# Patient Record
Sex: FEMALE | Race: WHITE | Employment: UNEMPLOYED | URBAN - METROPOLITAN AREA
[De-identification: names, ages, dates, MRNs, and addresses within clinical notes are randomized per-mention and may not be internally consistent; named-entity substitution may affect disease eponyms.]

---

## 2017-01-10 ENCOUNTER — HOSPITAL ENCOUNTER (OUTPATIENT)
Dept: PREADMISSION TESTING | Age: 57
Discharge: HOME | End: 2017-01-10
Attending: NURSE PRACTITIONER
Payer: COMMERCIAL

## 2017-01-10 ENCOUNTER — HOSPITAL ENCOUNTER (OUTPATIENT)
Dept: ULTRASOUND IMAGING | Age: 57
Discharge: HOME | End: 2017-01-10
Attending: SURGERY
Payer: COMMERCIAL

## 2017-01-10 ENCOUNTER — HOSPITAL ENCOUNTER (OUTPATIENT)
Dept: GENERAL RADIOLOGY | Age: 57
Discharge: HOME | End: 2017-01-10
Attending: NURSE PRACTITIONER
Payer: COMMERCIAL

## 2017-01-10 ENCOUNTER — OFFICE VISIT (OUTPATIENT)
Dept: SURGERY | Age: 57
End: 2017-01-10

## 2017-01-10 ENCOUNTER — HOSPITAL ENCOUNTER (OUTPATIENT)
Dept: ULTRASOUND IMAGING | Age: 57
Discharge: HOME | End: 2017-01-10
Attending: NURSE PRACTITIONER
Payer: COMMERCIAL

## 2017-01-10 VITALS
SYSTOLIC BLOOD PRESSURE: 141 MMHG | HEART RATE: 80 BPM | HEIGHT: 65 IN | WEIGHT: 221.13 LBS | BODY MASS INDEX: 36.84 KG/M2 | RESPIRATION RATE: 20 BRPM | TEMPERATURE: 97.7 F | DIASTOLIC BLOOD PRESSURE: 84 MMHG

## 2017-01-10 VITALS
BODY MASS INDEX: 36.85 KG/M2 | TEMPERATURE: 97.7 F | HEART RATE: 80 BPM | HEIGHT: 65 IN | WEIGHT: 221.2 LBS | SYSTOLIC BLOOD PRESSURE: 141 MMHG | RESPIRATION RATE: 20 BRPM | DIASTOLIC BLOOD PRESSURE: 84 MMHG

## 2017-01-10 DIAGNOSIS — K21.9 GASTROESOPHAGEAL REFLUX DISEASE WITHOUT ESOPHAGITIS: ICD-10-CM

## 2017-01-10 DIAGNOSIS — G47.33 OBSTRUCTIVE SLEEP APNEA: ICD-10-CM

## 2017-01-10 DIAGNOSIS — E11.9 CONTROLLED TYPE 2 DIABETES MELLITUS WITHOUT COMPLICATION, WITHOUT LONG-TERM CURRENT USE OF INSULIN (HCC): ICD-10-CM

## 2017-01-10 DIAGNOSIS — E66.01 MORBID OBESITY, UNSPECIFIED OBESITY TYPE (HCC): Primary | ICD-10-CM

## 2017-01-10 DIAGNOSIS — K75.81 NASH (NONALCOHOLIC STEATOHEPATITIS): ICD-10-CM

## 2017-01-10 DIAGNOSIS — E66.01 MORBID OBESITY, UNSPECIFIED OBESITY TYPE (HCC): ICD-10-CM

## 2017-01-10 DIAGNOSIS — Z01.818 PREOP GENERAL PHYSICAL EXAM: ICD-10-CM

## 2017-01-10 LAB
25(OH)D3 SERPL-MCNC: 33.6 NG/ML (ref 30–100)
ALBUMIN SERPL BCP-MCNC: 3.9 G/DL (ref 3.5–5)
ALBUMIN/GLOB SERPL: 1.3 {RATIO} (ref 1.1–2.2)
ALP SERPL-CCNC: 85 U/L (ref 45–117)
ALT SERPL-CCNC: 44 U/L (ref 12–78)
ANION GAP BLD CALC-SCNC: 6 MMOL/L (ref 5–15)
AST SERPL W P-5'-P-CCNC: 22 U/L (ref 15–37)
ATRIAL RATE: 74 BPM
BASOPHILS # BLD AUTO: 0.1 K/UL (ref 0–0.1)
BASOPHILS # BLD: 1 % (ref 0–1)
BILIRUB SERPL-MCNC: 0.4 MG/DL (ref 0.2–1)
BUN SERPL-MCNC: 13 MG/DL (ref 6–20)
BUN/CREAT SERPL: 18 (ref 12–20)
CALCIUM SERPL-MCNC: 9.7 MG/DL (ref 8.5–10.1)
CALCULATED P AXIS, ECG09: 25 DEGREES
CALCULATED R AXIS, ECG10: -9 DEGREES
CALCULATED T AXIS, ECG11: 26 DEGREES
CHLORIDE SERPL-SCNC: 99 MMOL/L (ref 97–108)
CO2 SERPL-SCNC: 33 MMOL/L (ref 21–32)
CREAT SERPL-MCNC: 0.71 MG/DL (ref 0.55–1.02)
CRP SERPL-MCNC: 2.67 MG/DL (ref 0–0.6)
DIAGNOSIS, 93000: NORMAL
DIFFERENTIAL METHOD BLD: ABNORMAL
EOSINOPHIL # BLD: 1.4 K/UL (ref 0–0.4)
EOSINOPHIL NFR BLD: 12 % (ref 0–7)
ERYTHROCYTE [DISTWIDTH] IN BLOOD BY AUTOMATED COUNT: 15 % (ref 11.5–14.5)
GLOBULIN SER CALC-MCNC: 3 G/DL (ref 2–4)
GLUCOSE SERPL-MCNC: 121 MG/DL (ref 65–100)
HCT VFR BLD AUTO: 43.4 % (ref 35–47)
HGB BLD-MCNC: 14.2 G/DL (ref 11.5–16)
LYMPHOCYTES # BLD AUTO: 22 % (ref 12–49)
LYMPHOCYTES # BLD: 2.5 K/UL (ref 0.8–3.5)
MCH RBC QN AUTO: 30.7 PG (ref 26–34)
MCHC RBC AUTO-ENTMCNC: 32.7 G/DL (ref 30–36.5)
MCV RBC AUTO: 93.9 FL (ref 80–99)
MONOCYTES # BLD: 0.7 K/UL (ref 0–1)
MONOCYTES NFR BLD AUTO: 6 % (ref 5–13)
NEUTS SEG # BLD: 6.6 K/UL (ref 1.8–8)
NEUTS SEG NFR BLD AUTO: 59 % (ref 32–75)
P-R INTERVAL, ECG05: 166 MS
PLATELET # BLD AUTO: 290 K/UL (ref 150–400)
POTASSIUM SERPL-SCNC: 3.7 MMOL/L (ref 3.5–5.1)
PROT SERPL-MCNC: 6.9 G/DL (ref 6.4–8.2)
Q-T INTERVAL, ECG07: 398 MS
QRS DURATION, ECG06: 72 MS
QTC CALCULATION (BEZET), ECG08: 441 MS
RBC # BLD AUTO: 4.62 M/UL (ref 3.8–5.2)
RBC MORPH BLD: ABNORMAL
SODIUM SERPL-SCNC: 138 MMOL/L (ref 136–145)
T4 FREE SERPL-MCNC: 1.1 NG/DL (ref 0.8–1.5)
TSH SERPL DL<=0.05 MIU/L-ACNC: 2.07 UIU/ML (ref 0.36–3.74)
VENTRICULAR RATE, ECG03: 74 BPM
WBC # BLD AUTO: 11.3 K/UL (ref 3.6–11)
WBC MORPH BLD: ABNORMAL

## 2017-01-10 PROCEDURE — 71020 XR CHEST PA LAT: CPT

## 2017-01-10 PROCEDURE — 86140 C-REACTIVE PROTEIN: CPT | Performed by: SURGERY

## 2017-01-10 PROCEDURE — 80053 COMPREHEN METABOLIC PANEL: CPT | Performed by: SURGERY

## 2017-01-10 PROCEDURE — 84439 ASSAY OF FREE THYROXINE: CPT | Performed by: SURGERY

## 2017-01-10 PROCEDURE — 84443 ASSAY THYROID STIM HORMONE: CPT | Performed by: SURGERY

## 2017-01-10 PROCEDURE — 93005 ELECTROCARDIOGRAM TRACING: CPT

## 2017-01-10 PROCEDURE — 85025 COMPLETE CBC W/AUTO DIFF WBC: CPT | Performed by: SURGERY

## 2017-01-10 PROCEDURE — 36415 COLL VENOUS BLD VENIPUNCTURE: CPT | Performed by: SURGERY

## 2017-01-10 PROCEDURE — 76700 US EXAM ABDOM COMPLETE: CPT

## 2017-01-10 PROCEDURE — 82306 VITAMIN D 25 HYDROXY: CPT | Performed by: SURGERY

## 2017-01-10 RX ORDER — ONDANSETRON 4 MG/1
4 TABLET, ORALLY DISINTEGRATING ORAL
Qty: 30 TAB | Refills: 0 | Status: SHIPPED | OUTPATIENT
Start: 2017-01-10 | End: 2017-02-08 | Stop reason: SDUPTHER

## 2017-01-10 RX ORDER — ENOXAPARIN SODIUM 100 MG/ML
40 INJECTION SUBCUTANEOUS DAILY
Qty: 14 SYRINGE | Refills: 0 | Status: SHIPPED | OUTPATIENT
Start: 2017-01-10 | End: 2017-01-29

## 2017-01-10 NOTE — PROGRESS NOTES
Bariatric Surgery History and Physical  Amanda Glass is a 64 y.o. female scheduled for laparoscopic malabsorptive gastric bypass with Dr. Javier Almazan on 2017 at [facility]. Patient comes in office today for history and physical, and to receive pre-operative liver shrinking diet.  Patient height is 5'5'', weight is 221 pounds, and BMI is 36.81  Bariatric comorbidities present: hypertension, non-insulin dependent diabetes, GERD and sleep apnea  Patient Active Problem List    Diagnosis Date Noted    History of pulmonary embolus (PE) 2016    Anxiety 2016    Recurrent major depressive disorder, in remission ([address] 2016    CAD (coronary artery disease) 2015    DM (diabetes mellitus) ([address] 2015    FH: diabetes mellitus     Metabolic syndrome 4116    Essential hypertension 2015    Chronic pain 2015    GARCIA (nonalcoholic steatohepatitis) 2011    Morbid obesity ([address] 2010    Obstructive sleep apnea 2010    Asthma 2010    Arthritis 2010    GERD (gastroesophageal reflux disease) 2010    Edema 2010     Past Medical History   Diagnosis Date    Arthritis     Asthma     Autoimmune disease ([address]      Malcahi Potter    CAD (coronary artery disease)      s/p stents 2015    Cardiac murmur     Depression     Diabetes ([address]     DVT (deep venous thrombosis) ([address]     GERD (gastroesophageal reflux disease)     Hypertension     Musculoskeletal disorder     EDMOND (obstructive sleep apnea)      wears cpap    S/P TONJA (total abdominal hysterectomy)     Thromboembolus ([address]      PE      Past Surgical History   Procedure Laterality Date    Hx total abdominal hysterectomy       age 22    Pr cardiac surg procedure unlist  2015     STENT PLACED    Hx appendectomy  1963    Hx hysterectomy  1985    Hx  section  [address] 40      Social History   Substance Use Topics    Smoking status: Never Smoker    Smokeless tobacco: Never Used    Alcohol use No      Comment: rarely      Family History   Problem Relation Age of Onset    Cancer Mother 67     colon    Diabetes Mother     Arthritis-osteo Mother     Stroke Mother     Migraines Mother     Diabetes Father     Heart Disease Father     Heart Attack Father     Hypertension Father     High Cholesterol Father     COPD Father     Heart Failure Father     Cancer Other     Diabetes Other     Heart Disease Other     Hypertension Other     Cancer Brother      lymphoma    Cancer Brother      PROSTATE AND LYMPHOMA    Cancer Maternal Grandmother      stomach    Asthma Brother     Asthma Brother     Stroke Brother     Cancer Maternal Aunt      lung     Cancer Maternal Uncle      lung    Cancer Maternal Uncle      melanoma    Anesth Problems Neg Hx       Prior to Admission medications    Medication Sig Start Date End Date Taking? Authorizing Provider   ondansetron (ZOFRAN ODT) 4 mg disintegrating tablet Take 1 Tab by mouth every six (6) hours as needed for Nausea. 1/10/17  Yes Uri Rhodes NP   guanFACINE (TENEX) 1 mg tablet TAKE 1 TABLET BY MOUTH TWICE A DAY 12/28/16  Yes Yovanny Julian MD   ALPRAZolam Molly Ridgel) 0.5 mg tablet TAKE 1 TABLET BY MOUTH 3 TIMES A DAY AS NEEDED FOR ANXIETY. MAX DAILY AMT 1.5MG 12/27/16  Yes Yovanny Julian MD   losartan (COZAAR) 50 mg tablet TAKE 1 TABLET BY MOUTH EVERY DAY **REPLACES AMLODIPINE** 12/27/16  Yes Yovanny Julian MD   albuterol (PROAIR HFA) 90 mcg/actuation inhaler Take 1 Puff by inhalation every four (4) hours as needed for Wheezing or Shortness of Breath. 12/21/16  Yes Maritza Diaz MD   omeprazole (PRILOSEC) 20 mg capsule TAKE ONE CAPSULE BY MOUTH EVERY DAY 12/20/16  Yes Yovanny Julian MD   budesonide (PULMICORT) 180 mcg/actuation aepb inhaler Take 1 Puff by inhalation two (2) times a day.  Indications: MAINTENANCE THERAPY FOR ASTHMA 11/4/16  Yes Madhavi Baez MD metoprolol tartrate (LOPRESSOR) 100 mg IR tablet TAKE 1 TABLET BY MOUTH TWICE A DAY 10/21/16  Yes Telly Maloney MD   cyclobenzaprine (FLEXERIL) 10 mg tablet Take  by mouth three (3) times daily as needed for Muscle Spasm(s). Yes Historical Provider   albuterol (ACCUNEB) 0.63 mg/3 mL nebulizer solution 3 mL by Nebulization route every four (4) hours as needed for Wheezing or Shortness of Breath. 10/21/16  Yes Ariella Rodriguez MD   rosuvastatin (CRESTOR) 40 mg tablet TAKE 1 TABLET BY MOUTH NIGHTLY 10/12/16  Yes Aquiles Cordon MD   chlorthalidone (HYGROTEN) 25 mg tablet TAKE 1 TABLET BY MOUTH DAILY 10/6/16  Yes Telly Maloney MD   diclofenac EC (VOLTAREN) 75 mg EC tablet Take 75 mg by mouth two (2) times a day. Yes Historical Provider   gabapentin (NEURONTIN) 300 mg capsule Take 2 Caps by mouth three (3) times daily. 8/17/16  Yes Mylene Banda MD   metFORMIN (GLUCOPHAGE) 500 mg tablet TAKE 1 TABLET BY MOUTH DAILY 8/17/16  Yes Mylene Banda MD   FLUoxetine (PROZAC) 20 mg capsule TAKE ONE CAPSULE BY MOUTH EVERY DAY 8/2/16  Yes Aquiles Cordon MD   Blood-Glucose Meter monitoring kit Use to check glucose once a day 7/15/16  Yes Aquiles Cordon MD   alcohol swabs (ALCOHOL PADS) padm Use when checking blood glucose 7/15/16  Yes Aquiles Cordon MD   Lancets misc Use once a day. Please give one compatible with patient's meter 7/15/16  Yes Aquiles Cordon MD   glucose blood VI test strips (BLOOD GLUCOSE TEST) strip Use once a day 7/15/16  Yes Aquiles Cordon MD   fluticasone (FLONASE) 50 mcg/actuation nasal spray 2 puffs each nostril daily 4/11/16  Yes Mylene Banda MD   valACYclovir (VALTREX) 500 mg tablet Take 1 Tab by mouth two (2) times a day. Patient taking differently: Take 500 mg by mouth two (2) times daily as needed. 4/11/16  Yes Mylene Banda MD   Aspirin, Buffered 81 mg tab Take  by mouth daily.    Yes Historical Provider   diclofenac (VOLTAREN) 1 % topical gel Apply 4 g to affected area four (4) times daily. Yes Historical Provider   cetirizine (ZYRTEC) 10 mg tablet Take  by mouth daily. Yes Historical Provider   nitroglycerin (NITROSTAT) 0.4 mg SL tablet 1 Tab by SubLINGual route every five (5) minutes as needed for Chest Pain. 12/11/15   Paris Lipscomb MD   dicyclomine (BENTYL) 10 mg capsule TAKE 1 (ONE) CAPSULE ORALLY AS NEEDED EVERY 6 HOURS 9/13/14   Sravan Gonsalez MD     Allergies   Allergen Reactions    Accupril [Quinapril] Cough    Lipitor [Atorvastatin] Other (comments)     aches    Norvasc [Amlodipine] Swelling     On legs at 10 mg dose           HPI    Review of Systems   Constitutional: Negative for chills, diaphoresis, fever and weight loss. HENT: Positive for congestion. Negative for hearing loss and nosebleeds. Eyes: Positive for blurred vision. Negative for double vision, photophobia and pain. Eyeglasses for reading   Respiratory: Positive for sputum production and shortness of breath. Negative for cough, wheezing and stridor. Asthma and sleep apnea with CPAP usae   Cardiovascular: Negative for chest pain, palpitations and leg swelling. Gastrointestinal: Positive for constipation and diarrhea. Negative for abdominal pain, blood in stool, heartburn, nausea and vomiting. History of irritable bowel. Genitourinary: Negative for dysuria, frequency, hematuria and urgency. No kidney stone history   Musculoskeletal: Positive for back pain and joint pain. Negative for falls and neck pain. Skin: Negative for itching and rash. Intermittent skin intertrigo to skin folds to lower abdomen   Neurological: Negative for dizziness, tingling, seizures, loss of consciousness, weakness and headaches. Endo/Heme/Allergies:        Diabetes diagnosed in 2016. Blood glucose levels 90's at home. No thyroid disease, no anemia, and no gout history   Psychiatric/Behavioral: Positive for depression.  Negative for hallucinations, memory loss, substance abuse and suicidal ideas. The patient is nervous/anxious. The patient does not have insomnia. Physical Exam   Constitutional: She is oriented to person, place, and time. She appears well-developed and well-nourished. Non-toxic appearance. No distress. HENT:   Head: Normocephalic and atraumatic. Head is without abrasion and without contusion. Hair is normal.   Right Ear: External ear normal.   Left Ear: External ear normal.   Nose: Nose normal. No septal deviation or nasal septal hematoma. No epistaxis. Right sinus exhibits no maxillary sinus tenderness and no frontal sinus tenderness. Left sinus exhibits no maxillary sinus tenderness and no frontal sinus tenderness. Mouth/Throat: Uvula is midline, oropharynx is clear and moist and mucous membranes are normal. Mucous membranes are not pale and not dry. She does not have dentures. Abnormal dentition. Eyes: Conjunctivae, EOM and lids are normal. Pupils are equal, round, and reactive to light. No scleral icterus. Right eye exhibits no nystagmus. Left eye exhibits no nystagmus. Neck: Trachea normal and normal range of motion. No JVD present. Carotid bruit is not present. No tracheal deviation present. No thyroid mass present. Cardiovascular: Normal rate, regular rhythm, S1 normal, S2 normal, normal heart sounds, intact distal pulses and normal pulses. Exam reveals no gallop. No murmur heard. Pulmonary/Chest: Effort normal and breath sounds normal. No respiratory distress. She has no decreased breath sounds. She has no wheezes. She has no rhonchi. She has no rales. She exhibits no laceration, no crepitus and no retraction. Abdominal: Soft. Normal appearance and bowel sounds are normal. She exhibits no shifting dullness, no distension, no fluid wave and no ascites. There is hepatomegaly. There is no tenderness. No hernia. Hernia confirmed negative in the ventral area. Abdomen, obese, rounded.  No hernia/masses palpated   Musculoskeletal: Normal range of motion. Lymphadenopathy:        Head (right side): No submental, no submandibular, no tonsillar, no preauricular and no posterior auricular adenopathy present. Head (left side): No submental, no submandibular, no tonsillar, no preauricular and no posterior auricular adenopathy present. She has no cervical adenopathy. She has no axillary adenopathy. Neurological: She is alert and oriented to person, place, and time. She has normal strength. No cranial nerve deficit or sensory deficit. Skin: Skin is warm and dry. No rash noted. She is not diaphoretic. No cyanosis. Nails show no clubbing. Psychiatric: She has a normal mood and affect. Her speech is normal and behavior is normal. Judgment and thought content normal. Cognition and memory are normal.   Blood pressure 141/84, pulse 80, temperature 97.7 °F (36.5 °C), resp. rate 20, height 5' 5\" (1.651 m), weight 221 lb 3.2 oz (100.3 kg), last menstrual period 01/01/1985. ASSESSMENT and PLAN      Morbid obesity with body mass index of 36.81. Co-morbids listed above    Patient is  scheduled for laparoscopic malabsorptive gastric bypass with Dr. Shy Saavedra on January 25, 2017 at [address] [place] [address].Counseled patient in regard to post diet restrictions, follow- up office visits, follow- up care, and follow up medications. Prescriptions of Zofran and Lovenox give Patient as received educational booklet and vitamin list. Discussed with patient and started patient on pre-operative liver shrinking diet. Patient received copy of bariatric educational booklet. Patient informed to discontinue use of Metformin, Aspirin, and Voltaren one week prior to surgery. Answered all questions and patient wishes to proceed. Signed By: Liana Strickland NP     January 10, 2017       35 minutes was spent with patient.

## 2017-01-10 NOTE — PATIENT INSTRUCTIONS
Start liver shrinking diet 1/11/ 2017    Stop taking Aspirin, Metformin, and Voltaren one week prior to surgery    Make 2, 4, and 6 week post operative visits         Laparoscopic Abraham-en-Y Gastric Bypass: Before Your Surgery  What is a laparoscopic Abraham-en-Y gastric bypass? A Abraham-en-Y (say \"mayela-en-why\") gastric bypass is surgery to help you lose weight. It does this in two ways. First, it makes the stomach smaller. Second, it changes the connection between the stomach and the intestines. These changes help you eat less and feel full sooner. You will be asleep during the surgery. The doctor will make several small cuts in your belly. These cuts are called incisions. Then the doctor puts special tools and a camera through the incisions. Next, the doctor separates the upper part of your stomach from the rest of your stomach to make a small pouch. This new pouch will hold the food you eat. The doctor will connect the new stomach pouch to the middle part of your small intestine. Then he or she will close the incisions with stitches. The incisions leave scars that fade with time. After the surgery, the food you eat will go from the small pouch to the middle part of your intestine. Food will no longer go through the lower part of your stomach or the first part of your intestines. You will stay in the hospital 1 or more days after the surgery. Most people can go back to work or their usual routine in about 2 to 4 weeks. Follow-up care is a key part of your treatment and safety. Be sure to make and go to all appointments, and call your doctor if you are having problems. It's also a good idea to know your test results and keep a list of the medicines you take. What happens before surgery? Surgery can be stressful. This information will help you understand what you can expect. And it will help you safely prepare for surgery.   Preparing for surgery  · Understand exactly what surgery is planned, along with the risks, benefits, and other options. · Tell your doctors ALL the medicines, vitamins, supplements, and herbal remedies you take. Some of these can increase the risk of bleeding or interact with anesthesia. · If you take blood thinners, such as warfarin (Coumadin), clopidogrel (Plavix), or aspirin, be sure to talk to your doctor. He or she will tell you if you should stop taking these medicines before your surgery. Make sure that you understand exactly what your doctor wants you to do. · Your doctor will tell you which medicines to take or stop before your surgery. You may need to stop taking certain medicines a week or more before surgery. So talk to your doctor as soon as you can. · If you have an advance directive, let your doctor know. It may include a living will and a durable power of  for health care. Bring a copy to the hospital. If you don't have one, you may want to prepare one. It lets your doctor and loved ones know your health care wishes. Doctors advise that everyone prepare these papers before any type of surgery or procedure. · You may need to follow a clear liquid diet for several days before surgery. Your doctor will tell you how to do this. What happens on the day of surgery? · Follow the instructions exactly about when to stop eating and drinking. If you don't, your surgery may be canceled. If your doctor told you to take your medicines on the day of surgery, take them with only a sip of water. · Take a bath or shower before you come in for your surgery. Do not apply lotions, perfumes, deodorants, or nail polish. · Do not shave the surgical site yourself. · Take off all jewelry and piercings. And take out contact lenses, if you wear them. At the hospital or surgery center  · Bring a picture ID. · The area for surgery is often marked to make sure there are no errors. · You will be kept comfortable and safe by your anesthesia provider. You will be asleep during the surgery.   · The surgery will take about 2 to 3 hours. Going home  · Be sure you have someone to drive you home. Anesthesia and pain medicine make it unsafe for you to drive. · You will be given more specific instructions about recovering from your surgery. They will cover things like diet, wound care, follow-up care, driving, and getting back to your normal routine. When should you call your doctor? · You have questions or concerns. · You don't understand how to prepare for your surgery. · You become ill before the surgery (such as fever, flu, or a cold). · You need to reschedule or have changed your mind about having the surgery. Where can you learn more? Go to http://maliha-sanchez.info/. Enter Q715 in the search box to learn more about \"Laparoscopic Abraham-en-Y Gastric Bypass: Before Your Surgery. \"  Current as of: February 16, 2016  Content Version: 11.1  © 5171-1873 [employer]. Care instructions adapted under license by [email or web address] (which disclaims liability or warranty for this information). If you have questions about a medical condition or this instruction, always ask your healthcare professional. [address] any warranty or liability for your use of this information.

## 2017-01-10 NOTE — MR AVS SNAPSHOT
Visit Information Date & Time Provider Department Dept. Phone Encounter #  
 1/10/2017  1:30 PM Alfredo Cerda NP [facility] 22 [facility] [phone] [id number] Your Appointments 1/17/2017  1:00 PM  
ESTABLISHED PATIENT with Racheal Sandoval MD  
[address] [facility]) Appt Note: sign consents for lap RY 1/25/17 ($0cp--$0pb) [address] [facility] 100[address] [facility]  
[phone]  
  
   
  [address]  
  
    
 6/23/2017  1:40 PM  
ESTABLISHED PATIENT with Queenie Paetl MD  
[facility] ([facility]) Appt Note: 6 mo fup; 6 mo [address] [place] [address] [facility]  
[address] [place] [address] [address] Upcoming Health Maintenance Date Due HEMOGLOBIN A1C Q6M 2/23/2017 BREAST CANCER SCRN MAMMOGRAM 7/14/2017 FOOT EXAM Q1 8/23/2017 MICROALBUMIN Q1 8/23/2017 LIPID PANEL Q1 8/23/2017 EYE EXAM RETINAL OR DILATED Q1 8/24/2017 PAP AKA CERVICAL CYTOLOGY 8/23/2019 COLONOSCOPY 6/23/2020 DTaP/Tdap/Td series (2 - Td) 8/14/2022 Allergies as of 1/10/2017  Review Complete On: 1/10/2017 By: Alfredo Cerda NP Severity Noted Reaction Type Reactions Accupril [Quinapril]  07/07/2010    Cough Lipitor [Atorvastatin]  07/07/2010    Other (comments)  
 aches Norvasc [Amlodipine]  07/22/2015    Swelling On legs at 10 mg dose Current Immunizations  Reviewed on 8/23/2016 Name Date Influenza Vaccine 9/1/2015 Pneumococcal Polysaccharide (PPSV-23) 8/4/2015 TD Vaccine 11/7/2005 TDAP Vaccine 8/14/2012 Not reviewed this visit You Were Diagnosed With   
  
 Codes Comments Morbid obesity, unspecified [facility] type ([facility]    -  Primary ICD-10-CM: E66.01 
ICD-9-CM: 278.01  Preop general physical exam     ICD-10-CM: U45.742 
 ICD-9-CM: V72.83 Gastroesophageal reflux disease without esophagitis     ICD-10-CM: K21.9 ICD-9-CM: 530.81 Obstructive sleep apnea     ICD-10-CM: G47.33 
ICD-9-CM: 327.23 Controlled type 2 diabetes mellitus without complication, without long-term current use of insulin ([facility]     ICD-10-CM: E11.9 ICD-9-CM: 250.00 BMI 36.0-36.9,adult     ICD-10-CM: L48.99 
ICD-9-CM: V85.36 Vitals BP Pulse Temp Resp Height(growth percentile) Weight(growth percentile) 141/84 80 97.7 °F (36.5 °C) 20 5' 5\" (1.651 m) 221 lb 3.2 oz (100.3 kg) LMP SpO2 BMI OB Status Smoking Status 01/01/1985 (!) 20% 36.81 kg/m2 Hysterectomy Never Smoker BMI and BSA Data Body Mass Index Body Surface Area  
 36.81 kg/m 2 2.14 m 2 Preferred Pharmacy Pharmacy Name Phone [id number] [address] AT [facility] [phone] Your Updated Medication List  
  
   
This list is accurate as of: 1/10/17  2:07 PM.  Always use your most recent med list.  
  
  
  
  
 * albuterol 0.63 mg/3 mL nebulizer solution Commonly known as:  ACCUNEB  
3 mL by Nebulization route every four (4) hours as needed for Wheezing or Shortness of Breath. * albuterol 90 mcg/actuation inhaler Commonly known as:  PROAIR HFA Take 1 Puff by inhalation every four (4) hours as needed for Wheezing or Shortness of Breath. alcohol swabs Padm Commonly known as:  ALCOHOL PADS Use when checking blood glucose ALPRAZolam 0.5 mg tablet Commonly known as:  XANAX  
TAKE 1 TABLET BY MOUTH 3 TIMES A DAY AS NEEDED FOR ANXIETY. MAX DAILY AMT 1.5MG aspirin, buffered 81 mg Tab Take  by mouth daily. Blood-Glucose Meter monitoring kit Use to check glucose once a day  
  
 budesonide 180 mcg/actuation Aepb inhaler Commonly known as:  PULMICORT Take 1 Puff by inhalation two (2) times a day. Indications: MAINTENANCE THERAPY FOR ASTHMA chlorthalidone 25 mg tablet Commonly known as:  HYGROTEN  
TAKE 1 TABLET BY MOUTH DAILY  
  
 cyclobenzaprine 10 mg tablet Commonly known as:  FLEXERIL Take  by mouth three (3) times daily as needed for Muscle Spasm(s). dicyclomine 10 mg capsule Commonly known as:  BENTYL TAKE 1 (ONE) CAPSULE ORALLY AS NEEDED EVERY 6 HOURS FLUoxetine 20 mg capsule Commonly known as:  PROzac TAKE ONE CAPSULE BY MOUTH EVERY DAY  
  
 fluticasone 50 mcg/actuation nasal spray Commonly known as:  Xavi Sacks 2 puffs each nostril daily  
  
 gabapentin 300 mg capsule Commonly known as:  NEURONTIN Take 2 Caps by mouth three (3) times daily. glucose blood VI test strips strip Commonly known as:  blood glucose test  
Use once a day  
  
 guanFACINE 1 mg tablet Commonly known as:  TENEX  
TAKE 1 TABLET BY MOUTH TWICE A DAY Lancets Misc Use once a day. Please give one compatible with patient's meter  
  
 losartan 50 mg tablet Commonly known as:  COZAAR  
TAKE 1 TABLET BY MOUTH EVERY DAY **REPLACES AMLODIPINE**  
  
 metFORMIN 500 mg tablet Commonly known as:  GLUCOPHAGE  
TAKE 1 TABLET BY MOUTH DAILY  
  
 metoprolol tartrate 100 mg IR tablet Commonly known as:  LOPRESSOR  
TAKE 1 TABLET BY MOUTH TWICE A DAY  
  
 nitroglycerin 0.4 mg SL tablet Commonly known as:  NITROSTAT  
1 Tab by SubLINGual route every five (5) minutes as needed for Chest Pain. omeprazole 20 mg capsule Commonly known as:  PRILOSEC  
TAKE ONE CAPSULE BY MOUTH EVERY DAY  
  
 ondansetron 4 mg disintegrating tablet Commonly known as:  ZOFRAN ODT Take 1 Tab by mouth every six (6) hours as needed for Nausea. rosuvastatin 40 mg tablet Commonly known as:  CRESTOR  
TAKE 1 TABLET BY MOUTH NIGHTLY  
  
 valACYclovir 500 mg tablet Commonly known as:  VALTREX Take 1 Tab by mouth two (2) times a day. * VOLTAREN 1 % Gel Generic drug:  diclofenac Apply 4 g to affected area four (4) times daily. * diclofenac EC 75 mg EC tablet Commonly known as:  VOLTAREN Take 75 mg by mouth two (2) times a day. ZyrTEC 10 mg tablet Generic drug:  cetirizine Take  by mouth daily. * Notice: This list has 4 medication(s) that are the same as other medications prescribed for you. Read the directions carefully, and ask your doctor or other care provider to review them with you. Prescriptions Sent to Pharmacy Refills  
 ondansetron (ZOFRAN ODT) 4 mg disintegrating tablet 0 Sig: Take 1 Tab by mouth every six (6) hours as needed for Nausea. Class: Normal  
 Pharmacy: [address] [place] [address] Ph #: [phone] Route: Oral  
  
Patient Instructions Start liver shrinking diet 1/11/ 2017 Stop taking Aspirin, Metformin, and Voltaren one week prior to surgery Make 2, 4, and 6 week post operative visits Laparoscopic Abraham-en-Y Gastric Bypass: Before Your Surgery What is a laparoscopic Abraham-en-Y gastric bypass? A Abraham-en-Y (say \"mayela-en-why\") gastric bypass is surgery to help you lose weight. It does this in two ways. First, it makes the stomach smaller. Second, it changes the connection between the stomach and the intestines. These changes help you eat less and feel full sooner. You will be asleep during the surgery. The doctor will make several small cuts in your belly. These cuts are called incisions. Then the doctor puts special tools and a camera through the incisions. Next, the doctor separates the upper part of your stomach from the rest of your stomach to make a small pouch. This new pouch will hold the food you eat. The doctor will connect the new stomach pouch to the middle part of your small intestine. Then he or she will close the incisions with stitches. The incisions leave scars that fade with time. After the surgery, the food you eat will go from the small pouch to the middle part of your intestine. Food will no longer go through the lower part of your stomach or the first part of your intestines. You will stay in the hospital 1 or more days after the surgery. Most people can go back to work or their usual routine in about 2 to 4 weeks. Follow-up care is a key part of your treatment and safety. Be sure to make and go to all appointments, and call your doctor if you are having problems. It's also a good idea to know your test results and keep a list of the medicines you take. What happens before surgery? Surgery can be stressful. This information will help you understand what you can expect. And it will help you safely prepare for surgery. Preparing for surgery · Understand exactly what surgery is planned, along with the risks, benefits, and other options. · Tell your doctors ALL the medicines, vitamins, supplements, and herbal remedies you take. Some of these can increase the risk of bleeding or interact with anesthesia. · If you take blood thinners, such as warfarin (Coumadin), clopidogrel (Plavix), or aspirin, be sure to talk to your doctor. He or she will tell you if you should stop taking these medicines before your surgery. Make sure that you understand exactly what your doctor wants you to do. · Your doctor will tell you which medicines to take or stop before your surgery. You may need to stop taking certain medicines a week or more before surgery. So talk to your doctor as soon as you can. · If you have an advance directive, let your doctor know. It may include a living will and a durable power of  for health care. Bring a copy to the hospital. If you don't have one, you may want to prepare one. It lets your doctor and loved ones know your health care wishes. Doctors advise that everyone prepare these papers before any type of surgery or procedure. · You may need to follow a clear liquid diet for several days before surgery. Your doctor will tell you how to do this. What happens on the day of surgery? · Follow the instructions exactly about when to stop eating and drinking. If you don't, your surgery may be canceled. If your doctor told you to take your medicines on the day of surgery, take them with only a sip of water. · Take a bath or shower before you come in for your surgery. Do not apply lotions, perfumes, deodorants, or nail polish. · Do not shave the surgical site yourself. · Take off all jewelry and piercings. And take out contact lenses, if you wear them. At the hospital or surgery center · Bring a picture ID. · The area for surgery is often marked to make sure there are no errors. · You will be kept comfortable and safe by your anesthesia provider. You will be asleep during the surgery. · The surgery will take about 2 to 3 hours. Going home · Be sure you have someone to drive you home. Anesthesia and pain medicine make it unsafe for you to drive. · You will be given more specific instructions about recovering from your surgery. They will cover things like diet, wound care, follow-up care, driving, and getting back to your normal routine. When should you call your doctor? · You have questions or concerns. · You don't understand how to prepare for your surgery. · You become ill before the surgery (such as fever, flu, or a cold). · You need to reschedule or have changed your mind about having the surgery. Where can you learn more? Go to http://maliha-sanchez.info/. Enter E058 in the search box to learn more about \"Laparoscopic Abraham-en-Y Gastric Bypass: Before Your Surgery. \" Current as of: February 16, 2016 Content Version: 11.1 © 8208-9768 [employer].  Care instructions adapted under license by [employer] (which disclaims liability or warranty for this information). If you have questions about a medical condition or this instruction, always ask your healthcare professional. [address] any warranty or liability for your use of this information. Introducing [facility] SERVICES! Dear Juan Pablo Franki: 
Thank you for requesting a "[employer]" account. Our records indicate that you already have an active "[employer]" account. You can access your account anytime at [email or web address] Did you know that you can access your hospital and ER discharge instructions at any time in "[employer]"? You can also review all of your test results from your hospital stay or ER visit. Additional Information If you have questions, please visit the Frequently Asked Questions section of the "[employer]" website at [email or web address] Remember, "[employer]" is NOT to be used for urgent needs. For medical emergencies, dial 911. Now available from your iPhone and Android! Please provide this summary of care documentation to your next provider. Your primary care clinician is listed as Mel Izquierdo. If you have any questions after today's visit, please call [phone].

## 2017-01-10 NOTE — PROGRESS NOTES
1. Have you been to the ER, urgent care clinic since your last visit? Hospitalized since your last visit?  no    2. Have you seen or consulted any other health care providers outside of the [address] [place] [address] since your last visit? Include any pap smears or colon screening.    no

## 2017-01-10 NOTE — PERIOP NOTES
Patient given surgical site infection FAQ handout and CHG wipes. Preop instructions reviewed and patient verbalizes understanding of instructions. Patient has been given the opportunity to ask additional questions.

## 2017-01-16 RX ORDER — TIZANIDINE 4 MG/1
TABLET ORAL
Qty: 30 TAB | Refills: 3 | Status: SHIPPED | OUTPATIENT
Start: 2017-01-16 | End: 2017-03-30

## 2017-01-17 ENCOUNTER — OFFICE VISIT (OUTPATIENT)
Dept: SURGERY | Age: 57
End: 2017-01-17

## 2017-01-17 VITALS
OXYGEN SATURATION: 95 % | RESPIRATION RATE: 15 BRPM | BODY MASS INDEX: 36.4 KG/M2 | WEIGHT: 218.5 LBS | HEART RATE: 65 BPM | SYSTOLIC BLOOD PRESSURE: 98 MMHG | TEMPERATURE: 97.5 F | HEIGHT: 65 IN | DIASTOLIC BLOOD PRESSURE: 63 MMHG

## 2017-01-17 DIAGNOSIS — E66.9 OBESITY (BMI 30-39.9): Primary | ICD-10-CM

## 2017-01-17 DIAGNOSIS — I15.9 SECONDARY HYPERTENSION: ICD-10-CM

## 2017-01-17 DIAGNOSIS — K21.9 GASTROESOPHAGEAL REFLUX DISEASE WITHOUT ESOPHAGITIS: ICD-10-CM

## 2017-01-17 DIAGNOSIS — G47.33 OSA (OBSTRUCTIVE SLEEP APNEA): ICD-10-CM

## 2017-01-17 DIAGNOSIS — E11.8 CONTROLLED TYPE 2 DIABETES MELLITUS WITH COMPLICATION, WITHOUT LONG-TERM CURRENT USE OF INSULIN (HCC): ICD-10-CM

## 2017-01-17 NOTE — PROGRESS NOTES
1. Have you been to the ER, urgent care clinic since your last visit? Hospitalized since your last visit?no    2. Have you seen or consulted any other health care providers outside of the [address] [place] [address] since your last visit? Include any pap smears or colon screening.  no

## 2017-01-19 NOTE — PROGRESS NOTES
Toshia Edge is an 64 y.o.   female who comes in for a meet the doctor appointment. she is planning on the laparoscopic gastric bypass surgery . she has completed her pre-operative work-up and is ready form surgery. We discussed our office consent form in detail and we both signed it. We discussed complications including but not limited to bleeding, infection, injury to abdominal organs, leak, PE/DVT, cardiopulmonary events and poor weight loss. she understands and agrees to surgery. More than 30 minutes was spent with the patient and over half that time was spent on counseling on the risks of the gastric bypass surgery.

## 2017-01-24 ENCOUNTER — ANESTHESIA EVENT (OUTPATIENT)
Dept: SURGERY | Age: 57
DRG: 620 | End: 2017-01-24
Payer: COMMERCIAL

## 2017-01-25 ENCOUNTER — ANESTHESIA (OUTPATIENT)
Dept: SURGERY | Age: 57
DRG: 620 | End: 2017-01-25
Payer: COMMERCIAL

## 2017-01-25 PROCEDURE — 74011250636 HC RX REV CODE- 250/636

## 2017-01-25 PROCEDURE — 77030008684 HC TU ET CUF COVD -B: Performed by: ANESTHESIOLOGY

## 2017-01-25 PROCEDURE — P9045 ALBUMIN (HUMAN), 5%, 250 ML: HCPCS

## 2017-01-25 PROCEDURE — 77030026438 HC STYL ET INTUB CARD -A: Performed by: ANESTHESIOLOGY

## 2017-01-25 PROCEDURE — 77030008771 HC TU NG SALEM SUMP -A: Performed by: ANESTHESIOLOGY

## 2017-01-25 PROCEDURE — 74011000250 HC RX REV CODE- 250

## 2017-01-25 PROCEDURE — 77030013079 HC BLNKT BAIR HGGR 3M -A: Performed by: ANESTHESIOLOGY

## 2017-01-25 PROCEDURE — 77030011992 HC AIRWY NASOPHGL TELE -A: Performed by: ANESTHESIOLOGY

## 2017-01-25 RX ORDER — KETOROLAC TROMETHAMINE 30 MG/ML
INJECTION, SOLUTION INTRAMUSCULAR; INTRAVENOUS AS NEEDED
Status: DISCONTINUED | OUTPATIENT
Start: 2017-01-25 | End: 2017-01-25 | Stop reason: HOSPADM

## 2017-01-25 RX ORDER — PHENYLEPHRINE HCL IN 0.9% NACL 0.4MG/10ML
SYRINGE (ML) INTRAVENOUS AS NEEDED
Status: DISCONTINUED | OUTPATIENT
Start: 2017-01-25 | End: 2017-01-25 | Stop reason: HOSPADM

## 2017-01-25 RX ORDER — MIDAZOLAM HYDROCHLORIDE 1 MG/ML
INJECTION, SOLUTION INTRAMUSCULAR; INTRAVENOUS AS NEEDED
Status: DISCONTINUED | OUTPATIENT
Start: 2017-01-25 | End: 2017-01-25 | Stop reason: HOSPADM

## 2017-01-25 RX ORDER — SODIUM CHLORIDE, SODIUM LACTATE, POTASSIUM CHLORIDE, CALCIUM CHLORIDE 600; 310; 30; 20 MG/100ML; MG/100ML; MG/100ML; MG/100ML
INJECTION, SOLUTION INTRAVENOUS
Status: DISCONTINUED | OUTPATIENT
Start: 2017-01-25 | End: 2017-01-25 | Stop reason: HOSPADM

## 2017-01-25 RX ORDER — SUCCINYLCHOLINE CHLORIDE 20 MG/ML
INJECTION INTRAMUSCULAR; INTRAVENOUS AS NEEDED
Status: DISCONTINUED | OUTPATIENT
Start: 2017-01-25 | End: 2017-01-25 | Stop reason: HOSPADM

## 2017-01-25 RX ORDER — PROPOFOL 10 MG/ML
INJECTION, EMULSION INTRAVENOUS AS NEEDED
Status: DISCONTINUED | OUTPATIENT
Start: 2017-01-25 | End: 2017-01-25 | Stop reason: HOSPADM

## 2017-01-25 RX ORDER — ONDANSETRON 2 MG/ML
INJECTION INTRAMUSCULAR; INTRAVENOUS AS NEEDED
Status: DISCONTINUED | OUTPATIENT
Start: 2017-01-25 | End: 2017-01-25 | Stop reason: HOSPADM

## 2017-01-25 RX ORDER — ROCURONIUM BROMIDE 10 MG/ML
INJECTION, SOLUTION INTRAVENOUS AS NEEDED
Status: DISCONTINUED | OUTPATIENT
Start: 2017-01-25 | End: 2017-01-25 | Stop reason: HOSPADM

## 2017-01-25 RX ORDER — NEOSTIGMINE METHYLSULFATE 1 MG/ML
INJECTION INTRAVENOUS AS NEEDED
Status: DISCONTINUED | OUTPATIENT
Start: 2017-01-25 | End: 2017-01-25 | Stop reason: HOSPADM

## 2017-01-25 RX ORDER — CEFAZOLIN SODIUM IN 0.9 % NACL 2 G/100 ML
PLASTIC BAG, INJECTION (ML) INTRAVENOUS AS NEEDED
Status: DISCONTINUED | OUTPATIENT
Start: 2017-01-25 | End: 2017-01-25 | Stop reason: HOSPADM

## 2017-01-25 RX ORDER — DEXAMETHASONE SODIUM PHOSPHATE 4 MG/ML
INJECTION, SOLUTION INTRA-ARTICULAR; INTRALESIONAL; INTRAMUSCULAR; INTRAVENOUS; SOFT TISSUE AS NEEDED
Status: DISCONTINUED | OUTPATIENT
Start: 2017-01-25 | End: 2017-01-25 | Stop reason: HOSPADM

## 2017-01-25 RX ORDER — LIDOCAINE HYDROCHLORIDE 20 MG/ML
INJECTION, SOLUTION EPIDURAL; INFILTRATION; INTRACAUDAL; PERINEURAL AS NEEDED
Status: DISCONTINUED | OUTPATIENT
Start: 2017-01-25 | End: 2017-01-25 | Stop reason: HOSPADM

## 2017-01-25 RX ORDER — ALBUMIN HUMAN 50 G/1000ML
SOLUTION INTRAVENOUS AS NEEDED
Status: DISCONTINUED | OUTPATIENT
Start: 2017-01-25 | End: 2017-01-25 | Stop reason: HOSPADM

## 2017-01-25 RX ORDER — ALBUMIN HUMAN 50 G/1000ML
SOLUTION INTRAVENOUS AS NEEDED
Status: DISCONTINUED | OUTPATIENT
Start: 2017-01-25 | End: 2017-01-25

## 2017-01-25 RX ORDER — CHLORTHALIDONE 25 MG/1
TABLET ORAL
Qty: 30 TAB | Refills: 3 | Status: SHIPPED | OUTPATIENT
Start: 2017-01-25 | End: 2017-02-20

## 2017-01-25 RX ORDER — GLYCOPYRROLATE 0.2 MG/ML
INJECTION INTRAMUSCULAR; INTRAVENOUS AS NEEDED
Status: DISCONTINUED | OUTPATIENT
Start: 2017-01-25 | End: 2017-01-25 | Stop reason: HOSPADM

## 2017-01-25 RX ORDER — FENTANYL CITRATE 50 UG/ML
INJECTION, SOLUTION INTRAMUSCULAR; INTRAVENOUS AS NEEDED
Status: DISCONTINUED | OUTPATIENT
Start: 2017-01-25 | End: 2017-01-25 | Stop reason: HOSPADM

## 2017-01-25 RX ADMIN — ALBUMIN HUMAN 250 ML: 50 SOLUTION INTRAVENOUS at 09:26

## 2017-01-25 RX ADMIN — MIDAZOLAM HYDROCHLORIDE 2 MG: 1 INJECTION, SOLUTION INTRAMUSCULAR; INTRAVENOUS at 07:26

## 2017-01-25 RX ADMIN — ROCURONIUM BROMIDE 5 MG: 10 INJECTION, SOLUTION INTRAVENOUS at 07:35

## 2017-01-25 RX ADMIN — FENTANYL CITRATE 100 MCG: 50 INJECTION, SOLUTION INTRAMUSCULAR; INTRAVENOUS at 07:35

## 2017-01-25 RX ADMIN — Medication 80 MCG: at 07:41

## 2017-01-25 RX ADMIN — ONDANSETRON 4 MG: 2 INJECTION INTRAMUSCULAR; INTRAVENOUS at 11:18

## 2017-01-25 RX ADMIN — ROCURONIUM BROMIDE 10 MG: 10 INJECTION, SOLUTION INTRAVENOUS at 09:56

## 2017-01-25 RX ADMIN — ONDANSETRON 4 MG: 2 INJECTION INTRAMUSCULAR; INTRAVENOUS at 07:26

## 2017-01-25 RX ADMIN — GLYCOPYRROLATE 0.3 MG: 0.2 INJECTION INTRAMUSCULAR; INTRAVENOUS at 11:30

## 2017-01-25 RX ADMIN — PROPOFOL 180 MG: 10 INJECTION, EMULSION INTRAVENOUS at 07:35

## 2017-01-25 RX ADMIN — FENTANYL CITRATE 50 MCG: 50 INJECTION, SOLUTION INTRAMUSCULAR; INTRAVENOUS at 09:10

## 2017-01-25 RX ADMIN — SODIUM CHLORIDE, SODIUM LACTATE, POTASSIUM CHLORIDE, CALCIUM CHLORIDE: 600; 310; 30; 20 INJECTION, SOLUTION INTRAVENOUS at 07:26

## 2017-01-25 RX ADMIN — NEOSTIGMINE METHYLSULFATE 1.5 MG: 1 INJECTION INTRAVENOUS at 11:30

## 2017-01-25 RX ADMIN — GLYCOPYRROLATE 0.2 MG: 0.2 INJECTION INTRAMUSCULAR; INTRAVENOUS at 07:26

## 2017-01-25 RX ADMIN — FENTANYL CITRATE 50 MCG: 50 INJECTION, SOLUTION INTRAMUSCULAR; INTRAVENOUS at 11:03

## 2017-01-25 RX ADMIN — ALBUMIN HUMAN 250 ML: 50 SOLUTION INTRAVENOUS at 07:50

## 2017-01-25 RX ADMIN — LIDOCAINE HYDROCHLORIDE 100 MG: 20 INJECTION, SOLUTION EPIDURAL; INFILTRATION; INTRACAUDAL; PERINEURAL at 07:35

## 2017-01-25 RX ADMIN — ALBUMIN HUMAN 250 ML: 50 SOLUTION INTRAVENOUS at 08:08

## 2017-01-25 RX ADMIN — Medication 2 G: at 07:35

## 2017-01-25 RX ADMIN — KETOROLAC TROMETHAMINE 30 MG: 30 INJECTION, SOLUTION INTRAMUSCULAR; INTRAVENOUS at 11:16

## 2017-01-25 RX ADMIN — ROCURONIUM BROMIDE 10 MG: 10 INJECTION, SOLUTION INTRAVENOUS at 09:14

## 2017-01-25 RX ADMIN — FENTANYL CITRATE 50 MCG: 50 INJECTION, SOLUTION INTRAMUSCULAR; INTRAVENOUS at 10:19

## 2017-01-25 RX ADMIN — SODIUM CHLORIDE, SODIUM LACTATE, POTASSIUM CHLORIDE, CALCIUM CHLORIDE: 600; 310; 30; 20 INJECTION, SOLUTION INTRAVENOUS at 10:27

## 2017-01-25 RX ADMIN — ROCURONIUM BROMIDE 20 MG: 10 INJECTION, SOLUTION INTRAVENOUS at 08:00

## 2017-01-25 RX ADMIN — SODIUM CHLORIDE, SODIUM LACTATE, POTASSIUM CHLORIDE, CALCIUM CHLORIDE: 600; 310; 30; 20 INJECTION, SOLUTION INTRAVENOUS at 08:50

## 2017-01-25 RX ADMIN — ROCURONIUM BROMIDE 15 MG: 10 INJECTION, SOLUTION INTRAVENOUS at 07:49

## 2017-01-25 RX ADMIN — ROCURONIUM BROMIDE 10 MG: 10 INJECTION, SOLUTION INTRAVENOUS at 09:31

## 2017-01-25 RX ADMIN — GLYCOPYRROLATE 0.2 MG: 0.2 INJECTION INTRAMUSCULAR; INTRAVENOUS at 07:49

## 2017-01-25 RX ADMIN — DEXAMETHASONE SODIUM PHOSPHATE 4 MG: 4 INJECTION, SOLUTION INTRA-ARTICULAR; INTRALESIONAL; INTRAMUSCULAR; INTRAVENOUS; SOFT TISSUE at 07:49

## 2017-01-25 RX ADMIN — Medication 80 MCG: at 07:38

## 2017-01-25 RX ADMIN — SUCCINYLCHOLINE CHLORIDE 120 MG: 20 INJECTION INTRAMUSCULAR; INTRAVENOUS at 07:36

## 2017-01-25 NOTE — ANESTHESIA POSTPROCEDURE EVALUATION
Post-Anesthesia Evaluation and Assessment    Patient: Greta Green MRN: [id number]  SSN: [id number]    YOB: 1960  Age: 64 y.o. Sex: female       Cardiovascular Function/Vital Signs  Visit Vitals    BP 93/55    Pulse 76    Temp 36.4 °C (97.5 °F)    Resp 15    Ht 5' 5\" (1.651 m)    Wt 100.3 kg (221 lb 1.9 oz)    SpO2 94%    BMI 36.8 kg/m2       Patient is status post general anesthesia for Procedure(s):  LAPAROSCOPIC GASTRIC BYPASS WITH ENDOSCOPY   ESOPHAGOGASTRODUODENOSCOPY (EGD). Nausea/Vomiting: None    Postoperative hydration reviewed and adequate. Pain:  Pain Scale 1: Numeric (0 - 10) (01/25/17 1250)  Pain Intensity 1: 4 (01/25/17 1250)   Managed    Neurological Status:   Neuro (WDL): Exceptions to WDL (01/25/17 1230)  Neuro  Neurologic State: Drowsy (01/25/17 1230)  LUE Motor Response: Purposeful (01/25/17 1230)  LLE Motor Response: Purposeful (01/25/17 1230)  RUE Motor Response: Purposeful (01/25/17 1230)  RLE Motor Response: Purposeful (01/25/17 1230)   At baseline    Mental Status and Level of Consciousness: Arousable    Pulmonary Status:   O2 Device: Nasal cannula (01/25/17 1235)   Adequate oxygenation and airway patent    Complications related to anesthesia: None    Post-anesthesia assessment completed.  No concerns    Signed By: Claudette Pill, MD     January 25, 2017

## 2017-01-25 NOTE — ANESTHESIA PREPROCEDURE EVALUATION
Anesthetic History   No history of anesthetic complications            Review of Systems / Medical History  Patient summary reviewed, nursing notes reviewed and pertinent labs reviewed    Pulmonary        Sleep apnea    Asthma        Neuro/Psych   Within defined limits           Cardiovascular    Hypertension          CAD         GI/Hepatic/Renal     GERD      Liver disease     Endo/Other    Diabetes    Morbid obesity and arthritis     Other Findings              Physical Exam    Airway  Mallampati: II  TM Distance: > 6 cm  Neck ROM: normal range of motion   Mouth opening: Normal     Cardiovascular  Regular rate and rhythm,  S1 and S2 normal,  no murmur, click, rub, or gallop             Dental  No notable dental hx       Pulmonary  Breath sounds clear to auscultation               Abdominal  GI exam deferred       Other Findings            Anesthetic Plan    ASA: 3  Anesthesia type: general          Induction: Intravenous  Anesthetic plan and risks discussed with: Patient

## 2017-01-26 ENCOUNTER — APPOINTMENT (OUTPATIENT)
Dept: GENERAL RADIOLOGY | Age: 57
DRG: 620 | End: 2017-01-26
Attending: SURGERY
Payer: COMMERCIAL

## 2017-01-26 PROBLEM — E87.6 HYPOKALEMIA: Status: ACTIVE | Noted: 2017-01-26

## 2017-01-26 PROCEDURE — 74241 XR UPPER GI SERIES W KUB: CPT

## 2017-01-27 ENCOUNTER — APPOINTMENT (OUTPATIENT)
Dept: CT IMAGING | Age: 57
DRG: 620 | End: 2017-01-27
Attending: SURGERY
Payer: COMMERCIAL

## 2017-01-27 ENCOUNTER — APPOINTMENT (OUTPATIENT)
Dept: GENERAL RADIOLOGY | Age: 57
DRG: 620 | End: 2017-01-27
Attending: PHYSICIAN ASSISTANT
Payer: COMMERCIAL

## 2017-01-27 PROCEDURE — 74022 RADEX COMPL AQT ABD SERIES: CPT

## 2017-01-27 PROCEDURE — 74177 CT ABD & PELVIS W/CONTRAST: CPT

## 2017-01-30 ENCOUNTER — TELEPHONE (OUTPATIENT)
Dept: SURGERY | Age: 57
End: 2017-01-30

## 2017-01-30 NOTE — TELEPHONE ENCOUNTER
Diet:Question of any nausea and/or vomiting. Protein intake (goal is 60 grams of protein daily)   Poor____Fair____Good__x__Great____    Comment:______________________________________________________________      ______________________________________________________________________    Hydration:Less than 32 ounces of water daily is fair to poor (Goal is 64 ounces per day)  Poor____ Fair____ Good_x___Great____    Comment:______________________________________________________________    ______________________________________________________________________      Ambulation:( walking at least 3 x week, for 15- 20 minutes)     Poor______ Fair_x_____ Good______     Great______ Comment:__________________________________________________    ______________________________________________________________________      Urine Color: Question of any odor and color(should be roger, pale, and clear) Dark______ Amber__x____ Pale______      Clear______ Comment:___________________________________________________                           ________________________________________________________________    Bowel movements: Question of any constipation- haven't had any bowel movements for more than 3 days. This could be related to protein intake and/or narcotic pain medication usage. Comment:                                                                                                                              Pain: Left sided abdominal pain is normal (should be less than 3)  Question if pain medication is helpful.  10___ 9___ 8___ 7_x__ 6___ 5___ 4___ 3___     2___1___0___Comment:___pain medication is helping, but she just came home yesterday from the hospital so still adjusting ______________________________________________    ______________________________________________________________________      Incision: (No redness, pain, swelling or fever) Healing Well______     Healed______Redness_x________ Pain_________ Swelling_________ Fever__________(greater than 101 needs evaluation)    Comment:____________________________________________________________    ______________________________________________________________________  Use of incentive spirometer: Yes__x__       No           Next Appointment:_2/8/17_____________                 Support Group: Yes__x____No______    Additional Comments:____________________________________________________________    ____________________________________________________________________      If more than one parameter is not met or considered poor, nurse needs to discuss with provider recommend for patient to be seen in the office as soon as possible or refer to the provider for follow-up. Reinforce to patient to use bariatric educational booklet as guide. It is appropriate to refer patient to the nutritionist to discuss more in detail of diet and nutrition.

## 2017-02-06 RX ORDER — FLUOXETINE HYDROCHLORIDE 20 MG/1
CAPSULE ORAL
Qty: 90 CAP | Refills: 1 | Status: SHIPPED | OUTPATIENT
Start: 2017-02-06 | End: 2017-07-12 | Stop reason: SDUPTHER

## 2017-02-06 RX ORDER — METOPROLOL TARTRATE 100 MG/1
TABLET ORAL
Qty: 60 TAB | Refills: 3 | Status: SHIPPED | OUTPATIENT
Start: 2017-02-06 | End: 2017-02-20

## 2017-02-08 ENCOUNTER — OFFICE VISIT (OUTPATIENT)
Dept: SURGERY | Age: 57
End: 2017-02-08

## 2017-02-08 VITALS
HEART RATE: 82 BPM | HEIGHT: 65 IN | WEIGHT: 200 LBS | TEMPERATURE: 97.5 F | DIASTOLIC BLOOD PRESSURE: 63 MMHG | OXYGEN SATURATION: 97 % | RESPIRATION RATE: 14 BRPM | BODY MASS INDEX: 33.32 KG/M2 | SYSTOLIC BLOOD PRESSURE: 111 MMHG

## 2017-02-08 DIAGNOSIS — Z09 SURGERY FOLLOW-UP: ICD-10-CM

## 2017-02-08 DIAGNOSIS — E66.09 NON MORBID OBESITY DUE TO EXCESS CALORIES: Primary | ICD-10-CM

## 2017-02-08 RX ORDER — HYDROMORPHONE HYDROCHLORIDE 2 MG/1
2 TABLET ORAL
Qty: 20 TAB | Refills: 0 | Status: SHIPPED | OUTPATIENT
Start: 2017-02-08 | End: 2017-02-23 | Stop reason: ALTCHOICE

## 2017-02-08 RX ORDER — ONDANSETRON 4 MG/1
4 TABLET, ORALLY DISINTEGRATING ORAL
Qty: 30 TAB | Refills: 0 | Status: SHIPPED | OUTPATIENT
Start: 2017-02-08 | End: 2017-06-23 | Stop reason: SDUPTHER

## 2017-02-08 NOTE — MR AVS SNAPSHOT
Visit Information Date & Time Provider Department Dept. Phone Encounter #  
 2/8/2017  1:20 PM Lucia Baires NP [address] [facility] [phone] [id number] Your Appointments 6/23/2017  1:40 PM  
ESTABLISHED PATIENT with Aubrey Mortimer, MD  
[facility] ([address]) Appt Note: 6 mo fup; 6 mo [address] [place] [address] [facility]  
54 [facility] Randy Kapadia [facility] [address] [address] Upcoming Health Maintenance Date Due HEMOGLOBIN A1C Q6M 2/23/2017 BREAST CANCER SCRN MAMMOGRAM 7/14/2017 FOOT EXAM Q1 8/23/2017 MICROALBUMIN Q1 8/23/2017 LIPID PANEL Q1 8/23/2017 EYE EXAM RETINAL OR DILATED Q1 8/24/2017 PAP AKA CERVICAL CYTOLOGY 8/23/2019 COLONOSCOPY 6/23/2020 DTaP/Tdap/Td series (2 - Td) 8/14/2022 Allergies as of 2/8/2017  Review Complete On: 2/8/2017 By: Lucia Baires NP Severity Noted Reaction Type Reactions Accupril [Quinapril]  07/07/2010    Cough Lipitor [Atorvastatin]  07/07/2010    Other (comments)  
 aches Norvasc [Amlodipine]  07/22/2015    Swelling On legs at 10 mg dose Current Immunizations  Reviewed on 1/26/2017 Name Date Influenza Vaccine 9/1/2015 Pneumococcal Polysaccharide (PPSV-23) 8/4/2015 TD Vaccine 11/7/2005 TDAP Vaccine 8/14/2012 Not reviewed this visit Vitals BP Pulse Temp Resp Height(growth percentile) Weight(growth percentile) 111/63 (BP 1 Location: Left arm, BP Patient Position: Sitting) 82 97.5 °F (36.4 °C) (Oral) 14 5' 5\" (1.651 m) 200 lb (90.7 kg) LMP SpO2 BMI OB Status Smoking Status 01/01/1985 97% 33.28 kg/m2 Hysterectomy Never Smoker Vitals History BMI and BSA Data Body Mass Index Body Surface Area  
 33.28 kg/m 2 2.04 m 2 Preferred Pharmacy Pharmacy Name Phone [id number] [address]  [facility] [phone] Your Updated Medication List  
  
   
This list is accurate as of: 2/8/17  1:49 PM.  Always use your most recent med list.  
  
  
  
  
 * albuterol 0.63 mg/3 mL nebulizer solution Commonly known as:  ACCUNEB  
3 mL by Nebulization route every four (4) hours as needed for Wheezing or Shortness of Breath. * albuterol 90 mcg/actuation inhaler Commonly known as:  PROAIR HFA Take 1 Puff by inhalation every four (4) hours as needed for Wheezing or Shortness of Breath. alcohol swabs Padm Commonly known as:  ALCOHOL PADS Use when checking blood glucose ALPRAZolam 0.5 mg tablet Commonly known as:  XANAX  
TAKE 1 TABLET BY MOUTH 3 TIMES A DAY AS NEEDED FOR ANXIETY. MAX DAILY AMT 1.5MG aspirin, buffered 81 mg Tab Take  by mouth daily. Blood-Glucose Meter monitoring kit Use to check glucose once a day  
  
 budesonide 180 mcg/actuation Aepb inhaler Commonly known as:  PULMICORT Take 1 Puff by inhalation two (2) times a day. Indications: MAINTENANCE THERAPY FOR ASTHMA  
  
 chlorthalidone 25 mg tablet Commonly known as:  HYGROTEN  
TAKE 1 TABLET BY MOUTH DAILY  
  
 dicyclomine 10 mg capsule Commonly known as:  BENTYL TAKE 1 (ONE) CAPSULE ORALLY AS NEEDED EVERY 6 HOURS FLUoxetine 20 mg capsule Commonly known as:  PROzac TAKE ONE CAPSULE BY MOUTH EVERY DAY  
  
 fluticasone 50 mcg/actuation nasal spray Commonly known as:  Chasidy Serene 2 puffs each nostril daily  
  
 gabapentin 300 mg capsule Commonly known as:  NEURONTIN Take 2 Caps by mouth three (3) times daily. glucose blood VI test strips strip Commonly known as:  blood glucose test  
Use once a day  
  
 guanFACINE 1 mg tablet Commonly known as:  TENEX  
TAKE 1 TABLET BY MOUTH TWICE A DAY HYDROmorphone 2 mg tablet Commonly known as:  DILAUDID Take 1 Tab by mouth every four (4) hours as needed for Pain. Max Daily Amount: 12 mg. Lancets Misc Use once a day. Please give one compatible with patient's meter  
  
 losartan 50 mg tablet Commonly known as:  COZAAR  
TAKE 1 TABLET BY MOUTH EVERY DAY **REPLACES AMLODIPINE**  
  
 metoprolol tartrate 100 mg IR tablet Commonly known as:  LOPRESSOR  
TAKE 1 TABLET BY MOUTH TWICE A DAY  
  
 nitroglycerin 0.4 mg SL tablet Commonly known as:  NITROSTAT  
1 Tab by SubLINGual route every five (5) minutes as needed for Chest Pain. omeprazole 20 mg capsule Commonly known as:  PRILOSEC  
TAKE ONE CAPSULE BY MOUTH EVERY DAY  
  
 ondansetron 4 mg disintegrating tablet Commonly known as:  ZOFRAN ODT Take 1 Tab by mouth every six (6) hours as needed for Nausea. rosuvastatin 40 mg tablet Commonly known as:  CRESTOR  
TAKE 1 TABLET BY MOUTH NIGHTLY  
  
 tiZANidine 4 mg tablet Commonly known as:  Tanja Dev TAKE 1 TABLET BY MOUTH AT BEDTIME  
  
 valACYclovir 500 mg tablet Commonly known as:  VALTREX Take 1 Tab by mouth two (2) times a day. ZyrTEC 10 mg tablet Generic drug:  cetirizine Take  by mouth daily. * Notice: This list has 2 medication(s) that are the same as other medications prescribed for you. Read the directions carefully, and ask your doctor or other care provider to review them with you. Prescriptions Printed Refills HYDROmorphone (DILAUDID) 2 mg tablet 0 Sig: Take 1 Tab by mouth every four (4) hours as needed for Pain. Max Daily Amount: 12 mg. Class: Print Route: Oral  
  
Prescriptions Sent to Pharmacy Refills  
 ondansetron (ZOFRAN ODT) 4 mg disintegrating tablet 0 Sig: Take 1 Tab by mouth every six (6) hours as needed for Nausea. Class: Normal  
 Pharmacy: [address] [place] [address] Ph #: [phone] Route: Oral  
  
Patient Instructions Constipation: Care Instructions Your Care Instructions Constipation means that you have a hard time passing stools (bowel movements). People pass stools from 3 times a day to once every 3 days. What is normal for you may be different. Constipation may occur with pain in the rectum and cramping. The pain may get worse when you try to pass stools. Sometimes there are small amounts of bright red blood on toilet paper or the surface of stools. This is because of enlarged veins near the rectum (hemorrhoids). A few changes in your diet and lifestyle may help you avoid ongoing constipation. Your doctor may also prescribe medicine to help loosen your stool. Some medicines can cause constipation. These include pain medicines and antidepressants. Tell your doctor about all the medicines you take. Your doctor may want to make a medicine change to ease your symptoms. Follow-up care is a key part of your treatment and safety. Be sure to make and go to all appointments, and call your doctor if you are having problems. It's also a good idea to know your test results and keep a list of the medicines you take. How can you care for yourself at home? · Drink plenty of fluids, enough so that your urine is light yellow or clear like water. If you have kidney, heart, or liver disease and have to limit fluids, talk with your doctor before you increase the amount of fluids you drink. · Include high-fiber foods in your diet each day. These include fruits, vegetables, beans, and whole grains. · Get at least 30 minutes of exercise on most days of the week. Walking is a good choice. You also may want to do other activities, such as running, swimming, cycling, or playing tennis or team sports. · Take a fiber supplement, such as Citrucel or Metamucil, every day. Read and follow all instructions on the label. · Schedule time each day for a bowel movement. A daily routine may help. Take your time having your bowel movement. · Support your feet with a small step stool when you sit on the toilet. This helps flex your hips and places your pelvis in a squatting position. · Your doctor may recommend an over-the-counter laxative to relieve your constipation. Examples are Milk of Magnesia and MiraLax. Read and follow all instructions on the label. Do not use laxatives on a long-term basis. When should you call for help? Call your doctor now or seek immediate medical care if: 
· You have new or worse belly pain. · You have new or worse nausea or vomiting. · You have blood in your stools. Watch closely for changes in your health, and be sure to contact your doctor if: 
· Your constipation is getting worse. · You do not get better as expected. Where can you learn more? Go to http://maliha-sanchez.info/. Enter [phone] in the search box to learn more about \"Constipation: Care Instructions. \" Current as of: May 27, 2016 Content Version: 11.1 © 1309-4977 [employer]. Care instructions adapted under license by [facility] (which disclaims liability or warranty for this information). If you have questions about a medical condition or this instruction, always ask your healthcare professional. [place] [address] any warranty or liability for your use of this information. What you need to know: 1. Advance your diet to soft foods. Follow the handout that you were given today in the office. 2.  Take the recommended vitamins daily 3. No lifting greater than 20 lbs. 4.  You can do light jogging and walking. 5  Follow up in 2 weeks. 6.  You may go into a pool. 7.  If you are not able to tolerate liquids or soft foods. Please call our office. [phone] 
8. If you have vomiting and persistent epigastric pain or chest pain. You should call our office, the doctor on-call or go to the emergency room. What to do if you are constipated: You may  take Milk of Magnesia. Take 2 Tablespoons followed by 16 oz of water then 2 hours later take another 2 tablespoons. If  milk of magnesia does not work then take [place] or Miralax over the counter. Keep in mind that the Benefiber or Miralax may take a day or two to work. If all of the above do not work try a Fleets enema and follow the directions on the box. Soft and Mushy: Phase 1 Below is a list of basic items to purchase for the first phase of the  
soft mushy diet. Your surgeon or nurse practitioner will inform you when it is okay  
to advance to the next phase. Soft and Mushy Foods: Prepare food to the appropriate texture. ? Everything on clear and full liquid diet ? Applesauce (no sugar added) ? Hot & cold cereals (Cream of Wheat, Plain Cheerios®, Special K with protein®, plain oatmeal, grits) ? Frozen or canned vegetables (carrots, acorn squash, butternut squash, string beans, spinach, broccoli, cauliflower  florets only!) ? Canned fruit (in natural juice or with Splenda®) ? Fat-free, cholesterol-free egg substitute (P) ? Low-fat or fat-free cottage cheese (P) ? Low-fat or fat-free yogurt (P) ? Low-fat or fat-free [place] yogurt (P) ? Fat-free milk or 1% milk (P) ? Lactaid fat-free or 1% low fat milk (P) ? Low-fat well-cooked/soft beans (the consistency of refried beans) (P) ? No sugar added, low fat pudding (no pistachio or other flavor containing nuts) ? low-fat cream soups ? Low-fat chicken noodle or chicken rice soup (P) ? Sugar-free fudgesicles ? Sugar-free cocoa ? Fat free whipped or mashed potatoes ? Herbs and spices ? Lite butter, margarine, canola oil, olive oil, reduced-fat or fat-free mayonnaise, reduced-fat or fat-free salad dressing, reduced-fat or fat-free cream cheese, reduced-fat or fat-free sour cream.  
 
 
 P designates food sources of protein.  Include a protein at each meal.  
 
 If a food does not contain protein, you may want to consider adding protein powder to the food to give it extra protein. For example, mix protein powder in with the following: oatmeal, mashed potatoes, sugar-free pudding, sugar-free gelatin (see recipes in book), no-sugar-added applesauce. Soft Mushy Diet: Phase 1 Time Meal or Snack Soft/Mushy Food Amount (ounces) Protein 
(g) Supplement 6:30 am Sip on Fluids Sip on non-carbonated, calorie-free, no sugar added liquids. 8 oz 
 0 g Take Multivitamin containing 18 mg ferrous sulfate (iron) 7:00 am   Stop drinking fluids 30 minutes before breakfast  
7:30 am Breakfast ½ cup sugar-free oatmeal with 1 scoop of protein powder. Add cinnamon, nutmeg, artificial sweeteners as desired for flavor. 4 oz  
 20-25 g   
9:00 Snack (optional) High Protein Gelatin (see recipe in book) 4oz 10 g   
11:30 am Stop drinking liquids 30 minutes before lunch 12:00 pm Lunch Sip low-fat cream of potato soup or low-fat cream of chicken soup mixed with 1 scoop of protein powder 8 oz soup 25 g Take 400 mg calcium citrate 2:00 Snack (optional) ½ cup high protein pudding (see recipe in book) or ½ cup low-fat cottage cheese or yogurt. Can also add protein powder as needed. 4 oz 14 g 
 
or 
 
5 g   
 
3:00  5:30 pm  
Sip on Fluids Sip on non-carbonated, calorie-free, no sugar added liquids. 24 - 32 oz  
0 g Take 400 mg calcium citrate. 6:00 pm Dinner ¼ cup low-fat, well cooked beans (ex. black beans, low-fat refried beans) ¼ cup no-sugar-added applesauce 4 oz 3.5 g Take 400 mg of calcium citrate. 7:00 - 10:00 pm Sip on Fluids Sip on non-carbonated, no sugar added liquids as needed  16-24 oz 0 g Take Multivitamin with 18 mg ferrous sulfate Total:  80 oz clear fluids 63-77 
grams 2 Multivitamins with 18 mg ferrous sulfate, 6403-5176 mg calcium citrate Introducing [facility]! Dear Elyse Sanderson: Thank you for requesting a Sighter account. Our records indicate that you already have an active Sighter account. You can access your account anytime at [email or web address] Did you know that you can access your hospital and ER discharge instructions at any time in Sighter? You can also review all of your test results from your hospital stay or ER visit. Additional Information If you have questions, please visit the Frequently Asked Questions section of the Sighter website at [email or web address] Remember, Sighter is NOT to be used for urgent needs. For medical emergencies, dial 911. Now available from your iPhone and Android! Please provide this summary of care documentation to your next provider. Your primary care clinician is listed as Tika Bro. If you have any questions after today's visit, please call [phone].

## 2017-02-08 NOTE — PATIENT INSTRUCTIONS
Constipation: Care Instructions  Your Care Instructions  Constipation means that you have a hard time passing stools (bowel movements). People pass stools from 3 times a day to once every 3 days. What is normal for you may be different. Constipation may occur with pain in the rectum and cramping. The pain may get worse when you try to pass stools. Sometimes there are small amounts of bright red blood on toilet paper or the surface of stools. This is because of enlarged veins near the rectum (hemorrhoids). A few changes in your diet and lifestyle may help you avoid ongoing constipation. Your doctor may also prescribe medicine to help loosen your stool. Some medicines can cause constipation. These include pain medicines and antidepressants. Tell your doctor about all the medicines you take. Your doctor may want to make a medicine change to ease your symptoms. Follow-up care is a key part of your treatment and safety. Be sure to make and go to all appointments, and call your doctor if you are having problems. It's also a good idea to know your test results and keep a list of the medicines you take. How can you care for yourself at home? · Drink plenty of fluids, enough so that your urine is light yellow or clear like water. If you have kidney, heart, or liver disease and have to limit fluids, talk with your doctor before you increase the amount of fluids you drink. · Include high-fiber foods in your diet each day. These include fruits, vegetables, beans, and whole grains. · Get at least 30 minutes of exercise on most days of the week. Walking is a good choice. You also may want to do other activities, such as running, swimming, cycling, or playing tennis or team sports. · Take a fiber supplement, such as Citrucel or Metamucil, every day. Read and follow all instructions on the label. · Schedule time each day for a bowel movement. A daily routine may help.  Take your time having your bowel movement. · Support your feet with a small step stool when you sit on the toilet. This helps flex your hips and places your pelvis in a squatting position. · Your doctor may recommend an over-the-counter laxative to relieve your constipation. Examples are Milk of Magnesia and MiraLax. Read and follow all instructions on the label. Do not use laxatives on a long-term basis. When should you call for help? Call your doctor now or seek immediate medical care if:  · You have new or worse belly pain. · You have new or worse nausea or vomiting. · You have blood in your stools. Watch closely for changes in your health, and be sure to contact your doctor if:  · Your constipation is getting worse. · You do not get better as expected. Where can you learn more? Go to http://maliha-sanchez.info/. Enter 21 [phone] in the search box to learn more about \"Constipation: Care Instructions. \"  Current as of: May 27, 2016  Content Version: 11.1  © 1791-0167 [employer]. Care instructions adapted under license by [employer] (which disclaims liability or warranty for this information). If you have questions about a medical condition or this instruction, always ask your healthcare professional. [address] any warranty or liability for your use of this information. What you need to know:  1. Advance your diet to soft foods. Follow the handout that you were given today in the office. 2.  Take the recommended vitamins daily  3. No lifting greater than 20 lbs. 4.  You can do light jogging and walking. 5  Follow up in 2 weeks. 6.  You may go into a pool. 7.  If you are not able to tolerate liquids or soft foods. Please call our office. [phone]  8. If you have vomiting and persistent epigastric pain or chest pain. You should call our office, the doctor on-call or go to the emergency room. What to do if you are constipated:   You may  take Milk of Magnesia. Take 2 Tablespoons followed by 16 oz of water then 2 hours later take another 2 tablespoons. If  milk of magnesia does not work then take [place] or Miralax over the counter. Keep in mind that the Benefiber or Miralax may take a day or two to work. If all of the above do not work try a Fleets enema and follow the directions on the box. Soft and Mushy: Phase 1    Below is a list of basic items to purchase for the first phase of the   soft mushy diet. Your surgeon or nurse practitioner will inform you when it is okay   to advance to the next phase. Soft and Mushy Foods: Prepare food to the appropriate texture. ? Everything on clear and full liquid diet  ? Applesauce (no sugar added)  ? Hot & cold cereals (Cream of Wheat, Plain Cheerios®, Special K with protein®, plain oatmeal, grits)  ? Frozen or canned vegetables (carrots, acorn squash, butternut squash, string beans, spinach, broccoli, cauliflower - florets only!)   ? Canned fruit (in natural juice or with Splenda®)  ? Fat-free, cholesterol-free egg substitute (P)  ? Low-fat or fat-free cottage cheese (P)  ? Low-fat or fat-free yogurt (P)  ? Low-fat or fat-free [place] yogurt (P)  ? Fat-free milk or 1% milk (P)  ? Lactaid fat-free or 1% low fat milk (P)  ? Low-fat well-cooked/soft beans (the consistency of refried beans) (P)  ? No sugar added, low fat pudding (no pistachio or other flavor containing nuts)  ? low-fat cream soups  ? Low-fat chicken noodle or chicken rice soup (P)  ? Sugar-free fudgesicles  ? Sugar-free cocoa  ? Fat free whipped or mashed potatoes   ? Herbs and spices  ? Lite butter, margarine, canola oil, olive oil, reduced-fat or fat-free mayonnaise, reduced-fat or fat-free salad dressing, reduced-fat or fat-free cream cheese, reduced-fat or fat-free sour cream.        P designates food sources of protein.  Include a protein at each meal.     If a food does not contain protein, you may want to consider adding protein powder to the food to give it extra protein. For example, mix protein powder in with the following: oatmeal, mashed potatoes, sugar-free pudding, sugar-free gelatin (see recipes in book), no-sugar-added applesauce. Soft Mushy Diet: Phase 1  Time Meal or Snack Soft/Mushy Food Amount (ounces) Protein  (g) Supplement   6:30 am Sip on Fluids Sip on non-carbonated, calorie-free, no sugar added liquids. 8 oz   0 g Take Multivitamin containing 18 mg ferrous sulfate (iron)    7:00 am   Stop drinking fluids 30 minutes before breakfast   7:30 am Breakfast ½ cup sugar-free oatmeal with 1 scoop of protein powder. Add cinnamon, nutmeg, artificial sweeteners as desired for flavor. 4 oz    20-25 g    9:00 Snack  (optional) High Protein Gelatin (see recipe in book) 4oz 10 g    11:30 am Stop drinking liquids 30 minutes before lunch   12:00 pm Lunch Sip low-fat cream of potato soup or low-fat cream of chicken soup mixed with 1 scoop of protein powder 8 oz soup 25 g Take 400 mg calcium citrate   2:00 Snack  (optional) ½ cup high protein pudding (see recipe in book)   or   ½ cup low-fat cottage cheese or yogurt. Can also add protein powder as needed. 4 oz 14 g    or    5 g      3:00 - 5:30 pm   Sip on Fluids   Sip on non-carbonated, calorie-free, no sugar added liquids. 24 - 32 oz   0 g   Take 400 mg calcium citrate. 6:00 pm Dinner ¼ cup low-fat, well cooked beans (ex. black beans, low-fat refried beans)  ¼ cup no-sugar-added applesauce 4 oz 3.5 g Take 400 mg of calcium citrate.    7:00 - 10:00 pm Sip on Fluids Sip on non-carbonated, no sugar added liquids as needed  16-24 oz 0 g Take Multivitamin with 18 mg ferrous sulfate   Total:  80 oz clear fluids 63-77  grams 2 Multivitamins with 18 mg ferrous sulfate, 3805-8450 mg calcium citrate

## 2017-02-08 NOTE — PROGRESS NOTES
1. Have you been to the ER, urgent care clinic since your last visit? Hospitalized since your last visit?no    2. Have you seen or consulted any other health care providers outside of the [address] [place] [address] since your last visit? Include any pap smears or colon screening.  no

## 2017-02-08 NOTE — PROGRESS NOTES
2 weeks status post gastric bypass. Pt reports doing well on liquids . Pt's post op pain is incisional and when she coughs. Her  was home last week with the flu and now she has a cough and runny nose. Pt reports no vomiting. She complains of nausea with the shakes. Sheis drinking approximately 60 oz of water daily  Denies fever or chills. She is taking bariatric vitamins without issue. Total weight loss since surgery 18lbs  Weight loss since last visit 18lbs  Visit Vitals    /63 (BP 1 Location: Left arm, BP Patient Position: Sitting)    Pulse 82    Temp 97.5 °F (36.4 °C) (Oral)    Resp 14    Ht 5' 5\" (1.651 m)    Wt 200 lb (90.7 kg)    LMP 01/01/1985    SpO2 97%    BMI 33.28 kg/m2          Patient has an advanced directive: NO    Ms. Jose Elmore has a reminder for a \"due or due soon\" health maintenance. I have asked that she contact her primary care provider for follow-up on this health maintenance. Physical Examination: General appearance - alert, well appearing, and in no distress,  Chest - clear to auscultation bilaterally  Heart - normal rate, regular rhythm, normal S1, S2, no murmurs, rubs, clicks or gallops  Abdomen - soft, nontender, nondistended  scars from previous incisions healing without erythema or induration    A/P    Doing well 2 wks stats post laparoscopic Gastric Bypass  Diet advanced to soft foods  Encouraged water intake  Continue PPI  Follow up with PCP today or urgent care due to flu like symptoms. No lifting greater than 20 lbs. Follow up in 2 weeks. Prescription given for Dilaudid. Refilled Zofran. Pt verbalized understanding and questions were answered to the best of my knowledge and ability.     Jarek Gupta, CRISTAL

## 2017-02-17 ENCOUNTER — OFFICE VISIT (OUTPATIENT)
Dept: FAMILY MEDICINE CLINIC | Age: 57
End: 2017-02-17

## 2017-02-17 VITALS
BODY MASS INDEX: 32.49 KG/M2 | OXYGEN SATURATION: 100 % | WEIGHT: 195 LBS | RESPIRATION RATE: 16 BRPM | HEIGHT: 65 IN | DIASTOLIC BLOOD PRESSURE: 81 MMHG | SYSTOLIC BLOOD PRESSURE: 127 MMHG | HEART RATE: 89 BPM

## 2017-02-17 DIAGNOSIS — G89.29 OTHER CHRONIC PAIN: ICD-10-CM

## 2017-02-17 DIAGNOSIS — M79.7 FIBROMYALGIA: Primary | ICD-10-CM

## 2017-02-17 RX ORDER — GABAPENTIN 600 MG/1
600 TABLET ORAL 3 TIMES DAILY
Qty: 270 TAB | Refills: 0 | Status: SHIPPED | OUTPATIENT
Start: 2017-02-17 | End: 2017-05-14 | Stop reason: SDUPTHER

## 2017-02-17 NOTE — PROGRESS NOTES
Chief Complaint   Patient presents with    Medication Evaluation     Patient comes today for medication evaluation. She stated she is no longer taking Metformin since surgery. Due to swallowing issues, she would like to switch Gabapentin from capsule to tablet. Also her insurance is no longer covering Pulmicort.

## 2017-02-17 NOTE — PROGRESS NOTES
Rebecka Duverney is a 64 y.o. female with history of HTN, CAD sp stent, GERD, GARCIA, DM, EDMOND, Asthma, Anxiety, Depression who presents to discuss medications changes after gastric bypass surgery. History provided by: patient    HPI    Patient had gastric bypass 1/25/17. At that time the surgeon stopped her metformin and diclofenac. She states that since her surgery she is not able to take gabapentin capsules and is in need of the tablets. She takes it for chronic pain due to fibromyalgia. When she takes gabapentin it controls the pain well. She has been off it for 2 weeks now and states the pain is bothersome. It is all over her body, legs, arms, back. It occurs daily. Has fu with surgeon 3/8/17    Patient Active Problem List   Diagnosis Code    [facility] E66.01    Obstructive sleep apnea G47.33    Asthma J45.909    Arthritis M19.90    GERD (gastroesophageal reflux disease) K21.9    Edema R60.9    GARCIA (nonalcoholic steatohepatitis) K75.81    Chronic pain G89.29    Essential hypertension W48    Metabolic syndrome E66.59    FH: diabetes mellitus Z83.3    DM (diabetes mellitus) ([facility] E11.9    CAD (coronary artery disease) I25.10    Anxiety F41.9    History of pulmonary embolus (PE) Z86.711    Hypokalemia E87.6          Current Outpatient Prescriptions:     gabapentin (NEURONTIN) 600 mg tablet, Take 1 Tab by mouth three (3) times daily. , Disp: 270 Tab, Rfl: 0    ondansetron (ZOFRAN ODT) 4 mg disintegrating tablet, Take 1 Tab by mouth every six (6) hours as needed for Nausea., Disp: 30 Tab, Rfl: 0    HYDROmorphone (DILAUDID) 2 mg tablet, Take 1 Tab by mouth every four (4) hours as needed for Pain.  Max Daily Amount: 12 mg., Disp: 20 Tab, Rfl: 0    FLUoxetine (PROZAC) 20 mg capsule, TAKE ONE CAPSULE BY MOUTH EVERY DAY, Disp: 90 Cap, Rfl: 1    metoprolol tartrate (LOPRESSOR) 100 mg IR tablet, TAKE 1 TABLET BY MOUTH TWICE A DAY, Disp: 60 Tab, Rfl: 3    chlorthalidone (HYGROTEN) 25 mg tablet, TAKE 1 TABLET BY MOUTH DAILY, Disp: 30 Tab, Rfl: 3    tiZANidine (ZANAFLEX) 4 mg tablet, TAKE 1 TABLET BY MOUTH AT BEDTIME, Disp: 30 Tab, Rfl: 3    guanFACINE (TENEX) 1 mg tablet, TAKE 1 TABLET BY MOUTH TWICE A DAY, Disp: 180 Tab, Rfl: 3    ALPRAZolam (XANAX) 0.5 mg tablet, TAKE 1 TABLET BY MOUTH 3 TIMES A DAY AS NEEDED FOR ANXIETY. MAX DAILY AMT 1.5MG, Disp: 180 Tab, Rfl: 0    losartan (COZAAR) 50 mg tablet, TAKE 1 TABLET BY MOUTH EVERY DAY **REPLACES AMLODIPINE**, Disp: 30 Tab, Rfl: 2    albuterol (PROAIR HFA) 90 mcg/actuation inhaler, Take 1 Puff by inhalation every four (4) hours as needed for Wheezing or Shortness of Breath., Disp: 1 Inhaler, Rfl: 5    omeprazole (PRILOSEC) 20 mg capsule, TAKE ONE CAPSULE BY MOUTH EVERY DAY, Disp: 90 Cap, Rfl: 1    budesonide (PULMICORT) 180 mcg/actuation aepb inhaler, Take 1 Puff by inhalation two (2) times a day. Indications: MAINTENANCE THERAPY FOR ASTHMA, Disp: 1 Inhaler, Rfl: 3    albuterol (ACCUNEB) 0.63 mg/3 mL nebulizer solution, 3 mL by Nebulization route every four (4) hours as needed for Wheezing or Shortness of Breath., Disp: 30 Vial, Rfl: 5    rosuvastatin (CRESTOR) 40 mg tablet, TAKE 1 TABLET BY MOUTH NIGHTLY, Disp: 90 Tab, Rfl: 3    Blood-Glucose Meter monitoring kit, Use to check glucose once a day, Disp: 1 Kit, Rfl: 0    alcohol swabs (ALCOHOL PADS) padm, Use when checking blood glucose, Disp: 100 Pad, Rfl: 5    Lancets misc, Use once a day. Please give one compatible with patient's meter, Disp: 1 Package, Rfl: 5    glucose blood VI test strips (BLOOD GLUCOSE TEST) strip, Use once a day, Disp: 100 Strip, Rfl: 5    valACYclovir (VALTREX) 500 mg tablet, Take 1 Tab by mouth two (2) times a day. (Patient taking differently: Take 500 mg by mouth two (2) times daily as needed.), Disp: 60 Tab, Rfl: 3    Aspirin, Buffered 81 mg tab, Take  by mouth daily. , Disp: , Rfl:     dicyclomine (BENTYL) 10 mg capsule, TAKE 1 (ONE) CAPSULE ORALLY AS NEEDED EVERY 6 HOURS, Disp: 120 capsule, Rfl: 0    cetirizine (ZYRTEC) 10 mg tablet, Take  by mouth daily. , Disp: , Rfl:     nitroglycerin (NITROSTAT) 0.4 mg SL tablet, 1 Tab by SubLINGual route every five (5) minutes as needed for Chest Pain., Disp: 1 Bottle, Rfl: 4     Allergies   Allergen Reactions    Accupril [Quinapril] Cough    Lipitor [Atorvastatin] Other (comments)     aches    Norvasc [Amlodipine] Swelling     On legs at 10 mg dose        Past Medical History   Diagnosis Date    Arthritis     Asthma     Autoimmune disease ([facility]      Evan Nava    CAD (coronary artery disease)      s/p stents 11/2015    Cardiac murmur     Depression     Diabetes ([facility]     DVT (deep venous thrombosis) ([facility] 1981    GERD (gastroesophageal reflux disease)     Hypertension     Morbid obesity ([facility]     Musculoskeletal disorder     EDMOND (obstructive sleep apnea)      wears cpap    S/P TONJA (total abdominal hysterectomy)     Thromboembolus ([facility] 1996     PE       ROS  Had one episode of SOB  No chest pain    Physical Exam   Constitutional: She is well-developed, well-nourished, and in no distress. /81  Pulse 89  Resp 16  Ht 5' 5\" (1.651 m)  Wt 195 lb (88.5 kg)  LMP 01/01/1985  SpO2 100%  BMI 32.45 kg/m2    Cardiovascular: Normal rate, regular rhythm, normal heart sounds and intact distal pulses. Exam reveals no gallop and no friction rub. No murmur heard. Pulmonary/Chest: Effort normal and breath sounds normal. No respiratory distress. She has no wheezes. She has no rales. Vitals reviewed. positive 18/18 fibromyalgia tender points     Assessment/Plan:   Carol Garcia is a 64 y.o. female with history of HTN, CAD sp stent, GERD, GARCIA, DM, EDMOND, Asthma, Anxiety, Depression who presents to discuss medications changes after gastric bypass surgery. ICD-10-CM ICD-9-CM    1. Fibromyalgia M79.7 729.1 gabapentin (NEURONTIN) 600 mg tablet   2.  Other chronic pain G89.29 338.29 gabapentin (NEURONTIN) 600 mg tablet     Will go ahead and change gabapentin to tablets. Follow-up Disposition:  Return if symptoms worsen or fail to improve. I have discussed the diagnosis with the patient and the intended plan as seen in the above orders. The patient has received an after-visit summary and questions were answered concerning future plans. I have discussed medication side effects and warnings with the patient as well.     Patient discussed with Dr Clemmie Halsted, MD  Family Medicine Resident (PGY-3)  2/17/2017

## 2017-02-17 NOTE — PATIENT INSTRUCTIONS
Fibromyalgia: Care Instructions  Your Care Instructions  Fibromyalgia is a painful condition that is not completely understood by medical experts. The cause of fibromyalgia is not known. It can make you feel tired and ache all over. It causes tender spots at specific points of the body that hurt only when you press on them. You may have trouble sleeping, as well as other symptoms. These problems can upset your work and home life. Symptoms tend to come and go, although they may never go away completely. Fibromyalgia does not harm your muscles, joints, or organs. Follow-up care is a key part of your treatment and safety. Be sure to make and go to all appointments, and call your doctor if you are having problems. It's also a good idea to know your test results and keep a list of the medicines you take. How can you care for yourself at home? · Exercise often. Walk, swim, or bike to help with pain and sleep problems and to make you feel better. · Try to get a good night's sleep. Go to bed and get up at the same time each day, whether you feel rested or not. Make sure you have a good mattress and pillow. · Reduce stress. Avoid things that cause you stress, if you can. If not, work at making them less stressful. Learn to use biofeedback, guided imagery, meditation, or other methods to relax. · Make healthy changes. Eat a balanced diet, quit smoking, and limit alcohol and caffeine. · Use a heating pad set on low or take warm baths or showers for pain. Using cold packs for up to 20 minutes at a time can also relieve pain. Put a thin cloth between the cold pack and your skin. A gentle massage might help too. · Be safe with medicines. Take your medicines exactly as prescribed. Call your doctor if you think you are having a problem with your medicine. Your doctor may talk to you about taking antidepressant medicines. These medicines may improve sleep, relieve pain, and in some cases treat depression.   · Learn about fibromyalgia. This makes coping easier. Then, take an active role in your treatment. · Think about joining a support group with others who have fibromyalgia to learn more and get support. When should you call for help? Watch closely for changes in your health, and be sure to contact your doctor if:  · You feel sad, helpless, or hopeless; lose interest in things you used to enjoy; or have other symptoms of depression. · Your fibromyalgia symptoms get worse. Where can you learn more? Go to http://maliha-sanchez.info/. Enter V003 in the search box to learn more about \"Fibromyalgia: Care Instructions. \"  Current as of: April 18, 2016  Content Version: 11.1  © 4408-4621 [employer], [employer]. Care instructions adapted under license by [employer] (which disclaims liability or warranty for this information). If you have questions about a medical condition or this instruction, always ask your healthcare professional. [address] any warranty or liability for your use of this information.

## 2017-02-17 NOTE — MR AVS SNAPSHOT
Visit Information Date & Time Provider Department Dept. Phone Encounter #  
 2/17/2017  4:00 PM Mary Barry, Maria Fernanda [place] [phone] [id number] Follow-up Instructions Return if symptoms worsen or fail to improve. Your Appointments 3/8/2017  1:40 PM  
[place] [id number] [address] [facility] ([address] [place] [address]) Appt Note: PO 4 wk F/U  
 [address] [place] [id number]  
[phone]  
  
   
 [address] [facility]  
  
    
 3/22/2017  1:40 PM  
POST OP with Tita Livingston NP [address] [facility] ([address] [place] [address]) Appt Note: PO 6 wk F/U  
 [address] 8 [facility] 1007 [facility]  
[phone]  
  
    
 6/23/2017  1:40 PM  
ESTABLISHED PATIENT with Kin Gonzalez MD  
[facility] ([facility]) Appt Note: 6 mo fup; 6 mo [address] [place] [address] [address] [facility]  
[address] [place] [address] [address] Upcoming Health Maintenance Date Due HEMOGLOBIN A1C Q6M 2/23/2017 BREAST CANCER SCRN MAMMOGRAM 7/14/2017 FOOT EXAM Q1 8/23/2017 MICROALBUMIN Q1 8/23/2017 LIPID PANEL Q1 8/23/2017 EYE EXAM RETINAL OR DILATED Q1 8/24/2017 PAP AKA CERVICAL CYTOLOGY 8/23/2019 COLONOSCOPY 6/23/2020 DTaP/Tdap/Td series (2 - Td) 8/14/2022 Allergies as of 2/17/2017  Review Complete On: 2/17/2017 By: Mary Barry MD  
  
 Severity Noted Reaction Type Reactions Accupril [Quinapril]  07/07/2010    Cough Lipitor [Atorvastatin]  07/07/2010    Other (comments)  
 aches Norvasc [Amlodipine]  07/22/2015    Swelling On legs at 10 mg dose Current Immunizations  Reviewed on 1/26/2017 Name Date Influenza Vaccine 9/1/2015 Pneumococcal Polysaccharide (PPSV-23) 8/4/2015 TD Vaccine 11/7/2005 TDAP Vaccine 8/14/2012 Not reviewed this visit You Were Diagnosed With   
  
 Codes Comments Fibromyalgia    -  Primary ICD-10-CM: M79.7 ICD-9-CM: 729.1 Other chronic pain     ICD-10-CM: G89.29 ICD-9-CM: 338.29 Vitals BP Pulse Resp Height(growth percentile) Weight(growth percentile) [facility]  
 127/81 89 16 5' 5\" (1.651 m) 195 lb (88.5 kg) 01/01/1985 SpO2 BMI OB Status Smoking Status 100% 32.45 kg/m2 Hysterectomy Never Smoker BMI and BSA Data Body Mass Index Body Surface Area  
 32.45 kg/m 2 2.01 m 2 Preferred Pharmacy Pharmacy Name Phone [id number] [place], [address] AT [facility] [phone] Your Updated Medication List  
  
   
This list is accurate as of: 2/17/17  4:34 PM.  Always use your most recent med list.  
  
  
  
  
 * albuterol 0.63 mg/3 mL nebulizer solution Commonly known as:  ACCUNEB  
3 mL by Nebulization route every four (4) hours as needed for Wheezing or Shortness of Breath. * albuterol 90 mcg/actuation inhaler Commonly known as:  PROAIR HFA Take 1 Puff by inhalation every four (4) hours as needed for Wheezing or Shortness of Breath. alcohol swabs Padm Commonly known as:  ALCOHOL PADS Use when checking blood glucose ALPRAZolam 0.5 mg tablet Commonly known as:  XANAX  
TAKE 1 TABLET BY MOUTH 3 TIMES A DAY AS NEEDED FOR ANXIETY. MAX DAILY AMT 1.5MG aspirin, buffered 81 mg Tab Take  by mouth daily. Blood-Glucose Meter monitoring kit Use to check glucose once a day  
  
 budesonide 180 mcg/actuation Aepb inhaler Commonly known as:  PULMICORT Take 1 Puff by inhalation two (2) times a day. Indications: MAINTENANCE THERAPY FOR ASTHMA  
  
 chlorthalidone 25 mg tablet Commonly known as:  HYGROTEN  
TAKE 1 TABLET BY MOUTH DAILY  
  
 dicyclomine 10 mg capsule Commonly known as:  BENTYL TAKE 1 (ONE) CAPSULE ORALLY AS NEEDED EVERY 6 HOURS FLUoxetine 20 mg capsule Commonly known as:  PROzac TAKE ONE CAPSULE BY MOUTH EVERY DAY  
  
 gabapentin 600 mg tablet Commonly known as:  NEURONTIN Take 1 Tab by mouth three (3) times daily. glucose blood VI test strips strip Commonly known as:  blood glucose test  
Use once a day  
  
 guanFACINE 1 mg tablet Commonly known as:  TENEX  
TAKE 1 TABLET BY MOUTH TWICE A DAY HYDROmorphone 2 mg tablet Commonly known as:  DILAUDID Take 1 Tab by mouth every four (4) hours as needed for Pain. Max Daily Amount: 12 mg. Lancets Misc Use once a day. Please give one compatible with patient's meter  
  
 losartan 50 mg tablet Commonly known as:  COZAAR  
TAKE 1 TABLET BY MOUTH EVERY DAY **REPLACES AMLODIPINE**  
  
 metoprolol tartrate 100 mg IR tablet Commonly known as:  LOPRESSOR  
TAKE 1 TABLET BY MOUTH TWICE A DAY  
  
 nitroglycerin 0.4 mg SL tablet Commonly known as:  NITROSTAT  
1 Tab by SubLINGual route every five (5) minutes as needed for Chest Pain. omeprazole 20 mg capsule Commonly known as:  PRILOSEC  
TAKE ONE CAPSULE BY MOUTH EVERY DAY  
  
 ondansetron 4 mg disintegrating tablet Commonly known as:  ZOFRAN ODT Take 1 Tab by mouth every six (6) hours as needed for Nausea. rosuvastatin 40 mg tablet Commonly known as:  CRESTOR  
TAKE 1 TABLET BY MOUTH NIGHTLY  
  
 tiZANidine 4 mg tablet Commonly known as:  Kamila Adwoa TAKE 1 TABLET BY MOUTH AT BEDTIME  
  
 valACYclovir 500 mg tablet Commonly known as:  VALTREX Take 1 Tab by mouth two (2) times a day. ZyrTEC 10 mg tablet Generic drug:  cetirizine Take  by mouth daily. * Notice: This list has 2 medication(s) that are the same as other medications prescribed for you. Read the directions carefully, and ask your doctor or other care provider to review them with you. Prescriptions Sent to Pharmacy Refills  
 gabapentin (NEURONTIN) 600 mg tablet 0 Sig: Take 1 Tab by mouth three (3) times daily. Class: Normal  
 Pharmacy: [address] #: [phone] Route: Oral  
  
Follow-up Instructions Return if symptoms worsen or fail to improve. Patient Instructions Fibromyalgia: Care Instructions Your Care Instructions Fibromyalgia is a painful condition that is not completely understood by medical experts. The cause of fibromyalgia is not known. It can make you feel tired and ache all over. It causes tender spots at specific points of the body that hurt only when you press on them. You may have trouble sleeping, as well as other symptoms. These problems can upset your work and home life. Symptoms tend to come and go, although they may never go away completely. Fibromyalgia does not harm your muscles, joints, or organs. Follow-up care is a key part of your treatment and safety. Be sure to make and go to all appointments, and call your doctor if you are having problems. It's also a good idea to know your test results and keep a list of the medicines you take. How can you care for yourself at home? · Exercise often. Walk, swim, or bike to help with pain and sleep problems and to make you feel better. · Try to get a good night's sleep. Go to bed and get up at the same time each day, whether you feel rested or not. Make sure you have a good mattress and pillow. · Reduce stress. Avoid things that cause you stress, if you can. If not, work at making them less stressful. Learn to use biofeedback, guided imagery, meditation, or other methods to relax. · Make healthy changes. Eat a balanced diet, quit smoking, and limit alcohol and caffeine. · Use a heating pad set on low or take warm baths or showers for pain. Using cold packs for up to 20 minutes at a time can also relieve pain. Put a thin cloth between the cold pack and your skin. A gentle massage might help too. · Be safe with medicines. Take your medicines exactly as prescribed. Call your doctor if you think you are having a problem with your medicine. Your doctor may talk to you about taking antidepressant medicines. These medicines may improve sleep, relieve pain, and in some cases treat depression. · Learn about fibromyalgia. This makes coping easier. Then, take an active role in your treatment. · Think about joining a support group with others who have fibromyalgia to learn more and get support. When should you call for help? Watch closely for changes in your health, and be sure to contact your doctor if: 
· You feel sad, helpless, or hopeless; lose interest in things you used to enjoy; or have other symptoms of depression. · Your fibromyalgia symptoms get worse. Where can you learn more? Go to http://maliha-sanchez.info/. Enter V003 in the search box to learn more about \"Fibromyalgia: Care Instructions. \" Current as of: April 18, 2016 Content Version: 11.1 © 7015-8696 [employer]. Care instructions adapted under license by [employer] (which disclaims liability or warranty for this information). If you have questions about a medical condition or this instruction, always ask your healthcare professional. [address] any warranty or liability for your use of this information. Introducing [facility]! Dear Mireille Bingham: 
Thank you for requesting a [facility] account. Our records indicate that you already have an active [facility] [employer] [facility] account. You can access your account anytime at [email or web address] Did you know that you can access your hospital and ER discharge instructions at any time in [facility] [employer] [facility]? You can also review all of your test results from your hospital stay or ER visit. Additional Information If you have questions, please visit the Frequently Asked Questions section of the [employer] website at [email or web address] Remember, [facility] is NOT to be used for urgent needs. For medical emergencies, dial 911. Now available from your iPhone and Android! Please provide this summary of care documentation to your next provider. Your primary care clinician is listed as Wade Briseno. If you have any questions after today's visit, please call [phone].

## 2017-02-20 ENCOUNTER — TELEPHONE (OUTPATIENT)
Dept: FAMILY MEDICINE CLINIC | Age: 57
End: 2017-02-20

## 2017-02-20 ENCOUNTER — OFFICE VISIT (OUTPATIENT)
Dept: FAMILY MEDICINE CLINIC | Age: 57
End: 2017-02-20

## 2017-02-20 ENCOUNTER — TELEPHONE (OUTPATIENT)
Dept: SURGERY | Age: 57
End: 2017-02-20

## 2017-02-20 VITALS
HEIGHT: 65 IN | SYSTOLIC BLOOD PRESSURE: 94 MMHG | RESPIRATION RATE: 20 BRPM | BODY MASS INDEX: 32.49 KG/M2 | HEART RATE: 64 BPM | OXYGEN SATURATION: 96 % | TEMPERATURE: 97.6 F | WEIGHT: 195 LBS | DIASTOLIC BLOOD PRESSURE: 63 MMHG

## 2017-02-20 DIAGNOSIS — E11.9 DIABETES MELLITUS TYPE 2, DIET-CONTROLLED (HCC): ICD-10-CM

## 2017-02-20 DIAGNOSIS — R42 LIGHTHEADEDNESS: Primary | ICD-10-CM

## 2017-02-20 DIAGNOSIS — R30.0 DYSURIA: ICD-10-CM

## 2017-02-20 DIAGNOSIS — I25.10 CORONARY ARTERY DISEASE INVOLVING NATIVE CORONARY ARTERY OF NATIVE HEART WITHOUT ANGINA PECTORIS: ICD-10-CM

## 2017-02-20 DIAGNOSIS — I95.9 HYPOTENSION, UNSPECIFIED HYPOTENSION TYPE: ICD-10-CM

## 2017-02-20 LAB
BILIRUB UR QL STRIP: ABNORMAL
GLUCOSE POC: 111 MG/DL
GLUCOSE UR-MCNC: NEGATIVE MG/DL
KETONES P FAST UR STRIP-MCNC: NEGATIVE MG/DL
PH UR STRIP: 8.5 [PH] (ref 4.6–8)
PROT UR QL STRIP: ABNORMAL MG/DL
SP GR UR STRIP: 1.01 (ref 1–1.03)
UA UROBILINOGEN AMB POC: ABNORMAL (ref 0.2–1)
URINALYSIS CLARITY POC: CLEAR
URINALYSIS COLOR POC: YELLOW
URINE BLOOD POC: NEGATIVE
URINE LEUKOCYTES POC: NEGATIVE
URINE NITRITES POC: NEGATIVE

## 2017-02-20 RX ORDER — METOPROLOL TARTRATE 25 MG/1
12.5 TABLET, FILM COATED ORAL 2 TIMES DAILY
Qty: 60 TAB | Refills: 1 | Status: SHIPPED | OUTPATIENT
Start: 2017-02-20 | End: 2017-03-02 | Stop reason: SDUPTHER

## 2017-02-20 NOTE — PROGRESS NOTES
Barbara Calles is a 64 y.o. female    Issues discussed today include:    1)  Low BP:  First noticed feeling \"lightheaded, weak\" 2 days ago. Checked BP yesterday and was 74/59. This am BP was 84/64 and at noon was 107/63. Has not taken her BP meds today. Was having constipation, but had small BM this am. Was small and hard. No opiate pain meds in 2 days. Notes some swelling in both hands, none in legs + bloating and abdomen is \"uncomfortable. \"  Had gastric bypass on Jan 25, 2017 (3.5 weeks ago), ended up staying in the hospital for 2 extra days d/t ileus  Has lost 35 lbs since her surgery. + nausea with almost all foods x 2-3 days. No eating any sugary foods. No emesis. She called and spoke to a nurse in her surgeon's office today at 8:30a - they recommended increasing protein and drinking more water  Pt says \"I know I need to drink more water. \"  + dysuria x 2 days, no frequency,+ urgency and only a dribble comes out  Has DM, has glucometer, but not checking sugar since sxs began    Data reviewed or ordered today:       Other problems include:  Patient Active Problem List   Diagnosis Code    Morbid obesity ([facility] [address] E66.01    Obstructive sleep apnea G47.33    Asthma J45.909    Arthritis M19.90    GERD (gastroesophageal reflux disease) K21.9    Edema R60.9    GARCIA (nonalcoholic steatohepatitis) K75.81    Chronic pain G89.29    Essential hypertension A73    Metabolic syndrome H02.16    FH: diabetes mellitus Z83.3    DM (diabetes mellitus) ([facility] [address] E11.9    CAD (coronary artery disease) I25.10    Anxiety F41.9    History of pulmonary embolus (PE) Z86.711    Hypokalemia E87.6       Medications:  Current Outpatient Prescriptions   Medication Sig Dispense Refill    metoprolol tartrate (LOPRESSOR) 25 mg tablet Take 0.5 Tabs by mouth two (2) times a day. 60 Tab 1    gabapentin (NEURONTIN) 600 mg tablet Take 1 Tab by mouth three (3) times daily.  270 Tab 0    ondansetron (ZOFRAN ODT) 4 mg disintegrating tablet Take 1 Tab by mouth every six (6) hours as needed for Nausea. 30 Tab 0    HYDROmorphone (DILAUDID) 2 mg tablet Take 1 Tab by mouth every four (4) hours as needed for Pain. Max Daily Amount: 12 mg. 20 Tab 0    FLUoxetine (PROZAC) 20 mg capsule TAKE ONE CAPSULE BY MOUTH EVERY DAY 90 Cap 1    tiZANidine (ZANAFLEX) 4 mg tablet TAKE 1 TABLET BY MOUTH AT BEDTIME 30 Tab 3    guanFACINE (TENEX) 1 mg tablet TAKE 1 TABLET BY MOUTH TWICE A  Tab 3    ALPRAZolam (XANAX) 0.5 mg tablet TAKE 1 TABLET BY MOUTH 3 TIMES A DAY AS NEEDED FOR ANXIETY. MAX DAILY AMT 1.5MG 180 Tab 0    albuterol (PROAIR HFA) 90 mcg/actuation inhaler Take 1 Puff by inhalation every four (4) hours as needed for Wheezing or Shortness of Breath. 1 Inhaler 5    omeprazole (PRILOSEC) 20 mg capsule TAKE ONE CAPSULE BY MOUTH EVERY DAY 90 Cap 1    budesonide (PULMICORT) 180 mcg/actuation aepb inhaler Take 1 Puff by inhalation two (2) times a day. Indications: MAINTENANCE THERAPY FOR ASTHMA 1 Inhaler 3    albuterol (ACCUNEB) 0.63 mg/3 mL nebulizer solution 3 mL by Nebulization route every four (4) hours as needed for Wheezing or Shortness of Breath. 30 Vial 5    rosuvastatin (CRESTOR) 40 mg tablet TAKE 1 TABLET BY MOUTH NIGHTLY 90 Tab 3    Blood-Glucose Meter monitoring kit Use to check glucose once a day 1 Kit 0    alcohol swabs (ALCOHOL PADS) padm Use when checking blood glucose 100 Pad 5    Lancets misc Use once a day. Please give one compatible with patient's meter 1 Package 5    glucose blood VI test strips (BLOOD GLUCOSE TEST) strip Use once a day 100 Strip 5    valACYclovir (VALTREX) 500 mg tablet Take 1 Tab by mouth two (2) times a day. (Patient taking differently: Take 500 mg by mouth two (2) times daily as needed.) 60 Tab 3    nitroglycerin (NITROSTAT) 0.4 mg SL tablet 1 Tab by SubLINGual route every five (5) minutes as needed for Chest Pain. 1 Bottle 4    Aspirin, Buffered 81 mg tab Take  by mouth daily.       dicyclomine (BENTYL) 10 mg capsule TAKE 1 (ONE) CAPSULE ORALLY AS NEEDED EVERY 6 HOURS 120 capsule 0    cetirizine (ZYRTEC) 10 mg tablet Take  by mouth daily. Allergies: Allergies   Allergen Reactions    Accupril [Quinapril] Cough    Lipitor [Atorvastatin] Other (comments)     aches    Norvasc [Amlodipine] Swelling     On legs at 10 mg dose       LMP:  Patient's last menstrual period was 01/01/1985. Social History     Social History    Marital status:      Spouse name: N/A    Number of children: N/A    Years of education: N/A     Occupational History    Not on file.      Social History Main Topics    Smoking status: Never Smoker    Smokeless tobacco: Never Used    Alcohol use No      Comment: rarely    Drug use: No    Sexual activity: Yes     Partners: Male     Birth control/ protection: None     Other Topics Concern    Not on file     Social History Narrative       Family History   Problem Relation Age of Onset    Cancer Mother 67     colon    Diabetes Mother     Arthritis-osteo Mother     Stroke Mother     Migraines Mother     Diabetes Father     Heart Disease Father     Heart Attack Father     Hypertension Father     High Cholesterol Father     COPD Father     Heart Failure Father     Cancer Other     Diabetes Other     Heart Disease Other     Hypertension Other     Cancer Brother      lymphoma    Cancer Brother      PROSTATE AND LYMPHOMA    Cancer Maternal Grandmother      stomach    Asthma Brother     Asthma Brother     Stroke Brother     Cancer Maternal Aunt      lung     Cancer Maternal Uncle      lung    Cancer Maternal Uncle      melanoma    Anesth Problems Neg Hx        ROS:   Chest Pain:  No  SOB:  No      Physical Exam  Visit Vitals    BP 94/63 (BP 1 Location: Left arm, BP Patient Position: Sitting)    Pulse 64    Temp 97.6 °F (36.4 °C) (Oral)    Resp 20    Ht 5' 5\" (1.651 m)    Wt 195 lb (88.5 kg)    LMP 01/01/1985    SpO2 96%    BMI 32.45 kg/m2 BP Readings from Last 3 Encounters:   02/20/17 94/63   02/17/17 127/81   02/08/17 111/63     Constitutional: Appears well,  No acute distress, Vitals noted  Psychiatric:  Affect normal, Alert and Oriented to person/place/time  Eyes:  Conjunctiva clear, no drainage  ENT:  External ears and nose normal, Teeth and gums appear healthy, Mucous membranes moist  Neck:  General inspection normal. Supple. Lungs:  Clear to auscultation, good respiratory effort, no wheezes, rales or rhonchi  Heart:  Normal HR, Normal S1 and S2,  Regular rhythm. No murmurs, rubs or gallops. No carotid bruits. Abdomen: Soft, protuberant, surgical scars healing well c/d/i, nondistended, mild diffuse ttp, no focal tenderness, no guarding or rebound  Extremities:  Without edema, good peripheral pulses  Skin:  Warm to palpation, without rashes    POC Glucose: 111      Assessment/Plan:      ICD-10-CM ICD-9-CM    1. Lightheadedness Y74 055.2 METABOLIC PANEL, BASIC      CBC WITH AUTOMATED DIFF   2. Hypotension, unspecified hypotension type I95.9 458.9    3. Diabetes mellitus type 2, diet-controlled (HCC) E11.9 250.00 AMB POC GLUCOSE, QUANTITATIVE, BLOOD      HEMOGLOBIN A1C WITH EAG   4. Dysuria R30.0 788.1 AMB POC URINALYSIS DIP STICK AUTO W/O MICRO   5.  Coronary artery disease involving native coronary artery of native heart without angina pectoris I25.10 414.01 metoprolol tartrate (LOPRESSOR) 25 mg tablet       Lightheadedness, likely 2/2 dehydration and weight loss with over medication of blood pressure  Currently on three antihypertensives, likely still in system even though last dose was yesterday  Stop chlorthalidone and cozaar  Continue metoprolol 12.5mg bid given h/o CAD with PCI in 2015  Dysuria - UA without evidence of infection, likely 2/2 concentrated urine in setting of dehydration  Push clear fluids  Pt to call her surgeons later this afternoon if still nauseated, lightheaded - may benefit from IV hydration (as surgeon's had indicated was not uncommon and could arrange IVF in ED without admission)  POC gluc fine 111, pt to check glucose at home to ensure not too high or low, not on medication for DM  Will check basic labs to ensure no sign of infection, electrolyte disturbance - I reviewed inpatient labs from last admission, was hypokalemic  Close follow up    Follow-up Disposition:  Return in about 3 days (around 2/23/2017). AVS was printed, given to patient and briefly discussed prior to patient's departure from the office today. Patient discussed with attending, Dr. Gisselle Garcia.  Estuardo Dias MD  [id number] [facility]  [phone]  [address]

## 2017-02-20 NOTE — MR AVS SNAPSHOT
Visit Information Date & Time Provider Department Dept. Phone Encounter #  
 2/20/2017  2:00 PM Amalia Baxter, Maria Fernanda Jewell [phone] [id number] Follow-up Instructions Return in about 3 days (around 2/23/2017). Your Appointments 3/8/2017  1:40 PM  
POST OP with Sukhdev Chandra NP [id number] [facility] ([facility]) Appt Note: PO 4 wk F/U  
 [address] [facility] [address]  
[phone]  
  
   
 [address] 1007 [facility]  
  
    
 3/22/2017  1:40 PM  
POST OP with Sukhdev Chandra NP [id number] [facility] ([facility]) Appt Note: PO 6 wk F/U  
 [address] [facility] 1007 [facility]  
[phone]  
  
    
 6/23/2017  1:40 PM  
ESTABLISHED PATIENT with Edith Yin MD  
[facility] ([facility]) Appt Note: 6 mo fup; 6 mo [address] [place] [address] [facility]  
[address] [place] [address] [facility] Upcoming Health Maintenance Date Due HEMOGLOBIN A1C Q6M 2/23/2017 BREAST CANCER SCRN MAMMOGRAM 7/14/2017 FOOT EXAM Q1 8/23/2017 MICROALBUMIN Q1 8/23/2017 LIPID PANEL Q1 8/23/2017 EYE EXAM RETINAL OR DILATED Q1 8/24/2017 PAP AKA CERVICAL CYTOLOGY 8/23/2019 COLONOSCOPY 6/23/2020 DTaP/Tdap/Td series (2 - Td) 8/14/2022 Allergies as of 2/20/2017  Review Complete On: 2/20/2017 By: Amalia Baxter MD  
  
 Severity Noted Reaction Type Reactions Accupril [Quinapril]  07/07/2010    Cough Lipitor [Atorvastatin]  07/07/2010    Other (comments)  
 aches Norvasc [Amlodipine]  07/22/2015    Swelling On legs at 10 mg dose Current Immunizations  Reviewed on 1/26/2017 Name Date Influenza Vaccine 9/1/2015 Pneumococcal Polysaccharide (PPSV-23) 8/4/2015 TD Vaccine 11/7/2005 TDAP Vaccine 8/14/2012 Not reviewed this visit You Were Diagnosed With   
  
 Codes Comments Dysuria    -  Primary ICD-10-CM: R30.0 ICD-9-CM: 702. 1 DM (diabetes mellitus screen)     ICD-10-CM: Z13.1 ICD-9-CM: V77.1 Vitals BP Pulse Temp Resp Height(growth percentile) Weight(growth percentile) 94/63 (BP 1 Location: Left arm, BP Patient Position: Sitting) 64 97.6 °F (36.4 °C) (Oral) 20 5' 5\" (1.651 m) 195 lb (88.5 kg) LMP SpO2 BMI OB Status Smoking Status 01/01/1985 96% 32.45 kg/m2 Hysterectomy Never Smoker Vitals History BMI and BSA Data Body Mass Index Body Surface Area  
 32.45 kg/m 2 2.01 m 2 Preferred Pharmacy Pharmacy Name Phone [id number] [place], [address] AT [employer] [phone] Your Updated Medication List  
  
   
This list is accurate as of: 2/20/17  2:52 PM.  Always use your most recent med list.  
  
  
  
  
 * albuterol 0.63 mg/3 mL nebulizer solution Commonly known as:  ACCUNEB  
3 mL by Nebulization route every four (4) hours as needed for Wheezing or Shortness of Breath. * albuterol 90 mcg/actuation inhaler Commonly known as:  PROAIR HFA Take 1 Puff by inhalation every four (4) hours as needed for Wheezing or Shortness of Breath. alcohol swabs Padm Commonly known as:  ALCOHOL PADS Use when checking blood glucose ALPRAZolam 0.5 mg tablet Commonly known as:  XANAX  
TAKE 1 TABLET BY MOUTH 3 TIMES A DAY AS NEEDED FOR ANXIETY. MAX DAILY AMT 1.5MG aspirin, buffered 81 mg Tab Take  by mouth daily. Blood-Glucose Meter monitoring kit Use to check glucose once a day  
  
 budesonide 180 mcg/actuation Aepb inhaler Commonly known as:  PULMICORT Take 1 Puff by inhalation two (2) times a day. Indications: MAINTENANCE THERAPY FOR ASTHMA  
  
 dicyclomine 10 mg capsule Commonly known as:  BENTYL TAKE 1 (ONE) CAPSULE ORALLY AS NEEDED EVERY 6 HOURS  
  
 FLUoxetine 20 mg capsule Commonly known as:  PROzac TAKE ONE CAPSULE BY MOUTH EVERY DAY  
  
 gabapentin 600 mg tablet Commonly known as:  NEURONTIN Take 1 Tab by mouth three (3) times daily. glucose blood VI test strips strip Commonly known as:  blood glucose test  
Use once a day  
  
 guanFACINE 1 mg tablet Commonly known as:  TENEX  
TAKE 1 TABLET BY MOUTH TWICE A DAY HYDROmorphone 2 mg tablet Commonly known as:  DILAUDID Take 1 Tab by mouth every four (4) hours as needed for Pain. Max Daily Amount: 12 mg. Lancets Misc Use once a day. Please give one compatible with patient's meter  
  
 metoprolol tartrate 25 mg tablet Commonly known as:  LOPRESSOR Take 0.5 Tabs by mouth two (2) times a day. nitroglycerin 0.4 mg SL tablet Commonly known as:  NITROSTAT  
1 Tab by SubLINGual route every five (5) minutes as needed for Chest Pain. omeprazole 20 mg capsule Commonly known as:  PRILOSEC  
TAKE ONE CAPSULE BY MOUTH EVERY DAY  
  
 ondansetron 4 mg disintegrating tablet Commonly known as:  ZOFRAN ODT Take 1 Tab by mouth every six (6) hours as needed for Nausea. rosuvastatin 40 mg tablet Commonly known as:  CRESTOR  
TAKE 1 TABLET BY MOUTH NIGHTLY  
  
 tiZANidine 4 mg tablet Commonly known as:  Michelle Reagin TAKE 1 TABLET BY MOUTH AT BEDTIME  
  
 valACYclovir 500 mg tablet Commonly known as:  VALTREX Take 1 Tab by mouth two (2) times a day. ZyrTEC 10 mg tablet Generic drug:  cetirizine Take  by mouth daily. * Notice: This list has 2 medication(s) that are the same as other medications prescribed for you. Read the directions carefully, and ask your doctor or other care provider to review them with you. Prescriptions Sent to Pharmacy Refills  
 metoprolol tartrate (LOPRESSOR) 25 mg tablet 1 Sig: Take 0.5 Tabs by mouth two (2) times a day.   
 Class: Normal  
 [facility] [address] [facility] #: [phone] Route: Oral  
  
We Performed the Following AMB POC GLUCOSE, QUANTITATIVE, BLOOD [93414 CPT(R)] AMB POC URINALYSIS DIP STICK AUTO W/O MICRO [85651 CPT(R)] Follow-up Instructions Return in about 3 days (around 2/23/2017). Patient Instructions Low Blood Pressure: Care Instructions Your Care Instructions Blood pressure is a measurement of the force of the blood against the walls of the blood vessels during and after each beat of the heart. Low blood pressure (hypotension) means that your blood pressure is much lower than normal. Some people, especially young, slim women, may have slightly low blood pressure without symptoms. However, in many people, low blood pressure can cause symptoms such as dizziness or lightheadedness. When your blood pressure is too low, your heart, brain, and other organs do not get enough blood. Low blood pressure can be caused by many things, including heart problems and some medicines. Uncontrolled diabetes can cause your blood pressure to drop, and so can a severe allergic reaction or infection. Another cause is dehydration, which is when your body loses too much fluid. Treatment for low blood pressure depends on the cause. Follow-up care is a key part of your treatment and safety. Be sure to make and go to all appointments, and call your doctor if you are having problems. It's also a good idea to know your test results and keep a list of the medicines you take. How can you care for yourself at home? · Drink plenty of fluids, enough so that your urine is light yellow or clear like water. If you have kidney, heart, or liver disease and have to limit fluids, talk with your doctor before you increase the amount of fluids you drink. · Be safe with medicines.  Call your doctor if you think you are having a problem with your medicine. You will get more details on the specific medicines your doctor prescribes. · Stand up or get out of bed very slowly to allow your body to adjust. 
· Get plenty of rest. 
· Do not smoke. Smoking increases your risk of heart attack. If you need help quitting, talk to your doctor about stop-smoking programs and medicines. These can increase your chances of quitting for good. · Limit alcohol to 2 drinks a day for men and 1 drink a day for women. Alcohol may interfere with your medicine. In addition, alcohol can make your low blood pressure worse by causing your body to lose water. When should you call for help? Call 911 anytime you think you may need emergency care. For example, call if: 
· You have symptoms of a heart attack. These may include: ¨ Chest pain or pressure, or a strange feeling in the chest. 
¨ Sweating. ¨ Shortness of breath. ¨ Nausea or vomiting. ¨ Pain, pressure, or a strange feeling in the back, neck, jaw, or upper belly or in one or both shoulders or arms. ¨ Lightheadedness or sudden weakness. ¨ A fast or irregular heartbeat. After you call 911, the  may tell you to chew 1 adult-strength or 2 to 4 low-dose aspirin. Wait for an ambulance. Do not try to drive yourself. · You have symptoms of a stroke. These may include: 
¨ Sudden numbness, tingling, weakness, or loss of movement in your face, arm, or leg, especially on only one side of your body. ¨ Sudden vision changes. ¨ Sudden trouble speaking. ¨ Sudden confusion or trouble understanding simple statements. ¨ Sudden problems with walking or balance. ¨ A sudden, severe headache that is different from past headaches. · You passed out (lost consciousness). Call your doctor now or seek immediate medical care if: 
· You are dizzy or lightheaded, or you feel like you may faint. · You have signs of needing more fluids. You have sunken eyes and a dry mouth, and you pass only a little dark urine. · You cannot keep down fluids. Watch closely for changes in your health, and be sure to contact your doctor if: 
· You do not get better as expected. Where can you learn more? Go to http://maliha-sanchez.info/. Enter C304 in the search box to learn more about \"Low Blood Pressure: Care Instructions. \" Current as of: April 21, 2016 Content Version: 11.1 © 5668-9103 [employer]. Care instructions adapted under license by [employer] (which disclaims liability or warranty for this information). If you have questions about a medical condition or this instruction, always ask your healthcare professional. [address] any warranty or liability for your use of this information. Low Blood Pressure: Care Instructions Your Care Instructions Blood pressure is a measurement of the force of the blood against the walls of the blood vessels during and after each beat of the heart. Low blood pressure (hypotension) means that your blood pressure is much lower than normal. Some people, especially young, slim women, may have slightly low blood pressure without symptoms. However, in many people, low blood pressure can cause symptoms such as dizziness or lightheadedness. When your blood pressure is too low, your heart, brain, and other organs do not get enough blood. Low blood pressure can be caused by many things, including heart problems and some medicines. Uncontrolled diabetes can cause your blood pressure to drop, and so can a severe allergic reaction or infection. Another cause is dehydration, which is when your body loses too much fluid. Treatment for low blood pressure depends on the cause. Follow-up care is a key part of your treatment and safety. Be sure to make and go to all appointments, and call your doctor if you are having problems. It's also a good idea to know your test results and keep a list of the medicines you take. How can you care for yourself at home? · Drink plenty of fluids, enough so that your urine is light yellow or clear like water. If you have kidney, heart, or liver disease and have to limit fluids, talk with your doctor before you increase the amount of fluids you drink. · Be safe with medicines. Call your doctor if you think you are having a problem with your medicine. You will get more details on the specific medicines your doctor prescribes. · Stand up or get out of bed very slowly to allow your body to adjust. 
· Get plenty of rest. 
· Do not smoke. Smoking increases your risk of heart attack. If you need help quitting, talk to your doctor about stop-smoking programs and medicines. These can increase your chances of quitting for good. · Limit alcohol to 2 drinks a day for men and 1 drink a day for women. Alcohol may interfere with your medicine. In addition, alcohol can make your low blood pressure worse by causing your body to lose water. When should you call for help? Call 911 anytime you think you may need emergency care. For example, call if: 
· You have symptoms of a heart attack. These may include: ¨ Chest pain or pressure, or a strange feeling in the chest. 
¨ Sweating. ¨ Shortness of breath. ¨ Nausea or vomiting. ¨ Pain, pressure, or a strange feeling in the back, neck, jaw, or upper belly or in one or both shoulders or arms. ¨ Lightheadedness or sudden weakness. ¨ A fast or irregular heartbeat. After you call 911, the  may tell you to chew 1 adult-strength or 2 to 4 low-dose aspirin. Wait for an ambulance. Do not try to drive yourself. · You have symptoms of a stroke. These may include: 
¨ Sudden numbness, tingling, weakness, or loss of movement in your face, arm, or leg, especially on only one side of your body. ¨ Sudden vision changes. ¨ Sudden trouble speaking. ¨ Sudden confusion or trouble understanding simple statements. ¨ Sudden problems with walking or balance. ¨ A sudden, severe headache that is different from past headaches. · You passed out (lost consciousness). Call your doctor now or seek immediate medical care if: 
· You are dizzy or lightheaded, or you feel like you may faint. · You have signs of needing more fluids. You have sunken eyes and a dry mouth, and you pass only a little dark urine. · You cannot keep down fluids. Watch closely for changes in your health, and be sure to contact your doctor if: 
· You do not get better as expected. Where can you learn more? Go to http://maliha-sanchez.info/. Enter C304 in the search box to learn more about \"Low Blood Pressure: Care Instructions. \" Current as of: April 21, 2016 Content Version: 11.1 © 2742-6941 [employer]. Care instructions adapted under license by Agennix (which disclaims liability or warranty for this information). If you have questions about a medical condition or this instruction, always ask your healthcare professional. [place] [address] any warranty or liability for your use of this information. Dehydration: Care Instructions Your Care Instructions Dehydration happens when your body loses too much fluid. This might happen when you do not drink enough water or you lose large amounts of fluids from your body because of diarrhea, vomiting, or sweating. Severe dehydration can be life-threatening. Water and minerals called electrolytes help put your body fluids back in balance. Learn the early signs of fluid loss, and drink more fluids to prevent dehydration. Follow-up care is a key part of your treatment and safety. Be sure to make and go to all appointments, and call your doctor if you are having problems. It's also a good idea to know your test results and keep a list of the medicines you take. How can you care for yourself at home?  
· To prevent dehydration, drink plenty of fluids, enough so that your urine is light yellow or clear like water. Choose water and other caffeine-free clear liquids until you feel better. If you have kidney, heart, or liver disease and have to limit fluids, talk with your doctor before you increase the amount of fluids you drink. · If you do not feel like eating or drinking, try taking small sips of water, sports drinks, or other rehydration drinks. · Get plenty of rest. 
To prevent dehydration · Add more fluids to your diet and daily routine, unless your doctor has told you not to. · During hot weather, drink more fluids. Drink even more fluids if you exercise a lot. Stay away from drinks with alcohol or caffeine. · Watch for the symptoms of dehydration. These include: ¨ A dry, sticky mouth. ¨ Dark yellow urine, and not much of it. ¨ Dry and sunken eyes. ¨ Feeling very tired. · Learn what problems can lead to dehydration. These include: ¨ Diarrhea, fever, and vomiting. ¨ Any illness with a fever, such as pneumonia or the flu. ¨ Activities that cause heavy sweating, such as endurance races and heavy outdoor work in hot or humid weather. ¨ Alcohol or drug abuse or withdrawal. 
¨ Certain medicines, such as cold and allergy pills (antihistamines), diet pills (diuretics), and laxatives. ¨ Certain diseases, such as diabetes, cancer, and heart or kidney disease. When should you call for help? Call 911 anytime you think you may need emergency care. For example, call if: 
· You passed out (lost consciousness). Call your doctor now or seek immediate medical care if: 
· You are confused and cannot think clearly. · You are dizzy or lightheaded, or you feel like you may faint. · You have signs of needing more fluids. You have sunken eyes and a dry mouth, and you pass only a little dark urine. · You cannot keep fluids down. Watch closely for changes in your health, and be sure to contact your doctor if: 
· You are not making tears. · Your skin is very dry and sags slowly back into place after you pinch it. · Your mouth and eyes are very dry. Where can you learn more? Go to http://maliha-sanchez.info/. Enter P492 in the search box to learn more about \"Dehydration: Care Instructions. \" Current as of: May 27, 2016 Content Version: 11.1 © 5677-0310 [employer]. Care instructions adapted under license by [employer] (which disclaims liability or warranty for this information). If you have questions about a medical condition or this instruction, always ask your healthcare professional. [place] [address] any warranty or liability for your use of this information. Painful Urination (Dysuria): Care Instructions Your Care Instructions Burning pain with urination (dysuria) is a common symptom of a urinary tract infection or other urinary problems. The bladder may become inflamed. This can cause pain when the bladder fills and empties. You may also feel pain if the tube that carries urine from the bladder to the outside of the body (urethra) gets irritated or infected. Sexually transmitted infections (STIs) also may cause pain when you urinate. Sometimes the pain can be caused by things other than an infection. The urethra can be irritated by soaps, perfumes, or foreign objects in the urethra. Kidney stones can cause pain when they pass through the urethra. The cause may be hard to find. You may need tests. Treatment for painful urination depends on the cause. Follow-up care is a key part of your treatment and safety. Be sure to make and go to all appointments, and call your doctor if you are having problems. It's also a good idea to know your test results and keep a list of the medicines you take. How can you care for yourself at home?  
· Drink extra water and juices such as cranberry and blueberry juices for the next day or two. This will help make the urine less concentrated. And it may help wash out any bacteria that may be causing an infection. (If you have kidney, heart, or liver disease and have to limit fluids, talk with your doctor before you increase the amount of fluids you drink.) · Avoid drinks that are carbonated or have caffeine. They can irritate the bladder. · Urinate often. Try to empty your bladder each time. For women: · Urinate right after you have sex. · After going to the bathroom, wipe from front to back. · Avoid douches, bubble baths, and feminine hygiene sprays. And avoid other feminine hygiene products that have deodorants. When should you call for help? Call your doctor now or seek immediate medical care if: 
· You have new symptoms, such as fever, nausea, or vomiting. · You have new or worse symptoms of a urinary problem. For example: ¨ You have blood or pus in your urine. ¨ You have chills or body aches. ¨ It hurts worse to urinate. ¨ You have groin or belly pain. ¨ You have pain in your back just below your rib cage (the flank area). Watch closely for changes in your health, and be sure to contact your doctor if you have any problems. Where can you learn more? Go to http://maliha-sanchez.info/. Enter R067 in the search box to learn more about \"Painful Urination (Dysuria): Care Instructions. \" Current as of: August 12, 2016 Content Version: 11.1 © 7752-0679 [employer]. Care instructions adapted under license by THYME (which disclaims liability or warranty for this information). If you have questions about a medical condition or this instruction, always ask your healthcare professional. [address] any warranty or liability for your use of this information. Introducing [facility]! Dear Darius Ly: 
Thank you for requesting a [employer] account.   Our records indicate that you already have an active [employer] account. You can access your account anytime at [email or web address] Did you know that you can access your hospital and ER discharge instructions at any time in [employer]? You can also review all of your test results from your hospital stay or ER visit. Additional Information If you have questions, please visit the Frequently Asked Questions section of the [employer] website at [email or web address] Remember, [employer] is NOT to be used for urgent needs. For medical emergencies, dial 911. Now available from your iPhone and Android! Please provide this summary of care documentation to your next provider. Your primary care clinician is listed as Christie Aguiar. If you have any questions after today's visit, please call [phone].

## 2017-02-20 NOTE — PROGRESS NOTES
Chief Complaint   Patient presents with    Blood Pressure Check     1. Have you been to the ER, urgent care clinic since your last visit? Hospitalized since your last visit? No    2. Have you seen or consulted any other health care providers outside of the [address] [place] [address] since your last visit? Include any pap smears or colon screening.  No

## 2017-02-20 NOTE — PATIENT INSTRUCTIONS
Low Blood Pressure: Care Instructions  Your Care Instructions  Blood pressure is a measurement of the force of the blood against the walls of the blood vessels during and after each beat of the heart. Low blood pressure (hypotension) means that your blood pressure is much lower than normal. Some people, especially young, slim women, may have slightly low blood pressure without symptoms. However, in many people, low blood pressure can cause symptoms such as dizziness or lightheadedness. When your blood pressure is too low, your heart, brain, and other organs do not get enough blood. Low blood pressure can be caused by many things, including heart problems and some medicines. Uncontrolled diabetes can cause your blood pressure to drop, and so can a severe allergic reaction or infection. Another cause is dehydration, which is when your body loses too much fluid. Treatment for low blood pressure depends on the cause. Follow-up care is a key part of your treatment and safety. Be sure to make and go to all appointments, and call your doctor if you are having problems. It's also a good idea to know your test results and keep a list of the medicines you take. How can you care for yourself at home? · Drink plenty of fluids, enough so that your urine is light yellow or clear like water. If you have kidney, heart, or liver disease and have to limit fluids, talk with your doctor before you increase the amount of fluids you drink. · Be safe with medicines. Call your doctor if you think you are having a problem with your medicine. You will get more details on the specific medicines your doctor prescribes. · Stand up or get out of bed very slowly to allow your body to adjust.  · Get plenty of rest.  · Do not smoke. Smoking increases your risk of heart attack. If you need help quitting, talk to your doctor about stop-smoking programs and medicines. These can increase your chances of quitting for good.   · Limit alcohol to 2 drinks a day for men and 1 drink a day for women. Alcohol may interfere with your medicine. In addition, alcohol can make your low blood pressure worse by causing your body to lose water. When should you call for help? Call 911 anytime you think you may need emergency care. For example, call if:  · You have symptoms of a heart attack. These may include:  ¨ Chest pain or pressure, or a strange feeling in the chest.  ¨ Sweating. ¨ Shortness of breath. ¨ Nausea or vomiting. ¨ Pain, pressure, or a strange feeling in the back, neck, jaw, or upper belly or in one or both shoulders or arms. ¨ Lightheadedness or sudden weakness. ¨ A fast or irregular heartbeat. After you call 911, the  may tell you to chew 1 adult-strength or 2 to 4 low-dose aspirin. Wait for an ambulance. Do not try to drive yourself. · You have symptoms of a stroke. These may include:  ¨ Sudden numbness, tingling, weakness, or loss of movement in your face, arm, or leg, especially on only one side of your body. ¨ Sudden vision changes. ¨ Sudden trouble speaking. ¨ Sudden confusion or trouble understanding simple statements. ¨ Sudden problems with walking or balance. ¨ A sudden, severe headache that is different from past headaches. · You passed out (lost consciousness). Call your doctor now or seek immediate medical care if:  · You are dizzy or lightheaded, or you feel like you may faint. · You have signs of needing more fluids. You have sunken eyes and a dry mouth, and you pass only a little dark urine. · You cannot keep down fluids. Watch closely for changes in your health, and be sure to contact your doctor if:  · You do not get better as expected. Where can you learn more? Go to http://maliha-sanchez.info/. Enter C304 in the search box to learn more about \"Low Blood Pressure: Care Instructions. \"  Current as of: April 21, 2016  Content Version: 11.1  © 3110-9185 [employer].  Care instructions adapted under license by [employer] (which disclaims liability or warranty for this information). If you have questions about a medical condition or this instruction, always ask your healthcare professional. [address] any warranty or liability for your use of this information. Low Blood Pressure: Care Instructions  Your Care Instructions  Blood pressure is a measurement of the force of the blood against the walls of the blood vessels during and after each beat of the heart. Low blood pressure (hypotension) means that your blood pressure is much lower than normal. Some people, especially young, slim women, may have slightly low blood pressure without symptoms. However, in many people, low blood pressure can cause symptoms such as dizziness or lightheadedness. When your blood pressure is too low, your heart, brain, and other organs do not get enough blood. Low blood pressure can be caused by many things, including heart problems and some medicines. Uncontrolled diabetes can cause your blood pressure to drop, and so can a severe allergic reaction or infection. Another cause is dehydration, which is when your body loses too much fluid. Treatment for low blood pressure depends on the cause. Follow-up care is a key part of your treatment and safety. Be sure to make and go to all appointments, and call your doctor if you are having problems. It's also a good idea to know your test results and keep a list of the medicines you take. How can you care for yourself at home? · Drink plenty of fluids, enough so that your urine is light yellow or clear like water. If you have kidney, heart, or liver disease and have to limit fluids, talk with your doctor before you increase the amount of fluids you drink. · Be safe with medicines. Call your doctor if you think you are having a problem with your medicine.  You will get more details on the specific medicines your doctor prescribes. · Stand up or get out of bed very slowly to allow your body to adjust.  · Get plenty of rest.  · Do not smoke. Smoking increases your risk of heart attack. If you need help quitting, talk to your doctor about stop-smoking programs and medicines. These can increase your chances of quitting for good. · Limit alcohol to 2 drinks a day for men and 1 drink a day for women. Alcohol may interfere with your medicine. In addition, alcohol can make your low blood pressure worse by causing your body to lose water. When should you call for help? Call 911 anytime you think you may need emergency care. For example, call if:  · You have symptoms of a heart attack. These may include:  ¨ Chest pain or pressure, or a strange feeling in the chest.  ¨ Sweating. ¨ Shortness of breath. ¨ Nausea or vomiting. ¨ Pain, pressure, or a strange feeling in the back, neck, jaw, or upper belly or in one or both shoulders or arms. ¨ Lightheadedness or sudden weakness. ¨ A fast or irregular heartbeat. After you call 911, the  may tell you to chew 1 adult-strength or 2 to 4 low-dose aspirin. Wait for an ambulance. Do not try to drive yourself. · You have symptoms of a stroke. These may include:  ¨ Sudden numbness, tingling, weakness, or loss of movement in your face, arm, or leg, especially on only one side of your body. ¨ Sudden vision changes. ¨ Sudden trouble speaking. ¨ Sudden confusion or trouble understanding simple statements. ¨ Sudden problems with walking or balance. ¨ A sudden, severe headache that is different from past headaches. · You passed out (lost consciousness). Call your doctor now or seek immediate medical care if:  · You are dizzy or lightheaded, or you feel like you may faint. · You have signs of needing more fluids. You have sunken eyes and a dry mouth, and you pass only a little dark urine. · You cannot keep down fluids.   Watch closely for changes in your health, and be sure to contact your doctor if:  · You do not get better as expected. Where can you learn more? Go to http://maliha-sanchez.info/. Enter C304 in the search box to learn more about \"Low Blood Pressure: Care Instructions. \"  Current as of: April 21, 2016  Content Version: 11.1  © 3926-7245 [employer]. Care instructions adapted under license by [employer] (which disclaims liability or warranty for this information). If you have questions about a medical condition or this instruction, always ask your healthcare professional. [place] [address] any warranty or liability for your use of this information. Dehydration: Care Instructions  Your Care Instructions  Dehydration happens when your body loses too much fluid. This might happen when you do not drink enough water or you lose large amounts of fluids from your body because of diarrhea, vomiting, or sweating. Severe dehydration can be life-threatening. Water and minerals called electrolytes help put your body fluids back in balance. Learn the early signs of fluid loss, and drink more fluids to prevent dehydration. Follow-up care is a key part of your treatment and safety. Be sure to make and go to all appointments, and call your doctor if you are having problems. It's also a good idea to know your test results and keep a list of the medicines you take. How can you care for yourself at home? · To prevent dehydration, drink plenty of fluids, enough so that your urine is light yellow or clear like water. Choose water and other caffeine-free clear liquids until you feel better. If you have kidney, heart, or liver disease and have to limit fluids, talk with your doctor before you increase the amount of fluids you drink. · If you do not feel like eating or drinking, try taking small sips of water, sports drinks, or other rehydration drinks.   · Get plenty of rest.  To prevent dehydration  · Add more fluids to your diet and daily routine, unless your doctor has told you not to. · During hot weather, drink more fluids. Drink even more fluids if you exercise a lot. Stay away from drinks with alcohol or caffeine. · Watch for the symptoms of dehydration. These include:  ¨ A dry, sticky mouth. ¨ Dark yellow urine, and not much of it. ¨ Dry and sunken eyes. ¨ Feeling very tired. · Learn what problems can lead to dehydration. These include:  ¨ Diarrhea, fever, and vomiting. ¨ Any illness with a fever, such as pneumonia or the flu. ¨ Activities that cause heavy sweating, such as endurance races and heavy outdoor work in hot or humid weather. ¨ Alcohol or drug abuse or withdrawal.  ¨ Certain medicines, such as cold and allergy pills (antihistamines), diet pills (diuretics), and laxatives. ¨ Certain diseases, such as diabetes, cancer, and heart or kidney disease. When should you call for help? Call 911 anytime you think you may need emergency care. For example, call if:  · You passed out (lost consciousness). Call your doctor now or seek immediate medical care if:  · You are confused and cannot think clearly. · You are dizzy or lightheaded, or you feel like you may faint. · You have signs of needing more fluids. You have sunken eyes and a dry mouth, and you pass only a little dark urine. · You cannot keep fluids down. Watch closely for changes in your health, and be sure to contact your doctor if:  · You are not making tears. · Your skin is very dry and sags slowly back into place after you pinch it. · Your mouth and eyes are very dry. Where can you learn more? Go to http://maliha-sanchez.info/. Enter S038 in the search box to learn more about \"Dehydration: Care Instructions. \"  Current as of: May 27, 2016  Content Version: 11.1  © 2339-2980 [employer]. Care instructions adapted under license by "[employer] (which disclaims liability or warranty for this information).  If you have questions about a medical condition or this instruction, always ask your healthcare professional. [address] any warranty or liability for your use of this information. Painful Urination (Dysuria): Care Instructions  Your Care Instructions  Burning pain with urination (dysuria) is a common symptom of a urinary tract infection or other urinary problems. The bladder may become inflamed. This can cause pain when the bladder fills and empties. You may also feel pain if the tube that carries urine from the bladder to the outside of the body (urethra) gets irritated or infected. Sexually transmitted infections (STIs) also may cause pain when you urinate. Sometimes the pain can be caused by things other than an infection. The urethra can be irritated by soaps, perfumes, or foreign objects in the urethra. Kidney stones can cause pain when they pass through the urethra. The cause may be hard to find. You may need tests. Treatment for painful urination depends on the cause. Follow-up care is a key part of your treatment and safety. Be sure to make and go to all appointments, and call your doctor if you are having problems. It's also a good idea to know your test results and keep a list of the medicines you take. How can you care for yourself at home? · Drink extra water and juices such as cranberry and blueberry juices for the next day or two. This will help make the urine less concentrated. And it may help wash out any bacteria that may be causing an infection. (If you have kidney, heart, or liver disease and have to limit fluids, talk with your doctor before you increase the amount of fluids you drink.)  · Avoid drinks that are carbonated or have caffeine. They can irritate the bladder. · Urinate often. Try to empty your bladder each time. For women:  · Urinate right after you have sex. · After going to the bathroom, wipe from front to back.   · Avoid douches, bubble baths, and feminine hygiene sprays. And avoid other feminine hygiene products that have deodorants. When should you call for help? Call your doctor now or seek immediate medical care if:  · You have new symptoms, such as fever, nausea, or vomiting. · You have new or worse symptoms of a urinary problem. For example:  ¨ You have blood or pus in your urine. ¨ You have chills or body aches. ¨ It hurts worse to urinate. ¨ You have groin or belly pain. ¨ You have pain in your back just below your rib cage (the flank area). Watch closely for changes in your health, and be sure to contact your doctor if you have any problems. Where can you learn more? Go to http://maliha-sanchez.info/. Enter Q019 in the search box to learn more about \"Painful Urination (Dysuria): Care Instructions. \"  Current as of: August 12, 2016  Content Version: 11.1  © 0067-1657 [employer]. Care instructions adapted under license by [employer] (which disclaims liability or warranty for this information). If you have questions about a medical condition or this instruction, always ask your healthcare professional. [address] any warranty or liability for your use of this information.

## 2017-02-20 NOTE — TELEPHONE ENCOUNTER
Diet:Question of any nausea and/or vomiting. Protein intake (goal is 60 grams of protein daily)   Poor x____Fair____Good____Great____    Comment:____________she said she has been feeling very nausea so she has not been able to eat a lot __________________________________________________      ______________________________________________________________________    Hydration:Less than 32 ounces of water daily is fair to poor (Goal is 64 ounces per day)  Poor____ Fair____ Good_x___Great____    Comment:______________________________________________________________    ______________________________________________________________________      Ambulation:( walking at least 3 x week, for 15- 20 minutes)     Poor______ Fair______ Good______     Great___x___ Comment:__________________________________________________    ______________________________________________________________________      Urine Color: Question of any odor and color(should be roger, pale, and clear) Dark______ Amber_x_____ Pale______      Clear______ Comment:___________________________________________________                           ________________________________________________________________    Bowel movements: Question of any constipation- haven't had any bowel movements for more than 3 days. This could be related to protein intake and/or narcotic pain medication usage. Comment:     has not had a bowel movement in 7 days, not getting enough protein intake. Blood pressure has been low. she is going to try miralax, milk of magnesia and also she already had a suppository. Pain: Left sided abdominal pain is normal (should be less than 3)  Question if pain medication is helpful.  10___ 9___ 8___ 7___ 6___ 5__x_ 4___ 3___     2___1___0___Comment:_____left side abdominal pain she feels is from her being constipated (bloated)____________________________________________    ______________________________________________________________________      Incision: (No redness, pain, swelling or fever) Healing Well______     Healed___x___Redness_________ Pain_________     Swelling_________ Fever__________(greater than 101 needs evaluation)    Comment:____________________________________________________________    ______________________________________________________________________  Use of incentive spirometer: Yes____       No    x       Next Appointment:___3/8/17___________                 Support Group: Yes__x____No______    Additional Comments:____________________________________________________________    ____________________________________________________________________      If more than one parameter is not met or considered poor, nurse needs to discuss with provider recommend for patient to be seen in the office as soon as possible or refer to the provider for follow-up. Reinforce to patient to use bariatric educational booklet as guide. It is appropriate to refer patient to the nutritionist to discuss more in detail of diet and nutrition.

## 2017-02-20 NOTE — TELEPHONE ENCOUNTER
Patient called to say had recent weight loss surgery of   January 25. Said she had spoken with surgeon this morning and was advised to call this office regarding the blood pressure readings. Said blood pressure was low the whole weekend. 84/64, now  Last night, 74/59    Feeling lightheaded. Appointment is ok per nurse advice and appointment was made for today; patient accepted.

## 2017-02-21 LAB
BASOPHILS # BLD AUTO: 0.1 X10E3/UL (ref 0–0.2)
BASOPHILS NFR BLD AUTO: 1 %
BUN SERPL-MCNC: 11 MG/DL (ref 6–24)
BUN/CREAT SERPL: 14 (ref 9–23)
CALCIUM SERPL-MCNC: 9.6 MG/DL (ref 8.7–10.2)
CHLORIDE SERPL-SCNC: 93 MMOL/L (ref 96–106)
CO2 SERPL-SCNC: 32 MMOL/L (ref 18–29)
CREAT SERPL-MCNC: 0.77 MG/DL (ref 0.57–1)
EOSINOPHIL # BLD AUTO: 0.8 X10E3/UL (ref 0–0.4)
EOSINOPHIL NFR BLD AUTO: 9 %
ERYTHROCYTE [DISTWIDTH] IN BLOOD BY AUTOMATED COUNT: 15 % (ref 12.3–15.4)
EST. AVERAGE GLUCOSE BLD GHB EST-MCNC: 128 MG/DL
GLUCOSE SERPL-MCNC: 103 MG/DL (ref 65–99)
HBA1C MFR BLD: 6.1 % (ref 4.8–5.6)
HCT VFR BLD AUTO: 37.2 % (ref 34–46.6)
HGB BLD-MCNC: 12.1 G/DL (ref 11.1–15.9)
IMM GRANULOCYTES # BLD: 0 X10E3/UL (ref 0–0.1)
IMM GRANULOCYTES NFR BLD: 0 %
LYMPHOCYTES # BLD AUTO: 2.2 X10E3/UL (ref 0.7–3.1)
LYMPHOCYTES NFR BLD AUTO: 25 %
MCH RBC QN AUTO: 29.2 PG (ref 26.6–33)
MCHC RBC AUTO-ENTMCNC: 32.5 G/DL (ref 31.5–35.7)
MCV RBC AUTO: 90 FL (ref 79–97)
MONOCYTES # BLD AUTO: 1 X10E3/UL (ref 0.1–0.9)
MONOCYTES NFR BLD AUTO: 11 %
NEUTROPHILS # BLD AUTO: 4.9 X10E3/UL (ref 1.4–7)
NEUTROPHILS NFR BLD AUTO: 54 %
PLATELET # BLD AUTO: 316 X10E3/UL (ref 150–379)
POTASSIUM SERPL-SCNC: 3.8 MMOL/L (ref 3.5–5.2)
RBC # BLD AUTO: 4.14 X10E6/UL (ref 3.77–5.28)
SODIUM SERPL-SCNC: 139 MMOL/L (ref 134–144)
WBC # BLD AUTO: 9 X10E3/UL (ref 3.4–10.8)

## 2017-02-21 NOTE — PROGRESS NOTES
BMP - milfly low chloride and elevated bicarb, no evidence of JASIEL or other electrolyte derangements  CBC - wnl  A1c 6.1%, improved from previous 6.9% - in the pre-diabetic range  Letter sent to inform pt of results

## 2017-02-23 ENCOUNTER — OFFICE VISIT (OUTPATIENT)
Dept: FAMILY MEDICINE CLINIC | Age: 57
End: 2017-02-23

## 2017-02-23 VITALS
OXYGEN SATURATION: 97 % | WEIGHT: 193 LBS | DIASTOLIC BLOOD PRESSURE: 74 MMHG | TEMPERATURE: 97.3 F | HEART RATE: 72 BPM | SYSTOLIC BLOOD PRESSURE: 119 MMHG | HEIGHT: 65 IN | RESPIRATION RATE: 16 BRPM | BODY MASS INDEX: 32.15 KG/M2

## 2017-02-23 DIAGNOSIS — I10 ESSENTIAL HYPERTENSION: Primary | ICD-10-CM

## 2017-02-23 DIAGNOSIS — R63.4 WEIGHT LOSS: ICD-10-CM

## 2017-02-23 DIAGNOSIS — J45.40 MODERATE PERSISTENT ASTHMA WITHOUT COMPLICATION: ICD-10-CM

## 2017-02-23 RX ORDER — ALBUTEROL SULFATE 90 UG/1
1 AEROSOL, METERED RESPIRATORY (INHALATION)
Qty: 1 INHALER | Refills: 5 | Status: SHIPPED | OUTPATIENT
Start: 2017-02-23 | End: 2019-10-20 | Stop reason: SDUPTHER

## 2017-02-23 NOTE — PROGRESS NOTES
Hue Engel is a 64 y.o. female    Issues discussed today include:    1)  Low blood pressure follow up: Was here 3 days ago for low BP after bariatric surgery and 35 lb wt loss. Has lost 2 more lbs since then. Has next appt with surgeons on 3/8/17. Says she fells much better for the last 2 days. Has been drinking lots of water. Didn't end up needing to go for IVF. Leisa Iba out for a walk yesterday for first time since hospital discharge. She brings her BP/HR log - BP ranging in 110-130/60-70s, HR in the 70s. She denies dizziness, nausea, abdominal pain, CP, dyspnea. Asked pt about tenex, she recalls being put on that for HTN. Denies h/o ADHD. Data reviewed or ordered today:       Other problems include:  Patient Active Problem List   Diagnosis Code    Morbid obesity ([facility] E66.01    Obstructive sleep apnea G47.33    Asthma J45.909    Arthritis M19.90    GERD (gastroesophageal reflux disease) K21.9    Edema R60.9    GARCIA (nonalcoholic steatohepatitis) K75.81    Chronic pain G89.29    Essential hypertension J90    Metabolic syndrome U27.72    FH: diabetes mellitus Z83.3    DM (diabetes mellitus) ([facility] E11.9    CAD (coronary artery disease) I25.10    Anxiety F41.9    History of pulmonary embolus (PE) Z86.711    Hypokalemia E87.6       Medications:  Current Outpatient Prescriptions   Medication Sig Dispense Refill    albuterol (PROAIR HFA) 90 mcg/actuation inhaler Take 1 Puff by inhalation every four (4) hours as needed for Wheezing or Shortness of Breath. 1 Inhaler 5    metoprolol tartrate (LOPRESSOR) 25 mg tablet Take 0.5 Tabs by mouth two (2) times a day. 60 Tab 1    gabapentin (NEURONTIN) 600 mg tablet Take 1 Tab by mouth three (3) times daily. 270 Tab 0    ondansetron (ZOFRAN ODT) 4 mg disintegrating tablet Take 1 Tab by mouth every six (6) hours as needed for Nausea.  30 Tab 0    FLUoxetine (PROZAC) 20 mg capsule TAKE ONE CAPSULE BY MOUTH EVERY DAY 90 Cap 1    tiZANidine (ZANAFLEX) 4 mg tablet TAKE 1 TABLET BY MOUTH AT BEDTIME 30 Tab 3    ALPRAZolam (XANAX) 0.5 mg tablet TAKE 1 TABLET BY MOUTH 3 TIMES A DAY AS NEEDED FOR ANXIETY. MAX DAILY AMT 1.5MG 180 Tab 0    budesonide (PULMICORT) 180 mcg/actuation aepb inhaler Take 1 Puff by inhalation two (2) times a day. Indications: MAINTENANCE THERAPY FOR ASTHMA 1 Inhaler 3    rosuvastatin (CRESTOR) 40 mg tablet TAKE 1 TABLET BY MOUTH NIGHTLY 90 Tab 3    Blood-Glucose Meter monitoring kit Use to check glucose once a day 1 Kit 0    alcohol swabs (ALCOHOL PADS) padm Use when checking blood glucose 100 Pad 5    Lancets misc Use once a day. Please give one compatible with patient's meter 1 Package 5    glucose blood VI test strips (BLOOD GLUCOSE TEST) strip Use once a day 100 Strip 5    valACYclovir (VALTREX) 500 mg tablet Take 1 Tab by mouth two (2) times a day. (Patient taking differently: Take 500 mg by mouth two (2) times daily as needed.) 60 Tab 3    dicyclomine (BENTYL) 10 mg capsule TAKE 1 (ONE) CAPSULE ORALLY AS NEEDED EVERY 6 HOURS 120 capsule 0    cetirizine (ZYRTEC) 10 mg tablet Take  by mouth daily.  omeprazole (PRILOSEC) 20 mg capsule TAKE ONE CAPSULE BY MOUTH EVERY DAY 90 Cap 1    albuterol (ACCUNEB) 0.63 mg/3 mL nebulizer solution 3 mL by Nebulization route every four (4) hours as needed for Wheezing or Shortness of Breath. 30 Vial 5    nitroglycerin (NITROSTAT) 0.4 mg SL tablet 1 Tab by SubLINGual route every five (5) minutes as needed for Chest Pain. 1 Bottle 4    Aspirin, Buffered 81 mg tab Take  by mouth daily. Allergies: Allergies   Allergen Reactions    Accupril [Quinapril] Cough    Lipitor [Atorvastatin] Other (comments)     aches    Norvasc [Amlodipine] Swelling     On legs at 10 mg dose       LMP:  Patient's last menstrual period was 01/01/1985.     Social History     Social History    Marital status:      Spouse name: N/A    Number of children: N/A    Years of education: N/A     Occupational History    Not on file. Social History Main Topics    Smoking status: Never Smoker    Smokeless tobacco: Never Used    Alcohol use No      Comment: rarely    Drug use: No    Sexual activity: Yes     Partners: Male     Birth control/ protection: None     Other Topics Concern    Not on file     Social History Narrative       Family History   Problem Relation Age of Onset    Cancer Mother 67     colon    Diabetes Mother     Arthritis-osteo Mother     Stroke Mother     Migraines Mother     Diabetes Father     Heart Disease Father     Heart Attack Father     Hypertension Father     High Cholesterol Father     COPD Father     Heart Failure Father     Cancer Other     Diabetes Other     Heart Disease Other     Hypertension Other     Cancer Brother      lymphoma    Cancer Brother      PROSTATE AND LYMPHOMA    Cancer Maternal Grandmother      stomach    Asthma Brother     Asthma Brother     Stroke Brother     Cancer Maternal Aunt      lung     Cancer Maternal Uncle      lung    Cancer Maternal Uncle      melanoma    Anesth Problems Neg Hx        ROS:   Chest Pain:  No  SOB:  No      Physical Exam  Visit Vitals    /74 (BP 1 Location: Left arm, BP Patient Position: Sitting)    Pulse 72    Temp 97.3 °F (36.3 °C) (Oral)    Resp 16    Ht 5' 5\" (1.651 m)    Wt 193 lb (87.5 kg)    LMP 01/01/1985    SpO2 97%    BMI 32.12 kg/m2     BP Readings from Last 3 Encounters:   02/23/17 119/74   02/20/17 94/63   02/17/17 127/81     Constitutional: Appears well,  No acute distress, Vitals noted  Psychiatric:  Affect normal, Alert and Oriented to person/place/time  Eyes:  Conjunctiva clear, no drainage  ENT:  External ears and nose normal, Teeth and gums appear healthy, Mucous membranes moist  Neck:  General inspection normal. Supple. Lungs:  Clear to auscultation, good respiratory effort, no wheezes, rales or rhonchi  Heart:  Normal HR, Normal S1 and S2,  Regular rhythm.   No murmurs, rubs or gallops. Abdomen: Soft, nondistended, nontender  Extremities:  Without edema, good peripheral pulses  Skin:  Warm to palpation, without rashes      Assessment/Plan:      ICD-10-CM ICD-9-CM    1. Essential hypertension I10 401.9    2. Weight loss R63.4 783.21    3. Moderate persistent asthma without complication I65.73 441.23 albuterol (PROAIR HFA) 90 mcg/actuation inhaler       BP and symptoms much improved from earlier this week. - D/c tenex today  - Continue metoprolol 12.5mg BID  - Keep daily BP/HR log and bring to follow up  - Hopefully we can increase her beta blocker at follow up given h/o CAD    H/o Asthma - not in exacerbation. Requesting refill - done. Follow-up Disposition:  Return in about 1 week (around 3/2/2017)  for BP follow up,  sooner if symptoms worsen or fail to improve. AVS was printed, given to patient and briefly discussed prior to patient's departure from the office today.     Rudy Marrufo MD  [address]  Medicine Residency  [address]  Azael Padgett

## 2017-02-23 NOTE — MR AVS SNAPSHOT
Visit Information Date & Time Provider Department Dept. Phone Encounter #  
 2/23/2017  2:00 PM Rodell Sandifer, [address] [facility] [phone] [id number] Follow-up Instructions Return in about 1 week (around 3/2/2017), or if symptoms worsen or fail to improve, for BP follow up. Your Appointments 3/8/2017  1:40 PM  
POST OP with Baldev Rose, CRIST[facility] [address] Surgery ([facility]) Appt Note: PO 4 wk F/U  
 [address] [facility] 8 [facility] [address] [facility] [address]  
[phone]  
  
   
 [address] 100[address] [facility]  
  
    
 3/22/2017  1:40 PM  
POST OP with Baldev Rose NP [address] Surgery ([facility]) Appt Note: PO 6 wk F/U  
 380 [facility] 8 [facility] 1007 [facility]  
[phone]  
  
    
 6/23/2017  1:40 PM  
ESTABLISHED PATIENT with Chrissie Yanes MD  
[facility] ([facility]) Appt Note: 6 mo fup; 6 mo [address] [place] [address] [facility]  
[address] [place] [address] [address] Upcoming Health Maintenance Date Due  
 BREAST CANCER SCRN MAMMOGRAM 7/14/2017 HEMOGLOBIN A1C Q6M 8/20/2017 FOOT EXAM Q1 8/23/2017 MICROALBUMIN Q1 8/23/2017 LIPID PANEL Q1 8/23/2017 EYE EXAM RETINAL OR DILATED Q1 8/24/2017 PAP AKA CERVICAL CYTOLOGY 8/23/2019 COLONOSCOPY 6/23/2020 DTaP/Tdap/Td series (2 - Td) 8/14/2022 Allergies as of 2/23/2017  Review Complete On: 2/23/2017 By: Jennifer Herrera LPN Severity Noted Reaction Type Reactions Accupril [Quinapril]  07/07/2010    Cough Lipitor [Atorvastatin]  07/07/2010    Other (comments)  
 aches Norvasc [Amlodipine]  07/22/2015    Swelling On legs at 10 mg dose Current Immunizations  Reviewed on 1/26/2017 Name Date Influenza Vaccine 9/1/2015 Pneumococcal Polysaccharide (PPSV-23) 8/4/2015 TD Vaccine 11/7/2005 TDAP Vaccine 8/14/2012 Not reviewed this visit You Were Diagnosed With   
  
 Codes Comments Essential hypertension    -  Primary ICD-10-CM: I10 
ICD-9-CM: 401.9 Weight loss     ICD-10-CM: R63.4 ICD-9-CM: 783.21 Moderate persistent asthma without complication     FXF-61-WC: J45.40 ICD-9-CM: 493.90 Vitals BP  
  
  
  
  
  
 119/74 (BP 1 Location: Left arm, BP Patient Position: Sitting) BMI and BSA Data Body Mass Index Body Surface Area  
 32.12 kg/m 2 2 m 2 Preferred Pharmacy Pharmacy Name Phone [id number] [place], [place] [address] AT [facility] [phone] Your Updated Medication List  
  
   
This list is accurate as of: 2/23/17  2:30 PM.  Always use your most recent med list.  
  
  
  
  
 * albuterol 0.63 mg/3 mL nebulizer solution Commonly known as:  ACCUNEB  
3 mL by Nebulization route every four (4) hours as needed for Wheezing or Shortness of Breath. * albuterol 90 mcg/actuation inhaler Commonly known as:  PROAIR HFA Take 1 Puff by inhalation every four (4) hours as needed for Wheezing or Shortness of Breath. alcohol swabs Padm Commonly known as:  ALCOHOL PADS Use when checking blood glucose ALPRAZolam 0.5 mg tablet Commonly known as:  XANAX  
TAKE 1 TABLET BY MOUTH 3 TIMES A DAY AS NEEDED FOR ANXIETY. MAX DAILY AMT 1.5MG aspirin, buffered 81 mg Tab Take  by mouth daily. Blood-Glucose Meter monitoring kit Use to check glucose once a day  
  
 budesonide 180 mcg/actuation Aepb inhaler Commonly known as:  PULMICORT Take 1 Puff by inhalation two (2) times a day. Indications: MAINTENANCE THERAPY FOR ASTHMA  
  
 dicyclomine 10 mg capsule Commonly known as:  BENTYL TAKE 1 (ONE) CAPSULE ORALLY AS NEEDED EVERY 6 HOURS FLUoxetine 20 mg capsule Commonly known as:  PROzac  
 TAKE ONE CAPSULE BY MOUTH EVERY DAY  
  
 gabapentin 600 mg tablet Commonly known as:  NEURONTIN Take 1 Tab by mouth three (3) times daily. glucose blood VI test strips strip Commonly known as:  blood glucose test  
Use once a day Lancets Misc Use once a day. Please give one compatible with patient's meter  
  
 metoprolol tartrate 25 mg tablet Commonly known as:  LOPRESSOR Take 0.5 Tabs by mouth two (2) times a day. nitroglycerin 0.4 mg SL tablet Commonly known as:  NITROSTAT  
1 Tab by SubLINGual route every five (5) minutes as needed for Chest Pain. omeprazole 20 mg capsule Commonly known as:  PRILOSEC  
TAKE ONE CAPSULE BY MOUTH EVERY DAY  
  
 ondansetron 4 mg disintegrating tablet Commonly known as:  ZOFRAN ODT Take 1 Tab by mouth every six (6) hours as needed for Nausea. rosuvastatin 40 mg tablet Commonly known as:  CRESTOR  
TAKE 1 TABLET BY MOUTH NIGHTLY  
  
 tiZANidine 4 mg tablet Commonly known as:  Mariano Nguyễn TAKE 1 TABLET BY MOUTH AT BEDTIME  
  
 valACYclovir 500 mg tablet Commonly known as:  VALTREX Take 1 Tab by mouth two (2) times a day. ZyrTEC 10 mg tablet Generic drug:  cetirizine Take  by mouth daily. * Notice: This list has 2 medication(s) that are the same as other medications prescribed for you. Read the directions carefully, and ask your doctor or other care provider to review them with you. Prescriptions Sent to Pharmacy Refills  
 albuterol (PROAIR HFA) 90 mcg/actuation inhaler 5 Sig: Take 1 Puff by inhalation every four (4) hours as needed for Wheezing or Shortness of Breath. Class: Normal  
 Pharmacy: [address] [place] [address] #: [phone] Route: Inhalation Follow-up Instructions Return in about 1 week (around 3/2/2017), or if symptoms worsen or fail to improve, for BP follow up. Introducing \[facility] [place] [facility]\"" & HEALTH SERVICES! Dear Melina Espinosa: 
Thank you for requesting a [employer] account. Our records indicate that you already have an active [employer] account. You can access your account anytime at [email or web address] Did you know that you can access your hospital and ER discharge instructions at any time in [employer]? You can also review all of your test results from your hospital stay or ER visit. Additional Information If you have questions, please visit the Frequently Asked Questions section of the [employer] website at [email or web address] Remember, [employer] is NOT to be used for urgent needs. For medical emergencies, dial 911. Now available from your iPhone and Android! Please provide this summary of care documentation to your next provider. Your primary care clinician is listed as Yasmany Colón. If you have any questions after today's visit, please call [phone].

## 2017-02-23 NOTE — PROGRESS NOTES
Had bariatric surgery 3 weeks ago  Has lost 37 pounds since surgery  Adjusted her antihypertensives    On tenex also -- brought her BP log    To return with blood pressure log    I reviewed with the resident the medical history and the resident's findings on the physical examination. I discussed with the resident the patient's diagnosis and concur with the plan.

## 2017-03-02 ENCOUNTER — OFFICE VISIT (OUTPATIENT)
Dept: FAMILY MEDICINE CLINIC | Age: 57
End: 2017-03-02

## 2017-03-02 VITALS
HEIGHT: 65 IN | OXYGEN SATURATION: 96 % | HEART RATE: 77 BPM | WEIGHT: 193 LBS | BODY MASS INDEX: 32.15 KG/M2 | RESPIRATION RATE: 16 BRPM | SYSTOLIC BLOOD PRESSURE: 121 MMHG | TEMPERATURE: 97.5 F | DIASTOLIC BLOOD PRESSURE: 77 MMHG

## 2017-03-02 DIAGNOSIS — I10 ESSENTIAL HYPERTENSION: Primary | ICD-10-CM

## 2017-03-02 DIAGNOSIS — I25.10 CORONARY ARTERY DISEASE INVOLVING NATIVE CORONARY ARTERY OF NATIVE HEART WITHOUT ANGINA PECTORIS: ICD-10-CM

## 2017-03-02 DIAGNOSIS — Z98.84 S/P GASTRIC BYPASS: ICD-10-CM

## 2017-03-02 DIAGNOSIS — R10.11 RUQ ABDOMINAL PAIN: ICD-10-CM

## 2017-03-02 RX ORDER — METOPROLOL TARTRATE 25 MG/1
25 TABLET, FILM COATED ORAL 2 TIMES DAILY
Qty: 60 TAB | Refills: 0
Start: 2017-03-02 | End: 2017-03-20 | Stop reason: SDUPTHER

## 2017-03-02 NOTE — PATIENT INSTRUCTIONS
Abdominal Pain: Care Instructions  Your Care Instructions    Abdominal pain has many possible causes. Some aren't serious and get better on their own in a few days. Others need more testing and treatment. If your pain continues or gets worse, you need to be rechecked and may need more tests to find out what is wrong. You may need surgery to correct the problem. Don't ignore new symptoms, such as fever, nausea and vomiting, urination problems, pain that gets worse, and dizziness. These may be signs of a more serious problem. Your doctor may have recommended a follow-up visit in the next 8 to 12 hours. If you are not getting better, you may need more tests or treatment. The doctor has checked you carefully, but problems can develop later. If you notice any problems or new symptoms, get medical treatment right away. Follow-up care is a key part of your treatment and safety. Be sure to make and go to all appointments, and call your doctor if you are having problems. It's also a good idea to know your test results and keep a list of the medicines you take. How can you care for yourself at home? · Rest until you feel better. · To prevent dehydration, drink plenty of fluids, enough so that your urine is light yellow or clear like water. Choose water and other caffeine-free clear liquids until you feel better. If you have kidney, heart, or liver disease and have to limit fluids, talk with your doctor before you increase the amount of fluids you drink. · If your stomach is upset, eat mild foods, such as rice, dry toast or crackers, bananas, and applesauce. Try eating several small meals instead of two or three large ones. · Wait until 48 hours after all symptoms have gone away before you have spicy foods, alcohol, and drinks that contain caffeine. · Do not eat foods that are high in fat. · Avoid anti-inflammatory medicines such as aspirin, ibuprofen (Advil, Motrin), and naproxen (Aleve).  These can cause stomach upset. Talk to your doctor if you take daily aspirin for another health problem. When should you call for help? Call 911 anytime you think you may need emergency care. For example, call if:  · You passed out (lost consciousness). · You pass maroon or very bloody stools. · You vomit blood or what looks like coffee grounds. · You have new, severe belly pain. Call your doctor now or seek immediate medical care if:  · Your pain gets worse, especially if it becomes focused in one area of your belly. · You have a new or higher fever. · Your stools are black and look like tar, or they have streaks of blood. · You have unexpected vaginal bleeding. · You have symptoms of a urinary tract infection. These may include:  ¨ Pain when you urinate. ¨ Urinating more often than usual.  ¨ Blood in your urine. · You are dizzy or lightheaded, or you feel like you may faint. Watch closely for changes in your health, and be sure to contact your doctor if:  · You are not getting better after 1 day (24 hours). Where can you learn more? Go to http://maliha-sanchez.info/. Enter E968 in the search box to learn more about \"Abdominal Pain: Care Instructions. \"  Current as of: May 27, 2016  Content Version: 11.1  © 7324-1355 [employer]. Care instructions adapted under license by [employer] (which disclaims liability or warranty for this information). If you have questions about a medical condition or this instruction, always ask your healthcare professional. [place] [address] any warranty or liability for your use of this information. Low Blood Pressure: Care Instructions  Your Care Instructions  Blood pressure is a measurement of the force of the blood against the walls of the blood vessels during and after each beat of the heart.  Low blood pressure (hypotension) means that your blood pressure is much lower than normal. Some people, especially young, slim women, may have slightly low blood pressure without symptoms. However, in many people, low blood pressure can cause symptoms such as dizziness or lightheadedness. When your blood pressure is too low, your heart, brain, and other organs do not get enough blood. Low blood pressure can be caused by many things, including heart problems and some medicines. Uncontrolled diabetes can cause your blood pressure to drop, and so can a severe allergic reaction or infection. Another cause is dehydration, which is when your body loses too much fluid. Treatment for low blood pressure depends on the cause. Follow-up care is a key part of your treatment and safety. Be sure to make and go to all appointments, and call your doctor if you are having problems. It's also a good idea to know your test results and keep a list of the medicines you take. How can you care for yourself at home? · Drink plenty of fluids, enough so that your urine is light yellow or clear like water. If you have kidney, heart, or liver disease and have to limit fluids, talk with your doctor before you increase the amount of fluids you drink. · Be safe with medicines. Call your doctor if you think you are having a problem with your medicine. You will get more details on the specific medicines your doctor prescribes. · Stand up or get out of bed very slowly to allow your body to adjust.  · Get plenty of rest.  · Do not smoke. Smoking increases your risk of heart attack. If you need help quitting, talk to your doctor about stop-smoking programs and medicines. These can increase your chances of quitting for good. · Limit alcohol to 2 drinks a day for men and 1 drink a day for women. Alcohol may interfere with your medicine. In addition, alcohol can make your low blood pressure worse by causing your body to lose water. When should you call for help? Call 911 anytime you think you may need emergency care.  For example, call if:  · You have symptoms of a heart attack. These may include:  ¨ Chest pain or pressure, or a strange feeling in the chest.  ¨ Sweating. ¨ Shortness of breath. ¨ Nausea or vomiting. ¨ Pain, pressure, or a strange feeling in the back, neck, jaw, or upper belly or in one or both shoulders or arms. ¨ Lightheadedness or sudden weakness. ¨ A fast or irregular heartbeat. After you call 911, the  may tell you to chew 1 adult-strength or 2 to 4 low-dose aspirin. Wait for an ambulance. Do not try to drive yourself. · You have symptoms of a stroke. These may include:  ¨ Sudden numbness, tingling, weakness, or loss of movement in your face, arm, or leg, especially on only one side of your body. ¨ Sudden vision changes. ¨ Sudden trouble speaking. ¨ Sudden confusion or trouble understanding simple statements. ¨ Sudden problems with walking or balance. ¨ A sudden, severe headache that is different from past headaches. · You passed out (lost consciousness). Call your doctor now or seek immediate medical care if:  · You are dizzy or lightheaded, or you feel like you may faint. · You have signs of needing more fluids. You have sunken eyes and a dry mouth, and you pass only a little dark urine. · You cannot keep down fluids. Watch closely for changes in your health, and be sure to contact your doctor if:  · You do not get better as expected. Where can you learn more? Go to http://maliha-sanchez.info/. Enter C304 in the search box to learn more about \"Low Blood Pressure: Care Instructions. \"  Current as of: April 21, 2016  Content Version: 11.1  © 3122-5842 [employer]. Care instructions adapted under license by [employer] (which disclaims liability or warranty for this information). If you have questions about a medical condition or this instruction, always ask your healthcare professional. [address] any warranty or liability for your use of this information.

## 2017-03-02 NOTE — PROGRESS NOTES
Here for BP check   Lost 37 pounds after gastric bypass surgery    Adjusting antihypertensives    Sees cardiology in June    I reviewed with the resident the medical history and the resident's findings on the physical examination. I discussed with the resident the patient's diagnosis and concur with the plan.

## 2017-03-02 NOTE — MR AVS SNAPSHOT
Visit Information Date & Time Provider Department Dept. Phone Encounter #  
 3/2/2017  3:55 PM Maria Fernanda Meza [phone] [id number] Your Appointments 3/8/2017  1:40 PM  
POST OP with Jaki Dominguez NP [id number] Surgery ([facility]) Appt Note: PO 4 wk F/U  
 [address] [facility] [address] [facility] [address]  
[phone]  
  
   
 [address] [facility] [address]  
  
    
 3/22/2017  1:40 PM  
POST OP with Jaki Dominguez NP [id number] Surgery ([facility]) Appt Note: PO 6 wk F/U  
 [address] 8 [facility] [address]  
[phone]  
  
    
 6/23/2017  1:40 PM  
ESTABLISHED PATIENT with Telly Maloney MD  
[facility] ([facility]) Appt Note: 6 mo fup; 6 mo [address] [facility] 600 [address] [facility]  
54 [facility] [place] [facility] [address] [address] Upcoming Health Maintenance Date Due  
 BREAST CANCER SCRN MAMMOGRAM 7/14/2017 HEMOGLOBIN A1C Q6M 8/20/2017 FOOT EXAM Q1 8/23/2017 MICROALBUMIN Q1 8/23/2017 LIPID PANEL Q1 8/23/2017 EYE EXAM RETINAL OR DILATED Q1 8/24/2017 PAP AKA CERVICAL CYTOLOGY 8/23/2019 COLONOSCOPY 6/23/2020 DTaP/Tdap/Td series (2 - Td) 8/14/2022 Allergies as of 3/2/2017  Review Complete On: 3/2/2017 By: Noel Anna LPN Severity Noted Reaction Type Reactions Accupril [Quinapril]  07/07/2010    Cough Lipitor [Atorvastatin]  07/07/2010    Other (comments)  
 aches Norvasc [Amlodipine]  07/22/2015    Swelling On legs at 10 mg dose Current Immunizations  Reviewed on 1/26/2017 Name Date Influenza Vaccine 9/1/2015 Pneumococcal Polysaccharide (PPSV-23) 8/4/2015 TD Vaccine 11/7/2005 TDAP Vaccine 8/14/2012 Not reviewed this visit You Were Diagnosed With   
  
 Codes Comments Essential hypertension    -  Primary ICD-10-CM: I10 
ICD-9-CM: 401.9 RUQ abdominal pain     ICD-10-CM: R10.11 ICD-9-CM: 789.01 Coronary artery disease involving native coronary artery of native heart without angina pectoris     ICD-10-CM: I25.10 ICD-9-CM: 414.01 S/P gastric bypass     ICD-10-CM: H70.79 ICD-9-CM: V45.86 Vitals BP  
  
  
  
  
  
 121/77 Vitals History BMI and BSA Data Body Mass Index Body Surface Area  
 32.12 kg/m 2 2 m 2 Preferred Pharmacy Pharmacy Name Phone [id number] [facility] [phone] Your Updated Medication List  
  
   
This list is accurate as of: 3/2/17  4:29 PM.  Always use your most recent med list.  
  
  
  
  
 * albuterol 0.63 mg/3 mL nebulizer solution Commonly known as:  ACCUNEB  
3 mL by Nebulization route every four (4) hours as needed for Wheezing or Shortness of Breath. * albuterol 90 mcg/actuation inhaler Commonly known as:  PROAIR HFA Take 1 Puff by inhalation every four (4) hours as needed for Wheezing or Shortness of Breath. alcohol swabs Padm Commonly known as:  ALCOHOL PADS Use when checking blood glucose ALPRAZolam 0.5 mg tablet Commonly known as:  XANAX  
TAKE 1 TABLET BY MOUTH 3 TIMES A DAY AS NEEDED FOR ANXIETY. MAX DAILY AMT 1.5MG aspirin, buffered 81 mg Tab Take  by mouth daily. Blood-Glucose Meter monitoring kit Use to check glucose once a day  
  
 budesonide 180 mcg/actuation Aepb inhaler Commonly known as:  PULMICORT Take 1 Puff by inhalation two (2) times a day. Indications: MAINTENANCE THERAPY FOR ASTHMA  
  
 dicyclomine 10 mg capsule Commonly known as:  BENTYL TAKE 1 (ONE) CAPSULE ORALLY AS NEEDED EVERY 6 HOURS FLUoxetine 20 mg capsule Commonly known as:  PROzac TAKE ONE CAPSULE BY MOUTH EVERY DAY  
  
 gabapentin 600 mg tablet Commonly known as:  NEURONTIN Take 1 Tab by mouth three (3) times daily. glucose blood VI test strips strip Commonly known as:  blood glucose test  
Use once a day Lancets Misc Use once a day. Please give one compatible with patient's meter  
  
 metoprolol tartrate 25 mg tablet Commonly known as:  LOPRESSOR Take 1 Tab by mouth two (2) times a day. nitroglycerin 0.4 mg SL tablet Commonly known as:  NITROSTAT  
1 Tab by SubLINGual route every five (5) minutes as needed for Chest Pain. omeprazole 20 mg capsule Commonly known as:  PRILOSEC  
TAKE ONE CAPSULE BY MOUTH EVERY DAY  
  
 ondansetron 4 mg disintegrating tablet Commonly known as:  ZOFRAN ODT Take 1 Tab by mouth every six (6) hours as needed for Nausea. rosuvastatin 40 mg tablet Commonly known as:  CRESTOR  
TAKE 1 TABLET BY MOUTH NIGHTLY  
  
 tiZANidine 4 mg tablet Commonly known as:  Corazon Manual TAKE 1 TABLET BY MOUTH AT BEDTIME  
  
 valACYclovir 500 mg tablet Commonly known as:  VALTREX Take 1 Tab by mouth two (2) times a day. ZyrTEC 10 mg tablet Generic drug:  cetirizine Take  by mouth daily. * Notice: This list has 2 medication(s) that are the same as other medications prescribed for you. Read the directions carefully, and ask your doctor or other care provider to review them with you. To-Do List   
 03/02/2017 Imaging:  NM HEPATOBILIARY DUCT SCAN Patient Instructions Abdominal Pain: Care Instructions Your Care Instructions Abdominal pain has many possible causes. Some aren't serious and get better on their own in a few days. Others need more testing and treatment. If your pain continues or gets worse, you need to be rechecked and may need more tests to find out what is wrong. You may need surgery to correct the problem.  
Don't ignore new symptoms, such as fever, nausea and vomiting, urination problems, pain that gets worse, and dizziness. These may be signs of a more serious problem. Your doctor may have recommended a follow-up visit in the next 8 to 12 hours. If you are not getting better, you may need more tests or treatment. The doctor has checked you carefully, but problems can develop later. If you notice any problems or new symptoms, get medical treatment right away. Follow-up care is a key part of your treatment and safety. Be sure to make and go to all appointments, and call your doctor if you are having problems. It's also a good idea to know your test results and keep a list of the medicines you take. How can you care for yourself at home? · Rest until you feel better. · To prevent dehydration, drink plenty of fluids, enough so that your urine is light yellow or clear like water. Choose water and other caffeine-free clear liquids until you feel better. If you have kidney, heart, or liver disease and have to limit fluids, talk with your doctor before you increase the amount of fluids you drink. · If your stomach is upset, eat mild foods, such as rice, dry toast or crackers, bananas, and applesauce. Try eating several small meals instead of two or three large ones. · Wait until 48 hours after all symptoms have gone away before you have spicy foods, alcohol, and drinks that contain caffeine. · Do not eat foods that are high in fat. · Avoid anti-inflammatory medicines such as aspirin, ibuprofen (Advil, Motrin), and naproxen (Aleve). These can cause stomach upset. Talk to your doctor if you take daily aspirin for another health problem. When should you call for help? Call 911 anytime you think you may need emergency care. For example, call if: 
· You passed out (lost consciousness). · You pass maroon or very bloody stools. · You vomit blood or what looks like coffee grounds. · You have new, severe belly pain. Call your doctor now or seek immediate medical care if: · Your pain gets worse, especially if it becomes focused in one area of your belly. · You have a new or higher fever. · Your stools are black and look like tar, or they have streaks of blood. · You have unexpected vaginal bleeding. · You have symptoms of a urinary tract infection. These may include: 
¨ Pain when you urinate. ¨ Urinating more often than usual. 
¨ Blood in your urine. · You are dizzy or lightheaded, or you feel like you may faint. Watch closely for changes in your health, and be sure to contact your doctor if: 
· You are not getting better after 1 day (24 hours). Where can you learn more? Go to http://maliha-sanchez.info/. Enter D518 in the search box to learn more about \"Abdominal Pain: Care Instructions. \" Current as of: May 27, 2016 Content Version: 11.1 © 3405-6917 MySupportAssistant. Care instructions adapted under license by [employer] (which disclaims liability or warranty for this information). If you have questions about a medical condition or this instruction, always ask your healthcare professional. [place] [address] any warranty or liability for your use of this information. Low Blood Pressure: Care Instructions Your Care Instructions Blood pressure is a measurement of the force of the blood against the walls of the blood vessels during and after each beat of the heart. Low blood pressure (hypotension) means that your blood pressure is much lower than normal. Some people, especially young, slim women, may have slightly low blood pressure without symptoms. However, in many people, low blood pressure can cause symptoms such as dizziness or lightheadedness. When your blood pressure is too low, your heart, brain, and other organs do not get enough blood. Low blood pressure can be caused by many things, including heart problems and some medicines.  Uncontrolled diabetes can cause your blood pressure to drop, and so can a severe allergic reaction or infection. Another cause is dehydration, which is when your body loses too much fluid. Treatment for low blood pressure depends on the cause. Follow-up care is a key part of your treatment and safety. Be sure to make and go to all appointments, and call your doctor if you are having problems. It's also a good idea to know your test results and keep a list of the medicines you take. How can you care for yourself at home? · Drink plenty of fluids, enough so that your urine is light yellow or clear like water. If you have kidney, heart, or liver disease and have to limit fluids, talk with your doctor before you increase the amount of fluids you drink. · Be safe with medicines. Call your doctor if you think you are having a problem with your medicine. You will get more details on the specific medicines your doctor prescribes. · Stand up or get out of bed very slowly to allow your body to adjust. 
· Get plenty of rest. 
· Do not smoke. Smoking increases your risk of heart attack. If you need help quitting, talk to your doctor about stop-smoking programs and medicines. These can increase your chances of quitting for good. · Limit alcohol to 2 drinks a day for men and 1 drink a day for women. Alcohol may interfere with your medicine. In addition, alcohol can make your low blood pressure worse by causing your body to lose water. When should you call for help? Call 911 anytime you think you may need emergency care. For example, call if: 
· You have symptoms of a heart attack. These may include: ¨ Chest pain or pressure, or a strange feeling in the chest. 
¨ Sweating. ¨ Shortness of breath. ¨ Nausea or vomiting. ¨ Pain, pressure, or a strange feeling in the back, neck, jaw, or upper belly or in one or both shoulders or arms. ¨ Lightheadedness or sudden weakness. ¨ A fast or irregular heartbeat. After you call 911, the  may tell you to chew 1 adult-strength or 2 to 4 low-dose aspirin. Wait for an ambulance. Do not try to drive yourself. · You have symptoms of a stroke. These may include: 
¨ Sudden numbness, tingling, weakness, or loss of movement in your face, arm, or leg, especially on only one side of your body. ¨ Sudden vision changes. ¨ Sudden trouble speaking. ¨ Sudden confusion or trouble understanding simple statements. ¨ Sudden problems with walking or balance. ¨ A sudden, severe headache that is different from past headaches. · You passed out (lost consciousness). Call your doctor now or seek immediate medical care if: 
· You are dizzy or lightheaded, or you feel like you may faint. · You have signs of needing more fluids. You have sunken eyes and a dry mouth, and you pass only a little dark urine. · You cannot keep down fluids. Watch closely for changes in your health, and be sure to contact your doctor if: 
· You do not get better as expected. Where can you learn more? Go to http://maliha-sanchez.info/. Enter C304 in the search box to learn more about \"Low Blood Pressure: Care Instructions. \" Current as of: April 21, 2016 Content Version: 11.1 © 0463-9884 [employer] Neuromodulation, Incorporated. Care instructions adapted under license by [employer] (which disclaims liability or warranty for this information). If you have questions about a medical condition or this instruction, always ask your healthcare professional. [place] [address] any warranty or liability for your use of this information. Introducing [facility]! Dear Reeves Dakin: 
Thank you for requesting a PromoteSocial account. Our records indicate that you already have an active PromoteSocial account. You can access your account anytime at [email or web address] Did you know that you can access your hospital and ER discharge instructions at any time in Investor[employer] Circle? You can also review all of your test results from your hospital stay or ER visit. Additional Information If you have questions, please visit the Frequently Asked Questions section of the [employer] website at [email or web address] Remember, [employer]or[employer] Circle is NOT to be used for urgent needs. For medical emergencies, dial 911. Now available from your iPhone and Android! Please provide this summary of care documentation to your next provider. Your primary care clinician is listed as Joy Sanchez. If you have any questions after today's visit, please call [phone].

## 2017-03-02 NOTE — PROGRESS NOTES
Chief Complaint   Patient presents with    Hypertension     followup    Breast pain     on and off, sharp pain most recent yesterday

## 2017-03-02 NOTE — PROGRESS NOTES
Twila Escalante is a 64 y.o. female    Issues discussed today include:    1)  Blood pressure follow up:  Is checking BP twice daily for the last week. Ranging 110-120s/70-80s. Taking metoprolol 25mg bid currently. H/o CAD, has f/u with Dr. Hal Valencia (cardiology) in June 2017. Denies HA, dizziness, palpitations. 2)  RUQ pain:  Initially began before surgery, would be off and on all day without association with meals. Didn't notice it as much after the surgery. Now 5 weeks post-op from gastric bypass surgery. Just this week started having sxs again. Still having some constipation, but has had relief with suppository. Only took 1 tab of her hydromorphone for pain in the last week. Slight nausea and infrequent, took zofran twice in the last week. Data reviewed or ordered today:       Other problems include:  Patient Active Problem List   Diagnosis Code    Morbid obesity ([facility] E66.01    Obstructive sleep apnea G47.33    Asthma J45.909    Arthritis M19.90    GERD (gastroesophageal reflux disease) K21.9    Edema R60.9    GARCIA (nonalcoholic steatohepatitis) K75.81    Chronic pain G89.29    Essential hypertension G47    Metabolic syndrome B29.30    FH: diabetes mellitus Z83.3    DM (diabetes mellitus) ([facility] E11.9    CAD (coronary artery disease) I25.10    Anxiety F41.9    History of pulmonary embolus (PE) Z86.711    Hypokalemia E87.6       Medications:  Current Outpatient Prescriptions   Medication Sig Dispense Refill    metoprolol tartrate (LOPRESSOR) 25 mg tablet Take 1 Tab by mouth two (2) times a day. 60 Tab 0    albuterol (PROAIR HFA) 90 mcg/actuation inhaler Take 1 Puff by inhalation every four (4) hours as needed for Wheezing or Shortness of Breath. 1 Inhaler 5    gabapentin (NEURONTIN) 600 mg tablet Take 1 Tab by mouth three (3) times daily. 270 Tab 0    ondansetron (ZOFRAN ODT) 4 mg disintegrating tablet Take 1 Tab by mouth every six (6) hours as needed for Nausea.  30 Tab 0    FLUoxetine (PROZAC) 20 mg capsule TAKE ONE CAPSULE BY MOUTH EVERY DAY 90 Cap 1    tiZANidine (ZANAFLEX) 4 mg tablet TAKE 1 TABLET BY MOUTH AT BEDTIME 30 Tab 3    ALPRAZolam (XANAX) 0.5 mg tablet TAKE 1 TABLET BY MOUTH 3 TIMES A DAY AS NEEDED FOR ANXIETY. MAX DAILY AMT 1.5MG 180 Tab 0    omeprazole (PRILOSEC) 20 mg capsule TAKE ONE CAPSULE BY MOUTH EVERY DAY 90 Cap 1    budesonide (PULMICORT) 180 mcg/actuation aepb inhaler Take 1 Puff by inhalation two (2) times a day. Indications: MAINTENANCE THERAPY FOR ASTHMA 1 Inhaler 3    albuterol (ACCUNEB) 0.63 mg/3 mL nebulizer solution 3 mL by Nebulization route every four (4) hours as needed for Wheezing or Shortness of Breath. 30 Vial 5    rosuvastatin (CRESTOR) 40 mg tablet TAKE 1 TABLET BY MOUTH NIGHTLY 90 Tab 3    Blood-Glucose Meter monitoring kit Use to check glucose once a day 1 Kit 0    alcohol swabs (ALCOHOL PADS) padm Use when checking blood glucose 100 Pad 5    Lancets misc Use once a day. Please give one compatible with patient's meter 1 Package 5    glucose blood VI test strips (BLOOD GLUCOSE TEST) strip Use once a day 100 Strip 5    valACYclovir (VALTREX) 500 mg tablet Take 1 Tab by mouth two (2) times a day. (Patient taking differently: Take 500 mg by mouth two (2) times daily as needed.) 60 Tab 3    Aspirin, Buffered 81 mg tab Take  by mouth daily.  dicyclomine (BENTYL) 10 mg capsule TAKE 1 (ONE) CAPSULE ORALLY AS NEEDED EVERY 6 HOURS 120 capsule 0    cetirizine (ZYRTEC) 10 mg tablet Take  by mouth daily.  nitroglycerin (NITROSTAT) 0.4 mg SL tablet 1 Tab by SubLINGual route every five (5) minutes as needed for Chest Pain. 1 Bottle 4       Allergies: Allergies   Allergen Reactions    Accupril [Quinapril] Cough    Lipitor [Atorvastatin] Other (comments)     aches    Norvasc [Amlodipine] Swelling     On legs at 10 mg dose       LMP:  Patient's last menstrual period was 01/01/1985.     Social History     Social History    Marital status:  Spouse name: N/A    Number of children: N/A    Years of education: N/A     Occupational History    Not on file. Social History Main Topics    Smoking status: Never Smoker    Smokeless tobacco: Never Used    Alcohol use No      Comment: rarely    Drug use: No    Sexual activity: Yes     Partners: Male     Birth control/ protection: None     Other Topics Concern    Not on file     Social History Narrative       Family History   Problem Relation Age of Onset    Cancer Mother 67     colon    Diabetes Mother     Arthritis-osteo Mother     Stroke Mother     Migraines Mother     Diabetes Father     Heart Disease Father     Heart Attack Father     Hypertension Father     High Cholesterol Father     COPD Father     Heart Failure Father     Cancer Other     Diabetes Other     Heart Disease Other     Hypertension Other     Cancer Brother      lymphoma    Cancer Brother      PROSTATE AND LYMPHOMA    Cancer Maternal Grandmother      stomach    Asthma Brother     Asthma Brother     Stroke Brother     Cancer Maternal Aunt      lung     Cancer Maternal Uncle      lung    Cancer Maternal Uncle      melanoma    Anesth Problems Neg Hx        ROS:   Chest Pain:  No  SOB:  No      Physical Exam  Visit Vitals    /77    Pulse 77    Temp 97.5 °F (36.4 °C) (Oral)    Resp 16    Ht 5' 5\" (1.651 m)    Wt 193 lb (87.5 kg)    LMP 01/01/1985    SpO2 96%    BMI 32.12 kg/m2     BP Readings from Last 3 Encounters:   03/02/17 121/77   02/23/17 119/74   02/20/17 94/63     Constitutional: Appears well,  No acute distress, Vitals noted  Psychiatric:  Affect normal, Alert and Oriented to person/place/time  Eyes:  Conjunctiva clear, no drainage  ENT:  External ears and nose normal, Teeth and gums appear healthy, Mucous membranes moist  Neck:  General inspection normal. Supple. Lungs:  Clear to auscultation, good respiratory effort, no wheezes, rales or rhonchi.   Heart:  Normal HR, Normal S1 and S2,  Regular rhythm. No murmurs, rubs or gallops. No carotid bruits. Abdomen: Soft, hypoactive BS, nondistended, + RUQ tenderness, no other ttp, no HSM, no guarding or rebound, well healing laparoscopic scars  Extremities:  Without edema, good peripheral pulses  Skin:  Warm to palpation, without rashes        Assessment/Plan:      ICD-10-CM ICD-9-CM    1. Essential hypertension I10 401.9    2. RUQ abdominal pain R10.11 789.01    3. Coronary artery disease involving native coronary artery of native heart without angina pectoris I25.10 414.01 metoprolol tartrate (LOPRESSOR) 25 mg tablet      NM HEPATOBILIARY DUCT SCAN   4. S/P gastric bypass Z98.84 V45.86      HTN - had low BP after gastric bypass and 37lb wt loss. Needs BB for h/o CAD. BP stable on metoprolol 25mg bid. Continue current dose. RUQ pain in the setting of acute weight loss, ? Gallbladder dysfunction. Sxs present when prior abd us done before surgery, was neg for gall stones. Will check hida scan         Follow-up Disposition:  Return in about 2 months (around 5/2/2017), or if symptoms worsen or fail to improve. AVS was printed, given to patient and briefly discussed prior to patient's departure from the office today.     Zuleyma Gonzalez MD  [address] [facility]  [address] [place] Jen Wilkins [facility]

## 2017-03-08 ENCOUNTER — OFFICE VISIT (OUTPATIENT)
Dept: SURGERY | Age: 57
End: 2017-03-08

## 2017-03-08 VITALS
OXYGEN SATURATION: 98 % | HEIGHT: 65 IN | RESPIRATION RATE: 15 BRPM | BODY MASS INDEX: 32.15 KG/M2 | HEART RATE: 65 BPM | WEIGHT: 193 LBS | SYSTOLIC BLOOD PRESSURE: 101 MMHG | DIASTOLIC BLOOD PRESSURE: 61 MMHG | TEMPERATURE: 97.6 F

## 2017-03-08 DIAGNOSIS — Z09 SURGERY FOLLOW-UP: ICD-10-CM

## 2017-03-08 DIAGNOSIS — E66.09 NON MORBID OBESITY DUE TO EXCESS CALORIES: Primary | ICD-10-CM

## 2017-03-08 NOTE — PROGRESS NOTES
6 weeks status post gastric bypass. Pt reports doing well on liquids, soft foods and soft meats. .    Pt's post op pain is none. Pt reports no nausea and no vomiting  Sheis drinking approximately 40+ oz of water daily  She was taken off of most of her blood pressure medication. She is taking bariatric vitamins without issue. Total weight loss since surgery 25lbs  Weight loss since last visit 7lbs  Visit Vitals    /61 (BP 1 Location: Left arm, BP Patient Position: Sitting)    Pulse 65    Temp 97.6 °F (36.4 °C) (Oral)    Resp 15    Ht 5' 5\" (1.651 m)    Wt 193 lb (87.5 kg)    LMP 01/01/1985    SpO2 98%    BMI 32.12 kg/m2          Patient has an advanced directive: NOt on file. Ms. Caroline Barroso has a reminder for a \"due or due soon\" health maintenance. I have asked that she contact her primary care provider for follow-up on this health maintenance. Physical Examination: General appearance - alert, well appearing, and in no distress,  Chest - clear to auscultation bilaterally  Heart - normal rate, regular rhythm, normal S1, S2, no murmurs, rubs, clicks or gallops  Abdomen - soft, nontender, nondistended  scars from previous incisions healing without erythema or induration    A/P    Doing well 6 wks stats post laparoscopic Gastric Bypass  Diet advanced to solid foods. May start colace for constipation  Encouraged water intake  Continue PPI  Goal of protein intake 50-60 grams. Follow up in 6 weeks. Exercise daily    Pt verbalized understanding and questions were answered to the best of my knowledge and ability.          Warren Jauregui NP

## 2017-03-08 NOTE — MR AVS SNAPSHOT
Visit Information Date & Time Provider Department Dept. Phone Encounter #  
 3/8/2017  1:40 PM Caitlyn Lee, [address] [facility] [phone] [id number] Your Appointments 4/5/2017  1:00 PM  
POST OP 10 MIN [place] [address] [facility] ([address]) Appt Note: 1 month f/u po/cancel 3/22 appt/$0PB/$0CP/GSSF/3.8.17/DGB  
 [address] [facility] 8 [facility] 1007 [facility]  
[phone]  
  
   
 [address] [place] [address] Road  
  
    
 6/23/2017  1:40 PM  
ESTABLISHED PATIENT with Elijah Meehan MD  
[facility] ([address]) Appt Note: 6 mo fup; 6 mo [address] [place] [address] [address] [facility]  
[address] [place] [address] [address] Upcoming Health Maintenance Date Due  
 BREAST CANCER SCRN MAMMOGRAM 7/14/2017 HEMOGLOBIN A1C Q6M 8/20/2017 FOOT EXAM Q1 8/23/2017 MICROALBUMIN Q1 8/23/2017 LIPID PANEL Q1 8/23/2017 EYE EXAM RETINAL OR DILATED Q1 8/24/2017 PAP AKA CERVICAL CYTOLOGY 8/23/2019 COLONOSCOPY 6/23/2020 DTaP/Tdap/Td series (2 - Td) 8/14/2022 Allergies as of 3/8/2017  Review Complete On: 3/8/2017 By: Caitlyn Lee NP Severity Noted Reaction Type Reactions Accupril [Quinapril]  07/07/2010    Cough Lipitor [Atorvastatin]  07/07/2010    Other (comments)  
 aches Norvasc [Amlodipine]  07/22/2015    Swelling On legs at 10 mg dose Current Immunizations  Reviewed on 1/26/2017 Name Date Influenza Vaccine 9/1/2015 Pneumococcal Polysaccharide (PPSV-23) 8/4/2015 TD Vaccine 11/7/2005 TDAP Vaccine 8/14/2012 Not reviewed this visit Vitals BP Pulse Temp Resp Height(growth percentile) Weight(growth percentile) 101/61 (BP 1 Location: Left arm, BP Patient Position: Sitting) 65 97.6 °F (36.4 °C) (Oral) 15 5' 5\" (1.651 m) 193 lb (87.5 kg) LMP SpO2 BMI OB Status Smoking Status 01/01/1985 98% 32.12 kg/m2 Hysterectomy Never Smoker Vitals History BMI and BSA Data Body Mass Index Body Surface Area  
 32.12 kg/m 2 2 m 2 Preferred Pharmacy Pharmacy Name Phone [id number] [place], [address] AT [facility] [phone] Your Updated Medication List  
  
   
This list is accurate as of: 3/8/17  2:26 PM.  Always use your most recent med list.  
  
  
  
  
 * albuterol 0.63 mg/3 mL nebulizer solution Commonly known as:  ACCUNEB  
3 mL by Nebulization route every four (4) hours as needed for Wheezing or Shortness of Breath. * albuterol 90 mcg/actuation inhaler Commonly known as:  PROAIR HFA Take 1 Puff by inhalation every four (4) hours as needed for Wheezing or Shortness of Breath. alcohol swabs Padm Commonly known as:  ALCOHOL PADS Use when checking blood glucose ALPRAZolam 0.5 mg tablet Commonly known as:  XANAX  
TAKE 1 TABLET BY MOUTH 3 TIMES A DAY AS NEEDED FOR ANXIETY. MAX DAILY AMT 1.5MG aspirin, buffered 81 mg Tab Take  by mouth daily. Blood-Glucose Meter monitoring kit Use to check glucose once a day  
  
 budesonide 180 mcg/actuation Aepb inhaler Commonly known as:  PULMICORT Take 1 Puff by inhalation two (2) times a day. Indications: MAINTENANCE THERAPY FOR ASTHMA  
  
 dicyclomine 10 mg capsule Commonly known as:  BENTYL TAKE 1 (ONE) CAPSULE ORALLY AS NEEDED EVERY 6 HOURS FLUoxetine 20 mg capsule Commonly known as:  PROzac TAKE ONE CAPSULE BY MOUTH EVERY DAY  
  
 gabapentin 600 mg tablet Commonly known as:  NEURONTIN Take 1 Tab by mouth three (3) times daily. glucose blood VI test strips strip Commonly known as:  blood glucose test  
Use once a day Lancets Misc Use once a day. Please give one compatible with patient's meter  
  
 metoprolol tartrate 25 mg tablet Commonly known as:  LOPRESSOR Take 1 Tab by mouth two (2) times a day. nitroglycerin 0.4 mg SL tablet Commonly known as:  NITROSTAT  
1 Tab by SubLINGual route every five (5) minutes as needed for Chest Pain. omeprazole 20 mg capsule Commonly known as:  PRILOSEC  
TAKE ONE CAPSULE BY MOUTH EVERY DAY  
  
 ondansetron 4 mg disintegrating tablet Commonly known as:  ZOFRAN ODT Take 1 Tab by mouth every six (6) hours as needed for Nausea. rosuvastatin 40 mg tablet Commonly known as:  CRESTOR  
TAKE 1 TABLET BY MOUTH NIGHTLY  
  
 tiZANidine 4 mg tablet Commonly known as:  Len Mule TAKE 1 TABLET BY MOUTH AT BEDTIME  
  
 valACYclovir 500 mg tablet Commonly known as:  VALTREX Take 1 Tab by mouth two (2) times a day. ZyrTEC 10 mg tablet Generic drug:  cetirizine Take  by mouth daily. * Notice: This list has 2 medication(s) that are the same as other medications prescribed for you. Read the directions carefully, and ask your doctor or other care provider to review them with you. To-Do List   
 03/13/2017 4:00 PM  
(Arrive by 3:30 PM) Appointment with [facility] RM 1 at [facility] ([phone]) Please bring any recent X-rays with you to the procedure. You must bring a LIST or BAG of all current medication you are taking with you to your appointment. NOTHING TO EAT OR DRINK 4 HOURS PRIOR TO ARRIVAL AND PLEASE BRING SOMEONE TO DRIVE THE PATIENT HOME AFTERWARDS. PATIENT SHOULD EAT A FATTY MEAL THE NIGHT BEFORE  THE PATIENT SHOULD NOT TAKE PAIN MEDICATION 4-6 HOURS PRIOR TO ARRIVAL TIME FOR TEST. Registration Patient Instructions Learning About Physical Activity What is physical activity? Physical activity is any kind of activity that gets your body moving. The types of physical activity that can help you get fit and stay healthy include: · Aerobic or \"cardio\" activities that make your heart beat faster and make you breathe harder, such as brisk walking, riding a bike, or running. Aerobic activities strengthen your heart and lungs and build up your endurance. · Strength training activities that make your muscles work against, or \"resist,\" something, such as lifting weights or doing push-ups. These activities help tone and strengthen your muscles. · Stretches that allow you to move your joints and muscles through their full range of motion. Stretching helps you be more flexible and avoid injury. What are the benefits of physical activity? Being active is one of the best things you can do to get fit and stay healthy. It helps you to: · Feel stronger and have more energy to do all the things you like to do. · Focus better at school or work and perform better in sports. · Feel, think, and sleep better. · Reach and stay at a healthy weight. · Lose fat and build lean muscle. · Lower your risk for serious health problems. · Keep your bones, muscles, and joints strong. Being fit lets you do more physical activity. And it lets you work out harder without as much effort. How can you make physical activity part of your life? Get at least 30 minutes of exercise on most days of the week. Walking is a good choice. You also may want to do other activities, such as running, swimming, cycling, or playing tennis or team sports. Pick activities that you likeones that make your heart beat faster, your muscles stronger, and your muscles and joints more flexible. If you find more than one thing you like doing, do them all. You don't have to do the same thing every day. Get your heart pumping every day. Any activity that makes your heart beat faster and keeps it at that rate for a while counts. Here are some great ways to get your heart beating faster: · Go for a brisk walk, run, or bike ride. · Go for a hike or swim. · Go in-line skating. · Play a game of touch football, basketball, or soccer. · Ride a bike. · Play tennis or racquetball. · Climb stairs. Even some household chores can be aerobicjust do them at a faster pace. Vacuuming, raking or mowing the lawn, sweeping the garage, and washing and waxing the car all can help get your heart rate up. Strengthen your muscles during the week. You don't have to lift heavy weights or grow big, bulky muscles to get stronger. Doing a few simple activities that make your muscles work against, or \"resist,\" something can help you get stronger. For example, you can: · Do push-ups or sit-ups, which use your own body weight as resistance. · Lift weights or dumbbells or use stretch bands at home or in a gym or community center. Stretch your muscles often. Stretching will help you as you become more active. It can help you stay flexible, loosen tight muscles, and avoid injury. It can also help improve your balance and posture and can be a great way to relax. Be sure to stretch the muscles you'll be using when you work out. It's best to warm your muscles slightly before you stretch them. Walk or do some other light aerobic activity for a few minutes, and then start stretching. When you stretch your muscles: · Do it slowly. Stretching is not about going fast or making sudden movements. · Don't push or bounce during a stretch. · Hold each stretch for at least 15 to 30 seconds, if you can. You should feel a stretch in the muscle, but not pain. · Breathe out as you do the stretch. Then breathe in as you hold the stretch. Don't hold your breath. If you're worried about how more activity might affect your health, have a checkup before you start. Follow any special advice your doctor gives you for getting a smart start. Where can you learn more? Go to http://maliha-sanchez.info/. Enter I457 in the search box to learn more about \"Learning About Physical Activity. \" Current as of: May 27, 2016 Content Version: 11.1 © 2913-4752 Healthwise, Incorporated. Care instructions adapted under license by [employer] (which disclaims liability or warranty for this information). If you have questions about a medical condition or this instruction, always ask your healthcare professional. [address] any warranty or liability for your use of this information. Introducing [facility] & HEALTH SERVICES! Dear Ismael Jauregui: 
Thank you for requesting a [employer] account. Our records indicate that you already have an active [employer] account. You can access your account anytime at [email or web address] Words Did you know that you can access your hospital and ER discharge instructions at any time in [employer]? You can also review all of your test results from your hospital stay or ER visit. Additional Information If you have questions, please visit the Frequently Asked Questions section of the [employer] website at [email or web address] Remember, [employer] is NOT to be used for urgent needs. For medical emergencies, dial 911. Now available from your iPhone and Android! Please provide this summary of care documentation to your next provider. Your primary care clinician is listed as Elizabeth Limon. If you have any questions after today's visit, please call [phone].

## 2017-03-08 NOTE — PATIENT INSTRUCTIONS

## 2017-03-13 ENCOUNTER — HOSPITAL ENCOUNTER (OUTPATIENT)
Dept: NUCLEAR MEDICINE | Age: 57
Discharge: HOME | End: 2017-03-13
Attending: FAMILY MEDICINE
Payer: COMMERCIAL

## 2017-03-13 DIAGNOSIS — I25.10 CORONARY ARTERY DISEASE INVOLVING NATIVE CORONARY ARTERY OF NATIVE HEART WITHOUT ANGINA PECTORIS: ICD-10-CM

## 2017-03-13 PROCEDURE — 78226 HEPATOBILIARY SYSTEM IMAGING: CPT

## 2017-03-14 ENCOUNTER — TELEPHONE (OUTPATIENT)
Dept: FAMILY MEDICINE CLINIC | Age: 57
End: 2017-03-14

## 2017-03-20 ENCOUNTER — TELEPHONE (OUTPATIENT)
Dept: FAMILY MEDICINE CLINIC | Age: 57
End: 2017-03-20

## 2017-03-20 DIAGNOSIS — I25.10 CORONARY ARTERY DISEASE INVOLVING NATIVE CORONARY ARTERY OF NATIVE HEART WITHOUT ANGINA PECTORIS: ICD-10-CM

## 2017-03-20 RX ORDER — METOPROLOL TARTRATE 25 MG/1
25 TABLET, FILM COATED ORAL 2 TIMES DAILY
Qty: 60 TAB | Refills: 2 | Status: SHIPPED | OUTPATIENT
Start: 2017-03-20 | End: 2017-07-19 | Stop reason: SDUPTHER

## 2017-03-20 NOTE — TELEPHONE ENCOUNTER
Patient states [facility] will not let her refill medication and states it's to soon to refill per her insurance company. Patient states the pharmacy is still showing her taking 1/2 tablet daily instead of 2 tablets daily.       metoprolol tartrate (LOPRESSOR) 25 mg tablet [[id number]]   Order Details   Dose: 25 mg Route: Oral Frequency: 2 TIMES DAILY   Dispense Quantity:  60 Tab Refills:  0 Fills Remaining:  --           Sig: Take 1 Tab by mouth two (2) times a day.          Written Date:  03/02/17 Expiration Date:  --     Start Date:  03/02/17 End Date:  --            Ordering Provider:  -- SHANIA #:  -- NPI:  --    Authorizing Provider:  Juan Pringle MD SHANIA #:  [id number] NPI:  [id number]    Diagnosis Association: Coronary artery disease involving native coronary artery of native heart without angina pectoris (I25.10)      Original Order:  metoprolol tartrate (LOPRESSOR) 25 mg tablet [[id number]]      Pharmacy:  [facility]/pharmacy [address] [place] [address] #:  [id number]

## 2017-03-30 ENCOUNTER — OFFICE VISIT (OUTPATIENT)
Dept: FAMILY MEDICINE CLINIC | Age: 57
End: 2017-03-30

## 2017-03-30 VITALS
DIASTOLIC BLOOD PRESSURE: 88 MMHG | TEMPERATURE: 98.2 F | HEART RATE: 72 BPM | OXYGEN SATURATION: 95 % | RESPIRATION RATE: 18 BRPM | HEIGHT: 65 IN | SYSTOLIC BLOOD PRESSURE: 128 MMHG | BODY MASS INDEX: 30.66 KG/M2 | WEIGHT: 184 LBS

## 2017-03-30 DIAGNOSIS — J45.41 MODERATE PERSISTENT ASTHMA WITH ACUTE EXACERBATION: Primary | ICD-10-CM

## 2017-03-30 RX ORDER — PREDNISONE 20 MG/1
40 TABLET ORAL
Qty: 10 TAB | Refills: 0 | Status: SHIPPED | OUTPATIENT
Start: 2017-03-30 | End: 2017-04-04

## 2017-03-30 RX ORDER — SUCRALFATE 1 G/1
1 TABLET ORAL 2 TIMES DAILY
Qty: 28 TAB | Refills: 0 | Status: SHIPPED | OUTPATIENT
Start: 2017-03-30 | End: 2017-04-13

## 2017-03-30 RX ORDER — CYCLOBENZAPRINE HCL 10 MG
TABLET ORAL
COMMUNITY
End: 2017-09-03 | Stop reason: ALTCHOICE

## 2017-03-30 RX ORDER — IPRATROPIUM BROMIDE AND ALBUTEROL SULFATE 2.5; .5 MG/3ML; MG/3ML
3 SOLUTION RESPIRATORY (INHALATION)
Qty: 1 NEBULE | Refills: 0
Start: 2017-03-30 | End: 2017-03-30

## 2017-03-30 NOTE — MR AVS SNAPSHOT
Visit Information Date & Time Provider Department Dept. Phone Encounter #  
 3/30/2017  4:00 PM Ant Holly, [address] [facility] [phone] [id number] Follow-up Instructions Return in about 1 week (around 4/6/2017) for asthma fu. Your Appointments 4/5/2017  1:00 PM  
POST OP 10 MIN with Chelsea Rosales NP [facility] ([address]) Appt Note: 1 month f/u po/cancel 3/22 appt/$0PB/$0CP/GSSF/3.8.17/DGB  
 [address] [address] 8 [facility] [address]  
[phone]  
  
   
 [address] [place] [address] Road  
  
    
 6/23/2017  1:40 PM  
ESTABLISHED PATIENT with Jefferson Mitchell MD  
[facility] ([facility]) Appt Note: 6 mo fup; 6 mo [address] [facility] [address]  
[facility] Randy Motcarlos [facility] [id number] [place] [address] Upcoming Health Maintenance Date Due  
 BREAST CANCER SCRN MAMMOGRAM 7/14/2017 HEMOGLOBIN A1C Q6M 8/20/2017 FOOT EXAM Q1 8/23/2017 MICROALBUMIN Q1 8/23/2017 LIPID PANEL Q1 8/23/2017 EYE EXAM RETINAL OR DILATED Q1 8/24/2017 PAP AKA CERVICAL CYTOLOGY 8/23/2019 COLONOSCOPY 6/23/2020 DTaP/Tdap/Td series (2 - Td) 8/14/2022 Allergies as of 3/30/2017  Review Complete On: 3/30/2017 By: Ant Holly MD  
  
 Severity Noted Reaction Type Reactions Accupril [Quinapril]  07/07/2010    Cough Lipitor [Atorvastatin]  07/07/2010    Other (comments)  
 aches Norvasc [Amlodipine]  07/22/2015    Swelling On legs at 10 mg dose Current Immunizations  Reviewed on 1/26/2017 Name Date Influenza Vaccine 9/1/2015 Pneumococcal Polysaccharide (PPSV-23) 8/4/2015 TD Vaccine 11/7/2005 TDAP Vaccine 8/14/2012 Not reviewed this visit You Were Diagnosed With   
  
 Codes Comments  Moderate persistent asthma with acute exacerbation    -  Primary ICD-10-CM: J45.41 
ICD-9-CM: 493.92 Vitals BP Pulse Temp Resp Height(growth percentile) Weight(growth percentile) 128/88 (BP 1 Location: Left arm, BP Patient Position: Sitting) 72 98.2 °F (36.8 °C) (Oral) 18 5' 5\" (1.651 m) 184 lb (83.5 kg) LMP SpO2 BMI OB Status Smoking Status 01/01/1985 95% 30.62 kg/m2 Hysterectomy Never Smoker Vitals History BMI and BSA Data Body Mass Index Body Surface Area  
 30.62 kg/m 2 1.96 m 2 Preferred Pharmacy Pharmacy Name Phone [id number] [place], [address] AT [facility] [place] [phone] Your Updated Medication List  
  
   
This list is accurate as of: 3/30/17  5:19 PM.  Always use your most recent med list.  
  
  
  
  
 * albuterol 0.63 mg/3 mL nebulizer solution Commonly known as:  ACCUNEB  
3 mL by Nebulization route every four (4) hours as needed for Wheezing or Shortness of Breath. * albuterol 90 mcg/actuation inhaler Commonly known as:  PROAIR HFA Take 1 Puff by inhalation every four (4) hours as needed for Wheezing or Shortness of Breath. albuterol-ipratropium 2.5 mg-0.5 mg/3 ml Nebu Commonly known as:  DUO-NEB  
3 mL by Nebulization route now for 1 dose. alcohol swabs Padm Commonly known as:  ALCOHOL PADS Use when checking blood glucose ALPRAZolam 0.5 mg tablet Commonly known as:  XANAX  
TAKE 1 TABLET BY MOUTH 3 TIMES A DAY AS NEEDED FOR ANXIETY. MAX DAILY AMT 1.5MG aspirin, buffered 81 mg Tab Take  by mouth daily. Blood-Glucose Meter monitoring kit Use to check glucose once a day  
  
 budesonide 180 mcg/actuation Aepb inhaler Commonly known as:  PULMICORT Take 1 Puff by inhalation two (2) times a day. Indications: MAINTENANCE THERAPY FOR ASTHMA  
  
 cyclobenzaprine 10 mg tablet Commonly known as:  FLEXERIL Take  by mouth three (3) times daily as needed for Muscle Spasm(s). dicyclomine 10 mg capsule Commonly known as:  BENTYL TAKE 1 (ONE) CAPSULE ORALLY AS NEEDED EVERY 6 HOURS FLUoxetine 20 mg capsule Commonly known as:  PROzac TAKE ONE CAPSULE BY MOUTH EVERY DAY  
  
 gabapentin 600 mg tablet Commonly known as:  NEURONTIN Take 1 Tab by mouth three (3) times daily. glucose blood VI test strips strip Commonly known as:  blood glucose test  
Use once a day Lancets Misc Use once a day. Please give one compatible with patient's meter  
  
 metoprolol tartrate 25 mg tablet Commonly known as:  LOPRESSOR Take 1 Tab by mouth two (2) times a day. nitroglycerin 0.4 mg SL tablet Commonly known as:  NITROSTAT  
1 Tab by SubLINGual route every five (5) minutes as needed for Chest Pain. omeprazole 20 mg capsule Commonly known as:  PRILOSEC  
TAKE ONE CAPSULE BY MOUTH EVERY DAY  
  
 ondansetron 4 mg disintegrating tablet Commonly known as:  ZOFRAN ODT Take 1 Tab by mouth every six (6) hours as needed for Nausea. rosuvastatin 40 mg tablet Commonly known as:  CRESTOR  
TAKE 1 TABLET BY MOUTH NIGHTLY  
  
 valACYclovir 500 mg tablet Commonly known as:  VALTREX Take 1 Tab by mouth two (2) times a day. ZyrTEC 10 mg tablet Generic drug:  cetirizine Take  by mouth daily. * Notice: This list has 2 medication(s) that are the same as other medications prescribed for you. Read the directions carefully, and ask your doctor or other care provider to review them with you. We Performed the Following ALBUTEROL, INHAL. SOL., FDA-APPROVED FINAL, NON-COMPOUND UNIT DOSE, 1 MG [ [facility] [place]] INHAL RX, AIRWAY OBST/DX SPUTUM INDUCT [id number] CPT(R)] Follow-up Instructions Return in about 1 week (around 4/6/2017) for asthma fu. Patient Instructions Use albuterol every 4 hours Asthma Attack: Care Instructions Your Care Instructions During an asthma attack, the airways swell and narrow.  This makes it hard to breathe. Severe asthma attacks can be life-threatening, but you can help prevent them by keeping your asthma under control and treating symptoms before they get bad. Symptoms include being short of breath, having chest tightness, coughing, and wheezing. Noting and treating these symptoms can also help you avoid future trips to the emergency room. The doctor has checked you carefully, but problems can develop later. If you notice any problems or new symptoms, get medical treatment right away. Follow-up care is a key part of your treatment and safety. Be sure to make and go to all appointments, and call your doctor if you are having problems. It's also a good idea to know your test results and keep a list of the medicines you take. How can you care for yourself at home? · Follow your asthma action plan to prevent and treat attacks. If you don't have an asthma action plan, work with your doctor to create one. · Take your asthma medicines exactly as prescribed. Talk to your doctor right away if you have any questions about how to take them. ¨ Use your quick-relief medicine when you have symptoms of an attack. Quick-relief medicine is usually an albuterol inhaler. Some people need to use quick-relief medicine before they exercise. ¨ Take your controller medicine every day, not just when you have symptoms. Controller medicine is usually an inhaled corticosteroid. The goal is to prevent problems before they occur. Don't use your controller medicine to treat an attack that has already started. It doesn't work fast enough to help. ¨ If your doctor prescribed corticosteroid pills to use during an attack, take them exactly as prescribed. It may take hours for the pills to work, but they may make the episode shorter and help you breathe better. ¨ Keep your quick-relief medicine with you at all times. · Talk to your doctor before using other medicines.  Some medicines, such as aspirin, can cause asthma attacks in some people. · If you have a peak flow meter, use it to check how well you are breathing. This can help you predict when an asthma attack is going to occur. Then you can take medicine to prevent the asthma attack or make it less severe. · Do not smoke or allow others to smoke around you. Avoid smoky places. Smoking makes asthma worse. If you need help quitting, talk to your doctor about stop-smoking programs and medicines. These can increase your chances of quitting for good. · Learn what triggers an asthma attack for you, and avoid the triggers when you can. Common triggers include colds, smoke, air pollution, dust, pollen, mold, pets, cockroaches, stress, and cold air. · Avoid colds and the flu. Get a pneumococcal vaccine shot. If you have had one before, ask your doctor if you need a second dose. Get a flu vaccine every fall. If you must be around people with colds or the flu, wash your hands often. When should you call for help? Call 911 anytime you think you may need emergency care. For example, call if: 
· You have severe trouble breathing. Call your doctor now or seek immediate medical care if: 
· Your symptoms do not get better after you have followed your asthma action plan. · You have new or worse trouble breathing. · Your coughing and wheezing get worse. · You cough up dark brown or bloody mucus (sputum). · You have a new or higher fever. Watch closely for changes in your health, and be sure to contact your doctor if: 
· You need to use quick-relief medicine on more than 2 days a week (unless it is just for exercise). · You cough more deeply or more often, especially if you notice more mucus or a change in the color of your mucus. · You are not getting better as expected. Where can you learn more? Go to http://maliha-sanchez.info/. Enter U668 in the search box to learn more about \"Asthma Attack: Care Instructions. \" 
 Current as of: May 23, 2016 Content Version: 11.2 © 0622-6042 [employer], [employer]. Care instructions adapted under license by [employer] (which disclaims liability or warranty for this information). If you have questions about a medical condition or this instruction, always ask your healthcare professional. [address] any warranty or liability for your use of this information. Introducing [facility] & HEALTH SERVICES! Dear Dariana Schmid: 
Thank you for requesting a [employer] account. Our records indicate that you already have an active [employer] account. You can access your account anytime at [email or web address]. [email or web address] Cash Did you know that you can access your hospital and ER discharge instructions at any time in [employer]? You can also review all of your test results from your hospital stay or ER visit. Additional Information If you have questions, please visit the Frequently Asked Questions section of the [employer] website at [email or web address] Remember, [employer] is NOT to be used for urgent needs. For medical emergencies, dial 911. Now available from your iPhone and Android! Please provide this summary of care documentation to your next provider. Your primary care clinician is listed as Anabel Mckeon. If you have any questions after today's visit, please call [phone].

## 2017-03-30 NOTE — PATIENT INSTRUCTIONS
Use albuterol every 4 hours      Asthma Attack: Care Instructions  Your Care Instructions    During an asthma attack, the airways swell and narrow. This makes it hard to breathe. Severe asthma attacks can be life-threatening, but you can help prevent them by keeping your asthma under control and treating symptoms before they get bad. Symptoms include being short of breath, having chest tightness, coughing, and wheezing. Noting and treating these symptoms can also help you avoid future trips to the emergency room. The doctor has checked you carefully, but problems can develop later. If you notice any problems or new symptoms, get medical treatment right away. Follow-up care is a key part of your treatment and safety. Be sure to make and go to all appointments, and call your doctor if you are having problems. It's also a good idea to know your test results and keep a list of the medicines you take. How can you care for yourself at home? · Follow your asthma action plan to prevent and treat attacks. If you don't have an asthma action plan, work with your doctor to create one. · Take your asthma medicines exactly as prescribed. Talk to your doctor right away if you have any questions about how to take them. ¨ Use your quick-relief medicine when you have symptoms of an attack. Quick-relief medicine is usually an albuterol inhaler. Some people need to use quick-relief medicine before they exercise. ¨ Take your controller medicine every day, not just when you have symptoms. Controller medicine is usually an inhaled corticosteroid. The goal is to prevent problems before they occur. Don't use your controller medicine to treat an attack that has already started. It doesn't work fast enough to help. ¨ If your doctor prescribed corticosteroid pills to use during an attack, take them exactly as prescribed. It may take hours for the pills to work, but they may make the episode shorter and help you breathe better.   ¨ Keep your quick-relief medicine with you at all times. · Talk to your doctor before using other medicines. Some medicines, such as aspirin, can cause asthma attacks in some people. · If you have a peak flow meter, use it to check how well you are breathing. This can help you predict when an asthma attack is going to occur. Then you can take medicine to prevent the asthma attack or make it less severe. · Do not smoke or allow others to smoke around you. Avoid smoky places. Smoking makes asthma worse. If you need help quitting, talk to your doctor about stop-smoking programs and medicines. These can increase your chances of quitting for good. · Learn what triggers an asthma attack for you, and avoid the triggers when you can. Common triggers include colds, smoke, air pollution, dust, pollen, mold, pets, cockroaches, stress, and cold air. · Avoid colds and the flu. Get a pneumococcal vaccine shot. If you have had one before, ask your doctor if you need a second dose. Get a flu vaccine every fall. If you must be around people with colds or the flu, wash your hands often. When should you call for help? Call 911 anytime you think you may need emergency care. For example, call if:  · You have severe trouble breathing. Call your doctor now or seek immediate medical care if:  · Your symptoms do not get better after you have followed your asthma action plan. · You have new or worse trouble breathing. · Your coughing and wheezing get worse. · You cough up dark brown or bloody mucus (sputum). · You have a new or higher fever. Watch closely for changes in your health, and be sure to contact your doctor if:  · You need to use quick-relief medicine on more than 2 days a week (unless it is just for exercise). · You cough more deeply or more often, especially if you notice more mucus or a change in the color of your mucus. · You are not getting better as expected. Where can you learn more?   Go to http://maliha-sanchez.info/. Enter X942 in the search box to learn more about \"Asthma Attack: Care Instructions. \"  Current as of: May 23, 2016  Content Version: 11.2  © 3525-5042 [employer], [employer]. Care instructions adapted under license by [employer] (which disclaims liability or warranty for this information). If you have questions about a medical condition or this instruction, always ask your healthcare professional. [place] [address] any warranty or liability for your use of this information.

## 2017-03-30 NOTE — PROGRESS NOTES
Chief Complaint   Patient presents with    Other     chest congestion, cough, head hurts, yellow and green mucus while coughing. headache since sunday and its getting worse.

## 2017-03-30 NOTE — PROGRESS NOTES
Hx asthma, HTN, CAD s/p stent, anxiety/depression    Diffuse wheezing    After duoneb treatment, less wheezing    Recent gastric bypass surgery    Will check with surgeon to verify if ok to give her oral steroids    I reviewed with the resident the medical history and the resident's findings on the physical examination. I discussed with the resident the patient's diagnosis and concur with the plan.

## 2017-03-30 NOTE — PROGRESS NOTES
Tia Mcghee is a 64 y.o. female with history of HTN, CAD sp stent, GERD, GARCIA, DM, EDMOND, Asthma, Anxiety, Depression who presents cough. History provided by: patient    HPI  Onset Sunday. Cough with green phlegm. Positive headache, rhinorrhea, congestion, malaise, fever at 101F, sore throat, SOB, wheezing    No chills    Using inhaler 2x/day and nebulizer every 4 hours    No sick contacts  Never smoked  No recent travel  Positive hx of asthma  No recent antibiotics     Had gastric bypass surgery 1/25/17    Patient Active Problem List   Diagnosis Code    Morbid obesity ([facility] E66.01    Obstructive sleep apnea G47.33    Asthma J45.909    Arthritis M19.90    GERD (gastroesophageal reflux disease) K21.9    Edema R60.9    GARCIA (nonalcoholic steatohepatitis) K75.81    Chronic pain G89.29    Essential hypertension L09    Metabolic syndrome S05.81    FH: diabetes mellitus Z83.3    DM (diabetes mellitus) ([facility] E11.9    CAD (coronary artery disease) I25.10    Anxiety F41.9    History of pulmonary embolus (PE) Z86.711    Hypokalemia E87.6          Current Outpatient Prescriptions:     cyclobenzaprine (FLEXERIL) 10 mg tablet, Take  by mouth three (3) times daily as needed for Muscle Spasm(s). , Disp: , Rfl:     sucralfate (CARAFATE) 1 gram tablet, Take 1 Tab by mouth two (2) times a day for 14 days. , Disp: 28 Tab, Rfl: 0    predniSONE (DELTASONE) 20 mg tablet, Take 2 Tabs by mouth daily (with breakfast) for 5 days. , Disp: 10 Tab, Rfl: 0    metoprolol tartrate (LOPRESSOR) 25 mg tablet, Take 1 Tab by mouth two (2) times a day., Disp: 60 Tab, Rfl: 2    albuterol (PROAIR HFA) 90 mcg/actuation inhaler, Take 1 Puff by inhalation every four (4) hours as needed for Wheezing or Shortness of Breath., Disp: 1 Inhaler, Rfl: 5    gabapentin (NEURONTIN) 600 mg tablet, Take 1 Tab by mouth three (3) times daily. , Disp: 270 Tab, Rfl: 0    FLUoxetine (PROZAC) 20 mg capsule, TAKE ONE CAPSULE BY MOUTH EVERY DAY, Disp: 90 Cap, Rfl: 1    ALPRAZolam (XANAX) 0.5 mg tablet, TAKE 1 TABLET BY MOUTH 3 TIMES A DAY AS NEEDED FOR ANXIETY. MAX DAILY AMT 1.5MG, Disp: 180 Tab, Rfl: 0    omeprazole (PRILOSEC) 20 mg capsule, TAKE ONE CAPSULE BY MOUTH EVERY DAY, Disp: 90 Cap, Rfl: 1    budesonide (PULMICORT) 180 mcg/actuation aepb inhaler, Take 1 Puff by inhalation two (2) times a day. Indications: MAINTENANCE THERAPY FOR ASTHMA, Disp: 1 Inhaler, Rfl: 3    albuterol (ACCUNEB) 0.63 mg/3 mL nebulizer solution, 3 mL by Nebulization route every four (4) hours as needed for Wheezing or Shortness of Breath., Disp: 30 Vial, Rfl: 5    rosuvastatin (CRESTOR) 40 mg tablet, TAKE 1 TABLET BY MOUTH NIGHTLY, Disp: 90 Tab, Rfl: 3    Blood-Glucose Meter monitoring kit, Use to check glucose once a day, Disp: 1 Kit, Rfl: 0    alcohol swabs (ALCOHOL PADS) padm, Use when checking blood glucose, Disp: 100 Pad, Rfl: 5    Lancets misc, Use once a day. Please give one compatible with patient's meter, Disp: 1 Package, Rfl: 5    glucose blood VI test strips (BLOOD GLUCOSE TEST) strip, Use once a day, Disp: 100 Strip, Rfl: 5    Aspirin, Buffered 81 mg tab, Take  by mouth daily. , Disp: , Rfl:     dicyclomine (BENTYL) 10 mg capsule, TAKE 1 (ONE) CAPSULE ORALLY AS NEEDED EVERY 6 HOURS, Disp: 120 capsule, Rfl: 0    cetirizine (ZYRTEC) 10 mg tablet, Take  by mouth daily. , Disp: , Rfl:     ondansetron (ZOFRAN ODT) 4 mg disintegrating tablet, Take 1 Tab by mouth every six (6) hours as needed for Nausea., Disp: 30 Tab, Rfl: 0    valACYclovir (VALTREX) 500 mg tablet, Take 1 Tab by mouth two (2) times a day.  (Patient taking differently: Take 500 mg by mouth two (2) times daily as needed.), Disp: 60 Tab, Rfl: 3    nitroglycerin (NITROSTAT) 0.4 mg SL tablet, 1 Tab by SubLINGual route every five (5) minutes as needed for Chest Pain., Disp: 1 Bottle, Rfl: 4     Allergies   Allergen Reactions    Accupril [Quinapril] Cough    Lipitor [Atorvastatin] Other (comments) aches    Norvasc [Amlodipine] Swelling     On legs at 10 mg dose        Past Medical History:   Diagnosis Date    Arthritis     Asthma     Autoimmune disease ([facility]     Abby Magaña    CAD (coronary artery disease)     s/p stents 11/2015    Cardiac murmur     Depression     Diabetes ([facility]     [facility]    GERD (gastroesophageal reflux disease)     Hypertension     Morbid obesity ([facility]     Musculoskeletal disorder     EDMOND (obstructive sleep apnea)     wears cpap    S/P TONJA (total abdominal hysterectomy)     Thromboembolus ([facility] 1996    PE       ROS  As stated in HPI    Physical Exam   Constitutional: She is well-developed, well-nourished, and in no distress. /88 (BP 1 Location: Left arm, BP Patient Position: Sitting)  Pulse 72  Temp 98.2 °F (36.8 °C) (Oral)   Resp 18  Ht 5' 5\" (1.651 m)  Wt 184 lb (83.5 kg)  LMP 01/01/1985  SpO2 95%  BMI 30.62 kg/m2    HENT:   Head: Normocephalic and atraumatic. Right Ear: External ear normal.   Left Ear: External ear normal.   Nose: Nose normal.   Mouth/Throat: Oropharynx is clear and moist. No oropharyngeal exudate. Bilateral TM nl  No sinus tenderness   Eyes: Conjunctivae are normal. Pupils are equal, round, and reactive to light. Right eye exhibits no discharge. Left eye exhibits no discharge. No scleral icterus. Neck: Neck supple. Cardiovascular: Normal rate, regular rhythm, normal heart sounds and intact distal pulses. Exam reveals no gallop and no friction rub. No murmur heard. Pulmonary/Chest: No respiratory distress. She has wheezes (diffuse inspiratory and expiratory). She has no rales. Poor air movement  Diffuse rhonchi   Lymphadenopathy:     She has no cervical adenopathy. Vitals reviewed. Assessment/Plan:   Michell Rojas is a 64 y.o. female with history of HTN, CAD sp stent, GERD, GARCIA, DM, EDMOND, Asthma, Anxiety, Depression who presents cough. ICD-10-CM ICD-9-CM    1.  Moderate persistent asthma with acute exacerbation J45.41 493.92 ALBUTEROL, INHAL. SOL., FDA-APPROVED FINAL, NON-COMPOUND UNIT DOSE, 1 MG      INHAL RX, AIRWAY OBST/DX SPUTUM INDUCT      albuterol-ipratropium (DUO-NEB) 2.5 mg-0.5 mg/3 ml nebu      sucralfate (CARAFATE) 1 gram tablet      predniSONE (DELTASONE) 20 mg tablet      1. Moderate persistent asthma with acute exacerbation  Patient w/ acute exacerbation. duoneb treatment given. Improvement in air movement. Scattered wheezing. Patient with recent gastric bypass surgery. Unable to reach patient's surgeon, Dr Ras English, but was able to reach on call surgeon Dr Adolfo Schwartz. Discussed starting PO prednisone for acute asthma exacerbation, he states ok to start, use carafate in addition and fu with Dr Ras English in am. Called patient and informed her of the update. Dicussed if at any moment she has worsening symptoms, abdominal pain, dark tarry stools, blood in stools then to go to the ER. Continue albuterol q4hr. - ALBUTEROL, INHAL. UBF.()  - INHAL RX, AIRWAY OBST/DX SPUTUM INDUCT ([id number])  - albuterol-ipratropium (DUO-NEB) 2.5 mg-0.5 mg/3 ml nebu; 3 mL by Nebulization route now for 1 dose. Dispense: 1 Nebule; Refill: 0  - sucralfate (CARAFATE) 1 gram tablet; Take 1 Tab by mouth two (2) times a day for 14 days. Dispense: 28 Tab; Refill: 0  - predniSONE (DELTASONE) 20 mg tablet; Take 2 Tabs by mouth daily (with breakfast) for 5 days. Dispense: 10 Tab; Refill: 0    Follow-up Disposition:  Return in about 1 week (around 4/6/2017) for asthma fu. I have discussed the diagnosis with the patient and the intended plan as seen in the above orders. The patient has received an after-visit summary and questions were answered concerning future plans. I have discussed medication side effects and warnings with the patient as well.     Patient discussed with Dr Melissa Osborn MD  Family Medicine Resident (PGY-3)  3/31/2017

## 2017-04-04 ENCOUNTER — OFFICE VISIT (OUTPATIENT)
Dept: SURGERY | Age: 57
End: 2017-04-04

## 2017-04-04 VITALS
DIASTOLIC BLOOD PRESSURE: 78 MMHG | OXYGEN SATURATION: 96 % | HEART RATE: 69 BPM | HEIGHT: 65 IN | SYSTOLIC BLOOD PRESSURE: 147 MMHG | WEIGHT: 183.5 LBS | RESPIRATION RATE: 20 BRPM | BODY MASS INDEX: 30.57 KG/M2 | TEMPERATURE: 98.2 F

## 2017-04-04 DIAGNOSIS — K81.1 CHRONIC CHOLECYSTITIS: Primary | ICD-10-CM

## 2017-04-04 DIAGNOSIS — Z09 POSTOPERATIVE EXAMINATION: ICD-10-CM

## 2017-04-04 NOTE — PROGRESS NOTES
1. Have you been to the ER, urgent care clinic since your last visit? Hospitalized since your last visit? no      2. Have you seen or consulted any other health care providers outside of the [address] [place] [address] since your last visit? Include any pap smears or colon screening. no    Patient states she vomits after eating certain foods about 3 times a week. Mariaelena Lemons  Body composition    female  64 y.o. Vitals:    04/04/17 1526   BP: 147/78   Pulse: 69   Resp: 20   Temp: 98.2 °F (36.8 °C)   TempSrc: Oral   SpO2: 96%   Weight: 183 lb 8 oz (83.2 kg)   Height: 5' 5\" (1.651 m)     Body mass index is 30.54 kg/(m^2).

## 2017-04-04 NOTE — PROGRESS NOTES
Subjective:      Ida Melara is a 64 y.o. female presents for postop care 69 days following lap GBP. She has been having episodes of RUQ pain associated with nausea. She has also had two severe asthma attacks recently and has been placed on steroids. She thinks the steroids are making her nausea better. She is aating a regular diet without difficulty. Bowel movements are constipated. The patient is voiding without difficulty. She is down 40 pounds    Objective:     Visit Vitals    /78 (BP 1 Location: Left arm, BP Patient Position: Sitting)    Pulse 69    Temp 98.2 °F (36.8 °C) (Oral)    Resp 20    Ht 5' 5\" (1.651 m)    Wt 183 lb 8 oz (83.2 kg)    LMP 1985    SpO2 96%    BMI 30.54 kg/m2       General:  alert, cooperative, no distress, appears stated age   Lungs CTA   Heart  RRR   Abdomen: soft, bowel sounds active, tender in RUQ   Incision:   healing well, no drainage, no erythema, no hernia, no seroma, no swelling, no dehiscence, incision well approximated     HIDA -  Significantly delayed gallbladder emptying     Assessment:     Biliary dyskenisia    Plan:       Plan/Recommendations/Medical Decision Makin. I recommend laparoscopic cholecystectomy with possible cholangiogram  Treatment alternatives were discussed. 2. Discussed aspects of surgical intervention, methods, risks (including by not limited to infection, bleeding, retained gallstones in the abdominal cavity and/or the main bile ducts, and injury to adjacent structures including the biliary system, common bile duct, and intestines.)  The patient understands the risks, any and all questions were answered to the patient's satisfaction. 3. Patient does wish to proceed with surgery.

## 2017-04-21 ENCOUNTER — HOSPITAL ENCOUNTER (OUTPATIENT)
Dept: PREADMISSION TESTING | Age: 57
Discharge: HOME | End: 2017-04-21
Payer: COMMERCIAL

## 2017-04-21 ENCOUNTER — HOSPITAL ENCOUNTER (OUTPATIENT)
Dept: GENERAL RADIOLOGY | Age: 57
Discharge: HOME | End: 2017-04-21
Attending: SURGERY
Payer: COMMERCIAL

## 2017-04-21 ENCOUNTER — TELEPHONE (OUTPATIENT)
Dept: CARDIOLOGY CLINIC | Age: 57
End: 2017-04-21

## 2017-04-21 VITALS
RESPIRATION RATE: 20 BRPM | SYSTOLIC BLOOD PRESSURE: 133 MMHG | BODY MASS INDEX: 29.82 KG/M2 | HEIGHT: 65 IN | OXYGEN SATURATION: 97 % | HEART RATE: 67 BPM | WEIGHT: 179 LBS | DIASTOLIC BLOOD PRESSURE: 79 MMHG | TEMPERATURE: 98.1 F

## 2017-04-21 LAB
ALBUMIN SERPL BCP-MCNC: 3.6 G/DL (ref 3.5–5)
ALBUMIN/GLOB SERPL: 1.2 {RATIO} (ref 1.1–2.2)
ALP SERPL-CCNC: 88 U/L (ref 45–117)
ALT SERPL-CCNC: 18 U/L (ref 12–78)
ANION GAP BLD CALC-SCNC: 10 MMOL/L (ref 5–15)
AST SERPL W P-5'-P-CCNC: 17 U/L (ref 15–37)
ATRIAL RATE: 64 BPM
BASOPHILS # BLD AUTO: 0.1 K/UL (ref 0–0.1)
BASOPHILS # BLD: 1 % (ref 0–1)
BILIRUB SERPL-MCNC: 0.5 MG/DL (ref 0.2–1)
BUN SERPL-MCNC: 7 MG/DL (ref 6–20)
BUN/CREAT SERPL: 12 (ref 12–20)
CALCIUM SERPL-MCNC: 9.3 MG/DL (ref 8.5–10.1)
CALCULATED P AXIS, ECG09: 12 DEGREES
CALCULATED R AXIS, ECG10: 3 DEGREES
CALCULATED T AXIS, ECG11: 19 DEGREES
CHLORIDE SERPL-SCNC: 106 MMOL/L (ref 97–108)
CO2 SERPL-SCNC: 28 MMOL/L (ref 21–32)
CREAT SERPL-MCNC: 0.58 MG/DL (ref 0.55–1.02)
DIAGNOSIS, 93000: NORMAL
EOSINOPHIL # BLD: 0.7 K/UL (ref 0–0.4)
EOSINOPHIL NFR BLD: 8 % (ref 0–7)
ERYTHROCYTE [DISTWIDTH] IN BLOOD BY AUTOMATED COUNT: 14.2 % (ref 11.5–14.5)
GLOBULIN SER CALC-MCNC: 3 G/DL (ref 2–4)
GLUCOSE SERPL-MCNC: 81 MG/DL (ref 65–100)
HCT VFR BLD AUTO: 41.4 % (ref 35–47)
HGB BLD-MCNC: 13.5 G/DL (ref 11.5–16)
LYMPHOCYTES # BLD AUTO: 27 % (ref 12–49)
LYMPHOCYTES # BLD: 2.4 K/UL (ref 0.8–3.5)
MCH RBC QN AUTO: 29.3 PG (ref 26–34)
MCHC RBC AUTO-ENTMCNC: 32.6 G/DL (ref 30–36.5)
MCV RBC AUTO: 90 FL (ref 80–99)
MONOCYTES # BLD: 0.6 K/UL (ref 0–1)
MONOCYTES NFR BLD AUTO: 7 % (ref 5–13)
NEUTS SEG # BLD: 5.1 K/UL (ref 1.8–8)
NEUTS SEG NFR BLD AUTO: 57 % (ref 32–75)
P-R INTERVAL, ECG05: 134 MS
PLATELET # BLD AUTO: 305 K/UL (ref 150–400)
POTASSIUM SERPL-SCNC: 4 MMOL/L (ref 3.5–5.1)
PROT SERPL-MCNC: 6.6 G/DL (ref 6.4–8.2)
Q-T INTERVAL, ECG07: 414 MS
QRS DURATION, ECG06: 68 MS
QTC CALCULATION (BEZET), ECG08: 427 MS
RBC # BLD AUTO: 4.6 M/UL (ref 3.8–5.2)
SODIUM SERPL-SCNC: 144 MMOL/L (ref 136–145)
VENTRICULAR RATE, ECG03: 64 BPM
WBC # BLD AUTO: 8.8 K/UL (ref 3.6–11)

## 2017-04-21 PROCEDURE — 71020 XR CHEST PA LAT: CPT

## 2017-04-21 PROCEDURE — 80053 COMPREHEN METABOLIC PANEL: CPT | Performed by: SURGERY

## 2017-04-21 PROCEDURE — 93005 ELECTROCARDIOGRAM TRACING: CPT

## 2017-04-21 PROCEDURE — 85025 COMPLETE CBC W/AUTO DIFF WBC: CPT | Performed by: SURGERY

## 2017-04-21 PROCEDURE — 36415 COLL VENOUS BLD VENIPUNCTURE: CPT | Performed by: SURGERY

## 2017-04-21 RX ORDER — TIZANIDINE HYDROCHLORIDE 4 MG/1
4 CAPSULE, GELATIN COATED ORAL
COMMUNITY
End: 2017-05-11 | Stop reason: SDUPTHER

## 2017-04-21 NOTE — PERIOP NOTES
[place] [facility]  PREOPERATIVE INSTRUCTIONS    Surgery Date:   4/28/17  Surgery arrival time given by surgeon: NO   If no,[facility] [address] staff will call you between 4 PM- 8 PM the day before surgery with your arrival time. If your surgery is on a Monday, we will call you the preceding Friday. Please call [phone] after 8 PM if you did not receive your arrival time. 1. Please report at the designated time to the [address] [facility]. Bring your insurance card, photo identification, and any copayment ( if applicable). 2. You must have a responsible adult to drive you home. You need to have a responsible adult to stay with you the first 24 hours after surgery if you are going home the same day of your surgery and you should not drive a car for 24 hours following your surgery. 3. Nothing to eat or drink after midnight the night before surgery. This includes no water, gum, mints, coffee, juice, etc.  Please note special instructions, if applicable, below for medications. 4. MEDICATIONS TO TAKE THE MORNING OF SURGERY WITH A SIP OF WATER: Use nebulizer or Proair if needed,Xanax,Gabapentin,Pulmicort,Metoprolol,Omeprazole,  5. No alcoholic beverages 24 hours before or after your surgery. 6. If you are being admitted to the hospital,please leave personal belongings/luggage in your car until you have an assigned hospital room number. 7. Stop Aspirin and/or any non-steroidal anti-inflammatory drugs (i.e. Ibuprofen, Naproxen, Advil, Aleve) as directed by your surgeon. You may take Tylenol. Stop herbal supplements 1 week prior to  surgery. 8. If you are currently taking Plavix, Coumadin,or any other blood-thinning/anticoagulant medication contact your surgeon for instructions. 9. Please wear comfortable clothes. Wear your glasses instead of contacts. We ask that all money, jewelry and valuables be left at home. Wear no make up, particularly mascara, the day of surgery.    10.  All body piercings, rings,and jewelry need to be removed and left at home. Please wear your hair loose or down. Please no pony-tails, buns, or any metal hair accessories. If you shower the morning of surgery, please do not apply any lotions, powders, or deodorants afterwards. Do not shave any body area within 24 hours of your surgery. 11. Please follow all instructions to avoid any potential surgical cancellation. 12.  Should your physical condition change, (i.e. fever, cold, flu, etc.) please notify your surgeon as soon as possible. 13. It is important to be on time. If a situation occurs where you may be delayed, please call: / [phone] on the day of surgery. 14. The [facility] staff can be reached at 21 [phone]. .  15. Special instructions: free  parking,ask Tennille Melissa today about the buffered aspirin and let him know your surgery date 4/28/17    The patient was contacted  in person. She  verbalize  understanding of all instructions does not  need reinforcement.

## 2017-04-21 NOTE — TELEPHONE ENCOUNTER
Return call placed to pt (IDx2). Informed to stop taking aspirin 5-7 days prior to surgery per VO Dr. Kwabena Acevedo.

## 2017-04-28 ENCOUNTER — HOSPITAL ENCOUNTER (OUTPATIENT)
Age: 57
Setting detail: OUTPATIENT SURGERY
Discharge: HOME | End: 2017-04-28
Attending: SURGERY | Admitting: SURGERY
Payer: COMMERCIAL

## 2017-04-28 ENCOUNTER — ANESTHESIA EVENT (OUTPATIENT)
Dept: SURGERY | Age: 57
End: 2017-04-28
Payer: COMMERCIAL

## 2017-04-28 ENCOUNTER — ANESTHESIA (OUTPATIENT)
Dept: SURGERY | Age: 57
End: 2017-04-28
Payer: COMMERCIAL

## 2017-04-28 ENCOUNTER — APPOINTMENT (OUTPATIENT)
Dept: GENERAL RADIOLOGY | Age: 57
End: 2017-04-28
Attending: SURGERY
Payer: COMMERCIAL

## 2017-04-28 VITALS
DIASTOLIC BLOOD PRESSURE: 75 MMHG | SYSTOLIC BLOOD PRESSURE: 161 MMHG | HEIGHT: 65 IN | TEMPERATURE: 97.8 F | RESPIRATION RATE: 18 BRPM | BODY MASS INDEX: 29.79 KG/M2 | HEART RATE: 91 BPM | OXYGEN SATURATION: 94 % | WEIGHT: 178.79 LBS

## 2017-04-28 LAB
GLUCOSE BLD STRIP.AUTO-MCNC: 101 MG/DL (ref 65–100)
GLUCOSE BLD STRIP.AUTO-MCNC: 109 MG/DL (ref 65–100)
SERVICE CMNT-IMP: ABNORMAL
SERVICE CMNT-IMP: ABNORMAL

## 2017-04-28 PROCEDURE — 77030002895 HC DEV VASC CLOSR COVD -B: Performed by: SURGERY

## 2017-04-28 PROCEDURE — 77030035048 HC TRCR ENDOSC OPTCL COVD -B: Performed by: SURGERY

## 2017-04-28 PROCEDURE — 74300 X-RAY BILE DUCTS/PANCREAS: CPT

## 2017-04-28 PROCEDURE — 77030035051: Performed by: SURGERY

## 2017-04-28 PROCEDURE — 77030031139 HC SUT VCRL2 J&J -A: Performed by: SURGERY

## 2017-04-28 PROCEDURE — 77030035045 HC TRCR ENDOSC VRSPRT BLDLSS COVD -B: Performed by: SURGERY

## 2017-04-28 PROCEDURE — 77030019908 HC STETH ESOPH SIMS -A: Performed by: NURSE ANESTHETIST, CERTIFIED REGISTERED

## 2017-04-28 PROCEDURE — 77030002933 HC SUT MCRYL J&J -A: Performed by: SURGERY

## 2017-04-28 PROCEDURE — 76030000001 HC AMB SURG OR TIME 1 TO 1.5: Performed by: SURGERY

## 2017-04-28 PROCEDURE — 77030008684 HC TU ET CUF COVD -B: Performed by: NURSE ANESTHETIST, CERTIFIED REGISTERED

## 2017-04-28 PROCEDURE — 82962 GLUCOSE BLOOD TEST: CPT

## 2017-04-28 PROCEDURE — 76060000062 HC AMB SURG ANES 1 TO 1.5 HR: Performed by: SURGERY

## 2017-04-28 PROCEDURE — 77030013079 HC BLNKT BAIR HGGR 3M -A: Performed by: NURSE ANESTHETIST, CERTIFIED REGISTERED

## 2017-04-28 PROCEDURE — 74011000250 HC RX REV CODE- 250

## 2017-04-28 PROCEDURE — 76210000035 HC AMBSU PH I REC 1 TO 1.5 HR: Performed by: SURGERY

## 2017-04-28 PROCEDURE — 77030037032 HC INSRT SCIS CLICKLLINE DISP STOR -B: Performed by: SURGERY

## 2017-04-28 PROCEDURE — 74011250636 HC RX REV CODE- 250/636: Performed by: ANESTHESIOLOGY

## 2017-04-28 PROCEDURE — 77030020263 HC SOL INJ SOD CL0.9% LFCR 1000ML: Performed by: SURGERY

## 2017-04-28 PROCEDURE — 74011636320 HC RX REV CODE- 636/320: Performed by: SURGERY

## 2017-04-28 PROCEDURE — 74011250636 HC RX REV CODE- 250/636: Performed by: SURGERY

## 2017-04-28 PROCEDURE — 77030009852 HC PCH RTVR ENDOSC COVD -B: Performed by: SURGERY

## 2017-04-28 PROCEDURE — 77030010507 HC ADH SKN DERMBND J&J -B: Performed by: SURGERY

## 2017-04-28 PROCEDURE — 76210000046 HC AMBSU PH II REC FIRST 0.5 HR: Performed by: SURGERY

## 2017-04-28 PROCEDURE — 77030020782 HC GWN BAIR PAWS FLX 3M -B

## 2017-04-28 PROCEDURE — 77030009403 HC ELECTRD ENDO MEGA -B: Performed by: SURGERY

## 2017-04-28 PROCEDURE — 74011250636 HC RX REV CODE- 250/636

## 2017-04-28 PROCEDURE — 77030032490 HC SLV COMPR SCD KNE COVD -B: Performed by: SURGERY

## 2017-04-28 PROCEDURE — 77030020747 HC TU INSUF ENDOSC TELE -A: Performed by: SURGERY

## 2017-04-28 PROCEDURE — 77030026438 HC STYL ET INTUB CARD -A: Performed by: NURSE ANESTHETIST, CERTIFIED REGISTERED

## 2017-04-28 PROCEDURE — 77030018836 HC SOL IRR NACL ICUM -A: Performed by: SURGERY

## 2017-04-28 PROCEDURE — 88304 TISSUE EXAM BY PATHOLOGIST: CPT | Performed by: SURGERY

## 2017-04-28 PROCEDURE — 77030012029 HC APPL CLP LIG COVD -C: Performed by: SURGERY

## 2017-04-28 PROCEDURE — 74011000250 HC RX REV CODE- 250: Performed by: ANESTHESIOLOGY

## 2017-04-28 PROCEDURE — 77030013140 HC MSK NEB VYRM -A

## 2017-04-28 PROCEDURE — 77030011640 HC PAD GRND REM COVD -A: Performed by: SURGERY

## 2017-04-28 PROCEDURE — 74011000250 HC RX REV CODE- 250: Performed by: SURGERY

## 2017-04-28 PROCEDURE — 77030008756 HC TU IRR SUC STRY -B: Performed by: SURGERY

## 2017-04-28 RX ORDER — SODIUM CHLORIDE, SODIUM LACTATE, POTASSIUM CHLORIDE, CALCIUM CHLORIDE 600; 310; 30; 20 MG/100ML; MG/100ML; MG/100ML; MG/100ML
100 INJECTION, SOLUTION INTRAVENOUS CONTINUOUS
Status: DISCONTINUED | OUTPATIENT
Start: 2017-04-28 | End: 2017-04-28 | Stop reason: HOSPADM

## 2017-04-28 RX ORDER — GLYCOPYRROLATE 0.2 MG/ML
INJECTION INTRAMUSCULAR; INTRAVENOUS AS NEEDED
Status: DISCONTINUED | OUTPATIENT
Start: 2017-04-28 | End: 2017-04-28 | Stop reason: HOSPADM

## 2017-04-28 RX ORDER — MIDAZOLAM HYDROCHLORIDE 1 MG/ML
INJECTION, SOLUTION INTRAMUSCULAR; INTRAVENOUS AS NEEDED
Status: DISCONTINUED | OUTPATIENT
Start: 2017-04-28 | End: 2017-04-28 | Stop reason: HOSPADM

## 2017-04-28 RX ORDER — CEFAZOLIN SODIUM IN 0.9 % NACL 2 G/50 ML
2 INTRAVENOUS SOLUTION, PIGGYBACK (ML) INTRAVENOUS
Status: COMPLETED | OUTPATIENT
Start: 2017-04-28 | End: 2017-04-28

## 2017-04-28 RX ORDER — PROPOFOL 10 MG/ML
INJECTION, EMULSION INTRAVENOUS AS NEEDED
Status: DISCONTINUED | OUTPATIENT
Start: 2017-04-28 | End: 2017-04-28 | Stop reason: HOSPADM

## 2017-04-28 RX ORDER — ALBUTEROL SULFATE 0.83 MG/ML
2.5 SOLUTION RESPIRATORY (INHALATION) AS NEEDED
Status: DISCONTINUED | OUTPATIENT
Start: 2017-04-28 | End: 2017-04-28 | Stop reason: HOSPADM

## 2017-04-28 RX ORDER — HYDROMORPHONE HYDROCHLORIDE 1 MG/ML
1 INJECTION, SOLUTION INTRAMUSCULAR; INTRAVENOUS; SUBCUTANEOUS
Status: DISCONTINUED | OUTPATIENT
Start: 2017-04-28 | End: 2017-04-28 | Stop reason: HOSPADM

## 2017-04-28 RX ORDER — ONDANSETRON 2 MG/ML
INJECTION INTRAMUSCULAR; INTRAVENOUS AS NEEDED
Status: DISCONTINUED | OUTPATIENT
Start: 2017-04-28 | End: 2017-04-28 | Stop reason: HOSPADM

## 2017-04-28 RX ORDER — SUCCINYLCHOLINE CHLORIDE 20 MG/ML
INJECTION INTRAMUSCULAR; INTRAVENOUS AS NEEDED
Status: DISCONTINUED | OUTPATIENT
Start: 2017-04-28 | End: 2017-04-28 | Stop reason: HOSPADM

## 2017-04-28 RX ORDER — OXYCODONE AND ACETAMINOPHEN 5; 325 MG/1; MG/1
2 TABLET ORAL
Qty: 45 TAB | Refills: 0 | Status: SHIPPED | OUTPATIENT
Start: 2017-04-28 | End: 2017-06-23 | Stop reason: ALTCHOICE

## 2017-04-28 RX ORDER — ONDANSETRON 4 MG/1
4 TABLET, ORALLY DISINTEGRATING ORAL
Qty: 30 TAB | Refills: 0 | Status: SHIPPED | OUTPATIENT
Start: 2017-04-28 | End: 2017-07-12 | Stop reason: ALTCHOICE

## 2017-04-28 RX ORDER — ROCURONIUM BROMIDE 10 MG/ML
INJECTION, SOLUTION INTRAVENOUS AS NEEDED
Status: DISCONTINUED | OUTPATIENT
Start: 2017-04-28 | End: 2017-04-28 | Stop reason: HOSPADM

## 2017-04-28 RX ORDER — DIPHENHYDRAMINE HYDROCHLORIDE 50 MG/ML
12.5 INJECTION, SOLUTION INTRAMUSCULAR; INTRAVENOUS AS NEEDED
Status: DISCONTINUED | OUTPATIENT
Start: 2017-04-28 | End: 2017-04-28 | Stop reason: HOSPADM

## 2017-04-28 RX ORDER — DEXAMETHASONE SODIUM PHOSPHATE 4 MG/ML
INJECTION, SOLUTION INTRA-ARTICULAR; INTRALESIONAL; INTRAMUSCULAR; INTRAVENOUS; SOFT TISSUE AS NEEDED
Status: DISCONTINUED | OUTPATIENT
Start: 2017-04-28 | End: 2017-04-28 | Stop reason: HOSPADM

## 2017-04-28 RX ORDER — SODIUM CHLORIDE, SODIUM LACTATE, POTASSIUM CHLORIDE, CALCIUM CHLORIDE 600; 310; 30; 20 MG/100ML; MG/100ML; MG/100ML; MG/100ML
150 INJECTION, SOLUTION INTRAVENOUS CONTINUOUS
Status: DISCONTINUED | OUTPATIENT
Start: 2017-04-28 | End: 2017-04-28 | Stop reason: HOSPADM

## 2017-04-28 RX ORDER — MIDAZOLAM HYDROCHLORIDE 1 MG/ML
1 INJECTION, SOLUTION INTRAMUSCULAR; INTRAVENOUS AS NEEDED
Status: DISCONTINUED | OUTPATIENT
Start: 2017-04-28 | End: 2017-04-28 | Stop reason: HOSPADM

## 2017-04-28 RX ORDER — FENTANYL CITRATE 50 UG/ML
INJECTION, SOLUTION INTRAMUSCULAR; INTRAVENOUS AS NEEDED
Status: DISCONTINUED | OUTPATIENT
Start: 2017-04-28 | End: 2017-04-28 | Stop reason: HOSPADM

## 2017-04-28 RX ORDER — LIDOCAINE HYDROCHLORIDE 20 MG/ML
INJECTION, SOLUTION EPIDURAL; INFILTRATION; INTRACAUDAL; PERINEURAL AS NEEDED
Status: DISCONTINUED | OUTPATIENT
Start: 2017-04-28 | End: 2017-04-28 | Stop reason: HOSPADM

## 2017-04-28 RX ORDER — ALBUTEROL SULFATE 0.83 MG/ML
2.5 SOLUTION RESPIRATORY (INHALATION) ONCE
Status: COMPLETED | OUTPATIENT
Start: 2017-04-28 | End: 2017-04-28

## 2017-04-28 RX ORDER — LIDOCAINE HYDROCHLORIDE 10 MG/ML
0.1 INJECTION, SOLUTION EPIDURAL; INFILTRATION; INTRACAUDAL; PERINEURAL AS NEEDED
Status: DISCONTINUED | OUTPATIENT
Start: 2017-04-28 | End: 2017-04-28 | Stop reason: HOSPADM

## 2017-04-28 RX ORDER — ONDANSETRON 2 MG/ML
4 INJECTION INTRAMUSCULAR; INTRAVENOUS AS NEEDED
Status: DISCONTINUED | OUTPATIENT
Start: 2017-04-28 | End: 2017-04-28 | Stop reason: HOSPADM

## 2017-04-28 RX ORDER — NEOSTIGMINE METHYLSULFATE 1 MG/ML
INJECTION INTRAVENOUS AS NEEDED
Status: DISCONTINUED | OUTPATIENT
Start: 2017-04-28 | End: 2017-04-28 | Stop reason: HOSPADM

## 2017-04-28 RX ORDER — BUPIVACAINE HYDROCHLORIDE AND EPINEPHRINE 5; 5 MG/ML; UG/ML
INJECTION, SOLUTION EPIDURAL; INTRACAUDAL; PERINEURAL AS NEEDED
Status: DISCONTINUED | OUTPATIENT
Start: 2017-04-28 | End: 2017-04-28 | Stop reason: HOSPADM

## 2017-04-28 RX ORDER — FENTANYL CITRATE 50 UG/ML
50 INJECTION, SOLUTION INTRAMUSCULAR; INTRAVENOUS AS NEEDED
Status: DISCONTINUED | OUTPATIENT
Start: 2017-04-28 | End: 2017-04-28 | Stop reason: HOSPADM

## 2017-04-28 RX ADMIN — PROPOFOL 180 MG: 10 INJECTION, EMULSION INTRAVENOUS at 12:06

## 2017-04-28 RX ADMIN — HYDROMORPHONE HYDROCHLORIDE 0.5 MG: 1 INJECTION, SOLUTION INTRAMUSCULAR; INTRAVENOUS; SUBCUTANEOUS at 13:44

## 2017-04-28 RX ADMIN — FENTANYL CITRATE 50 MCG: 50 INJECTION, SOLUTION INTRAMUSCULAR; INTRAVENOUS at 12:32

## 2017-04-28 RX ADMIN — LIDOCAINE HYDROCHLORIDE 80 MG: 20 INJECTION, SOLUTION EPIDURAL; INFILTRATION; INTRACAUDAL; PERINEURAL at 12:06

## 2017-04-28 RX ADMIN — SUCCINYLCHOLINE CHLORIDE 100 MG: 20 INJECTION INTRAMUSCULAR; INTRAVENOUS at 12:06

## 2017-04-28 RX ADMIN — FENTANYL CITRATE 50 MCG: 50 INJECTION, SOLUTION INTRAMUSCULAR; INTRAVENOUS at 12:44

## 2017-04-28 RX ADMIN — SODIUM CHLORIDE, SODIUM LACTATE, POTASSIUM CHLORIDE, AND CALCIUM CHLORIDE: 600; 310; 30; 20 INJECTION, SOLUTION INTRAVENOUS at 13:11

## 2017-04-28 RX ADMIN — MIDAZOLAM HYDROCHLORIDE 2 MG: 1 INJECTION, SOLUTION INTRAMUSCULAR; INTRAVENOUS at 11:58

## 2017-04-28 RX ADMIN — NEOSTIGMINE METHYLSULFATE 2 MG: 1 INJECTION INTRAVENOUS at 12:59

## 2017-04-28 RX ADMIN — HYDROMORPHONE HYDROCHLORIDE 1 MG: 1 INJECTION, SOLUTION INTRAMUSCULAR; INTRAVENOUS; SUBCUTANEOUS at 14:51

## 2017-04-28 RX ADMIN — FENTANYL CITRATE 50 MCG: 50 INJECTION, SOLUTION INTRAMUSCULAR; INTRAVENOUS at 12:53

## 2017-04-28 RX ADMIN — FENTANYL CITRATE 50 MCG: 50 INJECTION, SOLUTION INTRAMUSCULAR; INTRAVENOUS at 12:24

## 2017-04-28 RX ADMIN — ONDANSETRON 4 MG: 2 INJECTION INTRAMUSCULAR; INTRAVENOUS at 12:12

## 2017-04-28 RX ADMIN — SODIUM CHLORIDE, SODIUM LACTATE, POTASSIUM CHLORIDE, AND CALCIUM CHLORIDE 150 ML/HR: 600; 310; 30; 20 INJECTION, SOLUTION INTRAVENOUS at 10:48

## 2017-04-28 RX ADMIN — FENTANYL CITRATE 25 MCG: 50 INJECTION, SOLUTION INTRAMUSCULAR; INTRAVENOUS at 12:15

## 2017-04-28 RX ADMIN — ALBUTEROL SULFATE 2.5 MG: 2.5 SOLUTION RESPIRATORY (INHALATION) at 11:26

## 2017-04-28 RX ADMIN — CEFAZOLIN 2 G: 1 INJECTION, POWDER, FOR SOLUTION INTRAMUSCULAR; INTRAVENOUS; PARENTERAL at 12:13

## 2017-04-28 RX ADMIN — DEXAMETHASONE SODIUM PHOSPHATE 4 MG: 4 INJECTION, SOLUTION INTRA-ARTICULAR; INTRALESIONAL; INTRAMUSCULAR; INTRAVENOUS; SOFT TISSUE at 12:12

## 2017-04-28 RX ADMIN — HYDROMORPHONE HYDROCHLORIDE 0.5 MG: 1 INJECTION, SOLUTION INTRAMUSCULAR; INTRAVENOUS; SUBCUTANEOUS at 13:53

## 2017-04-28 RX ADMIN — ROCURONIUM BROMIDE 5 MG: 10 INJECTION, SOLUTION INTRAVENOUS at 12:41

## 2017-04-28 RX ADMIN — FENTANYL CITRATE 100 MCG: 50 INJECTION, SOLUTION INTRAMUSCULAR; INTRAVENOUS at 12:06

## 2017-04-28 RX ADMIN — FENTANYL CITRATE 25 MCG: 50 INJECTION, SOLUTION INTRAMUSCULAR; INTRAVENOUS at 11:58

## 2017-04-28 RX ADMIN — FENTANYL CITRATE 50 MCG: 50 INJECTION, SOLUTION INTRAMUSCULAR; INTRAVENOUS at 12:26

## 2017-04-28 RX ADMIN — MEPERIDINE HYDROCHLORIDE 12.5 MG: 25 INJECTION, SOLUTION INTRAMUSCULAR; INTRAVENOUS; SUBCUTANEOUS at 14:15

## 2017-04-28 RX ADMIN — ROCURONIUM BROMIDE 25 MG: 10 INJECTION, SOLUTION INTRAVENOUS at 12:15

## 2017-04-28 RX ADMIN — GLYCOPYRROLATE 0.4 MG: 0.2 INJECTION INTRAMUSCULAR; INTRAVENOUS at 12:59

## 2017-04-28 NOTE — INTERVAL H&P NOTE
H&P Update:  Antonia Lee was seen and examined. History and physical has been reviewed. The patient has been examined.  There have been no significant clinical changes since the completion of the originally dated History and Physical.    Signed By: Zahida Arguello MD     April 28, 2017 11:50 AM

## 2017-04-28 NOTE — ANESTHESIA POSTPROCEDURE EVALUATION
Post-Anesthesia Evaluation and Assessment    Patient: Oralia Flores MRN: [id number]  SSN: [id number]    YOB: 1960  Age: 64 y.o. Sex: female       Cardiovascular Function/Vital Signs  Visit Vitals    /77    Pulse 82    Temp 36.7 °C (98 °F)    Resp 22    Ht 5' 5\" (1.651 m)    Wt 81.1 kg (178 lb 12.7 oz)    SpO2 99%    BMI 29.75 kg/m2       Patient is status post general anesthesia for Procedure(s):  LAPAROSCOPIC CHOLECYSTECTOMY WITH GRAMS AND LYSIS OF ADHESIONS. Nausea/Vomiting: None    Postoperative hydration reviewed and adequate. Pain:  Pain Scale 1: Numeric (0 - 10) (04/28/17 1451)  Pain Intensity 1: 5 (04/28/17 1451)   Managed    Neurological Status:   Neuro (WDL): Within Defined Limits (04/28/17 1348)  Neuro  LUE Motor Response: Purposeful (04/28/17 1348)  LLE Motor Response: Purposeful (04/28/17 1348)  RUE Motor Response: Purposeful (04/28/17 1348)  RLE Motor Response: Purposeful (04/28/17 1348)   At baseline    Mental Status and Level of Consciousness: Arousable    Pulmonary Status:   O2 Device: Nasal cannula (3 LPM) (04/28/17 1348)   Adequate oxygenation and airway patent    Complications related to anesthesia: None    Post-anesthesia assessment completed.  No concerns    Signed By: Kimberley De Jesus MD     April 28, 2017

## 2017-04-28 NOTE — H&P (VIEW-ONLY)
Subjective:      Kerry López is a 64 y.o. female presents for postop care 69 days following lap GBP. She has been having episodes of RUQ pain associated with nausea. She has also had two severe asthma attacks recently and has been placed on steroids. She thinks the steroids are making her nausea better. She is aating a regular diet without difficulty. Bowel movements are constipated. The patient is voiding without difficulty. She is down 40 pounds    Objective:     Visit Vitals    /78 (BP 1 Location: Left arm, BP Patient Position: Sitting)    Pulse 69    Temp 98.2 °F (36.8 °C) (Oral)    Resp 20    Ht 5' 5\" (1.651 m)    Wt 183 lb 8 oz (83.2 kg)    LMP 1985    SpO2 96%    BMI 30.54 kg/m2       General:  alert, cooperative, no distress, appears stated age   Lungs CTA   Heart  RRR   Abdomen: soft, bowel sounds active, tender in RUQ   Incision:   healing well, no drainage, no erythema, no hernia, no seroma, no swelling, no dehiscence, incision well approximated     HIDA -  Significantly delayed gallbladder emptying     Assessment:     Biliary dyskenisia    Plan:       Plan/Recommendations/Medical Decision Makin. I recommend laparoscopic cholecystectomy with possible cholangiogram  Treatment alternatives were discussed. 2. Discussed aspects of surgical intervention, methods, risks (including by not limited to infection, bleeding, retained gallstones in the abdominal cavity and/or the main bile ducts, and injury to adjacent structures including the biliary system, common bile duct, and intestines.)  The patient understands the risks, any and all questions were answered to the patient's satisfaction. 3. Patient does wish to proceed with surgery.

## 2017-04-28 NOTE — IP AVS SNAPSHOT
Current Discharge Medication List  
  
START taking these medications Dose & Instructions Dispensing Information Comments Morning Noon Evening Bedtime  
 oxyCODONE-acetaminophen 5-325 mg per tablet Commonly known as:  PERCOCET Your last dose was: Your next dose is:    
   
   
 Dose:  2 Tab Take 2 Tabs by mouth every four (4) hours as needed for Pain. Max Daily Amount: 12 Tabs. Quantity:  45 Tab Refills:  0 CONTINUE these medications which have CHANGED Dose & Instructions Dispensing Information Comments Morning Noon Evening Bedtime * ondansetron 4 mg disintegrating tablet Commonly known as:  ZOFRAN ODT What changed:  Another medication with the same name was added. Make sure you understand how and when to take each. Your last dose was: Your next dose is:    
   
   
 Dose:  4 mg Take 1 Tab by mouth every six (6) hours as needed for Nausea. Quantity:  30 Tab Refills:  0  
     
   
   
   
  
 * ondansetron 4 mg disintegrating tablet Commonly known as:  ZOFRAN ODT What changed: You were already taking a medication with the same name, and this prescription was added. Make sure you understand how and when to take each. Your last dose was: Your next dose is:    
   
   
 Dose:  4 mg Take 1 Tab by mouth every eight (8) hours as needed for Nausea. Quantity:  30 Tab Refills:  0  
     
   
   
   
  
 valACYclovir 500 mg tablet Commonly known as:  VALTREX What changed:   
- when to take this 
- reasons to take this Your last dose was: Your next dose is:    
   
   
 Dose:  500 mg Take 1 Tab by mouth two (2) times a day. Quantity:  60 Tab Refills:  3  
     
   
   
   
  
 * Notice: This list has 2 medication(s) that are the same as other medications prescribed for you.  Read the directions carefully, and ask your doctor or other care provider to review them with you. CONTINUE these medications which have NOT CHANGED Dose & Instructions Dispensing Information Comments Morning Noon Evening Bedtime * albuterol 0.63 mg/3 mL nebulizer solution Commonly known as:  Inna Bhat Your last dose was: Your next dose is:    
   
   
 Dose:  0.63 mg  
3 mL by Nebulization route every four (4) hours as needed for Wheezing or Shortness of Breath. Quantity:  30 Vial  
Refills:  5  
     
   
   
   
  
 * albuterol 90 mcg/actuation inhaler Commonly known as:  PROAIR HFA Your last dose was: Your next dose is:    
   
   
 Dose:  1 Puff Take 1 Puff by inhalation every four (4) hours as needed for Wheezing or Shortness of Breath. Quantity:  1 Inhaler Refills:  5  
     
   
   
   
  
 alcohol swabs Padm Commonly known as:  ALCOHOL PADS Your last dose was: Your next dose is:    
   
   
 Use when checking blood glucose Quantity:  100 Pad Refills:  5 ALPRAZolam 0.5 mg tablet Commonly known as:  Pedrito Freedman Your last dose was: Your next dose is: TAKE 1 TABLET BY MOUTH 3 TIMES A DAY AS NEEDED FOR ANXIETY. MAX DAILY AMT 1.5MG Quantity:  180 Tab Refills:  0 Not to exceed 5 additional fills before 02/13/2017  
    
   
   
   
  
 aspirin, buffered 81 mg Tab Your last dose was: Your next dose is: Take  by mouth daily. Refills:  0 Blood-Glucose Meter monitoring kit Your last dose was: Your next dose is:    
   
   
 Use to check glucose once a day Quantity:  1 Kit Refills:  0  
     
   
   
   
  
 budesonide 180 mcg/actuation Aepb inhaler Commonly known as:  PULMICORT Your last dose was: Your next dose is:    
   
   
 Dose:  1 Puff Take 1 Puff by inhalation two (2) times a day. Indications: MAINTENANCE THERAPY FOR ASTHMA Quantity:  1 Inhaler Refills:  3  
     
   
   
   
  
 cyclobenzaprine 10 mg tablet Commonly known as:  FLEXERIL Your last dose was: Your next dose is: Take  by mouth three (3) times daily as needed for Muscle Spasm(s). Refills:  0  
     
   
   
   
  
 dicyclomine 10 mg capsule Commonly known as:  BENTYL Your last dose was: Your next dose is: TAKE 1 (ONE) CAPSULE ORALLY AS NEEDED EVERY 6 HOURS Quantity:  120 capsule Refills:  0 FLUoxetine 20 mg capsule Commonly known as:  PROzac Your last dose was: Your next dose is: TAKE ONE CAPSULE BY MOUTH EVERY DAY Quantity:  90 Cap Refills:  1  
     
   
   
   
  
 gabapentin 600 mg tablet Commonly known as:  NEURONTIN Your last dose was: Your next dose is:    
   
   
 Dose:  600 mg Take 1 Tab by mouth three (3) times daily. Quantity:  270 Tab Refills:  0  
     
   
   
   
  
 glucose blood VI test strips strip Commonly known as:  blood glucose test  
   
Your last dose was: Your next dose is:    
   
   
 Use once a day Quantity:  100 Strip Refills:  5 Lancets Misc Your last dose was: Your next dose is:    
   
   
 Use once a day. Please give one compatible with patient's meter Quantity:  1 Package Refills:  5  
     
   
   
   
  
 metoprolol tartrate 25 mg tablet Commonly known as:  LOPRESSOR Your last dose was: Your next dose is:    
   
   
 Dose:  25 mg Take 1 Tab by mouth two (2) times a day. Quantity:  60 Tab Refills:  2  
     
   
   
   
  
 nitroglycerin 0.4 mg SL tablet Commonly known as:  NITROSTAT Your last dose was: Your next dose is:    
   
   
 Dose:  0.4 mg  
1 Tab by SubLINGual route every five (5) minutes as needed for Chest Pain. Quantity:  1 Bottle Refills:  4 omeprazole 20 mg capsule Commonly known as:  PRILOSEC Your last dose was: Your next dose is: TAKE ONE CAPSULE BY MOUTH EVERY DAY Quantity:  90 Cap Refills:  1  
     
   
   
   
  
 rosuvastatin 40 mg tablet Commonly known as:  CRESTOR Your last dose was: Your next dose is: TAKE 1 TABLET BY MOUTH NIGHTLY Quantity:  90 Tab Refills:  3  
     
   
   
   
  
 tiZANidine 4 mg capsule Commonly known as:  Jorgito Weller Your last dose was: Your next dose is:    
   
   
 Dose:  4 mg Take 4 mg by mouth nightly. Refills:  0 ZyrTEC 10 mg tablet Generic drug:  cetirizine Your last dose was: Your next dose is: Take  by mouth daily. Refills:  0  
     
   
   
   
  
 * Notice: This list has 2 medication(s) that are the same as other medications prescribed for you. Read the directions carefully, and ask your doctor or other care provider to review them with you. Where to Get Your Medications These medications were sent to [address], [address] [place] [address] Hours:  24-hours Phone:  [phone] ondansetron 4 mg disintegrating tablet Information on where to get these meds will be given to you by the nurse or doctor. ! Ask your nurse or doctor about these medications  
  oxyCODONE-acetaminophen 5-325 mg per tablet

## 2017-04-28 NOTE — DISCHARGE INSTRUCTIONS
A Note for the Pre-Diabetic / Diabetic Patient    · Your blood glucose after surgery was . 109 AT 1:40 PM    Surgical stress on the body can make the blood sugar run higher than usual. Also, steroid-type drugs can be given sometimes to help reduce swelling and nausea. Steroids can unfortunately raise the blood sugar for the next few days as a side-effect of steroid medicines. After surgery, monitor your blood sugar closely. Watch what you eat by closely following a diabetic diet plan. If your blood sugar remains high, notify the doctor who monitors you blood sugar control for further instructions. How to Care for A Wound With Skin Glue    Topical skin glue is a sterile, liquid skin adhesive that holds wound edges together. The film will usually last 7-10 days, then begin to loosen from your skin. The glue may go by different names, depending on the maker of the product.     The following may answer some of your questions and provide wound care instructions:    [address]  · Swelling, redness, and pain are common with all wounds - these normally go away as the wound heals    · If swelling, redness, or pain increases, or if the wound feels warm to the touch call your doctor   · Contact your doctor if the wound edges reopen or separate    REPLACE BANDAGES  · If your wound is bandaged, keep the bandage dry  · Replace the bandage daily until the glue has fallen off, or if the bandage should become wet, unless otherwise instructed by your doctor  · When changing the dressing, do not place tape directly over the glue -  removing the tape later may also remove the glue before the wound has had time to heal    AVOIDING TOPICAL MEDICATIONS  · Do not apply any liquid, ointment medication, or any other product to your wound while the glue is in place - these may loosen the glue before your wound is healed    KEEP WOUND DRY AND PROTECTED  · You may briefly wet your wound in the shower if permitted by your surgeon. · Do not soak/immerse your wound; do not swim; avoid periods of heavy perspiration until the glue has naturally fallen off   · After showering, gently blot your wound dry with a soft towel. If a protective dressing is being used, apply a fresh, dry bandage  · Apply a clean, dry bandage over the wound if necessary to protect it  · Protect your wound from injury until the skin has had sufficient time to heal  · Do not scratch, rub, scrub, or pick at the glue - these may loosen the glue before your wound is healed  · Protect the wound from prolonged exposure to sunlight or tanning lamps while the glue is in place     If you have any questions or concerns about this product, please consult your doctor. Josh Lim MEDICATION AND   SIDE EFFECT GUIDE    The [facility] Insurance MEDICATION AND SIDE EFFECT GUIDE was provided to the PATIENT AND CARE PROVIDER. Information provided includes instruction about drug purpose and common side effects for the following medications:    PERCOCET DISCHARGE SUMMARY from your Nurse      PATIENT INSTRUCTIONS    After general anesthesia or intravenous sedation, for 24 hours or while taking prescription Narcotics:  · Limit your activities  · Do not drive and operate hazardous machinery  · Do not make important personal or business decisions  · Do  not drink alcoholic beverages  · If you have not urinated within 8 hours after discharge, please contact your surgeon on call. Report the following to your surgeon:  · Excessive pain, swelling, redness or odor of or around the surgical area  · Temperature over 100.5  · Nausea and vomiting lasting longer than 4 hours or if unable to take medications  · Any signs of decreased circulation or nerve impairment to extremity: change in color, persistent  numbness, tingling, coldness or increase pain  · Any questions      COUGH AND DEEP BREATHE    Breathing deeply and coughing are very important exercises to do after surgery.   Deep breathing and coughing open the little air tubes and air sacks in your lungs. You take deep breaths every day. You may not even notice - it is just something you do when you sigh or yawn. It is a natural exercise you do to keep these air passages open. After surgery, take deep breaths and cough, on purpose. DIRECTIONS:  · Take 10 to 15 slow deep breaths every hour while awake. · Breathe in deeply, and hold it for 2 seconds. · Exhale slowly through puckered lips, like blowing up a balloon. · After every 4th or 5th deep breath, hug your pillow to your chest or belly and give a hard, deep cough. Yes, it will probably hurt. But doing this exercise is a very important part of healing after surgery. Take your pain medicine to help you do this exercise without too much pain. Coughing and deep breathing help prevent bronchitis and pneumonia after surgery. If you had chest or belly surgery, use a pillow as a \"hug kelvin\" and hold it tightly to your chest or belly when you cough. ANKLE PUMPS    Ankle pumps increase the circulation of oxygenated blood to your lower extremities and decrease your risk for circulation problems such as blood clots. They also stretch the muscles, tendons and ligaments in your foot and ankle, and prevent joint contracture in the ankle and foot, especially after surgeries on the legs. It is important to do ankle pump exercises regularly after surgery because immobility increases your risk for developing a blood clot. Your doctor may also have you take an Aspirin for the next few days as well. If your doctor did not ask you to take an Aspirin, consult with him before starting Aspirin therapy on your own. The exercise is quite simple. · Slowly point your foot forward, feeling the muscles on the top of your lower leg stretch, and hold this position for 5 seconds.                   · Next, pull your foot back toward you as far as possible, stretching the calf muscles, and hold that position for 5 seconds. · Repeat with the other foot. · Perform 10 repetitions every hour while awake for both ankles if possible (down and then up with the foot once is one repetition). You should feel gentle stretching of the muscles in your lower leg when doing this exercise. If you feel pain, or your range of motion is limited, don't push too hard. Only go the limit your joint and muscles will let you go. If you have increasing pain, progressively worsening leg warmth or swelling, STOP the exercise and call your doctor. MEDICATION AND   SIDE EFFECT GUIDE    The [facility] MEDICATION AND SIDE EFFECT GUIDE was provided to the PATIENT AND CARE PROVIDER. Information provided includes instruction about drug purpose and common side effects for the following medications:   ·         These are general instructions for a healthy lifestyle:    *   Please give a list of your current medications to your Primary Care Provider. *   Please update this list whenever your medications are discontinued, doses are changed, or new medications (including over-the-counter products) are added. *   Please carry medication information at all times in case of emergency situations. About Smoking  No smoking / No tobacco products  Avoid exposure to second hand smoke     Surgeon General's Warning:  Quitting smoking now greatly reduces serious risk to your health. Obesity, smoking, and sedentary lifestyle greatly increases your risk for illness and disease. A healthy diet, regular physical exercise & weight monitoring are important for maintaining a healthy lifestyle. Congestive Heart Failure  You may be retaining fluid if you have a history of heart failure or if you experience any of the following symptoms:  Weight gain of 3 pounds or more overnight or 5 pounds in a week, increased swelling in your hands or feet or shortness of breath while lying flat in bed.   Please call your doctor as soon as you notice any of these symptoms; do not wait until your next office visit. Recognize signs and symptoms of STROKE:  F -  Face looks uneven  A -  Arms unable to move or move evenly  S -  Speech slurred or non-existent  T -  Time-call 911 as soon as signs and symptoms begin-DO NOT go          back to bed or wait to see if you get better-TIME IS BRAIN. Warning Signs of HEART ATTACK   Call 911 if you have these symptoms:     Chest discomfort. Most heart attacks involve discomfort in the center of the chest that lasts more than a few minutes, or that goes away and comes back. It can feel like uncomfortable pressure, squeezing, fullness, or pain.  Discomfort in other areas of the upper body. Symptoms can include pain or discomfort in one or both arms, the back, neck, jaw, or stomach.  Shortness of breath with or without chest discomfort.  Other signs may include breaking out in a cold sweat, nausea, or lightheadedness. Don't wait more than five minutes to call 911 - MINUTES MATTER! Fast action can save your life. Calling 911 is almost always the fastest way to get lifesaving treatment. Emergency Medical Services staff can begin treatment when they arrive -- up to an hour sooner than if someone gets to the hospital by car. The discharge information has been reviewed with the patient and caregiver. Any questions and concerns from the patient and caregiver have been addressed. The patient and caregiver verbalized understanding. Other information in your discharge envelope:  []     PRESCRIPTIONS  []     PHYSICAL THERAPY PRESCRIPTION  []     APPOINTMENT CARDS  []     Regional Anesthesia Pamphlet for block or block with On-Q Catheter from   Anesthesia Service  []     Medical device information sheets/pamphlets from their    []     School/work excuse note. []     /parent work excuse note.         The following personal items collected during your admission are returned to you:   Dental Appliance: Dental Appliances: None  Vision: Visual Aid: None  Hearing Aid:    Jewelry: Jewelry:  (ring given to )  Clothing: Clothing: Footwear, Pants, Shirt, Undergarments  Other Valuables:    Valuables sent to safe:       Cholecystectomy: What to Expect at [address]  After your surgery, it is normal to feel weak and tired for several days after you return home. Your belly may be swollen. If you had laparoscopic surgery, you may also have pain in your shoulder for about 24 hours. You may have gas or need to burp a lot at first, and a few people get diarrhea. The diarrhea usually goes away in 2 to 4 weeks, but it may last longer. How quickly you recover depends on whether you had a laparoscopic or open surgery. · For a laparoscopic surgery, most people can go back to work or their normal routine in 1 to 2 weeks, but it may take longer, depending on the type of work you do. · For an open surgery, it will probably take 4 to 6 weeks before you get back to your normal routine. This care sheet gives you a general idea about how long it will take for you to recover. However, each person recovers at a different pace. Follow the steps below to get better as quickly as possible. How can you care for yourself at home? Activity  · Rest when you feel tired. Getting enough sleep will help you recover. · Try to walk each day. Start out by walking a little more than you did the day before. Gradually increase the amount you walk. Walking boosts blood flow and helps prevent pneumonia and constipation. · For about 2 to 4 weeks, avoid lifting anything that would make you strain. This may include a child, heavy grocery bags and milk containers, a heavy briefcase or backpack, cat litter or dog food bags, or a vacuum . · Avoid strenuous activities, such as biking, jogging, weightlifting, and aerobic exercise, until your doctor says it is okay.   · You may shower 24 to 48 hours after surgery, if your doctor okays it. Pat the cut (incision) dry. Do not take a bath for the first 2 weeks, or until your doctor tells you it is okay. · You may drive when you are no longer taking pain medicine and can quickly move your foot from the gas pedal to the brake. You must also be able to sit comfortably for a long period of time, even if you do not plan to go far. You might get caught in traffic. · For a laparoscopic surgery, most people can go back to work or their normal routine in 1 to 2 weeks, but it may take longer. For an open surgery, it will probably take 4 to 6 weeks before you get back to your normal routine. · Your doctor will tell you when you can have sex again. Diet  · Eat smaller meals more often instead of fewer larger meals. You can eat a normal diet, but avoid eating fatty foods for about 1 month. Fatty foods include hamburger, whole milk, cheese, and many snack foods. If your stomach is upset, try bland, low-fat foods like plain rice, broiled chicken, toast, and yogurt. · Drink plenty of fluids (unless your doctor tells you not to). · If you have diarrhea, try avoiding spicy foods, dairy products, fatty foods, and alcohol. You can also watch to see if specific foods cause it, and stop eating them. If the diarrhea continues for more than 2 weeks, talk to your doctor. · You may notice that your bowel movements are not regular right after your surgery. This is common. Try to avoid constipation and straining with bowel movements. You may want to take a fiber supplement every day. If you have not had a bowel movement after a couple of days, ask your doctor about taking a mild laxative. Medicines  · Your doctor will tell you if and when you can restart your medicines. He or she will also give you instructions about taking any new medicines. · If you take blood thinners, such as warfarin (Coumadin), clopidogrel (Plavix), or aspirin, be sure to talk to your doctor.  He or she will tell you if and when to start taking those medicines again. Make sure that you understand exactly what your doctor wants you to do. · Take pain medicines exactly as directed. ¨ If the doctor gave you a prescription medicine for pain, take it as prescribed. ¨ If you are not taking a prescription pain medicine, take an over-the-counter medicine such as acetaminophen (Tylenol), ibuprofen (Advil, Motrin), or naproxen (Aleve). Read and follow all instructions on the label. ¨ Do not take two or more pain medicines at the same time unless the doctor told you to. Many pain medicines contain acetaminophen, which is Tylenol. Too much Tylenol can be harmful. · If you think your pain medicine is making you sick to your stomach:  ¨ Take your medicine after meals (unless your doctor tells you not to). ¨ Ask your doctor for a different pain medicine. · If your doctor prescribed antibiotics, take them as directed. Do not stop taking them just because you feel better. You need to take the full course of antibiotics. Incision care  · If you have strips of tape on the incision, or cut, leave the tape on for a week or until it falls off. · After 24 to 48 hours, wash the area daily with warm, soapy water, and pat it dry. · You may have staples to hold the cut together. Keep them dry until your doctor takes them out. This is usually in 7 to 10 days. · Keep the area clean and dry. You may cover it with a gauze bandage if it weeps or rubs against clothing. Change the bandage every day. Ice  · To reduce swelling and pain, put ice or a cold pack on your belly for 10 to 20 minutes at a time. Do this every 1 to 2 hours. Put a thin cloth between the ice and your skin. Follow-up care is a key part of your treatment and safety. Be sure to make and go to all appointments, and call your doctor if you are having problems. It's also a good idea to know your test results and keep a list of the medicines you take.   When should you call for help? Call 911 anytime you think you may need emergency care. For example, call if:  · You passed out (lost consciousness). · You have severe trouble breathing. · You have sudden chest pain and shortness of breath, or you cough up blood. Call your doctor now or seek immediate medical care if:  · You are sick to your stomach and cannot drink fluids. · You have pain that does not get better when you take your pain medicine. · You have signs of infection, such as:  ¨ Increased pain, swelling, warmth, or redness. ¨ Red streaks leading from the incision. ¨ Pus draining from the incision. ¨ Swollen lymph nodes in your neck, armpits, or groin. ¨ A fever. · Your urine turns dark brown or your stool is light-colored or morgan-colored. · Your skin or the whites of your eyes turn yellow. · Bright red blood has soaked through a large bandage over your incision. · You have signs of a blood clot, such as:  ¨ Pain in your calf, back of knee, thigh, or groin. ¨ Redness and swelling in your leg or groin. · You have trouble passing urine or stool, especially if you have mild pain or swelling in your lower belly. Watch closely for any changes in your health, and be sure to contact your doctor if:  · You had a laparoscopic surgery and your shoulder pain lasts more than 24 hours. · You do not have a bowel movement after taking a laxative. Where can you learn more? Go to http://maliha-sanchez.info/. Enter 458 82 177 in the search box to learn more about \"Cholecystectomy: What to Expect at Home. \"  Current as of: August 9, 2016  Content Version: 11.2  © 6445-8372 [employer]. Care instructions adapted under license by [employer] (which disclaims liability or warranty for this information).  If you have questions about a medical condition or this instruction, always ask your healthcare professional. [address] any warranty or liability for your use of this information.       Gloria Garcia MD, PhD, FACS  General Surgery at [address] [place] [address], [address] [place] [address] Adina Ceballos   Les[facility] PadgettFl[facility]  [phone]  Fax [phone]

## 2017-04-28 NOTE — IP AVS SNAPSHOT
[address] 
[phone] Patient: Danica Bills MRN: [id number] ITP:[id number] You are allergic to the following Allergen Reactions Accupril (Quinapril) Cough Lipitor (Atorvastatin) Other (comments)  
 aches Norvasc (Amlodipine) Swelling On legs at 10 mg dose Recent Documentation Height Weight BMI OB Status Smoking Status 1.651 m 81.1 kg 29.75 kg/m2 Hysterectomy Never Smoker Emergency Contacts Name Discharge Info Relation Home Work Mobile Leon Crowe DISCHARGE CAREGIVER [3] Spouse [3] [phone] About your hospitalization You were admitted on:  April 28, 2017 You last received care in the:  [facility] [address] AMB SURG UNIT You were discharged on:  April 28, 2017 Unit phone number:  [phone] Why you were hospitalized Your primary diagnosis was:  Not on File Providers Seen During Your Hospitalizations Provider Role Specialty Primary office phone Candida Suazo MD Attending Provider General Surgery [phone] Your Primary Care Physician (PCP) Primary Care Physician Office Phone Office Fax [facility] [phone] Follow-up Information Follow up With Details Comments Contact Info Jerrol Lombard, MD   [address] 
[phone] Your Appointments Friday May 12, 2017  9:00 AM EDT  
POST OP with Candida Suazo MD  
[address] [facility] Surgery [address]) [address] [facility]  
[phone] Current Discharge Medication List  
  
START taking these medications Dose & Instructions Dispensing Information Comments Morning Noon Evening Bedtime  
 oxyCODONE-acetaminophen 5-325 mg per tablet Commonly known as:  PERCOCET Your last dose was: Your next dose is:    
   
   
 Dose:  2 Tab Take 2 Tabs by mouth every four (4) hours as needed for Pain. Max Daily Amount: 12 Tabs. Quantity:  45 Tab Refills:  0 CONTINUE these medications which have CHANGED Dose & Instructions Dispensing Information Comments Morning Noon Evening Bedtime * ondansetron 4 mg disintegrating tablet Commonly known as:  ZOFRAN ODT What changed:  Another medication with the same name was added. Make sure you understand how and when to take each. Your last dose was: Your next dose is:    
   
   
 Dose:  4 mg Take 1 Tab by mouth every six (6) hours as needed for Nausea. Quantity:  30 Tab Refills:  0  
     
   
   
   
  
 * ondansetron 4 mg disintegrating tablet Commonly known as:  ZOFRAN ODT What changed: You were already taking a medication with the same name, and this prescription was added. Make sure you understand how and when to take each. Your last dose was: Your next dose is:    
   
   
 Dose:  4 mg Take 1 Tab by mouth every eight (8) hours as needed for Nausea. Quantity:  30 Tab Refills:  0  
     
   
   
   
  
 valACYclovir 500 mg tablet Commonly known as:  VALTREX What changed:   
- when to take this 
- reasons to take this Your last dose was: Your next dose is:    
   
   
 Dose:  500 mg Take 1 Tab by mouth two (2) times a day. Quantity:  60 Tab Refills:  3  
     
   
   
   
  
 * Notice: This list has 2 medication(s) that are the same as other medications prescribed for you. Read the directions carefully, and ask your doctor or other care provider to review them with you. CONTINUE these medications which have NOT CHANGED Dose & Instructions Dispensing Information Comments Morning Noon Evening Bedtime * albuterol 0.63 mg/3 mL nebulizer solution Commonly known as:  Aron Sikh Your last dose was: Your next dose is: Dose:  0.63 mg  
3 mL by Nebulization route every four (4) hours as needed for Wheezing or Shortness of Breath. Quantity:  30 Vial  
Refills:  5  
     
   
   
   
  
 * albuterol 90 mcg/actuation inhaler Commonly known as:  PROAIR HFA Your last dose was: Your next dose is:    
   
   
 Dose:  1 Puff Take 1 Puff by inhalation every four (4) hours as needed for Wheezing or Shortness of Breath. Quantity:  1 Inhaler Refills:  5  
     
   
   
   
  
 alcohol swabs Padm Commonly known as:  ALCOHOL PADS Your last dose was: Your next dose is:    
   
   
 Use when checking blood glucose Quantity:  100 Pad Refills:  5 ALPRAZolam 0.5 mg tablet Commonly known as:  Viveca More Your last dose was: Your next dose is: TAKE 1 TABLET BY MOUTH 3 TIMES A DAY AS NEEDED FOR ANXIETY. MAX DAILY AMT 1.5MG Quantity:  180 Tab Refills:  0 Not to exceed 5 additional fills before 02/13/2017  
    
   
   
   
  
 aspirin, buffered 81 mg Tab Your last dose was: Your next dose is: Take  by mouth daily. Refills:  0 Blood-Glucose Meter monitoring kit Your last dose was: Your next dose is:    
   
   
 Use to check glucose once a day Quantity:  1 Kit Refills:  0  
     
   
   
   
  
 budesonide 180 mcg/actuation Aepb inhaler Commonly known as:  PULMICORT Your last dose was: Your next dose is:    
   
   
 Dose:  1 Puff Take 1 Puff by inhalation two (2) times a day. Indications: MAINTENANCE THERAPY FOR ASTHMA Quantity:  1 Inhaler Refills:  3  
     
   
   
   
  
 cyclobenzaprine 10 mg tablet Commonly known as:  FLEXERIL Your last dose was: Your next dose is: Take  by mouth three (3) times daily as needed for Muscle Spasm(s). Refills:  0  
     
   
   
   
  
 dicyclomine 10 mg capsule Commonly known as:  BENTYL Your last dose was: Your next dose is: TAKE 1 (ONE) CAPSULE ORALLY AS NEEDED EVERY 6 HOURS Quantity:  120 capsule Refills:  0 FLUoxetine 20 mg capsule Commonly known as:  PROzac Your last dose was: Your next dose is: TAKE ONE CAPSULE BY MOUTH EVERY DAY Quantity:  90 Cap Refills:  1  
     
   
   
   
  
 gabapentin 600 mg tablet Commonly known as:  NEURONTIN Your last dose was: Your next dose is:    
   
   
 Dose:  600 mg Take 1 Tab by mouth three (3) times daily. Quantity:  270 Tab Refills:  0  
     
   
   
   
  
 glucose blood VI test strips strip Commonly known as:  blood glucose test  
   
Your last dose was: Your next dose is:    
   
   
 Use once a day Quantity:  100 Strip Refills:  5 Lancets Misc Your last dose was: Your next dose is:    
   
   
 Use once a day. Please give one compatible with patient's meter Quantity:  1 Package Refills:  5  
     
   
   
   
  
 metoprolol tartrate 25 mg tablet Commonly known as:  LOPRESSOR Your last dose was: Your next dose is:    
   
   
 Dose:  25 mg Take 1 Tab by mouth two (2) times a day. Quantity:  60 Tab Refills:  2  
     
   
   
   
  
 nitroglycerin 0.4 mg SL tablet Commonly known as:  NITROSTAT Your last dose was: Your next dose is:    
   
   
 Dose:  0.4 mg  
1 Tab by SubLINGual route every five (5) minutes as needed for Chest Pain. Quantity:  1 Bottle Refills:  4  
     
   
   
   
  
 omeprazole 20 mg capsule Commonly known as:  PRILOSEC Your last dose was: Your next dose is: TAKE ONE CAPSULE BY MOUTH EVERY DAY Quantity:  90 Cap Refills:  1  
     
   
   
   
  
 rosuvastatin 40 mg tablet Commonly known as:  CRESTOR Your last dose was: Your next dose is: TAKE 1 TABLET BY MOUTH NIGHTLY Quantity:  90 Tab Refills:  3  
     
   
   
   
  
 tiZANidine 4 mg capsule Commonly known as:  Louis Kan Your last dose was: Your next dose is:    
   
   
 Dose:  4 mg Take 4 mg by mouth nightly. Refills:  0 ZyrTEC 10 mg tablet Generic drug:  cetirizine Your last dose was: Your next dose is: Take  by mouth daily. Refills:  0  
     
   
   
   
  
 * Notice: This list has 2 medication(s) that are the same as other medications prescribed for you. Read the directions carefully, and ask your doctor or other care provider to review them with you. Where to Get Your Medications These medications were sent to [address] [place], [place] [address]ly, [facility] [place] [address] Hours:  24-hours Phone:  [phone] ondansetron 4 mg disintegrating tablet Information on where to get these meds will be given to you by the nurse or doctor. ! Ask your nurse or doctor about these medications  
  oxyCODONE-acetaminophen 5-325 mg per tablet Discharge Instructions A Note for the Pre-Diabetic / Diabetic Patient · Your blood glucose after surgery was . Surgical stress on the body can make the blood sugar run higher than usual. Also, steroid-type drugs can be given sometimes to help reduce swelling and nausea. Steroids can unfortunately raise the blood sugar for the next few days as a side-effect of steroid medicines. After surgery, monitor your blood sugar closely. Watch what you eat by closely following a diabetic diet plan. If your blood sugar remains high, notify the doctor who monitors you blood sugar control for further instructions. How to Care for A Wound With Skin Glue Topical skin glue is a sterile, liquid skin adhesive that holds wound edges together. The film will usually last 7-10 days, then begin to loosen from your skin. The glue may go by different names, depending on the maker of the product. The following may answer some of your questions and provide wound care instructions: CHECK THE WOUND APPEARANCE · Swelling, redness, and pain are common with all wounds - these normally go away as the wound heals · If swelling, redness, or pain increases, or if the wound feels warm to the touch call your doctor · Contact your doctor if the wound edges reopen or separate REPLACE BANDAGES 
· If your wound is bandaged, keep the bandage dry · Replace the bandage daily until the glue has fallen off, or if the bandage should become wet, unless otherwise instructed by your doctor · When changing the dressing, do not place tape directly over the glue -  removing the tape later may also remove the glue before the wound has had time to heal 
 
AVOIDING TOPICAL MEDICATIONS · Do not apply any liquid, ointment medication, or any other product to your wound while the glue is in place - these may loosen the glue before your wound is healed KEEP WOUND DRY AND PROTECTED · You may briefly wet your wound in the shower if permitted by your surgeon. · Do not soak/immerse your wound; do not swim; avoid periods of heavy perspiration until the glue has naturally fallen off · After showering, gently blot your wound dry with a soft towel. If a protective dressing is being used, apply a fresh, dry bandage · Apply a clean, dry bandage over the wound if necessary to protect it · Protect your wound from injury until the skin has had sufficient time to heal 
· Do not scratch, rub, scrub, or pick at the glue - these may loosen the glue before your wound is healed · Protect the wound from prolonged exposure to sunlight or tanning lamps while the glue is in place If you have any questions or concerns about this product, please consult your doctor. [address] [facility] [place] EFFECT GUIDE The [address] [facility] [place] EFFECT GUIDE was provided to the PATIENT AND CARE PROVIDER. Information provided includes instruction about drug purpose and common side effects for the following medications:  
 PERCOCET DISCHARGE SUMMARY from your Nurse PATIENT INSTRUCTIONS After general anesthesia or intravenous sedation, for 24 hours or while taking prescription Narcotics: · Limit your activities · Do not drive and operate hazardous machinery · Do not make important personal or business decisions · Do  not drink alcoholic beverages · If you have not urinated within 8 hours after discharge, please contact your surgeon on call. Report the following to your surgeon: 
· Excessive pain, swelling, redness or odor of or around the surgical area · Temperature over 100.5 · Nausea and vomiting lasting longer than 4 hours or if unable to take medications · Any signs of decreased circulation or nerve impairment to extremity: change in color, persistent  numbness, tingling, coldness or increase pain · Any questions [address] Breathing deeply and coughing are very important exercises to do after surgery. Deep breathing and coughing open the little air tubes and air sacks in your lungs. You take deep breaths every day. You may not even notice - it is just something you do when you sigh or yawn. It is a natural exercise you do to keep these air passages open. After surgery, take deep breaths and cough, on purpose. DIRECTIONS: 
· Take 10 to 15 slow deep breaths every hour while awake. · Breathe in deeply, and hold it for 2 seconds. · Exhale slowly through puckered lips, like blowing up a balloon. · After every 4th or 5th deep breath, hug your pillow to your chest or belly and give a hard, deep cough. Yes, it will probably hurt.   But doing this exercise is a very important part of healing after surgery. Take your pain medicine to help you do this exercise without too much pain. Coughing and deep breathing help prevent bronchitis and pneumonia after surgery. If you had chest or belly surgery, use a pillow as a \"hug kelvin\" and hold it tightly to your chest or belly when you cough. ANKLE PUMPS Ankle pumps increase the circulation of oxygenated blood to your lower extremities and decrease your risk for circulation problems such as blood clots. They also stretch the muscles, tendons and ligaments in your foot and ankle, and prevent joint contracture in the ankle and foot, especially after surgeries on the legs. It is important to do ankle pump exercises regularly after surgery because immobility increases your risk for developing a blood clot. Your doctor may also have you take an Aspirin for the next few days as well. If your doctor did not ask you to take an Aspirin, consult with him before starting Aspirin therapy on your own. The exercise is quite simple. · Slowly point your foot forward, feeling the muscles on the top of your lower leg stretch, and hold this position for 5 seconds. · Next, pull your foot back toward you as far as possible, stretching the calf muscles, and hold that position for 5 seconds. · Repeat with the other foot. · Perform 10 repetitions every hour while awake for both ankles if possible (down and then up with the foot once is one repetition). You should feel gentle stretching of the muscles in your lower leg when doing this exercise. If you feel pain, or your range of motion is limited, don't push too hard. Only go the limit your joint and muscles will let you go. If you have increasing pain, progressively worsening leg warmth or swelling, STOP the exercise and call your doctor. MEDICATION AND  
SIDE EFFECT GUIDE The [facility] was provided to the PATIENT AND CARE PROVIDER. Information provided includes instruction about drug purpose and common side effects for the following medications: · These are general instructions for a healthy lifestyle: *   Please give a list of your current medications to your Primary Care Provider. *   Please update this list whenever your medications are discontinued, doses are changed, or new medications (including over-the-counter products) are added. *   Please carry medication information at all times in case of emergency situations. About Smoking No smoking / No tobacco products Avoid exposure to second hand smoke Surgeon General's Warning:  Quitting smoking now greatly reduces serious risk to your health. Obesity, smoking, and sedentary lifestyle greatly increases your risk for illness and disease. A healthy diet, regular physical exercise & weight monitoring are important for maintaining a healthy lifestyle. Congestive Heart Failure You may be retaining fluid if you have a history of heart failure or if you experience any of the following symptoms:  Weight gain of 3 pounds or more overnight or 5 pounds in a week, increased swelling in your hands or feet or shortness of breath while lying flat in bed. Please call your doctor as soon as you notice any of these symptoms; do not wait until your next office visit. Recognize signs and symptoms of STROKE: 
F -  Face looks uneven A -  Arms unable to move or move evenly S -  Speech slurred or non-existent T -  Time-call 911 as soon as signs and symptoms begin-DO NOT go  
       back to bed or wait to see if you get better-TIME IS BRAIN. Warning Signs of HEART ATTACK Call 911 if you have these symptoms: 
 
? Chest discomfort. Most heart attacks involve discomfort in the center of the chest that lasts more than a few minutes, or that goes away and comes back. It can feel like uncomfortable pressure, squeezing, fullness, or pain. ? Discomfort in other areas of the upper body. Symptoms can include pain or discomfort in one or both arms, the back, neck, jaw, or stomach. ? Shortness of breath with or without chest discomfort. ? Other signs may include breaking out in a cold sweat, nausea, or lightheadedness. Don't wait more than five minutes to call [address]! Fast action can save your life. Calling 911 is almost always the fastest way to get lifesaving treatment. Emergency Medical Services staff can begin treatment when they arrive  up to an hour sooner than if someone gets to the hospital by car. The discharge information has been reviewed with the patient and caregiver. Any questions and concerns from the patient and caregiver have been addressed. The patient and caregiver verbalized understanding. Other information in your discharge envelope: PRESCRIPTIONS PHYSICAL THERAPY PRESCRIPTION 
     APPOINTMENT CARDS [facility] Pamphlet for block or block with On-Q Catheter from   Anesthesia Service Medical device information sheets/pamphlets from their  School/work excuse note. /parent work excuse note. The following personal items collected during your admission are returned to you:  
Dental Appliance: Dental Appliances: None Vision: Visual Aid: None Hearing Aid:   
Jewelry: Jewelry:  (ring given to ) Clothing: Clothing: Footwear, Pants, Shirt, Undergarments Other Valuables:   
Valuables sent to safe:   
  
Cholecystectomy: What to Expect at [facility] Your Recovery After your surgery, it is normal to feel weak and tired for several days after you return home. Your belly may be swollen. If you had laparoscopic surgery, you may also have pain in your shoulder for about 24 hours. You may have gas or need to burp a lot at first, and a few people get diarrhea. The diarrhea usually goes away in 2 to 4 weeks, but it may last longer. How quickly you recover depends on whether you had a laparoscopic or open surgery. · For a laparoscopic surgery, most people can go back to work or their normal routine in 1 to 2 weeks, but it may take longer, depending on the type of work you do. · For an open surgery, it will probably take 4 to 6 weeks before you get back to your normal routine. This care sheet gives you a general idea about how long it will take for you to recover. However, each person recovers at a different pace. Follow the steps below to get better as quickly as possible. How can you care for yourself at home? Activity · Rest when you feel tired. Getting enough sleep will help you recover. · Try to walk each day. Start out by walking a little more than you did the day before. Gradually increase the amount you walk. Walking boosts blood flow and helps prevent pneumonia and constipation. · For about 2 to 4 weeks, avoid lifting anything that would make you strain. This may include a child, heavy grocery bags and milk containers, a heavy briefcase or backpack, cat litter or dog food bags, or a vacuum . · Avoid strenuous activities, such as biking, jogging, weightlifting, and aerobic exercise, until your doctor says it is okay. · You may shower 24 to 48 hours after surgery, if your doctor okays it. Pat the cut (incision) dry. Do not take a bath for the first 2 weeks, or until your doctor tells you it is okay. · You may drive when you are no longer taking pain medicine and can quickly move your foot from the gas pedal to the brake. You must also be able to sit comfortably for a long period of time, even if you do not plan to go far. You might get caught in traffic. · For a laparoscopic surgery, most people can go back to work or their normal routine in 1 to 2 weeks, but it may take longer. For an open surgery, it will probably take 4 to 6 weeks before you get back to your normal routine. · Your doctor will tell you when you can have sex again. Diet · Eat smaller meals more often instead of fewer larger meals. You can eat a normal diet, but avoid eating fatty foods for about 1 month. Fatty foods include hamburger, whole milk, cheese, and many snack foods. If your stomach is upset, try bland, low-fat foods like plain rice, broiled chicken, toast, and yogurt. · Drink plenty of fluids (unless your doctor tells you not to). · If you have diarrhea, try avoiding spicy foods, dairy products, fatty foods, and alcohol. You can also watch to see if specific foods cause it, and stop eating them. If the diarrhea continues for more than 2 weeks, talk to your doctor. · You may notice that your bowel movements are not regular right after your surgery. This is common. Try to avoid constipation and straining with bowel movements. You may want to take a fiber supplement every day. If you have not had a bowel movement after a couple of days, ask your doctor about taking a mild laxative. Medicines · Your doctor will tell you if and when you can restart your medicines. He or she will also give you instructions about taking any new medicines. · If you take blood thinners, such as warfarin (Coumadin), clopidogrel (Plavix), or aspirin, be sure to talk to your doctor. He or she will tell you if and when to start taking those medicines again. Make sure that you understand exactly what your doctor wants you to do. · Take pain medicines exactly as directed. ¨ If the doctor gave you a prescription medicine for pain, take it as prescribed. ¨ If you are not taking a prescription pain medicine, take an over-the-counter medicine such as acetaminophen (Tylenol), ibuprofen (Advil, Motrin), or naproxen (Aleve). Read and follow all instructions on the label. ¨ Do not take two or more pain medicines at the same time unless the doctor told you to.  Many pain medicines contain acetaminophen, which is Tylenol. Too much Tylenol can be harmful. · If you think your pain medicine is making you sick to your stomach: 
¨ Take your medicine after meals (unless your doctor tells you not to). ¨ Ask your doctor for a different pain medicine. · If your doctor prescribed antibiotics, take them as directed. Do not stop taking them just because you feel better. You need to take the full course of antibiotics. Incision care · If you have strips of tape on the incision, or cut, leave the tape on for a week or until it falls off. · After 24 to 48 hours, wash the area daily with warm, soapy water, and pat it dry. · You may have staples to hold the cut together. Keep them dry until your doctor takes them out. This is usually in 7 to 10 days. · Keep the area clean and dry. You may cover it with a gauze bandage if it weeps or rubs against clothing. Change the bandage every day. Ice · To reduce swelling and pain, put ice or a cold pack on your belly for 10 to 20 minutes at a time. Do this every 1 to 2 hours. Put a thin cloth between the ice and your skin. Follow-up care is a key part of your treatment and safety. Be sure to make and go to all appointments, and call your doctor if you are having problems. It's also a good idea to know your test results and keep a list of the medicines you take. When should you call for help? Call 911 anytime you think you may need emergency care. For example, call if: 
· You passed out (lost consciousness). · You have severe trouble breathing. · You have sudden chest pain and shortness of breath, or you cough up blood. Call your doctor now or seek immediate medical care if: 
· You are sick to your stomach and cannot drink fluids. · You have pain that does not get better when you take your pain medicine. · You have signs of infection, such as: 
¨ Increased pain, swelling, warmth, or redness. ¨ Red streaks leading from the incision. ¨ Pus draining from the incision. ¨ Swollen lymph nodes in your neck, armpits, or groin. ¨ A fever. · Your urine turns dark brown or your stool is light-colored or morgan-colored. · Your skin or the whites of your eyes turn yellow. · Bright red blood has soaked through a large bandage over your incision. · You have signs of a blood clot, such as: 
¨ Pain in your calf, back of knee, thigh, or groin. ¨ Redness and swelling in your leg or groin. · You have trouble passing urine or stool, especially if you have mild pain or swelling in your lower belly. Watch closely for any changes in your health, and be sure to contact your doctor if: 
· You had a laparoscopic surgery and your shoulder pain lasts more than 24 hours. · You do not have a bowel movement after taking a laxative. Where can you learn more? Go to http://maliha-sanchez.info/. Enter [id number] [id number] 592 in the search box to learn more about \"Cholecystectomy: What to Expect at Home. \" Current as of: August 9, 2016 Content Version: 11.2 © 1106-1948 edjing. Care instructions adapted under license by [employer] (which disclaims liability or warranty for this information). If you have questions about a medical condition or this instruction, always ask your healthcare professional. [place] [address] any warranty or liability for your use of this information. Octavia Coto MD, PhD, [facility] General Surgery at [address] [place] [address], [address] [facility] 
[phone] Fax [phone] Discharge Orders None Introducing [facility]! Dear Teagan Goes: 
Thank you for requesting a [employer] account. Our records indicate that you already have an active [employer] account. You can access your account anytime at [email or web address] Did you know that you can access your hospital and ER discharge instructions at any time in MyChart? You can also review all of your test results from your hospital stay or ER visit. Additional Information If you have questions, please visit the Frequently Asked Questions section of the DermLink website at [email or web address] Remember, MyChart is NOT to be used for urgent needs. For medical emergencies, dial 911. Now available from your iPhone and Android! General Information Please provide this summary of care documentation to your next provider. Patient Signature:  ____________________________________________________________ Date:  ____________________________________________________________  
  
Nicki Faulkner Provider Signature:  ____________________________________________________________ Date:  ____________________________________________________________

## 2017-04-28 NOTE — ANESTHESIA PREPROCEDURE EVALUATION
Anesthetic History   No history of anesthetic complications            Review of Systems / Medical History  Patient summary reviewed and pertinent labs reviewed    Pulmonary        Sleep apnea: CPAP    Asthma : well controlled       Neuro/Psych   Within defined limits           Cardiovascular    Hypertension          CAD and cardiac stents    Exercise tolerance: >4 METS     GI/Hepatic/Renal     GERD: well controlled          Comments: Fatty liver Endo/Other    Diabetes: well controlled, type 2    Obesity and arthritis     Other Findings              Physical Exam    Airway  Mallampati: II  TM Distance: 4 - 6 cm  Neck ROM: normal range of motion   Mouth opening: Normal     Cardiovascular    Rhythm: regular  Rate: normal         Dental  No notable dental hx       Pulmonary  Breath sounds clear to auscultation               Abdominal         Other Findings            Anesthetic Plan    ASA: 3  Anesthesia type: general            Anesthetic plan and risks discussed with: Patient

## 2017-05-01 ENCOUNTER — TELEPHONE (OUTPATIENT)
Dept: SURGERY | Age: 57
End: 2017-05-01

## 2017-05-01 NOTE — TELEPHONE ENCOUNTER
Called to follow up with patient after surgery, patient said she is doing ok. Pain level around 6 just finish taking a pain pill also taking stool softners. Last bowel movement was this morning urine flow is good incision is good, healing well.  F/u visit already set will call if anything changes

## 2017-05-11 RX ORDER — TIZANIDINE 4 MG/1
TABLET ORAL
Qty: 30 TAB | Refills: 3 | Status: SHIPPED | OUTPATIENT
Start: 2017-05-11 | End: 2017-06-23 | Stop reason: ALTCHOICE

## 2017-05-12 ENCOUNTER — OFFICE VISIT (OUTPATIENT)
Dept: SURGERY | Age: 57
End: 2017-05-12

## 2017-05-12 VITALS
TEMPERATURE: 98.1 F | DIASTOLIC BLOOD PRESSURE: 70 MMHG | HEART RATE: 70 BPM | WEIGHT: 171 LBS | HEIGHT: 65 IN | SYSTOLIC BLOOD PRESSURE: 115 MMHG | RESPIRATION RATE: 13 BRPM | OXYGEN SATURATION: 97 % | BODY MASS INDEX: 28.49 KG/M2

## 2017-05-12 DIAGNOSIS — Z09 POSTOPERATIVE EXAMINATION: Primary | ICD-10-CM

## 2017-05-12 NOTE — PROGRESS NOTES
1. Have you been to the ER, urgent care clinic since your last visit? Hospitalized since your last visit?no    2. Have you seen or consulted any other health care providers outside of the [address] [place] [address] since [facility] last visit? Include any pap smears or colon screening.  no

## 2017-05-14 DIAGNOSIS — G89.29 OTHER CHRONIC PAIN: ICD-10-CM

## 2017-05-14 DIAGNOSIS — M79.7 FIBROMYALGIA: ICD-10-CM

## 2017-05-14 RX ORDER — GABAPENTIN 600 MG/1
TABLET ORAL
Qty: 270 TAB | Refills: 0 | Status: SHIPPED | OUTPATIENT
Start: 2017-05-14 | End: 2017-07-31 | Stop reason: SDUPTHER

## 2017-05-30 ENCOUNTER — TELEPHONE (OUTPATIENT)
Dept: SURGERY | Age: 57
End: 2017-05-30

## 2017-06-11 RX ORDER — OMEPRAZOLE 20 MG/1
CAPSULE, DELAYED RELEASE ORAL
Qty: 90 CAP | Refills: 1 | Status: SHIPPED | OUTPATIENT
Start: 2017-06-11 | End: 2018-06-13 | Stop reason: SDUPTHER

## 2017-06-23 ENCOUNTER — OFFICE VISIT (OUTPATIENT)
Dept: CARDIOLOGY CLINIC | Age: 57
End: 2017-06-23

## 2017-06-23 VITALS
DIASTOLIC BLOOD PRESSURE: 76 MMHG | HEIGHT: 65 IN | RESPIRATION RATE: 20 BRPM | SYSTOLIC BLOOD PRESSURE: 130 MMHG | WEIGHT: 160 LBS | BODY MASS INDEX: 26.66 KG/M2 | HEART RATE: 76 BPM | OXYGEN SATURATION: 96 %

## 2017-06-23 DIAGNOSIS — Z98.84 H/O BARIATRIC SURGERY: ICD-10-CM

## 2017-06-23 DIAGNOSIS — R07.2 PRECORDIAL PAIN: ICD-10-CM

## 2017-06-23 DIAGNOSIS — I25.10 CORONARY ARTERY DISEASE INVOLVING NATIVE CORONARY ARTERY OF NATIVE HEART WITHOUT ANGINA PECTORIS: Primary | ICD-10-CM

## 2017-06-23 DIAGNOSIS — I10 ESSENTIAL HYPERTENSION: ICD-10-CM

## 2017-06-23 DIAGNOSIS — R60.0 LOCALIZED EDEMA: ICD-10-CM

## 2017-06-23 RX ORDER — ASPIRIN 81 MG/1
TABLET ORAL DAILY
COMMUNITY
End: 2022-04-27

## 2017-06-23 RX ORDER — FUROSEMIDE 20 MG/1
20 TABLET ORAL AS NEEDED
COMMUNITY
End: 2017-06-23 | Stop reason: SDUPTHER

## 2017-06-23 RX ORDER — FUROSEMIDE 20 MG/1
20 TABLET ORAL AS NEEDED
Qty: 30 TAB | Refills: 0 | Status: SHIPPED | OUTPATIENT
Start: 2017-06-23 | End: 2017-07-31 | Stop reason: SDUPTHER

## 2017-06-23 RX ORDER — NITROGLYCERIN 0.4 MG/1
TABLET SUBLINGUAL
Qty: 25 TAB | Refills: 1 | Status: SHIPPED | OUTPATIENT
Start: 2017-06-23 | End: 2018-12-28 | Stop reason: SDUPTHER

## 2017-06-23 NOTE — MR AVS SNAPSHOT
Visit Information Date & Time Provider Department Dept. Phone Encounter #  
 6/23/2017 10:00 AM Som Davidson MD [facility] Chintan Lord [phone] [id number] Your Appointments 7/13/2017 12:00 PM  
NUCLEAR MEDICINE with DALJIT RODRIGUEZ  
[facility] ([facility] SCHEDULING) Appt Note: 1day toby scan stress only for CAD ht5'5\" wt-160  
 [address] [place] [address] [address]  
[address] [address]  
  
    
 6/29/2018  1:00 PM  
ESTABLISHED PATIENT with Som Davidson MD  
[address] ([address]) [address] [place] [address] [address]  
[address] [place] [address] [address] Upcoming Health Maintenance Date Due  
 BREAST CANCER SCRN MAMMOGRAM 7/14/2017 INFLUENZA AGE 9 TO ADULT 8/1/2017 HEMOGLOBIN A1C Q6M 8/20/2017 FOOT EXAM Q1 8/23/2017 MICROALBUMIN Q1 8/23/2017 LIPID PANEL Q1 8/23/2017 EYE EXAM RETINAL OR DILATED Q1 8/24/2017 PAP AKA CERVICAL CYTOLOGY 8/23/2019 COLONOSCOPY 6/23/2020 DTaP/Tdap/Td series (2 - Td) 8/14/2022 Allergies as of 6/23/2017  Review Complete On: 6/23/2017 By: Som Davidson MD  
  
 Severity Noted Reaction Type Reactions Accupril [Quinapril]  07/07/2010    Cough Lipitor [Atorvastatin]  07/07/2010    Other (comments)  
 aches Norvasc [Amlodipine]  07/22/2015    Swelling On legs at 10 mg dose Current Immunizations  Reviewed on 1/26/2017 Name Date Influenza Vaccine 9/1/2015 Pneumococcal Polysaccharide (PPSV-23) 8/4/2015 TD Vaccine 11/7/2005 TDAP Vaccine 8/14/2012 Not reviewed this visit Vitals  BP Pulse Resp Height(growth percentile) Weight(growth percentile) LMP  
 130/76 (BP 1 Location: Left arm, BP Patient Position: Sitting) 76 20 5' 5\" (1.651 m) 160 lb (72.6 kg) 01/01/1985 SpO2 BMI OB Status Smoking Status 96% 26.63 kg/m2 Hysterectomy Never Smoker Vitals History BMI and BSA Data Body Mass Index Body Surface Area  
 26.63 kg/m 2 1.82 m 2 Preferred Pharmacy Pharmacy Name Phone [id number] [facility] [phone] Your Updated Medication List  
  
   
This list is accurate as of: 6/23/17 11:21 AM.  Always use your most recent med list.  
  
  
  
  
 * albuterol 0.63 mg/3 mL nebulizer solution Commonly known as:  ACCUNEB  
3 mL by Nebulization route every four (4) hours as needed for Wheezing or Shortness of Breath. * albuterol 90 mcg/actuation inhaler Commonly known as:  PROAIR HFA Take 1 Puff by inhalation every four (4) hours as needed for Wheezing or Shortness of Breath. alcohol swabs Padm Commonly known as:  ALCOHOL PADS Use when checking blood glucose ALPRAZolam 0.5 mg tablet Commonly known as:  XANAX  
TAKE 1 TABLET BY MOUTH 3 TIMES A DAY AS NEEDED FOR ANXIETY. MAX DAILY AMT 1.5MG Blood-Glucose Meter monitoring kit Use to check glucose once a day  
  
 budesonide 180 mcg/actuation Aepb inhaler Commonly known as:  PULMICORT Take 1 Puff by inhalation two (2) times a day. Indications: MAINTENANCE THERAPY FOR ASTHMA  
  
 cyclobenzaprine 10 mg tablet Commonly known as:  FLEXERIL Take  by mouth three (3) times daily as needed for Muscle Spasm(s). dicyclomine 10 mg capsule Commonly known as:  BENTYL TAKE 1 (ONE) CAPSULE ORALLY AS NEEDED EVERY 6 HOURS FLUoxetine 20 mg capsule Commonly known as:  PROzac TAKE ONE CAPSULE BY MOUTH EVERY DAY  
  
 gabapentin 600 mg tablet Commonly known as:  NEURONTIN  
TAKE 1 TABLET BY MOUTH THREE (3) TIMES DAILY. glucose blood VI test strips strip Commonly known as:  blood glucose test  
Use once a day Lancets Misc Use once a day. Please give one compatible with patient's meter  
  
 metoprolol tartrate 25 mg tablet Commonly known as:  LOPRESSOR Take 1 Tab by mouth two (2) times a day. nitroglycerin 0.4 mg SL tablet Commonly known as:  NITROSTAT  
1 Tab by SubLINGual route every five (5) minutes as needed for Chest Pain. omeprazole 20 mg capsule Commonly known as:  PRILOSEC  
TAKE ONE CAPSULE BY MOUTH EVERY DAY  
  
 ondansetron 4 mg disintegrating tablet Commonly known as:  ZOFRAN ODT Take 1 Tab by mouth every eight (8) hours as needed for Nausea. rosuvastatin 40 mg tablet Commonly known as:  CRESTOR  
TAKE 1 TABLET BY MOUTH NIGHTLY  
  
 valACYclovir 500 mg tablet Commonly known as:  VALTREX Take 1 Tab by mouth two (2) times a day. ZyrTEC 10 mg tablet Generic drug:  cetirizine Take  by mouth daily. * Notice: This list has 2 medication(s) that are the same as other medications prescribed for you. Read the directions carefully, and ask your doctor or other care provider to review them with you. Patient Instructions Start ASA 81mg po daily. Lasix 20 mg po as needed. Dispense 30 no refills. FU with me in 1 year. Introducing [facility]! Dear Aimee Srivastava: 
Thank you for requesting a Net[employer] account. Our records indicate that you already have an active Net[employer] account. You can access your account anytime at [email or web address] [email or web address] Online [email or web address] Royalty Did you know that you can access your hospital and ER discharge instructions at any time in NetScaler? You can also review all of your test results from your hospital stay or ER visit. Additional Information If you have questions, please visit the Frequently Asked Questions section of the NetScaler website at [email or web address] [email or web address] Online [email or web address] Remember, NetScaler is NOT to be used for urgent needs. For medical emergencies, dial 911. Now available from your iPhone and Android! Please provide this summary of care documentation to your next provider. Your primary care clinician is listed as Reji Tovar. If you have any questions after today's visit, please call [phone].

## 2017-06-30 NOTE — PROGRESS NOTES
Office Follow-up    NAME: Lori Hughes   :  1960  MRM:  [id number]    Date:  2017            Assessment:     Problem List  Date Reviewed: 2017          Codes Class Noted    Hypokalemia ICD-10-CM: E87.6  ICD-9-CM: 276.8  2017        History of pulmonary embolus (PE) ICD-10-CM: Z86.711  ICD-9-CM: V12.55  2016        Anxiety ICD-10-CM: F41.9  ICD-9-CM: 300.00  2016        CAD (coronary artery disease) ICD-10-CM: I25.10  ICD-9-CM: 414.00  2015    Overview Signed 2015  8:52 AM by MD Dr. Leeanne Staples Dr[facility] placed stent in 80% blockage of distal left Cx              [facility]di[facility] mellitus) ([facility] ICD-10-CM: E11.9  ICD-9-CM: 250.00  2015    Overview Signed 2016 12:58 PM by Pretty Gaona MD     a1c 6.9 2016             FH: diabetes mellitus ICD-10-CM: Z83.3  ICD-9-CM: V18.0  8706        Metabolic syndrome JML-70-EQ: E88.81  ICD-9-CM: 277.7  8/3/2015    Overview Addendum 2015 11:38 AM by Pretty Gaona MD     TG up, sugar up, HTN, waist 54 inches  Rx metformin  Needs yearly eye exams and labs             Essential hypertension ICD-10-CM: I10  ICD-9-CM: 401.9  2015        Chronic pain ICD-10-CM: G89.29  ICD-9-CM: 338.29  3/30/2015    Overview Signed 3/30/2015 10:35 AM by Pretty Gaona MD     fibromyalgia             GARCIA (nonalcoholic steatohepatitis) ICD-10-CM: P77.90  ICD-9-CM: 571.8  2011    Overview Signed 2011  7:18 PM by Lazarus Gamble, MD     Ultrasound 2008             Morbid obesity ([facility] ICD-10-CM: O87.32  ICD-9-CM: 278.01  2010        Obstructive sleep apnea ICD-10-CM: G47.33  ICD-9-CM: 327.23  2010        Asthma ICD-10-CM: J45.909  ICD-9-CM: 493.90  2010        Arthritis ICD-10-CM: M19.90  ICD-9-CM: 716.90  2010        GERD (gastroesophageal reflux disease) ICD-10-CM: K21.9  ICD-9-CM: 530.81  2010        Edema ICD-10-CM: R60.9  ICD-9-CM: 782.3  2010                 Plan: 1. Chest pain: Known history of coronary artery disease with prior circumflex stent in 2015. She had mild residual disease. Symptoms are concerning especially knowing that she has significant weight loss after bariatric surgery and does not have a gallbladder anymore. Further evaluation with an exercise stress nuclear is acceptable to risk stratify if she has progression of coronary disease. Continue to use nitroglycerin on as-needed basis. 2. Mild peripheral edema: I gave her prescription of Lasix 20 mg p.o. as-needed basis. 3. CAD/ PCI LCX: She has BMS. Start baby aspirin which she has not been taking because her surgeons after bariatric surgery asked her to stop taking aspirin. 4. HTN: Controlled. Continue current meds. 5. Dyslipidemia: PCP following FLP. Continue Crestor. 6. Phone follow-up of the stress test.  Regular follow-up in 1 year. Subjective:     Lawson Fountain, a 62y.o. year-old who presents for followup. She underwent gastric bypass surgery in January 2017 and did very well with weight loss. In April 2017 she had an attack of gallbladder pain and underwent laparoscopic cholecystectomy. She has completely recovered from the surgeries. From past few days she has been experiencing chest pain left sided moderate intensity nonradiating associated with slight edema of the hands and feet. The chest pain is recurrent. The pain is relieved with nitroglycerin. The most recent chest pain was 4 days prior to her office visit today.     Exam:     Physical Exam:  Visit Vitals    /76 (BP 1 Location: Left arm, BP Patient Position: Sitting)    Pulse 76    Resp 20    Ht 5' 5\" (1.651 m)    Wt 160 lb (72.6 kg)    LMP 01/01/1985    SpO2 96%    BMI 26.63 kg/m2     General appearance - alert, well appearing, and in no distress  Mental status - affect appropriate to mood  Eyes - sclera anicteric, moist mucous membranes  Neck - supple, no significant adenopathy  Chest - clear to auscultation, no wheezes, rales or rhonchi  Heart - normal rate, regular rhythm, normal S1, S2, no murmurs, rubs, clicks or gallops      Medications:     Current Outpatient Prescriptions   Medication Sig    omeprazole (PRILOSEC) 20 mg capsule TAKE ONE CAPSULE BY MOUTH EVERY DAY    gabapentin (NEURONTIN) 600 mg tablet TAKE 1 TABLET BY MOUTH THREE (3) TIMES DAILY.  ondansetron (ZOFRAN ODT) 4 mg disintegrating tablet Take 1 Tab by mouth every eight (8) hours as needed for Nausea.  cyclobenzaprine (FLEXERIL) 10 mg tablet Take  by mouth three (3) times daily as needed for Muscle Spasm(s).  metoprolol tartrate (LOPRESSOR) 25 mg tablet Take 1 Tab by mouth two (2) times a day.  albuterol (PROAIR HFA) 90 mcg/actuation inhaler Take 1 Puff by inhalation every four (4) hours as needed for Wheezing or Shortness of Breath.  FLUoxetine (PROZAC) 20 mg capsule TAKE ONE CAPSULE BY MOUTH EVERY DAY    ALPRAZolam (XANAX) 0.5 mg tablet TAKE 1 TABLET BY MOUTH 3 TIMES A DAY AS NEEDED FOR ANXIETY. MAX DAILY AMT 1.5MG    budesonide (PULMICORT) 180 mcg/actuation aepb inhaler Take 1 Puff by inhalation two (2) times a day. Indications: MAINTENANCE THERAPY FOR ASTHMA    albuterol (ACCUNEB) 0.63 mg/3 mL nebulizer solution 3 mL by Nebulization route every four (4) hours as needed for Wheezing or Shortness of Breath.  rosuvastatin (CRESTOR) 40 mg tablet TAKE 1 TABLET BY MOUTH NIGHTLY    valACYclovir (VALTREX) 500 mg tablet Take 1 Tab by mouth two (2) times a day. (Patient taking differently: Take 500 mg by mouth two (2) times daily as needed.)    dicyclomine (BENTYL) 10 mg capsule TAKE 1 (ONE) CAPSULE ORALLY AS NEEDED EVERY 6 HOURS    cetirizine (ZYRTEC) 10 mg tablet Take  by mouth daily.  aspirin delayed-release 81 mg tablet Take  by mouth daily.  furosemide (LASIX) 20 mg tablet Take 1 Tab by mouth as needed.     nitroglycerin (NITROSTAT) 0.4 mg SL tablet DISSOLVE 1 TABLET IN MOUTH EVERY 5 MINUTES AS NEEDED FOR CHEST PAIN    Blood-Glucose Meter monitoring kit Use to check glucose once a day    alcohol swabs (ALCOHOL PADS) padm Use when checking blood glucose    Lancets misc Use once a day. Please give one compatible with patient's meter    glucose blood VI test strips (BLOOD GLUCOSE TEST) strip Use once a day     No current facility-administered medications for this visit. Diagnostic Data Review:       C: 11/30/15- Circumflex: There was a 80 % stenosis in the distal third of the vessel segment, Successful BMS dLCx: 2.0x12 Mini Vision. ECHO:4/3/15- EF 65%, G1DD, trivial MR,   Dobutamine Stress Echo : 4/27/15: No ischemia. False positive ST Changes of ischemia. Had chest pain during the test.       Lab Review:     Lab Results   Component Value Date/Time    Cholesterol, total 129 08/23/2016 02:02 PM    HDL Cholesterol 44 08/23/2016 02:02 PM    LDL, calculated 32 08/23/2016 02:02 PM    Triglyceride 267 08/23/2016 02:02 PM     Lab Results   Component Value Date/Time    Creatinine 0.58 04/21/2017 01:48 PM     Lab Results   Component Value Date/Time    BUN 7 04/21/2017 01:48 PM     Lab Results   Component Value Date/Time    Potassium 4.0 04/21/2017 01:48 PM     Lab Results   Component Value Date/Time    Hemoglobin A1c 6.1 02/20/2017 03:58 PM     Lab Results   Component Value Date/Time    HGB 13.5 04/21/2017 01:48 PM     Lab Results   Component Value Date/Time    PLATELET 970 88/61/9105 01:48 PM     No results for input(s): CPK, CKMB, TROIQ in the last 72 hours. No lab exists for component: CKQMB, CPKMB             ___________________________________________________    Lupillo Ayala.  Deena Cervantes MD, [facility]

## 2017-07-05 ENCOUNTER — OFFICE VISIT (OUTPATIENT)
Dept: SURGERY | Age: 57
End: 2017-07-05

## 2017-07-05 VITALS
WEIGHT: 155.5 LBS | BODY MASS INDEX: 25.91 KG/M2 | DIASTOLIC BLOOD PRESSURE: 71 MMHG | RESPIRATION RATE: 14 BRPM | TEMPERATURE: 98 F | HEIGHT: 65 IN | SYSTOLIC BLOOD PRESSURE: 116 MMHG | OXYGEN SATURATION: 98 % | HEART RATE: 75 BPM

## 2017-07-05 DIAGNOSIS — Z98.84 S/P GASTRIC BYPASS: ICD-10-CM

## 2017-07-05 DIAGNOSIS — E66.3 OVERWEIGHT: Primary | ICD-10-CM

## 2017-07-05 NOTE — PATIENT INSTRUCTIONS
Eating Healthy Foods: Care Instructions  Your Care Instructions  Eating healthy foods can help lower your risk for disease. Healthy food gives you energy and keeps your heart strong, your brain active, your muscles working, and your bones strong. A healthy diet includes a variety of foods from the basic food groups: grains, vegetables, fruits, milk and milk products, and meat and beans. Some people may eat more of their favorite foods from only one food group and, as a result, miss getting the nutrients they need. So, it is important to pay attention not only to what you eat but also to what you are missing from your diet. You can eat a healthy, balanced diet by making a few small changes. Follow-up care is a key part of your treatment and safety. Be sure to make and go to all appointments, and call your doctor if you are having problems. It's also a good idea to know your test results and keep a list of the medicines you take. How can you care for yourself at home? Look at what you eat  · Keep a food diary for a week or two and record everything you eat or drink. Track the number of servings you eat from each food group. · For a balanced diet every day, eat a variety of:  ¨ 6 or more ounce-equivalents of grains, such as cereals, breads, crackers, rice, or pasta, every day. An ounce-equivalent is 1 slice of bread, 1 cup of ready-to-eat cereal, or ½ cup of cooked rice, cooked pasta, or cooked cereal.  ¨ 2½ cups of vegetables, especially:  § Dark-green vegetables such as broccoli and spinach. § Orange vegetables such as carrots and sweet potatoes. § Dry beans (such as marsh and kidney beans) and peas (such as lentils). ¨ 2 cups of fresh, frozen, or canned fruit. A small apple or 1 banana or orange equals 1 cup. ¨ 3 cups of nonfat or low-fat milk, yogurt, or other milk products. ¨ 5½ ounces of meat and beans, such as chicken, fish, lean meat, beans, nuts, and seeds.  One egg, 1 tablespoon of peanut butter, ½ ounce nuts or seeds, or ¼ cup of cooked beans equals 1 ounce of meat. · Learn how to read food labels for serving sizes and ingredients. Fast-food and convenience-food meals often contain few or no fruits or vegetables. Make sure you eat some fruits and vegetables to make the meal more nutritious. · Look at your food diary. For each food group, add up what you have eaten and then divide the total by the number of days. This will give you an idea of how much you are eating from each food group. See if you can find some ways to change your diet to make it more healthy. Start small  · Do not try to make dramatic changes to your diet all at once. You might feel that you are missing out on your favorite foods and then be more likely to fail. · Start slowly, and gradually change your habits. Try some of the following:  ¨ Use whole wheat bread instead of white bread. ¨ Use nonfat or low-fat milk instead of whole milk. ¨ Eat brown rice instead of white rice, and eat whole wheat pasta instead of white-flour pasta. ¨ Try low-fat cheeses and low-fat yogurt. ¨ Add more fruits and vegetables to meals and have them for snacks. ¨ Add lettuce, tomato, cucumber, and onion to sandwiches. ¨ Add fruit to yogurt and cereal.  Enjoy food  · You can still eat your favorite foods. You just may need to eat less of them. If your favorite foods are high in fat, salt, and sugar, limit how often you eat them, but do not cut them out entirely. · Eat a wide variety of foods. Make healthy choices when eating out  · The type of restaurant you choose can help you make healthy choices. Even fast-food chains are now offering more low-fat or healthier choices on the menu. · Choose smaller portions, or take half of your meal home. · When eating out, try:  ¨ A veggie pizza with a whole wheat crust or grilled chicken (instead of sausage or pepperoni).   ¨ Pasta with roasted vegetables, grilled chicken, or marinara sauce instead of cream sauce. ¨ A vegetable wrap or grilled chicken wrap. ¨ Broiled or poached food instead of fried or breaded items. Make healthy choices easy  · Buy packaged, prewashed, ready-to-eat fresh vegetables and fruits, such as baby carrots, salad mixes, and chopped or shredded broccoli and cauliflower. · Buy packaged, presliced fruits, such as melon or pineapple. · Choose 100% fruit or vegetable juice instead of soda. Limit juice intake to 4 to 6 oz (½ to ¾ cup) a day. · Blend low-fat yogurt, fruit juice, and canned or frozen fruit to make a smoothie for breakfast or a snack. Where can you learn more? Go to http://maliha-sanchez.info/. Enter T756 in the search box to learn more about \"Eating Healthy Foods: Care Instructions. \"  Current as of: April 3, 2017  Content Version: 11.3  © 9139-4494 [employer]. Care instructions adapted under license by [employer] (which disclaims liability or warranty for this information). If you have questions about a medical condition or this instruction, always ask your healthcare professional. [place] [address] any warranty or liability for your use of this information.       Please check B12 with lab work

## 2017-07-05 NOTE — PROGRESS NOTES
HISTORY OF PRESENT ILLNESS  Alysha Patel is a 62 y.o. female with previous Malabsorptive Gastric bypass surgery on 1/2017 . She has lost a total of 63.5 pounds since surgery. She  Has lost 38.5 lbs since the last ov. Body mass index is 25.88 kg/(m^2). . no nausea and no vomiting . Denies Acid reflux/heartburn. . Drinking  64 ounces of water daily. 50-60 grmas protein intake daily. + BM's, constipation- miralax. Pt is walking for exercise. Dietary recall -    Breakfast- protein shakes  Lunch- soup  Dinner- vegetables and chicken, taco meat. She is snacking between meals; apple, fruit, popsicles. Vitamins:  MVI : yes  Calcium : yes  B-Vit 12: yes    Patient has an advanced directive: [address] file. Ms. Chhaya Davis has a reminder for a \"due or due soon\" health maintenance. I have asked that she contact her primary care provider for follow-up on this health maintenance. Co-Morbid(s)     Resolved      Was anti coagulation initiated for presumed / confirmed DVT/PE? NO    Was an incisional hernia noted on exam?       NO      COMORBIDITY     SLEEP APNEA                 no -CPAP      GERD  (req.meds)            no, prilosec  HYPERLIPIDEMIA           No -crestor  HYPERTENSION            No -metprolol  DIABETES                        yes      Current Outpatient Prescriptions:     aspirin delayed-release 81 mg tablet, Take  by mouth daily. , Disp: , Rfl:     furosemide (LASIX) 20 mg tablet, Take 1 Tab by mouth as needed. , Disp: 30 Tab, Rfl: 0    nitroglycerin (NITROSTAT) 0.4 mg SL tablet, DISSOLVE 1 TABLET IN MOUTH EVERY 5 MINUTES AS NEEDED FOR CHEST PAIN, Disp: 25 Tab, Rfl: 1    omeprazole (PRILOSEC) 20 mg capsule, TAKE ONE CAPSULE BY MOUTH EVERY DAY, Disp: 90 Cap, Rfl: 1    gabapentin (NEURONTIN) 600 mg tablet, TAKE 1 TABLET BY MOUTH THREE (3) TIMES DAILY. , Disp: 270 Tab, Rfl: 0    ondansetron (ZOFRAN ODT) 4 mg disintegrating tablet, Take 1 Tab by mouth every eight (8) hours as needed for Nausea., Disp: 30 Tab, Rfl: 0    cyclobenzaprine (FLEXERIL) 10 mg tablet, Take  by mouth three (3) times daily as needed for Muscle Spasm(s). , Disp: , Rfl:     metoprolol tartrate (LOPRESSOR) 25 mg tablet, Take 1 Tab by mouth two (2) times a day., Disp: 60 Tab, Rfl: 2    albuterol (PROAIR HFA) 90 mcg/actuation inhaler, Take 1 Puff by inhalation every four (4) hours as needed for Wheezing or Shortness of Breath., Disp: 1 Inhaler, Rfl: 5    FLUoxetine (PROZAC) 20 mg capsule, TAKE ONE CAPSULE BY MOUTH EVERY DAY, Disp: 90 Cap, Rfl: 1    ALPRAZolam (XANAX) 0.5 mg tablet, TAKE 1 TABLET BY MOUTH 3 TIMES A DAY AS NEEDED FOR ANXIETY. MAX DAILY AMT 1.5MG, Disp: 180 Tab, Rfl: 0    budesonide (PULMICORT) 180 mcg/actuation aepb inhaler, Take 1 Puff by inhalation two (2) times a day. Indications: MAINTENANCE THERAPY FOR ASTHMA, Disp: 1 Inhaler, Rfl: 3    albuterol (ACCUNEB) 0.63 mg/3 mL nebulizer solution, 3 mL by Nebulization route every four (4) hours as needed for Wheezing or Shortness of Breath., Disp: 30 Vial, Rfl: 5    rosuvastatin (CRESTOR) 40 mg tablet, TAKE 1 TABLET BY MOUTH NIGHTLY, Disp: 90 Tab, Rfl: 3    Blood-Glucose Meter monitoring kit, Use to check glucose once a day, Disp: 1 Kit, Rfl: 0    alcohol swabs (ALCOHOL PADS) padm, Use when checking blood glucose, Disp: 100 Pad, Rfl: 5    Lancets misc, Use once a day. Please give one compatible with patient's meter, Disp: 1 Package, Rfl: 5    glucose blood VI test strips (BLOOD GLUCOSE TEST) strip, Use once a day, Disp: 100 Strip, Rfl: 5    valACYclovir (VALTREX) 500 mg tablet, Take 1 Tab by mouth two (2) times a day. (Patient taking differently: Take 500 mg by mouth two (2) times daily as needed.), Disp: 60 Tab, Rfl: 3    dicyclomine (BENTYL) 10 mg capsule, TAKE 1 (ONE) CAPSULE ORALLY AS NEEDED EVERY 6 HOURS, Disp: 120 capsule, Rfl: 0    cetirizine (ZYRTEC) 10 mg tablet, Take  by mouth daily.   , Disp: , Rfl:       Visit Vitals    /71 (BP 1 Location: Left arm, BP Patient Position: Sitting)    Pulse 75    Temp 98 °F (36.7 °C) (Oral)    Resp 14    Ht 5' 5\" (1.651 m)    Wt 155 lb 8 oz (70.5 kg)    LMP 01/01/1985    SpO2 98%    BMI 25.88 kg/m2       HPI    Review of Systems   Constitutional: Negative for chills and fever. Respiratory: Negative for cough and shortness of breath. Cardiovascular: Positive for chest pain (having stress test next week-). Negative for palpitations. Gastrointestinal: Negative for abdominal pain, heartburn, nausea and vomiting. Musculoskeletal: Positive for back pain (seeing spine doctor tomorrow). Neurological: Negative for dizziness and headaches. Physical Exam   Constitutional: She is oriented to person, place, and time. She appears well-developed and well-nourished. Neck: Normal range of motion. Neck supple. Cardiovascular: Normal rate and regular rhythm. Exam reveals no gallop and no friction rub. No murmur heard. Pulmonary/Chest: Effort normal and breath sounds normal.   Abdominal: Soft. Bowel sounds are normal. She exhibits no distension. There is no tenderness. No masses or hernias noted. Neurological: She is alert and oriented to person, place, and time. Skin: Skin is warm and dry. Psychiatric: She has a normal mood and affect. ASSESSMENT and PLAN  Li Law is a 62 y.o. female with previous Malabsorptive Gastric bypass surgery on 1/2017 . She has lost a total of 63.5 pounds since surgery. She  Has lost 38.5 lbs since the last ov. Body mass index is 25.88 kg/(m^2). Patient is to continue a high protein, low-fat, low-sugar diet for life. increase protein at lunch . Take vitamins and minerals daily for life. Include protein at each meal.  Avoid high fat food items. Avoid \"added\" sugar. Chew well and eat slowly. Take 20-30 minutes to complete a meal.  Measure foods and read labels. Exercise daily. Follow up in 3 months   She is seeing PCP next week and is having blood work.    Pt verbalized understanding and questions were answered to the best of my knowledge and ability. Healthy diet educational materials were provided. 20 minutes spent face to face with patient, >50% of time spent counseling.

## 2017-07-05 NOTE — PROGRESS NOTES
1. Have you been to the ER, urgent care clinic since your last visit? Hospitalized since your last visit?no    2. Have you seen or consulted any other health care providers outside of the [address] [place] [address] since your last visit? Include any pap smears or colon screening.  no

## 2017-07-12 ENCOUNTER — TELEPHONE (OUTPATIENT)
Dept: CARDIOLOGY CLINIC | Age: 57
End: 2017-07-12

## 2017-07-12 ENCOUNTER — OFFICE VISIT (OUTPATIENT)
Dept: FAMILY MEDICINE CLINIC | Age: 57
End: 2017-07-12

## 2017-07-12 VITALS
WEIGHT: 154 LBS | DIASTOLIC BLOOD PRESSURE: 91 MMHG | OXYGEN SATURATION: 98 % | HEIGHT: 65 IN | RESPIRATION RATE: 18 BRPM | BODY MASS INDEX: 25.66 KG/M2 | HEART RATE: 86 BPM | TEMPERATURE: 98.6 F | SYSTOLIC BLOOD PRESSURE: 145 MMHG

## 2017-07-12 DIAGNOSIS — Z13.31 SCREENING FOR DEPRESSION: ICD-10-CM

## 2017-07-12 DIAGNOSIS — Z98.84 H/O GASTRIC BYPASS: ICD-10-CM

## 2017-07-12 DIAGNOSIS — F41.9 ANXIETY: Primary | ICD-10-CM

## 2017-07-12 RX ORDER — ALPRAZOLAM 0.5 MG/1
TABLET ORAL
Qty: 180 TAB | Refills: 0 | Status: SHIPPED | OUTPATIENT
Start: 2017-07-12 | End: 2017-10-12 | Stop reason: SDUPTHER

## 2017-07-12 RX ORDER — FLUOXETINE HYDROCHLORIDE 20 MG/1
CAPSULE ORAL
Qty: 90 CAP | Refills: 3 | Status: SHIPPED | OUTPATIENT
Start: 2017-07-12 | End: 2017-12-19 | Stop reason: ALTCHOICE

## 2017-07-12 NOTE — PROGRESS NOTES
Greater than 50% of today's 15 minute visit was counseling or coordination of care for the following reasons:    See diagnoses and orders, see patient instructions      FTF for BNZ for anxiety     as expected    PHQ9 =8. She is OFF fluoxetine.   Will resume    Labs today      We discussed health maintenance/diet/exercise/weight loss    Mammogram soon    She is seeing Dr. Fadi nowak for EST

## 2017-07-12 NOTE — MR AVS SNAPSHOT
Visit Information Date & Time Provider Department Dept. Phone Encounter #  
 7/12/2017 11:15 AM Darline Barfield MD [address] [facility] [phone] [id number] Your Appointments 7/13/2017 12:00 PM  
NUCLEAR MEDICINE with DALJIT RODRIGUEZ  
[facility] ([facility] SCHEDULING) Appt Note: 1day toby scan stress only for CAD ht5'5\" wt-160; toby scan stress ONLY for CAD ht5'5\" wt-160  
 [address] [facility] [address] [facility] [address]  
[address] [facility] [address]  
  
    
 10/11/2017  1:00 PM  
[facility] ([address]) Appt Note: 3mos per Mundo Sheth. $0cpon payment plan. 40.00. 1,100.00.pb.fs  
 [address] [facility] 70 [facility]  
[phone]  
  
   
 [address] [facility] [address]  
  
    
 6/29/2018  1:00 PM  
ESTABLISHED PATIENT with Tish Scott MD  
[address] ([address] [facility]) Appt Note: 1yr f/u  
 [address] [facility] [address]  
[address] [facility] [id number] [place] [address] Upcoming Health Maintenance Date Due  
 BREAST CANCER SCRN MAMMOGRAM 7/14/2017 INFLUENZA AGE 9 TO ADULT 8/1/2017 HEMOGLOBIN A1C Q6M 8/20/2017 FOOT EXAM Q1 8/23/2017 MICROALBUMIN Q1 8/23/2017 LIPID PANEL Q1 8/23/2017 EYE EXAM RETINAL OR DILATED Q1 8/24/2017 PAP AKA CERVICAL CYTOLOGY 8/23/2019 COLONOSCOPY 6/23/2020 DTaP/Tdap/Td series (2 - Td) 8/14/2022 Allergies as of 7/12/2017  Review Complete On: 7/12/2017 By: Sadia Gates LPN Severity Noted Reaction Type Reactions Accupril [Quinapril]  07/07/2010    Cough Lipitor [Atorvastatin]  07/07/2010    Other (comments)  
 aches Norvasc [Amlodipine]  07/22/2015    Swelling On legs at 10 mg dose Current Immunizations  Reviewed on 1/26/2017 Name Date Influenza Vaccine 9/1/2015 Pneumococcal Polysaccharide (PPSV-23) 8/4/2015 TD Vaccine 11/7/2005 TDAP Vaccine 8/14/2012 Not reviewed this visit You Were Diagnosed With   
  
 Codes Comments Anxiety    -  Primary ICD-10-CM: F41.9 ICD-9-CM: 300.00 PHQ9 =8 Screening for depression     ICD-10-CM: Z13.89 ICD-9-CM: V79.0 H/O gastric bypass     ICD-10-CM: Z98.890 ICD-9-CM: V45.86 lost from 230 to 154 lbs Vitals BP Pulse Temp Resp Height(growth percentile) Weight(growth percentile) (!) 145/91 86 98.6 °F (37 °C) (Oral) 18 5' 5\" (1.651 m) 154 lb (69.9 kg) LMP SpO2 BMI OB Status Smoking Status 01/01/1985 98% 25.63 kg/m2 Hysterectomy Never Smoker BMI and BSA Data Body Mass Index Body Surface Area  
 25.63 kg/m 2 1.79 m 2 Preferred Pharmacy Pharmacy Name Phone [id number] [place], [address] AT [facility] [phone] Your Updated Medication List  
  
   
This list is accurate as of: 7/12/17 11:41 AM.  Always use your most recent med list.  
  
  
  
  
 * albuterol 0.63 mg/3 mL nebulizer solution Commonly known as:  ACCUNEB  
3 mL by Nebulization route every four (4) hours as needed for Wheezing or Shortness of Breath. * albuterol 90 mcg/actuation inhaler Commonly known as:  PROAIR HFA Take 1 Puff by inhalation every four (4) hours as needed for Wheezing or Shortness of Breath. alcohol swabs Padm Commonly known as:  ALCOHOL PADS Use when checking blood glucose ALPRAZolam 0.5 mg tablet Commonly known as:  XANAX  
TAKE 1 TABLET BY MOUTH 3 TIMES A DAY AS NEEDED FOR ANXIETY. MAX DAILY AMT 1.5MG aspirin delayed-release 81 mg tablet Take  by mouth daily. Blood-Glucose Meter monitoring kit Use to check glucose once a day  
  
 budesonide 180 mcg/actuation Aepb inhaler Commonly known as:  PULMICORT  
 Take 1 Puff by inhalation two (2) times a day. Indications: MAINTENANCE THERAPY FOR ASTHMA  
  
 cyclobenzaprine 10 mg tablet Commonly known as:  FLEXERIL Take  by mouth three (3) times daily as needed for Muscle Spasm(s). dicyclomine 10 mg capsule Commonly known as:  BENTYL TAKE 1 (ONE) CAPSULE ORALLY AS NEEDED EVERY 6 HOURS FLUoxetine 20 mg capsule Commonly known as:  PROzac TAKE ONE CAPSULE BY MOUTH EVERY DAY  
  
 furosemide 20 mg tablet Commonly known as:  LASIX Take 1 Tab by mouth as needed. gabapentin 600 mg tablet Commonly known as:  NEURONTIN  
TAKE 1 TABLET BY MOUTH THREE (3) TIMES DAILY. glucose blood VI test strips strip Commonly known as:  blood glucose test  
Use once a day Lancets Misc Use once a day. Please give one compatible with patient's meter  
  
 metoprolol tartrate 25 mg tablet Commonly known as:  LOPRESSOR Take 1 Tab by mouth two (2) times a day. nitroglycerin 0.4 mg SL tablet Commonly known as:  NITROSTAT  
DISSOLVE 1 TABLET IN MOUTH EVERY 5 MINUTES AS NEEDED FOR CHEST PAIN  
  
 omeprazole 20 mg capsule Commonly known as:  PRILOSEC  
TAKE ONE CAPSULE BY MOUTH EVERY DAY  
  
 rosuvastatin 40 mg tablet Commonly known as:  CRESTOR  
TAKE 1 TABLET BY MOUTH NIGHTLY  
  
 valACYclovir 500 mg tablet Commonly known as:  VALTREX Take 1 Tab by mouth two (2) times a day. ZyrTEC 10 mg tablet Generic drug:  cetirizine Take  by mouth daily. * Notice: This list has 2 medication(s) that are the same as other medications prescribed for you. Read the directions carefully, and ask your doctor or other care provider to review them with you. Prescriptions Printed Refills ALPRAZolam (XANAX) 0.5 mg tablet 0 Sig: TAKE 1 TABLET BY MOUTH 3 TIMES A DAY AS NEEDED FOR ANXIETY. MAX DAILY AMT 1.5MG Class: Print FLUoxetine (PROZAC) 20 mg capsule 3  Sig: TAKE ONE CAPSULE BY MOUTH EVERY DAY  
 Class: Print We Performed the Following [id number] [employer] [ [facility]] VITAMIN B12 [id number] CPT(R)] VITAMIN D, 25 HYDROXY [id number] CPT(R)] Patient Instructions Body mass index is 25.63 kg/(m^2). Focus on regular exercise (150 minutes each week) and healthy eating. Eat more fruits and vegetables. Eat more protein (egg whites, beans, and nuts you know you tolerate) and less carbohydrates (white bread, white rice, white pasta, white potatoes, sodas, and sweets). Eat appropriately small portion sizes. Keep up with female exams Follow up with your specialists Mammogram soon Please call Dustin Pineda to help arrange and authorize any tests and/or referrals. Her # is [phone] Central Scheduling at [address] is #[phone] Introducing hospitals & HEALTH SERVICES! Dear Dung Idalou: 
Thank you for requesting a [employer] account. Our records indicate that you already have an active [employer] account. You can access your account anytime at [email or web address] Did you know that you can access your hospital and ER discharge instructions at any time in [employer]? You can also review all of your test results from your hospital stay or ER visit. Additional Information If you have questions, please visit the Frequently Asked Questions section of the [employer] website at [email or web address] Remember, [employer] is NOT to be used for urgent needs. For medical emergencies, dial 911. Now available from your iPhone and Android! Please provide this summary of care documentation to your next provider. Your primary care clinician is listed as Jorge Meza. If you have any questions after today's visit, please call [phone].

## 2017-07-12 NOTE — TELEPHONE ENCOUNTER
ID verified per protocol. Confirmed appointment(s) for 7-. Arrive @ [facility] @ 122:00 noon. Patient instructed to be NPO x 2 hrs prior, no caffeine (not even decaf coffee,tea,jah) x 12 hrs prior, wear comfortable clothes/good walking shoes. Hold Lopressor. Patient also informed the test takes 2+ hrs. Patient stated she can not walk on treadmill due to back issues and asthma and that Dr. Faviola Toro ordered a medication test like last time.  Noted where patient had Dboutamine stress echo in past. Informed patient I would discuss with Dr. Faviola Toro and verify exactly what test he recommends (exercise, Lexiscan, or Dobutamine) Patient verbalized understanding and agrees with prep/test.

## 2017-07-12 NOTE — PATIENT INSTRUCTIONS
Body mass index is 25.63 kg/(m^2). Focus on regular exercise (150 minutes each week) and healthy eating. Eat more fruits and vegetables. Eat more protein (egg whites, beans, and nuts you know you tolerate) and less carbohydrates (white bread, white rice, white pasta, white potatoes, sodas, and sweets). Eat appropriately small portion sizes. Keep up with female exams    Follow up with your specialists    Mammogram soon    Please call Dustin Pineda to help arrange and authorize any tests and/or referrals.   Her # is [phone] at [facility] is #[phone]

## 2017-07-12 NOTE — LETTER
7/13/2017 8:26 AM 
 
Ms. Geovanna Hurtado 
[address] [facility] [address] [facility] [address] Dear Geovanna Hurtado: 
 
Please find your most recent results below. Resulted Orders VITAMIN D, 25 HYDROXY Result Value Ref Range VITAMIN D, 25-HYDROXY 60.4 30.0 - 100.0 ng/mL Comment:  
   Vitamin D deficiency has been defined by the [address] [place] [address] practice guideline as a 
level of serum 25-OH vitamin D less than 20 ng/mL (1,2). The Endocrine Society went on to further define vitamin D 
insufficiency as a level between 21 and 29 ng/mL (2). 1. IOM ([place] of Medicine). 2010. Dietary reference 
   intakes for calcium and D. [address] [facility]: The 
   [facility]. 2. Lucian MF, Meli WETZEL, Judi LEWIS, et al. 
   Evaluation, treatment, and prevention of vitamin D 
   deficiency: an Endocrine Society clinical practice 
   guideline. JCEM. 2011 Jul; 96(7):1911-30. Narrative Performed at:  [address]  [id number] : Laura Nguyen MD, Phone:  [phone] VITAMIN B12 Result Value Ref Range Vitamin B12 383 211 - 946 pg/mL Narrative Performed at:  [address]  [id number] : Laura Nguyen MD, Phone:  [phone] Your labs are ok Sincerely, Chula Goel MD

## 2017-07-13 ENCOUNTER — CLINICAL SUPPORT (OUTPATIENT)
Dept: CARDIOLOGY CLINIC | Age: 57
End: 2017-07-13

## 2017-07-13 DIAGNOSIS — I25.10 CORONARY ARTERY DISEASE INVOLVING NATIVE CORONARY ARTERY OF NATIVE HEART WITHOUT ANGINA PECTORIS: Primary | ICD-10-CM

## 2017-07-13 LAB
25(OH)D3+25(OH)D2 SERPL-MCNC: 60.4 NG/ML (ref 30–100)
VIT B12 SERPL-MCNC: 383 PG/ML (ref 211–946)

## 2017-07-13 RX ORDER — AMINOPHYLLINE 25 MG/ML
125 INJECTION, SOLUTION INTRAVENOUS ONCE
Qty: 5 ML | Refills: 0
Start: 2017-07-13 | End: 2017-07-13

## 2017-07-13 NOTE — PROGRESS NOTES
See scanned report. Dr. Jacquelyn Garcia ordered study and Dr. Jacquelyn Garcia read study. ID verified per protocol. Test and risks explained. Consent signed after all patient questions answered. Patient developed headache and upset stomach during test. After Aminophylline administration, patient without symptoms or complaints voiced.

## 2017-07-14 ENCOUNTER — TELEPHONE (OUTPATIENT)
Dept: CARDIOLOGY CLINIC | Age: 57
End: 2017-07-14

## 2017-07-18 ENCOUNTER — HOSPITAL ENCOUNTER (OUTPATIENT)
Dept: MAMMOGRAPHY | Age: 57
Discharge: HOME | End: 2017-07-18
Payer: COMMERCIAL

## 2017-07-18 DIAGNOSIS — Z98.84 H/O GASTRIC BYPASS: ICD-10-CM

## 2017-07-18 PROCEDURE — 77067 SCR MAMMO BI INCL CAD: CPT

## 2017-07-19 DIAGNOSIS — I25.10 CORONARY ARTERY DISEASE INVOLVING NATIVE CORONARY ARTERY OF NATIVE HEART WITHOUT ANGINA PECTORIS: ICD-10-CM

## 2017-07-19 RX ORDER — METOPROLOL TARTRATE 25 MG/1
25 TABLET, FILM COATED ORAL 2 TIMES DAILY
Qty: 60 TAB | Refills: 2 | Status: SHIPPED | OUTPATIENT
Start: 2017-07-19 | End: 2017-10-13 | Stop reason: SDUPTHER

## 2017-07-31 DIAGNOSIS — M79.7 FIBROMYALGIA: ICD-10-CM

## 2017-07-31 DIAGNOSIS — G89.29 OTHER CHRONIC PAIN: ICD-10-CM

## 2017-07-31 RX ORDER — FUROSEMIDE 20 MG/1
TABLET ORAL
Qty: 30 TAB | Refills: 0 | Status: SHIPPED | OUTPATIENT
Start: 2017-07-31 | End: 2017-09-23 | Stop reason: SDUPTHER

## 2017-08-03 ENCOUNTER — OFFICE VISIT (OUTPATIENT)
Dept: FAMILY MEDICINE CLINIC | Age: 57
End: 2017-08-03

## 2017-08-03 VITALS
HEART RATE: 94 BPM | TEMPERATURE: 98 F | HEIGHT: 65 IN | OXYGEN SATURATION: 96 % | BODY MASS INDEX: 24.83 KG/M2 | SYSTOLIC BLOOD PRESSURE: 135 MMHG | WEIGHT: 149 LBS | DIASTOLIC BLOOD PRESSURE: 85 MMHG | RESPIRATION RATE: 16 BRPM

## 2017-08-03 DIAGNOSIS — T14.8XXA BRUISING: Primary | ICD-10-CM

## 2017-08-03 RX ORDER — GABAPENTIN 600 MG/1
TABLET ORAL
Qty: 270 TAB | Refills: 0 | Status: SHIPPED | OUTPATIENT
Start: 2017-08-03 | End: 2018-09-18 | Stop reason: SDUPTHER

## 2017-08-03 NOTE — PROGRESS NOTES
Subjective  Letha Hurley is an 62 y.o. female who presents for bruising on b/l RUE and LUE. Present for about 1 week. Not getting any better. Has not happened in the past. She is on ASA 81mg but not on any blood thinners. She reports that her dog scratched her left arm, but that is now healing well. She remembers holding her handbag on her forearm and wonders if this may have caused the bruising. Past Medical History - reviewed:  Past Medical History:   Diagnosis Date    Arthritis     OA back,knees and hands    Asthma     Autoimmune disease ([address]     Alexandra Sousa    CAD (coronary artery disease)     s/p stents 11/2015    Cardiac murmur     Depression     Diabetes ([address]     DVT (deep venous thrombosis) ([facility]) 1981    GERD (gastroesophageal reflux disease)     Hypertension     Morbid obesity ([address]     Musculoskeletal disorder     EDMOND (obstructive sleep apnea)     wears cpap    S/P TONJA (total abdominal hysterectomy)     Throm[facility]embolus ([facility] 1996    PE         ROS  CONSTITUTIONAL: no fever  CARDIOVASCULAR: no chest pain  RESPIRATORY: no shortness of breath      Physical Exam  Visit Vitals    /85 (BP 1 Location: Left arm, BP Patient Position: Sitting)    Pulse 94    Temp 98 °F (36.7 °C) (Oral)    Resp 16    Ht 5' 5\" (1.651 m)    Wt 149 lb (67.6 kg)    LMP 01/01/1985    SpO2 96%    BMI 24.79 kg/m2       General appearance - alert, well appearing, and in no distress  Chest - clear to auscultation, no wheezes or rhonchi, symmetric air entry  Heart - normal rate, regular rhythm, normal S1, S2, no murmurs  Extremities - no pedal edema  Skin - erythematous ecchymosis appearing areas on left upper extremities and some petechiae present in right upper extremity       Assessment/Plan    ICD-10-CM ICD-9-CM    1.  Bruising T14.8 924.9 CBC W/O DIFF      PROTHROMBIN TIME + INR      PTT      METABOLIC PANEL, COMPREHENSIVE     Easily bruised skin in b/l upper extremities: this appears to be related to fragile skin texture. But will check some labs. - CBC, CMP, PT/INR   - Try taking ASA 81mg every other day instead on daily    Discussed with Dr. Kristina Tran. Follow-up Disposition:  Return if symptoms worsen or fail to improve. I have discussed the diagnosis with the patient and the intended plan as seen in the above orders. The patient has received an after-visit summary and questions were answered concerning future plans. I have discussed medication side effects and warnings with the patient as well.       Kim Munguia MD  Family Medicine Resident

## 2017-08-03 NOTE — PROGRESS NOTES
Will check clotting factors  Recommend baby ASA every other day rather than every day    I saw and evaluated the patient, performing the key elements of the service. I discussed the findings, assessment and plan with the resident and agree with the resident's findings and plan as documented in the resident's note.

## 2017-08-03 NOTE — MR AVS SNAPSHOT
Visit Information Date & Time Provider Department Dept. Phone Encounter #  
 8/3/2017  3:55 PM [address], [place] 1515 [facility] [phone] [id number] Follow-up Instructions Return if symptoms worsen or fail to improve. Follow-up and Disposition History Your Appointments 10/11/2017  1:00 PM  
ESTABLISHED PATIENT with Prabha Moreno NP [address] [facility] ([facility] Appt Note: 3mos per Taryn De Anda. $0cpon payment plan. 40.00. 1,100.00.pb.fs  
 380 [facility] 8 [facility] 1007 [facility]  
[phone]  
  
   
 [address] 1007 [facility]  
  
    
 6/29/2018  1:00 PM  
ESTABLISHED PATIENT with Kassandra Gill MD  
[address] ([facility]) Appt Note: 1yr f/u  
 320 [facility] [place] [address] 1007 [facility]  
[address] [place] [address] [address] Upcoming Health Maintenance Date Due INFLUENZA AGE 9 TO ADULT 8/1/2017 HEMOGLOBIN A1C Q6M 8/20/2017 FOOT EXAM Q1 8/23/2017 MICROALBUMIN Q1 8/23/2017 LIPID PANEL Q1 8/23/2017 EYE EXAM RETINAL OR DILATED Q1 8/24/2017 BREAST CANCER SCRN MAMMOGRAM 7/18/2019 PAP AKA CERVICAL CYTOLOGY 8/23/2019 COLONOSCOPY 6/23/2020 DTaP/Tdap/Td series (2 - Td) 8/14/2022 Allergies as of 8/3/2017  Review Complete On: 8/3/2017 By: Shelli Vasques LPN Severity Noted Reaction Type Reactions Accupril [Quinapril]  07/07/2010    Cough Lipitor [Atorvastatin]  07/07/2010    Other (comments)  
 aches Norvasc [Amlodipine]  07/22/2015    Swelling On legs at 10 mg dose Current Immunizations  Reviewed on 1/26/2017 Name Date Influenza Vaccine 9/1/2015 Pneumococcal Polysaccharide (PPSV-23) 8/4/2015 TD Vaccine 11/7/2005 TDAP Vaccine 8/14/2012 Not reviewed this visit You Were Diagnosed With   
  
 Codes Comments Bruising    -  Primary ICD-10-CM: T14.8 ICD-9-CM: 924.9 Vitals BP Pulse Temp Resp Height(growth percentile) Weight(growth percentile) 135/85 (BP 1 Location: Left arm, BP Patient Position: Sitting) 94 98 °F (36.7 °C) (Oral) 16 5' 5\" (1.651 m) 149 lb (67.6 kg) LMP SpO2 BMI OB Status Smoking Status 01/01/1985 96% 24.79 kg/m2 Hysterectomy Never Smoker Vitals History BMI and BSA Data Body Mass Index Body Surface Area 24.79 kg/m 2 1.76 m 2 Preferred Pharmacy Pharmacy Name Phone [id number] [place], [place] AT [facility] [phone] Your Updated Medication List  
  
   
This list is accurate as of: 8/3/17  4:21 PM.  Always use your most recent med list.  
  
  
  
  
 * albuterol 0.63 mg/3 mL nebulizer solution Commonly known as:  ACCUNEB  
3 mL by Nebulization route every four (4) hours as needed for Wheezing or Shortness of Breath. * albuterol 90 mcg/actuation inhaler Commonly known as:  PROAIR HFA Take 1 Puff by inhalation every four (4) hours as needed for Wheezing or Shortness of Breath. alcohol swabs Padm Commonly known as:  ALCOHOL PADS Use when checking blood glucose ALPRAZolam 0.5 mg tablet Commonly known as:  XANAX  
TAKE 1 TABLET BY MOUTH 3 TIMES A DAY AS NEEDED FOR ANXIETY. MAX DAILY AMT 1.5MG aspirin delayed-release 81 mg tablet Take  by mouth daily. Blood-Glucose Meter monitoring kit Use to check glucose once a day  
  
 budesonide 180 mcg/actuation Aepb inhaler Commonly known as:  PULMICORT Take 1 Puff by inhalation two (2) times a day. Indications: MAINTENANCE THERAPY FOR ASTHMA  
  
 cyclobenzaprine 10 mg tablet Commonly known as:  FLEXERIL Take  by mouth three (3) times daily as needed for Muscle Spasm(s). dicyclomine 10 mg capsule Commonly known as:  BENTYL TAKE 1 (ONE) CAPSULE ORALLY AS NEEDED EVERY 6 HOURS FLUoxetine 20 mg capsule Commonly known as:  PROzac TAKE ONE CAPSULE BY MOUTH EVERY DAY  
  
 furosemide 20 mg tablet Commonly known as:  LASIX TAKE 1 TAB BY MOUTH AS NEEDED.  
  
 gabapentin 600 mg tablet Commonly known as:  NEURONTIN  
TAKE 1 TABLET BY MOUTH THREE (3) TIMES DAILY. glucose blood VI test strips strip Commonly known as:  blood glucose test  
Use once a day Lancets Misc Use once a day. Please give one compatible with patient's meter  
  
 metoprolol tartrate 25 mg tablet Commonly known as:  LOPRESSOR Take 1 Tab by mouth two (2) times a day. nitroglycerin 0.4 mg SL tablet Commonly known as:  NITROSTAT  
DISSOLVE 1 TABLET IN MOUTH EVERY 5 MINUTES AS NEEDED FOR CHEST PAIN  
  
 omeprazole 20 mg capsule Commonly known as:  PRILOSEC  
TAKE ONE CAPSULE BY MOUTH EVERY DAY  
  
 rosuvastatin 40 mg tablet Commonly known as:  CRESTOR  
TAKE 1 TABLET BY MOUTH NIGHTLY  
  
 valACYclovir 500 mg tablet Commonly known as:  VALTREX Take 1 Tab by mouth two (2) times a day. ZyrTEC 10 mg tablet Generic drug:  cetirizine Take  by mouth daily. * Notice: This list has 2 medication(s) that are the same as other medications prescribed for you. Read the directions carefully, and ask your doctor or other care provider to review them with you. We Performed the Following CBC W/O DIFF [96897 CPT(R)] METABOLIC PANEL, COMPREHENSIVE [98614 CPT(R)] PROTHROMBIN TIME + INR [49638 CPT(R)] PTT [id number] CPT(R)] Follow-up Instructions Return if symptoms worsen or fail to improve. Introducing hospitals & HEALTH SERVICES! Dear Shama Carbajal: 
Thank you for requesting a [employer] account. Our records indicate that you already have an active [employer] account. You can access your account anytime at [email or web address] Did you know that you can access your hospital and ER discharge instructions at any time in [employer]?   You can also review all of your test results from your hospital stay or ER visit. Additional Information If you have questions, please visit the Frequently Asked Questions section of the [employer] website at [email or web address] Remember, [employer] is NOT to be used for urgent needs. For medical emergencies, dial 911. Now available from your iPhone and Android! Please provide this summary of care documentation to your next provider. Your primary care clinician is listed as Naheed Cardoso. If you have any questions after today's visit, please call [phone].

## 2017-08-04 LAB
ALBUMIN SERPL-MCNC: 4.2 G/DL (ref 3.5–5.5)
ALBUMIN/GLOB SERPL: 2.1 {RATIO} (ref 1.2–2.2)
ALP SERPL-CCNC: 98 IU/L (ref 39–117)
ALT SERPL-CCNC: 17 IU/L (ref 0–32)
APTT PPP: 30 SEC (ref 24–33)
AST SERPL-CCNC: 17 IU/L (ref 0–40)
BILIRUB SERPL-MCNC: 0.8 MG/DL (ref 0–1.2)
BUN SERPL-MCNC: 8 MG/DL (ref 6–24)
BUN/CREAT SERPL: 11 (ref 9–23)
CALCIUM SERPL-MCNC: 9.9 MG/DL (ref 8.7–10.2)
CHLORIDE SERPL-SCNC: 99 MMOL/L (ref 96–106)
CO2 SERPL-SCNC: 28 MMOL/L (ref 18–29)
CREAT SERPL-MCNC: 0.7 MG/DL (ref 0.57–1)
ERYTHROCYTE [DISTWIDTH] IN BLOOD BY AUTOMATED COUNT: 14.5 % (ref 12.3–15.4)
GLOBULIN SER CALC-MCNC: 2 G/DL (ref 1.5–4.5)
GLUCOSE SERPL-MCNC: 95 MG/DL (ref 65–99)
HCT VFR BLD AUTO: 43.4 % (ref 34–46.6)
HGB BLD-MCNC: 14.4 G/DL (ref 11.1–15.9)
INR PPP: 1 (ref 0.8–1.2)
MCH RBC QN AUTO: 29.3 PG (ref 26.6–33)
MCHC RBC AUTO-ENTMCNC: 33.2 G/DL (ref 31.5–35.7)
MCV RBC AUTO: 88 FL (ref 79–97)
PLATELET # BLD AUTO: 310 X10E3/UL (ref 150–379)
POTASSIUM SERPL-SCNC: 3.6 MMOL/L (ref 3.5–5.2)
PROT SERPL-MCNC: 6.2 G/DL (ref 6–8.5)
PROTHROMBIN TIME: 10.6 SEC (ref 9.1–12)
RBC # BLD AUTO: 4.91 X10E6/UL (ref 3.77–5.28)
SODIUM SERPL-SCNC: 143 MMOL/L (ref 134–144)
WBC # BLD AUTO: 11.8 X10E3/UL (ref 3.4–10.8)

## 2017-09-01 ENCOUNTER — TELEPHONE (OUTPATIENT)
Dept: CARDIOLOGY CLINIC | Age: 57
End: 2017-09-01

## 2017-09-03 RX ORDER — TIZANIDINE 4 MG/1
TABLET ORAL
Qty: 30 TAB | Refills: 3 | Status: SHIPPED | OUTPATIENT
Start: 2017-09-03 | End: 2017-12-19

## 2017-09-24 RX ORDER — FUROSEMIDE 20 MG/1
TABLET ORAL
Qty: 30 TAB | Refills: 0 | Status: SHIPPED | OUTPATIENT
Start: 2017-09-24 | End: 2017-11-02 | Stop reason: SDUPTHER

## 2017-10-12 DIAGNOSIS — F41.9 ANXIETY: ICD-10-CM

## 2017-10-12 RX ORDER — ALPRAZOLAM 0.5 MG/1
TABLET ORAL
Qty: 180 TAB | Refills: 0 | Status: SHIPPED | OUTPATIENT
Start: 2017-10-12 | End: 2017-12-19 | Stop reason: SDUPTHER

## 2017-10-13 DIAGNOSIS — E78.5 HYPERLIPIDEMIA, UNSPECIFIED HYPERLIPIDEMIA TYPE: ICD-10-CM

## 2017-10-13 DIAGNOSIS — I25.10 CORONARY ARTERY DISEASE DUE TO LIPID RICH PLAQUE: ICD-10-CM

## 2017-10-13 DIAGNOSIS — I25.10 CORONARY ARTERY DISEASE INVOLVING NATIVE CORONARY ARTERY OF NATIVE HEART WITHOUT ANGINA PECTORIS: ICD-10-CM

## 2017-10-13 DIAGNOSIS — I25.83 CORONARY ARTERY DISEASE DUE TO LIPID RICH PLAQUE: ICD-10-CM

## 2017-10-13 NOTE — TELEPHONE ENCOUNTER
Requested Prescriptions     Pending Prescriptions Disp Refills    metoprolol tartrate (LOPRESSOR) 25 mg tablet 60 Tab 2     Sig: Take 1 Tab by mouth two (2) times a day. Pharmacy calling to refill, thank you.

## 2017-10-16 RX ORDER — METOPROLOL TARTRATE 25 MG/1
25 TABLET, FILM COATED ORAL 2 TIMES DAILY
Qty: 60 TAB | Refills: 2 | Status: SHIPPED | OUTPATIENT
Start: 2017-10-16 | End: 2017-12-29 | Stop reason: SDUPTHER

## 2017-10-18 ENCOUNTER — OFFICE VISIT (OUTPATIENT)
Dept: SURGERY | Age: 57
End: 2017-10-18

## 2017-10-18 VITALS
TEMPERATURE: 98.2 F | DIASTOLIC BLOOD PRESSURE: 83 MMHG | SYSTOLIC BLOOD PRESSURE: 137 MMHG | HEART RATE: 69 BPM | WEIGHT: 137 LBS | RESPIRATION RATE: 14 BRPM | HEIGHT: 65 IN | BODY MASS INDEX: 22.82 KG/M2 | OXYGEN SATURATION: 98 %

## 2017-10-18 DIAGNOSIS — E66.01 MORBID OBESITY (HCC): Primary | ICD-10-CM

## 2017-10-18 DIAGNOSIS — Z98.84 S/P GASTRIC BYPASS: ICD-10-CM

## 2017-10-18 RX ORDER — ROSUVASTATIN CALCIUM 40 MG/1
TABLET, COATED ORAL
Qty: 90 TAB | Refills: 2 | Status: SHIPPED | OUTPATIENT
Start: 2017-10-18 | End: 2019-12-23 | Stop reason: SDUPTHER

## 2017-10-18 NOTE — PATIENT INSTRUCTIONS

## 2017-10-18 NOTE — MR AVS SNAPSHOT
Visit Information Date & Time Provider Department Dept. Phone Encounter #  
 10/18/2017  1:00 PM Ward [place], [address] [facility] [phone] [id number] Your Appointments 1/17/2018  1:00 PM  
ESTABLISHED PATIENT with [place] [address] [facility] ([address]) Appt Note: 3mos. fs  
 [address] [facility] 1007 [facility] [place]  
[phone]  
  
   
 [address] [facility] [facility]  
  
    
 6/29/2018  1:00 PM  
ESTABLISHED PATIENT with Lilian Palacios MD  
[address] ([address]) Appt Note: 1yr f/u  
 320 [facility] [place] [address] 1007 [facility] [place]  
[address] [place] [address] [address] Upcoming Health Maintenance Date Due INFLUENZA AGE 9 TO ADULT 8/1/2017 HEMOGLOBIN A1C Q6M 8/20/2017 FOOT EXAM Q1 8/23/2017 MICROALBUMIN Q1 8/23/2017 LIPID PANEL Q1 8/23/2017 EYE EXAM RETINAL OR DILATED Q1 8/24/2017 BREAST CANCER SCRN MAMMOGRAM 7/18/2019 PAP AKA CERVICAL CYTOLOGY 8/23/2019 COLONOSCOPY 6/23/2020 DTaP/Tdap/Td series (2 - Td) 8/14/2022 Allergies as of 10/18/2017  Review Complete On: 10/18/2017 By: Ward [place] NP Severity Noted Reaction Type Reactions Accupril [Quinapril]  07/07/2010    Cough Lipitor [Atorvastatin]  07/07/2010    Other (comments)  
 aches Norvasc [Amlodipine]  07/22/2015    Swelling On legs at 10 mg dose Current Immunizations  Reviewed on 1/26/2017 Name Date Influenza Vaccine 9/1/2015 Pneumococcal Polysaccharide (PPSV-23) 8/4/2015 TD Vaccine 11/7/2005 TDAP Vaccine 8/14/2012 Not reviewed this visit Vitals BP Pulse Temp Resp Height(growth percentile) Weight(growth percentile) 137/83 (BP 1 Location: Left arm, BP Patient Position: Sitting) 69 98.2 °F (36.8 °C) (Oral) 14 5' 5\" (1.651 m) 137 lb (62.1 kg) LMP SpO2 BMI OB Status Smoking Status 01/01/1985 98% 22.8 kg/m2 Hysterectomy Never Smoker BMI and BSA Data Body Mass Index Body Surface Area  
 22.8 kg/m 2 1.69 m 2 Preferred Pharmacy Pharm[facility] Name Phone [id number] [address] AT [address] [phone] Your Updated Medication List  
  
   
This list is accurate as of: 10/18/17  2:22 PM.  Always use your most recent med list.  
  
  
  
  
 * albuterol 0.63 mg/3 mL nebulizer solution Commonly known as:  ACCUNEB  
3 mL by Nebulization route every four (4) hours as needed for Wheezing or Shortness of Breath. * albuterol 90 mcg/actuation inhaler Commonly known as:  PROAIR HFA Take 1 Puff by inhalation every four (4) hours as needed for Wheezing or Shortness of Breath. alcohol swabs Padm Commonly known as:  ALCOHOL PADS Use when checking blood glucose ALPRAZolam 0.5 mg tablet Commonly known as:  XANAX  
TAKE 1 TABLET BY MOUTH 3 TIMES A DAY AS NEEDED FOR ANXIETY. MAX DAILY AMT:1.5MG aspirin delayed-release 81 mg tablet Take  by mouth daily. Blood-Glucose Meter monitoring kit Use to check glucose once a day  
  
 budesonide 180 mcg/actuation Aepb inhaler Commonly known as:  PULMICORT Take 1 Puff by inhalation two (2) times a day. Indications: MAINTENANCE THERAPY FOR ASTHMA  
  
 dicyclomine 10 mg capsule Commonly known as:  BENTYL TAKE 1 (ONE) CAPSULE ORALLY AS NEEDED EVERY 6 HOURS FLUoxetine 20 mg capsule Commonly known as:  PROzac TAKE ONE CAPSULE BY MOUTH EVERY DAY  
  
 furosemide 20 mg tablet Commonly known as:  LASIX TAKE 1 TAB BY MOUTH AS NEEDED.  
  
 gabapentin 600 mg tablet Commonly known as:  NEURONTIN  
TAKE 1 TABLET BY MOUTH THREE (3) TIMES DAILY. glucose blood VI test strips strip Commonly known as:  blood glucose test  
Use once a day Lancets Misc Use once a day. Please give one compatible with patient's meter  
  
 metoprolol tartrate 25 mg tablet Commonly known as:  LOPRESSOR Take 1 Tab by mouth two (2) times a day. nitroglycerin 0.4 mg SL tablet Commonly known as:  NITROSTAT  
DISSOLVE 1 TABLET IN MOUTH EVERY 5 MINUTES AS NEEDED FOR CHEST PAIN  
  
 omeprazole 20 mg capsule Commonly known as:  PRILOSEC  
TAKE ONE CAPSULE BY MOUTH EVERY DAY  
  
 rosuvastatin 40 mg tablet Commonly known as:  CRESTOR  
TAKE 1 TABLET BY MOUTH NIGHTLY  
  
 tiZANidine 4 mg tablet Commonly known as:  Corlis Ax TAKE 1 TABLET BY MOUTH AT BEDTIME  
  
 valACYclovir 500 mg tablet Commonly known as:  VALTREX Take 1 Tab by mouth two (2) times a day. ZyrTEC 10 mg tablet Generic drug:  cetirizine Take  by mouth daily. * Notice: This list has 2 medication(s) that are the same as other medications prescribed for you. Read the directions carefully, and ask your doctor or other care provider to review them with you. Patient Instructions Eating Healthy Foods: Care Instructions Your Care Instructions Eating healthy foods can help lower your risk for disease. Healthy food gives you energy and keeps your heart strong, your brain active, your muscles working, and your bones strong. A healthy diet includes a variety of foods from the basic food groups: grains, vegetables, fruits, milk and milk products, and meat and beans. Some people may eat more of their favorite foods from only one food group and, as a result, miss getting the nutrients they need. So, it is important to pay attention not only to what you eat but also to what you are missing from your diet. You can eat a healthy, balanced diet by making a few small changes. Follow-up care is a key part of your treatment and safety.  Be sure to make and go to all appointments, and call your doctor if you are having problems. It's also a good idea to know your test results and keep a list of the medicines you take. How can you care for yourself at home? Look at what you eat · Keep a food diary for a week or two and record everything you eat or drink. Track the number of servings you eat from each food group. · For a balanced diet every day, eat a variety of: ¨ 6 or more ounce-equivalents of grains, such as cereals, breads, crackers, rice, or pasta, every day. An ounce-equivalent is 1 slice of bread, 1 cup of ready-to-eat cereal, or ½ cup of cooked rice, cooked pasta, or cooked cereal. 
¨ 2½ cups of vegetables, especially: § Dark-green vegetables such as broccoli and spinach. § Orange vegetables such as carrots and sweet potatoes. § Dry beans (such as marsh and kidney beans) and peas (such as lentils). ¨ 2 cups of fresh, frozen, or canned fruit. A small apple or 1 banana or orange equals 1 cup. ¨ 3 cups of nonfat or low-fat milk, yogurt, or other milk products. ¨ 5½ ounces of meat and beans, such as chicken, fish, lean meat, beans, nuts, and seeds. One egg, 1 tablespoon of peanut butter, ½ ounce nuts or seeds, or ¼ cup of cooked beans equals 1 ounce of meat. · Learn how to read food labels for serving sizes and ingredients. Fast-food and convenience-food meals often contain few or no fruits or vegetables. Make sure you eat some fruits and vegetables to make the meal more nutritious. · Look at your food diary. For each food group, add up what you have eaten and then divide the total by the number of days. This will give you an idea of how much you are eating from each food group. See if you can find some ways to change your diet to make it more healthy. Start small · Do not try to make dramatic changes to your diet all at once. You might feel that you are missing out on your favorite foods and then be more likely to fail. · Start slowly, and gradually change your habits. Try some of the following: ¨ Use whole wheat bread instead of white bread. ¨ Use nonfat or low-fat milk instead of whole milk. ¨ Eat brown rice instead of white rice, and eat whole wheat pasta instead of white-flour pasta. ¨ Try low-fat cheeses and low-fat yogurt. ¨ Add more fruits and vegetables to meals and have them for snacks. ¨ Add lettuce, tomato, cucumber, and onion to sandwiches. ¨ Add fruit to yogurt and cereal. 
Enjoy food · You can still eat your favorite foods. You just may need to eat less of them. If your favorite foods are high in fat, salt, and sugar, limit how often you eat them, but do not cut them out entirely. · Eat a wide variety of foods. Make healthy choices when eating out · The type of restaurant you choose can help you make healthy choices. Even fast-food chains are now offering more low-fat or healthier choices on the menu. · Choose smaller portions, or take half of your meal home. · When eating out, try: ¨ A veggie pizza with a whole wheat crust or grilled chicken (instead of sausage or pepperoni). ¨ Pasta with roasted vegetables, grilled chicken, or marinara sauce instead of cream sauce. ¨ A vegetable wrap or grilled chicken wrap. ¨ Broiled or poached food instead of fried or breaded items. Make healthy choices easy · Buy packaged, prewashed, ready-to-eat fresh vegetables and fruits, such as baby carrots, salad mixes, and chopped or shredded broccoli and cauliflower. · Buy packaged, presliced fruits, such as melon or pineapple. · Choose 100% fruit or vegetable juice instead of soda. Limit juice intake to 4 to 6 oz (½ to ¾ cup) a day. · Blend low-fat yogurt, fruit juice, and canned or frozen fruit to make a smoothie for breakfast or a snack. Where can you learn more? Go to http://maliha-sanchez.info/. Enter T756 in the search box to learn more about \"Eating Healthy Foods: Care Instructions. \" Current as of: April 3, 2017 Content Version: 11.3 © 8845-8544 Healthwise[employer] Incorporated. Care instructions adapted under license by [employer] (which disclaims liability or warranty for this information). If you have questions about a medical condition or this instruction, always ask your healthcare professional. [address] any warranty or liability for your use of this information. Introducing [facility] & HEALTH SERVICES! Dear Dayanna Leigh: 
Thank you for requesting a [employer] account. Our records indicate that you already have an active [employer] account. You can access your account anytime at [email or web address]. [email or web address] Did you know that you can access your hospital and ER discharge instructions at any time in [employer]? You can also review all of your test results from your hospital stay or ER visit. Additional Information If you have questions, please visit the Frequently Asked Questions section of the [employer] website at [email or web address] Remember, [employer] is NOT to be used for urgent needs. For medical emergencies, dial 911. Now available from your iPhone and Android! Please provide this summary of care documentation to your next provider. Your primary care clinician is listed as August Gonsalez. If you have any questions after today's visit, please call [phone].

## 2017-10-18 NOTE — PROGRESS NOTES
1. Have you been to the ER, urgent care clinic since your last visit? Hospitalized since your last visit?no    2. Have you seen or consulted any other health care providers outside of the [address] [place] [address] since your last visit? Include any pap smears or colon screening.  no

## 2017-10-18 NOTE — PROGRESS NOTES
HISTORY OF PRESENT ILLNESS  Santi Jacobson is a 62 y.o. female with previous Malabsorptive Gastric bypass. . She has lost a total of 81 pounds since surgery. She  Has lost 18 lbs since the last ov. Body mass index is 22.8 kg/(m^2). . no nausea and no vomiting . Denies Acid reflux/heartburn. . Drinking  64 ounces of water daily. 50-60 protein intake daily. + BM's. Pt is walking for exercise. Dietary recall -    Breakfast- shake and fruit  Lunch- chicken salad or tuna salad, crackers  Dinner - roast and potato and onion    She is snacking between meals; sugar free fudge sicles. Vitamins:  MVI : yes  Calcium : yes  B-Vit 12: yes    Patient has an advanced directive: NO      Ms. Tristan France has a reminder for a \"due or due soon\" health maintenance. I have asked that she contact her primary care provider for follow-up on this health maintenance. Co-Morbid(s)     Resolved      Was anti coagulation initiated for presumed / confirmed DVT/PE?    not on file. COMORBIDITY     SLEEP APNEA                 YES        GERD  (req.meds)            YES    HYPERTENSION              NO       metoprolol   DIABETES                         yes      Current Outpatient Prescriptions:     rosuvastatin (CRESTOR) 40 mg tablet, TAKE 1 TABLET BY MOUTH NIGHTLY, Disp: 90 Tab, Rfl: 2    metoprolol tartrate (LOPRESSOR) 25 mg tablet, Take 1 Tab by mouth two (2) times a day., Disp: 60 Tab, Rfl: 2    ALPRAZolam (XANAX) 0.5 mg tablet, TAKE 1 TABLET BY MOUTH 3 TIMES A DAY AS NEEDED FOR ANXIETY. MAX DAILY AMT:1.5MG, Disp: 180 Tab, Rfl: 0    furosemide (LASIX) 20 mg tablet, TAKE 1 TAB BY MOUTH AS NEEDED., Disp: 30 Tab, Rfl: 0    tiZANidine (ZANAFLEX) 4 mg tablet, TAKE 1 TABLET BY MOUTH AT BEDTIME, Disp: 30 Tab, Rfl: 3    gabapentin (NEURONTIN) 600 mg tablet, TAKE 1 TABLET BY MOUTH THREE (3) TIMES DAILY. , Disp: 270 Tab, Rfl: 0    FLUoxetine (PROZAC) 20 mg capsule, TAKE ONE CAPSULE BY MOUTH EVERY DAY, Disp: 90 Cap, Rfl: 3    aspirin delayed-release 81 mg tablet, Take  by mouth daily. , Disp: , Rfl:     nitroglycerin (NITROSTAT) 0.4 mg SL tablet, DISSOLVE 1 TABLET IN MOUTH EVERY 5 MINUTES AS NEEDED FOR CHEST PAIN, Disp: 25 Tab, Rfl: 1    omeprazole (PRILOSEC) 20 mg capsule, TAKE ONE CAPSULE BY MOUTH EVERY DAY, Disp: 90 Cap, Rfl: 1    albuterol (PROAIR HFA) 90 mcg/actuation inhaler, Take 1 Puff by inhalation every four (4) hours as needed for Wheezing or Shortness of Breath., Disp: 1 Inhaler, Rfl: 5    budesonide (PULMICORT) 180 mcg/actuation aepb inhaler, Take 1 Puff by inhalation two (2) times a day. Indications: MAINTENANCE THERAPY FOR ASTHMA, Disp: 1 Inhaler, Rfl: 3    albuterol (ACCUNEB) 0.63 mg/3 mL nebulizer solution, 3 mL by Nebulization route every four (4) hours as needed for Wheezing or Shortness of Breath., Disp: 30 Vial, Rfl: 5    Blood-Glucose Meter monitoring kit, Use to check glucose once a day, Disp: 1 Kit, Rfl: 0    alcohol swabs (ALCOHOL PADS) padm, Use when checking blood glucose, Disp: 100 Pad, Rfl: 5    Lancets misc, Use once a day. Please give one compatible with patient's meter, Disp: 1 Package, Rfl: 5    glucose blood VI test strips (BLOOD GLUCOSE TEST) strip, Use once a day, Disp: 100 Strip, Rfl: 5    valACYclovir (VALTREX) 500 mg tablet, Take 1 Tab by mouth two (2) times a day. (Patient taking differently: Take 500 mg by mouth two (2) times daily as needed.), Disp: 60 Tab, Rfl: 3    dicyclomine (BENTYL) 10 mg capsule, TAKE 1 (ONE) CAPSULE ORALLY AS NEEDED EVERY 6 HOURS, Disp: 120 capsule, Rfl: 0    cetirizine (ZYRTEC) 10 mg tablet, Take  by mouth daily. , Disp: , Rfl:       Visit Vitals    /83 (BP 1 Location: Left arm, BP Patient Position: Sitting)    Pulse 69    Temp 98.2 °F (36.8 °C) (Oral)    Resp 14    Ht 5' 5\" (1.651 m)    Wt 137 lb (62.1 kg)    LMP 01/01/1985    SpO2 98%    BMI 22.8 kg/m2     HPI    Review of Systems   Constitutional: Negative for chills and fever.    Cardiovascular: Negative for chest pain. Gastrointestinal: Negative for abdominal pain, heartburn, nausea and vomiting. Neurological: Negative for dizziness and headaches. Physical Exam   Constitutional: She is oriented to person, place, and time. She appears well-developed and well-nourished. HENT:   Head: Normocephalic. Neck: Normal range of motion. Neck supple. Cardiovascular: Normal rate and regular rhythm. Exam reveals no gallop and no friction rub. No murmur heard. Pulmonary/Chest: Effort normal and breath sounds normal.   Abdominal: Soft. Bowel sounds are normal. She exhibits no distension. There is no tenderness. No masses or hernias noted. Neurological: She is alert and oriented to person, place, and time. Skin: Skin is warm and dry. Psychiatric: She has a normal mood and affect. ASSESSMENT and PLAN  Gena Gomez is a 62 y.o. female with previous Malabsorptive Gastric bypass. . She has lost a total of 81 pounds since surgery. She  Has lost 18 lbs since the last ov. Body mass index is 22.8 kg/(m^2). Vanda Owens Patient is to continue a high protein, low-fat, low-sugar diet for life. Take vitamins and minerals daily for life. Include protein at each meal.  Avoid high fat food items. Avoid \"added\" sugar. Chew well and eat slowly. Take 20-30 minutes to complete a meal.  Measure foods and read labels. Exercise daily. Follow up in 3 months. Pt verbalized understanding and questions were answered to the best of my knowledge and ability.

## 2017-11-02 DIAGNOSIS — J45.901 ASTHMA EXACERBATION: ICD-10-CM

## 2017-11-02 DIAGNOSIS — J45.40 MODERATE PERSISTENT ASTHMA WITHOUT COMPLICATION: ICD-10-CM

## 2017-11-02 RX ORDER — ALBUTEROL SULFATE 0.63 MG/3ML
SOLUTION RESPIRATORY (INHALATION)
Qty: 75 ML | Refills: 1 | Status: SHIPPED | OUTPATIENT
Start: 2017-11-02 | End: 2018-10-19 | Stop reason: SDUPTHER

## 2017-11-02 RX ORDER — BUDESONIDE 180 UG/1
AEROSOL, POWDER RESPIRATORY (INHALATION)
Qty: 1 INHALER | Refills: 3 | Status: SHIPPED | OUTPATIENT
Start: 2017-11-02 | End: 2018-10-19 | Stop reason: SDUPTHER

## 2017-11-02 RX ORDER — FUROSEMIDE 20 MG/1
TABLET ORAL
Qty: 30 TAB | Refills: 0 | Status: SHIPPED | OUTPATIENT
Start: 2017-11-02 | End: 2017-12-01 | Stop reason: SDUPTHER

## 2017-12-01 RX ORDER — FUROSEMIDE 20 MG/1
TABLET ORAL
Qty: 30 TAB | Refills: 0 | Status: SHIPPED | OUTPATIENT
Start: 2017-12-01 | End: 2018-01-06 | Stop reason: SDUPTHER

## 2017-12-12 ENCOUNTER — TELEPHONE (OUTPATIENT)
Dept: FAMILY MEDICINE CLINIC | Age: 57
End: 2017-12-12

## 2017-12-12 NOTE — TELEPHONE ENCOUNTER
----- Message from Marie Hugo sent at 12/12/2017 11:17 AM EST -----  Regarding: Dr. Daniel Weiss  Pt is requesting an appt before the end of December for annual screening. The best contact is [phone].

## 2017-12-14 NOTE — TELEPHONE ENCOUNTER
Called and offered this appointment day and time per nurse Jose Garrido. Accepted.     Tuesday, December 19, 2017 01:00 PM f [facility]-MAIN OFFICE, [facility], Complete Physical, 20min    physical, fasting labs, appt ok per CS/cm

## 2017-12-19 ENCOUNTER — OFFICE VISIT (OUTPATIENT)
Dept: FAMILY MEDICINE CLINIC | Age: 57
End: 2017-12-19

## 2017-12-19 VITALS
DIASTOLIC BLOOD PRESSURE: 81 MMHG | BODY MASS INDEX: 23.49 KG/M2 | RESPIRATION RATE: 18 BRPM | HEART RATE: 64 BPM | SYSTOLIC BLOOD PRESSURE: 147 MMHG | HEIGHT: 65 IN | WEIGHT: 141 LBS | TEMPERATURE: 97.8 F | OXYGEN SATURATION: 100 %

## 2017-12-19 DIAGNOSIS — Z12.11 COLON CANCER SCREENING: ICD-10-CM

## 2017-12-19 DIAGNOSIS — F41.9 ANXIETY: ICD-10-CM

## 2017-12-19 DIAGNOSIS — E66.01 MORBID OBESITY (HCC): ICD-10-CM

## 2017-12-19 DIAGNOSIS — Z23 ENCOUNTER FOR IMMUNIZATION: ICD-10-CM

## 2017-12-19 DIAGNOSIS — Z00.00 HEALTHCARE MAINTENANCE: Primary | ICD-10-CM

## 2017-12-19 DIAGNOSIS — M95.4 STERNAL DEFORMITY: Primary | ICD-10-CM

## 2017-12-19 DIAGNOSIS — Z80.0 FH: COLON CANCER: ICD-10-CM

## 2017-12-19 DIAGNOSIS — E11.9 TYPE 2 DIABETES MELLITUS WITHOUT COMPLICATION, UNSPECIFIED LONG TERM INSULIN USE STATUS: ICD-10-CM

## 2017-12-19 DIAGNOSIS — F43.22 ADJUSTMENT DISORDER WITH ANXIETY: ICD-10-CM

## 2017-12-19 DIAGNOSIS — Z98.890 H/O COLONOSCOPY: ICD-10-CM

## 2017-12-19 DIAGNOSIS — M51.36 DDD (DEGENERATIVE DISC DISEASE), LUMBAR: ICD-10-CM

## 2017-12-19 DIAGNOSIS — Z98.84 GASTRIC BYPASS STATUS FOR OBESITY: ICD-10-CM

## 2017-12-19 LAB
ALBUMIN UR QL STRIP: 10 MG/L
CREATININE, URINE POC: 10 MG/DL
MICROALBUMIN/CREAT RATIO POC: <30 MG/G

## 2017-12-19 RX ORDER — ALPRAZOLAM 0.5 MG/1
TABLET ORAL
Qty: 90 TAB | Refills: 0 | Status: SHIPPED | OUTPATIENT
Start: 2017-12-19 | End: 2017-12-19 | Stop reason: SDUPTHER

## 2017-12-19 RX ORDER — BUPROPION HYDROCHLORIDE 150 MG/1
150 TABLET ORAL
Qty: 30 TAB | Refills: 3 | Status: SHIPPED | OUTPATIENT
Start: 2017-12-19 | End: 2018-05-31 | Stop reason: SDUPTHER

## 2017-12-19 RX ORDER — ALPRAZOLAM 0.5 MG/1
TABLET ORAL
Qty: 90 TAB | Refills: 0 | Status: SHIPPED | OUTPATIENT
Start: 2017-12-19 | End: 2018-05-15 | Stop reason: SDUPTHER

## 2017-12-19 NOTE — LETTER
12/21/2017 9:15 AM 
 
Ms. Emma Beatty 
[address] [address] Dear Emma Beatty: 
 
Please find your most recent results below. Resulted Orders HEMOGLOBIN A1C WITH EAG Result Value Ref Range Hemoglobin A1c 5.2 4.8 - 5.6 % Comment:  
            Pre-diabetes: 5.7 - 6.4 Diabetes: >6.4 Glycemic control for adults with diabetes: <7.0 Estimated average glucose 103 mg/dL Narrative Performed at:  [address]  [id number] : You Meza MD, Phone:  [phone] LDL, DIRECT Result Value Ref Range LDL,Direct 84 0 - 99 mg/dL Narrative Performed at:  [address]  [id number] : You Meza MD, Phone:  [phone] ALT Result Value Ref Range ALT (SGPT) 21 0 - 32 IU/L Narrative Performed at:  [address]  [id number] : You Meza MD, Phone:  [phone] METABOLIC PANEL, BASIC Result Value Ref Range Glucose 88 65 - 99 mg/dL BUN 10 6 - 24 mg/dL Creatinine 0.71 0.57 - 1.00 mg/dL GFR est non-AA 95 >59 mL/min/1.73 GFR est  >59 mL/min/1.73  
 BUN/Creatinine ratio 14 9 - 23 Sodium 143 134 - 144 mmol/L Potassium 3.9 3.5 - 5.2 mmol/L Chloride 97 96 - 106 mmol/L  
 CO2 28 18 - 29 mmol/L Calcium 10.0 8.7 - 10.2 mg/dL Narrative Performed at:  [address]  [id number] : You Meza MD, Phone:  [phone] AMB POC URINE, MICROALBUMIN, SEMIQUANT (3 RESULTS) Result Value Ref Range ALBUMIN, URINE POC 10 Negative mg/L  
 CREATININE, URINE POC 10 mg/dL Microalbumin/creat ratio (POC) <30 MG/G Comment:  
   Normal  
VITAMIN D, 25 HYDROXY Result Value Ref Range VITAMIN D, 25-HYDROXY 58.4 30.0 - 100.0 ng/mL Comment: Vitamin D deficiency has been defined by the [facility] practice guideline as a 
level of serum 25-OH vitamin D less than 20 ng/mL (1,2). The Endocrine Society went on to further define vitamin D 
insufficiency as a level between 21 and 29 ng/mL (2). 1. IOM ([place] of [employer]). 2010. Dietary reference 
   intakes for calcium and D. [facility]: The 
   [employer]. 2. Lucian MF, Meli NC, Judi LEWIS, et al. 
   Evaluation, treatment, and prevention of vitamin D 
   deficiency: an Endocrine Society clinical practice 
   guideline. JCEM. 2011 Jul; 96(7):1911-30. Narrative Performed at:  [address]  [id number] : Darletta Dancer MD, Phone:  [phone] VITAMIN B12 Result Value Ref Range Vitamin B12 473 232 - 1245 pg/mL Comment: **Please note reference interval change** Narrative Performed at:  [address]  [id number] : Darletta Dancer MD, Phone:  [phone] FERRITIN Result Value Ref Range Ferritin 329 (H) 15 - 150 ng/mL Narrative Performed at:  [address]  [id number] : Darletta Dancer MD, Phone:  [phone] RECOMMENDATIONS: 
 
Your ferritin level is up.  This shows abundant iron stores.  This can irritate your liver. I suggest that you see Dr. Juan Conteh to review this and your gastric bypass.    
 
Your sugars are OK.  Congratulations on your weight loss!! There is no evidence for vitamin deficiency. Sincerely, Moises Montano MD

## 2017-12-19 NOTE — ASSESSMENT & PLAN NOTE
This condition is managed by Specialist.  Key Obesity Meds             furosemide (LASIX) 20 mg tablet  (Taking) TAKE 1 TAB BY MOUTH AS NEEDED.         Lab Results   Component Value Date/Time    Hemoglobin A1c 6.1 02/20/2017 03:58 PM    Glucose 95 08/03/2017 04:15 PM    Cholesterol, total 129 08/23/2016 02:02 PM    HDL Cholesterol 44 08/23/2016 02:02 PM    LDL, calculated 32 08/23/2016 02:02 PM    Triglyceride 267 08/23/2016 02:02 PM    TSH 2.07 01/10/2017 09:52 AM    Sodium 143 08/03/2017 04:15 PM    Potassium 3.6 08/03/2017 04:15 PM    ALT (SGPT) 17 08/03/2017 04:15 PM    AST (SGOT) 17 08/03/2017 04:15 PM    Vitamin D 25-Hydroxy 33.6 01/10/2017 09:52 AM    VITAMIN D, 25-HYDROXY 60.4 07/12/2017 11:54 AM    C-Reactive protein 2.67 01/10/2017 09:52 AM

## 2017-12-19 NOTE — PROGRESS NOTES
Guy Guardado is a 62 y.o. female      Issues discussed today include: We discussed health maintenance    BMI = Body mass index is 23.46 kg/(m^2). She is s/p gastric bypass 1/2017 Dr. Zohaib Dean    We discussed diet/exercise/healthy weight    We reviewed and updated pertinent past medical history in the problem list      Data reviewed or ordered today:  Heather Munguia     GYN:  Hysterectomy (ovaries intact) done for benign reasons  Mammogram:  2017 normal  LMP:  25  last pap:  Due 2017  DEXA:  consider  Hearing screen:   done  Vision screening:   done  Depression screening:   done  Fall assessment:   done    Colonoscopy:  2015, next 2025  FOBT:  2017  TDAP:  2012  Pneumovax:  2015  PCV-13:  Rx given 2017  Flu shot:  2017  Zostavax:  Consider age 61  Eye exam:  2017  EKG: On file    FTF for DME:  done:  DM supplies:  She chacks here and there 120 range  Advanced directive:  Full code  Specialists:  Dillon Lopez  [facility]    In general, I advise patients to be as active as possible. I believe exercise is the key to long life and good health. The current recommendation is for individuals to exercise for 150 minutes each week (in other words 30 minutes 5 days a week). Exercise should be vigorous enough to work up a sweat. These activities include brisk walking, running, tennis, swimming, weight-lifting, etc.     I usually tell folks that work is work and exercise is exercise. Each of these activities has a different goal.  Even though you may be active at work, it may not be aerobically adequate. So build dedicated exercise time into your weekly routine. If a patient drinks alcohol, I suggest that a male drink only 2 beers (or glasses of wine, or shots of liquor) in any 24 hour period ( and not daily). For females, the limits are one drink per 24 hours (and not daily).   After these limits, the toxic effects of alcohol consumption start to manifest.     Avoid tobacco products. I may provide separate information discussing specific smoking cessation instructions if needed. I suggest a wellness exam yearly during your birth month to update health maintenance issues such as fasting labs, EKGs and other studies, appropriate cancer screenings,  immunizations, etc.      I suggest a yearly flu shot    Please call Amber Hernández to help arrange and authorize any tests or referrals. Her # is [phone]         Other problems include:  Patient Active Problem List   Diagnosis Code    Morbid obesity ([facility] E66.01    Obstructive sleep apnea G47.33    Asthma J45.909    Arthritis M19.90    GERD (gastroesophageal reflux disease) K21.9    GARCIA (nonalcoholic steatohepatitis) K75.81    Chronic pain G89.29    Essential hypertension C87    Metabolic syndrome U60.54    FH: diabetes mellitus Z83.3    DM (diabetes mellitus) ([facility] E11.9    CAD (coronary artery disease) I25.10    Anxiety F41.9    History of pulmonary embolus (PE) Z86.711    Hypokalemia E87.6    H/O gastric bypass Z98.890    H/O colonoscopy Z98.890    DDD (degenerative disc disease), lumbar M51.36       Medications:  Current Outpatient Prescriptions   Medication Sig Dispense Refill    ALPRAZolam (XANAX) 0.5 mg tablet Take 1/2 or one whole pill twice daily as needed for anxiety 90 Tab 0    buPROPion XL (WELLBUTRIN XL) 150 mg tablet Take 1 Tab by mouth every morning. Indications: ANXIETY WITH DEPRESSION 30 Tab 3    furosemide (LASIX) 20 mg tablet TAKE 1 TAB BY MOUTH AS NEEDED. 30 Tab 0    PULMICORT FLEXHALER 180 mcg/actuation aepb inhaler TAKE 1 PUFF BY INHALATION TWO (2) TIMES A DAY. INDICATIONS: MAINTENANCE THERAPY FOR ASTHMA 1 Inhaler 3    albuterol (ACCUNEB) 0.63 mg/3 mL nebulizer solution 3 ML BY NEBULIZATION ROUTE EVERY FOUR (4) HOURS AS NEEDED FOR WHEEZING OR SHORTNESS OF BREATH.  75 mL 1    rosuvastatin (CRESTOR) 40 mg tablet TAKE 1 TABLET BY MOUTH NIGHTLY 90 Tab 2  metoprolol tartrate (LOPRESSOR) 25 mg tablet Take 1 Tab by mouth two (2) times a day. 60 Tab 2    gabapentin (NEURONTIN) 600 mg tablet TAKE 1 TABLET BY MOUTH THREE (3) TIMES DAILY. 270 Tab 0    aspirin delayed-release 81 mg tablet Take  by mouth daily.  nitroglycerin (NITROSTAT) 0.4 mg SL tablet DISSOLVE 1 TABLET IN MOUTH EVERY 5 MINUTES AS NEEDED FOR CHEST PAIN 25 Tab 1    albuterol (PROAIR HFA) 90 mcg/actuation inhaler Take 1 Puff by inhalation every four (4) hours as needed for Wheezing or Shortness of Breath. 1 Inhaler 5    Blood-Glucose Meter monitoring kit Use to check glucose once a day 1 Kit 0    alcohol swabs (ALCOHOL PADS) padm Use when checking blood glucose 100 Pad 5    Lancets misc Use once a day. Please give one compatible with patient's meter 1 Package 5    glucose blood VI test strips (BLOOD GLUCOSE TEST) strip Use once a day 100 Strip 5    valACYclovir (VALTREX) 500 mg tablet Take 1 Tab by mouth two (2) times a day. (Patient taking differently: Take 500 mg by mouth two (2) times daily as needed.) 60 Tab 3    dicyclomine (BENTYL) 10 mg capsule TAKE 1 (ONE) CAPSULE ORALLY AS NEEDED EVERY 6 HOURS 120 capsule 0    cetirizine (ZYRTEC) 10 mg tablet Take  by mouth daily.  omeprazole (PRILOSEC) 20 mg capsule TAKE ONE CAPSULE BY MOUTH EVERY DAY 90 Cap 1       Allergies: Allergies   Allergen Reactions    Accupril [Quinapril] Cough    Lipitor [Atorvastatin] Other (comments)     aches    Norvasc [Amlodipine] Swelling     On legs at 10 mg dose       LMP:  Patient's last menstrual period was 01/01/1985. Social History     Social History    Marital status:      Spouse name: N/A    Number of children: N/A    Years of education: N/A     Occupational History    Not on file.      Social History Main Topics    Smoking status: Never Smoker    Smokeless tobacco: Never Used    Alcohol use No    Drug use: No    Sexual activity: Yes     Partners: Male     Birth control/ protection: None     Other Topics Concern    Not on file     Social History Narrative         Family History   Problem Relation Age of Onset    Cancer Mother 67     colon    Diabetes Mother     Arthritis-osteo Mother     Stroke Mother     Migraines Mother     Diabetes Father     Heart Disease Father     Heart Attack Father     Hypertension Father     High Cholesterol Father     COPD Father     Heart Failure Father     Cancer Other     Diabetes Other     Heart Disease Other     Hypertension Other     Cancer Brother      lymphoma    Cancer Brother      PROSTATE AND LYMPHOMA    Cancer Maternal Grandmother      stomach    Asthma Brother     Asthma Brother     Stroke Brother     Cancer Maternal Aunt      lung     Breast Cancer Maternal Aunt     Cancer Maternal Uncle      lung    Cancer Maternal Uncle      melanoma    Anesth Problems Neg Hx          Meaningful use:  done      ROS:  Headaches:  no  Chest Pain:  no  SOB:  no  Fevers:  no  Falls:  Some positional dizziness  anxiety/depression/losing interest in doing things that were previously enjoyed:  PHQ9 = 10  Other significant ROS:    Patient denied problems with:    Hearing/vision, speaking/swallowing, Reflux/indigestion, Cough,Diarrhea/constipation,Problems passing or controlling urine,  Snoring/sleep apnea,Fatigue, Weight change, memory                                                         Any other Positive ROS include: s/p gastric bypass 1/2017        Falls in the past 12 months:  no           Over the last year (or since your last visit):  Have you been diagnosed with heart attack, stroke, broken bones, or skin cancer = no    Exercise:  Needs more             Smoking history:  none                                    Patient's last menstrual period was 01/01/1985.     Physical Exam  Visit Vitals    /81 (BP 1 Location: Left arm, BP Patient Position: Sitting)    Pulse 64    Temp 97.8 °F (36.6 °C) (Oral)    Resp 18    Ht 5' 5\" (1.651 m)    Wt 141 lb (64 kg)    LMP 01/01/1985    SpO2 100%    BMI 23.46 kg/m2     BP Readings from Last 3 Encounters:   12/19/17 147/81   10/18/17 137/83   08/03/17 135/85     Constitutional:  Appears well,  No Acute Distress, Vitals noted  Psychiatric:   Affect normal, Alert and cooperative, Oriented to person/place/time    Eyes:   Pupils equally round and reactive, EOMI, conjunctiva clear, eyelids normal  ENT:   External ears and nose normal/lips, teeth=OK/gums normal, TMs and Orophyarynx normal  Neck:   general inspection and Thyroid normal.  No abnormal cervical or supraclavicular nodes    Lungs:   clear to auscultation, good respiratory effort  Heart: Ausculation normal.  Regular rhythm. No cardiac murmurs. No carotid bruits or palpable thrills  Chest wall normal  Abd:  benign  Extremities:   without edema, good peripheral pulses  Skin:  Some bruising associated with aspirin use on arms    Diabetic foot exam:      Sensation by vibration sense:  good  Monofilament test:  good  Skin integrity:  intact without lesions  Vascular:  good circulation  Motor:  moves all toes, strength seems ok    +onychomycosis        Assessment:    Patient Active Problem List   Diagnosis Code    Morbid obesity ([facility] E66.01    Obstructive sleep apnea G47.33    Asthma J45.909    Arthritis M19.90    GERD (gastroesophageal reflux disease) K21.9    GARCIA (nonalcoholic steatohepatitis) K75.81    Chronic pain G89.29    Essential hypertension M32    Metabolic syndrome A72.75    FH: diabetes mellitus Z83.3    DM (diabetes mellitus) (HCC) E11.9    CAD (coronary artery disease) I25.10    Anxiety F41.9    History of pulmonary embolus (PE) Z86.711    Hypokalemia E87.6    H/O gastric bypass Z98.890    H/O colonoscopy Z98.890    DDD (degenerative disc disease), lumbar M51.36       Today's diagnoses are:    ICD-10-CM ICD-9-CM    1. Healthcare maintenance Z00.00 V70.0    2.  Gastric bypass status for obesity Z98.84 V45.86 1/2017 Dr. Daniel Bang   3. Type 2 diabetes mellitus without complication, unspecified long term insulin use status ([facility]) E11.9 250.00     diet controlled, now s/p gastric bypass 1/2017   4. H/O colonoscopy Z98.890 V45.89    5. DDD (degenerative disc disease), lumbar M51.36 722.52    6. Morbid [facility] ([facility] E66.01 278.01    7. Anxiety F41.9 300.00 ALPRAZolam (XANAX) 0.5 mg tablet      DISCONTINUED: ALPRAZolam (XANAX) 0.5 mg tablet      DISCONTINUED: ALPRAZolam (XANAX) 0.5 mg tablet   8. Anxiety F41.9 300.00 ALPRAZolam (XANAX) 0.5 mg tablet      DISCONTINUED: ALPRAZolam (XANAX) 0.5 mg tablet      DISCONTINUED: ALPRAZolam (XANAX) 0.5 mg tablet    PHQ9 =8   9. Adjustment disorder with anxiety F43.22 309.24     PHQ9 = 10       Plan:  Orders Placed This Encounter    DISCONTD: ALPRAZolam (XANAX) 0.5 mg tablet     Sig: Take 1/2 or one whole pill twice daily as needed for anxiety     Dispense:  90 Tab     Refill:  0     May fill 1/2/2018    DISCONTD: ALPRAZolam [place]) 0.5 mg tablet     Sig: Take 1/2 or one whole pill twice daily as needed for anxiety     Dispense:  90 Tab     Refill:  0     May fill 2/2/2018    ALPRAZolam (XANAX) 0.5 mg tablet     Sig: Take 1/2 or one whole pill twice daily as needed for anxiety     Dispense:  90 Tab     Refill:  0     May fill 3/2/2018    buPROPion XL (WELLBUTRIN XL) 150 mg tablet     Sig: Take 1 Tab by mouth every morning. Indications: ANXIETY WITH DEPRESSION     Dispense:  30 Tab     Refill:  3     Replaces fluoxetine       See patient instructions  There are no Patient Instructions on file for this visit. refresh note:  done    AVS Printed:  done      Diagnoses and all orders for this visit:    1. Healthcare maintenance    2. Gastric bypass status for obesity  Comments:  1/2017 Dr. Daniel Bang    3. Type 2 diabetes mellitus without complication, unspecified long term insulin use status ([facility]  Comments:  diet controlled, now s/p gastric bypass 1/2017    4. H/O colonoscopy    5.  DDD (degenerative disc disease), lumbar    6. Morbid obesity ([address] [facility]  Assessment & Plan: This condition is managed by Specialist.  Key Obesity Meds             furosemide (LASIX) 20 mg tablet  (Taking) TAKE 1 TAB BY MOUTH AS NEEDED. Lab Results   Component Value Date/Time    Hemoglobin A1c 6.1 02/20/2017 03:58 PM    Glucose 95 08/03/2017 04:15 PM    Cholesterol, total 129 08/23/2016 02:02 PM    HDL Cholesterol 44 08/23/2016 02:02 PM    LDL, calculated 32 08/23/2016 02:02 PM    Triglyceride 267 08/23/2016 02:02 PM    TSH 2.07 01/10/2017 09:52 AM    Sodium 143 08/03/2017 04:15 PM    Potassium 3.6 08/03/2017 04:15 PM    ALT (SGPT) 17 08/03/2017 04:15 PM    AST (SGOT) 17 08/03/2017 04:15 PM    Vitamin D 25-Hydroxy 33.6 01/10/2017 09:52 AM    VITAMIN D, 25-HYDROXY 60.4 07/12/2017 11:54 AM    C-Reactive protein 2.67 01/10/2017 09:52 AM             7. Anxiety  -     ALPRAZolam (XANAX) 0.5 mg tablet; Take 1/2 or one whole pill twice daily as needed for anxiety    8. Anxiety  Comments:  PHQ9 =8  Orders:  -     ALPRAZolam (XANAX) 0.5 mg tablet; Take 1/2 or one whole pill twice daily as needed for anxiety    9. Adjustment disorder with anxiety  Comments:  PHQ9 = 10    Other orders  -     buPROPion XL (WELLBUTRIN XL) 150 mg tablet; Take 1 Tab by mouth every morning.  Indications: ANXIETY WITH DEPRESSION

## 2017-12-19 NOTE — PROGRESS NOTES
Chief Complaint   Patient presents with    Complete Physical     Patient fasting    Immunization/Injection     Influenza Vaccine per verbal order from MD [facility] [address] [facility]      1. Have you been to the ER, urgent care clinic since your last visit? Hospitalized since your last visit? No    2. Have you seen or consulted any other health care providers outside of the [address] [place] [address] since your last visit? Include any pap smears or colon screening. No     Reviewed record in preparation for visit and have obtained necessary documentation.

## 2017-12-19 NOTE — LETTER
12/19/2017 2:06 PM 
 
Ms. Miles Lamb 
[address] [address] TOENAIL FUNGUS REMEDY You must be very patient as this can take 6-7 months for results to be apparent! 1. Soak your feet 50/50 vinegar and water solution twice daily for 15 minutes. 2. Immediately after, spray under toenail with lotrimin spray. 3. Apply [employer] Vaporub to toenail base twice daily. Sincerely, Martha Montes MD

## 2017-12-20 LAB
25(OH)D3+25(OH)D2 SERPL-MCNC: 58.4 NG/ML (ref 30–100)
ALT SERPL-CCNC: 21 IU/L (ref 0–32)
BUN SERPL-MCNC: 10 MG/DL (ref 6–24)
BUN/CREAT SERPL: 14 (ref 9–23)
CALCIUM SERPL-MCNC: 10 MG/DL (ref 8.7–10.2)
CHLORIDE SERPL-SCNC: 97 MMOL/L (ref 96–106)
CO2 SERPL-SCNC: 28 MMOL/L (ref 18–29)
CREAT SERPL-MCNC: 0.71 MG/DL (ref 0.57–1)
EST. AVERAGE GLUCOSE BLD GHB EST-MCNC: 103 MG/DL
FERRITIN SERPL-MCNC: 329 NG/ML (ref 15–150)
GLUCOSE SERPL-MCNC: 88 MG/DL (ref 65–99)
HBA1C MFR BLD: 5.2 % (ref 4.8–5.6)
LDLC SERPL DIRECT ASSAY-MCNC: 84 MG/DL (ref 0–99)
POTASSIUM SERPL-SCNC: 3.9 MMOL/L (ref 3.5–5.2)
SODIUM SERPL-SCNC: 143 MMOL/L (ref 134–144)
VIT B12 SERPL-MCNC: 473 PG/ML (ref 232–1245)

## 2017-12-21 DIAGNOSIS — R79.89 ELEVATED FERRITIN: Primary | ICD-10-CM

## 2017-12-21 DIAGNOSIS — K75.81 NASH (NONALCOHOLIC STEATOHEPATITIS): ICD-10-CM

## 2017-12-21 NOTE — PROGRESS NOTES
Your ferritin level is up. This shows abundant iron stores. This can irritate your liver. I suggest that you see Dr. Micheal Walker to review this and your gastric bypass. Your sugars are OK. Congratulations on your weight loss!! There is no evidence for vitamin deficiency.

## 2017-12-29 DIAGNOSIS — I25.10 CORONARY ARTERY DISEASE INVOLVING NATIVE CORONARY ARTERY OF NATIVE HEART WITHOUT ANGINA PECTORIS: ICD-10-CM

## 2017-12-29 RX ORDER — METOPROLOL TARTRATE 25 MG/1
TABLET, FILM COATED ORAL
Qty: 60 TAB | Refills: 1 | Status: SHIPPED | OUTPATIENT
Start: 2017-12-29 | End: 2018-03-24 | Stop reason: SDUPTHER

## 2018-01-07 RX ORDER — FUROSEMIDE 20 MG/1
TABLET ORAL
Qty: 30 TAB | Refills: 0 | Status: SHIPPED | OUTPATIENT
Start: 2018-01-07 | End: 2018-02-10 | Stop reason: SDUPTHER

## 2018-01-18 ENCOUNTER — OFFICE VISIT (OUTPATIENT)
Dept: FAMILY MEDICINE CLINIC | Age: 58
End: 2018-01-18

## 2018-01-18 VITALS
BODY MASS INDEX: 22.66 KG/M2 | WEIGHT: 136 LBS | TEMPERATURE: 96.7 F | HEART RATE: 68 BPM | SYSTOLIC BLOOD PRESSURE: 124 MMHG | DIASTOLIC BLOOD PRESSURE: 83 MMHG | OXYGEN SATURATION: 9 % | HEIGHT: 65 IN | RESPIRATION RATE: 18 BRPM

## 2018-01-18 DIAGNOSIS — J01.00 ACUTE NON-RECURRENT MAXILLARY SINUSITIS: Primary | ICD-10-CM

## 2018-01-18 DIAGNOSIS — R05.9 COUGH: ICD-10-CM

## 2018-01-18 LAB
FLUAV+FLUBV AG NOSE QL IA.RAPID: NEGATIVE POS/NEG
FLUAV+FLUBV AG NOSE QL IA.RAPID: NEGATIVE POS/NEG
VALID INTERNAL CONTROL?: YES

## 2018-01-18 RX ORDER — AMOXICILLIN AND CLAVULANATE POTASSIUM 875; 125 MG/1; MG/1
1 TABLET, FILM COATED ORAL 2 TIMES DAILY
Qty: 14 TAB | Refills: 0 | Status: SHIPPED | OUTPATIENT
Start: 2018-01-18 | End: 2018-01-25

## 2018-01-18 NOTE — MR AVS SNAPSHOT
[address] 
[phone] Patient: Kira Cao MRN: [id number] EEU:[id number] Visit Information Date & Time Provider Department Dept. Phone Encounter #  
 1/18/2018 10:35 AM Tien Corrales MD 1000 [facility] [phone] [id number] Follow-up Instructions Return if symptoms worsen or fail to improve. Your Appointments 1/24/2018  2:20 PM  
[place] [address] [facility] ([facility]) Appt Note: 3mos. ID&Ins.cards. payment plan. $749.00.fs; re/[place] due to the weather. $649.00pb. payment plan. ID&ins.cards. fs  
 380 [facility] 8 [facility] 70 [facility]  
[phone]  
  
   
 [address] 70 [facility]  
  
    
 6/29/2018  1:00 PM  
ESTABLISHED PATIENT with Dash Rebolledo MD  
[address] ([address] [place] [address]) Appt Note: 1yr f/u  
 320 [facility] [place] 600 70 [facility]  
[address] [facility] [place] [address] [address] Upcoming Health Maintenance Date Due  
 EYE EXAM RETINAL OR DILATED Q1 12/19/2018* HEMOGLOBIN A1C Q6M 6/19/2018 FOOT EXAM Q1 12/19/2018 MICROALBUMIN Q1 12/19/2018 LIPID PANEL Q1 12/19/2018 BREAST CANCER SCRN MAMMOGRAM 7/18/2019 PAP AKA CERVICAL CYTOLOGY 8/23/2019 COLONOSCOPY 6/23/2020 DTaP/Tdap/Td series (2 - Td) 8/14/2022 *Topic was postponed. The date shown is not the original due date. Allergies as of 1/18/2018  Review Complete On: 1/18/2018 By: Tien Corrales MD  
  
 Severity Noted Reaction Type Reactions Accupril [Quinapril]  07/07/2010    Cough Lipitor [Atorvastatin]  07/07/2010    Other (comments)  
 aches Norvasc [Amlodipine]  07/22/2015    Swelling On legs at 10 mg dose Current Immunizations  Reviewed on 12/19/2017 Name Date Influenza Vaccine 9/1/2015 Influenza Vaccine (Quad) PF 12/19/2017 Pneumococcal Polysaccharide (PPSV-23) 8/4/2015 TD Vaccine 11/7/2005 TDAP Vaccine 8/14/2012 Not reviewed this visit You Were Diagnosed With   
  
 Codes Comments Acute non-recurrent maxillary sinusitis    -  Primary ICD-10-CM: J01.00 ICD-9-CM: 461.0 Cough     ICD-10-CM: R05 ICD-9-CM: 978. 2 Vitals BP Pulse Temp Resp Height(growth percentile) Weight(growth percentile) 124/83 (BP 1 Location: Right arm, BP Patient Position: Sitting) 68 96.7 °F (35.9 °C) (Oral) 18 5' 5\" (1.651 m) 136 lb (61.7 kg) LMP SpO2 BMI OB Status Smoking Status 01/01/1985 (!) 9% 22.63 kg/m2 Hysterectomy Never Smoker Vitals History BMI and BSA Data Body Mass Index Body Surface Area  
 22.63 kg/m 2 1.68 m 2 Preferred Pharmacy Pharmacy Name Phone [facility]/PHARMACY #[id number]- [place], [address] AT [facility] [phone] Your Updated Medication List  
  
   
This list is accurate as of: 1/18/18 11:18 AM.  Always use your most recent med list.  
  
  
  
  
 * albuterol 90 mcg/actuation inhaler Commonly known as:  PROAIR HFA Take 1 Puff by inhalation every four (4) hours as needed for Wheezing or Shortness of Breath. * albuterol 0.63 mg/3 mL nebulizer solution Commonly known as:  ACCUNEB  
3 ML BY NEBULIZATION ROUTE EVERY FOUR (4) HOURS AS NEEDED FOR WHEEZING OR SHORTNESS OF BREATH. alcohol swabs Padm Commonly known as:  ALCOHOL PADS Use when checking blood glucose ALPRAZolam 0.5 mg tablet Commonly known as:  Stephie Magallanes Take 1/2 or one whole pill twice daily as needed for anxiety  
  
 amoxicillin-clavulanate 875-125 mg per tablet Commonly known as:  AUGMENTIN Take 1 Tab by mouth two (2) times a day for 7 days. aspirin delayed-release 81 mg tablet Take  by mouth daily. Blood-Glucose Meter monitoring kit Use to check glucose once a day buPROPion  mg tablet Commonly known as:  Sonia Mast Take 1 Tab by mouth every morning. Indications: ANXIETY WITH DEPRESSION  
  
 dicyclomine 10 mg capsule Commonly known as:  BENTYL TAKE 1 (ONE) CAPSULE ORALLY AS NEEDED EVERY 6 HOURS  
  
 furosemide 20 mg tablet Commonly known as:  LASIX TAKE 1 TAB BY MOUTH AS NEEDED.  
  
 gabapentin 600 mg tablet Commonly known as:  NEURONTIN  
TAKE 1 TABLET BY MOUTH THREE (3) TIMES DAILY. glucose blood VI test strips strip Commonly known as:  blood glucose test  
Use once a day Lancets Misc Use once a day. Please give one compatible with patient's meter  
  
 metoprolol tartrate 25 mg tablet Commonly known as:  LOPRESSOR  
TAKE 1 TABLET BY MOUTH TWO (2) TIMES A DAY. nitroglycerin 0.4 mg SL tablet Commonly known as:  NITROSTAT  
DISSOLVE 1 TABLET IN MOUTH EVERY 5 MINUTES AS NEEDED FOR CHEST PAIN  
  
 omeprazole 20 mg capsule Commonly known as:  PRILOSEC  
TAKE ONE CAPSULE BY MOUTH EVERY DAY  
  
 PULMICORT FLEXHALER 180 mcg/actuation Aepb inhaler Generic drug:  budesonide TAKE 1 PUFF BY INHALATION TWO (2) TIMES A DAY. INDICATIONS: MAINTENANCE THERAPY FOR ASTHMA  
  
 rosuvastatin 40 mg tablet Commonly known as:  CRESTOR  
TAKE 1 TABLET BY MOUTH NIGHTLY  
  
 valACYclovir 500 mg tablet Commonly known as:  VALTREX Take 1 Tab by mouth two (2) times a day. ZyrTEC 10 mg tablet Generic drug:  cetirizine Take  by mouth daily. * Notice: This list has 2 medication(s) that are the same as other medications prescribed for you. Read the directions carefully, and ask your doctor or other care provider to review them with you. Prescriptions Sent to Pharmacy Refills  
 amoxicillin-clavulanate (AUGMENTIN) 875-125 mg per tablet 0 Sig: Take 1 Tab by mouth two (2) times a day for 7 days. Class: Normal  
 Pharmacy: [facility]/pharmacy #[id number] - [place], [address]  AT [facility] #: [phone] Route: Oral  
  
We Performed the Following AMB POC MILAN INFLUENZA A/B TEST [66500 CPT(R)] Follow-up Instructions Return if symptoms worsen or fail to improve. Introducing [facility] SERVICES! Dear Annel Ramirez: 
Thank you for requesting a [facility] account. Our records indicate that you already have an active [facility] account. You can access your account anytime at [email or web address] Did you know that you can access your hospital and ER discharge instructions at any time in [facility]? You can also review all of your test results from your hospital stay or ER visit. Additional Information If you have questions, please visit the Frequently Asked Questions section of the [facility] website at [email or web address] Remember, [facility] is NOT to be used for urgent needs. For medical emergencies, dial 911. Now available from your iPhone and Android! Please provide this summary of care documentation to your next provider. Your primary care clinician is listed as Hardy Monday. If you have any questions after today's visit, please call [phone].

## 2018-01-18 NOTE — PROGRESS NOTES
Chief Complaint   Patient presents with    Cold Symptoms     Symptoms started 10 days ago associated with coughing, sneezing and congestion      1. Have you been to the ER, urgent care clinic since your last visit? Hospitalized since your last visit? No     2. Have you seen or consulted any other health care providers outside of the [facility] since your last visit? Include any pap smears or colon screening.   No     Visit Vitals    /83 (BP 1 Location: Right arm, BP Patient Position: Sitting)    Pulse 68    Temp 96.7 °F (35.9 °C) (Oral)    Resp 18    Ht 5' 5\" (1.651 m)    Wt 136 lb (61.7 kg)    SpO2 (!) 9%    BMI 22.63 kg/m2

## 2018-01-18 NOTE — PROGRESS NOTES
Subjective  Pedro Baird is an 62 y.o. female who presents for   Chief Complaint   Patient presents with    Cold Symptoms     Symptoms started 10 days ago associated with coughing, sneezing and congestion      Reports 10 days of body aches, productive cough, sneezing, nasal congestion. Initially felt better but then worse since Tuesday. Taking tylenol which helps a little. No fever. +sick contact. Does not smoke. Hx of asthma, takes pulmicort. Used albuterol twice 2 days ago, none yesterday or today. Received flu vaccine this year. Allergies - reviewed: Allergies   Allergen Reactions    Accupril [Quinapril] Cough    Lipitor [Atorvastatin] Other (comments)     aches    Norvasc [Amlodipine] Swelling     On legs at 10 mg dose         Medications - reviewed:   Current Outpatient Prescriptions   Medication Sig    amoxicillin-clavulanate (AUGMENTIN) 875-125 mg per tablet Take 1 Tab by mouth two (2) times a day for 7 days.  furosemide (LASIX) 20 mg tablet TAKE 1 TAB BY MOUTH AS NEEDED.  metoprolol tartrate (LOPRESSOR) 25 mg tablet TAKE 1 TABLET BY MOUTH TWO (2) TIMES A DAY.  ALPRAZolam (XANAX) 0.5 mg tablet Take 1/2 or one whole pill twice daily as needed for anxiety    buPROPion XL (WELLBUTRIN XL) 150 mg tablet Take 1 Tab by mouth every morning. Indications: ANXIETY WITH DEPRESSION    PULMICORT FLEXHALER 180 mcg/actuation aepb inhaler TAKE 1 PUFF BY INHALATION TWO (2) TIMES A DAY. INDICATIONS: MAINTENANCE THERAPY FOR ASTHMA    albuterol (ACCUNEB) 0.63 mg/3 mL nebulizer solution 3 ML BY NEBULIZATION ROUTE EVERY FOUR (4) HOURS AS NEEDED FOR WHEEZING OR SHORTNESS OF BREATH.  rosuvastatin (CRESTOR) 40 mg tablet TAKE 1 TABLET BY MOUTH NIGHTLY    gabapentin (NEURONTIN) 600 mg tablet TAKE 1 TABLET BY MOUTH THREE (3) TIMES DAILY.  aspirin delayed-release 81 mg tablet Take  by mouth daily.     nitroglycerin (NITROSTAT) 0.4 mg SL tablet DISSOLVE 1 TABLET IN MOUTH EVERY 5 MINUTES AS NEEDED FOR CHEST PAIN    omeprazole (PRILOSEC) 20 mg capsule TAKE ONE CAPSULE BY MOUTH EVERY DAY    albuterol (PROAIR HFA) 90 mcg/actuation inhaler Take 1 Puff by inhalation every four (4) hours as needed for Wheezing or Shortness of Breath.  Blood-Glucose Meter monitoring kit Use to check glucose once a day    alcohol swabs (ALCOHOL PADS) padm Use when checking blood glucose    Lancets misc Use once a day. Please give one compatible with patient's meter    glucose blood VI test strips (BLOOD GLUCOSE TEST) strip Use once a day    valACYclovir (VALTREX) 500 mg tablet Take 1 Tab by mouth two (2) times a day. (Patient taking differently: Take 500 mg by mouth two (2) times daily as needed.)    dicyclomine (BENTYL) 10 mg capsule TAKE 1 (ONE) CAPSULE ORALLY AS NEEDED EVERY 6 HOURS    cetirizine (ZYRTEC) 10 mg tablet Take  by mouth daily. No current facility-administered medications for this visit.           Past Medical History - reviewed:  Past Medical History:   Diagnosis Date    Arthritis     OA back,knees and hands    Asthma     Autoimmune disease ([facility]     Ashlyn Rodriguez    CAD (coronary artery disease)     s/p stents 2015    Cardiac murmur     Depression     Diabetes ([address]     [facility] thrombosis[facility]     GERD (gastroesophageal reflux disease)     Hypertension     Morbid obesity ([facility]     Musculoskeletal disorder     EDMOND (obstructive sleep apnea)     wears cpap    S/P TONJA (total abdominal hysterectomy)     Thromboembolus ([address] 1996    PE         Past Surgical History - reviewed:   Past Surgical History:   Procedure Laterality Date    CARDIAC SURG PROCEDURE UNLIST  2015    STENT PLACED    HX APPENDECTOMY      HX  SECTION  1978    HX GASTRIC BYPASS  2017    HX HYSTERECTOMY  1985    HX TOTAL ABDOMINAL HYSTERECTOMY      age 22    DAYAN STEREO  BX BREAST RT ADDL W/CLIP AND SPECIMEN Right     neg          Social History - reviewed:  Social History     Social History    Marital status:      Spouse name: N/A    Number of children: N/A    Years of education: N/A     Occupational History    Not on file.      Social History Main Topics    Smoking status: Never Smoker    Smokeless tobacco: Never Used    Alcohol use No    Drug use: No    Sexual activity: Yes     Partners: Male     Birth control/ protection: None     Other Topics Concern    Not on file     Social History Narrative         Family History - reviewed:  Family History   Problem Relation Age of Onset    Cancer Mother 67     colon    Diabetes Mother     Arthritis-osteo Mother     Stroke Mother     Migraines Mother     Diabetes Father     Heart Disease Father     Heart Attack Father     Hypertension Father     High Cholesterol Father     COPD Father     Heart Failure Father     Cancer Other     Diabetes Other     Heart Disease Other     Hypertension Other     Cancer Brother      lymphoma    Cancer Brother      PROSTATE AND LYMPHOMA    Cancer Maternal Grandmother      stomach    Asthma Brother     Asthma Brother     Stroke Brother     Cancer Maternal Aunt      lung     Breast Cancer Maternal Aunt     Cancer Maternal Uncle      lung    Cancer Maternal Uncle      melanoma    Anesth Problems Neg Hx          Immunizations - reviewed:   Immunization History   Administered Date(s) Administered    Influenza Vaccine 09/01/2015    Influenza Vaccine (Quad) PF 12/19/2017    Pneumococcal Polysaccharide (PPSV-23) 08/04/2015    TD Vaccine 11/07/2005    TDAP Vaccine 08/14/2012       ROS  CONSTITUTIONAL: chills, body aches  ENT: nasal discharge, sinus tenderness  RESPIRATORY: cough      Physical Exam  Visit Vitals    /83 (BP 1 Location: Right arm, BP Patient Position: Sitting)    Pulse 68    Temp 96.7 °F (35.9 °C) (Oral)    Resp 18    Ht 5' 5\" (1.651 m)    Wt 136 lb (61.7 kg)    LMP 01/01/1985    SpO2 (!) 9%    BMI 22.63 kg/m2       General appearance - alert, ill appearing, with mask on  Eyes - EOMI  Ears - bilateral TM's and external ear canals normal  Nose - normal and patent, no erythema, discharge or polyps and sinus tenderness noted R maxillary  Mouth - erythematous  Neck - adenopathy noted R sup cervical  Chest - clear to auscultation, no wheezes, rales or rhonchi, symmetric air entry  Heart - normal rate, regular rhythm, normal S1, S2, no murmurs, rubs, clicks or gallops  Neurological - alert, oriented, normal speech, no focal findings or movement disorder noted    Recent Results (from the past 12 hour(s))   AMB POC MILAN INFLUENZA A/B TEST    Collection Time: 01/18/18 10:49 AM   Result Value Ref Range    VALID INTERNAL CONTROL POC Yes     Influenza A Ag POC Negative Negative Pos/Neg    Influenza B Ag POC Negative Negative Pos/Neg         Assessment/Plan    ICD-10-CM ICD-9-CM    1. Acute non-recurrent maxillary sinusitis J01.00 461.0 amoxicillin-clavulanate (AUGMENTIN) 875-125 mg per tablet   2. Cough R05 786.2 AMB POC MILAN INFLUENZA A/B TEST      amoxicillin-clavulanate (AUGMENTIN) 875-125 mg per tablet     Flu negative. Symptoms concerning for sinusitis. Will treat with augmentin given duration of symptoms, \"second sickness\", TTP R maxillary sinus. Follow-up Disposition:  Return if symptoms worsen or fail to improve. I have discussed the diagnosis with the patient and the intended plan as seen in the above orders. The patient has received an after-visit summary and questions were answered concerning future plans. I have discussed medication side effects and warnings with the patient as well. Alfred Finley MD  Family Medicine Resident    Patient discussed with Dr. Dulce Maria Juan, attending physician.

## 2018-01-24 ENCOUNTER — OFFICE VISIT (OUTPATIENT)
Dept: SURGERY | Age: 58
End: 2018-01-24

## 2018-01-24 VITALS
SYSTOLIC BLOOD PRESSURE: 129 MMHG | DIASTOLIC BLOOD PRESSURE: 74 MMHG | OXYGEN SATURATION: 98 % | HEART RATE: 82 BPM | BODY MASS INDEX: 22.49 KG/M2 | HEIGHT: 65 IN | TEMPERATURE: 98.3 F | WEIGHT: 135 LBS | RESPIRATION RATE: 15 BRPM

## 2018-01-24 DIAGNOSIS — E66.01 MORBID OBESITY (HCC): Primary | ICD-10-CM

## 2018-01-24 DIAGNOSIS — Z98.84 S/P GASTRIC BYPASS: ICD-10-CM

## 2018-01-24 NOTE — PATIENT INSTRUCTIONS
Learning About Physical Activity  What is physical activity? Physical activity is any kind of activity that gets your body moving. The types of physical activity that can help you get fit and stay healthy include:  · Aerobic or \"cardio\" activities that make your heart beat faster and make you breathe harder, such as brisk walking, riding a bike, or running. Aerobic activities strengthen your heart and lungs and build up your endurance. · Strength training activities that make your muscles work against, or \"resist,\" something, such as lifting weights or doing push-ups. These activities help tone and strengthen your muscles. · Stretches that allow you to move your joints and muscles through their full range of motion. Stretching helps you be more flexible and avoid injury. What are the benefits of physical activity? Being active is one of the best things you can do to get fit and stay healthy. It helps you to:  · Feel stronger and have more energy to do all the things you like to do. · Focus better at school or work and perform better in sports. · Feel, think, and sleep better. · Reach and stay at a healthy weight. · Lose fat and build lean muscle. · Lower your risk for serious health problems. · Keep your bones, muscles, and joints strong. Being fit lets you do more physical activity. And it lets you work out harder without as much effort. How can you make physical activity part of your life? Get at least 30 minutes of exercise on most days of the week. Walking is a good choice. You also may want to do other activities, such as running, swimming, cycling, or playing tennis or team sports. Pick activities that you like-ones that make your heart beat faster, your muscles stronger, and your muscles and joints more flexible. If you find more than one thing you like doing, do them all. You don't have to do the same thing every day. Get your heart pumping every day.  Any activity that makes your heart beat faster and keeps it at that rate for a while counts. Here are some great ways to get your heart beating faster:  · Go for a brisk walk, run, or bike ride. · Go for a hike or swim. · Go in-line skating. · Play a game of touch football, basketball, or soccer. · Ride a bike. · Play tennis or racquetball. · Climb stairs. Even some household chores can be aerobic-just do them at a faster pace. Vacuuming, raking or mowing the lawn, sweeping the garage, and washing and waxing the car all can help get your heart rate up. Strengthen your muscles during the week. You don't have to lift heavy weights or grow big, bulky muscles to get stronger. Doing a few simple activities that make your muscles work against, or \"resist,\" something can help you get stronger. For example, you can:  · Do push-ups or sit-ups, which use your own body weight as resistance. · Lift weights or dumbbells or use stretch bands at home or in a gym or community center. Stretch your muscles often. Stretching will help you as you become more active. It can help you stay flexible, loosen tight muscles, and avoid injury. It can also help improve your balance and posture and can be a great way to relax. Be sure to stretch the muscles you'll be using when you work out. It's best to warm your muscles slightly before you stretch them. Walk or do some other light aerobic activity for a few minutes, and then start stretching. When you stretch your muscles:  · Do it slowly. Stretching is not about going fast or making sudden movements. · Don't push or bounce during a stretch. · Hold each stretch for at least 15 to 30 seconds, if you can. You should feel a stretch in the muscle, but not pain. · Breathe out as you do the stretch. Then breathe in as you hold the stretch. Don't hold your breath. If you're worried about how more activity might affect your health, have a checkup before you start.  Follow any special advice your doctor gives you for getting a smart start. Where can you learn more? Go to http://maliha-sanchez.info/. Enter A069 in the search box to learn more about \"Learning About Physical Activity. \"  Current as of: March 13, 2017  Content Version: 11.4  © 3735-1198 [employer]. Care instructions adapted under license by [employer] (which disclaims liability or warranty for this information). If you have questions about a medical condition or this instruction, always ask your healthcare professional. [address] any warranty or liability for your use of this information.

## 2018-01-24 NOTE — PROGRESS NOTES
HISTORY OF PRESENT ILLNESS  Brittany Hnad is a 62 y.o. female with previous  Malabsorptive Gastric bypass on 1/25/2017. She has lost a total of 86 pounds since surgery. She  Has lost 2 lbs since the last ov. Body mass index is 22.47 kg/(m^2). . no nausea and no vomiting . Denies Acid reflux/heartburn. . Drinking  64 ounces of water daily. 50-60 grams protein intake daily. + BM's. Pt is not exercising. Dietary recall -   Breakfast- shake and egg  Lunch- soup or tuna or chicken salad  Dinner- chicken and dumplings pean    She is snacking between meals; animal crackers and fruit. Vitamins:  MVI : yes  Calcium : yes  B-Vit 12: yes    Patient has an advanced directive: not on missy    Ms. Rodolfo Tse has a reminder for a \"due or due soon\" health maintenance. I have asked that she contact her primary care provider for follow-up on this health maintenance. Co-Morbid(s)     Resolved      Was anti coagulation initiated for presumed / confirmed DVT/PE?   not on file. COMORBIDITY     SLEEP APNEA                 yes     GERD  (req.meds)            {yes  HYPERTENSION              NO      metoprolol  DIABETES                       yes      Current Outpatient Prescriptions:     amoxicillin-clavulanate (AUGMENTIN) 875-125 mg per tablet, Take 1 Tab by mouth two (2) times a day for 7 days. , Disp: 14 Tab, Rfl: 0    furosemide (LASIX) 20 mg tablet, TAKE 1 TAB BY MOUTH AS NEEDED., Disp: 30 Tab, Rfl: 0    metoprolol tartrate (LOPRESSOR) 25 mg tablet, TAKE 1 TABLET BY MOUTH TWO (2) TIMES A DAY., Disp: 60 Tab, Rfl: 1    ALPRAZolam (XANAX) 0.5 mg tablet, Take 1/2 or one whole pill twice daily as needed for anxiety, Disp: 90 Tab, Rfl: 0    buPROPion XL (WELLBUTRIN XL) 150 mg tablet, Take 1 Tab by mouth every morning. Indications: ANXIETY WITH DEPRESSION, Disp: 30 Tab, Rfl: 3    PULMICORT FLEXHALER 180 mcg/actuation aepb inhaler, TAKE 1 PUFF BY INHALATION TWO (2) TIMES A DAY.  INDICATIONS: MAINTENANCE THERAPY FOR ASTHMA, Disp: 1 Inhaler, Rfl: 3    albuterol (ACCUNEB) 0.63 mg/3 mL nebulizer solution, 3 ML BY NEBULIZATION ROUTE EVERY FOUR (4) HOURS AS NEEDED FOR WHEEZING OR SHORTNESS OF BREATH., Disp: 75 mL, Rfl: 1    rosuvastatin (CRESTOR) 40 mg tablet, TAKE 1 TABLET BY MOUTH NIGHTLY, Disp: 90 Tab, Rfl: 2    gabapentin (NEURONTIN) 600 mg tablet, TAKE 1 TABLET BY MOUTH THREE (3) TIMES DAILY. , Disp: 270 Tab, Rfl: 0    aspirin delayed-release 81 mg tablet, Take  by mouth daily. , Disp: , Rfl:     nitroglycerin (NITROSTAT) 0.4 mg SL tablet, DISSOLVE 1 TABLET IN MOUTH EVERY 5 MINUTES AS NEEDED FOR CHEST PAIN, Disp: 25 Tab, Rfl: 1    omeprazole (PRILOSEC) 20 mg capsule, TAKE ONE CAPSULE BY MOUTH EVERY DAY, Disp: 90 Cap, Rfl: 1    albuterol (PROAIR HFA) 90 mcg/actuation inhaler, Take 1 Puff by inhalation every four (4) hours as needed for Wheezing or Shortness of Breath., Disp: 1 Inhaler, Rfl: 5    Blood-Glucose Meter monitoring kit, Use to check glucose once a day, Disp: 1 Kit, Rfl: 0    alcohol swabs (ALCOHOL PADS) padm, Use when checking blood glucose, Disp: 100 Pad, Rfl: 5    Lancets misc, Use once a day. Please give one compatible with patient's meter, Disp: 1 Package, Rfl: 5    glucose blood VI test strips (BLOOD GLUCOSE TEST) strip, Use once a day, Disp: 100 Strip, Rfl: 5    valACYclovir (VALTREX) 500 mg tablet, Take 1 Tab by mouth two (2) times a day. (Patient taking differently: Take 500 mg by mouth two (2) times daily as needed.), Disp: 60 Tab, Rfl: 3    dicyclomine (BENTYL) 10 mg capsule, TAKE 1 (ONE) CAPSULE ORALLY AS NEEDED EVERY 6 HOURS, Disp: 120 capsule, Rfl: 0    cetirizine (ZYRTEC) 10 mg tablet, Take  by mouth daily.   , Disp: , Rfl:       Visit Vitals    /74 (BP 1 Location: Left arm, BP Patient Position: Sitting)    Pulse 82    Temp 98.3 °F (36.8 °C) (Oral)    Resp 15    Ht 5' 5\" (1.651 m)    Wt 135 lb (61.2 kg)    LMP 01/01/1985    SpO2 98%    BMI 22.47 kg/m2     HPI    Review of Systems Constitutional: Negative for chills and fever. Respiratory: Negative for cough and shortness of breath. Cardiovascular: Negative for chest pain. Gastrointestinal: Negative for abdominal pain, heartburn, nausea and vomiting. Physical Exam   Constitutional: She is oriented to person, place, and time. She appears well-developed and well-nourished. Cardiovascular: Normal rate and regular rhythm. Exam reveals no gallop and no friction rub. No murmur heard. Pulmonary/Chest: Effort normal and breath sounds normal.   Abdominal: Soft. Bowel sounds are normal. She exhibits no distension. There is no tenderness. No masses or hernias noted. Neurological: She is alert and oriented to person, place, and time. Skin: Skin is warm and dry. Psychiatric: She has a normal mood and affect. ASSESSMENT and PLAN  Pedro Baird is a 62 y.o. female with previous  Malabsorptive Gastric bypass on 1/25/2017. She has lost a total of 86 pounds since surgery. She  Has lost 2 lbs since the last ov. Body mass index is 22.47 kg/(m^2). Excellent weight loss. Patient is to continue a high protein, low-fat, low-sugar diet for life. Take vitamins and minerals daily for life. Include protein at each meal.  Avoid high fat food items. Avoid \"added\" sugar. Chew well and eat slowly. Take 20-30 minutes to complete a meal.  Measure foods and read labels. Resume exercise. Follow up 6 months  Labs done in December. Reviewed. Pt verbalized understanding and questions were answered to the best of my knowledge and ability.

## 2018-01-24 NOTE — PROGRESS NOTES
1. Have you been to the ER, urgent care clinic since your last visit? Hospitalized since your last visit?no    2. Have you seen or consulted any other health care providers outside of the [address] [place] [address] since your last visit? Include any pap smears or colon screening.  no

## 2018-01-29 NOTE — PROGRESS NOTES
I reviewed with the resident the medical history and the resident's findings on the physical examination. I discussed with the resident the patient's diagnosis and concur with the plan. O2 sats recorded incorrectly.

## 2018-02-10 RX ORDER — FUROSEMIDE 20 MG/1
TABLET ORAL
Qty: 30 TAB | Refills: 0 | Status: SHIPPED | OUTPATIENT
Start: 2018-02-10 | End: 2018-03-06 | Stop reason: SDUPTHER

## 2018-03-07 RX ORDER — FUROSEMIDE 20 MG/1
TABLET ORAL
Qty: 30 TAB | Refills: 0 | Status: SHIPPED | OUTPATIENT
Start: 2018-03-07 | End: 2018-04-05 | Stop reason: SDUPTHER

## 2018-03-24 DIAGNOSIS — I25.10 CORONARY ARTERY DISEASE INVOLVING NATIVE CORONARY ARTERY OF NATIVE HEART WITHOUT ANGINA PECTORIS: ICD-10-CM

## 2018-03-26 RX ORDER — METOPROLOL TARTRATE 25 MG/1
TABLET, FILM COATED ORAL
Qty: 60 TAB | Refills: 1 | Status: SHIPPED | OUTPATIENT
Start: 2018-03-26 | End: 2018-06-15 | Stop reason: SDUPTHER

## 2018-04-09 RX ORDER — FUROSEMIDE 20 MG/1
TABLET ORAL
Qty: 30 TAB | Refills: 0 | Status: SHIPPED | OUTPATIENT
Start: 2018-04-09 | End: 2018-05-06 | Stop reason: SDUPTHER

## 2018-05-06 RX ORDER — FUROSEMIDE 20 MG/1
TABLET ORAL
Qty: 30 TAB | Refills: 0 | Status: SHIPPED | OUTPATIENT
Start: 2018-05-06 | End: 2018-06-04 | Stop reason: SDUPTHER

## 2018-05-15 DIAGNOSIS — F41.9 ANXIETY: ICD-10-CM

## 2018-05-15 RX ORDER — ALPRAZOLAM 0.5 MG/1
TABLET ORAL
Qty: 90 TAB | Refills: 0 | Status: SHIPPED | OUTPATIENT
Start: 2018-05-15 | End: 2018-05-31 | Stop reason: SDUPTHER

## 2018-05-31 ENCOUNTER — OFFICE VISIT (OUTPATIENT)
Dept: FAMILY MEDICINE CLINIC | Age: 58
End: 2018-05-31

## 2018-05-31 VITALS
HEART RATE: 72 BPM | TEMPERATURE: 98.2 F | SYSTOLIC BLOOD PRESSURE: 125 MMHG | HEIGHT: 65 IN | BODY MASS INDEX: 21.69 KG/M2 | OXYGEN SATURATION: 98 % | DIASTOLIC BLOOD PRESSURE: 84 MMHG | RESPIRATION RATE: 16 BRPM | WEIGHT: 130.2 LBS

## 2018-05-31 DIAGNOSIS — R23.3 PETECHIAE: Primary | ICD-10-CM

## 2018-05-31 DIAGNOSIS — F41.9 ANXIETY: ICD-10-CM

## 2018-05-31 DIAGNOSIS — Z86.19 H/O COLD SORES: ICD-10-CM

## 2018-05-31 RX ORDER — ALPRAZOLAM 0.5 MG/1
TABLET ORAL
Qty: 90 TAB | Refills: 0 | Status: CANCELLED | OUTPATIENT
Start: 2018-05-31

## 2018-05-31 RX ORDER — VALACYCLOVIR HYDROCHLORIDE 500 MG/1
500 TABLET, FILM COATED ORAL 2 TIMES DAILY
Qty: 60 TAB | Refills: 3 | Status: CANCELLED | OUTPATIENT
Start: 2018-05-31

## 2018-05-31 RX ORDER — BUPROPION HYDROCHLORIDE 150 MG/1
150 TABLET ORAL
Qty: 30 TAB | Refills: 3 | Status: CANCELLED | OUTPATIENT
Start: 2018-05-31

## 2018-05-31 RX ORDER — CYCLOBENZAPRINE HCL 10 MG
TABLET ORAL
COMMUNITY
End: 2018-09-18

## 2018-05-31 NOTE — PROGRESS NOTES
Identified Patient with two Patient identifiers (Name and ). Two Patient Identifiers confirmed. Reviewed record in preparation for visit and have obtained necessary documentation. Chief Complaint   Patient presents with    Rash     c/o rash on bilateral arms per paitent noticed this weekend, feet and legs x 2 weeks with itching. Apply Benadryl Cooling Spray and Cortizone 10 Topical Cream with Slight Relief    Medication Refill     Medication Pending and Pharmacy Verified       Visit Vitals    /84 (BP 1 Location: Right arm, BP Patient Position: Sitting)    Pulse 72    Temp 98.2 °F (36.8 °C) (Oral)    Resp 16    Ht 5' 5\" (1.651 m)    Wt 130 lb 3.2 oz (59.1 kg)    SpO2 98%    BMI 21.67 kg/m2       1. Have you been to the ER, urgent care clinic since your last visit? Hospitalized since your last visit? No    2. Have you seen or consulted any other health care providers outside of the [address] [place] [address] since your last visit? Include any pap smears or colon screening.  No

## 2018-05-31 NOTE — MR AVS SNAPSHOT
[address] 
[phone] Patient: Claudio Alejandro MRN: [id number] M:[id number] Visit Information Date & Time Provider Department Dept. Phone Encounter #  
 5/31/2018  3:25 PM Kristina Ponce MD [address] [place] [address] [phone] [id number] Your Appointments 6/29/2018  1:00 PM  
ESTABLISHED PATIENT with Ella Burton MD  
[facility] ([address]) Appt Note: 1yr f/u  
 [address] [facility] [address] [place] [address] [facility] [address]  
[address] [place] [address]  
  
    
 7/25/2018  1:00 PM  
ESTABLISHED PATIENT with Brad Jiménez NP [id number] [facility] ([address] [facility]) Appt Note: 6mos per NP. ID&Ins.cards. [id number]. payment plan. fs  
 [address]  
[phone]  
  
   
 [address] Upcoming Health Maintenance Date Due HEMOGLOBIN A1C Q6M 6/19/2018 EYE EXAM RETINAL OR DILATED Q1 12/19/2018* Influenza Age 5 to Adult 8/1/2018 FOOT EXAM Q1 12/19/2018 MICROALBUMIN Q1 12/19/2018 LIPID PANEL Q1 12/19/2018 BREAST CANCER SCRN MAMMOGRAM 7/18/2019 PAP AKA CERVICAL CYTOLOGY 8/23/2019 COLONOSCOPY 6/23/2020 DTaP/Tdap/Td series (2 - Td) 8/14/2022 *Topic was postponed. The date shown is not the original due date. Allergies as of 5/31/2018  Review Complete On: 5/31/2018 By: Gita Tubbs LPN Severity Noted Reaction Type Reactions Accupril [Quinapril]  07/07/2010    Cough Lipitor [Atorvastatin]  07/07/2010    Other (comments)  
 aches Norvasc [Amlodipine]  07/22/2015    Swelling On legs at 10 mg dose Current Immunizations  Reviewed on 12/19/2017 Name Date Influenza Vaccine 9/1/2015 Influenza Vaccine (Quad) PF 12/19/2017 Pneumococcal Polysaccharide (PPSV-23) 8/4/2015 TD Vaccine 11/7/2005 TDAP Vaccine 8/14/2012 Not reviewed this visit You Were Diagnosed With   
  
 Codes Comments Petechiae    -  Primary ICD-10-CM: R23.3 ICD-9-CM: 634. 7 Vitals BP Pulse Temp Resp Height(growth percentile) Weight(growth percentile) 125/84 (BP 1 Location: Right arm, BP Patient Position: Sitting) 72 98.2 °F (36.8 °C) (Oral) 16 5' 5\" (1.651 m) 130 lb 3.2 oz (59.1 kg) LMP SpO2 BMI OB Status Smoking Status 01/01/1985 98% 21.67 kg/m2 Hysterectomy Never Smoker Vitals History BMI and BSA Data Body Mass Index Body Surface Area  
 21.67 kg/m 2 1.65 m 2 Preferred Pharmacy Pharmacy Name Phone [id number] [place], [address] AT Located within [facility] [phone] Your Updated Medication List  
  
   
This list is accurate as of 5/31/18  3:52 PM.  Always use your most recent med list.  
  
  
  
  
 * albuterol 90 mcg/actuation inhaler Commonly known as:  PROAIR HFA Take 1 Puff by inhalation every four (4) hours as needed for Wheezing or Shortness of Breath. * albuterol 0.63 mg/3 mL nebulizer solution Commonly known as:  ACCUNEB  
3 ML BY NEBULIZATION ROUTE EVERY FOUR (4) HOURS AS NEEDED FOR WHEEZING OR SHORTNESS OF BREATH. alcohol swabs Padm Commonly known as:  ALCOHOL PADS Use when checking blood glucose ALPRAZolam 0.5 mg tablet Commonly known as:  XANAX  
TAKE 1/2-1 TABLET BY MOUTH TWICE DAILY AS NEEDED FOR ANXIETY  
  
 aspirin delayed-release 81 mg tablet Take  by mouth daily. Blood-Glucose Meter monitoring kit Use to check glucose once a day buPROPion  mg tablet Commonly known as:  Peter Lock Take 1 Tab by mouth every morning. Indications: ANXIETY WITH DEPRESSION  
  
 cyclobenzaprine 10 mg tablet Commonly known as:  FLEXERIL Take  by mouth three (3) times daily as needed for Muscle Spasm(s). dicyclomine 10 mg capsule Commonly known as:  BENTYL TAKE 1 (ONE) CAPSULE ORALLY AS NEEDED EVERY 6 HOURS  
  
 furosemide 20 mg tablet Commonly known as:  LASIX TAKE 1 TAB BY MOUTH AS NEEDED.  
  
 gabapentin 600 mg tablet Commonly known as:  NEURONTIN  
TAKE 1 TABLET BY MOUTH THREE (3) TIMES DAILY. glucose blood VI test strips strip Commonly known as:  blood glucose test  
Use once a day Lancets Misc Use once a day. Please give one compatible with patient's meter  
  
 metoprolol tartrate 25 mg tablet Commonly known as:  LOPRESSOR  
TAKE 1 TABLET BY MOUTH TWO (2) TIMES A DAY. nitroglycerin 0.4 mg SL tablet Commonly known as:  NITROSTAT  
DISSOLVE 1 TABLET IN MOUTH EVERY 5 MINUTES AS NEEDED FOR CHEST PAIN  
  
 omeprazole 20 mg capsule Commonly known as:  PRILOSEC  
TAKE ONE CAPSULE BY MOUTH EVERY DAY  
  
 PULMICORT FLEXHALER 180 mcg/actuation Aepb inhaler Generic drug:  budesonide TAKE 1 PUFF BY INHALATION TWO (2) TIMES A DAY. INDICATIONS: MAINTENANCE THERAPY FOR ASTHMA  
  
 rosuvastatin 40 mg tablet Commonly known as:  CRESTOR  
TAKE 1 TABLET BY MOUTH NIGHTLY  
  
 valACYclovir 500 mg tablet Commonly known as:  VALTREX Take 1 Tab by mouth two (2) times a day. ZyrTEC 10 mg tablet Generic drug:  cetirizine Take  by mouth daily. * Notice: This list has 2 medication(s) that are the same as other medications prescribed for you. Read the directions carefully, and ask your doctor or other care provider to review them with you. We Performed the Following CBC W/O DIFF [09228 CPT(R)] METABOLIC PANEL, COMPREHENSIVE [07430 CPT(R)] Introducing [facility] [place] [facility]! Dear Bozena Buitrago: 
Thank you for requesting a [employer] account. Our records indicate that you already have an active [employer] account. You can access your account anytime at [email or web address] Did you know that you can access your hospital and ER discharge instructions at any time in myContactCard? You can also review all of your test results from your hospital stay or ER visit. Additional Information If you have questions, please visit the Frequently Asked Questions section of the [email or web address] website at [email or web address] Remember, myContactCard is NOT to be used for urgent needs. For medical emergencies, dial 911. Now available from your iPhone and Android! Please provide this summary of care documentation to your next provider. Your primary care clinician is listed as Lukasz Hunter. If you have any questions after today's visit, please call [phone].

## 2018-05-31 NOTE — PROGRESS NOTES
Subjective  CC: Magy Mueller is an 62 y.o. female presents for evaluation of rash. She states she first noticed some red dots, pinpoint in size, on her feet about two weeks ago. It does not itch or hurt. She then noticed the same rash on her arms bilaterally Friday and Saturday. This rash does not itch or hurt either. She has been outdoors. Most recently at the beach. [place] No known tick bites. She denies fevers or recent fevers or illnesses. She has tried benadryl cooling spray and some cortione cream without much relief. She has a history osteoarthritis but no rheumatoid arthritis. No swollen or red joints. No new medications. Allergies - reviewed: Allergies   Allergen Reactions    Accupril [Quinapril] Cough    Lipitor [Atorvastatin] Other (comments)     aches    Norvasc [Amlodipine] Swelling     On legs at 10 mg dose         Medications - reviewed:   Current Outpatient Prescriptions   Medication Sig    cyclobenzaprine (FLEXERIL) 10 mg tablet Take  by mouth three (3) times daily as needed for Muscle Spasm(s).  ALPRAZolam (XANAX) 0.5 mg tablet TAKE 1/2-1 TABLET BY MOUTH TWICE DAILY AS NEEDED FOR ANXIETY    furosemide (LASIX) 20 mg tablet TAKE 1 TAB BY MOUTH AS NEEDED.  metoprolol tartrate (LOPRESSOR) 25 mg tablet TAKE 1 TABLET BY MOUTH TWO (2) TIMES A DAY.  buPROPion XL (WELLBUTRIN XL) 150 mg tablet Take 1 Tab by mouth every morning. Indications: ANXIETY WITH DEPRESSION    PULMICORT FLEXHALER 180 mcg/actuation aepb inhaler TAKE 1 PUFF BY INHALATION TWO (2) TIMES A DAY. INDICATIONS: MAINTENANCE THERAPY FOR ASTHMA    albuterol (ACCUNEB) 0.63 mg/3 mL nebulizer solution 3 ML BY NEBULIZATION ROUTE EVERY FOUR (4) HOURS AS NEEDED FOR WHEEZING OR SHORTNESS OF BREATH.  rosuvastatin (CRESTOR) 40 mg tablet TAKE 1 TABLET BY MOUTH NIGHTLY    gabapentin (NEURONTIN) 600 mg tablet TAKE 1 TABLET BY MOUTH THREE (3) TIMES DAILY.     aspirin delayed-release 81 mg tablet Take  by mouth daily.  nitroglycerin (NITROSTAT) 0.4 mg SL tablet DISSOLVE 1 TABLET IN MOUTH EVERY 5 MINUTES AS NEEDED FOR CHEST PAIN    omeprazole (PRILOSEC) 20 mg capsule TAKE ONE CAPSULE BY MOUTH EVERY DAY    albuterol (PROAIR HFA) 90 mcg/actuation inhaler Take 1 Puff by inhalation every four (4) hours as needed for Wheezing or Shortness of Breath.  Blood-Glucose Meter monitoring kit Use to check glucose once a day    alcohol swabs (ALCOHOL PADS) padm Use when checking blood glucose    Lancets misc Use once a day. Please give one compatible with patient's meter    glucose blood VI test strips (BLOOD GLUCOSE TEST) strip Use once a day    valACYclovir (VALTREX) 500 mg tablet Take 1 Tab by mouth two (2) times a day. (Patient taking differently: Take 500 mg by mouth two (2) times daily as needed.)    dicyclomine (BENTYL) 10 mg capsule TAKE 1 (ONE) CAPSULE ORALLY AS NEEDED EVERY 6 HOURS    cetirizine (ZYRTEC) 10 mg tablet Take  by mouth daily. No current facility-administered medications for this visit.           Past Medical History - reviewed:  Past Medical History:   Diagnosis Date    Arthritis     OA back,knees and hands    Asthma     Autoimmune disease ([address]     Cindia Bolivar    CAD (coronary artery disease)     s/p stents 11/2015    Cardiac murmur     Depression     Diabetes ([address]     DVT (deep venous thrombosis) ([facility]    GERD (gastroesophageal reflux disease)     Hypertension     Morbid obesity ([address]     Musculoskeletal disorder     EDMOND (obstructive sleep apnea)     wears cpap    S/P TONJA (total abdominal hysterectomy)     Thromboembolus ([address] 1996    PE         Immunizations - reviewed:   Immunization History   Administered Date(s) Administered    Influenza Vaccine 09/01/2015    Influenza Vaccine (Quad) PF 12/19/2017    Pneumococcal Polysaccharide (PPSV-23) 08/04/2015    TD Vaccine 11/07/2005    TDAP Vaccine 08/14/2012         ROS  Review of Systems : A complete review of systems was performed and is negative except for those mentioned in the HPI. Physical Exam  Visit Vitals    /84 (BP 1 Location: Right arm, BP Patient Position: Sitting)    Pulse 72    Temp 98.2 °F (36.8 °C) (Oral)    Resp 16    Ht 5' 5\" (1.651 m)    Wt 130 lb 3.2 oz (59.1 kg)    LMP 01/01/1985    SpO2 98%    BMI 21.67 kg/m2       General appearance - Alert, NAD. Head: Atraumatic. Normocephalic. Respiratory - LCTAB. No wheeze/rale/rhonchi  Heart - Normal rate, regular rhythm. No m/r/r  Skin - petechial rash on lower extremities bilaterally and in forearms bilaterally. No excoriations. No insect bites seen. No satellite lesions or scaling. Legs with shiny thin skin bilaterally, no hair noted, even on toes. Extremities - no LE edema. DP and PT pulses 2+ bilaterally      Assessment/Plan    62year old presents with rash, unclear etiology. No systemic symptoms. Given petechiae will check CBC and CMP. If normal consider ABIs as physical exam notable for clear shiny skin could also point towards vascular issue such as PAD. Discussed with patient. She agreed with plan of care. All questions answered. Will follow up labs and plan accordingly. Patient was seen with Attending in clinic. 1. Petechiae  - CBC W/O DIFF  - METABOLIC PANEL, COMPREHENSIVE      I have discussed the aforementioned diagnoses and plan with the patient in detail. I have provided information in person and/or in AVS. All questions answered prior to discharge.     Moses Escobar MD  Family Medicine Resident  PGY 3

## 2018-06-01 LAB
ALBUMIN SERPL-MCNC: 4.1 G/DL (ref 3.5–5.5)
ALBUMIN/GLOB SERPL: 2.1 {RATIO} (ref 1.2–2.2)
ALP SERPL-CCNC: 99 IU/L (ref 39–117)
ALT SERPL-CCNC: 12 IU/L (ref 0–32)
AST SERPL-CCNC: 21 IU/L (ref 0–40)
BILIRUB SERPL-MCNC: 0.4 MG/DL (ref 0–1.2)
BUN SERPL-MCNC: 6 MG/DL (ref 6–24)
BUN/CREAT SERPL: 10 (ref 9–23)
CALCIUM SERPL-MCNC: 9.7 MG/DL (ref 8.7–10.2)
CHLORIDE SERPL-SCNC: 100 MMOL/L (ref 96–106)
CO2 SERPL-SCNC: 28 MMOL/L (ref 18–29)
CREAT SERPL-MCNC: 0.59 MG/DL (ref 0.57–1)
ERYTHROCYTE [DISTWIDTH] IN BLOOD BY AUTOMATED COUNT: 14.2 % (ref 12.3–15.4)
GFR SERPLBLD CREATININE-BSD FMLA CKD-EPI: 101 ML/MIN/1.73
GFR SERPLBLD CREATININE-BSD FMLA CKD-EPI: 117 ML/MIN/1.73
GLOBULIN SER CALC-MCNC: 2 G/DL (ref 1.5–4.5)
GLUCOSE SERPL-MCNC: 110 MG/DL (ref 65–99)
HCT VFR BLD AUTO: 39.7 % (ref 34–46.6)
HGB BLD-MCNC: 13.4 G/DL (ref 11.1–15.9)
MCH RBC QN AUTO: 29.9 PG (ref 26.6–33)
MCHC RBC AUTO-ENTMCNC: 33.8 G/DL (ref 31.5–35.7)
MCV RBC AUTO: 89 FL (ref 79–97)
PLATELET # BLD AUTO: 297 X10E3/UL (ref 150–379)
POTASSIUM SERPL-SCNC: 3.5 MMOL/L (ref 3.5–5.2)
PROT SERPL-MCNC: 6.1 G/DL (ref 6–8.5)
RBC # BLD AUTO: 4.48 X10E6/UL (ref 3.77–5.28)
SODIUM SERPL-SCNC: 142 MMOL/L (ref 134–144)
WBC # BLD AUTO: 8.8 X10E3/UL (ref 3.4–10.8)

## 2018-06-02 ENCOUNTER — TELEPHONE (OUTPATIENT)
Dept: FAMILY MEDICINE CLINIC | Age: 58
End: 2018-06-02

## 2018-06-02 DIAGNOSIS — R23.3 PETECHIAE: Primary | ICD-10-CM

## 2018-06-02 RX ORDER — VALACYCLOVIR HYDROCHLORIDE 500 MG/1
500 TABLET, FILM COATED ORAL 2 TIMES DAILY
Qty: 60 TAB | Refills: 3 | Status: SHIPPED | OUTPATIENT
Start: 2018-06-02 | End: 2019-12-23 | Stop reason: SDUPTHER

## 2018-06-02 RX ORDER — ALPRAZOLAM 0.5 MG/1
0.5 TABLET ORAL
Qty: 90 TAB | Refills: 0 | Status: SHIPPED | OUTPATIENT
Start: 2018-06-02 | End: 2018-07-03 | Stop reason: SDUPTHER

## 2018-06-02 RX ORDER — BUPROPION HYDROCHLORIDE 150 MG/1
150 TABLET ORAL
Qty: 30 TAB | Refills: 3 | Status: SHIPPED | OUTPATIENT
Start: 2018-06-02 | End: 2018-09-18 | Stop reason: SDUPTHER

## 2018-06-02 NOTE — TELEPHONE ENCOUNTER
06/02/18    Attempted to call jose with lab results. No answer. Labs normal.    Can consider lower extremity arterial ultrasound to check for PAD.     Moses Escobar MD

## 2018-06-04 RX ORDER — FUROSEMIDE 20 MG/1
TABLET ORAL
Qty: 30 TAB | Refills: 0 | Status: SHIPPED | OUTPATIENT
Start: 2018-06-04 | End: 2018-07-09 | Stop reason: SDUPTHER

## 2018-06-08 ENCOUNTER — HOSPITAL ENCOUNTER (OUTPATIENT)
Dept: VASCULAR SURGERY | Age: 58
Discharge: HOME | End: 2018-06-08
Attending: FAMILY MEDICINE
Payer: COMMERCIAL

## 2018-06-08 DIAGNOSIS — R23.3 PETECHIAE: ICD-10-CM

## 2018-06-08 PROCEDURE — 93922 UPR/L XTREMITY ART 2 LEVELS: CPT

## 2018-06-08 NOTE — PROCEDURES
[address]  *** FINAL REPORT ***    Name: Harlan Wilde  MRN: [id number]    Outpatient  : 1960  HIS Order #: [id number]  [id number] [facility] Visit #: [id number]  Date: 2018    TYPE OF TEST: Peripheral Arterial Testing    REASON FOR TEST  Claudication    Right Leg  Segmentals: Normal                     mmHg  Brachial         132  High thigh  Low thigh  Calf  Posterior tibial 147  Dorsalis pedis   147  Peroneal  Metatarsal  Toe pressure  Doppler:    Normal  PVR:        Normal  Ankle/Brachial: 1.11    Left Leg  Segmentals: Normal                     mmHg  Brachial         132  High thigh  Low thigh  Calf  Posterior tibial 149  Dorsalis pedis   136  Peroneal  Metatarsal  Toe pressure  Doppler:    Normal  PVR:        Normal  Ankle/Brachial: 1.13    INTERPRETATION/FINDINGS  PROCEDURE:  Evaluation of lower extremity arteries with systolic blood   pressure measurement at the ankle and brachial level and calculation  of the ankle/brachial pressure index (KEIRA). The exam may also include   pulse volume recording (PVR) plethysmography at the ankle level. IMPRESSION:  1. No evidence of significant peripheral arterial disease at rest in  the right leg. 2. No evidence of significant peripheral arterial disease at rest in  the left leg. 3. The right ankle/brachial index is 1.11 and the left ankle/brachial  index is 1.13.  4. The right toe/brachial index is 0.86 and the left toe/brachial  index is 0.81. ADDITIONAL COMMENTS    I have personally reviewed the data relevant to the interpretation of  this  study. TECHNOLOGIST: Lg Cason RVT  Signed: 2018 02:11 PM    PHYSICIAN: Sharifa Engel.  Kp Chakraborty MD  Signed: 2018 06:02 AM

## 2018-06-13 ENCOUNTER — OFFICE VISIT (OUTPATIENT)
Dept: SURGERY | Age: 58
End: 2018-06-13

## 2018-06-13 VITALS
HEART RATE: 82 BPM | WEIGHT: 133 LBS | BODY MASS INDEX: 22.16 KG/M2 | SYSTOLIC BLOOD PRESSURE: 107 MMHG | HEIGHT: 65 IN | RESPIRATION RATE: 14 BRPM | OXYGEN SATURATION: 97 % | DIASTOLIC BLOOD PRESSURE: 71 MMHG | TEMPERATURE: 48.4 F

## 2018-06-13 DIAGNOSIS — R10.11 RUQ PAIN: ICD-10-CM

## 2018-06-13 DIAGNOSIS — R10.13 EPIGASTRIC PAIN: ICD-10-CM

## 2018-06-13 DIAGNOSIS — R11.0 NAUSEA: ICD-10-CM

## 2018-06-13 DIAGNOSIS — R14.0 BLOATING: ICD-10-CM

## 2018-06-13 DIAGNOSIS — R10.12 LUQ ABDOMINAL PAIN: ICD-10-CM

## 2018-06-13 DIAGNOSIS — Z98.84 S/P GASTRIC BYPASS: ICD-10-CM

## 2018-06-13 DIAGNOSIS — E66.01 MORBID OBESITY (HCC): Primary | ICD-10-CM

## 2018-06-13 RX ORDER — SUCRALFATE 1 G/10ML
1 SUSPENSION ORAL 4 TIMES DAILY
Qty: 560 ML | Refills: 0 | Status: SHIPPED | OUTPATIENT
Start: 2018-06-13 | End: 2018-06-27

## 2018-06-13 RX ORDER — OMEPRAZOLE 20 MG/1
CAPSULE, DELAYED RELEASE ORAL
Qty: 90 CAP | Refills: 1 | Status: SHIPPED | OUTPATIENT
Start: 2018-06-13 | End: 2019-03-25 | Stop reason: SDUPTHER

## 2018-06-13 NOTE — MR AVS SNAPSHOT
[address] 
[phone] Patient: Li Law MRN:  [id number] Visit Information Date & Time Provider Department Dept. Phone Encounter #  
 6/13/2018  1:20 PM Arpita Keller, [address] [facility] [phone] [id number] Your Appointments 6/29/2018  1:00 PM  
ESTABLISHED PATIENT with Matheus Escalante MD  
[facility] Appt Note: 1yr f/u  
 [address] [facility] [address] [facility] [address]  
[address] [facility] [address]  
  
    
 7/25/2018  1:00 PM  
[place]AB[place] [address] [facility] ([facility]) Appt Note: 6mos per NP. ID&Ins.cards. [id number]. payment plan. fs  
 [address]  
[phone]  
  
   
 [address] Upcoming Health Maintenance Date Due HEMOGLOBIN A1C Q6M 6/19/2018 EYE EXAM RETINAL OR DILATED Q1 12/19/2018* Influenza Age 5 to Adult 8/1/2018 FOOT EXAM Q1 12/19/2018 MICROALBUMIN Q1 12/19/2018 LIPID PANEL Q1 12/19/2018 BREAST CANCER SCRN MAMMOGRAM 7/18/2019 PAP AKA CERVICAL CYTOLOGY 8/23/2019 COLONOSCOPY 6/23/2020 DTaP/Tdap/Td series (2 - Td) 8/14/2022 *Topic was postponed. The date shown is not the original due date. Allergies as of 6/13/2018  Review Complete On: 6/13/2018 By: Meera Gomez LPN Severity Noted Reaction Type Reactions Accupril [Quinapril]  07/07/2010    Cough Lipitor [Atorvastatin]  07/07/2010    Other (comments)  
 aches Norvasc [Amlodipine]  07/22/2015    Swelling On legs at 10 mg dose Current Immunizations  Reviewed on 12/19/2017 Name Date Influenza Vaccine 9/1/2015 Influenza Vaccine (Quad) PF 12/19/2017 Caleb Gracia   MRN: [id number]    Department:  [facility] Emergency Department   Date of Visit:  11/12/2019           Disclosure     Insurance plans vary and the physician(s) referred by the ER may not be covered by your plan.  Please contact you Pneumococcal Polysaccharide (PPSV-23) 8/4/2015 TD Vaccine 11/7/2005 TDAP Vaccine 8/14/2012 Not reviewed this visit You Were Diagnosed With   
  
 Codes Comments Morbid obesity ([facility] [address]    -  Primary ICD-10-CM: E66.01 
ICD-9-CM: 278.01   
 BMI 22.0-22.9, adult     ICD-10-CM: K56.28 
ICD-9-CM: V85.1 S/P gastric bypass     ICD-10-CM: W96.17 ICD-9-CM: V45.86 Epigastric pain     ICD-10-CM: R10.13 ICD-9-CM: 789.06   
 LUQ abdominal pain     ICD-10-CM: R10.12 ICD-9-CM: 789.02   
 RUQ pain     ICD-10-CM: R10.11 ICD-9-CM: 789.01 Nausea     ICD-10-CM: R11.0 ICD-9-CM: 787.02 Bloating     ICD-10-CM: R14.0 ICD-9-CM: 461. 3 Vitals BP Pulse Temp Resp Height(growth percentile) Weight(growth percentile) 107/71 (BP 1 Location: Left arm, BP Patient Position: Sitting) 82 (!) 48.4 °F (9.1 °C) (Oral) 14 5' 5\" (1.651 m) 133 lb (60.3 kg) LMP SpO2 BMI OB Status Smoking Status 01/01/1985 97% 22.13 kg/m2 Hysterectomy Never Smoker BMI and BSA Data Body Mass Index Body Surface Area  
 22.13 kg/m 2 1.66 m 2 Preferred Pharmacy Pharmacy Name Phone [id number]- [place], [address]  [facility] [phone] Your Updated Medication List  
  
   
This list is accurate as of 6/13/18  1:54 PM.  Always use your most recent med list.  
  
  
  
  
 * albuterol 90 mcg/actuation inhaler Commonly known as:  PROAIR HFA Take 1 Puff by inhalation every four (4) hours as needed for Wheezing or Shortness of Breath. * albuterol 0.63 mg/3 mL nebulizer solution Commonly known as:  ACCUNEB  
3 ML BY NEBULIZATION ROUTE EVERY FOUR (4) HOURS AS NEEDED FOR WHEEZING OR SHORTNESS OF BREATH. alcohol swabs Padm Commonly known as:  ALCOHOL PADS Use when checking blood glucose ALPRAZolam 0.5 mg tablet Commonly known as:  Donata Fess Take 1 Tab by mouth three (3) times daily as needed for Anxiety.  Max Daily Amount: 1.5 mg.  
  
 tell this physician (or your personal doctor if your instructions are to return to your personal doctor) about any new or lasting problems. The primary care or specialist physician will see patients referred from the [facility] Emergency Department.  Cheryle Levins aspirin delayed-release 81 mg tablet Take  by mouth daily. Blood-Glucose Meter monitoring kit Use to check glucose once a day buPROPion  mg tablet Commonly known as:  Lieutenant Fogo Take 1 Tab by mouth every morning. Indications: ANXIETY WITH DEPRESSION  
  
 cyclobenzaprine 10 mg tablet Commonly known as:  FLEXERIL Take  by mouth three (3) times daily as needed for Muscle Spasm(s). dicyclomine 10 mg capsule Commonly known as:  BENTYL TAKE 1 (ONE) CAPSULE ORALLY AS NEEDED EVERY 6 HOURS  
  
 furosemide 20 mg tablet Commonly known as:  LASIX TAKE 1 TAB BY MOUTH AS NEEDED.  
  
 gabapentin 600 mg tablet Commonly known as:  NEURONTIN  
TAKE 1 TABLET BY MOUTH THREE (3) TIMES DAILY. glucose blood VI test strips strip Commonly known as:  blood glucose test  
Use once a day Lancets Misc Use once a day. Please give one compatible with patient's meter  
  
 metoprolol tartrate 25 mg tablet Commonly known as:  LOPRESSOR  
TAKE 1 TABLET BY MOUTH TWO (2) TIMES A DAY. nitroglycerin 0.4 mg SL tablet Commonly known as:  NITROSTAT  
DISSOLVE 1 TABLET IN MOUTH EVERY 5 MINUTES AS NEEDED FOR CHEST PAIN  
  
 omeprazole 20 mg capsule Commonly known as:  PRILOSEC  
TAKE ONE CAPSULE BY MOUTH EVERY DAY  
  
 PULMICORT FLEXHALER 180 mcg/actuation Aepb inhaler Generic drug:  budesonide TAKE 1 PUFF BY INHALATION TWO (2) TIMES A DAY. INDICATIONS: MAINTENANCE THERAPY FOR ASTHMA  
  
 rosuvastatin 40 mg tablet Commonly known as:  CRESTOR  
TAKE 1 TABLET BY MOUTH NIGHTLY  
  
 sucralfate 100 mg/mL suspension Commonly known as:  Samul Se Take 10 mL by mouth four (4) times daily for 14 days. valACYclovir 500 mg tablet Commonly known as:  VALTREX Take 1 Tab by mouth two (2) times a day. ZyrTEC 10 mg tablet Generic drug:  cetirizine Take  by mouth daily. * Notice:   This list has 2 medication(s) that are the same as other medications prescribed for you. Read the directions carefully, and ask your doctor or other care provider to review them with you. Prescriptions Sent to Pharmacy Refills  
 sucralfate (CARAFATE) 100 mg/mL suspension 0 Sig: Take 10 mL by mouth four (4) times daily for 14 days. Class: Normal  
 Pharmacy: [address] [place] [address] Ph #: [phone] Route: Oral  
 omeprazole (PRILOSEC) 20 mg capsule 1 Sig: TAKE ONE CAPSULE BY MOUTH EVERY DAY Class: Normal  
 Pharmacy: [address] [place] [address] Ph #: [phone] We Performed the Following REFERRAL TO GASTROENTEROLOGY [QQM04 Custom] Comments:  
 Patient is 18 months status post Gastric bypass with Epigastric pain. Needs EGD to r/o gastritis or ulcer. Referral Information Referral ID Referred By Referred To  
  
 [id number] [facility] [address] [place] [address] [phone] Visits Status Start Date End Date 1 New Request 6/13/18 6/13/19 If your referral has a status of pending review or denied, additional information will be sent to support the outcome of this decision. Patient Instructions Upper GI Endoscopy: Before Your Procedure What is an upper GI endoscopy? An upper gastrointestinal (or GI) endoscopy is a test that allows your doctor to look at the inside of your esophagus, stomach, and the first part of your small intestine, called the duodenum. The esophagus is the tube that carries food to your stomach. The doctor uses a thin, lighted tube that bends. It is called an endoscope, or scope. The doctor puts the tip of the scope in your mouth and gently moves it down your throat. The scope is a flexible video camera.  The doctor looks at a monitor (like a TV set or a computer screen) as he or she moves the scope. A doctor may do this test, which is also called a procedure, to look for ulcers, tumors, infection, or bleeding. It also can be used to look for signs of acid backing up into your esophagus. This is called gastroesophageal reflux disease, or GERD. The doctor can use the scope to take a sample of tissue for study (a biopsy). The doctor also can use the scope to take out growths or stop bleeding. Follow-up care is a key part of your treatment and safety. Be sure to make and go to all appointments, and call your doctor if you are having problems. It's also a good idea to know your test results and keep a list of the medicines you take. What happens before the procedure? ?Preparing for the procedure ? · Understand exactly what procedure is planned, along with the risks, benefits, and other options. · Tell your doctors ALL the medicines, vitamins, supplements, and herbal remedies you take. Some of these can increase the risk of bleeding or interact with anesthesia. ? · If you take blood thinners, such as warfarin (Coumadin), clopidogrel (Plavix), or aspirin, be sure to talk to your doctor. He or she will tell you if you should stop taking these medicines before your procedure. Make sure that you understand exactly what your doctor wants you to do.  
? · Your doctor will tell you which medicines to take or stop before your procedure. You may need to stop taking certain medicines a week or more before the procedure. So talk to your doctor as soon as you can.  
? · If you have an advance directive, let your doctor know. It may include a living will and a durable power of  for health care. Bring a copy to the hospital. If you don't have one, you may want to prepare one. It lets your doctor and loved ones know your health care wishes. Doctors advise that everyone prepare these papers before any type of surgery or procedure. Procedures can be stressful.  This information will help you understand what you can expect. And it will help you safely prepare for your procedure. What happens on the day of the procedure? · Follow the instructions exactly about when to stop eating and drinking. If you don't, your procedure may be canceled. If your doctor told you to take your medicines on the day of the procedure, take them with only a sip of water. ? · Take a bath or shower before you come in for your procedure. Do not apply lotions, perfumes, deodorants, or nail polish. ? · Take off all jewelry and piercings. And take out contact lenses, if you wear them. ? At the hospital or surgery center · Bring a picture ID. ? · The test may take 15 to 30 minutes. ? · The doctor may spray medicine on the back of your throat to numb it. You also will get medicine to prevent pain and to relax you. ? · You will lie on your left side. The doctor will put the scope in your mouth and toward the back of your throat. The doctor will tell you when to swallow. This helps the scope move down your throat. You will be able to breathe normally. The doctor will move the scope down your esophagus into your stomach. The doctor also may look at the duodenum. ? · If your doctor wants to take a sample of tissue for a biopsy, he or she may use small surgical tools, which are put into the scope, to cut off some tissue. You will not feel a biopsy, if one is taken. The doctor also can use the tools to stop bleeding or to do other treatments, if needed. ? · You will stay at the hospital or surgery center for 1 to 2 hours until the medicine you were given wears off. Going home · Be sure you have someone to drive you home. Anesthesia and pain medicine make it unsafe for you to drive. ? · You will be given more specific instructions about recovering from your procedure. They will cover things like diet, wound care, follow-up care, driving, and getting back to your normal routine. When should you call your doctor? · You have questions or concerns. ? · You don't understand how to prepare for your procedure. ? · You become ill before the procedure (such as fever, flu, or a cold). ? · You need to reschedule or have changed your mind about having the procedure. Where can you learn more? Go to http://maliha-sanchez.info/. Enter P790 in the search box to learn more about \"Upper GI Endoscopy: Before Your Procedure. \" Current as of: May 12, 2017 Content Version: 11.4 © 5786-8674 [employer]. Care instructions adapted under license by [employer] (which disclaims liability or warranty for this information). If you have questions about a medical condition or this instruction, always ask your healthcare professional. [address] any warranty or liability for your use of this information. Introducing [facility]! Dear Lissa Favorite: 
Thank you for requesting a [employer] account. Our records indicate that you already have an active [employer] account. You can access your account anytime at [email or web address] Did you know that you can access your hospital and ER discharge instructions at any time in Music[employer]? You can also review all of your test results from your hospital stay or ER visit. Additional Information If you have questions, please visit the Frequently Asked Questions section of the Music[employer] website at [email or web address] Remember, Music[employer] is NOT to be used for urgent needs. For medical emergencies, dial 911. Now available from your iPhone and Android! Please provide this summary of care documentation to your next provider. Your primary care clinician is listed as Gonzales Kemp. If you have any questions after today's visit, please call [phone].

## 2018-06-13 NOTE — PATIENT INSTRUCTIONS
Upper GI Endoscopy: Before Your Procedure  What is an upper GI endoscopy? An upper gastrointestinal (or GI) endoscopy is a test that allows your doctor to look at the inside of your esophagus, stomach, and the first part of your small intestine, called the duodenum. The esophagus is the tube that carries food to your stomach. The doctor uses a thin, lighted tube that bends. It is called an endoscope, or scope. The doctor puts the tip of the scope in your mouth and gently moves it down your throat. The scope is a flexible video camera. The doctor looks at a monitor (like a TV set or a computer screen) as he or she moves the scope. A doctor may do this test, which is also called a procedure, to look for ulcers, tumors, infection, or bleeding. It also can be used to look for signs of acid backing up into your esophagus. This is called gastroesophageal reflux disease, or GERD. The doctor can use the scope to take a sample of tissue for study (a biopsy). The doctor also can use the scope to take out growths or stop bleeding. Follow-up care is a key part of your treatment and safety. Be sure to make and go to all appointments, and call your doctor if you are having problems. It's also a good idea to know your test results and keep a list of the medicines you take. What happens before the procedure? ?Preparing for the procedure  ? · Understand exactly what procedure is planned, along with the risks, benefits, and other options. · Tell your doctors ALL the medicines, vitamins, supplements, and herbal remedies you take. Some of these can increase the risk of bleeding or interact with anesthesia. ? · If you take blood thinners, such as warfarin (Coumadin), clopidogrel (Plavix), or aspirin, be sure to talk to your doctor. He or she will tell you if you should stop taking these medicines before your procedure.  Make sure that you understand exactly what your doctor wants you to do.   ? · Your doctor will tell you which medicines to take or stop before your procedure. You may need to stop taking certain medicines a week or more before the procedure. So talk to your doctor as soon as you can.   ? · If you have an advance directive, let your doctor know. It may include a living will and a durable power of  for health care. Bring a copy to the hospital. If you don't have one, you may want to prepare one. It lets your doctor and loved ones know your health care wishes. Doctors advise that everyone prepare these papers before any type of surgery or procedure. Procedures can be stressful. This information will help you understand what you can expect. And it will help you safely prepare for your procedure. What happens on the day of the procedure? · Follow the instructions exactly about when to stop eating and drinking. If you don't, your procedure may be canceled. If your doctor told you to take your medicines on the day of the procedure, take them with only a sip of water. ? · Take a bath or shower before you come in for your procedure. Do not apply lotions, perfumes, deodorants, or nail polish. ? · Take off all jewelry and piercings. And take out contact lenses, if you wear them. ? At the hospital or surgery center   · Bring a picture ID. ? · The test may take 15 to 30 minutes. ? · The doctor may spray medicine on the back of your throat to numb it. You also will get medicine to prevent pain and to relax you. ? · You will lie on your left side. The doctor will put the scope in your mouth and toward the back of your throat. The doctor will tell you when to swallow. This helps the scope move down your throat. You will be able to breathe normally. The doctor will move the scope down your esophagus into your stomach. The doctor also may look at the duodenum.    ? · If your doctor wants to take a sample of tissue for a biopsy, he or she may use small surgical tools, which are put into the scope, to cut off some tissue. You will not feel a biopsy, if one is taken. The doctor also can use the tools to stop bleeding or to do other treatments, if needed. ? · You will stay at the hospital or surgery center for 1 to 2 hours until the medicine you were given wears off. Going home   · Be sure you have someone to drive you home. Anesthesia and pain medicine make it unsafe for you to drive. ? · You will be given more specific instructions about recovering from your procedure. They will cover things like diet, wound care, follow-up care, driving, and getting back to your normal routine. When should you call your doctor? · You have questions or concerns. ? · You don't understand how to prepare for your procedure. ? · You become ill before the procedure (such as fever, flu, or a cold). ? · You need to reschedule or have changed your mind about having the procedure. Where can you learn more? Go to http://maliha-sanchez.info/. Enter P790 in the search box to learn more about \"Upper GI Endoscopy: Before Your Procedure. \"  Current as of: May 12, 2017  Content Version: 11.4  © 4114-6405 [employer]. Care instructions adapted under license by [employer] (which disclaims liability or warranty for this information). If you have questions about a medical condition or this instruction, always ask your healthcare professional. [address] any warranty or liability for your use of this information.

## 2018-06-13 NOTE — PROGRESS NOTES
1. Have you been to the ER, urgent care clinic since your last visit? Hospitalized since your last visit?no    2. Have you seen or consulted any other health care providers outside of the [facility] since your last visit? Include any pap smears or colon screening.  no

## 2018-06-13 NOTE — PROGRESS NOTES
HISTORY OF PRESENT ILLNESS  Mary Serrano is a 62 y.o. female with previous Malabsorptive Gastric bypass from 1/25/2017. She has lost a total of 88 pounds since surgery. She  Has lost 2 lb since the last ov. Body mass index is 22.13 kg/(m^2). Stacie Peaks She has had some nausea when she eats and tiffanie bloated. She complaints of epigastric pain which radiates from right and left upper quadrant. +Acid reflux/heartburn, took tums. Drinking water daily. She states that it hurts to eat and drink. No vomiting. Denies drinking alcoholol, smoking or taking NSAIDs. Hx of cholecystectomy 4/2017      Current Outpatient Prescriptions:     sucralfate (CARAFATE) 100 mg/mL suspension, Take 10 mL by mouth four (4) times daily for 14 days. , Disp: 560 mL, Rfl: 0    omeprazole (PRILOSEC) 20 mg capsule, TAKE ONE CAPSULE BY MOUTH EVERY DAY, Disp: 90 Cap, Rfl: 1    furosemide (LASIX) 20 mg tablet, TAKE 1 TAB BY MOUTH AS NEEDED., Disp: 30 Tab, Rfl: 0    buPROPion XL (WELLBUTRIN XL) 150 mg tablet, Take 1 Tab by mouth every morning. Indications: ANXIETY WITH DEPRESSION, Disp: 30 Tab, Rfl: 3    valACYclovir (VALTREX) 500 mg tablet, Take 1 Tab by mouth two (2) times a day., Disp: 60 Tab, Rfl: 3    ALPRAZolam (XANAX) 0.5 mg tablet, Take 1 Tab by mouth three (3) times daily as needed for Anxiety. Max Daily Amount: 1.5 mg., Disp: 90 Tab, Rfl: 0    cyclobenzaprine (FLEXERIL) 10 mg tablet, Take  by mouth three (3) times daily as needed for Muscle Spasm(s). , Disp: , Rfl:     metoprolol tartrate (LOPRESSOR) 25 mg tablet, TAKE 1 TABLET BY MOUTH TWO (2) TIMES A DAY., Disp: 60 Tab, Rfl: 1    PULMICORT FLEXHALER 180 mcg/actuation aepb inhaler, TAKE 1 PUFF BY INHALATION TWO (2) TIMES A DAY.  INDICATIONS: MAINTENANCE THERAPY FOR ASTHMA, Disp: 1 Inhaler, Rfl: 3    albuterol (ACCUNEB) 0.63 mg/3 mL nebulizer solution, 3 ML BY NEBULIZATION ROUTE EVERY FOUR (4) HOURS AS NEEDED FOR WHEEZING OR SHORTNESS OF BREATH., Disp: 75 mL, Rfl: 1    rosuvastatin (CRESTOR) 40 mg tablet, TAKE 1 TABLET BY MOUTH NIGHTLY, Disp: 90 Tab, Rfl: 2    gabapentin (NEURONTIN) 600 mg tablet, TAKE 1 TABLET BY MOUTH THREE (3) TIMES DAILY. , Disp: 270 Tab, Rfl: 0    aspirin delayed-release 81 mg tablet, Take  by mouth daily. , Disp: , Rfl:     nitroglycerin (NITROSTAT) 0.4 mg SL tablet, DISSOLVE 1 TABLET IN MOUTH EVERY 5 MINUTES AS NEEDED FOR CHEST PAIN, Disp: 25 Tab, Rfl: 1    albuterol (PROAIR HFA) 90 mcg/actuation inhaler, Take 1 Puff by inhalation every four (4) hours as needed for Wheezing or Shortness of Breath., Disp: 1 Inhaler, Rfl: 5    Blood-Glucose Meter monitoring kit, Use to check glucose once a day, Disp: 1 Kit, Rfl: 0    alcohol swabs (ALCOHOL PADS) padm, Use when checking blood glucose, Disp: 100 Pad, Rfl: 5    Lancets misc, Use once a day. Please give one compatible with patient's meter, Disp: 1 Package, Rfl: 5    glucose blood VI test strips (BLOOD GLUCOSE TEST) strip, Use once a day, Disp: 100 Strip, Rfl: 5    dicyclomine (BENTYL) 10 mg capsule, TAKE 1 (ONE) CAPSULE ORALLY AS NEEDED EVERY 6 HOURS, Disp: 120 capsule, Rfl: 0    cetirizine (ZYRTEC) 10 mg tablet, Take  by mouth daily. , Disp: , Rfl:       Visit Vitals    /71 (BP 1 Location: Left arm, BP Patient Position: Sitting)    Pulse 82    Temp (!) 48.4 °F (9.1 °C) (Oral)    Resp 14    Ht 5' 5\" (1.651 m)    Wt 133 lb (60.3 kg)    LMP 01/01/1985    SpO2 97%    BMI 22.13 kg/m2     HPI    Review of Systems   Gastrointestinal: Positive for abdominal pain, heartburn and nausea. Negative for vomiting. Physical Exam   Constitutional: She is oriented to person, place, and time. She appears well-developed and well-nourished. Cardiovascular: Normal rate and regular rhythm. Exam reveals no gallop and no friction rub. No murmur heard. Pulmonary/Chest: Effort normal and breath sounds normal.   Abdominal: Soft. Bowel sounds are normal. She exhibits no distension. Tenderness: epigastric, RUQ. Neurological: She is alert and oriented to person, place, and time. Skin: Skin is warm and dry. Psychiatric: She has a normal mood and affect. ASSESSMENT and PLAN  Daquan Isaacs is a 62 y.o. female with previous Malabsorptive Gastric bypass from 1/25/2017. She has lost a total of 88 pounds since surgery. She  Has lost 2 lb since the last ov. Body mass index is 22.13 kg/(m^2). Start PPI, carafate  EGD to r/o ulcer or gastritis. May eat bland foods, soups, etc.   Pt verbalized understanding and questions were answered to the best of my knowledge and ability.

## 2018-06-15 DIAGNOSIS — I25.10 CORONARY ARTERY DISEASE INVOLVING NATIVE CORONARY ARTERY OF NATIVE HEART WITHOUT ANGINA PECTORIS: ICD-10-CM

## 2018-06-18 RX ORDER — METOPROLOL TARTRATE 25 MG/1
TABLET, FILM COATED ORAL
Qty: 60 TAB | Refills: 1 | Status: SHIPPED | OUTPATIENT
Start: 2018-06-18 | End: 2018-09-30 | Stop reason: SDUPTHER

## 2018-06-21 ENCOUNTER — TELEPHONE (OUTPATIENT)
Dept: SURGERY | Age: 58
End: 2018-06-21

## 2018-06-21 NOTE — TELEPHONE ENCOUNTER
I returned patients call and verified patient with 2 patient identifiers. She states she saw Blake Miner NP on 6/13/18 and she has not heard anything for the GI doctor Ketty Winston wanted her to . She said Dr. Abel Marie name was on the referral paperwork Ketty Winston gave her. I gave her Dr. Roque Vivas numbers [facility] [phone] as well as [phone] as well as [facility] [place] office [phone]. She will call them for an appointment.

## 2018-06-29 ENCOUNTER — OFFICE VISIT (OUTPATIENT)
Dept: CARDIOLOGY CLINIC | Age: 58
End: 2018-06-29

## 2018-06-29 VITALS
RESPIRATION RATE: 15 BRPM | HEART RATE: 66 BPM | BODY MASS INDEX: 23.26 KG/M2 | WEIGHT: 139.6 LBS | HEIGHT: 65 IN | SYSTOLIC BLOOD PRESSURE: 108 MMHG | DIASTOLIC BLOOD PRESSURE: 78 MMHG | OXYGEN SATURATION: 98 %

## 2018-06-29 DIAGNOSIS — I25.10 CORONARY ARTERY DISEASE INVOLVING NATIVE CORONARY ARTERY OF NATIVE HEART WITHOUT ANGINA PECTORIS: Primary | ICD-10-CM

## 2018-06-29 DIAGNOSIS — R60.0 LOCALIZED EDEMA: ICD-10-CM

## 2018-06-29 DIAGNOSIS — I10 ESSENTIAL HYPERTENSION: ICD-10-CM

## 2018-06-29 PROBLEM — E11.40 TYPE 2 DIABETES MELLITUS WITH DIABETIC NEUROPATHY (HCC): Status: ACTIVE | Noted: 2018-06-29

## 2018-06-29 RX ORDER — SUCRALFATE 1 G/10ML
SUSPENSION ORAL 4 TIMES DAILY
COMMUNITY
End: 2018-07-25 | Stop reason: SDUPTHER

## 2018-06-29 NOTE — MR AVS SNAPSHOT
[address] [facility] [address] 100[address] [facility] 
[phone] Patient: Daquan Isaacs MRN:  [id number] Visit Information Date & Time Provider Department Dept. Phone Encounter #  
 6/29/2018  1:00 PM Constance Rose MD [facility] [place] Don [phone] [id number] Follow-up Instructions Follow-up and Disposition History Your Appointments 7/25/2018  1:00 PM  
ESTABLISHED PATIENT with Adolfo Cabrera NP [address] [facility] ([facility]) Appt Note: 6mos per NP. ID&Ins.cards. [id number]. payment plan. fs  
 [address] [facility] [address] [facility] 1007 [facility]  
[phone]  
  
   
 [address] 1007 [facility]  
  
    
 7/1/2019  1:20 PM  
ESTABLISHED PATIENT with Constance Rose MD  
[facility] Appt Note: 1 year follow up per Dr. Evonne Pickett [facility] [address] [place] [address]  
[address] Randy Kapadia [facility] [address] [address] Upcoming Health Maintenance Date Due HEMOGLOBIN A1C Q6M 6/19/2018 EYE EXAM RETINAL OR DILATED Q1 12/19/2018* Influenza Age 5 to Adult 8/1/2018 FOOT EXAM Q1 12/19/2018 MICROALBUMIN Q1 12/19/2018 LIPID PANEL Q1 12/19/2018 BREAST CANCER SCRN MAMMOGRAM 7/18/2019 PAP AKA CERVICAL CYTOLOGY 8/23/2019 COLONOSCOPY 6/23/2020 DTaP/Tdap/Td series (2 - Td) 8/14/2022 *Topic was postponed. The date shown is not the original due date. Allergies as of 6/29/2018  Review Complete On: 6/29/2018 By: Constance Rose MD  
  
 Severity Noted Reaction Type Reactions Accupril [Quinapril]  07/07/2010    Cough Lipitor [Atorvastatin]  07/07/2010    Other (comments)  
 aches Norvasc [Amlodipine]  07/22/2015    Swelling On legs at 10 mg dose Current Immunizations  Reviewed on 6/29/2018 Name Date Influenza Vaccine 9/1/2015 Influenza Vaccine (Quad) PF 12/19/2017 Pneumococcal Polysaccharide (PPSV-23) 8/4/2015 TD Vaccine 11/7/2005 TDAP Vaccine 8/14/2012 Reviewed by Kaila Bruce LPN on 1/14/7948 at  1:07 PM  
You Were Diagnosed With   
  
 Codes Comments Coronary artery disease involving native coronary artery of native heart without angina pectoris    -  Primary ICD-10-CM: I25.10 ICD-9-CM: 414.01 Essential hypertension     ICD-10-CM: I10 
ICD-9-CM: 401.9 Localized edema     ICD-10-CM: R60.0 ICD-9-CM: 354. 3 Vitals BP Pulse Resp Height(growth percentile) Weight(growth percentile) LMP  
 108/78 (BP 1 Location: Left arm, BP Patient Position: Sitting) 66 15 5' 5\" (1.651 m) 139 lb 9.6 oz (63.3 kg) 01/01/1985 SpO2 BMI OB Status Smoking Status 98% 23.23 kg/m2 Hysterectomy Never Smoker Vitals History BMI and BSA Data Body Mass Index Body Surface Area  
 23.23 kg/m 2 1.7 m 2 Preferred Pharmacy Pharmacy Name Phone [id number] [place], [address] AT [facility] [phone] Your Updated Medication List  
  
   
This list is accurate as of 6/29/18  1:52 PM.  Always use your most recent med list.  
  
  
  
  
 * albuterol 90 mcg/actuation inhaler Commonly known as:  PROAIR HFA Take 1 Puff by inhalation every four (4) hours as needed for Wheezing or Shortness of Breath. * albuterol 0.63 mg/3 mL nebulizer solution Commonly known as:  ACCUNEB  
3 ML BY NEBULIZATION ROUTE EVERY FOUR (4) HOURS AS NEEDED FOR WHEEZING OR SHORTNESS OF BREATH. alcohol swabs Padm Commonly known as:  ALCOHOL PADS Use when checking blood glucose ALPRAZolam 0.5 mg tablet Commonly known as:  Lamount Marten Take 1 Tab by mouth three (3) times daily as needed for Anxiety. Max Daily Amount: 1.5 mg.  
  
 aspirin delayed-release 81 mg tablet Take  by mouth daily. Blood-Glucose Meter monitoring kit Use to check glucose once a day buPROPion  mg tablet Commonly known as:  Peter Lock Take 1 Tab by mouth every morning. Indications: ANXIETY WITH DEPRESSION  
  
 CARAFATE 100 mg/mL suspension Generic drug:  sucralfate Take  by mouth four (4) times daily. cyclobenzaprine 10 mg tablet Commonly known as:  FLEXERIL Take  by mouth three (3) times daily as needed for Muscle Spasm(s). dicyclomine 10 mg capsule Commonly known as:  BENTYL TAKE 1 (ONE) CAPSULE ORALLY AS NEEDED EVERY 6 HOURS  
  
 furosemide 20 mg tablet Commonly known as:  LASIX TAKE 1 TAB BY MOUTH AS NEEDED.  
  
 gabapentin 600 mg tablet Commonly known as:  NEURONTIN  
TAKE 1 TABLET BY MOUTH THREE (3) TIMES DAILY. glucose blood VI test strips strip Commonly known as:  blood glucose test  
Use once a day Lancets Misc Use once a day. Please give one compatible with patient's meter  
  
 metoprolol tartrate 25 mg tablet Commonly known as:  LOPRESSOR  
TAKE 1 TABLET BY MOUTH TWO (2) TIMES A DAY. nitroglycerin 0.4 mg SL tablet Commonly known as:  NITROSTAT  
DISSOLVE 1 TABLET IN MOUTH EVERY 5 MINUTES AS NEEDED FOR CHEST PAIN  
  
 omeprazole 20 mg capsule Commonly known as:  PRILOSEC  
TAKE ONE CAPSULE BY MOUTH EVERY DAY  
  
 PULMICORT FLEXHALER 180 mcg/actuation Aepb inhaler Generic drug:  budesonide TAKE 1 PUFF BY INHALATION TWO (2) TIMES A DAY. INDICATIONS: MAINTENANCE THERAPY FOR ASTHMA  
  
 rosuvastatin 40 mg tablet Commonly known as:  CRESTOR  
TAKE 1 TABLET BY MOUTH NIGHTLY  
  
 valACYclovir 500 mg tablet Commonly known as:  VALTREX Take 1 Tab by mouth two (2) times a day. ZyrTEC 10 mg tablet Generic drug:  cetirizine Take  by mouth daily. * Notice: This list has 2 medication(s) that are the same as other medications prescribed for you.  Read the directions carefully, and ask your doctor or other care provider to review them with you. We Performed the Following AMB POC EKG ROUTINE W/ 12 LEADS, INTER & REP [59245 CPT(R)] Patient Instructions Reduce Metoprolol to 12.5 mg po BID See Dr. Cynthia Patel in 1 year. Introducing [facility] SERVICES! Dear Edilma Mills: 
Thank you for requesting a [employer] account. Our records indicate that you already have an active [employer] account. You can access your account anytime at [email or web address] Did you know that you can access your hospital and ER discharge instructions at any time in [employer]? You can also review all of your test results from your hospital stay or ER visit. Additional Information If you have questions, please visit the Frequently Asked Questions section of the [employer] website at [email or web address] Remember, [employer] is NOT to be used for urgent needs. For medical emergencies, dial 911. Now available from your iPhone and Android! Please provide this summary of care documentation to your next provider. Your primary care clinician is listed as Yamilet Fischer. If you have any questions after today's visit, please call [phone].

## 2018-06-29 NOTE — PROGRESS NOTES
Chief Complaint   Patient presents with    Hypertension    Coronary Artery Disease    Annual Exam     1. Have you been to the ER, urgent care clinic since your last visit? Hospitalized since your last visit? No    2. Have you seen or consulted any other health care providers outside of the [address] [place] [address] since your last visit? Include any pap smears or colon screening.  No    Visit Vitals    /78 (BP 1 Location: Left arm, BP Patient Position: Sitting)    Pulse 66    Resp 15    Ht 5' 5\" (1.651 m)    Wt 139 lb 9.6 oz (63.3 kg)    LMP 01/01/1985    SpO2 98%    BMI 23.23 kg/m2

## 2018-06-29 NOTE — PROGRESS NOTES
Office Follow-up    NAME: Radha Ramirez   :  1960  MRM:  [id number]    Date:  2018            Assessment:     Problem List  Date Reviewed: 2018          Codes Class Noted    Type 2 diabetes mellitus with diabetic neuropathy ([facility] ICD-10-CM: E11.40  ICD-9-CM: 250.60, 357.2  2018        Elevated ferritin ICD-10-CM: R79.89  ICD-9-CM: 790.6  2017        H/O colonoscopy ICD-10-CM: Z98.890  ICD-9-CM: V45.89  2017    Overview Addendum 2017  2:12 PM by Alexia Ayala MD     , next   +FH colon cancer in mom             DDD (degenerative disc disease), lumbar ICD-10-CM: M51.36  ICD-9-CM: 722.52  2017    Overview Signed 2017  1:57 PM by MD Dr. Tawana Medeiros  S/p NEREYDA x 3             FH: colon cancer ICD-10-CM: Z80.0  ICD-9-CM: V16.0  2017    Overview Signed 2017  2:12 PM by Alexia Ayala MD     mom             H/O gastric bypass ICD-10-CM: Z98.84  ICD-9-CM: V45.86  2017    Overview Signed 2017 11:38 AM by Alexia Ayala MD     lost from 230 to 154 lbs             Hypokalemia ICD-10-CM: E87.6  ICD-9-CM: 276.8  2017        History of pulmonary embolus (PE) ICD-10-CM: Z86.711  ICD-9-CM: V12.55  2016        Anxiety ICD-10-CM: F41.9  ICD-9-CM: 300.00  2016    Overview Addendum 2017  1:58 PM by Alexia Aylaa MD     Needs to be seen at least twice yearly for BNZ  FTF 2017   as expected 2017             CAD (coronary artery disease) ICD-10-CM: I25.10  ICD-9-CM: 414.00  2015    Overview Signed 2015  8:52 AM by MD Dr. Nathaly Medeiros Dr. placed stent in 80% blockage of distal left Cx              DM (diabetes mellitus) ([facility].) ICD-10-CM: E11.9  ICD-9-CM: 250.00  2015    Overview Addendum 2017  8:12 AM by Alexia Ayala MD     2017:  S/p gastric bypass = a1c 5.2/  LDL 84/  MAB negative    Dx:  a1c 6.9 2016             FH: diabetes mellitus ICD-10-CM: Z83.3  ICD-9-CM: V18.0  8/2/2355        Metabolic syndrome [facility]-PN: E88.81  ICD-9-CM: 277.7  8/3/2015    Overview Addendum 8/4/2015 11:38 AM by Kwame Rahman MD     TG up, sugar up, HTN, waist 54 inches  Rx metformin  Needs yearly eye exams and labs             Essential hypertension ICD-10-CM: I10  ICD-9-CM: 401.9  5/17/2015        Chronic pain ICD-10-CM: G89.29  ICD-9-CM: 338.29  3/30/2015    Overview Addendum 7/12/2017 11:40 AM by Kwame Rahman MD     Fibromyalgia  S/p NEREYDA x 3 in back via Dr. Octaviano Acharya. He uses flexeril HS for pain             GARCIA (nonalcoholic steatohepatitis) ICD-10-CM: K75.81  ICD-9-CM: 571.8  1/24/2011    Overview Signed 1/24/2011  7:18 PM by Dalton Randhawa MD     Ultrasound 2008             Morbid obesity [facility]) ICD-10-CM: V70.89  ICD-9-CM: 278.01  9/22/2010    Overview Signed 12/19/2017  1:57 PM by Kwame Rahman MD     S/p gastric bypass 1/2017             Obstructive sleep apnea ICD-10-CM: G47.33  ICD-9-CM: 327.23  9/22/2010        Asthma ICD-10-CM: J45.909  ICD-9-CM: 493.90  9/22/2010        Arthritis ICD-10-CM: M19.90  ICD-9-CM: 716.90  9/22/2010        GERD (gastroesophageal reflux disease) ICD-10-CM: K21.9  ICD-9-CM: 530.81  9/22/2010                 Plan:     1. CAD/status post LCx PCI in past: Stable. Continue aspirin. Continue statins. 2. Hypertension: Blood pressure is controlled. Her blood pressure is rather lower now since she has lost significant weight. I will decrease the metoprolol to 12.5 mg p.o. twice daily. 3. Dyslipidemia: Continue Crestor. FLP being followed by primary care physician. 4. Peripheral edema: This is occasional.  Previously this was related to her weight. She only takes occasional Lasix. 5. See me again in 1 year. Subjective:     Niranjan Villalta, a 62y.o. year-old who presents for followup. She has known history of obesity status post bariatric surgery she has lost 20 pounds in last 2 years.   She has also known history of CAD with PCI to the left circumflex artery. She is here for her annual follow-up. She denies any symptoms of chest pain, shortness breath, lightheadedness or dizziness. She will occasionally see mild swelling in her lower extremities. She uses Lasix as needed. EKG in my office today demonstrated normal sinus rhythm normal EKG, normal axis, normal intervals. Exam:     Physical Exam:  Visit Vitals    /78 (BP 1 Location: Left arm, BP Patient Position: Sitting)    Pulse 66    Resp 15    Ht 5' 5\" (1.651 m)    Wt 139 lb 9.6 oz (63.3 kg)    LMP 01/01/1985    SpO2 98%    BMI 23.23 kg/m2     General appearance - alert, well appearing, and in no distress  Mental status - affect appropriate to mood  Eyes - sclera anicteric, moist mucous membranes  Neck - supple, no significant adenopathy  Chest - clear to auscultation, no wheezes, rales or rhonchi  Heart - normal rate, regular rhythm, normal S1, S2, no murmurs, rubs, clicks or gallops  Abdomen - soft, nontender, nondistended, no masses or organomegaly  Extremities - peripheral pulses normal, no pedal edema  Skin - normal coloration  no rashes    Medications:     Current Outpatient Prescriptions   Medication Sig    sucralfate (CARAFATE) 100 mg/mL suspension Take  by mouth four (4) times daily.  metoprolol tartrate (LOPRESSOR) 25 mg tablet TAKE 1 TABLET BY MOUTH TWO (2) TIMES A DAY.  omeprazole (PRILOSEC) 20 mg capsule TAKE ONE CAPSULE BY MOUTH EVERY DAY    furosemide (LASIX) 20 mg tablet TAKE 1 TAB BY MOUTH AS NEEDED.  buPROPion XL (WELLBUTRIN XL) 150 mg tablet Take 1 Tab by mouth every morning. Indications: ANXIETY WITH DEPRESSION    valACYclovir (VALTREX) 500 mg tablet Take 1 Tab by mouth two (2) times a day. (Patient taking differently: Take 500 mg by mouth daily as needed.)    ALPRAZolam (XANAX) 0.5 mg tablet Take 1 Tab by mouth three (3) times daily as needed for Anxiety.  Max Daily Amount: 1.5 mg.    cyclobenzaprine (FLEXERIL) 10 mg tablet Take  by mouth three (3) times daily as needed for Muscle Spasm(s).  PULMICORT FLEXHALER 180 mcg/actuation aepb inhaler TAKE 1 PUFF BY INHALATION TWO (2) TIMES A DAY. INDICATIONS: MAINTENANCE THERAPY FOR ASTHMA    albuterol (ACCUNEB) 0.63 mg/3 mL nebulizer solution 3 ML BY NEBULIZATION ROUTE EVERY FOUR (4) HOURS AS NEEDED FOR WHEEZING OR SHORTNESS OF BREATH.  rosuvastatin (CRESTOR) 40 mg tablet TAKE 1 TABLET BY MOUTH NIGHTLY    gabapentin (NEURONTIN) 600 mg tablet TAKE 1 TABLET BY MOUTH THREE (3) TIMES DAILY.  aspirin delayed-release 81 mg tablet Take  by mouth daily.  nitroglycerin (NITROSTAT) 0.4 mg SL tablet DISSOLVE 1 TABLET IN MOUTH EVERY 5 MINUTES AS NEEDED FOR CHEST PAIN    albuterol (PROAIR HFA) 90 mcg/actuation inhaler Take 1 Puff by inhalation every four (4) hours as needed for Wheezing or Shortness of Breath.  Blood-Glucose Meter monitoring kit Use to check glucose once a day    alcohol swabs (ALCOHOL PADS) padm Use when checking blood glucose    Lancets misc Use once a day. Please give one compatible with patient's meter    glucose blood VI test strips (BLOOD GLUCOSE TEST) strip Use once a day    dicyclomine (BENTYL) 10 mg capsule TAKE 1 (ONE) CAPSULE ORALLY AS NEEDED EVERY 6 HOURS    cetirizine (ZYRTEC) 10 mg tablet Take  by mouth daily. No current facility-administered medications for this visit. Diagnostic Data Review:     EKG:    LHC: 11/30/15- Circumflex: There was a 80 % stenosis in the distal third of the vessel segment, Successful BMS dLCx: 2.0x12 Mini Vision. ECHO:4/3/15- EF 65%, G1DD, trivial MR,   Dobutamine Stress Echo : 4/27/15: No ischemia. False positive ST Changes of ischemia.  Had chest pain during the test.       Lab Review:     Lab Results   Component Value Date/Time    Cholesterol, total 129 08/23/2016 02:02 PM    HDL Cholesterol 44 08/23/2016 02:02 PM    LDL,Direct 84 12/19/2017 02:28 PM    LDL, calculated 32 08/23/2016 02:02 PM Triglyceride 267 (H) 08/23/2016 02:02 PM     Lab Results   Component Value Date/Time    Creatinine 0.59 05/31/2018 04:00 PM     Lab Results   Component Value Date/Time    BUN 6 05/31/2018 04:00 PM     Lab Results   Component Value Date/Time    Potassium 3.5 05/31/2018 04:00 PM     Lab Results   Component Value Date/Time    Hemoglobin A1c 5.2 12/19/2017 02:28 PM     Lab Results   Component Value Date/Time    HGB 13.4 05/31/2018 04:00 PM     Lab Results   Component Value Date/Time    PLATELET 955 24/75/8487 04:00 PM     No results for input(s): CPK, CKMB, TROIQ in the last 72 hours. No lab exists for component: CKQMB, CPKMB             ___________________________________________________    Miguel Davey.  Wilbert Merrill MD, [facility]

## 2018-07-03 DIAGNOSIS — F41.9 ANXIETY: ICD-10-CM

## 2018-07-03 RX ORDER — ALPRAZOLAM 0.5 MG/1
TABLET ORAL
Qty: 90 TAB | Refills: 0 | Status: SHIPPED | OUTPATIENT
Start: 2018-07-03 | End: 2018-09-18 | Stop reason: SDUPTHER

## 2018-07-09 RX ORDER — FUROSEMIDE 20 MG/1
TABLET ORAL
Qty: 30 TAB | Refills: 0 | Status: SHIPPED | OUTPATIENT
Start: 2018-07-09 | End: 2018-08-05 | Stop reason: SDUPTHER

## 2018-07-19 ENCOUNTER — HOSPITAL ENCOUNTER (OUTPATIENT)
Dept: MAMMOGRAPHY | Age: 58
Discharge: HOME | End: 2018-07-19
Payer: COMMERCIAL

## 2018-07-19 DIAGNOSIS — Z12.31 VISIT FOR SCREENING MAMMOGRAM: ICD-10-CM

## 2018-07-19 PROCEDURE — 77067 SCR MAMMO BI INCL CAD: CPT

## 2018-07-25 ENCOUNTER — OFFICE VISIT (OUTPATIENT)
Dept: SURGERY | Age: 58
End: 2018-07-25

## 2018-07-25 VITALS
HEIGHT: 65 IN | SYSTOLIC BLOOD PRESSURE: 124 MMHG | DIASTOLIC BLOOD PRESSURE: 78 MMHG | BODY MASS INDEX: 22.99 KG/M2 | WEIGHT: 138 LBS | HEART RATE: 72 BPM | TEMPERATURE: 98.1 F | RESPIRATION RATE: 14 BRPM | OXYGEN SATURATION: 96 %

## 2018-07-25 DIAGNOSIS — E66.01 MORBID OBESITY (HCC): Primary | ICD-10-CM

## 2018-07-25 RX ORDER — ONDANSETRON 4 MG/1
4 TABLET, ORALLY DISINTEGRATING ORAL
Qty: 20 TAB | Refills: 0 | Status: SHIPPED | OUTPATIENT
Start: 2018-07-25 | End: 2018-09-18

## 2018-07-25 RX ORDER — SUCRALFATE 1 G/10ML
1 SUSPENSION ORAL 4 TIMES DAILY
Qty: 560 ML | Refills: 0 | Status: SHIPPED | OUTPATIENT
Start: 2018-07-25 | End: 2018-08-08

## 2018-07-25 NOTE — PROGRESS NOTES
HISTORY OF PRESENT ILLNESS  Rebecka Duverney is a 62 y.o. female with previous  Malabsorptive Gastric bypass on 1/25/2017 . She has lost a total of 83 pounds since surgery. She  Has gained 5 lbs since the last ov. Body mass index is 22.96 kg/(m^2). Mabeline Sink Nausea, +vomting once incident. . - Acid reflux/heartburn, since being on PPI. . Drinking  64 ounces of water daily. ? protein intake daily. + BM's. Pt is walking for exercise. She is scheduled for an EGd in August. She has been taking Carafate and Prilosec. Dietary recall -   Breakfast- yogurt, fruit  Lunch- tuna, crackers  Dinner- vegetables, fish    She is snacking between meals; pretzels and animal crackers. icee. Vitamins:  MVI : yes  Calcium : yes  B-Vit 12: yes    Patient has an advanced directive: not on file. Ms. Anika Leavitt has a reminder for a \"due or due soon\" health maintenance. I have asked that she contact her primary care provider for follow-up on this health maintenance. Co-Morbid(s)     Resolved            COMORBIDITY     SLEEP APNEA                 yes        GERD  (req.meds)            no,   HTN     Yes  Diabetes    yes    Current Outpatient Prescriptions:     furosemide (LASIX) 20 mg tablet, TAKE 1 TAB BY MOUTH AS NEEDED., Disp: 30 Tab, Rfl: 0    ALPRAZolam (XANAX) 0.5 mg tablet, TAKE 1/2-1 TABLET BY MOUTH TWICE DAILY AS NEEDED FOR ANXIETY, Disp: 90 Tab, Rfl: 0    sucralfate (CARAFATE) 100 mg/mL suspension, Take  by mouth four (4) times daily. , Disp: , Rfl:     metoprolol tartrate (LOPRESSOR) 25 mg tablet, TAKE 1 TABLET BY MOUTH TWO (2) TIMES A DAY., Disp: 60 Tab, Rfl: 1    omeprazole (PRILOSEC) 20 mg capsule, TAKE ONE CAPSULE BY MOUTH EVERY DAY, Disp: 90 Cap, Rfl: 1    buPROPion XL (WELLBUTRIN XL) 150 mg tablet, Take 1 Tab by mouth every morning. Indications: ANXIETY WITH DEPRESSION, Disp: 30 Tab, Rfl: 3    valACYclovir (VALTREX) 500 mg tablet, Take 1 Tab by mouth two (2) times a day.  (Patient taking differently: Take 500 mg by mouth daily as needed.), Disp: 60 Tab, Rfl: 3    cyclobenzaprine (FLEXERIL) 10 mg tablet, Take  by mouth three (3) times daily as needed for Muscle Spasm(s). , Disp: , Rfl:     PULMICORT FLEXHALER 180 mcg/actuation aepb inhaler, TAKE 1 PUFF BY INHALATION TWO (2) TIMES A DAY. INDICATIONS: MAINTENANCE THERAPY FOR ASTHMA, Disp: 1 Inhaler, Rfl: 3    albuterol (ACCUNEB) 0.63 mg/3 mL nebulizer solution, 3 ML BY NEBULIZATION ROUTE EVERY FOUR (4) HOURS AS NEEDED FOR WHEEZING OR SHORTNESS OF BREATH., Disp: 75 mL, Rfl: 1    rosuvastatin (CRESTOR) 40 mg tablet, TAKE 1 TABLET BY MOUTH NIGHTLY, Disp: 90 Tab, Rfl: 2    gabapentin (NEURONTIN) 600 mg tablet, TAKE 1 TABLET BY MOUTH THREE (3) TIMES DAILY. , Disp: 270 Tab, Rfl: 0    aspirin delayed-release 81 mg tablet, Take  by mouth daily. , Disp: , Rfl:     nitroglycerin (NITROSTAT) 0.4 mg SL tablet, DISSOLVE 1 TABLET IN MOUTH EVERY 5 MINUTES AS NEEDED FOR CHEST PAIN, Disp: 25 Tab, Rfl: 1    albuterol (PROAIR HFA) 90 mcg/actuation inhaler, Take 1 Puff by inhalation every four (4) hours as needed for Wheezing or Shortness of Breath., Disp: 1 Inhaler, Rfl: 5    Blood-Glucose Meter monitoring kit, Use to check glucose once a day, Disp: 1 Kit, Rfl: 0    alcohol swabs (ALCOHOL PADS) padm, Use when checking blood glucose, Disp: 100 Pad, Rfl: 5    Lancets misc, Use once a day. Please give one compatible with patient's meter, Disp: 1 Package, Rfl: 5    glucose blood VI test strips (BLOOD GLUCOSE TEST) strip, Use once a day, Disp: 100 Strip, Rfl: 5    dicyclomine (BENTYL) 10 mg capsule, TAKE 1 (ONE) CAPSULE ORALLY AS NEEDED EVERY 6 HOURS, Disp: 120 capsule, Rfl: 0    cetirizine (ZYRTEC) 10 mg tablet, Take  by mouth daily.   , Disp: , Rfl:       Visit Vitals    /78 (BP 1 Location: Left arm, BP Patient Position: Sitting)    Pulse 72    Temp 98.1 °F (36.7 °C) (Oral)    Resp 14    Ht 5' 5\" (1.651 m)    Wt 138 lb (62.6 kg)    LMP 01/01/1985    SpO2 96%    BMI 22.96 kg/m2 HPI    Review of Systems   Constitutional: Negative for chills and fever. Respiratory: Negative for cough and shortness of breath. Cardiovascular: Negative for chest pain. Gastrointestinal: Positive for heartburn (intermittent) and nausea (intermittent). Negative for abdominal pain. Neurological: Negative for dizziness and headaches. Physical Exam   Constitutional: She is oriented to person, place, and time. She appears well-developed and well-nourished. Neck: Normal range of motion. Neck supple. Cardiovascular: Normal rate and regular rhythm. Exam reveals no gallop and no friction rub. No murmur heard. Pulmonary/Chest: Effort normal and breath sounds normal.   Abdominal: Soft. Bowel sounds are normal. She exhibits no distension. There is no tenderness. No masses or hernias noted   Neurological: She is alert and oriented to person, place, and time. Skin: Skin is warm and dry. Psychiatric: She has a normal mood and affect. ASSESSMENT and PLAN  Hue Engel is a 62 y.o. female with previous  Malabsorptive Gastric bypass on 1/25/2017 . She has lost a total of 83 pounds since surgery. She  Has gained 5 lbs since the last ov. Body mass index is 22.96 kg/(m^2). EGd in August  Continue Carafate and Prilosec   Focus on protein. Advised patient regard to diet that is high-protein, low-fat, low-sugar, limited carbohydrates. Strive for 50-60 grams of protein daily. If having a snack, foods that are protein or fiber rich. . No eating/drinking together, chew foods well, and portion control. Measure meals. Discussed snacking behavior and to [facility] pay attention to behavioral factor and habits. Drink at least 40-64 ounces of water or non-calorie/non-carbonated beverages daily. Continue vitamin regiment daily. Exercise at least 3 days a week with cardiovascular and strength training. Patient to follow up in 6 months. Advised to call office if any questions/concerns.

## 2018-07-25 NOTE — PROGRESS NOTES
1. Have you been to the ER, urgent care clinic since your last visit? Hospitalized since your last visit? No    2. Have you seen or consulted any other health care providers outside of the [address] [place] [address] since your last visit? Include any pap smears or colon screening.  No

## 2018-07-25 NOTE — PATIENT INSTRUCTIONS

## 2018-07-25 NOTE — MR AVS SNAPSHOT
[address] 8 [facility] 70 [facility] 
[phone] Patient: Guy Guardado MRN:  [id number] Visit Information Date & Time Provider Department Dept. Phone Encounter #  
 7/25/2018  1:00 PM Ward Cheatham, [address] [facility] Surgery [phone] [id number] Your Appointments 1/22/2019  1:00 PM  
ESTABLISHED PATIENT with Radha Roblero MD  
[address] [facility] Surgery [address]) Appt Note: 6mos. per NP> [email or web address]. plan. [email or web address]. fs  
 [address] [facility] 70 [facility]  
[phone]  
  
   
 [address] 70 [facility]  
  
    
 7/1/2019  1:20 PM  
ESTABLISHED PATIENT with Lilian Palacios MD  
[address] ([address]) Appt Note: 1 year follow up per Dr. Manoj Ho [facility] 600 70 [facility]  
54 [facility] [address] [address] Upcoming Health Maintenance Date Due HEMOGLOBIN A1C Q6M 6/19/2018 EYE EXAM RETINAL OR DILATED Q1 12/19/2018* Influenza Age 5 to Adult 8/1/2018 FOOT EXAM Q1 12/19/2018 MICROALBUMIN Q1 12/19/2018 LIPID PANEL Q1 12/19/2018 PAP AKA CERVICAL CYTOLOGY 8/23/2019 COLONOSCOPY 6/23/2020 BREAST CANCER SCRN MAMMOGRAM 7/19/2020 DTaP/Tdap/Td series (2 - Td) 8/14/2022 *Topic was postponed. The date shown is not the original due date. Allergies as of 7/25/2018  Review Complete On: 7/25/2018 By: Kaur Suarez LPN Severity Noted Reaction Type Reactions Accupril [Quinapril]  07/07/2010    Cough Lipitor [Atorvastatin]  07/07/2010    Other (comments)  
 aches Norvasc [Amlodipine]  07/22/2015    Swelling On legs at 10 mg dose Current Immunizations  Reviewed on 6/29/2018 Name Date Influenza Vaccine 9/1/2015 Influenza Vaccine (Quad) PF 12/19/2017 Pneumococcal Polysaccharide (PPSV-23) 8/4/2015 TD Vaccine 11/7/2005 TDAP Vaccine 8/14/2012 Not reviewed this visit You Were Diagnosed With   
  
 Codes Comments Morbid obesity ([facility].)    -  Primary ICD-10-CM: E66.01 
ICD-9-CM: 278.01   
 BMI 22.0-22.9, adult     ICD-10-CM: L18.15 
ICD-9-CM: V85.1 Vitals BP Pulse Temp Resp Height(growth percentile) Weight(growth percentile) 124/78 (BP 1 Location: Left arm, BP Patient Position: Sitting) 72 98.1 °F (36.7 °C) (Oral) 14 5' 5\" (1.651 m) 138 lb (62.6 kg) LMP SpO2 BMI OB Status Smoking Status 01/01/1985 96% 22.96 kg/m2 Hysterectomy Never Smoker Vitals History BMI and BSA Data Body Mass Index Body Surface Area  
 22.96 kg/m 2 1.69 m 2 Preferred Pharmacy Pharmacy Name Phone [id number] [address] [facility] [phone] Your Updated Medication List  
  
   
This list is accurate as of 7/25/18  1:43 PM.  Always use your most recent med list.  
  
  
  
  
 * albuterol 90 mcg/actuation inhaler Commonly known as:  PROAIR HFA Take 1 Puff by inhalation every four (4) hours as needed for Wheezing or Shortness of Breath. * albuterol 0.63 mg/3 mL nebulizer solution Commonly known as:  ACCUNEB  
3 ML BY NEBULIZATION ROUTE EVERY FOUR (4) HOURS AS NEEDED FOR WHEEZING OR SHORTNESS OF BREATH. alcohol swabs Padm Commonly known as:  ALCOHOL PADS Use when checking blood glucose ALPRAZolam 0.5 mg tablet Commonly known as:  XANAX  
TAKE 1/2-1 TABLET BY MOUTH TWICE DAILY AS NEEDED FOR ANXIETY  
  
 aspirin delayed-release 81 mg tablet Take  by mouth daily. Blood-Glucose Meter monitoring kit Use to check glucose once a day buPROPion  mg tablet Commonly known as:  Syble Ko Take 1 Tab by mouth every morning. Indications: ANXIETY WITH DEPRESSION  
  
 cyclobenzaprine 10 mg tablet Commonly known as:  FLEXERIL Take  by mouth three (3) times daily as needed for Muscle Spasm(s). dicyclomine 10 mg capsule Commonly known as:  BENTYL TAKE 1 (ONE) CAPSULE ORALLY AS NEEDED EVERY 6 HOURS  
  
 furosemide 20 mg tablet Commonly known as:  LASIX TAKE 1 TAB BY MOUTH AS NEEDED.  
  
 gabapentin 600 mg tablet Commonly known as:  NEURONTIN  
TAKE 1 TABLET BY MOUTH THREE (3) TIMES DAILY. glucose blood VI test strips strip Commonly known as:  blood glucose test  
Use once a day Lancets Misc Use once a day. Please give one compatible with patient's meter  
  
 metoprolol tartrate 25 mg tablet Commonly known as:  LOPRESSOR  
TAKE 1 TABLET BY MOUTH TWO (2) TIMES A DAY. nitroglycerin 0.4 mg SL tablet Commonly known as:  NITROSTAT  
DISSOLVE 1 TABLET IN MOUTH EVERY 5 MINUTES AS NEEDED FOR CHEST PAIN  
  
 omeprazole 20 mg capsule Commonly known as:  PRILOSEC  
TAKE ONE CAPSULE BY MOUTH EVERY DAY  
  
 ondansetron 4 mg disintegrating tablet Commonly known as:  ZOFRAN ODT Take 1 Tab by mouth every eight (8) hours as needed for Nausea. PULMICORT FLEXHALER 180 mcg/actuation Aepb inhaler Generic drug:  budesonide TAKE 1 PUFF BY INHALATION TWO (2) TIMES A DAY. INDICATIONS: MAINTENANCE THERAPY FOR ASTHMA  
  
 rosuvastatin 40 mg tablet Commonly known as:  CRESTOR  
TAKE 1 TABLET BY MOUTH NIGHTLY  
  
 sucralfate 100 mg/mL suspension Commonly known as:  Rainell Ruder Take 10 mL by mouth four (4) times daily for 14 days. valACYclovir 500 mg tablet Commonly known as:  VALTREX Take 1 Tab by mouth two (2) times a day. ZyrTEC 10 mg tablet Generic drug:  cetirizine Take  by mouth daily. * Notice: This list has 2 medication(s) that are the same as other medications prescribed for you. Read the directions carefully, and ask your doctor or other care provider to review them with you. Prescriptions Sent to Pharmacy Refills sucralfate (CARAFATE) 100 mg/mL suspension 0 Sig: Take 10 mL by mouth four (4) times daily for 14 days. Class: Normal  
 Pharmacy: [address] [place] [address] Ph #: [phone] Route: Oral  
 ondansetron (ZOFRAN ODT) 4 mg disintegrating tablet 0 Sig: Take 1 Tab by mouth every eight (8) hours as needed for Nausea. Class: Normal  
 Pharmacy: [address] [place] [address], [address] [place] [address] Ph #: [phone] Route: Oral  
  
To-Do List   
 08/03/2018 3:00 PM  
(Arrive by 2:45 PM) Appointment with [facility] MAM 2 at [facility] ([phone]) Shower or bathe using soap and water. Do not use deodorant, powder, perfumes, or lotion the day of your exam.  If your prior mammograms were not performed at [place] [address] please bring films with you or forward prior images 2 days before your procedure. Not doing so may result in your appointment needing to be rescheduled. If patient is not a callback diagnostic, the patient must have an order/script from the physician for the diagnostic. Please bring it on the day of the mammogram or have it faxed to the department. [facility] PSYCHIATRIC [place]  [phone] [facility]  [phone] [facility] [phone] [facility] [address] [facility] [phone] [facility] [phone] Please arrive 15 minutes prior to appointment to register 08/03/2018 3:30 PM  
(Arrive by 3:15 PM) Appointment with [facility] MAM [address] [facility] at [facility] ([phone]) Shower or bathe using soap and water. Do not use deodorant, powder, perfumes, or lotion. If your prior films were not performed at a local [employer] facility please bring or forward prior images 2 days before procedure. Please arrive 15 minutes prior to appointment to register Patient Instructions Eating Healthy Foods: Care Instructions Your Care Instructions Eating healthy foods can help lower your risk for disease. Healthy food gives you energy and keeps your heart strong, your brain active, your muscles working, and your bones strong. A healthy diet includes a variety of foods from the basic food groups: grains, vegetables, fruits, milk and milk products, and meat and beans. Some people may eat more of their favorite foods from only one food group and, as a result, miss getting the nutrients they need. So, it is important to pay attention not only to what you eat but also to what you are missing from your diet. You can eat a healthy, balanced diet by making a few small changes. Follow-up care is a key part of your treatment and safety. Be sure to make and go to all appointments, and call your doctor if you are having problems. It's also a good idea to know your test results and keep a list of the medicines you take. How can you care for yourself at home? Look at what you eat · Keep a food diary for a week or two and record everything you eat or drink. Track the number of servings you eat from each food group. · For a balanced diet every day, eat a variety of: ¨ 6 or more ounce-equivalents of grains, such as cereals, breads, crackers, rice, or pasta, every day. An ounce-equivalent is 1 slice of bread, 1 cup of ready-to-eat cereal, or ½ cup of cooked rice, cooked pasta, or cooked cereal. 
¨ 2½ cups of vegetables, especially: § Dark-green vegetables such as broccoli and spinach. § Orange vegetables such as carrots and sweet potatoes. § Dry beans (such as marsh and kidney beans) and peas (such as lentils). ¨ 2 cups of fresh, frozen, or canned fruit. A small apple or 1 banana or orange equals 1 cup. ¨ 3 cups of nonfat or low-fat milk, yogurt, or other milk products. ¨ 5½ ounces of meat and beans, such as chicken, fish, lean meat, beans, nuts, and seeds.  One egg, 1 tablespoon of peanut butter, ½ ounce nuts or seeds, or ¼ cup of cooked beans equals 1 ounce of meat. · Learn how to read food labels for serving sizes and ingredients. Fast-food and convenience-food meals often contain few or no fruits or vegetables. Make sure you eat some fruits and vegetables to make the meal more nutritious. · Look at your food diary. For each food group, add up what you have eaten and then divide the total by the number of days. This will give you an idea of how much you are eating from each food group. See if you can find some ways to change your diet to make it more healthy. Start small · Do not try to make dramatic changes to your diet all at once. You might feel that you are missing out on your favorite foods and then be more likely to fail. · Start slowly, and gradually change your habits. Try some of the following: ¨ Use whole wheat bread instead of white bread. ¨ Use nonfat or low-fat milk instead of whole milk. ¨ Eat brown rice instead of white rice, and eat whole wheat pasta instead of white-flour pasta. ¨ Try low-fat cheeses and low-fat yogurt. ¨ Add more fruits and vegetables to meals and have them for snacks. ¨ Add lettuce, tomato, cucumber, and onion to sandwiches. ¨ Add fruit to yogurt and cereal. 
Enjoy food · You can still eat your favorite foods. You just may need to eat less of them. If your favorite foods are high in fat, salt, and sugar, limit how often you eat them, but do not cut them out entirely. · Eat a wide variety of foods. Make healthy choices when eating out · The type of restaurant you choose can help you make healthy choices. Even fast-food chains are now offering more low-fat or healthier choices on the menu. · Choose smaller portions, or take half of your meal home. · When eating out, try: ¨ A veggie pizza with a whole wheat crust or grilled chicken (instead of sausage or pepperoni). ¨ Pasta with roasted vegetables, grilled chicken, or marinara sauce instead of cream sauce. ¨ A vegetable wrap or grilled chicken wrap. ¨ Broiled or poached food instead of fried or breaded items. Make healthy choices easy · Buy packaged, prewashed, ready-to-eat fresh vegetables and fruits, such as baby carrots, salad mixes, and chopped or shredded broccoli and cauliflower. · Buy packaged, presliced fruits, such as melon or pineapple. · Choose 100% fruit or vegetable juice instead of soda. Limit juice intake to 4 to 6 oz (½ to ¾ cup) a day. · Blend low-fat yogurt, fruit juice, and canned or frozen fruit to make a smoothie for breakfast or a snack. Where can you learn more? Go to http://maliha-sanchez.info/. Enter T756 in the search box to learn more about \"Eating Healthy Foods: Care Instructions. \" Current as of: May 12, 2017 Content Version: 11.7 © 3009-6344 [employer]. Care instructions adapted under license by [employer] (which disclaims liability or warranty for this information). If you have questions about a medical condition or this instruction, always ask your healthcare professional. [place] [address] any warranty or liability for your use of this information. Introducing [facility]! Dear Rick Brock: 
Thank you for requesting a Cloud[employer] account. Our records indicate that you already have an active CloudOpt account. You can access your account anytime at [email or web address]. [employer]/En Noir Did you know that you can access your hospital and ER discharge instructions at any time in CloudOpt? You can also review all of your test results from your hospital stay or ER visit. Additional Information If you have questions, please visit the Frequently Asked Questions section of the CloudOpt website at [email or web address]. [employer]/ET [employer]pt/. Remember, CloudOpt is NOT to be used for urgent needs. For medical emergencies, dial 911. Now available from your iPhone and Android! Please provide this summary of care documentation to your next provider. Your primary care clinician is listed as Clare Pineda. If you have any questions after today's visit, please call [phone].

## 2018-08-03 ENCOUNTER — HOSPITAL ENCOUNTER (OUTPATIENT)
Dept: MAMMOGRAPHY | Age: 58
Discharge: HOME | End: 2018-08-03
Payer: COMMERCIAL

## 2018-08-03 DIAGNOSIS — R92.8 ABNORMAL MAMMOGRAM OF RIGHT BREAST: ICD-10-CM

## 2018-08-03 DIAGNOSIS — R92.8 ABNORMAL MAMMOGRAM: ICD-10-CM

## 2018-08-03 PROCEDURE — 77065 DX MAMMO INCL CAD UNI: CPT

## 2018-08-03 PROCEDURE — 76642 ULTRASOUND BREAST LIMITED: CPT

## 2018-08-05 RX ORDER — FUROSEMIDE 20 MG/1
TABLET ORAL
Qty: 30 TAB | Refills: 0 | Status: SHIPPED | OUTPATIENT
Start: 2018-08-05 | End: 2018-10-23 | Stop reason: SDUPTHER

## 2018-08-06 DIAGNOSIS — R92.8 ABNORMAL MAMMOGRAM: Primary | ICD-10-CM

## 2018-08-08 ENCOUNTER — OFFICE VISIT (OUTPATIENT)
Dept: SURGERY | Age: 58
End: 2018-08-08

## 2018-08-08 ENCOUNTER — HOSPITAL ENCOUNTER (OUTPATIENT)
Dept: LAB | Age: 58
Discharge: HOME | End: 2018-08-08

## 2018-08-08 ENCOUNTER — DOCUMENTATION ONLY (OUTPATIENT)
Dept: SURGERY | Age: 58
End: 2018-08-08

## 2018-08-08 VITALS — BODY MASS INDEX: 22.99 KG/M2 | WEIGHT: 138 LBS | HEIGHT: 65 IN | HEART RATE: 69 BPM

## 2018-08-08 DIAGNOSIS — N63.10 BREAST MASS, RIGHT: Primary | ICD-10-CM

## 2018-08-08 DIAGNOSIS — R92.8 ABNORMAL ULTRASOUND OF BREAST: ICD-10-CM

## 2018-08-08 NOTE — PROGRESS NOTES
HISTORY OF PRESENT ILLNESS  Edgar Price is a 62 y.o. female. HPI    NEW patient presents for consultation at the request of Dr. Suzie Faulkner for abnormal RIGHT breast mammogram and ultrasound. She has no breast symptoms, feels no breast lumps, has no nipple discharge/retraction or skin change. She does have a history of a RIGHT breast biopsy in  at [facility] with benign pathology. Also had what sounds like a fibroadenoma removed from the RIGHT breast in the . FH is significant for a maternal cousin diagnosed with breast cancer at age 28 and  at age 45. RIGHT diagnostic mammogram and RIGHT ultrasound at [facility] 8/3/18, BIRADS 4, suspicious  On ultrasound, orresponding to the mammographic density at about the 11:00 position and 3 cm radial distance from the nipple is a lobulated,  well-circumscribed, hypoechoic, solid mass measuring about 3 x 8 x 5 mm. No other suspicious cystic or solid lesions identified. Review of Systems   Constitutional: Negative. HENT: Negative. Eyes: Negative. Respiratory: Negative. Cardiovascular: Negative. Gastrointestinal: Positive for constipation and heartburn. Genitourinary: Negative. Musculoskeletal: Positive for back pain, joint pain and myalgias. Skin: Negative. Neurological: Positive for focal weakness. Endo/Heme/Allergies: Negative. Psychiatric/Behavioral: Positive for depression. The patient is nervous/anxious.         Physical Exam    ASSESSMENT and PLAN  {ASSESSMENT/PLAN:82310}

## 2018-08-08 NOTE — LETTER
2018 3:40 PM 
 
Patient:  Amanda Glass YOB: 1960 Date of Visit: 2018 Dear Dr. Prajapati Patient: 
 
Thank you for referring Ms. Dion Torres to me for evaluation/treatment. Below are the relevant portions of my assessment and plan of care. HISTORY OF PRESENT ILLNESS Amanda Glass is a 62 y.o. female. HPI 
NEW patient presents for consultation at the request of Dr. Анна Carroll for abnormal RIGHT breast mammogram and ultrasound. She has no breast symptoms, feels no breast lumps, has no nipple discharge/retraction or skin change. She does have a history of a RIGHT breast biopsy in  at [facility] with benign pathology. Also had what sounds like a fibroadenoma removed from the RIGHT breast in the . FH is significant for a maternal cousin diagnosed with breast cancer at age 28 and  at age 45. RIGHT diagnostic mammogram and RIGHT ultrasound at [facility] 8/3/18, BIRADS 4, suspicious  On ultrasound, orresponding to the mammographic density at about the 11:00 position and 3 cm radial distance from the nipple is a lobulated,  well-circumscribed, hypoechoic, solid mass measuring about 3 x 8 x 5 mm.  No other suspicious cystic or solid lesions identified. Past Medical History:  
Diagnosis Date  Arthritis OA back,knees and hands  Asthma  Autoimmune disease ([address] Malachi Potter  CAD (coronary artery disease) s/p stents 2015  Cardiac murmur  Depression  Diabetes ([address]  DVT (deep venous thrombosis) ([address]   GERD (gastroesophageal reflux disease)  Hypertension  Morbid obesity ([address]  Musculoskeletal disorder  EDMOND (obstructive sleep apnea)   
 wears cpap  S/P TONJA (total abdominal hysterectomy)  Thromboembolus ([address]  PE Past Surgical History:  
Procedure Laterality Date  CARDIAC SURG PROCEDURE UNLIST  2015 STENT PLACED [address] [facility] 58  HX GASTRIC BYPASS  01/2017 Randy Arreolaabhishek 211  HX TOTAL ABDOMINAL HYSTERECTOMY    
 age 22  DAYAN STEREO  BX BREAST RT ADDL W/CLIP AND SPECIMEN Right 2000  
 neg Social History Social History  Marital status:  Spouse name: N/A  
 Number of children: N/A  
 Years of education: N/A Occupational History  Not on file. Social History Main Topics  Smoking status: Never Smoker  Smokeless tobacco: Never Used  Alcohol use No  
 Drug use: No  
 Sexual activity: Yes  
  Partners: Male Birth control/ protection: None Other Topics Concern  Not on file Social History Narrative Current Outpatient Prescriptions on File Prior to Visit Medication Sig Dispense Refill  furosemide (LASIX) 20 mg tablet TAKE 1 TAB BY MOUTH AS NEEDED. 30 Tab 0  
 sucralfate (CARAFATE) 100 mg/mL suspension Take 10 mL by mouth four (4) times daily for 14 days. 560 mL 0  
 ondansetron (ZOFRAN ODT) 4 mg disintegrating tablet Take 1 Tab by mouth every eight (8) hours as needed for Nausea. 20 Tab 0  ALPRAZolam (XANAX) 0.5 mg tablet TAKE 1/2-1 TABLET BY MOUTH TWICE DAILY AS NEEDED FOR ANXIETY 90 Tab 0  
 metoprolol tartrate (LOPRESSOR) 25 mg tablet TAKE 1 TABLET BY MOUTH TWO (2) TIMES A DAY. 60 Tab 1  
 omeprazole (PRILOSEC) 20 mg capsule TAKE ONE CAPSULE BY MOUTH EVERY DAY 90 Cap 1  
 buPROPion XL (WELLBUTRIN XL) 150 mg tablet Take 1 Tab by mouth every morning. Indications: ANXIETY WITH DEPRESSION 30 Tab 3  
 valACYclovir (VALTREX) 500 mg tablet Take 1 Tab by mouth two (2) times a day. (Patient taking differently: Take 500 mg by mouth daily as needed.) 60 Tab 3  cyclobenzaprine (FLEXERIL) 10 mg tablet Take  by mouth three (3) times daily as needed for Muscle Spasm(s).  PULMICORT FLEXHALER 180 mcg/actuation aepb inhaler TAKE 1 PUFF BY INHALATION TWO (2) TIMES A DAY.  INDICATIONS: MAINTENANCE THERAPY FOR ASTHMA 1 Inhaler 3  
  albuterol (ACCUNEB) 0.63 mg/3 mL nebulizer solution 3 ML BY NEBULIZATION ROUTE EVERY FOUR (4) HOURS AS NEEDED FOR WHEEZING OR SHORTNESS OF BREATH. 75 mL 1  rosuvastatin (CRESTOR) 40 mg tablet TAKE 1 TABLET BY MOUTH NIGHTLY 90 Tab 2  
 gabapentin (NEURONTIN) 600 mg tablet TAKE 1 TABLET BY MOUTH THREE (3) TIMES DAILY. 270 Tab 0  
 aspirin delayed-release 81 mg tablet Take  by mouth daily.  nitroglycerin (NITROSTAT) 0.4 mg SL tablet DISSOLVE 1 TABLET IN MOUTH EVERY 5 MINUTES AS NEEDED FOR CHEST PAIN 25 Tab 1  
 albuterol (PROAIR HFA) 90 mcg/actuation inhaler Take 1 Puff by inhalation every four (4) hours as needed for Wheezing or Shortness of Breath. 1 Inhaler 5  Blood-Glucose Meter monitoring kit Use to check glucose once a day 1 Kit 0  
 alcohol swabs (ALCOHOL PADS) padm Use when checking blood glucose 100 Pad 5  Lancets misc Use once a day. Please give one compatible with patient's meter 1 Package 5  
 glucose blood VI test strips (BLOOD GLUCOSE TEST) strip Use once a day 100 Strip 5  
 dicyclomine (BENTYL) 10 mg capsule TAKE 1 (ONE) CAPSULE ORALLY AS NEEDED EVERY 6 HOURS 120 capsule 0  
 cetirizine (ZYRTEC) 10 mg tablet Take  by mouth daily. No current facility-administered medications on file prior to visit. Allergies Allergen Reactions  Accupril [Quinapril] Cough  Lipitor [Atorvastatin] Other (comments)  
  aches  Norvasc [Amlodipine] Swelling On legs at 10 mg dose OB History Obstetric Comments Menarche 15, LMP 1985, # of children 1, age of 4st delivery 16, Hysterectomy/oophorectomy Yes/No/, Breast bx RIGHT, 2004, history of breast feeding No, BCP No, Hormone therapy No 
  
  
 
ROS Constitutional: Negative. HENT: Negative. Eyes: Negative. Respiratory: Negative. Cardiovascular: Negative. Gastrointestinal: Positive for constipation and heartburn. Genitourinary: Negative. Musculoskeletal: Positive for back pain, joint pain and myalgias. Skin: Negative. Neurological: Positive for focal weakness. Endo/Heme/Allergies: Negative. Psychiatric/Behavioral: Positive for depression. The patient is nervous/anxious. Physical Exam  
Cardiovascular: Normal rate, regular rhythm and normal heart sounds. Pulmonary/Chest: Breath sounds normal. Right breast exhibits no inverted nipple, no mass, no nipple discharge, no skin change and no tenderness. Left breast exhibits no inverted nipple, no mass, no nipple discharge, no skin change and no tenderness. Breasts are symmetrical.  
 
 
Lymphadenopathy:  
     Right cervical: No superficial cervical, no deep cervical and no posterior cervical adenopathy present. Left cervical: No superficial cervical, no deep cervical and no posterior cervical adenopathy present. She has no axillary adenopathy. Right axillary: No pectoral and no lateral adenopathy present. Left axillary: No pectoral and no lateral adenopathy present. BREAST ULTRASOUND Indication: Right breast mass UOQ Technique: The area was scanned using a high-frequency linear-array near-field transducer Findings: Small mass Impression: Small benign appearing mass Disposition: Ultrasound-guided biopsy performed US - Guided Core Biopsy Following detailed explanation and  description of the Biopsy procedure, its risk, benefits and possible alternatives, the patient signed the informed consent. Indication : Mass, Ultrasound Visible, right Breast upper outer quadrant Prep : We cleansed the skin with alcohol. Anesthesia : We anesthetized the skin and underlying tissues with 1% lidocaine with epinephrine. Device : We advanced the BARD Marquee device through the lesion and captured tissue with real-time Ultrasound Confirmation. Core Sampling : We repeated this sampling for the following number of cores, 3. Marker : We placed a marking clip to yancy the biopsy site. Marker Type : SENOMark. Dressing : We then closed the incision with steristrips and placed a sterile dressing. Instructions : The patient was instructed regarding post-procedure care and activities. Pathology : Pending at this time. Patient tolerated procedure well and discharged in stable condition. Informed patient that they will be notified of pathology results in 3 to 5 days. ASSESSMENT and PLAN 
  ICD-10-CM ICD-9-CM 1. Breast mass, right N63.10 611.72   
2. Abnormal ultrasound of breast R92.8 793.89 New pt presents for consultation after abnormal RIGHT breast mammo and US. Physical exam today normal. US visualizes small benign appearing mass at RIGHT breast UOQ. Discussed bx of this mass to confirm PATH and pt agreed to proceed. She tolerated the procedure well, and 3 cores were taken. Previous bx clip removed during this process. Will send for PATH and f/u with results. This plan was reviewed with the patient and patient agrees. All questions were answered. Written by Christal Kelley, as dictated by Dr. Liliana Mendoza MD. 
 
If you have questions, please do not hesitate to call me. I look forward to following Ms. Velasquez Samuels along with you.  
 
 
 
Sincerely, 
 
 
Maximo Zuñiga MD

## 2018-08-08 NOTE — COMMUNICATION BODY
HISTORY OF PRESENT ILLNESS  Rebecka Duverney is a 62 y.o. female. HPI  NEW patient presents for consultation at the request of Dr. Lashay Pepper for abnormal RIGHT breast mammogram and ultrasound. She has no breast symptoms, feels no breast lumps, has no nipple discharge/retraction or skin change. She does have a history of a RIGHT breast biopsy in  at [facility] with benign pathology. Also had what sounds like a fibroadenoma removed from the RIGHT breast in the . FH is significant for a maternal cousin diagnosed with breast cancer at age 28 and  at age 45. RIGHT diagnostic mammogram and RIGHT ultrasound at [facility] 8/3/18, BIRADS 4, suspicious  On ultrasound, orresponding to the mammographic density at about the 11:00 position and 3 cm radial distance from the nipple is a lobulated,  well-circumscribed, hypoechoic, solid mass measuring about 3 x 8 x 5 mm.  No other suspicious cystic or solid lesions identified.     Past Medical History:   Diagnosis Date    Arthritis     OA back,knees and hands    Asthma     Autoimmune disease ([address]     Alivia Lau    CAD (coronary artery disease)     s/p stents 2015    Cardiac murmur     Depression     Diabetes ([address]     [facility]     GERD (gastroesophageal reflux disease)     Hypertension     Morbid obesity ([address]     Musculoskeletal disorder     EDMOND (obstructive sleep apnea)     wears cpap    S/P TONJA (total abdominal hysterectomy)     Thromboembolus ([address] 1996    PE       Past Surgical History:   Procedure Laterality Date    CARDIAC SURG PROCEDURE UNLIST  2015    STENT PLACED    HX APPENDECTOMY      HX  SECTION  1978    HX GASTRIC BYPASS  2017    HX HYSTERECTOMY  1985    HX TOTAL ABDOMINAL HYSTERECTOMY      age 22   [facility]  BX BREAST RT ADDL W/CLIP AND SPECIMEN Right     neg        Social History     Social History    Marital status:      Spouse name: N/A    Number of children: N/A    Years of education: N/A     Occupational History    Not on file. Social History Main Topics    Smoking status: Never Smoker    Smokeless tobacco: Never Used    Alcohol use No    Drug use: No    Sexual activity: Yes     Partners: Male     Birth control/ protection: None     Other Topics Concern    Not on file     Social History Narrative       Current Outpatient Prescriptions on File Prior to Visit   Medication Sig Dispense Refill    furosemide (LASIX) 20 mg tablet TAKE 1 TAB BY MOUTH AS NEEDED. 30 Tab 0    sucralfate (CARAFATE) 100 mg/mL suspension Take 10 mL by mouth four (4) times daily for 14 days. 560 mL 0    ondansetron (ZOFRAN ODT) 4 mg disintegrating tablet Take 1 Tab by mouth every eight (8) hours as needed for Nausea. 20 Tab 0    ALPRAZolam (XANAX) 0.5 mg tablet TAKE 1/2-1 TABLET BY MOUTH TWICE DAILY AS NEEDED FOR ANXIETY 90 Tab 0    metoprolol tartrate (LOPRESSOR) 25 mg tablet TAKE 1 TABLET BY MOUTH TWO (2) TIMES A DAY. 60 Tab 1    omeprazole (PRILOSEC) 20 mg capsule TAKE ONE CAPSULE BY MOUTH EVERY DAY 90 Cap 1    buPROPion XL (WELLBUTRIN XL) 150 mg tablet Take 1 Tab by mouth every morning. Indications: ANXIETY WITH DEPRESSION 30 Tab 3    valACYclovir (VALTREX) 500 mg tablet Take 1 Tab by mouth two (2) times a day. (Patient taking differently: Take 500 mg by mouth daily as needed.) 60 Tab 3    cyclobenzaprine (FLEXERIL) 10 mg tablet Take  by mouth three (3) times daily as needed for Muscle Spasm(s).  PULMICORT FLEXHALER 180 mcg/actuation aepb inhaler TAKE 1 PUFF BY INHALATION TWO (2) TIMES A DAY. INDICATIONS: MAINTENANCE THERAPY FOR ASTHMA 1 Inhaler 3    albuterol (ACCUNEB) 0.63 mg/3 mL nebulizer solution 3 ML BY NEBULIZATION ROUTE EVERY FOUR (4) HOURS AS NEEDED FOR WHEEZING OR SHORTNESS OF BREATH.  75 mL 1    rosuvastatin (CRESTOR) 40 mg tablet TAKE 1 TABLET BY MOUTH NIGHTLY 90 Tab 2    gabapentin (NEURONTIN) 600 mg tablet TAKE 1 TABLET BY MOUTH THREE (3) TIMES DAILY. 270 Tab 0    aspirin delayed-release 81 mg tablet Take  by mouth daily.  nitroglycerin (NITROSTAT) 0.4 mg SL tablet DISSOLVE 1 TABLET IN MOUTH EVERY 5 MINUTES AS NEEDED FOR CHEST PAIN 25 Tab 1    albuterol (PROAIR HFA) 90 mcg/actuation inhaler Take 1 Puff by inhalation every four (4) hours as needed for Wheezing or Shortness of Breath. 1 Inhaler 5    Blood-Glucose Meter monitoring kit Use to check glucose once a day 1 Kit 0    alcohol swabs (ALCOHOL PADS) padm Use when checking blood glucose 100 Pad 5    Lancets misc Use once a day. Please give one compatible with patient's meter 1 Package 5    glucose blood VI test strips (BLOOD GLUCOSE TEST) strip Use once a day 100 Strip 5    dicyclomine (BENTYL) 10 mg capsule TAKE 1 (ONE) CAPSULE ORALLY AS NEEDED EVERY 6 HOURS 120 capsule 0    cetirizine (ZYRTEC) 10 mg tablet Take  by mouth daily. No current facility-administered medications on file prior to visit. Allergies   Allergen Reactions    Accupril [Quinapril] Cough    Lipitor [Atorvastatin] Other (comments)     aches    Norvasc [Amlodipine] Swelling     On legs at 10 mg dose       OB History     Obstetric Comments    Menarche 15, LMP 1985, # of children 1, age of 4st delivery 16, Hysterectomy/oophorectomy Yes/No/, Breast bx RIGHT, 2004, history of breast feeding No, BCP No, Hormone therapy No          ROS  Constitutional: Negative. HENT: Negative. Eyes: Negative. Respiratory: Negative. Cardiovascular: Negative. Gastrointestinal: Positive for constipation and heartburn. Genitourinary: Negative. Musculoskeletal: Positive for back pain, joint pain and myalgias. Skin: Negative. Neurological: Positive for focal weakness. Endo/Heme/Allergies: Negative. Psychiatric/Behavioral: Positive for depression. The patient is nervous/anxious. Physical Exam   Cardiovascular: Normal rate, regular rhythm and normal heart sounds.     Pulmonary/Chest: Breath sounds normal. Right breast exhibits no inverted nipple, no mass, no nipple discharge, no skin change and no tenderness. Left breast exhibits no inverted nipple, no mass, no nipple discharge, no skin change and no tenderness. Breasts are symmetrical.       Lymphadenopathy:        Right cervical: No superficial cervical, no deep cervical and no posterior cervical adenopathy present. Left cervical: No superficial cervical, no deep cervical and no posterior cervical adenopathy present. She has no axillary adenopathy. Right axillary: No pectoral and no lateral adenopathy present. Left axillary: No pectoral and no lateral adenopathy present. BREAST ULTRASOUND  Indication: Right breast mass UOQ  Technique: The area was scanned using a high-frequency linear-array near-field transducer  Findings: Small mass  Impression: Small benign appearing mass  Disposition: Ultrasound-guided biopsy performed    US - Guided Core Biopsy  Following detailed explanation and  description of the Biopsy procedure, its risk, benefits and possible alternatives, the patient signed the informed consent. Indication : Mass, Ultrasound Visible, right Breast upper outer quadrant   Prep : We cleansed the skin with alcohol. Anesthesia : We anesthetized the skin and underlying tissues with 1% lidocaine with epinephrine. Device : We advanced the BARD Marquee device through the lesion and captured tissue with real-time Ultrasound Confirmation. Core Sampling : We repeated this sampling for the following number of cores, 3. Marker : We placed a marking clip to yancy the biopsy site. Marker Type : SENOMark. Dressing : We then closed the incision with steristrips and placed a sterile dressing. Instructions : The patient was instructed regarding post-procedure care and activities. Pathology : Pending at this time. Patient tolerated procedure well and discharged in stable condition.     Informed patient that they will be notified of pathology results in 3 to 5 days. ASSESSMENT and PLAN    ICD-10-CM ICD-9-CM    1. Breast mass, right N63.10 611.72    2. Abnormal ultrasound of breast R92.8 793.89       New pt presents for consultation after abnormal RIGHT breast mammo and US. Physical exam today normal. US visualizes small benign appearing mass at RIGHT breast UOQ. Discussed bx of this mass to confirm PATH and pt agreed to proceed. She tolerated the procedure well, and 3 cores were taken. Previous bx clip removed during this process. Will send for PATH and f/u with results. This plan was reviewed with the patient and patient agrees. All questions were answered.     Written by Denise Rangel, as dictated by Dr. Amanda Acevedo MD.

## 2018-08-08 NOTE — PROGRESS NOTES
[facility]  OFFICE PROCEDURE PROGRESS NOTE        Chart reviewed for the following:   Maria E Eugene MD, have reviewed the History, Physical and updated the Allergic reactions for [place] [place] [address] performed immediately prior to start of procedure:   Maria E Eugene MD, have performed the following reviews on Barbara Calles prior to the start of the procedure:            * Patient was identified by name and date of birth   * Agreement on procedure being performed was verified  * Risks and Benefits explained to the patient  * Procedure site verified and marked as necessary  * Patient was positioned for comfort  * Consent was signed and verified     Time:   3:00 PM      Date of procedure: 8/8/2018    Procedure performed by:  Lesly Arzate MD    Provider assisted by:   Betty Sparrow RN    Patient assisted by: self    How tolerated by patient: tolerated the procedure well with no complications    Post Procedural Pain Scale: 0 - No Hurt    Comments:   Written and verbal post biopsy instructions reviewed with and given to patient with her understanding.

## 2018-08-08 NOTE — PROGRESS NOTES
HISTORY OF PRESENT ILLNESS  Diego Oconnell is a 62 y.o. female. HPI  NEW patient presents for consultation at the request of Dr. Fernanda Laughlin for abnormal RIGHT breast mammogram and ultrasound. She has no breast symptoms, feels no breast lumps, has no nipple discharge/retraction or skin change. She does have a history of a RIGHT breast biopsy in  at [facility] with benign pathology. Also had what sounds like a fibroadenoma removed from the RIGHT breast in the . FH is significant for a maternal cousin diagnosed with breast cancer at age 28 and  at age 45. RIGHT diagnostic mammogram and RIGHT ultrasound at [facility] 8/3/18, BIRADS 4, suspicious  On ultrasound, orresponding to the mammographic density at about the 11:00 position and 3 cm radial distance from the nipple is a lobulated,  well-circumscribed, hypoechoic, solid mass measuring about 3 x 8 x 5 mm.  No other suspicious cystic or solid lesions identified.     Past Medical History:   Diagnosis Date    Arthritis     OA back,knees and hands    Asthma     Autoimmune disease ([address]     Rogelio Thomas    CAD (coronary artery disease)     s/p stents 2015    Cardiac murmur     Depression     Diabetes ([address]     [facility]     GERD (gastroesophageal reflux disease)     Hypertension     Morbid obesity ([address]     Musculoskeletal disorder     EDMOND (obstructive sleep apnea)     wears cpap    S/P TONJA (total abdominal hysterectomy)     Thromboembolus ([address] 1996    PE       Past Surgical History:   Procedure Laterality Date    CARDIAC SURG PROCEDURE UNLIST  2015    STENT PLACED    HX APPENDECTOMY      HX  SECTION  1978    HX GASTRIC BYPASS  2017    HX HYSTERECTOMY  1985    HX TOTAL ABDOMINAL HYSTERECTOMY      age 22   [facility]  BX BREAST RT ADDL W/CLIP AND SPECIMEN Right     neg        Social History     Social History    Marital status:      Spouse name: N/A    Number of children: N/A    Years of education: N/A     Occupational History    Not on file. Social History Main Topics    Smoking status: Never Smoker    Smokeless tobacco: Never Used    Alcohol use No    Drug use: No    Sexual activity: Yes     Partners: Male     Birth control/ protection: None     Other Topics Concern    Not on file     Social History Narrative       Current Outpatient Prescriptions on File Prior to Visit   Medication Sig Dispense Refill    furosemide (LASIX) 20 mg tablet TAKE 1 TAB BY MOUTH AS NEEDED. 30 Tab 0    sucralfate (CARAFATE) 100 mg/mL suspension Take 10 mL by mouth four (4) times daily for 14 days. 560 mL 0    ondansetron (ZOFRAN ODT) 4 mg disintegrating tablet Take 1 Tab by mouth every eight (8) hours as needed for Nausea. 20 Tab 0    ALPRAZolam (XANAX) 0.5 mg tablet TAKE 1/2-1 TABLET BY MOUTH TWICE DAILY AS NEEDED FOR ANXIETY 90 Tab 0    metoprolol tartrate (LOPRESSOR) 25 mg tablet TAKE 1 TABLET BY MOUTH TWO (2) TIMES A DAY. 60 Tab 1    omeprazole (PRILOSEC) 20 mg capsule TAKE ONE CAPSULE BY MOUTH EVERY DAY 90 Cap 1    buPROPion XL (WELLBUTRIN XL) 150 mg tablet Take 1 Tab by mouth every morning. Indications: ANXIETY WITH DEPRESSION 30 Tab 3    valACYclovir (VALTREX) 500 mg tablet Take 1 Tab by mouth two (2) times a day. (Patient taking differently: Take 500 mg by mouth daily as needed.) 60 Tab 3    cyclobenzaprine (FLEXERIL) 10 mg tablet Take  by mouth three (3) times daily as needed for Muscle Spasm(s).  PULMICORT FLEXHALER 180 mcg/actuation aepb inhaler TAKE 1 PUFF BY INHALATION TWO (2) TIMES A DAY. INDICATIONS: MAINTENANCE THERAPY FOR ASTHMA 1 Inhaler 3    albuterol (ACCUNEB) 0.63 mg/3 mL nebulizer solution 3 ML BY NEBULIZATION ROUTE EVERY FOUR (4) HOURS AS NEEDED FOR WHEEZING OR SHORTNESS OF BREATH.  75 mL 1    rosuvastatin (CRESTOR) 40 mg tablet TAKE 1 TABLET BY MOUTH NIGHTLY 90 Tab 2    gabapentin (NEURONTIN) 600 mg tablet TAKE 1 TABLET BY MOUTH THREE (3) TIMES DAILY. 270 Tab 0    aspirin delayed-release 81 mg tablet Take  by mouth daily.  nitroglycerin (NITROSTAT) 0.4 mg SL tablet DISSOLVE 1 TABLET IN MOUTH EVERY 5 MINUTES AS NEEDED FOR CHEST PAIN 25 Tab 1    albuterol (PROAIR HFA) 90 mcg/actuation inhaler Take 1 Puff by inhalation every four (4) hours as needed for Wheezing or Shortness of Breath. 1 Inhaler 5    Blood-Glucose Meter monitoring kit Use to check glucose once a day 1 Kit 0    alcohol swabs (ALCOHOL PADS) padm Use when checking blood glucose 100 Pad 5    Lancets misc Use once a day. Please give one compatible with patient's meter 1 Package 5    glucose blood VI test strips (BLOOD GLUCOSE TEST) strip Use once a day 100 Strip 5    dicyclomine (BENTYL) 10 mg capsule TAKE 1 (ONE) CAPSULE ORALLY AS NEEDED EVERY 6 HOURS 120 capsule 0    cetirizine (ZYRTEC) 10 mg tablet Take  by mouth daily. No current facility-administered medications on file prior to visit. Allergies   Allergen Reactions    Accupril [Quinapril] Cough    Lipitor [Atorvastatin] Other (comments)     aches    Norvasc [Amlodipine] Swelling     On legs at 10 mg dose       OB History     Obstetric Comments    Menarche 15, LMP 1985, # of children 1, age of 4st delivery 16, Hysterectomy/oophorectomy Yes/No/, Breast bx RIGHT, 2004, history of breast feeding No, BCP No, Hormone therapy No          ROS  Constitutional: Negative. HENT: Negative. Eyes: Negative. Respiratory: Negative. Cardiovascular: Negative. Gastrointestinal: Positive for constipation and heartburn. Genitourinary: Negative. Musculoskeletal: Positive for back pain, joint pain and myalgias. Skin: Negative. Neurological: Positive for focal weakness. Endo/Heme/Allergies: Negative. Psychiatric/Behavioral: Positive for depression. The patient is nervous/anxious. Physical Exam   Cardiovascular: Normal rate, regular rhythm and normal heart sounds.     Pulmonary/Chest: Breath sounds normal. Right breast exhibits no inverted nipple, no mass, no nipple discharge, no skin change and no tenderness. Left breast exhibits no inverted nipple, no mass, no nipple discharge, no skin change and no tenderness. Breasts are symmetrical.       Lymphadenopathy:        Right cervical: No superficial cervical, no deep cervical and no posterior cervical adenopathy present. Left cervical: No superficial cervical, no deep cervical and no posterior cervical adenopathy present. She has no axillary adenopathy. Right axillary: No pectoral and no lateral adenopathy present. Left axillary: No pectoral and no lateral adenopathy present. BREAST ULTRASOUND  Indication: Right breast mass UOQ  Technique: The area was scanned using a high-frequency linear-array near-field transducer  Findings: Small mass  Impression: Small benign appearing mass  Disposition: Ultrasound-guided biopsy performed    US - Guided Core Biopsy  Following detailed explanation and  description of the Biopsy procedure, its risk, benefits and possible alternatives, the patient signed the informed consent. Indication : Mass, Ultrasound Visible, right Breast upper outer quadrant   Prep : We cleansed the skin with alcohol. Anesthesia : We anesthetized the skin and underlying tissues with 1% lidocaine with epinephrine. Device : We advanced the BARD Marquee device through the lesion and captured tissue with real-time Ultrasound Confirmation. Core Sampling : We repeated this sampling for the following number of cores, 3. Marker : We placed a marking clip to yancy the biopsy site. Marker Type : SENOMark. Dressing : We then closed the incision with steristrips and placed a sterile dressing. Instructions : The patient was instructed regarding post-procedure care and activities. Pathology : Pending at this time. Patient tolerated procedure well and discharged in stable condition.     Informed patient that they will be notified of pathology results in 3 to 5 days. ASSESSMENT and PLAN    ICD-10-CM ICD-9-CM    1. Breast mass, right N63.10 611.72    2. Abnormal ultrasound of breast R92.8 793.89       New pt presents for consultation after abnormal RIGHT breast mammo and US. Physical exam today normal. US visualizes small benign appearing mass at RIGHT breast UOQ. Discussed bx of this mass to confirm PATH and pt agreed to proceed. She tolerated the procedure well, and 3 cores were taken. Previous bx clip removed during this process. Will send for PATH and f/u with results. This plan was reviewed with the patient and patient agrees. All questions were answered.     Written by Marisol Juan, as dictated by Dr. Gerry Bobby MD.

## 2018-08-09 ENCOUNTER — TELEPHONE (OUTPATIENT)
Dept: SURGERY | Age: 58
End: 2018-08-09

## 2018-09-18 ENCOUNTER — OFFICE VISIT (OUTPATIENT)
Dept: FAMILY MEDICINE CLINIC | Age: 58
End: 2018-09-18

## 2018-09-18 VITALS
HEART RATE: 65 BPM | OXYGEN SATURATION: 99 % | RESPIRATION RATE: 18 BRPM | TEMPERATURE: 98 F | DIASTOLIC BLOOD PRESSURE: 79 MMHG | WEIGHT: 140 LBS | HEIGHT: 65 IN | BODY MASS INDEX: 23.32 KG/M2 | SYSTOLIC BLOOD PRESSURE: 119 MMHG

## 2018-09-18 DIAGNOSIS — E11.40 TYPE 2 DIABETES MELLITUS WITH DIABETIC NEUROPATHY, UNSPECIFIED WHETHER LONG TERM INSULIN USE (HCC): Primary | ICD-10-CM

## 2018-09-18 DIAGNOSIS — G89.29 OTHER CHRONIC PAIN: ICD-10-CM

## 2018-09-18 DIAGNOSIS — F41.9 ANXIETY: ICD-10-CM

## 2018-09-18 DIAGNOSIS — M79.7 FIBROMYALGIA: ICD-10-CM

## 2018-09-18 LAB
ALBUMIN UR QL STRIP: 10 MG/L
CREATININE, URINE POC: 50 MG/DL
MICROALBUMIN/CREAT RATIO POC: <30 MG/G

## 2018-09-18 RX ORDER — DICYCLOMINE HYDROCHLORIDE 10 MG/1
CAPSULE ORAL
Qty: 120 CAP | Refills: 0 | Status: SHIPPED | OUTPATIENT
Start: 2018-09-18 | End: 2018-11-13 | Stop reason: SDUPTHER

## 2018-09-18 RX ORDER — BUPROPION HYDROCHLORIDE 300 MG/1
300 TABLET ORAL
Qty: 90 TAB | Refills: 3 | Status: SHIPPED | OUTPATIENT
Start: 2018-09-18 | End: 2019-05-28 | Stop reason: SDUPTHER

## 2018-09-18 RX ORDER — GABAPENTIN 600 MG/1
TABLET ORAL
Qty: 270 TAB | Refills: 0 | Status: SHIPPED | OUTPATIENT
Start: 2018-09-18 | End: 2019-04-05 | Stop reason: SDUPTHER

## 2018-09-18 RX ORDER — ALPRAZOLAM 0.5 MG/1
TABLET ORAL
Qty: 90 TAB | Refills: 0 | Status: SHIPPED | OUTPATIENT
Start: 2018-09-18 | End: 2018-12-24 | Stop reason: SDUPTHER

## 2018-09-18 NOTE — MR AVS SNAPSHOT
[address] 
[phone] Patient: Ollie Lorenzana MRN: [id number] Visit Information Date & Time Provider Department Dept. Phone Encounter #  
 9/18/2018  1:20 PM Hossein Middleton MD 1000 [facility] [phone] [id number] Your Appointments 1/22/2019  1:00 PM  
ESTABLISHED PATIENT with Neldon Canavan, MD  
[address] [facility] 3651 [facility]) Appt Note: 6mos. per NP> [email or web address] plan. [email or web address]. fs  
 566 [facility] 8 [facility] 1007 [facility]  
[phone]  
  
   
 [address] 1007 [facility]  
  
    
 7/1/2019  1:20 PM  
ESTABLISHED PATIENT with Coleman Navarro MD  
[address] ([address]) Appt Note: 1 year follow up per Dr. Molly Mcgarry [place] [address] 1007 [facility]  
[address] [facility] [address] [address] Upcoming Health Maintenance Date Due  
 EYE EXAM RETINAL OR DILATED Q1 12/19/2018* Influenza Age 5 to Adult 9/18/2019* FOOT EXAM Q1 12/19/2018 MICROALBUMIN Q1 12/19/2018 LIPID PANEL Q1 12/19/2018 HEMOGLOBIN A1C Q6M 3/18/2019 PAP AKA CERVICAL CYTOLOGY 8/23/2019 COLONOSCOPY 6/23/2020 BREAST CANCER SCRN MAMMOGRAM 8/3/2020 DTaP/Tdap/Td series (2 - Td) 8/14/2022 *Topic was postponed. The date shown is not the original due date. Allergies as of 9/18/2018  Review Complete On: 9/18/2018 By: Jase Turner LPN Severity Noted Reaction Type Reactions Accupril [Quinapril]  07/07/2010    Cough Lipitor [Atorvastatin]  07/07/2010    Other (comments)  
 aches Norvasc [Amlodipine]  07/22/2015    Swelling On legs at 10 mg dose Current Immunizations  Reviewed on 9/18/2018 Name Date Influenza Vaccine 9/1/2015 Influenza Vaccine (Quad) PF 12/19/2017 Pneumococcal Polysaccharide (PPSV-23) 8/4/2015 TD Vaccine 11/7/2005 TDAP Vaccine 8/14/2012 Reviewed by Mariela Regan LPN on 7/48/1676 at  1:25 PM  
You Were Diagnosed With   
  
 Codes Comments Type 2 diabetes mellitus with diabetic neuropathy, unspecified whether long term insulin use ([facility]    -  Primary ICD-10-CM: E11.40 ICD-9-CM: 250.60, 357.2 labs for biometric form Anxiety     ICD-10-CM: F41.9 ICD-9-CM: 300.00 FTF for BNZ Anxiety     ICD-10-CM: F41.9 ICD-9-CM: 300.00 Fibromyalgia     ICD-10-CM: M79.7 ICD-9-CM: 729.1 Other chronic pain     ICD-10-CM: G89.29 ICD-9-CM: 338.29 Vitals BP Pulse Temp Resp Height(growth percentile) Weight(growth percentile) 119/79 (BP 1 Location: Left arm, BP Patient Position: Sitting) 65 98 °F (36.7 °C) (Oral) 18 5' 5\" (1.651 m) 140 lb (63.5 kg) LMP SpO2 BMI OB Status Smoking Status 01/01/1985 99% 23.3 kg/m2 Hysterectomy Never Smoker BMI and BSA Data Body Mass Index Body Surface Area  
 23.3 kg/m 2 1.71 m 2 Preferred Pharmacy Pharmacy Name Phone [id number], [address] AT [facility] [phone] Your Updated Medication List  
  
   
This list is accurate as of 9/18/18  1:59 PM.  Always use your most recent med list.  
  
  
  
  
 * albuterol 90 mcg/actuation inhaler Commonly known as:  PROAIR HFA Take 1 Puff by inhalation every four (4) hours as needed for Wheezing or Shortness of Breath. * albuterol 0.63 mg/3 mL nebulizer solution Commonly known as:  ACCUNEB  
3 ML BY NEBULIZATION ROUTE EVERY FOUR (4) HOURS AS NEEDED FOR WHEEZING OR SHORTNESS OF BREATH. alcohol swabs Padm Commonly known as:  ALCOHOL PADS Use when checking blood glucose ALPRAZolam 0.5 mg tablet Commonly known as:  XANAX  
TAKE 1/2-1 TABLET BY MOUTH TWICE DAILY AS NEEDED FOR ANXIETY  
  
 aspirin delayed-release 81 mg tablet Take  by mouth daily. Blood-Glucose Meter monitoring kit Use to check glucose once a day buPROPion  mg tablet Commonly known as:  Jason Greaser Take 1 Tab by mouth every morning. Indications: ANXIETY WITH DEPRESSION  
  
 dicyclomine 10 mg capsule Commonly known as:  BENTYL TAKE 1 (ONE) CAPSULE ORALLY AS NEEDED EVERY 6 HOURS  
  
 furosemide 20 mg tablet Commonly known as:  LASIX TAKE 1 TAB BY MOUTH AS NEEDED.  
  
 gabapentin 600 mg tablet Commonly known as:  NEURONTIN  
TAKE 1 TABLET BY MOUTH THREE (3) TIMES DAILY. glucose blood VI test strips strip Commonly known as:  blood glucose test  
Use once a day Lancets Misc Use once a day. Please give one compatible with patient's meter  
  
 metoprolol tartrate 25 mg tablet Commonly known as:  LOPRESSOR  
TAKE 1 TABLET BY MOUTH TWO (2) TIMES A DAY. nitroglycerin 0.4 mg SL tablet Commonly known as:  NITROSTAT  
DISSOLVE 1 TABLET IN MOUTH EVERY 5 MINUTES AS NEEDED FOR CHEST PAIN  
  
 omeprazole 20 mg capsule Commonly known as:  PRILOSEC  
TAKE ONE CAPSULE BY MOUTH EVERY DAY  
  
 PULMICORT FLEXHALER 180 mcg/actuation Aepb inhaler Generic drug:  budesonide TAKE 1 PUFF BY INHALATION TWO (2) TIMES A DAY. INDICATIONS: MAINTENANCE THERAPY FOR ASTHMA  
  
 rosuvastatin 40 mg tablet Commonly known as:  CRESTOR  
TAKE 1 TABLET BY MOUTH NIGHTLY  
  
 valACYclovir 500 mg tablet Commonly known as:  VALTREX Take 1 Tab by mouth two (2) times a day. * Notice: This list has 2 medication(s) that are the same as other medications prescribed for you. Read the directions carefully, and ask your doctor or other care provider to review them with you. Prescriptions Printed Refills ALPRAZolam (XANAX) 0.5 mg tablet 0 Sig: TAKE 1/2-1 TABLET BY MOUTH TWICE DAILY AS NEEDED FOR ANXIETY Class: Print Prescriptions Sent to Pharmacy  Refills  
 gabapentin (NEURONTIN) 600 mg tablet 0  
 Sig: TAKE 1 TABLET BY MOUTH THREE (3) TIMES DAILY. Class: Normal  
 Pharmacy: [address] [place] [address] Ph #: [phone] dicyclomine (BENTYL) 10 mg capsule 0 Sig: TAKE 1 (ONE) CAPSULE ORALLY AS NEEDED EVERY 6 HOURS Class: Normal  
 Pharmacy: [address] [place] [address] Ph #: [phone] We Performed the Following ALT [id number] CPT(R)] AMB POC URINE, MICROALBUMIN, SEMIQUANT (3 RESULTS) [27844 CPT(R)] CHOLESTEROL, TOTAL [94909 CPT(R)] HDL CHOLESTEROL [70451 CPT(R)] HEMOGLOBIN A1C WITH EAG [14176 CPT(R)]  DIABETES EYE EXAM [6 Custom]  DIABETES FOOT EXAM [7 Custom] LDL, DIRECT [id number] CPT(R)] METABOLIC PANEL, BASIC [79994 CPT(R)] REFERRAL TO OPHTHALMOLOGY [REF57 Custom] Comments:  
 Dr. Felicitas Adhikari #[phone] Referral Information Referral ID Referred By Referred To  
  
 [id number] [facility] [address] [place] [address] 33 Visits Status Start Date End Date 1 New Request 9/18/18 9/18/19 If your referral has a status of pending review or denied, additional information will be sent to support the outcome of this decision. Patient Instructions Labs today Refills done I will fill out form once labs are back Eye exam soon Introducing \[facility] [place] [facility]\"" & HEALTH SERVICES! Dear Jc Michelle: 
Thank you for requesting a [employer] account. Our records indicate that you already have an active Rollstream account. You can access your account anytime at [email or web address] Did you know that you can access your hospital and ER discharge instructions at any time in Rollstream? You can also review all of your test results from your hospital stay or ER visit. Additional Information If you have questions, please visit the Frequently Asked Questions section of the [employer] website at [email or web address] Remember, [employer] is NOT to be used for urgent needs. For medical emergencies, dial 911. Now available from your iPhone and Android! Please provide this summary of care documentation to your next provider. Your primary care clinician is listed as Clare Pineda. If you have any questions after today's visit, please call [phone].

## 2018-09-18 NOTE — PATIENT INSTRUCTIONS
Labs today    Refills done    I will fill out form once labs are back    Eye exam soon    Increase wellbutrin to 300 mg

## 2018-09-18 NOTE — LETTER
9/18/2018 2:04 PM 
 
Ms. Neal Steiner 
[address] [address] Body mass index is 23.3 kg/(m^2). Focus on regular exercise (150 minutes each week) and healthy eating. Eat more fruits and vegetables. Eat more protein (egg whites, beans, and nuts you know you tolerate) and less carbohydrates (white bread, white rice, white pasta, white potatoes, sodas, and sweets). Eat appropriately small portion sizes. Consider counseling with Dr. Marielle Richey #[phone] or Dr. Sincere Matthews #[phone] If needed, consider [facility] [address] at #[phone] or [phone] after hours National suicide prevention hotline 6-711.258.4927 Sincerely, Paul Lee MD

## 2018-09-18 NOTE — PROGRESS NOTES
1. Have you been to the ER, urgent care clinic since your last visit? Hospitalized since your last visit? No    2. Have you seen or consulted any other health care providers outside of the [address] [place] [address] since your last visit? Include any pap smears or colon screening. No    Chief Complaint   Patient presents with    Physical     Biometric screening       Blood pressure 119/79, pulse 65, temperature 98 °F (36.7 °C), temperature source Oral, resp. rate 18, height 5' 5\" (1.651 m), weight 140 lb (63.5 kg), last menstrual period 01/01/1985, SpO2 99 %.

## 2018-09-18 NOTE — PROGRESS NOTES
Greater than 50% of today's 15 minute visit was counseling or coordination of care for the following reasons:    See diagnoses and orders, see patient instructions    patient needs biometric form filled out for work. Labs were drawn to complete the form. She is under lots of stress. PHQ9 =  7 . Will increase wellbutrin to 300 mg daily.  as expected. FTF for BNZ refills  PHQ9 = 7.  consider counseling. see letter    Diagnoses and all orders for this visit:    1. Type 2 diabetes mellitus with diabetic neuropathy, unspecified whether long term insulin use ([facility]  Comments:  labs for biometric form  Orders:  -     HEMOGLOBIN A1C WITH EAG  -     ALT  -     AMB POC URINE, MICROALBUMIN, SEMIQUANT (3 RESULTS)  -     METABOLIC PANEL, BASIC  -      DIABETES EYE EXAM  -      DIABETES FOOT EXAM  -     REFERRAL TO OPHTHALMOLOGY  -     HDL CHOLESTEROL  -     LDL, DIRECT  -     CHOLESTEROL, TOTAL    2. Anxiety  Comments:  FTF for BNZ  PHQ9 = 7.  consider counseling. see letter  Orders:  -     ALPRAZolam (XANAX) 0.5 mg tablet; TAKE 1/2-1 TABLET BY MOUTH TWICE DAILY AS NEEDED FOR ANXIETY  -     buPROPion XL (WELLBUTRIN XL) 300 mg XL tablet; Take 1 Tab by mouth every morning. Indications: ANXIETY WITH DEPRESSION    3. Anxiety  -     ALPRAZolam (XANAX) 0.5 mg tablet; TAKE 1/2-1 TABLET BY MOUTH TWICE DAILY AS NEEDED FOR ANXIETY  -     buPROPion XL (WELLBUTRIN XL) 300 mg XL tablet; Take 1 Tab by mouth every morning. Indications: ANXIETY WITH DEPRESSION    4. Fibromyalgia  -     gabapentin (NEURONTIN) 600 mg tablet; TAKE 1 TABLET BY MOUTH THREE (3) TIMES DAILY. 5. Other chronic pain  -     gabapentin (NEURONTIN) 600 mg tablet; TAKE 1 TABLET BY MOUTH THREE (3) TIMES DAILY.     Other orders  -     dicyclomine (BENTYL) 10 mg capsule; TAKE 1 (ONE) CAPSULE ORALLY AS NEEDED EVERY 6 HOURS

## 2018-09-19 LAB
ALT SERPL-CCNC: 11 IU/L (ref 0–32)
BUN SERPL-MCNC: 8 MG/DL (ref 6–24)
BUN/CREAT SERPL: 13 (ref 9–23)
CALCIUM SERPL-MCNC: 9.4 MG/DL (ref 8.7–10.2)
CHLORIDE SERPL-SCNC: 100 MMOL/L (ref 96–106)
CHOLEST SERPL-MCNC: 166 MG/DL (ref 100–199)
CO2 SERPL-SCNC: 26 MMOL/L (ref 20–29)
CREAT SERPL-MCNC: 0.61 MG/DL (ref 0.57–1)
EST. AVERAGE GLUCOSE BLD GHB EST-MCNC: 105 MG/DL
GLUCOSE SERPL-MCNC: 91 MG/DL (ref 65–99)
HBA1C MFR BLD: 5.3 % (ref 4.8–5.6)
HDLC SERPL-MCNC: 45 MG/DL
INTERPRETATION, 910389: NORMAL
LDLC SERPL DIRECT ASSAY-MCNC: 97 MG/DL (ref 0–99)
Lab: NORMAL
POTASSIUM SERPL-SCNC: 3.6 MMOL/L (ref 3.5–5.2)
SODIUM SERPL-SCNC: 143 MMOL/L (ref 134–144)

## 2018-09-30 DIAGNOSIS — I25.10 CORONARY ARTERY DISEASE INVOLVING NATIVE CORONARY ARTERY OF NATIVE HEART WITHOUT ANGINA PECTORIS: ICD-10-CM

## 2018-10-01 RX ORDER — METOPROLOL TARTRATE 25 MG/1
TABLET, FILM COATED ORAL
Qty: 60 TAB | Refills: 1 | Status: SHIPPED | OUTPATIENT
Start: 2018-10-01 | End: 2018-11-25 | Stop reason: SDUPTHER

## 2018-10-03 ENCOUNTER — OFFICE VISIT (OUTPATIENT)
Dept: FAMILY MEDICINE CLINIC | Age: 58
End: 2018-10-03

## 2018-10-03 VITALS
HEART RATE: 85 BPM | DIASTOLIC BLOOD PRESSURE: 60 MMHG | BODY MASS INDEX: 23.49 KG/M2 | RESPIRATION RATE: 16 BRPM | HEIGHT: 65 IN | SYSTOLIC BLOOD PRESSURE: 89 MMHG | TEMPERATURE: 99.9 F | OXYGEN SATURATION: 95 % | WEIGHT: 141 LBS

## 2018-10-03 DIAGNOSIS — R31.9 HEMATURIA, UNSPECIFIED TYPE: Primary | ICD-10-CM

## 2018-10-03 LAB
BILIRUB UR QL STRIP: NEGATIVE
GLUCOSE UR-MCNC: NEGATIVE MG/DL
KETONES P FAST UR STRIP-MCNC: NEGATIVE MG/DL
PH UR STRIP: 7 [PH] (ref 4.6–8)
PROT UR QL STRIP: NORMAL
SP GR UR STRIP: 1.01 (ref 1–1.03)
UA UROBILINOGEN AMB POC: NORMAL (ref 0.2–1)
URINALYSIS CLARITY POC: NORMAL
URINALYSIS COLOR POC: YELLOW
URINE BLOOD POC: NORMAL
URINE LEUKOCYTES POC: NORMAL
URINE NITRITES POC: NEGATIVE

## 2018-10-03 RX ORDER — NITROFURANTOIN (MACROCRYSTALS) 100 MG/1
100 CAPSULE ORAL 2 TIMES DAILY
Qty: 10 CAP | Refills: 0 | Status: SHIPPED | OUTPATIENT
Start: 2018-10-03 | End: 2018-10-08

## 2018-10-03 NOTE — PROGRESS NOTES
Chief Complaint   Patient presents with    Blood in Urine     started today     1. Have you been to the ER, urgent care clinic since your last visit? Hospitalized since your last visit? No    2. Have you seen or consulted any other health care providers outside of the [address] [place] [address] since your last visit? Include any pap smears or colon screening.  No

## 2018-10-03 NOTE — PROGRESS NOTES
Solitario Gates is a 62 y.o. female who presents with a chief complaint of hematuria. Per patient, when she awoke this morning and went to the bathroom she had bright red blood in her urine, and when she urinated later, it was more of dark, tea colored. She reports that she's felt a little tired over the last few days, but otherwise has had no change in activity. No fevers, chills or night sweats. Having some back pain, which feels like her chronic back pain for which she receives injections, lastly in December. She does have some flank pain on the left, but reports that this also occasionally comes on with her back pain. She denies any new medications or dietary changes. She does report an uncle with a history of bladder cancer. Meds:    Current Outpatient Prescriptions:     metoprolol tartrate (LOPRESSOR) 25 mg tablet, TAKE 1 TABLET BY MOUTH TWO (2) TIMES A DAY., Disp: 60 Tab, Rfl: 1    ALPRAZolam (XANAX) 0.5 mg tablet, TAKE 1/2-1 TABLET BY MOUTH TWICE DAILY AS NEEDED FOR ANXIETY, Disp: 90 Tab, Rfl: 0    gabapentin (NEURONTIN) 600 mg tablet, TAKE 1 TABLET BY MOUTH THREE (3) TIMES DAILY. , Disp: 270 Tab, Rfl: 0    dicyclomine (BENTYL) 10 mg capsule, TAKE 1 (ONE) CAPSULE ORALLY AS NEEDED EVERY 6 HOURS, Disp: 120 Cap, Rfl: 0    buPROPion XL (WELLBUTRIN XL) 300 mg XL tablet, Take 1 Tab by mouth every morning. Indications: ANXIETY WITH DEPRESSION, Disp: 90 Tab, Rfl: 3    furosemide (LASIX) 20 mg tablet, TAKE 1 TAB BY MOUTH AS NEEDED., Disp: 30 Tab, Rfl: 0    omeprazole (PRILOSEC) 20 mg capsule, TAKE ONE CAPSULE BY MOUTH EVERY DAY, Disp: 90 Cap, Rfl: 1    valACYclovir (VALTREX) 500 mg tablet, Take 1 Tab by mouth two (2) times a day. (Patient taking differently: Take 500 mg by mouth daily as needed.), Disp: 60 Tab, Rfl: 3    PULMICORT FLEXHALER 180 mcg/actuation aepb inhaler, TAKE 1 PUFF BY INHALATION TWO (2) TIMES A DAY.  INDICATIONS: MAINTENANCE THERAPY FOR ASTHMA, Disp: 1 Inhaler, Rfl: 3    albuterol (ACCUNEB) 0.63 mg/3 mL nebulizer solution, 3 ML BY NEBULIZATION ROUTE EVERY FOUR (4) HOURS AS NEEDED FOR WHEEZING OR SHORTNESS OF BREATH., Disp: 75 mL, Rfl: 1    rosuvastatin (CRESTOR) 40 mg tablet, TAKE 1 TABLET BY MOUTH NIGHTLY, Disp: 90 Tab, Rfl: 2    aspirin delayed-release 81 mg tablet, Take  by mouth daily. , Disp: , Rfl:     albuterol (PROAIR HFA) 90 mcg/actuation inhaler, Take 1 Puff by inhalation every four (4) hours as needed for Wheezing or Shortness of Breath., Disp: 1 Inhaler, Rfl: 5    Blood-Glucose Meter monitoring kit, Use to check glucose once a day, Disp: 1 Kit, Rfl: 0    alcohol swabs (ALCOHOL PADS) padm, Use when checking blood glucose, Disp: 100 Pad, Rfl: 5    Lancets misc, Use once a day. Please give one compatible with patient's meter, Disp: 1 Package, Rfl: 5    glucose blood VI test strips (BLOOD GLUCOSE TEST) strip, Use once a day, Disp: 100 Strip, Rfl: 5    nitroglycerin (NITROSTAT) 0.4 mg SL tablet, DISSOLVE 1 TABLET IN MOUTH EVERY 5 MINUTES AS NEEDED FOR CHEST PAIN, Disp: 25 Tab, Rfl: 1   Allergies:   Allergies   Allergen Reactions    Accupril [Quinapril] Cough    Lipitor [Atorvastatin] Other (comments)     aches    Norvasc [Amlodipine] Swelling     On legs at 10 mg dose     Smoker:   History   Smoking Status    Never Smoker   Smokeless Tobacco    Never Used     ETOH:   History   Alcohol Use No       FH:    Family History   Problem Relation Age of Onset    Cancer Mother 67     colon    Diabetes Mother     Arthritis-osteo Mother     Stroke Mother     Migraines Mother     Diabetes Father     Heart Disease Father     Heart Attack Father     Hypertension Father     High Cholesterol Father     COPD Father     Heart Failure Father     Cancer Other     Diabetes Other     Heart Disease Other     Hypertension Other     Cancer Brother      lymphoma    Cancer Brother      PROSTATE AND LYMPHOMA    Cancer Maternal Grandmother      stomach    Asthma Brother     Asthma Brother     Stroke Brother     Cancer Maternal Aunt      lung     Breast Cancer Maternal Aunt     Cancer Maternal Uncle      lung    Cancer Maternal Uncle      melanoma    Anesth Problems Neg Hx        ROS:  General/Constitutional:  No headache, fever, fatigue, weight loss or weight gain     Eyes:  No redness, pruritis, pain, visual changes, swelling, or discharge    Ears:  No pain, loss or changes in hearin    Neck:  No swelling, masses, stiffness, pain, or limited movemen    Cardiac:   No chest pain    Respiratory:  No cough or shortness of breath    GI:  No nausea/vomiting, diarrhea, abdominal pain, bloody or dark stools     :  No dysuria, but +  hematuria   Neurological:  No loss of consciousness, dizziness, seizures, dysarthria, cognitive changes, memory changes,  problems with balance, or unilateral weakness    Skin: No rash         Physical Exam:  Visit Vitals    BP (!) 89/60    Pulse 85    Temp 99.9 °F (37.7 °C) (Oral)    Resp 16    Ht 5' 5\" (1.651 m)    Wt 141 lb (64 kg)    LMP 01/01/1985    SpO2 95%    BMI 23.46 kg/m2       GEN: No apparent distress. Alert and oriented and responds to all questions appropriately. EYES:  Conjunctiva clear; pupils round and reactive to light; extraocular movements are intact. EAR: External ears are normal.  Tympanic membranes are clear and without effusion. NOSE: Turbinates are within normal limits. No drainage  OROPHYARYNX: No oral lesions or exudates. NECK:  Supple; no masses; thyroid normal           LUNGS: Respirations unlabored; clear to auscultation bilaterally  CARDIOVASCULAR: Regular, rate, and rhythm without murmurs, gallops or rubs   ABDOMEN: Soft; nontender; nondistended; normoactive bowel sounds; no masses or organomegaly  NEUROLOGIC:  No focal neurologic deficits. Strength and sensation grossly intact. Coordination and gait grossly intact. EXT: Well perfused. No edema. SKIN: No obvious rashes.     Assessment/Plan:    ICD-10-CM ICD-9-CM 1. Hematuria, unspecified type R31.9 599.70 AMB POC URINALYSIS DIP STICK AUTO W/O MICRO     Patient with hematuria on dipstick, with urine also showing evidence of infection with 3+ esterase. Evaluated microscope of urine, which showed bacteria, WBCs, and RBCs. Likely infectious at this time, will plan to treat with a 5 day course of nitrofurantoin, and then retest urine in 2 weeks to assure hematuria has resolved. Patient agreeable to plan, and comforable with follow-up. All questions answered.       RTC: 2 weeks

## 2018-10-05 LAB
BACTERIA UR CULT: ABNORMAL
BACTERIA UR CULT: ABNORMAL

## 2018-10-17 ENCOUNTER — OFFICE VISIT (OUTPATIENT)
Dept: FAMILY MEDICINE CLINIC | Age: 58
End: 2018-10-17

## 2018-10-17 VITALS
SYSTOLIC BLOOD PRESSURE: 100 MMHG | HEIGHT: 65 IN | HEART RATE: 74 BPM | RESPIRATION RATE: 16 BRPM | BODY MASS INDEX: 23.32 KG/M2 | TEMPERATURE: 98.6 F | WEIGHT: 140 LBS | DIASTOLIC BLOOD PRESSURE: 75 MMHG | OXYGEN SATURATION: 99 %

## 2018-10-17 DIAGNOSIS — R31.9 URINARY TRACT INFECTION WITH HEMATURIA, SITE UNSPECIFIED: Primary | ICD-10-CM

## 2018-10-17 DIAGNOSIS — N39.0 URINARY TRACT INFECTION WITH HEMATURIA, SITE UNSPECIFIED: Primary | ICD-10-CM

## 2018-10-17 LAB
BILIRUB UR QL STRIP: NEGATIVE
GLUCOSE UR-MCNC: NEGATIVE MG/DL
KETONES P FAST UR STRIP-MCNC: NEGATIVE MG/DL
PH UR STRIP: 6.5 [PH] (ref 4.6–8)
PROT UR QL STRIP: NEGATIVE
SP GR UR STRIP: 1.01 (ref 1–1.03)
UA UROBILINOGEN AMB POC: NORMAL (ref 0.2–1)
URINALYSIS CLARITY POC: CLEAR
URINALYSIS COLOR POC: YELLOW
URINE BLOOD POC: NORMAL
URINE LEUKOCYTES POC: NEGATIVE
URINE NITRITES POC: NEGATIVE

## 2018-10-17 NOTE — PROGRESS NOTES
Edgardo Mead is a 62 y.o. female who presents with a chief complaint of hematuria. Patient is following up for hematuria with a UTI demonstrated 2 weeks ago. Patient was noted to have E.coli susceptible to nitrofurantoin, and was placed on a 5 day course of this. The patient tolerated this well, and had immediate improvement in blood after starting abx. Patient reports that her urine has remained minimally cloudy, and this morning she did have a very mild burning with urination, but has otherwise been stable without issue. Meds:    Current Outpatient Medications:     metoprolol tartrate (LOPRESSOR) 25 mg tablet, TAKE 1 TABLET BY MOUTH TWO (2) TIMES A DAY., Disp: 60 Tab, Rfl: 1    ALPRAZolam (XANAX) 0.5 mg tablet, TAKE 1/2-1 TABLET BY MOUTH TWICE DAILY AS NEEDED FOR ANXIETY, Disp: 90 Tab, Rfl: 0    gabapentin (NEURONTIN) 600 mg tablet, TAKE 1 TABLET BY MOUTH THREE (3) TIMES DAILY. , Disp: 270 Tab, Rfl: 0    dicyclomine (BENTYL) 10 mg capsule, TAKE 1 (ONE) CAPSULE ORALLY AS NEEDED EVERY 6 HOURS, Disp: 120 Cap, Rfl: 0    buPROPion XL (WELLBUTRIN XL) 300 mg XL tablet, Take 1 Tab by mouth every morning. Indications: ANXIETY WITH DEPRESSION, Disp: 90 Tab, Rfl: 3    furosemide (LASIX) 20 mg tablet, TAKE 1 TAB BY MOUTH AS NEEDED., Disp: 30 Tab, Rfl: 0    omeprazole (PRILOSEC) 20 mg capsule, TAKE ONE CAPSULE BY MOUTH EVERY DAY, Disp: 90 Cap, Rfl: 1    valACYclovir (VALTREX) 500 mg tablet, Take 1 Tab by mouth two (2) times a day. (Patient taking differently: Take 500 mg by mouth daily as needed.), Disp: 60 Tab, Rfl: 3    PULMICORT FLEXHALER 180 mcg/actuation aepb inhaler, TAKE 1 PUFF BY INHALATION TWO (2) TIMES A DAY.  INDICATIONS: MAINTENANCE THERAPY FOR ASTHMA, Disp: 1 Inhaler, Rfl: 3    albuterol (ACCUNEB) 0.63 mg/3 mL nebulizer solution, 3 ML BY NEBULIZATION ROUTE EVERY FOUR (4) HOURS AS NEEDED FOR WHEEZING OR SHORTNESS OF BREATH., Disp: 75 mL, Rfl: 1    rosuvastatin (CRESTOR) 40 mg tablet, TAKE 1 TABLET BY MOUTH NIGHTLY, Disp: 90 Tab, Rfl: 2    aspirin delayed-release 81 mg tablet, Take  by mouth daily. , Disp: , Rfl:     nitroglycerin (NITROSTAT) 0.4 mg SL tablet, DISSOLVE 1 TABLET IN MOUTH EVERY 5 MINUTES AS NEEDED FOR CHEST PAIN, Disp: 25 Tab, Rfl: 1    albuterol (PROAIR HFA) 90 mcg/actuation inhaler, Take 1 Puff by inhalation every four (4) hours as needed for Wheezing or Shortness of Breath., Disp: 1 Inhaler, Rfl: 5    Blood-Glucose Meter monitoring kit, Use to check glucose once a day, Disp: 1 Kit, Rfl: 0    alcohol swabs (ALCOHOL PADS) padm, Use when checking blood glucose, Disp: 100 Pad, Rfl: 5    Lancets misc, Use once a day. Please give one compatible with patient's meter, Disp: 1 Package, Rfl: 5    glucose blood VI test strips (BLOOD GLUCOSE TEST) strip, Use once a day, Disp: 100 Strip, Rfl: 5   Allergies:   Allergies   Allergen Reactions    Accupril [Quinapril] Cough    Lipitor [Atorvastatin] Other (comments)     aches    Norvasc [Amlodipine] Swelling     On legs at 10 mg dose     Smoker:   Social History     Tobacco Use   Smoking Status Never Smoker   Smokeless Tobacco Never Used     ETOH:   Social History     Substance and Sexual Activity   Alcohol Use No    Alcohol/week: 0.0 oz       FH:    Family History   Problem Relation Age of Onset    Cancer Mother 67        colon    Diabetes Mother     Arthritis-osteo Mother     Stroke Mother     Migraines Mother     Diabetes Father     Heart Disease Father     Heart Attack Father     Hypertension Father     High Cholesterol Father     COPD Father     Heart Failure Father     Cancer Other     Diabetes Other     Heart Disease Other     Hypertension Other     Cancer Brother         lymphoma    Cancer Brother         PROSTATE AND LYMPHOMA    Cancer Maternal Grandmother         stomach    Asthma Brother     Asthma Brother     Stroke Brother     Cancer Maternal Aunt         lung     Breast Cancer Maternal Aunt     Cancer Maternal Uncle         lung    Cancer Maternal Uncle         melanoma    Anesth Problems Neg Hx        ROS:  General/Constitutional:  No headache, fever, fatigue  Eyes:  No redness, pruritis, pain, visual changes  Ears:  No pain, loss or changes in hearing  Neck:  No swelling, masses, stiffness, pain, or limited movement  Cardiac:   No chest pain, palpitations  Respiratory:  No cough or shortness of breath    GI:  No nausea/vomiting, diarrhea, abdominal pain, bloody or dark stools     : +hematura (2 weeks prior)        Physical Exam:  Visit Vitals  /75   Pulse 74   Temp 98.6 °F (37 °C) (Oral)   Resp 16   Ht 5' 5\" (1.651 m)   Wt 140 lb (63.5 kg)   LMP 01/01/1985   SpO2 99%   BMI 23.30 kg/m²       GEN: No apparent distress. Alert and oriented and responds to all questions appropriately. LUNGS: Respirations unlabored; clear to auscultation bilaterally  CARDIOVASCULAR: Regular, rate, and rhythm without murmurs, gallops or rubs   ABDOMEN: Soft; nontender; nondistended; normoactive bowel sounds; no masses or organomegaly  NEUROLOGIC:  No focal neurologic deficits. Strength and sensation grossly intact. Coordination and gait grossly intact. EXT: Well perfused. No edema. SKIN: No obvious rashes. Assessment/Plan:    ICD-10-CM ICD-9-CM    1. Urinary tract infection with hematuria, site unspecified N39.0 599.0 AMB POC URINALYSIS DIP STICK AUTO W/O MICRO    R31.9 599.70 CULTURE, URINE      REFERRAL TO UROLOGY     Generally doing well, with minimal symptoms. No gross hematuria, but 1+ blood noted on UA today. In light of this, discussed options with patient, with patient electing for a urology referral to further evaluate. Discussed possible diagnostic work-ups that they may pursue, but will need to wait and see at this point.

## 2018-10-19 DIAGNOSIS — J45.40 MODERATE PERSISTENT ASTHMA WITHOUT COMPLICATION: ICD-10-CM

## 2018-10-19 LAB — BACTERIA UR CULT: NORMAL

## 2018-10-21 RX ORDER — ALBUTEROL SULFATE 0.63 MG/3ML
0.63 SOLUTION RESPIRATORY (INHALATION)
Qty: 75 ML | Refills: 1 | Status: SHIPPED | OUTPATIENT
Start: 2018-10-21 | End: 2019-10-20 | Stop reason: SDUPTHER

## 2018-10-24 RX ORDER — FUROSEMIDE 20 MG/1
TABLET ORAL
Qty: 30 TAB | Refills: 0 | Status: SHIPPED | OUTPATIENT
Start: 2018-10-24 | End: 2018-11-20 | Stop reason: SDUPTHER

## 2018-10-26 ENCOUNTER — TELEPHONE (OUTPATIENT)
Dept: FAMILY MEDICINE CLINIC | Age: 58
End: 2018-10-26

## 2018-10-26 NOTE — TELEPHONE ENCOUNTER
Called the patient and informed her she can see anyone she would like to see just make sure they are in network with her insurance. I informed her she can call her insurance for a list of providers that are in network. Patient stated she will work on it Monday.

## 2018-10-26 NOTE — TELEPHONE ENCOUNTER
[id number] - REFERRAL TO UROLOGY None  1 1   Diagnosis Information     Diagnosis   N39.0,R31.9 (ICD-10-CM) - Urinary tract infection with hematuria, site unspecified

## 2018-10-26 NOTE — TELEPHONE ENCOUNTER
----- Message from Fang Ruvalcaba sent at 10/26/2018  2:53 PM EDT -----  Regarding: Dr. Gentry Perdue telephone   Pt is requesting a call back in regards to referral for Dr. Amanda Bee. States that doctor is not at practice.  Best contact [phone]

## 2018-11-03 ENCOUNTER — OFFICE VISIT (OUTPATIENT)
Dept: FAMILY MEDICINE CLINIC | Age: 58
End: 2018-11-03

## 2018-11-03 VITALS
HEIGHT: 65 IN | OXYGEN SATURATION: 100 % | TEMPERATURE: 98.1 F | HEART RATE: 65 BPM | SYSTOLIC BLOOD PRESSURE: 128 MMHG | BODY MASS INDEX: 23.49 KG/M2 | WEIGHT: 141 LBS | RESPIRATION RATE: 16 BRPM | DIASTOLIC BLOOD PRESSURE: 78 MMHG

## 2018-11-03 DIAGNOSIS — I25.118 CORONARY ARTERY DISEASE OF NATIVE ARTERY OF NATIVE HEART WITH STABLE ANGINA PECTORIS (HCC): ICD-10-CM

## 2018-11-03 DIAGNOSIS — Z98.84 H/O GASTRIC BYPASS: ICD-10-CM

## 2018-11-03 DIAGNOSIS — G47.33 OBSTRUCTIVE SLEEP APNEA: ICD-10-CM

## 2018-11-03 DIAGNOSIS — E11.9 TYPE 2 DIABETES MELLITUS WITHOUT COMPLICATION, UNSPECIFIED WHETHER LONG TERM INSULIN USE (HCC): ICD-10-CM

## 2018-11-03 DIAGNOSIS — K75.81 NASH (NONALCOHOLIC STEATOHEPATITIS): ICD-10-CM

## 2018-11-03 DIAGNOSIS — Z23 ENCOUNTER FOR IMMUNIZATION: ICD-10-CM

## 2018-11-03 DIAGNOSIS — M51.36 DDD (DEGENERATIVE DISC DISEASE), LUMBAR: Primary | ICD-10-CM

## 2018-11-03 NOTE — ASSESSMENT & PLAN NOTE
Well Controlled, based on history, physical exam and review of pertinent labs, studies and medications; meds reconciled; continue current treatment plan. Key Antihyperglycemic Medications     Patient is on no antihyperglycemic meds. Other Key Diabetic Medications             gabapentin (NEURONTIN) 600 mg tablet  (Taking) TAKE 1 TABLET BY MOUTH THREE (3) TIMES DAILY.     rosuvastatin (CRESTOR) 40 mg tablet  (Taking) TAKE 1 TABLET BY MOUTH NIGHTLY        Lab Results   Component Value Date/Time    Hemoglobin A1c 5.3 09/18/2018 02:16 PM    Glucose 91 09/18/2018 02:16 PM    Creatinine 0.61 09/18/2018 02:16 PM    Microalbumin/creat ratio (POC) <30 09/18/2018 02:17 PM    Cholesterol, total 166 09/18/2018 02:16 PM    HDL Cholesterol 45 09/18/2018 02:16 PM    LDL,Direct 97 09/18/2018 02:16 PM     Diabetic Foot and Eye Exam HM Status   Topic Date Due    Eye Exam  12/19/2018 (Originally 8/24/2017)   [address] [place] [address] Diabetic Foot Care  09/18/2019

## 2018-11-03 NOTE — PATIENT INSTRUCTIONS
Consider shingles vaccine series. Rx given. Body mass index is 23.46 kg/m². Stay active. Call Dr. Mark Lechuga for sleep evaluation. Call #[phone] for an appointment then call our referral coordinator [phone]) with the date and time and the first and last name of who you will be seeing so we can authorize your referral with your insurance.       Fasting labs soon    Letter for SSI

## 2018-11-03 NOTE — LETTER
11/3/2018 9:44 AM 
 
Ms. Andry Armijo 
[address] [address] To Whom It May Concern,  
 
Ms. Raliegh Habermann is a long term patient of mine who has been on SSI disability since 4/2016 for chronic pain syndrome and  lumbar disc disease. She is under the care of back specialist Dr. Melody Ponce #[phone]. She wants to participate in the SSI \"Ticket to Work Program\". I support this decision. She wants to perform what work she is able to do given her medical conditions. Work restrictions may need to be documented by her back specialist Dr. Simran Mahoney. Sincerely, Duane Solis MD

## 2018-11-03 NOTE — PROGRESS NOTES
Reynold Jacob is a 62 y.o. female      Issues discussed today include:        Signs and symptoms:  She wants to participate in the \"Ticket to Work Program\" through [facility]. Duration:  She has been on SSI disability since 4/2016 for fibromyalgia and DDD  Context:  She received another NEREYDA by Dr. Sarah Painting 11/1/2018  Location:  DDD in neck and back  Quality:  aches  Severity:  Chronic pain  Timing:  She starts PT again this week  Modifying factors:  she cannot lift more than 25 lbs    Data reviewed or ordered today:    KEIRA 6/2018:  1. No evidence of significant peripheral arterial disease at rest in  the right leg. 2. No evidence of significant peripheral arterial disease at rest in  the left leg. 3. The right ankle/brachial index is 1.11 and the left ankle/brachial  index is 1.13.  4. The right toe/brachial index is 0.86 and the left toe/brachial  index is 0.81. She is due for cervical CT soon (once she starts PT)    Other problems include:  Patient Active Problem List   Diagnosis Code    Obstructive sleep apnea G47.33    Asthma J45.909    Arthritis M19.90    GERD (gastroesophageal reflux disease) K21.9    GARCIA (nonalcoholic steatohepatitis) K75.81    Chronic pain G89.29    Essential hypertension R23    Metabolic syndrome J29.47    FH: diabetes mellitus Z83.3    Anxiety F41.9    History of pulmonary embolus (PE) Z86.711    Hypokalemia E87.6    H/O gastric bypass Z98.84    H/O colonoscopy Z98.890    DDD (degenerative disc disease), lumbar M51.36    FH: colon cancer Z80.0    Elevated ferritin R79.89    Type 2 diabetes mellitus with diabetic neuropathy (HCC) E11.40    Abnormal mammogram of right breast R92.8       Medications:  Current Outpatient Medications   Medication Sig Dispense Refill    furosemide (LASIX) 20 mg tablet TAKE 1 TAB BY MOUTH AS NEEDED. 30 Tab 0    budesonide (PULMICORT FLEXHALER) 180 mcg/actuation aepb inhaler Take 2 Puffs by inhalation daily.  1 Inhaler 3    albuterol (ACCUNEB) 0.63 mg/3 mL nebulizer solution 3 mL by Nebulization route every six (6) hours as needed for Wheezing. 75 mL 1    metoprolol tartrate (LOPRESSOR) 25 mg tablet TAKE 1 TABLET BY MOUTH TWO (2) TIMES A DAY. 60 Tab 1    ALPRAZolam (XANAX) 0.5 mg tablet TAKE 1/2-1 TABLET BY MOUTH TWICE DAILY AS NEEDED FOR ANXIETY 90 Tab 0    gabapentin (NEURONTIN) 600 mg tablet TAKE 1 TABLET BY MOUTH THREE (3) TIMES DAILY. 270 Tab 0    dicyclomine (BENTYL) 10 mg capsule TAKE 1 (ONE) CAPSULE ORALLY AS NEEDED EVERY 6 HOURS 120 Cap 0    buPROPion XL (WELLBUTRIN XL) 300 mg XL tablet Take 1 Tab by mouth every morning. Indications: ANXIETY WITH DEPRESSION 90 Tab 3    omeprazole (PRILOSEC) 20 mg capsule TAKE ONE CAPSULE BY MOUTH EVERY DAY 90 Cap 1    valACYclovir (VALTREX) 500 mg tablet Take 1 Tab by mouth two (2) times a day. (Patient taking differently: Take 500 mg by mouth daily as needed.) 60 Tab 3    rosuvastatin (CRESTOR) 40 mg tablet TAKE 1 TABLET BY MOUTH NIGHTLY 90 Tab 2    aspirin delayed-release 81 mg tablet Take  by mouth daily.  nitroglycerin (NITROSTAT) 0.4 mg SL tablet DISSOLVE 1 TABLET IN MOUTH EVERY 5 MINUTES AS NEEDED FOR CHEST PAIN 25 Tab 1    albuterol (PROAIR HFA) 90 mcg/actuation inhaler Take 1 Puff by inhalation every four (4) hours as needed for Wheezing or Shortness of Breath. 1 Inhaler 5    Blood-Glucose Meter monitoring kit Use to check glucose once a day 1 Kit 0    alcohol swabs (ALCOHOL PADS) padm Use when checking blood glucose 100 Pad 5    Lancets misc Use once a day. Please give one compatible with patient's meter 1 Package 5    glucose blood VI test strips (BLOOD GLUCOSE TEST) strip Use once a day 100 Strip 5       Allergies: Allergies   Allergen Reactions    Accupril [Quinapril] Cough    Lipitor [Atorvastatin] Other (comments)     aches    Norvasc [Amlodipine] Swelling     On legs at 10 mg dose       LMP:  Patient's last menstrual period was 01/01/1985.     Social History     Socioeconomic History    Marital status:      Spouse name: Not on file    Number of children: Not on file    Years of education: Not on file    Highest education level: Not on file   Social Needs    Financial resource strain: Not on file    Food insecurity - worry: Not on file    Food insecurity - inability: Not on file    Transportation needs - medical: Not on file   [employer] needs - non-medical: Not on file   Occupational History    Not on file   Tobacco Use    Smoking status: Never Smoker    Smokeless tobacco: Never Used   Substance and Sexual Activity    Alcohol use: No     Alcohol/week: 0.0 oz    Drug use: No    Sexual activity: Yes     Partners: Male     Birth control/protection: None   Other Topics Concern    Not on file   Social History Narrative    Not on file         Family History   Problem Relation Age of Onset    Cancer Mother 67        colon    Diabetes Mother     Arthritis-osteo Mother     Stroke Mother     Migraines Mother     Diabetes Father     Heart Disease Father     Heart Attack Father     Hypertension Father     High Cholesterol Father     COPD Father     Heart Failure Father     Cancer Other     Diabetes Other     Heart Disease Other     Hypertension Other     Cancer Brother         lymphoma    Cancer Brother         PROSTATE AND LYMPHOMA    Cancer Maternal Grandmother         stomach    Asthma Brother     Asthma Brother     Stroke Brother     Cancer Maternal Aunt         lung     Breast Cancer Maternal Aunt     Cancer Maternal Uncle         lung    Cancer Maternal Uncle         melanoma    Anesth Problems Neg Hx          Meaningful use:  done      ROS:  Headaches:  no  Chest Pain:  no  SOB:  no  Fevers:  no  Falls:  no  anxiety/depression/losing interest in doing things that were previously enjoyed:  no. PHQ2 = 0  Other significant ROS:  GERD, snoring in controlled on CPAP.   She has had 100 lb weight loss s/p gastric bypass surgery  Patient denied problems with:    Hearing/vision, speaking/swallowing, Reflux/indigestion, Cough,Diarrhea/constipation,Mood (anxiety/depression/losing interest in doing things that were previously enjoyed), Fatigue, Weight change, memory                                                         Any other Positive ROS include: recent Lowell Levy (she sees [facility] at [facility] [place], operated by [facility] and gets CT scan soon        Falls in the past 12 months:  no           Over the last year (or since your last visit):  Have you been diagnosed with heart attack, stroke, broken bones, or skin cancer = no    Exercise:  Walks regularly. Starts PT soon             Smoking history:  none                                    Patient's last menstrual period was 01/01/1985. Physical Exam  Visit Vitals  /78   Pulse 65   Temp 98.1 °F (36.7 °C)   Resp 16   Ht 5' 5\" (1.651 m)   Wt 141 lb (64 kg)   LMP 01/01/1985   SpO2 100%   BMI 23.46 kg/m²     BP Readings from Last 3 Encounters:   11/03/18 128/78   10/17/18 100/75   10/03/18 (!) 89/60     Constitutional:  Appears well,  No Acute Distress, Vitals noted  Psychiatric:   Affect normal, Alert and cooperative, Oriented to person/place/time    Eyes:   Pupils equally round and reactive, EOMI, conjunctiva clear, eyelids normal  ENT:   External ears and nose normal/lips, teeth=OK/gums normal, TMs and Orophyarynx normal  Neck:   general inspection and Thyroid normal.  No abnormal cervical or supraclavicular nodes    Lungs:   clear to auscultation, good respiratory effort  Heart: Ausculation normal.  Regular rhythm. No cardiac murmurs.   No carotid bruits or palpable thrills  Chest wall normal  Abdominal exam:   normal.  Liver and spleen normal.  No bruits/masses/tenderness    Extremities:   without edema, suspect peripheral pulses  Skin:  Lots of skin tears and some bruising    Neuro:  No facial droop, speech fluent, EOMI, Pupils equally round and reactive to light, visual fields seem OK, hearing seems normal and symmetrical,smile symmetrical, puffs out cheeks symmetrically    Shoulder shrug symmetrical     moves all extremities, strength/sensation seems intact and symmetrical    Rapid alternating movements of hands normal and symmetrical    balance seems OK, no pronator drift, gait normal. \"get up and go\" test OK    squats OK given DDD, heel standing/toe standing OK    no tenderness of C spine, T spine, LS spine, flexion/extension of spine OK givne DDD    Affect seems appropriate, no obvious mental processing problems    MSK:  Full passive ROM all joints                     Assessment:    Patient Active Problem List   Diagnosis Code    Obstructive sleep apnea G47.33    Asthma J45.909    Arthritis M19.90    GERD (gastroesophageal reflux disease) K21.9    GARCIA (nonalcoholic steatohepatitis) K75.81    Chronic pain G89.29    Essential hypertension R61    Metabolic syndrome R34.98    FH: diabetes mellitus Z83.3    Anxiety F41.9    History of pulmonary embolus (PE) Z86.711    Hypokalemia E87.6    H/O gastric bypass Z98.84    H/O colonoscopy Z98.890    DDD (degenerative disc disease), lumbar M51.36    FH: colon cancer Z80.0    Elevated ferritin R79.89    Type 2 diabetes mellitus with diabetic neuropathy (HCC) E11.40    Abnormal mammogram of right breast R92.8       Today's diagnoses are:  Diagnoses and all orders for this visit:    1. DDD (degenerative disc disease), lumbar  Comments:  see letter. she sees Dr. Bren Hill    2. Encounter for immunization  -     INFLUENZA VIRUS VAC QUAD,SPLIT,PRESV FREE SYRINGE IM  -     [place] IMMUNIZ ADMIN,1 SINGLE/COMB VAC/TOXOID    3. H/O gastric bypass  -     VITAMIN B12  -     VITAMIN D, 25 HYDROXY  -     LIPID PANEL  -     METABOLIC PANEL, BASIC    4. GARCIA (nonalcoholic steatohepatitis)  -     HEPATIC FUNCTION PANEL  -     FERRITIN    5. Obstructive sleep apnea  -     SLEEP MEDICINE REFERRAL    6.  Type 2 diabetes mellitus without complication, unspecified whether long term insulin use [facility])  Comments:  fasting labs soon  Assessment & Plan:  Well Controlled, based on history, physical exam and review of pertinent labs, studies and medications; meds reconciled; continue current treatment plan. Key Antihyperglycemic Medications     Patient is on no antihyperglycemic meds. Other Key Diabetic Medications             gabapentin (NEURONTIN) 600 mg tablet  (Taking) TAKE 1 TABLET BY MOUTH THREE (3) TIMES DAILY. rosuvastatin (CRESTOR) 40 mg tablet  (Taking) TAKE 1 TABLET BY MOUTH NIGHTLY        Lab Results   Component Value Date/Time    Hemoglobin A1c 5.3 09/18/2018 02:16 PM    Glucose 91 09/18/2018 02:16 PM    Creatinine 0.61 09/18/2018 02:16 PM    Microalbumin/creat ratio (POC) <30 09/18/2018 02:17 PM    Cholesterol, total 166 09/18/2018 02:16 PM    HDL Cholesterol 45 09/18/2018 02:16 PM    LDL,Direct 97 09/18/2018 02:16 PM     Diabetic Foot and Eye Exam HM Status   Topic Date Due    Eye Exam  12/19/2018 (Originally 8/24/2017)    Diabetic Foot Care  09/18/2019       Orders:  -     AMB POC URINE, MICROALBUMIN, SEMIQUANT (3 RESULTS)    7.  Coronary artery disease of native artery of native heart with stable angina pectoris [facility])  Comments:  Dr. Lorelei Kimball placed stent in 80% blockage of distal left Cx 2015        Plan:  Orders Placed This Encounter    [place] IMMUNIZ ADMIN,1 SINGLE/COMB VAC/TOXOID    INFLUENZA VIRUS VACCINE QUADRIVALENT, PRESERVATIVE FREE SYRINGE (10718)    VITAMIN B12    VITAMIN D, 25 HYDROXY    LIPID PANEL    METABOLIC PANEL, BASIC    HEPATIC FUNCTION PANEL    FERRITIN    SLEEP MEDICINE REFERRAL     Referral Priority:   Routine     Referral Type:   Consultation     Referral Reason:   Specialty Services Required     Referred to Provider:   Lorenza Porter MD     Requested Specialty:   Sleep Medicine     Number of Visits Requested:   1    AMB POC URINE, MICROALBUMIN, SEMIQUANT (3 RESULTS)       See patient instructions  Patient Instructions   Consider shingles vaccine series. Rx given. Body mass index is 23.46 kg/m². Stay active. Call Dr. Maxi John for sleep evaluation. Call #[phone] for an appointment then call our referral coordinator [phone]) with the date and time and the first and last name of who you will be seeing so we can authorize your referral with your insurance.       Fasting labs soon    Letter for SSI        Arrange diagnoses:  done    Check meds:  done    AVS Printed:  done

## 2018-11-03 NOTE — PROGRESS NOTES
Chief Complaint   Patient presents with    Form Completion     Pt is being seen for clearance to return back to work

## 2018-11-05 ENCOUNTER — TELEPHONE (OUTPATIENT)
Dept: FAMILY MEDICINE CLINIC | Age: 58
End: 2018-11-05

## 2018-11-05 ENCOUNTER — LAB ONLY (OUTPATIENT)
Dept: FAMILY MEDICINE CLINIC | Age: 58
End: 2018-11-05

## 2018-11-05 DIAGNOSIS — E08.00 DIABETES MELLITUS DUE TO UNDERLYING CONDITION WITH HYPEROSMOLARITY WITHOUT COMA, WITHOUT LONG-TERM CURRENT USE OF INSULIN (HCC): Primary | ICD-10-CM

## 2018-11-05 LAB
ALBUMIN UR QL STRIP: 10 MG/L
CREATININE, URINE POC: 200 MG/DL
MICROALBUMIN/CREAT RATIO POC: <30 MG/G

## 2018-11-06 LAB
25(OH)D3+25(OH)D2 SERPL-MCNC: 36.7 NG/ML (ref 30–100)
ALBUMIN SERPL-MCNC: 4.3 G/DL (ref 3.5–5.5)
ALP SERPL-CCNC: 93 IU/L (ref 39–117)
ALT SERPL-CCNC: 15 IU/L (ref 0–32)
AST SERPL-CCNC: 16 IU/L (ref 0–40)
BILIRUB DIRECT SERPL-MCNC: 0.11 MG/DL (ref 0–0.4)
BILIRUB SERPL-MCNC: 0.4 MG/DL (ref 0–1.2)
BUN SERPL-MCNC: 10 MG/DL (ref 6–24)
BUN/CREAT SERPL: 14 (ref 9–23)
CALCIUM SERPL-MCNC: 9.9 MG/DL (ref 8.7–10.2)
CHLORIDE SERPL-SCNC: 101 MMOL/L (ref 96–106)
CHOLEST SERPL-MCNC: 214 MG/DL (ref 100–199)
CO2 SERPL-SCNC: 26 MMOL/L (ref 20–29)
CREAT SERPL-MCNC: 0.72 MG/DL (ref 0.57–1)
FERRITIN SERPL-MCNC: 159 NG/ML (ref 15–150)
GLUCOSE SERPL-MCNC: 98 MG/DL (ref 65–99)
HDLC SERPL-MCNC: 54 MG/DL
INTERPRETATION, 910389: NORMAL
LDLC SERPL CALC-MCNC: 132 MG/DL (ref 0–99)
POTASSIUM SERPL-SCNC: 4.4 MMOL/L (ref 3.5–5.2)
PROT SERPL-MCNC: 6.5 G/DL (ref 6–8.5)
SODIUM SERPL-SCNC: 143 MMOL/L (ref 134–144)
TRIGL SERPL-MCNC: 142 MG/DL (ref 0–149)
VIT B12 SERPL-MCNC: 363 PG/ML (ref 232–1245)
VLDLC SERPL CALC-MCNC: 28 MG/DL (ref 5–40)

## 2018-11-06 NOTE — PROGRESS NOTES
Your vitamin D and B12 is OK. Your LDL (the \"bad cholesterol\") is creeping up. Continue Crestor. Your iron stores are better. Your kidney and liver functions are OK. Stay active.

## 2018-11-12 ENCOUNTER — HOSPITAL ENCOUNTER (OUTPATIENT)
Dept: CT IMAGING | Age: 58
Discharge: HOME | End: 2018-11-12
Attending: UROLOGY
Payer: COMMERCIAL

## 2018-11-12 DIAGNOSIS — N39.0 UTI (URINARY TRACT INFECTION): ICD-10-CM

## 2018-11-12 DIAGNOSIS — R31.9 HEMATURIA: ICD-10-CM

## 2018-11-12 PROCEDURE — 74011636320 HC RX REV CODE- 636/320

## 2018-11-12 PROCEDURE — 74178 CT ABD&PLV WO CNTR FLWD CNTR: CPT

## 2018-11-12 RX ADMIN — IOPAMIDOL 98 ML: 612 INJECTION, SOLUTION INTRAVENOUS at 14:39

## 2018-11-13 RX ORDER — DICYCLOMINE HYDROCHLORIDE 10 MG/1
CAPSULE ORAL
Qty: 120 CAP | Refills: 0 | Status: SHIPPED | OUTPATIENT
Start: 2018-11-13 | End: 2018-12-10 | Stop reason: SDUPTHER

## 2018-11-20 DIAGNOSIS — R93.5 ABNORMAL CT OF THE ABDOMEN: Primary | ICD-10-CM

## 2018-11-20 RX ORDER — FUROSEMIDE 20 MG/1
TABLET ORAL
Qty: 30 TAB | Refills: 0 | Status: SHIPPED | OUTPATIENT
Start: 2018-11-20 | End: 2018-12-20 | Stop reason: SDUPTHER

## 2018-11-25 DIAGNOSIS — I25.10 CORONARY ARTERY DISEASE INVOLVING NATIVE CORONARY ARTERY OF NATIVE HEART WITHOUT ANGINA PECTORIS: ICD-10-CM

## 2018-11-25 RX ORDER — METOPROLOL TARTRATE 25 MG/1
TABLET, FILM COATED ORAL
Qty: 60 TAB | Refills: 1 | Status: SHIPPED | OUTPATIENT
Start: 2018-11-25 | End: 2019-02-18 | Stop reason: SDUPTHER

## 2018-12-10 RX ORDER — DICYCLOMINE HYDROCHLORIDE 10 MG/1
CAPSULE ORAL
Qty: 120 CAP | Refills: 0 | Status: SHIPPED | OUTPATIENT
Start: 2018-12-10 | End: 2019-01-08 | Stop reason: SDUPTHER

## 2018-12-13 ENCOUNTER — HOSPITAL ENCOUNTER (OUTPATIENT)
Dept: CT IMAGING | Age: 58
Discharge: HOME | End: 2018-12-13
Payer: COMMERCIAL

## 2018-12-13 DIAGNOSIS — R93.5 ABNORMAL CT OF THE ABDOMEN: ICD-10-CM

## 2018-12-13 PROCEDURE — 74177 CT ABD & PELVIS W/CONTRAST: CPT

## 2018-12-13 PROCEDURE — 74011636320 HC RX REV CODE- 636/320: Performed by: RADIOLOGY

## 2018-12-13 RX ADMIN — IOPAMIDOL 100 ML: 755 INJECTION, SOLUTION INTRAVENOUS at 17:05

## 2018-12-16 PROBLEM — R93.5 ABNORMAL CT OF THE ABDOMEN: Status: ACTIVE | Noted: 2018-12-16

## 2018-12-18 ENCOUNTER — TELEPHONE (OUTPATIENT)
Dept: FAMILY MEDICINE CLINIC | Age: 58
End: 2018-12-18

## 2018-12-18 NOTE — TELEPHONE ENCOUNTER
----- Message from Junie Greenwood sent at 12/17/2018  4:10 PM EST -----  Regarding: /Telephone  Pt returned the call to office.     Best [phone]

## 2018-12-18 NOTE — TELEPHONE ENCOUNTER
Returned patient call, left message to call office, to call back if she needs anything and I do not know who called her early.

## 2018-12-24 DIAGNOSIS — F41.9 ANXIETY: ICD-10-CM

## 2018-12-24 RX ORDER — ALPRAZOLAM 0.5 MG/1
TABLET ORAL
Qty: 90 TAB | Refills: 0 | Status: SHIPPED | OUTPATIENT
Start: 2018-12-24 | End: 2019-04-05 | Stop reason: SDUPTHER

## 2018-12-26 RX ORDER — FUROSEMIDE 20 MG/1
TABLET ORAL
Qty: 30 TAB | Refills: 0 | Status: SHIPPED | OUTPATIENT
Start: 2018-12-26 | End: 2019-01-24 | Stop reason: SDUPTHER

## 2018-12-28 RX ORDER — NITROGLYCERIN 0.4 MG/1
TABLET SUBLINGUAL
Qty: 25 TAB | Refills: 1 | Status: SHIPPED | OUTPATIENT
Start: 2018-12-28 | End: 2019-10-20 | Stop reason: SDUPTHER

## 2019-01-07 ENCOUNTER — OFFICE VISIT (OUTPATIENT)
Dept: FAMILY MEDICINE CLINIC | Age: 59
End: 2019-01-07

## 2019-01-07 VITALS
WEIGHT: 143 LBS | OXYGEN SATURATION: 99 % | DIASTOLIC BLOOD PRESSURE: 86 MMHG | RESPIRATION RATE: 20 BRPM | TEMPERATURE: 98.1 F | HEIGHT: 65 IN | HEART RATE: 72 BPM | SYSTOLIC BLOOD PRESSURE: 128 MMHG | BODY MASS INDEX: 23.82 KG/M2

## 2019-01-07 DIAGNOSIS — K55.069 OMENTAL INFARCTION (HCC): Primary | ICD-10-CM

## 2019-01-07 NOTE — PROGRESS NOTES
Edward Schmitt is a 62 y.o. female who presents to follow-up on CT results. Patient had imaging done on her chest/abdomen/pelvis is response to persistent hematuria. Apparently incidentally, it was noted on 2 different CTs to have evidence of omental infarction. At the time of the imaging, she denies having any abdominal pain or systemic complaints. She does note that in December (about a week and a half ago) she had a cramping sensation underneath her ribs that she associated as some abdominal pain. She did have some loose stools the next day, and then the pain resolved. History of Abraham-en-Y, but no recent issues with this. She reports no other medication changes. No other recent issues    Reports that she saw Dr. Cruz Landa in the past for an endoscopy and already has a relationship with them. Meds:    Current Outpatient Medications:     nitroglycerin (NITROSTAT) 0.4 mg SL tablet, DISSOLVE 1 TABLET IN MOUTH EVERY 5 MINUTES AS NEEDED FOR CHEST PAIN, Disp: 25 Tab, Rfl: 1    furosemide (LASIX) 20 mg tablet, TAKE 1 TAB BY MOUTH AS NEEDED., Disp: 30 Tab, Rfl: 0    ALPRAZolam (XANAX) 0.5 mg tablet, TAKE 1/2-1 TABLET BY MOUTH TWICE DAILY AS NEEDED FOR ANXIETY, Disp: 90 Tab, Rfl: 0    dicyclomine (BENTYL) 10 mg capsule, TAKE 1 (ONE) CAPSULE BY MOUTH AS NEEDED EVERY 6 HOURS, Disp: 120 Cap, Rfl: 0    metoprolol tartrate (LOPRESSOR) 25 mg tablet, TAKE 1 TABLET BY MOUTH TWO (2) TIMES A DAY., Disp: 60 Tab, Rfl: 1    budesonide (PULMICORT FLEXHALER) 180 mcg/actuation aepb inhaler, Take 2 Puffs by inhalation daily. , Disp: 1 Inhaler, Rfl: 3    albuterol (ACCUNEB) 0.63 mg/3 mL nebulizer solution, 3 mL by Nebulization route every six (6) hours as needed for Wheezing., Disp: 75 mL, Rfl: 1    gabapentin (NEURONTIN) 600 mg tablet, TAKE 1 TABLET BY MOUTH THREE (3) TIMES DAILY. , Disp: 270 Tab, Rfl: 0    buPROPion XL (WELLBUTRIN XL) 300 mg XL tablet, Take 1 Tab by mouth every morning.  Indications: ANXIETY WITH DEPRESSION, Disp: 90 Tab, Rfl: 3    omeprazole (PRILOSEC) 20 mg capsule, TAKE ONE CAPSULE BY MOUTH EVERY DAY, Disp: 90 Cap, Rfl: 1    valACYclovir (VALTREX) 500 mg tablet, Take 1 Tab by mouth two (2) times a day. (Patient taking differently: Take 500 mg by mouth daily as needed.), Disp: 60 Tab, Rfl: 3    rosuvastatin (CRESTOR) 40 mg tablet, TAKE 1 TABLET BY MOUTH NIGHTLY, Disp: 90 Tab, Rfl: 2    aspirin delayed-release 81 mg tablet, Take  by mouth daily. , Disp: , Rfl:     albuterol (PROAIR HFA) 90 mcg/actuation inhaler, Take 1 Puff by inhalation every four (4) hours as needed for Wheezing or Shortness of Breath., Disp: 1 Inhaler, Rfl: 5    Blood-Glucose Meter monitoring kit, Use to check glucose once a day, Disp: 1 Kit, Rfl: 0    alcohol swabs (ALCOHOL PADS) padm, Use when checking blood glucose, Disp: 100 Pad, Rfl: 5    Lancets misc, Use once a day. Please give one compatible with patient's meter, Disp: 1 Package, Rfl: 5    glucose blood VI test strips (BLOOD GLUCOSE TEST) strip, Use once a day, Disp: 100 Strip, Rfl: 5   Allergies:   Allergies   Allergen Reactions    Accupril [Quinapril] Cough    Lipitor [Atorvastatin] Other (comments)     aches    Norvasc [Amlodipine] Swelling     On legs at 10 mg dose     Smoker:   Social History     Tobacco Use   Smoking Status Never Smoker   Smokeless Tobacco Never Used     ETOH:   Social History     Substance and Sexual Activity   Alcohol Use No    Alcohol/week: 0.0 oz       FH:    Family History   Problem Relation Age of Onset    Cancer Mother 67        colon    Diabetes Mother     Arthritis-osteo Mother     Stroke Mother     Migraines Mother     Diabetes Father     Heart Disease Father     Heart Attack Father     Hypertension Father     High Cholesterol Father     COPD Father     Heart Failure Father     Cancer Other     Diabetes Other     Heart Disease Other     Hypertension Other     Cancer Brother         lymphoma    Cancer Brother PROSTATE AND LYMPHOMA    Cancer Maternal Grandmother         stomach    Asthma Brother     Asthma Brother     Stroke Brother     Cancer Maternal Aunt         lung     Breast Cancer Maternal Aunt     Cancer Maternal Uncle         lung    Cancer Maternal Uncle         melanoma    Anesth Problems Neg Hx        ROS:  General/Constitutional:  No headache, fever, fatigue  Eyes:  No redness, pruritis, pain, visual changes  Ears:  No pain, loss or changes in hearing  Neck:  No swelling, masses, stiffness, pain, or limited movement  Cardiac:   No chest pain, palpitations  Respiratory:  No cough or shortness of breath    GI:  No nausea/vomiting, diarrhea, abdominal pain, bloody or dark stools           Physical Exam:  Visit Vitals  LMP 01/01/1985       GEN: No apparent distress. Alert and oriented and responds to all questions appropriately. EYES:  Conjunctiva clear; pupils round and reactive to light; extraocular movements are intact. LUNGS: Respirations unlabored; clear to auscultation bilaterally  CARDIOVASCULAR: Regular, rate, and rhythm without murmurs, gallops or rubs   ABDOMEN: Soft; nontender; nondistended; normoactive bowel sounds; no masses or organomegaly  NEUROLOGIC:  No focal neurologic deficits. Strength and sensation grossly intact. Coordination and gait grossly intact. EXT: Well perfused. No edema. SKIN: No obvious rashes. 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 Noted in research that this is generally self-limited and given her lack of symptoms likely incidental.  Will continue conservative therapy at this time. Will have her see her GI doc as well to assure no further treatment is necessary.

## 2019-01-07 NOTE — PROGRESS NOTES
62year old female presenting for followup of CT results    Asymptomatic however CT scan with evidence of omental infarction    Pt will followup with GI specialist    I reviewed with the resident the medical history and the resident's findings on the physical examination. I discussed with the resident the patient's diagnosis and concur with the plan.

## 2019-01-08 RX ORDER — DICYCLOMINE HYDROCHLORIDE 10 MG/1
CAPSULE ORAL
Qty: 30 CAP | Refills: 0 | Status: SHIPPED | OUTPATIENT
Start: 2019-01-08 | End: 2022-07-15

## 2019-01-24 RX ORDER — FUROSEMIDE 20 MG/1
TABLET ORAL
Qty: 30 TAB | Refills: 0 | Status: SHIPPED | OUTPATIENT
Start: 2019-01-24 | End: 2019-02-21 | Stop reason: SDUPTHER

## 2019-01-25 ENCOUNTER — TELEPHONE (OUTPATIENT)
Dept: SURGERY | Age: 59
End: 2019-01-25

## 2019-01-25 ENCOUNTER — OFFICE VISIT (OUTPATIENT)
Dept: FAMILY MEDICINE CLINIC | Age: 59
End: 2019-01-25

## 2019-01-25 VITALS
TEMPERATURE: 97.6 F | HEART RATE: 67 BPM | WEIGHT: 143 LBS | DIASTOLIC BLOOD PRESSURE: 76 MMHG | HEIGHT: 65 IN | RESPIRATION RATE: 18 BRPM | SYSTOLIC BLOOD PRESSURE: 107 MMHG | OXYGEN SATURATION: 97 % | BODY MASS INDEX: 23.82 KG/M2

## 2019-01-25 DIAGNOSIS — K55.069 OMENTAL INFARCTION (HCC): ICD-10-CM

## 2019-01-25 DIAGNOSIS — N30.01 ACUTE CYSTITIS WITH HEMATURIA: Primary | ICD-10-CM

## 2019-01-25 DIAGNOSIS — R31.9 HEMATURIA, UNSPECIFIED TYPE: ICD-10-CM

## 2019-01-25 LAB
BILIRUB UR QL STRIP: NEGATIVE
GLUCOSE UR-MCNC: NEGATIVE MG/DL
KETONES P FAST UR STRIP-MCNC: NEGATIVE MG/DL
PH UR STRIP: 6 [PH] (ref 4.6–8)
PROT UR QL STRIP: NEGATIVE
SP GR UR STRIP: 1 (ref 1–1.03)
UA UROBILINOGEN AMB POC: NORMAL (ref 0.2–1)
URINALYSIS CLARITY POC: NORMAL
URINALYSIS COLOR POC: YELLOW
URINE BLOOD POC: NORMAL
URINE LEUKOCYTES POC: NORMAL
URINE NITRITES POC: NEGATIVE

## 2019-01-25 RX ORDER — NITROFURANTOIN 25; 75 MG/1; MG/1
100 CAPSULE ORAL 2 TIMES DAILY
Qty: 10 CAP | Refills: 0 | Status: SHIPPED | OUTPATIENT
Start: 2019-01-25 | End: 2019-01-30

## 2019-01-25 NOTE — LETTER
NOTIFICATION OF RETURN TO WORK / SCHOOL 
1/25/2019 Ms. Vignesh [place] [address] [facility] [address] To Whom It May Concern: 
 
Belinda Zepeda was under the care of [facility] on 1/25/19. Please excuse her absence on this day. She will return to work on her next scheduled shift. If there are questions or concerns please have the patient contact our office. Sincerely, Eli Mcclelland MD

## 2019-01-25 NOTE — PROGRESS NOTES
Chief Complaint   Patient presents with    Blood in Urine     since this morning; lower left abdominal pain

## 2019-01-28 ENCOUNTER — OFFICE VISIT (OUTPATIENT)
Dept: SURGERY | Age: 59
End: 2019-01-28

## 2019-01-28 VITALS
RESPIRATION RATE: 14 BRPM | SYSTOLIC BLOOD PRESSURE: 100 MMHG | DIASTOLIC BLOOD PRESSURE: 61 MMHG | WEIGHT: 139.31 LBS | BODY MASS INDEX: 23.21 KG/M2 | HEIGHT: 65 IN | HEART RATE: 74 BPM | OXYGEN SATURATION: 98 % | TEMPERATURE: 98.1 F

## 2019-01-28 DIAGNOSIS — Z09 POSTOPERATIVE EXAMINATION: Primary | ICD-10-CM

## 2019-01-28 NOTE — PROGRESS NOTES
1. Have you been to the ER, urgent care clinic since your last visit? Hospitalized since your last visit? No    2. Have you seen or consulted any other health care providers outside of the [address] [place] [address] since your last visit? Include any pap smears or colon screening.  Yes [facility] last week for abnormal findings of CT

## 2019-01-29 NOTE — PROGRESS NOTES
Subjective:      Lizzie Barrera is a 62 y.o. female presents for postop care 2 years following GBP. Eating a regular diet without difficulty. Bowel movements are regular. The patient is voiding without difficulty. Taking vitamins   Exercising  Only complaint is occasional LUQ pain that correlates to an area of inflamed/ischemic omentum on CT scan. Objective:     Visit Vitals  /61 (BP 1 Location: Left arm, BP Patient Position: Sitting)   Pulse 74   Temp 98.1 °F (36.7 °C) (Oral)   Resp 14   Ht 5' 5\" (1.651 m)   Wt 139 lb 5 oz (63.2 kg)   LMP 01/01/1985   SpO2 98%   BMI 23.18 kg/m²       General:  alert, cooperative, no distress, appears stated age   Abdomen: soft, bowel sounds active, non-tender     Assessment:     Doing well postoperatively. Plan:     1. Continue any current medications.   2. follow up if pain does not resolve in the next 2 to 4 weeks

## 2019-01-30 LAB
BACTERIA UR CULT: ABNORMAL
BACTERIA UR CULT: ABNORMAL

## 2019-01-31 ENCOUNTER — TELEPHONE (OUTPATIENT)
Dept: FAMILY MEDICINE CLINIC | Age: 59
End: 2019-01-31

## 2019-01-31 DIAGNOSIS — N30.01 ACUTE CYSTITIS WITH HEMATURIA: Primary | ICD-10-CM

## 2019-01-31 RX ORDER — AMOXICILLIN AND CLAVULANATE POTASSIUM 500; 125 MG/1; MG/1
1 TABLET, FILM COATED ORAL 2 TIMES DAILY
Qty: 10 TAB | Refills: 0 | Status: SHIPPED | OUTPATIENT
Start: 2019-01-31 | End: 2019-02-05

## 2019-01-31 NOTE — PROGRESS NOTES
Susceptibilities reviewed. Previously prescribed Nitro only with intermediate susceptibility. Pt messaged me and still reporting symptoms. Have switched abx to augmentin. Script sent to pt's pharmacy. Called pt to relay change but unable to reach her. pls advise pt of change were she to call back.

## 2019-01-31 NOTE — TELEPHONE ENCOUNTER
This writer contacted patient in reference to urine culture results and medication changing per Dr. Greg Gonsalez. Two patient identifiers confirmed. Patient informed of the following per [employer] message per Dr. Greg Gonsalez     I have received your final urine cultures confirming that you indeed have an active infection. I have changed your antibiotic to Augmentin as your cultures show a higher susceptibility to this. The prescription has been sent to your pharmacy. Please let us know if you have any issues getting these or if your symptoms persist after completion of this new regimen. I would also like to see you in 1-2 weeks after completing the antibiotics to ensure that your hematuria is resolving. Patient verbalized understanding and gratitude and will contact office after completion of medication regimen to schedule a follow up appointment. No further questions or concerns voiced.

## 2019-02-03 ENCOUNTER — OFFICE VISIT (OUTPATIENT)
Dept: FAMILY MEDICINE CLINIC | Age: 59
End: 2019-02-03

## 2019-02-03 VITALS
HEART RATE: 64 BPM | WEIGHT: 140 LBS | OXYGEN SATURATION: 98 % | SYSTOLIC BLOOD PRESSURE: 110 MMHG | DIASTOLIC BLOOD PRESSURE: 73 MMHG | HEIGHT: 65 IN | TEMPERATURE: 97.8 F | RESPIRATION RATE: 16 BRPM | BODY MASS INDEX: 23.32 KG/M2

## 2019-02-03 DIAGNOSIS — R07.81 RIB PAIN ON LEFT SIDE: ICD-10-CM

## 2019-02-03 DIAGNOSIS — M94.0 COSTOCHONDRITIS: ICD-10-CM

## 2019-02-03 DIAGNOSIS — B37.0 ORAL THRUSH: ICD-10-CM

## 2019-02-03 DIAGNOSIS — J02.9 SORE THROAT: Primary | ICD-10-CM

## 2019-02-03 LAB
S PYO AG THROAT QL: POSITIVE
VALID INTERNAL CONTROL?: YES

## 2019-02-03 RX ORDER — NYSTATIN 100000 [USP'U]/ML
1 SUSPENSION ORAL 4 TIMES DAILY
Qty: 60 ML | Refills: 0 | Status: SHIPPED | OUTPATIENT
Start: 2019-02-03 | End: 2022-04-27

## 2019-02-03 NOTE — PROGRESS NOTES
History of Present Illness:     Chief Complaint   Patient presents with    Flank Pain     left times 2-3 weeks    Sore Throat    Mouth Pain     Pt is a 62y.o. year old female    Presents to clinic for flank pain, sore throat and mouth pain. Sore throat and mouth pain   Started 1-2 days ago  Worried she may have thrush  Used left over nystatin mouthwash  Right ear pain as well    Works at a  with sick contacts  [address] kids home with fevers last week    Left side pain has been ongoing for 3 weeks now  Progressively worse  Described as sharp pain  Associated with movement and lying on that side   + dysuria  Denies hematuria, nausea, vomiting   Recently completed Macrobid course and Augmentin 2 days ago for UTI  Having burning with urination    ROS:  Denies fever, chills, sweats  +Body aches  +Left side pain  Denies n/v, diarrhea  +Constipation    Past Medical History:   Diagnosis Date    Arthritis     OA back,knees and hands    Asthma     Autoimmune disease ([address]     [facility]    CAD (coronary artery disease)     s/p stents 11/2015    Cardiac murmur     Depression     Diabetes ([address]     DVT (deep venous thrombosis) ([address] 1981    GERD (gastroesophageal reflux disease)     Hypertension     Morbid obesity ([address]     Musculoskeletal disorder     DEMOND (obstructive sleep apnea)     wears cpap    S/P TONJA (total abdominal hysterectomy)     Thromboembolus ([address] 1996    PE       Allergies:   Allergies   Allergen Reactions    Accupril [Quinapril] Cough    Lipitor [Atorvastatin] Other (comments)     aches    Norvasc [Amlodipine] Swelling     On legs at 10 mg dose         Objective:     Vitals:    02/03/19 1422   BP: 110/73   Pulse: 64   Resp: 16   Temp: 97.8 °F (36.6 °C)   TempSrc: Oral   SpO2: 98%   Weight: 140 lb (63.5 kg)   Height: 5' 5\" (1.651 m)       Physical Exam:  General appearance - alert, well appearing, and in no distress  Ears - bilateral TM's and external ear canals normal  Nose - normal and patent, no erythema, discharge or polyps  Mouth - mucous membranes moist, pharynx normal without lesions and erythematous. +Thrush on tongue. Neck - supple, no significant adenopathy  Chest - +Left sided TTP under breast. Clear to auscultation, no wheezes, rales or rhonchi, symmetric air entry  Heart - normal rate, regular rhythm, normal S1, S2, no murmurs, rubs, clicks or gallops  Abdomen - soft, nontender, nondistended, no masses or organomegaly      Recent Labs:  No results found for this or any previous visit (from the past 12 hour(s)). Assessment and Plan:   Pt is a 62y.o. year old female,      ICD-10-CM ICD-9-CM    1. Sore throat J02.9 462    2. Rib pain on left side R07.81 786.50    3. Oral thrush B37.0 112.0 nystatin (MYCOSTATIN) 100,000 unit/mL suspension   4. Costochondritis M94.0 733.6      Side pain likely msk   Pain over lower ribs, abdomen benign  Recommended Tylenol PRN and cool packs    Rapid strep positive    Continue Augmentin for UTI  Reviewed UCx and abx is appropriate    Encourage PO hydration    Follow up PRN    Park Lee MD      I have discussed the diagnosis with the patient and the intended plan as seen in the above orders. The patient has received an after-visit summary and questions were answered concerning future plans.

## 2019-02-03 NOTE — PATIENT INSTRUCTIONS

## 2019-02-03 NOTE — LETTER
NOTIFICATION RETURN TO WORK 
 
2/3/2019 2:57 PM 
 
Ms. Schwab 
[address] [address] To Whom It May Concern: 
 
Dmitri Alegria is currently under the care of [facility]. She will return to work on: 2/6/19 If there are questions or concerns please have the patient contact our office.  
 
 
 
Sincerely, 
 
 
Derrick Brito MD

## 2019-02-03 NOTE — PROGRESS NOTES
Chief Complaint   Patient presents with    Flank Pain     left times 2-3 weeks    Sore Throat    Mouth Pain     1. Have you been to the ER, urgent care clinic since your last visit? Hospitalized since your last visit? No    2. Have you seen or consulted any other health care providers outside of the [address] [place] [address] since your last visit? Include any pap smears or colon screening.  No

## 2019-02-08 ENCOUNTER — OFFICE VISIT (OUTPATIENT)
Dept: FAMILY MEDICINE CLINIC | Age: 59
End: 2019-02-08

## 2019-02-08 VITALS
HEART RATE: 66 BPM | WEIGHT: 141.6 LBS | TEMPERATURE: 97.8 F | RESPIRATION RATE: 16 BRPM | HEIGHT: 65 IN | DIASTOLIC BLOOD PRESSURE: 85 MMHG | BODY MASS INDEX: 23.59 KG/M2 | SYSTOLIC BLOOD PRESSURE: 132 MMHG | OXYGEN SATURATION: 97 %

## 2019-02-08 DIAGNOSIS — R30.0 BURNING WITH URINATION: ICD-10-CM

## 2019-02-08 DIAGNOSIS — N30.00 ACUTE CYSTITIS WITHOUT HEMATURIA: Primary | ICD-10-CM

## 2019-02-08 LAB
BILIRUB UR QL STRIP: NEGATIVE
GLUCOSE UR-MCNC: NEGATIVE MG/DL
KETONES P FAST UR STRIP-MCNC: NEGATIVE MG/DL
PH UR STRIP: 7 [PH] (ref 4.6–8)
PROT UR QL STRIP: NEGATIVE
SP GR UR STRIP: 1.01 (ref 1–1.03)
UA UROBILINOGEN AMB POC: NORMAL (ref 0.2–1)
URINALYSIS CLARITY POC: CLEAR
URINALYSIS COLOR POC: YELLOW
URINE BLOOD POC: NEGATIVE
URINE LEUKOCYTES POC: NORMAL
URINE NITRITES POC: NEGATIVE

## 2019-02-08 RX ORDER — SULFAMETHOXAZOLE AND TRIMETHOPRIM 800; 160 MG/1; MG/1
1 TABLET ORAL 2 TIMES DAILY
Qty: 20 TAB | Refills: 0 | Status: SHIPPED | OUTPATIENT
Start: 2019-02-08 | End: 2019-02-18

## 2019-02-08 NOTE — PATIENT INSTRUCTIONS
Urinary Tract Infection in Women: Care Instructions  Your Care Instructions    A urinary tract infection, or UTI, is a general term for an infection anywhere between the kidneys and the urethra (where urine comes out). Most UTIs are bladder infections. They often cause pain or burning when you urinate. UTIs are caused by bacteria and can be cured with antibiotics. Be sure to complete your treatment so that the infection goes away. Follow-up care is a key part of your treatment and safety. Be sure to make and go to all appointments, and call your doctor if you are having problems. It's also a good idea to know your test results and keep a list of the medicines you take. How can you care for yourself at home? · Take your antibiotics as directed. Do not stop taking them just because you feel better. You need to take the full course of antibiotics. · Drink extra water and other fluids for the next day or two. This may help wash out the bacteria that are causing the infection. (If you have kidney, heart, or liver disease and have to limit fluids, talk with your doctor before you increase your fluid intake.)  · Avoid drinks that are carbonated or have caffeine. They can irritate the bladder. · Urinate often. Try to empty your bladder each time. · To relieve pain, take a hot bath or lay a heating pad set on low over your lower belly or genital area. Never go to sleep with a heating pad in place. To prevent UTIs  · Drink plenty of water each day. This helps you urinate often, which clears bacteria from your system. (If you have kidney, heart, or liver disease and have to limit fluids, talk with your doctor before you increase your fluid intake.)  · Urinate when you need to. · Urinate right after you have sex. · Change sanitary pads often. · Avoid douches, bubble baths, feminine hygiene sprays, and other feminine hygiene products that have deodorants.   · After going to the bathroom, wipe from front to back.  When should you call for help? Call your doctor now or seek immediate medical care if:    · Symptoms such as fever, chills, nausea, or vomiting get worse or appear for the first time.     · You have new pain in your back just below your rib cage. This is called flank pain.     · There is new blood or pus in your urine.     · You have any problems with your antibiotic medicine.    Watch closely for changes in your health, and be sure to contact your doctor if:    · You are not getting better after taking an antibiotic for 2 days.     · Your symptoms go away but then come back. Where can you learn more? Go to http://maliha-sanchez.info/. Enter L311 in the search box to learn more about \"Urinary Tract Infection in Women: Care Instructions. \"  Current as of: March 20, 2018  Content Version: 11.9  © 5279-2862 [employer]. Care instructions adapted under license by [employer] (which disclaims liability or warranty for this information). If you have questions about a medical condition or this instruction, always ask your healthcare professional. [address] any warranty or liability for your use of this information.

## 2019-02-08 NOTE — PROGRESS NOTES
History of Present Illness:     Chief Complaint   Patient presents with    Follow-up     here today to follow up for positive strep on 2/3/19 and bladder infection diagnosed on 1/25/19, states throat is feeling better    LOW BACK PAIN     radiates to left side, states pain is better than when seen previously, 5/10 pain     Urinary Burning     states has some burning with urination     Pt is a 62y.o. year old female    Presents to clinic for UTI follow up. Last UTI in 1/25 and given Nystatin  Completed course of Augmentin given by Urologist     Burning with urination  Somewhat improved since stopping Augmentin  Low back pain on the left side  Denies blood in urine, increased urination   Tried Tylenol to help with the pain  Side pain is better      Past Medical History:   Diagnosis Date    Arthritis     OA back,knees and hands    Asthma     Autoimmune disease ([address]     [employer]    CAD (coronary artery disease)     s/p stents 11/2015    Cardiac murmur     Depression     Diabetes ([address]     D[facility] ([facility] thrombosis) [facility]    GERD (gastroesophageal reflux disease)     Hypertension     Morbid obesity ([address]     Musculoskeletal disorder     EDMOND (obstructive sleep apnea)     wears cpap    S/P TONJA (total abdominal hysterectomy)     Thromboembolus ([address] 1996    PE       Allergies: Allergies   Allergen Reactions    Accupril [Quinapril] Cough    Lipitor [Atorvastatin] Other (comments)     aches    Norvasc [Amlodipine] Swelling     On legs at 10 mg dose         Review of Systems:  Denies fever, chills, sweats  Denies n/v/d, constipation  +Dysuria. Denies hematuria.       Objective:     Vitals:    02/08/19 0936   BP: 132/85   Pulse: 66   Resp: 16   Temp: 97.8 °F (36.6 °C)   TempSrc: Oral   SpO2: 97%   Weight: 141 lb 9.6 oz (64.2 kg)   Height: 5' 5\" (1.651 m)       Physical Exam:  General appearance - alert, well appearing, and in no distress  Abdomen - soft, nontender, +LUQ TTP without guarding or rebound tenderness, no masses or organomegaly  Back exam - No CVA tenderness      Recent Labs:  Recent Results (from the past 12 hour(s))   AMB POC URINALYSIS DIP STICK AUTO W/O MICRO    Collection Time: 02/08/19  9:50 AM   Result Value Ref Range    Color (UA POC) Yellow     Clarity (UA POC) Clear     Glucose (UA POC) Negative Negative    Bilirubin (UA POC) Negative Negative    Ketones (UA POC) Negative Negative    Specific gravity (UA POC) 1.010 1.001 - 1.035    Blood (UA POC) Negative Negative    pH (UA POC) 7.0 4.6 - 8.0    Protein (UA POC) Negative Negative    Urobilinogen (UA POC) 1 mg/dL 0.2 - 1    Nitrites (UA POC) Negative Negative    Leukocyte esterase (UA POC) 3+ Negative         Assessment and Plan:   Pt is a 62y.o. year old female,      ICD-10-CM ICD-9-CM    1. Acute cystitis without hematuria N30.00 595.0    2. Burning with urination R30.0 788.1 AMB POC URINALYSIS DIP STICK AUTO W/O MICRO     Trial another course of abx  Bactrim DS x 3 days  If no improvement, will likely refer back to Urology    LUQ pain likely due to omental infarction found on CT c/a/p  Pain is improving  If interval worsening, would re-image    Follow up in 1-2 weeks    Marely Torre MD      I have discussed the diagnosis with the patient and the intended plan as seen in the above orders. The patient has received an after-visit summary and questions were answered concerning future plans.

## 2019-02-08 NOTE — PROGRESS NOTES
Kristina Polanco is a 62 y.o. female  Chief Complaint   Patient presents with    Follow-up     here today to follow up for positive strep on 2/3/19 and bladder infection diagnosed on 1/25/19, states throat is feeling better    LOW BACK PAIN     radiates to left side, states pain is better than when seen previously, 5/10 pain     Urinary Burning     states has some burning with urination     Visit Vitals  /85 (BP 1 Location: Right arm, BP Patient Position: Sitting)   Pulse 66   Temp 97.8 °F (36.6 °C) (Oral)   Resp 16   Ht 5' 5\" (1.651 m)   Wt 141 lb 9.6 oz (64.2 kg)   LMP 01/01/1985   SpO2 97%   BMI 23.56 kg/m²     1. Have you been to the ER, urgent care clinic since your last visit? Hospitalized since your last visit? No    2. Have you seen or consulted any other health care providers outside of the [address] [place] [address] since your last visit? Include any pap smears or colon screening.  No  Health Maintenance Due   Topic Date Due    Shingrix Vaccine Age 49> (1 of 2) 04/30/2010    EYE EXAM RETINAL OR DILATED  08/24/2018

## 2019-02-18 DIAGNOSIS — I25.10 CORONARY ARTERY DISEASE INVOLVING NATIVE CORONARY ARTERY OF NATIVE HEART WITHOUT ANGINA PECTORIS: ICD-10-CM

## 2019-02-19 RX ORDER — METOPROLOL TARTRATE 25 MG/1
TABLET, FILM COATED ORAL
Qty: 60 TAB | Refills: 1 | Status: SHIPPED | OUTPATIENT
Start: 2019-02-19 | End: 2019-04-18 | Stop reason: SDUPTHER

## 2019-02-21 RX ORDER — FUROSEMIDE 20 MG/1
TABLET ORAL
Qty: 30 TAB | Refills: 0 | Status: SHIPPED | OUTPATIENT
Start: 2019-02-21 | End: 2019-03-21 | Stop reason: SDUPTHER

## 2019-03-21 RX ORDER — FUROSEMIDE 20 MG/1
TABLET ORAL
Qty: 30 TAB | Refills: 0 | Status: SHIPPED | OUTPATIENT
Start: 2019-03-21 | End: 2019-04-18 | Stop reason: SDUPTHER

## 2019-03-25 RX ORDER — OMEPRAZOLE 20 MG/1
CAPSULE, DELAYED RELEASE ORAL
Qty: 90 CAP | Refills: 1 | Status: SHIPPED | OUTPATIENT
Start: 2019-03-25 | End: 2020-02-27

## 2019-04-05 ENCOUNTER — OFFICE VISIT (OUTPATIENT)
Dept: FAMILY MEDICINE CLINIC | Age: 59
End: 2019-04-05

## 2019-04-05 VITALS
TEMPERATURE: 98.2 F | DIASTOLIC BLOOD PRESSURE: 84 MMHG | RESPIRATION RATE: 17 BRPM | BODY MASS INDEX: 22.86 KG/M2 | OXYGEN SATURATION: 99 % | WEIGHT: 137.2 LBS | HEART RATE: 78 BPM | SYSTOLIC BLOOD PRESSURE: 127 MMHG | HEIGHT: 65 IN

## 2019-04-05 DIAGNOSIS — M79.7 FIBROMYALGIA: ICD-10-CM

## 2019-04-05 DIAGNOSIS — G89.29 OTHER CHRONIC PAIN: ICD-10-CM

## 2019-04-05 DIAGNOSIS — F41.9 ANXIETY: ICD-10-CM

## 2019-04-05 RX ORDER — ALPRAZOLAM 0.5 MG/1
0.5 TABLET ORAL
Qty: 30 TAB | Refills: 0 | Status: SHIPPED | OUTPATIENT
Start: 2019-04-05 | End: 2019-05-28 | Stop reason: SDUPTHER

## 2019-04-05 RX ORDER — GABAPENTIN 600 MG/1
TABLET ORAL
Qty: 270 TAB | Refills: 0 | Status: SHIPPED | OUTPATIENT
Start: 2019-04-05 | End: 2019-05-28 | Stop reason: SDUPTHER

## 2019-04-05 NOTE — PROGRESS NOTES
[facility] Molly Leong    Office [phone], Fax [phone]      Subjective:     CC: xanax refill  History provided by patient     HPI:  Hue Engel is a 62 y.o. WHITE [place]  female with significant history of HTN, Type 2 DM, fibromyalgia  and Anxiety presenting for medication refill. Anxeity: Has been on xanax for the past 10 years and states that its the only medicine that works for her to have a good night sleep. Patient has been Wellbutrin for the past 2 years and was working with Dr. Neo Jacob to wean her off from xanax. She currently takes 0.5mg of xanax once at night time and as PRN basis during panic attacks. She reports occasional panic attacks     Fibromyalgia: Patient also reports chronic whole body pain. She follows up with Rheumatology as OP and reported that her gabapentin was increased to 600 mg TID. Had tried different medications in the past and only this dose of gabapentin works for her. Was refilled by our practice last and patient also requesting for script. Review of Systems   Constitutional: Negative for chills and fever. Respiratory: Negative for shortness of breath. Cardiovascular: Negative for chest pain. Gastrointestinal: Negative for abdominal pain, nausea and vomiting. Psychiatric/Behavioral: Negative for depression. The patient is nervous/anxious. The patient does not have insomnia.         Past Medical History:   Diagnosis Date    Arthritis     OA back,knees and hands    Asthma     Autoimmune disease ([address]     Erika Lang    CAD (coronary artery disease)     s/p stents 11/2015    Cardiac murmur     Depression     Diabetes ([address]     DVT (deep venous thrombosis) ([address] 1981    GERD (gastroesophageal reflux disease)     Hypertension     Morbid obesity ([address]     Musculoskeletal disorder     EDMOND (obstructive sleep apnea)     wears cpap    S/P TONJA (total abdominal hysterectomy)     Thromboembolus ([address] 1996    PE Current Outpatient Medications on File Prior to Visit   Medication Sig Dispense Refill    omeprazole (PRILOSEC) 20 mg capsule TAKE ONE CAPSULE BY MOUTH EVERY DAY 90 Cap 1    metoprolol tartrate (LOPRESSOR) 25 mg tablet TAKE 1 TABLET BY MOUTH TWO (2) TIMES A DAY. 60 Tab 1    nystatin (MYCOSTATIN) 100,000 unit/mL suspension Take 5 mL by mouth four (4) times daily. swish and spit 60 mL 0    dicyclomine (BENTYL) 10 mg capsule TAKE 1 (ONE) CAPSULE BY MOUTH AS NEEDED EVERY 6 HOURS 30 Cap 0    nitroglycerin (NITROSTAT) 0.4 mg SL tablet DISSOLVE 1 TABLET IN MOUTH EVERY 5 MINUTES AS NEEDED FOR CHEST PAIN 25 Tab 1    ALPRAZolam (XANAX) 0.5 mg tablet TAKE 1/2-1 TABLET BY MOUTH TWICE DAILY AS NEEDED FOR ANXIETY 90 Tab 0    budesonide (PULMICORT FLEXHALER) 180 mcg/actuation aepb inhaler Take 2 Puffs by inhalation daily. 1 Inhaler 3    gabapentin (NEURONTIN) 600 mg tablet TAKE 1 TABLET BY MOUTH THREE (3) TIMES DAILY. 270 Tab 0    buPROPion XL (WELLBUTRIN XL) 300 mg XL tablet Take 1 Tab by mouth every morning. Indications: ANXIETY WITH DEPRESSION 90 Tab 3    valACYclovir (VALTREX) 500 mg tablet Take 1 Tab by mouth two (2) times a day. (Patient taking differently: Take 500 mg by mouth daily as needed.) 60 Tab 3    rosuvastatin (CRESTOR) 40 mg tablet TAKE 1 TABLET BY MOUTH NIGHTLY 90 Tab 2    aspirin delayed-release 81 mg tablet Take  by mouth daily.  albuterol (PROAIR HFA) 90 mcg/actuation inhaler Take 1 Puff by inhalation every four (4) hours as needed for Wheezing or Shortness of Breath. 1 Inhaler 5    Blood-Glucose Meter monitoring kit Use to check glucose once a day 1 Kit 0    alcohol swabs (ALCOHOL PADS) padm Use when checking blood glucose 100 Pad 5    Lancets misc Use once a day. Please give one compatible with patient's meter 1 Package 5    glucose blood VI test strips (BLOOD GLUCOSE TEST) strip Use once a day 100 Strip 5    furosemide (LASIX) 20 mg tablet TAKE 1 TAB BY MOUTH AS NEEDED.  30 Tab 0  albuterol (ACCUNEB) 0.63 mg/3 mL nebulizer solution 3 mL by Nebulization route every six (6) hours as needed for Wheezing. 75 mL 1     No current facility-administered medications on file prior to visit.         Allergies   Allergen Reactions    Accupril [Quinapril] Cough    Lipitor [Atorvastatin] Other (comments)     aches    Norvasc [Amlodipine] Swelling     On legs at 10 mg dose       Past Surgical History:   Procedure Laterality Date    CARDIAC SURG PROCEDURE UNLIST  2015    STENT PLACED    HX APPENDECTOMY      HX  SECTION  1978    HX GASTRIC BYPASS  2017    HX HYSTERECTOMY  1985    HX TOTAL ABDOMINAL HYSTERECTOMY      age 22    DAYAN STEREO  BX BREAST RT ADDL W/CLIP AND SPECIMEN Right     neg        Social History     Socioeconomic History    Marital status:      Spouse name: Not on file    Number of children: Not on file    Years of education: Not on file    Highest education level: Not on file   Occupational History    Not on file   Social Needs    Financial resource strain: Not on file    Food insecurity:     Worry: Not on file     Inability: Not on file    Transportation needs:     Medical: Not on file     Non-medical: Not on file   Tobacco Use    Smoking status: Never Smoker    Smokeless tobacco: Never Used   Substance and Sexual Activity    Alcohol use: No     Alcohol/week: 0.0 oz    Drug use: No    Sexual activity: Yes     Partners: Male     Birth control/protection: None   Lifestyle    Physical activity:     Days per week: Not on file     Minutes per session: Not on file    Stress: Not on file   Relationships    Social connections:     Talks on phone: Not on file     Gets together: Not on file     Attends Faith service: Not on file     Active member of club or organization: Not on file     Attends meetings of clubs or organizations: Not on file     Relationship status: Not on file    Intimate partner violence:     Fear of current or ex partner: Not on file     Emotionally abused: Not on file     Physically abused: Not on file     Forced sexual activity: Not on file   Other Topics Concern    Not on file   Social History Narrative    Not on file       Patient Active Problem List   Diagnosis Code    Obstructive sleep apnea G47.33    Asthma J45.909    Arthritis M19.90    GERD (gastroesophageal reflux disease) K21.9    GARCIA (nonalcoholic steatohepatitis) K75.81    Chronic pain G89.29    Essential hypertension W94    Metabolic syndrome U43.98    FH: diabetes mellitus Z83.3    Anxiety F41.9    History of pulmonary embolus (PE) Z86.711    Hypokalemia E87.6    H/O gastric bypass Z98.84    H/O colonoscopy Z98.890    DDD (degenerative disc disease), lumbar M51.36    FH: colon cancer Z80.0    Elevated ferritin R79.89    Type 2 diabetes mellitus with diabetic neuropathy (HCC) E11.40    Abnormal mammogram of right breast R92.8    Abnormal CT of the abdomen R93.5    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 HENT:   Head: Normocephalic and atraumatic. Cardiovascular: Normal rate and regular rhythm. Pulmonary/Chest: Effort normal and breath sounds normal. No respiratory distress. She has no wheezes. Abdominal: Soft. Bowel sounds are normal. She exhibits no distension. Musculoskeletal: Normal range of motion. Psychiatric: She has a normal mood and affect. Her behavior is normal. Thought content normal.             Assessment and orders:       ICD-10-CM ICD-9-CM    1. Anxiety F41.9 300.00 ALPRAZolam (XANAX) 0.5 mg tablet    FTF for BNZ  PHQ9 = 7.  consider counseling. see letter   2.  Fibromyalgia M79.7 729.1 gabapentin (NEURONTIN) 600 mg tablet 3. Other chronic pain G89.29 338.29 gabapentin (NEURONTIN) 600 mg tablet     Diagnoses and all orders for this visit:    1. Anxiety: mildly controlled. On Wellbutrin and Xanax 0.5 mg nightly.  checked and has been getting refills every month. Last xanax refill was on 02/27 for 30 days supply. Explained the need to be weaned off xanax and to attempt taking 0.25 mg of xanax every other day. Plan is to transition to atarax. Patient in agreement with the plan  Comments:  RAISA 7 score of 17  Orders:  -     ALPRAZolam (XANAX) 0.5 mg tablet; Take 1 Tab by mouth nightly as needed for Anxiety. Max Daily Amount: 0.5 mg.    2. Fibromyalgia: Chronic pain. Counseled on good sleep hygiene and exercises such as yoga. -     gabapentin (NEURONTIN) 600 mg tablet; TAKE 1 TABLET BY MOUTH THREE (3) TIMES DAILY. 3. Other chronic pain  -     gabapentin (NEURONTIN) 600 mg tablet; TAKE 1 TABLET BY MOUTH THREE (3) TIMES DAILY. Pt was discussed with Dr. Karishma Del Castillo (attending physician). I have reviewed patient medical and social history and medications. I have reviewed pertinent labs results and other data. I have discussed the diagnosis with the patient and the intended plan as seen in the above orders. The patient has received an after-visit summary and questions were answered concerning future plans. I have discussed medication side effects and warnings with the patient as well.     Naman Sanchez MD  Resident [facility]  04/05/19

## 2019-04-05 NOTE — PROGRESS NOTES
Chief Complaint   Patient presents with    Medication Refill     Xanax      Blood pressure 127/84, pulse 78, temperature 98.2 °F (36.8 °C), temperature source Oral, resp. rate 17, height 5' 5\" (1.651 m), weight 137 lb 3.2 oz (62.2 kg), last menstrual period 01/01/1985, SpO2 99 %. 1. Have you been to the ER, urgent care clinic since your last visit? Hospitalized since your last visit? No    2. Have you seen or consulted any other health care providers outside of the [address] [place] [address] since your last visit? Include any pap smears or colon screening.  No

## 2019-04-05 NOTE — PATIENT INSTRUCTIONS
Learning About Generalized Anxiety Disorder  What is generalized anxiety disorder? We all worry. It's a normal part of life. But when you have generalized anxiety disorder, you worry about lots of things and have a hard time stopping your worry. This worry or anxiety interferes with your relationships, work, and life. What causes it? The cause is not known. But it may be passed down through families. What are the symptoms? You may feel anxious or worry most days about things like work, relationships, health, or money. You may worry about things that are unlikely to happen. You find it hard to stop or control the worry. Because you worry a lot and try hard to stop worrying, you may feel restless, tired, tense, or cranky. You may also find it hard to think or sleep. And you may have headaches or an upset stomach. How is it diagnosed? Your doctor will ask about your health and how often you worry or feel anxious. He or she may ask about other symptoms, like whether you:  · Feel restless. · Feel tired. · Have a hard time thinking or feel that your mind goes blank. · Feel cranky. · Have tense muscles. · Have sleep problems. A physical exam and tests can help make sure that your symptoms aren't caused by a different condition, such as a thyroid problem. How is it treated? Counseling and medicine can both work to treat anxiety. The two are often used along with lifestyle changes. Cognitive-behavioral therapy (CBT) is a type of counseling that's used to help treat anxiety. In CBT, you learn how to notice and replace thoughts that make you feel worried. It also can help you learn how to relax when you worry. Medicines can help. These medicines are often also used for depression. Selective serotonin reuptake inhibitors (SSRIs) are often tried first. But there are other medicines that your doctor may use. You may need to try a few medicines to find one that works well.   Many people feel better by getting regular exercise, eating healthy meals, and getting good sleep. Mindfulness--focusing on things that happen in the present moment--also can help reduce your anxiety. What can you expect when you have it? Having anxiety can be upsetting. Some people might feel less worried and stressed after a couple of months of treatment. But for other people, it might take longer to feel better. Reaching out to people for help is important. Try not to isolate yourself. Let your family and friends help you. Find someone you can trust and confide in. Talk to that person. When you know what anxiety is--and how you can get help for it--you can start to learn new ways of thinking. This can help you cope and work through your anxiety. Follow-up care is a key part of your treatment and safety. Be sure to make and go to all appointments, and call your doctor if you are having problems. It's also a good idea to know your test results and keep a list of the medicines you take. Where can you learn more? Go to http://maliha-sanchez.info/. Enter G110 in the search box to learn more about \"Learning About Generalized Anxiety Disorder. \"  Current as of: September 11, 2018  Content Version: 11.9  © 7929-6802 [employer]. Care instructions adapted under license by [employer] (which disclaims liability or warranty for this information). If you have questions about a medical condition or this instruction, always ask your healthcare professional. [address] any warranty or liability for your use of this information.

## 2019-04-18 DIAGNOSIS — I25.10 CORONARY ARTERY DISEASE INVOLVING NATIVE CORONARY ARTERY OF NATIVE HEART WITHOUT ANGINA PECTORIS: ICD-10-CM

## 2019-04-18 RX ORDER — METOPROLOL TARTRATE 25 MG/1
TABLET, FILM COATED ORAL
Qty: 60 TAB | Refills: 1 | Status: SHIPPED | OUTPATIENT
Start: 2019-04-18 | End: 2019-05-14 | Stop reason: SDUPTHER

## 2019-04-19 RX ORDER — FUROSEMIDE 20 MG/1
TABLET ORAL
Qty: 90 TAB | Refills: 0 | Status: SHIPPED | OUTPATIENT
Start: 2019-04-19 | End: 2019-07-20 | Stop reason: SDUPTHER

## 2019-05-07 DIAGNOSIS — F41.9 ANXIETY: ICD-10-CM

## 2019-05-07 NOTE — TELEPHONE ENCOUNTER
[facility], [address]ha [address]             Pt is requesting a Rx refill for ALPRAZolam Alonza Settle) 0.5 mg tablet going to the pharmacy on file which is [facility]/pharmacy #[id number].  Best contact number is [phone]      Patient has appointment with Dr. Danilo Pimentel on 05/08/19.

## 2019-05-08 RX ORDER — ALPRAZOLAM 0.5 MG/1
0.5 TABLET ORAL
Qty: 30 TAB | Refills: 0 | OUTPATIENT
Start: 2019-05-08

## 2019-05-10 ENCOUNTER — HOSPITAL ENCOUNTER (OUTPATIENT)
Dept: CT IMAGING | Age: 59
Discharge: HOME | End: 2019-05-10
Attending: PHYSICIAN ASSISTANT
Payer: COMMERCIAL

## 2019-05-10 DIAGNOSIS — R93.89 ABNORMAL FINDING ON CT SCAN: ICD-10-CM

## 2019-05-10 PROCEDURE — 74011636320 HC RX REV CODE- 636/320: Performed by: RADIOLOGY

## 2019-05-10 PROCEDURE — 74160 CT ABDOMEN W/CONTRAST: CPT

## 2019-05-10 RX ADMIN — IOPAMIDOL 90 ML: 755 INJECTION, SOLUTION INTRAVENOUS at 10:15

## 2019-05-10 NOTE — TELEPHONE ENCOUNTER
Spoke with patient and rescheduled her appointment with Dr Kylah Montana for 5/28. Patient ok with this.

## 2019-05-10 NOTE — TELEPHONE ENCOUNTER
I personally won't refill the xanax. It appears that she's been on this for awhile, but benzodiazepines are not a good long term medication. A different alternative medication should be considered like an antidepressent or atarax that have lower side effect profiles.

## 2019-05-10 NOTE — TELEPHONE ENCOUNTER
Spoke with patient about her concern of a canceled appointment and she was informed Terence Willingham would be notified to check for her. She is asking for this request to be sent to   Dr. Parish Dodd whom she saw first as she could no longer see Dr. Cynthia Anand. She said this medication was discussed at her visit with him on January 7 and is asking for consideration of this medication refill. Said she will no longer see Dr. Jarod Adan as a provider at this office.     Let patient know about this request.

## 2019-05-14 DIAGNOSIS — I25.10 CORONARY ARTERY DISEASE INVOLVING NATIVE CORONARY ARTERY OF NATIVE HEART WITHOUT ANGINA PECTORIS: ICD-10-CM

## 2019-05-14 RX ORDER — METOPROLOL TARTRATE 25 MG/1
TABLET, FILM COATED ORAL
Qty: 60 TAB | Refills: 0 | Status: SHIPPED | OUTPATIENT
Start: 2019-05-14 | End: 2019-06-06 | Stop reason: SDUPTHER

## 2019-05-28 ENCOUNTER — OFFICE VISIT (OUTPATIENT)
Dept: FAMILY MEDICINE CLINIC | Age: 59
End: 2019-05-28

## 2019-05-28 VITALS
SYSTOLIC BLOOD PRESSURE: 103 MMHG | HEART RATE: 66 BPM | HEIGHT: 65 IN | OXYGEN SATURATION: 100 % | WEIGHT: 137 LBS | TEMPERATURE: 97.9 F | RESPIRATION RATE: 16 BRPM | DIASTOLIC BLOOD PRESSURE: 64 MMHG | BODY MASS INDEX: 22.82 KG/M2

## 2019-05-28 DIAGNOSIS — E11.8 CONTROLLED TYPE 2 DIABETES MELLITUS WITH COMPLICATION, WITHOUT LONG-TERM CURRENT USE OF INSULIN (HCC): Primary | ICD-10-CM

## 2019-05-28 DIAGNOSIS — F41.0 PANIC ATTACKS: ICD-10-CM

## 2019-05-28 DIAGNOSIS — I25.118 CORONARY ARTERY DISEASE OF NATIVE ARTERY OF NATIVE HEART WITH STABLE ANGINA PECTORIS (HCC): ICD-10-CM

## 2019-05-28 DIAGNOSIS — G89.29 OTHER CHRONIC PAIN: ICD-10-CM

## 2019-05-28 DIAGNOSIS — F41.9 ANXIETY: ICD-10-CM

## 2019-05-28 DIAGNOSIS — M79.7 FIBROMYALGIA: ICD-10-CM

## 2019-05-28 DIAGNOSIS — Z23 NEED FOR SHINGLES VACCINE: ICD-10-CM

## 2019-05-28 RX ORDER — ALPRAZOLAM 0.25 MG/1
0.5 TABLET ORAL
Qty: 20 TAB | Refills: 0 | Status: SHIPPED | OUTPATIENT
Start: 2019-05-28 | End: 2019-09-13

## 2019-05-28 RX ORDER — GABAPENTIN 600 MG/1
TABLET ORAL
Qty: 270 TAB | Refills: 0 | Status: SHIPPED | OUTPATIENT
Start: 2019-05-28 | End: 2019-07-21 | Stop reason: SDUPTHER

## 2019-05-28 RX ORDER — BUPROPION HYDROCHLORIDE 300 MG/1
300 TABLET ORAL
Qty: 90 TAB | Refills: 3 | Status: SHIPPED | OUTPATIENT
Start: 2019-05-28 | End: 2020-07-28

## 2019-05-28 RX ORDER — HYDROXYZINE PAMOATE 25 MG/1
25 CAPSULE ORAL
Qty: 30 CAP | Refills: 1 | Status: SHIPPED | OUTPATIENT
Start: 2019-05-28 | End: 2019-08-17 | Stop reason: SDUPTHER

## 2019-05-28 NOTE — PROGRESS NOTES
Chief Complaint   Patient presents with    Medication Refill     Xanax, Wellbutrin and Gabapentin     Blood pressure 103/64, pulse 66, temperature 97.9 °F (36.6 °C), temperature source Oral, resp. rate 16, height 5' 5\" (1.651 m), weight 137 lb (62.1 kg), last menstrual period 01/01/1985, SpO2 100 %. 1. Have you been to the ER, urgent care clinic since your last visit? Hospitalized since your last visit? No    2. Have you seen or consulted any other health care providers outside of the [address] [place] [address] since your last visit? Include any pap smears or colon screening. No     RAISA 2/7 5/28/2019   Feeling nervous, anxious or on edge? 3   Not being able to stop or control worrying? 2   RAISA-2 Subtotal 5   Worrying too much about different things? 3   Trouble relaxing? 2   Being so restless that it is hard to sit still? 1   Becoming easily annoyed or irritable? 3   Feeling afraid as if something awful might happen? 1   RAISA-7 Total Score 15   If you checked off any problems, how difficult have these problems made it for you to do your work, take care of thinks at home, or get along with other people?   Very difficult     3 most recent PHQ Screens 5/28/2019   Little interest or pleasure in doing things More than half the days   Feeling down, depressed, irritable, or hopeless Nearly every day   Total Score PHQ 2 5   Trouble falling or staying asleep, or sleeping too much Nearly every day   Feeling tired or having little energy Nearly every day   Poor appetite, weight loss, or overeating More than half the days   Feeling bad about yourself - or that you are a failure or have let yourself or your family down Several days   Trouble concentrating on things such as school, work, reading, or watching TV More than half the days   Moving or speaking so slowly that other people could have noticed; or the opposite being so fidgety that others notice More than half the days   Thoughts of being better off dead, or hurting yourself in some way Not at all   PHQ 9 Score 18   How difficult have these problems made it for you to do your work, take care of your home and get along with others Somewhat difficult

## 2019-05-28 NOTE — PROGRESS NOTES
History of Present Illness:     Chief Complaint   Patient presents with    Medication Refill     Xanax, Wellbutrin and Gabapentin     Pt is a 61y.o. year old female    Presents to clinic for medication refills. Type 2 DM  Off of medications since gastric bypass  Last A1c 5.3% in 9/2018   in 11/2018; compliant with Crestor daily    CAD  Followed by Dr. 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    CAD (coronary artery disease)     s/p stents 11/2015    Cardiac murmur     Depression     Diabetes [facility]     DVT (deep venous thrombosis) ([facility] 1981    GERD (gastroesophageal reflux disease)     Hypertension     Morbid obesity ([facility]     Musculoskeletal disorder     EDMOND (obstructive sleep apnea)     wears cpap    S/P TONJA (total abdominal hysterectomy)     Thromboembolus ([facility] 1996    PE         Current Outpatient Medications on File Prior to Visit   Medication Sig Dispense Refill    metoprolol tartrate (LOPRESSOR) 25 mg tablet TAKE 1 TABLET BY MOUTH TWO (2) TIMES A DAY. 60 Tab 0    furosemide (LASIX) 20 mg tablet TAKE 1 TAB BY MOUTH AS NEEDED. 90 Tab 0    omeprazole (PRILOSEC) 20 mg capsule TAKE ONE CAPSULE BY MOUTH EVERY DAY 90 Cap 1    nystatin (MYCOSTATIN) 100,000 unit/mL suspension Take 5 mL by mouth four (4) times daily.  swish and spit 60 mL 0    dicyclomine (BENTYL) 10 mg capsule TAKE 1 (ONE) CAPSULE BY MOUTH AS NEEDED EVERY 6 HOURS 30 Cap 0    nitroglycerin (NITROSTAT) 0.4 mg SL tablet DISSOLVE 1 TABLET IN MOUTH EVERY 5 MINUTES AS NEEDED FOR CHEST PAIN 25 Tab 1    budesonide (PULMICORT FLEXHALER) 180 mcg/actuation aepb inhaler Take 2 Puffs by inhalation daily. 1 Inhaler 3    albuterol (ACCUNEB) 0.63 mg/3 mL nebulizer solution 3 mL by Nebulization route every six (6) hours as needed for Wheezing. 75 mL 1    valACYclovir (VALTREX) 500 mg tablet Take 1 Tab by mouth two (2) times a day. (Patient taking differently: Take 500 mg by mouth daily as needed.) 60 Tab 3    rosuvastatin (CRESTOR) 40 mg tablet TAKE 1 TABLET BY MOUTH NIGHTLY 90 Tab 2    albuterol (PROAIR HFA) 90 mcg/actuation inhaler Take 1 Puff by inhalation every four (4) hours as needed for Wheezing or Shortness of Breath. 1 Inhaler 5    Blood-Glucose Meter monitoring kit Use to check glucose once a day 1 Kit 0    alcohol swabs (ALCOHOL PADS) padm Use when checking blood glucose 100 Pad 5    Lancets misc Use once a day. Please give one compatible with patient's meter 1 Package 5    glucose blood VI test strips (BLOOD GLUCOSE TEST) strip Use once a day 100 Strip 5    aspirin delayed-release 81 mg tablet Take  by mouth daily. No current facility-administered medications on file prior to visit. Allergies:   Allergies   Allergen Reactions    Accupril [Quinapril] Cough    Lipitor [Atorvastatin] Other (comments)     aches    Norvasc [Amlodipine] Swelling     On legs at 10 mg dose       Review of Systems:  Denies fever, chills, sweats  Denies chest pain, TALAMANTES, palpitations, LE edema  Denies numbness/ tingling/ weakness in extremities      Objective:     Vitals:    05/28/19 1326   BP: 103/64   Pulse: 66   Resp: 16   Temp: 97.9 °F (36.6 °C)   TempSrc: Oral   SpO2: 100%   Weight: 137 lb (62.1 kg)   Height: 5' 5\" (1.651 m)       Physical Exam:  General appearance - alert, well appearing, and in no distress  Mental status - alert, oriented to person, place, and time, normal mood, behavior, speech, dress, motor activity, and thought processes  Chest - clear to auscultation, no wheezes, rales or rhonchi, symmetric air entry  Heart - normal rate, regular rhythm, normal S1, S2, no murmurs, rubs, clicks or gallops      Recent Labs:  No results found for this or any previous visit (from the past 12 hour(s)). Assessment and Plan:   Pt is a 61y.o. year old female,      ICD-10-CM ICD-9-CM    1. Controlled type 2 diabetes mellitus with complication, without long-term current use of insulin ([facility]) E11.8 250.90 HEMOGLOBIN A1C WITH EAG   2. Coronary artery disease of native artery of native heart with stable angina pectoris ([facility] I25.118 414.01      413.9    3. Fibromyalgia M79.7 729.1 gabapentin (NEURONTIN) 600 mg tablet   4. Other chronic pain G89.29 338.29 gabapentin (NEURONTIN) 600 mg tablet   5. Anxiety F41.9 300.00 buPROPion XL (WELLBUTRIN XL) 300 mg XL tablet      ALPRAZolam (XANAX) 0.25 mg tablet      hydrOXYzine pamoate (VISTARIL) 25 mg capsule    FTF for BNZ  PHQ9 = 7.  consider counseling. see letter   6. Need for shingles vaccine Z23 V04.89 varicella-zoster recombinant, PF, (SHINGRIX) 50 mcg/0.5 mL susr injection   7. Panic attacks F41.0 300.01 ALPRAZolam (XANAX) 0.25 mg tablet        reviewed and appears to be appropriate  Continue wean as intended by Dr. Macias  at 300mg daily    Given script for Shingrix vaccine    Given supply of Xanax  Will try to limit use for panic attacks  Trial Atarax as alternative     Refilled Gabapentin for fibromyalgia  Seems to be well controlled on this med    Follow up in 3 months for well woman visit WITH lázaro Fuller MD      I have discussed the diagnosis with the patient and the intended plan as seen in the above orders. The patient has received an after-visit summary and questions were answered concerning future plans.

## 2019-05-28 NOTE — PATIENT INSTRUCTIONS

## 2019-06-06 DIAGNOSIS — I25.10 CORONARY ARTERY DISEASE INVOLVING NATIVE CORONARY ARTERY OF NATIVE HEART WITHOUT ANGINA PECTORIS: ICD-10-CM

## 2019-06-06 RX ORDER — METOPROLOL TARTRATE 25 MG/1
TABLET, FILM COATED ORAL
Qty: 60 TAB | Refills: 0 | Status: SHIPPED | OUTPATIENT
Start: 2019-06-06 | End: 2019-06-28 | Stop reason: SDUPTHER

## 2019-06-28 DIAGNOSIS — I25.10 CORONARY ARTERY DISEASE INVOLVING NATIVE CORONARY ARTERY OF NATIVE HEART WITHOUT ANGINA PECTORIS: ICD-10-CM

## 2019-06-28 RX ORDER — METOPROLOL TARTRATE 25 MG/1
TABLET, FILM COATED ORAL
Qty: 60 TAB | Refills: 0 | Status: SHIPPED | OUTPATIENT
Start: 2019-06-28 | End: 2019-07-25 | Stop reason: SDUPTHER

## 2019-07-01 ENCOUNTER — OFFICE VISIT (OUTPATIENT)
Dept: SURGERY | Age: 59
End: 2019-07-01

## 2019-07-01 VITALS
HEART RATE: 71 BPM | SYSTOLIC BLOOD PRESSURE: 120 MMHG | WEIGHT: 135 LBS | DIASTOLIC BLOOD PRESSURE: 80 MMHG | OXYGEN SATURATION: 98 % | BODY MASS INDEX: 22.49 KG/M2 | HEIGHT: 65 IN | RESPIRATION RATE: 20 BRPM | TEMPERATURE: 98.7 F

## 2019-07-01 DIAGNOSIS — E66.01 MORBID OBESITY (HCC): Primary | ICD-10-CM

## 2019-07-01 DIAGNOSIS — Z98.84 S/P GASTRIC BYPASS: ICD-10-CM

## 2019-07-01 RX ORDER — ONDANSETRON 4 MG/1
4 TABLET, ORALLY DISINTEGRATING ORAL
Qty: 30 TAB | Refills: 0 | Status: SHIPPED | OUTPATIENT
Start: 2019-07-01 | End: 2019-11-18 | Stop reason: SDUPTHER

## 2019-07-01 RX ORDER — HYDROXYZINE 25 MG/1
TABLET, FILM COATED ORAL
COMMUNITY
End: 2019-08-19 | Stop reason: SDUPTHER

## 2019-07-01 NOTE — PROGRESS NOTES
HISTORY OF PRESENT ILLNESS  Nisha Mae is a 61 y.o. female with previous Malabsorpative Gastric bypass surgery on 2 years and 6 months ago. . She has lost a total of 86 pounds since surgery. She  Has lost 3 lbs since the last ov. Body mass index is 22.47 kg/m². Steph Never no vomiting , complaints of some nausea. . Denies Acid reflux/heartburn. . Drinking  64+ ounces of water daily. ? protein intake daily. + BM's. Pt is walking for exercise. Dietary recall -    Breakfast- toast, egg, yogurt  Lunch- tuna with cracker  Dinner- corn on the cob, tomato and steak  She is snacking between meals; pretzels and fruit. Vitamins:  MVI : yes  Calcium : yes  B-Vit 12: yes    Patient has an advanced directive: NO      Ms. Mesha Mcclain has a reminder for a \"due or due soon\" health maintenance. I have asked that she contact her primary care provider for follow-up on this health maintenance. Current Outpatient Medications:     hydrOXYzine HCl (ATARAX) 25 mg tablet, Take  by mouth two (2) times daily as needed for Itching., Disp: , Rfl:     metoprolol tartrate (LOPRESSOR) 25 mg tablet, TAKE 1 TABLET BY MOUTH TWO (2) TIMES A DAY., Disp: 60 Tab, Rfl: 0    gabapentin (NEURONTIN) 600 mg tablet, TAKE 1 TABLET BY MOUTH THREE (3) TIMES DAILY. , Disp: 270 Tab, Rfl: 0    buPROPion XL (WELLBUTRIN XL) 300 mg XL tablet, Take 1 Tab by mouth every morning. Indications: Anxiousness associated with Depression, Disp: 90 Tab, Rfl: 3    furosemide (LASIX) 20 mg tablet, TAKE 1 TAB BY MOUTH AS NEEDED., Disp: 90 Tab, Rfl: 0    omeprazole (PRILOSEC) 20 mg capsule, TAKE ONE CAPSULE BY MOUTH EVERY DAY, Disp: 90 Cap, Rfl: 1    dicyclomine (BENTYL) 10 mg capsule, TAKE 1 (ONE) CAPSULE BY MOUTH AS NEEDED EVERY 6 HOURS, Disp: 30 Cap, Rfl: 0    budesonide (PULMICORT FLEXHALER) 180 mcg/actuation aepb inhaler, Take 2 Puffs by inhalation daily. , Disp: 1 Inhaler, Rfl: 3    albuterol (ACCUNEB) 0.63 mg/3 mL nebulizer solution, 3 mL by Nebulization route every six (6) hours as needed for Wheezing., Disp: 75 mL, Rfl: 1    valACYclovir (VALTREX) 500 mg tablet, Take 1 Tab by mouth two (2) times a day. (Patient taking differently: Take 500 mg by mouth daily as needed.), Disp: 60 Tab, Rfl: 3    rosuvastatin (CRESTOR) 40 mg tablet, TAKE 1 TABLET BY MOUTH NIGHTLY, Disp: 90 Tab, Rfl: 2    aspirin delayed-release 81 mg tablet, Take  by mouth daily. , Disp: , Rfl:     albuterol (PROAIR HFA) 90 mcg/actuation inhaler, Take 1 Puff by inhalation every four (4) hours as needed for Wheezing or Shortness of Breath., Disp: 1 Inhaler, Rfl: 5    Blood-Glucose Meter monitoring kit, Use to check glucose once a day, Disp: 1 Kit, Rfl: 0    alcohol swabs (ALCOHOL PADS) padm, Use when checking blood glucose, Disp: 100 Pad, Rfl: 5    Lancets misc, Use once a day. Please give one compatible with patient's meter, Disp: 1 Package, Rfl: 5    glucose blood VI test strips (BLOOD GLUCOSE TEST) strip, Use once a day, Disp: 100 Strip, Rfl: 5    ALPRAZolam (XANAX) 0.25 mg tablet, Take 2 Tabs by mouth nightly as needed for Anxiety (Panic attacks). Max Daily Amount: 0.5 mg., Disp: 20 Tab, Rfl: 0    nystatin (MYCOSTATIN) 100,000 unit/mL suspension, Take 5 mL by mouth four (4) times daily. swish and spit, Disp: 60 mL, Rfl: 0    nitroglycerin (NITROSTAT) 0.4 mg SL tablet, DISSOLVE 1 TABLET IN MOUTH EVERY 5 MINUTES AS NEEDED FOR CHEST PAIN, Disp: 25 Tab, Rfl: 1      Visit Vitals  /80   Pulse 71   Temp 98.7 °F (37.1 °C)   Resp 20   Ht 5' 5\" (1.651 m)   Wt 135 lb (61.2 kg)   LMP 01/01/1985   SpO2 98%   BMI 22.47 kg/m²     HPI    Review of Systems   Constitutional: Negative for chills and fever. Respiratory: Negative for shortness of breath. Cardiovascular: Negative for chest pain. Gastrointestinal: Positive for nausea (at times ). Negative for abdominal pain, heartburn and vomiting. Neurological: Negative for dizziness and headaches.        Physical Exam   Constitutional: She is oriented to person, place, and time. She appears well-developed and well-nourished. Cardiovascular: Normal rate and regular rhythm. Exam reveals no gallop and no friction rub. No murmur heard. Pulmonary/Chest: Effort normal and breath sounds normal.   Abdominal: Soft. Bowel sounds are normal. She exhibits no distension. There is no tenderness. No masses or hernias noted. Neurological: She is alert and oriented to person, place, and time. Skin: Skin is warm and dry. Psychiatric: She has a normal mood and affect. ASSESSMENT and PLAN  Greg Min is a 61 y.o. female with previous Malabsorpative Gastric bypass surgery on 2 years and 6 months ago. . She has lost a total of 86 pounds since surgery. She  Has lost 3 lbs since the last ov. Body mass index is 22.47 kg/m². Advised patient regard to diet that is high-protein, low-fat, low-sugar, limited carbohydrates. Strive for 50-60 grams of protein daily. If having a snack, foods that are protein or fiber rich. . No eating/drinking together, chew foods well, and portion control. Measure meals. Discussed snacking behavior and to [facility] pay attention to behavioral factor and habits. Drink at least 40-64 ounces of water or non-calorie/non-carbonated beverages daily. Continue vitamin regiment daily. Exercise at least 3 days a week with cardiovascular and strength training. Patient to follow up in 6 months. Advised to call office if any questions/concerns. Her labs were checked by her PCP in [place] and normal. zofran prescribed for nausea as needed.

## 2019-07-01 NOTE — PATIENT INSTRUCTIONS
Eating Healthy Foods: Care Instructions  Your Care Instructions    Eating healthy foods can help lower your risk for disease. Healthy food gives you energy and keeps your heart strong, your brain active, your muscles working, and your bones strong. A healthy diet includes a variety of foods from the basic food groups: grains, vegetables, fruits, milk and milk products, and meat and beans. Some people may eat more of their favorite foods from only one food group and, as a result, miss getting the nutrients they need. So, it is important to pay attention not only to what you eat but also to what you are missing from your diet. You can eat a healthy, balanced diet by making a few small changes. Follow-up care is a key part of your treatment and safety. Be sure to make and go to all appointments, and call your doctor if you are having problems. It's also a good idea to know your test results and keep a list of the medicines you take. How can you care for yourself at home? Look at what you eat  · Keep a food diary for a week or two and record everything you eat or drink. Track the number of servings you eat from each food group. · For a balanced diet every day, eat a variety of:  ? 6 or more ounce-equivalents of grains, such as cereals, breads, crackers, rice, or pasta, every day. An ounce-equivalent is 1 slice of bread, 1 cup of ready-to-eat cereal, or ½ cup of cooked rice, cooked pasta, or cooked cereal.  ? 2½ cups of vegetables, especially:  § Dark-green vegetables such as broccoli and spinach. § Orange vegetables such as carrots and sweet potatoes. § Dry beans (such as marsh and kidney beans) and peas (such as lentils). ? 2 cups of fresh, frozen, or canned fruit. A small apple or 1 banana or orange equals 1 cup. ? 3 cups of nonfat or low-fat milk, yogurt, or other milk products. ? 5½ ounces of meat and beans, such as chicken, fish, lean meat, beans, nuts, and seeds.  One egg, 1 tablespoon of peanut butter, ½ ounce nuts or seeds, or ¼ cup of cooked beans equals 1 ounce of meat. · Learn how to read food labels for serving sizes and ingredients. Fast-food and convenience-food meals often contain few or no fruits or vegetables. Make sure you eat some fruits and vegetables to make the meal more nutritious. · Look at your food diary. For each food group, add up what you have eaten and then divide the total by the number of days. This will give you an idea of how much you are eating from each food group. See if you can find some ways to change your diet to make it more healthy. Start small  · Do not try to make dramatic changes to your diet all at once. You might feel that you are missing out on your favorite foods and then be more likely to fail. · Start slowly, and gradually change your habits. Try some of the following:  ? Use whole wheat bread instead of white bread. ? Use nonfat or low-fat milk instead of whole milk. ? Eat brown rice instead of white rice, and eat whole wheat pasta instead of white-flour pasta. ? Try low-fat cheeses and low-fat yogurt. ? Add more fruits and vegetables to meals and have them for snacks. ? Add lettuce, tomato, cucumber, and onion to sandwiches. ? Add fruit to yogurt and cereal.  Enjoy food  · You can still eat your favorite foods. You just may need to eat less of them. If your favorite foods are high in fat, salt, and sugar, limit how often you eat them, but do not cut them out entirely. · Eat a wide variety of foods. Make healthy choices when eating out  · The type of restaurant you choose can help you make healthy choices. Even fast-food chains are now offering more low-fat or healthier choices on the menu. · Choose smaller portions, or take half of your meal home. · When eating out, try:  ? A veggie pizza with a whole wheat crust or grilled chicken (instead of sausage or pepperoni).   ? Pasta with roasted vegetables, grilled chicken, or marinara sauce instead of cream sauce. ? A vegetable wrap or grilled chicken wrap. ? Broiled or poached food instead of fried or breaded items. Make healthy choices easy  · Buy packaged, prewashed, ready-to-eat fresh vegetables and fruits, such as baby carrots, salad mixes, and chopped or shredded broccoli and cauliflower. · Buy packaged, presliced fruits, such as melon or pineapple. · Choose 100% fruit or vegetable juice instead of soda. Limit juice intake to 4 to 6 oz (½ to ¾ cup) a day. · Blend low-fat yogurt, fruit juice, and canned or frozen fruit to make a smoothie for breakfast or a snack. Where can you learn more? Go to http://maliha-sanchez.info/. Enter T756 in the search box to learn more about \"Eating Healthy Foods: Care Instructions. \"  Current as of: March 28, 2018  Content Version: 11.9  © 2770-1077 [employer]. Care instructions adapted under license by [employer] (which disclaims liability or warranty for this information). If you have questions about a medical condition or this instruction, always ask your healthcare professional. [place] [address] any warranty or liability for your use of this information.

## 2019-07-01 NOTE — PROGRESS NOTES
1. Have you been to the ER, urgent care clinic since your last visit? Hospitalized since your last visit? No    2. Have you seen or consulted any other health care providers outside of the [address] [place] [address] since your last visit? Include any pap smears or colon screening.  No

## 2019-07-08 ENCOUNTER — OFFICE VISIT (OUTPATIENT)
Dept: CARDIOLOGY CLINIC | Age: 59
End: 2019-07-08

## 2019-07-08 VITALS
RESPIRATION RATE: 16 BRPM | BODY MASS INDEX: 22.99 KG/M2 | DIASTOLIC BLOOD PRESSURE: 72 MMHG | OXYGEN SATURATION: 99 % | SYSTOLIC BLOOD PRESSURE: 116 MMHG | HEIGHT: 65 IN | WEIGHT: 138 LBS | HEART RATE: 76 BPM

## 2019-07-08 DIAGNOSIS — R60.0 LOCALIZED EDEMA: ICD-10-CM

## 2019-07-08 DIAGNOSIS — I25.10 CORONARY ARTERY DISEASE INVOLVING NATIVE CORONARY ARTERY OF NATIVE HEART WITHOUT ANGINA PECTORIS: Primary | ICD-10-CM

## 2019-07-08 DIAGNOSIS — I10 ESSENTIAL HYPERTENSION: ICD-10-CM

## 2019-07-08 NOTE — PROGRESS NOTES
Sharyn Lesch is a 61 y.o. female    Chief Complaint   Patient presents with    Annual Exam    Coronary Artery Disease    Hypertension    Other     S/P LCPCI         Visit Vitals  /72 (BP 1 Location: Left arm, BP Patient Position: Sitting)   Pulse 76   Resp 16   Ht 5' 5\" (1.651 m)   Wt 138 lb (62.6 kg)   LMP 01/01/1985   SpO2 99%   BMI 22.96 kg/m²       1. Have you been to the ER, urgent care clinic since your last visit? Hospitalized since your last visit? NO    2. Have you seen or consulted any other health care providers outside of the [address] [place] [address] since your last visit? Include any pap smears or colon screening.   NO

## 2019-07-08 NOTE — PROGRESS NOTES
Office Follow-up    NAME: Steph Canales   :  1960  MRM:  [id number]    Date:  2019            Assessment:     Problem List  Date Reviewed: 2019          Codes Class Noted    Omental infarction [facility]) ICD-10-CM: Z35.893  ICD-9-CM: 557.0  2019        Abnormal CT of the abdomen ICD-10-CM: R93.5  ICD-9-CM: 793.6  2018    Overview Signed 2018  6:01 AM by Alley Crenshaw     2018:   Ongoing evolution of inflammatory changes in the left upper quadrant most  consistent with omental infarction. Decreased size of main inflamed fatty nodule  and less surrounding stranding. Otherwise, no acute pathology in the chest, abdomen or pelvis. Abnormal mammogram of right breast ICD-10-CM: R92.8  ICD-9-CM: 793.80  2018    Overview Addendum 2018  4:36 PM by Alley Morales did biopsy 7361. Biopsy  = fibroadenoma    Mammogram 2018: IMPRESSION: Small right breast mass. BI-RADS Assessment Category 4: Suspicious  Abnormality- Biopsy should be considered. RECOMMENDATION:  Ultrasound-guided right breast biopsy.                   Type 2 diabetes mellitus with diabetic neuropathy ([facility] ICD-10-CM: E11.40  ICD-9-CM: 250.60, 357.2  2018    Overview Signed 11/3/2018  9:52 AM by Mario Del Valle V     2018:  a1c 5.3    2017:  S/p gastric bypass = a1c 5.2/  LDL 84/  MAB negative    Dx:  a1c 6.9 2016             Elevated ferritin ICD-10-CM: R79.89  ICD-9-CM: 790.6  2017        H/O colonoscopy ICD-10-CM: Z98.890  ICD-9-CM: V45.89  2017    Overview Addendum 2017  2:12 PM by Alley Crenshaw     2015, next 2020  +FH colon cancer in mom             DDD (degenerative disc disease), lumbar ICD-10-CM: M51.36  ICD-9-CM: 722.52  2017    Overview Signed 2017  1:57 PM by Alley Kearns  S/p NEREYDA x 3             FH: colon cancer ICD-10-CM: Z80.0  ICD-9-CM: V16.0  2017    Overview Signed 2017  2:12 PM by Alley Crenshaw mom             H/O gastric bypass ICD-10-CM: Z98.84  ICD-9-CM: V45.86  7/12/2017    Overview Addendum 11/3/2018  9:51 AM by Juan Pablo Thornton 1/2017  lost from 230 to 154 lbs             Hypokalemia ICD-10-CM: E87.6  ICD-9-CM: 276.8  1/26/2017        History of pulmonary embolus (PE) ICD-10-CM: Z86.711  ICD-9-CM: V12.55  11/7/2016        Anxiety ICD-10-CM: F41.9  ICD-9-CM: 300.00  6/1/2016    Overview Addendum 9/18/2018  4:54 PM by Juan Pablo Valles     Needs to be seen at least twice yearly for BNZ  FTF 12/19/2017, 9/18/18   as expected 12/19/2017, 9/18/18             FH: diabetes mellitus ICD-10-CM: Z83.3  ICD-9-CM: V18.0  3/9/6599        Metabolic syndrome DHC-29-ZO: E88.81  ICD-9-CM: 277.7  8/3/2015    Overview Addendum 8/4/2015 11:38 AM by Yovanny Luther V     TG up, sugar up, HTN, waist 54 inches  Rx metformin  Needs yearly eye exams and labs             Essential hypertension ICD-10-CM: I10  ICD-9-CM: 401.9  5/17/2015        Chronic pain ICD-10-CM: G89.29  ICD-9-CM: 338.29  3/30/2015    Overview Addendum 7/12/2017 11:40 AM by Una Puente  S/p NEREYDA x 3 in back via  [address] He uses flexeril HS for pain             GARCIA (nonalcoholic steatohepatitis) ICD-10-CM: K75.81  ICD-9-CM: 571.8  1/24/2011    Overview Signed 1/24/2011  7:18 PM by Juan Pablo Valles     Ultrasound 2008             Obstructive sleep apnea ICD-10-CM: G47.33  ICD-9-CM: 327.23  9/22/2010        Asthma ICD-10-CM: J45.909  ICD-9-CM: 493.90  9/22/2010        Arthritis ICD-10-CM: M19.90  ICD-9-CM: 716.90  9/22/2010        GERD (gastroesophageal reflux disease) ICD-10-CM: K21.9  ICD-9-CM: 530.81  9/22/2010                 Plan:     1. CAD/status post LCx PCI in past: Stable. Continue aspirin. Continue statins. 2. Hypertension: Blood pressure is controlled. Continue metoprolol. 3. Dyslipidemia: Continue Crestor. Last fasting lipid profile from November 2018 demonstrated LDL of 132. Continue Crestor.   Goal LDL is 70 or below. Recheck fasting lipid profile since she has lost significant amount of weight since gastric bypass surgery. 4. Peripheral edema: This is occasional.  Previously this was related to her weight. She only takes occasional Lasix. 5. See me again in 1 year. Subjective:     Casper Germain, a 61y.o. year-old who presents for followup. She has known history of CAD status post PCI of the left circumflex coronary artery. Hypertension. She has had gastric bypass surgery in 2017. Since then she has lost 120 pounds. She is returning today for follow-up. Denies any symptoms of chest pain, shortness of breath, lightheadedness or dizziness. She has easy skin bruising. She is only taking she did have one episode of chest discomfort about a month ago which relieved with one nitroglycerin. She has not had any recurrence of symptoms. EKG in my office today demonstrated normal sinus rhythm normal EKG, normal axis, normal intervals. Exam:     Physical Exam:  Visit Vitals  /72 (BP 1 Location: Left arm, BP Patient Position: Sitting)   Pulse 76   Resp 16   Ht 5' 5\" (1.651 m)   Wt 138 lb (62.6 kg)   LMP 01/01/1985   SpO2 99%   BMI 22.96 kg/m²     General appearance - alert, well appearing, and in no distress  Mental status - affect appropriate to mood  Eyes - sclera anicteric, moist mucous membranes  Neck - supple, no significant adenopathy  Chest - clear to auscultation, no wheezes, rales or rhonchi  Heart - normal rate, regular rhythm, normal S1, S2, no murmurs, rubs, clicks or gallops  Abdomen - soft, nontender, nondistended, no masses or organomegaly  Extremities - peripheral pulses normal, no pedal edema  Skin - normal coloration. Skin bruising is present on the arms and neck. Medications:     Current Outpatient Medications   Medication Sig    hydrOXYzine HCl (ATARAX) 25 mg tablet Take  by mouth two (2) times daily as needed for Itching.     ondansetron [facility] ODT) 4 mg disintegrating tablet Take 1 Tab by mouth every eight (8) hours as needed for Nausea.  metoprolol tartrate (LOPRESSOR) 25 mg tablet TAKE 1 TABLET BY MOUTH TWO (2) TIMES A DAY.  gabapentin (NEURONTIN) 600 mg tablet TAKE 1 TABLET BY MOUTH THREE (3) TIMES DAILY.  buPROPion XL (WELLBUTRIN XL) 300 mg XL tablet Take 1 Tab by mouth every morning. Indications: Anxiousness associated with Depression    furosemide (LASIX) 20 mg tablet TAKE 1 TAB BY MOUTH AS NEEDED.  omeprazole (PRILOSEC) 20 mg capsule TAKE ONE CAPSULE BY MOUTH EVERY DAY    nystatin (MYCOSTATIN) 100,000 unit/mL suspension Take 5 mL by mouth four (4) times daily. swish and spit    dicyclomine (BENTYL) 10 mg capsule TAKE 1 (ONE) CAPSULE BY MOUTH AS NEEDED EVERY 6 HOURS    nitroglycerin (NITROSTAT) 0.4 mg SL tablet DISSOLVE 1 TABLET IN MOUTH EVERY 5 MINUTES AS NEEDED FOR CHEST PAIN    budesonide (PULMICORT FLEXHALER) 180 mcg/actuation aepb inhaler Take 2 Puffs by inhalation daily.  albuterol (ACCUNEB) 0.63 mg/3 mL nebulizer solution 3 mL by Nebulization route every six (6) hours as needed for Wheezing.  valACYclovir (VALTREX) 500 mg tablet Take 1 Tab by mouth two (2) times a day. (Patient taking differently: Take 500 mg by mouth daily as needed.)    rosuvastatin (CRESTOR) 40 mg tablet TAKE 1 TABLET BY MOUTH NIGHTLY    aspirin delayed-release 81 mg tablet Take  by mouth daily.  albuterol (PROAIR HFA) 90 mcg/actuation inhaler Take 1 Puff by inhalation every four (4) hours as needed for Wheezing or Shortness of Breath.  Blood-Glucose Meter monitoring kit Use to check glucose once a day    alcohol swabs (ALCOHOL PADS) padm Use when checking blood glucose    Lancets misc Use once a day. Please give one compatible with patient's meter    glucose blood VI test strips (BLOOD GLUCOSE TEST) strip Use once a day    ALPRAZolam (XANAX) 0.25 mg tablet Take 2 Tabs by mouth nightly as needed for Anxiety (Panic attacks).  Max Daily Amount: 0.5 mg. (Patient not taking: Reported on 7/8/2019)     No current facility-administered medications for this visit. Diagnostic Data Review:     EKG:    LHC: 11/30/15- Circumflex: There was a 80 % stenosis in the distal third of the vessel segment, Successful BMS dLCx: 2.0x12 Mini Vision. ECHO:4/3/15- EF 65%, G1DD, trivial MR,   Dobutamine Stress Echo : 4/27/15: No ischemia. False positive ST Changes of ischemia. Had chest pain during the test.       Lab Review:     Lab Results   Component Value Date/Time    Cholesterol, total 214 (H) 11/05/2018 09:31 AM    HDL Cholesterol 54 11/05/2018 09:31 AM    LDL,Direct 97 09/18/2018 02:16 PM    LDL, calculated 132 (H) 11/05/2018 09:31 AM    Triglyceride 142 11/05/2018 09:31 AM     Lab Results   Component Value Date/Time    Creatinine 0.72 11/05/2018 09:31 AM     Lab Results   Component Value Date/Time    BUN 10 11/05/2018 09:31 AM     Lab Results   Component Value Date/Time    Potassium 4.4 11/05/2018 09:31 AM     Lab Results   Component Value Date/Time    Hemoglobin A1c 5.3 09/18/2018 02:16 PM     Lab Results   Component Value Date/Time    HGB 13.4 05/31/2018 04:00 PM     Lab Results   Component Value Date/Time    PLATELET 625 04/45/2403 04:00 PM     No results for input(s): CPK, CKMB, TROIQ in the last 72 hours. No lab exists for component: CKQMB, CPKMB             ___________________________________________________    Sherita Allison.  Grover Sunshine MD, [facility]

## 2019-07-21 DIAGNOSIS — G89.29 OTHER CHRONIC PAIN: ICD-10-CM

## 2019-07-21 DIAGNOSIS — M79.7 FIBROMYALGIA: ICD-10-CM

## 2019-07-22 RX ORDER — FUROSEMIDE 20 MG/1
TABLET ORAL
Qty: 30 TAB | Refills: 2 | Status: SHIPPED | OUTPATIENT
Start: 2019-07-22 | End: 2019-10-06 | Stop reason: SDUPTHER

## 2019-07-22 RX ORDER — GABAPENTIN 600 MG/1
TABLET ORAL
Qty: 90 TAB | Refills: 0 | Status: SHIPPED | OUTPATIENT
Start: 2019-07-22 | End: 2019-10-21 | Stop reason: SDUPTHER

## 2019-07-22 NOTE — TELEPHONE ENCOUNTER
Attempted to call patient to notify prescription for Gabapentin 600 mg is ready for  at . Left voicemail for patient to return call.

## 2019-07-22 NOTE — TELEPHONE ENCOUNTER
Refill of Lasix 20 mg daily PRN completed per VO of Royer Lopez.     Last OV: 7/2019  Next OV: 7/2020

## 2019-07-25 DIAGNOSIS — I25.10 CORONARY ARTERY DISEASE INVOLVING NATIVE CORONARY ARTERY OF NATIVE HEART WITHOUT ANGINA PECTORIS: ICD-10-CM

## 2019-07-25 RX ORDER — METOPROLOL TARTRATE 25 MG/1
TABLET, FILM COATED ORAL
Qty: 60 TAB | Refills: 0 | Status: SHIPPED | OUTPATIENT
Start: 2019-07-25 | End: 2019-10-24 | Stop reason: SDUPTHER

## 2019-08-17 DIAGNOSIS — F41.9 ANXIETY: ICD-10-CM

## 2019-08-19 RX ORDER — HYDROXYZINE PAMOATE 25 MG/1
25 CAPSULE ORAL
Qty: 30 CAP | Refills: 1 | Status: SHIPPED | OUTPATIENT
Start: 2019-08-19 | End: 2019-10-24 | Stop reason: SDUPTHER

## 2019-08-27 ENCOUNTER — OFFICE VISIT (OUTPATIENT)
Dept: FAMILY MEDICINE CLINIC | Age: 59
End: 2019-08-27

## 2019-08-27 VITALS
TEMPERATURE: 98 F | WEIGHT: 138.8 LBS | OXYGEN SATURATION: 98 % | DIASTOLIC BLOOD PRESSURE: 72 MMHG | SYSTOLIC BLOOD PRESSURE: 116 MMHG | HEART RATE: 66 BPM | HEIGHT: 65 IN | BODY MASS INDEX: 23.13 KG/M2 | RESPIRATION RATE: 18 BRPM

## 2019-08-27 DIAGNOSIS — Z23 ENCOUNTER FOR IMMUNIZATION: ICD-10-CM

## 2019-08-27 DIAGNOSIS — R30.0 DYSURIA: Primary | ICD-10-CM

## 2019-08-27 DIAGNOSIS — F41.9 ANXIETY: ICD-10-CM

## 2019-08-27 RX ORDER — CEPHALEXIN 500 MG/1
500 CAPSULE ORAL 2 TIMES DAILY
Qty: 10 CAP | Refills: 0 | Status: SHIPPED | OUTPATIENT
Start: 2019-08-27 | End: 2019-09-01

## 2019-08-27 NOTE — PATIENT INSTRUCTIONS
- Ask about scheduling with [address] [place] [address] for counseling         Painful Urination (Dysuria): Care Instructions  Your Care Instructions  Burning pain with urination (dysuria) is a common symptom of a urinary tract infection or other urinary problems. The bladder may become inflamed. This can cause pain when the bladder fills and empties. You may also feel pain if the tube that carries urine from the bladder to the outside of the body (urethra) gets irritated or infected. Sexually transmitted infections (STIs) also may cause pain when you urinate. Sometimes the pain can be caused by things other than an infection. The urethra can be irritated by soaps, perfumes, or foreign objects in the urethra. Kidney stones can cause pain when they pass through the urethra. The cause may be hard to find. You may need tests. Treatment for painful urination depends on the cause. Follow-up care is a key part of your treatment and safety. Be sure to make and go to all appointments, and call your doctor if you are having problems. It's also a good idea to know your test results and keep a list of the medicines you take. How can you care for yourself at home? · Drink extra water for the next day or two. This will help make the urine less concentrated. (If you have kidney, heart, or liver disease and have to limit fluids, talk with your doctor before you increase the amount of fluids you drink.)  · Avoid drinks that are carbonated or have caffeine. They can irritate the bladder. · Urinate often. Try to empty your bladder each time. For women:  · Urinate right after you have sex. · After going to the bathroom, wipe from front to back. · Avoid douches, bubble baths, and feminine hygiene sprays. And avoid other feminine hygiene products that have deodorants. When should you call for help?   Call your doctor now or seek immediate medical care if:    · You have new symptoms, such as fever, nausea, or vomiting.     · You have new or worse symptoms of a urinary problem. For example:  ? You have blood or pus in your urine. ? You have chills or body aches. ? It hurts worse to urinate. ? You have groin or belly pain. ? You have pain in your back just below your rib cage (the flank area).    Watch closely for changes in your health, and be sure to contact your doctor if you have any problems. Where can you learn more? Go to http://maliha-sanchez.info/. Enter D102 in the search box to learn more about \"Painful Urination (Dysuria): Care Instructions. \"  Current as of: December 19, 2018  Content Version: 12.1  © 2993-8030 [employer]. Care instructions adapted under license by [employer] (which disclaims liability or warranty for this information). If you have questions about a medical condition or this instruction, always ask your healthcare professional. [address] any warranty or liability for your use of this information.

## 2019-08-27 NOTE — PROGRESS NOTES
Identified pt with two pt identifiers(name and ). Reviewed record in preparation for visit and have obtained necessary documentation. Chief Complaint   Patient presents with    Medication Refill        Health Maintenance Due   Topic    EYE EXAM RETINAL OR DILATED     HEMOGLOBIN A1C Q6M     Influenza Age 5 to Adult     PAP AKA CERVICAL CYTOLOGY     FOOT EXAM Q1        Visit Vitals  /86 (BP 1 Location: Right arm, BP Patient Position: Sitting)   Pulse 66   Temp 98 °F (36.7 °C) (Oral)   Resp 18   Ht 5' 5\" (1.651 m)   Wt 138 lb 12.8 oz (63 kg)   SpO2 98%   BMI 23.10 kg/m²         Coordination of Care Questionnaire:  :   1) Have you been to an emergency room, urgent care, or hospitalized since your last visit? If yes, where when, and reason for visit? no       2. Have seen or consulted any other health care provider since your last visit? If yes, where when, and reason for visit? NO      3) Do you have an Advanced Directive/ Living Will in place? NO  If no, would you like information NO    Patient is accompanied by self I have received verbal consent from Liberty El to discuss any/all medical information while they are present in the room.

## 2019-08-27 NOTE — PROGRESS NOTES
History of Present Illness:     Chief Complaint   Patient presents with    Medication Refill     Pt is a 61y.o. year old female    Presents to clinic for eval of hydroxazine. Anxiety  Taking Wellbutrin daily  Taking Atarax at bedtime to help   Not in counseling  Panic episode 1 week ago    Urinary complaint  4 days of burning with urination  Increased urinary urgency  Denies fever, chills  Denies hematuria, back pain    Past Medical History:   Diagnosis Date    Arthritis     OA back,knees and hands    Asthma     Autoimmune disease ([address]     Raúl Griffiths    CAD (coronary artery disease)     s/p stents 11/2015    Cardiac murmur     Depression     [facility]     DVT (deep venous thrombosis) ([facility] 1981    GERD (gastroesophageal reflux disease)     Hypertension     Morbid obesity ([facility]     Musculoskeletal disorder     EDMOND (obstructive sleep apnea)     wears cpap    S/P TONJA (total abdominal hysterectomy)     Thromboembolus ([facility] 1996    PE         Current Outpatient Medications on File Prior to Visit   Medication Sig Dispense Refill    hydrOXYzine pamoate (VISTARIL) 25 mg capsule TAKE 1 CAP BY MOUTH TWO (2) TIMES DAILY AS NEEDED FOR ANXIETY (PANIC ATTACKS) FOR UP TO 14 DAYS. 30 Cap 1    metoprolol tartrate (LOPRESSOR) 25 mg tablet TAKE 1 TABLET BY MOUTH TWO (2) TIMES A DAY. 60 Tab 0    furosemide (LASIX) 20 mg tablet TAKE 1 TABLET BY MOUTH EVERY DAY AS NEEDED 30 Tab 2    gabapentin (NEURONTIN) 600 mg tablet TAKE 1 TABLET BY MOUTH THREE (3) TIMES DAILY. 90 Tab 0    ondansetron (ZOFRAN ODT) 4 mg disintegrating tablet Take 1 Tab by mouth every eight (8) hours as needed for Nausea. 30 Tab 0    buPROPion XL (WELLBUTRIN XL) 300 mg XL tablet Take 1 Tab by mouth every morning.  Indications: Anxiousness associated with Depression 90 Tab 3    omeprazole (PRILOSEC) 20 mg capsule TAKE ONE CAPSULE BY MOUTH EVERY DAY 90 Cap 1    nystatin (MYCOSTATIN) 100,000 unit/mL suspension Take 5 mL by mouth four (4) times daily. swish and spit 60 mL 0    dicyclomine (BENTYL) 10 mg capsule TAKE 1 (ONE) CAPSULE BY MOUTH AS NEEDED EVERY 6 HOURS 30 Cap 0    nitroglycerin (NITROSTAT) 0.4 mg SL tablet DISSOLVE 1 TABLET IN MOUTH EVERY 5 MINUTES AS NEEDED FOR CHEST PAIN 25 Tab 1    budesonide (PULMICORT FLEXHALER) 180 mcg/actuation aepb inhaler Take 2 Puffs by inhalation daily. 1 Inhaler 3    albuterol (ACCUNEB) 0.63 mg/3 mL nebulizer solution 3 mL by Nebulization route every six (6) hours as needed for Wheezing. 75 mL 1    valACYclovir (VALTREX) 500 mg tablet Take 1 Tab by mouth two (2) times a day. (Patient taking differently: Take 500 mg by mouth daily as needed.) 60 Tab 3    rosuvastatin (CRESTOR) 40 mg tablet TAKE 1 TABLET BY MOUTH NIGHTLY 90 Tab 2    aspirin delayed-release 81 mg tablet Take  by mouth daily.  albuterol (PROAIR HFA) 90 mcg/actuation inhaler Take 1 Puff by inhalation every four (4) hours as needed for Wheezing or Shortness of Breath. 1 Inhaler 5    Blood-Glucose Meter monitoring kit Use to check glucose once a day 1 Kit 0    alcohol swabs (ALCOHOL PADS) padm Use when checking blood glucose 100 Pad 5    Lancets misc Use once a day. Please give one compatible with patient's meter 1 Package 5    glucose blood VI test strips (BLOOD GLUCOSE TEST) strip Use once a day 100 Strip 5    ALPRAZolam (XANAX) 0.25 mg tablet Take 2 Tabs by mouth nightly as needed for Anxiety (Panic attacks). Max Daily Amount: 0.5 mg. (Patient not taking: Reported on 7/8/2019) 20 Tab 0     No current facility-administered medications on file prior to visit. Allergies:   Allergies   Allergen Reactions    Accupril [Quinapril] Cough    Lipitor [Atorvastatin] Other (comments)     aches    Norvasc [Amlodipine] Swelling     On legs at 10 mg dose         Review of Systems:  Denies fever, chills, sweats  Denies n/v/d      Objective:     Vitals:    08/27/19 0945 08/27/19 1042   BP: 152/86 116/72   Pulse: 66    Resp: 18 Temp: 98 °F (36.7 °C)    TempSrc: Oral    SpO2: 98%    Weight: 138 lb 12.8 oz (63 kg)    Height: 5' 5\" (1.651 m)        Physical Exam:  General appearance - alert, well appearing, and in no distress  Mental status - alert, oriented to person, place, and time, depressed mood, affect appropriate to mood  Abdomen - Soft, non distended. +Suprapubic TTP. Recent Labs:  Recent Results (from the past 12 hour(s))   AMB POC URINALYSIS DIP STICK AUTO W/O MICRO    Collection Time: 08/27/19 10:40 AM   Result Value Ref Range    Color (UA POC) Yellow     Clarity (UA POC) Clear     Glucose (UA POC) Negative Negative    Bilirubin (UA POC) Negative Negative    Ketones (UA POC) Negative Negative    Specific gravity (UA POC) 1.010 1.001 - 1.035    Blood (UA POC) Negative Negative    pH (UA POC) 7.0 4.6 - 8.0    Protein (UA POC) Negative Negative    Urobilinogen (UA POC) 1 mg/dL 0.2 - 1    Nitrites (UA POC) Negative Negative    Leukocyte esterase (UA POC) 2+ Negative         Assessment and Plan:   Pt is a 61y.o. year old female,      ICD-10-CM ICD-9-CM    1. Dysuria R30.0 788.1 cephALEXin (KEFLEX) 500 mg capsule      AMB POC URINALYSIS DIP STICK AUTO W/O MICRO   2. Anxiety F41.9 300.00    3. Encounter for immunization Z23 V03.89 ID IMMUNIZ ADMIN,1 SINGLE/COMB VAC/TOXOID      INFLUENZA VIRUS VAC QUAD,SPLIT,PRESV FREE SYRINGE IM     Continue Wellbutrin and PRN Atarax for anxiety  Recommended started Counseling; given local resources  Consider [address] [place] [address] session    Treat empirically for UTI with Keflex  Send UCx    Follow up in November for annual Wellness Visit. Mary Kate Rivera MD      I have discussed the diagnosis with the patient and the intended plan as seen in the above orders. The patient has received an after-visit summary and questions were answered concerning future plans.

## 2019-08-29 LAB
BACTERIA UR CULT: ABNORMAL
BACTERIA UR CULT: ABNORMAL

## 2019-09-12 NOTE — PROGRESS NOTES
HPI       Chief Complaint: kidney pain     Casper Germain is a 61 y.o. female who presents for \"kidney pain\"    \"Kidney pain\" - seen 8/27/19 with urinary symptoms, given keflex, culture grew corynebacterium. Symptoms resolved but then returned 4 days ago. C/o dysuria, low back pain (has hx of back pain followed by Dr. Anderson Every but states this is different), 6/10 suprapubic pain, increased urinary frequency. No fevers. No vaginal discharge or irritation. Had never had any UTIs until last year. Had to go to the Urologist and have a cystoscopy done. Was told to go back in 1 year. Sore throat - since this morning, some mild hoarseness. No fevers. No nasal congestion or rhinorrhea. Mild coughing last night, was very fatigued yesterday. Some nausea without vomiting. No diarrhea or abdominal pain. Works in a classroom and drives school bus, there is a cold going around but strep was also recently going around. PMHx:  Past Medical History:   Diagnosis Date    Arthritis     OA back,knees and hands    Asthma     Autoimmune disease ([address]     Layman Liner    CAD (coronary artery disease)     s/p stents 11/2015    Cardiac murmur     Depression     Diabetes ([address]     DVT (deep venous thrombosis) ([address] 1981    GERD (gastroesophageal reflux disease)     Hypertension     Morbid obesity ([address]     Musculoskeletal disorder     EDMOND (obstructive sleep apnea)     wears cpap    S/P TONJA (total abdominal hysterectomy)     Thromboembolus ([facility] 1996    PE     Meds:   Current Outpatient Medications   Medication Sig Dispense Refill    trimethoprim-sulfamethoxazole (BACTRIM DS, SEPTRA DS) 160-800 mg per tablet Take 1 Tab by mouth two (2) times a day for 5 days. 10 Tab 0    metoprolol tartrate (LOPRESSOR) 25 mg tablet TAKE 1 TABLET BY MOUTH TWO (2) TIMES A DAY.  60 Tab 0    furosemide (LASIX) 20 mg tablet TAKE 1 TABLET BY MOUTH EVERY DAY AS NEEDED 30 Tab 2    gabapentin (NEURONTIN) 600 mg tablet TAKE 1 TABLET BY MOUTH THREE (3) TIMES DAILY. 90 Tab 0    ondansetron (ZOFRAN ODT) 4 mg disintegrating tablet Take 1 Tab by mouth every eight (8) hours as needed for Nausea. 30 Tab 0    buPROPion XL (WELLBUTRIN XL) 300 mg XL tablet Take 1 Tab by mouth every morning. Indications: Anxiousness associated with Depression 90 Tab 3    omeprazole (PRILOSEC) 20 mg capsule TAKE ONE CAPSULE BY MOUTH EVERY DAY 90 Cap 1    nystatin (MYCOSTATIN) 100,000 unit/mL suspension Take 5 mL by mouth four (4) times daily. swish and spit 60 mL 0    dicyclomine (BENTYL) 10 mg capsule TAKE 1 (ONE) CAPSULE BY MOUTH AS NEEDED EVERY 6 HOURS 30 Cap 0    nitroglycerin (NITROSTAT) 0.4 mg SL tablet DISSOLVE 1 TABLET IN MOUTH EVERY 5 MINUTES AS NEEDED FOR CHEST PAIN 25 Tab 1    budesonide (PULMICORT FLEXHALER) 180 mcg/actuation aepb inhaler Take 2 Puffs by inhalation daily. 1 Inhaler 3    albuterol (ACCUNEB) 0.63 mg/3 mL nebulizer solution 3 mL by Nebulization route every six (6) hours as needed for Wheezing. 75 mL 1    valACYclovir (VALTREX) 500 mg tablet Take 1 Tab by mouth two (2) times a day. (Patient taking differently: Take 500 mg by mouth daily as needed.) 60 Tab 3    rosuvastatin (CRESTOR) 40 mg tablet TAKE 1 TABLET BY MOUTH NIGHTLY 90 Tab 2    aspirin delayed-release 81 mg tablet Take  by mouth daily.  albuterol (PROAIR HFA) 90 mcg/actuation inhaler Take 1 Puff by inhalation every four (4) hours as needed for Wheezing or Shortness of Breath. 1 Inhaler 5    Blood-Glucose Meter monitoring kit Use to check glucose once a day 1 Kit 0    alcohol swabs (ALCOHOL PADS) padm Use when checking blood glucose 100 Pad 5    Lancets misc Use once a day. Please give one compatible with patient's meter 1 Package 5    glucose blood VI test strips (BLOOD GLUCOSE TEST) strip Use once a day 100 Strip 5     Allergies:    Allergies   Allergen Reactions    Accupril [Quinapril] Cough    Lipitor [Atorvastatin] Other (comments)     aches    Norvasc [Amlodipine] Swelling On legs at 10 mg dose     ROS  Review of Systems   Constitutional: Negative for chills, fever and weight loss. HENT: Positive for sore throat. Negative for congestion and sinus pain. Respiratory: Positive for cough. Negative for shortness of breath and wheezing. Cardiovascular: Negative for chest pain and palpitations. Gastrointestinal: Negative for abdominal pain, constipation, diarrhea, nausea and vomiting. Genitourinary: Positive for dysuria, frequency and urgency. Musculoskeletal: Positive for back pain. Neurological: Negative for dizziness, weakness and headaches. Physical Exam:  Visit Vitals  /79 (BP 1 Location: Right arm, BP Patient Position: Sitting)   Pulse 71   Temp 98.4 °F (36.9 °C) (Oral)   Resp 18   Ht 5' 5\" (1.651 m)   Wt 138 lb (62.6 kg)   LMP 01/01/1985   SpO2 99%   BMI 22.96 kg/m²       Wt Readings from Last 3 Encounters:   09/13/19 138 lb (62.6 kg)   08/27/19 138 lb 12.8 oz (63 kg)   07/08/19 138 lb (62.6 kg)     BP Readings from Last 3 Encounters:   09/13/19 123/79   08/27/19 116/72   07/08/19 116/72      Physical Exam   Constitutional: She appears well-developed and well-nourished. HENT:   Head: Normocephalic and atraumatic. Right Ear: External ear normal.   Left Ear: External ear normal.   Mouth/Throat: Oropharynx is clear and moist. No oropharyngeal exudate. TMS normal bilaterally   Eyes: EOM are normal.   Neck: Normal range of motion. Neck supple. No thyromegaly present. Cardiovascular: Normal rate and regular rhythm. No murmur heard. Pulmonary/Chest: Effort normal and breath sounds normal. No respiratory distress. She has no wheezes. She has no rales. Abdominal: Soft. Bowel sounds are normal. She exhibits no distension. There is no tenderness. Lymphadenopathy:     She has no cervical adenopathy. Psychiatric: She has a normal mood and affect. Vitals reviewed. Assessment     61 y.o. female with:    ICD-10-CM ICD-9-CM    1.  Dysuria R30.0 788.1 AMB POC URINALYSIS DIP STICK AUTO W/O MICRO      CULTURE, URINE      trimethoprim-sulfamethoxazole (BACTRIM DS, SEPTRA DS) 160-800 mg per tablet   2. Sore throat J02.9 462 AMB POC RAPID STREP A              Plan     1. Dysuria  Will treat w bactrim based on previous cultures. Send for urine culture. If symptoms persist recommend Urology f/u   - AMB POC URINALYSIS DIP STICK AUTO W/O MICRO  - CULTURE, URINE  - trimethoprim-sulfamethoxazole (BACTRIM DS, SEPTRA DS) 160-800 mg per tablet; Take 1 Tab by mouth two (2) times a day for 5 days. Dispense: 10 Tab; Refill: 0    2. Sore throat  Rapid strep negative. LIkely the beginning of viral URI. Conservative management. RTC if no improvement. - AMB POC RAPID STREP A      Follow-up and Dispositions    · Return if symptoms worsen or fail to improve. Patient discussed with Dr. Nilda Wise (Attending physician)    I have discussed the diagnosis with the patient and the intended plan as seen in the above orders. The patient has received an after-visit summary and questions were answered concerning future plans. I have discussed medication side effects and warnings with the patient as well.     Marielena Marinelli MD  Family Medicine Resident  PGY-3

## 2019-09-13 ENCOUNTER — OFFICE VISIT (OUTPATIENT)
Dept: FAMILY MEDICINE CLINIC | Age: 59
End: 2019-09-13

## 2019-09-13 VITALS
HEIGHT: 65 IN | RESPIRATION RATE: 18 BRPM | SYSTOLIC BLOOD PRESSURE: 123 MMHG | WEIGHT: 138 LBS | DIASTOLIC BLOOD PRESSURE: 79 MMHG | BODY MASS INDEX: 22.99 KG/M2 | OXYGEN SATURATION: 99 % | TEMPERATURE: 98.4 F | HEART RATE: 71 BPM

## 2019-09-13 DIAGNOSIS — J02.9 SORE THROAT: ICD-10-CM

## 2019-09-13 DIAGNOSIS — R30.0 DYSURIA: Primary | ICD-10-CM

## 2019-09-13 LAB
BILIRUB UR QL STRIP: NEGATIVE
GLUCOSE UR-MCNC: NEGATIVE MG/DL
KETONES P FAST UR STRIP-MCNC: NEGATIVE MG/DL
PH UR STRIP: 7 [PH] (ref 4.6–8)
PROT UR QL STRIP: NEGATIVE
S PYO AG THROAT QL: NEGATIVE
SP GR UR STRIP: 1.01 (ref 1–1.03)
UA UROBILINOGEN AMB POC: NORMAL (ref 0.2–1)
URINALYSIS CLARITY POC: CLEAR
URINALYSIS COLOR POC: YELLOW
URINE BLOOD POC: NEGATIVE
URINE LEUKOCYTES POC: NORMAL
URINE NITRITES POC: NEGATIVE
VALID INTERNAL CONTROL?: YES

## 2019-09-13 RX ORDER — SULFAMETHOXAZOLE AND TRIMETHOPRIM 800; 160 MG/1; MG/1
1 TABLET ORAL 2 TIMES DAILY
Qty: 10 TAB | Refills: 0 | Status: SHIPPED | OUTPATIENT
Start: 2019-09-13 | End: 2019-09-18

## 2019-09-13 NOTE — PATIENT INSTRUCTIONS
Viral Respiratory Infection: Care Instructions  Your Care Instructions    Viruses are very small organisms. They grow in number after they enter your body. There are many types that cause different illnesses, such as colds and the mumps. The symptoms of a viral respiratory infection often start quickly. They include a fever, sore throat, and runny nose. You may also just not feel well. Or you may not want to eat much. Most viral respiratory infections are not serious. They usually get better with time and self-care. Antibiotics are not used to treat a viral infection. That's because antibiotics will not help cure a viral illness. In some cases, antiviral medicine can help your body fight a serious viral infection. Follow-up care is a key part of your treatment and safety. Be sure to make and go to all appointments, and call your doctor if you are having problems. It's also a good idea to know your test results and keep a list of the medicines you take. How can you care for yourself at home? · Rest as much as possible until you feel better. · Be safe with medicines. Take your medicine exactly as prescribed. Call your doctor if you think you are having a problem with your medicine. You will get more details on the specific medicine your doctor prescribes. · Take an over-the-counter pain medicine, such as acetaminophen (Tylenol), ibuprofen (Advil, Motrin), or naproxen (Aleve), as needed for pain and fever. Read and follow all instructions on the label. Do not give aspirin to anyone younger than 20. It has been linked to Reye syndrome, a serious illness. · Drink plenty of fluids, enough so that your urine is light yellow or clear like water. Hot fluids, such as tea or soup, may help relieve congestion in your nose and throat. If you have kidney, heart, or liver disease and have to limit fluids, talk with your doctor before you increase the amount of fluids you drink.   · Try to clear mucus from your lungs by breathing deeply and coughing. · Gargle with warm salt water once an hour. This can help reduce swelling and throat pain. Use 1 teaspoon of salt mixed in 1 cup of warm water. · Do not smoke or allow others to smoke around you. If you need help quitting, talk to your doctor about stop-smoking programs and medicines. These can increase your chances of quitting for good. To avoid spreading the virus  · Cough or sneeze into a tissue. Then throw the tissue away. · If you don't have a tissue, use your hand to cover your cough or sneeze. Then clean your hand. You can also cough into your sleeve. · Wash your hands often. Use soap and warm water. Wash for 15 to 20 seconds each time. · If you don't have soap and water near you, you can clean your hands with alcohol wipes or gel. When should you call for help? Call your doctor now or seek immediate medical care if:    · You have a new or higher fever.     · Your fever lasts more than 48 hours.     · You have trouble breathing.     · You have a fever with a stiff neck or a severe headache.     · You are sensitive to light.     · You feel very sleepy or confused.    Watch closely for changes in your health, and be sure to contact your doctor if:    · You do not get better as expected. Where can you learn more? Go to http://maliha-sanchez.info/. Enter Q049 in the search box to learn more about \"Viral Respiratory Infection: Care Instructions. \"  Current as of: September 5, 2018  Content Version: 12.1  © 2600-1173 "[employer]. Care instructions adapted under license by [employer] (which disclaims liability or warranty for this information). If you have questions about a medical condition or this instruction, always ask your healthcare professional. [place] [address] any warranty or liability for your use of this information.          Urinary Tract Infection in Women: Care Instructions  Your Care Instructions    A urinary tract infection, or UTI, is a general term for an infection anywhere between the kidneys and the urethra (where urine comes out). Most UTIs are bladder infections. They often cause pain or burning when you urinate. UTIs are caused by bacteria and can be cured with antibiotics. Be sure to complete your treatment so that the infection goes away. Follow-up care is a key part of your treatment and safety. Be sure to make and go to all appointments, and call your doctor if you are having problems. It's also a good idea to know your test results and keep a list of the medicines you take. How can you care for yourself at home? · Take your antibiotics as directed. Do not stop taking them just because you feel better. You need to take the full course of antibiotics. · Drink extra water and other fluids for the next day or two. This may help wash out the bacteria that are causing the infection. (If you have kidney, heart, or liver disease and have to limit fluids, talk with your doctor before you increase your fluid intake.)  · Avoid drinks that are carbonated or have caffeine. They can irritate the bladder. · Urinate often. Try to empty your bladder each time. · To relieve pain, take a hot bath or lay a heating pad set on low over your lower belly or genital area. Never go to sleep with a heating pad in place. To prevent UTIs  · Drink plenty of water each day. This helps you urinate often, which clears bacteria from your system. (If you have kidney, heart, or liver disease and have to limit fluids, talk with your doctor before you increase your fluid intake.)  · Urinate when you need to. · Urinate right after you have sex. · Change sanitary pads often. · Avoid douches, bubble baths, feminine hygiene sprays, and other feminine hygiene products that have deodorants. · After going to the bathroom, wipe from front to back. When should you call for help?   Call your doctor now or seek immediate medical care if:    · Symptoms such as fever, chills, nausea, or vomiting get worse or appear for the first time.     · You have new pain in your back just below your rib cage. This is called flank pain.     · There is new blood or pus in your urine.     · You have any problems with your antibiotic medicine.    Watch closely for changes in your health, and be sure to contact your doctor if:    · You are not getting better after taking an antibiotic for 2 days.     · Your symptoms go away but then come back. Where can you learn more? Go to http://maliha-sanchez.info/. Enter Q150 in the search box to learn more about \"Urinary Tract Infection in Women: Care Instructions. \"  Current as of: December 19, 2018  Content Version: 12.1  © 8535-6993 [employer]. Care instructions adapted under license by [employer] (which disclaims liability or warranty for this information). If you have questions about a medical condition or this instruction, always ask your healthcare professional. [address] any warranty or liability for your use of this information.

## 2019-09-13 NOTE — PROGRESS NOTES
Identified pt with two pt identifiers(name and ). Reviewed record in preparation for visit and have obtained necessary documentation. Chief Complaint   Patient presents with    Urinary Burning     x 4 days        Health Maintenance Due   Topic    EYE EXAM RETINAL OR DILATED     HEMOGLOBIN A1C Q6M     PAP AKA CERVICAL CYTOLOGY     FOOT EXAM Q1        Visit Vitals  /79 (BP 1 Location: Right arm, BP Patient Position: Sitting)   Pulse 71   Temp 98.4 °F (36.9 °C) (Oral)   Resp 18   Ht 5' 5\" (1.651 m)   Wt 138 lb (62.6 kg)   SpO2 99%   BMI 22.96 kg/m²         Coordination of Care Questionnaire:  :   1) Have you been to an emergency room, urgent care, or hospitalized since your last visit? If yes, where when, and reason for visit? no       2. Have seen or consulted any other health care provider since your last visit? If yes, where when, and reason for visit? NO      3) Do you have an Advanced Directive/ Living Will in place? NO  If no, would you like information NO    Patient is accompanied by self I have received verbal consent from Eugene Bowden to discuss any/all medical information while they are present in the room.

## 2019-09-13 NOTE — LETTER
NOTIFICATION RETURN TO WORK / SCHOOL 
 
9/13/2019 11:50 AM 
 
Ms. Bina Ocampo 
[address] [id number] To Whom It May Concern: 
 
Bina Ocampo is currently under the care of [facility]. She was seen in clinic on 9/13/2019. She will return to work/school on: 9/16/2019 If there are questions or concerns please have the patient contact our office. Sincerely, Ashley Riggs MD

## 2019-09-15 LAB
BACTERIA UR CULT: ABNORMAL
BACTERIA UR CULT: ABNORMAL

## 2019-10-06 RX ORDER — FUROSEMIDE 20 MG/1
TABLET ORAL
Qty: 30 TAB | Refills: 2 | Status: SHIPPED | OUTPATIENT
Start: 2019-10-06 | End: 2020-01-17 | Stop reason: SDUPTHER

## 2019-10-12 NOTE — PROGRESS NOTES
Gerhardt Garrison is a 61 y.o. female who presents for possible UTI. The patient states her first UTI was in 04/2019. She was seen by [address] [place] [address] Urology over the summer and had cystoscopy done. She was then seen here at [facility] in 08/27/2019 and 09/13/2019 for recurrent symptoms. At her last visit, she was prescribed Bactrim for 5 days. This helped her symptoms while she was taking it but the symptoms returned and have been present for ~2-3 weeks now. She complains of dysuria, urgency, frequency. She has not noticed any hematuria. She started taking Azo OTC 3 days ago which dampened her symptoms somewhat. She drinks plenty of water. She denies any new sexual partners, spermicide or new hygiene practices. Other Concern:   · Patient also endorses vaginal dryness and pain on intercourse. The pain is both on penetration and also felt deeper during intercourse. She denies any itching or vaginal discharge. She is currently sexually active with one partner, her . She has not tried lubricants before. She states she has a well woman with Dr. Mitchel Dhillon coming up and would like to have a pelvic exam done then instead of today. Her last Pap was 1 year ago. She has a history of a hysterectomy (for heavy bleeding/fibroids)    ROS:  General: No fevers or chills  GI: Abdominal, flank and groin pain. No nausea, or vomiting  : Dysuria, urinary frequency   MSK: No joint pain  Skin: No rashes    Allergies  Allergies   Allergen Reactions    Accupril [Quinapril] Cough    Lipitor [Atorvastatin] Other (comments)     aches    Norvasc [Amlodipine] Swelling     On legs at 10 mg dose       Medications  Current Outpatient Medications   Medication Sig    furosemide (LASIX) 20 mg tablet TAKE 1 TABLET BY MOUTH EVERY DAY AS NEEDED    metoprolol tartrate (LOPRESSOR) 25 mg tablet TAKE 1 TABLET BY MOUTH TWO (2) TIMES A DAY.  gabapentin (NEURONTIN) 600 mg tablet TAKE 1 TABLET BY MOUTH THREE (3) TIMES DAILY.     ondansetron (ZOFRAN ODT) 4 mg disintegrating tablet Take 1 Tab by mouth every eight (8) hours as needed for Nausea.  buPROPion XL (WELLBUTRIN XL) 300 mg XL tablet Take 1 Tab by mouth every morning. Indications: Anxiousness associated with Depression    omeprazole (PRILOSEC) 20 mg capsule TAKE ONE CAPSULE BY MOUTH EVERY DAY    nystatin (MYCOSTATIN) 100,000 unit/mL suspension Take 5 mL by mouth four (4) times daily. swish and spit    dicyclomine (BENTYL) 10 mg capsule TAKE 1 (ONE) CAPSULE BY MOUTH AS NEEDED EVERY 6 HOURS    nitroglycerin (NITROSTAT) 0.4 mg SL tablet DISSOLVE 1 TABLET IN MOUTH EVERY 5 MINUTES AS NEEDED FOR CHEST PAIN    budesonide (PULMICORT FLEXHALER) 180 mcg/actuation aepb inhaler Take 2 Puffs by inhalation daily.  albuterol (ACCUNEB) 0.63 mg/3 mL nebulizer solution 3 mL by Nebulization route every six (6) hours as needed for Wheezing.  valACYclovir (VALTREX) 500 mg tablet Take 1 Tab by mouth two (2) times a day. (Patient taking differently: Take 500 mg by mouth daily as needed.)    rosuvastatin (CRESTOR) 40 mg tablet TAKE 1 TABLET BY MOUTH NIGHTLY    aspirin delayed-release 81 mg tablet Take  by mouth daily.  albuterol (PROAIR HFA) 90 mcg/actuation inhaler Take 1 Puff by inhalation every four (4) hours as needed for Wheezing or Shortness of Breath.  Blood-Glucose Meter monitoring kit Use to check glucose once a day    alcohol swabs (ALCOHOL PADS) padm Use when checking blood glucose    Lancets misc Use once a day. Please give one compatible with patient's meter    glucose blood VI test strips (BLOOD GLUCOSE TEST) strip Use once a day     No current facility-administered medications for this visit.       Past Medical History  Past Medical History:   Diagnosis Date    Arthritis     OA back,knees and hands    Asthma     Autoimmune disease ([address]     Ricardo Fire    CAD (coronary artery disease)     s/p stents 11/2015    Cardiac murmur     Depression     Diabetes ([address]     DVT (deep venous thrombosis) ([address]     GERD (gastroesophageal reflux disease)     Hypertension     Morbid obesity ([address]     Musculoskeletal disorder     EDMOND (obstructive sleep apnea)     wears cpap    S/P TONJA (total abdominal hysterectomy)     Thromboembolus ([address] 1996    PE     Past Surgical History   Past Surgical History:   Procedure Laterality Date    CARDIAC SURG PROCEDURE UNLIST  2015    STENT PLACED    HX APPENDECTOMY  1963    HX  SECTION  1978    HX GASTRIC BYPASS  2017    HX HYSTERECTOMY      HX TOTAL ABDOMINAL HYSTERECTOMY      age 22   [de-identified] DAYAN STEREO  BX BREAST RT ADDL W/CLIP AND SPECIMEN Right     neg      Social History  Social History     Socioeconomic History    Marital status:      Spouse name: Not on file    Number of children: Not on file    Years of education: Not on file    Highest education level: Not on file   Tobacco Use    Smoking status: Never Smoker    Smokeless tobacco: Never Used   Substance and Sexual Activity    Alcohol use: No     Alcohol/week: 0.0 standard drinks    Drug use: No    Sexual activity: Yes     Partners: Male     Birth control/protection: None     Immunizations   Immunization History   Administered Date(s) Administered    Influenza Vaccine 2015    Influenza Vaccine (Quad) PF 2017, 2018, 2019    Pneumococcal Polysaccharide (PPSV-23) 2015    TD Vaccine 2005    TDAP Vaccine 2012     Physical Exam:  Visit Vitals  /74 (BP 1 Location: Left arm, BP Patient Position: Sitting)   Pulse 83   Temp 98.2 °F (36.8 °C) (Oral)   Resp 18   Ht 5' 5\" (1.651 m)   Wt 139 lb (63 kg)   LMP 1985   SpO2 99%   BMI 23.13 kg/m²     Gen: NAD. Responds to all questions appropriately. Eye: Conjunctiva are clear. Lungs: No labored respirations. CTAB. CV: RRR. No m/r/g. Back: CVA tenderness bilaterally . Abdomen: Bowel sounds present. Soft. Tender to palpation in suprapubic region.  No rebound or guarding. Extremities: Moving all extremities equally. Labs  Recent Results (from the past 12 hour(s))   AMB POC URINALYSIS DIP STICK AUTO W/O MICRO    Collection Time: 10/14/19  3:20 PM   Result Value Ref Range    Color (UA POC) Yellow     Clarity (UA POC) Clear     Glucose (UA POC) Negative Negative    Bilirubin (UA POC) Negative Negative    Ketones (UA POC) Negative Negative    Specific gravity (UA POC) 1.005 1.001 - 1.035    Blood (UA POC) Negative Negative    pH (UA POC) 5.0 4.6 - 8.0    Protein (UA POC) Negative Negative    Urobilinogen (UA POC) 0.2 mg/dL 0.2 - 1    Nitrites (UA POC) Negative Negative    Leukocyte esterase (UA POC) 2+ Negative     Assessment/Plan:   Mak Wang is a 61 y.o. female here with recurrent uncomplicated cystitis. Plan:  1. Patient's VSS. 2. Recent cultures have grown Corynebacterium. She has previously been treated with Keflex and Bactrim with return of symptoms. Prescribed course of Macrobid on this occasion. Can also try Cipro in the future. Will wait for urine culture and sensitivities. 3. Due to costovertebral tenderness, albeit bilaterally, ordered renal ultrasound to check for stones or abnormalities. Last imaging (CT) in 05/2019 was normal.    4. Pt instructed to return to the office or go to the ER if symptoms such as fever, abdominal pain, back pain, nausea, or vomiting develop as this could indicate a more serious infection and/or infection in the kidneys. 5. Medical release of records signed to obtain records from [facility]   6. Patient to follow up with PCP on 11/18 for Well Woman Exam and pelvic exam to evaluate dryness and dyspareunia. I have discussed the diagnosis with the patient and the intended plan as seen in the above orders. The patient has received an after-visit summary and questions were answered concerning future plans. I have discussed medication side effects and warnings with the patient as well.     Patient discussed with  Kiesha Padilla (Attending Physician)     Sreedhar Mcguire MD

## 2019-10-14 ENCOUNTER — OFFICE VISIT (OUTPATIENT)
Dept: FAMILY MEDICINE CLINIC | Age: 59
End: 2019-10-14

## 2019-10-14 VITALS
OXYGEN SATURATION: 99 % | SYSTOLIC BLOOD PRESSURE: 115 MMHG | WEIGHT: 139 LBS | RESPIRATION RATE: 18 BRPM | TEMPERATURE: 98.2 F | DIASTOLIC BLOOD PRESSURE: 74 MMHG | HEART RATE: 83 BPM | HEIGHT: 65 IN | BODY MASS INDEX: 23.16 KG/M2

## 2019-10-14 DIAGNOSIS — N30.90 BLADDER INFECTION: Primary | ICD-10-CM

## 2019-10-14 LAB
BILIRUB UR QL STRIP: NEGATIVE
GLUCOSE UR-MCNC: NEGATIVE MG/DL
KETONES P FAST UR STRIP-MCNC: NEGATIVE MG/DL
PH UR STRIP: 5 [PH] (ref 4.6–8)
PROT UR QL STRIP: NEGATIVE
SP GR UR STRIP: 1 (ref 1–1.03)
UA UROBILINOGEN AMB POC: NORMAL (ref 0.2–1)
URINALYSIS CLARITY POC: CLEAR
URINALYSIS COLOR POC: YELLOW
URINE BLOOD POC: NEGATIVE
URINE LEUKOCYTES POC: NORMAL
URINE NITRITES POC: NEGATIVE

## 2019-10-14 RX ORDER — NITROFURANTOIN 25; 75 MG/1; MG/1
100 CAPSULE ORAL 2 TIMES DAILY
Qty: 10 CAP | Refills: 0 | Status: SHIPPED | OUTPATIENT
Start: 2019-10-14 | End: 2019-12-23 | Stop reason: ALTCHOICE

## 2019-10-14 NOTE — PROGRESS NOTES
Chief Complaint   Patient presents with    Urinary Burning     follo wup on bladder infection     1. Have you been to the ER, urgent care clinic since your last visit? Hospitalized since your last visit? No    2. Have you seen or consulted any other health care providers outside of the [address] [place] [address] since your last visit? Include any pap smears or colon screening. No     Reviewed record in preparation for visit and have obtained necessary documentation.

## 2019-10-14 NOTE — PATIENT INSTRUCTIONS
Please call the following number to schedule your imaging test that has been ordered:     [facility]  [phone].   Monday-Friday, 7:30am-6:30pm; Saturday, 8am-12pm.

## 2019-10-14 NOTE — PROGRESS NOTES
U/a with +2 LE and patient symptomatic   Sent for urine culture  Tried to treat with Macrobid on this occasion.  Past cultures with out susceptibilities

## 2019-10-16 LAB — BACTERIA UR CULT: NORMAL

## 2019-10-18 ENCOUNTER — TELEPHONE (OUTPATIENT)
Dept: FAMILY MEDICINE CLINIC | Age: 59
End: 2019-10-18

## 2019-10-18 NOTE — TELEPHONE ENCOUNTER
Returned Ms Crowe's call and relayed the results of her urine culture (no growth). She continues to have burning when she pees and her urine is dark despite drinking 96oz of water a day. She will complete the Keflex due to her symptoms and will return to care if symptoms worsen. She is also taking OTC Azo daily. She states her renal ultrasound is scheduled for next week. She has been told she has bladder prolapse and needs to follow up with her urogyn. I will be in touch following the ultrasound results.

## 2019-10-18 NOTE — TELEPHONE ENCOUNTER
10/18/19 8:59 AM   Called Ms Kenny Arechiga to discuss her urine culture result. Left her a generic message to call us back, and if available I will take the call or call her back.

## 2019-10-20 DIAGNOSIS — J45.40 MODERATE PERSISTENT ASTHMA WITHOUT COMPLICATION: ICD-10-CM

## 2019-10-20 DIAGNOSIS — M79.7 FIBROMYALGIA: ICD-10-CM

## 2019-10-20 DIAGNOSIS — G89.29 OTHER CHRONIC PAIN: ICD-10-CM

## 2019-10-20 RX ORDER — ALBUTEROL SULFATE 90 UG/1
1 AEROSOL, METERED RESPIRATORY (INHALATION)
Qty: 1 INHALER | Refills: 5 | Status: SHIPPED | OUTPATIENT
Start: 2019-10-20 | End: 2022-04-27 | Stop reason: SDUPTHER

## 2019-10-21 ENCOUNTER — TELEPHONE (OUTPATIENT)
Dept: FAMILY MEDICINE CLINIC | Age: 59
End: 2019-10-21

## 2019-10-21 RX ORDER — NITROGLYCERIN 0.4 MG/1
TABLET SUBLINGUAL
Qty: 25 TAB | Refills: 1 | Status: SHIPPED | OUTPATIENT
Start: 2019-10-21 | End: 2021-07-06

## 2019-10-21 RX ORDER — GABAPENTIN 600 MG/1
TABLET ORAL
Qty: 90 TAB | Refills: 0 | Status: SHIPPED | OUTPATIENT
Start: 2019-10-21 | End: 2019-12-24

## 2019-10-21 RX ORDER — CYCLOBENZAPRINE HCL 10 MG
5 TABLET ORAL
Qty: 15 TAB | Refills: 0 | Status: SHIPPED | OUTPATIENT
Start: 2019-10-21 | End: 2019-11-05

## 2019-10-21 RX ORDER — ALBUTEROL SULFATE 0.63 MG/3ML
0.63 SOLUTION RESPIRATORY (INHALATION)
Qty: 75 ML | Refills: 1 | Status: SHIPPED | OUTPATIENT
Start: 2019-10-21 | End: 2022-04-27 | Stop reason: SDUPTHER

## 2019-10-21 NOTE — TELEPHONE ENCOUNTER
Filling 30 day supply of Gabapentin. Will address refills at next visit. Will need controlled substance agreement.

## 2019-10-21 NOTE — TELEPHONE ENCOUNTER
Refill of nitrostat 0.4 SL tabs completed per VO of Dr. Subhash Saul.     Last OV: 7/2019  Next OV: 7/2020

## 2019-10-21 NOTE — TELEPHONE ENCOUNTER
----- Message from Lakeisha Magdaleno sent at 10/21/2019  9:47 AM EDT -----  Regarding: Dr. Sailaja Rodriguez Refill  Medication Refill    Caller (if not patient): Patient       Relationship of caller (if not patient): Requesting a refill on the following prescription: Flexeril 10 mg. Best contact number(s): [phone]      Name of medication and dosage if known: Flexeril 10 mg.        Is patient out of this medication (yes/no): Yes       Pharmacy name: [facility] listed in chart? (yes/no): Yes   Pharmacy phone number:      Details to clarify the request:      Lakeisha Magdaleno

## 2019-10-21 NOTE — TELEPHONE ENCOUNTER
Two patient identifiers confirmed. Patient notified written prescription for   Gabapentin 600 mg is ready for . Notified patient she can  written Gabapentin 600 mg prescription at . Patient verbalized understanding. Patient wanted to know if medication refill request for [id number] has been sent to pharmacy. Advised patient will sent request again to Dr. Mk Shields for medication refill of   Flexeril 10 mg. Patient verbalized understanding.

## 2019-10-22 DIAGNOSIS — N30.00 ACUTE CYSTITIS WITHOUT HEMATURIA: Primary | ICD-10-CM

## 2019-10-22 DIAGNOSIS — N39.0 URINARY TRACT INFECTION WITHOUT HEMATURIA, SITE UNSPECIFIED: ICD-10-CM

## 2019-10-24 ENCOUNTER — HOSPITAL ENCOUNTER (OUTPATIENT)
Dept: ULTRASOUND IMAGING | Age: 59
Discharge: HOME | End: 2019-10-24
Attending: STUDENT IN AN ORGANIZED HEALTH CARE EDUCATION/TRAINING PROGRAM
Payer: COMMERCIAL

## 2019-10-24 DIAGNOSIS — N30.00 ACUTE CYSTITIS WITHOUT HEMATURIA: ICD-10-CM

## 2019-10-24 DIAGNOSIS — F41.9 ANXIETY: ICD-10-CM

## 2019-10-24 DIAGNOSIS — N39.0 URINARY TRACT INFECTION WITHOUT HEMATURIA, SITE UNSPECIFIED: ICD-10-CM

## 2019-10-24 PROCEDURE — 76770 US EXAM ABDO BACK WALL COMP: CPT

## 2019-10-25 ENCOUNTER — TELEPHONE (OUTPATIENT)
Dept: FAMILY MEDICINE CLINIC | Age: 59
End: 2019-10-25

## 2019-10-25 RX ORDER — HYDROXYZINE PAMOATE 25 MG/1
25 CAPSULE ORAL
Qty: 30 CAP | Refills: 1 | Status: SHIPPED | OUTPATIENT
Start: 2019-10-25 | End: 2019-12-24

## 2019-10-25 NOTE — TELEPHONE ENCOUNTER
2:56 PM 10/25/19   Spoke to MsTrae Neo Smith regarding the results of her renal ultrasound. It was normal.   Her burning on urination returned two days ago after running out of Azo pills. She denies any external burning or irritation of her skin, she states it is on urination. I recommended she  some more Azo pills and can return to care to re-test her urine. She states she may come in to see us on Monday or Tuesday of next week. She will also call her Urologist to try and get in sooner, she currently has an appointment with them on 11/20/19.

## 2019-11-18 ENCOUNTER — OFFICE VISIT (OUTPATIENT)
Dept: FAMILY MEDICINE CLINIC | Age: 59
End: 2019-11-18

## 2019-11-18 ENCOUNTER — HOSPITAL ENCOUNTER (OUTPATIENT)
Dept: LAB | Age: 59
Discharge: HOME | End: 2019-11-18
Payer: MEDICARE

## 2019-11-18 VITALS
DIASTOLIC BLOOD PRESSURE: 82 MMHG | OXYGEN SATURATION: 99 % | HEIGHT: 65 IN | WEIGHT: 139 LBS | BODY MASS INDEX: 23.16 KG/M2 | HEART RATE: 68 BPM | SYSTOLIC BLOOD PRESSURE: 144 MMHG | TEMPERATURE: 98.1 F | RESPIRATION RATE: 16 BRPM

## 2019-11-18 DIAGNOSIS — Z01.419 WELL WOMAN EXAM WITH ROUTINE GYNECOLOGICAL EXAM: Primary | ICD-10-CM

## 2019-11-18 DIAGNOSIS — Z98.84 H/O GASTRIC BYPASS: ICD-10-CM

## 2019-11-18 DIAGNOSIS — E11.40 TYPE 2 DIABETES MELLITUS WITH DIABETIC NEUROPATHY, WITHOUT LONG-TERM CURRENT USE OF INSULIN (HCC): ICD-10-CM

## 2019-11-18 DIAGNOSIS — Z82.62 FAMILY HISTORY OF OSTEOPOROSIS IN MOTHER: ICD-10-CM

## 2019-11-18 DIAGNOSIS — I10 ESSENTIAL HYPERTENSION: ICD-10-CM

## 2019-11-18 DIAGNOSIS — N95.2 ATROPHIC VAGINITIS: ICD-10-CM

## 2019-11-18 DIAGNOSIS — M51.36 DDD (DEGENERATIVE DISC DISEASE), LUMBAR: ICD-10-CM

## 2019-11-18 LAB
ANION GAP SERPL CALC-SCNC: 3 MMOL/L (ref 5–15)
BUN SERPL-MCNC: 9 MG/DL (ref 6–20)
BUN/CREAT SERPL: 15 (ref 12–20)
CALCIUM SERPL-MCNC: 9.6 MG/DL (ref 8.5–10.1)
CHLORIDE SERPL-SCNC: 104 MMOL/L (ref 97–108)
CHOLEST SERPL-MCNC: 212 MG/DL
CO2 SERPL-SCNC: 34 MMOL/L (ref 21–32)
CREAT SERPL-MCNC: 0.59 MG/DL (ref 0.55–1.02)
ERYTHROCYTE [DISTWIDTH] IN BLOOD BY AUTOMATED COUNT: 12 % (ref 11.5–14.5)
EST. AVERAGE GLUCOSE BLD GHB EST-MCNC: 111 MG/DL
GLUCOSE SERPL-MCNC: 96 MG/DL (ref 65–100)
HBA1C MFR BLD: 5.5 % (ref 4–5.6)
HCT VFR BLD AUTO: 42.7 % (ref 35–47)
HDLC SERPL-MCNC: 50 MG/DL
HDLC SERPL: 4.2 {RATIO} (ref 0–5)
HGB BLD-MCNC: 13.7 G/DL (ref 11.5–16)
LDLC SERPL CALC-MCNC: 126.4 MG/DL (ref 0–100)
LIPID PROFILE,FLP: ABNORMAL
MCH RBC QN AUTO: 29.9 PG (ref 26–34)
MCHC RBC AUTO-ENTMCNC: 32.1 G/DL (ref 30–36.5)
MCV RBC AUTO: 93.2 FL (ref 80–99)
NRBC # BLD: 0 K/UL (ref 0–0.01)
NRBC BLD-RTO: 0 PER 100 WBC
PLATELET # BLD AUTO: 349 K/UL (ref 150–400)
PMV BLD AUTO: 10.4 FL (ref 8.9–12.9)
POTASSIUM SERPL-SCNC: 4.2 MMOL/L (ref 3.5–5.1)
RBC # BLD AUTO: 4.58 M/UL (ref 3.8–5.2)
SODIUM SERPL-SCNC: 141 MMOL/L (ref 136–145)
TRIGL SERPL-MCNC: 178 MG/DL (ref ?–150)
VLDLC SERPL CALC-MCNC: 35.6 MG/DL
WBC # BLD AUTO: 11.6 K/UL (ref 3.6–11)

## 2019-11-18 PROCEDURE — 85027 COMPLETE CBC AUTOMATED: CPT

## 2019-11-18 PROCEDURE — 80061 LIPID PANEL: CPT

## 2019-11-18 PROCEDURE — 83036 HEMOGLOBIN GLYCOSYLATED A1C: CPT

## 2019-11-18 PROCEDURE — 80048 BASIC METABOLIC PNL TOTAL CA: CPT

## 2019-11-18 RX ORDER — HYDROXYZINE 25 MG/1
TABLET, FILM COATED ORAL
COMMUNITY
End: 2019-12-24 | Stop reason: SDUPTHER

## 2019-11-18 RX ORDER — CYCLOBENZAPRINE HCL 10 MG
TABLET ORAL
COMMUNITY
End: 2019-11-18 | Stop reason: SDUPTHER

## 2019-11-18 RX ORDER — CYCLOBENZAPRINE HCL 10 MG
10 TABLET ORAL
Qty: 90 TAB | Refills: 1 | Status: SHIPPED | OUTPATIENT
Start: 2019-11-18 | End: 2020-01-13

## 2019-11-18 NOTE — PROGRESS NOTES
Chief Complaint   Patient presents with    Complete Physical     Blood pressure 144/82, pulse 68, temperature 98.1 °F (36.7 °C), temperature source Oral, resp. rate 16, height 5' 5\" (1.651 m), weight 139 lb (63 kg), last menstrual period 01/01/1985, SpO2 99 %. 1. Have you been to the ER, urgent care clinic since your last visit? Hospitalized since your last visit? No    2. Have you seen or consulted any other health care providers outside of the [address] [place] [address] since your last visit? Include any pap smears or colon screening.  No

## 2019-11-18 NOTE — PATIENT INSTRUCTIONS
- Schedule appointment with [address] [place] [address]. Call your insurance to see who is in network for counseling.     - Flexeril was sent to your pharmacy    - Mammogram has been ordered. You should receive a call to schedule. - A bone density scan (DEXA) was ordered. You should be able to schedule this with your mammogram.          Atrophic Vaginitis: Care Instructions  Your Care Instructions    Atrophic vaginitis is an irritation of the vagina. It's caused by thinning tissues and less moisture in the vaginal walls. It often happens during menopause when hormone levels change. Surgery to remove the ovaries also can cause it. Your doctor may do tests to rule out other causes. And you may get tests to measure your hormone levels. The problem is most often treated with the hormone estrogen. It comes in a cream, tablets, or a soft plastic ring that is placed in the vagina. Follow-up care is a key part of your treatment and safety. Be sure to make and go to all appointments, and call your doctor if you are having problems. It's also a good idea to know your test results and keep a list of the medicines you take. How can you care for yourself at home? · Use a water-based lubricant for your vagina if sex is dry or painful. Examples are Astroglide, Wet Lubricant Gel, and K-Y Jelly. · Talk with your doctor about using low-dose vaginal estrogen. It treats dryness and thinning tissue. · Do not douche. · Having sex improves blood flow to the vagina. This helps keep your tissue healthy. When should you call for help? Watch closely for changes in your health, and be sure to contact your doctor if:    · You have unexpected vaginal bleeding.     · You do not get better as expected. Where can you learn more? Go to http://maliha-sacnhez.info/. Enter R330 in the search box to learn more about \"Atrophic Vaginitis: Care Instructions. \"  Current as of: February 19, 2019  Content Version: 12.2  © 4826-4373 [employer]. Care instructions adapted under license by [employer] (which disclaims liability or warranty for this information). If you have questions about a medical condition or this instruction, always ask your healthcare professional. [address] any warranty or liability for your use of this information. Painful Sex: Care Instructions  Your Care Instructions    Painful sex can be caused by many things. You may have an injury, an infection, or a growth in your vagina. Or maybe you have muscle spasms. In some cases, the pain is caused by another medical condition, such as a spinal problem. Some medicines can cause dryness in the vagina. And as a woman gets older, her vagina gets drier. It may also narrow, shorten, and get stiffer. This dryness can make sex painful. Talk to your doctor about what might be causing your painful sex. Treatment may help. Follow-up care is a key part of your treatment and safety. Be sure to make and go to all appointments, and call your doctor if you are having problems. It's also a good idea to know your test results and keep a list of the medicines you take. How can you care for yourself at home? · Use a vaginal lubricant during sex. Examples are Astroglide, K-Y Jelly, and Wet Gel Lubricant. · Increase the time you and your partner spend touching each other before sex. This is called foreplay. · Try different positions for sex to find the most comfortable ones. · Ask your doctor about exercises to strengthen and relax your pelvic muscles. · Before sex, take a warm bath. This can relax you and reduce anxiety. · If your doctor prescribes any medicines, take them exactly as prescribed. Call your doctor if you think you are having a problem with your medicine. When should you call for help? Watch closely for changes in your health, and be sure to contact your doctor if you have any problems.   Where can you learn more?  Go to http://maliha-sanchez.info/. Enter R379 in the search box to learn more about \"Painful Sex: Care Instructions. \"  Current as of: February 19, 2019  Content Version: 12.2  © 5445-6168 [employer], [employer]. Care instructions adapted under license by [employer] (which disclaims liability or warranty for this information). If you have questions about a medical condition or this instruction, always ask your healthcare professional. [address] any warranty or liability for your use of this information.

## 2019-11-18 NOTE — PROGRESS NOTES
HPI:  Domo Rivera is a 61 y.o. female presenting for well woman exam.     Acute complaints: None. Appointment with Urology this week. Will have records sent over. Back pain. Followed by Dr. Michael Garcia. Required injections to help and usually last about 6-8 months. Using Flexeril PRN.     HTN. Elevated in office today. BPs good at home. Thinks it is related to her stressors. Exercise: Walks a lot at work. Diet: Doing pretty well. Follow up with bypass surgeon early next year. GYN:  S/p hysterectomy in her early 25s due to heavy bleeding and fibroids. Sexual: No hx of STDs. Currently sexually active with . Psych: Difficulty with stress (daughter, son-in-law and their kids had to move in with them). Endorses feelings of depressed mood. Feels safe at home. Brother recently diagnosed with cancer. Feels that the Wellbutrin and Atarax helps. Interested in counseling. Health Maintenance - reviewed:  Pap (age 21-65): S/p hysterectomy; however, says she has had paps each year. Mammogram (age 54-69): 2018. Abnormal and required a breast biopsy showed fibroadenoma. Colonoscopy (age 54-65): 2015 with 5 year follow up. Due 2020. Low Dose CT Lung (age 46-80 with 30ppy hx and current smoker or quit < 15 yrs): Not indicated     DEXA (>/= 73 yo or sooner with RF): Osteoporosis was dx in 45s with osteoporosis. Pt is s/p gastric bypass. HIV screening: Declined      Allergies- reviewed: Allergies   Allergen Reactions    Accupril [Quinapril] Cough    Lipitor [Atorvastatin] Other (comments)     aches    Norvasc [Amlodipine] Swelling     On legs at 10 mg dose         Medications- reviewed:   Current Outpatient Medications   Medication Sig    hydrOXYzine HCl (ATARAX) 25 mg tablet Take  by mouth three (3) times daily as needed for Itching.  cyclobenzaprine (FLEXERIL) 10 mg tablet Take 1 Tab by mouth three (3) times daily as needed for Muscle Spasm(s).     metoprolol tartrate (LOPRESSOR) 25 mg tablet TAKE 1 TABLET BY MOUTH TWO (2) TIMES A DAY.  nitroglycerin (NITROSTAT) 0.4 mg SL tablet Take one tab, wait five minutes. If pain persists, take another tab and wait five minutes. If pain persists, take another tab and dial 911.  albuterol (ACCUNEB) 0.63 mg/3 mL nebulizer solution 3 mL by Nebulization route every six (6) hours as needed for Wheezing.  gabapentin (NEURONTIN) 600 mg tablet Take 1 pill TID    albuterol (PROAIR HFA) 90 mcg/actuation inhaler Take 1 Puff by inhalation every four (4) hours as needed for Wheezing or Shortness of Breath.  furosemide (LASIX) 20 mg tablet TAKE 1 TABLET BY MOUTH EVERY DAY AS NEEDED    buPROPion XL (WELLBUTRIN XL) 300 mg XL tablet Take 1 Tab by mouth every morning. Indications: Anxiousness associated with Depression    omeprazole (PRILOSEC) 20 mg capsule TAKE ONE CAPSULE BY MOUTH EVERY DAY    nystatin (MYCOSTATIN) 100,000 unit/mL suspension Take 5 mL by mouth four (4) times daily. swish and spit    dicyclomine (BENTYL) 10 mg capsule TAKE 1 (ONE) CAPSULE BY MOUTH AS NEEDED EVERY 6 HOURS    budesonide (PULMICORT FLEXHALER) 180 mcg/actuation aepb inhaler Take 2 Puffs by inhalation daily.  valACYclovir (VALTREX) 500 mg tablet Take 1 Tab by mouth two (2) times a day. (Patient taking differently: Take 500 mg by mouth daily as needed.)    rosuvastatin (CRESTOR) 40 mg tablet TAKE 1 TABLET BY MOUTH NIGHTLY    aspirin delayed-release 81 mg tablet Take  by mouth daily.  Blood-Glucose Meter monitoring kit Use to check glucose once a day    alcohol swabs (ALCOHOL PADS) padm Use when checking blood glucose    Lancets misc Use once a day. Please give one compatible with patient's meter    glucose blood VI test strips (BLOOD GLUCOSE TEST) strip Use once a day    nitrofurantoin, macrocrystal-monohydrate, (MACROBID) 100 mg capsule Take 1 Cap by mouth two (2) times a day.  Indications: Bacterial Urinary Tract Infection    ondansetron (ZOFRAN ODT) 4 mg disintegrating tablet Take 1 Tab by mouth every eight (8) hours as needed for Nausea. No current facility-administered medications for this visit.           Past Medical History- reviewed:  Past Medical History:   Diagnosis Date    Arthritis     OA back,knees and hands    Asthma     Autoimmune disease ([facility]     Adrian Essex    CAD (coronary artery disease)     s/p stents 2015    Cardiac murmur     Depression     Diabetes ([facility]     DVT (deep venous thrombosis) ([facility])     GERD (gastroesophageal reflux disease)     Hypertension     Morbid obesity ([facility]     Musculoskeletal disorder     EDMOND (obstructive sleep apnea)     wears cpap    S/P TONJA (total abdominal hysterectomy)     [facility]us [facility]    PE         Past Surgical History- reviewed:   Past Surgical History:   Procedure Laterality Date    CARDIAC SURG PROCEDURE UNLIST  2015    STENT PLACED    HX APPENDECTOMY      HX  SECTION  1978    HX GASTRIC BYPASS  2017    HX HYSTERECTOMY  1985    HX TOTAL ABDOMINAL HYSTERECTOMY      age 22   Robert Danita DAYAN STEREO  BX BREAST RT ADDL W/CLIP AND SPECIMEN Right     neg          Social History- reviewed:  Social History     Socioeconomic History    Marital status:      Spouse name: Not on file    Number of children: Not on file    Years of education: Not on file    Highest education level: Not on file   Occupational History    Not on file   Social Needs    Financial resource strain: Not on file    Food insecurity:     Worry: Not on file     Inability: Not on file    Transportation needs:     Medical: Not on file     Non-medical: Not on file   Tobacco Use    Smoking status: Never Smoker    Smokeless tobacco: Never Used   Substance and Sexual Activity    Alcohol use: No     Alcohol/week: 0.0 standard drinks    Drug use: No    Sexual activity: Yes     Partners: Male     Birth control/protection: None   Lifestyle    Physical activity: Days per week: Not on file     Minutes per session: Not on file    Stress: Not on file   Relationships    Social connections:     Talks on phone: Not on file     Gets together: Not on file     Attends Advent service: Not on file     Active member of club or organization: Not on file     Attends meetings of clubs or organizations: Not on file     Relationship status: Not on file    Intimate partner violence:     Fear of current or ex partner: Not on file     Emotionally abused: Not on file     Physically abused: Not on file     Forced sexual activity: Not on file   Other Topics Concern    Not on file   Social History Narrative    Not on file         Immunizations- reviewed:   Immunization History   Administered Date(s) Administered    Influenza Vaccine 09/01/2015    Influenza Vaccine (Quad) PF 12/19/2017, 11/03/2018, 08/27/2019    Pneumococcal Polysaccharide (PPSV-23) 08/04/2015    TD Vaccine 11/07/2005    TDAP Vaccine 08/14/2012       Review of systems:  Items bolded if positive. Constitutional: Fever, chills, night sweats, weight loss, lymphadenopathy, fatigue  HEENT: Vision change, eye pain, rhinorrhea, sinus pain, epistaxis, dysphagia, change in hearing, tinnitus, vertigo.    Endocrine: Weight change, heat/ cold intolerance, tremor, insomnia, polyuria, polydipsia, polyphagia, abnl hair growth, nail changes  Cardiovascular: Chest pain, palpitations, syncope, lower extremity edema, orthopnea, paroxysmal nocturnal dyspnea  Pulmonary: Shortness of breath, dyspnea on exertion, cough, hemoptysis, wheezing  GI: Nausea, vomiting, diarrhea, melena, hematochezia, change in appetite, abdominal pain, change in bowel habits or stools  : Dysuria, frequency, urgency, incontinence, hematuria, nocturia  Musculoskeletal: joint swelling or pain, muscle pain, back pain  Skin:  Rash, New/growing/changing skin lesions  Neurologic: Headache, muscle weakness, paresthesias, anesthesia, ataxia, change in speech, change in gait   Psychiatric: depression, anxiety, hallucinations, sandro, SI/HI      Physical Exam  Visit Vitals  /82   Pulse 68   Temp 98.1 °F (36.7 °C) (Oral)   Resp 16   Ht 5' 5\" (1.651 m)   Wt 139 lb (63 kg)   LMP 01/01/1985   SpO2 99%   BMI 23.13 kg/m²       General appearance - alert, well appearing, and in no distress  Eyes - pupils equal and reactive, extraocular eye movements intact  Ears - bilateral TM's and external ear canals normal  Nose - normal and patent, no erythema, discharge or polyps  Mouth - mucous membranes moist, pharynx normal without lesions  Neck - supple, no significant adenopathy  Chest - clear to auscultation, no wheezes, rales or rhonchi, symmetric air entry  Heart - normal rate, regular rhythm, normal S1, S2, 2/6 MARÍA. Abdomen - soft, nontender, nondistended, no masses or organomegaly  Neurological - alert, oriented, normal speech, no focal findings or movement disorder noted  Musculoskeletal - no joint tenderness, deformity or swelling  Extremities - peripheral pulses normal, no pedal edema, no clubbing or cyanosis  Skin - normal coloration and turgor, no rashes, no suspicious skin lesions noted  Pelvic - Exam chaperoned by Montine Boxer, LPN. External genitalia normal without rashes or lesions. Urethral prolapse. Atrophic vaginal mucosa, normal appearing vaginal cuff. Cervix absent. No adnexal masses or tenderness. Breast - Exam chaperoned by Montine Boxer, LPN. Non tender. No masses. No nipple discharge. Assessment/Plan:   Ms. Duncan Fontana is a 61 y.o. female presenting for well woman health maintenance visit. · Counseled on importance of healthy diet, regular exercise, healthy lifestyle     · Pelvic exam done. Confirmed no cervix on exam.  Confirmed atrophic vaginitis and urethral  prolapse on exam. Urology follow up pending. · Mammogram ordered    · Colonoscopy due 2020    · Labs ordered    · DEXA ordered.   RF for early osteoporosis: mother dx in her 45s, s/p gastric bypass. · Recommended Counseling for her anxiety/ depression given ongoing changes in family. Will re-eval need to increase medications at next visit. MyChart follow up in 2 weeks. · Follow-up: 3 months for urinary issues after Urology eval.       Orders Placed This Encounter    DAYAN MAMMO BI SCREENING INCL CAD     Standing Status:   Future     Standing Expiration Date:   12/18/2020     Order Specific Question:   Reason for Exam     Answer:   Breast cancer screening    DEXA BONE DENSITY STUDY AXIAL     Standing Status:   Future     Standing Expiration Date:   11/18/2020     Order Specific Question:   Reason for Exam     Answer:   Early screening. RF: early osteoporisis in mother, pt is s/p gastric bypass.  CBC W/O DIFF     Standing Status:   Future     Standing Expiration Date:   37/10/7905    METABOLIC PANEL, BASIC     Standing Status:   Future     Standing Expiration Date:   11/18/2020    LIPID PANEL     Standing Status:   Future     Standing Expiration Date:   11/18/2020    HEMOGLOBIN A1C WITH EAG     Standing Status:   Future     Standing Expiration Date:   11/18/2020    hydrOXYzine HCl (ATARAX) 25 mg tablet     Sig: Take  by mouth three (3) times daily as needed for Itching.  DISCONTD: cyclobenzaprine (FLEXERIL) 10 mg tablet     Sig: Take  by mouth three (3) times daily as needed for Muscle Spasm(s).  cyclobenzaprine (FLEXERIL) 10 mg tablet     Sig: Take 1 Tab by mouth three (3) times daily as needed for Muscle Spasm(s). Dispense:  90 Tab     Refill:  1         I have discussed the diagnosis with the patient and the intended plan as seen in the above orders. The patient has received an after-visit summary and questions were answered concerning future plans. Informed pt to return to the office if new symptoms arise.       Neel Alfred MD

## 2019-11-19 NOTE — PROGRESS NOTES
A1c at goal  Lipids elevated; will confirm she is compliant with statin  Remaining labs good  Notified via [address]

## 2019-11-20 ENCOUNTER — DOCUMENTATION ONLY (OUTPATIENT)
Dept: FAMILY MEDICINE CLINIC | Age: 59
End: 2019-11-20

## 2019-11-20 NOTE — PROGRESS NOTES
Received Urologist records for review. Patient seen by Urology on 11/02/19 and then again on 11/14/19 for recurrent UTIs. At 11/02  Visit - started on Macrobid prophylactically for persistent dysuria and frequency. CT Abd/Pelv with no evidence of cause of hematuria. CT did note LUQ omental fat stranding and follow up CT suggested. At 11/14 visit - Cystoscopy performed and negative. Patient to complete 3 more days of Macrobid. Follow up in 1 year for repeat UA. She is to follow up with her PCP for omental stranding.    Report scanned into Media

## 2019-11-21 RX ORDER — ONDANSETRON 4 MG/1
TABLET, ORALLY DISINTEGRATING ORAL
Qty: 30 TAB | Refills: 0 | Status: SHIPPED | OUTPATIENT
Start: 2019-11-21 | End: 2020-01-02

## 2019-11-24 ENCOUNTER — TELEPHONE (OUTPATIENT)
Dept: FAMILY MEDICINE CLINIC | Age: 59
End: 2019-11-24

## 2019-11-24 NOTE — TELEPHONE ENCOUNTER
----- Message from Vida Bernabe sent at 11/22/2019  5:02 PM EST -----  Regarding: Dr. Roseline Fay  General Message/Vendor Calls    Caller's first and last name:      Reason for call:Pt is calling in reference to her mammogram.  [facility] called to get her schedule but the order needs to reflect the diagnosis mammogram order.       Callback required yes/no and why:Y      Best contact number(s):[phone]      Details to clarify the request:      Vida Bernabe

## 2019-12-02 ENCOUNTER — TELEPHONE (OUTPATIENT)
Dept: FAMILY MEDICINE CLINIC | Age: 59
End: 2019-12-02

## 2019-12-02 DIAGNOSIS — R92.8 ABNORMAL MAMMOGRAM OF RIGHT BREAST: ICD-10-CM

## 2019-12-02 DIAGNOSIS — Z12.31 ENCOUNTER FOR SCREENING MAMMOGRAM FOR MALIGNANT NEOPLASM OF BREAST: ICD-10-CM

## 2019-12-02 DIAGNOSIS — D24.1 FIBROADENOMA OF RIGHT BREAST: ICD-10-CM

## 2019-12-02 DIAGNOSIS — Z12.39 BREAST CANCER SCREENING: Primary | ICD-10-CM

## 2019-12-02 NOTE — TELEPHONE ENCOUNTER
Ordered mammogram.  Dx right breast mammo due to history of abnormal mammo with fibroadenoma on pathology in 2018. Normal left breast; screening mammo ordered.      Jomar Li MD

## 2019-12-03 DIAGNOSIS — D24.1 FIBROADENOMA OF RIGHT BREAST: ICD-10-CM

## 2019-12-03 DIAGNOSIS — R92.8 ABNORMAL MAMMOGRAM OF RIGHT BREAST: Primary | ICD-10-CM

## 2019-12-05 ENCOUNTER — PATIENT MESSAGE (OUTPATIENT)
Dept: FAMILY MEDICINE CLINIC | Age: 59
End: 2019-12-05

## 2019-12-12 ENCOUNTER — TELEPHONE (OUTPATIENT)
Dept: FAMILY MEDICINE CLINIC | Age: 59
End: 2019-12-12

## 2019-12-12 DIAGNOSIS — Z82.62 FAMILY HISTORY OF OSTEOPOROSIS IN MOTHER: ICD-10-CM

## 2019-12-12 DIAGNOSIS — M51.36 DDD (DEGENERATIVE DISC DISEASE), LUMBAR: Primary | ICD-10-CM

## 2019-12-12 DIAGNOSIS — Z98.84 H/O GASTRIC BYPASS: ICD-10-CM

## 2019-12-12 DIAGNOSIS — Z78.0 ASYMPTOMATIC MENOPAUSAL STATE: ICD-10-CM

## 2019-12-12 NOTE — TELEPHONE ENCOUNTER
Roselyn Santiago from [address] [place] [address] called stating the patient is scheduled for Bone Density and Mammo tomorrow and the order triggered ABN. Please change Dx froylan and place new order. They will reschedule the appointment to allow time to get new orders.

## 2019-12-13 ENCOUNTER — HOSPITAL ENCOUNTER (OUTPATIENT)
Dept: MAMMOGRAPHY | Age: 59
Discharge: HOME | End: 2019-12-13
Attending: FAMILY MEDICINE
Payer: COMMERCIAL

## 2019-12-13 DIAGNOSIS — Z12.31 VISIT FOR SCREENING MAMMOGRAM: ICD-10-CM

## 2019-12-13 DIAGNOSIS — R92.8 ABNORMAL MAMMOGRAM OF RIGHT BREAST: ICD-10-CM

## 2019-12-13 DIAGNOSIS — D24.1 FIBROADENOMA OF RIGHT BREAST: ICD-10-CM

## 2019-12-13 PROCEDURE — 77080 DXA BONE DENSITY AXIAL: CPT

## 2019-12-13 PROCEDURE — 77067 SCR MAMMO BI INCL CAD: CPT

## 2019-12-17 ENCOUNTER — OFFICE VISIT (OUTPATIENT)
Dept: FAMILY MEDICINE CLINIC | Age: 59
End: 2019-12-17

## 2019-12-17 DIAGNOSIS — F43.29 STRESS AND ADJUSTMENT REACTION: Primary | ICD-10-CM

## 2019-12-17 DIAGNOSIS — F41.9 ANXIETY: ICD-10-CM

## 2019-12-17 NOTE — PROGRESS NOTES
Paris Jones is a 61 y.o. female who presents to the [facility] for counseling. Referred by Dr. Sandra Guerra. -Mental health history: depression     -HPI: Patient endorses recent stress: her daughter's  and kids moved in, patient's brother was diagnosed with leukemia, patient had some past marital issues. Patient reports to be very emotionally unstable, she bursts in tears a lot, her blood pressure elevates a lot throughout  the day. She tried to leave the situation by leaving the room and staying alone in her bedroom. Patient works at [employer] as a , and provides additional assistance with kids during the day. She has been working in childcare for 25 years. Patient feels like that her emotional overload is related to her daughter's behavior: she lost custody of her older children and may lose custody of younger kids too (4 total). Patient feels overwhelmed and is looking for possibility to gain custody over 10 y.o grandson. Patient's 2 older sons have stable lives. -Stressors include: brother's illness, family moved in, argument with daughter    -Strategies pt has tried in an attempt to control stress: avoiding argument by walking away from it, spending time with her 7 dogs, garden in summer, reading     -Medications for anxiety and depression: Wellbutrin and Atarax     Current Outpatient Medications   Medication Sig Dispense Refill    ondansetron (ZOFRAN ODT) 4 mg disintegrating tablet TAKE 1 TABLET BY MOUTH EVERY 8 HOURS AS NEEDED FOR NAUSEA 30 Tab 0    hydrOXYzine HCl (ATARAX) 25 mg tablet Take  by mouth three (3) times daily as needed for Itching.  cyclobenzaprine (FLEXERIL) 10 mg tablet Take 1 Tab by mouth three (3) times daily as needed for Muscle Spasm(s). 90 Tab 1    metoprolol tartrate (LOPRESSOR) 25 mg tablet TAKE 1 TABLET BY MOUTH TWO (2) TIMES A DAY. 60 Tab 2    nitroglycerin (NITROSTAT) 0.4 mg SL tablet Take one tab, wait five minutes.  If pain persists, take another tab and wait five minutes. If pain persists, take another tab and dial 911. 25 Tab 1    albuterol (ACCUNEB) 0.63 mg/3 mL nebulizer solution 3 mL by Nebulization route every six (6) hours as needed for Wheezing. 75 mL 1    gabapentin (NEURONTIN) 600 mg tablet Take 1 pill TID 90 Tab 0    albuterol (PROAIR HFA) 90 mcg/actuation inhaler Take 1 Puff by inhalation every four (4) hours as needed for Wheezing or Shortness of Breath. 1 Inhaler 5    nitrofurantoin, macrocrystal-monohydrate, (MACROBID) 100 mg capsule Take 1 Cap by mouth two (2) times a day. Indications: Bacterial Urinary Tract Infection 10 Cap 0    furosemide (LASIX) 20 mg tablet TAKE 1 TABLET BY MOUTH EVERY DAY AS NEEDED 30 Tab 2    buPROPion XL (WELLBUTRIN XL) 300 mg XL tablet Take 1 Tab by mouth every morning. Indications: Anxiousness associated with Depression 90 Tab 3    omeprazole (PRILOSEC) 20 mg capsule TAKE ONE CAPSULE BY MOUTH EVERY DAY 90 Cap 1    nystatin (MYCOSTATIN) 100,000 unit/mL suspension Take 5 mL by mouth four (4) times daily. swish and spit 60 mL 0    dicyclomine (BENTYL) 10 mg capsule TAKE 1 (ONE) CAPSULE BY MOUTH AS NEEDED EVERY 6 HOURS 30 Cap 0    budesonide (PULMICORT FLEXHALER) 180 mcg/actuation aepb inhaler Take 2 Puffs by inhalation daily. 1 Inhaler 3    valACYclovir (VALTREX) 500 mg tablet Take 1 Tab by mouth two (2) times a day. (Patient taking differently: Take 500 mg by mouth daily as needed.) 60 Tab 3    rosuvastatin (CRESTOR) 40 mg tablet TAKE 1 TABLET BY MOUTH NIGHTLY 90 Tab 2    aspirin delayed-release 81 mg tablet Take  by mouth daily.  Blood-Glucose Meter monitoring kit Use to check glucose once a day 1 Kit 0    alcohol swabs (ALCOHOL PADS) padm Use when checking blood glucose 100 Pad 5    Lancets misc Use once a day.  Please give one compatible with patient's meter 1 Package 5    glucose blood VI test strips (BLOOD GLUCOSE TEST) strip Use once a day 100 Strip 5     Allergies   Allergen Reactions    Accupril [Quinapril] Cough    Lipitor [Atorvastatin] Other (comments)     aches    Norvasc [Amlodipine] Swelling     On legs at 10 mg dose     Past Medical History:   Diagnosis Date    Arthritis     OA back,knees and hands    Asthma     Autoimmune disease ([facility]     FIBROMYLGIA    CAD (coronary artery disease)     s/p stents 11/2015    Cardiac murmur     Depression     Diabetes ([facility]     DVT (deep venous thrombosis) ([facility] 1981    GERD (gastroesophageal reflux disease)     Hypertension     Morbid obesity ([facility]     Musculoskeletal disorder     EDMOND (obstructive sleep apnea)     wears cpap    S/P TONJA (total abdominal hysterectomy)     Thromboembolus ([facility] 1996    [place]     Family History   Problem Relation Age of Onset    Cancer Mother 67        colon    Diabetes Mother     Arthritis-osteo Mother     Stroke Mother     Migraines Mother     Diabetes Father     Heart Disease Father     Heart Attack Father     Hypertension Father     High Cholesterol Father     COPD Father     Heart Failure Father     Cancer Other     Diabetes Other     Heart Disease Other     Hypertension Other     Cancer Brother         lymphoma    Cancer Brother         PROSTATE AND LYMPHOMA    Cancer Maternal Grandmother         stomach    Asthma Brother     Asthma Brother     Stroke Brother     Cancer Maternal Aunt         lung     Breast Cancer Maternal Aunt     Cancer Maternal Uncle         lung    Cancer Maternal Uncle         melanoma    Anesth Problems Neg Hx      Social History     Socioeconomic History    Marital status:      Spouse name: Not on file    Number of children: Not on file    Years of education: Not on file    Highest education level: Not on file   Occupational History    Not on file   Social Needs    Financial resource strain: Not on file    Food insecurity:     Worry: Not on file     Inability: Not on file    Transportation needs:     Medical: Not on file     Non-medical: Not on file Tobacco Use    Smoking status: Never Smoker    Smokeless tobacco: Never Used   Substance and Sexual Activity    Alcohol use: No     Alcohol/week: 0.0 standard drinks    Drug use: No    Sexual activity: Yes     Partners: Male     Birth control/protection: None   Lifestyle    Physical activity:     Days per week: Not on file     Minutes per session: Not on file    Stress: Not on file   Relationships    Social connections:     Talks on phone: Not on file     Gets together: Not on file     Attends Mandaeism service: Not on file     Active member of club or organization: Not on file     Attends meetings of clubs or organizations: Not on file     Relationship status: Not on file    Intimate partner violence:     Fear of current or ex partner: Not on file     Emotionally abused: Not on file     Physically abused: Not on file     Forced sexual activity: Not on file   Other Topics Concern    Not on file   Social History Narrative    Not on file       Review of Systems   Constitutional: Negative for fever, malaise/fatigue and weight loss. Respiratory: Negative for shortness of breath. Cardiovascular: Negative for chest pain. Gastrointestinal: Negative for abdominal pain, constipation, diarrhea, nausea and vomiting. Musculoskeletal: Negative for falls. Neurological: Negative for weakness and headaches. Psychiatric/Behavioral: Negative for memory loss, substance abuse and suicidal ideas. The patient is nervous/anxious and has insomnia. All other systems reviewed and are negative. Social: denies smoking, admits that occasionally she has 1 alcoholic drink, denies illicit drugs use  SHx: s/p bariatric Sx 3 years ago, lost 100 lbs total, continues to follow up     Objective:     Psychiatric: Memory normal. Mood sad Affect appropriate  GEN: Well appearing. No apparent distress. Responds to all questions appropriately. Lungs: No labored respirations.  Talking in complete sentences without difficulty. Skin: no lesions, dry and warm    Assessment:       ICD-10-CM ICD-9-CM    1. Stress and adjustment reaction F43.29 309.89    2. Anxiety F41.9 300.00        Plan:     Greater than 50% of visit spent in counseling and coordination of care, topics discussed:    -Counseled on exercise and healthy diet to control: continue to follow with bariatrics for post op lifestyle modification   -Counseled on healthy ways of channeling anger and frustration  -Discussed importance of social support   -Counseled patient on ways to find meaning in life   -Patient is to follow-up in 4 weeks    Total Patient Care Time: 45 minutes. Patient seen and discussed with Dr. Rajendra Yee.     Lynne Fleming, MD  Family Medicine Resident

## 2019-12-17 NOTE — PATIENT INSTRUCTIONS
Adjustment Disorder: Care Instructions  Your Care Instructions    Adjustment disorder means that you have emotional or behavioral problems because of stress. But your response to the stress is far more severe than a normal response. It is severe enough to affect your work or social life and may cause depression and physical pains and problems. Events that may cause this response can include a divorce, money problems, or starting school or a new job. It might be anything that causes some stress. This disorder is most often a short-term problem. It happens within 3 months of the stressful event or change. If the response lasts longer than 6 months after the event ends, you may have a more serious disorder. Follow-up care is a key part of your treatment and safety. Be sure to make and go to all appointments, and call your doctor if you are having problems. It's also a good idea to know your test results and keep a list of the medicines you take. How can you care for yourself at home? · Go to all counseling sessions. Do not skip any because you are feeling better. · If your doctor prescribed medicines, take them exactly as prescribed. Call your doctor if you think you are having a problem with your medicine. You will get more details on the specific medicines your doctor prescribes. · Discuss the causes of your stress with a good friend or family member. Or you can join a support group for people with similar problems. Talking to others sometimes relieves stress. · Get at least 30 minutes of exercise on most days of the week. Walking is a good choice. You also may want to do other activities, such as running, swimming, cycling, or playing tennis or team sports. Relaxation techniques  Do relaxation exercises 10 to 20 minutes a day. You can play soothing, relaxing music while you do them, if you wish. · Tell others in your house that you are going to do your relaxation exercises.  Ask them not to disturb you.  · Find a comfortable, quiet place. · Lie down on your back, or sit with your back straight. · Focus on your breathing. Make it slow and steady. · Breathe in through your nose. Breathe out through either your nose or mouth. · Breathe deeply, filling up the area between your navel and your rib cage. Breathe so that your belly goes up and down. · Do not hold your breath. · Breathe like this for 5 to 10 minutes. Notice the feeling of calmness throughout your whole body. As you continue to breathe slowly and deeply, relax by doing these next steps for another 5 to 10 minutes:  · Tighten and relax each muscle group in your body. Start at your toes, and work your way up to your head. · Imagine your muscle groups relaxing and getting heavy. · Empty your mind of all thoughts. · Let yourself relax more and more deeply. · Be aware of the state of calmness that surrounds you. · When your relaxation time is over, you can bring yourself back to alertness by moving your fingers and toes. Then move your hands and feet. And then move your entire body. Sometimes people fall asleep during relaxation. But they most often wake up soon. · Always give yourself time to return to full alertness before you drive a car. Wait to do anything that might cause an accident if you are not fully alert. Never play a relaxation tape while you drive a car. When should you call for help? Call 911 anytime you think you may need emergency care. For example, call if:    · You feel you cannot stop from hurting yourself or someone else. Keep the numbers for these national suicide hotlines: 6-632-349-TALK (7-328.983.8913) and 5-389-BJJGAYG (9-462.599.4462).  If you or someone you know talks about suicide or feeling hopeless, get help right away.    Watch closely for changes in your health, and be sure to contact your doctor if:    · You have new anxiety, or your anxiety gets worse.     · You have been feeling sad, depressed, or hopeless or have lost interest in things that you usually enjoy.     · You do not get better as expected. Where can you learn more? Go to http://maliha-sanchez.info/. Enter [id number] in the search box to learn more about \"Adjustment Disorder: Care Instructions. \"  Current as of: April 7, 2019  Content Version: 12.2  © 1090-3666 [employer]. Care instructions adapted under license by [employer] (which disclaims liability or warranty for this information). If you have questions about a medical condition or this instruction, always ask your healthcare professional. [place] [address] any warranty or liability for your use of this information.

## 2019-12-17 NOTE — PROGRESS NOTES
[address] Medicine Residency Attending Addendum:  Patient encounter was discussed on the day of the encounter with Logan Ruiz MD, performing the key elements of the service. I discussed the findings, assessment and plan with the resident and agree with the resident's findings and plan as documented in the resident's note.       Bettie Pérez MD, CAQSM, RMSK

## 2019-12-23 ENCOUNTER — OFFICE VISIT (OUTPATIENT)
Dept: FAMILY MEDICINE CLINIC | Age: 59
End: 2019-12-23

## 2019-12-23 VITALS
BODY MASS INDEX: 23.16 KG/M2 | HEART RATE: 63 BPM | HEIGHT: 65 IN | RESPIRATION RATE: 18 BRPM | DIASTOLIC BLOOD PRESSURE: 84 MMHG | TEMPERATURE: 98 F | WEIGHT: 139 LBS | SYSTOLIC BLOOD PRESSURE: 131 MMHG

## 2019-12-23 DIAGNOSIS — E78.5 HYPERLIPIDEMIA, UNSPECIFIED HYPERLIPIDEMIA TYPE: ICD-10-CM

## 2019-12-23 DIAGNOSIS — B00.89 HERPES DERMATITIS: ICD-10-CM

## 2019-12-23 DIAGNOSIS — M85.89 OSTEOPENIA OF MULTIPLE SITES: ICD-10-CM

## 2019-12-23 DIAGNOSIS — I25.83 CORONARY ARTERY DISEASE DUE TO LIPID RICH PLAQUE: ICD-10-CM

## 2019-12-23 DIAGNOSIS — I25.10 CORONARY ARTERY DISEASE DUE TO LIPID RICH PLAQUE: ICD-10-CM

## 2019-12-23 DIAGNOSIS — R30.0 BURNING WITH URINATION: Primary | ICD-10-CM

## 2019-12-23 DIAGNOSIS — N30.01 ACUTE CYSTITIS WITH HEMATURIA: ICD-10-CM

## 2019-12-23 LAB
ALBUMIN UR QL STRIP: 30 MG/L
BILIRUB UR QL STRIP: NEGATIVE
CREATININE, URINE POC: 10 MG/DL
GLUCOSE UR-MCNC: NEGATIVE MG/DL
KETONES P FAST UR STRIP-MCNC: NEGATIVE MG/DL
MICROALBUMIN/CREAT RATIO POC: >300 MG/G
PH UR STRIP: 5.5 [PH] (ref 4.6–8)
PROT UR QL STRIP: NEGATIVE
SP GR UR STRIP: 1 (ref 1–1.03)
UA UROBILINOGEN AMB POC: NORMAL (ref 0.2–1)
URINALYSIS CLARITY POC: NORMAL
URINALYSIS COLOR POC: YELLOW
URINE BLOOD POC: NORMAL
URINE LEUKOCYTES POC: NORMAL
URINE NITRITES POC: NEGATIVE

## 2019-12-23 RX ORDER — SULFAMETHOXAZOLE AND TRIMETHOPRIM 800; 160 MG/1; MG/1
1 TABLET ORAL 2 TIMES DAILY
Qty: 6 TAB | Refills: 0 | Status: SHIPPED | OUTPATIENT
Start: 2019-12-23 | End: 2019-12-26

## 2019-12-23 RX ORDER — SULFAMETHOXAZOLE AND TRIMETHOPRIM 800; 160 MG/1; MG/1
1 TABLET ORAL 2 TIMES DAILY
Qty: 20 TAB | Refills: 0 | Status: SHIPPED | OUTPATIENT
Start: 2019-12-23 | End: 2019-12-23

## 2019-12-23 RX ORDER — VALACYCLOVIR HYDROCHLORIDE 500 MG/1
TABLET, FILM COATED ORAL
Qty: 30 TAB | Refills: 0 | Status: SHIPPED | OUTPATIENT
Start: 2019-12-23 | End: 2020-08-27

## 2019-12-23 RX ORDER — ROSUVASTATIN CALCIUM 20 MG/1
20 TABLET, COATED ORAL
Qty: 30 TAB | Refills: 5 | Status: SHIPPED | OUTPATIENT
Start: 2019-12-23 | End: 2020-06-23

## 2019-12-23 NOTE — PROGRESS NOTES
Nona Butler is a 61 y.o. female  Patient states generalized body aches x 1 day and low back pain. Patient states right hand discomfort especially during gripping. Chief Complaint   Patient presents with    Bladder Infection     discoloration of urine dark yellow and burning during urination x7 off and on     Hand Problem     chronic problem getting worst over 14 days       1. Have you been to the ER, urgent care clinic since your last visit? Hospitalized since your last visit? No  M  2. Have you seen or consulted any other health care providers outside of the [address] [place] [address] since your last visit? Include any pap smears or colon screening. No      Visit Vitals  /84 (BP 1 Location: Right arm, BP Patient Position: Sitting)   Pulse 63   Temp 98 °F (36.7 °C) (Oral)   Resp 18   Ht 5' 5\" (1.651 m)   Wt 139 lb (63 kg)   BMI 23.13 kg/m²           Health Maintenance Due   Topic Date Due    EYE EXAM RETINAL OR DILATED  08/24/2018    FOOT EXAM Q1  09/18/2019    MICROALBUMIN Q1  11/05/2019    COLONOSCOPY  06/23/2020         Medication Reconciliation completed, changes noted.   Please  Update medication list.

## 2019-12-23 NOTE — PATIENT INSTRUCTIONS
Female Urinary Tract: Anatomy Sketch    Current as of: February 19, 2019  Content Version: 12.2  © 0571-7830 [employer]. Care instructions adapted under license by [employer] (which disclaims liability or warranty for this information). If you have questions about a medical condition or this instruction, always ask your healthcare professional. [place] [address] any warranty or liability for your use of this information.

## 2019-12-23 NOTE — PROGRESS NOTES
History of Present Illness:     Chief Complaint   Patient presents with    Bladder Infection     discoloration of urine dark yellow and burning during urination x7 off and on     Hand Problem     chronic problem getting worst over 14 days     Pt is a 61y.o. year old female    Presents to clinic for hand issue and bladder infection. Burning with urination and dark urine  Started about 3 days ago  Last UTI in September   Planning to follow up with Urology for recurrent UTIs    Right ring finger getting stuck  Making it difficult to  things  Ongoing for a while, worse over past 2 weeks     HLD  Not taking consistently  Feels the medication makes her tired and achy       Past Medical History:   Diagnosis Date    Arthritis     OA back,knees and hands    Asthma     Autoimmune disease ([address]     Trey Post    CAD (coronary artery disease)     s/p stents 11/2015    Cardiac murmur     Depression     Diabetes ([address]     DVT (deep venous thrombosis) ([address] 1981    GERD (gastroesophageal reflux disease)     Hypertension     Morbid obesity ([address]     Musculoskeletal disorder     EDMOND (obstructive sleep apnea)     wears cpap    S/P TONJA (total abdominal hysterectomy)     Thromboembolus ([address] 1996    [place]         Current Outpatient Medications on File Prior to Visit   Medication Sig Dispense Refill    ondansetron (ZOFRAN ODT) 4 mg disintegrating tablet TAKE 1 TABLET BY MOUTH EVERY 8 HOURS AS NEEDED FOR NAUSEA 30 Tab 0    hydrOXYzine HCl (ATARAX) 25 mg tablet Take  by mouth three (3) times daily as needed for Itching.  cyclobenzaprine (FLEXERIL) 10 mg tablet Take 1 Tab by mouth three (3) times daily as needed for Muscle Spasm(s). 90 Tab 1    metoprolol tartrate (LOPRESSOR) 25 mg tablet TAKE 1 TABLET BY MOUTH TWO (2) TIMES A DAY. 60 Tab 2    nitroglycerin (NITROSTAT) 0.4 mg SL tablet Take one tab, wait five minutes. If pain persists, take another tab and wait five minutes.  If pain persists, take another tab and dial 911. 25 Tab 1    albuterol (ACCUNEB) 0.63 mg/3 mL nebulizer solution 3 mL by Nebulization route every six (6) hours as needed for Wheezing. 75 mL 1    gabapentin (NEURONTIN) 600 mg tablet Take 1 pill TID 90 Tab 0    albuterol (PROAIR HFA) 90 mcg/actuation inhaler Take 1 Puff by inhalation every four (4) hours as needed for Wheezing or Shortness of Breath. 1 Inhaler 5    furosemide (LASIX) 20 mg tablet TAKE 1 TABLET BY MOUTH EVERY DAY AS NEEDED 30 Tab 2    buPROPion XL (WELLBUTRIN XL) 300 mg XL tablet Take 1 Tab by mouth every morning. Indications: Anxiousness associated with Depression 90 Tab 3    omeprazole (PRILOSEC) 20 mg capsule TAKE ONE CAPSULE BY MOUTH EVERY DAY 90 Cap 1    nystatin (MYCOSTATIN) 100,000 unit/mL suspension Take 5 mL by mouth four (4) times daily. swish and spit 60 mL 0    dicyclomine (BENTYL) 10 mg capsule TAKE 1 (ONE) CAPSULE BY MOUTH AS NEEDED EVERY 6 HOURS 30 Cap 0    budesonide (PULMICORT FLEXHALER) 180 mcg/actuation aepb inhaler Take 2 Puffs by inhalation daily. 1 Inhaler 3    aspirin delayed-release 81 mg tablet Take  by mouth daily.  Blood-Glucose Meter monitoring kit Use to check glucose once a day 1 Kit 0    alcohol swabs (ALCOHOL PADS) padm Use when checking blood glucose 100 Pad 5    Lancets misc Use once a day. Please give one compatible with patient's meter 1 Package 5    glucose blood VI test strips (BLOOD GLUCOSE TEST) strip Use once a day 100 Strip 5     No current facility-administered medications on file prior to visit. Allergies:   Allergies   Allergen Reactions    Accupril [Quinapril] Cough    Lipitor [Atorvastatin] Other (comments)     aches    Norvasc [Amlodipine] Swelling     On legs at 10 mg dose         Review of Systems:  Denies fever, chills, sweats  Denies n/v/d, constipation  +Low back pain  +dysuria       Objective:     Vitals:    12/23/19 1435   BP: 131/84   Pulse: 63   Resp: 18   Temp: 98 °F (36.7 °C)   TempSrc: Oral Weight: 139 lb (63 kg)   Height: 5' 5\" (1.651 m)       Physical Exam:  General appearance - alert, well appearing, and in no distress  Abdomen - soft, nontender, nondistended, no masses or organomegaly. No CVA tenderness. MSK - Limited ROM in digits of right hand. Hypertrophy of DIP and PIP joints. Skin - Herpetiform lesion with surrounding erythema on palmar surface of right hand. Recent Labs:  Recent Results (from the past 12 hour(s))   AMB POC URINALYSIS DIP STICK AUTO W/O MICRO    Collection Time: 12/23/19  2:49 PM   Result Value Ref Range    Color (UA POC) Yellow     Clarity (UA POC) Slightly Cloudy     Glucose (UA POC) Negative Negative    Bilirubin (UA POC) Negative Negative    Ketones (UA POC) Negative Negative    Specific gravity (UA POC) 1.005 1.001 - 1.035    Blood (UA POC) Trace Negative    pH (UA POC) 5.5 4.6 - 8.0    Protein (UA POC) Negative Negative    Urobilinogen (UA POC) 0.2 mg/dL 0.2 - 1    Nitrites (UA POC) Negative Negative    Leukocyte esterase (UA POC) 3+ Negative   AMB POC URINE, MICROALBUMIN, SEMIQUANT (3 RESULTS)    Collection Time: 12/23/19  3:30 PM   Result Value Ref Range    ALBUMIN, URINE POC 30 Negative mg/L    CREATININE, URINE POC 10 mg/dL    Microalbumin/creat ratio (POC) >300 <30 MG/G         Assessment and Plan:   Pt is a 61y.o. year old female,      ICD-10-CM ICD-9-CM    1. Burning with urination R30.0 788.1 AMB POC URINALYSIS DIP STICK AUTO W/O MICRO      CULTURE, URINE      trimethoprim-sulfamethoxazole (BACTRIM DS, SEPTRA DS) 160-800 mg per tablet      DISCONTINUED: trimethoprim-sulfamethoxazole (BACTRIM DS, SEPTRA DS) 160-800 mg per tablet   2. Coronary artery disease due to lipid rich plaque I25.10 414.00 rosuvastatin (CRESTOR) 20 mg tablet    I25.83 414.3    3.  Hyperlipidemia, unspecified hyperlipidemia type E78.5 272.4 rosuvastatin (CRESTOR) 20 mg tablet      AMB POC URINE, MICROALBUMIN, SEMIQUANT (3 RESULTS)   4. Herpes dermatitis B00.89 054.79 valACYclovir (VALTREX) 500 mg tablet   5. Osteopenia of multiple sites M85.89 733.90 REFERRAL TO ENDOCRINOLOGY   6. Acute cystitis with hematuria N30.01 595.0 trimethoprim-sulfamethoxazole (BACTRIM DS, SEPTRA DS) 160-800 mg per tablet      DISCONTINUED: trimethoprim-sulfamethoxazole (BACTRIM DS, SEPTRA DS) 160-800 mg per tablet     Bactrim DS for empiric tx of acute cystitis  UCx sent    HLD  Pt not tolerating Crestor 40mg  Decrease to 20mg daily    Valtrex PRN herpetic lesion of hand    Osteopenia  Given hx of bypass surgery and early BMD loss, refer to Endocrine     Sandi Tan MD      I have discussed the diagnosis with the patient and the intended plan as seen in the above orders. The patient has received an after-visit summary and questions were answered concerning future plans.

## 2019-12-24 DIAGNOSIS — F41.9 ANXIETY: ICD-10-CM

## 2019-12-24 DIAGNOSIS — G89.29 OTHER CHRONIC PAIN: ICD-10-CM

## 2019-12-24 DIAGNOSIS — M79.7 FIBROMYALGIA: ICD-10-CM

## 2019-12-24 RX ORDER — GABAPENTIN 600 MG/1
TABLET ORAL
Qty: 90 TAB | Refills: 0 | Status: SHIPPED | OUTPATIENT
Start: 2019-12-24 | End: 2020-03-06 | Stop reason: SDUPTHER

## 2019-12-24 RX ORDER — HYDROXYZINE PAMOATE 25 MG/1
25 CAPSULE ORAL
Qty: 30 CAP | Refills: 1 | Status: SHIPPED | OUTPATIENT
Start: 2019-12-24 | End: 2020-01-21

## 2020-01-02 RX ORDER — ONDANSETRON 4 MG/1
TABLET, ORALLY DISINTEGRATING ORAL
Qty: 30 TAB | Refills: 0 | Status: SHIPPED | OUTPATIENT
Start: 2020-01-02 | End: 2020-11-05

## 2020-01-12 DIAGNOSIS — M51.36 DDD (DEGENERATIVE DISC DISEASE), LUMBAR: ICD-10-CM

## 2020-01-13 RX ORDER — CYCLOBENZAPRINE HCL 10 MG
TABLET ORAL
Qty: 90 TAB | Refills: 1 | Status: SHIPPED | OUTPATIENT
Start: 2020-01-13 | End: 2020-03-09

## 2020-01-21 DIAGNOSIS — F41.9 ANXIETY: ICD-10-CM

## 2020-01-21 RX ORDER — FUROSEMIDE 20 MG/1
TABLET ORAL
Qty: 30 TAB | Refills: 2 | Status: SHIPPED | OUTPATIENT
Start: 2020-01-21 | End: 2020-04-06

## 2020-01-21 RX ORDER — FUROSEMIDE 20 MG/1
TABLET ORAL
Qty: 90 TAB | Refills: 1 | Status: SHIPPED | OUTPATIENT
Start: 2020-01-21 | End: 2020-04-06 | Stop reason: SDUPTHER

## 2020-01-21 RX ORDER — HYDROXYZINE PAMOATE 25 MG/1
25 CAPSULE ORAL
Qty: 30 CAP | Refills: 1 | Status: SHIPPED | OUTPATIENT
Start: 2020-01-21 | End: 2020-04-22

## 2020-01-21 NOTE — TELEPHONE ENCOUNTER
Per VO of Dr. Sary Jackson: 7/8/2019    Future Appointments   Date Time Provider Yesy Olguin   7/20/2020  2:00 PM [facility]       Requested Prescriptions     Signed Prescriptions Disp Refills    furosemide (LASIX) 20 mg tablet 90 Tab 1     Sig: TAKE 1 TABLET BY MOUTH EVERY DAY AS NEEDED     Authorizing Provider: Latoya Davis     Ordering User: Mylene Marsh

## 2020-01-27 DIAGNOSIS — I25.10 CORONARY ARTERY DISEASE INVOLVING NATIVE CORONARY ARTERY OF NATIVE HEART WITHOUT ANGINA PECTORIS: ICD-10-CM

## 2020-01-28 RX ORDER — METOPROLOL TARTRATE 25 MG/1
TABLET, FILM COATED ORAL
Qty: 60 TAB | Refills: 2 | Status: SHIPPED | OUTPATIENT
Start: 2020-01-28 | End: 2020-04-24

## 2020-02-17 ENCOUNTER — OFFICE VISIT (OUTPATIENT)
Dept: SURGERY | Age: 60
End: 2020-02-17

## 2020-02-17 VITALS
RESPIRATION RATE: 18 BRPM | HEIGHT: 65 IN | OXYGEN SATURATION: 97 % | TEMPERATURE: 97.9 F | HEART RATE: 68 BPM | WEIGHT: 140 LBS | SYSTOLIC BLOOD PRESSURE: 138 MMHG | BODY MASS INDEX: 23.32 KG/M2 | DIASTOLIC BLOOD PRESSURE: 85 MMHG

## 2020-02-17 DIAGNOSIS — E53.8 VITAMIN B12 DEFICIENCY: ICD-10-CM

## 2020-02-17 DIAGNOSIS — E66.01 MORBID OBESITY (HCC): Primary | ICD-10-CM

## 2020-02-17 DIAGNOSIS — K91.2 POSTSURGICAL NONABSORPTION: ICD-10-CM

## 2020-02-17 DIAGNOSIS — Z98.84 S/P GASTRIC BYPASS: ICD-10-CM

## 2020-02-17 DIAGNOSIS — E55.9 VITAMIN D DEFICIENCY: ICD-10-CM

## 2020-02-17 DIAGNOSIS — D64.9 ANEMIA, UNSPECIFIED TYPE: ICD-10-CM

## 2020-02-17 DIAGNOSIS — R10.12 LUQ ABDOMINAL PAIN: ICD-10-CM

## 2020-02-17 NOTE — PROGRESS NOTES
HISTORY OF PRESENT ILLNESS  Miles Lamb is a 61 y.o. female with previous Malabsorptive Gastric bypass surgery 3 years ago . She has lost a total of  81 pounds since surgery. She  Has gained 5 lbs since the last ov. Body mass index is 23.3 kg/m². Cathy Reynolds She complains of more nausea with eating more textured foods. She complains that she has pain when she eats more textures foods and is finging foods are harder going down. She takes Zofran as needed. She is complaining of LUQ pain. She is taking Prilosec. Drinking  64 ounces of water daily. 40 gramsa protein intake daily. + BM's. Pt is walking and work for exercise. Denies smoking, ETOH and taking NSAIDs  Dietary recall -   Breakfast- yogurt and cinnamon toast cruch, egg  Lunch- tomato soup or salad, or tuna  Dinner- vegetables, chicken or ham    She is not snacking between meals; [place] Vitamins:  MVI : yes  Calcium : yes  B-Vit 12: yes    Patient has an advanced directive:  Not on file. Ms. Jaja Bruno has a reminder for a \"due or due soon\" health maintenance. I have asked that she contact her primary care provider for follow-up on this health maintenance.       Co-Morbid(s)           COMORBIDITY     SLEEP APNEA                 NO        GERD  (req.meds)            YES  HYPERLIPIDEMIA            NO  HYPERTENSION              YES         DIABETES                         NO           Current Outpatient Medications:     metoprolol tartrate (LOPRESSOR) 25 mg tablet, TAKE 1 TABLET BY MOUTH TWO (2) TIMES A DAY., Disp: 60 Tab, Rfl: 2    furosemide (LASIX) 20 mg tablet, TAKE 1 TABLET BY MOUTH EVERY DAY AS NEEDED, Disp: 90 Tab, Rfl: 1    furosemide (LASIX) 20 mg tablet, TAKE 1 TABLET BY MOUTH EVERY DAY AS NEEDED, Disp: 30 Tab, Rfl: 2    cyclobenzaprine (FLEXERIL) 10 mg tablet, TAKE 1 TABLET BY MOUTH THREE TIMES A DAY AS NEEDED FOR MUSCLE SPASMS, Disp: 90 Tab, Rfl: 1    ondansetron (ZOFRAN ODT) 4 mg disintegrating tablet, TAKE 1 TABLET BY MOUTH EVERY 8 HOURS AS NEEDED FOR NAUSEA, Disp: 30 Tab, Rfl: 0    gabapentin (NEURONTIN) 600 mg tablet, TAKE 1 TABLET BY MOUTH 3 TIMES A DAY, Disp: 90 Tab, Rfl: 0    valACYclovir (VALTREX) 500 mg tablet, Take 1 pill twice daily for 3 days at sign of herpes flare., Disp: 30 Tab, Rfl: 0    rosuvastatin (CRESTOR) 20 mg tablet, Take 1 Tab by mouth nightly., Disp: 30 Tab, Rfl: 5    albuterol (ACCUNEB) 0.63 mg/3 mL nebulizer solution, 3 mL by Nebulization route every six (6) hours as needed for Wheezing., Disp: 75 mL, Rfl: 1    albuterol (PROAIR HFA) 90 mcg/actuation inhaler, Take 1 Puff by inhalation every four (4) hours as needed for Wheezing or Shortness of Breath., Disp: 1 Inhaler, Rfl: 5    buPROPion XL (WELLBUTRIN XL) 300 mg XL tablet, Take 1 Tab by mouth every morning. Indications: Anxiousness associated with Depression, Disp: 90 Tab, Rfl: 3    omeprazole (PRILOSEC) 20 mg capsule, TAKE ONE CAPSULE BY MOUTH EVERY DAY, Disp: 90 Cap, Rfl: 1    nystatin (MYCOSTATIN) 100,000 unit/mL suspension, Take 5 mL by mouth four (4) times daily. swish and spit, Disp: 60 mL, Rfl: 0    dicyclomine (BENTYL) 10 mg capsule, TAKE 1 (ONE) CAPSULE BY MOUTH AS NEEDED EVERY 6 HOURS, Disp: 30 Cap, Rfl: 0    budesonide (PULMICORT FLEXHALER) 180 mcg/actuation aepb inhaler, Take 2 Puffs by inhalation daily. , Disp: 1 Inhaler, Rfl: 3    aspirin delayed-release 81 mg tablet, Take  by mouth daily. , Disp: , Rfl:     Blood-Glucose Meter monitoring kit, Use to check glucose once a day, Disp: 1 Kit, Rfl: 0    alcohol swabs (ALCOHOL PADS) padm, Use when checking blood glucose, Disp: 100 Pad, Rfl: 5    Lancets misc, Use once a day. Please give one compatible with patient's meter, Disp: 1 Package, Rfl: 5    glucose blood VI test strips (BLOOD GLUCOSE TEST) strip, Use once a day, Disp: 100 Strip, Rfl: 5    nitroglycerin (NITROSTAT) 0.4 mg SL tablet, Take one tab, wait five minutes. If pain persists, take another tab and wait five minutes.  If pain persists, take another tab and dial 911., Disp: 25 Tab, Rfl: 1      Visit Vitals  /85   Pulse 68   Temp 97.9 °F (36.6 °C) (Oral)   Resp 18   Ht 5' 5\" (1.651 m)   Wt 140 lb (63.5 kg)   LMP 01/01/1985   SpO2 97%   BMI 23.30 kg/m²     HPI    Review of Systems   Respiratory: Negative for shortness of breath. Cardiovascular: Negative for chest pain. Gastrointestinal: Positive for abdominal pain and nausea. Negative for heartburn and vomiting. Neurological: Negative for dizziness and headaches. Physical Exam  Constitutional:       Appearance: She is well-developed. Cardiovascular:      Rate and Rhythm: Normal rate and regular rhythm. Heart sounds: No murmur. No friction rub. No gallop. Pulmonary:      Effort: Pulmonary effort is normal.      Breath sounds: Normal breath sounds. Abdominal:      General: Bowel sounds are normal. There is no distension. Palpations: Abdomen is soft. Tenderness: There is abdominal tenderness (LUQ). Comments: No masses or hernias noted. Skin:     General: Skin is warm and dry. Neurological:      Mental Status: She is alert and oriented to person, place, and time. ASSESSMENT and PLAN  Kiki Jarvis is a 61 y.o. female with previous Malabsorptive Gastric bypass surgery 3 years ago . She has lost a total of  81 pounds since surgery. She  Has gained 5 lbs since the last ov. Body mass index is 23.3 kg/m². Aguilar Junk She complains of more nausea with eating more textured foods. She takes Zofran as needed. She is complaining of LUQ pain. She is taking Prilosec. Drinking  64 ounces of water daily. 40 gramsa protein intake daily. + BM's. Pt is walking and work for exercise. Denies smoking, ETOH and taking NSAIDs  REfer to Dr. Alexander Urias for EGD to r/o gastritis or stricture. Advised patient regard to diet that is high-protein, low-fat, low-sugar, limited carbohydrates. Strive for 50-60 grams of protein daily. If having a snack, foods that are protein or fiber rich. . No eating/drinking together, chew foods well, and portion control. Measure meals. Discussed snacking behavior and to [facility] pay attention to behavioral factor and habits. Drink at least 40-64 ounces of water or non-calorie/non-carbonated beverages daily. Continue vitamin regiment daily. Exercise at least 3 days a week with cardiovascular and strength training. Patient to follow up in 1 year. . Advised to call office if any questions/concerns. Check nutrition labs. 26 Minutes spent face to face with patient, >50 % of time spent counseling. Pt verbalized understanding and questions were answered to the best of my knowledge and ability. Lorna Ackerman

## 2020-02-17 NOTE — PATIENT INSTRUCTIONS
Eating Healthy Foods: Care Instructions  Your Care Instructions    Eating healthy foods can help lower your risk for disease. Healthy food gives you energy and keeps your heart strong, your brain active, your muscles working, and your bones strong. A healthy diet includes a variety of foods from the basic food groups: grains, vegetables, fruits, milk and milk products, and meat and beans. Some people may eat more of their favorite foods from only one food group and, as a result, miss getting the nutrients they need. So, it is important to pay attention not only to what you eat but also to what you are missing from your diet. You can eat a healthy, balanced diet by making a few small changes. Follow-up care is a key part of your treatment and safety. Be sure to make and go to all appointments, and call your doctor if you are having problems. It's also a good idea to know your test results and keep a list of the medicines you take. How can you care for yourself at home? Look at what you eat  · Keep a food diary for a week or two and record everything you eat or drink. Track the number of servings you eat from each food group. · For a balanced diet every day, eat a variety of:  ? 6 or more ounce-equivalents of grains, such as cereals, breads, crackers, rice, or pasta, every day. An ounce-equivalent is 1 slice of bread, 1 cup of ready-to-eat cereal, or ½ cup of cooked rice, cooked pasta, or cooked cereal.  ? 2½ cups of vegetables, especially:  § Dark-green vegetables such as broccoli and spinach. § Orange vegetables such as carrots and sweet potatoes. § Dry beans (such as marsh and kidney beans) and peas (such as lentils). ? 2 cups of fresh, frozen, or canned fruit. A small apple or 1 banana or orange equals 1 cup. ? 3 cups of nonfat or low-fat milk, yogurt, or other milk products. ? 5½ ounces of meat and beans, such as chicken, fish, lean meat, beans, nuts, and seeds.  One egg, 1 tablespoon of peanut butter, ½ ounce nuts or seeds, or ¼ cup of cooked beans equals 1 ounce of meat. · Learn how to read food labels for serving sizes and ingredients. Fast-food and convenience-food meals often contain few or no fruits or vegetables. Make sure you eat some fruits and vegetables to make the meal more nutritious. · Look at your food diary. For each food group, add up what you have eaten and then divide the total by the number of days. This will give you an idea of how much you are eating from each food group. See if you can find some ways to change your diet to make it more healthy. Start small  · Do not try to make dramatic changes to your diet all at once. You might feel that you are missing out on your favorite foods and then be more likely to fail. · Start slowly, and gradually change your habits. Try some of the following:  ? Use whole wheat bread instead of white bread. ? Use nonfat or low-fat milk instead of whole milk. ? Eat brown rice instead of white rice, and eat whole wheat pasta instead of white-flour pasta. ? Try low-fat cheeses and low-fat yogurt. ? Add more fruits and vegetables to meals and have them for snacks. ? Add lettuce, tomato, cucumber, and onion to sandwiches. ? Add fruit to yogurt and cereal.  Enjoy food  · You can still eat your favorite foods. You just may need to eat less of them. If your favorite foods are high in fat, salt, and sugar, limit how often you eat them, but do not cut them out entirely. · Eat a wide variety of foods. Make healthy choices when eating out  · The type of restaurant you choose can help you make healthy choices. Even fast-food chains are now offering more low-fat or healthier choices on the menu. · Choose smaller portions, or take half of your meal home. · When eating out, try:  ? A veggie pizza with a whole wheat crust or grilled chicken (instead of sausage or pepperoni).   ? Pasta with roasted vegetables, grilled chicken, or marinara sauce instead of cream sauce. ? A vegetable wrap or grilled chicken wrap. ? Broiled or poached food instead of fried or breaded items. Make healthy choices easy  · Buy packaged, prewashed, ready-to-eat fresh vegetables and fruits, such as baby carrots, salad mixes, and chopped or shredded broccoli and cauliflower. · Buy packaged, presliced fruits, such as melon or pineapple. · Choose 100% fruit or vegetable juice instead of soda. Limit juice intake to 4 to 6 oz (½ to ¾ cup) a day. · Blend low-fat yogurt, fruit juice, and canned or frozen fruit to make a smoothie for breakfast or a snack. Where can you learn more? Go to http://maliha-sanchez.info/. Enter T756 in the search box to learn more about \"Eating Healthy Foods: Care Instructions. \"  Current as of: November 7, 2018  Content Version: 12.2  © 2060-8346 [employer]. Care instructions adapted under license by [employer] (which disclaims liability or warranty for this information). If you have questions about a medical condition or this instruction, always ask your healthcare professional. [place] [address] any warranty or liability for your use of this information.

## 2020-02-17 NOTE — PROGRESS NOTES
1. Have you been to the ER, urgent care clinic since your last visit? Hospitalized since your last visit? No    2. Have you seen or consulted any other health care providers outside of the [address] [place] [address] since your last visit? Include any pap smears or colon screening.  No

## 2020-02-18 LAB
25(OH)D3+25(OH)D2 SERPL-MCNC: 29.6 NG/ML (ref 30–100)
ALBUMIN SERPL-MCNC: 4.8 G/DL (ref 3.8–4.9)
ALBUMIN/GLOB SERPL: 1.9 {RATIO} (ref 1.2–2.2)
ALP SERPL-CCNC: 135 IU/L (ref 39–117)
ALT SERPL-CCNC: 16 IU/L (ref 0–32)
AST SERPL-CCNC: 20 IU/L (ref 0–40)
BILIRUB SERPL-MCNC: 0.4 MG/DL (ref 0–1.2)
BUN SERPL-MCNC: 9 MG/DL (ref 6–24)
BUN/CREAT SERPL: 14 (ref 9–23)
CALCIUM SERPL-MCNC: 10 MG/DL (ref 8.7–10.2)
CHLORIDE SERPL-SCNC: 97 MMOL/L (ref 96–106)
CO2 SERPL-SCNC: 26 MMOL/L (ref 20–29)
CREAT SERPL-MCNC: 0.66 MG/DL (ref 0.57–1)
GLOBULIN SER CALC-MCNC: 2.5 G/DL (ref 1.5–4.5)
GLUCOSE SERPL-MCNC: 78 MG/DL (ref 65–99)
HCT VFR BLD AUTO: NORMAL %
HGB BLD-MCNC: NORMAL G/DL
IRON SERPL-MCNC: 105 UG/DL (ref 27–159)
PLATELET # BLD AUTO: NORMAL 10*3/UL
POTASSIUM SERPL-SCNC: 3.9 MMOL/L (ref 3.5–5.2)
PROT SERPL-MCNC: 7.3 G/DL (ref 6–8.5)
RBC # BLD AUTO: NORMAL 10*6/UL
SODIUM SERPL-SCNC: 141 MMOL/L (ref 134–144)
VIT B12 SERPL-MCNC: 331 PG/ML (ref 232–1245)
WBC # BLD AUTO: NORMAL X10E3/UL

## 2020-02-20 NOTE — PROGRESS NOTES
Mireille Bingham,   Your Vit D is low. Start Vit D 5,000 iu over the counter. All your other labs look good.    Kedar Somers

## 2020-02-27 RX ORDER — OMEPRAZOLE 20 MG/1
CAPSULE, DELAYED RELEASE ORAL
Qty: 30 CAP | Refills: 5 | Status: SHIPPED | OUTPATIENT
Start: 2020-02-27 | End: 2020-08-13

## 2020-02-28 ENCOUNTER — TELEPHONE (OUTPATIENT)
Dept: SURGERY | Age: 60
End: 2020-02-28

## 2020-02-28 NOTE — TELEPHONE ENCOUNTER
Pt is calling because Masha referred her to Dr Alexander Urais for the endoscopy but cannot see him because he is not in her Network with her Insurance. Pt stated that she would like a referral faxed over to Dr Felisha Salazar office because he is in her Network.     Fax: [phone]

## 2020-03-03 ENCOUNTER — DOCUMENTATION ONLY (OUTPATIENT)
Dept: SURGERY | Age: 60
End: 2020-03-03

## 2020-03-03 ENCOUNTER — TELEPHONE (OUTPATIENT)
Dept: SURGERY | Age: 60
End: 2020-03-03

## 2020-03-03 DIAGNOSIS — M79.7 FIBROMYALGIA: ICD-10-CM

## 2020-03-03 DIAGNOSIS — Z98.84 S/P GASTRIC BYPASS: Primary | ICD-10-CM

## 2020-03-03 DIAGNOSIS — E66.01 MORBID OBESITY (HCC): ICD-10-CM

## 2020-03-03 DIAGNOSIS — G89.29 OTHER CHRONIC PAIN: ICD-10-CM

## 2020-03-03 DIAGNOSIS — R10.12 LUQ ABDOMINAL PAIN: ICD-10-CM

## 2020-03-03 NOTE — TELEPHONE ENCOUNTER
Patient has appt 3/10/20 for med refill.     Asking for emergency supply to hold until then for her gabapentin.    thanks

## 2020-03-03 NOTE — TELEPHONE ENCOUNTER
I called the patient and I left her a voice mail letting her I faxed referral to Dr Garcia Farah and to call me back if she has any questions.

## 2020-03-06 RX ORDER — GABAPENTIN 600 MG/1
TABLET ORAL
Qty: 30 TAB | Refills: 0 | Status: SHIPPED | OUTPATIENT
Start: 2020-03-06 | End: 2020-03-10 | Stop reason: SDUPTHER

## 2020-03-09 DIAGNOSIS — M51.36 DDD (DEGENERATIVE DISC DISEASE), LUMBAR: ICD-10-CM

## 2020-03-09 RX ORDER — CYCLOBENZAPRINE HCL 10 MG
TABLET ORAL
Qty: 90 TAB | Refills: 1 | Status: SHIPPED | OUTPATIENT
Start: 2020-03-09 | End: 2020-05-06

## 2020-03-09 NOTE — PROGRESS NOTES
[address]  [address]  [phone]    Date of visit:  3/8/2020    Ander Mcghee is an 61 y.o. female presents for a gabapentin refill. Patient has been taking Gabapentin for her back pain and fibromyalgia for several years. She states she has good days and bad days with regards to her pain. Today her pain is minimal and stable. She has seen improvement on the Gabapentin. It is prescribed three times a day but she tries to only take it twice a day. She denies any dizziness or other side effects on the medication. She also follows with Ortho for spinal injections. Review of Systems   General/Constitutional:   No headache, fever, fatigue, weight loss or weight gain       Eyes:   No redness, pruritis, pain, visual changes, swelling, or discharge      Ears:    No pain, loss or changes in hearing     Neck:   No swelling, masses, stiffness, pain, or limited movement     Cardiac:    No chest pain      Respiratory:   No cough or shortness of breath     GI:   No nausea/vomiting, diarrhea, abdominal pain, bloody or dark stools       :   No dysuria or  hematuria    Neurological:   No loss of consciousness, dizziness, seizures, problems with balance, or unilateral weakness     Skin: No rash     Allergies   Allergies   Allergen Reactions    Accupril [Quinapril] Cough    Lipitor [Atorvastatin] Other (comments)     aches    Norvasc [Amlodipine] Swelling     On legs at 10 mg dose     Medications  Current Outpatient Medications   Medication Sig    gabapentin (NEURONTIN) 600 mg tablet TAKE 1 TABLET BY MOUTH 3 TIMES A DAY    omeprazole (PRILOSEC) 20 mg capsule TAKE ONE CAPSULE BY MOUTH EVERY DAY    metoprolol tartrate (LOPRESSOR) 25 mg tablet TAKE 1 TABLET BY MOUTH TWO (2) TIMES A DAY.     furosemide (LASIX) 20 mg tablet TAKE 1 TABLET BY MOUTH EVERY DAY AS NEEDED    furosemide (LASIX) 20 mg tablet TAKE 1 TABLET BY MOUTH EVERY DAY AS NEEDED    cyclobenzaprine (FLEXERIL) 10 mg tablet TAKE 1 TABLET BY MOUTH THREE TIMES A DAY AS NEEDED FOR MUSCLE SPASMS    ondansetron (ZOFRAN ODT) 4 mg disintegrating tablet TAKE 1 TABLET BY MOUTH EVERY 8 HOURS AS NEEDED FOR NAUSEA    valACYclovir (VALTREX) 500 mg tablet Take 1 pill twice daily for 3 days at sign of herpes flare.  rosuvastatin (CRESTOR) 20 mg tablet Take 1 Tab by mouth nightly.  nitroglycerin (NITROSTAT) 0.4 mg SL tablet Take one tab, wait five minutes. If pain persists, take another tab and wait five minutes. If pain persists, take another tab and dial 911.  albuterol (ACCUNEB) 0.63 mg/3 mL nebulizer solution 3 mL by Nebulization route every six (6) hours as needed for Wheezing.  albuterol (PROAIR HFA) 90 mcg/actuation inhaler Take 1 Puff by inhalation every four (4) hours as needed for Wheezing or Shortness of Breath.  buPROPion XL (WELLBUTRIN XL) 300 mg XL tablet Take 1 Tab by mouth every morning. Indications: Anxiousness associated with Depression    nystatin (MYCOSTATIN) 100,000 unit/mL suspension Take 5 mL by mouth four (4) times daily. swish and spit    dicyclomine (BENTYL) 10 mg capsule TAKE 1 (ONE) CAPSULE BY MOUTH AS NEEDED EVERY 6 HOURS    budesonide (PULMICORT FLEXHALER) 180 mcg/actuation aepb inhaler Take 2 Puffs by inhalation daily.  aspirin delayed-release 81 mg tablet Take  by mouth daily.  Blood-Glucose Meter monitoring kit Use to check glucose once a day    alcohol swabs (ALCOHOL PADS) padm Use when checking blood glucose    Lancets misc Use once a day. Please give one compatible with patient's meter    glucose blood VI test strips (BLOOD GLUCOSE TEST) strip Use once a day     No current facility-administered medications for this visit.       Medical History  Past Medical History:   Diagnosis Date    Arthritis     OA back,knees and hands    Asthma     Autoimmune disease ([facility]     Evan Nava    CAD (coronary artery disease)     s/p stents 11/2015  Cardiac murmur     Depression     Diabetes ([facility]     DVT (deep venous thrombosis) ([facility]) 1981    GERD (gastroesophageal reflux disease)     Hypertension     Morbid obesity ([facility]     Musculoskeletal disorder     EDMOND (obstructive sleep apnea)     wears cpap    S/P TONJA (total abdominal hysterectomy)     Thromboembolus ([facility] 1996    PE     Immunizations   Immunization History   Administered Date(s) Administered    Influenza Vaccine 09/01/2015    Influenza Vaccine (Quad) PF 12/19/2017, 11/03/2018, 08/27/2019    Pneumococcal Polysaccharide (PPSV-23) 08/04/2015    TD Vaccine 11/07/2005    TDAP Vaccine 08/14/2012     Social History  Social History     Tobacco Use    Smoking status: Never Smoker    Smokeless tobacco: Never Used   Substance Use Topics    Alcohol use: No     Alcohol/week: 0.0 standard drinks    Drug use: No     Objective     Visit Vitals  BP 97/64 (BP 1 Location: Left arm, BP Patient Position: Sitting)   Pulse 63   Temp 98.3 °F (36.8 °C) (Oral)   Resp 18   Ht 5' 5\" (1.651 m)   Wt 142 lb 12.8 oz (64.8 kg)   LMP 01/01/1985   SpO2 97%   BMI 23.76 kg/m²     Physical Examination  GEN: No apparent distress. Alert and oriented and responds to all questions appropriately. LUNGS: Respirations unlabored; clear to auscultation bilaterally  CARDIOVASCULAR: Regular, rate, and rhythm without murmurs, gallops or rubs   NEUROLOGIC:  No focal neurologic deficits. Strength and sensation grossly intact. Coordination and gait grossly intact. EXT: Well perfused. No edema. SKIN: No obvious rashes. Tylor Fulton is a 61 y.o. who presents for a gabapentin refill. Plan   1. Fibromyalgia  - Controlled substance contract completed today between the patient and myself, as well as her PCP Dr Joann Isaac.  Patient has a copy and it will be scanned into the chart  -  reviewed and appropriate  - UDS was not done on this occasion, would require it at next visit   - Refilled: gabapentin (NEURONTIN) 600 mg tablet; Take 1 Tab by mouth three (3) times daily. Max Daily Amount: 1,800 mg. TAKE 1 TABLET BY MOUTH 3 TIMES A DAY  Dispense: 45 Tab; Refill: 1    2. Type 2 diabetes mellitus with diabetic neuropathy, without long-term current use of insulin (HCC)  - Refilled glucose blood VI test strips (BLOOD GLUCOSE TEST) strip; Use once a day  Dispense: 100 Strip; Refill: 5    I have discussed the aforementioned diagnoses and plan with the patient in detail. I have provided information in person and/or in AVS. All questions answered prior to discharge.     I discussed this patient with Dr. Laura Ordaz (Attending Physician)   Signed By:  Harsha Givens MD    Family Medicine Resident

## 2020-03-10 ENCOUNTER — OFFICE VISIT (OUTPATIENT)
Dept: FAMILY MEDICINE CLINIC | Age: 60
End: 2020-03-10

## 2020-03-10 VITALS
WEIGHT: 142.8 LBS | SYSTOLIC BLOOD PRESSURE: 97 MMHG | DIASTOLIC BLOOD PRESSURE: 64 MMHG | RESPIRATION RATE: 18 BRPM | OXYGEN SATURATION: 97 % | HEIGHT: 65 IN | BODY MASS INDEX: 23.79 KG/M2 | TEMPERATURE: 98.3 F | HEART RATE: 63 BPM

## 2020-03-10 DIAGNOSIS — M79.7 FIBROMYALGIA: Primary | ICD-10-CM

## 2020-03-10 DIAGNOSIS — E11.40 TYPE 2 DIABETES MELLITUS WITH DIABETIC NEUROPATHY, WITHOUT LONG-TERM CURRENT USE OF INSULIN (HCC): ICD-10-CM

## 2020-03-10 DIAGNOSIS — G89.29 OTHER CHRONIC PAIN: ICD-10-CM

## 2020-03-10 PROBLEM — K52.9 INFLAMMATORY BOWEL DISEASE: Status: ACTIVE | Noted: 2020-03-10

## 2020-03-10 RX ORDER — GABAPENTIN 600 MG/1
600 TABLET ORAL 3 TIMES DAILY
Qty: 45 TAB | Refills: 1 | Status: SHIPPED | OUTPATIENT
Start: 2020-03-10 | End: 2020-05-28

## 2020-03-10 NOTE — PROGRESS NOTES
Identified pt with two pt identifiers(name and ). Reviewed record in preparation for visit and have obtained necessary documentation. Chief Complaint   Patient presents with    Medication Refill        Health Maintenance Due   Topic    Eye Exam Retinal or Dilated     Foot Exam Q1     Colonoscopy        Visit Vitals  BP 97/64 (BP 1 Location: Left arm, BP Patient Position: Sitting)   Pulse 63   Temp 98.3 °F (36.8 °C) (Oral)   Resp 18   Ht 5' 5\" (1.651 m)   Wt 142 lb 12.8 oz (64.8 kg)   SpO2 97%   BMI 23.76 kg/m²         Coordination of Care Questionnaire:  :   1) Have you been to an emergency room, urgent care, or hospitalized since your last visit? If yes, where when, and reason for visit? no       2. Have seen or consulted any other health care provider since your last visit? If yes, where when, and reason for visit? NO      3) Do you have an Advanced Directive/ Living Will in place? NO  If no, would you like information NO    Patient is accompanied by self I have received verbal consent from Rosalia Martell to discuss any/all medical information while they are present in the room.

## 2020-04-06 RX ORDER — FUROSEMIDE 20 MG/1
TABLET ORAL
Qty: 30 TAB | Refills: 2 | Status: SHIPPED | OUTPATIENT
Start: 2020-04-06 | End: 2020-06-30

## 2020-04-22 DIAGNOSIS — F41.9 ANXIETY: ICD-10-CM

## 2020-04-22 RX ORDER — HYDROXYZINE PAMOATE 25 MG/1
CAPSULE ORAL
Qty: 30 CAP | Refills: 1 | Status: SHIPPED | OUTPATIENT
Start: 2020-04-22 | End: 2020-05-26

## 2020-04-24 DIAGNOSIS — I25.10 CORONARY ARTERY DISEASE INVOLVING NATIVE CORONARY ARTERY OF NATIVE HEART WITHOUT ANGINA PECTORIS: ICD-10-CM

## 2020-04-24 RX ORDER — METOPROLOL TARTRATE 25 MG/1
TABLET, FILM COATED ORAL
Qty: 60 TAB | Refills: 2 | Status: SHIPPED | OUTPATIENT
Start: 2020-04-24 | End: 2020-07-20

## 2020-05-06 DIAGNOSIS — M51.36 DDD (DEGENERATIVE DISC DISEASE), LUMBAR: ICD-10-CM

## 2020-05-06 RX ORDER — CYCLOBENZAPRINE HCL 10 MG
TABLET ORAL
Qty: 90 TAB | Refills: 1 | Status: SHIPPED | OUTPATIENT
Start: 2020-05-06 | End: 2020-07-06

## 2020-05-12 RX ORDER — SUCRALFATE 1 G/1
1 TABLET ORAL 4 TIMES DAILY
COMMUNITY
End: 2022-04-27

## 2020-05-12 NOTE — PROGRESS NOTES
[address]  Endoscopy Preprocedure Instructions      1. On the day of your surgery, please report to registration located on the 2nd floor of the  [facility]. yes    2. You must have a responsible adult to drive you to the hospital, stay at the hospital during your procedure and drive you home. If they leave your procedure will not be started (no exceptions). yes    3. Do not have anything to eat or drink (including water, gum, mints, coffee, and juice) after midnight. This does not apply to the medications you were instructed to take by your physician. yesIf you are currently taking Plavix, Coumadin, Aspirin, or other blood-thinning agents, contact your physician for special instructions. yes    4. If you are having a procedure that requires bowel prep: We recommend that you have only clear liquids the day before your procedure and begin your bowel prep by 5:00 pm.  You may continue to drink clear liquids until midnight. If for any reason you are not able to complete your prep please notify your physician immediately. yes    5. Have a list of all current medications, including vitamins, herbal supplements and any other over the counter medications. yes    6. If you wear glasses, contacts, dentures and/or hearing aids, they may be removed prior to procedure, please bring a case to store them in. yes    7. You should understand that if you do not follow these instructions your procedure may be cancelled. If your physical condition changes (I.e. fever, cold or flu) please contact your doctor as soon as possible. 8. It is important that you be on time.   If for any reason you are unable to keep your appointment please call your physicians office prior to your scheduled procedure

## 2020-05-19 ENCOUNTER — ANESTHESIA EVENT (OUTPATIENT)
Dept: ENDOSCOPY | Age: 60
End: 2020-05-19
Payer: COMMERCIAL

## 2020-05-19 ENCOUNTER — HOSPITAL ENCOUNTER (OUTPATIENT)
Age: 60
Setting detail: OUTPATIENT SURGERY
Discharge: HOME | End: 2020-05-19
Attending: INTERNAL MEDICINE | Admitting: INTERNAL MEDICINE
Payer: COMMERCIAL

## 2020-05-19 ENCOUNTER — ANESTHESIA (OUTPATIENT)
Dept: ENDOSCOPY | Age: 60
End: 2020-05-19
Payer: COMMERCIAL

## 2020-05-19 VITALS
HEART RATE: 77 BPM | SYSTOLIC BLOOD PRESSURE: 133 MMHG | RESPIRATION RATE: 16 BRPM | HEIGHT: 65 IN | DIASTOLIC BLOOD PRESSURE: 67 MMHG | WEIGHT: 143.96 LBS | BODY MASS INDEX: 23.99 KG/M2 | OXYGEN SATURATION: 99 % | TEMPERATURE: 97.7 F

## 2020-05-19 LAB
GLUCOSE BLD STRIP.AUTO-MCNC: 92 MG/DL (ref 65–100)
H PYLORI FROM TISSUE: NEGATIVE
KIT LOT NO., HCLOLOT: NORMAL
NEGATIVE CONTROL: NEGATIVE
POSITIVE CONTROL: POSITIVE
SERVICE CMNT-IMP: NORMAL

## 2020-05-19 PROCEDURE — 74011000250 HC RX REV CODE- 250: Performed by: NURSE ANESTHETIST, CERTIFIED REGISTERED

## 2020-05-19 PROCEDURE — 74011250636 HC RX REV CODE- 250/636: Performed by: INTERNAL MEDICINE

## 2020-05-19 PROCEDURE — 77030018712 HC DEV BLLN INFL BSC -B: Performed by: INTERNAL MEDICINE

## 2020-05-19 PROCEDURE — 77030021593 HC FCPS BIOP ENDOSC BSC -A: Performed by: INTERNAL MEDICINE

## 2020-05-19 PROCEDURE — 76040000019: Performed by: INTERNAL MEDICINE

## 2020-05-19 PROCEDURE — 87077 CULTURE AEROBIC IDENTIFY: CPT | Performed by: INTERNAL MEDICINE

## 2020-05-19 PROCEDURE — 74011250636 HC RX REV CODE- 250/636: Performed by: NURSE ANESTHETIST, CERTIFIED REGISTERED

## 2020-05-19 PROCEDURE — 82962 GLUCOSE BLOOD TEST: CPT

## 2020-05-19 PROCEDURE — C1726 CATH, BAL DIL, NON-VASCULAR: HCPCS | Performed by: INTERNAL MEDICINE

## 2020-05-19 PROCEDURE — 76060000031 HC ANESTHESIA FIRST 0.5 HR: Performed by: INTERNAL MEDICINE

## 2020-05-19 RX ORDER — MIDAZOLAM HYDROCHLORIDE 1 MG/ML
.25-5 INJECTION, SOLUTION INTRAMUSCULAR; INTRAVENOUS
Status: DISCONTINUED | OUTPATIENT
Start: 2020-05-19 | End: 2020-05-19 | Stop reason: HOSPADM

## 2020-05-19 RX ORDER — ATROPINE SULFATE 0.1 MG/ML
0.4 INJECTION INTRAVENOUS
Status: DISCONTINUED | OUTPATIENT
Start: 2020-05-19 | End: 2020-05-19 | Stop reason: HOSPADM

## 2020-05-19 RX ORDER — SODIUM CHLORIDE 9 MG/ML
50 INJECTION, SOLUTION INTRAVENOUS CONTINUOUS
Status: DISCONTINUED | OUTPATIENT
Start: 2020-05-19 | End: 2020-05-19 | Stop reason: HOSPADM

## 2020-05-19 RX ORDER — PROPOFOL 10 MG/ML
INJECTION, EMULSION INTRAVENOUS AS NEEDED
Status: DISCONTINUED | OUTPATIENT
Start: 2020-05-19 | End: 2020-05-19 | Stop reason: HOSPADM

## 2020-05-19 RX ORDER — EPINEPHRINE 0.1 MG/ML
1 INJECTION INTRACARDIAC; INTRAVENOUS
Status: DISCONTINUED | OUTPATIENT
Start: 2020-05-19 | End: 2020-05-19 | Stop reason: HOSPADM

## 2020-05-19 RX ORDER — NALOXONE HYDROCHLORIDE 0.4 MG/ML
0.4 INJECTION, SOLUTION INTRAMUSCULAR; INTRAVENOUS; SUBCUTANEOUS
Status: DISCONTINUED | OUTPATIENT
Start: 2020-05-19 | End: 2020-05-19 | Stop reason: HOSPADM

## 2020-05-19 RX ORDER — LIDOCAINE HYDROCHLORIDE 20 MG/ML
INJECTION, SOLUTION EPIDURAL; INFILTRATION; INTRACAUDAL; PERINEURAL AS NEEDED
Status: DISCONTINUED | OUTPATIENT
Start: 2020-05-19 | End: 2020-05-19 | Stop reason: HOSPADM

## 2020-05-19 RX ORDER — DEXTROMETHORPHAN/PSEUDOEPHED 2.5-7.5/.8
1.2 DROPS ORAL
Status: DISCONTINUED | OUTPATIENT
Start: 2020-05-19 | End: 2020-05-19 | Stop reason: HOSPADM

## 2020-05-19 RX ORDER — FLUMAZENIL 0.1 MG/ML
0.2 INJECTION INTRAVENOUS
Status: DISCONTINUED | OUTPATIENT
Start: 2020-05-19 | End: 2020-05-19 | Stop reason: HOSPADM

## 2020-05-19 RX ADMIN — PROPOFOL 100 MG: 10 INJECTION, EMULSION INTRAVENOUS at 13:14

## 2020-05-19 RX ADMIN — PROPOFOL 50 MG: 10 INJECTION, EMULSION INTRAVENOUS at 13:17

## 2020-05-19 RX ADMIN — LIDOCAINE HYDROCHLORIDE 40 MG: 20 INJECTION, SOLUTION INTRAVENOUS at 13:21

## 2020-05-19 RX ADMIN — PROPOFOL 50 MG: 10 INJECTION, EMULSION INTRAVENOUS at 13:21

## 2020-05-19 RX ADMIN — SODIUM CHLORIDE: 9 INJECTION, SOLUTION INTRAVENOUS at 13:09

## 2020-05-19 NOTE — H&P
The patient is a 61year old female who presents with a complaint of Abdominal Pain. The patient presents for consultation at the request of Dr. Linn Blackmon). Note for \"Abdominal Pain\": She reports she has been having epigastric pain, she had gastric bypass done about 3 years ago. About a year and half ago she had an EGD done at the [address] [place] [address] and she thinks she had an ulcer a polyp removed. The pain can radiate to the LUQ area, feels nauseated and certain heavy meals and meats give her more symptoms. Problem List/Past Medical (Chelsea Monte; 2020 12:12 PM)  Asthma    Hiatal Hernia    Arthritis    Heart Murmur    Depression    Hypertension    Fibromyalgia (729.1  M79.7)    Herpes simplex virus   on hands- Valtrex prn  Family history of colon cancer (V16.0  Z80.0)    Constipation (564.00  K59.00)    Abdominal pain, periumbilical (416.60  Y49.20)    Non-alcoholic fatty liver disease (571.8  K76.0)    Abdominal pain, RUQ (789.01  R10.11)      Past Surgical History (Anaya Monte; 2020 12:12 PM)  Hysterectomy     Section   3x  Appendectomy      Allergies (Anaya Monte; 2020 12:12 PM)  No Known Allergies    No Known Drug Allergies  [2012]: Medication History (Chelsea Monte; 2020 12:12 PM)  Gabapentin (PHN)  (300MG Tablet, 3 Oral daily) Active. DOBUTamine HCl  (500MG/40ML Solution, Intravenous) Active. Perflutren Lipid Microsphere  (1.1MG/ML Suspension, Intravenous 1 dose) Active. Ranexa  (500MG Tablet ER 12HR, 1 Oral bid) Active. Crestor  (10MG Tablet, 1 Oral daily) Active. Aspirin EC Low Strength  (81MG Tablet DR, 1 Oral daily) Active. Omeprazole  (20MG Capsule DR, 1 Oral daily) Active. Chlorthalidone  (25MG Tablet, 1 Oral daily) Active. ALPRAZolam  (0.25MG Tablet, 3 Oral daily prn) Active. Ergocalciferol  (63330VLDX Capsule, 1 Oral every 7 days) Active. Valtrex  (500MG Tablet, Oral prn) Active.   Albuterol  (90MCG/ACT [employer], Inhalation every three hours, as needed) Active. (2 puffs)  Multivitamin  (Oral daily) Active. (Centrum)  Omeprazole  (20MG Tablet DR, Oral daily) Active. Dicyclomine HCl  (10MG Capsule, Oral every six hours, as needed) Active. Metoprolol Tartrate  (50MG Tablet, 1 Oral bid) Active. ZyrTEC Allergy  (10MG Capsule, 1 Oral daily) Active. AmLODIPine Besylate  (5MG Tablet, 1 Oral daily) Active. GuanFACINE HCl  (1MG Tablet, Oral two times daily) Active. Medications Reconciled     Family History ([employer]; 4/1/2020 12:12 PM)  Diabetes Mellitus   Mother, Father, Brother. Hypertension   Mother, Brother. Arthritis   Mother. Heart problem (429.9  I51.9)   Father. Lung/Respiratory Disease   Father. Lymph node cancer (196.9  C77. 9)   First Degree Relatives. Gallbladder Disease   First Degree Relatives. Melanoma   First Degree Relatives. Lung Cancer   First Degree Relatives. Social History ([employer]; 4/1/2020 12:12 PM)  Alcohol Use   Has never drank. Marital status   . Tobacco Use   Never smoker. Employment status   N/a. Diagnostic Studies History ([employer]; 4/1/2020 12:12 PM)  Colonoscopy    Endoscopy      Health Maintenance History ([employer]; 4/1/2020 12:12 PM)  Flu Vaccine  [2019]:  Pneumovax  [2019]:        Review of Systems ([employer]; 4/1/2020 12:15 PM)  General Not Present- Chronic Fatigue, Poor Appetite, Weight Gain and Weight Loss. Skin Not Present- Itching, Rash and Skin Color Changes. HEENT Not Present- Hearing Loss and Vertigo. Respiratory Not Present- Difficulty Breathing and TB exposure. Cardiovascular Not Present- Chest Pain, Use of Antibiotics before Dental Procedures and Use of Blood Thinners. Gastrointestinal Present- See HPI. Musculoskeletal Not Present- Arthritis, Hip Replacement Surgery and Knee Replacement Surgery. Neurological Not Present- Weakness. Psychiatric Not Present- Depression. Endocrine Not Present- Diabetes and Thyroid Problems.   Hematology Not Present- Anemia. Vitals (Anaya [employer]; 4/1/2020 12:11 PM)  4/1/2020 12:11 PM  Weight: 140 lb   Height: 65 in   Body Surface Area: 1.7 m²   Body Mass Index: 23.3 kg/m²                Assessment & Plan (Miles Moscoso MD; 4/1/2020 1:29 PM)  Abdominal pain, epigastric (789.06  R10.13)  Impression: Concerned about presence of marginal ulcer or gastritis. Recommended she takes omeprazole twice a day and sucralfate four times a day. Will review most recent endoscopy by Dr. Frederick Mane at the [facility]. Current Plans  Started Omeprazole 20MG, 1 (one) Tablet BID, #60, 30 days starting 04/01/2020, Ref. x3.  Started Sucralfate 1GM, 1 (one) Tablet QID, #120, 30 days starting 04/01/2020, Ref. x3.  Abdominal pain, acute, left upper quadrant (789.02  R10.12)  Constipation (Preliminary Diagnosis) (564.00  K59.00)  Impression: Recommended one time dulcolax followed by Miralax and then daily Miralax and citrucel. If bothered by Miralax, I suggested amanda milk of magnesia tablets. Current Plans  CBC & PLATELETS (AUTO) (96836)  METABOLIC PANEL, BASIC (53552)  TSH (THYROID STIMULATING HORMONE) (56462)  Pt Education - How to access health information online: discussed with patient and provided information. Patient is to call me for any questions or concerns.   Follow up in 4-6 weeks

## 2020-05-19 NOTE — ROUTINE PROCESS
Micah Bashir  1960  [id number]    Situation:  Verbal report received from: SANTO Traylor RN  Procedure: Procedure(s):  ESOPHAGOGASTRODUODENOSCOPY (EGD) (ESSENTIAL)  ESOPHAGOGASTRODUODENAL (EGD) BIOPSY  ESOPHAGEAL DILATION    Background:    Preoperative diagnosis: ABDOMINAL PAIN-GASTRIC  Postoperative diagnosis: 1.- Hiatal Hernia  2.- GB Anatomy  3.- Gastr-jejunal Anastamotic Stricture    :  Dr. Radu Coombs  Assistant(s): Endoscopy Technician-1: Thalia Lam  Endoscopy RN-1: Annita Cogan, RN    Specimens: * No specimens in log *  H. Pylori  yes    Assessment:  Intra-procedure medications     Anesthesia gave intra-procedure sedation and medications, see anesthesia flow sheet yes    Intravenous fluids: NS@ KVO     Vital signs stable     Abdominal assessment: round and soft   No crepitus felt around collarbones    Recommendation:  Discharge patient per MD order. Family or Friend   Permission to share finding with family or friend yes    Endoscopy discharge instructions have been reviewed and given to patient and spouse. The patient and spouse verbalized understanding and acceptance of instructions.

## 2020-05-19 NOTE — DISCHARGE INSTRUCTIONS
Marcela Walton M.D.  [phone]           2020  Ksenia Barajas  :  1960  [facility] Medical Record Number:  [id number]        ENDOSCOPY FINDINGS:   Your endoscopy showed a small size hiatal hernia and mild narrowing at the junction of the stomach and jejunum, gentle dilation was performed. EGD DISCHARGE INSTRUCTIONS    DISCOMFORT:  Sore throat- throat lozenges or warm salt water gargle  redness at IV site- apply warm compress to area; if redness or soreness persist- contact your physician  Gaseous discomfort- walking, belching will help relieve any discomfort  You may not operate a vehicle for 12 hours  You may not engage in an occupation involving machinery or appliances for rest of today  You may not drink alcoholic beverages for at least 12 hours  Avoid making any critical decisions for at least 24 hour    DIET:   You may resume your bariatric diet with frequent small meal portions. ACTIVITY  Spend the remainder of the day resting -  avoid any strenuous activity. Avoid driving or operating machinery. CALL M.D. ANY SIGN OF   Increasing pain, nausea, vomiting  Abdominal distension (swelling)  New increased bleeding (oral or rectal)  Fever (chills)  Pain in chest area  Bloody discharge from nose or mouth  Shortness of breath    Follow-up Instructions:   Call Dr. Luisa Magaña for any questions or problems. Telephone # [phone]  Biopsies were obtained, the results will be available  in  5 to 7 days. We will call you to notify you of these results. Continue same medications. Follow up in the office.

## 2020-05-19 NOTE — PROGRESS NOTES
CRE balloon dilatation of the esophagus   12 mm Balloon inflated to 3 ATMs and held for 5 seconds. 13.5 mm Balloon inflated to 4.5 ATMs and held for 5 seconds. 15 mm Balloon inflated to 8 ATMs and held for 5 seconds. No subcutaneous crepitus of the chest or cervical region was noted post dilatation.

## 2020-05-19 NOTE — PROCEDURES
Jai Hughes M.D.  [phone]           2020                EGD Operative Report  Emilia Shah  :  1960  [facility] Medical Record Number:  [id number]      Indication:  Abdominal pain, epigastric     : Chris Saul MD    Referring Provider:  Brigitte Hanna MD      Anesthesia/Sedation:  MAC anesthesia    Airway assessment: No airway problems anticipated    Pre-Procedural Exam:      Airway: clear, no airway problems anticipated  Heart: RRR, without gallops or rubs  Lungs: clear bilaterally without wheezes, crackles, or rhonchi  Abdomen: soft, nontender, nondistended, bowel sounds present  Mental Status: awake, alert and oriented to person, place and time       Procedure Details     After infomed consent was obtained for the procedure, with all risks and benefits of procedure explained the patient was taken to the endoscopy suite and placed in the left lateral decubitus position. Following sequential administration of sedation as per above, the endoscope was inserted into the mouth and advanced under direct vision to second portion of the duodenum. A careful inspection was made as the gastroscope was withdrawn, including a retroflexed view of the proximal stomach; findings and interventions are described below. Findings:   Esophagus:Mucosa within normal throughout  Stomach: Small size hiatal hernia noted, evidence of previous julia-en-y gastrojejunostomy seen. Gastric pouch showed normal mucosa, except minimal erythema adjacent to the anastomosis, slightly narrow anastomosis noted. Jejunum: Mucosa within normal in the blind limb and enteric limb. Therapies:  Anastomosis balloon dilation performed at 12 mm, 13.5 mm and 15 mm with minimal effective dilation seen. Specimens: Pyloritek           Complications:   None; patient tolerated the procedure well. EBL:  None.            Impression:    1.- Hiatal Hernia  2.- S/P gastric bypass surgery  3.- Gastro-jejunal Anastamotic narrowing that was dilated    Recommendations:    -Continue acid suppression.  -Await IVY test result and treat for Helicobacter pylori if positive.  -Continue with frequent small meal portions  -Follow-up in the office.     Yumiko Prasad MD

## 2020-05-19 NOTE — ANESTHESIA POSTPROCEDURE EVALUATION
Procedure(s):  ESOPHAGOGASTRODUODENOSCOPY (EGD) (ESSENTIAL)  ESOPHAGOGASTRODUODENAL (EGD) BIOPSY  ESOPHAGEAL DILATION. MAC    Anesthesia Post Evaluation      Multimodal analgesia: multimodal analgesia not used between 6 hours prior to anesthesia start to PACU discharge  Patient location during evaluation: PACU  Patient participation: complete - patient participated  Level of consciousness: awake and alert  Pain score: 1  Pain management: satisfactory to patient  Airway patency: patent  Anesthetic complications: no  Cardiovascular status: acceptable  Respiratory status: acceptable  Hydration status: acceptable  Post anesthesia nausea and vomiting:  none      Vitals Value Taken Time   /68 5/19/2020  1:41 PM   Temp 36.5 °C (97.7 °F) 5/19/2020  1:31 PM   Pulse 74 5/19/2020  1:44 PM   Resp 11 5/19/2020  1:44 PM   SpO2 100 % 5/19/2020  1:44 PM   Vitals shown include unvalidated device data.

## 2020-05-19 NOTE — ANESTHESIA PREPROCEDURE EVALUATION
Relevant Problems   No relevant active problems   Anesthetic History   No history of anesthetic complications            Review of Systems / Medical History  Patient summary reviewed and pertinent labs reviewed    Pulmonary        Sleep apnea: CPAP    Asthma : well controlled       Neuro/Psych   Within defined limits           Cardiovascular    Hypertension          CAD and cardiac stents    Exercise tolerance: >4 METS     GI/Hepatic/Renal     GERD: well controlled          Comments: Fatty liver Endo/Other    Diabetes: well controlled, type 2    Obesity and arthritis     Other Findings              Physical Exam    Airway  Mallampati: II  TM Distance: 4 - 6 cm  Neck ROM: normal range of motion   Mouth opening: Normal     Cardiovascular    Rhythm: regular  Rate: normal         Dental  No notable dental hx       Pulmonary  Breath sounds clear to auscultation               Abdominal         Other Findings            Anesthetic Plan    ASA: 3  Anesthesia type: general            Anesthetic plan and risks discussed with: Patient              Anesthetic History               Review of Systems / Medical History  Patient summary reviewed and pertinent labs reviewed    Pulmonary            Asthma : well controlled  Pertinent negatives: No sleep apnea     Neuro/Psych         Psychiatric history (anxeity / depression)     Cardiovascular    Hypertension            Pertinent negatives: No CAD  Exercise tolerance: >4 METS     GI/Hepatic/Renal     GERD        Pertinent negatives: No liver disease   Endo/Other    Diabetes: type 2      Pertinent negatives: No morbid obesity and arthritis   Other Findings              Physical Exam    Airway  Mallampati: II  TM Distance: 4 - 6 cm  Neck ROM: normal range of motion   Mouth opening: Normal     Cardiovascular    Rhythm: regular  Rate: normal         Dental  No notable dental hx       Pulmonary  Breath sounds clear to auscultation               Abdominal  GI exam deferred       Other Findings            Anesthetic Plan    ASA: 3  Anesthesia type: MAC          Induction: Intravenous  Anesthetic plan and risks discussed with: Patient

## 2020-05-19 NOTE — PROGRESS NOTES

## 2020-05-23 DIAGNOSIS — F41.9 ANXIETY: ICD-10-CM

## 2020-05-25 DIAGNOSIS — G89.29 OTHER CHRONIC PAIN: ICD-10-CM

## 2020-05-25 DIAGNOSIS — M79.7 FIBROMYALGIA: ICD-10-CM

## 2020-05-26 RX ORDER — HYDROXYZINE PAMOATE 25 MG/1
CAPSULE ORAL
Qty: 30 CAP | Refills: 1 | Status: SHIPPED | OUTPATIENT
Start: 2020-05-26 | End: 2020-06-23

## 2020-05-27 ENCOUNTER — TELEPHONE (OUTPATIENT)
Dept: FAMILY MEDICINE CLINIC | Age: 60
End: 2020-05-27

## 2020-05-28 RX ORDER — GABAPENTIN 600 MG/1
TABLET ORAL
Qty: 90 TAB | Refills: 1 | Status: SHIPPED | OUTPATIENT
Start: 2020-05-28 | End: 2020-10-01

## 2020-05-28 NOTE — TELEPHONE ENCOUNTER
05/28/20 9:22 AM   Gabapentin refill request received. Patient signed Controlled Substance Agreement on 03/23. Scanned to Media file.  reviewed and appropriate. Adjusted prescription to 90 tabs for a 1 month supply, with 1 refill.       Latasha Irwin MD

## 2020-06-23 DIAGNOSIS — I25.10 CORONARY ARTERY DISEASE DUE TO LIPID RICH PLAQUE: ICD-10-CM

## 2020-06-23 DIAGNOSIS — E78.5 HYPERLIPIDEMIA, UNSPECIFIED HYPERLIPIDEMIA TYPE: ICD-10-CM

## 2020-06-23 DIAGNOSIS — F41.9 ANXIETY: ICD-10-CM

## 2020-06-23 DIAGNOSIS — I25.83 CORONARY ARTERY DISEASE DUE TO LIPID RICH PLAQUE: ICD-10-CM

## 2020-06-23 RX ORDER — ROSUVASTATIN CALCIUM 20 MG/1
TABLET, COATED ORAL
Qty: 30 TAB | Refills: 5 | Status: SHIPPED | OUTPATIENT
Start: 2020-06-23 | End: 2020-12-17

## 2020-06-23 RX ORDER — HYDROXYZINE PAMOATE 25 MG/1
CAPSULE ORAL
Qty: 30 CAP | Refills: 1 | Status: SHIPPED | OUTPATIENT
Start: 2020-06-23 | End: 2020-07-24

## 2020-06-25 ENCOUNTER — TELEPHONE (OUTPATIENT)
Dept: CARDIOLOGY CLINIC | Age: 60
End: 2020-06-25

## 2020-06-25 NOTE — TELEPHONE ENCOUNTER
Patient would like to reschedule her upcoming appointment. Please advise.     Phone #: [phone]  Thanks

## 2020-06-30 RX ORDER — FUROSEMIDE 20 MG/1
TABLET ORAL
Qty: 30 TAB | Refills: 2 | Status: SHIPPED | OUTPATIENT
Start: 2020-06-30 | End: 2020-09-08

## 2020-07-04 DIAGNOSIS — M51.36 DDD (DEGENERATIVE DISC DISEASE), LUMBAR: ICD-10-CM

## 2020-07-06 RX ORDER — CYCLOBENZAPRINE HCL 10 MG
TABLET ORAL
Qty: 90 TAB | Refills: 1 | Status: SHIPPED | OUTPATIENT
Start: 2020-07-06 | End: 2020-08-27

## 2020-07-20 DIAGNOSIS — I25.10 CORONARY ARTERY DISEASE INVOLVING NATIVE CORONARY ARTERY OF NATIVE HEART WITHOUT ANGINA PECTORIS: ICD-10-CM

## 2020-07-20 RX ORDER — METOPROLOL TARTRATE 25 MG/1
TABLET, FILM COATED ORAL
Qty: 60 TAB | Refills: 2 | Status: SHIPPED | OUTPATIENT
Start: 2020-07-20 | End: 2021-04-26

## 2020-07-24 DIAGNOSIS — F41.9 ANXIETY: ICD-10-CM

## 2020-07-24 RX ORDER — HYDROXYZINE PAMOATE 25 MG/1
CAPSULE ORAL
Qty: 30 CAP | Refills: 1 | Status: SHIPPED | OUTPATIENT
Start: 2020-07-24 | End: 2020-08-25

## 2020-07-27 DIAGNOSIS — F41.9 ANXIETY: ICD-10-CM

## 2020-07-28 RX ORDER — BUPROPION HYDROCHLORIDE 300 MG/1
TABLET ORAL
Qty: 30 TAB | Refills: 11 | Status: SHIPPED | OUTPATIENT
Start: 2020-07-28 | End: 2021-07-28

## 2020-08-13 RX ORDER — OMEPRAZOLE 20 MG/1
CAPSULE, DELAYED RELEASE ORAL
Qty: 30 CAP | Refills: 5 | Status: SHIPPED | OUTPATIENT
Start: 2020-08-13 | End: 2021-05-12

## 2020-08-14 ENCOUNTER — OFFICE VISIT (OUTPATIENT)
Dept: CARDIOLOGY CLINIC | Age: 60
End: 2020-08-14
Payer: COMMERCIAL

## 2020-08-14 VITALS
WEIGHT: 143 LBS | SYSTOLIC BLOOD PRESSURE: 112 MMHG | DIASTOLIC BLOOD PRESSURE: 76 MMHG | HEART RATE: 88 BPM | HEIGHT: 65 IN | BODY MASS INDEX: 23.82 KG/M2 | OXYGEN SATURATION: 99 %

## 2020-08-14 DIAGNOSIS — I10 ESSENTIAL HYPERTENSION: ICD-10-CM

## 2020-08-14 DIAGNOSIS — T14.8XXA BRUISING: ICD-10-CM

## 2020-08-14 DIAGNOSIS — I25.10 CORONARY ARTERY DISEASE INVOLVING NATIVE CORONARY ARTERY OF NATIVE HEART WITHOUT ANGINA PECTORIS: Primary | ICD-10-CM

## 2020-08-14 PROCEDURE — 99214 OFFICE O/P EST MOD 30 MIN: CPT | Performed by: INTERNAL MEDICINE

## 2020-08-14 PROCEDURE — G8752 SYS BP LESS 140: HCPCS | Performed by: INTERNAL MEDICINE

## 2020-08-14 PROCEDURE — G8427 DOCREV CUR MEDS BY ELIG CLIN: HCPCS | Performed by: INTERNAL MEDICINE

## 2020-08-14 PROCEDURE — 3017F COLORECTAL CA SCREEN DOC REV: CPT | Performed by: INTERNAL MEDICINE

## 2020-08-14 PROCEDURE — G8420 CALC BMI NORM PARAMETERS: HCPCS | Performed by: INTERNAL MEDICINE

## 2020-08-14 PROCEDURE — G8432 DEP SCR NOT DOC, RNG: HCPCS | Performed by: INTERNAL MEDICINE

## 2020-08-14 PROCEDURE — G9899 SCRN MAM PERF RSLTS DOC: HCPCS | Performed by: INTERNAL MEDICINE

## 2020-08-14 PROCEDURE — G8754 DIAS BP LESS 90: HCPCS | Performed by: INTERNAL MEDICINE

## 2020-08-14 PROCEDURE — 93010 ELECTROCARDIOGRAM REPORT: CPT | Performed by: INTERNAL MEDICINE

## 2020-08-14 NOTE — PROGRESS NOTES
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) ICD-10-CM: R40.987  ICD-9-CM: 557.0  2019        Abnormal CT of the abdomen ICD-10-CM: R93.5  ICD-9-CM: 793.6  2018    Overview Signed 2018  6:01 AM by Claude Villarreal     2018:   Ongoing evolution of inflammatory changes in the left upper quadrant most  consistent with omental infarction. Decreased size of main inflamed fatty nodule  and less surrounding stranding. Otherwise, no acute pathology in the chest, abdomen or pelvis. Abnormal mammogram of right breast ICD-10-CM: R92.8  ICD-9-CM: 793.80  2018    Overview Addendum 2018  4:36 PM by Claude Jimenez did biopsy 9668. Biopsy  = fibroadenoma    Mammogram 2018: IMPRESSION: Small right breast mass. BI-RADS Assessment Category 4: Suspicious  Abnormality- Biopsy should be considered. RECOMMENDATION:  Ultrasound-guided right breast biopsy. Type 2 diabetes mellitus with diabetic neuropathy ([facility] ICD-10-CM: E11.40  ICD-9-CM: 250.60, 357.2  2018    Overview Addendum 2020  1:26 PM by Young Crapenter MD     2018:  a1c 5.3    2017:  S/p gastric bypass = a1c 5.2/  LDL 84/  MAB negative    Dx:  a1c 6.9 2016    Eye exam: 6/3/2020.  Normal exam.             Elevated ferritin ICD-10-CM: R79.89  ICD-9-CM: 790.6  2017        H/O colonoscopy ICD-10-CM: Z98.890  ICD-9-CM: V45.89  2017    Overview Addendum 2017  2:12 PM by Claude Villarreal     , next   +FH colon cancer in mom             DDD (degenerative disc disease), lumbar ICD-10-CM: M51.36  ICD-9-CM: 722.52  2017 Overview Signed 12/19/2017  1:57 PM by Joy Polanco  S/p NEREYDA x 3             FH: colon cancer ICD-10-CM: Z80.0  ICD-9-CM: V16.0  12/19/2017    Overview Signed 12/19/2017  2:12 PM by Joy Crain     mom             H/O gastric bypass ICD-10-CM: Z98.84  ICD-9-CM: V45.86  7/12/2017    Overview Addendum 11/3/2018  9:51 AM by Joy Damon 1/2017  lost from 230 to 154 lbs             Hypokalemia ICD-10-CM: E87.6  ICD-9-CM: 276.8  1/26/2017        History of pulmonary embolus (PE) ICD-10-CM: U41.182  ICD-9-CM: V12.55  11/7/2016        Anxiety ICD-10-CM: F41.9  ICD-9-CM: 300.00  6/1/2016    Overview Addendum 9/18/2018  4:54 PM by Joy Crain     Needs to be seen at least twice yearly for BNZ  FTF 12/19/2017, 9/18/18   as expected 12/19/2017, 9/18/18             FH: diabetes mellitus ICD-10-CM: Z83.3  ICD-9-CM: V18.0  3/2/9013        Metabolic syndrome BBG-96-OV: E88.81  ICD-9-CM: 277.7  8/3/2015    Overview Addendum 8/4/2015 11:38 AM by Oswald Ramos V     TG up, sugar up, HTN, waist 54 inches  Rx metformin  Needs yearly eye exams and labs             Essential hypertension ICD-10-CM: I10  ICD-9-CM: 401.9  5/17/2015        Chronic pain ICD-10-CM: G89.29  ICD-9-CM: 338.29  3/30/2015    Overview Addendum 7/12/2017 11:40 AM by Justus Weaver  S/p NEREYDA x 3 in back via Dr. Jaciel Polanco. He uses flexeril HS for pain             GARCIA (nonalcoholic steatohepatitis) ICD-10-CM: K75.81  ICD-9-CM: 571.8  1/24/2011    Overview Signed 1/24/2011  7:18 PM by Joy Crain     Ultrasound 2008             Obstructive sleep apnea ICD-10-CM: G47.33  ICD-9-CM: 327.23  9/22/2010        Asthma ICD-10-CM: J45.909  ICD-9-CM: 493.90  9/22/2010        Arthritis ICD-10-CM: M19.90  ICD-9-CM: 716.90  9/22/2010        GERD (gastroesophageal reflux disease) ICD-10-CM: K21.9  ICD-9-CM: 530.81  9/22/2010                 Plan:     1. CAD/status post LCx PCI in past: Stable. Continue aspirin.   Continue statins. 2. Hypertension: Blood pressure is controlled. Continue metoprolol. 3. Dyslipidemia: Continue Crestor. Last fasting lipid profile from November 2018 demonstrated LDL of 126. Continue Crestor. Goal LDL is 70 or below. 4. Peripheral edema: This is occasional.  Previously this was related to her weight. 5. Morbid obesity: Lost 120lbs after gastric bybass surgery IN 2017.   6. Skin bruising: Change ASA to alternate day. 7. See me again in 1 year. Subjective:     Paris Jones, a 61y.o. year-old who presents for followup. She has known history of CAD status post PCI of the left circumflex coronary artery. Hypertension. She has had gastric bypass surgery in 2017. Since then she has lost 120 pounds. She is returning today for follow-up. Denies any symptoms of chest pain, shortness of breath, lightheadedness or dizziness. She has easy skin bruising. She has not had any recurrence of symptoms. EKG in my office today demonstrated normal sinus rhythm normal EKG, normal axis, normal intervals. Exam:     Physical Exam:  Visit Vitals  /76 (BP 1 Location: Left arm, BP Patient Position: Sitting)   Pulse 88   Ht 5' 5\" (1.651 m)   Wt 143 lb (64.9 kg)   LMP 01/01/1985   SpO2 99%   BMI 23.80 kg/m²     General appearance - alert, well appearing, and in no distress  Mental status - affect appropriate to mood  Eyes - sclera anicteric, moist mucous membranes  Neck - supple, no significant adenopathy  Chest - clear to auscultation, no wheezes, rales or rhonchi  Heart - normal rate, regular rhythm, normal S1, S2, no murmurs, rubs, clicks or gallops  Abdomen - soft, nontender, nondistended, no masses or organomegaly  Extremities - peripheral pulses normal, no pedal edema  Skin - normal coloration. Skin bruising is present on the arms and neck.      Medications:     Current Outpatient Medications   Medication Sig    omeprazole (PRILOSEC) 20 mg capsule TAKE 1 CAPSULE BY MOUTH EVERY DAY (Patient taking differently: Take 20 mg by mouth two (2) times a day. TAKE 1 CAPSULE BY MOUTH EVERY DAY)    buPROPion XL (WELLBUTRIN XL) 300 mg XL tablet TAKE 1 TAB BY MOUTH EVERY MORNING. REPLACES FLUOXETINE    hydrOXYzine pamoate (VISTARIL) 25 mg capsule TAKE 1 CAPSULE BY MOUTH TWICE A DAY AS NEEDED FOR ANXIETY (PANIC ATTACKS) FOR UP TO 14 DAYS    metoprolol tartrate (LOPRESSOR) 25 mg tablet TAKE 1 TABLET BY MOUTH TWO (2) TIMES A DAY.  cyclobenzaprine (FLEXERIL) 10 mg tablet TAKE 1 TABLET BY MOUTH THREE TIMES A DAY AS NEEDED FOR MUSCLE SPASMS    furosemide (LASIX) 20 mg tablet TAKE 1 TABLET BY MOUTH EVERY DAY AS NEEDED    rosuvastatin (CRESTOR) 20 mg tablet TAKE 1 TABLET BY MOUTH EVERY DAY AT NIGHT    gabapentin (NEURONTIN) 600 mg tablet TAKE 1 TAB BY MOUTH THREE (3) TIMES DAILY. MAX DAILY AMOUNT: 1,800 MG.  sucralfate (CARAFATE) 1 gram tablet Take 1 g by mouth four (4) times daily.  glucose blood VI test strips (BLOOD GLUCOSE TEST) strip Use once a day    ondansetron (ZOFRAN ODT) 4 mg disintegrating tablet TAKE 1 TABLET BY MOUTH EVERY 8 HOURS AS NEEDED FOR NAUSEA    valACYclovir (VALTREX) 500 mg tablet Take 1 pill twice daily for 3 days at sign of herpes flare.  nitroglycerin (NITROSTAT) 0.4 mg SL tablet Take one tab, wait five minutes. If pain persists, take another tab and wait five minutes. If pain persists, take another tab and dial 911.  albuterol (ACCUNEB) 0.63 mg/3 mL nebulizer solution 3 mL by Nebulization route every six (6) hours as needed for Wheezing.  albuterol (PROAIR HFA) 90 mcg/actuation inhaler Take 1 Puff by inhalation every four (4) hours as needed for Wheezing or Shortness of Breath.  nystatin (MYCOSTATIN) 100,000 unit/mL suspension Take 5 mL by mouth four (4) times daily.  swish and spit    dicyclomine (BENTYL) 10 mg capsule TAKE 1 (ONE) CAPSULE BY MOUTH AS NEEDED EVERY 6 HOURS    budesonide (PULMICORT FLEXHALER) 180 mcg/actuation aepb inhaler Take 2 Puffs by inhalation daily.  aspirin delayed-release 81 mg tablet Take  by mouth daily.  Blood-Glucose Meter monitoring kit Use to check glucose once a day    alcohol swabs (ALCOHOL PADS) padm Use when checking blood glucose    Lancets misc Use once a day. Please give one compatible with patient's meter     No current facility-administered medications for this visit. Diagnostic Data Review:     EKG:    LHC: 11/30/15- Circumflex: There was a 80 % stenosis in the distal third of the vessel segment, Successful BMS dLCx: 2.0x12 Mini Vision. ECHO:4/3/15- EF 65%, G1DD, trivial MR,   Dobutamine Stress Echo : 4/27/15: No ischemia. False positive ST Changes of ischemia. Had chest pain during the test.       Lab Review:     Lab Results   Component Value Date/Time    Cholesterol, total 212 (H) 11/18/2019 11:10 AM    HDL Cholesterol 50 11/18/2019 11:10 AM    LDL,Direct 97 09/18/2018 02:16 PM    LDL, calculated 126.4 (H) 11/18/2019 11:10 AM    Triglyceride 178 (H) 11/18/2019 11:10 AM    CHOL/HDL Ratio 4.2 11/18/2019 11:10 AM     Lab Results   Component Value Date/Time    Creatinine 0.66 02/17/2020 11:44 AM     Lab Results   Component Value Date/Time    BUN 9 02/17/2020 11:44 AM     Lab Results   Component Value Date/Time    Potassium 3.9 02/17/2020 11:44 AM     Lab Results   Component Value Date/Time    Hemoglobin A1c 5.5 11/18/2019 11:10 AM     Lab Results   Component Value Date/Time    HGB CANCELED 02/17/2020 11:44 AM     Lab Results   Component Value Date/Time    PLATELET CANCELED 72/67/6577 11:44 AM     No results for input(s): CPK, CKMB, TROIQ in the last 72 hours. No lab exists for component: CKQMB, CPKMB             ___________________________________________________    No Duffy.  Stephen Mckee MD, [facility]

## 2020-08-14 NOTE — PROGRESS NOTES
Visit Vitals  /76 (BP 1 Location: Left arm, BP Patient Position: Sitting)   Pulse 88   Ht 5' 5\" (1.651 m)   Wt 143 lb (64.9 kg)   LMP 01/01/1985   SpO2 99%   BMI 23.80 kg/m²         Chest pain: 1 episode but over 3 weeks  Shortness of breath: no  Edema: no  Palpitations: no  Dizziness: get up, spell x a couple times     New diagnosis/Surgeries: no    ER/Hospitalizations: no    Refills: Nitro

## 2020-08-25 DIAGNOSIS — F41.9 ANXIETY: ICD-10-CM

## 2020-08-25 RX ORDER — HYDROXYZINE PAMOATE 25 MG/1
CAPSULE ORAL
Qty: 30 CAP | Refills: 1 | Status: CANCELLED | OUTPATIENT
Start: 2020-08-25

## 2020-08-25 RX ORDER — HYDROXYZINE PAMOATE 25 MG/1
CAPSULE ORAL
Qty: 30 CAP | Refills: 1 | Status: SHIPPED | OUTPATIENT
Start: 2020-08-25 | End: 2020-09-25

## 2020-08-27 DIAGNOSIS — B00.89 HERPES DERMATITIS: ICD-10-CM

## 2020-08-27 DIAGNOSIS — M51.36 DDD (DEGENERATIVE DISC DISEASE), LUMBAR: ICD-10-CM

## 2020-08-27 RX ORDER — CYCLOBENZAPRINE HCL 10 MG
TABLET ORAL
Qty: 90 TAB | Refills: 1 | Status: SHIPPED | OUTPATIENT
Start: 2020-08-27 | End: 2020-11-05

## 2020-08-27 RX ORDER — VALACYCLOVIR HYDROCHLORIDE 500 MG/1
TABLET, FILM COATED ORAL
Qty: 30 TAB | Refills: 0 | Status: SHIPPED | OUTPATIENT
Start: 2020-08-27 | End: 2020-09-08

## 2020-09-06 DIAGNOSIS — B00.89 HERPES DERMATITIS: ICD-10-CM

## 2020-09-08 RX ORDER — VALACYCLOVIR HYDROCHLORIDE 500 MG/1
TABLET, FILM COATED ORAL
Qty: 30 TAB | Refills: 0 | Status: SHIPPED | OUTPATIENT
Start: 2020-09-08 | End: 2020-10-01

## 2020-09-08 RX ORDER — FUROSEMIDE 20 MG/1
TABLET ORAL
Qty: 30 TAB | Refills: 2 | Status: SHIPPED | OUTPATIENT
Start: 2020-09-08 | End: 2020-12-17

## 2020-09-25 DIAGNOSIS — F41.9 ANXIETY: ICD-10-CM

## 2020-09-25 RX ORDER — HYDROXYZINE PAMOATE 25 MG/1
CAPSULE ORAL
Qty: 30 CAP | Refills: 1 | Status: SHIPPED | OUTPATIENT
Start: 2020-09-25 | End: 2020-11-05

## 2020-09-30 DIAGNOSIS — B00.89 HERPES DERMATITIS: ICD-10-CM

## 2020-10-01 DIAGNOSIS — M79.7 FIBROMYALGIA: ICD-10-CM

## 2020-10-01 DIAGNOSIS — G89.29 OTHER CHRONIC PAIN: ICD-10-CM

## 2020-10-01 RX ORDER — GABAPENTIN 600 MG/1
TABLET ORAL
Qty: 90 TAB | Refills: 0 | Status: SHIPPED | OUTPATIENT
Start: 2020-10-01 | End: 2021-01-08

## 2020-10-01 RX ORDER — VALACYCLOVIR HYDROCHLORIDE 500 MG/1
TABLET, FILM COATED ORAL
Qty: 30 TAB | Refills: 0 | Status: SHIPPED | OUTPATIENT
Start: 2020-10-01 | End: 2021-01-08

## 2020-10-02 NOTE — TELEPHONE ENCOUNTER
Reviewed chart for Gabapentin refill. CSA signed in 3/2020; however, no UDS noted this year.  was reviewed and is appropriate without concerns for misuse. Will fill this months request and arrange for pt to have office visit to address chronic pain and obtain UDS. Cynthia Barreto MD

## 2020-10-28 ENCOUNTER — OFFICE VISIT (OUTPATIENT)
Dept: FAMILY MEDICINE CLINIC | Age: 60
End: 2020-10-28
Payer: COMMERCIAL

## 2020-10-28 VITALS
HEIGHT: 65 IN | SYSTOLIC BLOOD PRESSURE: 118 MMHG | TEMPERATURE: 98 F | RESPIRATION RATE: 18 BRPM | BODY MASS INDEX: 23.49 KG/M2 | HEART RATE: 79 BPM | WEIGHT: 141 LBS | OXYGEN SATURATION: 98 % | DIASTOLIC BLOOD PRESSURE: 71 MMHG

## 2020-10-28 DIAGNOSIS — K75.81 NASH (NONALCOHOLIC STEATOHEPATITIS): ICD-10-CM

## 2020-10-28 DIAGNOSIS — E11.40 TYPE 2 DIABETES MELLITUS WITH DIABETIC NEUROPATHY, WITHOUT LONG-TERM CURRENT USE OF INSULIN (HCC): ICD-10-CM

## 2020-10-28 DIAGNOSIS — G89.29 OTHER CHRONIC PAIN: ICD-10-CM

## 2020-10-28 DIAGNOSIS — E11.21 TYPE 2 DIABETES WITH NEPHROPATHY (HCC): ICD-10-CM

## 2020-10-28 DIAGNOSIS — M79.7 PRIMARY FIBROMYALGIA SYNDROME: ICD-10-CM

## 2020-10-28 DIAGNOSIS — I10 ESSENTIAL HYPERTENSION: Primary | ICD-10-CM

## 2020-10-28 DIAGNOSIS — Z12.11 COLON CANCER SCREENING: ICD-10-CM

## 2020-10-28 LAB
ALBUMIN SERPL-MCNC: 3.6 G/DL (ref 3.5–5)
ALBUMIN/GLOB SERPL: 1.3 {RATIO} (ref 1.1–2.2)
ALP SERPL-CCNC: 126 U/L (ref 45–117)
ALT SERPL-CCNC: 17 U/L (ref 12–78)
ANION GAP SERPL CALC-SCNC: 8 MMOL/L (ref 5–15)
AST SERPL-CCNC: 17 U/L (ref 15–37)
BILIRUB SERPL-MCNC: 0.5 MG/DL (ref 0.2–1)
BUN SERPL-MCNC: 11 MG/DL (ref 6–20)
BUN/CREAT SERPL: 16 (ref 12–20)
CALCIUM SERPL-MCNC: 9.8 MG/DL (ref 8.5–10.1)
CHLORIDE SERPL-SCNC: 103 MMOL/L (ref 97–108)
CHOLEST SERPL-MCNC: 197 MG/DL
CO2 SERPL-SCNC: 30 MMOL/L (ref 21–32)
CREAT SERPL-MCNC: 0.7 MG/DL (ref 0.55–1.02)
ERYTHROCYTE [DISTWIDTH] IN BLOOD BY AUTOMATED COUNT: 12.4 % (ref 11.5–14.5)
EST. AVERAGE GLUCOSE BLD GHB EST-MCNC: 105 MG/DL
GLOBULIN SER CALC-MCNC: 2.8 G/DL (ref 2–4)
GLUCOSE SERPL-MCNC: 116 MG/DL (ref 65–100)
HBA1C MFR BLD: 5.3 % (ref 4–5.6)
HCT VFR BLD AUTO: 42.1 % (ref 35–47)
HDLC SERPL-MCNC: 60 MG/DL
HDLC SERPL: 3.3 {RATIO} (ref 0–5)
HGB BLD-MCNC: 13.7 G/DL (ref 11.5–16)
LDLC SERPL CALC-MCNC: 100 MG/DL (ref 0–100)
LIPID PROFILE,FLP: ABNORMAL
MCH RBC QN AUTO: 29.7 PG (ref 26–34)
MCHC RBC AUTO-ENTMCNC: 32.5 G/DL (ref 30–36.5)
MCV RBC AUTO: 91.3 FL (ref 80–99)
NRBC # BLD: 0 K/UL (ref 0–0.01)
NRBC BLD-RTO: 0 PER 100 WBC
PLATELET # BLD AUTO: 323 K/UL (ref 150–400)
PMV BLD AUTO: 10.1 FL (ref 8.9–12.9)
POTASSIUM SERPL-SCNC: 3.2 MMOL/L (ref 3.5–5.1)
PROT SERPL-MCNC: 6.4 G/DL (ref 6.4–8.2)
RBC # BLD AUTO: 4.61 M/UL (ref 3.8–5.2)
SODIUM SERPL-SCNC: 141 MMOL/L (ref 136–145)
TRIGL SERPL-MCNC: 185 MG/DL (ref ?–150)
VLDLC SERPL CALC-MCNC: 37 MG/DL
WBC # BLD AUTO: 8.5 K/UL (ref 3.6–11)

## 2020-10-28 PROCEDURE — 99214 OFFICE O/P EST MOD 30 MIN: CPT | Performed by: FAMILY MEDICINE

## 2020-10-28 NOTE — PROGRESS NOTES
History of Present Illness:     Chief Complaint   Patient presents with    Complete Physical     [address] [facility] Mary Simmons is a 61 y.o. female     Fibromyalgia/ chronic pain. Taking Gabapentin. Pain well controlled on current dose of medication. Needs lipid panel drawn for employee wellness forms. Due for colonoscopy. Needs referral to GI. PMH (REVIEWED):  Past Medical History:   Diagnosis Date    Arthritis     OA back,knees and hands    Asthma     Autoimmune disease ([facility] [address] [address]     Dickson Coles    CAD (coronary artery disease)     s/p stents 11/2015    Cardiac murmur     Depression     Diabetes ([facility]     DVT (deep venous thrombosis) ([facility]) 1981    GERD (gastroesophageal reflux disease)     Hypertension     Lumbar radiculitis     follows with dr Renetta Woodruff for epidural steroid injections    Morbid obesity ([facility]     Musculoskeletal disorder     EDMOND (obstructive sleep apnea)     wears cpap    S/P TONJA (total abdominal hysterectomy)     [facility]    PE       Current Medications/Allergies (REVIEWED):     Current Outpatient Medications on File Prior to Visit   Medication Sig Dispense Refill    valACYclovir (VALTREX) 500 mg tablet TAKE 1 PILL TWICE DAILY FOR 3 DAYS AT SIGN OF HERPES FLARE. 30 Tab 0    gabapentin (NEURONTIN) 600 mg tablet TAKE 1 TAB BY MOUTH THREE (3) TIMES DAILY. MAX DAILY AMOUNT: 1,800 MG. 90 Tab 0    hydrOXYzine pamoate (VISTARIL) 25 mg capsule TAKE 1 CAPSULE BY MOUTH TWICE A DAY AS NEEDED FOR ANXIETY (PANIC ATTACKS) FOR UP TO 14 DAYS 30 Cap 1    furosemide (LASIX) 20 mg tablet TAKE 1 TABLET BY MOUTH EVERY DAY AS NEEDED 30 Tab 2    cyclobenzaprine (FLEXERIL) 10 mg tablet TAKE 1 TABLET BY MOUTH THREE TIMES A DAY AS NEEDED FOR MUSCLE SPASMS 90 Tab 1    omeprazole (PRILOSEC) 20 mg capsule TAKE 1 CAPSULE BY MOUTH EVERY DAY (Patient taking differently: Take 20 mg by mouth two (2) times a day.  TAKE 1 CAPSULE BY MOUTH EVERY DAY) 30 Cap 5    buPROPion XL (WELLBUTRIN XL) 300 mg XL tablet TAKE 1 TAB BY MOUTH EVERY MORNING. REPLACES FLUOXETINE 30 Tab 11    metoprolol tartrate (LOPRESSOR) 25 mg tablet TAKE 1 TABLET BY MOUTH TWO (2) TIMES A DAY. 60 Tab 2    rosuvastatin (CRESTOR) 20 mg tablet TAKE 1 TABLET BY MOUTH EVERY DAY AT NIGHT 30 Tab 5    sucralfate (CARAFATE) 1 gram tablet Take 1 g by mouth four (4) times daily.  glucose blood VI test strips (BLOOD GLUCOSE TEST) strip Use once a day 100 Strip 5    ondansetron (ZOFRAN ODT) 4 mg disintegrating tablet TAKE 1 TABLET BY MOUTH EVERY 8 HOURS AS NEEDED FOR NAUSEA 30 Tab 0    nitroglycerin (NITROSTAT) 0.4 mg SL tablet Take one tab, wait five minutes. If pain persists, take another tab and wait five minutes. If pain persists, take another tab and dial 911. 25 Tab 1    albuterol (ACCUNEB) 0.63 mg/3 mL nebulizer solution 3 mL by Nebulization route every six (6) hours as needed for Wheezing. 75 mL 1    albuterol (PROAIR HFA) 90 mcg/actuation inhaler Take 1 Puff by inhalation every four (4) hours as needed for Wheezing or Shortness of Breath. 1 Inhaler 5    nystatin (MYCOSTATIN) 100,000 unit/mL suspension Take 5 mL by mouth four (4) times daily. swish and spit 60 mL 0    dicyclomine (BENTYL) 10 mg capsule TAKE 1 (ONE) CAPSULE BY MOUTH AS NEEDED EVERY 6 HOURS 30 Cap 0    budesonide (PULMICORT FLEXHALER) 180 mcg/actuation aepb inhaler Take 2 Puffs by inhalation daily. 1 Inhaler 3    aspirin delayed-release 81 mg tablet Take  by mouth daily.  Blood-Glucose Meter monitoring kit Use to check glucose once a day 1 Kit 0    alcohol swabs (ALCOHOL PADS) padm Use when checking blood glucose 100 Pad 5    Lancets misc Use once a day. Please give one compatible with patient's meter 1 Package 5     No current facility-administered medications on file prior to visit.         Allergies   Allergen Reactions    Accupril [Quinapril] Cough    Lipitor [Atorvastatin] Other (comments)     aches    Norvasc [Amlodipine] Swelling     On legs at 10 mg dose         Review of Systems:     Denies fever, chills, sweats  Denies chest pain, TALAMANTES, palpitations, LE edema  Denies cough, sputum production, SOB, pleuritic chest pain, wheezing  +Some tingling in toes     Objective:     Vitals:    10/28/20 1324   BP: 118/71   Pulse: 79   Resp: 18   Temp: 98 °F (36.7 °C)   TempSrc: Temporal   SpO2: 98%   Weight: 141 lb (64 kg)   Height: 5' 5\" (1.651 m)       Physical Exam:  General appearance - alert, well appearing, and in no distress  Chest - clear to auscultation, no wheezes, rales or rhonchi, symmetric air entry  Heart - normal rate, regular rhythm, normal S1, S2, no murmurs, rubs, clicks or gallops  Extremities - peripheral pulses normal, no pedal edema, no clubbing or cyanosis, feet normal, good pulses, normal color, temperature and sensation, monofilament sensory exam is normal in both feet    Recent Labs:  No results found for this or any previous visit (from the past 12 hour(s)). Assessment and Plan:       ICD-10-CM ICD-9-CM    1. Essential hypertension  I10 401.9 CBC W/O DIFF      LIPID PANEL      METABOLIC PANEL, COMPREHENSIVE      METABOLIC PANEL, COMPREHENSIVE      LIPID PANEL      CBC W/O DIFF   2. GARCIA (nonalcoholic steatohepatitis)  K75.81 571.8 LIPID PANEL      METABOLIC PANEL, COMPREHENSIVE      METABOLIC PANEL, COMPREHENSIVE      LIPID PANEL   3. Type 2 diabetes mellitus with diabetic neuropathy, without long-term current use of insulin (HCC)  E11.40 250.60 HEMOGLOBIN A1C WITH EAG     357.2 HEMOGLOBIN A1C WITH EAG   4. Primary fibromyalgia syndrome  M79.7 729.1 COMPLIANCE DRUG SCREEN/PRESCRIPTION MONITORING   5. Other chronic pain  G89.29 338.29 COMPLIANCE DRUG SCREEN/PRESCRIPTION MONITORING   6. Colon cancer screening  Z12.11 V76.51 REFERRAL TO GASTROENTEROLOGY   7.  Type 2 diabetes with nephropathy (HCC)  E11.21 250.40      583.81        Checking labs for chronic conditions  No change in medications    Taking Gabapentin for chronic pain from fibromyalgia and back pain  Pain well controlled, will continue med at current dose  CSA signed 3/2020  Due for compliance UDS; not done at visit due to confusion with orders, pt to return to complete       GI referral placed for colonoscopy    Follow up: 6 months    Renetta Davis MD    We discussed the expected course, resolution and complications of the diagnosis(es) in detail. Medication risks, benefits, costs, interactions, and alternatives were discussed as indicated. I advised her to contact the office if her condition worsens, changes or fails to improve as anticipated. She expressed understanding with the diagnosis(es) and plan.

## 2020-10-29 ENCOUNTER — TELEPHONE (OUTPATIENT)
Dept: FAMILY MEDICINE CLINIC | Age: 60
End: 2020-10-29

## 2020-10-29 NOTE — TELEPHONE ENCOUNTER
----- Message from Chelo Martinez sent at 10/28/2020  5:44 PM EDT -----  Regarding: Dr. Elmo Siemens  Patient return call    Caller's first and last name and relationship (if not the patient): N/A      Best contact number(s): [phone]      Whose call is being returned: Nathan Hastings      Details to clarify the request: N/A      Chelo Martinez

## 2020-10-30 ENCOUNTER — LAB ONLY (OUTPATIENT)
Dept: FAMILY MEDICINE CLINIC | Age: 60
End: 2020-10-30

## 2020-10-30 DIAGNOSIS — M79.7 PRIMARY FIBROMYALGIA SYNDROME: ICD-10-CM

## 2020-10-30 DIAGNOSIS — G89.29 OTHER CHRONIC PAIN: ICD-10-CM

## 2020-10-30 NOTE — TELEPHONE ENCOUNTER
Scheduled lab appt with patient for today, 10/30. Dr. Radha Garcia   Received: Supa Salazar, [address] [facility] [address] [facility]    Phone Number: [phone]               Pt stated she was advised to come into office today,10/29, for urine specimen however pt could not today. Pt would like to stop by tomorrow, 10/30. Pt would like callback to check availability.

## 2020-11-04 DIAGNOSIS — F41.9 ANXIETY: ICD-10-CM

## 2020-11-04 DIAGNOSIS — M51.36 DDD (DEGENERATIVE DISC DISEASE), LUMBAR: ICD-10-CM

## 2020-11-04 LAB — DRUGS UR: NORMAL

## 2020-11-05 RX ORDER — CYCLOBENZAPRINE HCL 10 MG
TABLET ORAL
Qty: 90 TAB | Refills: 1 | Status: SHIPPED | OUTPATIENT
Start: 2020-11-05 | End: 2021-01-05

## 2020-11-05 RX ORDER — HYDROXYZINE PAMOATE 25 MG/1
CAPSULE ORAL
Qty: 30 CAP | Refills: 1 | Status: SHIPPED | OUTPATIENT
Start: 2020-11-05 | End: 2020-12-01

## 2020-11-05 RX ORDER — ONDANSETRON 4 MG/1
TABLET, ORALLY DISINTEGRATING ORAL
Qty: 30 TAB | Refills: 0 | Status: SHIPPED | OUTPATIENT
Start: 2020-11-05 | End: 2021-03-02

## 2020-11-24 NOTE — TELEPHONE ENCOUNTER
Cynthia with [facility] calling and states she was informed that patient had labs done on today. They are asking that lab results be faxed to them when they become available for doctor to review.     Fax [phone]     [phone]      Labs just done today, so not available- referral order is on file      thanks I contacted Kenneth at [phone] and spoke to Sofi---she will fax to us. Left message on patient's voicemail that we requested CBC and will get back to him after  receives results.

## 2020-11-30 DIAGNOSIS — F41.9 ANXIETY: ICD-10-CM

## 2020-12-01 RX ORDER — HYDROXYZINE PAMOATE 25 MG/1
CAPSULE ORAL
Qty: 30 CAP | Refills: 1 | Status: SHIPPED | OUTPATIENT
Start: 2020-12-01 | End: 2021-01-05

## 2020-12-08 ENCOUNTER — TELEPHONE (OUTPATIENT)
Dept: SURGERY | Age: 60
End: 2020-12-08

## 2020-12-17 DIAGNOSIS — I25.83 CORONARY ARTERY DISEASE DUE TO LIPID RICH PLAQUE: ICD-10-CM

## 2020-12-17 DIAGNOSIS — E78.5 HYPERLIPIDEMIA, UNSPECIFIED HYPERLIPIDEMIA TYPE: ICD-10-CM

## 2020-12-17 DIAGNOSIS — I25.10 CORONARY ARTERY DISEASE DUE TO LIPID RICH PLAQUE: ICD-10-CM

## 2020-12-17 RX ORDER — ROSUVASTATIN CALCIUM 20 MG/1
TABLET, COATED ORAL
Qty: 30 TAB | Refills: 5 | Status: SHIPPED | OUTPATIENT
Start: 2020-12-17 | End: 2021-09-02 | Stop reason: ALTCHOICE

## 2020-12-17 RX ORDER — FUROSEMIDE 20 MG/1
TABLET ORAL
Qty: 30 TAB | Refills: 2 | Status: SHIPPED | OUTPATIENT
Start: 2020-12-17 | End: 2021-03-15

## 2020-12-31 DIAGNOSIS — M51.36 DDD (DEGENERATIVE DISC DISEASE), LUMBAR: ICD-10-CM

## 2020-12-31 DIAGNOSIS — F41.9 ANXIETY: ICD-10-CM

## 2021-01-05 RX ORDER — HYDROXYZINE PAMOATE 25 MG/1
CAPSULE ORAL
Qty: 30 CAP | Refills: 1 | Status: SHIPPED | OUTPATIENT
Start: 2021-01-05 | End: 2021-02-05

## 2021-01-05 RX ORDER — CYCLOBENZAPRINE HCL 10 MG
TABLET ORAL
Qty: 90 TAB | Refills: 1 | Status: SHIPPED | OUTPATIENT
Start: 2021-01-05 | End: 2021-06-30

## 2021-01-07 DIAGNOSIS — B00.89 HERPES DERMATITIS: ICD-10-CM

## 2021-01-07 DIAGNOSIS — M79.7 FIBROMYALGIA: ICD-10-CM

## 2021-01-07 DIAGNOSIS — G89.29 OTHER CHRONIC PAIN: ICD-10-CM

## 2021-01-08 RX ORDER — GABAPENTIN 600 MG/1
TABLET ORAL
Qty: 90 TAB | Refills: 2 | Status: SHIPPED | OUTPATIENT
Start: 2021-01-08 | End: 2021-06-09

## 2021-01-08 RX ORDER — VALACYCLOVIR HYDROCHLORIDE 500 MG/1
TABLET, FILM COATED ORAL
Qty: 30 TAB | Refills: 0 | Status: SHIPPED | OUTPATIENT
Start: 2021-01-08 | End: 2021-07-05 | Stop reason: SDUPTHER

## 2021-01-08 NOTE — TELEPHONE ENCOUNTER
reviewed and is appropriate. UDS in 10/2020 appropriate as well. Approved Gabapentin with refills. Cont routine follow up.

## 2021-01-13 ENCOUNTER — TRANSCRIBE ORDER (OUTPATIENT)
Dept: SCHEDULING | Age: 61
End: 2021-01-13

## 2021-01-13 DIAGNOSIS — Z12.31 VISIT FOR SCREENING MAMMOGRAM: Primary | ICD-10-CM

## 2021-01-20 ENCOUNTER — VIRTUAL VISIT (OUTPATIENT)
Dept: SURGERY | Age: 61
End: 2021-01-20
Payer: COMMERCIAL

## 2021-01-20 VITALS — TEMPERATURE: 96.4 F | WEIGHT: 138.5 LBS | BODY MASS INDEX: 23.07 KG/M2 | HEIGHT: 65 IN

## 2021-01-20 DIAGNOSIS — E53.8 VITAMIN B12 DEFICIENCY: ICD-10-CM

## 2021-01-20 DIAGNOSIS — K91.2 POSTSURGICAL NONABSORPTION: ICD-10-CM

## 2021-01-20 DIAGNOSIS — D64.9 ANEMIA, UNSPECIFIED TYPE: ICD-10-CM

## 2021-01-20 DIAGNOSIS — E55.9 VITAMIN D DEFICIENCY: ICD-10-CM

## 2021-01-20 DIAGNOSIS — E66.01 MORBID OBESITY (HCC): Primary | ICD-10-CM

## 2021-01-20 DIAGNOSIS — Z98.84 S/P GASTRIC BYPASS: ICD-10-CM

## 2021-01-20 PROCEDURE — 99213 OFFICE O/P EST LOW 20 MIN: CPT | Performed by: NURSE PRACTITIONER

## 2021-01-20 NOTE — PROGRESS NOTES
HISTORY OF PRESENT ILLNESS  Jyothi Troy is a 61 y.o. female with previous malabsorptive gastric bypass surgery on 4 years ago. . She has lost a total of 82.5 pounds since surgery. She  has lost 1.5 since the last ov. Body mass index is 23.05 kg/m². Lorean Snare She had some nausea and vomiting over the weekend. Today she complains of some nausea and has taken Zofran. She states that this helps. She takes Prilosec for heartburn. Drinking  48+ ounces of water daily. 50 grams protein intake daily. + BM's. She is walking for exercise. After her last visit a year ago, she was seen by Dr. Batista-gastroenterology for abdominal pain and dysphagia. She had an endoscopy to rule out stricture. Endoscopy findings below:     Findings:   Esophagus:Mucosa within normal throughout  Stomach: Small size hiatal hernia noted, evidence of previous julia-en-y gastrojejunostomy seen. Gastric pouch showed normal mucosa, except minimal erythema adjacent to the anastomosis, slightly narrow anastomosis noted. Jejunum: Mucosa within normal in the blind limb and enteric limb.     Therapies:  Anastomosis balloon dilation performed at 12 mm, 13.5 mm and 15 mm with minimal effective dilation seen. She felt much better after this endoscopy. She still struggles with some more textured meats. Dietary recall -breakfast-yogurt, eggs salad, cereal  Lunch-soup, tuna and crackers  Dinner-vegetables, spaghetti with ground beef, chicken and dumplings, salmon    She has snacking between meals sometimes pretzels or fruit bars     Vitamins:  MVI : yes  Calcium : yes  B-Vit 12: yes  Vit D: Yes        Ms. Dario Baca has a reminder for a \"due or due soon\" health maintenance. I have asked that she contact her primary care provider for follow-up on this health maintenance.         COMORBIDITY     SLEEP APNEA                 NO        GERD  (req.meds)            YES  HYPERLIPIDEMIA            yes  HYPERTENSION              YES         DIABETES NO           Current Outpatient Medications:     valACYclovir (VALTREX) 500 mg tablet, TAKE 1 PILL TWICE DAILY FOR 3 DAYS AT SIGN OF HERPES FLARE., Disp: 30 Tab, Rfl: 0    gabapentin (NEURONTIN) 600 mg tablet, TAKE 1 TABLET BY MOUTH 3 TIMES A DAY, Disp: 90 Tab, Rfl: 2    hydrOXYzine pamoate (VISTARIL) 25 mg capsule, TAKE 1 CAPSULE BY MOUTH TWICE A DAY AS NEEDED FOR ANXIETY (PANIC ATTACKS) FOR UP TO 14 DAYS, Disp: 30 Cap, Rfl: 1    cyclobenzaprine (FLEXERIL) 10 mg tablet, TAKE 1 TABLET BY MOUTH THREE TIMES A DAY AS NEEDED FOR MUSCLE SPASMS, Disp: 90 Tab, Rfl: 1    furosemide (LASIX) 20 mg tablet, TAKE 1 TABLET BY MOUTH EVERY DAY AS NEEDED, Disp: 30 Tab, Rfl: 2    rosuvastatin (CRESTOR) 20 mg tablet, TAKE 1 TABLET BY MOUTH EVERY DAY AT NIGHT, Disp: 30 Tab, Rfl: 5    ondansetron (ZOFRAN ODT) 4 mg disintegrating tablet, TAKE 1 TABLET BY MOUTH EVERY 8 HOURS AS NEEDED FOR NAUSEA, Disp: 30 Tab, Rfl: 0    omeprazole (PRILOSEC) 20 mg capsule, TAKE 1 CAPSULE BY MOUTH EVERY DAY (Patient taking differently: Take 20 mg by mouth two (2) times a day. TAKE 1 CAPSULE BY MOUTH EVERY DAY), Disp: 30 Cap, Rfl: 5    buPROPion XL (WELLBUTRIN XL) 300 mg XL tablet, TAKE 1 TAB BY MOUTH EVERY MORNING. REPLACES FLUOXETINE, Disp: 30 Tab, Rfl: 11    metoprolol tartrate (LOPRESSOR) 25 mg tablet, TAKE 1 TABLET BY MOUTH TWO (2) TIMES A DAY., Disp: 60 Tab, Rfl: 2    sucralfate (CARAFATE) 1 gram tablet, Take 1 g by mouth four (4) times daily. , Disp: , Rfl:     glucose blood VI test strips (BLOOD GLUCOSE TEST) strip, Use once a day, Disp: 100 Strip, Rfl: 5    albuterol (ACCUNEB) 0.63 mg/3 mL nebulizer solution, 3 mL by Nebulization route every six (6) hours as needed for Wheezing., Disp: 75 mL, Rfl: 1    albuterol (PROAIR HFA) 90 mcg/actuation inhaler, Take 1 Puff by inhalation every four (4) hours as needed for Wheezing or Shortness of Breath., Disp: 1 Inhaler, Rfl: 5    dicyclomine (BENTYL) 10 mg capsule, TAKE 1 (ONE) CAPSULE BY MOUTH AS NEEDED EVERY 6 HOURS, Disp: 30 Cap, Rfl: 0    budesonide (PULMICORT FLEXHALER) 180 mcg/actuation aepb inhaler, Take 2 Puffs by inhalation daily. , Disp: 1 Inhaler, Rfl: 3    Blood-Glucose Meter monitoring kit, Use to check glucose once a day, Disp: 1 Kit, Rfl: 0    alcohol swabs (ALCOHOL PADS) padm, Use when checking blood glucose, Disp: 100 Pad, Rfl: 5    Lancets misc, Use once a day. Please give one compatible with patient's meter, Disp: 1 Package, Rfl: 5    nitroglycerin (NITROSTAT) 0.4 mg SL tablet, Take one tab, wait five minutes. If pain persists, take another tab and wait five minutes. If pain persists, take another tab and dial 911., Disp: 25 Tab, Rfl: 1    nystatin (MYCOSTATIN) 100,000 unit/mL suspension, Take 5 mL by mouth four (4) times daily. swish and spit, Disp: 60 mL, Rfl: 0    aspirin delayed-release 81 mg tablet, Take  by mouth daily. , Disp: , Rfl:       Visit Vitals  Temp (!) 96.4 °F (35.8 °C) (Skin)   Ht 5' 5\" (1.651 m)   Wt 138 lb 8 oz (62.8 kg)   LMP 01/01/1985   BMI 23.05 kg/m²     HPI    Review of Systems   Respiratory: Negative for shortness of breath. Cardiovascular: Negative for chest pain. Gastrointestinal: Positive for nausea (over the weekend. Better now) and vomiting. Negative for abdominal pain and heartburn. Neurological: Negative for dizziness and headaches. Physical Exam  HENT:      Mouth/Throat:      Mouth: Mucous membranes are moist.   Pulmonary:      Effort: No respiratory distress. Abdominal:      Tenderness: There is no abdominal tenderness (per patient. ). Neurological:      Mental Status: She is alert. ASSESSMENT and PLAN  Carlos Pinto is a 61 y.o. female with previous malabsorptive gastric bypass surgery on 4 years ago. . She has lost a total of 82.5 pounds since surgery. She  has lost 1.5 since the last ov. Body mass index is 23.05 kg/m². Dinah Valenzuela She had some nausea and vomiting over the weekend.   Today she complains of some nausea and has taken Zofran. She states that this helps. She takes Prilosec for heartburn. Drinking  48+ ounces of water daily. 50 grams protein intake daily. + BM's. She is walking for exercise. After her last visit a year ago, she was seen by Dr. Batista-gastroenterology for abdominal pain and dysphagia. She had an endoscopy to rule out stricture. Endoscopy findings below:     Findings:   Esophagus:Mucosa within normal throughout  Stomach: Small size hiatal hernia noted, evidence of previous julia-en-y gastrojejunostomy seen. Gastric pouch showed normal mucosa, except minimal erythema adjacent to the anastomosis, slightly narrow anastomosis noted. Jejunum: Mucosa within normal in the blind limb and enteric limb.     Therapies:  Anastomosis balloon dilation performed at 12 mm, 13.5 mm and 15 mm with minimal effective dilation seen. She felt much better after this endoscopy. She still struggles with some more textured meats. Dietary recall -breakfast-yogurt, eggs salad, cereal  Lunch-soup, tuna and crackers  Dinner-vegetables, spaghetti with ground beef, chicken and dumplings, salmon    She has snacking between meals sometimes pretzels or fruit bars    Advised patient regard to diet that is high-protein, low-fat, low-sugar, limited carbohydrates. Strive for 50-60 grams of protein daily. If having a snack, foods that are protein or fiber rich. . No eating/drinking together, chew foods well, and portion control. Measure meals. Discussed snacking behavior and to [facility] pay attention to behavioral factor and habits. Drink at least 40-64 ounces of water or non-calorie/non-carbonated beverages daily. Continue vitamin regiment daily. Exercise at least 3 days a week with cardiovascular and strength training. Patient to follow up in 1 year. Advised to call office if any questions/concerns. Check nutrition labs. Patient will let me know if she develops any dysphagia again.     5 Minutes spent face to face virtually with patient, >50 % of time spent counseling.

## 2021-01-20 NOTE — PROGRESS NOTES
1. Have you been to the ER, urgent care clinic since your last visit? Hospitalized since your last visit? No    2. Have you seen or consulted any other health care providers outside of the [address] [place] [address] since your last visit? Include any pap smears or colon screening. No    Elida Flaherty  Body composition    female  61 y.o.   Vitals:    01/20/21 1100   Temp: (!) 96.4 °F (35.8 °C)   TempSrc: Skin   Weight: 138 lb 8 oz (62.8 kg)   Height: 5' 5\" (1.651 m)        C/O nausea ans svomiting for last several days

## 2021-01-20 NOTE — PATIENT INSTRUCTIONS
Eating Healthy Foods: Care Instructions Your Care Instructions Eating healthy foods can help lower your risk for disease. Healthy food gives you energy and keeps your heart strong, your brain active, your muscles working, and your bones strong. A healthy diet includes a variety of foods from the basic food groups: grains, vegetables, fruits, milk and milk products, and meat and beans. Some people may eat more of their favorite foods from only one food group and, as a result, miss getting the nutrients they need. So, it is important to pay attention not only to what you eat but also to what you are missing from your diet. You can eat a healthy, balanced diet by making a few small changes. Follow-up care is a key part of your treatment and safety. Be sure to make and go to all appointments, and call your doctor if you are having problems. It's also a good idea to know your test results and keep a list of the medicines you take. How can you care for yourself at home? Look at what you eat · Keep a food diary for a week or two and record everything you eat or drink. Track the number of servings you eat from each food group. · For a balanced diet every day, eat a variety of: 
? 6 or more ounce-equivalents of grains, such as cereals, breads, crackers, rice, or pasta, every day. An ounce-equivalent is 1 slice of bread, 1 cup of ready-to-eat cereal, or ½ cup of cooked rice, cooked pasta, or cooked cereal. 
? 2½ cups of vegetables, especially: § Dark-green vegetables such as broccoli and spinach. § Orange vegetables such as carrots and sweet potatoes. § Dry beans (such as marsh and kidney beans) and peas (such as lentils). ? 2 cups of fresh, frozen, or canned fruit. A small apple or 1 banana or orange equals 1 cup. ? 3 cups of nonfat or low-fat milk, yogurt, or other milk products. ? 5½ ounces of meat and beans, such as chicken, fish, lean meat, beans, nuts, and seeds. One egg, 1 tablespoon of peanut butter, ½ ounce nuts or seeds, or ¼ cup of cooked beans equals 1 ounce of meat. · Learn how to read food labels for serving sizes and ingredients. Fast-food and convenience-food meals often contain few or no fruits or vegetables. Make sure you eat some fruits and vegetables to make the meal more nutritious. · Look at your food diary. For each food group, add up what you have eaten and then divide the total by the number of days. This will give you an idea of how much you are eating from each food group. See if you can find some ways to change your diet to make it more healthy. Start small · Do not try to make dramatic changes to your diet all at once. You might feel that you are missing out on your favorite foods and then be more likely to fail. · Start slowly, and gradually change your habits. Try some of the following: ? Use whole wheat bread instead of white bread. ? Use nonfat or low-fat milk instead of whole milk. ? Eat brown rice instead of white rice, and eat whole wheat pasta instead of white-flour pasta. ? Try low-fat cheeses and low-fat yogurt. ? Add more fruits and vegetables to meals and have them for snacks. ? Add lettuce, tomato, cucumber, and onion to sandwiches. ? Add fruit to yogurt and cereal. 
Enjoy food · You can still eat your favorite foods. You just may need to eat less of them. If your favorite foods are high in fat, salt, and sugar, limit how often you eat them, but do not cut them out entirely. · Eat a wide variety of foods. Make healthy choices when eating out · The type of restaurant you choose can help you make healthy choices. Even fast-food chains are now offering more low-fat or healthier choices on the menu. · Choose smaller portions, or take half of your meal home. · When eating out, try: ? A veggie pizza with a whole wheat crust or grilled chicken (instead of sausage or pepperoni). ? Pasta with roasted vegetables, grilled chicken, or marinara sauce instead of cream sauce. ? A vegetable wrap or grilled chicken wrap. ? Broiled or poached food instead of fried or breaded items. Make healthy choices easy · Buy packaged, prewashed, ready-to-eat fresh vegetables and fruits, such as baby carrots, salad mixes, and chopped or shredded broccoli and cauliflower. · Buy packaged, presliced fruits, such as melon or pineapple. · Choose 100% fruit or vegetable juice instead of soda. Limit juice intake to 4 to 6 oz (½ to ¾ cup) a day. · Blend low-fat yogurt, fruit juice, and canned or frozen fruit to make a smoothie for breakfast or a snack. Where can you learn more? Go to http://www.gruber.com/ Enter T756 in the search box to learn more about \"Eating Healthy Foods: Care Instructions. \" Current as of: August 22, 2019               Content Version: 12.6 © 6623-8903 [employer]. Care instructions adapted under license by [employer] (which disclaims liability or warranty for this information). If you have questions about a medical condition or this instruction, always ask your healthcare professional. [facility] any warranty or liability for your use of this information.

## 2021-01-22 ENCOUNTER — HOSPITAL ENCOUNTER (OUTPATIENT)
Dept: MAMMOGRAPHY | Age: 61
Discharge: HOME | End: 2021-01-22
Attending: FAMILY MEDICINE
Payer: COMMERCIAL

## 2021-01-22 DIAGNOSIS — Z12.31 VISIT FOR SCREENING MAMMOGRAM: ICD-10-CM

## 2021-01-22 PROCEDURE — 77067 SCR MAMMO BI INCL CAD: CPT

## 2021-02-05 DIAGNOSIS — F41.9 ANXIETY: ICD-10-CM

## 2021-02-05 RX ORDER — HYDROXYZINE PAMOATE 25 MG/1
CAPSULE ORAL
Qty: 30 CAP | Refills: 1 | Status: SHIPPED | OUTPATIENT
Start: 2021-02-05 | End: 2021-05-04

## 2021-03-02 RX ORDER — ONDANSETRON 4 MG/1
TABLET, ORALLY DISINTEGRATING ORAL
Qty: 30 TAB | Refills: 0 | Status: SHIPPED | OUTPATIENT
Start: 2021-03-02 | End: 2021-09-21 | Stop reason: SDUPTHER

## 2021-03-15 RX ORDER — FUROSEMIDE 20 MG/1
TABLET ORAL
Qty: 30 TAB | Refills: 2 | Status: SHIPPED | OUTPATIENT
Start: 2021-03-15 | End: 2021-06-22

## 2021-04-24 DIAGNOSIS — I25.10 CORONARY ARTERY DISEASE INVOLVING NATIVE CORONARY ARTERY OF NATIVE HEART WITHOUT ANGINA PECTORIS: ICD-10-CM

## 2021-04-26 RX ORDER — METOPROLOL TARTRATE 25 MG/1
TABLET, FILM COATED ORAL
Qty: 60 TAB | Refills: 2 | Status: SHIPPED | OUTPATIENT
Start: 2021-04-26 | End: 2021-10-25

## 2021-05-03 DIAGNOSIS — F41.9 ANXIETY: ICD-10-CM

## 2021-05-04 RX ORDER — HYDROXYZINE PAMOATE 25 MG/1
CAPSULE ORAL
Qty: 30 CAP | Refills: 1 | Status: SHIPPED | OUTPATIENT
Start: 2021-05-04 | End: 2021-06-11

## 2021-05-20 RX ORDER — OMEPRAZOLE 20 MG/1
CAPSULE, DELAYED RELEASE ORAL
Qty: 30 CAPSULE | Refills: 5 | Status: SHIPPED | OUTPATIENT
Start: 2021-05-20 | End: 2021-07-28 | Stop reason: SDUPTHER

## 2021-06-08 DIAGNOSIS — M79.7 FIBROMYALGIA: ICD-10-CM

## 2021-06-08 DIAGNOSIS — G89.29 OTHER CHRONIC PAIN: ICD-10-CM

## 2021-06-09 RX ORDER — GABAPENTIN 600 MG/1
TABLET ORAL
Qty: 90 TABLET | Refills: 2 | Status: SHIPPED | OUTPATIENT
Start: 2021-06-09 | End: 2021-12-28

## 2021-06-09 NOTE — TELEPHONE ENCOUNTER
Reviewed chart - UDS and Controlled Substance Agreement up to date.  reviewed and is appropriate.     Milton Coy MD

## 2021-06-10 DIAGNOSIS — F41.9 ANXIETY: ICD-10-CM

## 2021-06-11 RX ORDER — HYDROXYZINE PAMOATE 25 MG/1
CAPSULE ORAL
Qty: 30 CAPSULE | Refills: 1 | Status: SHIPPED | OUTPATIENT
Start: 2021-06-11 | End: 2021-07-28 | Stop reason: SDUPTHER

## 2021-06-22 RX ORDER — FUROSEMIDE 20 MG/1
TABLET ORAL
Qty: 30 TABLET | Refills: 2 | Status: SHIPPED | OUTPATIENT
Start: 2021-06-22 | End: 2021-09-02 | Stop reason: ALTCHOICE

## 2021-06-30 DIAGNOSIS — M51.36 DDD (DEGENERATIVE DISC DISEASE), LUMBAR: ICD-10-CM

## 2021-06-30 RX ORDER — CYCLOBENZAPRINE HCL 10 MG
TABLET ORAL
Qty: 90 TABLET | Refills: 1 | Status: SHIPPED | OUTPATIENT
Start: 2021-06-30 | End: 2021-08-31

## 2021-07-03 DIAGNOSIS — B00.89 HERPES DERMATITIS: ICD-10-CM

## 2021-07-05 RX ORDER — VALACYCLOVIR HYDROCHLORIDE 500 MG/1
TABLET, FILM COATED ORAL
Qty: 30 TABLET | Refills: 0 | Status: SHIPPED | OUTPATIENT
Start: 2021-07-05

## 2021-07-06 RX ORDER — NITROGLYCERIN 0.4 MG/1
TABLET SUBLINGUAL
Qty: 25 TABLET | Refills: 1 | Status: SHIPPED | OUTPATIENT
Start: 2021-07-06 | End: 2022-04-03 | Stop reason: SDUPTHER

## 2021-07-06 NOTE — TELEPHONE ENCOUNTER
Refill per VO of Dr. Rick Nuno  Last appt: 2019  Future Appointments   Date Time Provider Yesy Olguin   2021  2:30 PM Aiyana Fragoso MD [facility] BS AMB   2021  2:20 PM Mario Alberto Lopez MD [facility] BS AMB       Requested Prescriptions     Pending Prescriptions Disp Refills    nitroglycerin (NITROSTAT) 0.4 mg SL tablet [Pharmacy Med Name: NITROGLYCERIN 0.4 MG TABLET SL] 25 Tablet 1     SiTAB WAIT 5MINS IF PAIN PERSISTS TAKE 1TAB, WAIT 5MINS IF PAIN PERSISTS TAKE 1 TAB & DIAL 911.

## 2021-07-28 ENCOUNTER — OFFICE VISIT (OUTPATIENT)
Dept: FAMILY MEDICINE CLINIC | Age: 61
End: 2021-07-28
Payer: COMMERCIAL

## 2021-07-28 VITALS
HEART RATE: 75 BPM | WEIGHT: 150 LBS | BODY MASS INDEX: 24.99 KG/M2 | SYSTOLIC BLOOD PRESSURE: 120 MMHG | OXYGEN SATURATION: 98 % | RESPIRATION RATE: 16 BRPM | DIASTOLIC BLOOD PRESSURE: 76 MMHG | TEMPERATURE: 98 F | HEIGHT: 65 IN

## 2021-07-28 DIAGNOSIS — F41.9 ANXIETY: ICD-10-CM

## 2021-07-28 DIAGNOSIS — E11.40 TYPE 2 DIABETES MELLITUS WITH DIABETIC NEUROPATHY, WITHOUT LONG-TERM CURRENT USE OF INSULIN (HCC): ICD-10-CM

## 2021-07-28 DIAGNOSIS — Z12.83 SKIN CANCER SCREENING: ICD-10-CM

## 2021-07-28 DIAGNOSIS — Z12.11 COLON CANCER SCREENING: ICD-10-CM

## 2021-07-28 DIAGNOSIS — I87.2 VENOUS INSUFFICIENCY: ICD-10-CM

## 2021-07-28 DIAGNOSIS — I10 ESSENTIAL HYPERTENSION: Primary | ICD-10-CM

## 2021-07-28 DIAGNOSIS — K21.9 GASTROESOPHAGEAL REFLUX DISEASE WITHOUT ESOPHAGITIS: ICD-10-CM

## 2021-07-28 DIAGNOSIS — R23.3 EASY BRUISING: ICD-10-CM

## 2021-07-28 DIAGNOSIS — Z98.84 H/O GASTRIC BYPASS: ICD-10-CM

## 2021-07-28 PROCEDURE — 99214 OFFICE O/P EST MOD 30 MIN: CPT | Performed by: FAMILY MEDICINE

## 2021-07-28 RX ORDER — HYDROXYZINE PAMOATE 25 MG/1
25 CAPSULE ORAL
Qty: 180 CAPSULE | Refills: 3 | Status: SHIPPED | OUTPATIENT
Start: 2021-07-28 | End: 2022-06-28

## 2021-07-28 RX ORDER — OMEPRAZOLE 20 MG/1
20 CAPSULE, DELAYED RELEASE ORAL DAILY
Qty: 90 CAPSULE | Refills: 3 | Status: ON HOLD | OUTPATIENT
Start: 2021-08-28 | End: 2021-12-06

## 2021-07-28 RX ORDER — OMEPRAZOLE 40 MG/1
40 CAPSULE, DELAYED RELEASE ORAL DAILY
Qty: 30 CAPSULE | Refills: 0 | Status: SHIPPED | OUTPATIENT
Start: 2021-07-28 | End: 2021-08-20

## 2021-07-28 NOTE — PROGRESS NOTES
Chief Complaint   Patient presents with    Medication Refill     omeprazole, hydroxyzine, and possibly metoprolol     1. Have you been to the ER, urgent care clinic since your last visit? Hospitalized since your last visit? No    2. Have you seen or consulted any other health care providers outside of the [address] [place] [address] since your last visit? Include any pap smears or colon screening.  No

## 2021-07-28 NOTE — PROGRESS NOTES
History of Present Illness:     Chief Complaint   Patient presents with    Medication Refill     omeprazole, hydroxyzine, and possibly metoprolol       Fly Hernández is a 64 y.o. female     Medication refills. GERD. Needs Omeprazole refilled. Saw her bariatric surgeon (Dr. Tomy Singh) for nausea/ vomiting off and on. She will follow up with her soon. Anxiety. Uses Hydroxyzine as needed. Using it once daily and it is helping. HTN. BP good in office. Taking Metoprolol. T2DM. Last A1c 5.3%. Diet controlled. A1cs normal since bypass surgery. Lower extremity edema. Taking Lasix 20mg daily with good relief. PMH (REVIEWED):  Past Medical History:   Diagnosis Date    Arthritis     OA back,knees and hands    Asthma     Autoimmune disease ([facility] [address]     Darlis Shelter    CAD (coronary artery disease)     s/p stents 11/2015    Cardiac murmur     Depression     Diabetes ([address]     DVT (deep venous thrombosis) ([facility] 1981    GERD (gastroesophageal reflux disease)     Hypertension     Lumbar radiculitis     follows with dr Rica Stuart for epidural steroid injections    Morbid obesity ([facility] [address]     Musculoskeletal disorder     EDMOND (obstructive sleep apnea)     wears cpap    S/P TONJA (total abdominal hysterectomy)     Thromboembolus ([facility] [address] 1996    PE    Trigger finger, right little finger 10/19/2020    follows with [facility]    Trigger finger, right ring finger 10/19/2020    [facility]       Current Medications/Allergies (REVIEWED):     Current Outpatient Medications on File Prior to Visit   Medication Sig Dispense Refill    nitroglycerin (NITROSTAT) 0.4 mg SL tablet 1TAB WAIT 5MINS IF PAIN PERSISTS TAKE 1TAB, WAIT 5MINS IF PAIN PERSISTS TAKE 1 TAB & DIAL 911. 25 Tablet 1    valACYclovir (VALTREX) 500 mg tablet TAKE 1 PILL TWICE DAILY FOR 3 DAYS AT SIGN OF HERPES FLARE.  30 Tablet 0    cyclobenzaprine (FLEXERIL) 10 mg tablet TAKE 1 TABLET BY MOUTH THREE TIMES A DAY AS NEEDED FOR MUSCLE SPASMS 90 Tablet 1    furosemide (LASIX) 20 mg tablet TAKE 1 TABLET BY MOUTH EVERY DAY AS NEEDED 30 Tablet 2    gabapentin (NEURONTIN) 600 mg tablet TAKE 1 TABLET BY MOUTH THREE TIMES A DAY 90 Tablet 2    metoprolol tartrate (LOPRESSOR) 25 mg tablet TAKE 1 TABLET BY MOUTH TWO (2) TIMES A DAY. 60 Tab 2    ondansetron (ZOFRAN ODT) 4 mg disintegrating tablet TAKE 1 TABLET BY MOUTH EVERY 8 HOURS AS NEEDED FOR NAUSEA 30 Tab 0    rosuvastatin (CRESTOR) 20 mg tablet TAKE 1 TABLET BY MOUTH EVERY DAY AT NIGHT 30 Tab 5    sucralfate (CARAFATE) 1 gram tablet Take 1 g by mouth four (4) times daily.  glucose blood VI test strips (BLOOD GLUCOSE TEST) strip Use once a day 100 Strip 5    albuterol (ACCUNEB) 0.63 mg/3 mL nebulizer solution 3 mL by Nebulization route every six (6) hours as needed for Wheezing. 75 mL 1    albuterol (PROAIR HFA) 90 mcg/actuation inhaler Take 1 Puff by inhalation every four (4) hours as needed for Wheezing or Shortness of Breath. 1 Inhaler 5    nystatin (MYCOSTATIN) 100,000 unit/mL suspension Take 5 mL by mouth four (4) times daily. swish and spit 60 mL 0    dicyclomine (BENTYL) 10 mg capsule TAKE 1 (ONE) CAPSULE BY MOUTH AS NEEDED EVERY 6 HOURS 30 Cap 0    budesonide (PULMICORT FLEXHALER) 180 mcg/actuation aepb inhaler Take 2 Puffs by inhalation daily. 1 Inhaler 3    aspirin delayed-release 81 mg tablet Take  by mouth daily.  Blood-Glucose Meter monitoring kit Use to check glucose once a day 1 Kit 0    alcohol swabs (ALCOHOL PADS) padm Use when checking blood glucose 100 Pad 5    Lancets misc Use once a day. Please give one compatible with patient's meter 1 Package 5    [DISCONTINUED] valACYclovir (VALTREX) 500 mg tablet TAKE 1 PILL TWICE DAILY FOR 3 DAYS AT SIGN OF HERPES FLARE. 30 Tab 0     No current facility-administered medications on file prior to visit.        Allergies   Allergen Reactions    Accupril [Quinapril] Cough    Lipitor [Atorvastatin] Other (comments)     aches    Norvasc [Amlodipine] Swelling     On legs at 10 mg dose         Review of Systems:     Review of Systems   Constitutional: Negative for chills and fever. Respiratory: Negative for cough and shortness of breath. Cardiovascular: Negative for chest pain and leg swelling. Gastrointestinal: Positive for heartburn and nausea. Endo/Heme/Allergies: Bruises/bleeds easily. Objective:     Vitals:    07/28/21 1436   BP: 120/76   Pulse: 75   Resp: 16   Temp: 98 °F (36.7 °C)   TempSrc: Oral   SpO2: 98%   Weight: 150 lb (68 kg)   Height: 5' 5\" (1.651 m)       Physical Exam:  General appearance - alert, well appearing, and in no distress  Mental status - alert, oriented to person, place, and time, normal mood, behavior, speech, dress, motor activity, and thought processes  Chest - clear to auscultation, no wheezes, rales or rhonchi, symmetric air entry  Heart - normal rate, regular rhythm, normal S1, S2, no murmurs, rubs, clicks or gallops  Neurological - alert, oriented, normal speech, no focal findings or movement disorder noted  Extremities - Skin on b/l LEs slightly hyperpigmented from below the knee down, shiny. 1+ PT and DP pulses bilaterally. Scattered bruises/ scratches on bilateral upper and lower extremities. Recent Labs:  No results found for this or any previous visit (from the past 12 hour(s)). Assessment and Plan:       ICD-10-CM ICD-9-CM    1. Essential hypertension  I10 401.9 LIPID PANEL      METABOLIC PANEL, COMPREHENSIVE   2. Anxiety  F41.9 300.00 hydrOXYzine pamoate (VISTARIL) 25 mg capsule    FTF for BNZ  PHQ9 = 7.  consider counseling. see letter   3. Gastroesophageal reflux disease without esophagitis  K21.9 530.81 omeprazole (PRILOSEC) 40 mg capsule      omeprazole (PRILOSEC) 20 mg capsule      METABOLIC PANEL, COMPREHENSIVE   4.  Type 2 diabetes mellitus with diabetic neuropathy, without long-term current use of insulin ([facility])  E11.40 250.60 MICROALBUMIN, UR, RAND W/ MICROALB/CREAT RATIO 357.2 HEMOGLOBIN A1C WITH EAG      LIPID PANEL      METABOLIC PANEL, COMPREHENSIVE   5. H/O gastric bypass  Z98.84 V45.86    6. Colon cancer screening  Z12.11 V76.51 REFERRAL TO GASTROENTEROLOGY   7. Easy bruising  O24.3 341.9 METABOLIC PANEL, COMPREHENSIVE      CBC W/O DIFF      PROTHROMBIN TIME + INR   8. Skin cancer screening  Z12.83 V76.43 REFERRAL TO DERMATOLOGY       HTN  Well controlled on current regimen    Anxiety  Well controlled on Hydroxyzine daily  Can use up to BID as needed    GERD/ h/o gastric bypass  Taking PPI daily   Recently having more nausea and vomiting she thinks is related to her bypass surgery  Following with her bariatric surgeon who is aware  Will trial increase of Omeprazole to 40mg x 30 days  Then resume Omeprazole 20mg daily    T2DM, diet controlled  Noted to be with nephropathy in problem list  No significant CKD indicated on labs  One urine microalbumin of > 300, but all priors were >30  Will repeat urine microalbumin and BMP today    Easy bruising  Known hx of fatty liver disease  Will check CMP, INR and CBC today    Referred to GI for colonoscopy, due 2020    Referred to Dermatology for skin cancer screening    Follow up: 6 months, sooner if needed    Nanette Leyden, MD    We discussed the expected course, resolution and complications of the diagnosis(es) in detail. Medication risks, benefits, costs, interactions, and alternatives were discussed as indicated. I advised her to contact the office if her condition worsens, changes or fails to improve as anticipated. She expressed understanding with the diagnosis(es) and plan.

## 2021-07-28 NOTE — PATIENT INSTRUCTIONS
GI Referrals:  [facility].  Dr. Max Levy  [phone]  [employer]             Learning About Venous Insufficiency  What is it? Venous insufficiency is a problem with the flow of blood from the veins of the legs back to the heart. It's also called chronic venous insufficiency or chronic venous stasis. Your veins bring blood back to the heart after it flows through your body. Veins have valves that keep the blood moving in one direction--toward the heart. But with venous insufficiency, the veins of the legs might not work as they should. This can allow blood to leak backward. Fluid can pool in the legs. This can lead to problems that include varicose veins. What causes it? Venous insufficiency is sometimes caused by deep vein thrombosis and high blood pressure inside leg veins. You are more likely to have venous insufficiency if you:  · Are older. · Are female. · Are overweight. · Don't get enough physical activity. · Smoke. · Have a family history of varicose veins. What are the symptoms? Symptoms of venous insufficiency affect the legs. They may include:  · Swelling, often in the ankles. · A rash. · Varicose veins. · Itching. · Cramping. · Skin sores (ulcers). · Aching or a feeling of heaviness. · Changes in skin color. How is it diagnosed? Your doctor can diagnose venous insufficiency by examining your legs and by using a type of ultrasound test (duplex Doppler) to find out how well blood is flowing in your legs. How is it treated? To reduce swelling and relieve pain caused by venous insufficiency, you can wear compression stockings. They are tighter at the ankles than at the top of the legs. They also can help venous skin ulcers heal. But there are different types of stockings, and they need to fit right. So your doctor will recommend what you need. You also can try to:  · Get more exercise, especially walking. It can increase blood flow.   · Avoid standing still or sitting for a long time, which can make the fluid pool in your legs. · Try not to sit with your legs crossed at the knee. · Keep your legs raised above your heart when you're lying down. This reduces swelling. If these treatments don't work, you may need medicine or a procedure to help relieve symptoms. Procedures might be done to close the vein, to remove the vein, or to improve blood flow. Follow-up care is a key part of your treatment and safety. Be sure to make and go to all appointments, and call your doctor if you are having problems. It's also a good idea to know your test results and keep a list of the medicines you take. Current as of: March 4, 2020               Content Version: 12.8  © 2006-2021 [employer]. Care instructions adapted under license by [employer] (which disclaims liability or warranty for this information). If you have questions about a medical condition or this instruction, always ask your healthcare professional. [place] [address] any warranty or liability for your use of this information.

## 2021-07-29 LAB
ALBUMIN SERPL-MCNC: 3.9 G/DL (ref 3.5–5)
ALBUMIN/GLOB SERPL: 1.3 {RATIO} (ref 1.1–2.2)
ALP SERPL-CCNC: 132 U/L (ref 45–117)
ALT SERPL-CCNC: 22 U/L (ref 12–78)
ANION GAP SERPL CALC-SCNC: 4 MMOL/L (ref 5–15)
AST SERPL-CCNC: 15 U/L (ref 15–37)
BILIRUB SERPL-MCNC: 0.5 MG/DL (ref 0.2–1)
BUN SERPL-MCNC: 8 MG/DL (ref 6–20)
BUN/CREAT SERPL: 13 (ref 12–20)
CALCIUM SERPL-MCNC: 9.5 MG/DL (ref 8.5–10.1)
CHLORIDE SERPL-SCNC: 103 MMOL/L (ref 97–108)
CHOLEST SERPL-MCNC: 269 MG/DL
CO2 SERPL-SCNC: 34 MMOL/L (ref 21–32)
CREAT SERPL-MCNC: 0.61 MG/DL (ref 0.55–1.02)
CREAT UR-MCNC: <13 MG/DL
ERYTHROCYTE [DISTWIDTH] IN BLOOD BY AUTOMATED COUNT: 12.6 % (ref 11.5–14.5)
EST. AVERAGE GLUCOSE BLD GHB EST-MCNC: 103 MG/DL
GLOBULIN SER CALC-MCNC: 2.9 G/DL (ref 2–4)
GLUCOSE SERPL-MCNC: 72 MG/DL (ref 65–100)
HBA1C MFR BLD: 5.2 % (ref 4–5.6)
HCT VFR BLD AUTO: 45.9 % (ref 35–47)
HDLC SERPL-MCNC: 51 MG/DL
HDLC SERPL: 5.3 {RATIO} (ref 0–5)
HGB BLD-MCNC: 14.5 G/DL (ref 11.5–16)
INR PPP: 1 (ref 0.9–1.1)
LDLC SERPL CALC-MCNC: 161.4 MG/DL (ref 0–100)
MCH RBC QN AUTO: 29.2 PG (ref 26–34)
MCHC RBC AUTO-ENTMCNC: 31.6 G/DL (ref 30–36.5)
MCV RBC AUTO: 92.5 FL (ref 80–99)
MICROALBUMIN UR-MCNC: <0.5 MG/DL
MICROALBUMIN/CREAT UR-RTO: NORMAL MG/G (ref 0–30)
NRBC # BLD: 0 K/UL (ref 0–0.01)
NRBC BLD-RTO: 0 PER 100 WBC
PLATELET # BLD AUTO: 349 K/UL (ref 150–400)
PMV BLD AUTO: 10.2 FL (ref 8.9–12.9)
POTASSIUM SERPL-SCNC: 4.1 MMOL/L (ref 3.5–5.1)
PROT SERPL-MCNC: 6.8 G/DL (ref 6.4–8.2)
PROTHROMBIN TIME: 10.4 SEC (ref 9–11.1)
RBC # BLD AUTO: 4.96 M/UL (ref 3.8–5.2)
SODIUM SERPL-SCNC: 141 MMOL/L (ref 136–145)
TRIGL SERPL-MCNC: 283 MG/DL (ref ?–150)
VLDLC SERPL CALC-MCNC: 56.6 MG/DL
WBC # BLD AUTO: 8.7 K/UL (ref 3.6–11)

## 2021-07-29 NOTE — PROGRESS NOTES
Notable for elevated lipids  Pt currently on Crestor 20mg daily  Urine microalbumin screen negative    Notified patient via [employer]

## 2021-08-20 DIAGNOSIS — K21.9 GASTROESOPHAGEAL REFLUX DISEASE WITHOUT ESOPHAGITIS: ICD-10-CM

## 2021-08-20 RX ORDER — OMEPRAZOLE 40 MG/1
CAPSULE, DELAYED RELEASE ORAL
Qty: 30 CAPSULE | Refills: 0 | Status: SHIPPED | OUTPATIENT
Start: 2021-08-20 | End: 2021-11-01

## 2021-09-02 ENCOUNTER — OFFICE VISIT (OUTPATIENT)
Dept: CARDIOLOGY CLINIC | Age: 61
End: 2021-09-02
Payer: COMMERCIAL

## 2021-09-02 VITALS
BODY MASS INDEX: 24.99 KG/M2 | OXYGEN SATURATION: 98 % | HEART RATE: 77 BPM | HEIGHT: 65 IN | SYSTOLIC BLOOD PRESSURE: 125 MMHG | DIASTOLIC BLOOD PRESSURE: 88 MMHG | WEIGHT: 150 LBS

## 2021-09-02 DIAGNOSIS — I25.10 CORONARY ARTERY DISEASE DUE TO LIPID RICH PLAQUE: Primary | ICD-10-CM

## 2021-09-02 DIAGNOSIS — I10 ESSENTIAL HYPERTENSION: ICD-10-CM

## 2021-09-02 DIAGNOSIS — I25.83 CORONARY ARTERY DISEASE DUE TO LIPID RICH PLAQUE: Primary | ICD-10-CM

## 2021-09-02 DIAGNOSIS — E78.5 DYSLIPIDEMIA: ICD-10-CM

## 2021-09-02 PROCEDURE — 99214 OFFICE O/P EST MOD 30 MIN: CPT | Performed by: INTERNAL MEDICINE

## 2021-09-02 RX ORDER — ATORVASTATIN CALCIUM 20 MG/1
20 TABLET, FILM COATED ORAL DAILY
Qty: 90 TABLET | Refills: 3 | Status: SHIPPED | OUTPATIENT
Start: 2021-09-02

## 2021-09-02 RX ORDER — BUMETANIDE 0.5 MG/1
0.5 TABLET ORAL AS NEEDED
Qty: 30 TABLET | Refills: 3 | Status: SHIPPED | OUTPATIENT
Start: 2021-09-02 | End: 2022-01-10 | Stop reason: SDUPTHER

## 2021-09-02 NOTE — PATIENT INSTRUCTIONS
Stop Furosemide. Start Bumex 0.5 mg as needed. Dispense 30 tab. Stop Crestor. Start Atorvastatin 20mg po daily. See Dr. Jesika Mccracken in 1 year.

## 2021-09-02 NOTE — PROGRESS NOTES
Chief Complaint   Patient presents with    Hypertension    Coronary Artery Disease    Other     VENOUS INSUFF,     Visit Vitals  /88 (BP 1 Location: Left upper arm, BP Patient Position: Sitting)   Pulse 77   Ht 5' 5\" (1.651 m)   Wt 150 lb (68 kg)   SpO2 98%   BMI 24.96 kg/m²     Chest pain denied   Palpations LAST WEEK WAS ON VACATION, GOT PALPS WITH LONG WALKS  SOB ONCE WITH THE PALPS  Dizziness denied  Swelling in hands/feet BILATERAL FEET, LEFT MORE THEN RIGHT, WHILE ON VACATION.  OCCASIONALLY HAPPENS  Recent hospital stays denied

## 2021-09-02 NOTE — PROGRESS NOTES
.All orders entered per verbal orders of Dr. Tylor Boggs     Stop Furosemide. Start Bumex 0.5 mg as needed. Dispense 30 tab. Stop Crestor. Start Atorvastatin 20mg po daily. See Dr. Cher Espinoza in 1 year. Refill per VO of Dr. Tylor Boggs  Last appt: 8/14/2020  Future Appointments   Date Time Provider Yesy Olguin   9/8/2022 10:00 AM Thanh Lopez MD [facility]       Requested Prescriptions     Signed Prescriptions Disp Refills    atorvastatin (LIPITOR) 20 mg tablet 90 Tablet 3     Sig: Take 1 Tablet by mouth daily.  bumetanide (BUMEX) 0.5 mg tablet 30 Tablet 3     Sig: Take 1 Tablet by mouth as needed (FOR WT GAIN OF 3 LBS IN ONE DAY OR 5 LBS IN ONE WEEK).

## 2021-09-22 RX ORDER — ONDANSETRON 4 MG/1
TABLET, ORALLY DISINTEGRATING ORAL
Qty: 30 TABLET | Refills: 0 | Status: SHIPPED | OUTPATIENT
Start: 2021-09-22 | End: 2022-08-18 | Stop reason: SDUPTHER

## 2021-10-23 DIAGNOSIS — I25.10 CORONARY ARTERY DISEASE INVOLVING NATIVE CORONARY ARTERY OF NATIVE HEART WITHOUT ANGINA PECTORIS: ICD-10-CM

## 2021-10-25 RX ORDER — METOPROLOL TARTRATE 25 MG/1
TABLET, FILM COATED ORAL
Qty: 60 TABLET | Refills: 1 | Status: SHIPPED | OUTPATIENT
Start: 2021-10-25 | End: 2021-12-01

## 2021-10-31 DIAGNOSIS — K21.9 GASTROESOPHAGEAL REFLUX DISEASE WITHOUT ESOPHAGITIS: ICD-10-CM

## 2021-10-31 DIAGNOSIS — M51.36 DDD (DEGENERATIVE DISC DISEASE), LUMBAR: ICD-10-CM

## 2021-11-01 RX ORDER — CYCLOBENZAPRINE HCL 10 MG
TABLET ORAL
Qty: 90 TABLET | Refills: 1 | Status: SHIPPED | OUTPATIENT
Start: 2021-11-01 | End: 2021-12-01

## 2021-11-01 RX ORDER — FUROSEMIDE 20 MG/1
TABLET ORAL
Qty: 30 TABLET | Refills: 1 | Status: SHIPPED | OUTPATIENT
Start: 2021-11-01 | End: 2022-01-10 | Stop reason: ALTCHOICE

## 2021-11-01 RX ORDER — OMEPRAZOLE 40 MG/1
CAPSULE, DELAYED RELEASE ORAL
Qty: 30 CAPSULE | Refills: 0 | Status: SHIPPED | OUTPATIENT
Start: 2021-11-01 | End: 2021-12-28

## 2021-11-15 ENCOUNTER — TELEPHONE (OUTPATIENT)
Dept: SURGERY | Age: 61
End: 2021-11-15

## 2021-12-01 DIAGNOSIS — I25.10 CORONARY ARTERY DISEASE INVOLVING NATIVE CORONARY ARTERY OF NATIVE HEART WITHOUT ANGINA PECTORIS: ICD-10-CM

## 2021-12-01 DIAGNOSIS — M51.36 DDD (DEGENERATIVE DISC DISEASE), LUMBAR: ICD-10-CM

## 2021-12-01 RX ORDER — METOPROLOL TARTRATE 25 MG/1
TABLET, FILM COATED ORAL
Qty: 60 TABLET | Refills: 1 | Status: SHIPPED | OUTPATIENT
Start: 2021-12-01 | End: 2022-06-09

## 2021-12-01 RX ORDER — CYCLOBENZAPRINE HCL 10 MG
TABLET ORAL
Qty: 90 TABLET | Refills: 1 | Status: SHIPPED | OUTPATIENT
Start: 2021-12-01 | End: 2022-02-23

## 2021-12-01 NOTE — PERIOP NOTES
Patient verified being vaccinated as documented in chart and informed patient no additional information or testing needed. Patient verbalized understanding.

## 2021-12-06 ENCOUNTER — ANESTHESIA EVENT (OUTPATIENT)
Dept: ENDOSCOPY | Age: 61
End: 2021-12-06
Payer: COMMERCIAL

## 2021-12-06 ENCOUNTER — HOSPITAL ENCOUNTER (OUTPATIENT)
Age: 61
Setting detail: OUTPATIENT SURGERY
Discharge: HOME | End: 2021-12-06
Attending: INTERNAL MEDICINE | Admitting: INTERNAL MEDICINE
Payer: COMMERCIAL

## 2021-12-06 ENCOUNTER — ANESTHESIA (OUTPATIENT)
Dept: ENDOSCOPY | Age: 61
End: 2021-12-06
Payer: COMMERCIAL

## 2021-12-06 VITALS
RESPIRATION RATE: 13 BRPM | SYSTOLIC BLOOD PRESSURE: 127 MMHG | HEIGHT: 65 IN | TEMPERATURE: 97.9 F | DIASTOLIC BLOOD PRESSURE: 58 MMHG | HEART RATE: 78 BPM | BODY MASS INDEX: 23.62 KG/M2 | OXYGEN SATURATION: 98 % | WEIGHT: 141.76 LBS

## 2021-12-06 LAB
GLUCOSE BLD STRIP.AUTO-MCNC: 95 MG/DL (ref 65–117)
SERVICE CMNT-IMP: NORMAL

## 2021-12-06 PROCEDURE — 76060000031 HC ANESTHESIA FIRST 0.5 HR: Performed by: INTERNAL MEDICINE

## 2021-12-06 PROCEDURE — 74011250636 HC RX REV CODE- 250/636: Performed by: NURSE ANESTHETIST, CERTIFIED REGISTERED

## 2021-12-06 PROCEDURE — 2709999900 HC NON-CHARGEABLE SUPPLY: Performed by: INTERNAL MEDICINE

## 2021-12-06 PROCEDURE — 76040000019: Performed by: INTERNAL MEDICINE

## 2021-12-06 PROCEDURE — 74011000250 HC RX REV CODE- 250: Performed by: NURSE ANESTHETIST, CERTIFIED REGISTERED

## 2021-12-06 PROCEDURE — 82962 GLUCOSE BLOOD TEST: CPT

## 2021-12-06 RX ORDER — MIDAZOLAM HYDROCHLORIDE 1 MG/ML
.25-5 INJECTION, SOLUTION INTRAMUSCULAR; INTRAVENOUS
Status: DISCONTINUED | OUTPATIENT
Start: 2021-12-06 | End: 2021-12-06 | Stop reason: HOSPADM

## 2021-12-06 RX ORDER — SODIUM CHLORIDE 9 MG/ML
50 INJECTION, SOLUTION INTRAVENOUS CONTINUOUS
Status: DISCONTINUED | OUTPATIENT
Start: 2021-12-06 | End: 2021-12-06 | Stop reason: HOSPADM

## 2021-12-06 RX ORDER — DEXTROMETHORPHAN/PSEUDOEPHED 2.5-7.5/.8
1.2 DROPS ORAL
Status: DISCONTINUED | OUTPATIENT
Start: 2021-12-06 | End: 2021-12-06 | Stop reason: HOSPADM

## 2021-12-06 RX ORDER — EPINEPHRINE 0.1 MG/ML
1 INJECTION INTRACARDIAC; INTRAVENOUS
Status: DISCONTINUED | OUTPATIENT
Start: 2021-12-06 | End: 2021-12-06 | Stop reason: HOSPADM

## 2021-12-06 RX ORDER — FLUMAZENIL 0.1 MG/ML
0.2 INJECTION INTRAVENOUS
Status: DISCONTINUED | OUTPATIENT
Start: 2021-12-06 | End: 2021-12-06 | Stop reason: HOSPADM

## 2021-12-06 RX ORDER — NALOXONE HYDROCHLORIDE 0.4 MG/ML
0.4 INJECTION, SOLUTION INTRAMUSCULAR; INTRAVENOUS; SUBCUTANEOUS
Status: DISCONTINUED | OUTPATIENT
Start: 2021-12-06 | End: 2021-12-06 | Stop reason: HOSPADM

## 2021-12-06 RX ORDER — PROPOFOL 10 MG/ML
INJECTION, EMULSION INTRAVENOUS AS NEEDED
Status: DISCONTINUED | OUTPATIENT
Start: 2021-12-06 | End: 2021-12-06 | Stop reason: HOSPADM

## 2021-12-06 RX ORDER — PROPOFOL 10 MG/ML
INJECTION, EMULSION INTRAVENOUS
Status: DISCONTINUED | OUTPATIENT
Start: 2021-12-06 | End: 2021-12-06 | Stop reason: HOSPADM

## 2021-12-06 RX ORDER — LIDOCAINE HYDROCHLORIDE 20 MG/ML
INJECTION, SOLUTION EPIDURAL; INFILTRATION; INTRACAUDAL; PERINEURAL AS NEEDED
Status: DISCONTINUED | OUTPATIENT
Start: 2021-12-06 | End: 2021-12-06 | Stop reason: HOSPADM

## 2021-12-06 RX ORDER — ATROPINE SULFATE 0.1 MG/ML
0.4 INJECTION INTRAVENOUS
Status: DISCONTINUED | OUTPATIENT
Start: 2021-12-06 | End: 2021-12-06 | Stop reason: HOSPADM

## 2021-12-06 RX ADMIN — LIDOCAINE HYDROCHLORIDE 40 MG: 20 INJECTION, SOLUTION INTRAVENOUS at 14:22

## 2021-12-06 RX ADMIN — PROPOFOL 100 MCG/KG/MIN: 10 INJECTION, EMULSION INTRAVENOUS at 14:22

## 2021-12-06 RX ADMIN — PROPOFOL INJECTABLE EMULSION 50 MG: 10 INJECTION, EMULSION INTRAVENOUS at 14:22

## 2021-12-06 NOTE — PROCEDURES
Soni Chandler M.D.  [phone]            2021          Colonoscopy Operative Report  Amber Steiner  :  1960  [facility] Medical Record Number:  [id number]      Indications:    Family history of coloretal cancer (screening only)     :  Otto Perez MD    Referring Provider: Alverto Dean MD    Sedation:  MAC anesthesia    Pre-Procedural Exam:      Airway: clear,  No airway problems anticipated  Heart: RRR, without gallops or rubs  Lungs: clear bilaterally without wheezes, crackles, or rhonchi  Abdomen: soft, nontender, nondistended, bowel sounds present  Mental Status: awake, alert and oriented to person, place and time     Procedure Details:  After informed consent was obtained with all risks and benefits of procedure explained and preoperative exam completed, the patient was taken to the endoscopy suite and placed in the left lateral decubitus position. Upon sequential sedation as per above, a digital rectal exam was performed. The Olympus videocolonoscope  was inserted in the rectum and carefully advanced to the cecum, which was identified by the ileocecal valve and appendiceal orifice. The quality of preparation was fair. The colonoscope was slowly withdrawn with careful inspection and evaluation between folds. Retroflexion in the rectum was performed. Findings:   Terminal Ileum: not intubated  Cecum: normal  Ascending Colon: normal  Transverse Colon: normal  Descending Colon: no mucosal lesion appreciated  - MIld diffuse melanosis coli.  mild diverticulosis; Sigmoid: no mucosal lesion appreciated  mild diverticulosis; Rectum: no mucosal lesion appreciated  Grade 1 internal hemorrhoid(s); Interventions:  none    Specimen Removed:  none    Complications: None. EBL:  None.     Impression:  Mild melanosis coli, otherwise mucosa within normal                         Sigmoid diverticulosis and internal hemorrhoids    Recommendations:  -Repeat colonoscopy in 5 years.   -High fiber diet.    -Resume normal medication(s). Discharge Disposition:  Home in the company of a  when able to ambulate.     Kulwinder Saravia MD  12/6/2021  2:44 PM

## 2021-12-06 NOTE — PROGRESS NOTES
Georgina Tirado  1960  [id number]    Situation:  Verbal report received from: Liz CHRISTY  Procedure: Procedure(s):  COLONOSCOPY (URGENT)    Background:    Preoperative diagnosis: FX HX OF COLON CANCER  Postoperative diagnosis: Diverticulosis  Melanosis  Hemorrhoids    :  Dr. Miles Gimenez  Assistant(s): Endoscopy Technician-1: Karel Castro  Endoscopy RN-1: Santi Lara RN    Specimens: * No specimens in log *  H. Pylori  no    Assessment:  Intra-procedure medications   Anesthesia gave intra-procedure sedation and medications, see anesthesia flow sheet yes    Intravenous fluids: NS@ KVO     Vital signs stable yes    Abdominal assessment: round and soft yes    Recommendation:  Discharge patient per MD order yes.   Family or Friend spouse  Permission to share finding with family or friend yes

## 2021-12-06 NOTE — ANESTHESIA PREPROCEDURE EVALUATION
Relevant Problems   RESPIRATORY SYSTEM   (+) Asthma   (+) Obstructive sleep apnea      CARDIOVASCULAR   (+) Essential hypertension      GASTROINTESTINAL   (+) GERD (gastroesophageal reflux disease)   (+) GARCIA (nonalcoholic steatohepatitis)      ENDOCRINE   (+) Arthritis   (+) Type 2 diabetes mellitus with diabetic neuropathy (HCC)   (+) Type 2 diabetes with nephropathy (HCC)   Anesthetic History   No history of anesthetic complications            Review of Systems / Medical History  Patient summary reviewed and pertinent labs reviewed    Pulmonary        Sleep apnea: CPAP    Asthma : well controlled       Neuro/Psych   Within defined limits           Cardiovascular    Hypertension          CAD and cardiac stents    Exercise tolerance: >4 METS     GI/Hepatic/Renal     GERD: well controlled          Comments: Fatty liver Endo/Other    Diabetes: well controlled, type 2    Obesity and arthritis     Other Findings              Physical Exam    Airway  Mallampati: II  TM Distance: 4 - 6 cm  Neck ROM: normal range of motion   Mouth opening: Normal     Cardiovascular    Rhythm: regular  Rate: normal         Dental  No notable dental hx       Pulmonary  Breath sounds clear to auscultation               Abdominal         Other Findings            Anesthetic Plan    ASA: 3  Anesthesia type: general            Anesthetic plan and risks discussed with: Patient              Anesthetic History   No history of anesthetic complications            Review of Systems / Medical History  Patient summary reviewed, nursing notes reviewed and pertinent labs reviewed    Pulmonary            Asthma : well controlled  Pertinent negatives: No shortness of breath and sleep apnea     Neuro/Psych         Psychiatric history (anxeity / depression)     Cardiovascular    Hypertension            Pertinent negatives: No CAD  Exercise tolerance: >4 METS     GI/Hepatic/Renal     GERD        Pertinent negatives: No liver disease   Endo/Other    Diabetes: type 2      Pertinent negatives: No morbid obesity and arthritis   Other Findings              Physical Exam    Airway  Mallampati: II  TM Distance: 4 - 6 cm  Neck ROM: normal range of motion   Mouth opening: Normal     Cardiovascular    Rhythm: regular  Rate: normal         Dental    Dentition: Lower dentition intact and Upper dentition intact     Pulmonary  Breath sounds clear to auscultation               Abdominal  GI exam deferred       Other Findings            Anesthetic Plan    ASA: 3  Anesthesia type: MAC          Induction: Intravenous  Anesthetic plan and risks discussed with: Patient

## 2021-12-06 NOTE — ROUTINE PROCESS
Endoscopy discharge instructions have been reviewed and given to patient. The patient verbalized understanding and acceptance of instructions. Dr. Vida Lipscomb discussed with patient procedure findings and next steps.

## 2021-12-06 NOTE — H&P
Caren Stevenson M.D.  [phone]            History and Physical       NAME:  Una Martínez   :   1960   MRN:   [id number]       Referring Physician:  Dr. Thao Lopez Date: 2021 2:18 PM    Chief Complaint:  Colon cancer screening    History of Present Illness:  Patient is a 64 y.o. who is seen for colon cancer screening. Denies any ongoing GI complaints. She has a family history of colon cancer. PMH:  Past Medical History:   Diagnosis Date    Arthritis     OA back,knees and hands    Asthma     Autoimmune disease ([address]     Reba Gowers    CAD (coronary artery disease)     s/p stents 2015    Cardiac murmur     Depression     Diabetes ([address]     diet controlled at this time.  D[facility]     GERD (gastroesophageal reflux disease)     Hypertension     Lumbar radiculitis     follows with dr Niki Gardner for epidural steroid injections    Morbid obesity ([address]     Musculoskeletal disorder     EDMOND (obstructive sleep apnea)     wears cpap    S/P TONJA (total abdominal hysterectomy)     Thromboembolus ([address]     PE    Trigger finger, right little finger 10/19/2020    [facility]s with [facility]    Trigger finger, right ring finger 10/19/2020    [facility]       PSH:  Past Surgical History:   Procedure Laterality Date    HX APPENDECTOMY  1963    HX BREAST BIOPSY Right 2018    Fibroadenoma    HX  [place]    HX CHOLECYSTECTOMY      HX GASTRIC BYPASS  2017    HX HYSTERECTOMY  1985    HX TOTAL ABDOMINAL HYSTERECTOMY      age 22    IR INJ SPINE 2005 [address] [place] [address]  2021    DAYAN STEREO  BX BREAST RT ADDL W/CLIP AND SPECIMEN Right     neg     [place] CARDIAC SURG PROCEDURE UNLIST  2015    STENT PLACED       Allergies: Allergies   Allergen Reactions    Accupril [Quinapril] Cough    Adhesive Tape-Silicones Other (comments)     Makes skin tears easily.      Lipitor [Atorvastatin] Other (comments)     aches    Norvasc [Amlodipine] Swelling     On legs at 10 mg dose       Home Medications:  Prior to Admission Medications   Prescriptions Last Dose Informant Patient Reported? Taking? Blood-Glucose Meter monitoring kit   No No   Sig: Use to check glucose once a day   Lancets misc   No No   Sig: Use once a day. Please give one compatible with patient's meter   albuterol (ACCUNEB) 0.63 mg/3 mL nebulizer solution 11/6/2021 at Unknown time  No Yes   Sig: 3 mL by Nebulization route every six (6) hours as needed for Wheezing. albuterol (PROAIR HFA) 90 mcg/actuation inhaler 11/6/2021 at Unknown time  No Yes   Sig: Take 1 Puff by inhalation every four (4) hours as needed for Wheezing or Shortness of Breath. alcohol swabs (ALCOHOL PADS) padm   No No   Sig: Use when checking blood glucose   aspirin delayed-release 81 mg tablet 12/3/2021  Yes No   Sig: Take  by mouth daily. atorvastatin (LIPITOR) 20 mg tablet 12/5/2021 at Unknown time  No Yes   Sig: Take 1 Tablet by mouth daily. budesonide (PULMICORT FLEXHALER) 180 mcg/actuation aepb inhaler 11/6/2021 at Unknown time  No Yes   Sig: Take 2 Puffs by inhalation daily. bumetanide (BUMEX) 0.5 mg tablet 12/5/2021 at Unknown time  No Yes   Sig: Take 1 Tablet by mouth as needed (FOR WT GAIN OF 3 LBS IN ONE DAY OR 5 LBS IN ONE WEEK).    cyclobenzaprine (FLEXERIL) 10 mg tablet 12/5/2021 at Unknown time  No Yes   Sig: TAKE 1 TABLET BY MOUTH THREE TIMES A DAY AS NEEDED FOR MUSCLE SPASMS   dicyclomine (BENTYL) 10 mg capsule 12/5/2021 at Unknown time  No Yes   Sig: TAKE 1 (ONE) CAPSULE BY MOUTH AS NEEDED EVERY 6 HOURS   furosemide (LASIX) 20 mg tablet 12/6/2021 at Unknown time  No Yes   Sig: TAKE 1 TABLET BY MOUTH EVERY DAY AS NEEDED   gabapentin (NEURONTIN) 600 mg tablet 12/5/2021 at Unknown time  No Yes   Sig: TAKE 1 TABLET BY MOUTH THREE TIMES A DAY   glucose blood VI test strips (BLOOD GLUCOSE TEST) strip   No No   Sig: Use once a day   hydrOXYzine pamoate (VISTARIL) 25 mg capsule 2021 at Unknown time  No Yes   Sig: Take 1 Capsule by mouth two (2) times daily as needed for Anxiety. metoprolol tartrate (LOPRESSOR) 25 mg tablet 2021 at Unknown time  No Yes   Sig: TAKE 1 TABLET BY MOUTH TWO (2) TIMES A DAY. nitroglycerin (NITROSTAT) 0.4 mg SL tablet Unknown at Unknown time  No No   SiTAB WAIT 5MINS IF PAIN PERSISTS TAKE 1TAB, WAIT 5MINS IF PAIN PERSISTS TAKE 1 TAB & DIAL 911.   nystatin (MYCOSTATIN) 100,000 unit/mL suspension   No No   Sig: Take 5 mL by mouth four (4) times daily. swish and spit   Patient not taking: Reported on 2021   omeprazole (PRILOSEC) 40 mg capsule 2021 at Unknown time  No Yes   Sig: TAKE 1 CAPSULE BY MOUTH EVERY DAY   ondansetron (ZOFRAN ODT) 4 mg disintegrating tablet Unknown at Unknown time  No No   Sig: TAKE 1 TABLET BY MOUTH EVERY 8 HOURS AS NEEDED FOR NAUSEA   sucralfate (CARAFATE) 1 gram tablet   Yes No   Sig: Take 1 g by mouth four (4) times daily. Patient not taking: Reported on 2021   valACYclovir (VALTREX) 500 mg tablet Unknown at Unknown time  No No   Sig: TAKE 1 PILL TWICE DAILY FOR 3 DAYS AT SIGN OF HERPES FLARE.       Facility-Administered Medications: None       Hospital Medications:  Current Facility-Administered Medications   Medication Dose Route Frequency    0.9% sodium chloride infusion  50 mL/hr IntraVENous CONTINUOUS    midazolam (VERSED) injection 0.25-5 mg  0.25-5 mg IntraVENous Multiple    naloxone (NARCAN) injection 0.4 mg  0.4 mg IntraVENous Multiple    flumazeniL (ROMAZICON) 0.1 mg/mL injection 0.2 mg  0.2 mg IntraVENous Multiple    simethicone (MYLICON) 36PL/6.8PN oral drops 80 mg  1.2 mL Oral Multiple    atropine injection 0.4 mg  0.4 mg IntraVENous ONCE PRN    EPINEPHrine (ADRENALIN) 0.1 mg/mL syringe 1 mg  1 mg Endoscopically ONCE PRN       Social History:  Social History     Tobacco Use    Smoking status: Never Smoker    Smokeless tobacco: Never Used   Substance Use Topics  Alcohol use: No     Alcohol/week: 0.0 standard drinks       Family History:  Family History   Problem Relation Age of Onset    Cancer Mother 67        colon    Diabetes Mother     Arthritis-osteo Mother     Stroke Mother     Migraines Mother     Diabetes Father     Heart Disease Father     Heart Attack Father     Hypertension Father     High Cholesterol Father     COPD Father     Heart Failure Father     Cancer Other     Diabetes Other     Heart Disease Other     Hypertension Other     Cancer Brother         lymphoma    Cancer Brother         PROSTATE AND LYMPHOMA    Cancer Maternal Grandmother         stomach    Asthma Brother     Asthma Brother     Stroke Brother     Cancer Maternal Aunt         lung     Breast Cancer Maternal Aunt     Cancer Maternal Uncle         lung    Cancer Maternal Uncle         melanoma    Anesth Problems Neg Hx              Review of Systems:      Constitutional: negative fever, negative chills, negative weight loss  Eyes:   negative visual changes  ENT:   negative sore throat, tongue or lip swelling  Respiratory:  negative cough, negative dyspnea  Cards:  negative for chest pain, palpitations, lower extremity edema  GI:   See HPI  :  negative for frequency, dysuria  Integument:  negative for rash and pruritus  Heme:  negative for easy bruising and gum/nose bleeding  Musculoskel: negative for myalgias,  back pain and muscle weakness  Neuro: negative for headaches, dizziness, vertigo  Psych:  negative for feelings of anxiety, depression       Objective:     Patient Vitals for the past 8 hrs:   BP Temp Pulse Resp SpO2 Height Weight   12/06/21 1343 136/73 98.2 °F (36.8 °C) 81 12 98 % 5' 5\" (1.651 m) 64.3 kg (141 lb 12.1 oz)     No intake/output data recorded. No intake/output data recorded.     EXAM:     NEURO-a&o   HEENT-wnl   LUNGS-clear    COR-regular rate and rhythym     ABD-soft , no tenderness, no rebound, good bs     EXT-no edema     Data Review     No results for input(s): WBC, HGB, HCT, PLT, HGBEXT, HCTEXT, PLTEXT in the last 72 hours. No results for input(s): NA, K, CL, CO2, BUN, CREA, GLU, PHOS, CA in the last 72 hours. No results for input(s): AP, TBIL, TP, ALB, GLOB, GGT, AML, LPSE in the last 72 hours. No lab exists for component: SGOT, GPT, AMYP, HLPSE  No results for input(s): INR, PTP, APTT, INREXT in the last 72 hours. Patient Active Problem List   Diagnosis Code    Obstructive sleep apnea G47.33    Asthma J45.909    Arthritis M19.90    GERD (gastroesophageal reflux disease) K21.9    GARCIA (nonalcoholic steatohepatitis) K75.81    Chronic pain G89.29    Essential hypertension D69    Metabolic syndrome H94.57    FH: diabetes mellitus Z83.3    Anxiety F41.9    History of pulmonary embolus (PE) Z86.711    Hypokalemia E87.6    H/O gastric bypass Z98.84    H/O colonoscopy Z98.890    DDD (degenerative disc disease), lumbar M51.36    FH: colon cancer Z80.0    Elevated ferritin R79.89    Type 2 diabetes mellitus with diabetic neuropathy (HCC) E11.40    Abnormal mammogram of right breast R92.8    Abnormal CT of the abdomen R93.5    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HCC) K55.069    Primary fibromyalgia syndrome M79.7    Inflammatory bowel disease K52.9    Type 2 diabetes with nephropathy (HCC) E11.21    Venous insufficiency I87.2      Assessment:   · Colon cancer screening   Plan:   · Colonoscopy today.      Signed By: Patricia Knox MD     12/6/2021  2:18 PM

## 2021-12-06 NOTE — DISCHARGE INSTRUCTIONS
[facility]. Blake Nettles M.D.  [phone]            COLON DISCHARGE INSTRUCTIONS       2021    Gilmar Paz  :  1960  Ricky Medical Record Number:  [id number]      COLONOSCOPY FINDINGS:  Your colonoscopy showed mild diverticulosis and internal hemorrhoids. DISCOMFORT:  Redness at IV site- apply warm compress to area; if redness or soreness persist- contact your physician  There may be a slight amount of blood passed from the rectum  Gaseous discomfort- walking, belching will help relieve any discomfort  You may not operate a vehicle for 12 hours  You may not engage in an occupation involving machinery or appliances for rest of today  You may not drink alcoholic beverages for at least 12 hours  Avoid making any critical decisions for at least 24 hour  DIET:   High fiber diet. - however -  remember your colon is empty and a heavy meal will produce gas. Avoid these foods:  vegetables, fried / greasy foods, carbonated drinks for today     ACTIVITY:  You may resume your normal daily activities it is recommended that you spend the remainder of the day resting -  avoid any strenuous activity. CALL M.DTrae ANY SIGN OF:   Increasing pain, nausea, vomiting  Abdominal distension (swelling)  New increased bleeding (oral or rectal)  Fever (chills)  Pain in chest area  Bloody discharge from nose or mouth   Shortness of breath    Follow-up Instructions:   Call Dr. Kirby Lala if any questions or problems. Telephone # [phone]  Should have a repeat colonoscopy in 5 years.

## 2021-12-06 NOTE — ANESTHESIA POSTPROCEDURE EVALUATION
Procedure(s):  COLONOSCOPY (URGENT). MAC    Anesthesia Post Evaluation      Multimodal analgesia: multimodal analgesia used between 6 hours prior to anesthesia start to PACU discharge  Patient location during evaluation: PACU  Patient participation: complete - patient participated  Level of consciousness: awake and alert  Pain management: adequate  Airway patency: patent  Anesthetic complications: no  Cardiovascular status: acceptable  Respiratory status: acceptable  Hydration status: acceptable  Post anesthesia nausea and vomiting:  none  Final Post Anesthesia Temperature Assessment:  Normothermia (36.0-37.5 degrees C)      INITIAL Post-op Vital signs:   Vitals Value Taken Time   /68 12/06/21 1504   Temp 36.6 °C (97.9 °F) 12/06/21 1444   Pulse 78 12/06/21 1508   Resp 15 12/06/21 1508   SpO2 99 % 12/06/21 1508   Vitals shown include unvalidated device data.

## 2021-12-06 NOTE — PERIOP NOTES
Report from Iva Small CRNA, see anesthesia record. ABD remains soft and non-tender post procedure. Pt has no complaints at this time and tolerated the procedure well. Endoscope was pre-cleaned at bedside immediately following procedure by Jasmin Thomas.

## 2021-12-28 DIAGNOSIS — M79.7 FIBROMYALGIA: ICD-10-CM

## 2021-12-28 DIAGNOSIS — G89.29 OTHER CHRONIC PAIN: ICD-10-CM

## 2021-12-28 RX ORDER — GABAPENTIN 600 MG/1
TABLET ORAL
Qty: 90 TABLET | Refills: 0 | Status: SHIPPED | OUTPATIENT
Start: 2021-12-28 | End: 2022-03-22

## 2022-01-05 ENCOUNTER — TRANSCRIBE ORDER (OUTPATIENT)
Dept: SCHEDULING | Age: 62
End: 2022-01-05

## 2022-01-05 DIAGNOSIS — Z12.31 SCREENING MAMMOGRAM FOR HIGH-RISK PATIENT: Primary | ICD-10-CM

## 2022-01-10 ENCOUNTER — PATIENT MESSAGE (OUTPATIENT)
Dept: CARDIOLOGY CLINIC | Age: 62
End: 2022-01-10

## 2022-01-10 RX ORDER — BUMETANIDE 0.5 MG/1
0.5 TABLET ORAL AS NEEDED
Qty: 30 TABLET | Refills: 2 | Status: SHIPPED | OUTPATIENT
Start: 2022-01-10 | End: 2022-03-22 | Stop reason: SDUPTHER

## 2022-01-10 RX ORDER — BUMETANIDE 0.5 MG/1
0.5 TABLET ORAL AS NEEDED
Qty: 30 TABLET | Refills: 3 | OUTPATIENT
Start: 2022-01-10

## 2022-01-10 NOTE — TELEPHONE ENCOUNTER
Refill per VO of Dr. Key Jason  Last appt: 9/2/2021  Future Appointments   Date Time Provider Yesy Olguin   1/14/2022 10:20 AM Ramya García NP [facility] BS AMB   2/9/2022 11:00 AM [facility] 1 [facility]   9/8/2022 10:00 AM Tatiana Lopez MD [facility] BS AMB       Requested Prescriptions     Pending Prescriptions Disp Refills    bumetanide (BUMEX) 0.5 mg tablet 30 Tablet 2     Sig: Take 1 Tablet by mouth as needed (FOR WT GAIN OF 3 LBS IN ONE DAY OR 5 LBS IN ONE WEEK).

## 2022-01-10 NOTE — TELEPHONE ENCOUNTER
Pharmacy requesting refill, patient is out of this med        [facility] pharmacy [phone]

## 2022-01-12 ENCOUNTER — TELEPHONE (OUTPATIENT)
Dept: SURGERY | Age: 62
End: 2022-01-12

## 2022-01-12 NOTE — TELEPHONE ENCOUNTER
I tried contacting the patient to see if she could do a virtual visit on JAN 14 instead of coming in office. Unable to reach patient. Left voicemail for the patient to call back.

## 2022-01-14 ENCOUNTER — VIRTUAL VISIT (OUTPATIENT)
Dept: SURGERY | Age: 62
End: 2022-01-14
Payer: COMMERCIAL

## 2022-01-14 VITALS — BODY MASS INDEX: 24.41 KG/M2 | WEIGHT: 143 LBS | HEIGHT: 64 IN

## 2022-01-14 DIAGNOSIS — E53.8 VITAMIN B12 DEFICIENCY: ICD-10-CM

## 2022-01-14 DIAGNOSIS — Z98.84 S/P GASTRIC BYPASS: ICD-10-CM

## 2022-01-14 DIAGNOSIS — K91.2 POSTSURGICAL NONABSORPTION: ICD-10-CM

## 2022-01-14 DIAGNOSIS — E55.9 VITAMIN D DEFICIENCY: ICD-10-CM

## 2022-01-14 DIAGNOSIS — D64.9 ANEMIA, UNSPECIFIED TYPE: ICD-10-CM

## 2022-01-14 DIAGNOSIS — E66.01 MORBID OBESITY (HCC): Primary | ICD-10-CM

## 2022-01-14 PROCEDURE — 99212 OFFICE O/P EST SF 10 MIN: CPT | Performed by: NURSE PRACTITIONER

## 2022-01-14 NOTE — PROGRESS NOTES
1. Have you been to the ER, urgent care clinic since your last visit? Hospitalized since your last visit? no    2. Have you seen or consulted any other health care providers outside of the [address] [place] [address] since your last visit? Include any pap smears or colon screening. Yes,  Colonoscopy.

## 2022-01-14 NOTE — PROGRESS NOTES
I was in the office while conducting this encounter. Consent:  She and/or her healthcare decision maker is aware that this patient-initiated Telehealth encounter is a billable service, with coverage as determined by her insurance carrier. She is aware that she may receive a bill and has provided verbal consent to proceed: Yes    This virtual visit was conducted via [employer]. Pursuant to the emergency declaration under the [facility] [place] [place] [address] [facility] authority and the [employer] Act, this Virtual  Visit was conducted to reduce the patient's risk of exposure to COVID-19 and provide continuity of care for an established patient. Services were provided through a video synchronous discussion virtually to substitute for in-person clinic visit. Due to this being a TeleHealth evaluation, many elements of the physical examination are unable to be assessed. Total Time: minutes: 11-20 minutes. HISTORY OF PRESENT ILLNESS  Paradise Pal is a 64 y.o. female with previous malabsorptive Gastric bypass 4 years ago . She has lost a total of 75 pounds since surgery. She  has gained 5 lbs since the last ov. Body mass index is 24.93 kg/m². . Nausea off and on. No vomiting. Denies Acid reflux/heartburn, taking Prilosec. . Drinking  64 ounces of water daily. 50-60 protein intake daily. + BM's, taking ducolas. . Pt is walking for exercise. +Colonscopy wit some diverticulitis. Dietary recall -      Breakfast- raisin bran, yogurt  Lunch- soup or tuna  Dinner- salmon,     She is snacking between meals; farooq peraza. Vitamins:  MVI : yes  Calcium : yes  B-Vit 12: yes  Vit D: Yes        Ms. Devyn Watson has a reminder for a \"due or due soon\" health maintenance. I have asked that she contact her primary care provider for follow-up on this health maintenance.         COMORBIDITY     SLEEP APNEA                 NO        GERD  (req.meds) YES  HYPERLIPIDEMIA            NO  HYPERTENSION              NO         DIABETES                         NO           Current Outpatient Medications:     bumetanide (BUMEX) 0.5 mg tablet, Take 1 Tablet by mouth as needed (FOR WT GAIN OF 3 LBS IN ONE DAY OR 5 LBS IN ONE WEEK). , Disp: 30 Tablet, Rfl: 2    omeprazole (PRILOSEC) 40 mg capsule, TAKE 1 CAPSULE BY MOUTH EVERY DAY, Disp: 90 Capsule, Rfl: 1    gabapentin (NEURONTIN) 600 mg tablet, TAKE 1 TABLET BY MOUTH THREE TIMES A DAY, Disp: 90 Tablet, Rfl: 0    cyclobenzaprine (FLEXERIL) 10 mg tablet, TAKE 1 TABLET BY MOUTH THREE TIMES A DAY AS NEEDED FOR MUSCLE SPASMS, Disp: 90 Tablet, Rfl: 1    metoprolol tartrate (LOPRESSOR) 25 mg tablet, TAKE 1 TABLET BY MOUTH TWO (2) TIMES A DAY., Disp: 60 Tablet, Rfl: 1    ondansetron (ZOFRAN ODT) 4 mg disintegrating tablet, TAKE 1 TABLET BY MOUTH EVERY 8 HOURS AS NEEDED FOR NAUSEA, Disp: 30 Tablet, Rfl: 0    atorvastatin (LIPITOR) 20 mg tablet, Take 1 Tablet by mouth daily. , Disp: 90 Tablet, Rfl: 3    nitroglycerin (NITROSTAT) 0.4 mg SL tablet, 1TAB WAIT 5MINS IF PAIN PERSISTS TAKE 1TAB, WAIT 5MINS IF PAIN PERSISTS TAKE 1 TAB & DIAL 911., Disp: 25 Tablet, Rfl: 1    valACYclovir (VALTREX) 500 mg tablet, TAKE 1 PILL TWICE DAILY FOR 3 DAYS AT SIGN OF HERPES FLARE., Disp: 30 Tablet, Rfl: 0    sucralfate (CARAFATE) 1 gram tablet, Take 1 g by mouth four (4) times daily. , Disp: , Rfl:     albuterol (ACCUNEB) 0.63 mg/3 mL nebulizer solution, 3 mL by Nebulization route every six (6) hours as needed for Wheezing., Disp: 75 mL, Rfl: 1    albuterol (PROAIR HFA) 90 mcg/actuation inhaler, Take 1 Puff by inhalation every four (4) hours as needed for Wheezing or Shortness of Breath., Disp: 1 Inhaler, Rfl: 5    dicyclomine (BENTYL) 10 mg capsule, TAKE 1 (ONE) CAPSULE BY MOUTH AS NEEDED EVERY 6 HOURS, Disp: 30 Cap, Rfl: 0    budesonide (PULMICORT FLEXHALER) 180 mcg/actuation aepb inhaler, Take 2 Puffs by inhalation daily. , Disp: 1 Inhaler, Rfl: 3    Blood-Glucose Meter monitoring kit, Use to check glucose once a day, Disp: 1 Kit, Rfl: 0    alcohol swabs (ALCOHOL PADS) padm, Use when checking blood glucose, Disp: 100 Pad, Rfl: 5    Lancets misc, Use once a day. Please give one compatible with patient's meter, Disp: 1 Package, Rfl: 5    hydrOXYzine pamoate (VISTARIL) 25 mg capsule, Take 1 Capsule by mouth two (2) times daily as needed for Anxiety. , Disp: 180 Capsule, Rfl: 3    glucose blood VI test strips (BLOOD GLUCOSE TEST) strip, Use once a day, Disp: 100 Strip, Rfl: 5    nystatin (MYCOSTATIN) 100,000 unit/mL suspension, Take 5 mL by mouth four (4) times daily. swish and spit (Patient not taking: Reported on 1/14/2022), Disp: 60 mL, Rfl: 0    aspirin delayed-release 81 mg tablet, Take  by mouth daily. (Patient not taking: Reported on 1/14/2022), Disp: , Rfl:       Visit Vitals  Ht 5' 3.5\" (1.613 m)   Wt 143 lb (64.9 kg)   LMP 01/01/1985   BMI 24.93 kg/m²     HPI    Review of Systems   Respiratory: Negative for shortness of breath. Cardiovascular: Negative for chest pain. Gastrointestinal: Negative for abdominal pain, heartburn, nausea and vomiting. Neurological: Negative for dizziness and headaches. Physical Exam  HENT:      Mouth/Throat:      Mouth: Mucous membranes are moist.   Pulmonary:      Effort: No respiratory distress. Abdominal:      Tenderness: There is no abdominal tenderness. Neurological:      Mental Status: She is alert and oriented to person, place, and time. ASSESSMENT and PLAN  Charmayne Barefoot is a 64 y.o. female with previous malabsorptive Gastric bypass 4 years ago . She has lost a total of 75 pounds since surgery. She  has gained 5 lbs since the last ov. Body mass index is 24.93 kg/m². . Nausea off and on. No vomiting. Denies Acid reflux/heartburn, taking Prilosec. . Drinking  64 ounces of water daily. 50-60 protein intake daily. + BM's, taking ducolas. . Pt is walking for exercise. +Colonscopy wit some diverticulitis. Advised patient regard to diet that is high-protein, low-fat, low-sugar, limited carbohydrates. Strive for 50-60 grams of protein daily. If having a snack, foods that are protein or fiber rich. . No eating/drinking together, chew foods well, and portion control. Measure meals. Discussed snacking behavior and to [facility] pay attention to behavioral factor and habits. Drink at least 40-64 ounces of water or non-calorie/non-carbonated beverages daily. Continue vitamin regiment daily. Exercise at least 3 days a week with cardiovascular and strength training. Patient to follow up in 1 year. Advised to call office if any questions/concerns. Check nutrition labs. Start colace. 15 Minutes spent face to face with patient, >50 % of time spent counseling.

## 2022-01-14 NOTE — PATIENT INSTRUCTIONS
Laxative, Stool Softeners (By mouth)   Treats constipation by helping you have a bowel movement. Brand Name(s): Col-Rite, Colace, Colace Clear, DSS, Diocto, Diocto Liquid, Doc-Q-Lace, Docuprene, Docusil, Dok, Dulcolax, Fleet Sof-Lax, [facility] Docusate Calcium, [facility] Stool Softener, [facility] Stool Softener Extra Strength   There may be other brand names for this medicine. When This Medicine Should Not Be Used: You should not use this medicine if you have severe stomach pain, nausea, or vomiting. Stool softeners should not be used if you have severe stomach pain and do not know the cause. How to Use This Medicine:   Capsule, Tablet, Liquid, Liquid Filled Capsule  · Your doctor will tell you how much medicine to use. Do not use more than directed. · Follow the instructions on the medicine label if you are using this medicine without a prescription. · Drink 6 to 8 glasses of water daily while using any laxative. · To make the oral liquid taste better, you may mix it with one-half glass of milk or fruit juice. · Measure the oral liquid medicine with a marked measuring spoon, oral syringe, medicine cup, or medicine dropper. If a dose is missed:   · Use the missed dose as soon as possible. · If you do not remember the missed dose until the next day, skip the missed dose and go back to your regular dosing schedule. · You should not use two doses at the same time. How to Store and Dispose of This Medicine:   · Store the medicine in a tightly closed container at room temperature, away from heat and moisture. Do not store liquid-filled capsules in the refrigerator. · Keep all medicine out of the reach of children. Drugs and Foods to Avoid:   Ask your doctor or pharmacist before using any other medicine, including over-the-counter medicines, vitamins, and herbal products. · You should not use mineral oil while you are using a stool softener.   · You should not use a stool softener within 2 hours before or after taking any other medicines. Laxatives can keep other medicines from working correctly. Warnings While Using This Medicine:   · If you are pregnant or breastfeeding, talk to your doctor before taking this medicine. · Do not give laxatives to children under 10years old unless you talk to your doctor. · You should not use this laxative for longer than 1 week unless approved by your doctor. Laxatives may be habit-forming and can harm your bowels if you use them too long. · Stool softeners usually work in 1 to 2 days, but for some people, results can take as long as 3 to 5 days. Possible Side Effects While Using This Medicine: If you notice these less serious side effects, talk with your doctor:  · Nausea  · Sore throat  · Skin rash  If you notice other side effects that you think are caused by this medicine, tell your doctor. Call your doctor for medical advice about side effects. You may report side effects to FDA at 4-194-FDA-3045  © 2017 [facility] Information is for End User's use only and may not be sold, redistributed or otherwise used for commercial purposes. The above information is an  only. It is not intended as medical advice for individual conditions or treatments. Talk to your doctor, nurse or pharmacist before following any medical regimen to see if it is safe and effective for you.

## 2022-02-22 DIAGNOSIS — M51.36 DDD (DEGENERATIVE DISC DISEASE), LUMBAR: ICD-10-CM

## 2022-02-23 RX ORDER — CYCLOBENZAPRINE HCL 10 MG
TABLET ORAL
Qty: 90 TABLET | Refills: 1 | Status: SHIPPED | OUTPATIENT
Start: 2022-02-23 | End: 2022-04-18

## 2022-03-18 PROBLEM — Z80.0 FH: COLON CANCER: Status: ACTIVE | Noted: 2017-12-19

## 2022-03-18 PROBLEM — R93.5 ABNORMAL CT OF THE ABDOMEN: Status: ACTIVE | Noted: 2018-12-16

## 2022-03-18 PROBLEM — K55.069 OMENTAL INFARCTION (HCC): Status: ACTIVE | Noted: 2019-01-07

## 2022-03-18 PROBLEM — M51.36 DDD (DEGENERATIVE DISC DISEASE), LUMBAR: Status: ACTIVE | Noted: 2017-12-19

## 2022-03-19 PROBLEM — E11.21 TYPE 2 DIABETES WITH NEPHROPATHY (HCC): Status: ACTIVE | Noted: 2020-10-28

## 2022-03-19 PROBLEM — E87.6 HYPOKALEMIA: Status: ACTIVE | Noted: 2017-01-26

## 2022-03-19 PROBLEM — I87.2 VENOUS INSUFFICIENCY: Status: ACTIVE | Noted: 2021-07-28

## 2022-03-19 PROBLEM — E11.40 TYPE 2 DIABETES MELLITUS WITH DIABETIC NEUROPATHY (HCC): Status: ACTIVE | Noted: 2018-06-29

## 2022-03-19 PROBLEM — R79.89 ELEVATED FERRITIN: Status: ACTIVE | Noted: 2017-12-21

## 2022-03-19 PROBLEM — R92.8 ABNORMAL MAMMOGRAM OF RIGHT BREAST: Status: ACTIVE | Noted: 2018-08-06

## 2022-03-19 PROBLEM — Z98.890 H/O COLONOSCOPY: Status: ACTIVE | Noted: 2017-12-19

## 2022-03-20 PROBLEM — M79.7 PRIMARY FIBROMYALGIA SYNDROME: Status: ACTIVE | Noted: 2020-03-10

## 2022-03-20 PROBLEM — K52.9 INFLAMMATORY BOWEL DISEASE: Status: ACTIVE | Noted: 2020-03-10

## 2022-03-20 PROBLEM — Z98.84 H/O GASTRIC BYPASS: Status: ACTIVE | Noted: 2017-07-12

## 2022-03-22 ENCOUNTER — TELEPHONE (OUTPATIENT)
Dept: FAMILY MEDICINE CLINIC | Age: 62
End: 2022-03-22

## 2022-03-22 DIAGNOSIS — G89.29 OTHER CHRONIC PAIN: ICD-10-CM

## 2022-03-22 DIAGNOSIS — M79.7 FIBROMYALGIA: ICD-10-CM

## 2022-03-22 RX ORDER — BUMETANIDE 0.5 MG/1
TABLET ORAL
Qty: 30 TABLET | Refills: 2 | OUTPATIENT
Start: 2022-03-22

## 2022-03-22 RX ORDER — GABAPENTIN 600 MG/1
TABLET ORAL
Qty: 90 TABLET | Refills: 0 | Status: SHIPPED | OUTPATIENT
Start: 2022-03-22 | End: 2022-04-27 | Stop reason: SDUPTHER

## 2022-03-22 NOTE — TELEPHONE ENCOUNTER
Refill per VO of Dr. Bozena Mills:    Last appt: 9/2/2021    Future Appointments   Date Time Provider Yesy Zazuetai   9/8/2022 10:00 AM Anthony Lopez MD [facility] BS AMB       Requested Prescriptions     Pending Prescriptions Disp Refills    bumetanide (BUMEX) 0.5 mg tablet [Pharmacy Med Name: BUMETANIDE 0.5 MG TABLET] 30 Tablet 2     Sig: TAKE 1 TABLET AS NEEDED FOR WEIGHT GAIN OF 3 LBS. IN 1 DAY OR 5 LBS.  IN 1 WEEK     Refill was denied because,  TOO SOON FOR A PRN MED

## 2022-03-22 NOTE — TELEPHONE ENCOUNTER
Reviewed chart. Pt due for UDS and visit. Will fill 30 days of med and have pt contacted to schedule an office visit. Reviewed PDMP; appropriate.      Kristen Harden MD

## 2022-03-22 NOTE — TELEPHONE ENCOUNTER
Called patient to set up an appointment for med refills in April with Dr. Amee Perez. Left vm for patient to call the office back so we can get her scheduled.

## 2022-03-23 RX ORDER — BUMETANIDE 0.5 MG/1
0.5 TABLET ORAL AS NEEDED
Qty: 30 TABLET | Refills: 2 | Status: SHIPPED | OUTPATIENT
Start: 2022-03-23 | End: 2022-06-17

## 2022-04-04 RX ORDER — NITROGLYCERIN 0.4 MG/1
TABLET SUBLINGUAL
Qty: 25 TABLET | Refills: 1 | Status: SHIPPED | OUTPATIENT
Start: 2022-04-04 | End: 2022-09-22

## 2022-04-04 NOTE — TELEPHONE ENCOUNTER
Refill per VO of Dr. Mckenzie Livingston:    Last appt: Visit date not found    Future Appointments   Date Time Provider Yesy Olguin   2022 10:00 AM Jose Aceves MD [facility] BS AMB   2022 10:00 AM Saeid Lopez MD [facility] BS AMB       Requested Prescriptions     Pending Prescriptions Disp Refills    nitroglycerin (NITROSTAT) 0.4 mg SL tablet 25 Tablet 1     SiTAB WAIT 5MINS IF PAIN PERSISTS TAKE 1TAB, WAIT 5MINS IF PAIN PERSISTS TAKE 1 TAB & DIAL 911.

## 2022-04-18 DIAGNOSIS — M51.36 DDD (DEGENERATIVE DISC DISEASE), LUMBAR: ICD-10-CM

## 2022-04-18 RX ORDER — CYCLOBENZAPRINE HCL 10 MG
TABLET ORAL
Qty: 90 TABLET | Refills: 1 | Status: SHIPPED | OUTPATIENT
Start: 2022-04-18 | End: 2022-05-23

## 2022-04-27 ENCOUNTER — OFFICE VISIT (OUTPATIENT)
Dept: FAMILY MEDICINE CLINIC | Age: 62
End: 2022-04-27
Payer: COMMERCIAL

## 2022-04-27 VITALS
RESPIRATION RATE: 17 BRPM | HEIGHT: 64 IN | BODY MASS INDEX: 24.92 KG/M2 | HEART RATE: 76 BPM | TEMPERATURE: 97.1 F | SYSTOLIC BLOOD PRESSURE: 130 MMHG | DIASTOLIC BLOOD PRESSURE: 82 MMHG | OXYGEN SATURATION: 98 % | WEIGHT: 146 LBS

## 2022-04-27 DIAGNOSIS — G89.29 OTHER CHRONIC PAIN: ICD-10-CM

## 2022-04-27 DIAGNOSIS — M79.7 FIBROMYALGIA: ICD-10-CM

## 2022-04-27 DIAGNOSIS — J45.40 MODERATE PERSISTENT ASTHMA WITHOUT COMPLICATION: Primary | ICD-10-CM

## 2022-04-27 DIAGNOSIS — Z23 ENCOUNTER FOR IMMUNIZATION: ICD-10-CM

## 2022-04-27 DIAGNOSIS — R30.0 DYSURIA: ICD-10-CM

## 2022-04-27 DIAGNOSIS — E11.9 TYPE 2 DIABETES MELLITUS IN REMISSION (HCC): ICD-10-CM

## 2022-04-27 DIAGNOSIS — T75.3XXA MOTION SICKNESS, INITIAL ENCOUNTER: ICD-10-CM

## 2022-04-27 DIAGNOSIS — Z98.84 H/O GASTRIC BYPASS: ICD-10-CM

## 2022-04-27 LAB
BILIRUB UR QL STRIP: NEGATIVE
GLUCOSE UR-MCNC: NEGATIVE MG/DL
KETONES P FAST UR STRIP-MCNC: NEGATIVE MG/DL
PH UR STRIP: 7 [PH] (ref 4.6–8)
PROT UR QL STRIP: NEGATIVE
SP GR UR STRIP: 1.01 (ref 1–1.03)
UA UROBILINOGEN AMB POC: NORMAL (ref 0.2–1)
URINALYSIS CLARITY POC: NORMAL
URINALYSIS COLOR POC: YELLOW
URINE BLOOD POC: NEGATIVE
URINE LEUKOCYTES POC: NORMAL
URINE NITRITES POC: NEGATIVE

## 2022-04-27 PROCEDURE — 81003 URINALYSIS AUTO W/O SCOPE: CPT | Performed by: FAMILY MEDICINE

## 2022-04-27 PROCEDURE — 90750 HZV VACC RECOMBINANT IM: CPT | Performed by: FAMILY MEDICINE

## 2022-04-27 PROCEDURE — 90732 PPSV23 VACC 2 YRS+ SUBQ/IM: CPT | Performed by: FAMILY MEDICINE

## 2022-04-27 PROCEDURE — 99214 OFFICE O/P EST MOD 30 MIN: CPT | Performed by: FAMILY MEDICINE

## 2022-04-27 RX ORDER — MELOXICAM 15 MG/1
TABLET ORAL
COMMUNITY
Start: 2022-04-18 | End: 2022-04-27

## 2022-04-27 RX ORDER — GABAPENTIN 600 MG/1
600 TABLET ORAL 3 TIMES DAILY
Qty: 90 TABLET | Refills: 3 | Status: SHIPPED | OUTPATIENT
Start: 2022-04-27

## 2022-04-27 RX ORDER — ALBUTEROL SULFATE 90 UG/1
1 AEROSOL, METERED RESPIRATORY (INHALATION)
Qty: 1 EACH | Refills: 3 | Status: SHIPPED | OUTPATIENT
Start: 2022-04-27

## 2022-04-27 RX ORDER — BUPROPION HYDROCHLORIDE 300 MG/1
300 TABLET ORAL AS NEEDED
COMMUNITY
End: 2022-07-15

## 2022-04-27 RX ORDER — ALBUTEROL SULFATE 0.63 MG/3ML
0.63 SOLUTION RESPIRATORY (INHALATION)
Qty: 75 ML | Refills: 1 | Status: SHIPPED | OUTPATIENT
Start: 2022-04-27

## 2022-04-27 RX ORDER — BUDESONIDE 180 UG/1
2 AEROSOL, POWDER RESPIRATORY (INHALATION) DAILY
Qty: 1 EACH | Refills: 3 | Status: SHIPPED | OUTPATIENT
Start: 2022-04-27

## 2022-04-27 RX ORDER — SCOLOPAMINE TRANSDERMAL SYSTEM 1 MG/1
1 PATCH, EXTENDED RELEASE TRANSDERMAL
Qty: 3 PATCH | Refills: 0 | Status: SHIPPED | OUTPATIENT
Start: 2022-04-27 | End: 2022-07-15

## 2022-04-27 RX ORDER — SULFAMETHOXAZOLE AND TRIMETHOPRIM 800; 160 MG/1; MG/1
1 TABLET ORAL 2 TIMES DAILY
Qty: 6 TABLET | Refills: 0 | Status: SHIPPED | OUTPATIENT
Start: 2022-04-27 | End: 2022-04-30

## 2022-04-27 NOTE — PROGRESS NOTES
Tim Benitez is a 64 y.o. female    Chief Complaint   Patient presents with    Dysuria    Medication Refill       1. Have you been to the ER, urgent care clinic since your last visit? Hospitalized since your last visit? No  2. Have you seen or consulted any other health care providers outside of the [address] [place] [address] since your last visit? Include any pap smears or colon screening. No    Visit Vitals  /82 (BP 1 Location: Right arm, BP Patient Position: Sitting)   Pulse 76   Temp 97.1 °F (36.2 °C) (Temporal)   Resp 17   Ht 5' 3.5\" (1.613 m)   Wt 146 lb (66.2 kg)   SpO2 98%   BMI 25.46 kg/m²       Health Maintenance Due   Topic Date Due    Shingrix Vaccine Age 49> (1 of 2) Never done    Pneumococcal 0-64 years (2 - PCV) 08/04/2016    Foot Exam Q1  10/29/2021       Burning when urinating for 2-3 days, no abdominal pain.

## 2022-04-27 NOTE — PATIENT INSTRUCTIONS
Motion Sickness: Care Instructions  Your Care Instructions     Motion sickness is nausea caused by riding in a car, airplane, train, or boat. It can also cause vomiting, sweating, and headache. Motion sickness is sometimes called carsickness, airsickness, or seasickness. You can also get motion sickness from playing video games, looking through a microscope, or other activities. Problems caused by motion sickness usually go away soon after the motion stops. Sometimes it can take a few days for symptoms to go away. Motion sickness can be treated with either over-the-counter or prescription medicine. The medicines come as pills, a patch, or a shot. Some people try ginger or ginger ale to help nausea. Some people also think wristbands that put pressure on a certain spot can reduce motion sickness. Follow-up care is a key part of your treatment and safety. Be sure to make and go to all appointments, and call your doctor if you are having problems. It's also a good idea to know your test results and keep a list of the medicines you take. How can you care for yourself at home? · Sit in the front seat of a car or near the wings when you fly in an airplane. · Try not to move your head. Keep your head still by pressing it into a headrest.  · On a boat, get a cabin near the middle of the ship. Go outside often to get fresh air. · When in a car, boat, or airplane, look at one place on the horizon. · Do not read or watch TV in a moving vehicle. · Do not drink alcohol or eat a big meal before traveling. · Eat small meals during long trips. · Try a few soda crackers and a carbonated drink if you feel ill. · Try ginger, ginger tea, or ginger ale before you travel. · Try an over-the-counter medicine, such as dimenhydrinate (Dramamine), diphenhydramine (Benadryl), or meclizine (Bonine), about an hour before you travel. These medicines can make you feel sleepy. Do not drive while using them.   · If you get prescription medicine from your doctor, take your medicines exactly as prescribed. Be aware that these medicines may make you sleepy. Call your doctor if you think you are having a problem with your medicine. When should you call for help? Call your doctor now or seek immediate medical care if:    · You have nausea and vomiting that does not go away after treatment. Watch closely for changes in your health, and be sure to contact your doctor if:    · Your symptoms do not go away within 3 days after a trip.     · You do not get better as expected. Where can you learn more? Go to http://www.gray.com/  Enter R003 in the search box to learn more about \"Motion Sickness: Care Instructions. \"  Current as of: September 8, 2021               Content Version: 13.2  © 2006-2022 [employer]wise[employer]. Care instructions adapted under license by [employer] (which disclaims liability or warranty for this information). If you have questions about a medical condition or this instruction, always ask your healthcare professional. [address] any warranty or liability for your use of this information.

## 2022-04-27 NOTE — PROGRESS NOTES
History of Present Illness:     Chief Complaint   Patient presents with    Dysuria    Medication Refill       Amadou Cole is a 64 y.o. female     Dysuria  Started 2 days ago  Denies any blood or urinary frequency. Asthma well controlled on current meds  This time of year and heat are triggers  Needs med refills    Fibromyalgia/chronic back pain  Followed by Ortho  Pain well reasonably controlled on Gabapentin  Plan is for steroid injection with Dr. Pratima Zuniga next month  Prescribed Meloxicam which she has been taking    Has a cruise coming up  Tends to get sea sick    Diabetes  Last A1c 5.2      PMH (REVIEWED):  Past Medical History:   Diagnosis Date    Arthritis     OA back,knees and hands    Asthma     Autoimmune disease ([address]     Elfrieda Mare    CAD (coronary artery disease)     s/p stents 11/2015    Cardiac murmur     Depression     Diabetes ([address]     diet controlled at this time.  DVT (deep venous thrombosis) ([facility]) 1981    GERD (gastroesophageal reflux disease)     Hypertension     Lumbar radiculitis     follows with dr Ambika Ace for epidural steroid injections    [facility]     Musculoskeletal disorder     EDMOND (obstructive sleep apnea)     wears cpap    S/P TONJA (total abdominal hysterectomy)     Thromboembolus ([address] 1996    PE    Trigger finger, right little finger 10/19/2020    follows with [facility]    Trigger finger, right ring finger 10/19/2020    [facility]       Current Medications/Allergies (REVIEWED):     Current Outpatient Medications on File Prior to Visit   Medication Sig Dispense Refill    buPROPion XL (WELLBUTRIN XL) 300 mg XL tablet Take 300 mg by mouth as needed.       cyclobenzaprine (FLEXERIL) 10 mg tablet TAKE 1 TABLET BY MOUTH THREE TIMES A DAY AS NEEDED FOR MUSCLE SPASMS 90 Tablet 1    nitroglycerin (NITROSTAT) 0.4 mg SL tablet 1TAB WAIT 5MINS IF PAIN PERSISTS TAKE 1TAB, WAIT 5MINS IF PAIN PERSISTS TAKE 1 TAB & DIAL 911. 25 Tablet 1    bumetanide (BUMEX) 0.5 mg tablet Take 1 Tablet by mouth as needed (FOR WT GAIN OF 3 LBS IN ONE DAY OR 5 LBS IN ONE WEEK). 30 Tablet 2    omeprazole (PRILOSEC) 40 mg capsule TAKE 1 CAPSULE BY MOUTH EVERY DAY 90 Capsule 1    metoprolol tartrate (LOPRESSOR) 25 mg tablet TAKE 1 TABLET BY MOUTH TWO (2) TIMES A DAY. 60 Tablet 1    ondansetron (ZOFRAN ODT) 4 mg disintegrating tablet TAKE 1 TABLET BY MOUTH EVERY 8 HOURS AS NEEDED FOR NAUSEA 30 Tablet 0    atorvastatin (LIPITOR) 20 mg tablet Take 1 Tablet by mouth daily. 90 Tablet 3    hydrOXYzine pamoate (VISTARIL) 25 mg capsule Take 1 Capsule by mouth two (2) times daily as needed for Anxiety. 180 Capsule 3    valACYclovir (VALTREX) 500 mg tablet TAKE 1 PILL TWICE DAILY FOR 3 DAYS AT SIGN OF HERPES FLARE. 30 Tablet 0    glucose blood VI test strips (BLOOD GLUCOSE TEST) strip Use once a day 100 Strip 5    dicyclomine (BENTYL) 10 mg capsule TAKE 1 (ONE) CAPSULE BY MOUTH AS NEEDED EVERY 6 HOURS 30 Cap 0    Blood-Glucose Meter monitoring kit Use to check glucose once a day 1 Kit 0    alcohol swabs (ALCOHOL PADS) padm Use when checking blood glucose 100 Pad 5    Lancets misc Use once a day. Please give one compatible with patient's meter 1 Package 5    sucralfate (CARAFATE) 1 gram tablet Take 1 g by mouth four (4) times daily. (Patient not taking: Reported on 4/27/2022)       No current facility-administered medications on file prior to visit. Allergies   Allergen Reactions    Accupril [Quinapril] Cough    Adhesive Tape-Silicones Other (comments)     Makes skin tears easily.  Lipitor [Atorvastatin] Other (comments)     aches    Norvasc [Amlodipine] Swelling     On legs at 10 mg dose         Review of Systems:     Review of Systems   Constitutional: Negative for chills and fever. Respiratory: Negative for cough and shortness of breath. Cardiovascular: Negative for chest pain, palpitations and leg swelling. Genitourinary: Positive for dysuria.  Negative for frequency, hematuria and urgency. Musculoskeletal: Positive for back pain. Objective:     Vitals:    04/27/22 1007   BP: 130/82   Pulse: 76   Resp: 17   Temp: 97.1 °F (36.2 °C)   TempSrc: Temporal   SpO2: 98%   Weight: 146 lb (66.2 kg)   Height: 5' 3.5\" (1.613 m)       Physical Exam:  General appearance - alert, well appearing, and in no distress  Mental status - alert, oriented to person, place, and time, normal mood, behavior, speech, dress, motor activity, and thought processes  Heart - normal rate, regular rhythm, normal S1, S2, no murmurs, rubs, clicks or gallops  Abdomen - soft, +suprapubic tenderness, nondistended, no masses or organomegaly  Extremities - peripheral pulses normal, no pedal edema, no clubbing or cyanosis, feet normal, good pulses, normal color, temperature and sensation, monofilament sensory exam is normal in both feet  Nails - Thick/ long great toe nails    Recent Labs:  Recent Results (from the past 12 hour(s))   AMB POC URINALYSIS DIP STICK AUTO W/O MICRO    Collection Time: 04/27/22 10:31 AM   Result Value Ref Range    Color (UA POC) Yellow     Clarity (UA POC) Slightly Cloudy     Glucose (UA POC) Negative Negative    Bilirubin (UA POC) Negative Negative    Ketones (UA POC) Negative Negative    Specific gravity (UA POC) 1.015 1.001 - 1.035    Blood (UA POC) Negative Negative    pH (UA POC) 7 4.6 - 8.0    Protein (UA POC) Negative Negative    Urobilinogen (UA POC) 1 mg/dL 0.2 - 1    Nitrites (UA POC) Negative Negative    Leukocyte esterase (UA POC) 3+ Negative       Assessment and Plan:       ICD-10-CM ICD-9-CM    1. Moderate persistent asthma without complication  J69.39 459.39 albuterol (ACCUNEB) 0.63 mg/3 mL nebulizer solution      albuterol (ProAir HFA) 90 mcg/actuation inhaler      budesonide (Pulmicort Flexhaler) 180 mcg/actuation aepb inhaler   2. Fibromyalgia  M79.7 729.1 gabapentin (NEURONTIN) 600 mg tablet      COMPLIANCE DRUG SCREEN/PRESCRIPTION MONITORING   3.  Other chronic pain  G89.29 338.29 gabapentin (NEURONTIN) 600 mg tablet      COMPLIANCE DRUG SCREEN/PRESCRIPTION MONITORING   4. Dysuria  R30.0 788.1 AMB POC URINALYSIS DIP STICK AUTO W/O MICRO      trimethoprim-sulfamethoxazole (BACTRIM DS, SEPTRA DS) 160-800 mg per tablet      CULTURE, URINE   5. Encounter for immunization  Z23 V03.89 ZOSTER VACC RECOMBINANT ADJUVANTED      PNEUMOCOCCAL POLYSACCHARIDE VACCINE, 23-VALENT, ADULT OR IMMUNOSUPPRESSED PT DOSE,      MO IMMUNIZ ADMIN,1 SINGLE/COMB VAC/TOXOID      MO IMMUNIZ,ADMIN,EACH ADDL   6. Type 2 diabetes mellitus in remission (HCC)  E11.9 250.00 CBC W/O DIFF      LIPID PANEL      METABOLIC PANEL, COMPREHENSIVE      HEMOGLOBIN A1C WITH EAG      MICROALBUMIN, UR, RAND W/ MICROALB/CREAT RATIO      REFERRAL TO PODIATRY      HM DIABETES FOOT EXAM      HEMOGLOBIN A1C WITH EAG      METABOLIC PANEL, COMPREHENSIVE      LIPID PANEL      CBC W/O DIFF   7. H/O gastric bypass  Z98.84 V45.86 IRON      VITAMIN B12      VITAMIN D, 25 HYDROXY      CBC W/O DIFF      CBC W/O DIFF      VITAMIN D, 25 HYDROXY      VITAMIN B12      IRON   8. Motion sickness, initial encounter  T75. 3XXA 994.6 scopolamine (TRANSDERM-SCOP) 1 mg over 3 days pt3d       Asthma, stable  Refilled inhalers    Fibromyalgia/ chronic pain  Pain moderately controlled, can function daily  Reviewed PDMP; appropriate  Updated CSA signed today  Compliance UDS today    Dysuria with LE in UA  Will treat empirically with Bactrim  UCx ordered    PPSV and Shingrix vaccines today    Hx of gastric bypass surgery  Prescribed Meloxicam; advised her to STOP taking due to her bypass surgery. Reminded to avoid NSAIDs. Can take Tylenol PRN.    Re-ordered labs needed for her bariatric team    T2DM, in remission  Check A1c today  Foot exam normal  Referred to Podiatry for toe nail care    Motion sickness, upcoming cruise  Prescribed Scopolamine patch    Follow up: 6 mo    Andrea Hernandez MD    We discussed the expected course, resolution and complications of the diagnosis(es) in detail. Medication risks, benefits, costs, interactions, and alternatives were discussed as indicated. I advised her to contact the office if her condition worsens, changes or fails to improve as anticipated. She expressed understanding with the diagnosis(es) and plan.

## 2022-04-28 LAB
25(OH)D3 SERPL-MCNC: 32 NG/ML (ref 30–100)
ALBUMIN SERPL-MCNC: 3.7 G/DL (ref 3.5–5)
ALBUMIN/GLOB SERPL: 1.3 {RATIO} (ref 1.1–2.2)
ALP SERPL-CCNC: 124 U/L (ref 45–117)
ALT SERPL-CCNC: 27 U/L (ref 12–78)
ANION GAP SERPL CALC-SCNC: 5 MMOL/L (ref 5–15)
AST SERPL-CCNC: 19 U/L (ref 15–37)
BILIRUB SERPL-MCNC: 0.5 MG/DL (ref 0.2–1)
BUN SERPL-MCNC: 11 MG/DL (ref 6–20)
BUN/CREAT SERPL: 19 (ref 12–20)
CALCIUM SERPL-MCNC: 9.5 MG/DL (ref 8.5–10.1)
CHLORIDE SERPL-SCNC: 104 MMOL/L (ref 97–108)
CHOLEST SERPL-MCNC: 184 MG/DL
CO2 SERPL-SCNC: 31 MMOL/L (ref 21–32)
CREAT SERPL-MCNC: 0.59 MG/DL (ref 0.55–1.02)
CREAT UR-MCNC: 30.8 MG/DL
ERYTHROCYTE [DISTWIDTH] IN BLOOD BY AUTOMATED COUNT: 12.8 % (ref 11.5–14.5)
EST. AVERAGE GLUCOSE BLD GHB EST-MCNC: 108 MG/DL
GLOBULIN SER CALC-MCNC: 2.9 G/DL (ref 2–4)
GLUCOSE SERPL-MCNC: 90 MG/DL (ref 65–100)
HBA1C MFR BLD: 5.4 % (ref 4–5.6)
HCT VFR BLD AUTO: 44.6 % (ref 35–47)
HDLC SERPL-MCNC: 65 MG/DL
HDLC SERPL: 2.8 {RATIO} (ref 0–5)
HGB BLD-MCNC: 14.2 G/DL (ref 11.5–16)
IRON SERPL-MCNC: 63 UG/DL (ref 35–150)
LDLC SERPL CALC-MCNC: 89 MG/DL (ref 0–100)
MCH RBC QN AUTO: 29.8 PG (ref 26–34)
MCHC RBC AUTO-ENTMCNC: 31.8 G/DL (ref 30–36.5)
MCV RBC AUTO: 93.7 FL (ref 80–99)
MICROALBUMIN UR-MCNC: 0.53 MG/DL
MICROALBUMIN/CREAT UR-RTO: 17 MG/G (ref 0–30)
NRBC # BLD: 0 K/UL (ref 0–0.01)
NRBC BLD-RTO: 0 PER 100 WBC
PLATELET # BLD AUTO: 308 K/UL (ref 150–400)
PMV BLD AUTO: 10.1 FL (ref 8.9–12.9)
POTASSIUM SERPL-SCNC: 4 MMOL/L (ref 3.5–5.1)
PROT SERPL-MCNC: 6.6 G/DL (ref 6.4–8.2)
RBC # BLD AUTO: 4.76 M/UL (ref 3.8–5.2)
SODIUM SERPL-SCNC: 140 MMOL/L (ref 136–145)
TRIGL SERPL-MCNC: 150 MG/DL (ref ?–150)
VIT B12 SERPL-MCNC: 229 PG/ML (ref 193–986)
VLDLC SERPL CALC-MCNC: 30 MG/DL
WBC # BLD AUTO: 8.4 K/UL (ref 3.6–11)

## 2022-04-29 LAB
BACTERIA SPEC CULT: NORMAL
CC UR VC: NORMAL
SERVICE CMNT-IMP: NORMAL

## 2022-05-02 ENCOUNTER — HOSPITAL ENCOUNTER (OUTPATIENT)
Dept: MAMMOGRAPHY | Age: 62
Discharge: HOME | End: 2022-05-02
Attending: FAMILY MEDICINE
Payer: COMMERCIAL

## 2022-05-02 DIAGNOSIS — Z12.31 SCREENING MAMMOGRAM FOR HIGH-RISK PATIENT: ICD-10-CM

## 2022-05-02 PROCEDURE — 77063 BREAST TOMOSYNTHESIS BI: CPT

## 2022-05-09 LAB — DRUGS UR: NORMAL

## 2022-05-22 DIAGNOSIS — M51.36 DDD (DEGENERATIVE DISC DISEASE), LUMBAR: ICD-10-CM

## 2022-05-22 DIAGNOSIS — K21.9 GASTROESOPHAGEAL REFLUX DISEASE WITHOUT ESOPHAGITIS: ICD-10-CM

## 2022-05-23 RX ORDER — CYCLOBENZAPRINE HCL 10 MG
TABLET ORAL
Qty: 90 TABLET | Refills: 1 | Status: SHIPPED | OUTPATIENT
Start: 2022-05-23 | End: 2022-08-01

## 2022-05-23 RX ORDER — OMEPRAZOLE 40 MG/1
CAPSULE, DELAYED RELEASE ORAL
Qty: 30 CAPSULE | Refills: 5 | Status: SHIPPED | OUTPATIENT
Start: 2022-05-23

## 2022-06-02 RX ORDER — NITROGLYCERIN 0.4 MG/1
TABLET SUBLINGUAL
Qty: 25 TABLET | Refills: 1 | OUTPATIENT
Start: 2022-06-02

## 2022-06-02 NOTE — TELEPHONE ENCOUNTER
Refill per VO of Dr. Christian Tripp:    Last appt: 9/2/2021    Future Appointments   Date Time Provider Yesy Clau   9/8/2022 10:00 AM Darien Lopez MD [facility] BS AMB       Requested Prescriptions     Refused Prescriptions Disp Refills    nitroglycerin (NITROSTAT) 0.4 mg SL tablet [Pharmacy Med Name: NITROGLYCERIN 0.4 MG TABLET SL] 25 Tablet 1     Sig: TAKE 1 TAB WAIT 5MINS IF PAIN PERSISTS TAKE 1 TAB, WAIT 5MINS IF PAIN PERSISTS TAKE 1 TAB & DIAL 911.    Refill was denied because,  REFILLS STILL AVAILABLE AT THE PHARMACY

## 2022-06-17 RX ORDER — BUMETANIDE 0.5 MG/1
TABLET ORAL
Qty: 30 TABLET | Refills: 2 | Status: SHIPPED | OUTPATIENT
Start: 2022-06-17 | End: 2022-09-16

## 2022-06-28 DIAGNOSIS — F41.9 ANXIETY: ICD-10-CM

## 2022-06-28 RX ORDER — HYDROXYZINE PAMOATE 25 MG/1
CAPSULE ORAL
Qty: 60 CAPSULE | Refills: 11 | Status: SHIPPED | OUTPATIENT
Start: 2022-06-28 | End: 2022-11-03 | Stop reason: SDUPTHER

## 2022-07-15 ENCOUNTER — NURSE TRIAGE (OUTPATIENT)
Dept: OTHER | Facility: CLINIC | Age: 62
End: 2022-07-15

## 2022-07-15 ENCOUNTER — OFFICE VISIT (OUTPATIENT)
Dept: FAMILY MEDICINE CLINIC | Age: 62
End: 2022-07-15
Payer: COMMERCIAL

## 2022-07-15 VITALS
SYSTOLIC BLOOD PRESSURE: 112 MMHG | BODY MASS INDEX: 24.76 KG/M2 | OXYGEN SATURATION: 98 % | HEART RATE: 71 BPM | DIASTOLIC BLOOD PRESSURE: 80 MMHG | WEIGHT: 142 LBS

## 2022-07-15 DIAGNOSIS — I10 ESSENTIAL HYPERTENSION: ICD-10-CM

## 2022-07-15 DIAGNOSIS — R42 LIGHT-HEADEDNESS: Primary | ICD-10-CM

## 2022-07-15 PROCEDURE — 99213 OFFICE O/P EST LOW 20 MIN: CPT

## 2022-07-15 RX ORDER — MELOXICAM 15 MG/1
TABLET ORAL
COMMUNITY
Start: 2022-07-03

## 2022-07-15 NOTE — PROGRESS NOTES
Faith Delgado is a 58 y.o. female    Chief Complaint   Patient presents with    Blood Pressure Check     elevated BP for the past 2 days. has headaches and lightheadness. denies chest pain       1. \"Have you been to the ER, urgent care clinic since your last visit? Hospitalized since your last visit? \" No    2. \"Have you seen or consulted any other health care providers outside of the [address] [place] [address] since your last visit? \" No     3. For patients aged 39-70: Has the patient had a colonoscopy / FIT/ Cologuard? Yes - no Care Gap present      If the patient is female:    4. For patients aged 41-77: Has the patient had a mammogram within the past 2 years? Yes - no Care Gap present      5. For patients aged 21-65: Has the patient had a pap smear? Yes - no Care Gap present      Health Maintenance Due   Topic Date Due    Eye Exam Retinal or Dilated  06/03/2022    Shingrix Vaccine Age 50> (2 of 2) 06/22/2022         Medication Reconciliation completed, changes noted.   Please update medication list.

## 2022-07-15 NOTE — PROGRESS NOTES
[facility] [address] [place] [address]   Office [phone], Fax [phone]    Subjective:     Chief Complaint   Patient presents with    Blood Pressure Check     elevated BP for the past 2 days. has headaches and lightheadness. denies chest pain       History provided by patient     HPI:  Darylene Ave is a 58 y.o. WHITE/NON- female with past medical history of HTN, GERD, DVT, T2DM, CAD presents for BP check. Reports elevated BP at home for the last 2 days. Has been using a wrist cuff and BP has been 140-200/ . BP in office today controlled at 114/80 and repeat 111/77. Patient's cuff in office was measuring 174/81. No chest pain, last time she used her nitroglycerin was 1 month ago. Has been having occasional headaches relieved by tylenol. Also experiencing light headedness with positional changes. Has not had any falls. Is taking Bumex nightly. Meds: Metoprolol 25mg bid, Bumex 0.5mg (prescribed prn)  Smoking: Never smoker   Exercise: Daily walks.    Diet: Balanced        Social History     Socioeconomic History    Marital status:      Spouse name: Not on file    Number of children: Not on file    Years of education: Not on file    Highest education level: Not on file   Occupational History    Not on file   Tobacco Use    Smoking status: Never Smoker    Smokeless tobacco: Never Used   Vaping Use    Vaping Use: Never used   Substance and Sexual Activity    Alcohol use: No     Alcohol/week: 0.0 standard drinks    Drug use: No    Sexual activity: Yes     Partners: Male     Birth control/protection: None   Other Topics Concern    Not on file   Social History Narrative    Not on file     Social Determinants of Health     Financial Resource Strain:     Difficulty of Paying Living Expenses: Not on file   Food Insecurity:     Worried About Running Out of Food in the Last Year: Not on file    Gabby of Food in the Last Year: Not on ZAYRA Senior Needs:     Lack of Transportation (Medical): Not on file    Lack of Transportation (Non-Medical):  Not on file   Physical Activity:     Days of Exercise per Week: Not on file    Minutes of Exercise per Session: Not on file   Stress:     Feeling of Stress : Not on file   Social Connections:     Frequency of Communication with Friends and Family: Not on file    Frequency of Social Gatherings with Friends and Family: Not on file    Attends Anabaptist Services: Not on file    Active Member of [address] [place] [address] or Organizations: Not on file    Attends Club or Organization Meetings: Not on file    Marital Status: Not on file   Intimate Partner Violence:     Fear of Current or Ex-Partner: Not on file    Emotionally Abused: Not on file    Physically Abused: Not on file    Sexually Abused: Not on file   Housing Stability:     Unable to Pay for Housing in the Last Year: Not on file    Number of [place] in the Last Year: Not on file    Unstable Housing in the Last Year: Not on file         ROS:  General/Constitutional:  No headache, fever, fatigue, weight loss or weight gain     Eyes:  No redness, pruritis, pain, visual changes, swelling, or discharge    Ears:  No pain, loss or changes in hearing    Cardiac:   No chest pain    Respiratory:  No cough or shortness of breath    GI:  No nausea/vomiting, diarrhea, abdominal pain, bloody or dark stools     :  No dysuria or  hematuria   Neurological:  No loss of consciousness, dizziness, seizures, dysarthria, cognitive changes, memory changes,  problems with balance, or unilateral weakness    Skin: No rash       Objective:     Visit Vitals  /80 (BP 1 Location: Left upper arm, BP Patient Position: Sitting, BP Cuff Size: Adult) Comment: our machine   Pulse 71   Wt 142 lb (64.4 kg)   LMP 01/01/1985   SpO2 98%   BMI 24.76 kg/m²        General appearance - alert, well appearing, and in no distress  Chest - clear to auscultation, no wheezes, rales or rhonchi, symmetric air entry  Heart - normal rate, regular rhythm, normal S1, S2, no murmurs, rubs, clicks or gallops  Abdomen - soft, nontender, nondistended, no masses or organomegaly  Neurological - alert, oriented, normal speech, no focal findings or movement disorder noted  Musculoskeletal - no joint tenderness, deformity or swelling  Extremities - no pedal edema noted  Skin - normal coloration and turgor, no rashes, no suspicious skin lesions noted        Assessment and orders:     HTN:   - Patient's BP cuff w/ falsely elevated readings. BP controlled. - Continue Metoprolol bid. - Advised to switch Bumex to day time use and to take as needed. Advised to hold this medication for low BP.   - Given ED precautions for HTN urgency/emergency  - Adhere to DASH diet and continue exercise daily     Light headedness Likely orthostatic as this only occurs with positional change. - Advised patient to get up slowly when changing position, apply compression stockings, increase sodium in diet and cautionary use of Bumex           Follow-up and Dispositions    · Return in about 1 month (around 8/15/2022) for  , hypertension follow up with Dr. Dyan Sullivan. Pt was discussed with Dr. Buddy Aguilar (attending physician). I have reviewed patient medical and social history and medications. I have reviewed pertinent labs results and other data. I have discussed the diagnosis with the patient and the intended plan as seen in the above orders. The patient has received an after-visit summary and questions were answered concerning future plans. I have discussed medication side effects and warnings with the patient as well.     Santos Bruno, DO  Resident [facility]  07/15/22

## 2022-07-15 NOTE — TELEPHONE ENCOUNTER
Received call from [facility] with Red Flag Complaint. Subjective: Caller states \"For the last two days my blood pressure has been high. \"     Current Symptoms: High BP- 249/115, 157/108, 142/76- yesterday    224/88 10:00 am- had not taken BP medication yet, 145/87    Onset: Two days    Associated Symptoms: Lightheadedness yesterday, no headache currently    Pain Severity: Denies pain currently    Temperature: Denies    What has been tried: Tylenol    Recommended disposition: See in Office Today    Care advice provided, patient verbalizes understanding; denies any other questions or concerns; instructed to call back for any new or worsening symptoms. Patient/Caller agrees with recommended disposition; writer provided warm transfer to [facility] for appointment scheduling    Attention Provider: Thank you for allowing me to participate in the care of your patient. The patient was connected to triage in response to information provided to the [facility]. Please do not respond through this encounter as the response is not directed to a shared pool.     Reason for Disposition   Systolic BP >= 502 OR Diastolic >= 680    Protocols used: BLOOD PRESSURE - HIGH-ADULT-OH

## 2022-07-15 NOTE — PATIENT INSTRUCTIONS
Acute High Blood Pressure: Care Instructions  Your Care Instructions     Acute high blood pressure is very high blood pressure. It's a serious problem. Very high blood pressure can damage your brain, heart, eyes, and kidneys. You may have been given medicines to lower your blood pressure. You may have gotten them as pills or through a needle in one of your veins. This is called an IV. And maybe you were given other medicines too. These can be needed when high blood pressure causes other problems. To keep your blood pressure at a lower level, you may need to make healthy lifestyle changes. And you will probably need to take medicines. Be sure to follow up with your doctor about your blood pressure and what you can do about it. Follow-up care is a key part of your treatment and safety. Be sure to make and go to all appointments, and call your doctor if you are having problems. It's also a good idea to know your test results and keep a list of the medicines you take. How can you care for yourself at home? · See your doctor as often as he or she recommends. This is to make sure your blood pressure is under control. · Take your blood pressure medicine exactly as prescribed. You may take one or more types. They include diuretics, beta-blockers, ACE inhibitors, calcium channel blockers, and angiotensin II receptor blockers. Call your doctor if you think you are having a problem with your medicine. · If you take blood pressure medicine, talk to your doctor before you take decongestants or anti-inflammatory medicine, such as ibuprofen. These can raise blood pressure. · Learn how to check your blood pressure at home. Check it often. · Ask your doctor if it's okay to drink alcohol. · Talk to your doctor about lifestyle changes that can help blood pressure. These include being active and managing your weight. · Don't smoke. Smoking increases your risk for heart attack and stroke.   When should you call for help?   Call 911  anytime you think you may need emergency care. This may mean having symptoms that suggest that your blood pressure is causing a serious heart or blood vessel problem. Your blood pressure may be over 180/120. For example, call 911 if:    · You have symptoms of a heart attack. These may include:  ? Chest pain or pressure, or a strange feeling in the chest.  ? Sweating. ? Shortness of breath. ? Nausea or vomiting. ? Pain, pressure, or a strange feeling in the back, neck, jaw, or upper belly or in one or both shoulders or arms. ? Lightheadedness or sudden weakness. ? A fast or irregular heartbeat.     · You have symptoms of a stroke. These may include:  ? Sudden numbness, tingling, weakness, or loss of movement in your face, arm, or leg, especially on only one side of your body. ? Sudden vision changes. ? Sudden trouble speaking. ? Sudden confusion or trouble understanding simple statements. ? Sudden problems with walking or balance. ? A sudden, severe headache that is different from past headaches.     · You have severe back or belly pain. Do not wait until your blood pressure comes down on its own. Get help right away. Call your doctor now or seek immediate care if:    · Your blood pressure is much higher than normal (such as 180/120 or higher), but you don't have symptoms.     · You think high blood pressure is causing symptoms, such as:  ? Severe headache.  ? Blurry vision. Watch closely for changes in your health, and be sure to contact your doctor if:    · Your blood pressure measures higher than your doctor recommends at least 2 times. That means the top number is higher or the bottom number is higher, or both.     · You think you may be having side effects from your blood pressure medicine. Where can you learn more? Go to http://www.gray.com/  Enter H919 in the search box to learn more about \"Acute High Blood Pressure: Care Instructions. \"  Current as of: January 10, 2022               Content Version: 13.2  © 8116-3957 Healthwise, [facility]. Care instructions adapted under license by [employer] (which disclaims liability or warranty for this information). If you have questions about a medical condition or this instruction, always ask your healthcare professional. [address] any warranty or liability for your use of this information.

## 2022-07-15 NOTE — TELEPHONE ENCOUNTER
Received call from Naima Ochoa at [facility] with Red Flag Complaint. Subjective: Makayla from [facility] ([email or web address]) on the line reporting patient sent a message to provider this morning regarding      Current Symptoms: High BP    LVM on verified line. Asked patient to call back to speak with RN triage to go over symptoms and discuss elevated BP. Care advice provided, patient verbalizes understanding; denies any other questions or concerns; instructed to call back for any new or worsening symptoms. Attention Provider: Thank you for allowing me to participate in the care of your patient. The patient was connected to triage in response to information provided to the ECC. Please do not respond through this encounter as the response is not directed to a shared pool. Reason for Disposition   Message left on unidentified voice mail. Phone number verified.     Protocols used: NO CONTACT OR DUPLICATE CONTACT CALL-ADULT-[place]

## 2022-08-01 DIAGNOSIS — M51.36 DDD (DEGENERATIVE DISC DISEASE), LUMBAR: ICD-10-CM

## 2022-08-01 RX ORDER — CYCLOBENZAPRINE HCL 10 MG
TABLET ORAL
Qty: 90 TABLET | Refills: 1 | Status: SHIPPED | OUTPATIENT
Start: 2022-08-01

## 2022-08-18 NOTE — TELEPHONE ENCOUNTER
I called Ms Morelia Gagnno with regards to medication refill. V/M left advised to please call the office back. Follow up call made to Ms Morelia Gagnon verified all [address]. Patient states she needs a refill on the ondansetron 4 mg tab. She has 6 tabs left.     Refill request sent to Cosmo Hanks NP.

## 2022-08-24 ENCOUNTER — OFFICE VISIT (OUTPATIENT)
Dept: FAMILY MEDICINE CLINIC | Age: 62
End: 2022-08-24
Payer: COMMERCIAL

## 2022-08-24 VITALS
WEIGHT: 140 LBS | HEIGHT: 64 IN | BODY MASS INDEX: 23.9 KG/M2 | OXYGEN SATURATION: 97 % | SYSTOLIC BLOOD PRESSURE: 119 MMHG | HEART RATE: 80 BPM | DIASTOLIC BLOOD PRESSURE: 68 MMHG | RESPIRATION RATE: 16 BRPM

## 2022-08-24 DIAGNOSIS — Z79.899 TAKING A STATIN MEDICATION: ICD-10-CM

## 2022-08-24 DIAGNOSIS — M79.10 MUSCLE ACHE: ICD-10-CM

## 2022-08-24 DIAGNOSIS — R03.0 ELEVATED BLOOD PRESSURE READING: ICD-10-CM

## 2022-08-24 DIAGNOSIS — I10 ESSENTIAL HYPERTENSION: Primary | ICD-10-CM

## 2022-08-24 PROCEDURE — 99213 OFFICE O/P EST LOW 20 MIN: CPT | Performed by: FAMILY MEDICINE

## 2022-08-24 RX ORDER — ONDANSETRON 4 MG/1
TABLET, ORALLY DISINTEGRATING ORAL
Qty: 30 TABLET | Refills: 0 | Status: SHIPPED | OUTPATIENT
Start: 2022-08-24

## 2022-08-24 RX ORDER — CHOLECALCIFEROL (VITAMIN D3) 125 MCG
100 CAPSULE ORAL DAILY
Qty: 90 CAPSULE | Refills: 1 | Status: SHIPPED | OUTPATIENT
Start: 2022-08-24

## 2022-08-24 NOTE — PROGRESS NOTES
Roverto Trotter is a 58 y.o. female    Chief Complaint   Patient presents with    Hypertension         1. Have you been to the ER, urgent care clinic since your last visit? Hospitalized since your last visit? No  2. Have you seen or consulted any other health care providers outside of the [address] [place] [address] since your last visit? Include any pap smears or colon screening.  No    Visit Vitals  /68 (BP 1 Location: Right arm, BP Patient Position: Sitting)   Pulse 80   Resp 16   Ht 5' 3.5\" (1.613 m)   Wt 140 lb (63.5 kg)   SpO2 97%   BMI 24.41 kg/m²     3 most recent PHQ Screens 4/27/2022   Little interest or pleasure in doing things Not at all   Feeling down, depressed, irritable, or hopeless More than half the days   Total Score PHQ 2 2   Trouble falling or staying asleep, or sleeping too much -   Feeling tired or having little energy -   Poor appetite, weight loss, or overeating -   Feeling bad about yourself - or that you are a failure or have let yourself or your family down -   Trouble concentrating on things such as school, work, reading, or watching TV -   Moving or speaking so slowly that other people could have noticed; or the opposite being so fidgety that others notice -   Thoughts of being better off dead, or hurting yourself in some way -   PHQ 9 Score -   How difficult have these problems made it for you to do your work, take care of your home and get along with others -     Health Maintenance Due   Topic Date Due    Eye Exam Retinal or Dilated  06/03/2022    COVID-19 Vaccine (4 - Booster for Moderna series) 06/21/2022    Shingrix Vaccine Age 50> (2 of 2) 06/22/2022    DTaP/Tdap/Td series (2 - Td or Tdap) 08/14/2022     Metoprolol 8am and 8pm, bp runs higher mid day

## 2022-09-18 ENCOUNTER — PATIENT MESSAGE (OUTPATIENT)
Dept: FAMILY MEDICINE CLINIC | Age: 62
End: 2022-09-18
Payer: COMMERCIAL

## 2022-09-18 DIAGNOSIS — R82.90 MALODOROUS URINE: Primary | ICD-10-CM

## 2022-09-18 PROCEDURE — 99421 OL DIG E/M SVC 5-10 MIN: CPT | Performed by: FAMILY MEDICINE

## 2022-09-19 RX ORDER — NITROFURANTOIN 25; 75 MG/1; MG/1
100 CAPSULE ORAL 2 TIMES DAILY
Qty: 10 CAPSULE | Refills: 0 | Status: SHIPPED | OUTPATIENT
Start: 2022-09-19 | End: 2022-09-24

## 2022-09-19 NOTE — TELEPHONE ENCOUNTER
Contacted by pt via [employer] regarding symptoms of dysuria and malodorous urine. Will treat empirically for a UTI with Macrobid. Reviewed prior UCxs and allergies. Notified pt via [employer] that medication had been sent to her local pharmacy.      Time spent on encounter: 5 min    Nicole See MD

## 2022-09-19 NOTE — TELEPHONE ENCOUNTER
From: Gilmar Brandi  To: Mekhi Morin MD  Sent: 9/18/2022 3:55 PM EDT  Subject: Nirav Pearl   I have been having cloudy pee when I go to the bathroom plus some discomfort some smell to my pee.    Do I need to come into see you or lab work   Thank you   Irlanda Boyle   [phone]

## 2022-09-22 RX ORDER — NITROGLYCERIN 0.4 MG/1
TABLET SUBLINGUAL
Qty: 25 TABLET | Refills: 0 | Status: SHIPPED | OUTPATIENT
Start: 2022-09-22

## 2022-10-27 ENCOUNTER — TELEPHONE (OUTPATIENT)
Dept: SURGERY | Age: 62
End: 2022-10-27

## 2022-10-27 NOTE — TELEPHONE ENCOUNTER
I called and spoke with the patient. I informed her there is an existing order pending. Check with your pharmacy a little later or tomorrow. If there is an issue please call back. She acknowledged understanding and thanked me for the call.

## 2022-10-28 ENCOUNTER — PATIENT MESSAGE (OUTPATIENT)
Dept: FAMILY MEDICINE CLINIC | Age: 62
End: 2022-10-28

## 2022-10-28 ENCOUNTER — OFFICE VISIT (OUTPATIENT)
Dept: CARDIOLOGY CLINIC | Age: 62
End: 2022-10-28
Payer: COMMERCIAL

## 2022-10-28 VITALS
SYSTOLIC BLOOD PRESSURE: 120 MMHG | OXYGEN SATURATION: 99 % | WEIGHT: 136 LBS | HEART RATE: 74 BPM | HEIGHT: 64 IN | DIASTOLIC BLOOD PRESSURE: 80 MMHG | BODY MASS INDEX: 23.22 KG/M2

## 2022-10-28 DIAGNOSIS — I25.83 CORONARY ARTERY DISEASE DUE TO LIPID RICH PLAQUE: Primary | ICD-10-CM

## 2022-10-28 DIAGNOSIS — E78.5 DYSLIPIDEMIA: ICD-10-CM

## 2022-10-28 DIAGNOSIS — I25.10 CORONARY ARTERY DISEASE DUE TO LIPID RICH PLAQUE: Primary | ICD-10-CM

## 2022-10-28 DIAGNOSIS — I10 ESSENTIAL HYPERTENSION: ICD-10-CM

## 2022-10-28 PROCEDURE — 93000 ELECTROCARDIOGRAM COMPLETE: CPT | Performed by: INTERNAL MEDICINE

## 2022-10-28 PROCEDURE — 3078F DIAST BP <80 MM HG: CPT | Performed by: INTERNAL MEDICINE

## 2022-10-28 PROCEDURE — 3074F SYST BP LT 130 MM HG: CPT | Performed by: INTERNAL MEDICINE

## 2022-10-28 PROCEDURE — 99213 OFFICE O/P EST LOW 20 MIN: CPT | Performed by: INTERNAL MEDICINE

## 2022-10-28 NOTE — PROGRESS NOTES
Office Follow-up    NAME: Juno Garcia   :  1960  MRM:  [id number]    Date:  10/28/2022            Assessment:     Problem List  Date Reviewed: 2022            Codes Class Noted    Venous insufficiency ICD-10-CM: I87.2  ICD-9-CM: 459.81  2021        Type 2 diabetes with nephropathy ([facility] ICD-10-CM: E11.21  ICD-9-CM: 250.40, 583.81  10/28/2020        Primary fibromyalgia syndrome ICD-10-CM: M79.7  ICD-9-CM: 729.1  3/10/2020        Inflammatory bowel disease ICD-10-CM: K52.9  ICD-9-CM: 558.9  3/10/2020        Omental infarction [facility]) ICD-10-CM: I77.933  ICD-9-CM: 557.0  2019        Abnormal CT of the abdomen ICD-10-CM: R93.5  ICD-9-CM: 793.6  2018    Overview Signed 2018  6:01 AM by Dina Moffett     2018:   Ongoing evolution of inflammatory changes in the left upper quadrant most  consistent with omental infarction. Decreased size of main inflamed fatty nodule  and less surrounding stranding. Otherwise, no acute pathology in the chest, abdomen or pelvis. Abnormal mammogram of right breast ICD-10-CM: R92.8  ICD-9-CM: 793.80  2018    Overview Addendum 2018  4:36 PM by Dina Cao did biopsy 8433. Biopsy  = fibroadenoma    Mammogram 2018: IMPRESSION: Small right breast mass. BI-RADS Assessment Category 4: Suspicious  Abnormality- Biopsy should be considered. RECOMMENDATION:  Ultrasound-guided right breast biopsy. Type 2 diabetes mellitus with diabetic neuropathy ([facility] ICD-10-CM: E11.40  ICD-9-CM: 250.60, 357.2  2018    Overview Addendum 2020  1:26 PM by Carolina Lewis MD     2018:  a1c 5.3    2017:  S/p gastric bypass = a1c 5.2/  LDL 84/  MAB negative    Dx:  a1c 6.9 2016    Eye exam: 6/3/2020.  Normal exam.             Elevated ferritin ICD-10-CM: R79.89  ICD-9-CM: 790.6  2017        H/O colonoscopy ICD-10-CM: P74.901  ICD-9-CM: V45.89  2017    Overview Addendum 2017 2:12 PM by Sunil Miller, next 2020  +FH colon cancer in mom             DDD (degenerative disc disease), lumbar ICD-10-CM: M51.36  ICD-9-CM: 722.52  12/19/2017    Overview Signed 12/19/2017  1:57 PM by Susie Hood  S/p NEREYDA x 3             FH: colon cancer ICD-10-CM: Z80.0  ICD-9-CM: V16.0  12/19/2017    Overview Signed 12/19/2017  2:12 PM by Susie Carrillo     mom             H/O gastric bypass ICD-10-CM: Z98.84  ICD-9-CM: V45.86  7/12/2017    Overview Addendum 11/3/2018  9:51 AM by Susie Barker 1/2017  lost from 230 to 154 lbs             Hypokalemia ICD-10-CM: E87.6  ICD-9-CM: 276.8  1/26/2017        History of pulmonary embolus (PE) ICD-10-CM: Z86.711  ICD-9-CM: V12.55  11/7/2016        Anxiety ICD-10-CM: F41.9  ICD-9-CM: 300.00  6/1/2016    Overview Addendum 9/18/2018  4:54 PM by Susie Carrillo     Needs to be seen at least twice yearly for BNZ  FTF 12/19/2017, 9/18/18   as expected 12/19/2017, 9/18/18             FH: diabetes mellitus ICD-10-CM: Z83.3  ICD-9-CM: V18.0  2/7/6596        Metabolic syndrome DWB-88-AH: E88.81  ICD-9-CM: 277.7  8/3/2015    Overview Addendum 8/4/2015 11:38 AM by Julieta Ashby V     TG up, sugar up, HTN, waist 54 inches  Rx metformin  Needs yearly eye exams and labs             Essential hypertension ICD-10-CM: I10  ICD-9-CM: 401.9  5/17/2015        Chronic pain ICD-10-CM: G89.29  ICD-9-CM: 338.29  3/30/2015    Overview Addendum 7/12/2017 11:40 AM by Joanne Chen  S/p NEREYDA x 3 in back via Dr. Carole Hood.   He uses flexeril HS for pain             GARCIA (nonalcoholic steatohepatitis) ICD-10-CM: K75.81  ICD-9-CM: 571.8  1/24/2011    Overview Signed 1/24/2011  7:18 PM by Susie Carrillo     Ultrasound 2008             Obstructive sleep apnea ICD-10-CM: G47.33  ICD-9-CM: 327.23  9/22/2010        Asthma ICD-10-CM: J45.909  ICD-9-CM: 493.90  9/22/2010        Arthritis ICD-10-CM: M19.90  ICD-9-CM: 716.90  9/22/2010        GERD (gastroesophageal reflux disease) ICD-10-CM: K21.9  ICD-9-CM: 530.81  9/22/2010              Plan:     CAD/status post LCx PCI (Nov 2015): Stable. Continue aspirin. Continue statins. Hypertension: Blood pressure is controlled. Continue metoprolol. Dyslipidemia: Has been having cough from Crestor. Will change to Atorvastatin 20mg po daily. Lipid profile from 7/2022 show LDL 89 . Peripheral edema: This is occasional.  Previously this was related to her weight. Use diruetic as needed. Furosemide is not covered by her pharmacy. I will change it to Bumex 0.5 as needed. Morbid obesity: Lost 120lbs after gastric bybass surgery IN 2017. Skin bruising: Change ASA to alternate day. PreOp Eval: She is going for Rt shoulder rotator cuff surgery in Dec 20th. She is low to intermediate risk and can proceed. See me again in 1 year. Subjective:     Santhosh Hilliard, a 58y.o. year-old who presents for followup. She has known history of CAD status post PCI of the left circumflex coronary artery. Hypertension. She has had gastric bypass surgery in 2017. Since then she has lost 120 pounds. She is returning today for follow-up. Denies any symptoms of chest pain, shortness of breath, lightheadedness or dizziness. She has easy skin bruising. She has not had any recurrence of symptoms. Recently she was on vacation and was exposed to significant humidity and felt SOB and occasional extremity edema. EKG in my office today demonstrated normal sinus rhythm normal EKG, normal axis, normal intervals.         Exam:     Physical Exam:  Visit Vitals  /80 (BP 1 Location: Left upper arm, BP Patient Position: Sitting, BP Cuff Size: Adult)   Pulse 74   Ht 5' 3.5\" (1.613 m)   Wt 136 lb (61.7 kg)   LMP 01/01/1985   SpO2 99%   BMI 23.71 kg/m²     General appearance - alert, well appearing, and in no distress  Mental status - affect appropriate to mood  Eyes - sclera anicteric, moist mucous membranes  Neck - supple, no significant adenopathy  Chest - clear to auscultation, no wheezes, rales or rhonchi  Heart - normal rate, regular rhythm, normal S1, S2, no murmurs, rubs, clicks or gallops  Abdomen - soft, nontender, nondistended, no masses or organomegaly  Extremities - peripheral pulses normal, no pedal edema  Skin - normal coloration. Skin bruising is present on the arms and neck. Medications:     Current Outpatient Medications   Medication Sig    nitroglycerin (NITROSTAT) 0.4 mg SL tablet TAKE 1 TAB WAIT 5MINS IF PAIN PERSISTS TAKE 1 TAB, WAIT 5MINS IF PAIN PERSISTS TAKE 1 TAB & DIAL 911.    bumetanide (BUMEX) 0.5 mg tablet TAKE 1 TABLET BY MOUTH EVERY DAY AS NEEDED    ondansetron (ZOFRAN ODT) 4 mg disintegrating tablet TAKE 1 TABLET BY MOUTH EVERY 8 HOURS AS NEEDED FOR NAUSEA    co-enzyme Q-10 (Co Q-10) 100 mg capsule Take 1 Capsule by mouth daily. cyclobenzaprine (FLEXERIL) 10 mg tablet TAKE 1 TABLET BY MOUTH THREE TIMES A DAY AS NEEDED FOR MUSCLE SPASMS    meloxicam (MOBIC) 15 mg tablet TAKE 1 TABLET BY MOUTH EVERY DAY WITH FOOD AS NEEDED FOR PAIN    hydrOXYzine pamoate (VISTARIL) 25 mg capsule TAKE 1 CAPSULE BY MOUTH TWICE A DAY AS NEEDED FOR ANXIETY    metoprolol tartrate (LOPRESSOR) 25 mg tablet TAKE 1 TABLET BY MOUTH TWO TIMES A DAY. omeprazole (PRILOSEC) 40 mg capsule TAKE 1 CAPSULE BY MOUTH EVERY DAY    albuterol (ACCUNEB) 0.63 mg/3 mL nebulizer solution 3 mL by Nebulization route every six (6) hours as needed for Wheezing. albuterol (ProAir HFA) 90 mcg/actuation inhaler Take 1 Puff by inhalation every four (4) hours as needed for Wheezing or Shortness of Breath.    gabapentin (NEURONTIN) 600 mg tablet Take 1 Tablet by mouth three (3) times daily. budesonide (Pulmicort Flexhaler) 180 mcg/actuation aepb inhaler Take 2 Puffs by inhalation daily. atorvastatin (LIPITOR) 20 mg tablet Take 1 Tablet by mouth daily.     valACYclovir (VALTREX) 500 mg tablet TAKE 1 PILL TWICE DAILY FOR 3 DAYS AT SIGN OF HERPES FLARE. glucose blood VI test strips (BLOOD GLUCOSE TEST) strip Use once a day    Blood-Glucose Meter monitoring kit Use to check glucose once a day    alcohol swabs (ALCOHOL PADS) padm Use when checking blood glucose    Lancets misc Use once a day. Please give one compatible with patient's meter     No current facility-administered medications for this visit. Diagnostic Data Review:       C: 11/30/15- Circumflex: There was a 80 % stenosis in the distal third of the vessel segment, Successful BMS dLCx: 2.0x12 Mini Vision. ECHO:4/3/15- EF 65%, G1DD, trivial MR,   Dobutamine Stress Echo : 4/27/15: No ischemia. False positive ST Changes of ischemia. Had chest pain during the test.       Lab Review:     Lab Results   Component Value Date/Time    Cholesterol, total 184 04/27/2022 11:10 AM    HDL Cholesterol 65 04/27/2022 11:10 AM    LDL,Direct 97 09/18/2018 02:16 PM    LDL, calculated 89 04/27/2022 11:10 AM    Triglyceride 150 (H) 04/27/2022 11:10 AM    CHOL/HDL Ratio 2.8 04/27/2022 11:10 AM     Lab Results   Component Value Date/Time    Creatinine 0.59 04/27/2022 11:10 AM     Lab Results   Component Value Date/Time    BUN 11 04/27/2022 11:10 AM     Lab Results   Component Value Date/Time    Potassium 4.0 04/27/2022 11:10 AM     Lab Results   Component Value Date/Time    Hemoglobin A1c 5.4 04/27/2022 11:10 AM     Lab Results   Component Value Date/Time    HGB 14.2 04/27/2022 11:10 AM     Lab Results   Component Value Date/Time    PLATELET 184 65/04/5923 11:10 AM     No results for input(s): CPK, CKMB, TROIQ in the last 72 hours. No lab exists for component: CKQMB, CPKMB             ___________________________________________________    Luverne Gowers.  David Roblero MD, [facility]

## 2022-10-28 NOTE — PROGRESS NOTES
Jose Carlos Haddad is a 58 y.o. female    Visit Vitals  /80 (BP 1 Location: Left upper arm, BP Patient Position: Sitting, BP Cuff Size: Adult)   Pulse 74   Ht 5' 3.5\" (1.613 m)   Wt 136 lb (61.7 kg)   LMP 01/01/1985   SpO2 99%   BMI 23.71 kg/m²       Chief Complaint   Patient presents with    Hypertension    Other     EDMOND       Chest pain NO  SOB NO  Dizziness NO  Swelling HANDS AND FEET  Recent hospital visit NO  Refills NO  COVID VACCINE STATUS YES  HAD COVID?  NO

## 2022-10-29 NOTE — PROGRESS NOTES
History of Present Illness:     Chief Complaint   Patient presents with    Hypertension       Georgina Tirado is a 58 y.o. female     HTN  Home BP readings are \"all over the place\"  Home BPs running 110-140s/70-80s  No issues since last bout of elevated BPs  Thinks her back/ neck pain was contributing    HLD  Taking Atorvastatin  Worries she is having aches and pains  Recalls taking another one in the past but will discuss this with her cardiologist    [facility] (REVIEWED):  Past Medical History:   Diagnosis Date    Arthritis     OA back,knees and hands    Asthma     Autoimmune disease ([address]     Marymichael Thomson    CAD (coronary artery disease)     s/p stents 11/2015    Cardiac murmur     Depression     Diabetes ([address]     diet controlled at this time.      DVT (deep venous thrombosis) ([facility]) 1981    GERD (gastroesophageal reflux disease)     Hypertension     Lumbar radiculitis     follows with dr Chhaya Roberts for epidural steroid injections    Morbid obesity ([address]     Musculoskeletal disorder     EDMOND (obstructive sleep apnea)     wears cpap    S/P TONJA (total abdominal hysterectomy)     Thromboembolus ([facility] 1996    PE    Trigger finger, right little finger 10/19/2020    follows with [facility]    Trigger finger, right ring finger 10/19/2020    [facility]       Current Medications/Allergies (REVIEWED):     Current Outpatient Medications on File Prior to Visit   Medication Sig Dispense Refill    ondansetron (ZOFRAN ODT) 4 mg disintegrating tablet TAKE 1 TABLET BY MOUTH EVERY 8 HOURS AS NEEDED FOR NAUSEA 30 Tablet 0    cyclobenzaprine (FLEXERIL) 10 mg tablet TAKE 1 TABLET BY MOUTH THREE TIMES A DAY AS NEEDED FOR MUSCLE SPASMS 90 Tablet 1    meloxicam (MOBIC) 15 mg tablet TAKE 1 TABLET BY MOUTH EVERY DAY WITH FOOD AS NEEDED FOR PAIN      hydrOXYzine pamoate (VISTARIL) 25 mg capsule TAKE 1 CAPSULE BY MOUTH TWICE A DAY AS NEEDED FOR ANXIETY 60 Capsule 11    bumetanide (BUMEX) 0.5 mg tablet TAKE 1 TABLET BY MOUTH EVERY DAY AS NEEDED 30 Tablet 2    metoprolol tartrate (LOPRESSOR) 25 mg tablet TAKE 1 TABLET BY MOUTH TWO TIMES A DAY. 180 Tablet 1    omeprazole (PRILOSEC) 40 mg capsule TAKE 1 CAPSULE BY MOUTH EVERY DAY 30 Capsule 5    albuterol (ACCUNEB) 0.63 mg/3 mL nebulizer solution 3 mL by Nebulization route every six (6) hours as needed for Wheezing. 75 mL 1    albuterol (ProAir HFA) 90 mcg/actuation inhaler Take 1 Puff by inhalation every four (4) hours as needed for Wheezing or Shortness of Breath. 1 Each 3    gabapentin (NEURONTIN) 600 mg tablet Take 1 Tablet by mouth three (3) times daily. 90 Tablet 3    budesonide (Pulmicort Flexhaler) 180 mcg/actuation aepb inhaler Take 2 Puffs by inhalation daily. 1 Each 3    nitroglycerin (NITROSTAT) 0.4 mg SL tablet 1TAB WAIT 5MINS IF PAIN PERSISTS TAKE 1TAB, WAIT 5MINS IF PAIN PERSISTS TAKE 1 TAB & DIAL 911. 25 Tablet 1    atorvastatin (LIPITOR) 20 mg tablet Take 1 Tablet by mouth daily. 90 Tablet 3    valACYclovir (VALTREX) 500 mg tablet TAKE 1 PILL TWICE DAILY FOR 3 DAYS AT SIGN OF HERPES FLARE. 30 Tablet 0    glucose blood VI test strips (BLOOD GLUCOSE TEST) strip Use once a day 100 Strip 5    Blood-Glucose Meter monitoring kit Use to check glucose once a day 1 Kit 0    alcohol swabs (ALCOHOL PADS) padm Use when checking blood glucose 100 Pad 5    Lancets misc Use once a day. Please give one compatible with patient's meter 1 Package 5     No current facility-administered medications on file prior to visit. Allergies   Allergen Reactions    Accupril [Quinapril] Cough    Adhesive Tape-Silicones Other (comments)     Makes skin tears easily.      Lipitor [Atorvastatin] Other (comments)     aches    Norvasc [Amlodipine] Swelling     On legs at 10 mg dose         Review of Systems:     Denies chest pain, TALAMANTES, palpitations, LE edema  Denies cough, sputum production, SOB, pleuritic chest pain, wheezing    Objective:     Vitals:    08/24/22 1609 08/24/22 1616   BP: 106/68 119/68   Pulse: 80    Resp: 16 SpO2: 97%    Weight: 140 lb (63.5 kg)    Height: 5' 3.5\" (1.613 m)        Physical Exam:  General appearance - alert, well appearing, and in no distress  Chest - clear to auscultation, no wheezes, rales or rhonchi, symmetric air entry  Heart - normal rate, regular rhythm, normal S1, S2, no murmurs, rubs, clicks or gallops    Recent Labs:  No results found for this or any previous visit (from the past 12 hour(s)). Assessment and Plan:       ICD-10-CM ICD-9-CM    1. Essential hypertension  I10 401.9       2. Elevated blood pressure reading  R03.0 796.2       3. Muscle ache  M79.10 729.1 co-enzyme Q-10 (Co Q-10) 100 mg capsule      4. Taking a statin medication  Z79.899 V49.89 co-enzyme Q-10 (Co Q-10) 100 mg capsule          HTN, well controlled  Elevated BPs resolved  Repeat BP in office good on both occasions today  Continue current meds    C/o body aches she thinks is due to the Atorvastatin  Recommended trial of Co-Q10  Follow up with Dr. Jesse Perry scheduled for next month    Follow up: 6 mo    Mekhi Morin MD    We discussed the expected course, resolution and complications of the diagnosis(es) in detail. Medication risks, benefits, costs, interactions, and alternatives were discussed as indicated. I advised her to contact the office if her condition worsens, changes or fails to improve as anticipated. She expressed understanding with the diagnosis(es) and plan. Yes - the patient is able to be screened

## 2022-11-01 ENCOUNTER — LAB ONLY (OUTPATIENT)
Dept: FAMILY MEDICINE CLINIC | Age: 62
End: 2022-11-01
Payer: COMMERCIAL

## 2022-11-01 DIAGNOSIS — R82.90 MALODOROUS URINE: Primary | ICD-10-CM

## 2022-11-01 PROCEDURE — 81003 URINALYSIS AUTO W/O SCOPE: CPT | Performed by: FAMILY MEDICINE

## 2022-11-02 ENCOUNTER — PATIENT MESSAGE (OUTPATIENT)
Dept: FAMILY MEDICINE CLINIC | Age: 62
End: 2022-11-02

## 2022-11-02 DIAGNOSIS — N30.00 ACUTE CYSTITIS WITHOUT HEMATURIA: Primary | ICD-10-CM

## 2022-11-02 DIAGNOSIS — R82.90 MALODOROUS URINE: ICD-10-CM

## 2022-11-02 DIAGNOSIS — R82.90 MALODOROUS URINE: Primary | ICD-10-CM

## 2022-11-03 ENCOUNTER — DOCUMENTATION ONLY (OUTPATIENT)
Dept: CARDIOLOGY CLINIC | Age: 62
End: 2022-11-03

## 2022-11-03 DIAGNOSIS — F41.9 ANXIETY: ICD-10-CM

## 2022-11-03 RX ORDER — SULFAMETHOXAZOLE AND TRIMETHOPRIM 800; 160 MG/1; MG/1
1 TABLET ORAL 2 TIMES DAILY
Qty: 6 TABLET | Refills: 0 | Status: SHIPPED | OUTPATIENT
Start: 2022-11-03 | End: 2022-11-06

## 2022-11-03 NOTE — TELEPHONE ENCOUNTER
GNR UTI. Will start empiric antibiotics and follow up sensitivities. Notified pt via [address]

## 2022-11-03 NOTE — LETTER
11/3/2022 4:46 PM    Ms. Smooth Ellis  [address] [facility] [address]      To Whom it may Concern. Smooth Ellis underwent cardiac evaluation and based on the available data she is a intermediate risk candidate from cardiac standpoint to undergo an intermediate risk surgery. Smooth Ellis may proceed with planned Right shoulder arthroscopy, rotator cuff repair surgery. Thanks for giving us the opportunity to participate in the care of your patient.            Sincerely,              Wilfred Moon MD

## 2022-11-03 NOTE — PROGRESS NOTES
Per LOV:  PreOp Eval: She is going for Rt shoulder rotator cuff surgery in Dec 20th. She is low to intermediate risk and can proceed.      Letter composed, signed and faxed to Nanette Patel @ [phone]

## 2022-11-04 ENCOUNTER — PATIENT MESSAGE (OUTPATIENT)
Dept: SURGERY | Age: 62
End: 2022-11-04

## 2022-11-04 RX ORDER — HYDROXYZINE PAMOATE 25 MG/1
CAPSULE ORAL
Qty: 60 CAPSULE | Refills: 11 | Status: SHIPPED | OUTPATIENT
Start: 2022-11-04

## 2022-11-04 NOTE — TELEPHONE ENCOUNTER
----- Message from Kelly Cohen MD sent at 11/4/2022  2:37 PM EDT -----  Cyndidorian Pedraza in a antibiotic to her pharmacy bc her UCx came back positive. I notified her via [email or web address] a couple days ago but it looks like she never read the message. Can you call her to make sure she knows to get and start the antibioic for her UTI?     Thanks!  ----- Message -----  From: Kaiden, [facility] [place]  Sent: 11/3/2022   2:49 PM EDT  To: Kelly Cohen MD

## 2022-11-04 NOTE — TELEPHONE ENCOUNTER
Called patient who is aware of antibiotic at pharmacy and will inform office if she has any issues once finishing medication.

## 2022-11-05 LAB
BACTERIA SPEC CULT: ABNORMAL
CC UR VC: ABNORMAL
SERVICE CMNT-IMP: ABNORMAL

## 2022-11-07 RX ORDER — ONDANSETRON 4 MG/1
TABLET, ORALLY DISINTEGRATING ORAL
Qty: 30 TABLET | Refills: 0 | Status: SHIPPED | OUTPATIENT
Start: 2022-11-07

## 2022-11-07 NOTE — TELEPHONE ENCOUNTER
I called Ms Case Segura to follow up on her request for the ondanestron 4 mg tab. I verified patient with 2 patient identifiers. Ms Case Segura states she is watching what she eats certain foods trigger the nausea. She is requesting refill be sent to the [facility]. I spoke with Es Sewell request forwarded to her.

## 2022-11-16 ENCOUNTER — TELEPHONE (OUTPATIENT)
Dept: SURGERY | Age: 62
End: 2022-11-16

## 2022-11-28 RX ORDER — ATORVASTATIN CALCIUM 20 MG/1
TABLET, FILM COATED ORAL
Qty: 30 TABLET | Refills: 11 | Status: SHIPPED | OUTPATIENT
Start: 2022-11-28

## 2022-11-28 NOTE — TELEPHONE ENCOUNTER
Refill per VO of Dr. Saman Valdez:    Last appt: 10/28/2022    Future Appointments   Date Time Provider Yesy Clau   11/29/2022  2:00 PM Jann Preciado MD [facility] BS AMB   12/6/2022 11:20 AM Sagar Charles NP [facility] BS AMB   10/30/2023  1:00 PM Silvia Lopez MD [facility] BS AMB       Requested Prescriptions     Pending Prescriptions Disp Refills    atorvastatin (LIPITOR) 20 mg tablet [Pharmacy Med Name: ATORVASTATIN 20 MG TABLET] 30 Tablet 11     Sig: TAKE 1 TABLET BY MOUTH EVERY DAY

## 2022-11-29 ENCOUNTER — OFFICE VISIT (OUTPATIENT)
Dept: FAMILY MEDICINE CLINIC | Age: 62
End: 2022-11-29
Payer: COMMERCIAL

## 2022-11-29 VITALS
WEIGHT: 139.4 LBS | SYSTOLIC BLOOD PRESSURE: 140 MMHG | HEIGHT: 64 IN | HEART RATE: 75 BPM | OXYGEN SATURATION: 96 % | RESPIRATION RATE: 18 BRPM | BODY MASS INDEX: 23.8 KG/M2 | TEMPERATURE: 98 F | DIASTOLIC BLOOD PRESSURE: 75 MMHG

## 2022-11-29 DIAGNOSIS — F32.1 CURRENT MODERATE EPISODE OF MAJOR DEPRESSIVE DISORDER, UNSPECIFIED WHETHER RECURRENT (HCC): ICD-10-CM

## 2022-11-29 DIAGNOSIS — Z12.31 ENCOUNTER FOR SCREENING MAMMOGRAM FOR MALIGNANT NEOPLASM OF BREAST: ICD-10-CM

## 2022-11-29 DIAGNOSIS — Z00.00 ENCOUNTER FOR WELLNESS EXAMINATION IN ADULT: Primary | ICD-10-CM

## 2022-11-29 DIAGNOSIS — Z23 ENCOUNTER FOR IMMUNIZATION: ICD-10-CM

## 2022-11-29 DIAGNOSIS — M85.80 OSTEOPENIA, UNSPECIFIED LOCATION: ICD-10-CM

## 2022-11-29 PROCEDURE — 90471 IMMUNIZATION ADMIN: CPT | Performed by: STUDENT IN AN ORGANIZED HEALTH CARE EDUCATION/TRAINING PROGRAM

## 2022-11-29 PROCEDURE — 90686 IIV4 VACC NO PRSV 0.5 ML IM: CPT | Performed by: STUDENT IN AN ORGANIZED HEALTH CARE EDUCATION/TRAINING PROGRAM

## 2022-11-29 PROCEDURE — 99396 PREV VISIT EST AGE 40-64: CPT | Performed by: STUDENT IN AN ORGANIZED HEALTH CARE EDUCATION/TRAINING PROGRAM

## 2022-11-29 RX ORDER — IBUPROFEN 600 MG/1
TABLET ORAL
COMMUNITY
Start: 2022-11-28

## 2022-11-29 RX ORDER — HYDROCODONE BITARTRATE AND ACETAMINOPHEN 7.5; 325 MG/1; MG/1
TABLET ORAL
COMMUNITY
Start: 2022-11-28

## 2022-11-29 RX ORDER — BUPROPION HYDROCHLORIDE 300 MG/1
300 TABLET ORAL 2 TIMES DAILY
COMMUNITY
Start: 2022-08-26

## 2022-11-29 RX ORDER — CLINDAMYCIN HYDROCHLORIDE 150 MG/1
2 CAPSULE ORAL 4 TIMES DAILY
COMMUNITY
Start: 2022-11-28

## 2022-11-29 NOTE — PROGRESS NOTES
HPI:  Paradise Pal is a 58 y.o. female presenting for well woman exam.     Immunizations - reviewed:   Immunization History   Administered Date(s) Administered    COVID-19, MODERNA BLUE border, Primary or Immunocompromised, (age 18y+), IM, 100 mcg/0.5mL 04/02/2021, 04/30/2021    COVID-19, PFIZER GRAY top, DO NOT Dilute, (age 15 y+), IM, 30 mcg/0.3 mL 02/21/2022    Influenza Vaccine 09/01/2015    Influenza, FLUARIX, FLULAVAL, FLUZONE (age 10 mo+) AND AFLURIA, (age 1 y+), PF, 0.5mL 12/19/2017, 11/03/2018, 08/27/2019, 10/03/2020, 11/29/2022    Pneumococcal Polysaccharide (PPSV-23) 08/04/2015, 04/27/2022    TD Vaccine 11/07/2005    TDAP Vaccine 08/14/2012    Tdap 11/29/2022    Zoster Recombinant 04/27/2022     Flu: will get today  Tdap: will get today  Pneumovax (>64yo or earlier if risks): 4/2022  Zostervax (>51yo): 4/2022    Health Maintenance reviewed -  Pap smear Pt thinks several years ago  Mammogram: ~1 year ago  Colonoscopy: 3 yrs - pt repeats Q5yrs  DEXA scan (>64yo) 2019 - osteopenia  Lung cancer screening: Non-smoker    Preventative care (USPSTF):  18: Alcohol abuse screening (CAGE): no EtOH use     Depression screening: positive   PHQ 9 Score: 7 (11/29/2022  2:00 PM)  Thoughts of being better off dead, or hurting yourself in some way: 0 (11/29/2022  2:00 PM)     Tdap: Last 2012     40:  Diabetes screening: Last A1c controlled 4/2022         50:  Colonoscopy (if age > (45)50-75)? 3yrs ago  Colonoscopy results: Repeat in 5yrs per pt      55:  Lung cancer screening: non-smoker     Allergies- reviewed: Allergies   Allergen Reactions    Accupril [Quinapril] Cough    Adhesive Tape-Silicones Other (comments)     Makes skin tears easily. Lipitor [Atorvastatin] Other (comments)     aches    Norvasc [Amlodipine] Swelling     On legs at 10 mg dose         Medications- reviewed:   Current Outpatient Medications   Medication Sig    clindamycin (CLEOCIN) 150 mg capsule Take 2 Capsules by mouth four (4) times daily. buPROPion XL (WELLBUTRIN XL) 300 mg XL tablet Take 300 mg by mouth two (2) times a day. HYDROcodone-acetaminophen (NORCO) 7.5-325 mg per tablet     ibuprofen (MOTRIN) 600 mg tablet     atorvastatin (LIPITOR) 20 mg tablet TAKE 1 TABLET BY MOUTH EVERY DAY    ondansetron (ZOFRAN ODT) 4 mg disintegrating tablet TAKE 1 TABLET BY MOUTH EVERY 8 HOURS AS NEEDED FOR NAUSEA    hydrOXYzine pamoate (VISTARIL) 25 mg capsule TAKE 1 CAPSULE BY MOUTH TWICE A DAY AS NEEDED FOR ANXIETY    metoprolol tartrate (LOPRESSOR) 25 mg tablet TAKE 1 TABLET BY MOUTH TWICE A DAY    nitroglycerin (NITROSTAT) 0.4 mg SL tablet TAKE 1 TAB WAIT 5MINS IF PAIN PERSISTS TAKE 1 TAB, WAIT 5MINS IF PAIN PERSISTS TAKE 1 TAB & DIAL 911.    bumetanide (BUMEX) 0.5 mg tablet TAKE 1 TABLET BY MOUTH EVERY DAY AS NEEDED    co-enzyme Q-10 (Co Q-10) 100 mg capsule Take 1 Capsule by mouth daily. cyclobenzaprine (FLEXERIL) 10 mg tablet TAKE 1 TABLET BY MOUTH THREE TIMES A DAY AS NEEDED FOR MUSCLE SPASMS    meloxicam (MOBIC) 15 mg tablet TAKE 1 TABLET BY MOUTH EVERY DAY WITH FOOD AS NEEDED FOR PAIN    omeprazole (PRILOSEC) 40 mg capsule TAKE 1 CAPSULE BY MOUTH EVERY DAY    albuterol (ACCUNEB) 0.63 mg/3 mL nebulizer solution 3 mL by Nebulization route every six (6) hours as needed for Wheezing. albuterol (ProAir HFA) 90 mcg/actuation inhaler Take 1 Puff by inhalation every four (4) hours as needed for Wheezing or Shortness of Breath.    gabapentin (NEURONTIN) 600 mg tablet Take 1 Tablet by mouth three (3) times daily. budesonide (Pulmicort Flexhaler) 180 mcg/actuation aepb inhaler Take 2 Puffs by inhalation daily. valACYclovir (VALTREX) 500 mg tablet TAKE 1 PILL TWICE DAILY FOR 3 DAYS AT SIGN OF HERPES FLARE. glucose blood VI test strips (BLOOD GLUCOSE TEST) strip Use once a day    Blood-Glucose Meter monitoring kit Use to check glucose once a day    alcohol swabs (ALCOHOL PADS) padm Use when checking blood glucose    Lancets misc Use once a day. Please give one compatible with patient's meter     No current facility-administered medications for this visit. Past Medical History- reviewed:  Past Medical History:   Diagnosis Date    Arthritis     OA back,knees and hands    Asthma     Autoimmune disease ([address]     Rosina Eastman    CAD (coronary artery disease)     s/p stents 11/2015    Cardiac murmur     Depression     Diabetes ([address]     diet controlled at this time.      DVT (deep venous thrombosis) ([facility] 1981    GERD (gastroesophageal reflux disease)     Hypertension     Lumbar radiculitis     follows with dr Eliza Saravia for epidural steroid injections    Morbid obesity ([address]     Musculoskeletal disorder     EDMOND (obstructive sleep apnea)     wears cpap    S/P TONJA (total abdominal hysterectomy)     Thromboembolus ([address] 1996    PE    Trigger finger, right little finger 10/19/2020    follows with [facility]    Trigger finger, right ring finger 10/19/2020    [facility]         Past Surgical History- reviewed:   Past Surgical History:   Procedure Laterality Date    COLONOSCOPY N/A 12/6/2021    COLONOSCOPY (URGENT) performed by Santana Preciado MD at [address] [place] [address] Right 2018    Fibroadenoma    [address]    HX CHOLECYSTECTOMY  2017    HX GASTRIC BYPASS  01/2017    HX HYSTERECTOMY  1985    HX TOTAL ABDOMINAL HYSTERECTOMY      age 22    IR INJ SPINE [address]  2/4/2021    IR INJ SPINE FLUORO GUIDED  5/5/2022    DAYAN STEREO  BX BREAST RT ADDL W/CLIP AND SPECIMEN Right 2000    neg     IL CARDIAC SURG PROCEDURE UNLIST  11/2015    STENT PLACED         Family History - reviewed:  Family History   Problem Relation Age of Onset    Cancer Mother 67        colon    Diabetes Mother     OSTEOARTHRITIS Mother     Stroke Mother     Migraines Mother     Diabetes Father     Heart Disease Father     Heart Attack Father     Hypertension Father     High Cholesterol Father     COPD Father     Heart Failure Father Cancer Other     Diabetes Other     Heart Disease Other     Hypertension Other     Cancer Brother         lymphoma    Cancer Brother         PROSTATE AND LYMPHOMA    Cancer Maternal Grandmother         stomach    Asthma Brother     Asthma Brother     Stroke Brother     Cancer Maternal Aunt         lung     Breast Cancer Maternal Aunt     Cancer Maternal Uncle         lung    Cancer Maternal Uncle         melanoma    Anesth Problems Neg Hx          Social History - reviewed:  Social History     Socioeconomic History    Marital status:      Spouse name: Not on file    Number of children: Not on file    Years of education: Not on file    Highest education level: Not on file   Occupational History    Not on file   Tobacco Use    Smoking status: Never    Smokeless tobacco: Never   Vaping Use    Vaping Use: Never used   Substance and Sexual Activity    Alcohol use: No     Alcohol/week: 0.0 standard drinks    Drug use: No    Sexual activity: Yes     Partners: Male     Birth control/protection: None   Other Topics Concern    Not on file   Social History Narrative    Not on file     Social Determinants of Health     Financial Resource Strain: Low Risk     Difficulty of Paying Living Expenses: Not hard at all   Food Insecurity: No Food Insecurity    Worried About Running Out of Food in the Last Year: Never true    Ran Out of Food in the Last Year: Never true   Transportation Needs: Not on file   Physical Activity: Not on file   Stress: Not on file   Social Connections: Not on file   Intimate Partner Violence: Not on file   Housing Stability: Not on file           Review of Systems   CONSTITUTIONAL: Denies: fever, chills  BREASTS: No masses or nipple discharge      Physical Exam  Visit Vitals  BP (!) 140/75 (BP 1 Location: Left upper arm, BP Patient Position: Sitting, BP Cuff Size: Adult)   Pulse 75   Temp 98 °F (36.7 °C) (Oral)   Resp 18   Ht 5' 3.5\" (1.613 m)   Wt 139 lb 6.4 oz (63.2 kg)   LMP 01/01/1985   SpO2 96% BMI 24.31 kg/m²       General appearance - alert, well appearing, and in no distress  Ears - bilateral TM's and external ear canals normal  Nose - normal and patent, no erythema, discharge or polyps  Mouth - mucous membranes moist, pharynx normal without lesions  Neck - supple, no significant adenopathy, thyroid exam: thyroid is normal in size without nodules or tenderness  Chest - clear to auscultation, no wheezes, rales or rhonchi, symmetric air entry  Heart - normal rate, regular rhythm, normal S1, S2, no murmurs, rubs, clicks or gallops  Abdomen - soft, nontender, nondistended, no masses or organomegaly  Neurological - alert, oriented, normal speech, no focal findings or movement disorder noted  Musculoskeletal - no joint tenderness, deformity or swelling  Extremities - no pedal edema noted  Skin - normal coloration and turgor, no rashes, no suspicious skin lesions noted      Assessment/Plan:    ICD-10-CM ICD-9-CM    1. Encounter for wellness examination in adult  Z00.00 V70.0       2. Current moderate episode of major depressive disorder, unspecified whether recurrent ([facility])  F32.1 296.22       3. Encounter for immunization  Z23 V03.89 INFLUENZA, FLUARIX, FLULAVAL, FLUZONE (AGE 6 MO+), AFLURIA(AGE 3Y+) IM, PF, 0.5 ML      TDAP, BOOSTRIX, (AGE 10 YRS+), IM      PA IMMUNIZ ADMIN,1 SINGLE/COMB VAC/TOXOID      PA IMMUNIZ,ADMIN,EACH ADDL      4. Osteopenia, unspecified location  M85.80 733.90 DEXA BONE DENSITY STUDY AXIAL      5.  Encounter for screening mammogram for malignant neoplasm of breast  Z12.31 V76.12 [facility] MAMMO BI SCREENING INCL CAD        -Discussed symptoms of depression, patient states it is related to some family issues  -She is currently taking Atarax as needed which is helpful for her and she also takes wellbutrin  -Not interested in any additional medical therapy at this time however she would like to get established with a therapist  -Information provided for local area therapy practices  -Patient will reach out if she would like to discuss any additional medical management      I have discussed the diagnosis with the patient and the intended plan as seen in the above orders. The patient has received an after-visit summary and questions were answered concerning future plans. I have discussed medication side effects and warnings with the patient as well. Informed pt to return to the office if new symptoms arise.       Garfield Becerra MD

## 2022-11-29 NOTE — PROGRESS NOTES
Identified pt with two pt identifiers(name and ). Reviewed record in preparation for visit and have obtained necessary documentation. Chief Complaint   Patient presents with    Physical     Biometric screening        Health Maintenance Due   Topic    COVID-19 Vaccine (4 - Booster for Moderna series)    Eye Exam Retinal or Dilated     Shingrix Vaccine Age 50> (2 of 2)    Flu Vaccine (1)    DTaP/Tdap/Td series (2 - Td or Tdap)       Visit Vitals  BP (!) 140/75 (BP 1 Location: Left upper arm, BP Patient Position: Sitting, BP Cuff Size: Adult)   Pulse 75   Temp 98 °F (36.7 °C) (Oral)   Resp 18   Ht 5' 3.5\" (1.613 m)   Wt 139 lb 6.4 oz (63.2 kg)   SpO2 96%   BMI 24.31 kg/m²         Coordination of Care Questionnaire:  :   1) Have you been to an emergency room, urgent care, or hospitalized since your last visit? If yes, where when, and reason for visit? no       2. Have seen or consulted any other health care provider since your last visit? If yes, where when, and reason for visit? NO      Patient is accompanied by self I have received verbal consent from Merline Gorman to discuss any/all medical information while they are present in the room.     3 most recent PHQ Screens 2022   Little interest or pleasure in doing things Several days   Feeling down, depressed, irritable, or hopeless More than half the days   Total Score PHQ 2 3   Trouble falling or staying asleep, or sleeping too much Not at all   Feeling tired or having little energy Nearly every day   Poor appetite, weight loss, or overeating Not at all   Feeling bad about yourself - or that you are a failure or have let yourself or your family down Several days   Trouble concentrating on things such as school, work, reading, or watching TV Not at all   Moving or speaking so slowly that other people could have noticed; or the opposite being so fidgety that others notice Not at all   Thoughts of being better off dead, or hurting yourself in some way Not at all   PHQ 9 Score 7   How difficult have these problems made it for you to do your work, take care of your home and get along with others Not difficult at all

## 2022-12-07 ENCOUNTER — VIRTUAL VISIT (OUTPATIENT)
Dept: SURGERY | Age: 62
End: 2022-12-07
Payer: COMMERCIAL

## 2022-12-07 VITALS — HEIGHT: 63 IN | WEIGHT: 135 LBS | BODY MASS INDEX: 23.92 KG/M2

## 2022-12-07 DIAGNOSIS — Z98.84 S/P GASTRIC BYPASS: ICD-10-CM

## 2022-12-07 DIAGNOSIS — G89.29 OTHER CHRONIC PAIN: ICD-10-CM

## 2022-12-07 DIAGNOSIS — M79.7 FIBROMYALGIA: ICD-10-CM

## 2022-12-07 DIAGNOSIS — E66.01 MORBID OBESITY (HCC): Primary | ICD-10-CM

## 2022-12-07 PROCEDURE — 99212 OFFICE O/P EST SF 10 MIN: CPT | Performed by: NURSE PRACTITIONER

## 2022-12-07 NOTE — PROGRESS NOTES
HISTORY OF PRESENT ILLNESS  Estelita Song is a 58 y.o. female with previous Malabsorptive Gastric bypass surgery on 5 years . Tranjali Blew She has lost a total of 83 pounds since surgery. She has lost 12 lbs since her last ov. Body mass index is 23.91 kg/m². Suhas Quinonesw nausea and vomiting recently with stomach bug. She has nausea and uses Zofran as needed. I just refilled it. . + Acid reflux/heartburn. , taking Prilosec. . Drinking  64 ounces of water daily. She does not eat a lot of dry meats. She has to eat then in a casserole. . + BM's, miralax. . She is not exercising. She was hurt at work and has to have rotator cuff surgery in 2 weeks. She is taking Meloxicam.   Dietary recall -    Breakfast-   Breakfast- cereal, toast with butter  Lunch- tuna with crackers, soup  Dinner- veg, casserole, fish or fish stick    She is not snacking between meals; Tranjali Blew Vitamins:  MVI : yes  Calcium : yes  B-Vit 12: yes  Vit D: Yes          Ms. Shirley Law has a reminder for a \"due or due soon\" health maintenance. I have asked that she contact her primary care provider for follow-up on this health maintenance. COMORBIDITY     SLEEP APNEA                 NO        GERD  (req.meds)            YES  HYPERLIPIDEMIA            NO  HYPERTENSION              NO         DIABETES                         NO           Current Outpatient Medications:     clindamycin (CLEOCIN) 150 mg capsule, Take 2 Capsules by mouth four (4) times daily. , Disp: , Rfl:     buPROPion XL (WELLBUTRIN XL) 300 mg XL tablet, Take 300 mg by mouth two (2) times a day., Disp: , Rfl:     HYDROcodone-acetaminophen (NORCO) 7.5-325 mg per tablet, , Disp: , Rfl:     ibuprofen (MOTRIN) 600 mg tablet, , Disp: , Rfl:     atorvastatin (LIPITOR) 20 mg tablet, TAKE 1 TABLET BY MOUTH EVERY DAY, Disp: 30 Tablet, Rfl: 11    ondansetron (ZOFRAN ODT) 4 mg disintegrating tablet, TAKE 1 TABLET BY MOUTH EVERY 8 HOURS AS NEEDED FOR NAUSEA, Disp: 30 Tablet, Rfl: 0    hydrOXYzine pamoate (VISTARIL) 25 mg capsule, TAKE 1 CAPSULE BY MOUTH TWICE A DAY AS NEEDED FOR ANXIETY, Disp: 60 Capsule, Rfl: 11    metoprolol tartrate (LOPRESSOR) 25 mg tablet, TAKE 1 TABLET BY MOUTH TWICE A DAY, Disp: 180 Tablet, Rfl: 1    nitroglycerin (NITROSTAT) 0.4 mg SL tablet, TAKE 1 TAB WAIT 5MINS IF PAIN PERSISTS TAKE 1 TAB, WAIT 5MINS IF PAIN PERSISTS TAKE 1 TAB & DIAL 911., Disp: 25 Tablet, Rfl: 0    bumetanide (BUMEX) 0.5 mg tablet, TAKE 1 TABLET BY MOUTH EVERY DAY AS NEEDED, Disp: 90 Tablet, Rfl: 2    co-enzyme Q-10 (Co Q-10) 100 mg capsule, Take 1 Capsule by mouth daily. , Disp: 90 Capsule, Rfl: 1    cyclobenzaprine (FLEXERIL) 10 mg tablet, TAKE 1 TABLET BY MOUTH THREE TIMES A DAY AS NEEDED FOR MUSCLE SPASMS, Disp: 90 Tablet, Rfl: 1    meloxicam (MOBIC) 15 mg tablet, TAKE 1 TABLET BY MOUTH EVERY DAY WITH FOOD AS NEEDED FOR PAIN, Disp: , Rfl:     omeprazole (PRILOSEC) 40 mg capsule, TAKE 1 CAPSULE BY MOUTH EVERY DAY, Disp: 30 Capsule, Rfl: 5    albuterol (ACCUNEB) 0.63 mg/3 mL nebulizer solution, 3 mL by Nebulization route every six (6) hours as needed for Wheezing., Disp: 75 mL, Rfl: 1    albuterol (ProAir HFA) 90 mcg/actuation inhaler, Take 1 Puff by inhalation every four (4) hours as needed for Wheezing or Shortness of Breath., Disp: 1 Each, Rfl: 3    gabapentin (NEURONTIN) 600 mg tablet, Take 1 Tablet by mouth three (3) times daily. , Disp: 90 Tablet, Rfl: 3    budesonide (Pulmicort Flexhaler) 180 mcg/actuation aepb inhaler, Take 2 Puffs by inhalation daily. , Disp: 1 Each, Rfl: 3    valACYclovir (VALTREX) 500 mg tablet, TAKE 1 PILL TWICE DAILY FOR 3 DAYS AT SIGN OF HERPES FLARE., Disp: 30 Tablet, Rfl: 0    glucose blood VI test strips (BLOOD GLUCOSE TEST) strip, Use once a day, Disp: 100 Strip, Rfl: 5    Blood-Glucose Meter monitoring kit, Use to check glucose once a day, Disp: 1 Kit, Rfl: 0    alcohol swabs (ALCOHOL PADS) padm, Use when checking blood glucose, Disp: 100 Pad, Rfl: 5    Lancets misc, Use once a day.  Please give one compatible with patient's meter, Disp: 1 Package, Rfl: 5      Visit Vitals  Ht 5' 3\" (1.6 m)   Wt 135 lb (61.2 kg)   LMP 01/01/1985   BMI 23.91 kg/m²      HPI    Review of Systems   Respiratory:  Negative for shortness of breath. Cardiovascular:  Negative for chest pain. Gastrointestinal:  Positive for heartburn. Negative for abdominal pain, nausea and vomiting. Neurological:  Negative for dizziness and headaches. Physical Exam  Pulmonary:      Effort: No respiratory distress. Abdominal:      Tenderness: There is no abdominal tenderness. Neurological:      Mental Status: She is alert and oriented to person, place, and time. ASSESSMENT and PLAN  Ginger Strange is a 58 y.o. female with previous Malabsorptive Gastric bypass surgery on 5 years . Sandrine Phan She has lost a total of 83 pounds since surgery. She has lost 12 lbs since her last ov. Body mass index is 23.91 kg/m². Sandrine Phan nausea and vomiting recently with stomach bug. She has nausea and uses Zofran as needed. . + Acid reflux/heartburn. , taking Prilosec. . Drinking  64 ounces of water daily. She does not eat a lot of dry meats. She has to eat then in a casserole. . + BM's, miralax. . She is not exercising. She was hurt at work and has to have rotator cuff surgery in 2 weeks. She is taking Meloxicam.   We discussed that she cannot taking Meloxicam and needs to discuss other option with her physician. Advised patient regard to diet that is high-protein, low-fat, low-sugar, limited carbohydrates. Strive for 50-60 grams of protein daily. If having a snack, foods that are protein or fiber rich. . No eating/drinking together, chew foods well, and portion control. Measure meals. Discussed snacking behavior and to [facility] pay attention to behavioral factor and habits. Drink at least 40-64 ounces of water or non-calorie/non-carbonated beverages daily. Continue vitamin regiment daily. Exercise at least 3 days a week with cardiovascular and strength training.  Patient to follow up in 1 year. . Advised to call office if any questions/concerns. Reviewed labs done by her PCP in April. 15 Minutes spent face to face with patient, >50 % of time spent counseling. Merline Gorman, was evaluated through a synchronous (real-time) audio-video encounter. The patient (or guardian if applicable) is aware that this is a billable service, which includes applicable co-pays. This Virtual Visit was conducted with patient's (and/or legal guardian's) consent. The visit was conducted pursuant to the emergency declaration under the [address] [place] [address], [address] [place] [address] [facility] authority and the [employer] and [employer] General Act. Patient identification was verified, and a caregiver was present when appropriate. The patient was located at: Home: [address] [place] [id number]  The provider was located at: Facility (Appt Department): [address]  [address] [facility] [address]  [facility] [id number]      --Giancarlo Degroot NP on 12/7/2022 at 2:48 PM    An electronic signature was used to authenticate this note.

## 2022-12-07 NOTE — PROGRESS NOTES
Identified pt with two pt identifiers (name and ). Reviewed chart in preparation for visit and have obtained necessary documentation. Jhon Gannon is a 58 y.o. female  Chief Complaint   Patient presents with    Obesity    Follow-up     Annual bariatric visit -- Previous Weight(21) 138lb -- [phone]     Visit Vitals  Ht 5' 3\" (1.6 m)   Wt 135 lb (61.2 kg)   LMP 1985   BMI 23.91 kg/m²       1. Have you been to the ER, urgent care clinic since your last visit? Hospitalized since your last visit? No    2. Have you seen or consulted any other health care providers outside of the [address] [place] [address] since your last visit? Include any pap smears or colon screening.  No

## 2022-12-08 RX ORDER — GABAPENTIN 600 MG/1
TABLET ORAL
Qty: 90 TABLET | Refills: 0 | Status: SHIPPED | OUTPATIENT
Start: 2022-12-08

## 2022-12-12 DIAGNOSIS — M51.36 DDD (DEGENERATIVE DISC DISEASE), LUMBAR: ICD-10-CM

## 2022-12-12 RX ORDER — NITROGLYCERIN 0.4 MG/1
TABLET SUBLINGUAL
Qty: 25 TABLET | Refills: 2 | Status: SHIPPED | OUTPATIENT
Start: 2022-12-12

## 2022-12-12 RX ORDER — CYCLOBENZAPRINE HCL 10 MG
TABLET ORAL
Qty: 90 TABLET | Refills: 1 | Status: SHIPPED | OUTPATIENT
Start: 2022-12-12

## 2022-12-12 NOTE — TELEPHONE ENCOUNTER
Refill per VO of Dr. Oconnor Even:    Last appt: 10/28/2022    Future Appointments   Date Time Provider Yesy Olguin   5/4/2023 10:15 AM [facility] MAM 1 [facility]   5/4/2023 11:00 AM WT DEXA/ADYAN 1 [facility]   10/30/2023  1:00 PM Heavenly Lopez MD [facility] BS AMB       Requested Prescriptions     Pending Prescriptions Disp Refills    nitroglycerin (NITROSTAT) 0.4 mg SL tablet [Pharmacy Med Name: NITROGLYCERIN 0.4 MG TABLET SL] 25 Tablet 0     Sig: TAKE 1 TABLET BY MOUTH EVERY 5 MINUTES UP TO 3 DOSES THEN CALL 911 IF PAIN PERSISTS

## 2022-12-13 DIAGNOSIS — M51.36 DDD (DEGENERATIVE DISC DISEASE), LUMBAR: ICD-10-CM

## 2022-12-13 RX ORDER — CYCLOBENZAPRINE HCL 10 MG
TABLET ORAL
Qty: 90 TABLET | Refills: 1 | Status: CANCELLED | OUTPATIENT
Start: 2022-12-13

## 2022-12-14 RX ORDER — ONDANSETRON 4 MG/1
TABLET, ORALLY DISINTEGRATING ORAL
Qty: 24 TABLET | Refills: 1 | Status: SHIPPED | OUTPATIENT
Start: 2022-12-14

## 2023-01-01 ENCOUNTER — APPOINTMENT (OUTPATIENT)
Facility: HOSPITAL | Age: 63
DRG: 004 | End: 2023-01-01
Payer: MEDICARE

## 2023-01-01 PROCEDURE — 71045 X-RAY EXAM CHEST 1 VIEW: CPT

## 2023-01-01 PROCEDURE — 71275 CT ANGIOGRAPHY CHEST: CPT

## 2023-02-22 DIAGNOSIS — M79.10 MUSCLE ACHE: ICD-10-CM

## 2023-02-22 DIAGNOSIS — Z79.899 TAKING A STATIN MEDICATION: ICD-10-CM

## 2023-02-22 RX ORDER — EPINEPHRINE 0.22MG
AEROSOL WITH ADAPTER (ML) INHALATION
Qty: 90 CAPSULE | Refills: 1 | Status: SHIPPED | OUTPATIENT
Start: 2023-02-22

## 2023-02-28 RX ORDER — ONDANSETRON 4 MG/1
TABLET, ORALLY DISINTEGRATING ORAL
Qty: 20 TABLET | Refills: 1 | Status: SHIPPED | OUTPATIENT
Start: 2023-02-28

## 2023-03-13 ENCOUNTER — NURSE TRIAGE (OUTPATIENT)
Dept: OTHER | Facility: CLINIC | Age: 63
End: 2023-03-13

## 2023-03-13 ENCOUNTER — HOSPITAL ENCOUNTER (INPATIENT)
Age: 63
LOS: 2 days | Discharge: HOME | DRG: 394 | End: 2023-03-15
Attending: EMERGENCY MEDICINE | Admitting: FAMILY MEDICINE
Payer: MEDICARE

## 2023-03-13 ENCOUNTER — APPOINTMENT (OUTPATIENT)
Dept: CT IMAGING | Age: 63
DRG: 394 | End: 2023-03-13
Attending: EMERGENCY MEDICINE
Payer: MEDICARE

## 2023-03-13 DIAGNOSIS — K52.9 COLITIS: Primary | ICD-10-CM

## 2023-03-13 DIAGNOSIS — N28.9 ACUTE RENAL INSUFFICIENCY: ICD-10-CM

## 2023-03-13 DIAGNOSIS — E87.6 HYPOKALEMIA: ICD-10-CM

## 2023-03-13 DIAGNOSIS — I95.9 HYPOTENSION, UNSPECIFIED HYPOTENSION TYPE: ICD-10-CM

## 2023-03-13 DIAGNOSIS — D72.829 LEUKOCYTOSIS, UNSPECIFIED TYPE: ICD-10-CM

## 2023-03-13 PROBLEM — N17.9 AKI (ACUTE KIDNEY INJURY) (HCC): Status: ACTIVE | Noted: 2023-03-13

## 2023-03-13 PROBLEM — N17.9 AKI (ACUTE KIDNEY INJURY) (HCC): Status: ACTIVE | Noted: 2023-01-01

## 2023-03-13 LAB
ALBUMIN SERPL-MCNC: 2.7 G/DL (ref 3.5–5)
ALBUMIN/GLOB SERPL: 0.6 (ref 1.1–2.2)
ALP SERPL-CCNC: 176 U/L (ref 45–117)
ALT SERPL-CCNC: 11 U/L (ref 12–78)
ANION GAP SERPL CALC-SCNC: 5 MMOL/L (ref 5–15)
APPEARANCE UR: CLEAR
AST SERPL-CCNC: 14 U/L (ref 15–37)
BACTERIA URNS QL MICRO: NEGATIVE /HPF
BASOPHILS # BLD: 0.3 K/UL (ref 0–0.1)
BASOPHILS NFR BLD: 1 % (ref 0–1)
BILIRUB SERPL-MCNC: 0.7 MG/DL (ref 0.2–1)
BILIRUB UR QL: NEGATIVE
BUN SERPL-MCNC: 22 MG/DL (ref 6–20)
BUN/CREAT SERPL: 15 (ref 12–20)
CALCIUM SERPL-MCNC: 9.4 MG/DL (ref 8.5–10.1)
CHLORIDE SERPL-SCNC: 96 MMOL/L (ref 97–108)
CK SERPL-CCNC: 29 U/L (ref 26–192)
CO2 SERPL-SCNC: 32 MMOL/L (ref 21–32)
COLOR UR: ABNORMAL
CREAT SERPL-MCNC: 1.43 MG/DL (ref 0.55–1.02)
DIFFERENTIAL METHOD BLD: ABNORMAL
EOSINOPHIL # BLD: 0.3 K/UL (ref 0–0.4)
EOSINOPHIL NFR BLD: 1 % (ref 0–7)
EPITH CASTS URNS QL MICRO: ABNORMAL /LPF
ERYTHROCYTE [DISTWIDTH] IN BLOOD BY AUTOMATED COUNT: 12.3 % (ref 11.5–14.5)
FLUAV AG NPH QL IA: NEGATIVE
FLUBV AG NOSE QL IA: NEGATIVE
GLOBULIN SER CALC-MCNC: 4.2 G/DL (ref 2–4)
GLUCOSE SERPL-MCNC: 98 MG/DL (ref 65–100)
GLUCOSE UR STRIP.AUTO-MCNC: NEGATIVE MG/DL
HCT VFR BLD AUTO: 38.8 % (ref 35–47)
HGB BLD-MCNC: 13.4 G/DL (ref 11.5–16)
HGB UR QL STRIP: NEGATIVE
IMM GRANULOCYTES # BLD AUTO: 0.3 K/UL (ref 0–0.04)
IMM GRANULOCYTES NFR BLD AUTO: 1 % (ref 0–0.5)
KETONES UR QL STRIP.AUTO: ABNORMAL MG/DL
LACTATE SERPL-SCNC: 1.5 MMOL/L (ref 0.4–2)
LEUKOCYTE ESTERASE UR QL STRIP.AUTO: ABNORMAL
LYMPHOCYTES # BLD: 1.5 K/UL (ref 0.8–3.5)
LYMPHOCYTES NFR BLD: 6 % (ref 12–49)
MCH RBC QN AUTO: 29.6 PG (ref 26–34)
MCHC RBC AUTO-ENTMCNC: 34.5 G/DL (ref 30–36.5)
MCV RBC AUTO: 85.8 FL (ref 80–99)
MONOCYTES # BLD: 1.3 K/UL (ref 0–1)
MONOCYTES NFR BLD: 5 % (ref 5–13)
NEUTS SEG # BLD: 21.4 K/UL (ref 1.8–8)
NEUTS SEG NFR BLD: 86 % (ref 32–75)
NITRITE UR QL STRIP.AUTO: NEGATIVE
NRBC # BLD: 0 K/UL (ref 0–0.01)
NRBC BLD-RTO: 0 PER 100 WBC
PH UR STRIP: 5 (ref 5–8)
PLATELET # BLD AUTO: 392 K/UL (ref 150–400)
PMV BLD AUTO: 8.6 FL (ref 8.9–12.9)
POTASSIUM SERPL-SCNC: 2.7 MMOL/L (ref 3.5–5.1)
PROCALCITONIN SERPL-MCNC: 0.33 NG/ML
PROT SERPL-MCNC: 6.9 G/DL (ref 6.4–8.2)
PROT UR STRIP-MCNC: NEGATIVE MG/DL
RBC # BLD AUTO: 4.52 M/UL (ref 3.8–5.2)
RBC #/AREA URNS HPF: ABNORMAL /HPF (ref 0–5)
RBC MORPH BLD: ABNORMAL
SARS-COV-2 RDRP RESP QL NAA+PROBE: NOT DETECTED
SARS-COV-2 RNA RESP QL NAA+PROBE: NOT DETECTED
SODIUM SERPL-SCNC: 133 MMOL/L (ref 136–145)
SOURCE, COVRS: NORMAL
SOURCE, COVRS: NORMAL
SP GR UR REFRACTOMETRY: 1.01 (ref 1–1.03)
UROBILINOGEN UR QL STRIP.AUTO: 1 EU/DL (ref 0.2–1)
WBC # BLD AUTO: 25.1 K/UL (ref 3.6–11)
WBC URNS QL MICRO: ABNORMAL /HPF (ref 0–4)

## 2023-03-13 PROCEDURE — 74011000250 HC RX REV CODE- 250

## 2023-03-13 PROCEDURE — 84145 PROCALCITONIN (PCT): CPT

## 2023-03-13 PROCEDURE — 96374 THER/PROPH/DIAG INJ IV PUSH: CPT

## 2023-03-13 PROCEDURE — 74011250637 HC RX REV CODE- 250/637

## 2023-03-13 PROCEDURE — 96375 TX/PRO/DX INJ NEW DRUG ADDON: CPT

## 2023-03-13 PROCEDURE — 74011250636 HC RX REV CODE- 250/636: Performed by: EMERGENCY MEDICINE

## 2023-03-13 PROCEDURE — 89055 LEUKOCYTE ASSESSMENT FECAL: CPT

## 2023-03-13 PROCEDURE — 96361 HYDRATE IV INFUSION ADD-ON: CPT

## 2023-03-13 PROCEDURE — 74011250636 HC RX REV CODE- 250/636

## 2023-03-13 PROCEDURE — 82550 ASSAY OF CK (CPK): CPT

## 2023-03-13 PROCEDURE — 65270000029 HC RM PRIVATE

## 2023-03-13 PROCEDURE — 74011000636 HC RX REV CODE- 636: Performed by: EMERGENCY MEDICINE

## 2023-03-13 PROCEDURE — 99285 EMERGENCY DEPT VISIT HI MDM: CPT

## 2023-03-13 PROCEDURE — 85025 COMPLETE CBC W/AUTO DIFF WBC: CPT

## 2023-03-13 PROCEDURE — 65270000046 HC RM TELEMETRY

## 2023-03-13 PROCEDURE — 87635 SARS-COV-2 COVID-19 AMP PRB: CPT

## 2023-03-13 PROCEDURE — 74011000250 HC RX REV CODE- 250: Performed by: EMERGENCY MEDICINE

## 2023-03-13 PROCEDURE — 87506 IADNA-DNA/RNA PROBE TQ 6-11: CPT

## 2023-03-13 PROCEDURE — 74177 CT ABD & PELVIS W/CONTRAST: CPT

## 2023-03-13 PROCEDURE — 81001 URINALYSIS AUTO W/SCOPE: CPT

## 2023-03-13 PROCEDURE — 87324 CLOSTRIDIUM AG IA: CPT

## 2023-03-13 PROCEDURE — 83605 ASSAY OF LACTIC ACID: CPT

## 2023-03-13 PROCEDURE — 87040 BLOOD CULTURE FOR BACTERIA: CPT

## 2023-03-13 PROCEDURE — 87177 OVA AND PARASITES SMEARS: CPT

## 2023-03-13 PROCEDURE — 80053 COMPREHEN METABOLIC PANEL: CPT

## 2023-03-13 PROCEDURE — 36415 COLL VENOUS BLD VENIPUNCTURE: CPT

## 2023-03-13 PROCEDURE — 94640 AIRWAY INHALATION TREATMENT: CPT

## 2023-03-13 PROCEDURE — 87804 INFLUENZA ASSAY W/OPTIC: CPT

## 2023-03-13 PROCEDURE — U0005 INFEC AGEN DETEC AMPLI PROBE: HCPCS

## 2023-03-13 RX ORDER — BUDESONIDE 0.5 MG/2ML
250 INHALANT ORAL
Status: DISCONTINUED | OUTPATIENT
Start: 2023-03-13 | End: 2023-03-15 | Stop reason: HOSPADM

## 2023-03-13 RX ORDER — ACETAMINOPHEN 650 MG/1
650 SUPPOSITORY RECTAL
Status: DISCONTINUED | OUTPATIENT
Start: 2023-03-13 | End: 2023-03-15 | Stop reason: HOSPADM

## 2023-03-13 RX ORDER — POLYETHYLENE GLYCOL 3350 17 G/17G
17 POWDER, FOR SOLUTION ORAL DAILY PRN
Status: DISCONTINUED | OUTPATIENT
Start: 2023-03-13 | End: 2023-03-15 | Stop reason: HOSPADM

## 2023-03-13 RX ORDER — SODIUM CHLORIDE 9 MG/ML
100 INJECTION, SOLUTION INTRAVENOUS CONTINUOUS
Status: DISCONTINUED | OUTPATIENT
Start: 2023-03-13 | End: 2023-03-13

## 2023-03-13 RX ORDER — ONDANSETRON 2 MG/ML
4 INJECTION INTRAMUSCULAR; INTRAVENOUS
Status: DISCONTINUED | OUTPATIENT
Start: 2023-03-13 | End: 2023-03-15 | Stop reason: HOSPADM

## 2023-03-13 RX ORDER — GABAPENTIN 300 MG/1
600 CAPSULE ORAL 3 TIMES DAILY
Status: DISCONTINUED | OUTPATIENT
Start: 2023-03-13 | End: 2023-03-15 | Stop reason: HOSPADM

## 2023-03-13 RX ORDER — POTASSIUM CHLORIDE 7.45 MG/ML
10 INJECTION INTRAVENOUS
Status: DISPENSED | OUTPATIENT
Start: 2023-03-13 | End: 2023-03-13

## 2023-03-13 RX ORDER — CYCLOBENZAPRINE HCL 10 MG
10 TABLET ORAL
Status: DISCONTINUED | OUTPATIENT
Start: 2023-03-13 | End: 2023-03-15 | Stop reason: HOSPADM

## 2023-03-13 RX ORDER — POTASSIUM CHLORIDE 7.45 MG/ML
10 INJECTION INTRAVENOUS
Status: COMPLETED | OUTPATIENT
Start: 2023-03-14 | End: 2023-03-14

## 2023-03-13 RX ORDER — HYDROXYZINE 25 MG/1
25 TABLET, FILM COATED ORAL
Status: DISCONTINUED | OUTPATIENT
Start: 2023-03-13 | End: 2023-03-15 | Stop reason: HOSPADM

## 2023-03-13 RX ORDER — SODIUM CHLORIDE AND POTASSIUM CHLORIDE 300; 900 MG/100ML; MG/100ML
INJECTION, SOLUTION INTRAVENOUS CONTINUOUS
Status: DISCONTINUED | OUTPATIENT
Start: 2023-03-13 | End: 2023-03-15 | Stop reason: HOSPADM

## 2023-03-13 RX ORDER — ENOXAPARIN SODIUM 100 MG/ML
40 INJECTION SUBCUTANEOUS DAILY
Status: DISCONTINUED | OUTPATIENT
Start: 2023-03-14 | End: 2023-03-15 | Stop reason: HOSPADM

## 2023-03-13 RX ORDER — SODIUM CHLORIDE 0.9 % (FLUSH) 0.9 %
5-40 SYRINGE (ML) INJECTION EVERY 8 HOURS
Status: DISCONTINUED | OUTPATIENT
Start: 2023-03-13 | End: 2023-03-15 | Stop reason: HOSPADM

## 2023-03-13 RX ORDER — PANTOPRAZOLE SODIUM 40 MG/1
40 TABLET, DELAYED RELEASE ORAL
Status: DISCONTINUED | OUTPATIENT
Start: 2023-03-14 | End: 2023-03-15 | Stop reason: HOSPADM

## 2023-03-13 RX ORDER — ACETAMINOPHEN 325 MG/1
650 TABLET ORAL
Status: DISCONTINUED | OUTPATIENT
Start: 2023-03-13 | End: 2023-03-15 | Stop reason: HOSPADM

## 2023-03-13 RX ORDER — ATORVASTATIN CALCIUM 20 MG/1
20 TABLET, FILM COATED ORAL DAILY
Status: DISCONTINUED | OUTPATIENT
Start: 2023-03-14 | End: 2023-03-15 | Stop reason: HOSPADM

## 2023-03-13 RX ORDER — SODIUM CHLORIDE 0.9 % (FLUSH) 0.9 %
5-40 SYRINGE (ML) INJECTION AS NEEDED
Status: DISCONTINUED | OUTPATIENT
Start: 2023-03-13 | End: 2023-03-15 | Stop reason: HOSPADM

## 2023-03-13 RX ORDER — LEVOFLOXACIN 5 MG/ML
750 INJECTION, SOLUTION INTRAVENOUS
Status: COMPLETED | OUTPATIENT
Start: 2023-03-13 | End: 2023-03-13

## 2023-03-13 RX ORDER — METOPROLOL TARTRATE 25 MG/1
25 TABLET, FILM COATED ORAL 2 TIMES DAILY
Status: DISCONTINUED | OUTPATIENT
Start: 2023-03-13 | End: 2023-03-15 | Stop reason: HOSPADM

## 2023-03-13 RX ORDER — ONDANSETRON 4 MG/1
4 TABLET, ORALLY DISINTEGRATING ORAL
Status: DISCONTINUED | OUTPATIENT
Start: 2023-03-13 | End: 2023-03-15 | Stop reason: HOSPADM

## 2023-03-13 RX ORDER — BUPROPION HYDROCHLORIDE 150 MG/1
300 TABLET ORAL 2 TIMES DAILY
Status: DISCONTINUED | OUTPATIENT
Start: 2023-03-14 | End: 2023-03-15 | Stop reason: HOSPADM

## 2023-03-13 RX ADMIN — ACETAMINOPHEN 650 MG: 325 TABLET ORAL at 18:51

## 2023-03-13 RX ADMIN — POTASSIUM BICARBONATE 20 MEQ: 782 TABLET, EFFERVESCENT ORAL at 17:19

## 2023-03-13 RX ADMIN — GABAPENTIN 600 MG: 300 CAPSULE ORAL at 17:18

## 2023-03-13 RX ADMIN — SODIUM CHLORIDE 1000 ML: 9 INJECTION, SOLUTION INTRAVENOUS at 14:57

## 2023-03-13 RX ADMIN — POTASSIUM CHLORIDE AND SODIUM CHLORIDE: 900; 300 INJECTION, SOLUTION INTRAVENOUS at 17:31

## 2023-03-13 RX ADMIN — CEFEPIME 2 G: 2 INJECTION, POWDER, FOR SOLUTION INTRAVENOUS at 15:24

## 2023-03-13 RX ADMIN — Medication 10 ML: at 22:00

## 2023-03-13 RX ADMIN — HYDROXYZINE HYDROCHLORIDE 25 MG: 25 TABLET, FILM COATED ORAL at 18:51

## 2023-03-13 RX ADMIN — Medication 10 ML: at 16:26

## 2023-03-13 RX ADMIN — POTASSIUM CHLORIDE 10 MEQ: 7.46 INJECTION, SOLUTION INTRAVENOUS at 18:40

## 2023-03-13 RX ADMIN — POTASSIUM CHLORIDE 10 MEQ: 7.46 INJECTION, SOLUTION INTRAVENOUS at 16:50

## 2023-03-13 RX ADMIN — LEVOFLOXACIN 750 MG: 5 INJECTION, SOLUTION INTRAVENOUS at 15:30

## 2023-03-13 RX ADMIN — BUDESONIDE 250 MCG: 0.5 SUSPENSION RESPIRATORY (INHALATION) at 20:40

## 2023-03-13 RX ADMIN — IOPAMIDOL 100 ML: 755 INJECTION, SOLUTION INTRAVENOUS at 15:00

## 2023-03-13 NOTE — TELEPHONE ENCOUNTER
Location of patient: [place]    Received call from ΣΑΡΑΝΤΙ at [facility] with [employer]. Reports positive at home COVID test 2 days ago    Current Symptoms: abdominal pain, nausea, body aches, bright yellow urine    Onset: 2 days ago;     Pain Severity: 8/10; Temperature: unsure     What has been tried: tylenol cold and flu    Denies - shortness of breath / cough / vomiting / bloody black or tarry stool /     Recommended disposition: See in Office Today    Care advice provided, patient verbalizes understanding; denies any other questions or concerns; instructed to call back for any new or worsening symptoms. Patient/Caller agrees with recommended disposition; writer provided warm transfer to [facility] at [facility] for appointment scheduling    Attention Provider: Thank you for allowing me to participate in the care of your patient. The patient was connected to triage in response to information provided to the [facility]. Please do not respond through this encounter as the response is not directed to a shared pool.     Reason for Disposition   MODERATE pain (e.g., interferes with normal activities that comes and goes (cramps) lasts > 24 hours  (Exception: Pain with Vomiting or Diarrhea - see that Protocol.)    Protocols used: Abdominal Pain - Female-ADULT-[place]

## 2023-03-13 NOTE — ED PROVIDER NOTES
Ms. Radha Marshall is a 63yo female who presents to the ER with complaints of COVID. She said that she began to feel ill last Tuesday. She did a home test of COVID which was positive. She came to the ER today because she woke up this morning and had a bit of mucousy discharge from her bottom that was in the bed. She reports that she has had a cough. She has had body aches since her symptoms started. Over the last 2 days, she has had abdominal cramping and discomfort. She reports that her urine has been darker than normal and she has some discomfort when she urinates. She has had a mild cough and feels mildly short of breath. She denies any other complaints. Past Medical History:   Diagnosis Date    Arthritis     OA back,knees and hands    Asthma     Autoimmune disease ([address]     Bridger Guerra    CAD (coronary artery disease)     s/p stents 11/2015    Cardiac murmur     Depression     Diabetes ([address]     diet controlled at this time.      DVT (deep venous thrombosis) ([facility]) 1981    GERD (gastroesophageal reflux disease)     Hypertension     Lumbar radiculitis     follows with dr Manuelito Triana for epidural steroid injections    Morbid obesity ([address]     Musculoskeletal disorder     EDMOND (obstructive sleep apnea)     wears cpap    S/P TONJA (total abdominal hysterectomy)     Thromboembolus ([address] 1996    PE    Trigger finger, right little finger 10/19/2020    follows with [facility]    Trigger finger, right ring finger 10/19/2020    [facility]       Past Surgical History:   Procedure Laterality Date    COLONOSCOPY N/A 12/6/2021    COLONOSCOPY (URGENT) performed by Ingris Perez MD at [address] Right 2018    Fibroadenoma    [address]    HX CHOLECYSTECTOMY  2017    HX GASTRIC BYPASS  01/2017    HX HYSTERECTOMY  1985    HX TOTAL ABDOMINAL HYSTERECTOMY      age 22    IR [id number]  2/4/2021    IR [address]  5/5/2022    [facility] BX BREAST RT ADDL W/CLIP AND SPECIMEN Right 2000    neg     UT CARDIAC SURG PROCEDURE UNLIST  11/2015    STENT PLACED         Family History:   Problem Relation Age of Onset    Cancer Mother 67        colon    Diabetes Mother     OSTEOARTHRITIS Mother     Stroke Mother     Migraines Mother     Diabetes Father     Heart Disease Father     Heart Attack Father     Hypertension Father     High Cholesterol Father     COPD Father     Heart Failure Father     Cancer Other     Diabetes Other     Heart Disease Other     Hypertension Other     Cancer Brother         lymphoma    Cancer Brother         PROSTATE AND LYMPHOMA    Cancer Maternal Grandmother         stomach    Asthma Brother     Asthma Brother     Stroke Brother     Cancer Maternal Aunt         lung     Breast Cancer Maternal Aunt     Cancer Maternal Uncle         lung    Cancer Maternal Uncle         melanoma    Anesth Problems Neg Hx        Social History     Socioeconomic History    Marital status:      Spouse name: Not on file    Number of children: Not on file    Years of education: Not on file    Highest education level: Not on file   Occupational History    Not on file   Tobacco Use    Smoking status: Never    Smokeless tobacco: Never   Vaping Use    Vaping Use: Never used   Substance and Sexual Activity    Alcohol use: No     Alcohol/week: 0.0 standard drinks    Drug use: No    Sexual activity: Yes     Partners: Male     Birth control/protection: None   Other Topics Concern    Not on file   Social History Narrative    Not on file     Social Determinants of Health     Financial Resource Strain: Low Risk     Difficulty of Paying Living Expenses: Not hard at all   Food Insecurity: No Food Insecurity    Worried About Running Out of Food in the Last Year: Never true    Ran Out of Food in the Last Year: Never true   Transportation Needs: Not on file   Physical Activity: Not on file   Stress: Not on file   Social Connections: Not on file   Intimate Partner Violence: Not on file   Housing Stability: Not on file         ALLERGIES: Accupril [quinapril], Adhesive tape-silicones, Lipitor [atorvastatin], and Norvasc [amlodipine]    Review of Systems   Constitutional:  Positive for fatigue. Negative for chills and fever. HENT:  Negative for rhinorrhea and sore throat. Respiratory:  Positive for cough and shortness of breath. Cardiovascular:  Negative for chest pain. Gastrointestinal:  Positive for abdominal pain. Genitourinary:  Positive for dysuria. Musculoskeletal:         Body aches   Skin:  Negative for rash. Neurological:  Negative for dizziness, weakness and light-headedness. There were no vitals filed for this visit. Physical Exam         Vital signs reviewed. Nursing notes reviewed. Const:  No acute distress, well developed, well nourished  Head:  Atraumatic, normocephalic  Eyes:  PERRL, conjunctiva normal, no scleral icterus  Neck:  Supple, trachea midline  Cardiovascular: Normal rate  Resp:  No resp distress, no increased work of breathing  Abd:  Soft, mild diffuse abdominal tenderness, non-distended, no rebound, no guarding  MSK:  No pedal edema, normal ROM  Neuro:  Alert and oriented x3, no cranial nerve defect  Skin:  Warm, dry, intact  Psych: normal mood and affect, behavior is normal, judgement and thought content is normal        Medical Decision Making  Amount and/or Complexity of Data Reviewed  External Data Reviewed: notes. Labs: ordered. Radiology: ordered. Risk  Prescription drug management. Decision regarding hospitalization. Perfect Serve Consult for Admission  3:26 PM    ED Room Number: D31/D31  Patient Name and age:  Merry Oppenheim 58 y.o.  female  Working Diagnosis:   1. Colitis    2. Acute renal insufficiency    3. Hypokalemia    4. Leukocytosis, unspecified type    5.  Hypotension, unspecified hypotension type        COVID-19 Suspicion:  yes  Sepsis present:  yes  Reassessment needed: yes  Readmission: no  Isolation Requirements:  yes  Recommended Level of Care:  telemetry  Department: [facility] ED - [phone]  Other:  BP 90 systolic at triage, improved with IVF      Ms. Saida Hanson is a 65yo female who presents to the ER with complaints of abdominal pain. She is found to have hypokalemia, renal insufficiency, and colitis on CT scan. C. difficile is pending. She reports recently testing positive for COVID-19. We will retest her here. Put on antibiotics given the fact that her blood pressure was in the 90 systolic when she presented to the ER and her white count was 25,000.   She is to be evaluated for admission by the family medicine team.    Procedures

## 2023-03-13 NOTE — H&P
[address] [place] [address]   Office [phone]  Fax [phone]       Admission H&P     Name: Sal Allen MRN: [id number]  Sex: Female   YOB: 1960  Age: 58 y.o. PCP: Krissy Echols MD     Source of Information: patient, medical records    Chief complaint: abdominal pain    History of Present Illness  Sal Allen is a 58 y.o. female with PMH hypertension, asthma, GERD, gastric bypass,  CAD s/p PCI (2015), anxiety, type 2 diabetes mellitus, HFpEF (EF 65%, G1DD 2015) who presents to the ER complaining of \"flu-like symptoms\" for 1 week. She endorses myalgias, headaches, sore throat, nausea, shortness of breath, lightheadedness, chills, fevers (101*). She notes 3 day history of 3-4 episodes of watery bowel movements. No recent antibiotic use, hospitalizations, or history of prior C. Diff infection. Denies vomiting. Reports she typically is constipated, so having so many bowel movements is quite unusual for her. Took a COVID test at home that was positive. States she has not taken any of her home medicines today. Has had poor oral intake due to nausea for several days. In the ER:      Vitals:  Patient Vitals for the past 8 hrs:   Temp Pulse Resp BP SpO2   03/13/23 1445 98.5 °F (36.9 °C) 81 16 123/81 98 %         Labs:   Recent Labs     03/13/23  1340   WBC 25.1*   HGB 13.4   HCT 38.8        Recent Labs     03/13/23  1340   *   K 2.7*   CL 96*   CO2 32   BUN 22*   CREA 1.43*   GLU 98   CA 9.4     Recent Labs     03/13/23  1340   *   TP 6.9   ALB 2.7*   GLOB 4.2*     No results for input(s): INR, PTP, APTT, INREXT, INREXT in the last 72 hours. No results for input(s): PHI, PCO2I, PO2I, FIO2I in the last 72 hours. Recent Labs     03/13/23  1340   CPK 29       Imaging:   CXR:  CXR Results  (Last 48 hours)      None            CT:    CT Results  (Last 48 hours)                 03/13/23 1503  CT ABD PELV W CONT Final result    Impression:  Imaging findings consistent with a moderate to severe infectious/inflammatory   colitis most pronounced at the sigmoid and descending colon. Incidental and/or nonemergent findings are as described above. Narrative:  EXAM    CLINICAL HISTORY: abd pain   INDICATION: abd pain   COMPARISON: 2019. CONTRAST: 100  ml Isovue 370   TECHNIQUE:    Multislice helical CT was performed of the abdomen and pelvis following   uneventful rapid bolus intravenous contrast administration. Oral contrast was   not administered. Contiguous 5 mm axial images were reconstructed and lung and   soft tissue windows were generated. Coronal and sagittal reformations were   generated. CT dose reduction was achieved through use of a standardized   protocol tailored for this examination and automatic exposure control for dose   modulation. FINDINGS:   LUNG BASES:No mass lesion or effusion. LIVER/GALLBLADDER: Hepatic steatosis and cholecystectomy. There is no   intrahepatic duct dilatation. There is no hepatic parenchymal mass. Hepatic   enhancement pattern is within normal limits. Portal vein is patent. SPLEEN/PANCREAS: No mass. There is no pancreatic duct dilatation. There is no   evidence of splenomegaly. ADRENALS/KIDNEYS: No mass. There is no hydronephrosis. There is no renal mass. There is no perinephric mass. STOMACH: Gastric bypass    COLON AND SMALL BOWEL: There is extensive colonic wall thickening at the sigmoid   colon and descending colon. Ascending colonic wall thickening is less   conspicuous. There is fecal stasis. There is no free intraperitoneal air. There   is no evidence of incarceration or obstruction. No mesenteric adenopathy. PERITONEUM: Unremarkable   APPENDIX: Unremarkable. BLADDER/REPRODUCTIVE ORGANS: No mass or calculus. RETROPERITONEUM: Unremarkable. The abdominal aorta is normal in caliber. No   aneurysm. No retroperitoneal adenopathy.    OSSEOUS STRUCTURES: No destructive bone lesion. Treatment: S/p 2L bolus, cefepime, levaquin    EKG: pending on admission      Review of Systems  Review of Systems   Constitutional:  Positive for chills and fever. HENT:  Positive for sore throat. Eyes:  Negative for visual disturbance. Respiratory:  Positive for shortness of breath. Cardiovascular:  Negative for chest pain. Gastrointestinal:  Positive for abdominal pain, diarrhea and nausea. Negative for blood in stool, constipation and vomiting. Genitourinary:  Negative for dysuria. Musculoskeletal:  Negative for gait problem. Skin:  Negative for rash. Neurological:  Positive for light-headedness. Psychiatric/Behavioral:  The patient is not nervous/anxious. Home Medications   Prior to Admission medications    Medication Sig Start Date End Date Taking? Authorizing Provider   ondansetron (ZOFRAN ODT) 4 mg disintegrating tablet TAKE 1 TABLET BY MOUTH EVERY 8 HOURS AS NEEDED FOR NAUSEA 2/28/23   Angel Johnson NP   cyclobenzaprine (FLEXERIL) 10 mg tablet Take 1 Tablet by mouth three (3) times daily as needed for Muscle Spasm(s). 2/28/23   Alejandra Kelly MD   co-enzyme Q-10 (CO Q-10) 100 mg capsule TAKE 1 CAPSULE BY MOUTH EVERY DAY 2/22/23   Alejandra Kelly MD   nitroglycerin (NITROSTAT) 0.4 mg SL tablet TAKE 1 TABLET BY MOUTH EVERY 5 MINUTES UP TO 3 DOSES THEN CALL 911 IF PAIN PERSISTS 12/12/22   Kimmy Lopez MD   gabapentin (NEURONTIN) 600 mg tablet TAKE 1 TABLET BY MOUTH THREE TIMES A DAY 12/8/22   Lakeisha Jane MD   clindamycin (CLEOCIN) 150 mg capsule Take 2 Capsules by mouth four (4) times daily. 11/28/22   Provider, Historical   buPROPion XL (WELLBUTRIN XL) 300 mg XL tablet Take 300 mg by mouth two (2) times a day.  8/26/22   Provider, Historical   HYDROcodone-acetaminophen (NORCO) 7.5-325 mg per tablet  11/28/22   Provider, Historical   ibuprofen (MOTRIN) 600 mg tablet  11/28/22   Provider, Historical   atorvastatin (LIPITOR) 20 mg tablet TAKE 1 TABLET BY MOUTH EVERY DAY 11/28/22   Ludwig Lopez MD   hydrOXYzine pamoate (VISTARIL) 25 mg capsule TAKE 1 CAPSULE BY MOUTH TWICE A DAY AS NEEDED FOR ANXIETY 11/4/22   Elvia LARIOS MD   metoprolol tartrate (LOPRESSOR) 25 mg tablet TAKE 1 TABLET BY MOUTH TWICE A DAY 11/4/22   Elvia LARIOS MD   bumetanide (BUMEX) 0.5 mg tablet TAKE 1 TABLET BY MOUTH EVERY DAY AS NEEDED 9/16/22   Anjana Shah MD   meloxicam (MOBIC) 15 mg tablet TAKE 1 TABLET BY MOUTH EVERY DAY WITH FOOD AS NEEDED FOR PAIN 7/3/22   Provider, Jose C   omeprazole (PRILOSEC) 40 mg capsule TAKE 1 CAPSULE BY MOUTH EVERY DAY 5/23/22   Anjana Shah MD   albuterol (ACCUNEB) 0.63 mg/3 mL nebulizer solution 3 mL by Nebulization route every six (6) hours as needed for Wheezing. 4/27/22   Anjana Shah MD   albuterol (ProAir HFA) 90 mcg/actuation inhaler Take 1 Puff by inhalation every four (4) hours as needed for Wheezing or Shortness of Breath. 4/27/22   Anjana Shah MD   budesonide (Pulmicort Flexhaler) 180 mcg/actuation aepb inhaler Take 2 Puffs by inhalation daily. 4/27/22   Anjana Shah MD   valACYclovir (VALTREX) 500 mg tablet TAKE 1 PILL TWICE DAILY FOR 3 DAYS AT SIGN OF HERPES FLARE. 7/5/21   Anjana Shah MD   glucose blood VI test strips (BLOOD GLUCOSE TEST) strip Use once a day 3/10/20   Paulino Sims MD   Blood-Glucose Meter monitoring kit Use to check glucose once a day 7/15/16   Harry Sim MD   alcohol swabs (ALCOHOL PADS) padm Use when checking blood glucose 7/15/16   Harry Sim MD   Lancets misc Use once a day. Please give one compatible with patient's meter 7/15/16   Harry Sim MD       Allergies  Allergies   Allergen Reactions    Accupril [Quinapril] Cough    Adhesive Tape-Silicones Other (comments)     Makes skin tears easily.      Lipitor [Atorvastatin] Other (comments)     aches    Norvasc [Amlodipine] Swelling     On legs at 10 mg dose       Past Medical History  Past Medical History:   Diagnosis Date    Arthritis     OA back,knees and hands    Asthma     Autoimmune disease ([facility]     Vu Garcia    CAD (coronary artery disease)     s/p stents 11/2015    Cardiac murmur     Depression     Diabetes ([facility]     diet controlled at this time.      DVT (deep venous thrombosis) ([facility]) 1981    GERD (gastroesophageal reflux disease)     Hypertension     Lumbar radiculitis     follows with dr Alessandro Mohan for epidural steroid injections    Morbid obesity ([facility]     Musculoskeletal disorder     EDMOND (obstructive sleep apnea)     wears cpap    S/P TONJA (total abdominal hysterectomy)     Thromboembolus ([facility] 1996    PE    Trigger finger, right little finger 10/19/2020    [facility]s with [facility]    Trigger finger, right ring finger 10/19/2020    [facility]        Previous Hospitalization(s)  Past Surgical History:   Procedure Laterality Date    COLONOSCOPY N/A 12/6/2021    COLONOSCOPY (URGENT) performed by Adri Marcum MD at [address] Right 2018    Fibroadenoma    HX [address]    HX CHOLECYSTECTOMY  2017    HX GASTRIC BYPASS  01/2017    HX HYSTERECTOMY  1985    HX TOTAL ABDOMINAL HYSTERECTOMY      age 22    IR INJ SPINE FLUORO GUIDED  2/4/2021    IR INJ SPINE FLUORO GUIDED  5/5/2022    [facility]EREO  BX BREAST RT ADDL W/CLIP AND SPECIMEN Right 2000    neg     [place] CARDIAC SURG PROCEDURE UNLIST  11/2015    STENT PLACED       Social History  Social History     Socioeconomic History    Marital status:      Spouse name: Not on file    Number of children: Not on file    Years of education: Not on file    Highest education level: Not on file   Occupational History    Not on file   Tobacco Use    Smoking status: Never    Smokeless tobacco: Never   Vaping Use    Vaping Use: Never used   Substance and Sexual Activity    Alcohol use: No     Alcohol/week: 0.0 standard drinks    Drug use: No    Sexual activity: Yes     Partners: Male Birth control/protection: None   Other Topics Concern    Not on file   Social History Narrative    Not on file     Social Determinants of Health     Financial Resource Strain: Low Risk     Difficulty of Paying Living Expenses: Not hard at all   Food Insecurity: No Food Insecurity    Worried About Running Out of Food in the Last Year: Never true    Ran Out of Food in the Last Year: Never true   Transportation Needs: Not on file   Physical Activity: Not on file   Stress: Not on file   Social Connections: Not on file   Intimate Partner Violence: Not on file   Housing Stability: Not on file         Patient resides    Independently    x  With family care      Assisted living      SNF      Ambulates  x  Independently      With cane       Assisted walker      Alcohol history   x  None     Social     Chronic     Smoking history  x  None     Former smoker     Current smoker     Drug history  x  None     Former drug user     Current drug user     Code status  x  Full code     DNR/DNI     Partial      Code status discussed with the patient/caregivers. Physical Exam  General: No acute distress. Alert. Cooperative. Head: Normocephalic. Atraumatic. Eyes:              Conjunctiva pink. Sclera white. PERRL. Throat: Moist mucous membranes. No tonsillar exudates or erythema. Neck: No JVD. No lymphadenopathy. Respiratory: CTAB. No w/r/r/c. No accessory muscle use. Cardiovascular: Regular Rhythm. Chronic systolic murmur at aortic valve. GI: + bowel sounds. Generalized tenderness to palpation. Mild rebound/ no guarding. Non-distended. Extremities: No LE edema. Distal pulses intact. Musculoskeletal: Full ROM in all extremities. Skin: Warm, dry. No rashes. Neuro: CN II-XII grossly intact. Strength 5/5 in all extremities.             Assessment and Plan     Edilberto Saini is a 58 y.o. female with PMH of hypertension, asthma, GERD, gastric bypass,  CAD s/p PCI (2015), anxiety, type 2 diabetes mellitus, HFpEF (EF 65%, G1DD 2015)  who is admitted for colitis. Colitis: Presented with generalized abdominal pain. Has had clear/milky watery bowel movements for several days. No prior history of C. Diff infections or recent antibiotic use/hospitalizations. WBC 25.1. CT A/P shows colitis in sigmoid and descending colon. WBC quite elevated but no significant risk factors for c diff so holding off on empiric treatment. No other SIRS criteria met. Given Cefepime/Levaquin in ED.   - Admit to remote tele  - GI consulted  - Stool studies, blood cultures, Procal pending  - Zosyn (3/13-)  - MIVF  - Zofran prn for nausea  - Clear liquid diet, advance as tolerated  - Serial abdominal exams  - Enteric precautions    Acute Kidney Injury: POA Cr 1.43 (BL 0.6). Etiology likely pre-renal from IVVD due to poor oral intake for several days prior to admission. - MIVF  - Strict I&O, monitor to keep urine output > 30 mL/hr  - Avoid nephrotoxic agents. Hypokalemia: POA 2.7. Likely due to GI losses. - Replete and monitor BMP  - EKG    Recent COVID +: Symptomatic for 1 week. At home rapid was positive. No respiratory distress. Saturating well on room air.   - COVID test pending     H/o Gastric bypass:   - Avoid NSAIDs and ASA    CAD s/p PCI: Stent dLCx in 2015.   - Continue Lipitor 20mg daily    HFpEF: EF 65%, G1DD 2015. Euvolemic.   - Hold home prn Bumex in setting of JASIEL    T2DM: Last HgA1C 5.4 04/2022. Diet controlled. - Diabetic diet    Anxiety:   - Continue Wellbutrin  mg BID    Asthma:  - Duonebs prn  - Pulmicort BID    Hypertension: POA BP was 123/81. Home Lopressor 25mg BID.    - Continue home Lopressor  - Will continue to monitor at this time and readjust as BP's trend. GERD:  - Protonix 40 mg daily    Fibromyalgia:  - Continue gabapentin 600mg TID    FEN/GI - Clear liquid diet, advance as tolerated. NS at 100 mL/hr.   Activity - Ambulate as tolerated  DVT prophylaxis - Lovenox  GI prophylaxis -  Home PPI  Fall prophylaxis - Not indicated at this time. Disposition - Admit to Remote Telemetry. Plan to d/c to TBD. Code Status - Full, discussed with patient / caregivers. Next of Kin Name and Contact: Evan Orellana (Spouse) [phone]      Patient Roberta Rubio will be discussed with Dr. Antonietta Resendiz.     4:02 PM, 03/13/23  Eloise Do DO  Family Medicine Resident       For Billing    Chief Complaint   Patient presents with    Positive For Covid-19    Abdominal Pain       Hospital Problems  Date Reviewed: 11/29/2022            Codes Class Noted POA    * (Principal) Colitis ICD-10-CM: K52.9  ICD-9-CM: 558.9  3/13/2023 Unknown        JASIEL (acute kidney injury) ([facility] ICD-10-CM: N17.9  ICD-9-CM: 584.9  3/13/2023 Unknown        Hypokalemia ICD-10-CM: E87.6  ICD-9-CM: 276.8  1/26/2017 Unknown        Obstructive sleep apnea ICD-10-CM: G47.33  ICD-9-CM: 327.23  9/22/2010 Yes        Arthritis ICD-10-CM: M19.90  ICD-9-CM: 716.90  9/22/2010 Yes        GERD (gastroesophageal reflux disease) ICD-10-CM: K21.9  ICD-9-CM: 530.81  9/22/2010 Yes

## 2023-03-13 NOTE — ED NOTES
Report given to [address] [place] [address] with SBAR. Patient still requires stool for ova and parasite, stool sample sent to the lab to complete other stool orders. Patient reminded of the need to let RN know when she need to move her bowels once more.

## 2023-03-13 NOTE — ED TRIAGE NOTES
Pt ambulatory to triage for abd pain, and SOB. Pt states started with symptoms 6 days ago and had positive home covid  test Saturday. PT reports mucus with BM and this morning woke up with fecal incontinence that was mucus.

## 2023-03-14 LAB
ALBUMIN SERPL-MCNC: 2.1 G/DL (ref 3.5–5)
ALBUMIN/GLOB SERPL: 0.6 (ref 1.1–2.2)
ALP SERPL-CCNC: 131 U/L (ref 45–117)
ALT SERPL-CCNC: 9 U/L (ref 12–78)
ANION GAP SERPL CALC-SCNC: 7 MMOL/L (ref 5–15)
AST SERPL-CCNC: 9 U/L (ref 15–37)
BASOPHILS # BLD: 0.1 K/UL (ref 0–0.1)
BASOPHILS NFR BLD: 1 % (ref 0–1)
BILIRUB SERPL-MCNC: 0.4 MG/DL (ref 0.2–1)
BUN SERPL-MCNC: 17 MG/DL (ref 6–20)
BUN/CREAT SERPL: 17 (ref 12–20)
C COLI+JEJUNI TUF STL QL NAA+PROBE: NEGATIVE
C DIFF GDH STL QL: NEGATIVE
C DIFF TOX A+B STL QL IA: NEGATIVE
CALCIUM SERPL-MCNC: 9 MG/DL (ref 8.5–10.1)
CHLORIDE SERPL-SCNC: 104 MMOL/L (ref 97–108)
CO2 SERPL-SCNC: 26 MMOL/L (ref 21–32)
CREAT SERPL-MCNC: 1.02 MG/DL (ref 0.55–1.02)
DIFFERENTIAL METHOD BLD: ABNORMAL
EC STX1+STX2 GENES STL QL NAA+PROBE: NEGATIVE
EOSINOPHIL # BLD: 0.3 K/UL (ref 0–0.4)
EOSINOPHIL NFR BLD: 2 % (ref 0–7)
ERYTHROCYTE [DISTWIDTH] IN BLOOD BY AUTOMATED COUNT: 12.2 % (ref 11.5–14.5)
ETEC ELTA+ESTB GENES STL QL NAA+PROBE: NEGATIVE
GLOBULIN SER CALC-MCNC: 3.5 G/DL (ref 2–4)
GLUCOSE SERPL-MCNC: 100 MG/DL (ref 65–100)
HCT VFR BLD AUTO: 36.8 % (ref 35–47)
HGB BLD-MCNC: 12.2 G/DL (ref 11.5–16)
IMM GRANULOCYTES # BLD AUTO: 0.2 K/UL (ref 0–0.04)
IMM GRANULOCYTES NFR BLD AUTO: 1 % (ref 0–0.5)
INTERPRETATION: NORMAL
LYMPHOCYTES # BLD: 2 K/UL (ref 0.8–3.5)
LYMPHOCYTES NFR BLD: 10 % (ref 12–49)
MCH RBC QN AUTO: 29 PG (ref 26–34)
MCHC RBC AUTO-ENTMCNC: 33.2 G/DL (ref 30–36.5)
MCV RBC AUTO: 87.6 FL (ref 80–99)
MONOCYTES # BLD: 1.5 K/UL (ref 0–1)
MONOCYTES NFR BLD: 7 % (ref 5–13)
NEUTS SEG # BLD: 15.7 K/UL (ref 1.8–8)
NEUTS SEG NFR BLD: 79 % (ref 32–75)
NRBC # BLD: 0 K/UL (ref 0–0.01)
NRBC BLD-RTO: 0 PER 100 WBC
P SHIGELLOIDES DNA STL QL NAA+PROBE: NEGATIVE
PLATELET # BLD AUTO: 292 K/UL (ref 150–400)
PMV BLD AUTO: 8.8 FL (ref 8.9–12.9)
POTASSIUM SERPL-SCNC: 3 MMOL/L (ref 3.5–5.1)
PROT SERPL-MCNC: 5.6 G/DL (ref 6.4–8.2)
RBC # BLD AUTO: 4.2 M/UL (ref 3.8–5.2)
SALMONELLA SP SPAO STL QL NAA+PROBE: NEGATIVE
SHIGELLA SP+EIEC IPAH STL QL NAA+PROBE: NEGATIVE
SODIUM SERPL-SCNC: 137 MMOL/L (ref 136–145)
V CHOL+PARA+VUL DNA STL QL NAA+NON-PROBE: NEGATIVE
WBC # BLD AUTO: 19.8 K/UL (ref 3.6–11)
WBC #/AREA STL HPF: NORMAL /HPF (ref 0–4)
Y ENTEROCOL DNA STL QL NAA+NON-PROBE: NEGATIVE

## 2023-03-14 PROCEDURE — 74011000258 HC RX REV CODE- 258

## 2023-03-14 PROCEDURE — 77030029684 HC NEB SM VOL KT MONA -A

## 2023-03-14 PROCEDURE — 94640 AIRWAY INHALATION TREATMENT: CPT

## 2023-03-14 PROCEDURE — 74011250637 HC RX REV CODE- 250/637

## 2023-03-14 PROCEDURE — 74011000250 HC RX REV CODE- 250

## 2023-03-14 PROCEDURE — 65270000046 HC RM TELEMETRY

## 2023-03-14 PROCEDURE — 94664 DEMO&/EVAL PT USE INHALER: CPT

## 2023-03-14 PROCEDURE — 74011250636 HC RX REV CODE- 250/636

## 2023-03-14 PROCEDURE — 85025 COMPLETE CBC W/AUTO DIFF WBC: CPT

## 2023-03-14 PROCEDURE — 93005 ELECTROCARDIOGRAM TRACING: CPT

## 2023-03-14 PROCEDURE — 74011250637 HC RX REV CODE- 250/637: Performed by: STUDENT IN AN ORGANIZED HEALTH CARE EDUCATION/TRAINING PROGRAM

## 2023-03-14 PROCEDURE — 36415 COLL VENOUS BLD VENIPUNCTURE: CPT

## 2023-03-14 PROCEDURE — 74011250636 HC RX REV CODE- 250/636: Performed by: STUDENT IN AN ORGANIZED HEALTH CARE EDUCATION/TRAINING PROGRAM

## 2023-03-14 PROCEDURE — 99232 SBSQ HOSP IP/OBS MODERATE 35: CPT | Performed by: FAMILY MEDICINE

## 2023-03-14 PROCEDURE — 94761 N-INVAS EAR/PLS OXIMETRY MLT: CPT

## 2023-03-14 PROCEDURE — 80053 COMPREHEN METABOLIC PANEL: CPT

## 2023-03-14 RX ADMIN — ACETAMINOPHEN 650 MG: 325 TABLET ORAL at 18:16

## 2023-03-14 RX ADMIN — METOPROLOL TARTRATE 25 MG: 25 TABLET, FILM COATED ORAL at 09:13

## 2023-03-14 RX ADMIN — PIPERACILLIN AND TAZOBACTAM 4.5 G: 4; .5 INJECTION, POWDER, LYOPHILIZED, FOR SOLUTION INTRAVENOUS at 00:23

## 2023-03-14 RX ADMIN — GABAPENTIN 600 MG: 300 CAPSULE ORAL at 21:20

## 2023-03-14 RX ADMIN — BUPROPION HYDROCHLORIDE 300 MG: 150 TABLET, EXTENDED RELEASE ORAL at 09:12

## 2023-03-14 RX ADMIN — BUPROPION HYDROCHLORIDE 300 MG: 150 TABLET, EXTENDED RELEASE ORAL at 21:20

## 2023-03-14 RX ADMIN — POTASSIUM CHLORIDE 10 MEQ: 7.46 INJECTION, SOLUTION INTRAVENOUS at 02:11

## 2023-03-14 RX ADMIN — POTASSIUM CHLORIDE 10 MEQ: 7.46 INJECTION, SOLUTION INTRAVENOUS at 00:22

## 2023-03-14 RX ADMIN — PIPERACILLIN AND TAZOBACTAM 3.38 G: 3; .375 INJECTION, POWDER, LYOPHILIZED, FOR SOLUTION INTRAVENOUS at 07:03

## 2023-03-14 RX ADMIN — PIPERACILLIN AND TAZOBACTAM 3.38 G: 3; .375 INJECTION, POWDER, LYOPHILIZED, FOR SOLUTION INTRAVENOUS at 21:20

## 2023-03-14 RX ADMIN — METOPROLOL TARTRATE 25 MG: 25 TABLET, FILM COATED ORAL at 00:22

## 2023-03-14 RX ADMIN — POTASSIUM BICARBONATE 40 MEQ: 782 TABLET, EFFERVESCENT ORAL at 03:49

## 2023-03-14 RX ADMIN — GABAPENTIN 600 MG: 300 CAPSULE ORAL at 15:00

## 2023-03-14 RX ADMIN — GABAPENTIN 600 MG: 300 CAPSULE ORAL at 09:13

## 2023-03-14 RX ADMIN — ENOXAPARIN SODIUM 40 MG: 100 INJECTION SUBCUTANEOUS at 09:12

## 2023-03-14 RX ADMIN — Medication 10 ML: at 21:21

## 2023-03-14 RX ADMIN — GABAPENTIN 600 MG: 300 CAPSULE ORAL at 00:22

## 2023-03-14 RX ADMIN — BUDESONIDE 250 MCG: 0.5 SUSPENSION RESPIRATORY (INHALATION) at 08:15

## 2023-03-14 RX ADMIN — ATORVASTATIN CALCIUM 20 MG: 20 TABLET, FILM COATED ORAL at 09:13

## 2023-03-14 RX ADMIN — PIPERACILLIN AND TAZOBACTAM 3.38 G: 3; .375 INJECTION, POWDER, LYOPHILIZED, FOR SOLUTION INTRAVENOUS at 13:20

## 2023-03-14 RX ADMIN — METOPROLOL TARTRATE 25 MG: 25 TABLET, FILM COATED ORAL at 21:20

## 2023-03-14 RX ADMIN — Medication 10 ML: at 06:00

## 2023-03-14 RX ADMIN — ACETAMINOPHEN 650 MG: 325 TABLET ORAL at 00:47

## 2023-03-14 RX ADMIN — PANTOPRAZOLE SODIUM 40 MG: 40 TABLET, DELAYED RELEASE ORAL at 07:03

## 2023-03-14 RX ADMIN — POTASSIUM CHLORIDE AND SODIUM CHLORIDE: 900; 300 INJECTION, SOLUTION INTRAVENOUS at 09:15

## 2023-03-14 RX ADMIN — CYCLOBENZAPRINE 10 MG: 10 TABLET, FILM COATED ORAL at 00:23

## 2023-03-14 NOTE — CONSULTS
[address] [place] [address]. Brennan Thurman M.D.  [phone]                    GASTROENTEROLOGY CONSULTATION NOTE              NAME:  Key Gomes   :   1960   MRN:   [id number]       Referring Physician:    [facility]      Consult Date:   3/14/2023 5:43 PM    Chief Complaint:    Abdominal pain and abnormal CT scan     History of Present Illness:    Patient is a 58 y.o. who is know to me, last colonoscopy was in 2021 showing sigmoid diverticulosis and melanosis coli, started to feel flu like symptoms a week ago, had low grade fever and chills, and then started to have LUQ pain and lightheadedness with myalgias and sore throat. She then started to have loose stools over the last 3 days. She denies any bleeding. She usually has chronic constipation. She had labs showing leukocytosis and CT scan showing left sided colitis. Today she reports feeling better, tolerating liquids without nausea or vomiting. PMH:  Past Medical History:   Diagnosis Date    Arthritis     OA back,knees and hands    Asthma     Autoimmune disease ([address]     FIBROMYLGIA    CAD (coronary artery disease)     s/p stents 2015    Cardiac murmur     Depression     Diabetes ([address]     diet controlled at this time.      DVT (deep venous thrombosis) ([facility])     GERD (gastroesophageal reflux disease)     Hypertension     Lumbar radiculitis     follows with dr Erick Horowitz for epidural steroid injections    Morbid obesity ([address]     Musculoskeletal disorder     EDMOND (obstructive sleep apnea)     wears cpap    S/P TONJA (total abdominal hysterectomy)     Thromboembolus ([address]     PE    Trigger finger, right little finger 10/19/2020    follows with [facility]    Trigger finger, right ring finger 10/19/2020    [facility]       PSH:  Past Surgical History:   Procedure Laterality Date    COLONOSCOPY N/A 2021    COLONOSCOPY (URGENT) performed by Jas Cleveland MD at [address]  BREAST BIOPSY Right 2018    Fibroadenoma    [address]    HX CHOLECYSTECTOMY  2017    HX GASTRIC BYPASS  01/2017    HX HYSTERECTOMY  1985    HX TOTAL ABDOMINAL HYSTERECTOMY      age 22    IR INJ SPINE FLUORO GUIDED  2/4/2021    IR INJ SPINE FLUORO GUIDED  5/5/2022    DAYAN STEREO  BX BREAST RT ADDL W/CLIP AND SPECIMEN Right 2000    neg     TX CARDIAC SURG PROCEDURE UNLIST  11/2015    STENT PLACED       Allergies: Allergies   Allergen Reactions    Accupril [Quinapril] Cough    Adhesive Tape-Silicones Other (comments)     Makes skin tears easily. Lipitor [Atorvastatin] Other (comments)     aches    Norvasc [Amlodipine] Swelling     On legs at 10 mg dose       Home Medications:  Prior to Admission Medications   Prescriptions Last Dose Informant Patient Reported? Taking? Blood-Glucose Meter monitoring kit 3/13/2023  No Yes   Sig: Use to check glucose once a day   HYDROcodone-acetaminophen (NORCO) 7.5-325 mg per tablet Unknown  Yes No   Sig: as needed. Lancets misc 3/13/2023  No Yes   Sig: Use once a day. Please give one compatible with patient's meter   albuterol (ACCUNEB) 0.63 mg/3 mL nebulizer solution 2/14/2023  No Yes   Sig: 3 mL by Nebulization route every six (6) hours as needed for Wheezing. albuterol (ProAir HFA) 90 mcg/actuation inhaler 2/14/2023  No Yes   Sig: Take 1 Puff by inhalation every four (4) hours as needed for Wheezing or Shortness of Breath. alcohol swabs (ALCOHOL PADS) padm 3/13/2023  No Yes   Sig: Use when checking blood glucose   atorvastatin (LIPITOR) 20 mg tablet 3/7/2023  No Yes   Sig: TAKE 1 TABLET BY MOUTH EVERY DAY   buPROPion XL (WELLBUTRIN XL) 300 mg XL tablet 3/7/2023  Yes Yes   Sig: Take 300 mg by mouth two (2) times a day. budesonide (Pulmicort Flexhaler) 180 mcg/actuation aepb inhaler 3/7/2023  No Yes   Sig: Take 2 Puffs by inhalation daily.    bumetanide (BUMEX) 0.5 mg tablet 2/14/2023  No Yes   Sig: TAKE 1 TABLET BY MOUTH EVERY DAY AS NEEDED clindamycin (CLEOCIN) 150 mg capsule Not Taking  Yes No   Sig: Take 2 Capsules by mouth four (4) times daily. Patient not taking: Reported on 3/14/2023   co-enzyme Q-10 (CO Q-10) 100 mg capsule 3/7/2023  No Yes   Sig: TAKE 1 CAPSULE BY MOUTH EVERY DAY   cyclobenzaprine (FLEXERIL) 10 mg tablet 3/7/2023  No Yes   Sig: Take 1 Tablet by mouth three (3) times daily as needed for Muscle Spasm(s). gabapentin (NEURONTIN) 600 mg tablet 3/7/2023  No Yes   Sig: TAKE 1 TABLET BY MOUTH THREE TIMES A DAY   glucose blood VI test strips (BLOOD GLUCOSE TEST) strip 3/13/2023  No Yes   Sig: Use once a day   hydrOXYzine pamoate (VISTARIL) 25 mg capsule 2023  No Yes   Sig: TAKE 1 CAPSULE BY MOUTH TWICE A DAY AS NEEDED FOR ANXIETY   ibuprofen (MOTRIN) 600 mg tablet 3/7/2023  Yes Yes   Si mg two (2) times a day. meloxicam (MOBIC) 15 mg tablet 3/7/2023  Yes Yes   Sig: TAKE 1 TABLET BY MOUTH EVERY DAY WITH FOOD AS NEEDED FOR PAIN   metoprolol tartrate (LOPRESSOR) 25 mg tablet 3/7/2023  No Yes   Sig: TAKE 1 TABLET BY MOUTH TWICE A DAY   nitroglycerin (NITROSTAT) 0.4 mg SL tablet 2023  No Yes   Sig: TAKE 1 TABLET BY MOUTH EVERY 5 MINUTES UP TO 3 DOSES THEN CALL 911 IF PAIN PERSISTS   omeprazole (PRILOSEC) 40 mg capsule 3/7/2023  No Yes   Sig: TAKE 1 CAPSULE BY MOUTH EVERY DAY   ondansetron (ZOFRAN ODT) 4 mg disintegrating tablet 3/7/2023  No Yes   Sig: TAKE 1 TABLET BY MOUTH EVERY 8 HOURS AS NEEDED FOR NAUSEA   valACYclovir (VALTREX) 500 mg tablet   No Yes   Sig: TAKE 1 PILL TWICE DAILY FOR 3 DAYS AT SIGN OF HERPES FLARE.       Facility-Administered Medications: None       Hospital Medications:  Current Facility-Administered Medications   Medication Dose Route Frequency    piperacillin-tazobactam (ZOSYN) 3.375 g in 0.9% sodium chloride (MBP/ADV) 100 mL MBP  3.375 g IntraVENous Q8H    sodium chloride (NS) flush 5-40 mL  5-40 mL IntraVENous Q8H    sodium chloride (NS) flush 5-40 mL  5-40 mL IntraVENous PRN acetaminophen (TYLENOL) tablet 650 mg  650 mg Oral Q6H PRN    Or    acetaminophen (TYLENOL) suppository 650 mg  650 mg Rectal Q6H PRN    polyethylene glycol (MIRALAX) packet 17 g  17 g Oral DAILY PRN    ondansetron (ZOFRAN ODT) tablet 4 mg  4 mg Oral Q8H PRN    Or    ondansetron (ZOFRAN) injection 4 mg  4 mg IntraVENous Q6H PRN    enoxaparin (LOVENOX) injection 40 mg  40 mg SubCUTAneous DAILY    0.9% sodium chloride with KCl 40 mEq/L infusion   IntraVENous CONTINUOUS    atorvastatin (LIPITOR) tablet 20 mg  20 mg Oral DAILY    buPROPion XL (WELLBUTRIN XL) tablet 300 mg  300 mg Oral BID    metoprolol tartrate (LOPRESSOR) tablet 25 mg  25 mg Oral BID    pantoprazole (PROTONIX) tablet 40 mg  40 mg Oral ACB    budesonide (PULMICORT) 500 mcg/2 ml nebulizer suspension  250 mcg Nebulization BID RT    gabapentin (NEURONTIN) capsule 600 mg  600 mg Oral TID    hydrOXYzine HCL (ATARAX) tablet 25 mg  25 mg Oral BID PRN    cyclobenzaprine (FLEXERIL) tablet 10 mg  10 mg Oral TID PRN       Social History:  Social History     Tobacco Use    Smoking status: Never    Smokeless tobacco: Never   Substance Use Topics    Alcohol use: No     Alcohol/week: 0.0 standard drinks       Family History:  Family History   Problem Relation Age of Onset    Cancer Mother 67        colon    Diabetes Mother     OSTEOARTHRITIS Mother     Stroke Mother     Migraines Mother     Diabetes Father     Heart Disease Father     Heart Attack Father     Hypertension Father     High Cholesterol Father     COPD Father     Heart Failure Father     Cancer Other     Diabetes Other     Heart Disease Other     Hypertension Other     Cancer Brother         lymphoma    Cancer Brother         PROSTATE AND LYMPHOMA    Cancer Maternal Grandmother         stomach    Asthma Brother     Asthma Brother     Stroke Brother     Cancer Maternal Aunt         lung     Breast Cancer Maternal Aunt     Cancer Maternal Uncle         lung    Cancer Maternal Uncle         melanoma    Anesth Problems Neg Hx        Review of Systems:  Constitutional: (+)fever, (+) chills, negative weight loss  Eyes:   negative visual changes  ENT:   (+) sore throat, tongue or lip swelling  Respiratory:  negative cough, negative dyspnea  Cards:  negative for chest pain, palpitations, lower extremity edema  GI:   See HPI  :  negative for frequency, dysuria  Integument:  negative for rash and pruritus  Heme:  negative for easy bruising and gum/nose bleeding  Musculoskel: (+) for myalgias,  back pain and muscle weakness  Neuro: (+) for headaches, dizziness, vertigo  Psych:  negative for feelings of anxiety, depression     Objective:   Patient Vitals for the past 8 hrs:   BP Temp Pulse Resp SpO2   03/14/23 1601 96/63 98.2 °F (36.8 °C) 80 16 95 %   03/14/23 1144 116/74 98.5 °F (36.9 °C) 75 16 95 %     03/14 0701 - 03/14 1900  In: 150 [P.O.:150]  Out: 0   03/12 1901 - 03/14 0700  In: 2250 [I.V.:2250]  Out: -     EXAM:     NEURO-alert, oriented x3, affect appropriate, no focal neurological deficits, moves all extremities well, no involuntary movements   HEENT-Head: Normocephalic, no lesions, without obvious abnormality. LUNGS-clear to auscultation bilaterally    COR-regular rate and rhythym     ABD- soft, mildly distended and tender left side, no guarding     EXT-no edema    Skin - No rash     Data Review     Recent Labs     03/14/23  0028 03/13/23  1340   WBC 19.8* 25.1*   HGB 12.2 13.4   HCT 36.8 38.8    392     Recent Labs     03/14/23  0028 03/13/23  1340    133*   K 3.0* 2.7*    96*   CO2 26 32   BUN 17 22*   CREA 1.02 1.43*    98   CA 9.0 9.4     Recent Labs     03/14/23  0028 03/13/23  1340   * 176*   TP 5.6* 6.9   ALB 2.1* 2.7*   GLOB 3.5 4.2*     No results for input(s): INR, PTP, APTT, INREXT in the last 72 hours.     Patient Active Problem List   Diagnosis Code    Obstructive sleep apnea G47.33    Asthma J45.909    Arthritis M19.90    GERD (gastroesophageal reflux disease) K21.9 GARCIA (nonalcoholic steatohepatitis) K75.81    Chronic pain G89.29    Essential hypertension A65    Metabolic syndrome N02.86    FH: diabetes mellitus Z83.3    Anxiety F41.9    History of pulmonary embolus (PE) Z86.711    Hypokalemia E87.6    H/O gastric bypass Z98.84    H/O colonoscopy Z98.890    DDD (degenerative disc disease), lumbar M51.36    FH: colon cancer Z80.0    Elevated ferritin R79.89    Type 2 diabetes mellitus with diabetic neuropathy (HCC) E11.40    Abnormal mammogram of right breast R92.8    Abnormal CT of the abdomen R93.5    Omental infarction [facility]) K55.069    Primary fibromyalgia syndrome M79.7    Inflammatory bowel disease K52.9    Type 2 diabetes with nephropathy (HCC) E11.21    Venous insufficiency I87.2    Colitis K52.9    JASIEL (acute kidney injury) ([facility] N17.9       Assessment and Plan:  Acute colitis, most likely consistent with acute ischemic colitis related to underlying comorbidities, constipation and recent dehydration. Agree with stool studies to rule out infectious process. Agree with current management with IV fluid hydration and IV antibiotics. Advance diet as tolerated. No plans for colonoscopy unless symptoms fail to improve. We discussed that upon discharge, she will need to maintain a daily bowel regimen with fiber supplements and Miralax and maintain adequate daily hydration. If she continues to do well, she may be able to be discharged home tomorrow on oral antibiotics. Thanks for allowing me to participate in the care of this patient.   Signed By: Andry Andrade MD     3/14/2023  5:43 PM

## 2023-03-14 NOTE — PROGRESS NOTES
Bedside and Verbal shift change report given to Simba Thacker RN (oncoming nurse) by Jen Montalvo RN (offgoing nurse). Report included the following information SBAR, Kardex, Intake/Output, MAR, and Recent Results.

## 2023-03-14 NOTE — PROGRESS NOTES
3/14/2023  10:52 AM  CM completed assessment w/ pt in person. Charted demographics verified, pt lives w/ spouse and her 24 yo granddaughter in a private residence. At baseline pt is  ambulatory, independent w/ her ADLS, and drives. Pt reports 2 falls in the last 6 months, which she describes as losing her balance, did not need medical care. Reason for Admission:  Emergent for Colitis  Pt is a 57 yo female who presents to [facility] c/o 3 day history of 3-4 episodes of watery bowel movements, no vomiting   Past Medical History:   Diagnosis Date    Arthritis       OA back,knees and hands    Asthma      Autoimmune disease ([facility]       Haydee Manna    CAD (coronary artery disease)       s/p stents 11/2015    Cardiac murmur      Depression      Diabetes ([facility]       diet controlled at this time.      DVT (deep venous thrombosis) ([facility]) 1981    GERD (gastroesophageal reflux disease)      Hypertension      Lumbar radiculitis       follows with dr Micki Alvarenga for epidural steroid injections    Morbid obesity ([facility]      Musculoskeletal disorder      EDMOND (obstructive sleep apnea)       wears cpap    S/P TONJA (total abdominal hysterectomy)      Thromboembolus ([facility] 1996     PE    Trigger finger, right little finger 10/19/2020     [facility] with [facility]    Trigger finger, right ring finger 10/19/2020     [facility]                        RUR Score:  9 % Low Risk of Readmission/Green                   Plan for utilizing home health:   NO history of HH or Rehab, pt is independent at baseline for ADLs   DME: None  Rx; pt is insured and fills Rx at [address] no difficulty affording her medications      PCP: First and Last name:  Kenji Moise MD     Name of Practice:    Are you a current patient: Yes/No: yes    Approximate date of last visit: > 30 days   Can you participate in a virtual visit with your PCP: yes                     Current Advanced Directive/Advance Care Plan: Full Code  HCDM/NOK spouse Vikas Calhoun (C) [phone]    Healthcare Decision Maker:   Click here to complete [employer] including selection of the Healthcare Decision Maker Relationship (ie \"Primary\")                             Transition of Care Plan:       RUR 9 % Low Risk of Readmission/Green  LOS 1 Day  Hospital admission for  medical management   GI following        CM to follow through for treatment/response  DC when stable to home w/ family assistance  Outpatient follow up PCP, specialists  Spouse will transport at [facility] 6 Management Interventions  PCP Verified by CM:  Yes Hyun Cheung MD )  Palliative Care Criteria Met (RRAT>21 & CHF Dx)?: No  Mode of Transport at Discharge: Self (Family)  Physical Therapy Consult: No  Occupational Therapy Consult: No  Speech Therapy Consult: No  Support Systems: Spouse/Significant Other, Other Family Member(s)  Confirm Follow Up Transport: Family  Discharge Location  Patient Expects to be Discharged to[de-identified] Home with family assistance

## 2023-03-14 NOTE — PROGRESS NOTES
Bedside and Verbal shift change report given to [facility], [facility] and Ray Sanchez RN (oncoming nurse) by Drea Reeves RN (offgoing nurse). Report included the following information SBAR, Kardex, ED Summary, Intake/Output, MAR, and Recent Results.

## 2023-03-14 NOTE — DISCHARGE INSTRUCTIONS
HOME DISCHARGE INSTRUCTIONS    Celestina Henson / [id number] : 1960    Admission date: 3/13/2023 Discharge date: 3/15/2023     Please bring this form with you to show your care provider at your follow-up appointment. Primary care provider:  Jaymie Ramos    Discharging provider:  Yoana Root DO  - Family Medicine Resident  Dmitry Wing MD - Attending, Family Medicine     You have been admitted to the hospital with the following diagnoses:    ACUTE DIAGNOSES:  Colitis [K52.9]  Hypokalemia [E87.6]  Acute renal insufficiency [N28.9]      . . . . . . . . . . . . . . . . . . . . . . . . . . . . . . . . . . . . . . . . . . . . . . . . . . . . . . . . . . . . . . . . . . . . . . . [facility] FOLLOW-UP CARE RECOMMENDATIONS:  - Continue Augmentin twice daily for your colitis for 8 more days  - start a Probiotic daily for the next 10 days, to help with your gut health while you are taking the antibiotic to treat the colitis. - Start Miralax daily, this is a fiber supplement to help with smooth bowel movements. Continue this until your follow up with GI.   - Have your PCP check your labs to monitor your electrolytes and kidney function.  - Recommend avoiding NSAIDs (like Mobic and Ibuprofen) and Aspirin due to your history of gastric bypass surgery  - Maintain a daily bowel regimen with fiber supplements and Miralax and maintain adequate daily hydration.  - Follow up with your GI specialist, Dr. Gavi Gross. Appointments  Future Appointments   Date Time Provider Yesy Olguin   3/20/2023  1:15 PM MD SOFIE Lim BS AMB   2023 10:15 AM [facility] DAYAN 1 [facility]   2023 11:00 AM [facility] DEXA/DAYAN 1 [facility]   10/30/2023  1:00 PM Fiorella Lopez MD CAVSF BS AMB     Herminia Palacios MD  [address] [place] [address]  [phone]    Follow up       Pending test results:    At the time of your discharge the following test results are still pending: Stool studies  Please make sure you review these results with your outpatient follow-up provider(s). Specific symptoms to watch for: chest pain, shortness of breath, fever, chills, nausea, vomiting, diarrhea, change in mentation, falling, weakness, bleeding. DIET/what to eat:  Resume previous diet    ACTIVITY:  Activity as tolerated    What to do if new or unexpected symptoms occur? If you experience any of the above symptoms (or should other concerns or questions arise after discharge) please call your primary care physician. Return to the emergency room if you cannot get hold of your doctor. It is very important that you keep your follow-up appointment(s). Please bring discharge papers, medication list (and/or medication bottles) to your follow-up appointments for review by your outpatient provider(s). Please check the list of medications and be sure it includes every medication (even non-prescription medications) that your provider wants you to take. It is important that you take the medication exactly as they are prescribed. Keep your medication in the bottles provided by the pharmacist and keep a list of the medication names, dosages, and times to be taken in your wallet. Do not take other medications without consulting your doctor. If you have any questions about your medications or other instructions, please talk to your nurse or care provider before you leave the hospital.     Information obtained by:     I understand that if any problems occur once I am at home I am to contact my physician. These instructions were explained to me and I had the opportunity to ask questions. I understand and acknowledge receipt of the instructions indicated above.                                                                                                                                                Physician's or R.N.'s Signature                                                                  Date/Time Patient or Representative Signature                                                          Date/Time

## 2023-03-14 NOTE — PROGRESS NOTES
Received report from [employer]. Collected stool sample. Patient assigned a room and transported to the 5th floor room 532. Report given to Miller CHRISTY.

## 2023-03-14 NOTE — DISCHARGE SUMMARY
[address] [facility] [address] [place] [address]   Office [phone]  Fax [phone]       Discharge / Transfer / Off-Service Note     Name: Maryjane Osorio MRN: [id number]  Sex: Female   YOB: 1960  Age: 58 y.o. PCP: Pierre Mendoza MD     Date of admission: 3/13/2023  Date of discharge/transfer: 3/15/2023    Attending physician at admission: Micheline Garcia MD     Attending physician at discharge/transfer: Micheline Garcia MD     Resident physician at discharge/transfer: Sammi Waller DO     Consultants during hospitalization  IP CONSULT TO GASTROENTEROLOGY     Admission diagnoses   Colitis [K52.9]  Hypokalemia [E87.6]  Acute renal insufficiency [N28.9]    Recommended follow-up after discharge  1. PCP: Pierre Mendoza MD    Things to follow up on with PCP:  - Discharged with Augmentin (and probiotic) to complete 10 day course  - BMP to check renal function and potassium  - Started bowel regimen per GI: Miralax daily  - Advised to avoid NSAIDs and Aspirin given history of gastric bypass surgery  - Should follow up with GI, Dr. Sim Flavin    History of Present Illness  Per admitting provider, Hamida Mcmahan is a 58 y.o. female with PMH hypertension, asthma, GERD, gastric bypass,  CAD s/p PCI (2015), anxiety, type 2 diabetes mellitus, HFpEF (EF 65%, G1DD 2015) who presents to the ER complaining of \"flu-like symptoms\" for 1 week. She endorses myalgias, headaches, sore throat, nausea, shortness of breath, lightheadedness, chills, fevers (101*). She notes 3 day history of 3-4 episodes of watery bowel movements. No recent antibiotic use, hospitalizations, or history of prior C. Diff infection. Denies vomiting. Reports she typically is constipated, so having so many bowel movements is quite unusual for her. Took a COVID test at home that was positive. States she has not taken any of her home medicines today. Has had poor oral intake due to nausea for several days. \"    [id number] Doreen Robert aMx is a 58 y.o. female with PMH of hypertension, asthma, GERD, gastric bypass,  CAD s/p PCI (2015), anxiety, type 2 diabetes mellitus, HFpEF (EF 65%, G1DD 2015)  who is admitted for colitis. Acute ischemic colitis: Presented with generalized abdominal pain. CT A/P shows colitis in sigmoid and descending colon. Stool studies negative. Transitioned Zosyn to Augmentin and complete 10 day course. Evaluated by GI, who recommend daily bowel regimen and outpatient follow up. JASIEL: Improved. (BL 0.6). Etiology likely pre-renal from IVVD due to poor oral intake for several days prior to admission. Hypokalemia: Improved. Likely due to GI losses. H/o Gastric bypass: 2017. Avoid NSAIDs and ASA. CAD s/p PCI: Stent dLCx in 2015. Continue Lipitor 20mg daily  HFpEF: EF 65%, G1DD 2015. Euvolemic. Continue home Bumex prn. T2DM: Last HgA1C 5.4 04/2022. Diet controlled. Anxiety: Continue Wellbutrin  mg BID  Asthma: Continue home Albuterol and Pulmicort BID  Hypertension: Home Lopressor 25mg BID. GERD: Continue Protonix 40 mg daily. Fibromyalgia: Continue gabapentin 600mg TID. Physical exam at discharge:    Vitals Reviewed. Visit Vitals  /61 (BP 1 Location: Left upper arm, BP Patient Position: At rest)   Pulse 76   Temp 97.9 °F (36.6 °C)   Resp 16   Ht 5' 3\" (1.6 m)   Wt 130 lb (59 kg)   SpO2 97%   BMI 23.03 kg/m²        General Oriented to person, place, and time and well-developed. Appears well-nourished, no distress. Not diaphoretic. Cardio Normal rate, regular rhythm. Exam reveals no gallop and no friction rub. Chronic systolic murmur over aortic valve. Pulmonary Effort normal and breath sounds normal. No respiratory distress. No wheezes, no rales. Abdominal Soft. Bowel sounds normal. No distension. Mild diffuse tenderness. Extremities No edema of lower extremities. No tenderness. Distal pulses intact. Neurological Alert and oriented to person, place, and time.     Dermatology Skin is warm and dry. No rash noted. No erythema or pallor. Psychiatric Affect and judgment normal.        Condition at discharge: Stable    Labs  Recent Labs     03/15/23  0055 03/14/23  0028 03/13/23  1340   WBC 16.7* 19.8* 25.1*   HGB 10.3* 12.2 13.4   HCT 29.5* 36.8 38.8    292 392     Recent Labs     03/15/23  0055 03/14/23  0028 03/13/23  1340    137 133*   K 3.5 3.0* 2.7*   * 104 96*   CO2 24 26 32   BUN 8 17 22*   CREA 0.61 1.02 1.43*   GLU 99 100 98   CA 8.2* 9.0 9.4     Recent Labs     03/15/23  0055 03/14/23  0028 03/13/23  1340   ALT 7* 9* 11*    131* 176*   TBILI 0.5 0.4 0.7   TP 4.3* 5.6* 6.9   ALB 1.6* 2.1* 2.7*   GLOB 2.7 3.5 4.2*     No results for input(s): PH, PCO2, PO2, TNIPOC, TROIQ, INR, PTP, APTT, FE, TIBC, PSAT, FERR, GLUCPOC, INREXT, INREXT in the last 72 hours. No lab exists for component: [place]    Microbiology  Results       Procedure Component Value Units Date/Time    OVA & PARASITES, STOOL [[id number]] Collected: 03/13/23 2013    Order Status: Sent Specimen: Feces from Stool Updated: 03/13/23 2047    COVID-19 RAPID TEST [[id number]] Collected: 03/13/23 1708    Order Status: Completed Specimen: Nasopharyngeal Updated: 03/13/23 1730     Specimen source Nasopharyngeal        COVID-19 rapid test Not detected        Comment: Rapid Abbott ID Now       Rapid NAAT:  The specimen is NEGATIVE for SARS-CoV-2, the novel coronavirus associated with COVID-19. Negative results should be treated as presumptive and, if inconsistent with clinical signs and symptoms or necessary for patient management, should be tested with an alternative molecular assay. Negative results do not preclude SARS-CoV-2 infection and should not be used as the sole basis for patient management decisions. This test has been authorized by the FDA under an Emergency Use Authorization (EUA) for use by authorized laboratories.    Fact sheet for Healthcare Providers: http://www.niecy.leah/  Fact sheet for Patients: http://www.niecy.leah/       Methodology: Isothermal Nucleic Acid Amplification         C. DIFFICILE AG & TOXIN A/B [[id number]] Collected: 03/13/23 1708    Order Status: Completed Specimen: Stool Updated: 03/14/23 1731     [address] ANTIGEN Negative        C. difficile toxin Negative        INTERPRETATION       NEGATIVE FOR TOXIGENIC C. DIFFICILE          ENTERIC BACTERIA PANEL, DNA [[id number]] Collected: 03/13/23 1708    Order Status: Completed Specimen: Stool Updated: 03/14/23 2058     Shigella species, DNA Negative        Campylobacter species, DNA Negative        Vibrio species, DNA Negative        Enterotoxigen E Coli, DNA Negative        Shiga toxin producing, DNA Negative        Salmonella species, DNA Negative        P. shigelloides, DNA Negative        Y. enterocolitica, DNA Negative       OVA & PARASITES, STOOL [[id number]]     Order Status: Canceled Specimen: Feces from Stool     CULTURE, BLOOD, PAIRED [[id number]] Collected: 03/13/23 1519    Order Status: Completed Specimen: Blood Updated: 03/15/23 0530     Special Requests: NO SPECIAL REQUESTS        Culture result: NO GROWTH 2 DAYS                Imaging  CT ABD PELV W CONT    Result Date: 3/13/2023  Imaging findings consistent with a moderate to severe infectious/inflammatory colitis most pronounced at the sigmoid and descending colon. Incidental and/or nonemergent findings are as described above.        Chronic diagnoses   Problem List as of 3/15/2023 Date Reviewed: 11/29/2022            Codes Class Noted - Resolved    * (Principal) Colitis ICD-10-CM: K52.9  ICD-9-CM: 558.9  3/13/2023 - Present        JASIEL (acute kidney injury) ([facility] ICD-10-CM: N17.9  ICD-9-CM: 584.9  3/13/2023 - Present        Venous insufficiency ICD-10-CM: W96.9  ICD-9-CM: 459.81  7/28/2021 - Present        Type 2 diabetes with nephropathy ([facility] ICD-10-CM: E11.21  ICD-9-CM: 250.40, 583.81  10/28/2020 - Present        Primary fibromyalgia syndrome ICD-10-CM: M79.7  ICD-9-CM: 729.1  3/10/2020 - Present        Inflammatory bowel disease ICD-10-CM: K52.9  ICD-9-CM: 558.9  3/10/2020 - Present        Omental infarction [facility]) ICD-10-CM: C50.146  ICD-9-CM: 557.0  1/7/2019 - Present        Abnormal CT of the abdomen ICD-10-CM: R93.5  ICD-9-CM: 793.6  12/16/2018 - Present    Overview Signed 12/16/2018  6:01 AM by Ben Sahu     12/2018:   Ongoing evolution of inflammatory changes in the left upper quadrant most  consistent with omental infarction. Decreased size of main inflamed fatty nodule  and less surrounding stranding. Otherwise, no acute pathology in the chest, abdomen or pelvis. Abnormal mammogram of right breast ICD-10-CM: R92.8  ICD-9-CM: 793.80  8/6/2018 - Present    Overview Addendum 8/16/2018  4:36 PM by Ben Floyd Service did biopsy 8/9/0130. Biopsy  = fibroadenoma    Mammogram 8/2018: IMPRESSION: Small right breast mass. BI-RADS Assessment Category 4: Suspicious  Abnormality- Biopsy should be considered. RECOMMENDATION:  Ultrasound-guided right breast biopsy. Type 2 diabetes mellitus with diabetic neuropathy ([facility] ICD-10-CM: E11.40  ICD-9-CM: 250.60, 357.2  6/29/2018 - Present    Overview Addendum 6/9/2020  1:26 PM by Ivett Connolly MD     9/2018:  a1c 5.3    12/2017:  S/p gastric bypass = a1c 5.2/  LDL 84/  MAB negative    Dx:  a1c 6.9 8/2016    Eye exam: 6/3/2020.  Normal exam.             Elevated ferritin ICD-10-CM: R79.89  ICD-9-CM: 790.6  12/21/2017 - Present        H/O colonoscopy ICD-10-CM: Z98.890  ICD-9-CM: V45.89  12/19/2017 - Present    Overview Addendum 12/19/2017  2:12 PM by Ben Sahu     2015, next 2020  +FH colon cancer in mom             DDD (degenerative disc disease), lumbar ICD-10-CM: M51.36  ICD-9-CM: 722.52  12/19/2017 - Present    Overview Signed 12/19/2017  1:57 PM by Ben Webb  S/p NEREYDA x 3             FH: colon cancer ICD-10-CM: Z80.0  ICD-9-CM: V16.0  12/19/2017 - Present    Overview Signed 12/19/2017  2:12 PM by Tracy felipe             H/O gastric bypass ICD-10-CM: Z98.84  ICD-9-CM: V45.86  7/12/2017 - Present    Overview Addendum 11/3/2018  9:51 AM by Tracy Carbajal 1/2017  lost from 230 to 154 lbs             Hypokalemia ICD-10-CM: E87.6  ICD-9-CM: 276.8  1/26/2017 - Present        History of pulmonary embolus (PE) ICD-10-CM: D60.941  ICD-9-CM: V12.55  11/7/2016 - Present        Anxiety ICD-10-CM: F41.9  ICD-9-CM: 300.00  6/1/2016 - Present    Overview Addendum 9/18/2018  4:54 PM by Tracy Mcgraw     Needs to be seen at least twice yearly for BNZ  FTF 12/19/2017, 9/18/18   as expected 12/19/2017, 9/18/18             FH: diabetes mellitus ICD-10-CM: Z83.3  ICD-9-CM: V18.0  8/4/2015 - Present        Metabolic syndrome ZQF-82-CW: E88.81  ICD-9-CM: 277.7  8/3/2015 - Present    Overview Addendum 8/4/2015 11:38 AM by Temo Torres V     TG up, sugar up, HTN, waist 54 inches  Rx metformin  Needs yearly eye exams and labs             Essential hypertension ICD-10-CM: I10  ICD-9-CM: 401.9  5/17/2015 - Present        Chronic pain ICD-10-CM: G89.29  ICD-9-CM: 338.29  3/30/2015 - Present    Overview Addendum 7/12/2017 11:40 AM by Skylar Ochoa  S/p NEREYDA x 3 in back via Dr. John Ordonez.   He uses flexeril HS for pain             GARCIA (nonalcoholic steatohepatitis) ICD-10-CM: K75.81  ICD-9-CM: 571.8  1/24/2011 - Present    Overview Signed 1/24/2011  7:18 PM by Tracy Mcgraw     Ultrasound 2008             Obstructive sleep apnea ICD-10-CM: G47.33  ICD-9-CM: 327.23  9/22/2010 - Present        Asthma ICD-10-CM: J45.909  ICD-9-CM: 493.90  9/22/2010 - Present        Arthritis ICD-10-CM: M19.90  ICD-9-CM: 716.90  9/22/2010 - Present        GERD (gastroesophageal reflux disease) ICD-10-CM: K21.9  ICD-9-CM: 530.81  9/22/2010 - Present        RESOLVED: Recurrent major depressive disorder, in remission ([facility] ICD-10-CM: F33.40  ICD-9-CM: 296.35  6/1/2016 - 1/26/2017        RESOLVED: CAD (coronary artery disease) ICD-10-CM: I25.10  ICD-9-CM: 414.00  12/29/2015 - 11/3/2018        RESOLVED: DM (diabetes mellitus) ([place] [address].) ICD-10-CM: E11.9  ICD-9-CM: 250.00  11/30/2015 - 11/3/2018        RESOLVED: Morbid obesity ([facility] ICD-10-CM: E66.01  ICD-9-CM: 278.01  9/22/2010 - 11/3/2018    Overview Signed 12/19/2017  1:57 PM by Melia James     S/nino gastric bypass 1/2017             RESOLVED: Edema ICD-10-CM: R60.9  ICD-9-CM: 782.3  9/22/2010 - 12/19/2017        RESOLVED: Pain, joint, multiple sites ICD-10-CM: M25.50  ICD-9-CM: 719.49  9/22/2010 - 8/13/2014           Discharge/Transfer Medications  Current Discharge Medication List        START taking these medications    Details   amoxicillin-clavulanate (AUGMENTIN) 875-125 mg per tablet Take 1 Tablet by mouth every twelve (12) hours for 17 doses. Qty: 17 Tablet, Refills: 0  Start date: 3/15/2023, End date: 3/24/2023      L.acid,para-B. bifidum-S.therm (RISAQUAD) 8 billion cell cap cap Take 1 Capsule by mouth daily for 10 days. Qty: 10 Capsule, Refills: 0  Start date: 3/15/2023, End date: 3/25/2023      polyethylene glycol (MIRALAX) 17 gram packet Take 1 Packet by mouth daily for 30 days. Qty: 30 Packet, Refills: 0  Start date: 3/15/2023, End date: 4/14/2023           CONTINUE these medications which have NOT CHANGED    Details   ondansetron (ZOFRAN ODT) 4 mg disintegrating tablet TAKE 1 TABLET BY MOUTH EVERY 8 HOURS AS NEEDED FOR NAUSEA  Qty: 20 Tablet, Refills: 1      cyclobenzaprine (FLEXERIL) 10 mg tablet Take 1 Tablet by mouth three (3) times daily as needed for Muscle Spasm(s).   Qty: 90 Tablet, Refills: 1    Associated Diagnoses: DDD (degenerative disc disease), lumbar      co-enzyme Q-10 (CO Q-10) 100 mg capsule TAKE 1 CAPSULE BY MOUTH EVERY DAY  Qty: 90 Capsule, Refills: 1    Associated Diagnoses: Muscle ache; Taking a statin medication      nitroglycerin (NITROSTAT) 0.4 mg SL tablet TAKE 1 TABLET BY MOUTH EVERY 5 MINUTES UP TO 3 DOSES THEN CALL 911 IF PAIN PERSISTS  Qty: 25 Tablet, Refills: 2      gabapentin (NEURONTIN) 600 mg tablet TAKE 1 TABLET BY MOUTH THREE TIMES A DAY  Qty: 90 Tablet, Refills: 0    Comments: This request is for a new prescription for a controlled substance as required by Federal/State law. Associated Diagnoses: Fibromyalgia; Other chronic pain      buPROPion XL (WELLBUTRIN XL) 300 mg XL tablet Take 300 mg by mouth two (2) times a day. atorvastatin (LIPITOR) 20 mg tablet TAKE 1 TABLET BY MOUTH EVERY DAY  Qty: 30 Tablet, Refills: 11    Comments: EMERGENCY HOLDOVER PROVIDED: #[id number]. [facility] GAVE 3 ATORVASTATIN 20 MG TABLET. MD CONTACTED FOR AUTHORIZATION ON 11/26/2022      hydrOXYzine pamoate (VISTARIL) 25 mg capsule TAKE 1 CAPSULE BY MOUTH TWICE A DAY AS NEEDED FOR ANXIETY  Qty: 60 Capsule, Refills: 11    Associated Diagnoses: Anxiety      metoprolol tartrate (LOPRESSOR) 25 mg tablet TAKE 1 TABLET BY MOUTH TWICE A DAY  Qty: 180 Tablet, Refills: 1    Associated Diagnoses: Coronary artery disease involving native coronary artery of native heart without angina pectoris      bumetanide (BUMEX) 0.5 mg tablet TAKE 1 TABLET BY MOUTH EVERY DAY AS NEEDED  Qty: 90 Tablet, Refills: 2      omeprazole (PRILOSEC) 40 mg capsule TAKE 1 CAPSULE BY MOUTH EVERY DAY  Qty: 30 Capsule, Refills: 5    Associated Diagnoses: Gastroesophageal reflux disease without esophagitis      albuterol (ACCUNEB) 0.63 mg/3 mL nebulizer solution 3 mL by Nebulization route every six (6) hours as needed for Wheezing. Qty: 75 mL, Refills: 1    Associated Diagnoses: Moderate persistent asthma without complication      albuterol (ProAir HFA) 90 mcg/actuation inhaler Take 1 Puff by inhalation every four (4) hours as needed for Wheezing or Shortness of Breath. Qty: 1 Each, Refills: 3    Associated Diagnoses:  Moderate persistent asthma without complication      budesonide (Pulmicort Flexhaler) 180 mcg/actuation aepb inhaler Take 2 Puffs by inhalation daily. Qty: 1 Each, Refills: 3    Associated Diagnoses: Moderate persistent asthma without complication      valACYclovir (VALTREX) 500 mg tablet TAKE 1 PILL TWICE DAILY FOR 3 DAYS AT SIGN OF HERPES FLARE. Qty: 30 Tablet, Refills: 0    Associated Diagnoses: Herpes dermatitis      glucose blood VI test strips (BLOOD GLUCOSE TEST) strip Use once a day  Qty: 100 Strip, Refills: 5    Associated Diagnoses: Type 2 diabetes mellitus with diabetic neuropathy, without long-term current use of insulin ([facility])      Blood-Glucose Meter monitoring kit Use to check glucose once a day  Qty: 1 Kit, Refills: 0    Associated Diagnoses: Diabetes mellitus type 2, controlled ([facility])      alcohol swabs (ALCOHOL PADS) padm Use when checking blood glucose  Qty: 100 Pad, Refills: 5    Associated Diagnoses: Diabetes mellitus type 2, controlled ([address]      Lancets misc Use once a day. Please give one compatible with patient's meter  Qty: 1 Package, Refills: 5    Associated Diagnoses: Diabetes mellitus type 2, controlled ([address]      HYDROcodone-acetaminophen (NORCO) 7.5-325 mg per tablet as needed. STOP taking these medications       ibuprofen (MOTRIN) 600 mg tablet Comments:   Reason for Stopping:         meloxicam (MOBIC) 15 mg tablet Comments:   Reason for Stopping:         clindamycin (CLEOCIN) 150 mg capsule Comments:   Reason for Stopping:                Diet:   Advance diet as tolerated back to previous diet . Activity:  As tolerated    Disposition: Stable for discharge home with outpatient follow up.      Discharge instructions to patient/family  Please seek medical attention for any new or worsening symptoms particularly fever, chest pain, shortness of breath, abdominal pain, nausea, vomiting    Follow up plans/appointments  Follow-up Information       Follow up With Specialties Details Why Contact Info    Sheyla Bragg MD Family Medicine Follow up on 3/20/2023 Hospitalization follow up appointment on 3/20 at 1:15pm [address] [place] [address] [place] [address]  [phone]      Anel Rodríguez MD Gastroenterology Schedule an appointment as soon as possible for a visit in 1 week(s) Call to schedule GI follow up for recent admission for colitis [address] Helton MD Family Medicine   [address] [phone]               Annmarie Linder DO  Family Medicine Resident       For Billing    Chief Complaint   Patient presents with    Positive For Covid-19    Abdominal Pain       Hospital Problems  Date Reviewed: 11/29/2022            Codes Class Noted POA    * (Principal) Colitis ICD-10-CM: K52.9  ICD-9-CM: 558.9  3/13/2023 Unknown        JASIEL (acute kidney injury) ([facility] ICD-10-CM: N17.9  ICD-9-CM: 584.9  3/13/2023 Unknown        Hypokalemia ICD-10-CM: E87.6  ICD-9-CM: 276.8  1/26/2017 Unknown        Obstructive sleep apnea ICD-10-CM: G47.33  ICD-9-CM: 327.23  9/22/2010 Yes        Arthritis ICD-10-CM: M19.90  ICD-9-CM: 716.90  9/22/2010 Yes        GERD (gastroesophageal reflux disease) ICD-10-CM: K21.9  ICD-9-CM: 530.81  9/22/2010 Yes

## 2023-03-14 NOTE — PROGRESS NOTES
Problem: Risk for Spread of Infection  Goal: Prevent transmission of infectious organism to others  Description: Prevent the transmission of infectious organisms to other patients, staff members, and visitors. Outcome: Progressing Towards Goal     Problem: Falls - Risk of  Goal: *Absence of Falls  Description: Document Zachery Hy Fall Risk and appropriate interventions in the flowsheet.   Outcome: Progressing Towards Goal  Note: Fall Risk Interventions:            Problem: Pain  Goal: *Control of Pain  Outcome: Progressing Towards Goal     Problem: Patient Education: Go to Patient Education Activity  Goal: Patient/Family Education  Outcome: Progressing Towards Goal

## 2023-03-14 NOTE — PROGRESS NOTES
[address] [place] [address]   Office [phone]  Fax [phone]     DAILY PROGRESS NOTE    24 Hour Events: No acute events. SUBJECTIVE: Reports BM still watery. OBJECTIVE:      Vitals: Visit Vitals  /70 (BP 1 Location: Left upper arm, BP Patient Position: At rest)   Pulse 77   Temp 98.5 °F (36.9 °C)   Resp 16   Ht 5' 3\" (1.6 m)   Wt 130 lb (59 kg)   SpO2 97%   BMI 23.03 kg/m²     Physical Exam:  General: NAD. Respiratory: CTAB. Cardiovascular: Regular rate. Systolic murmur over aortic valve. GI: Nondistended. + bowel sounds. Mild generalized TTP. Extremities: No LE edema. Skin: Warm, dry.   Neuro: Alert and oriented    I/O:  Date 03/13/23 0700 - 03/14/23 0659 03/14/23 0700 - 03/15/23 0659   Shift 4241-5392 9525-7170 24 Hour Total 8007-9502 8253-5423 24 Hour Total   INTAKE   I.V.(mL/kg/hr) 2150(3)  2150(1.5)        Volume (sodium chloride 0.9 % bolus infusion 1,000 mL) 1000  1000        Volume (sodium chloride 0.9 % bolus infusion 1,000 mL) 1000  1000        Volume (levoFLOXacin (LEVAQUIN) 750 mg in D5W IVPB) 150  150      Shift Total(mL/kg) 7279(49.1)  9152(48.9)      OUTPUT   Urine(mL/kg/hr)           Urine Occurrence(s)  1 x 1 x 1 x  1 x   Emesis/NG output           Emesis Occurrence(s)  0 x 0 x 0 x  0 x   Stool           Stool Occurrence(s)  1 x 1 x 1 x  1 x   Shift Total(mL/kg)         NET 2150  2150      Weight (kg) 59 59 59 59 59 59       Inpatient Medications  Current Facility-Administered Medications   Medication Dose Route Frequency    piperacillin-tazobactam (ZOSYN) 3.375 g in 0.9% sodium chloride (MBP/ADV) 100 mL MBP  3.375 g IntraVENous Q8H    sodium chloride (NS) flush 5-40 mL  5-40 mL IntraVENous Q8H    sodium chloride (NS) flush 5-40 mL  5-40 mL IntraVENous PRN    acetaminophen (TYLENOL) tablet 650 mg  650 mg Oral Q6H PRN    Or    acetaminophen (TYLENOL) suppository 650 mg  650 mg Rectal Q6H PRN    polyethylene glycol (MIRALAX) packet 17 g  17 g Oral DAILY PRN    ondansetron (ZOFRAN ODT) tablet 4 mg  4 mg Oral Q8H PRN    Or    ondansetron (ZOFRAN) injection 4 mg  4 mg IntraVENous Q6H PRN    enoxaparin (LOVENOX) injection 40 mg  40 mg SubCUTAneous DAILY    0.9% sodium chloride with KCl 40 mEq/L infusion   IntraVENous CONTINUOUS    atorvastatin (LIPITOR) tablet 20 mg  20 mg Oral DAILY    buPROPion XL (WELLBUTRIN XL) tablet 300 mg  300 mg Oral BID    metoprolol tartrate (LOPRESSOR) tablet 25 mg  25 mg Oral BID    pantoprazole (PROTONIX) tablet 40 mg  40 mg Oral ACB    budesonide (PULMICORT) 500 mcg/2 ml nebulizer suspension  250 mcg Nebulization BID RT    gabapentin (NEURONTIN) capsule 600 mg  600 mg Oral TID    hydrOXYzine HCL (ATARAX) tablet 25 mg  25 mg Oral BID PRN    cyclobenzaprine (FLEXERIL) tablet 10 mg  10 mg Oral TID PRN       Allergies  Allergies   Allergen Reactions    Accupril [Quinapril] Cough    Adhesive Tape-Silicones Other (comments)     Makes skin tears easily. Lipitor [Atorvastatin] Other (comments)     aches    Norvasc [Amlodipine] Swelling     On legs at 10 mg dose       CBC:  Recent Labs     03/14/23  0028 03/13/23  1340   WBC 19.8* 25.1*   HGB 12.2 13.4   HCT 36.8 38.8    574       Metabolic Panel:  Recent Labs     03/14/23  0028 03/13/23  1340    133*   K 3.0* 2.7*    96*   CO2 26 32   BUN 17 22*   CREA 1.02 1.43*    98   CA 9.0 9.4   ALB 2.1* 2.7*   ALT 9* 11*            Assessment and Plan     Oniel Argueta is a 58 y.o. female with PMH of hypertension, asthma, GERD, gastric bypass,  CAD s/p PCI (2015), anxiety, type 2 diabetes mellitus, HFpEF (EF 65%, G1DD 2015)  who is admitted for colitis. Colitis: Presented with generalized abdominal pain. Has had clear/milky watery bowel movements for several days. No prior history of C. Diff infections or recent antibiotic use/hospitalizations. WBC 25.1. CT A/P shows colitis in sigmoid and descending colon.    - GI consulted  - Stool studies, blood cultures pending  - Zosyn (3/13-)  - MIVF  - Zofran prn for nausea  - Clear liquid diet, advance as tolerated  - Enteric precautions     Acute Kidney Injury: Improved. (BL 0.6). Etiology likely pre-renal from IVVD due to poor oral intake for several days prior to admission. - MIVF  - Strict I&O, monitor to keep urine output > 30 mL/hr  - Avoid nephrotoxic agents. Hypokalemia: POA 2.7. Likely due to GI losses. - Replete and monitor BMP     Recent COVID +: Symptomatic for 1 week. At home rapid was positive. No respiratory distress. Saturating well on room air.   - COVID test neg      H/o Gastric bypass: 2017  - Avoid NSAIDs and ASA     CAD s/p PCI: Stent dLCx in 2015.   - Continue Lipitor 20mg daily     HFpEF: EF 65%, G1DD 2015. Euvolemic.   - Hold home prn Bumex in setting of JASIEL     T2DM: Last HgA1C 5.4 04/2022. Diet controlled. - Diabetic diet     Anxiety:   - Continue Wellbutrin  mg BID     Asthma:  - Duonebs prn  - Pulmicort BID     Hypertension: POA BP was 123/81. Home Lopressor 25mg BID.    - Continue home Lopressor  - Will continue to monitor at this time and readjust as BP's trend. GERD:  - Protonix 40 mg daily     Fibromyalgia:  - Continue gabapentin 600mg TID     FEN/GI - Clear liquid diet, advance as tolerated. NS at 100 mL/hr. Activity - Ambulate as tolerated  DVT prophylaxis - Lovenox  GI prophylaxis -  Home PPI  Fall prophylaxis - Not indicated at this time. Disposition - Admit to Remote Telemetry. Plan to d/c to TBD. Code Status - Full, discussed with patient / caregivers.   Next of Kin Name and Contact: Roberta Barnett (Spouse) Rboert Rabago DO  Family Medicine Resident       For Billing    Chief Complaint   Patient presents with    Positive For Covid-19    Abdominal Pain       Hospital Problems  Date Reviewed: 11/29/2022            Codes Class Noted POA    * (Principal) Colitis ICD-10-CM: K52.9  ICD-9-CM: 558.9  3/13/2023 Unknown        JASIEL (acute kidney injury) ([facility] [address] ICD-10-CM: N17.9  ICD-9-CM: 584.9  3/13/2023 Unknown        Hypokalemia ICD-10-CM: E87.6  ICD-9-CM: 276.8  1/26/2017 Unknown        Obstructive sleep apnea ICD-10-CM: G47.33  ICD-9-CM: 327.23  9/22/2010 Yes        Arthritis ICD-10-CM: M19.90  ICD-9-CM: 716.90  9/22/2010 Yes        GERD (gastroesophageal reflux disease) ICD-10-CM: K21.9  ICD-9-CM: 530.81  9/22/2010 Yes

## 2023-03-15 ENCOUNTER — APPOINTMENT (OUTPATIENT)
Dept: CT IMAGING | Age: 63
End: 2023-03-15
Attending: EMERGENCY MEDICINE
Payer: COMMERCIAL

## 2023-03-15 ENCOUNTER — APPOINTMENT (OUTPATIENT)
Dept: GENERAL RADIOLOGY | Age: 63
End: 2023-03-15
Attending: EMERGENCY MEDICINE
Payer: COMMERCIAL

## 2023-03-15 ENCOUNTER — HOSPITAL ENCOUNTER (EMERGENCY)
Age: 63
Discharge: HOME | End: 2023-03-15
Attending: EMERGENCY MEDICINE
Payer: COMMERCIAL

## 2023-03-15 VITALS
HEIGHT: 65 IN | RESPIRATION RATE: 18 BRPM | OXYGEN SATURATION: 100 % | BODY MASS INDEX: 21.66 KG/M2 | SYSTOLIC BLOOD PRESSURE: 153 MMHG | WEIGHT: 130 LBS | DIASTOLIC BLOOD PRESSURE: 61 MMHG | HEART RATE: 86 BPM | TEMPERATURE: 98.5 F

## 2023-03-15 VITALS
HEART RATE: 72 BPM | TEMPERATURE: 97.8 F | BODY MASS INDEX: 23.04 KG/M2 | SYSTOLIC BLOOD PRESSURE: 130 MMHG | RESPIRATION RATE: 16 BRPM | WEIGHT: 130 LBS | HEIGHT: 63 IN | OXYGEN SATURATION: 100 % | DIASTOLIC BLOOD PRESSURE: 63 MMHG

## 2023-03-15 DIAGNOSIS — S80.212A ABRASION OF LEFT KNEE, INITIAL ENCOUNTER: ICD-10-CM
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
## 2023-03-15 DIAGNOSIS — S01.01XA LACERATION OF SCALP, INITIAL ENCOUNTER: ICD-10-CM

## 2023-03-15 DIAGNOSIS — S40.811A ABRASION OF RIGHT UPPER EXTREMITY, INITIAL ENCOUNTER: ICD-10-CM

## 2023-03-15 DIAGNOSIS — S80.02XA CONTUSION OF LEFT KNEE, INITIAL ENCOUNTER: ICD-10-CM

## 2023-03-15 LAB
ALBUMIN SERPL-MCNC: 1.6 G/DL (ref 3.5–5)
ALBUMIN/GLOB SERPL: 0.6 (ref 1.1–2.2)
ALP SERPL-CCNC: 105 U/L (ref 45–117)
ALT SERPL-CCNC: 7 U/L (ref 12–78)
ANION GAP SERPL CALC-SCNC: 6 MMOL/L (ref 5–15)
AST SERPL-CCNC: 8 U/L (ref 15–37)
ATRIAL RATE: 71 BPM
BASOPHILS # BLD: 0.1 K/UL (ref 0–0.1)
BASOPHILS NFR BLD: 0 % (ref 0–1)
BILIRUB SERPL-MCNC: 0.5 MG/DL (ref 0.2–1)
BUN SERPL-MCNC: 8 MG/DL (ref 6–20)
BUN/CREAT SERPL: 13 (ref 12–20)
CALCIUM SERPL-MCNC: 8.2 MG/DL (ref 8.5–10.1)
CALCULATED P AXIS, ECG09: 29 DEGREES
CALCULATED R AXIS, ECG10: -2 DEGREES
CALCULATED T AXIS, ECG11: 13 DEGREES
CHLORIDE SERPL-SCNC: 109 MMOL/L (ref 97–108)
CO2 SERPL-SCNC: 24 MMOL/L (ref 21–32)
CREAT SERPL-MCNC: 0.61 MG/DL (ref 0.55–1.02)
DIAGNOSIS, 93000: NORMAL
DIFFERENTIAL METHOD BLD: ABNORMAL
EOSINOPHIL # BLD: 0.6 K/UL (ref 0–0.4)
EOSINOPHIL NFR BLD: 3 % (ref 0–7)
ERYTHROCYTE [DISTWIDTH] IN BLOOD BY AUTOMATED COUNT: 12.6 % (ref 11.5–14.5)
GLOBULIN SER CALC-MCNC: 2.7 G/DL (ref 2–4)
GLUCOSE SERPL-MCNC: 99 MG/DL (ref 65–100)
HCT VFR BLD AUTO: 29.5 % (ref 35–47)
HGB BLD-MCNC: 10.3 G/DL (ref 11.5–16)
IMM GRANULOCYTES # BLD AUTO: 0.3 K/UL (ref 0–0.04)
IMM GRANULOCYTES NFR BLD AUTO: 2 % (ref 0–0.5)
LYMPHOCYTES # BLD: 1.9 K/UL (ref 0.8–3.5)
LYMPHOCYTES NFR BLD: 11 % (ref 12–49)
MCH RBC QN AUTO: 30 PG (ref 26–34)
MCHC RBC AUTO-ENTMCNC: 34.9 G/DL (ref 30–36.5)
MCV RBC AUTO: 86 FL (ref 80–99)
MONOCYTES # BLD: 1.2 K/UL (ref 0–1)
MONOCYTES NFR BLD: 7 % (ref 5–13)
NEUTS SEG # BLD: 12.7 K/UL (ref 1.8–8)
NEUTS SEG NFR BLD: 77 % (ref 32–75)
NRBC # BLD: 0 K/UL (ref 0–0.01)
NRBC BLD-RTO: 0 PER 100 WBC
P-R INTERVAL, ECG05: 146 MS
PLATELET # BLD AUTO: 271 K/UL (ref 150–400)
PMV BLD AUTO: 8.8 FL (ref 8.9–12.9)
POTASSIUM SERPL-SCNC: 3.5 MMOL/L (ref 3.5–5.1)
PROT SERPL-MCNC: 4.3 G/DL (ref 6.4–8.2)
Q-T INTERVAL, ECG07: 420 MS
QRS DURATION, ECG06: 72 MS
QTC CALCULATION (BEZET), ECG08: 456 MS
RBC # BLD AUTO: 3.43 M/UL (ref 3.8–5.2)
SODIUM SERPL-SCNC: 139 MMOL/L (ref 136–145)
VENTRICULAR RATE, ECG03: 71 BPM
WBC # BLD AUTO: 16.7 K/UL (ref 3.6–11)

## 2023-03-15 PROCEDURE — 99284 EMERGENCY DEPT VISIT MOD MDM: CPT

## 2023-03-15 PROCEDURE — 36415 COLL VENOUS BLD VENIPUNCTURE: CPT

## 2023-03-15 PROCEDURE — 85025 COMPLETE CBC W/AUTO DIFF WBC: CPT

## 2023-03-15 PROCEDURE — 74011250637 HC RX REV CODE- 250/637: Performed by: STUDENT IN AN ORGANIZED HEALTH CARE EDUCATION/TRAINING PROGRAM

## 2023-03-15 PROCEDURE — 74011250637 HC RX REV CODE- 250/637

## 2023-03-15 PROCEDURE — 70450 CT HEAD/BRAIN W/O DYE: CPT

## 2023-03-15 PROCEDURE — 73562 X-RAY EXAM OF KNEE 3: CPT

## 2023-03-15 PROCEDURE — 74011000250 HC RX REV CODE- 250: Performed by: EMERGENCY MEDICINE

## 2023-03-15 PROCEDURE — 94664 DEMO&/EVAL PT USE INHALER: CPT

## 2023-03-15 PROCEDURE — 74011250637 HC RX REV CODE- 250/637: Performed by: EMERGENCY MEDICINE

## 2023-03-15 PROCEDURE — 75810000293 HC SIMP/SUPERF WND  RPR

## 2023-03-15 PROCEDURE — 74011000250 HC RX REV CODE- 250

## 2023-03-15 PROCEDURE — 94640 AIRWAY INHALATION TREATMENT: CPT

## 2023-03-15 PROCEDURE — 74011000258 HC RX REV CODE- 258

## 2023-03-15 PROCEDURE — 74011250636 HC RX REV CODE- 250/636

## 2023-03-15 PROCEDURE — 80053 COMPREHEN METABOLIC PANEL: CPT

## 2023-03-15 PROCEDURE — 94761 N-INVAS EAR/PLS OXIMETRY MLT: CPT

## 2023-03-15 RX ORDER — AMOXICILLIN AND CLAVULANATE POTASSIUM 875; 125 MG/1; MG/1
1 TABLET, FILM COATED ORAL EVERY 12 HOURS
Qty: 17 TABLET | Refills: 0 | Status: SHIPPED | OUTPATIENT
Start: 2023-03-15 | End: 2023-03-24

## 2023-03-15 RX ORDER — TRAMADOL HYDROCHLORIDE 50 MG/1
50 TABLET ORAL
Status: COMPLETED | OUTPATIENT
Start: 2023-03-15 | End: 2023-03-15

## 2023-03-15 RX ORDER — AMOXICILLIN AND CLAVULANATE POTASSIUM 875; 125 MG/1; MG/1
1 TABLET, FILM COATED ORAL EVERY 12 HOURS
Status: DISCONTINUED | OUTPATIENT
Start: 2023-03-15 | End: 2023-03-15 | Stop reason: HOSPADM

## 2023-03-15 RX ORDER — POLYETHYLENE GLYCOL 3350 17 G/17G
17 POWDER, FOR SOLUTION ORAL DAILY
Qty: 30 PACKET | Refills: 0 | Status: SHIPPED | OUTPATIENT
Start: 2023-03-15 | End: 2023-04-14

## 2023-03-15 RX ORDER — TRAMADOL HYDROCHLORIDE 50 MG/1
50 TABLET ORAL
Qty: 6 TABLET | Refills: 0 | Status: SHIPPED | OUTPATIENT
Start: 2023-03-15 | End: 2023-03-18

## 2023-03-15 RX ADMIN — ENOXAPARIN SODIUM 40 MG: 100 INJECTION SUBCUTANEOUS at 08:55

## 2023-03-15 RX ADMIN — GABAPENTIN 600 MG: 300 CAPSULE ORAL at 08:55

## 2023-03-15 RX ADMIN — Medication 1 CAPSULE: at 09:14

## 2023-03-15 RX ADMIN — PANTOPRAZOLE SODIUM 40 MG: 40 TABLET, DELAYED RELEASE ORAL at 06:32

## 2023-03-15 RX ADMIN — CYCLOBENZAPRINE 10 MG: 10 TABLET, FILM COATED ORAL at 06:32

## 2023-03-15 RX ADMIN — BUPROPION HYDROCHLORIDE 300 MG: 150 TABLET, EXTENDED RELEASE ORAL at 08:54

## 2023-03-15 RX ADMIN — Medication 10 ML: at 05:09

## 2023-03-15 RX ADMIN — Medication 5 ML: at 20:33

## 2023-03-15 RX ADMIN — BUDESONIDE 250 MCG: 0.5 SUSPENSION RESPIRATORY (INHALATION) at 07:12

## 2023-03-15 RX ADMIN — ACETAMINOPHEN 650 MG: 325 TABLET ORAL at 03:42

## 2023-03-15 RX ADMIN — ATORVASTATIN CALCIUM 20 MG: 20 TABLET, FILM COATED ORAL at 08:55

## 2023-03-15 RX ADMIN — PIPERACILLIN AND TAZOBACTAM 3.38 G: 3; .375 INJECTION, POWDER, LYOPHILIZED, FOR SOLUTION INTRAVENOUS at 06:27

## 2023-03-15 RX ADMIN — POTASSIUM CHLORIDE AND SODIUM CHLORIDE: 900; 300 INJECTION, SOLUTION INTRAVENOUS at 03:38

## 2023-03-15 RX ADMIN — HYDROXYZINE HYDROCHLORIDE 25 MG: 25 TABLET, FILM COATED ORAL at 06:32

## 2023-03-15 RX ADMIN — TRAMADOL HYDROCHLORIDE 50 MG: 50 TABLET, COATED ORAL at 21:32

## 2023-03-15 RX ADMIN — METOPROLOL TARTRATE 25 MG: 25 TABLET, FILM COATED ORAL at 08:55

## 2023-03-15 NOTE — PROGRESS NOTES
3/15/2023   CARE MANAGEMENT NOTE:  CM reviewed EMR and handoff received from previous  Danny Hollingsworth). Pt was admitted with colitis, hypokalemia, and acute renal insufficiency. Reportedly, pt resides with her  Javon Reza (N.[phone]) and 25 y.o granddtr. RUR 11%    Transition Plan of Care:  GI following for medical management  Plan is for pt to return home with her   Outpatient follow up  Family will transport pt home    CM will continue to follow pt until discharged.   Darryl

## 2023-03-15 NOTE — PROGRESS NOTES
Discharge instructions/AVS discussed in detail with pt. Pt verbalizes understanding of all instructions, including where to get new medications. Pt denies any questions about instructions and has signed paper copy AVS. Pt discharged per MD order, being driven home by family. Pt was transferred to car via wheelchair, assisted by staff. Pt in no apparent distress at time of discharge with no complaints. IV removed.

## 2023-03-15 NOTE — ED TRIAGE NOTES
Pt reports that she was discharged home from [address] [place] [address] today and when she arrived home she was ascending her outdoor stairs and lost her balance and fell down 4-5 stairs landing in gravel. Pt reports left knee pain and laceration to the right side of her scalp. No LOC.

## 2023-03-15 NOTE — PROGRESS NOTES
Problem: Patient Education:  Go to Education Activity  Goal: Patient/Family Education  Outcome: Progressing Towards Goal     Problem: Falls - Risk of  Goal: *Absence of Falls  Description: Document Maicol Eulogioians Fall Risk and appropriate interventions in the flowsheet.   Outcome: Progressing Towards Goal  Note: Fall Risk Interventions:                                Problem: Pain  Goal: *Control of Pain  Outcome: Progressing Towards Goal

## 2023-03-15 NOTE — PROGRESS NOTES
[address] [facility] [address]   Office [phone]  Fax [phone]     DAILY PROGRESS NOTE    24 Hour Events: Enteric panel negative. SUBJECTIVE: Seen and assessed at bedside this morning. No specific complaints. OBJECTIVE:      Vitals: Visit Vitals  /61 (BP 1 Location: Left upper arm, BP Patient Position: At rest)   Pulse 71   Temp 97.6 °F (36.4 °C)   Resp 16   Ht 5' 3\" (1.6 m)   Wt 130 lb (59 kg)   SpO2 98%   BMI 23.03 kg/m²     Physical Exam:  General: NAD. Respiratory: CTAB. Cardiovascular: Regular rate. Systolic murmur over aortic valve. GI: Nondistended. + bowel sounds. Mild generalized TTP. Extremities: No LE edema. Skin: Warm, dry. Neuro: Alert and oriented    I/O:  Date 03/14/23 0700 - 03/15/23 0659 03/15/23 0700 - 03/16/23 0659   Shift 8898-3701 0681-9047 24 Hour Total 4672-5500 2202-8941 24 Hour Total   INTAKE   P.O. 150 240 390        P.O. 150 240 390      I. V.(mL/kg/hr) 100(0.1)  100(0.1) 1400  1400     I.V. 0  0        Volume (0.9% sodium chloride with KCl 40 mEq/L infusion)    1200  1200     Volume (potassium chloride 10 mEq in 100 ml IVPB) 100  100        Volume (piperacillin-tazobactam (ZOSYN) 3.375 g in 0.9% sodium chloride (MBP/ADV) 100 mL MBP)    200  200   Blood 0  0        Autotransfused 0  0      Other 0  0        Other 0  0      Shift Total(mL/kg) 250(4.2) 240(4.1) 490(8.3) 1400(23.7)  1400(23.7)   OUTPUT   Urine(mL/kg/hr) 0(0)  0(0)        Urine Voided 0  0        Urine Occurrence(s) 6 x 3 x 9 x      Emesis/NG output 0  0        Emesis 0  0        Emesis Occurrence(s) 0 x 0 x 0 x      Other 0  0        Other Output 0  0      Stool 0  0        Stool Occurrence(s) 5 x 2 x 7 x        Stool 0  0      Blood 0  0        Quantitative Blood Loss 0  0        Blood 0  0      Shift Total(mL/kg) 0(0)  0(0)         1400   Weight (kg) 59 59 59 59 59 59         Inpatient Medications  Current Facility-Administered Medications   Medication Dose Route Frequency    amoxicillin-clavulanate (AUGMENTIN) 875-125 mg per tablet 1 Tablet  1 Tablet Oral Q12H    L.acidophilus-paracasei-S.thermophil-bifidobacter (RISAQUAD) 8 billion cell capsule  1 Capsule Oral DAILY    sodium chloride (NS) flush 5-40 mL  5-40 mL IntraVENous Q8H    sodium chloride (NS) flush 5-40 mL  5-40 mL IntraVENous PRN    acetaminophen (TYLENOL) tablet 650 mg  650 mg Oral Q6H PRN    Or    acetaminophen (TYLENOL) suppository 650 mg  650 mg Rectal Q6H PRN    polyethylene glycol (MIRALAX) packet 17 g  17 g Oral DAILY PRN    ondansetron (ZOFRAN ODT) tablet 4 mg  4 mg Oral Q8H PRN    Or    ondansetron (ZOFRAN) injection 4 mg  4 mg IntraVENous Q6H PRN    enoxaparin (LOVENOX) injection 40 mg  40 mg SubCUTAneous DAILY    0.9% sodium chloride with KCl 40 mEq/L infusion   IntraVENous CONTINUOUS    atorvastatin (LIPITOR) tablet 20 mg  20 mg Oral DAILY    buPROPion XL (WELLBUTRIN XL) tablet 300 mg  300 mg Oral BID    metoprolol tartrate (LOPRESSOR) tablet 25 mg  25 mg Oral BID    pantoprazole (PROTONIX) tablet 40 mg  40 mg Oral ACB    budesonide (PULMICORT) 500 mcg/2 ml nebulizer suspension  250 mcg Nebulization BID RT    gabapentin (NEURONTIN) capsule 600 mg  600 mg Oral TID    hydrOXYzine HCL (ATARAX) tablet 25 mg  25 mg Oral BID PRN    cyclobenzaprine (FLEXERIL) tablet 10 mg  10 mg Oral TID PRN       Allergies  Allergies   Allergen Reactions    Accupril [Quinapril] Cough    Adhesive Tape-Silicones Other (comments)     Makes skin tears easily.      Lipitor [Atorvastatin] Other (comments)     aches    Norvasc [Amlodipine] Swelling     On legs at 10 mg dose       CBC:  Recent Labs     03/15/23  0055 03/14/23  0028 03/13/23  1340   WBC 16.7* 19.8* 25.1*   HGB 10.3* 12.2 13.4   HCT 29.5* 36.8 38.8    292 245         Metabolic Panel:  Recent Labs     03/15/23  0055 03/14/23  0028 03/13/23  1340    137 133*   K 3.5 3.0* 2.7*   * 104 96*   CO2 24 26 32   BUN 8 17 22*   CREA 0.61 1.02 1.43*   GLU 99 100 98   CA 8.2* 9.0 9.4   ALB 1.6* 2.1* 2.7*   ALT 7* 9* 11*              Assessment and Plan     Kailee Kuo is a 58 y.o. female with PMH of hypertension, asthma, GERD, gastric bypass,  CAD s/p PCI (2015), anxiety, type 2 diabetes mellitus, HFpEF (EF 65%, G1DD 2015)  who is admitted for colitis. Colitis: Presented with generalized abdominal pain. Has had clear/milky watery bowel movements for several days. No prior history of C. Diff infections or recent antibiotic use/hospitalizations. WBC 25.1. CT A/P shows colitis in sigmoid and descending colon. Stool studies negative. - GI following: Outpatient follow up, start daily bowel regimen  - Zosyn (3/13-3/15). Transition to Augmentin to complete 10 day course. Anticipate discharge later today. - MIVF  - Zofran prn for nausea  - Clear liquid diet, advance as tolerated     Acute Kidney Injury: Improved. (BL 0.6). Etiology likely pre-renal from IVVD due to poor oral intake for several days prior to admission. - MIVF  - Strict I&O, monitor to keep urine output > 30 mL/hr  - Avoid nephrotoxic agents. Hypokalemia: POA 2.7. Likely due to GI losses. - Replete and monitor BMP     H/o Gastric bypass: 2017  - Avoid NSAIDs and ASA     CAD s/p PCI: Stent dLCx in 2015.   - Continue Lipitor 20mg daily     HFpEF: EF 65%, G1DD 2015. Euvolemic.   - Hold home prn Bumex in setting of JASIEL     T2DM: Last HgA1C 5.4 04/2022. Diet controlled. - Diabetic diet     Anxiety:   - Continue Wellbutrin  mg BID     Asthma:  - Duonebs prn  - Pulmicort BID     Hypertension: POA BP was 123/81. Home Lopressor 25mg BID.    - Continue home Lopressor  - Will continue to monitor at this time and readjust as BP's trend. GERD:  - Protonix 40 mg daily     Fibromyalgia:  - Continue gabapentin 600mg TID     FEN/GI - Advance diet as tolerated. NS at 100 mL/hr.   Activity - Ambulate as tolerated  DVT prophylaxis - Lovenox  GI prophylaxis -  Home PPI  Fall prophylaxis - Not indicated at this time. Disposition - Anticipate d/c today  Code Status - Full, discussed with patient / caregivers.   Next of Kin Name and Contact: Torey Luna (Spouse) Robert Rabago DO  Family Medicine Resident       For Billing    Chief Complaint   Patient presents with    Positive For Covid-19    Abdominal Pain       Hospital Problems  Date Reviewed: 11/29/2022            Codes Class Noted POA    * (Principal) Colitis ICD-10-CM: K52.9  ICD-9-CM: 558.9  3/13/2023 Unknown        JASIEL (acute kidney injury) ([facility] [address] ICD-10-CM: N17.9  ICD-9-CM: 584.9  3/13/2023 Unknown        Hypokalemia ICD-10-CM: E87.6  ICD-9-CM: 276.8  1/26/2017 Unknown        Obstructive sleep apnea ICD-10-CM: G47.33  ICD-9-CM: 327.23  9/22/2010 Yes        Arthritis ICD-10-CM: M19.90  ICD-9-CM: 716.90  9/22/2010 Yes        GERD (gastroesophageal reflux disease) ICD-10-CM: K21.9  ICD-9-CM: 530.81  9/22/2010 Yes

## 2023-03-15 NOTE — PROGRESS NOTES
Nutrition Note  Advanced diet order per IDR discussion with MD    Electronically signed by Tobin Blank RD MS on 6/43/8282 at 10:38 AM    Contact: Ext: [phone], or via [employer]

## 2023-03-15 NOTE — PROGRESS NOTES
Spiritual Care Partner Volunteer visited patient at [address] in [place] [address] on 3/15/2023  Documented by:  Jose Flores [address], [address], [facility]  Staff [address]  Paging service: [phone] (OPAL)

## 2023-03-15 NOTE — PROGRESS NOTES
Bedside and Verbal shift change report given to [facility], [address] [facility] (oncoming nurse) by Karon Pierce RN (offgoing nurse). Report included the following information SBAR, Intake/Output, MAR, and Recent Results.

## 2023-03-16 NOTE — ED NOTES
The patient was discharged home by Dr Libby Ray and Shameka Bonner RN in stable condition. The patient is alert and oriented, is in no respiratory distress and has vital signs within normal limits . The patient's diagnosis, condition and treatment were explained to patient or parent/guardian. The patient/responsible party expressed understanding. One prescription sent to pharmacy on file. No work/school note given to pt. A discharge plan has been developed. A  was not involved in the process. Aftercare instructions were given to the patient.

## 2023-03-16 NOTE — ED PROVIDER NOTES
Patient is a 58-year-old female with history of arthritis, asthma, fibromyalgia, coronary artery disease status post stents, cardiac murmur, depression, diabetes, DVT, GERD, hypertension, lumbar radiculitis, sleep apnea who presents after fall. Patient complains of laceration to head and left knee pain and abrasion. She says that she was just discharged from [facility], and was on her way home when she fell on stairs leading up to her house. Patient said no one witnessed the fall, she did not lose consciousness. Patient denies blood thinner use. Complains of knee pain worse with weightbearing. Denies any numbness or weakness. No fevers, chills, nausea, vomiting. No headache, vision changes. Past Medical History:   Diagnosis Date    Arthritis     OA back,knees and hands    Asthma     Autoimmune disease ([address]     Omid Cabrera    CAD (coronary artery disease)     s/p stents 11/2015    Cardiac murmur     Depression     Diabetes ([address]     diet controlled at this time.      DVT (deep venous thrombosis) ([facility]) 1981    GERD (gastroesophageal reflux disease)     Hypertension     Lumbar radiculitis     follows with dr Jorgito Drake for epidural steroid injections    Morbid obesity ([address]     Musculoskeletal disorder     EDMOND (obstructive sleep apnea)     wears cpap    S/P TONJA (total abdominal hysterectomy)     Thromboembolus ([address] 1996    PE    Trigger finger, right little finger 10/19/2020    follows with [facility]    Trigger finger, right ring finger 10/19/2020    [facility]       Past Surgical History:   Procedure Laterality Date    COLONOSCOPY N/A 12/6/2021    COLONOSCOPY (URGENT) performed by Leeanne Myers MD at [facility] Right 2018    Fibroadenoma    [address]    HX CHOLECYSTECTOMY  2017    HX GASTRIC BYPASS  01/2017    HX HYSTERECTOMY  1985    HX TOTAL ABDOMINAL HYSTERECTOMY      age 22    IR INJ SPINE [address]  2/4/2021    IR INJ SPINE FLUORO GUIDED  5/5/2022    DAYAN STEREO  BX BREAST RT ADDL W/CLIP AND SPECIMEN Right 2000    neg     ND CARDIAC SURG PROCEDURE UNLIST  11/2015    STENT PLACED         Family History:   Problem Relation Age of Onset    Cancer Mother 67        colon    Diabetes Mother     OSTEOARTHRITIS Mother     Stroke Mother     Migraines Mother     Diabetes Father     Heart Disease Father     Heart Attack Father     Hypertension Father     High Cholesterol Father     COPD Father     Heart Failure Father     Cancer Other     Diabetes Other     Heart Disease Other     Hypertension Other     Cancer Brother         lymphoma    Cancer Brother         PROSTATE AND LYMPHOMA    Cancer Maternal Grandmother         stomach    Asthma Brother     Asthma Brother     Stroke Brother     Cancer Maternal Aunt         lung     Breast Cancer Maternal Aunt     Cancer Maternal Uncle         lung    Cancer Maternal Uncle         melanoma    Anesth Problems Neg Hx        Social History     Socioeconomic History    Marital status:      Spouse name: Not on file    Number of children: Not on file    Years of education: Not on file    Highest education level: Not on file   Occupational History    Not on file   Tobacco Use    Smoking status: Never    Smokeless tobacco: Never   Vaping Use    Vaping Use: Never used   Substance and Sexual Activity    Alcohol use: No     Alcohol/week: 0.0 standard drinks    Drug use: No    Sexual activity: Yes     Partners: Male     Birth control/protection: None   Other Topics Concern    Not on file   Social History Narrative    Not on file     Social Determinants of Health     Financial Resource Strain: Low Risk     Difficulty of Paying Living Expenses: Not hard at all   Food Insecurity: No Food Insecurity    Worried About Running Out of Food in the Last Year: Never true    Ran Out of Food in the Last Year: Never true   Transportation Needs: Not on file   Physical Activity: Not on file   Stress: Not on file   Social Connections: Not on file   Intimate Partner Violence: Not on file   Housing Stability: Not on file         ALLERGIES: Accupril [quinapril], Adhesive tape-silicones, Lipitor [atorvastatin], and Norvasc [amlodipine]    Review of Systems   Constitutional:  Negative for chills and fever. HENT:  Negative for drooling and nosebleeds. Eyes:  Negative for pain and itching. Respiratory:  Negative for choking and stridor. Cardiovascular:  Negative for leg swelling. Gastrointestinal:  Negative for abdominal distention and rectal pain. Endocrine: Negative for heat intolerance and polyphagia. Genitourinary:  Negative for enuresis and genital sores. Musculoskeletal:  Negative for arthralgias and joint swelling. Skin:  Positive for wound. Negative for color change. Allergic/Immunologic: Negative for immunocompromised state. Neurological:  Negative for tremors and speech difficulty. Hematological:  Negative for adenopathy. Psychiatric/Behavioral:  Negative for dysphoric mood and sleep disturbance. Vitals:    03/15/23 1943   BP: (!) 153/61   Pulse: 86   Resp: 18   Temp: 98.5 °F (36.9 °C)   SpO2: 100%   Weight: 59 kg (130 lb)   Height: 5' 5\" (1.651 m)            Physical Exam  Vitals and nursing note reviewed. Constitutional:       General: She is not in acute distress. Appearance: She is well-developed. She is not toxic-appearing or diaphoretic. HENT:      Head: Normocephalic. Comments: 3 cm scalp laceration to right parietal scalp. No active bleeding, no underlying crepitus. Nose: Nose normal.   Eyes:      Conjunctiva/sclera: Conjunctivae normal.   Cardiovascular:      Rate and Rhythm: Regular rhythm. Pulmonary:      Effort: Pulmonary effort is normal. No respiratory distress. Abdominal:      General: There is no distension. Palpations: Abdomen is soft. Musculoskeletal:         General: Tenderness and signs of injury present.       Cervical back: Normal range of motion and neck supple. Comments: Small non bleeding abrasion to anterior left knee. Skin:     General: Skin is warm and dry. Neurological:      Mental Status: She is alert and oriented to person, place, and time. Coordination: Coordination normal.   Psychiatric:         Behavior: Behavior normal.        Medical Decision Making  Differential diagnosis includes skull fracture, skin abrasion, laceration, knee fracture, contusion, knee abrasion. Patient reports her tetanus is up-to-date. She was discharged home today from [address] [place] [address] and fell while going up the stairs to her house. Patient with no acute fracture of knee, recommended elevation, ice and Ace wrap. Scalp laceration noted, patient denies LOC, no blood thinner use. Staples placed, patient tolerated procedure well. Does not warrant hospitalization at this time. I discussed with patient if she felt safe going home or needed assistance with ambulance transfer, the patient says that her family can take her home, and she feels comfortable going home. Stable for discharge. Amount and/or Complexity of Data Reviewed  Independent Historian: spouse  External Data Reviewed: labs, radiology and notes. Radiology: ordered and independent interpretation performed. Decision-making details documented in ED Course. Risk  Prescription drug management. Decision regarding hospitalization. Wound Repair    Date/Time: 3/16/2023 1:29 AM  Performed by: attendingLocation details: scalp  Wound length:2.6 - 7.5 cm    Anesthesia:  Local Anesthetic: LET (lido, epi, tetracaine)  Irrigation solution: saline  Irrigation method: syringe  Skin closure: staples  Number of sutures: 6  My total time at bedside, performing this procedure was 1-15 minutes. Patient's results have been reviewed with them.   Patient and/or family have verbally conveyed their understanding and agreement of the patient's signs, symptoms, diagnosis, treatment and prognosis and additionally agree to follow up as recommended or return to the Emergency Room should their condition change prior to follow-up. Discharge instructions have also been provided to the patient with some educational information regarding their diagnosis as well a list of reasons why they would want to return to the ER prior to their follow-up appointment should their condition change.

## 2023-03-16 NOTE — DISCHARGE INSTRUCTIONS
Please keep your abrasions clean with soap and water. Have your staples removed in 10 days. Thank you.

## 2023-03-17 ENCOUNTER — TELEPHONE (OUTPATIENT)
Dept: FAMILY MEDICINE CLINIC | Age: 63
End: 2023-03-17

## 2023-03-17 NOTE — TELEPHONE ENCOUNTER
Care Transitions Initial Follow Up Call    Outreach made within 2 business days of discharge: Yes    Patient: Fawn Bloch Patient : 1960   MRN: [id number]  Reason for Admission: Colitis  Discharge Date: 3/15/23       Spoke with: MsTrae Arnol Leavitt    Discharge department/facility: [facility]    TCM Interactive Patient Contact:  Was patient able to fill all prescriptions: Yes  Was patient instructed to bring all medications to the follow-up visit: Yes  Is patient taking all medications as directed in the discharge summary? Yes  Does patient understand their discharge instructions: Yes  Does patient have questions or concerns that need addressed prior to 7-14 day follow up office visit: No    Patient tripped and fell down the stairs after getting home. Went to the ED. CT scan was done. 6 staples to head (midline, per patient).      Scheduled appointment with PCP within 7-14 days    Follow Up  Future Appointments   Date Time Provider Yesy Zazuetai   3/20/2023  1:15 PM No Fernandez MD SFFP BS [facility]   2023 10:15 AM WTC DAYAN 1 [facility]   2023 11:00 AM WTC DEXA/DAYAN 1 [facility]   10/30/2023  1:00 PM Santa Lopez MD CAV[facility] BS Georgeann Favre, RN

## 2023-03-18 LAB
BACTERIA SPEC CULT: NORMAL
SERVICE CMNT-IMP: NORMAL

## 2023-03-20 ENCOUNTER — OFFICE VISIT (OUTPATIENT)
Dept: FAMILY MEDICINE CLINIC | Age: 63
End: 2023-03-20
Payer: COMMERCIAL

## 2023-03-20 VITALS
DIASTOLIC BLOOD PRESSURE: 69 MMHG | SYSTOLIC BLOOD PRESSURE: 125 MMHG | BODY MASS INDEX: 23.69 KG/M2 | OXYGEN SATURATION: 99 % | HEIGHT: 65 IN | RESPIRATION RATE: 14 BRPM | HEART RATE: 77 BPM | TEMPERATURE: 97.5 F | WEIGHT: 142.2 LBS

## 2023-03-20 DIAGNOSIS — M79.7 FIBROMYALGIA: ICD-10-CM

## 2023-03-20 DIAGNOSIS — K52.9 COLITIS: Primary | ICD-10-CM

## 2023-03-20 DIAGNOSIS — D64.9 LOW HEMOGLOBIN: ICD-10-CM

## 2023-03-20 DIAGNOSIS — J45.40 MODERATE PERSISTENT ASTHMA WITHOUT COMPLICATION: ICD-10-CM

## 2023-03-20 DIAGNOSIS — N17.9 AKI (ACUTE KIDNEY INJURY) (HCC): ICD-10-CM

## 2023-03-20 DIAGNOSIS — Z09 HOSPITAL DISCHARGE FOLLOW-UP: ICD-10-CM

## 2023-03-20 PROCEDURE — G8427 DOCREV CUR MEDS BY ELIG CLIN: HCPCS | Performed by: STUDENT IN AN ORGANIZED HEALTH CARE EDUCATION/TRAINING PROGRAM

## 2023-03-20 PROCEDURE — 1111F DSCHRG MED/CURRENT MED MERGE: CPT | Performed by: STUDENT IN AN ORGANIZED HEALTH CARE EDUCATION/TRAINING PROGRAM

## 2023-03-20 PROCEDURE — 99496 TRANSJ CARE MGMT HIGH F2F 7D: CPT | Performed by: STUDENT IN AN ORGANIZED HEALTH CARE EDUCATION/TRAINING PROGRAM

## 2023-03-20 RX ORDER — GABAPENTIN 600 MG/1
600 TABLET ORAL 3 TIMES DAILY
Qty: 90 TABLET | Refills: 0 | Status: ON HOLD | OUTPATIENT
Start: 2023-03-20

## 2023-03-20 RX ORDER — ALBUTEROL SULFATE 0.63 MG/3ML
0.63 SOLUTION RESPIRATORY (INHALATION)
Qty: 75 ML | Refills: 1 | Status: ON HOLD | OUTPATIENT
Start: 2023-03-20

## 2023-03-20 NOTE — PROGRESS NOTES
Room 20    Identified pt with two pt identifiers(name and ). Reviewed record in preparation for visit and have obtained necessary documentation. Chief Complaint   Patient presents with    Hospital Follow Up     Colitis and fall        Health Maintenance Due   Topic    COVID-19 Vaccine (4 - Booster for Moderna series)    Eye Exam Retinal or Dilated     Shingles Vaccine (2 of 2)       Visit Vitals  /69 (BP 1 Location: Right upper arm, BP Patient Position: Sitting, BP Cuff Size: Adult)   Pulse 77   Temp 97.5 °F (36.4 °C) (Temporal)   Resp 14   Ht 5' 5\" (1.651 m)   Wt 142 lb 3.2 oz (64.5 kg)   SpO2 99%   BMI 23.66 kg/m²     PHQ-9 Score: 6. MD aware  Patient request refill on gabapentin. MD aware. Coordination of Care Questionnaire:  :   1) Have you been to an emergency room, urgent care, or hospitalized since your last visit? If yes, where when, and reason for visit? Yes, here for followup       2. Have seen or consulted any other health care provider since your last visit? If yes, where when, and reason for visit? NO      Patient is accompanied by self I have received verbal consent from Yfn Maynard to discuss any/all medical information while they are present in the room.

## 2023-03-20 NOTE — PROGRESS NOTES
Transitional Care Management Progress Note    Patient: Lisa Dowd  : 1960  PCP: Chris Rae MD    Date of office visit: 3/20/2023   Date of admission: 3/15/23  Date of discharge: 3/15/23  Hospital: [facility]    Call initiated w/i 2 business dates of discharge: Yes   Date of the most recent call to the patient: 3/17/2023  3:49 PM      Assessment/Plan:   Diagnoses and all orders for this visit:    1. Colitis: symptoms improving, no systemic symptoms, no N/V/D, tolerating PO intake with good UOP  - Continue Augmentin to complete 10 day course  - Refilled Probiotic  - Has appointment with Dr. Jessica Cheng on 5/15    2. JASIEL (acute kidney injury) [facility]): presented to ED with Cr 1.43, improved to 0.61 on discharge, good PO intake and UOP currently  - Check CMP    3. Low hemoglobin: presented to ED with Hb 13.4 which downtrended to 10.3, likely due to hemodilution from IVF while hospitalized  - Check CBC    4. Fibromyalgia: stable, needs refill of Gabapentin  - Refilled Gabapentin 600mg TID    5. Moderate persistent asthma without complication: stable, needs refill of Albuterol neb solution  - Refilled Albuterol neb solution    6. Hospital discharge follow-up  -     [facility] MEDS RECONCILED W/CURRENT MED LIST    The complexity of medical decision making for this patient's transitional care is high     Subjective:   Lisa Dowd is a 58 y.o. female presenting today for follow-up after hospital discharge. This encounter and supporting documentation was reviewed if available. Medication reconciliation was performed today. The main problem requiring admission was Colitis. Complications during admission: JASIEL and Hypokalemia. Interval history/Current status: doing well. Diarrhea improved. No fevers/chills. Tolerating PO intake, good UOP. No blood in stool. No nausea/vomiting. Slight left-sided abdominal pain but otherwise no complaints.  Has been compliant with Augmentin and Probiotic which was prescribed on discharge. Admitting symptoms have: significantly improved    Medications marked \"taking\" at this time:  Prior to Admission medications    Medication Sig Start Date End Date Taking? Authorizing Provider   gabapentin (NEURONTIN) 600 mg tablet Take 1 Tablet by mouth three (3) times daily. 3/20/23  Yes Mili Alva MD   albuterol (ACCUNEB) 0.63 mg/3 mL nebulizer solution 3 mL by Nebulization route every six (6) hours as needed for Wheezing. 3/20/23  Yes Mili Alva MD   L.acid,para-B. bifidum-S.therm (RISAQUAD) 8 billion cell cap cap Take 1 Capsule by mouth daily. 3/20/23  Yes Mili Alva MD   amoxicillin-clavulanate (AUGMENTIN) 875-125 mg per tablet Take 1 Tablet by mouth every twelve (12) hours for 17 doses. 3/15/23 3/24/23 Yes Michael Vasquez MD   polyethylene glycol (MIRALAX) 17 gram packet Take 1 Packet by mouth daily for 30 days. 3/15/23 4/14/23 Yes Michael Vasqeuz MD   ondansetron (ZOFRAN ODT) 4 mg disintegrating tablet TAKE 1 TABLET BY MOUTH EVERY 8 HOURS AS NEEDED FOR NAUSEA 2/28/23  Yes Lorraine Gutierrez NP   cyclobenzaprine (FLEXERIL) 10 mg tablet Take 1 Tablet by mouth three (3) times daily as needed for Muscle Spasm(s). 2/28/23  Yes Silvia Newberry MD   co-enzyme Q-10 (CO Q-10) 100 mg capsule TAKE 1 CAPSULE BY MOUTH EVERY DAY 2/22/23  Yes Charline LARIOS MD   nitroglycerin (NITROSTAT) 0.4 mg SL tablet TAKE 1 TABLET BY MOUTH EVERY 5 MINUTES UP TO 3 DOSES THEN CALL 911 IF PAIN PERSISTS 12/12/22  Yes Henrry Lopez MD   buPROPion XL (WELLBUTRIN XL) 300 mg XL tablet Take 300 mg by mouth two (2) times a day.  8/26/22  Yes Provider, Historical   atorvastatin (LIPITOR) 20 mg tablet TAKE 1 TABLET BY MOUTH EVERY DAY 11/28/22  Yes Diamond Lopez MD   hydrOXYzine pamoate (VISTARIL) 25 mg capsule TAKE 1 CAPSULE BY MOUTH TWICE A DAY AS NEEDED FOR ANXIETY 11/4/22  Yes Silvia Newberry MD   metoprolol tartrate (LOPRESSOR) 25 mg tablet TAKE 1 TABLET BY MOUTH TWICE A DAY 11/4/22 Yes Silvia Newberry MD   bumetanide (BUMEX) 0.5 mg tablet TAKE 1 TABLET BY MOUTH EVERY DAY AS NEEDED 9/16/22  Yes Silvia Newberry MD   omeprazole (PRILOSEC) 40 mg capsule TAKE 1 CAPSULE BY MOUTH EVERY DAY 5/23/22  Yes Silvia Newberry MD   albuterol (ProAir HFA) 90 mcg/actuation inhaler Take 1 Puff by inhalation every four (4) hours as needed for Wheezing or Shortness of Breath. 4/27/22  Yes Silvia Newberry MD   budesonide (Pulmicort Flexhaler) 180 mcg/actuation aepb inhaler Take 2 Puffs by inhalation daily. 4/27/22  Yes Silvia Newberry MD   valACYclovir (VALTREX) 500 mg tablet TAKE 1 PILL TWICE DAILY FOR 3 DAYS AT SIGN OF HERPES FLARE. 7/5/21  Yes Silvia Newberry MD   glucose blood VI test strips (BLOOD GLUCOSE TEST) strip Use once a day 3/10/20  Yes Lamin Gant MD   Blood-Glucose Meter monitoring kit Use to check glucose once a day 7/15/16  Yes Chuy Mccoy MD   alcohol swabs (ALCOHOL PADS) padm Use when checking blood glucose 7/15/16  Yes Chuy Mccoy MD   Lancets misc Use once a day. Please give one compatible with patient's meter 7/15/16  Yes Chuy Mccoy MD   L.acid,para-B. bifidum-S.therm (RISAQUAD) 8 billion cell cap cap Take 1 Capsule by mouth daily for 10 days. 3/15/23 3/20/23  Michael Vasquez MD   gabapentin (NEURONTIN) 600 mg tablet TAKE 1 TABLET BY MOUTH THREE TIMES A DAY 12/8/22 3/20/23  Radha Lindsay MD   albuterol (ACCUNEB) 0.63 mg/3 mL nebulizer solution 3 mL by Nebulization route every six (6) hours as needed for Wheezing.  4/27/22 3/20/23  Silvia Newberry MD        ROS:  General ROS: negative for - chills or fever  Respiratory ROS: negative for - cough or shortness of breath  Cardiovascular ROS: no chest pain or dyspnea on exertion  Gastrointestinal ROS: no abdominal pain, change in bowel habits, or black or bloody stools  Genito-Urinary ROS: no dysuria, trouble voiding, or hematuria  Neurological ROS: no TIA or stroke symptoms  Dermatological ROS: negative for - dry skin or rash       Patient Active Problem List   Diagnosis Code    Asthma J45.909    Arthritis M19.90    GERD (gastroesophageal reflux disease) K21.9    GARCIA (nonalcoholic steatohepatitis) K75.81    Fibromyalgia M79.7    Essential hypertension F97    Metabolic syndrome W52.87    FH: diabetes mellitus Z83.3    Anxiety F41.9    History of pulmonary embolus (PE) Z86.711    Hypokalemia E87.6    H/O gastric bypass Z98.84    H/O colonoscopy Z98.890    DDD (degenerative disc disease), lumbar M51.36    FH: colon cancer Z80.0    Elevated ferritin R79.89    Type 2 diabetes mellitus with diabetic neuropathy (HCC) E11.40    Abnormal mammogram of right breast R92.8    Abnormal CT of the abdomen R93.5    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    Primary fibromyalgia syndrome M79.7    Inflammatory bowel disease K52.9    Type 2 diabetes with nephropathy (HCC) E11.21    Venous insufficiency I87.2    Colitis K52.9    JASIEL (acute kidney injury) ([address] N17.9     Patient Active Problem List    Diagnosis Date Noted    Colitis 03/13/2023    JASIEL (acute kidney injury) ([address] 03/13/2023    Venous insufficiency 07/28/2021    Type 2 diabetes with nephropathy ([address] 10/28/2020    Primary fibromyalgia syndrome 03/10/2020    Inflammatory bowel disease 03/10/2020    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[address] 01/07/2019    Abnormal CT of the abdomen 12/16/2018    Abnormal mammogram of right breast 08/06/2018    Type 2 diabetes mellitus with diabetic neuropathy ([address] 06/29/2018    Elevated ferritin 12/21/2017    H/O colonoscopy 12/19/2017    DDD (degenerative disc disease), lumbar 12/19/2017    FH: colon cancer 12/19/2017    H/O gastric bypass 07/12/2017    Hypokalemia 01/26/2017    History of pulmonary embolus (PE) 11/07/2016    Anxiety 06/01/2016    FH: diabetes mellitus 95/62/0304    Metabolic syndrome 56/22/9337    Essential hypertension 05/17/2015    Fibromyalgia 03/30/2015    GARCIA (nonalcoholic steatohepatitis) 01/24/2011    Asthma 09/22/2010    Arthritis 09/22/2010    GERD (gastroesophageal reflux disease) 09/22/2010     Current Outpatient Medications   Medication Sig Dispense Refill    gabapentin (NEURONTIN) 600 mg tablet Take 1 Tablet by mouth three (3) times daily. 90 Tablet 0    albuterol (ACCUNEB) 0.63 mg/3 mL nebulizer solution 3 mL by Nebulization route every six (6) hours as needed for Wheezing. 75 mL 1    L.acid,para-B. bifidum-S.therm (RISAQUAD) 8 billion cell cap cap Take 1 Capsule by mouth daily. 30 Capsule 5    amoxicillin-clavulanate (AUGMENTIN) 875-125 mg per tablet Take 1 Tablet by mouth every twelve (12) hours for 17 doses. 17 Tablet 0    polyethylene glycol (MIRALAX) 17 gram packet Take 1 Packet by mouth daily for 30 days. 30 Packet 0    ondansetron (ZOFRAN ODT) 4 mg disintegrating tablet TAKE 1 TABLET BY MOUTH EVERY 8 HOURS AS NEEDED FOR NAUSEA 20 Tablet 1    cyclobenzaprine (FLEXERIL) 10 mg tablet Take 1 Tablet by mouth three (3) times daily as needed for Muscle Spasm(s). 90 Tablet 1    co-enzyme Q-10 (CO Q-10) 100 mg capsule TAKE 1 CAPSULE BY MOUTH EVERY DAY 90 Capsule 1    nitroglycerin (NITROSTAT) 0.4 mg SL tablet TAKE 1 TABLET BY MOUTH EVERY 5 MINUTES UP TO 3 DOSES THEN CALL 911 IF PAIN PERSISTS 25 Tablet 2    buPROPion XL (WELLBUTRIN XL) 300 mg XL tablet Take 300 mg by mouth two (2) times a day. atorvastatin (LIPITOR) 20 mg tablet TAKE 1 TABLET BY MOUTH EVERY DAY 30 Tablet 11    hydrOXYzine pamoate (VISTARIL) 25 mg capsule TAKE 1 CAPSULE BY MOUTH TWICE A DAY AS NEEDED FOR ANXIETY 60 Capsule 11    metoprolol tartrate (LOPRESSOR) 25 mg tablet TAKE 1 TABLET BY MOUTH TWICE A  Tablet 1    bumetanide (BUMEX) 0.5 mg tablet TAKE 1 TABLET BY MOUTH EVERY DAY AS NEEDED 90 Tablet 2    omeprazole (PRILOSEC) 40 mg capsule TAKE 1 CAPSULE BY MOUTH EVERY DAY 30 Capsule 5    albuterol (ProAir HFA) 90 mcg/actuation inhaler Take 1 Puff by inhalation every four (4) hours as needed for Wheezing or Shortness of Breath.  1 Each 3    budesonide (Pulmicort Flexhaler) 180 mcg/actuation aepb inhaler Take 2 Puffs by inhalation daily. 1 Each 3    valACYclovir (VALTREX) 500 mg tablet TAKE 1 PILL TWICE DAILY FOR 3 DAYS AT SIGN OF HERPES FLARE. 30 Tablet 0    glucose blood VI test strips (BLOOD GLUCOSE TEST) strip Use once a day 100 Strip 5    Blood-Glucose Meter monitoring kit Use to check glucose once a day 1 Kit 0    alcohol swabs (ALCOHOL PADS) padm Use when checking blood glucose 100 Pad 5    Lancets misc Use once a day. Please give one compatible with patient's meter 1 Package 5     Allergies   Allergen Reactions    Accupril [Quinapril] Cough    Adhesive Tape-Silicones Other (comments)     Makes skin tears easily. Lipitor [Atorvastatin] Other (comments)     aches    Norvasc [Amlodipine] Swelling     On legs at 10 mg dose     Past Medical History:   Diagnosis Date    Arthritis     OA back,knees and hands    Asthma     Autoimmune disease ([address]     Verito Reid    CAD (coronary artery disease)     s/p stents 11/2015    Cardiac murmur     Depression     Diabetes ([address]     diet controlled at this time.      DVT (deep venous thrombosis) ([facility] 1981    GERD (gastroesophageal reflux disease)     Hypertension     Lumbar radiculitis     follows with dr Brandy Mcdonnell for epidural steroid injections    Morbid obesity ([address]     Musculoskeletal disorder     EDMOND (obstructive sleep apnea)     wears cpap    S/P TONJA (total abdominal hysterectomy)     Thromboembolus ([address] 1996    PE    Trigger finger, right little finger 10/19/2020    follows with [facility]    Trigger finger, right ring finger 10/19/2020    [facility]     Past Surgical History:   Procedure Laterality Date    COLONOSCOPY N/A 12/6/2021    COLONOSCOPY (URGENT) performed by Shannon Chambers MD at [address] Right 2018    Fibroadenoma    [address]    HX CHOLECYSTECTOMY  2017    HX GASTRIC BYPASS  01/2017    HX HYSTERECTOMY  1985    HX TOTAL ABDOMINAL HYSTERECTOMY      age 22    IR INJ SPINE FLUORO GUIDED  2/4/2021    IR INJ SPINE FLUORO GUIDED  5/5/2022    DAYAN STEREO  BX BREAST RT ADDL W/CLIP AND SPECIMEN Right 2000    neg     VT CARDIAC SURG PROCEDURE UNLIST  11/2015    STENT PLACED     Family History   Problem Relation Age of Onset    Cancer Mother 67        colon    Diabetes Mother     OSTEOARTHRITIS Mother     Stroke Mother     Migraines Mother     Diabetes Father     Heart Disease Father     Heart Attack Father     Hypertension Father     High Cholesterol Father     COPD Father     Heart Failure Father     Cancer Other     Diabetes Other     Heart Disease Other     Hypertension Other     Cancer Brother         lymphoma    Cancer Brother         PROSTATE AND LYMPHOMA    Cancer Maternal Grandmother         stomach    Asthma Brother     Asthma Brother     Stroke Brother     Cancer Maternal Aunt         lung     Breast Cancer Maternal Aunt     Cancer Maternal Uncle         lung    Cancer Maternal Uncle         melanoma    Anesth Problems Neg Hx      Social History     Tobacco Use    Smoking status: Never    Smokeless tobacco: Never   Substance Use Topics    Alcohol use: No     Alcohol/week: 0.0 standard drinks          Objective:   Visit Vitals  /69 (BP 1 Location: Right upper arm, BP Patient Position: Sitting, BP Cuff Size: Adult)   Pulse 77   Temp 97.5 °F (36.4 °C) (Temporal)   Resp 14   Ht 5' 5\" (1.651 m)   Wt 142 lb 3.2 oz (64.5 kg)   LMP 01/01/1985   SpO2 99%   BMI 23.66 kg/m²        Physical Examination: General appearance - alert, well appearing, and in no distress  Mental status - alert, oriented to person, place, and time  Chest - clear to auscultation, no wheezes, rales or rhonchi, symmetric air entry  Heart - normal rate, regular rhythm, normal S1, S2, no murmurs, rubs, clicks or gallops  Abdomen - soft, nontender, nondistended, no masses or organomegaly  Extremities - peripheral pulses normal, no pedal edema, no clubbing or cyanosis  Skin - normal coloration and turgor, no rashes, no suspicious skin lesions noted       We discussed the expected course, resolution and complications of the diagnosis(es) in detail. Medication risks, benefits, costs, interactions, and alternatives were discussed as indicated. I advised her to contact the office if her condition worsens, changes or fails to improve as anticipated. She expressed understanding with the diagnosis(es) and plan.      Hien Bateman MD

## 2023-03-21 LAB
ALBUMIN SERPL-MCNC: 2.4 G/DL (ref 3.5–5)
ALBUMIN/GLOB SERPL: 0.8 (ref 1.1–2.2)
ALP SERPL-CCNC: 139 U/L (ref 45–117)
ALT SERPL-CCNC: 13 U/L (ref 12–78)
ANION GAP SERPL CALC-SCNC: 5 MMOL/L (ref 5–15)
AST SERPL-CCNC: 19 U/L (ref 15–37)
BILIRUB SERPL-MCNC: 0.4 MG/DL (ref 0.2–1)
BUN SERPL-MCNC: 6 MG/DL (ref 6–20)
BUN/CREAT SERPL: 11 (ref 12–20)
CALCIUM SERPL-MCNC: 8.5 MG/DL (ref 8.5–10.1)
CHLORIDE SERPL-SCNC: 99 MMOL/L (ref 97–108)
CO2 SERPL-SCNC: 34 MMOL/L (ref 21–32)
CREAT SERPL-MCNC: 0.55 MG/DL (ref 0.55–1.02)
ERYTHROCYTE [DISTWIDTH] IN BLOOD BY AUTOMATED COUNT: 12.5 % (ref 11.5–14.5)
GLOBULIN SER CALC-MCNC: 3.2 G/DL (ref 2–4)
GLUCOSE SERPL-MCNC: 83 MG/DL (ref 65–100)
HCT VFR BLD AUTO: 34.8 % (ref 35–47)
HGB BLD-MCNC: 11.7 G/DL (ref 11.5–16)
MCH RBC QN AUTO: 29 PG (ref 26–34)
MCHC RBC AUTO-ENTMCNC: 33.6 G/DL (ref 30–36.5)
MCV RBC AUTO: 86.4 FL (ref 80–99)
NRBC # BLD: 0 K/UL (ref 0–0.01)
NRBC BLD-RTO: 0 PER 100 WBC
PLATELET # BLD AUTO: 556 K/UL (ref 150–400)
PMV BLD AUTO: 8.5 FL (ref 8.9–12.9)
POTASSIUM SERPL-SCNC: 3.1 MMOL/L (ref 3.5–5.1)
PROT SERPL-MCNC: 5.6 G/DL (ref 6.4–8.2)
RBC # BLD AUTO: 4.03 M/UL (ref 3.8–5.2)
SODIUM SERPL-SCNC: 138 MMOL/L (ref 136–145)
WBC # BLD AUTO: 17.5 K/UL (ref 3.6–11)

## 2023-03-22 ENCOUNTER — NURSE TRIAGE (OUTPATIENT)
Dept: OTHER | Facility: CLINIC | Age: 63
End: 2023-03-22

## 2023-03-22 LAB
O+P SPEC MICRO: NORMAL
O+P STL CONC: NORMAL
SPECIMEN SOURCE: NORMAL

## 2023-03-22 NOTE — TELEPHONE ENCOUNTER
[facility]    Received call from Maria D at [facility] with [employer]. Subjective: Caller states \"Leg swelling, pain in left side \"     Current Symptoms: Hospitalized Mon-Wed last week for colon and abdominal wall inflammation and infection, dehydration. Continues to take oral antibiotic. Losing balance more often, fell and needed staples on top of right head last Wednesday post-d/c and twisted left knee. Currently swelling in left foot up to middle of leg. Mild swelling in right foot up to ankle. Denies redness. Takes diuretic and blood pressure meds. Hx PE, does not take blood thinners      Associated Symptoms: reduced activity, using walker until yesterday. Lost balance again this morning. Denies dizziness. Pain Severity: 6-7/10, aching pain in left side near breast, comes and goes for unknown reason, unknown modifying factors. Temperature: Denies    What has been tried: Antibiotics    LMP: NA Pregnant: NA    Recommended disposition: Go to ED/UCC Now (Or to Office with PCP Approval)    Care advice provided, patient verbalizes understanding; denies any other questions or concerns; instructed to call back for any new or worsening symptoms. Writer provided warm transfer to [employer] at [facility] for 2nd level triage      Attention Provider: Thank you for allowing me to participate in the care of your patient. The patient was connected to triage in response to information provided to the [facility]. Please do not respond through this encounter as the response is not directed to a shared pool.     Reason for Disposition   Thigh, calf, or ankle swelling in both legs, but one side is definitely more swollen (Exception: longstanding difference between legs)    Protocols used: Leg Swelling and Edema-ADULT-[place]

## 2023-03-23 NOTE — PROGRESS NOTES
Subjective   CC:  Yfn Maynard is a 58 y.o. female with hx of HTN, HLD, controlled DM2, CAD, HFpEF, EDMOND, asthma, anxiety, fibromyalgia, hx of gastric bypass who presents for staple removal.     - Recently admitted at [facility] for colitis. On 10 day course of Augmentin (last dose today). Rell Gentleman on her way into her house from the hospital on 3/15 and was seen again at [facility] ED where she had 6 staples placed for scalp laceration (right parietal scalp). Hit head and left knee during the fall. Head CT and X-ray of left knee both showed no acute process. Advised to follow up in 10 days for staple removal.   - Scalp has been healing well with no bleeding, redness, pain, drainage   - Reports that she had been feeling pretty good earlier this week, but over the last 1-2 days has had recurrence of mucousy stools and intermittent L-sided abdominal cramping (similar to how symptoms of colitis started)  - No fevers are home, though temp of 100 degrees F here   - No blood in stool, nausea, vomiting   - Appetite is decreased, but able to keep down liquids (water, juice) and soup   - Taking Miralax PRN   - Has appt with GI (Dr. Magali Aggarwal) on 5/31/23 and follow up with Dr. Karla Bryant on 4/3    ROS otherwise negative except as documented in HPI. Past Medical History  Past Medical History:   Diagnosis Date    Arthritis     OA back,knees and hands    Asthma     Autoimmune disease ([address]     Ebony Davis    CAD (coronary artery disease)     s/p stents 11/2015    Cardiac murmur     Depression     Diabetes ([address]     diet controlled at this time.      DVT (deep venous thrombosis) ([facility]) 1981    GERD (gastroesophageal reflux disease)     Hypertension     Lumbar radiculitis     follows with dr Monica Nguyen for epidural steroid injections    Morbid obesity ([address]     Musculoskeletal disorder     EDMOND (obstructive sleep apnea)     wears cpap    S/P TONJA (total abdominal hysterectomy)     Thromboembolus ([address] 1996    PE    Trigger finger, right little finger 10/19/2020    follows with [facility]    Trigger finger, right ring finger 10/19/2020    [facility]       Current Medications  Current Outpatient Medications   Medication Sig Dispense Refill    ciprofloxacin HCl (CIPRO) 500 mg tablet Take 1 Tablet by mouth two (2) times a day for 10 days. 20 Tablet 0    metroNIDAZOLE (FLAGYL) 250 mg tablet Take 1 Tablet by mouth three (3) times daily for 10 days. 30 Tablet 0    gabapentin (NEURONTIN) 600 mg tablet Take 1 Tablet by mouth three (3) times daily. 90 Tablet 0    albuterol (ACCUNEB) 0.63 mg/3 mL nebulizer solution 3 mL by Nebulization route every six (6) hours as needed for Wheezing. 75 mL 1    L.acid,para-B. bifidum-S.therm (RISAQUAD) 8 billion cell cap cap Take 1 Capsule by mouth daily. 30 Capsule 5    amoxicillin-clavulanate (AUGMENTIN) 875-125 mg per tablet Take 1 Tablet by mouth every twelve (12) hours for 17 doses. 17 Tablet 0    polyethylene glycol (MIRALAX) 17 gram packet Take 1 Packet by mouth daily for 30 days. 30 Packet 0    ondansetron (ZOFRAN ODT) 4 mg disintegrating tablet TAKE 1 TABLET BY MOUTH EVERY 8 HOURS AS NEEDED FOR NAUSEA 20 Tablet 1    cyclobenzaprine (FLEXERIL) 10 mg tablet Take 1 Tablet by mouth three (3) times daily as needed for Muscle Spasm(s). 90 Tablet 1    co-enzyme Q-10 (CO Q-10) 100 mg capsule TAKE 1 CAPSULE BY MOUTH EVERY DAY 90 Capsule 1    nitroglycerin (NITROSTAT) 0.4 mg SL tablet TAKE 1 TABLET BY MOUTH EVERY 5 MINUTES UP TO 3 DOSES THEN CALL 911 IF PAIN PERSISTS 25 Tablet 2    buPROPion XL (WELLBUTRIN XL) 300 mg XL tablet Take 300 mg by mouth two (2) times a day.       atorvastatin (LIPITOR) 20 mg tablet TAKE 1 TABLET BY MOUTH EVERY DAY 30 Tablet 11    hydrOXYzine pamoate (VISTARIL) 25 mg capsule TAKE 1 CAPSULE BY MOUTH TWICE A DAY AS NEEDED FOR ANXIETY 60 Capsule 11    metoprolol tartrate (LOPRESSOR) 25 mg tablet TAKE 1 TABLET BY MOUTH TWICE A  Tablet 1    bumetanide (BUMEX) 0.5 mg tablet TAKE 1 TABLET BY MOUTH EVERY DAY AS NEEDED 90 Tablet 2    omeprazole (PRILOSEC) 40 mg capsule TAKE 1 CAPSULE BY MOUTH EVERY DAY 30 Capsule 5    albuterol (ProAir HFA) 90 mcg/actuation inhaler Take 1 Puff by inhalation every four (4) hours as needed for Wheezing or Shortness of Breath. 1 Each 3    budesonide (Pulmicort Flexhaler) 180 mcg/actuation aepb inhaler Take 2 Puffs by inhalation daily. 1 Each 3    valACYclovir (VALTREX) 500 mg tablet TAKE 1 PILL TWICE DAILY FOR 3 DAYS AT SIGN OF HERPES FLARE. 30 Tablet 0    glucose blood VI test strips (BLOOD GLUCOSE TEST) strip Use once a day 100 Strip 5    Blood-Glucose Meter monitoring kit Use to check glucose once a day 1 Kit 0    alcohol swabs (ALCOHOL PADS) padm Use when checking blood glucose 100 Pad 5    Lancets misc Use once a day. Please give one compatible with patient's meter 1 Package 5       Allergies  Allergies   Allergen Reactions    Accupril [Quinapril] Cough    Adhesive Tape-Silicones Other (comments)     Makes skin tears easily.      Lipitor [Atorvastatin] Other (comments)     aches    Norvasc [Amlodipine] Swelling     On legs at 10 mg dose       Social History   Social History     Socioeconomic History    Marital status:      Spouse name: Not on file    Number of children: Not on file    Years of education: Not on file    Highest education level: Not on file   Occupational History    Not on file   Tobacco Use    Smoking status: Never    Smokeless tobacco: Never   Vaping Use    Vaping Use: Never used   Substance and Sexual Activity    Alcohol use: No     Alcohol/week: 0.0 standard drinks    Drug use: No    Sexual activity: Yes     Partners: Male     Birth control/protection: None   Other Topics Concern    Not on file   Social History Narrative    Not on file     Social Determinants of Health     Financial Resource Strain: Low Risk     Difficulty of Paying Living Expenses: Not hard at all   Food Insecurity: No Food Insecurity    Worried About [address] in the Last Year: Never true    Ran Out of Food in the Last Year: Never true   Transportation Needs: Not on file   Physical Activity: Not on file   Stress: Not on file   Social Connections: Not on file   Intimate Partner Violence: Not on file   Housing Stability: Not on file       Family History  Family History   Problem Relation Age of Onset    Cancer Mother 67        colon    Diabetes Mother     OSTEOARTHRITIS Mother     Stroke Mother     Migraines Mother     Diabetes Father     Heart Disease Father     Heart Attack Father     Hypertension Father     High Cholesterol Father     COPD Father     Heart Failure Father     Cancer Other     Diabetes Other     Heart Disease Other     Hypertension Other     Cancer Brother         lymphoma    Cancer Brother         PROSTATE AND LYMPHOMA    Cancer Maternal Grandmother         stomach    Asthma Brother     Asthma Brother     Stroke Brother     Cancer Maternal Aunt         lung     Breast Cancer Maternal Aunt     Cancer Maternal Uncle         lung    Cancer Maternal Uncle         melanoma    Anesth Problems Neg Hx        Objective   Vital Signs  Visit Vitals  /72 (BP 1 Location: Right upper arm, BP Patient Position: Sitting, BP Cuff Size: Adult)   Pulse 82   Temp 100 °F (37.8 °C) (Oral)   Resp 16   Ht 5' 5\" (1.651 m)   Wt 137 lb 3.2 oz (62.2 kg)   LMP 01/01/1985   SpO2 97%   BMI 22.83 kg/m²       Physical Exam  Vitals reviewed. Constitutional:       General: She is not in acute distress. Appearance: She is not toxic-appearing. HENT:      Head: Laceration present. Comments: Laceration on right parietal scalp with good approximation; healing well; no surrounding erythema or drainage. 6 staples in place. Cardiovascular:      Heart sounds: Murmur heard. Crescendo decrescendo systolic murmur is present with a grade of 3/6. Abdominal:      General: Bowel sounds are increased. There is no distension. Palpations: Abdomen is soft. Tenderness:  There is abdominal tenderness. There is no guarding or rebound. Musculoskeletal:      Right lower leg: No edema. Left lower leg: No edema. Skin:     General: Skin is warm and dry. Capillary Refill: Capillary refill takes less than 2 seconds. Findings: No rash. Neurological:      Mental Status: She is alert and oriented to person, place, and time. Staple removal:  6 staples were removed by Savannah Morales DO without difficulty. Wound edges were well approximated. There was no evidence of infection. Patient tolerated procedure well. Assessment and Plan   Ronnie Triana is a 58 y.o. who is here for follow up of colitis and staple removal.    I have personally reviewed the following encounter notes, labs, and/or imaging/diagnostic studies:   - Hospital admission from 3/13-3/15/2023, including notes, labs, and imaging  - ED encounter summary and available imaging results from 3/15/23  - CBC from 3/20 with leukocytosis and thrombocytosis  - CMP from 3/20 with hypokalemia (improved from admission), stable renal function, mildly elevated alk phos    1. Colitis - Recently hospitalized from 3/13-3/15 for colitis and discharged with 10-day course of Augmentin. CT Abd/pelvis during admission showed moderate to severe infectious/inflammatory colitis most pronounced at sigmoid and descending colon. Stool studies including C. Difficile, enteric panel, and O&P all normal. Stool WBC mildly elevated (5-10). Symptoms initially improved, but has started to return over the last 1-2 days. No signs of acute abdomen on exam. Will stop Augmentin (last dose due tonight) and start 10 day course of Cipro 500 mg BID and Flagyl 250 mg TID. Follow up in 5 days. Checking repeat CBC and CMP today. ED precautions discussed. - CBC WITH AUTOMATED DIFF; Future  - METABOLIC PANEL, COMPREHENSIVE; Future  - ciprofloxacin HCl (CIPRO) 500 mg tablet; Take 1 Tablet by mouth two (2) times a day for 10 days. Dispense: 20 Tablet;  Refill: 0  - metroNIDAZOLE (FLAGYL) 250 mg tablet; Take 1 Tablet by mouth three (3) times daily for 10 days. Dispense: 30 Tablet; Refill: 0  - METABOLIC PANEL, COMPREHENSIVE  - CBC WITH AUTOMATED DIFF    2. Hypokalemia - Hypokalemic while admitted. Had improved by time of discharge but K Was 3.1 on repeat CMP on 3/20. Repeating today. Encouraged PO hydration with water/Gatorade.   - METABOLIC PANEL, COMPREHENSIVE; Future    3. Scalp laceration, subsequent encounter - Secondary to GLF on 3/15. Laceration healing well with no signs of infection. 6 staples removed without difficulty today. 4. Encounter for staple removal  - SUTURE / STAPLE REMOVAL BY PROVIDER       Patient is counseled to return to the office if symptoms do not improve as expected. Urgent consultation with the nearest Emergency Department is strongly recommended if condition worsens. Patient is counseled to follow up as recommended and to inform the office if any changes in treatment are recommended.       I discussed this patient with Dr. Con Cowart (Attending Physician)       Signed By:  Alysha Taylor DO  Family Medicine Resident

## 2023-03-24 ENCOUNTER — OFFICE VISIT (OUTPATIENT)
Dept: FAMILY MEDICINE CLINIC | Age: 63
End: 2023-03-24

## 2023-03-24 VITALS
WEIGHT: 137.2 LBS | SYSTOLIC BLOOD PRESSURE: 106 MMHG | BODY MASS INDEX: 22.86 KG/M2 | OXYGEN SATURATION: 97 % | HEIGHT: 65 IN | DIASTOLIC BLOOD PRESSURE: 72 MMHG | RESPIRATION RATE: 16 BRPM | TEMPERATURE: 100 F | HEART RATE: 82 BPM

## 2023-03-24 DIAGNOSIS — Z48.02 ENCOUNTER FOR STAPLE REMOVAL: ICD-10-CM

## 2023-03-24 DIAGNOSIS — K52.9 COLITIS: Primary | ICD-10-CM

## 2023-03-24 DIAGNOSIS — S01.01XD SCALP LACERATION, SUBSEQUENT ENCOUNTER: ICD-10-CM

## 2023-03-24 DIAGNOSIS — E87.6 HYPOKALEMIA: ICD-10-CM

## 2023-03-24 RX ORDER — METRONIDAZOLE 250 MG/1
250 TABLET ORAL 3 TIMES DAILY
Qty: 30 TABLET | Refills: 0 | Status: ON HOLD | OUTPATIENT
Start: 2023-03-24 | End: 2023-04-03

## 2023-03-24 RX ORDER — CIPROFLOXACIN 500 MG/1
500 TABLET ORAL 2 TIMES DAILY
Qty: 20 TABLET | Refills: 0 | Status: ON HOLD | OUTPATIENT
Start: 2023-03-24 | End: 2023-04-03

## 2023-03-24 NOTE — PROGRESS NOTES
Room 19    Identified pt with two pt identifiers(name and ). Reviewed record in preparation for visit and have obtained necessary documentation. Chief Complaint   Patient presents with    Suture Removal     Went to [facility] and had six staples in her scalp placed on 03/15/2023 after a fall        Health Maintenance Due   Topic    COVID-19 Vaccine (4 - Booster for Moderna series)    Eye Exam Retinal or Dilated     Shingles Vaccine (2 of 2)       Visit Vitals  /72 (BP 1 Location: Right upper arm, BP Patient Position: Sitting, BP Cuff Size: Adult)   Pulse 82   Temp 100 °F (37.8 °C) (Oral)   Resp 16   Ht 5' 5\" (1.651 m)   Wt 137 lb 3.2 oz (62.2 kg)   SpO2 97%   BMI 22.83 kg/m²         Coordination of Care Questionnaire:  :   1) Have you been to an emergency room, urgent care, or hospitalized since your last visit? If yes, where when, and reason for visit? Yes, see reason for visit       2. Have seen or consulted any other health care provider since your last visit? If yes, where when, and reason for visit? NO      Patient is accompanied by self I have received verbal consent from Daniel Wei to discuss any/all medical information while they are present in the room.

## 2023-03-25 ENCOUNTER — TELEPHONE (OUTPATIENT)
Dept: FAMILY MEDICINE CLINIC | Age: 63
End: 2023-03-25

## 2023-03-25 ENCOUNTER — HOSPITAL ENCOUNTER (INPATIENT)
Age: 63
LOS: 6 days | Discharge: NURSING, REHAB OR LONG-TERM CARE | DRG: 853 | End: 2023-03-31
Attending: EMERGENCY MEDICINE | Admitting: STUDENT IN AN ORGANIZED HEALTH CARE EDUCATION/TRAINING PROGRAM
Payer: MEDICARE

## 2023-03-25 ENCOUNTER — APPOINTMENT (OUTPATIENT)
Dept: CT IMAGING | Age: 63
DRG: 853 | End: 2023-03-25
Payer: MEDICARE

## 2023-03-25 DIAGNOSIS — Z98.890 H/O COLONOSCOPY: ICD-10-CM

## 2023-03-25 DIAGNOSIS — K52.9 COLITIS: ICD-10-CM

## 2023-03-25 DIAGNOSIS — R65.21 SEPTIC SHOCK (HCC): ICD-10-CM

## 2023-03-25 DIAGNOSIS — D63.8 ANEMIA OF CHRONIC DISEASE: ICD-10-CM

## 2023-03-25 DIAGNOSIS — A41.9 SEPTIC SHOCK (HCC): ICD-10-CM

## 2023-03-25 DIAGNOSIS — I10 ESSENTIAL HYPERTENSION: ICD-10-CM

## 2023-03-25 DIAGNOSIS — E87.6 HYPOKALEMIA: ICD-10-CM

## 2023-03-25 DIAGNOSIS — R53.81 MALAISE AND FATIGUE: Primary | ICD-10-CM

## 2023-03-25 DIAGNOSIS — Z90.49 S/P COLECTOMY: ICD-10-CM

## 2023-03-25 DIAGNOSIS — R53.83 MALAISE AND FATIGUE: Primary | ICD-10-CM

## 2023-03-25 DIAGNOSIS — D72.9 NEUTROPHILIA: ICD-10-CM

## 2023-03-25 DIAGNOSIS — D72.829 LEUKOCYTOSIS, UNSPECIFIED TYPE: ICD-10-CM

## 2023-03-25 DIAGNOSIS — R19.7 DIARRHEA OF PRESUMED INFECTIOUS ORIGIN: ICD-10-CM

## 2023-03-25 DIAGNOSIS — Z98.84 H/O GASTRIC BYPASS: ICD-10-CM

## 2023-03-25 DIAGNOSIS — M79.7 FIBROMYALGIA: ICD-10-CM

## 2023-03-25 DIAGNOSIS — D75.839 THROMBOCYTOSIS: ICD-10-CM

## 2023-03-25 LAB
ALBUMIN SERPL-MCNC: 2 G/DL (ref 3.5–5)
ALBUMIN SERPL-MCNC: 2.2 G/DL (ref 3.5–5)
ALBUMIN/GLOB SERPL: 0.5 (ref 1.1–2.2)
ALBUMIN/GLOB SERPL: 0.7 (ref 1.1–2.2)
ALP SERPL-CCNC: 129 U/L (ref 45–117)
ALP SERPL-CCNC: 163 U/L (ref 45–117)
ALT SERPL-CCNC: 9 U/L (ref 12–78)
ALT SERPL-CCNC: 9 U/L (ref 12–78)
ANION GAP SERPL CALC-SCNC: 6 MMOL/L (ref 5–15)
ANION GAP SERPL CALC-SCNC: 7 MMOL/L (ref 5–15)
AST SERPL-CCNC: 14 U/L (ref 15–37)
AST SERPL-CCNC: 14 U/L (ref 15–37)
BASOPHILS # BLD: 0 K/UL (ref 0–0.1)
BASOPHILS # BLD: 0 K/UL (ref 0–0.1)
BASOPHILS NFR BLD: 0 % (ref 0–1)
BASOPHILS NFR BLD: 0 % (ref 0–1)
BILIRUB SERPL-MCNC: 0.6 MG/DL (ref 0.2–1)
BILIRUB SERPL-MCNC: 0.9 MG/DL (ref 0.2–1)
BUN SERPL-MCNC: 11 MG/DL (ref 6–20)
BUN SERPL-MCNC: 6 MG/DL (ref 6–20)
BUN/CREAT SERPL: 14 (ref 12–20)
BUN/CREAT SERPL: 9 (ref 12–20)
CALCIUM SERPL-MCNC: 8.5 MG/DL (ref 8.5–10.1)
CALCIUM SERPL-MCNC: 8.5 MG/DL (ref 8.5–10.1)
CHLORIDE SERPL-SCNC: 90 MMOL/L (ref 97–108)
CHLORIDE SERPL-SCNC: 95 MMOL/L (ref 97–108)
CO2 SERPL-SCNC: 35 MMOL/L (ref 21–32)
CO2 SERPL-SCNC: 37 MMOL/L (ref 21–32)
COMMENT, HOLDF: NORMAL
CREAT SERPL-MCNC: 0.66 MG/DL (ref 0.55–1.02)
CREAT SERPL-MCNC: 0.79 MG/DL (ref 0.55–1.02)
CRP SERPL-MCNC: 25.9 MG/DL (ref 0–0.6)
DIFFERENTIAL METHOD BLD: ABNORMAL
DIFFERENTIAL METHOD BLD: ABNORMAL
EOSINOPHIL # BLD: 0 K/UL (ref 0–0.4)
EOSINOPHIL # BLD: 0.3 K/UL (ref 0–0.4)
EOSINOPHIL NFR BLD: 0 % (ref 0–7)
EOSINOPHIL NFR BLD: 1 % (ref 0–7)
ERYTHROCYTE [DISTWIDTH] IN BLOOD BY AUTOMATED COUNT: 12.8 % (ref 11.5–14.5)
ERYTHROCYTE [DISTWIDTH] IN BLOOD BY AUTOMATED COUNT: 13.1 % (ref 11.5–14.5)
ERYTHROCYTE [SEDIMENTATION RATE] IN BLOOD: 50 MM/HR (ref 0–30)
GLOBULIN SER CALC-MCNC: 3.2 G/DL (ref 2–4)
GLOBULIN SER CALC-MCNC: 4.2 G/DL (ref 2–4)
GLUCOSE SERPL-MCNC: 120 MG/DL (ref 65–100)
GLUCOSE SERPL-MCNC: 142 MG/DL (ref 65–100)
HCT VFR BLD AUTO: 34.7 % (ref 35–47)
HCT VFR BLD AUTO: 35.6 % (ref 35–47)
HGB BLD-MCNC: 11.4 G/DL (ref 11.5–16)
HGB BLD-MCNC: 11.7 G/DL (ref 11.5–16)
IMM GRANULOCYTES # BLD AUTO: 0 K/UL
IMM GRANULOCYTES # BLD AUTO: 0 K/UL (ref 0–0.04)
IMM GRANULOCYTES NFR BLD AUTO: 0 %
IMM GRANULOCYTES NFR BLD AUTO: 0 % (ref 0–0.5)
LACTATE BLD-SCNC: 1.84 MMOL/L (ref 0.4–2)
LIPASE SERPL-CCNC: 25 U/L (ref 73–393)
LYMPHOCYTES # BLD: 1.3 K/UL (ref 0.8–3.5)
LYMPHOCYTES # BLD: 1.4 K/UL (ref 0.8–3.5)
LYMPHOCYTES NFR BLD: 3 % (ref 12–49)
LYMPHOCYTES NFR BLD: 4 % (ref 12–49)
MAGNESIUM SERPL-MCNC: 1.6 MG/DL (ref 1.6–2.4)
MCH RBC QN AUTO: 28.7 PG (ref 26–34)
MCH RBC QN AUTO: 28.7 PG (ref 26–34)
MCHC RBC AUTO-ENTMCNC: 32 G/DL (ref 30–36.5)
MCHC RBC AUTO-ENTMCNC: 33.7 G/DL (ref 30–36.5)
MCV RBC AUTO: 85 FL (ref 80–99)
MCV RBC AUTO: 89.7 FL (ref 80–99)
MONOCYTES # BLD: 2 K/UL (ref 0–1)
MONOCYTES # BLD: 2.8 K/UL (ref 0–1)
MONOCYTES NFR BLD: 6 % (ref 5–13)
MONOCYTES NFR BLD: 6 % (ref 5–13)
NEUTS BAND NFR BLD MANUAL: 1 %
NEUTS SEG # BLD: 29.8 K/UL (ref 1.8–8)
NEUTS SEG # BLD: 42.3 K/UL (ref 1.8–8)
NEUTS SEG NFR BLD: 89 % (ref 32–75)
NEUTS SEG NFR BLD: 90 % (ref 32–75)
NRBC # BLD: 0 K/UL (ref 0–0.01)
NRBC # BLD: 0 K/UL (ref 0–0.01)
NRBC BLD-RTO: 0 PER 100 WBC
NRBC BLD-RTO: 0 PER 100 WBC
PLATELET # BLD AUTO: 520 K/UL (ref 150–400)
PLATELET # BLD AUTO: 542 K/UL (ref 150–400)
PLATELET COMMENTS,PCOM: ABNORMAL
PMV BLD AUTO: 8.6 FL (ref 8.9–12.9)
PMV BLD AUTO: 8.7 FL (ref 8.9–12.9)
POTASSIUM SERPL-SCNC: 2.1 MMOL/L (ref 3.5–5.1)
POTASSIUM SERPL-SCNC: 2.6 MMOL/L (ref 3.5–5.1)
PROCALCITONIN SERPL-MCNC: 0.52 NG/ML
PROT SERPL-MCNC: 5.4 G/DL (ref 6.4–8.2)
PROT SERPL-MCNC: 6.2 G/DL (ref 6.4–8.2)
RBC # BLD AUTO: 3.97 M/UL (ref 3.8–5.2)
RBC # BLD AUTO: 4.08 M/UL (ref 3.8–5.2)
RBC MORPH BLD: ABNORMAL
RBC MORPH BLD: ABNORMAL
SAMPLES BEING HELD,HOLD: NORMAL
SODIUM SERPL-SCNC: 132 MMOL/L (ref 136–145)
SODIUM SERPL-SCNC: 138 MMOL/L (ref 136–145)
WBC # BLD AUTO: 33.4 K/UL (ref 3.6–11)
WBC # BLD AUTO: 46.5 K/UL (ref 3.6–11)

## 2023-03-25 PROCEDURE — 83540 ASSAY OF IRON: CPT

## 2023-03-25 PROCEDURE — 74011000258 HC RX REV CODE- 258

## 2023-03-25 PROCEDURE — 36415 COLL VENOUS BLD VENIPUNCTURE: CPT

## 2023-03-25 PROCEDURE — 94761 N-INVAS EAR/PLS OXIMETRY MLT: CPT

## 2023-03-25 PROCEDURE — 99222 1ST HOSP IP/OBS MODERATE 55: CPT | Performed by: INTERNAL MEDICINE

## 2023-03-25 PROCEDURE — 51798 US URINE CAPACITY MEASURE: CPT

## 2023-03-25 PROCEDURE — 74177 CT ABD & PELVIS W/CONTRAST: CPT

## 2023-03-25 PROCEDURE — 74011250637 HC RX REV CODE- 250/637

## 2023-03-25 PROCEDURE — 86140 C-REACTIVE PROTEIN: CPT

## 2023-03-25 PROCEDURE — 80053 COMPREHEN METABOLIC PANEL: CPT

## 2023-03-25 PROCEDURE — 65270000029 HC RM PRIVATE

## 2023-03-25 PROCEDURE — 99285 EMERGENCY DEPT VISIT HI MDM: CPT

## 2023-03-25 PROCEDURE — 84145 PROCALCITONIN (PCT): CPT

## 2023-03-25 PROCEDURE — 74011000636 HC RX REV CODE- 636: Performed by: RADIOLOGY

## 2023-03-25 PROCEDURE — 74011250636 HC RX REV CODE- 250/636: Performed by: NURSE PRACTITIONER

## 2023-03-25 PROCEDURE — 83036 HEMOGLOBIN GLYCOSYLATED A1C: CPT

## 2023-03-25 PROCEDURE — 85652 RBC SED RATE AUTOMATED: CPT

## 2023-03-25 PROCEDURE — 87040 BLOOD CULTURE FOR BACTERIA: CPT

## 2023-03-25 PROCEDURE — 74011250636 HC RX REV CODE- 250/636

## 2023-03-25 PROCEDURE — 83735 ASSAY OF MAGNESIUM: CPT

## 2023-03-25 PROCEDURE — 74011000250 HC RX REV CODE- 250

## 2023-03-25 PROCEDURE — 85025 COMPLETE CBC W/AUTO DIFF WBC: CPT

## 2023-03-25 PROCEDURE — 82728 ASSAY OF FERRITIN: CPT

## 2023-03-25 PROCEDURE — 83690 ASSAY OF LIPASE: CPT

## 2023-03-25 PROCEDURE — 83605 ASSAY OF LACTIC ACID: CPT

## 2023-03-25 RX ORDER — ACETAMINOPHEN 325 MG/1
650 TABLET ORAL
Status: DISCONTINUED | OUTPATIENT
Start: 2023-03-25 | End: 2023-03-31 | Stop reason: HOSPADM

## 2023-03-25 RX ORDER — MAGNESIUM SULFATE HEPTAHYDRATE 40 MG/ML
2 INJECTION, SOLUTION INTRAVENOUS ONCE
Status: COMPLETED | OUTPATIENT
Start: 2023-03-25 | End: 2023-03-26

## 2023-03-25 RX ORDER — METOPROLOL TARTRATE 25 MG/1
25 TABLET, FILM COATED ORAL 2 TIMES DAILY
Status: DISCONTINUED | OUTPATIENT
Start: 2023-03-25 | End: 2023-03-31 | Stop reason: HOSPADM

## 2023-03-25 RX ORDER — BUMETANIDE 1 MG/1
0.5 TABLET ORAL DAILY
Status: DISCONTINUED | OUTPATIENT
Start: 2023-03-26 | End: 2023-03-31 | Stop reason: HOSPADM

## 2023-03-25 RX ORDER — BUPROPION HYDROCHLORIDE 150 MG/1
300 TABLET ORAL 2 TIMES DAILY
Status: DISCONTINUED | OUTPATIENT
Start: 2023-03-25 | End: 2023-03-31 | Stop reason: HOSPADM

## 2023-03-25 RX ORDER — ALBUTEROL SULFATE 0.83 MG/ML
2.5 SOLUTION RESPIRATORY (INHALATION)
Status: DISCONTINUED | OUTPATIENT
Start: 2023-03-25 | End: 2023-03-31 | Stop reason: HOSPADM

## 2023-03-25 RX ORDER — ATORVASTATIN CALCIUM 20 MG/1
20 TABLET, FILM COATED ORAL DAILY
Status: DISCONTINUED | OUTPATIENT
Start: 2023-03-26 | End: 2023-03-31 | Stop reason: HOSPADM

## 2023-03-25 RX ORDER — HYDROXYZINE 25 MG/1
25 TABLET, FILM COATED ORAL
Status: DISCONTINUED | OUTPATIENT
Start: 2023-03-25 | End: 2023-03-31 | Stop reason: HOSPADM

## 2023-03-25 RX ORDER — ENOXAPARIN SODIUM 100 MG/ML
40 INJECTION SUBCUTANEOUS DAILY
Status: DISCONTINUED | OUTPATIENT
Start: 2023-03-26 | End: 2023-03-31 | Stop reason: HOSPADM

## 2023-03-25 RX ORDER — POTASSIUM CHLORIDE 7.45 MG/ML
10 INJECTION INTRAVENOUS
Status: DISPENSED | OUTPATIENT
Start: 2023-03-25 | End: 2023-03-25

## 2023-03-25 RX ORDER — PROCHLORPERAZINE EDISYLATE 5 MG/ML
10 INJECTION INTRAMUSCULAR; INTRAVENOUS
Status: DISCONTINUED | OUTPATIENT
Start: 2023-03-25 | End: 2023-03-31 | Stop reason: HOSPADM

## 2023-03-25 RX ORDER — BUDESONIDE 0.5 MG/2ML
250 INHALANT ORAL
Status: DISCONTINUED | OUTPATIENT
Start: 2023-03-19 | End: 2023-03-31 | Stop reason: HOSPADM

## 2023-03-25 RX ORDER — VANCOMYCIN HYDROCHLORIDE 250 MG/5ML
125 POWDER, FOR SOLUTION ORAL EVERY 6 HOURS
Status: DISCONTINUED | OUTPATIENT
Start: 2023-03-25 | End: 2023-03-27

## 2023-03-25 RX ORDER — ONDANSETRON 2 MG/ML
4 INJECTION INTRAMUSCULAR; INTRAVENOUS
Status: DISCONTINUED | OUTPATIENT
Start: 2023-03-25 | End: 2023-03-25

## 2023-03-25 RX ORDER — ACETAMINOPHEN 650 MG/1
650 SUPPOSITORY RECTAL
Status: DISCONTINUED | OUTPATIENT
Start: 2023-03-25 | End: 2023-03-31 | Stop reason: HOSPADM

## 2023-03-25 RX ORDER — PROMETHAZINE HYDROCHLORIDE 25 MG/1
12.5 TABLET ORAL
Status: DISCONTINUED | OUTPATIENT
Start: 2023-03-25 | End: 2023-03-31 | Stop reason: HOSPADM

## 2023-03-25 RX ORDER — POTASSIUM CHLORIDE 750 MG/1
40 TABLET, FILM COATED, EXTENDED RELEASE ORAL
Status: DISPENSED | OUTPATIENT
Start: 2023-03-25 | End: 2023-03-25

## 2023-03-25 RX ORDER — GABAPENTIN 300 MG/1
600 CAPSULE ORAL 3 TIMES DAILY
Status: DISCONTINUED | OUTPATIENT
Start: 2023-03-25 | End: 2023-03-31 | Stop reason: HOSPADM

## 2023-03-25 RX ORDER — POLYETHYLENE GLYCOL 3350 17 G/17G
17 POWDER, FOR SOLUTION ORAL DAILY PRN
Status: DISCONTINUED | OUTPATIENT
Start: 2023-03-25 | End: 2023-03-31 | Stop reason: HOSPADM

## 2023-03-25 RX ORDER — SODIUM CHLORIDE 0.9 % (FLUSH) 0.9 %
5-40 SYRINGE (ML) INJECTION AS NEEDED
Status: DISCONTINUED | OUTPATIENT
Start: 2023-03-25 | End: 2023-03-31 | Stop reason: HOSPADM

## 2023-03-25 RX ORDER — SODIUM CHLORIDE 0.9 % (FLUSH) 0.9 %
5-40 SYRINGE (ML) INJECTION EVERY 8 HOURS
Status: DISCONTINUED | OUTPATIENT
Start: 2023-03-25 | End: 2023-03-31 | Stop reason: HOSPADM

## 2023-03-25 RX ADMIN — MAGNESIUM SULFATE HEPTAHYDRATE 2 G: 40 INJECTION, SOLUTION INTRAVENOUS at 23:35

## 2023-03-25 RX ADMIN — PIPERACILLIN AND TAZOBACTAM 3.38 G: 3; .375 INJECTION, POWDER, LYOPHILIZED, FOR SOLUTION INTRAVENOUS at 23:36

## 2023-03-25 RX ADMIN — IOPAMIDOL 100 ML: 755 INJECTION, SOLUTION INTRAVENOUS at 21:07

## 2023-03-25 RX ADMIN — PROMETHAZINE HYDROCHLORIDE 12.5 MG: 25 TABLET ORAL at 21:52

## 2023-03-25 RX ADMIN — POTASSIUM CHLORIDE 10 MEQ: 7.46 INJECTION, SOLUTION INTRAVENOUS at 20:06

## 2023-03-25 RX ADMIN — POTASSIUM CHLORIDE 10 MEQ: 7.46 INJECTION, SOLUTION INTRAVENOUS at 17:53

## 2023-03-25 RX ADMIN — POTASSIUM CHLORIDE 40 MEQ: 750 TABLET, FILM COATED, EXTENDED RELEASE ORAL at 20:11

## 2023-03-25 RX ADMIN — SODIUM CHLORIDE, PRESERVATIVE FREE 10 ML: 5 INJECTION INTRAVENOUS at 23:29

## 2023-03-25 NOTE — H&P
[address] [facility] [address] [place] [address]   Office [phone]  Fax [phone]       Admission H&P     Name: Pasco Duane MRN: [id number]  Sex: Female   YOB: 1960  Age: 58 y.o. PCP: Rudi Dawson MD     Date of admission: 3/25/2023    Source of Information: patient, medical records    Chief complaint: leukocytosis, hypokalemia    HPI:  Pasco Duane is a 58 y.o. female with PMH of hypertension, asthma, GERD, gastric bypass,  CAD s/p PCI (2015), anxiety, T2DM, HFpEF who presents to the ER for evaluation of outpatient leukocytosis. She was seen on 3/15 for ischemic colitis, seen by GI and was given IV zosyn. She felt symptomatically better and was discharged to complete a 10 day course of Augmentin. However she had ongoing mucus in stools, abdominal distention and LLQ pain. Yesterday she was seen by her PCP for removal of head sutures for a recent GLF. She had lab work which showed elevated white counts so she was asked to go to the ED. She was started on ciprofloxacin and flagyl which she took 1 dose. Today morning she has a temp of 100 F. She has been passing non bloody loose stools and flatus. Mother had colon cancer and brother had lymphoma. No personal h/o cancer. In the ED:  Vitals: T 99  HR 65  /66  RR 18  O2Sats 98% on RA  Labs: wbc 46.5 Hgb 11.7 Plts 520 Na 132 K 2.1 Cr 0.79  Imaging: none  Treatment: 10mEq KCl x4 ordered over four hours    Patient Vitals for the past 12 hrs:   Temp Pulse Resp BP SpO2   03/25/23 1902 -- -- -- -- 97 %   03/25/23 1845 100.1 °F (37.8 °C) 95 15 (!) 100/49 95 %   03/25/23 1830 -- 95 19 (!) 108/51 97 %   03/25/23 1822 -- 95 18 (!) 95/59 96 %   03/25/23 1800 -- 94 18 113/63 100 %   03/25/23 1748 -- 92 22 (!) 112/59 96 %   03/25/23 1744 -- 91 -- -- --   03/25/23 1434 99 °F (37.2 °C) 65 18 101/66 98 %       Review of Systems  Review of Systems   Constitutional:  Negative for chills and fever. HENT:  Negative for congestion and sore throat. Eyes:  Negative for pain and discharge. Respiratory:  Negative for cough and shortness of breath. Cardiovascular:  Negative for chest pain and leg swelling. Gastrointestinal:  Positive for abdominal distention, abdominal pain and nausea. Negative for blood in stool, constipation and vomiting. Endocrine: Negative for cold intolerance. Genitourinary:  Negative for dysuria and frequency. Musculoskeletal:  Negative for arthralgias and myalgias. Skin:  Negative for rash. Allergic/Immunologic: Negative for immunocompromised state. Neurological:  Negative for syncope and headaches. Hematological:  Negative for adenopathy. Psychiatric/Behavioral:  Negative for confusion and sleep disturbance. Home Medications   Prior to Admission medications    Medication Sig Start Date End Date Taking? Authorizing Provider   ciprofloxacin HCl (CIPRO) 500 mg tablet Take 1 Tablet by mouth two (2) times a day for 10 days. 3/24/23 4/3/23  Vasile Rucker MD   metroNIDAZOLE (FLAGYL) 250 mg tablet Take 1 Tablet by mouth three (3) times daily for 10 days. 3/24/23 4/3/23  Vasile Rucker MD   gabapentin (NEURONTIN) 600 mg tablet Take 1 Tablet by mouth three (3) times daily. 3/20/23   Benji Lima MD   albuterol (ACCUNEB) 0.63 mg/3 mL nebulizer solution 3 mL by Nebulization route every six (6) hours as needed for Wheezing. 3/20/23   Benji Lima MD   L.acid,para-B. bifidum-S.therm (RISAQUAD) 8 billion cell cap cap Take 1 Capsule by mouth daily. 3/20/23   Benji Lima MD   polyethylene glycol (MIRALAX) 17 gram packet Take 1 Packet by mouth daily for 30 days. 3/15/23 4/14/23  Misbah Khoury MD   ondansetron (ZOFRAN ODT) 4 mg disintegrating tablet TAKE 1 TABLET BY MOUTH EVERY 8 HOURS AS NEEDED FOR NAUSEA 2/28/23   Joey Padgett NP   cyclobenzaprine (FLEXERIL) 10 mg tablet Take 1 Tablet by mouth three (3) times daily as needed for Muscle Spasm(s).  2/28/23   Fadia Fortune MD   co-enzyme Q-10 (CO Q-10) 100 mg capsule TAKE 1 CAPSULE BY MOUTH EVERY DAY 2/22/23   Jasbir Russell MD   nitroglycerin (NITROSTAT) 0.4 mg SL tablet TAKE 1 TABLET BY MOUTH EVERY 5 MINUTES UP TO 3 DOSES THEN CALL 911 IF PAIN PERSISTS 12/12/22   Gilda Lopez MD   buPROPion XL (WELLBUTRIN XL) 300 mg XL tablet Take 300 mg by mouth two (2) times a day. 8/26/22   Provider, Historical   atorvastatin (LIPITOR) 20 mg tablet TAKE 1 TABLET BY MOUTH EVERY DAY 11/28/22   Gilda Lopez MD   hydrOXYzine pamoate (VISTARIL) 25 mg capsule TAKE 1 CAPSULE BY MOUTH TWICE A DAY AS NEEDED FOR ANXIETY 11/4/22   Betty LARIOS MD   metoprolol tartrate (LOPRESSOR) 25 mg tablet TAKE 1 TABLET BY MOUTH TWICE A DAY 11/4/22   Betty LARIOS MD   bumetanide (BUMEX) 0.5 mg tablet TAKE 1 TABLET BY MOUTH EVERY DAY AS NEEDED 9/16/22   Jasbir Russell MD   omeprazole (PRILOSEC) 40 mg capsule TAKE 1 CAPSULE BY MOUTH EVERY DAY 5/23/22   Jabsir Russell MD   albuterol (ProAir HFA) 90 mcg/actuation inhaler Take 1 Puff by inhalation every four (4) hours as needed for Wheezing or Shortness of Breath. 4/27/22   Jasbir Russell MD   budesonide (Pulmicort Flexhaler) 180 mcg/actuation aepb inhaler Take 2 Puffs by inhalation daily. 4/27/22   Jasbir Russell MD   valACYclovir (VALTREX) 500 mg tablet TAKE 1 PILL TWICE DAILY FOR 3 DAYS AT SIGN OF HERPES FLARE. 7/5/21   Jasbir Russell MD   glucose blood VI test strips (BLOOD GLUCOSE TEST) strip Use once a day 3/10/20   Joanne Christopher MD   Blood-Glucose Meter monitoring kit Use to check glucose once a day 7/15/16   Reginald Cramer MD   alcohol swabs (ALCOHOL PADS) padm Use when checking blood glucose 7/15/16   Reginald Cramer MD   Lancets misc Use once a day. Please give one compatible with patient's meter 7/15/16   Reginald Cramer MD       Allergies  Allergies   Allergen Reactions    Accupril [Quinapril] Cough    Adhesive Tape-Silicones Other (comments)     Makes skin tears easily.      Lipitor [Atorvastatin] Other (comments)     aches    Norvasc [Amlodipine] Swelling     On legs at 10 mg dose       Past Medical History  Past Medical History:   Diagnosis Date    Arthritis     OA back,knees and hands    Asthma     Autoimmune disease ([facility] [address]     Asia Potter    CAD (coronary artery disease)     s/p stents 11/2015    Cardiac murmur     Depression     Diabetes ([facility] [address]     diet controlled at this time.      DVT (deep venous thrombosis) ([facility] [address] 1981    GERD (gastroesophageal reflux disease)     Hypertension     Lumbar radiculitis     follows with dr Vijay Johnson for epidural steroid injections    Morbid obesity ([facility] [address]     Musculoskeletal disorder     EDMOND (obstructive sleep apnea)     wears cpap    S/P TONJA (total abdominal hysterectomy)     Thromboembolus ([facility] [address] 1996    PE    Trigger finger, right little finger 10/19/2020    follows with [facility]    Trigger finger, right ring finger 10/19/2020    [facility]       Previous Hospitalization(s)  Past Surgical History:   Procedure Laterality Date    COLONOSCOPY N/A 12/6/2021    COLONOSCOPY (URGENT) performed by Mane Reza MD at [address] [place] [address] Right 2018    Fibroadenoma    [address]    HX CHOLECYSTECTOMY  2017    HX GASTRIC BYPASS  01/2017    HX HYSTERECTOMY  1985    HX TOTAL ABDOMINAL HYSTERECTOMY      age 22    IR INJ SPINE FLUORO GUIDED  2/4/2021    IR INJ SPINE FLUORO GUIDED  5/5/2022    DAYAN STEREO  BX BREAST RT ADDL W/CLIP AND SPECIMEN Right 2000    neg     NM CARDIAC SURG PROCEDURE UNLIST  11/2015    STENT PLACED       Family History  Family History   Problem Relation Age of Onset    Cancer Mother 67        colon    Diabetes Mother     OSTEOARTHRITIS Mother     Stroke Mother     Migraines Mother     Diabetes Father     Heart Disease Father     Heart Attack Father     Hypertension Father     High Cholesterol Father     COPD Father     Heart Failure Father     Cancer Other     Diabetes Other     Heart Disease Other     Hypertension Other     Cancer Brother         lymphoma    Cancer Brother         PROSTATE AND LYMPHOMA    Cancer Maternal Grandmother         stomach    Asthma Brother     Asthma Brother     Stroke Brother     Cancer Maternal Aunt         lung     Breast Cancer Maternal Aunt     Cancer Maternal Uncle         lung    Cancer Maternal Uncle         melanoma    Anesth Problems Neg Hx        Social History  Alcohol history: Not at all  Smoking history: Non-smoker  Illicit drug history: Not at all  Living arrangement: patient lives with their family. Ambulates: Walker    Vital Signs  Visit Vitals  BP (!) 100/49 (BP 1 Location: Left upper arm, BP Patient Position: At rest;Reclining)   Pulse 95   Temp 100.1 °F (37.8 °C)   Resp 15   Ht 5' 5\" (1.651 m)   Wt 130 lb (59 kg)   SpO2 97%   BMI 21.63 kg/m²       Physical Exam  General: No acute distress. Alert. Cooperative. Head: Normocephalic. Atraumatic. Eyes:              Conjunctiva pink. Sclera white. Ears:              Hearing grossly intact. Nose:             Septum midline. Mucosa pink. No drainage. Throat: Mucosa pink. Moist mucous membranes. No tonsillar exudates or erythema. Palate movement equal bilaterally. Neck: Supple. Normal ROM. No stiffness. Respiratory: CTAB. No w/r/r/c.   Cardiovascular: Mitral valve murmur. Normal S1,S2. No m/r/g. Pulses 2+ throughout. GI: + bowel sounds. LLQ, RUQ, epigastric tender. No rebound tenderness or guarding. Mild distention. Extremities: No LE edema. Distal pulses intact. Musculoskeletal: Full ROM in all extremities. Skin: Warm, dry. No rashes. Neuro: CN II-XII grossly intact. Strength 5/5 in all extremities.         Laboratory Data  Recent Results (from the past 8 hour(s))   CBC WITH AUTOMATED DIFF    Collection Time: 03/25/23  2:38 PM   Result Value Ref Range    WBC 46.5 (H) 3.6 - 11.0 K/uL    RBC 4.08 3.80 - 5.20 M/uL    HGB 11.7 11.5 - 16.0 g/dL    HCT 34.7 (L) 35.0 - 47.0 %    MCV 85.0 80.0 - 99.0 FL    MCH 28.7 26.0 - 34.0 PG    MCHC 33.7 30.0 - 36.5 g/dL    RDW 12.8 11.5 - 14.5 %    PLATELET 698 (H) 193 - 400 K/uL    MPV 8.6 (L) 8.9 - 12.9 FL    NRBC 0.0 0  WBC    ABSOLUTE NRBC 0.00 0.00 - 0.01 K/uL    NEUTROPHILS 90 (H) 32 - 75 %    BAND NEUTROPHILS 1 %    LYMPHOCYTES 3 (L) 12 - 49 %    MONOCYTES 6 5 - 13 %    EOSINOPHILS 0 0 - 7 %    BASOPHILS 0 0 - 1 %    IMMATURE GRANULOCYTES 0 0.0 - 0.5 %    ABS. NEUTROPHILS 42.3 (H) 1.8 - 8.0 K/UL    ABS. LYMPHOCYTES 1.4 0.8 - 3.5 K/UL    ABS. MONOCYTES 2.8 (H) 0.0 - 1.0 K/UL    ABS. EOSINOPHILS 0.0 0.0 - 0.4 K/UL    ABS. BASOPHILS 0.0 0.0 - 0.1 K/UL    ABS. IMM. GRANS. 0.0 0.00 - 0.04 K/UL    DF MANUAL      PLATELET COMMENTS Large Platelets      RBC COMMENTS NORMOCYTIC, NORMOCHROMIC     METABOLIC PANEL, COMPREHENSIVE    Collection Time: 03/25/23  2:38 PM   Result Value Ref Range    Sodium 132 (L) 136 - 145 mmol/L    Potassium 2.1 (LL) 3.5 - 5.1 mmol/L    Chloride 90 (L) 97 - 108 mmol/L    CO2 35 (H) 21 - 32 mmol/L    Anion gap 7 5 - 15 mmol/L    Glucose 142 (H) 65 - 100 mg/dL    BUN 11 6 - 20 MG/DL    Creatinine 0.79 0.55 - 1.02 MG/DL    BUN/Creatinine ratio 14 12 - 20      eGFR >60 >60 ml/min/1.73m2    Calcium 8.5 8.5 - 10.1 MG/DL    Bilirubin, total 0.9 0.2 - 1.0 MG/DL    ALT (SGPT) 9 (L) 12 - 78 U/L    AST (SGOT) 14 (L) 15 - 37 U/L    Alk. phosphatase 163 (H) 45 - 117 U/L    Protein, total 6.2 (L) 6.4 - 8.2 g/dL    Albumin 2.0 (L) 3.5 - 5.0 g/dL    Globulin 4.2 (H) 2.0 - 4.0 g/dL    A-G Ratio 0.5 (L) 1.1 - 2.2     SAMPLES BEING HELD    Collection Time: 03/25/23  2:38 PM   Result Value Ref Range    SAMPLES BEING HELD SST.RED.XENIA     COMMENT        Add-on orders for these samples will be processed based on acceptable specimen integrity and analyte stability, which may vary by analyte.    LIPASE    Collection Time: 03/25/23  2:38 PM   Result Value Ref Range    Lipase 25 (L) 73 - 393 U/L   MAGNESIUM    Collection Time: 03/25/23  2:38 PM   Result Value Ref Range    Magnesium 1.6 1.6 - 2.4 mg/dL   PROCALCITONIN    Collection Time: 03/25/23  2:38 PM   Result Value Ref Range    Procalcitonin 0.52 ng/mL   POC LACTIC ACID    Collection Time: 03/25/23  2:52 PM   Result Value Ref Range    Lactic Acid (POC) 1.84 0.40 - 2.00 mmol/L       Imaging  CXR Results  (Last 48 hours)      None          CT Results  (Last 48 hours)      None              Assessment and Plan     Oralia Antunez is a 58 y.o. female with PMH of hypertension, asthma, GERD, gastric bypass,  CAD s/p PCI (2015), anxiety, type 2 diabetes mellitus, HFpEF (EF 65%, G1DD 2015)  who is admitted for hyperkalemia and leukocytosis. Leukocytosis in s/o diarrhea: POA wbc 46.5. Likely reactive in ongoing diarrhea in s/o previous admission 3/14/23 for colitis. Was treated with zosyn and discharged on Augmentin, completed 9/10 day. Was switched over to ciprofloxacin and flagyl. Favor C. Diff given ongoing diarrhea and recent Abx use. Less likely hematologic process like CML given infectious etiology. Hematology consulted from ED  - Admit to telemetry  - Follow up C. Diff, O&P, WBC stool, culture stool  - Enteric precautions  - Follow up blood culture  - Repeat CT abdomen to look for colitis  - Strict I and O  - NPO with sips of water  - Phenergan for nausea  - Zosyn for possible colitis  - Follow up peripheral smear per hemat. - Daily CBC, BMP    Hypokalemia: POA K 2.1 in s/o persistent GI loses. EKG sinus rhythm, no T wave changes.  - Replete as needed  - Monitor on BMP q4h, daily Mg    H/o recent falls: patient recently evaluated at [facility] ED for mechanical GLF with x 6 staples placed on head. Wound clean and dry. CT imaging negative. H/o Gastric bypass: 2017. Avoid NSAIDs and ASA. CAD s/p PCI: Stent dLCx in 2015. F/w Dr Jayme Aleman MD ( cards). LOV 10/22. Stress test 2017 EF 64%  - Continue Lipitor 20 mg daily    HFpEF: Stress test 2017 EF 64%. Euvolemic.  - Continue home Bumex 0.5 mg daily.     T2DM: Last HgA1C 5.4 04/2022. Diet controlled s/p gastric by pass. Anxiety:   - Continue Wellbutrin  mg BID    Asthma:   - Continue home Albuterol and Pulmicort BID    Hypertension:   - Home Lopressor 25mg BID.  - Monitor BP and adjust as needed    GERD:   - Cont home Protonix 40 mg IV daily. Fibromyalgia:   - Continue gabapentin 600mg TID. Prolonged Qtc:   - Avoid qtc prolonging drugs    DDD, Arthritis: on home flexeril 10 mG TID prn    FEN/GI - NPO with sips. Activity - As tolerated  DVT prophylaxis - Lovenox  GI prophylaxis - Protonix  Fall prophylaxis - Fall precautions ordered. Disposition - Admit to Stepdown. Plan to d/c to TBD. Code Status - Full. Discussed with patient / caregivers.   Point of Contact - Severo Guallpa (Spouse) [phone]    Patient Fiorella Owusu will be discussed with Dr. Jannie Pierce .    5:01 PM, 03/25/23  Geovany Ruiz MD  Family Medicine Resident       For Billing    Chief Complaint   Patient presents with    Abnormal White Blood Cell Count       Hospital Problems  Date Reviewed: 3/20/2023            Codes Class Noted POA    Neutrophilia ICD-10-CM: D72.9  ICD-9-CM: 288.8  3/25/2023 Unknown        Diarrhea of presumed infectious origin ICD-10-CM: R19.7  ICD-9-CM: 009.3  3/25/2023 Unknown        Thrombocytosis ICD-10-CM: D75.839  ICD-9-CM: 238.71  3/25/2023 Unknown        Colitis ICD-10-CM: K52.9  ICD-9-CM: 558.9  3/13/2023 Unknown        H/O gastric bypass ICD-10-CM: Z98.84  ICD-9-CM: V45.86  7/12/2017 Yes    Overview Addendum 11/3/2018  9:51 AM by Tristan Mccain 1/2017  lost from 230 to 154 lbs             Hypokalemia ICD-10-CM: E87.6  ICD-9-CM: 276.8  1/26/2017 Yes        Anxiety ICD-10-CM: F41.9  ICD-9-CM: 300.00  6/1/2016 Yes    Overview Addendum 9/18/2018  4:54 PM by Tristan Lopez     Needs to be seen at least twice yearly for BNZ  FTF 12/19/2017, 9/18/18   as expected 12/19/2017, 9/18/18             FH: diabetes mellitus ICD-10-CM: Z83.3  ICD-9-CM: V18.0  8/4/2015 Yes        Essential hypertension ICD-10-CM: I10  ICD-9-CM: 401.9  5/17/2015 Yes        Fibromyalgia ICD-10-CM: M79.7  ICD-9-CM: 729.1  3/30/2015 Yes    Overview Addendum 7/12/2017 11:40 AM by Vilma Caballero  S/p NEREYDA x 3 in back via Dr. Maged Harkins.   He uses flexeril HS for pain             Asthma ICD-10-CM: J45.909  ICD-9-CM: 493.90  9/22/2010 Yes        GERD (gastroesophageal reflux disease) ICD-10-CM: K21.9  ICD-9-CM: 530.81  9/22/2010 Yes

## 2023-03-25 NOTE — ED TRIAGE NOTES
Pt instructed to come to the ER for elevated WBC and low K. Pt reports low grade fevers and fatigue.

## 2023-03-25 NOTE — PROGRESS NOTES
[facility]   Senior Resident Admission Note    Chart reviewed. Patient seen, examined, and discussed with Dr. Reyna Wu (PGY-1). See H&P note for more details. CC: abnormal blood work    HPI:  Brijesh Bernstein is a 58 y.o. female w/ a known history of hypertension, asthma, GERD, gastric bypass,  CAD s/p PCI (2015), anxiety, type 2 diabetes mellitus, HFpEF (EF 65%, G1DD 2015)  who presents for abnormal blood work and colitis. Patient recently admitted from 3/13 to 3/15 for colitis, thought to be ischemic vs infectious and treated with Zosyn that was transitioned to PO Augmentin on d/c. Subsequently saw Dr. Jonas Khan on 3/24 who obtained BW and converted patient to Cipro+Flagyl. BW returned today for a leukocytosis of 33.4 with a K of 2.6 prompting Dr. Jonas Khan to notify patients of these concerning results with recommendation to be evaluated at [facility] ED. Today, she presents with foul-smelling, \"mucous\"-like, mostly loose and watery bowel movements associated with abdominal cramping (mostly localized to left side) along with \"fevers\" (T: 100.0F at home) and nausea without vomiting. Does not endorse any sick contacts. No dysuria, hematuria. FH:  - Mother: Unspecified skin cancer, colon cancer  - Brother: Unspecified lymphoma (?CML)    Pertinent ED Findings:  VS:  99, 65, 101/66, 18, 98% on RA  Labs:  WBC 46.6 (neutrophil predominant), K 2.1, Na 132  Imaging: none  EKG:  NSR at 95 bpm, Qtc 542 msec  Treatment: Kcl 40 mEq IV    PE:  Patient Vitals for the past 4 hrs:   Temp Pulse Resp BP SpO2   03/25/23 1434 99 °F (37.2 °C) 65 18 101/66 98 %       A/P: 58 y.o. female admitted for acute leukocytosis with underlying colitis. Acute Leukocytosis: POA WBC 46.5 (neutrophil predominant, acutely worsened over last 3 reads with the following trend: 17.5>33.4>46.5). Acute worsening suggests an infectious process.  However, heme/onc was consulted by ED and expressed concerns for blast crisis. CML would present with basophilia and elevated monocytes along with WBC >100 increasing over time. No splenomegaly or B symptoms. There is a 7 lb weight loss from 3/24 to 3/25 (suspect likely in error as patient was 130 lbs on 3/15). Leukemoid reaction seems more likely, though, patient does not appear septic or toxic. Crohns is also on Ddx given watery BM's. Recently admitted for colitis (infectious vs inflammatory per CT Abd). - Admit to Tele, VS per unit protocol  - Consult to heme/onc; appreciate rec's   - Peripheral smear pending  - 1.5 MIVF  - Zosyn for colitis, PO vanc for possible C. diff  - Stool studies  - Enteric precautions  - Bcx ordered  - Daily labs    Colitis: Ischemic vs infectious. Likely contributing to patient's leukocytosis above. Obtain repeat imaging to assess and r/o more ominous sequela of suspected C. Diff such as megacolon. - CT Abd/pelv    Hypokalemia: Acute. POA K 2.1. Suspect likely 2/2 mild hypovolemia from sensible losses. Does not use B-agonist regularly. Not on insulin. ED started IV Kcl 40 mEq.   - IV Kcl 20 mEq after initial run completes  - Klor Con 40 mEq x 2 (q1h), recheck K at around 11 PM  - Replete PRN      I agree with remaining assessment and plan as documented in Dr. Abby Mahoney (PGY-1) note.     Pt discussed with Dr. Georgina Perez (on-call attending physician)    Erin Blackman MD  Family Medicine Resident (PGY-2)

## 2023-03-25 NOTE — TELEPHONE ENCOUNTER
Tried calling the patient multiple times to inform her of her low potassium levels. Left a HIPAA compliant message informing of low potassium levels requiring urgent repletion at an ER.      Do Betancourt MD

## 2023-03-25 NOTE — ED PROVIDER NOTES
This is a 28-year-old female who presents to the emergency room with leukocytosis. Patient was called by her PCP and told to come to the hospital secondary to an elevated WBC count. Patient states she has had worsening fatigue over the past few days, went to her PCP and had blood drawn. Is found to have a leukocytosis of 33,000 prompting an emergency room visit. Denies any chest pain, shortness of breath, dizziness, nausea or vomiting. No urinary symptoms, no constipation or diarrhea. Is noted to have a temperature of 99 in triage. Blood pressure 101/66. No chills. There are no further complaints at this time. Past Medical History:   Diagnosis Date    Arthritis     OA back,knees and hands    Asthma     Autoimmune disease ([address]     Chandler Malhotra    CAD (coronary artery disease)     s/p stents 11/2015    Cardiac murmur     Depression     Diabetes ([address]     diet controlled at this time.      DVT (deep venous thrombosis) ([facility]) 1981    GERD (gastroesophageal reflux disease)     Hypertension     Lumbar radiculitis     follows with dr Coral Hooker for epidural steroid injections    Morbid obesity ([address]     Musculoskeletal disorder     EDMOND (obstructive sleep apnea)     wears cpap    S/P TONJA (total abdominal hysterectomy)     Thromboembolus ([address] 1996    PE    Trigger finger, right little finger 10/19/2020    follows with [facility]    Trigger finger, right ring finger 10/19/2020    [facility]       Past Surgical History:   Procedure Laterality Date    COLONOSCOPY N/A 12/6/2021    COLONOSCOPY (URGENT) performed by Jenn Allen MD at [address] [place] [address] Right 2018    Fibroadenoma    [address]    HX CHOLECYSTECTOMY  2017    HX GASTRIC BYPASS  01/2017    HX HYSTERECTOMY  1985    HX TOTAL ABDOMINAL HYSTERECTOMY      age 22    IR INJ SPINE FLUORO GUIDED  2/4/2021    IR INJ SPINE FLUORO GUIDED  5/5/2022    DAYAN STEREO  BX BREAST RT ADDL W/CLIP AND SPECIMEN Right 2000    neg     [place] CARDIAC SURG PROCEDURE UNLIST  11/2015    STENT PLACED         Family History:   Problem Relation Age of Onset    Cancer Mother 67        colon    Diabetes Mother     OSTEOARTHRITIS Mother     Stroke Mother     Migraines Mother     Diabetes Father     Heart Disease Father     Heart Attack Father     Hypertension Father     High Cholesterol Father     COPD Father     Heart Failure Father     Cancer Other     Diabetes Other     Heart Disease Other     Hypertension Other     Cancer Brother         lymphoma    Cancer Brother         PROSTATE AND LYMPHOMA    Cancer Maternal Grandmother         stomach    Asthma Brother     Asthma Brother     Stroke Brother     Cancer Maternal Aunt         lung     Breast Cancer Maternal Aunt     Cancer Maternal Uncle         lung    Cancer Maternal Uncle         melanoma    Anesth Problems Neg Hx        Social History     Socioeconomic History    Marital status:      Spouse name: Not on file    Number of children: Not on file    Years of education: Not on file    Highest education level: Not on file   Occupational History    Not on file   Tobacco Use    Smoking status: Never    Smokeless tobacco: Never   Vaping Use    Vaping Use: Never used   Substance and Sexual Activity    Alcohol use: No     Alcohol/week: 0.0 standard drinks    Drug use: No    Sexual activity: Yes     Partners: Male     Birth control/protection: None   Other Topics Concern    Not on file   Social History Narrative    Not on file     Social Determinants of Health     Financial Resource Strain: Low Risk     Difficulty of Paying Living Expenses: Not hard at all   Food Insecurity: No Food Insecurity    Worried About Running Out of Food in the Last Year: Never true    Ran Out of Food in the Last Year: Never true   Transportation Needs: Not on file   Physical Activity: Not on file   Stress: Not on file   Social Connections: Not on file   Intimate Partner Violence: Not on file   Housing Stability: Not on file         ALLERGIES: Accupril [quinapril], Adhesive tape-silicones, Lipitor [atorvastatin], and Norvasc [amlodipine]    Review of Systems   Constitutional:  Positive for fatigue. Negative for appetite change, chills, diaphoresis and fever. HENT:  Negative for congestion, ear discharge, ear pain, sinus pressure, sinus pain, sore throat and trouble swallowing. Eyes:  Negative for photophobia, pain, redness and visual disturbance. Respiratory:  Negative for cough, chest tightness, shortness of breath and wheezing. Cardiovascular:  Negative for chest pain and palpitations. Gastrointestinal:  Negative for abdominal distention, abdominal pain, nausea and vomiting. Endocrine: Negative. Genitourinary:  Negative for difficulty urinating, flank pain, frequency and urgency. Musculoskeletal:  Positive for myalgias. Negative for back pain, neck pain and neck stiffness. Skin:  Negative for color change, pallor, rash and wound. Allergic/Immunologic: Negative. Neurological:  Negative for dizziness, speech difficulty, weakness and headaches. Hematological:  Does not bruise/bleed easily. Psychiatric/Behavioral:  Negative for behavioral problems. The patient is not nervous/anxious. Vitals:    03/25/23 1434   BP: 101/66   Pulse: 65   Resp: 18   Temp: 99 °F (37.2 °C)   SpO2: 98%   Weight: 59 kg (130 lb)   Height: 5' 5\" (1.651 m)            Physical Exam  Vitals and nursing note reviewed. Constitutional:       General: She is not in acute distress. Appearance: Normal appearance. She is well-developed. She is not ill-appearing. HENT:      Head: Normocephalic and atraumatic. Right Ear: External ear normal.      Left Ear: External ear normal.      Nose: Nose normal. No congestion. Mouth/Throat:      Mouth: Mucous membranes are moist.   Eyes:      General:         Right eye: No discharge. Left eye: No discharge.       Conjunctiva/sclera: Conjunctivae normal. Pupils: Pupils are equal, round, and reactive to light. Neck:      Vascular: No JVD. Trachea: No tracheal deviation. Cardiovascular:      Rate and Rhythm: Normal rate and regular rhythm. Pulses: Normal pulses. Heart sounds: Normal heart sounds. No murmur heard. No gallop. Pulmonary:      Effort: Pulmonary effort is normal. No respiratory distress. Breath sounds: Normal breath sounds. Chest:      Chest wall: No tenderness. Abdominal:      General: Bowel sounds are normal. There is no distension. Palpations: Abdomen is soft. Tenderness: There is no abdominal tenderness. There is no guarding or rebound. Genitourinary:     Comments: Negative    Musculoskeletal:         General: No tenderness. Normal range of motion. Cervical back: Normal range of motion and neck supple. No tenderness. Skin:     General: Skin is warm and dry. Capillary Refill: Capillary refill takes less than 2 seconds. Coloration: Skin is not pale. Findings: No erythema or rash. Neurological:      General: No focal deficit present. Mental Status: She is alert and oriented to person, place, and time. Motor: No weakness. Coordination: Coordination normal.   Psychiatric:         Mood and Affect: Mood normal.         Behavior: Behavior normal.         Thought Content: Thought content normal.         Judgment: Judgment normal.        Medical Decision Making  58-year-old female who presents to the emergency room with leukocytosis. Patient was called by her PCP and told to come to the hospital secondary to an elevated WBC count. Patient states she has had worsening fatigue over the past few days, went to her PCP and had blood drawn. Is found to have a leukocytosis of 33,000 prompting an emergency room visit. Denies any chest pain, shortness of breath, dizziness, nausea or vomiting. No urinary symptoms, no constipation or diarrhea.   I am concerned for urinary tract infection, leukemia, blasts. Hematology consulted. Peripheral smear ordered. Per lab will be sent to [facility] to be reviewed by pathology and resulted tomorrow. Admitted to the family practice service who is aware. They will follow up with results of smear. Potassium repleted secondary to hypokalemia. Any available vitals, labs, images, nursing notes, medications, allergies, PMH, PSH and/or previous records in the chart were reviewed. All of these were considered in the medical decision making process. I individually reviewed any labs and any images obtained. This was discussed with my attending and he/she is in agreement with plan of care. Patient in agreement with plan of care. Problems Addressed:  Hypokalemia: acute illness or injury  Leukocytosis, unspecified type: acute illness or injury  Malaise and fatigue: acute illness or injury    Amount and/or Complexity of Data Reviewed  Labs: ordered. Decision-making details documented in ED Course. Risk  Prescription drug management. Decision regarding hospitalization. ED Course as of 03/25/23 1547   Sat Mar 25, 2023   1547 Discussed with Dr. Primitivo Condon. Hematology consulted. [KR]      ED Course User Index  [KR] Sloane Valdovinos NP     Perfect Serve Consult for Admission  4:49 PM    ED Room Number: ER02/02  Patient Name and age:  Dennie Bandy 58 y.o.  female  Working Diagnosis:   1. Malaise and fatigue    2. Leukocytosis, unspecified type    3. Hypokalemia        COVID-19 Suspicion:  no  Sepsis present:  no  Reassessment needed: no  Code Status:  Full Code  Readmission: no  Isolation Requirements:  no  Recommended Level of Care:  step down  Department: [facility] - [phone]    Other:  59-year-old female who presents to the emergency room with leukocytosis. Patient was called by her PCP and told to come to the hospital secondary to an elevated WBC count.   Patient states she has had worsening fatigue over the past few days, went to her PCP and had blood drawn. Is found to have a leukocytosis of 33,000 prompting an emergency room visit. WBC count now 91809. K 2.1 and repleted. Consulted hematology and awaiting callback. 6:15 PM Peripheral smear to be resulted tomorrow after [facility] pathology reviews. Hematology to review at that time.          Procedures

## 2023-03-25 NOTE — CONSULTS
[facility] at [address]  Karuna Quale: [phone]  F: [phone] Patient ID  Name: Angelina Lara  YOB: 1960  MRN: [id number]  Referring Provider:   No referring provider defined for this encounter. Primary Care Provider:   Marcella Wood MD       HEMATOLOGY/MEDICAL ONCOLOGY  NOTE     Reason for Evaluation:     Chief Complaint   Patient presents with    Abnormal White Blood Cell Count     neutrophilia      Subjective:     History of Present Illness:   Date of Visit: 03/25/23   Angelina Lara is a 58 y.o. F who presents as an inpatient consultation for neutrophilia. She was hospitalized earlier this month for colitis and was treated with antibiotics while C diff testing was negative. However, patient reports that she did not improve with prescribed antibiotic course. She reports loose stools. Denies any bleeding. Reports mild fever. Denies night sweats. Reports decreased appetite. Denies lymphadenopathy. Reports abdominal pain. .   This is a recurrent problem. Problem has occurred for weeks. Problem has worsened. Patient reports dealing with diarrhea. Patient denies any shortness of breath. Past Medical History:   Diagnosis Date    Arthritis     OA back,knees and hands    Asthma     Autoimmune disease ([address]     Laneta Labor    CAD (coronary artery disease)     s/p stents 11/2015    Cardiac murmur     Depression     Diabetes ([address]     diet controlled at this time.      DVT (deep venous thrombosis) ([facility]) 1981    GERD (gastroesophageal reflux disease)     Hypertension     Lumbar radiculitis     follows with dr Pedrito Bonilla for epidural steroid injections    Morbid obesity ([address]     Musculoskeletal disorder     EDMOND (obstructive sleep apnea)     wears cpap    S/P TONJA (total abdominal hysterectomy)     Thromboembolus ([address] 1996    PE    Trigger finger, right little finger 10/19/2020    [facility] with [facility]    Trigger finger, right ring finger 10/19/2020    [facility]      Past Surgical History:   Procedure Laterality Date    COLONOSCOPY N/A 12/6/2021    COLONOSCOPY (URGENT) performed by Jenn [facility] at [facility]    HX BREAST BIOPSY Right 2018    Fibroadenoma    HX [address]    HX CHOLECYSTECTOMY  2017    HX GASTRIC BYPASS  01/2017    HX HYSTERECTOMY  1985    HX TOTAL ABDOMINAL HYSTERECTOMY      age 22    IR INJ SPINE FLUORO GUIDED  2/4/2021    IR INJ SPINE FLUORO GUIDED  5/5/2022    DAYAN STEREO  BX BREAST RT ADDL W/CLIP AND SPECIMEN Right 2000    neg     [place] CARDIAC SURG PROCEDURE UNLIST  11/2015    STENT PLACED      Social History     Tobacco Use    Smoking status: Never    Smokeless tobacco: Never   Substance Use Topics    Alcohol use: No     Alcohol/week: 0.0 standard drinks      Family History   Problem Relation Age of Onset    Cancer Mother 67        colon    Diabetes Mother     OSTEOARTHRITIS Mother     Stroke Mother     Migraines Mother     Diabetes Father     Heart Disease Father     Heart Attack Father     Hypertension Father     High Cholesterol Father     COPD Father     Heart Failure Father     Cancer Other     Diabetes Other     Heart Disease Other     Hypertension Other     Cancer Brother         lymphoma    Cancer Brother         PROSTATE AND LYMPHOMA    Cancer Maternal Grandmother         stomach    Asthma Brother     Asthma Brother     Stroke Brother     Cancer Maternal Aunt         lung     Breast Cancer Maternal Aunt     Cancer Maternal Uncle         lung    Cancer Maternal Uncle         melanoma    Anesth Problems Neg Hx      Current Facility-Administered Medications   Medication Dose Route Frequency    potassium chloride 10 mEq in 100 ml IVPB  10 mEq IntraVENous Q1H    sodium chloride (NS) flush 5-40 mL  5-40 mL IntraVENous Q8H    sodium chloride (NS) flush 5-40 mL  5-40 mL IntraVENous PRN    acetaminophen (TYLENOL) tablet 650 mg  650 mg Oral Q6H PRN    Or acetaminophen (TYLENOL) suppository 650 mg  650 mg Rectal Q6H PRN    polyethylene glycol (MIRALAX) packet 17 g  17 g Oral DAILY PRN    [START ON 3/26/2023] enoxaparin (LOVENOX) injection 40 mg  40 mg SubCUTAneous DAILY    promethazine (PHENERGAN) tablet 12.5 mg  12.5 mg Oral Q6H PRN    potassium chloride SR (KLOR-CON 10) tablet 40 mEq  40 mEq Oral Q1H    potassium chloride 10 mEq in 100 ml IVPB  10 mEq IntraVENous Q1H    magnesium sulfate 2 g/50 ml IVPB (premix or compounded)  2 g IntraVENous ONCE     Current Outpatient Medications   Medication Sig    ciprofloxacin HCl (CIPRO) 500 mg tablet Take 1 Tablet by mouth two (2) times a day for 10 days. metroNIDAZOLE (FLAGYL) 250 mg tablet Take 1 Tablet by mouth three (3) times daily for 10 days. gabapentin (NEURONTIN) 600 mg tablet Take 1 Tablet by mouth three (3) times daily. albuterol (ACCUNEB) 0.63 mg/3 mL nebulizer solution 3 mL by Nebulization route every six (6) hours as needed for Wheezing. L.acid,para-B. bifidum-S.therm (RISAQUAD) 8 billion cell cap cap Take 1 Capsule by mouth daily. polyethylene glycol (MIRALAX) 17 gram packet Take 1 Packet by mouth daily for 30 days. ondansetron (ZOFRAN ODT) 4 mg disintegrating tablet TAKE 1 TABLET BY MOUTH EVERY 8 HOURS AS NEEDED FOR NAUSEA    cyclobenzaprine (FLEXERIL) 10 mg tablet Take 1 Tablet by mouth three (3) times daily as needed for Muscle Spasm(s). co-enzyme Q-10 (CO Q-10) 100 mg capsule TAKE 1 CAPSULE BY MOUTH EVERY DAY    nitroglycerin (NITROSTAT) 0.4 mg SL tablet TAKE 1 TABLET BY MOUTH EVERY 5 MINUTES UP TO 3 DOSES THEN CALL 911 IF PAIN PERSISTS    buPROPion XL (WELLBUTRIN XL) 300 mg XL tablet Take 300 mg by mouth two (2) times a day.     atorvastatin (LIPITOR) 20 mg tablet TAKE 1 TABLET BY MOUTH EVERY DAY    hydrOXYzine pamoate (VISTARIL) 25 mg capsule TAKE 1 CAPSULE BY MOUTH TWICE A DAY AS NEEDED FOR ANXIETY    metoprolol tartrate (LOPRESSOR) 25 mg tablet TAKE 1 TABLET BY MOUTH TWICE A DAY bumetanide (BUMEX) 0.5 mg tablet TAKE 1 TABLET BY MOUTH EVERY DAY AS NEEDED    omeprazole (PRILOSEC) 40 mg capsule TAKE 1 CAPSULE BY MOUTH EVERY DAY    albuterol (ProAir HFA) 90 mcg/actuation inhaler Take 1 Puff by inhalation every four (4) hours as needed for Wheezing or Shortness of Breath. budesonide (Pulmicort Flexhaler) 180 mcg/actuation aepb inhaler Take 2 Puffs by inhalation daily. valACYclovir (VALTREX) 500 mg tablet TAKE 1 PILL TWICE DAILY FOR 3 DAYS AT SIGN OF HERPES FLARE. glucose blood VI test strips (BLOOD GLUCOSE TEST) strip Use once a day    Blood-Glucose Meter monitoring kit Use to check glucose once a day    alcohol swabs (ALCOHOL PADS) padm Use when checking blood glucose    Lancets misc Use once a day. Please give one compatible with patient's meter      Allergies   Allergen Reactions    Accupril [Quinapril] Cough    Adhesive Tape-Silicones Other (comments)     Makes skin tears easily. Lipitor [Atorvastatin] Other (comments)     aches    Norvasc [Amlodipine] Swelling     On legs at 10 mg dose      -  Review of Systems provided by:patient  General: denies night sweats, denies weight loss, reports fever, reports chills, reports appetite loss, and reports fatigue  Eyes: denies any acute vision loss and denies any eye pain  HEENT: denies epistaxis and denies trouble swallowing  Cardio: denies any chest pain and denies any leg swelling  Resp: denies any shortness of breath. denies any hemoptysis. Abdomen: denies any nausea, denies any vomiting, denies any constipation, denies any bloody stools, denies any melena, reports abdominal pain, and reports diarrhea  MSK: denies any myalgias and denies any arthralgias  Skin: denies any rash and denies any itching  Lymph: denies any lymph node enlargement and denies any lymph node tenderness  Neuro: denies any headache and denies any tremor  : Denies any dysuria. Denies any hematuria.   Psych: denies depression and denies anxiety    Objective: Visit Vitals  BP (!) 100/49 (BP 1 Location: Left upper arm, BP Patient Position: At rest;Reclining)   Pulse 95   Temp 100.1 °F (37.8 °C)   Resp 15   Ht 5' 5\" (1.651 m)   Wt 130 lb (59 kg)   LMP 01/01/1985   SpO2 97%   BMI 21.63 kg/m²     ECOG PS: 1- Restricted in physically strenuous activity but ambulatory and able to carry out work of a light or sedentary nature, e.g., light house work, office work. Physical Exam  Constitutional: No acute distress. and Non-toxic appearance. HENT: Normocephalic and atraumatic head. and carla complexion. Eyes: Normal Conjunctivae. Anicteric sclerae. Cardiovascular: S1,S2 auscultated. No pitting edema. Pulmonary: Normal Respiratory Effort. No wheezing. No rhonchi. No rales. Abdominal: Normal bowel sounds. Soft Abdomen to palpation. General abdominal tenderness. No guarding. No rebound tenderness. Musculoskeletal: No muscle pain on palpation. No temporal muscle wasting on inspection. Skin: No jaundice. No petechiae. Neurological: Alert and oriented. No tremor on inspection. Psychiatric: mood normal. normal speech rate. normal affect. Lymph: No cervical, supraclavicular,or axillary lymph node enlargement or tenderness. Results:     I personally reviewed Epic EHR labs/results below:   Lab Results   Component Value Date/Time    WBC 46.5 (H) 03/25/2023 02:38 PM    HGB 11.7 03/25/2023 02:38 PM    HCT 34.7 (L) 03/25/2023 02:38 PM    PLATELET 651 (H) 56/45/0920 02:38 PM    MCV 85.0 03/25/2023 02:38 PM    ABS.  NEUTROPHILS 42.3 (H) 03/25/2023 02:38 PM     Lab Results   Component Value Date/Time    Sodium 132 (L) 03/25/2023 02:38 PM    Potassium 2.1 (LL) 03/25/2023 02:38 PM    Chloride 90 (L) 03/25/2023 02:38 PM    CO2 35 (H) 03/25/2023 02:38 PM    Glucose 142 (H) 03/25/2023 02:38 PM    BUN 11 03/25/2023 02:38 PM    Creatinine 0.79 03/25/2023 02:38 PM    GFR est AA >60 04/27/2022 11:10 AM    GFR est non-AA >60 04/27/2022 11:10 AM    Calcium 8.5 03/25/2023 02:38 PM Glucose (POC) 95 12/06/2021 01:50 PM     Lab Results   Component Value Date/Time    Bilirubin, total 0.9 03/25/2023 02:38 PM    ALT (SGPT) 9 (L) 03/25/2023 02:38 PM    Alk. phosphatase 163 (H) 03/25/2023 02:38 PM    Protein, total 6.2 (L) 03/25/2023 02:38 PM    Albumin 2.0 (L) 03/25/2023 02:38 PM    Globulin 4.2 (H) 03/25/2023 02:38 PM     Assessment and Recommendations:     Neutrophilia  With elevated white blood cell count. This is predominantly neutrophilic. Reviewed her peripheral blood smear [id number] and do not appreciate any immature blast cells concerning for acute leukemia. Cannot officially exclude any occult leukemia but at this time would monitor clinically and with follow-up serology. She has septated nuclei and her neutrophils suggestive of reactive/toxic granulation. High white blood cell counts can be seen in settings of C. difficile infections. Continue to keep a wide differential of possible infectious/inflammatory causes. Agree with repeat C. difficile testing especially as she did not improve with other antibiotics. She denies taking any Flagyl or oral vancomycin. Diarrhea of presumed infectious origin  Patient reports ongoing persistent diarrhea that never fully improved and is actually worsened since last hospitalization earlier this month. Management per primary team.  Agree with C. difficile testing. Thrombocytosis  Elevated platelet count is likely reactive. This could be due to similar process driving her neutrophilia. She denies any specific infectious foci otherwise. She does have a history of gastric bypass surgery. Would recommend checking iron panel since iron deficiency is the most common cause of thrombocytosis in the outpatient setting. We will check ESR and CRP inflammatory markers. Hypokalemia  Repletion of patient's significant hypokalemia per primary team.  Her potassium is in the low 2 range.   Could be at risk for cardiac rhythm problems especially if this does not improve. FOLLOW-UP:  We will follow along but please page on-call team if any specific questions.   Signed By:   Aylin Bridges MD

## 2023-03-25 NOTE — TELEPHONE ENCOUNTER
Called patient regarding lab results including hypokalemia (2.6) and worsening leukocytosis (WBC now up to 33 from 17 on 3/20). She states that she is not feeling well at all and has mostly been in bed since yesterday. Still having cramping L-sided abdominal pain and passing mucous from her rectum. I advised that she go back to [facility] ED for evaluation and likely admission. She agreed and states that she will go to the ED.  Called inpatient family medicine team to let them know I had recommended that she go to the hospital.     Torey Martins, DO  Family Medicine Resident

## 2023-03-25 NOTE — PROGRESS NOTES
[address] Medicine Residency Attending Addendum:  I saw and evaluated the patient on the day of the encounter with Thomas Rosen MD  , performing the key elements of the service. I discussed the findings, assessment and plan with the resident and agree with the resident's findings and plan as documented in the resident's note.       Adrianna Blackmon MD, CAQSM, RMSK

## 2023-03-25 NOTE — PROGRESS NOTES
3/25/23  4:11 PM    Care Management Initial Evaluation:    CM attempted to call patient but unable to reach patient, completed assessment via chart review. Patient had a recent hospital stay from 3/13-3/15/23 and was discharged home with family. She had a PCP follow up appointment on 3/24/23. Came back to the hospital due to elevated WBC and low K.     Reason for Readmission:    Elevated WBC and low K          RUR Score/Risk Level:    N/A- not admitted yet     PCP: First and Last name:  [facility]   Name of Practice:    Are you a current patient: Yes/No: Yes   Approximate date of last visit: 3/24/23   Can you participate in a virtual visit with your PCP:     Is a Care Conference indicated:   No    Did you attend your follow up appointment (s): If not, why not:  Yes       Resources/supports as identified by patient/family:   Spouse and 25year old granddaughter       Top Challenges facing patient (as identified by patient/family and CM): Finances/Medication cost?    Has insurance   Transportation    Spouse    Support system or lack thereof? Spouse and granddaughter   Living arrangements? Spouse and granddaughter  Self-care/ADLs/Cognition? Normally ambulatory, independent with ADL's and drives         Current Advanced Directive/Advance Care Plan:  Full code           Plan for utilizing home health:   No home health needs at previous dc             Transition of Care Plan:    Based on readmission, the patient's previous Plan of Care   has been evaluated and/or modified. The current Transition of Care Plan is:            CM following for dc needs. [address] Management Interventions  PCP Verified by CM: Yes  Last Visit to PCP: 03/24/23  Mode of Transport at Discharge:  Other (see comment) (Family will transport at [employer])  Transition of Care Consult (CM Consult): Discharge Planning  Discharge Durable Medical Equipment: No  Physical Therapy Consult: No  Occupational Therapy Consult: No  Speech Therapy Consult: No  Support Systems: Spouse/Significant Other, Other Family Member(s)  Confirm Follow Up Transport: Self  Discharge Location  Patient Expects to be Discharged to[de-identified] Home with family assistance     Readmission Assessment  Number of days since last admission?: 8-30 days  Previous disposition: Home with Family  What was the patient's/caregiver's perception as to why they think they needed to return back to the hospital?: Other (Comment) (Told to come to ER due to labs)  Did you visit your Primary Care Physician after you left the hospital, before you returned this time?: Yes  Did you see a specialist, such as Cardiac, Pulmonary, Orthopedic Physician, etc. after you left the hospital?: No  Who advised the patient to return to the hospital?: Physician  Does the patient report anything that got in the way of taking their medications?: No  In our efforts to provide the best possible care to you and others like you, can you think of anything that we could have done to help you after you left the hospital the first time, so that you might not have needed to return so soon?: Other (Comment)

## 2023-03-26 ENCOUNTER — ANESTHESIA (OUTPATIENT)
Dept: SURGERY | Age: 63
End: 2023-03-26
Payer: MEDICARE

## 2023-03-26 ENCOUNTER — ANESTHESIA EVENT (OUTPATIENT)
Dept: SURGERY | Age: 63
End: 2023-03-26
Payer: MEDICARE

## 2023-03-26 LAB
ANION GAP SERPL CALC-SCNC: 6 MMOL/L (ref 5–15)
ANION GAP SERPL CALC-SCNC: 7 MMOL/L (ref 5–15)
APPEARANCE UR: CLEAR
BACTERIA URNS QL MICRO: NEGATIVE /HPF
BASOPHILS # BLD: 0 K/UL (ref 0–0.1)
BASOPHILS NFR BLD: 0 % (ref 0–1)
BILIRUB UR QL: NEGATIVE
BUN SERPL-MCNC: 11 MG/DL (ref 6–20)
BUN SERPL-MCNC: 11 MG/DL (ref 6–20)
BUN/CREAT SERPL: 14 (ref 12–20)
BUN/CREAT SERPL: 24 (ref 12–20)
CALCIUM SERPL-MCNC: 7.4 MG/DL (ref 8.5–10.1)
CALCIUM SERPL-MCNC: 7.9 MG/DL (ref 8.5–10.1)
CHLORIDE SERPL-SCNC: 103 MMOL/L (ref 97–108)
CHLORIDE SERPL-SCNC: 99 MMOL/L (ref 97–108)
CO2 SERPL-SCNC: 24 MMOL/L (ref 21–32)
CO2 SERPL-SCNC: 29 MMOL/L (ref 21–32)
COLOR UR: YELLOW
CREAT SERPL-MCNC: 0.45 MG/DL (ref 0.55–1.02)
CREAT SERPL-MCNC: 0.76 MG/DL (ref 0.55–1.02)
DIFFERENTIAL METHOD BLD: ABNORMAL
EOSINOPHIL # BLD: 0 K/UL (ref 0–0.4)
EOSINOPHIL NFR BLD: 0 % (ref 0–7)
EPITH CASTS URNS QL MICRO: ABNORMAL /LPF
ERYTHROCYTE [DISTWIDTH] IN BLOOD BY AUTOMATED COUNT: 13.1 % (ref 11.5–14.5)
EST. AVERAGE GLUCOSE BLD GHB EST-MCNC: 114 MG/DL
FERRITIN SERPL-MCNC: 372 NG/ML (ref 8–252)
GLUCOSE SERPL-MCNC: 115 MG/DL (ref 65–100)
GLUCOSE SERPL-MCNC: 96 MG/DL (ref 65–100)
GLUCOSE UR STRIP.AUTO-MCNC: NEGATIVE MG/DL
HBA1C MFR BLD: 5.6 % (ref 4–5.6)
HCT VFR BLD AUTO: 29.3 % (ref 35–47)
HGB BLD-MCNC: 10 G/DL (ref 11.5–16)
HGB UR QL STRIP: NEGATIVE
HYALINE CASTS URNS QL MICRO: ABNORMAL /LPF (ref 0–2)
IMM GRANULOCYTES # BLD AUTO: 0 K/UL
IMM GRANULOCYTES NFR BLD AUTO: 0 %
IRON SATN MFR SERPL: 10 % (ref 20–50)
IRON SERPL-MCNC: 15 UG/DL (ref 35–150)
KETONES UR QL STRIP.AUTO: ABNORMAL MG/DL
LACTATE BLD-SCNC: 1.91 MMOL/L (ref 0.4–2)
LACTATE SERPL-SCNC: 0.7 MMOL/L (ref 0.4–2)
LEUKOCYTE ESTERASE UR QL STRIP.AUTO: NEGATIVE
LYMPHOCYTES # BLD: 1.6 K/UL (ref 0.8–3.5)
LYMPHOCYTES NFR BLD: 3 % (ref 12–49)
MAGNESIUM SERPL-MCNC: 2.2 MG/DL (ref 1.6–2.4)
MCH RBC QN AUTO: 29 PG (ref 26–34)
MCHC RBC AUTO-ENTMCNC: 34.1 G/DL (ref 30–36.5)
MCV RBC AUTO: 84.9 FL (ref 80–99)
MONOCYTES # BLD: 3.7 K/UL (ref 0–1)
MONOCYTES NFR BLD: 7 % (ref 5–13)
NEUTS BAND NFR BLD MANUAL: 9 % (ref 0–6)
NEUTS SEG # BLD: 47 K/UL (ref 1.8–8)
NEUTS SEG NFR BLD: 81 % (ref 32–75)
NITRITE UR QL STRIP.AUTO: NEGATIVE
NRBC # BLD: 0 K/UL (ref 0–0.01)
NRBC BLD-RTO: 0 PER 100 WBC
PERIPHERAL SMEAR,PSM: NORMAL
PH UR STRIP: 6 (ref 5–8)
PLATELET # BLD AUTO: 440 K/UL (ref 150–400)
PMV BLD AUTO: 8.3 FL (ref 8.9–12.9)
POTASSIUM SERPL-SCNC: 2.5 MMOL/L (ref 3.5–5.1)
POTASSIUM SERPL-SCNC: 4 MMOL/L (ref 3.5–5.1)
PROT UR STRIP-MCNC: NEGATIVE MG/DL
RBC # BLD AUTO: 3.45 M/UL (ref 3.8–5.2)
RBC #/AREA URNS HPF: ABNORMAL /HPF (ref 0–5)
RBC MORPH BLD: ABNORMAL
SODIUM SERPL-SCNC: 133 MMOL/L (ref 136–145)
SODIUM SERPL-SCNC: 135 MMOL/L (ref 136–145)
SP GR UR REFRACTOMETRY: 1.02 (ref 1–1.03)
TIBC SERPL-MCNC: 146 UG/DL (ref 250–450)
UR CULT HOLD, URHOLD: NORMAL
UROBILINOGEN UR QL STRIP.AUTO: 0.2 EU/DL (ref 0.2–1)
WBC # BLD AUTO: 52.3 K/UL (ref 3.6–11)
WBC MORPH BLD: ABNORMAL
WBC URNS QL MICRO: ABNORMAL /HPF (ref 0–4)

## 2023-03-26 PROCEDURE — 65610000006 HC RM INTENSIVE CARE

## 2023-03-26 PROCEDURE — 77030002996 HC SUT SLK J&J -A: Performed by: SURGERY

## 2023-03-26 PROCEDURE — 74011250636 HC RX REV CODE- 250/636: Performed by: STUDENT IN AN ORGANIZED HEALTH CARE EDUCATION/TRAINING PROGRAM

## 2023-03-26 PROCEDURE — 77030002916 HC SUT ETHLN J&J -A: Performed by: SURGERY

## 2023-03-26 PROCEDURE — C9113 INJ PANTOPRAZOLE SODIUM, VIA: HCPCS | Performed by: STUDENT IN AN ORGANIZED HEALTH CARE EDUCATION/TRAINING PROGRAM

## 2023-03-26 PROCEDURE — 76060000036 HC ANESTHESIA 2.5 TO 3 HR: Performed by: SURGERY

## 2023-03-26 PROCEDURE — 74011250636 HC RX REV CODE- 250/636: Performed by: SURGERY

## 2023-03-26 PROCEDURE — 77030031139 HC SUT VCRL2 J&J -A: Performed by: SURGERY

## 2023-03-26 PROCEDURE — 0D160ZA BYPASS STOMACH TO JEJUNUM, OPEN APPROACH: ICD-10-PCS | Performed by: FAMILY MEDICINE

## 2023-03-26 PROCEDURE — 77030002966 HC SUT PDS J&J -A: Performed by: SURGERY

## 2023-03-26 PROCEDURE — 83735 ASSAY OF MAGNESIUM: CPT

## 2023-03-26 PROCEDURE — 85025 COMPLETE CBC W/AUTO DIFF WBC: CPT

## 2023-03-26 PROCEDURE — 77030009968 HC RELD STPLR ENDOSC J&J -D: Performed by: SURGERY

## 2023-03-26 PROCEDURE — 99223 1ST HOSP IP/OBS HIGH 75: CPT | Performed by: STUDENT IN AN ORGANIZED HEALTH CARE EDUCATION/TRAINING PROGRAM

## 2023-03-26 PROCEDURE — 77030005513 HC CATH URETH FOL11 MDII -B

## 2023-03-26 PROCEDURE — 83605 ASSAY OF LACTIC ACID: CPT

## 2023-03-26 PROCEDURE — 77030003028 HC SUT VCRL J&J -A: Performed by: SURGERY

## 2023-03-26 PROCEDURE — 77030036732 HC RELD STPLR VASC J&J -F: Performed by: SURGERY

## 2023-03-26 PROCEDURE — 74011000250 HC RX REV CODE- 250

## 2023-03-26 PROCEDURE — 74011000258 HC RX REV CODE- 258

## 2023-03-26 PROCEDURE — 74011000250 HC RX REV CODE- 250: Performed by: NURSE ANESTHETIST, CERTIFIED REGISTERED

## 2023-03-26 PROCEDURE — 74011000250 HC RX REV CODE- 250: Performed by: STUDENT IN AN ORGANIZED HEALTH CARE EDUCATION/TRAINING PROGRAM

## 2023-03-26 PROCEDURE — 77030008684 HC TU ET CUF COVD -B: Performed by: ANESTHESIOLOGY

## 2023-03-26 PROCEDURE — 2709999900 HC NON-CHARGEABLE SUPPLY: Performed by: SURGERY

## 2023-03-26 PROCEDURE — C9113 INJ PANTOPRAZOLE SODIUM, VIA: HCPCS

## 2023-03-26 PROCEDURE — 77030002986 HC SUT PROL J&J -A: Performed by: SURGERY

## 2023-03-26 PROCEDURE — 74011000258 HC RX REV CODE- 258: Performed by: SURGERY

## 2023-03-26 PROCEDURE — 74011250637 HC RX REV CODE- 250/637

## 2023-03-26 PROCEDURE — 74011250636 HC RX REV CODE- 250/636

## 2023-03-26 PROCEDURE — 80048 BASIC METABOLIC PNL TOTAL CA: CPT

## 2023-03-26 PROCEDURE — 0D1B0Z4 BYPASS ILEUM TO CUTANEOUS, OPEN APPROACH: ICD-10-PCS | Performed by: FAMILY MEDICINE

## 2023-03-26 PROCEDURE — 74011250637 HC RX REV CODE- 250/637: Performed by: STUDENT IN AN ORGANIZED HEALTH CARE EDUCATION/TRAINING PROGRAM

## 2023-03-26 PROCEDURE — 51702 INSERT TEMP BLADDER CATH: CPT

## 2023-03-26 PROCEDURE — 77030012405 HC DRN WND ADLR -A: Performed by: SURGERY

## 2023-03-26 PROCEDURE — 77030015926 HC DEV TISS SEAL J&J -E: Performed by: SURGERY

## 2023-03-26 PROCEDURE — 76010000132 HC OR TIME 2.5 TO 3 HR: Performed by: SURGERY

## 2023-03-26 PROCEDURE — 74011000250 HC RX REV CODE- 250: Performed by: SURGERY

## 2023-03-26 PROCEDURE — 88307 TISSUE EXAM BY PATHOLOGIST: CPT

## 2023-03-26 PROCEDURE — 77030020061 HC IV BLD WRMR ADMIN SET 3M -B: Performed by: ANESTHESIOLOGY

## 2023-03-26 PROCEDURE — 74011250636 HC RX REV CODE- 250/636: Performed by: NURSE ANESTHETIST, CERTIFIED REGISTERED

## 2023-03-26 PROCEDURE — 77030026438 HC STYL ET INTUB CARD -A: Performed by: NURSE ANESTHETIST, CERTIFIED REGISTERED

## 2023-03-26 PROCEDURE — 76210000006 HC OR PH I REC 0.5 TO 1 HR: Performed by: SURGERY

## 2023-03-26 PROCEDURE — C9290 INJ, BUPIVACAINE LIPOSOME: HCPCS | Performed by: SURGERY

## 2023-03-26 PROCEDURE — 77030008756 HC TU IRR SUC STRY -B: Performed by: SURGERY

## 2023-03-26 PROCEDURE — P9045 ALBUMIN (HUMAN), 5%, 250 ML: HCPCS | Performed by: NURSE ANESTHETIST, CERTIFIED REGISTERED

## 2023-03-26 PROCEDURE — 0DTF0ZZ RESECTION OF RIGHT LARGE INTESTINE, OPEN APPROACH: ICD-10-PCS | Performed by: FAMILY MEDICINE

## 2023-03-26 PROCEDURE — 77030013079 HC BLNKT BAIR HGGR 3M -A: Performed by: ANESTHESIOLOGY

## 2023-03-26 PROCEDURE — C1765 ADHESION BARRIER: HCPCS | Performed by: SURGERY

## 2023-03-26 PROCEDURE — 86900 BLOOD TYPING SEROLOGIC ABO: CPT

## 2023-03-26 PROCEDURE — 94640 AIRWAY INHALATION TREATMENT: CPT

## 2023-03-26 PROCEDURE — 81001 URINALYSIS AUTO W/SCOPE: CPT

## 2023-03-26 PROCEDURE — 77030037255 HC PCH OST FLX SENSURA COLO -A: Performed by: SURGERY

## 2023-03-26 PROCEDURE — 36415 COLL VENOUS BLD VENIPUNCTURE: CPT

## 2023-03-26 PROCEDURE — 77030005513 HC CATH URETH FOL11 MDII -B: Performed by: SURGERY

## 2023-03-26 RX ORDER — SODIUM CHLORIDE, SODIUM LACTATE, POTASSIUM CHLORIDE, CALCIUM CHLORIDE 600; 310; 30; 20 MG/100ML; MG/100ML; MG/100ML; MG/100ML
INJECTION, SOLUTION INTRAVENOUS
Status: DISCONTINUED | OUTPATIENT
Start: 2023-03-26 | End: 2023-03-27 | Stop reason: HOSPADM

## 2023-03-26 RX ORDER — POTASSIUM CHLORIDE 7.45 MG/ML
10 INJECTION INTRAVENOUS
Status: COMPLETED | OUTPATIENT
Start: 2023-03-26 | End: 2023-03-26

## 2023-03-26 RX ORDER — FENTANYL CITRATE 50 UG/ML
INJECTION, SOLUTION INTRAMUSCULAR; INTRAVENOUS AS NEEDED
Status: DISCONTINUED | OUTPATIENT
Start: 2023-03-26 | End: 2023-03-27 | Stop reason: HOSPADM

## 2023-03-26 RX ORDER — LIDOCAINE HYDROCHLORIDE 20 MG/ML
INJECTION, SOLUTION EPIDURAL; INFILTRATION; INTRACAUDAL; PERINEURAL AS NEEDED
Status: DISCONTINUED | OUTPATIENT
Start: 2023-03-26 | End: 2023-03-27 | Stop reason: HOSPADM

## 2023-03-26 RX ORDER — MORPHINE SULFATE 2 MG/ML
2 INJECTION, SOLUTION INTRAMUSCULAR; INTRAVENOUS
Status: DISCONTINUED | OUTPATIENT
Start: 2023-03-26 | End: 2023-03-26

## 2023-03-26 RX ORDER — ONDANSETRON 2 MG/ML
INJECTION INTRAMUSCULAR; INTRAVENOUS AS NEEDED
Status: DISCONTINUED | OUTPATIENT
Start: 2023-03-26 | End: 2023-03-27 | Stop reason: HOSPADM

## 2023-03-26 RX ORDER — POTASSIUM CHLORIDE 750 MG/1
20 TABLET, FILM COATED, EXTENDED RELEASE ORAL
Status: DISCONTINUED | OUTPATIENT
Start: 2023-03-26 | End: 2023-03-26

## 2023-03-26 RX ORDER — DEXAMETHASONE SODIUM PHOSPHATE 4 MG/ML
INJECTION, SOLUTION INTRA-ARTICULAR; INTRALESIONAL; INTRAMUSCULAR; INTRAVENOUS; SOFT TISSUE AS NEEDED
Status: DISCONTINUED | OUTPATIENT
Start: 2023-03-26 | End: 2023-03-27 | Stop reason: HOSPADM

## 2023-03-26 RX ORDER — PHENYLEPHRINE HCL IN 0.9% NACL 0.4MG/10ML
SYRINGE (ML) INTRAVENOUS AS NEEDED
Status: DISCONTINUED | OUTPATIENT
Start: 2023-03-26 | End: 2023-03-27 | Stop reason: HOSPADM

## 2023-03-26 RX ORDER — MIDAZOLAM HYDROCHLORIDE 1 MG/ML
INJECTION, SOLUTION INTRAMUSCULAR; INTRAVENOUS AS NEEDED
Status: DISCONTINUED | OUTPATIENT
Start: 2023-03-26 | End: 2023-03-27 | Stop reason: HOSPADM

## 2023-03-26 RX ORDER — PROPOFOL 10 MG/ML
INJECTION, EMULSION INTRAVENOUS
Status: DISCONTINUED | OUTPATIENT
Start: 2023-03-26 | End: 2023-03-27 | Stop reason: HOSPADM

## 2023-03-26 RX ORDER — POTASSIUM CHLORIDE 750 MG/1
20 TABLET, FILM COATED, EXTENDED RELEASE ORAL
Status: COMPLETED | OUTPATIENT
Start: 2023-03-26 | End: 2023-03-26

## 2023-03-26 RX ORDER — ALBUMIN HUMAN 50 G/1000ML
SOLUTION INTRAVENOUS
Status: DISCONTINUED | OUTPATIENT
Start: 2023-03-26 | End: 2023-03-27 | Stop reason: HOSPADM

## 2023-03-26 RX ORDER — SUCCINYLCHOLINE CHLORIDE 20 MG/ML
INJECTION INTRAMUSCULAR; INTRAVENOUS AS NEEDED
Status: DISCONTINUED | OUTPATIENT
Start: 2023-03-26 | End: 2023-03-27 | Stop reason: HOSPADM

## 2023-03-26 RX ORDER — MORPHINE SULFATE 2 MG/ML
2 INJECTION, SOLUTION INTRAMUSCULAR; INTRAVENOUS
Status: DISCONTINUED | OUTPATIENT
Start: 2023-03-26 | End: 2023-03-31 | Stop reason: HOSPADM

## 2023-03-26 RX ORDER — SODIUM CHLORIDE 9 MG/ML
250 INJECTION, SOLUTION INTRAVENOUS AS NEEDED
Status: DISCONTINUED | OUTPATIENT
Start: 2023-03-26 | End: 2023-03-31 | Stop reason: HOSPADM

## 2023-03-26 RX ORDER — ROCURONIUM BROMIDE 10 MG/ML
INJECTION, SOLUTION INTRAVENOUS AS NEEDED
Status: DISCONTINUED | OUTPATIENT
Start: 2023-03-26 | End: 2023-03-27 | Stop reason: HOSPADM

## 2023-03-26 RX ORDER — SODIUM CHLORIDE 9 MG/ML
50 INJECTION, SOLUTION INTRAVENOUS CONTINUOUS
Status: DISCONTINUED | OUTPATIENT
Start: 2023-03-26 | End: 2023-03-31 | Stop reason: HOSPADM

## 2023-03-26 RX ADMIN — SUGAMMADEX 240 MG: 100 INJECTION, SOLUTION INTRAVENOUS at 23:58

## 2023-03-26 RX ADMIN — SODIUM CHLORIDE 1000 ML: 9 INJECTION, SOLUTION INTRAVENOUS at 16:53

## 2023-03-26 RX ADMIN — GABAPENTIN 600 MG: 300 CAPSULE ORAL at 00:05

## 2023-03-26 RX ADMIN — VANCOMYCIN HYDROCHLORIDE 125 MG: 250 POWDER, FOR SOLUTION ORAL at 08:11

## 2023-03-26 RX ADMIN — SODIUM CHLORIDE 150 ML/HR: 9 INJECTION, SOLUTION INTRAVENOUS at 05:11

## 2023-03-26 RX ADMIN — MORPHINE SULFATE 2 MG: 2 INJECTION, SOLUTION INTRAMUSCULAR; INTRAVENOUS at 20:55

## 2023-03-26 RX ADMIN — SODIUM CHLORIDE 150 ML/HR: 9 INJECTION, SOLUTION INTRAVENOUS at 11:49

## 2023-03-26 RX ADMIN — DEXAMETHASONE SODIUM PHOSPHATE 4 MG: 4 INJECTION, SOLUTION INTRAMUSCULAR; INTRAVENOUS at 21:34

## 2023-03-26 RX ADMIN — Medication 100 MCG: at 22:07

## 2023-03-26 RX ADMIN — DEXMEDETOMIDINE 10 MCG: 100 INJECTION, SOLUTION INTRAVENOUS at 23:38

## 2023-03-26 RX ADMIN — PIPERACILLIN AND TAZOBACTAM 3.38 G: 3; .375 INJECTION, POWDER, LYOPHILIZED, FOR SOLUTION INTRAVENOUS at 06:46

## 2023-03-26 RX ADMIN — POTASSIUM CHLORIDE 10 MEQ: 7.46 INJECTION, SOLUTION INTRAVENOUS at 00:54

## 2023-03-26 RX ADMIN — BUPROPION HYDROCHLORIDE 300 MG: 150 TABLET, EXTENDED RELEASE ORAL at 00:05

## 2023-03-26 RX ADMIN — SODIUM CHLORIDE 500 ML: 9 INJECTION, SOLUTION INTRAVENOUS at 03:30

## 2023-03-26 RX ADMIN — POTASSIUM CHLORIDE 20 MEQ: 750 TABLET, FILM COATED, EXTENDED RELEASE ORAL at 08:10

## 2023-03-26 RX ADMIN — SODIUM CHLORIDE, POTASSIUM CHLORIDE, SODIUM LACTATE AND CALCIUM CHLORIDE: 600; 310; 30; 20 INJECTION, SOLUTION INTRAVENOUS at 21:28

## 2023-03-26 RX ADMIN — BUPROPION HYDROCHLORIDE 300 MG: 150 TABLET, EXTENDED RELEASE ORAL at 08:10

## 2023-03-26 RX ADMIN — Medication 80 MCG: at 21:53

## 2023-03-26 RX ADMIN — Medication 100 MCG: at 21:57

## 2023-03-26 RX ADMIN — HYDROXYZINE HYDROCHLORIDE 25 MG: 25 TABLET, FILM COATED ORAL at 00:05

## 2023-03-26 RX ADMIN — POTASSIUM CHLORIDE 10 MEQ: 7.46 INJECTION, SOLUTION INTRAVENOUS at 09:06

## 2023-03-26 RX ADMIN — PROPOFOL 50 MCG/KG/MIN: 10 INJECTION, EMULSION INTRAVENOUS at 21:28

## 2023-03-26 RX ADMIN — GABAPENTIN 600 MG: 300 CAPSULE ORAL at 15:24

## 2023-03-26 RX ADMIN — Medication 100 MCG: at 22:20

## 2023-03-26 RX ADMIN — Medication 80 MCG: at 21:39

## 2023-03-26 RX ADMIN — VANCOMYCIN HYDROCHLORIDE 125 MG: 250 POWDER, FOR SOLUTION ORAL at 13:26

## 2023-03-26 RX ADMIN — MORPHINE SULFATE 2 MG: 2 INJECTION, SOLUTION INTRAMUSCULAR; INTRAVENOUS at 11:59

## 2023-03-26 RX ADMIN — BUDESONIDE 250 MCG: 0.5 SUSPENSION RESPIRATORY (INHALATION) at 08:03

## 2023-03-26 RX ADMIN — Medication 100 MCG: at 22:00

## 2023-03-26 RX ADMIN — Medication 1 CAPSULE: at 10:32

## 2023-03-26 RX ADMIN — PROCHLORPERAZINE EDISYLATE 10 MG: 5 INJECTION INTRAMUSCULAR; INTRAVENOUS at 07:23

## 2023-03-26 RX ADMIN — Medication 100 MCG: at 22:26

## 2023-03-26 RX ADMIN — FENTANYL CITRATE 50 MCG: 50 INJECTION, SOLUTION INTRAMUSCULAR; INTRAVENOUS at 21:28

## 2023-03-26 RX ADMIN — ALBUMIN (HUMAN) 500 ML/HR: 12.5 SOLUTION INTRAVENOUS at 22:20

## 2023-03-26 RX ADMIN — SODIUM CHLORIDE 250 ML/HR: 9 INJECTION, SOLUTION INTRAVENOUS at 20:11

## 2023-03-26 RX ADMIN — Medication 100 MCG: at 22:11

## 2023-03-26 RX ADMIN — ROCURONIUM BROMIDE 30 MG: 10 INJECTION INTRAVENOUS at 22:23

## 2023-03-26 RX ADMIN — SODIUM CHLORIDE 150 ML/HR: 9 INJECTION, SOLUTION INTRAVENOUS at 01:45

## 2023-03-26 RX ADMIN — Medication 100 MCG: at 22:04

## 2023-03-26 RX ADMIN — ATORVASTATIN CALCIUM 20 MG: 20 TABLET, FILM COATED ORAL at 08:10

## 2023-03-26 RX ADMIN — SODIUM CHLORIDE 1000 ML: 9 INJECTION, SOLUTION INTRAVENOUS at 11:12

## 2023-03-26 RX ADMIN — POTASSIUM BICARBONATE 50 MEQ: 782 TABLET, EFFERVESCENT ORAL at 07:23

## 2023-03-26 RX ADMIN — Medication 80 MCG: at 21:46

## 2023-03-26 RX ADMIN — SODIUM CHLORIDE, PRESERVATIVE FREE 10 ML: 5 INJECTION INTRAVENOUS at 05:11

## 2023-03-26 RX ADMIN — VANCOMYCIN HYDROCHLORIDE 125 MG: 250 POWDER, FOR SOLUTION ORAL at 01:08

## 2023-03-26 RX ADMIN — Medication 80 MCG: at 21:43

## 2023-03-26 RX ADMIN — ONDANSETRON HYDROCHLORIDE 4 MG: 2 SOLUTION INTRAMUSCULAR; INTRAVENOUS at 21:34

## 2023-03-26 RX ADMIN — FENTANYL CITRATE 50 MCG: 50 INJECTION, SOLUTION INTRAMUSCULAR; INTRAVENOUS at 22:43

## 2023-03-26 RX ADMIN — ROCURONIUM BROMIDE 15 MG: 10 INJECTION INTRAVENOUS at 22:45

## 2023-03-26 RX ADMIN — MORPHINE SULFATE 2 MG: 2 INJECTION, SOLUTION INTRAMUSCULAR; INTRAVENOUS at 16:25

## 2023-03-26 RX ADMIN — LIDOCAINE HYDROCHLORIDE 40 MG: 20 INJECTION, SOLUTION EPIDURAL; INFILTRATION; INTRACAUDAL; PERINEURAL at 21:28

## 2023-03-26 RX ADMIN — Medication 100 MCG: at 22:15

## 2023-03-26 RX ADMIN — SODIUM CHLORIDE 60 MCG/MIN: 9 INJECTION, SOLUTION INTRAVENOUS at 22:31

## 2023-03-26 RX ADMIN — POTASSIUM CHLORIDE 10 MEQ: 7.46 INJECTION, SOLUTION INTRAVENOUS at 00:47

## 2023-03-26 RX ADMIN — PIPERACILLIN AND TAZOBACTAM 3.38 G: 3; .375 INJECTION, POWDER, LYOPHILIZED, FOR SOLUTION INTRAVENOUS at 15:13

## 2023-03-26 RX ADMIN — POTASSIUM CHLORIDE 10 MEQ: 7.46 INJECTION, SOLUTION INTRAVENOUS at 09:55

## 2023-03-26 RX ADMIN — VANCOMYCIN HYDROCHLORIDE 125 MG: 250 POWDER, FOR SOLUTION ORAL at 18:25

## 2023-03-26 RX ADMIN — POTASSIUM CHLORIDE 10 MEQ: 7.46 INJECTION, SOLUTION INTRAVENOUS at 01:04

## 2023-03-26 RX ADMIN — SODIUM CHLORIDE 40 MG: 9 INJECTION INTRAMUSCULAR; INTRAVENOUS; SUBCUTANEOUS at 00:54

## 2023-03-26 RX ADMIN — ENOXAPARIN SODIUM 40 MG: 100 INJECTION SUBCUTANEOUS at 08:10

## 2023-03-26 RX ADMIN — POTASSIUM CHLORIDE 10 MEQ: 7.46 INJECTION, SOLUTION INTRAVENOUS at 07:41

## 2023-03-26 RX ADMIN — GABAPENTIN 600 MG: 300 CAPSULE ORAL at 08:10

## 2023-03-26 RX ADMIN — POTASSIUM CHLORIDE 20 MEQ: 750 TABLET, EXTENDED RELEASE ORAL at 09:55

## 2023-03-26 RX ADMIN — SODIUM CHLORIDE, SODIUM LACTATE, POTASSIUM CHLORIDE, CALCIUM CHLORIDE: 600; 310; 30; 20 INJECTION, SOLUTION INTRAVENOUS at 22:21

## 2023-03-26 RX ADMIN — Medication 40 MCG: at 21:50

## 2023-03-26 RX ADMIN — MIDAZOLAM HYDROCHLORIDE 1 MG: 1 INJECTION, SOLUTION INTRAMUSCULAR; INTRAVENOUS at 21:31

## 2023-03-26 RX ADMIN — SODIUM CHLORIDE 40 MG: 9 INJECTION INTRAMUSCULAR; INTRAVENOUS; SUBCUTANEOUS at 08:11

## 2023-03-26 RX ADMIN — SUCCINYLCHOLINE CHLORIDE 100 MG: 20 INJECTION, SOLUTION INTRAMUSCULAR; INTRAVENOUS at 21:28

## 2023-03-26 RX ADMIN — ROCURONIUM BROMIDE 5 MG: 10 INJECTION INTRAVENOUS at 21:28

## 2023-03-26 RX ADMIN — SODIUM CHLORIDE, PRESERVATIVE FREE 10 ML: 5 INJECTION INTRAVENOUS at 13:26

## 2023-03-26 NOTE — PROGRESS NOTES
1149 TRANSFER - IN REPORT:    Verbal report received from Martin(name) on Rise Amandeep  being received from ED(unit) for routine progression of care      Report consisted of patients Situation, Background, Assessment and   Recommendations(SBAR). Information from the following report(s) SBAR, Kardex, ED Summary, Procedure Summary, Intake/Output, MAR, Recent Results, Med Rec Status, Cardiac Rhythm NSR, Alarm Parameters , Quality Measures, and Dual Neuro Assessment was reviewed with the receiving nurse. Opportunity for questions and clarification was provided. Assessment completed upon patients arrival to unit and care assumed. Primary Nurse Austin Gipson RN and Karyle Kick, RN performed a dual skin assessment on this patient Impairment noted- see wound doc flow sheet  Nadeem score, see flowsheet. 1150 Pt arrived on unit. See Flowsheet. [address] [facility] [address] Tom Reynolds at bedside. If pain worsens or BP drops will need Xlap.     [address] Rodolfo Esteban of Lactic 1.9 and hypotension. Placing orders for Bolus and contacting Dr. Tom Reynolds.

## 2023-03-26 NOTE — ED NOTES
Patient has not voided and reports belly discomfort; bladder scan shows . 900ml; patient stands st bedside commode and cannot go ; DR order for a straight cath and output doc is 900ml ; RN will continue to monitor

## 2023-03-26 NOTE — PROGRESS NOTES
Note dictated. On MMT for c difficile colitis. CT scan does not show evidence of colonic malperfusion, but wall is thickened. NPO, will re-check exam this evening. Patient is aware of the possibility of emergency surgery.      Gualberto Ledesma MD

## 2023-03-26 NOTE — PROGRESS NOTES
Pharmacy requested to dose oral vancomycin for possible c. Difficile infection. Will order 125 mg orally q6H x10 days pending labs.

## 2023-03-26 NOTE — PROGRESS NOTES
Occupational therapy note:  Orders received, chart reviewed. Spoke with patient RN, Jayden Canales, who reports patient now ready to leave floor and transfer to ICU. Will follow. Eun Wilde MS OTR/L

## 2023-03-26 NOTE — CONSULTS
PRISCILLA Lee MD  [phone] office     Gastroenterology Consultation Note      Admit Date: 3/25/2023  Consult Date: 3/26/2023   I greatly appreciate your asking me to see Kavon Garrido, thank you very much for the opportunity to participate in her care. Narrative Assessment and Plan   58year old female with recent episode of ischemic colitis who now presents with worsening abdominal pain and a significant leukocytosis. Her CT is worse compared to her last scan. She has rebound tenderness on abdominal exam. I am concerned about ongoing colonic ischemia. I have discussed her care with Dr. Shayne Huggins of Surgery who will see her later. In the meantime, would keep her NPO and continue IV fluids and antibiotics. I stopped her pro-biotic. Subjective:     Chief Complaint: Abdominal pain, abnormal CT    History of Present Illness:     Ms. Clay Zepeda is a 58year old female with hypertension, asthma, GERD, gastric bypass,  CAD s/p PCI (2015), anxiety, T2DM, HFpEF who was sent to the ER by her PCP due to leukocytosis. She was hospitalized earlier this month with presumed ischemic colitis. She has never completely recovered from this hospitalization despite a course of antibiotics, as she has had continued abdominal discomfort and mucus stools. No rectal bleeding. She actually has not moved her bowels in two days. She saw her PCP for an unrelated issue and was send to the ER when she was noted to have a significant leukocytosis. Today her WBC count is over 50,000. She had a low grade fever too. She was moved to the ICU due to hypotension which has resolved. Currently she reports continued abdominal pain. Her CT now shows thickening of her entire colon.      Juan Jose Sierra MD    Past Medical History:   Diagnosis Date    Arthritis     OA back,knees and hands    Asthma     Autoimmune disease ([facility] [address]     Juliano Arroyo    CAD (coronary artery disease)     s/p stents 11/2015    Cardiac murmur Depression     Diabetes ([facility] [address]     diet controlled at this time.      DVT (deep venous thrombosis) ([facility] [address] 1981    GERD (gastroesophageal reflux disease)     Hypertension     Lumbar radiculitis     follows with dr Van Yap for epidural steroid injections    Morbid obesity ([facility] [address]     Musculoskeletal disorder     EDMOND (obstructive sleep apnea)     wears cpap    S/P TONJA (total abdominal hysterectomy)     Thromboembolus ([facility] [address] 1996    PE    Trigger finger, right little finger 10/19/2020    follows with [facility]    Trigger finger, right ring finger 10/19/2020    [facility]        Past Surgical History:   Procedure Laterality Date    COLONOSCOPY N/A 12/6/2021    COLONOSCOPY (URGENT) performed by Gerry Urrutia MD at [facility]    HX BREAST BIOPSY Right 2018    Fibroadenoma    [address]    HX CHOLECYSTECTOMY  2017    HX GASTRIC BYPASS  01/2017    HX HYSTERECTOMY  1985    HX TOTAL ABDOMINAL HYSTERECTOMY      age 22    IR INJ SPINE FLUORO GUIDED  2/4/2021    IR INJ SPINE FLUORO GUIDED  5/5/2022    DAYAN STEREO  BX BREAST RT ADDL W/CLIP AND SPECIMEN Right 2000    neg     [place] CARDIAC SURG PROCEDURE UNLIST  11/2015    STENT PLACED       Social History     Tobacco Use    Smoking status: Never    Smokeless tobacco: Never   Substance Use Topics    Alcohol use: No     Alcohol/week: 0.0 standard drinks        Family History   Problem Relation Age of Onset    Cancer Mother 67        colon    Diabetes Mother     OSTEOARTHRITIS Mother     Stroke Mother     Migraines Mother     Diabetes Father     Heart Disease Father     Heart Attack Father     Hypertension Father     High Cholesterol Father     COPD Father     Heart Failure Father     Cancer Other     Diabetes Other     Heart Disease Other     Hypertension Other     Cancer Brother         lymphoma    Cancer Brother         PROSTATE AND LYMPHOMA    Cancer Maternal Grandmother         stomach    Asthma Brother     Asthma Brother     Stroke Brother     Cancer Maternal Aunt         lung     Breast Cancer Maternal Aunt     Cancer Maternal Uncle         lung    Cancer Maternal Uncle         melanoma    Anesth Problems Neg Hx         Allergies   Allergen Reactions    Accupril [Quinapril] Cough    Adhesive Tape-Silicones Other (comments)     Makes skin tears easily. Lipitor [Atorvastatin] Other (comments)     aches    Norvasc [Amlodipine] Swelling     On legs at 10 mg dose            Home Medications:  Prior to Admission Medications   Prescriptions Last Dose Informant Patient Reported? Taking? Blood-Glucose Meter monitoring kit   No No   Sig: Use to check glucose once a day   L.acid,para-B. bifidum-S.therm (RISAQUAD) 8 billion cell cap cap   No No   Sig: Take 1 Capsule by mouth daily. Lancets misc   No No   Sig: Use once a day. Please give one compatible with patient's meter   albuterol (ACCUNEB) 0.63 mg/3 mL nebulizer solution   No No   Sig: 3 mL by Nebulization route every six (6) hours as needed for Wheezing. albuterol (ProAir HFA) 90 mcg/actuation inhaler   No No   Sig: Take 1 Puff by inhalation every four (4) hours as needed for Wheezing or Shortness of Breath. alcohol swabs (ALCOHOL PADS) padm   No No   Sig: Use when checking blood glucose   atorvastatin (LIPITOR) 20 mg tablet   No No   Sig: TAKE 1 TABLET BY MOUTH EVERY DAY   buPROPion XL (WELLBUTRIN XL) 300 mg XL tablet   Yes No   Sig: Take 300 mg by mouth two (2) times a day. budesonide (Pulmicort Flexhaler) 180 mcg/actuation aepb inhaler   No No   Sig: Take 2 Puffs by inhalation daily. bumetanide (BUMEX) 0.5 mg tablet   No No   Sig: TAKE 1 TABLET BY MOUTH EVERY DAY AS NEEDED   ciprofloxacin HCl (CIPRO) 500 mg tablet   No No   Sig: Take 1 Tablet by mouth two (2) times a day for 10 days.    co-enzyme Q-10 (CO Q-10) 100 mg capsule   No No   Sig: TAKE 1 CAPSULE BY MOUTH EVERY DAY   cyclobenzaprine (FLEXERIL) 10 mg tablet   No No   Sig: Take 1 Tablet by mouth three (3) times daily as needed for Muscle Spasm(s). gabapentin (NEURONTIN) 600 mg tablet   No No   Sig: Take 1 Tablet by mouth three (3) times daily. glucose blood VI test strips (BLOOD GLUCOSE TEST) strip   No No   Sig: Use once a day   hydrOXYzine pamoate (VISTARIL) 25 mg capsule   No No   Sig: TAKE 1 CAPSULE BY MOUTH TWICE A DAY AS NEEDED FOR ANXIETY   metoprolol tartrate (LOPRESSOR) 25 mg tablet   No No   Sig: TAKE 1 TABLET BY MOUTH TWICE A DAY   metroNIDAZOLE (FLAGYL) 250 mg tablet   No No   Sig: Take 1 Tablet by mouth three (3) times daily for 10 days. nitroglycerin (NITROSTAT) 0.4 mg SL tablet   No No   Sig: TAKE 1 TABLET BY MOUTH EVERY 5 MINUTES UP TO 3 DOSES THEN CALL 911 IF PAIN PERSISTS   omeprazole (PRILOSEC) 40 mg capsule   No No   Sig: TAKE 1 CAPSULE BY MOUTH EVERY DAY   ondansetron (ZOFRAN ODT) 4 mg disintegrating tablet   No No   Sig: TAKE 1 TABLET BY MOUTH EVERY 8 HOURS AS NEEDED FOR NAUSEA   polyethylene glycol (MIRALAX) 17 gram packet   No No   Sig: Take 1 Packet by mouth daily for 30 days. valACYclovir (VALTREX) 500 mg tablet   No No   Sig: TAKE 1 PILL TWICE DAILY FOR 3 DAYS AT SIGN OF HERPES FLARE.       Facility-Administered Medications: None       Hospital Medications:  Current Facility-Administered Medications   Medication Dose Route Frequency    0.9% sodium chloride infusion  150 mL/hr IntraVENous CONTINUOUS    pantoprazole (PROTONIX) 40 mg in 0.9% sodium chloride 10 mL injection  40 mg IntraVENous DAILY    morphine injection 2 mg  2 mg IntraVENous Q4H PRN    L.acidophilus-paracasei-S.thermophil-bifidobacter (RISAQUAD) 8 billion cell capsule  1 Capsule Oral DAILY    sodium chloride (NS) flush 5-40 mL  5-40 mL IntraVENous Q8H    sodium chloride (NS) flush 5-40 mL  5-40 mL IntraVENous PRN    acetaminophen (TYLENOL) tablet 650 mg  650 mg Oral Q6H PRN    Or    acetaminophen (TYLENOL) suppository 650 mg  650 mg Rectal Q6H PRN    polyethylene glycol (MIRALAX) packet 17 g  17 g Oral DAILY PRN    enoxaparin (LOVENOX) injection 40 mg  40 mg SubCUTAneous DAILY    promethazine (PHENERGAN) tablet 12.5 mg  12.5 mg Oral Q6H PRN    albuterol (PROVENTIL VENTOLIN) nebulizer solution 2.5 mg  2.5 mg Nebulization Q4H PRN    atorvastatin (LIPITOR) tablet 20 mg  20 mg Oral DAILY    budesonide (PULMICORT) 500 mcg/2 ml nebulizer suspension  250 mcg Nebulization BID RT    [Held by provider] bumetanide (BUMEX) tablet 0.5 mg  0.5 mg Oral DAILY    buPROPion XL (WELLBUTRIN XL) tablet 300 mg  300 mg Oral BID    [Held by provider] hydrOXYzine HCL (ATARAX) tablet 25 mg  25 mg Oral QHS    [Held by provider] metoprolol tartrate (LOPRESSOR) tablet 25 mg  25 mg Oral BID    gabapentin (NEURONTIN) capsule 600 mg  600 mg Oral TID    piperacillin-tazobactam (ZOSYN) 3.375 g in 0.9% sodium chloride (MBP/ADV) 100 mL MBP  3.375 g IntraVENous Q8H    vancomycin (FIRVANQ) 50 mg/mL oral solution 125 mg  125 mg Oral Q6H    prochlorperazine (COMPAZINE) injection 10 mg  10 mg IntraVENous Q6H PRN       Review of Systems:  Full ROS was done and was negative except as noted in the HPI.        Objective:     Physical Exam:  Visit Vitals  BP (!) 122/58 (BP 1 Location: Right upper arm, BP Patient Position: At rest)   Pulse (!) 101   Temp 98.1 °F (36.7 °C)   Resp 20   Ht 5' 5\" (1.651 m)   Wt 59 kg (130 lb)   SpO2 96%   BMI 21.63 kg/m²     SpO2 Readings from Last 6 Encounters:   03/26/23 96%   03/24/23 97%   03/20/23 99%   03/15/23 100%   03/15/23 100%   11/29/22 96%          Intake/Output Summary (Last 24 hours) at 3/26/2023 1237  Last data filed at 3/26/2023 1149  Gross per 24 hour   Intake 3010 ml   Output 1075 ml   Net 1935 ml      General: no distress, appears uncomfortable  Skin:  No rash or jaundice  HEENT: Pupils equal, sclera anicteric  Cardiovascular: Regular, well perfused, no edema  Respiratory:  Clear bilaterally, normal respiratory effort  GI:  Abdomen distended, moderately tender with rebound tenderness  Musculoskeletal:  No skeletal deformity nor acute arthritis noted. Neurological:  Motor and sensory function intact in upper extremities  Psychiatric:  Normal affect, memory intact, appears to have insight into current illness    Laboratory:    Recent Results (from the past 24 hour(s))   CBC WITH AUTOMATED DIFF    Collection Time: 03/25/23  2:38 PM   Result Value Ref Range    WBC 46.5 (H) 3.6 - 11.0 K/uL    RBC 4.08 3.80 - 5.20 M/uL    HGB 11.7 11.5 - 16.0 g/dL    HCT 34.7 (L) 35.0 - 47.0 %    MCV 85.0 80.0 - 99.0 FL    MCH 28.7 26.0 - 34.0 PG    MCHC 33.7 30.0 - 36.5 g/dL    RDW 12.8 11.5 - 14.5 %    PLATELET 113 (H) 212 - 400 K/uL    MPV 8.6 (L) 8.9 - 12.9 FL    NRBC 0.0 0  WBC    ABSOLUTE NRBC 0.00 0.00 - 0.01 K/uL    NEUTROPHILS 90 (H) 32 - 75 %    BAND NEUTROPHILS 1 %    LYMPHOCYTES 3 (L) 12 - 49 %    MONOCYTES 6 5 - 13 %    EOSINOPHILS 0 0 - 7 %    BASOPHILS 0 0 - 1 %    IMMATURE GRANULOCYTES 0 0.0 - 0.5 %    ABS. NEUTROPHILS 42.3 (H) 1.8 - 8.0 K/UL    ABS. LYMPHOCYTES 1.4 0.8 - 3.5 K/UL    ABS. MONOCYTES 2.8 (H) 0.0 - 1.0 K/UL    ABS. EOSINOPHILS 0.0 0.0 - 0.4 K/UL    ABS. BASOPHILS 0.0 0.0 - 0.1 K/UL    ABS. IMM. GRANS. 0.0 0.00 - 0.04 K/UL    DF MANUAL      PLATELET COMMENTS Large Platelets      RBC COMMENTS NORMOCYTIC, NORMOCHROMIC     METABOLIC PANEL, COMPREHENSIVE    Collection Time: 03/25/23  2:38 PM   Result Value Ref Range    Sodium 132 (L) 136 - 145 mmol/L    Potassium 2.1 (LL) 3.5 - 5.1 mmol/L    Chloride 90 (L) 97 - 108 mmol/L    CO2 35 (H) 21 - 32 mmol/L    Anion gap 7 5 - 15 mmol/L    Glucose 142 (H) 65 - 100 mg/dL    BUN 11 6 - 20 MG/DL    Creatinine 0.79 0.55 - 1.02 MG/DL    BUN/Creatinine ratio 14 12 - 20      eGFR >60 >60 ml/min/1.73m2    Calcium 8.5 8.5 - 10.1 MG/DL    Bilirubin, total 0.9 0.2 - 1.0 MG/DL    ALT (SGPT) 9 (L) 12 - 78 U/L    AST (SGOT) 14 (L) 15 - 37 U/L    Alk.  phosphatase 163 (H) 45 - 117 U/L    Protein, total 6.2 (L) 6.4 - 8.2 g/dL    Albumin 2.0 (L) 3.5 - 5.0 g/dL    Globulin 4.2 (H) 2.0 - 4.0 g/dL    A-G Ratio 0.5 (L) 1.1 - 2.2     SAMPLES BEING HELD    Collection Time: 03/25/23  2:38 PM   Result Value Ref Range    SAMPLES BEING HELD SST.RED.[email or web address]     COMMENT        Add-on orders for these samples will be processed based on acceptable specimen integrity and analyte stability, which may vary by analyte.    CULTURE, BLOOD    Collection Time: 03/25/23  2:38 PM    Specimen: Blood   Result Value Ref Range    Special Requests: NO SPECIAL REQUESTS      Culture result: NO GROWTH AFTER 15 HOURS     LIPASE    Collection Time: 03/25/23  2:38 PM   Result Value Ref Range    Lipase 25 (L) 73 - 393 U/L   MAGNESIUM    Collection Time: 03/25/23  2:38 PM   Result Value Ref Range    Magnesium 1.6 1.6 - 2.4 mg/dL   PROCALCITONIN    Collection Time: 03/25/23  2:38 PM   Result Value Ref Range    Procalcitonin 0.52 ng/mL   IRON PROFILE    Collection Time: 03/25/23  2:38 PM   Result Value Ref Range    Iron 15 (L) 35 - 150 ug/dL    TIBC 146 (L) 250 - 450 ug/dL    Iron % saturation 10 (L) 20 - 50 %   FERRITIN    Collection Time: 03/25/23  2:38 PM   Result Value Ref Range    Ferritin 372 (H) 8 - 252 NG/ML   SED RATE (ESR)    Collection Time: 03/25/23  2:38 PM   Result Value Ref Range    Sed rate, automated 50 (H) 0 - 30 mm/hr   C REACTIVE PROTEIN, QT    Collection Time: 03/25/23  2:38 PM   Result Value Ref Range    C-Reactive protein 25.90 (H) 0.00 - 0.60 mg/dL   POC LACTIC ACID    Collection Time: 03/25/23  2:52 PM   Result Value Ref Range    Lactic Acid (POC) 1.84 0.40 - 2.00 mmol/L   CULTURE, BLOOD    Collection Time: 03/25/23  5:56 PM    Specimen: Blood   Result Value Ref Range    Special Requests: NO SPECIAL REQUESTS      Culture result: NO GROWTH AFTER 14 HOURS     URINALYSIS W/MICROSCOPIC    Collection Time: 03/26/23  1:06 AM   Result Value Ref Range    Color YELLOW      Appearance CLEAR CLEAR      Specific gravity 1.017 1.003 - 1.030      pH (UA) 6.0 5.0 - 8.0      Protein Negative NEG mg/dL    Glucose Negative NEG mg/dL    Ketone TRACE (A) NEG mg/dL    Bilirubin Negative NEG      Blood Negative NEG      Urobilinogen 0.2 0.2 - 1.0 EU/dL    Nitrites Negative NEG      Leukocyte Esterase Negative NEG      WBC 0-4 0 - 4 /hpf    RBC 0-5 0 - 5 /hpf    Epithelial cells FEW FEW /lpf    Bacteria Negative NEG /hpf    Hyaline cast 2-5 0 - 2 /lpf   URINE CULTURE HOLD SAMPLE    Collection Time: 03/26/23  1:06 AM    Specimen: Urine   Result Value Ref Range    Urine culture hold        Urine on hold in Microbiology dept for 2 days. If unpreserved urine is submitted, it cannot be used for addtional testing after 24 hours, recollection will be required. METABOLIC PANEL, BASIC    Collection Time: 03/26/23  5:44 AM   Result Value Ref Range    Sodium 135 (L) 136 - 145 mmol/L    Potassium 2.5 (LL) 3.5 - 5.1 mmol/L    Chloride 99 97 - 108 mmol/L    CO2 29 21 - 32 mmol/L    Anion gap 7 5 - 15 mmol/L    Glucose 115 (H) 65 - 100 mg/dL    BUN 11 6 - 20 MG/DL    Creatinine 0.45 (L) 0.55 - 1.02 MG/DL    BUN/Creatinine ratio 24 (H) 12 - 20      eGFR >60 >60 ml/min/1.73m2    Calcium 7.4 (L) 8.5 - 10.1 MG/DL   CBC WITH AUTOMATED DIFF    Collection Time: 03/26/23  5:44 AM   Result Value Ref Range    WBC 52.3 (HH) 3.6 - 11.0 K/uL    RBC 3.45 (L) 3.80 - 5.20 M/uL    HGB 10.0 (L) 11.5 - 16.0 g/dL    HCT 29.3 (L) 35.0 - 47.0 %    MCV 84.9 80.0 - 99.0 FL    MCH 29.0 26.0 - 34.0 PG    MCHC 34.1 30.0 - 36.5 g/dL    RDW 13.1 11.5 - 14.5 %    PLATELET 813 (H) 742 - 400 K/uL    MPV 8.3 (L) 8.9 - 12.9 FL    NRBC 0.0 0  WBC    ABSOLUTE NRBC 0.00 0.00 - 0.01 K/uL    NEUTROPHILS 81 (H) 32 - 75 %    BAND NEUTROPHILS 9 (H) 0 - 6 %    LYMPHOCYTES 3 (L) 12 - 49 %    MONOCYTES 7 5 - 13 %    EOSINOPHILS 0 0 - 7 %    BASOPHILS 0 0 - 1 %    IMMATURE GRANULOCYTES 0 %    ABS. NEUTROPHILS 47.0 (H) 1.8 - 8.0 K/UL    ABS. LYMPHOCYTES 1.6 0.8 - 3.5 K/UL    ABS. MONOCYTES 3.7 (H) 0.0 - 1.0 K/UL    ABS. EOSINOPHILS 0.0 0.0 - 0.4 K/UL    ABS. BASOPHILS 0.0 0.0 - 0.1 K/UL    ABS. IMM.  GRANS. 0.0 K/UL DF MANUAL      RBC COMMENTS NORMOCYTIC, NORMOCHROMIC      WBC COMMENTS TOXIC GRANULATION     MAGNESIUM    Collection Time: 03/26/23  5:44 AM   Result Value Ref Range    Magnesium 2.2 1.6 - 2.4 mg/dL   LACTIC ACID    Collection Time: 03/26/23  5:44 AM   Result Value Ref Range    Lactic acid 0.7 0.4 - 2.0 MMOL/L         Assessment/Plan:     Active Problems:    Asthma (9/22/2010)      GERD (gastroesophageal reflux disease) (9/22/2010)      Fibromyalgia (3/30/2015)      Overview: Fibromyalgia      S/p NEREYDA x 3 in back via Dr. Sal Chan. He uses flexeril HS for pain      Essential hypertension (5/17/2015)      FH: diabetes mellitus (8/4/2015)      Anxiety (6/1/2016)      Overview: Needs to be seen at least twice yearly for BNZ      FTF 12/19/2017, 9/18/18       as expected 12/19/2017, 9/18/18      Hypokalemia (1/26/2017)      H/O gastric bypass (7/12/2017)      Overview: Dr. Dash Gupta 1/2017      lost from 230 to 154 lbs      Colitis (3/13/2023)      Neutrophilia (3/25/2023)      Diarrhea of presumed infectious origin (3/25/2023)      Thrombocytosis (3/25/2023)         See above narrative for full detail.

## 2023-03-26 NOTE — PROGRESS NOTES
Level of Care: Transitioned from Stepdown to ICU    Patient seen and examined at bedside. Does endorse progressively worsening abdominal pain throughout the day with peritoneal signs and marked rebound TTP. # Shock in s/o Colitis: Likely hypovolemic in s/o fulminant colitis. Possibly distributive given modest bump in LA. No JVD on exam.To be taken to OR emergently on 3/26 for possible Total Abdominal Colectomy. 1) Case Reviewed with Intensivist (Dr. Tawny Noel):  -- Recommended initiation of carolyn-synephrine for pressure support; will try to run peripherally though nurses report difficulty pulling from lines > may need central line  -- May need to add IV flagyl if this is found to be fulminant C. Diff colitis. Could switch Zosyn to CTX. Intensivist okay with choice. 2) Anesthesia consulted for possible central line placement:  -- Discussed with Dr. Misael Emanuel. To see patient. Aware. 3) Case discussed with Gen Surg (Dr. Jett Valentine): Called Dr. Jett Valentine -- suspects fulminant colitis that may improve when treated, with a certain percentage requiring TAC. In this case, given hemodynamic instability and acute abdominal, will be taken to OR.  -- Plan for OR emergently on 3/26. -- Dr. Jett Valentine present on the floor; consenting patient. Agree's with possible addition of IV flagyl if patient found to have pseudomembranes in the OR during Ex-Lap / TAC.   -- If TAC performed will have ileostomy with stoma for ~ 6 weeks. -- Will evaluate patient post-op    Addendum at @ 030:  - Spoke to Dr. Jett Valentine earlier at  ~ [address]; patient s/p partial colectomy with ileostomy and stoma development. Recommends rectal vancomycin flushes be added. - Discussed with pharm > will transition Abx to: IV CTX 2g BID, IV Flagyl 500 mg q8h, PO Vanc (PTD), Rectal Vanc enema 500 mg QID  - Patient seen and examined at bedside; resting comfortably with R IJ CVC and a RLQ stoma; TRISTA drain with sanguinous output. Resting comfortably.  MAP's between 17-80 off carolyn. Continue IV Fluids at 250 mL/hr. Central line present for pressor support if required.      Change in care reviewed with Dr. Anjelica Jacobs (Attending Physician)  Dionicio Will MD

## 2023-03-26 NOTE — PROGRESS NOTES
TRANSFER - OUT REPORT:    Verbal report given to WESTLEY Castillo(name) on Eh Ryan  being transferred to [facility] for routine progression of care       Report consisted of patients Situation, Background, Assessment and   Recommendations(SBAR). Information from the following report(s) SBAR, Kardex, and MAR was reviewed with the receiving nurse. Lines:   Peripheral IV 03/25/23 Right Antecubital (Active)   Site Assessment Clean, dry, & intact 03/26/23 0814   Phlebitis Assessment 0 03/26/23 0814   Infiltration Assessment 0 03/26/23 0814   Dressing Status Clean, dry, & intact 03/26/23 0814   Dressing Type Tape;Transparent 03/26/23 0814   Hub Color/Line Status Pink;Flushed 03/26/23 0814       Peripheral IV 03/25/23 Anterior; Left Forearm (Active)   Site Assessment Clean, dry, & intact 03/26/23 0814   Phlebitis Assessment 0 03/26/23 0814   Infiltration Assessment 0 03/26/23 0814   Dressing Status Clean, dry, & intact 03/26/23 0814   Dressing Type Tape;Transparent 03/26/23 0814   Hub Color/Line Status Pink; Infusing 03/26/23 9865   Action Taken Open ports on tubing capped 03/26/23 0003        Opportunity for questions and clarification was provided.       Patient transported with:   Registered Nurse

## 2023-03-26 NOTE — PROGRESS NOTES
[facility] Family Medicine Service Daily Progress Note    24 Hour Events: urinary retention noted, 900 cc drained from bladder. Collected stool sample which was non foul smelling    SUBJECTIVE: Patient has no new complaints. Denies chest pain, SOB, nausea, vomiting, abdominal pain, dizziness. OBJECTIVE:    Vitals: Visit Vitals  BP (!) 94/54 (BP 1 Location: Left upper arm)   Pulse 88   Temp 99 °F (37.2 °C)   Resp 18   Ht 5' 5\" (1.651 m)   Wt 130 lb (59 kg)   SpO2 96%   BMI 21.63 kg/m²       Physical Exam:  General: No acute distress  Respiratory: Clear lung fields bilaterally, no wheeze, rales  Cardiovascular: Regular rate, rhythm no murmurs gallops  GI: Mild distended. + bowel sounds. LLQ mild tenderness. Extremities: No LE edema. Skin: Warm, dry.   Neuro: Alert and oriented    Inpatient Medications  Current Facility-Administered Medications   Medication Dose Route Frequency    sodium chloride 0.9 % bolus infusion 500 mL  500 mL IntraVENous ONCE    0.9% sodium chloride infusion  150 mL/hr IntraVENous CONTINUOUS    sodium chloride (NS) flush 5-40 mL  5-40 mL IntraVENous Q8H    sodium chloride (NS) flush 5-40 mL  5-40 mL IntraVENous PRN    acetaminophen (TYLENOL) tablet 650 mg  650 mg Oral Q6H PRN    Or    acetaminophen (TYLENOL) suppository 650 mg  650 mg Rectal Q6H PRN    polyethylene glycol (MIRALAX) packet 17 g  17 g Oral DAILY PRN    enoxaparin (LOVENOX) injection 40 mg  40 mg SubCUTAneous DAILY    promethazine (PHENERGAN) tablet 12.5 mg  12.5 mg Oral Q6H PRN    albuterol (PROVENTIL VENTOLIN) nebulizer solution 2.5 mg  2.5 mg Nebulization Q4H PRN    atorvastatin (LIPITOR) tablet 20 mg  20 mg Oral DAILY    budesonide (PULMICORT) 500 mcg/2 ml nebulizer suspension  250 mcg Nebulization BID RT    [Held by provider] bumetanide (BUMEX) tablet 0.5 mg  0.5 mg Oral DAILY    buPROPion XL (WELLBUTRIN XL) tablet 300 mg  300 mg Oral BID    [Held by provider] hydrOXYzine HCL (ATARAX) tablet 25 mg  25 mg Oral QHS [Held by provider] metoprolol tartrate (LOPRESSOR) tablet 25 mg  25 mg Oral BID    gabapentin (NEURONTIN) capsule 600 mg  600 mg Oral TID    piperacillin-tazobactam (ZOSYN) 3.375 g in 0.9% sodium chloride (MBP/ADV) 100 mL MBP  3.375 g IntraVENous Q8H    vancomycin (FIRVANQ) 50 mg/mL oral solution 125 mg  125 mg Oral Q6H    prochlorperazine (COMPAZINE) injection 10 mg  10 mg IntraVENous Q6H PRN     Current Outpatient Medications   Medication Sig    ciprofloxacin HCl (CIPRO) 500 mg tablet Take 1 Tablet by mouth two (2) times a day for 10 days. metroNIDAZOLE (FLAGYL) 250 mg tablet Take 1 Tablet by mouth three (3) times daily for 10 days. gabapentin (NEURONTIN) 600 mg tablet Take 1 Tablet by mouth three (3) times daily. albuterol (ACCUNEB) 0.63 mg/3 mL nebulizer solution 3 mL by Nebulization route every six (6) hours as needed for Wheezing. L.acid,para-B. bifidum-S.therm (RISAQUAD) 8 billion cell cap cap Take 1 Capsule by mouth daily. polyethylene glycol (MIRALAX) 17 gram packet Take 1 Packet by mouth daily for 30 days. ondansetron (ZOFRAN ODT) 4 mg disintegrating tablet TAKE 1 TABLET BY MOUTH EVERY 8 HOURS AS NEEDED FOR NAUSEA    cyclobenzaprine (FLEXERIL) 10 mg tablet Take 1 Tablet by mouth three (3) times daily as needed for Muscle Spasm(s). co-enzyme Q-10 (CO Q-10) 100 mg capsule TAKE 1 CAPSULE BY MOUTH EVERY DAY    nitroglycerin (NITROSTAT) 0.4 mg SL tablet TAKE 1 TABLET BY MOUTH EVERY 5 MINUTES UP TO 3 DOSES THEN CALL 911 IF PAIN PERSISTS    buPROPion XL (WELLBUTRIN XL) 300 mg XL tablet Take 300 mg by mouth two (2) times a day.     atorvastatin (LIPITOR) 20 mg tablet TAKE 1 TABLET BY MOUTH EVERY DAY    hydrOXYzine pamoate (VISTARIL) 25 mg capsule TAKE 1 CAPSULE BY MOUTH TWICE A DAY AS NEEDED FOR ANXIETY    metoprolol tartrate (LOPRESSOR) 25 mg tablet TAKE 1 TABLET BY MOUTH TWICE A DAY    bumetanide (BUMEX) 0.5 mg tablet TAKE 1 TABLET BY MOUTH EVERY DAY AS NEEDED    omeprazole (PRILOSEC) 40 mg capsule TAKE 1 CAPSULE BY MOUTH EVERY DAY    albuterol (ProAir HFA) 90 mcg/actuation inhaler Take 1 Puff by inhalation every four (4) hours as needed for Wheezing or Shortness of Breath. budesonide (Pulmicort Flexhaler) 180 mcg/actuation aepb inhaler Take 2 Puffs by inhalation daily. valACYclovir (VALTREX) 500 mg tablet TAKE 1 PILL TWICE DAILY FOR 3 DAYS AT SIGN OF HERPES FLARE. glucose blood VI test strips (BLOOD GLUCOSE TEST) strip Use once a day    Blood-Glucose Meter monitoring kit Use to check glucose once a day    alcohol swabs (ALCOHOL PADS) padm Use when checking blood glucose    Lancets misc Use once a day. Please give one compatible with patient's meter       Allergies  Allergies   Allergen Reactions    Accupril [Quinapril] Cough    Adhesive Tape-Silicones Other (comments)     Makes skin tears easily. Lipitor [Atorvastatin] Other (comments)     aches    Norvasc [Amlodipine] Swelling     On legs at 10 mg dose       CBC:  Recent Labs     03/25/23  1438 03/24/23  1045   WBC 46.5* 33.4*   HGB 11.7 11.4*   HCT 34.7* 35.6   * 431*       Metabolic Panel:  Recent Labs     03/25/23  1438 03/24/23  1045   * 138   K 2.1* 2.6*   CL 90* 95*   CO2 35* 37*   BUN 11 6   CREA 0.79 0.66   * 120*   CA 8.5 8.5   MG 1.6  --    ALB 2.0* 2.2*   ALT 9* 9*            Assessment and Plan   Angelina Lara is a 58 y.o. female with PMH of hypertension, asthma, GERD, gastric bypass,  CAD s/p PCI (2015), anxiety, type 2 diabetes mellitus, HFpEF (EF 65%, G1DD 2015)  who is admitted for hyperkalemia and leukocytosis. Leukocytosis in s/o diarrhea: POA wbc 46.5. Likely reactive in ongoing diarrhea in s/o previous admission 3/14/23 for colitis. Was treated with zosyn and dc on Augmentin, completed 9/10 day. Was switched over to ciprofloxacin and flagyl. At PCP. Likely C. Diff given recent Abx. Less likely hematologic process . Procal 0.52. Rpt CT w circumferential thickened colon upto rectum.  CRP 25.90, ESR 50. C/f IBD. Colonoscopy in 2021 with sigmoid diverticulosis only  - Follow up C. Diff, O&P, WBC stool, culture stool  - Enteric precautions  - Follow up blood culture  - GI consulted, appreciate recs  - Strict I and O  - NPO with sips of water  - Phenergan for nausea  - Zosyn for possible colitis  - Follow up peripheral smear per hematology, leukocytosis likely reactive  - Daily CBC, BMP     Hypokalemia: POA K 2.1 in s/o persistent GI loses. EKG sinus rhythm, no T wave changes.  - Replete as needed  - Monitor on BMP q4h, daily Mg    Thrombocytosis: POA Plt 520. Likely reactive, per hemat. Ddx hemoconcentration, LO common cause. - Hematology on board, appreciate recs  - Follow up iron panel, ferritin   - Monitor on labs     H/o recent falls: patient recently evaluated at [facility] ED for mechanical GLF with x 6 staples placed on head. Wound clean and dry. CT imaging negative. H/o Gastric bypass: 2017. Avoid NSAIDs and ASA. CAD s/p PCI: Stent dLCx in 2015. F/w Dr Cassy Haro MD ( cards). LOV 10/22. Stress test 2017 EF 64%  - Continue Lipitor 20 mg daily     HFpEF: Stress test 2017 EF 64%. Euvolemic.  - Hold home Bumex 0.5 mg daily in s/o soft BP     T2DM: Last HgA1C 5.4 04/2022. Diet controlled s/p gastric by pass. Anxiety:   - Continue Wellbutrin  mg BID     Asthma:   - Continue home Albuterol and Pulmicort BID     Hypertension:   - Hold Bumex 0.5 mg daily and Lopressor 25mg BID in s/o soft BP  - Monitor BP and adjust as needed     GERD:   - Cont home Protonix 40 mg IV daily. Fibromyalgia:   - Continue gabapentin 600mg TID.      Prolonged Qtc:   - Avoid qtc prolonging drugs     DDD, Arthritis: on home flexeril 10 mg TID prn    Naty Sands MD  Family Medicine Resident       For Billing    Chief Complaint   Patient presents with    Abnormal [place] Problems  Date Reviewed: 3/20/2023            Codes Class Noted POA    Neutrophilia ICD-10-CM: D72.9  ICD-9-CM: 288.8  3/25/2023 Unknown        Diarrhea of presumed infectious origin ICD-10-CM: R19.7  ICD-9-CM: 009.3  3/25/2023 Unknown        Thrombocytosis ICD-10-CM: D75.839  ICD-9-CM: 238.71  3/25/2023 Unknown        Colitis ICD-10-CM: K52.9  ICD-9-CM: 558.9  3/13/2023 Unknown        H/O gastric bypass ICD-10-CM: Z98.84  ICD-9-CM: V45.86  7/12/2017 Yes    Overview Addendum 11/3/2018  9:51 AM by Tray Dominguez 1/2017  lost from 230 to 154 lbs             Hypokalemia ICD-10-CM: E87.6  ICD-9-CM: 276.8  1/26/2017 Yes        Anxiety ICD-10-CM: F41.9  ICD-9-CM: 300.00  6/1/2016 Yes    Overview Addendum 9/18/2018  4:54 PM by Tray Delgado     Needs to be seen at least twice yearly for BNZ  FTF 12/19/2017, 9/18/18   as expected 12/19/2017, 9/18/18             FH: diabetes mellitus ICD-10-CM: Z83.3  ICD-9-CM: V18.0  8/4/2015 Yes        Essential hypertension ICD-10-CM: I10  ICD-9-CM: 401.9  5/17/2015 Yes        Fibromyalgia ICD-10-CM: M79.7  ICD-9-CM: 729.1  3/30/2015 Yes    Overview Addendum 7/12/2017 11:40 AM by Zeenat Finn  S/p NEREYDA x 3 in back via Dr. Janeth Rogers.   He uses flexeril HS for pain             Asthma ICD-10-CM: J45.909  ICD-9-CM: 493.90  9/22/2010 Yes        GERD (gastroesophageal reflux disease) ICD-10-CM: K21.9  ICD-9-CM: 530.81  9/22/2010 Yes

## 2023-03-26 NOTE — ANESTHESIA PREPROCEDURE EVALUATION
Relevant Problems   RESPIRATORY SYSTEM   (+) Asthma      CARDIOVASCULAR   (+) Essential hypertension      GASTROINTESTINAL   (+) GERD (gastroesophageal reflux disease)   (+) GARCIA (nonalcoholic steatohepatitis)      RENAL FAILURE   (+) JASIEL (acute kidney injury) ([facility] [address]      ENDOCRINE   (+) Arthritis   (+) Type 2 diabetes mellitus with diabetic neuropathy (HCC)   (+) Type 2 diabetes with nephropathy (HCC)       Anesthetic History   No history of anesthetic complications            Review of Systems / Medical History  Patient summary reviewed, nursing notes reviewed and pertinent labs reviewed    Pulmonary        Sleep apnea: No treatment    Asthma     Comments: States never used CPAP - had Gastric bypass surgery with over 100 lb weight loss- reports less snoring    Neuro/Psych         Psychiatric history     Cardiovascular    Hypertension          CAD    Exercise tolerance: >4 METS  Comments: Reports sedentary but denies recent CP, one stent placed 2015  Reports last NTG use 2-3 months prior    GI/Hepatic/Renal     GERD: poorly controlled    Renal disease  Liver disease    Comments: CrCl >60 , reports Bumex use for water retention in feet  Endo/Other    Diabetes: type 2    Arthritis     Other Findings            Physical Exam    Airway  Mallampati: I  TM Distance: 4 - 6 cm    Mouth opening: Diminished (comment)     Cardiovascular    Rhythm: regular          Comments: HR >100 Dental  No notable dental hx       Pulmonary                 Abdominal         Other Findings            Anesthetic Plan    ASA: 4, emergent  Anesthesia type: general  Plan GA with central line placement. Reports difficult IV access.    D/w pt/family possible postop ventilation as needed         Induction: Intravenous  Anesthetic plan and risks discussed with: Patient and Family

## 2023-03-26 NOTE — ED NOTES
Bedside and Verbal shift change report given to Alex (oncoming nurse) by Michael Sanders (offgoing nurse).  Report included the following information SBAR, Kardex, ED Summary, Intake/Output, MAR, Recent Results, and Cardiac Rhythm SR

## 2023-03-26 NOTE — PROGRESS NOTES
Physical Therapy orders acknowledged, chart reviewed and discussed with nurse patient being transfers to the floor. We  will continue to follow up with the patient for therapy.

## 2023-03-26 NOTE — ED NOTES
Patient blood pressure has been trending down. .. DR on call notified and states he will come to bedside to assess patient    DR ordered bolus and continuous fluids; RN will continue to monitor      0530  Patient retaining urine;  placed mckenna order and is per nurse driven protocol to be removed on 3/27/23      0630  DR notified of critical WBC ; continued low blood pressures; team will round and assess patient after shift change

## 2023-03-27 ENCOUNTER — APPOINTMENT (OUTPATIENT)
Dept: NON INVASIVE DIAGNOSTICS | Age: 63
DRG: 853 | End: 2023-03-27
Attending: STUDENT IN AN ORGANIZED HEALTH CARE EDUCATION/TRAINING PROGRAM
Payer: MEDICARE

## 2023-03-27 ENCOUNTER — APPOINTMENT (OUTPATIENT)
Dept: VASCULAR SURGERY | Age: 63
DRG: 853 | End: 2023-03-27
Attending: STUDENT IN AN ORGANIZED HEALTH CARE EDUCATION/TRAINING PROGRAM
Payer: MEDICARE

## 2023-03-27 ENCOUNTER — APPOINTMENT (OUTPATIENT)
Dept: GENERAL RADIOLOGY | Age: 63
DRG: 853 | End: 2023-03-27
Attending: FAMILY MEDICINE
Payer: MEDICARE

## 2023-03-27 LAB
ABO + RH BLD: NORMAL
ANION GAP SERPL CALC-SCNC: 5 MMOL/L (ref 5–15)
BASOPHILS # BLD: 0 K/UL (ref 0–0.1)
BASOPHILS NFR BLD: 0 % (ref 0–1)
BLOOD GROUP ANTIBODIES SERPL: NORMAL
BUN SERPL-MCNC: 13 MG/DL (ref 6–20)
BUN/CREAT SERPL: 32 (ref 12–20)
CALCIUM SERPL-MCNC: 7.2 MG/DL (ref 8.5–10.1)
CHLORIDE SERPL-SCNC: 109 MMOL/L (ref 97–108)
CO2 SERPL-SCNC: 25 MMOL/L (ref 21–32)
CREAT SERPL-MCNC: 0.41 MG/DL (ref 0.55–1.02)
DIFFERENTIAL METHOD BLD: ABNORMAL
ECHO AO ASC DIAM: 2.4 CM
ECHO AO ASCENDING AORTA INDEX: 1.35 CM/M2
ECHO AV AREA PEAK VELOCITY: 1.5 CM2
ECHO AV AREA VTI: 1.6 CM2
ECHO AV AREA/BSA PEAK VELOCITY: 0.8 CM2/M2
ECHO AV AREA/BSA VTI: 0.9 CM2/M2
ECHO AV MEAN GRADIENT: 10 MMHG
ECHO AV MEAN VELOCITY: 1.5 M/S
ECHO AV PEAK GRADIENT: 16 MMHG
ECHO AV PEAK VELOCITY: 2 M/S
ECHO AV VELOCITY RATIO: 0.5
ECHO AV VTI: 43 CM
ECHO LA DIAMETER INDEX: 2.02 CM/M2
ECHO LA DIAMETER: 3.6 CM
ECHO LA VOL 2C: 31 ML (ref 22–52)
ECHO LA VOL 4C: 37 ML (ref 22–52)
ECHO LA VOL BP: 35 ML (ref 22–52)
ECHO LA VOL/BSA BIPLANE: 20 ML/M2 (ref 16–34)
ECHO LA VOLUME AREA LENGTH: 38 ML
ECHO LA VOLUME INDEX A2C: 17 ML/M2 (ref 16–34)
ECHO LA VOLUME INDEX A4C: 21 ML/M2 (ref 16–34)
ECHO LA VOLUME INDEX AREA LENGTH: 21 ML/M2 (ref 16–34)
ECHO LV E' LATERAL VELOCITY: 12 CM/S
ECHO LV E' SEPTAL VELOCITY: 9 CM/S
ECHO LV EDV A2C: 38 ML
ECHO LV EDV A4C: 55 ML
ECHO LV EDV BP: 47 ML (ref 56–104)
ECHO LV EDV INDEX A4C: 31 ML/M2
ECHO LV EDV INDEX BP: 26 ML/M2
ECHO LV EDV NDEX A2C: 21 ML/M2
ECHO LV EJECTION FRACTION A2C: 53 %
ECHO LV EJECTION FRACTION A4C: 63 %
ECHO LV EJECTION FRACTION BIPLANE: 59 % (ref 55–100)
ECHO LV ESV A2C: 18 ML
ECHO LV ESV A4C: 20 ML
ECHO LV ESV BP: 20 ML (ref 19–49)
ECHO LV ESV INDEX A2C: 10 ML/M2
ECHO LV ESV INDEX A4C: 11 ML/M2
ECHO LV ESV INDEX BP: 11 ML/M2
ECHO LV FRACTIONAL SHORTENING: 25 % (ref 28–44)
ECHO LV INTERNAL DIMENSION DIASTOLE INDEX: 2.25 CM/M2
ECHO LV INTERNAL DIMENSION DIASTOLIC: 4 CM (ref 3.9–5.3)
ECHO LV INTERNAL DIMENSION SYSTOLIC INDEX: 1.69 CM/M2
ECHO LV INTERNAL DIMENSION SYSTOLIC: 3 CM
ECHO LV IVSD: 0.8 CM (ref 0.6–0.9)
ECHO LV MASS 2D: 93.5 G (ref 67–162)
ECHO LV MASS INDEX 2D: 52.5 G/M2 (ref 43–95)
ECHO LV POSTERIOR WALL DIASTOLIC: 0.8 CM (ref 0.6–0.9)
ECHO LV RELATIVE WALL THICKNESS RATIO: 0.4
ECHO LVOT AREA: 3.1 CM2
ECHO LVOT AV VTI INDEX: 0.51
ECHO LVOT DIAM: 2 CM
ECHO LVOT MEAN GRADIENT: 2 MMHG
ECHO LVOT PEAK GRADIENT: 4 MMHG
ECHO LVOT PEAK VELOCITY: 1 M/S
ECHO LVOT STROKE VOLUME INDEX: 38.8 ML/M2
ECHO LVOT SV: 69.1 ML
ECHO LVOT VTI: 22 CM
ECHO MV A VELOCITY: 1.22 M/S
ECHO MV E DECELERATION TIME (DT): 135.3 MS
ECHO MV E VELOCITY: 0.82 M/S
ECHO MV E/A RATIO: 0.67
ECHO MV E/E' LATERAL: 6.83
ECHO MV E/E' RATIO (AVERAGED): 7.97
ECHO MV E/E' SEPTAL: 9.11
ECHO PULMONARY ARTERY END DIASTOLIC PRESSURE: 5 MMHG
ECHO PV MAX VELOCITY: 1.3 M/S
ECHO PV PEAK GRADIENT: 7 MMHG
ECHO PV REGURGITANT MAX VELOCITY: 1.1 M/S
ECHO RV INTERNAL DIMENSION: 3.3 CM
ECHO RV TAPSE: 2.5 CM (ref 1.7–?)
ECHO RVOT PEAK GRADIENT: 4 MMHG
ECHO RVOT PEAK VELOCITY: 1 M/S
ECHO TV REGURGITANT MAX VELOCITY: 2.67 M/S
ECHO TV REGURGITANT PEAK GRADIENT: 29 MMHG
EOSINOPHIL # BLD: 0 K/UL (ref 0–0.4)
EOSINOPHIL NFR BLD: 0 % (ref 0–7)
ERYTHROCYTE [DISTWIDTH] IN BLOOD BY AUTOMATED COUNT: 13.3 % (ref 11.5–14.5)
GLUCOSE BLD STRIP.AUTO-MCNC: 121 MG/DL (ref 65–117)
GLUCOSE SERPL-MCNC: 147 MG/DL (ref 65–100)
HCT VFR BLD AUTO: 23.5 % (ref 35–47)
HGB BLD-MCNC: 7.9 G/DL (ref 11.5–16)
IMM GRANULOCYTES # BLD AUTO: 0 K/UL
IMM GRANULOCYTES NFR BLD AUTO: 0 %
LYMPHOCYTES # BLD: 1.2 K/UL (ref 0.8–3.5)
LYMPHOCYTES NFR BLD: 3 % (ref 12–49)
MAGNESIUM SERPL-MCNC: 1.7 MG/DL (ref 1.6–2.4)
MCH RBC QN AUTO: 29.7 PG (ref 26–34)
MCHC RBC AUTO-ENTMCNC: 33.6 G/DL (ref 30–36.5)
MCV RBC AUTO: 88.3 FL (ref 80–99)
METAMYELOCYTES NFR BLD MANUAL: 2 %
MONOCYTES # BLD: 1.6 K/UL (ref 0–1)
MONOCYTES NFR BLD: 4 % (ref 5–13)
NEUTS BAND NFR BLD MANUAL: 4 % (ref 0–6)
NEUTS SEG # BLD: 35.5 K/UL (ref 1.8–8)
NEUTS SEG NFR BLD: 87 % (ref 32–75)
NRBC # BLD: 0 K/UL (ref 0–0.01)
NRBC BLD-RTO: 0 PER 100 WBC
PLATELET # BLD AUTO: 408 K/UL (ref 150–400)
PMV BLD AUTO: 8.4 FL (ref 8.9–12.9)
POTASSIUM SERPL-SCNC: 3.5 MMOL/L (ref 3.5–5.1)
RBC # BLD AUTO: 2.66 M/UL (ref 3.8–5.2)
RBC MORPH BLD: ABNORMAL
SERVICE CMNT-IMP: ABNORMAL
SODIUM SERPL-SCNC: 139 MMOL/L (ref 136–145)
SPECIMEN EXP DATE BLD: NORMAL
WBC # BLD AUTO: 39 K/UL (ref 3.6–11)
WBC MORPH BLD: ABNORMAL

## 2023-03-27 PROCEDURE — 93306 TTE W/DOPPLER COMPLETE: CPT

## 2023-03-27 PROCEDURE — 82962 GLUCOSE BLOOD TEST: CPT

## 2023-03-27 PROCEDURE — 93306 TTE W/DOPPLER COMPLETE: CPT | Performed by: SPECIALIST

## 2023-03-27 PROCEDURE — 93971 EXTREMITY STUDY: CPT

## 2023-03-27 PROCEDURE — 74011250637 HC RX REV CODE- 250/637

## 2023-03-27 PROCEDURE — 77030038269 HC DRN EXT URIN PURWCK BARD -A

## 2023-03-27 PROCEDURE — 74011000250 HC RX REV CODE- 250

## 2023-03-27 PROCEDURE — 80048 BASIC METABOLIC PNL TOTAL CA: CPT

## 2023-03-27 PROCEDURE — 99233 SBSQ HOSP IP/OBS HIGH 50: CPT | Performed by: FAMILY MEDICINE

## 2023-03-27 PROCEDURE — 65610000006 HC RM INTENSIVE CARE

## 2023-03-27 PROCEDURE — 74011250636 HC RX REV CODE- 250/636

## 2023-03-27 PROCEDURE — 71045 X-RAY EXAM CHEST 1 VIEW: CPT

## 2023-03-27 PROCEDURE — 74011250636 HC RX REV CODE- 250/636: Performed by: STUDENT IN AN ORGANIZED HEALTH CARE EDUCATION/TRAINING PROGRAM

## 2023-03-27 PROCEDURE — 97163 PT EVAL HIGH COMPLEX 45 MIN: CPT

## 2023-03-27 PROCEDURE — 74011000272 HC RX REV CODE- 272

## 2023-03-27 PROCEDURE — 97530 THERAPEUTIC ACTIVITIES: CPT

## 2023-03-27 PROCEDURE — 83735 ASSAY OF MAGNESIUM: CPT

## 2023-03-27 PROCEDURE — 85025 COMPLETE CBC W/AUTO DIFF WBC: CPT

## 2023-03-27 PROCEDURE — 2709999900 HC NON-CHARGEABLE SUPPLY

## 2023-03-27 PROCEDURE — 36415 COLL VENOUS BLD VENIPUNCTURE: CPT

## 2023-03-27 PROCEDURE — 51798 US URINE CAPACITY MEASURE: CPT

## 2023-03-27 RX ORDER — SODIUM CHLORIDE, SODIUM LACTATE, POTASSIUM CHLORIDE, CALCIUM CHLORIDE 600; 310; 30; 20 MG/100ML; MG/100ML; MG/100ML; MG/100ML
125 INJECTION, SOLUTION INTRAVENOUS CONTINUOUS
Status: DISCONTINUED | OUTPATIENT
Start: 2023-03-27 | End: 2023-03-27 | Stop reason: HOSPADM

## 2023-03-27 RX ORDER — FENTANYL CITRATE 50 UG/ML
25 INJECTION, SOLUTION INTRAMUSCULAR; INTRAVENOUS
Status: DISCONTINUED | OUTPATIENT
Start: 2023-03-27 | End: 2023-03-27 | Stop reason: HOSPADM

## 2023-03-27 RX ORDER — ALBUTEROL SULFATE 0.83 MG/ML
2.5 SOLUTION RESPIRATORY (INHALATION) AS NEEDED
Status: DISCONTINUED | OUTPATIENT
Start: 2023-03-27 | End: 2023-03-27 | Stop reason: HOSPADM

## 2023-03-27 RX ORDER — ONDANSETRON 2 MG/ML
4 INJECTION INTRAMUSCULAR; INTRAVENOUS AS NEEDED
Status: DISCONTINUED | OUTPATIENT
Start: 2023-03-27 | End: 2023-03-27 | Stop reason: HOSPADM

## 2023-03-27 RX ORDER — DEXMEDETOMIDINE HYDROCHLORIDE 100 UG/ML
INJECTION, SOLUTION INTRAVENOUS AS NEEDED
Status: DISCONTINUED | OUTPATIENT
Start: 2023-03-26 | End: 2023-03-27 | Stop reason: HOSPADM

## 2023-03-27 RX ORDER — HYDROMORPHONE HYDROCHLORIDE 1 MG/ML
.25-1 INJECTION, SOLUTION INTRAMUSCULAR; INTRAVENOUS; SUBCUTANEOUS
Status: DISCONTINUED | OUTPATIENT
Start: 2023-03-27 | End: 2023-03-27 | Stop reason: HOSPADM

## 2023-03-27 RX ORDER — METRONIDAZOLE 500 MG/100ML
500 INJECTION, SOLUTION INTRAVENOUS EVERY 8 HOURS
Status: DISCONTINUED | OUTPATIENT
Start: 2023-03-27 | End: 2023-03-31 | Stop reason: HOSPADM

## 2023-03-27 RX ORDER — SODIUM CHLORIDE 0.9 % (FLUSH) 0.9 %
5-40 SYRINGE (ML) INJECTION EVERY 8 HOURS
Status: DISCONTINUED | OUTPATIENT
Start: 2023-03-27 | End: 2023-03-27 | Stop reason: HOSPADM

## 2023-03-27 RX ORDER — METRONIDAZOLE 500 MG/100ML
500 INJECTION, SOLUTION INTRAVENOUS EVERY 8 HOURS
Status: DISCONTINUED | OUTPATIENT
Start: 2023-03-27 | End: 2023-03-27

## 2023-03-27 RX ORDER — SODIUM CHLORIDE 0.9 % (FLUSH) 0.9 %
5-40 SYRINGE (ML) INJECTION AS NEEDED
Status: DISCONTINUED | OUTPATIENT
Start: 2023-03-27 | End: 2023-03-27 | Stop reason: HOSPADM

## 2023-03-27 RX ORDER — VANCOMYCIN HYDROCHLORIDE 250 MG/5ML
500 POWDER, FOR SOLUTION ORAL EVERY 6 HOURS
Status: DISCONTINUED | OUTPATIENT
Start: 2023-03-27 | End: 2023-03-31 | Stop reason: HOSPADM

## 2023-03-27 RX ADMIN — SODIUM CHLORIDE 250 ML/HR: 9 INJECTION, SOLUTION INTRAVENOUS at 03:00

## 2023-03-27 RX ADMIN — CEFTRIAXONE SODIUM 2 G: 2 INJECTION, POWDER, FOR SOLUTION INTRAMUSCULAR; INTRAVENOUS at 16:30

## 2023-03-27 RX ADMIN — SODIUM CHLORIDE 30 MCG/MIN: 9 INJECTION, SOLUTION INTRAVENOUS at 03:38

## 2023-03-27 RX ADMIN — VANCOMYCIN HYDROCHLORIDE 500 MG: 250 POWDER, FOR SOLUTION ORAL at 22:00

## 2023-03-27 RX ADMIN — SODIUM CHLORIDE 250 ML/HR: 9 INJECTION, SOLUTION INTRAVENOUS at 15:15

## 2023-03-27 RX ADMIN — SODIUM CHLORIDE, PRESERVATIVE FREE 10 ML: 5 INJECTION INTRAVENOUS at 17:41

## 2023-03-27 RX ADMIN — VANCOMYCIN HYDROCHLORIDE 500 MG: 500 INJECTION, POWDER, LYOPHILIZED, FOR SOLUTION INTRAVENOUS at 10:37

## 2023-03-27 RX ADMIN — METRONIDAZOLE 500 MG: 500 INJECTION, SOLUTION INTRAVENOUS at 12:12

## 2023-03-27 RX ADMIN — VANCOMYCIN HYDROCHLORIDE 500 MG: 500 INJECTION, POWDER, LYOPHILIZED, FOR SOLUTION INTRAVENOUS at 17:41

## 2023-03-27 RX ADMIN — GABAPENTIN 600 MG: 300 CAPSULE ORAL at 21:40

## 2023-03-27 RX ADMIN — VANCOMYCIN HYDROCHLORIDE 500 MG: 500 INJECTION, POWDER, LYOPHILIZED, FOR SOLUTION INTRAVENOUS at 22:00

## 2023-03-27 RX ADMIN — SODIUM CHLORIDE 250 ML/HR: 9 INJECTION, SOLUTION INTRAVENOUS at 06:50

## 2023-03-27 RX ADMIN — VANCOMYCIN HYDROCHLORIDE 500 MG: 250 POWDER, FOR SOLUTION ORAL at 16:30

## 2023-03-27 RX ADMIN — GABAPENTIN 600 MG: 300 CAPSULE ORAL at 09:30

## 2023-03-27 RX ADMIN — VANCOMYCIN HYDROCHLORIDE 500 MG: 250 POWDER, FOR SOLUTION ORAL at 09:42

## 2023-03-27 RX ADMIN — METRONIDAZOLE 500 MG: 500 INJECTION, SOLUTION INTRAVENOUS at 21:40

## 2023-03-27 RX ADMIN — METRONIDAZOLE 500 MG: 500 INJECTION, SOLUTION INTRAVENOUS at 05:00

## 2023-03-27 RX ADMIN — BUPROPION HYDROCHLORIDE 300 MG: 150 TABLET, EXTENDED RELEASE ORAL at 21:40

## 2023-03-27 RX ADMIN — BUPROPION HYDROCHLORIDE 300 MG: 150 TABLET, EXTENDED RELEASE ORAL at 09:30

## 2023-03-27 RX ADMIN — SODIUM CHLORIDE, PRESERVATIVE FREE 10 ML: 5 INJECTION INTRAVENOUS at 22:00

## 2023-03-27 RX ADMIN — ATORVASTATIN CALCIUM 20 MG: 20 TABLET, FILM COATED ORAL at 09:31

## 2023-03-27 RX ADMIN — BUDESONIDE 250 MCG: 0.5 SUSPENSION RESPIRATORY (INHALATION) at 21:00

## 2023-03-27 RX ADMIN — SODIUM CHLORIDE, PRESERVATIVE FREE 10 ML: 5 INJECTION INTRAVENOUS at 05:00

## 2023-03-27 RX ADMIN — SODIUM CHLORIDE 150 ML/HR: 9 INJECTION, SOLUTION INTRAVENOUS at 19:24

## 2023-03-27 RX ADMIN — GABAPENTIN 600 MG: 300 CAPSULE ORAL at 16:30

## 2023-03-27 RX ADMIN — CEFTRIAXONE SODIUM 2 G: 2 INJECTION, POWDER, FOR SOLUTION INTRAMUSCULAR; INTRAVENOUS at 04:00

## 2023-03-27 NOTE — ACP (ADVANCE CARE PLANNING)
Advance Care Planning   Advance Care Planning Inpatient Note  [facility]    Today's Date: 3/27/2023  Unit: [facility] 3 INTENSIVE CARE    Received request from  In Basket . Upon review of chart and communication with care team, patient's decision making abilities are not in question. Patient, Spouse, and Child/children was/were present in the room during visit. Goals of ACP Conversation:  Discuss Advance Care planning documents    Health Care Decision Makers:    No healthcare decision makers have been documented. Click here to complete [address] including selection of the Healthcare Decision Maker Relationship (ie \"Primary\")  Summary:  No Decision Maker named by patient at this time    Advance Care Planning Documents (Patient Wishes) on file:  None     Assessment:     Received a request for an Advance Medical Directive (AMD) Consult. Reviewed patient's chart and spoke with patient's nurse. Pt, pt's spouse, pt's daughter and son were present. Introduced self and chaplaincy. Pt wu through family support. No spiritual or emotional needs at this time. Reviewed the AMD with the patient. Reviewed [facility] hierarchy of decision makers. Pt shared she would like to complete the form at a later time.  shared chaplains are available to assist when she is ready.      Interventions:  Provided education on documents for clarity and greater understanding  Discussed and provided education on state decision maker hierarchy  Reviewed but did not complete ACP document    Care Preferences Communicated:  No    Outcomes/Plan:  ACP Discussion Completed    [facility] on 3/27/2023 at 12:13 PM

## 2023-03-27 NOTE — PROGRESS NOTES
Problem: Falls - Risk of  Goal: *Absence of Falls  Description: Document Carlos A Yanez Fall Risk and appropriate interventions in the flowsheet. Outcome: Progressing Towards Goal  Note: Fall Risk Interventions:       Problem: Pressure Injury - Risk of  Goal: *Prevention of pressure injury  Description: Document Nadeem Scale and appropriate interventions in the flowsheet. Outcome: Progressing Towards Goal  Note: Pressure Injury Interventions:  Sensory Interventions: Assess changes in LOC, Assess need for specialty bed, Check visual cues for pain, Keep linens dry and wrinkle-free, Monitor skin under medical devices, Pressure redistribution bed/mattress (bed type), Turn and reposition approx. every two hours (pillows and wedges if needed)    Moisture Interventions: Absorbent underpads, Assess need for specialty bed, Internal/External urinary devices, Minimize layers    Activity Interventions: Assess need for specialty bed, Pressure redistribution bed/mattress(bed type), Increase time out of bed, PT/OT evaluation    Mobility Interventions: Assess need for specialty bed, HOB 30 degrees or less, Pressure redistribution bed/mattress (bed type), Turn and reposition approx.  every two hours(pillow and wedges)    Nutrition Interventions: Document food/fluid/supplement intake    Friction and Shear Interventions: HOB 30 degrees or less, Lift sheet, Minimize layers, Transferring/repositioning devices

## 2023-03-27 NOTE — PROGRESS NOTES
Mika Lackey M.D.  [phone]           GI PROGRESS NOTE        NAME: Truman Hudson   :  1960   MRN:  [id number]       Subjective:   Lying in bed, reports feeling better compared to yesterday, some pain only with movement now. Tolerating diet, small amount. Objective: In NAD      VITALS:   Last 24hrs VS reviewed since prior progress note. Most recent are:  Visit Vitals  BP (!) 127/48 (BP 1 Location: Left upper arm, BP Patient Position: At rest)   Pulse 89   Temp 97.8 °F (36.6 °C)   Resp 27   Ht 5' 5\" (1.651 m)   Wt 70.3 kg (155 lb)   SpO2 96%   Breastfeeding No   BMI 25.79 kg/m²       Intake/Output Summary (Last 24 hours) at 3/27/2023 1653  Last data filed at 3/27/2023 1344  Gross per 24 hour   Intake 3500.76 ml   Output 1448 ml   Net 2052.76 ml       PHYSICAL EXAM:  General: Alert, in no acute distress    HEENT: Anicteric sclerae. Lungs:            CTA Bilaterally.    Heart:  Regular  rhythm,    Abdomen: Soft, mildly distended, dressing and ileostomy  Extremities: Edema noted over left hand a forearm      Lab Data Reviewed:   Recent Labs     23  0324 23  0544   WBC 39.0* 52.3*   HGB 7.9* 10.0*   HCT 23.5* 29.3*   * 440*     Recent Labs     23  0324 23  1344    133*   K 3.5 4.0   * 103   CO2 25 24   BUN 13 11   CREA 0.41* 0.76   * 96   CA 7.2* 7.9*     Recent Labs     23  1438   *   TP 6.2*   ALB 2.0*   GLOB 4.2*   LPSE 25*       ________________________________________________________________________  Patient Active Problem List   Diagnosis Code    Asthma J45.909    Arthritis M19.90    GERD (gastroesophageal reflux disease) K21.9    GARCIA (nonalcoholic steatohepatitis) K75.81    Fibromyalgia M79.7    Essential hypertension M18    Metabolic syndrome U55.64    FH: diabetes mellitus Z83.3    Anxiety F41.9    History of pulmonary embolus (PE) Z86.711    Hypokalemia E87.6    H/O gastric bypass Z98.84    H/O colonoscopy Z98.890    DDD (degenerative disc disease), lumbar M51.36    FH: colon cancer Z80.0    Elevated ferritin R79.89    Type 2 diabetes mellitus with diabetic neuropathy (HCC) E11.40    Abnormal mammogram of right breast R92.8    Abnormal CT of the abdomen R93.5    Omental infarction [facility]) K55.069    Primary fibromyalgia syndrome M79.7    Inflammatory bowel disease K52.9    Type 2 diabetes with nephropathy (HCC) E11.21    Venous insufficiency I87.2    Colitis K52.9    JASIEL (acute kidney injury) ([facility] [address] N17.9    Neutrophilia D72.9    Diarrhea of presumed infectious origin R19.7    Thrombocytosis D75.839         Assessment and Plan:  Fulminant colitis with septic shock, S/P subtotal colectomy yesterday. Awaiting pathology, had negative C. Diff on previous admission. No significant urine output today despite continuous IV fluids. Discussed with her nurse to get a bladder scan and discuss with primary team diuretics and repeat labs. Swelling of LUE, rule out DVT.        Signed By: Paula Holman MD     3/27/2023  4:53 PM

## 2023-03-27 NOTE — PROGRESS NOTES
Problem: Mobility Impaired (Adult and Pediatric)  Goal: *Acute Goals and Plan of Care (Insert Text)  Description: FUNCTIONAL STATUS PRIOR TO ADMISSION: patient reports being \"weaker\" prior to this admission. Patient reports fall prior to admission on the stairs. Patient having bilateral LE weakness that is progressive. Use of RW prior to admission    HOME SUPPORT PRIOR TO ADMISSION: patient lives with     Physical Therapy Goals  Initiated 3/27/2023  1. Patient will move from supine to sit and sit to supine  in bed with moderate assistance  within 7 day(s). 2.  Patient will transfer from bed to chair and chair to bed with moderate assistance  using the least restrictive device within 7 day(s). 3.  Patient will perform sit to stand with moderate assistance  within 7 day(s). 4.  Patient will ambulate with moderate assistance  for 10 feet with the least restrictive device within 7 day(s). Outcome: Progressing Towards Goal  Note:   PHYSICAL THERAPY EVALUATION  Patient: Allen Rosado (12 y.o. female)  Date: 3/27/2023  Primary Diagnosis: Colitis [K52.9]  Leukocytosis [D72.829]  Procedure(s) (LRB):  LAPAROTOMY EXPLORATORY, Subtotal ABDOMINAL COLECTOMY (N/A) 1 Day Post-Op   Precautions: fall       ASSESSMENT  Based on the objective data described below, the patient presents with decreased strength, ROM, balance and overall functional mobility following admission for colitis. Patient underwent emergent lap choly yesterday. Patient is having bilateral distal UE and LE weakness. Reports increased fall history, recent hospital admission (3/13/2023), she is reporting Right RC surgery in 12/2022. Patient today with bilateral LE weakness distal >proximal.  She has weak  bilaterally. She is unable to DF to neutral.  Patient required MOD A x2 to  for supine-sit. In sitting, vitals stable (still on Eduardo). Patient unable to stand due to decreased LE strength.      Current Level of Function Impacting Discharge (mobility/balance): MOD A x2 for bed mobility, unable to sit    Functional Outcome Measure: The patient scored 8/24 on the [facility] outcome measure which is indicative of need for therapy services. Other factors to consider for discharge:      Patient will benefit from skilled therapy intervention to address the above noted impairments. PLAN :  Recommendations and Planned Interventions: bed mobility training, transfer training, gait training, therapeutic exercises, neuromuscular re-education, and therapeutic activities      Frequency/Duration: Patient will be followed by physical therapy:  5 times a week to address goals. Recommendation for discharge: (in order for the patient to meet his/her long term goals)  Therapy up to 5 days/week in SNF setting    This discharge recommendation:  Has been made in collaboration with the attending provider and/or case management    IF patient discharges home will need the following DME: to be determined (TBD)         SUBJECTIVE:   Patient stated I have been getting weaker and weaker.     OBJECTIVE DATA SUMMARY:   HISTORY:    Past Medical History:   Diagnosis Date    Arthritis     OA back,knees and hands    Asthma     Autoimmune disease ([address]     Awa Musca    CAD (coronary artery disease)     s/p stents 11/2015    Cardiac murmur     Depression     Diabetes ([address]     diet controlled at this time.      DVT (deep venous thrombosis) ([facility] 1981    GERD (gastroesophageal reflux disease)     Hypertension     Lumbar radiculitis     follows with dr Jodi Rice for epidural steroid injections    Morbid obesity ([address]     Musculoskeletal disorder     EDMOND (obstructive sleep apnea)     wears cpap    S/P TONJA (total abdominal hysterectomy)     Thromboembolus ([address] 1996    PE    Trigger finger, right little finger 10/19/2020    follows with [facility]    Trigger finger, right ring finger 10/19/2020    [facility]     Past Surgical History:   Procedure Laterality Date COLONOSCOPY N/A 12/6/2021    COLONOSCOPY (URGENT) performed by Jenn Allen MD at [address] Right 2018    Fibroadenoma    [address]    HX CHOLECYSTECTOMY  2017    HX GASTRIC BYPASS  01/2017    HX HYSTERECTOMY  1985    HX TOTAL ABDOMINAL HYSTERECTOMY      age 22    IR INJ SPINE FLUORO GUIDED  2/4/2021    IR INJ SPINE FLUORO GUIDED  5/5/2022    DAYAN STEREO  BX BREAST RT ADDL W/CLIP AND SPECIMEN Right 2000    neg     RI UNLISTED PROCEDURE CARDIAC SURGERY  11/2015    STENT PLACED       Personal factors and/or comorbidities impacting plan of care:     Home Situation  Home Environment: Private residence  # Steps to Enter: 4  One/Two Story Residence: Two story, live on 1st floor  Living Alone: No  Support Systems: Spouse/Significant Other, Child(radha)  Patient Expects to be Discharged to[de-identified] Home  Current DME Used/Available at Home: None    EXAMINATION/PRESENTATION/DECISION MAKING:   Vitals:    03/27/23 1103 03/27/23 1132 03/27/23 1232 03/27/23 1332   BP: 124/67 126/69 (!) 127/53 (!) 127/48   BP 1 Location:    Left upper arm   BP Patient Position:    At rest   Pulse: 84 88 87 89   Temp:    97.8 °F (36.6 °C)   Resp: 19 14 19 27   Height:       Weight:       SpO2: 97% 97% 97% 96%       Critical Behavior:  Neurologic State: Drowsy  Orientation Level: Unable to verbalize  Cognition: Follows commands     Hearing: Auditory  Auditory Impairment: None  Skin:    Edema:   Range Of Motion:  AROM: Generally decreased, functional           PROM: Generally decreased, functional           Strength:    Strength: Grossly decreased, non-functional                    Tone & Sensation:                                  Coordination:  Coordination: Grossly decreased, non-functional  Vision:      Functional Mobility:  Bed Mobility:  Rolling: Moderate assistance  Supine to Sit: Moderate assistance;Assist x1  Sit to Supine:  Moderate assistance;Assist x2     Transfers:  Sit to Stand:  (unable)             Functional Measure:  [employer] AM-PAC®      Basic Mobility Inpatient Short Form (6-Clicks) Version 2  How much HELP from another person do you currently need. .. (If the patient hasn't done an activity recently, how much help from another person do you think they would need if they tried?) Total A Lot A Little None   1. Turning from your back to your side while in a flat bed without using bedrails? []  1 [x]  2 []  3  []  4   2. Moving from lying on your back to sitting on the side of a flat bed without using bedrails? []  1 [x]  2 []  3  []  4   3. Moving to and from a bed to a chair (including a wheelchair)? [x]  1 []  2 []  3  []  4   4. Standing up from a chair using your arms (e.g. wheelchair or bedside chair)? [x]  1 []  2 []  3  []  4   5. Walking in hospital room? [x]  1 []  2 []  3  []  4   6. Climbing 3-5 steps with a railing? [x]  1 []  2 []  3  []  4     Raw Score: 8/24                            Cutoff score ?171,2,3 had higher odds of discharging home with home health or need of SNF/IPR. [address], [place]. Validity of the AM-PAC 6-Clicks Inpatient Daily Activity and Basic Mobility Short Forms. Physical Therapy Mar 2014, [phone]) 379-391; DOI: 10.2522/ptj.29720812  2. Tamie Hernandez. Association of AM-PAC \"6-Clicks\" Basic Mobility and Daily Activity Scores With Discharge Destination. Phys Ther. 2021 Apr 4;101(4):ywig624. doi: 10.1093/ptj/swpf556. PMID: 88810711. V Benita Roberts, Jose KHAN, Robin Lacy, Pawan K, Elizabeth S. Activity Measure for Post-Acute Care \"6-Clicks\" Basic Mobility Scores Predict Discharge Destination After Acute Care Hospitalization in Select Patient Groups: A Retrospective, Observational Study. Arch Rehabil Res Clin Transl. 2022 Jul 16;4(3):458154. doi: 10.1016/j.arrct. 2627.171604. PMID: 00157833; PMCID: [id number].   4. Kathy Fine, Chip WMaria E. AM-PAC Short Forms Manual 4.0. Revised 2/2020. Physical Therapy Evaluation Charge Determination   History Examination Presentation Decision-Making   HIGH Complexity :3+ comorbidities / personal factors will impact the outcome/ POC  HIGH Complexity : 4+ Standardized tests and measures addressing body structure, function, activity limitation and / or participation in recreation  HIGH Complexity : Unstable and unpredictable characteristics  Other outcome measures AMAPC  HIGH       Based on the above components, the patient evaluation is determined to be of the following complexity level: HIGH     Pain Rating:  None reported    Activity Tolerance:   Fair    After treatment patient left in no apparent distress:   Supine in bed, Call bell within reach, and Caregiver / family present    COMMUNICATION/EDUCATION:   The patients plan of care was discussed with: Registered nurse. Fall prevention education was provided and the patient/caregiver indicated understanding., Patient/family have participated as able in goal setting and plan of care. , and Patient/family agree to work toward stated goals and plan of care.     Thank you for this referral.  Wesley Steen, PT, DPT   Time Calculation: 26 mins

## 2023-03-27 NOTE — PROGRESS NOTES
Pharmacy re: oral Vancomycin. Possible fulminant c. Difficile. Will change vancomycin to 500 mg orally q6H. MD added metronidazole IV. Per protocol.

## 2023-03-27 NOTE — ANESTHESIA PROCEDURE NOTES
Central Line Placement    Start time: 3/26/2023 9:54 PM  End time: 3/26/2023 10:11 PM  Performed by: Maggy Tariq MD  Authorized by: Maggy Tariq MD     Indications: vascular access and need for vasopressors (requested by Tele ICU MD- difficult IV blood draws in ICU)  Preanesthetic Checklist: patient identified, risks and benefits discussed, anesthesia consent, site marked, patient being monitored, timeout performed and fire risk safety assessment completed and verbalized    Timeout Time: 21:54 EDT       Pre-procedure: All elements of maximal sterile barrier technique followed?  Yes    2% Chlorhexidine for cutaneous antisepsis, Hand hygiene performed prior to catheter insertion and Ultrasound guidance    Sterile Ultrasound Technique followed?: Yes            Procedure:   Prep:  ChloraPrep  Location: internal jugular  Orientation:  Right  Patient position:  Trendelenburg  Catheter type:  Triple lumen  Catheter size:  7 Fr  Catheter length:  20 cm  Number of attempts:  1  Successful placement: Yes      Assessment:   Post-procedure:  Catheter secured, sterile dressing applied and sterile dressing with CHG applied  Assessment:  Blood return through all ports and guidewire removal verified (will check CXR postop)  Insertion:  Uncomplicated  Patient tolerance:  Patient tolerated the procedure well with no immediate complications

## 2023-03-27 NOTE — PROGRESS NOTES
Occupational Therapy Note:  Orders acknowledged, chart reviewed, and spoke with nursing. Patient was received in bed requesting to defer OT evaluation d/t fatigue despite max encouragement. Will follow-up next tx day for OT evaluation.    Mary Bailey, OTR/L

## 2023-03-27 NOTE — PROGRESS NOTES
[address] [place] [address] with [address] [place] [address]     Resident Progress Note in Brief    S:   Patient seen and examined at bedside. Patient denies any SOB, nausea/vomiting, abdominal pain. O:  Visit Vitals  BP (!) 127/48 (BP 1 Location: Left upper arm, BP Patient Position: At rest)   Pulse 89   Temp 97.8 °F (36.6 °C)   Resp 27   Ht 5' 5\" (1.651 m)   Wt 155 lb (70.3 kg)   LMP 01/01/1985   SpO2 96%   Breastfeeding No   BMI 25.79 kg/m²       Physical Examination  General appearance - alert, well appearing, and in no distress  Chest - clear to auscultation, no wheezes, rales or rhonchi, symmetric air entry  Heart - normal rate, regular rhythm,   Abdomen - soft, appropriately tender to palpation, bowel sounds present  Neurological - alert, oriented, normal speech, no focal findings  Extremities - 1+ pitting edema of the left arm, non-tender    A/P:   Lori Wheeler is a 58 y.o. female with a PMHx of hypertension, asthma, GERD, gastric bypass,  CAD s/p PCI (2015), anxiety, type 2 diabetes mellitus, HFpEF (EF 65%, G1DD 2015) who is admitted for leukocytosis and diarrhea. Colitis s/p exploratory laparotomy: S/p exploratory laparotomy and subtotal colectomy for fulminant colitis. TRISTA drain in place. BP elevated, taken off of pressors(carolyn) due to elevated BP. UO reduced with no urinary retention(on bladder scan); around ~0.4 ml/kg/hr urine production(slightly below normal). With upper extremity edema, concern for fluid overload(b/l lungs w/o crackles). Reduced IVF from 250-->150 ml/hr.   - Surgery on board, appreciate recs  - CTX, Flagyl, Vancomycin oral solution/rectal enema q6h to cover for C. difficile  - Compazine for nausea  - On GI bland diet  - Follow up on final Bcx  - Stool studies pending  - Enteric precautions  - Strict I&Os  - Daily CBC, BMP  - IVF at 150 ml/hr  - LUE Duplex pending for r/o DVT      Please see daily progress note for detailed assessment and plan. Ronaldo Radford MD  Family Medicine Resident, PGY-1

## 2023-03-27 NOTE — ANESTHESIA POSTPROCEDURE EVALUATION
Procedure(s):  LAPAROTOMY EXPLORATORY, Subtotal ABDOMINAL COLECTOMY. general    Anesthesia Post Evaluation      Multimodal analgesia: multimodal analgesia used between 6 hours prior to anesthesia start to PACU discharge  Patient location during evaluation: PACU  Patient participation: complete - patient participated  Level of consciousness: awake  Pain management: satisfactory to patient  Airway patency: patent  Anesthetic complications: no  Cardiovascular status: acceptable  Respiratory status: acceptable  Hydration status: acceptable  Comments: Post op chest xray -R IJ line appears in good placement - no pneumothorax  noted by me. Official Radiologist reading to follow  Post anesthesia nausea and vomiting:  controlled  Final Post Anesthesia Temperature Assessment:  Normothermia (36.0-37.5 degrees C)      INITIAL Post-op Vital signs:   Vitals Value Taken Time   BP 96/48 03/27/23 0057   Temp 36.4 °C (97.5 °F) 03/27/23 0033   Pulse 87 03/27/23 0101   Resp 10 03/27/23 0101   SpO2 96 % 03/27/23 0101   Vitals shown include unvalidated device data.

## 2023-03-27 NOTE — PROGRESS NOTES
1900 Bedside shift change report given to Tuan Meyers RN (oncoming nurse) by Mojgan Henry RN (offgoing nurse). Report included the following information SBAR, Kardex, ED Summary, Procedure Summary, Intake/Output, MAR, Recent Results, Med Rec Status, Cardiac Rhythm ST, Alarm Parameters , and Quality Measures. Primary Nurse Becky Esqueda RN and Mojgan Henry RN performed a dual skin assessment on this patient. No impairment noted. Nadeem score is; see flow sheet. 2130 TRANSFER - OUT REPORT:    Verbal report given to PACU RN (name) on Estela Reveal  being transferred to OR (unit) for ordered procedure       Report consisted of patients Situation, Background, Assessment and   Recommendations(SBAR). Information from the following report(s) SBAR, Kardex, Procedure Summary, Intake/Output, MAR, Recent Results, Med Rec Status, Cardiac Rhythm ST, Alarm Parameters , and Quality Measures was reviewed with the receiving nurse. Lines:   Triple Lumen 03/26/23 Right (Active)   Central Line Being Utilized No 03/27/23 0046   Site Assessment Clean, dry, & intact 03/27/23 0046   Dressing Status Clean, dry, & intact 03/27/23 0046   Dressing Type Transparent 03/27/23 0046       Peripheral IV 03/25/23 Right Antecubital (Active)   Site Assessment Clean, dry, & intact 03/27/23 0030   Phlebitis Assessment 0 03/27/23 0030   Infiltration Assessment 0 03/27/23 0030   Dressing Status Clean, dry, & intact 03/27/23 0030   Dressing Type Tape;Transparent 03/27/23 0030   Hub Color/Line Status Flushed 03/27/23 0030   Action Taken Open ports on tubing capped 03/26/23 2000   Alcohol Cap Used Yes 03/26/23 2000       Peripheral IV 03/25/23 Anterior; Left Forearm (Active)   Site Assessment Clean, dry, & intact 03/27/23 0030   Phlebitis Assessment 0 03/27/23 0030   Infiltration Assessment 0 03/27/23 0030   Dressing Status Clean, dry, & intact 03/27/23 0030   Dressing Type Transparent;Tape 03/27/23 0030   Hub Color/Line Status Patent; Infusing;Pink 03/27/23 0030   Action Taken Open ports on tubing capped 03/27/23 0030   Alcohol Cap Used Yes 03/27/23 0030        Opportunity for questions and clarification was provided. Patient transported with:   Monitor  Registered Nurse    0100 TRANSFER - IN REPORT:    Verbal report received from Janine Shukla RN (name) on Pasco Duane  being received from PACU (unit) for routine post - op      Report consisted of patients Situation, Background, Assessment and   Recommendations(SBAR). Information from the following report(s) SBAR, Kardex, ED Summary, OR Summary, Procedure Summary, Intake/Output, MAR, Recent Results, Med Rec Status, Cardiac Rhythm ST, Alarm Parameters , and Quality Measures was reviewed with the receiving nurse. Opportunity for questions and clarification was provided. Assessment completed upon patients arrival to unit and care assumed. 46 Spoke with Family Practice resident regarding patient's antibiotics and IV fluid orders. Waiting for verification from surgery/intensivist.     0330 BP 74/35; attempted to wake patient - eyes open to physical stimulation. Eduardo started. Family Medicine made aware. 0400 Vanc held due to patient's inability to remain alert.     0700 Bedside shift change report given to Natalie Bhat (oncoming nurse) by Kirstie Fitzpatrick RN (offgoing nurse). Report included the following information SBAR, Kardex, ED Summary, Procedure Summary, Intake/Output, MAR, Recent Results, Med Rec Status, Cardiac Rhythm SR, Alarm Parameters , and Quality Measures.

## 2023-03-27 NOTE — PROGRESS NOTES
Mary Cifuentes [facility] Service Daily Progress Note    24 Hour Events: Patient transferred to the ICU for hypotension/shock requiring pressor support with vanita. The patient was taken to the OR for partial colectomy. Started on CTX, flagyl, and vanc (PO and rectal). SUBJECTIVE: Patient sleeping comfortably. Sedated, difficult to rouse. OBJECTIVE:    Vitals: Visit Vitals  BP (!) 92/56   Pulse 82   Temp 97.7 °F (36.5 °C)   Resp 10   Ht 5' 5\" (1.651 m)   Wt 155 lb 14.4 oz (70.7 kg)   SpO2 95%   Breastfeeding No   BMI 25.94 kg/m²       Physical Exam:  General: No acute distress  Respiratory: Clear lung fields bilaterally, no wheeze, rales  Cardiovascular: Regular rate, rhythm no murmurs gallops  GI: no distension. + bowel sounds. Post-surgical dressing C/D/I with no surrounding erythema and appropriate post-op tenderness. TRISTA tube in place  Extremities: No LE edema. Skin: Warm, dry.     Inpatient Medications  Current Facility-Administered Medications   Medication Dose Route Frequency    vancomycin (FIRVANQ) 50 mg/mL oral solution 500 mg  500 mg Oral Q6H    metroNIDAZOLE (FLAGYL) IVPB premix 500 mg  500 mg IntraVENous Q8H    cefTRIAXone (ROCEPHIN) 2 g in 0.9% sodium chloride 20 mL IV syringe  2 g IntraVENous Q12H    vancomycin (VANCOCIN) rectal enema 500 mg  500 mg Rectal Q6H    0.9% sodium chloride infusion  250 mL/hr IntraVENous CONTINUOUS    morphine injection 2 mg  2 mg IntraVENous Q3H PRN    PHENYLephrine (VANITA-SYNEPHRINE) 30 mg in 0.9% sodium chloride 250 mL infusion   mcg/min IntraVENous TITRATE    0.9% sodium chloride infusion 250 mL  250 mL IntraVENous PRN    sodium chloride (NS) flush 5-40 mL  5-40 mL IntraVENous Q8H    sodium chloride (NS) flush 5-40 mL  5-40 mL IntraVENous PRN    acetaminophen (TYLENOL) tablet 650 mg  650 mg Oral Q6H PRN    Or    acetaminophen (TYLENOL) suppository 650 mg  650 mg Rectal Q6H PRN    polyethylene glycol (MIRALAX) packet 17 g  17 g Oral DAILY PRN    [Held by provider] enoxaparin (LOVENOX) injection 40 mg  40 mg SubCUTAneous DAILY    promethazine (PHENERGAN) tablet 12.5 mg  12.5 mg Oral Q6H PRN    albuterol (PROVENTIL VENTOLIN) nebulizer solution 2.5 mg  2.5 mg Nebulization Q4H PRN    atorvastatin (LIPITOR) tablet 20 mg  20 mg Oral DAILY    budesonide (PULMICORT) 500 mcg/2 ml nebulizer suspension  250 mcg Nebulization BID RT    [Held by provider] bumetanide (BUMEX) tablet 0.5 mg  0.5 mg Oral DAILY    buPROPion XL (WELLBUTRIN XL) tablet 300 mg  300 mg Oral BID    [Held by provider] hydrOXYzine HCL (ATARAX) tablet 25 mg  25 mg Oral QHS    [Held by provider] metoprolol tartrate (LOPRESSOR) tablet 25 mg  25 mg Oral BID    gabapentin (NEURONTIN) capsule 600 mg  600 mg Oral TID    prochlorperazine (COMPAZINE) injection 10 mg  10 mg IntraVENous Q6H PRN       Allergies  Allergies   Allergen Reactions    Accupril [Quinapril] Cough    Adhesive Tape-Silicones Other (comments)     Makes skin tears easily. Lipitor [Atorvastatin] Other (comments)     aches    Norvasc [Amlodipine] Swelling     On legs at 10 mg dose       CBC:  Recent Labs     03/27/23  0324 03/26/23  0544 03/25/23  1438   WBC 39.0* 52.3* 46.5*   HGB 7.9* 10.0* 11.7   HCT 23.5* 29.3* 34.7*   * 440* 314*         Metabolic Panel:  Recent Labs     03/27/23  0324 03/26/23  1344 03/26/23  0544 03/25/23  1438 03/24/23  1045    133* 135* 132* 138   K 3.5 4.0 2.5* 2.1* 2.6*   * 103 99 90* 95*   CO2 25 24 29 35* 37*   BUN 13 11 11 11 6   CREA 0.41* 0.76 0.45* 0.79 0.66   * 96 115* 142* 120*   CA 7.2* 7.9* 7.4* 8.5 8.5   MG 1.7  --  2.2 1.6  --    ALB  --   --   --  2.0* 2.2*   ALT  --   --   --  9* 9*              Assessment and Plan   Allen Rosado is a 58 y.o. female with PMH of hypertension, asthma, GERD, gastric bypass,  CAD s/p PCI (2015), anxiety, type 2 diabetes mellitus, HFpEF (EF 65%, G1DD 2015)  who is admitted for hyperkalemia and leukocytosis.      Septic shock 2/2 colitis: Improved, now s/p partial colectomy on 3/27/23. POA wbc 46. 5. presumed infectious etiology with concern for C. Diff given recent Abx vs IBD. Rpt CT w circumferential thickened colon upto rectum.  - GI, general surgery consulted, appreciate recs  - CTX, flagyl, vanc (PO and rectal enema)  - Follow up C. Diff, O&P, WBC stool, culture stool  - Enteric precautions  - Follow up blood culture  - Zosyn for possible colitis  - Daily CBC, BMP  - pressors as indicated to maintain MAPs over 65  - IVF at 250ml/hr     Hypokalemia: Resolved. POA K 2.1 in s/o persistent GI loses. EKG sinus rhythm, no T wave changes.  - Replete as needed  - Monitor on BMP    Thrombocytosis: POA Plt 520. Likely reactive, per hemat. Ddx hemoconcentration, LO common cause. - Hematology on board, appreciate recs  - Follow up iron panel, ferritin   - Monitor on labs     H/o recent falls: patient recently evaluated at [facility] ED for mechanical GLF with x 6 staples placed on head. Wound clean and dry. CT imaging negative. H/o Gastric bypass: 2017. Avoid NSAIDs and ASA. CAD s/p PCI: Stent dLCx in 2015. F/w Dr Roseanne Koyanagi, MD ( cards). LOV 10/22. Stress test 2017 EF 64%  - Continue Lipitor 20 mg daily     HFpEF: Stress test 2017 EF 64%. Euvolemic.  - Hold home Bumex 0.5 mg daily in s/o soft BP     T2DM: Last HgA1C 5.4 04/2022. Diet controlled s/p gastric by pass. Anxiety:   - Continue Wellbutrin  mg BID     Asthma:   - Continue home Albuterol and Pulmicort BID     Hypertension:   - Hold Bumex 0.5 mg daily and Lopressor 25mg BID in s/o soft BP  - Monitor BP and adjust as needed     GERD:   - Cont home Protonix 40 mg IV daily. Fibromyalgia:   - Continue gabapentin 600mg TID.      Prolonged Qtc:   - Avoid qtc prolonging drugs     DDD, Arthritis: on home flexeril 10 mg TID prn    Manny Hatch MD  Family Medicine Resident       For Billing    Chief Complaint   Patient presents with    Abnormal [place] Problems Date Reviewed: 3/26/2023            Codes Class Noted POA    Neutrophilia ICD-10-CM: D72.9  ICD-9-CM: 288.8  3/25/2023 Unknown        Diarrhea of presumed infectious origin ICD-10-CM: R19.7  ICD-9-CM: 009.3  3/25/2023 Unknown        Thrombocytosis ICD-10-CM: D75.839  ICD-9-CM: 238.71  3/25/2023 Unknown        Colitis ICD-10-CM: K52.9  ICD-9-CM: 558.9  3/13/2023 Unknown        H/O gastric bypass ICD-10-CM: Z98.84  ICD-9-CM: V45.86  7/12/2017 Yes    Overview Addendum 11/3/2018  9:51 AM by Ernestine Cotton 1/2017  lost from 230 to 154 lbs             Hypokalemia ICD-10-CM: E87.6  ICD-9-CM: 276.8  1/26/2017 Yes        Anxiety ICD-10-CM: F41.9  ICD-9-CM: 300.00  6/1/2016 Yes    Overview Addendum 9/18/2018  4:54 PM by Ernestine Limon     Needs to be seen at least twice yearly for BNZ  FTF 12/19/2017, 9/18/18   as expected 12/19/2017, 9/18/18             FH: diabetes mellitus ICD-10-CM: Z83.3  ICD-9-CM: V18.0  8/4/2015 Yes        Essential hypertension ICD-10-CM: I10  ICD-9-CM: 401.9  5/17/2015 Yes        Fibromyalgia ICD-10-CM: M79.7  ICD-9-CM: 729.1  3/30/2015 Yes    Overview Addendum 7/12/2017 11:40 AM by Millie Jauregui  S/p NEREYDA x 3 in back via Dr. Val Carter.   He uses flexeril HS for pain             Asthma ICD-10-CM: J45.909  ICD-9-CM: 493.90  9/22/2010 Yes        GERD (gastroesophageal reflux disease) ICD-10-CM: K21.9  ICD-9-CM: 530.81  9/22/2010 Yes

## 2023-03-27 NOTE — PROGRESS NOTES
Case management note:   RUR 20%    READMISSION    Pt was admitted from 3/13 to 3/15 /23 for colitis    Today ,pt is POD #1 s/p subtotal colectomy due to fulminant colitis. Pt /family will benefit from ostomy education    Pt continues to complain of numbness of her LUE. Pt will benefit from PT/OT consultations. IV antibiotic therapies    I am following pt for home health,rehab or DME needs.     Bo Coles

## 2023-03-27 NOTE — PROGRESS NOTES
[facility] ICU TEAM Progress Note    Name: Krystle Doran   : 1960   MRN: [id number]   Date: 3/27/2023           ICU Assessment     Severe colitis , possibl c-diff related pan colitis  Septic shock  Bandemia  Lecuocytosis  Hypopnatremia  DM  Asthma  HFpEF         ICU Comprehensive Plan of Care:    NEURO: patients mental status at baseline, pain control  CV: Keep MAP >65; wean off carolyn  PULM: supplemental o2 as needed, incentive spirometry  GI: NPO, f/u surgery  RENAL/: Monitor Cr and UOP; trend bmp  ENDO: BG Goal 140-180; glycemic control  HEME/ONC: Tx Hgb < 7, Plt<10k; transfuse as needed  MICRO:  FU on Cx; cont broad abx coverage, PO/rectal vanco fu  cx/c diff from OR  Code Status: Full Code  LOS: 2  Prophylaxis: GI: Pep   DVT: will restart lovenox     Discussed Care Plan with Bedside RN       Subjective:   Progress Note: 3/27/2023      Reason for ICU Admission: fulminant colitis s/p colectomy    HPI:     Alexandra Omalleyle is s/o subtotal colectomy. , pain seems to be controlled, OR cx pending      Active Problem List:     Problem List  Date Reviewed: 3/26/2023            Codes Class    Neutrophilia ICD-10-CM: D72.9  ICD-9-CM: 288.8         Diarrhea of presumed infectious origin ICD-10-CM: R19.7  ICD-9-CM: 466. 3         Thrombocytosis ICD-10-CM: K09.472  ICD-9-CM: 238.71         * (Principal) Colitis ICD-10-CM: K52.9  ICD-9-CM: 558.9         JASIEL (acute kidney injury) ([facility] ICD-10-CM: N17.9  ICD-9-CM: 068. 9         Venous insufficiency ICD-10-CM: I87.2  ICD-9-CM: 459.81         Type 2 diabetes with nephropathy ([facility] ICD-10-CM: E11.21  ICD-9-CM: 250.40, 583.81         Primary fibromyalgia syndrome ICD-10-CM: M79.7  ICD-9-CM: 729.1         Inflammatory bowel disease ICD-10-CM: K52.9  ICD-9-CM: 558.9         Omental infarction [facility]) ICD-10-CM: P03.740  ICD-9-CM: 557.0         Abnormal CT of the abdomen ICD-10-CM: R93.5  ICD-9-CM: 793.6     Overview Signed 2018  6:01 AM by Christa Alcazar 12/2018:   Ongoing evolution of inflammatory changes in the left upper quadrant most  consistent with omental infarction. Decreased size of main inflamed fatty nodule  and less surrounding stranding. Otherwise, no acute pathology in the chest, abdomen or pelvis. Abnormal mammogram of right breast ICD-10-CM: R92.8  ICD-9-CM: 793.80     Overview Addendum 8/16/2018  4:36 PM by Delphine Gu[place] Dr. Franne Schilder did biopsy 5/8/7232. Biopsy  = fibroadenoma    Mammogram 8/2018: IMPRESSION: Small right breast mass. BI-RADS Assessment Category 4: Suspicious  Abnormality- Biopsy should be considered. RECOMMENDATION:  Ultrasound-guided right breast biopsy. Type 2 diabetes mellitus with diabetic neuropathy ([facility] ICD-10-CM: E11.40  ICD-9-CM: 250.60, 357.2     Overview Addendum 6/9/2020  1:26 PM by Juan Jose Sierra MD     9/2018:  a1c 5.3    12/2017:  S/p gastric bypass = a1c 5.2/  LDL 84/  MAB negative    Dx:  a1c 6.9 8/2016    Eye exam: 6/3/2020.  Normal exam.             Elevated ferritin ICD-10-CM: R79.89  ICD-9-CM: 790.6         H/O colonoscopy ICD-10-CM: Z98.890  ICD-9-CM: V45.89     Overview Addendum 12/19/2017  2:12 PM by Monika Johnson     2015, next 2020  +FH colon cancer in mom             DDD (degenerative disc disease), lumbar ICD-10-CM: M51.36  ICD-9-CM: 722.52     Overview Signed 12/19/2017  1:57 PM by Monika Ashford  S/p NEREYDA x 3             FH: colon cancer ICD-10-CM: Z80.0  ICD-9-CM: V16.0     Overview Signed 12/19/2017  2:12 PM by Monika Johnson     mom             H/O gastric bypass ICD-10-CM: Z98.84  ICD-9-CM: V45.86     Overview Addendum 11/3/2018  9:51 AM by Monika Beck 1/2017  lost from 230 to 154 lbs             Hypokalemia ICD-10-CM: E87.6  ICD-9-CM: 276.8         History of pulmonary embolus (PE) ICD-10-CM: E72.368  ICD-9-CM: V12.55         Anxiety ICD-10-CM: F41.9  ICD-9-CM: 300.00     Overview Addendum 9/18/2018  4:54 PM by Valery Machado     Needs to be seen at least twice yearly for BNZ  FTF 2017, 18   as expected 2017, 18             FH: diabetes mellitus ICD-10-CM: Z83.3  ICD-9-CM: G81.1         Metabolic syndrome JWX-11-YA: E88.81  ICD-9-CM: 277.7     Overview Addendum 2015 11:38 AM by Valery Machado     TG up, sugar up, HTN, waist 54 inches  Rx metformin  Needs yearly eye exams and labs             Essential hypertension ICD-10-CM: I10  ICD-9-CM: 401.9         Fibromyalgia ICD-10-CM: M79.7  ICD-9-CM: 729.1     Overview Addendum 2017 11:40 AM by Helen Espino  S/p NEREYDA x 3 in back via Dr. Erick Rodriguez. He uses flexeril HS for pain             GARCIA (nonalcoholic steatohepatitis) ICD-10-CM: K75.81  ICD-9-CM: 571.8     Overview Signed 2011  7:18 PM by Valery Machado     Ultrasound              Asthma ICD-10-CM: J45.909  ICD-9-CM: 493.90         Arthritis ICD-10-CM: M19.90  ICD-9-CM: 716.90         GERD (gastroesophageal reflux disease) ICD-10-CM: K21.9  ICD-9-CM: 530.81            Past Medical History:      has a past medical history of Arthritis, Asthma, Autoimmune disease ([address] CAD (coronary artery disease), Cardiac murmur, Depression, Diabetes ([address] DVT (deep venous thrombosis) ([address] (), GERD (gastroesophageal reflux disease), Hypertension, Lumbar radiculitis, Morbid obesity ([address] Musculoskeletal disorder, EDMOND (obstructive sleep apnea), S/P TONJA (total abdominal hysterectomy), Thromboembolus ([address] (), Trigger finger, right little finger (10/19/2020), and Trigger finger, right ring finger (10/19/2020).     Past Surgical History:      has a past surgical history that includes hx total abdominal hysterectomy; pr unlisted procedure cardiac surgery (2015); hx appendectomy (); hx hysterectomy (); hx  section ( 1980 ); johnny stereo  bx breast rt addl w/clip and specimen (Right, ); hx breast biopsy (Right, ); ir inj spine fluoro guided (2/4/2021); hx gastric bypass (01/2017); hx cholecystectomy (2017); colonoscopy (N/A, 12/6/2021); and ir inj spine fluoro guided (5/5/2022). Home Medications:     Prior to Admission medications    Medication Sig Start Date End Date Taking? Authorizing Provider   ciprofloxacin HCl (CIPRO) 500 mg tablet Take 1 Tablet by mouth two (2) times a day for 10 days. 3/24/23 4/3/23 Yes Fanta Solares MD   metroNIDAZOLE (FLAGYL) 250 mg tablet Take 1 Tablet by mouth three (3) times daily for 10 days. 3/24/23 4/3/23 Yes Fanta Solares MD   gabapentin (NEURONTIN) 600 mg tablet Take 1 Tablet by mouth three (3) times daily. 3/20/23  Yes Piper Almanzar MD   albuterol (ACCUNEB) 0.63 mg/3 mL nebulizer solution 3 mL by Nebulization route every six (6) hours as needed for Wheezing. 3/20/23  Yes Piper Almanzar MD   L.acid,para-B. bifidum-S.therm (RISAQUAD) 8 billion cell cap cap Take 1 Capsule by mouth daily. 3/20/23  Yes Piper Almanzar MD   polyethylene glycol (MIRALAX) 17 gram packet Take 1 Packet by mouth daily for 30 days. 3/15/23 4/14/23 Yes Alana Bess MD   ondansetron (ZOFRAN ODT) 4 mg disintegrating tablet TAKE 1 TABLET BY MOUTH EVERY 8 HOURS AS NEEDED FOR NAUSEA 2/28/23  Yes Bon Baldwin NP   cyclobenzaprine (FLEXERIL) 10 mg tablet Take 1 Tablet by mouth three (3) times daily as needed for Muscle Spasm(s). 2/28/23  Yes Charlotte Laguna MD   nitroglycerin (NITROSTAT) 0.4 mg SL tablet TAKE 1 TABLET BY MOUTH EVERY 5 MINUTES UP TO 3 DOSES THEN CALL 911 IF PAIN PERSISTS 12/12/22  Yes Henrry Lopez MD   buPROPion XL (WELLBUTRIN XL) 300 mg XL tablet Take 300 mg by mouth two (2) times a day.  8/26/22  Yes Provider, Historical   atorvastatin (LIPITOR) 20 mg tablet TAKE 1 TABLET BY MOUTH EVERY DAY 11/28/22  Yes Kiara Lopez MD   hydrOXYzine pamoate (VISTARIL) 25 mg capsule TAKE 1 CAPSULE BY MOUTH TWICE A DAY AS NEEDED FOR ANXIETY 11/4/22  Yes Charlotte Laguna MD   metoprolol tartrate (LOPRESSOR) 25 mg tablet TAKE 1 TABLET BY MOUTH TWICE A DAY 11/4/22  Yes Charlotte Laguna MD   bumetanide (BUMEX) 0.5 mg tablet TAKE 1 TABLET BY MOUTH EVERY DAY AS NEEDED 9/16/22  Yes Charlotte Laguna MD   omeprazole (PRILOSEC) 40 mg capsule TAKE 1 CAPSULE BY MOUTH EVERY DAY 5/23/22  Yes Charlotte Laguna MD   albuterol (ProAir HFA) 90 mcg/actuation inhaler Take 1 Puff by inhalation every four (4) hours as needed for Wheezing or Shortness of Breath. 4/27/22  Yes Charlotte Laguna MD   budesonide (Pulmicort Flexhaler) 180 mcg/actuation aepb inhaler Take 2 Puffs by inhalation daily. 4/27/22  Yes Charlotte Laguna MD   alcohol swabs (ALCOHOL PADS) padm Use when checking blood glucose 7/15/16  Yes Brent Perez MD   co-enzyme Q-10 (CO Q-10) 100 mg capsule TAKE 1 CAPSULE BY MOUTH EVERY DAY 2/22/23   Charlotte Laguna MD   valACYclovir (VALTREX) 500 mg tablet TAKE 1 PILL TWICE DAILY FOR 3 DAYS AT SIGN OF HERPES FLARE. Patient not taking: Reported on 3/26/2023 7/5/21   Charlotte Laguna MD   glucose blood VI test strips (BLOOD GLUCOSE TEST) strip Use once a day  Patient not taking: Reported on 3/26/2023 3/10/20   Chang Cuevas MD   Blood-Glucose Meter monitoring kit Use to check glucose once a day  Patient not taking: Reported on 3/26/2023 7/15/16   Brent Perez MD   Lancets misc Use once a day. Please give one compatible with patient's meter  Patient not taking: Reported on 3/26/2023 7/15/16   Brent Perez MD       Allergies/Social/Family History: Allergies   Allergen Reactions    Accupril [Quinapril] Cough    Adhesive Tape-Silicones Other (comments)     Makes skin tears easily.      Lipitor [Atorvastatin] Other (comments)     aches    Norvasc [Amlodipine] Swelling     On legs at 10 mg dose      Social History     Tobacco Use    Smoking status: Never    Smokeless tobacco: Never   Substance Use Topics    Alcohol use: No     Alcohol/week: 0.0 standard drinks      Family History   Problem Relation Age of Onset    Cancer Mother 67        colon    Diabetes Mother     OSTEOARTHRITIS Mother     Stroke Mother     Migraines Mother     Diabetes Father     Heart Disease Father     Heart Attack Father     Hypertension Father     High Cholesterol Father     COPD Father     Heart Failure Father     Cancer Other     Diabetes Other     Heart Disease Other     Hypertension Other     Cancer Brother         lymphoma    Cancer Brother         PROSTATE AND LYMPHOMA    Cancer Maternal Grandmother         stomach    Asthma Brother     Asthma Brother     Stroke Brother     Cancer Maternal Aunt         lung     Breast Cancer Maternal Aunt     Cancer Maternal Uncle         lung    Cancer Maternal Uncle         melanoma    Anesth Problems Neg Hx            Objective:   Vital Signs:  Visit Vitals  BP (!) 117/56   Pulse 84   Temp 97.4 °F (36.3 °C)   Resp 15   Ht 5' 5\" (1.651 m)   Wt 70.7 kg (155 lb 14.4 oz)   LMP 1985   SpO2 97%   Breastfeeding No   BMI 25.94 kg/m²    O2 Flow Rate (L/min): 3 l/min O2 Device: None (Room air) Temp (24hrs), Av.7 °F (36.5 °C), Min:97.4 °F (36.3 °C), Max:98.2 °F (36.8 °C)           Intake/Output:     Intake/Output Summary (Last 24 hours) at 3/27/2023 0923  Last data filed at 3/27/2023 0847  Gross per 24 hour   Intake 6208.26 ml   Output 1348 ml   Net 4860.26 ml       Physical Exam:   Gen: awake, NAD  HEENT: PERRL  NC, AT  Neck: no JVD, midline trach  CV: NSR  Pulm: symmetric chest rise  Abd- dressing in place    LABS AND  DATA: Personally reviewed  Recent Labs     23  0324 23  0544   WBC 39.0* 52.3*   HGB 7.9* 10.0*   HCT 23.5* 29.3*   * 440*     Recent Labs     23  0324 23  1344 23  0544    133* 135*   K 3.5 4.0 2.5*   * 103 99   CO2  29   BUN 13 11 11   CREA 0.41* 0.76 0.45*   * 96 115*   CA 7.2* 7.9* 7.4*   MG 1.7  --  2.2     Recent Labs     23  1438 23  1045   * 129*   TP 6.2* 5.4*   ALB 2.0* 2.2*   GLOB 4.2* 3.2   LPSE 25*  --      No results for input(s): INR, PTP, APTT, INREXT in the last 72 hours. No results for input(s): PHI, PCO2I, PO2I, FIO2I in the last 72 hours. No results for input(s): CPK, CKMB, TROIQ, BNPP in the last 72 hours. Hemodynamics:   PAP:   CO:     Wedge:   CI:     CVP:    SVR:       PVR:       Ventilator Settings:  Mode Rate Tidal Volume Pressure FiO2 PEEP                    Peak airway pressure:      Minute ventilation:          MEDS: Reviewed       I performed all aspects of the physical examination via Telemedicine associated with two way audio and video communication and with the on-site assistance of Nurse. Patient is critically ill in the ICU. I  personally  reviewed the pertinent medical records, laboratory/ pathology data and radiographic images. Due to  critical illness impairing one or more vital organs of this patient resulting in life threatening clinical situation  I have provided direct, frequent personal  assessment and manipulation in management plan and spent 35  minutes  of  critical care time excluding the time spent on procedures and teaching. Greater than 50% of this time  in patient's care was  employed  in counseling and coordination of care and engaged in face to face discussion of case management issues, addressing questions, and outlining a plan of  therapy.   Edith Verde, [address]  3/27/2023

## 2023-03-27 NOTE — PERIOP NOTES
TRANSFER - OUT REPORT:    Verbal report given to [facility] (name) on Oralia Antunez  being transferred to [phone] (unit) for routine post - op       Report consisted of patients Situation, Background, Assessment and   Recommendations(SBAR). Information from the following report(s) SBAR was reviewed with the receiving nurse. Lines:   Triple Lumen 03/26/23 Right (Active)   Central Line Being Utilized No 03/27/23 0046   Site Assessment Clean, dry, & intact 03/27/23 0046   Dressing Status Clean, dry, & intact 03/27/23 0046   Dressing Type Transparent 03/27/23 0046       Peripheral IV 03/25/23 Right Antecubital (Active)   Site Assessment Clean, dry, & intact 03/27/23 0030   Phlebitis Assessment 0 03/27/23 0030   Infiltration Assessment 0 03/27/23 0030   Dressing Status Clean, dry, & intact 03/27/23 0030   Dressing Type Tape;Transparent 03/27/23 0030   Hub Color/Line Status Flushed 03/27/23 0030   Action Taken Open ports on tubing capped 03/26/23 2000   Alcohol Cap Used Yes 03/26/23 2000       Peripheral IV 03/25/23 Anterior; Left Forearm (Active)   Site Assessment Clean, dry, & intact 03/27/23 0030   Phlebitis Assessment 0 03/27/23 0030   Infiltration Assessment 0 03/27/23 0030   Dressing Status Clean, dry, & intact 03/27/23 0030   Dressing Type Transparent;Tape 03/27/23 0030   Hub Color/Line Status Patent; Infusing;Pink 03/27/23 0030   Action Taken Open ports on tubing capped 03/27/23 0030   Alcohol Cap Used Yes 03/27/23 0030        Opportunity for questions and clarification was provided.       Patient transported with:   O2 @ 2 liters  Registered Nurse

## 2023-03-27 NOTE — PROGRESS NOTES
Bedside and Verbal shift change report given to Hellen Hernandez RN (oncoming nurse) by Patrizia Jensen RN (offgoing nurse). Report included the following information SBAR, Kardex, MAR, and Cardiac Rhythm sinus rhythm    0730: Pt complaining of numbness and tingling to LUE. No c/o pain. Family Practice informed. 0830: Intensivist informed of LUE numbness and tingling and BLE weakness. Orders to monitor. 0920: Dr. Ketan Zavala with verbal orders for regular diet. 1730: Urine output 375 for the shift. Only 21 cc on bladder scan. Irrigated mckenna. Mckenna patent. Family Practice informed. Bedside and Verbal shift change report given to Ernesto Lechuga ([facility]), RN (oncoming nurse) by Hellen Hernandez RN (offgoing nurse). Report included the following information SBAR, Kardex, MAR, and Cardiac Rhythm Sinus rhythm . written material/verbal instruction

## 2023-03-27 NOTE — PROGRESS NOTES
Feels better, thirsty. Pain controlled. Visit Vitals  BP (!) 127/53   Pulse 87   Temp 97.4 °F (36.3 °C)   Resp 19   Ht 5' 5\" (1.651 m)   Wt 70.3 kg (155 lb)   SpO2 97%   Breastfeeding No   BMI 25.79 kg/m²     Abdomen soft, incision clean,  Ostomy appropriate. Drain SS    POD1 subtotal colectomy  Diet as tolerated. OOB.   Jennifer Murray MD

## 2023-03-27 NOTE — PROGRESS NOTES
[facility] Medicine Residency [facility]        Post operative Progress Note    S: Patient seen and examined at bedside after colectomy. No nausea, vomiting, abdominal pain, chest pain, dyspnea. O:  Visit Vitals  BP (!) 93/56   Pulse 87   Temp 97.4 °F (36.3 °C)   Resp 11   Ht 5' 5\" (1.651 m)   Wt 130 lb (59 kg)   LMP 01/01/1985   SpO2 95%   Breastfeeding No   BMI 21.63 kg/m²       Physical Examination:   General appearance - alert, well appearing, and in no distress  Chest - clear to auscultation, no wheezes, rales or rhonchi, symmetric air entry  Heart - normal rate, regular rhythm, normal S1, S2, no murmurs  Abdomen - stoma bag and drain in place, wound clean and dry,  + BS, soft, nontender, nondistended, no masses  Neurological - alert, oriented, normal speech, no focal findings  Extremities - peripheral pulses normal, no pedal edema, no clubbing or cyanosis    A/P:     Fiorella Owusu is a 58 y.o. female with a PMHx of hypertension, asthma, GERD, gastric bypass,  CAD s/p PCI (2015), anxiety, type 2 diabetes mellitus, HFpEF (EF 65%, G1DD 2015) who is admitted for leukocytosis and diarrhea. Shock in s/o Colitis: Resolving. S/p exploratory laparotomy and subtotal colectomy for fulminant colitis. Off carolyn-synephrine. HDS stable. IJV in place. On mckenna post op. - Surgery on board, appreciate recs  - CTX, Flagyl, Vancomycin rectal enema q6h to cover for C.difficile  - NPO, ADA, IVF  - Follow up C difficile labs, Bcx, stool studies  - Enteric precautions  - Strict I and O  - Daily CBC, BMP  - Compazine for nausea    Please see the primary team's daily progress note for the patient's full plan.   Patient seen with MD Geovany Barragan MD  Family Medicine Resident

## 2023-03-27 NOTE — CONSULTS
[address] [facility] ICU TEAM Progress Note    Name: Obie Mendes   : 1960   MRN: [id number]   Date: 3/26/2023           ICU Assessment     Severe colitis , possibl c-diff related pan colitis  Septic shock  Bandemia  Lecuocytosis  Hypopnatremia  DM  Asthma  HFpEF              ICU Comprehensive Plan of Care:    NEURO: no active issues - likely exptected to return intubated - plan for sedation/vent bundle  CV: Keep MAP >65; on carolyn, TLC in OR  PULM: on RA at my eval  but will plan for  mechanical ventilation, in which case she will need a low TV, high peep strategy, serial ABG  GI: NPO  RENAL/: Monitor Cr and UOP; trend ABG  ENDO: BG Goal 140-180; glycemic control  HEME/ONC: Tx Hgb < 7, Plt<10k; transfuse as needed  MICRO:  FU on Cx; cont broad abx coverage, po vanco, add IV flagyl  Code Status: Full Code  LOS: 1  Prophylaxis: GI: Ppi  DVT: scd pre -op,      Discussed Care Plan with Bedside RN       Subjective:   Progress Note: 3/26/2023      Reason for ICU Admission: septic shock    HPI:     58year old woman, ICU consulted for septic shock, likely due to worsening colitis. Patient abd exam is worsening, in context ced severe colitis - ? C diff related. Pending emergent transfer to OR      Active Problem List:     Problem List  Date Reviewed: 3/26/2023            Codes Class    Neutrophilia ICD-10-CM: D72.9  ICD-9-CM: 288.8         Diarrhea of presumed infectious origin ICD-10-CM: R19.7  ICD-9-CM: 528. 3         Thrombocytosis ICD-10-CM: S26.413  ICD-9-CM: 238.71         Colitis ICD-10-CM: K52.9  ICD-9-CM: 558.9         JASIEL (acute kidney injury) ([facility] [address] ICD-10-CM: N17.9  ICD-9-CM: 415. 9         Venous insufficiency ICD-10-CM: I87.2  ICD-9-CM: 459.81         Type 2 diabetes with nephropathy (HCC) ICD-10-CM: E11.21  ICD-9-CM: 250.40, 583.81         Primary fibromyalgia syndrome ICD-10-CM: M79.7  ICD-9-CM: 729.1         Inflammatory bowel disease ICD-10-CM: K52.9  ICD-9-CM: 558.9 Omental infarction [facility]) ICD-10-CM: X44.423  ICD-9-CM: 557.0         Abnormal CT of the abdomen ICD-10-CM: R93.5  ICD-9-CM: 793.6     Overview Signed 12/16/2018  6:01 AM by Paola Graham     12/2018:   Ongoing evolution of inflammatory changes in the left upper quadrant most  consistent with omental infarction. Decreased size of main inflamed fatty nodule  and less surrounding stranding. Otherwise, no acute pathology in the chest, abdomen or pelvis. Abnormal mammogram of right breast ICD-10-CM: R92.8  ICD-9-CM: 793.80     Overview Addendum 8/16/2018  4:36 PM by Paola Odom did biopsy 4/4/0184. Biopsy  = fibroadenoma    Mammogram 8/2018: IMPRESSION: Small right breast mass. BI-RADS Assessment Category 4: Suspicious  Abnormality- Biopsy should be considered. RECOMMENDATION:  Ultrasound-guided right breast biopsy. Type 2 diabetes mellitus with diabetic neuropathy ([facility] [address] ICD-10-CM: E11.40  ICD-9-CM: 250.60, 357.2     Overview Addendum 6/9/2020  1:26 PM by Lalitha Munson MD     9/2018:  a1c 5.3    12/2017:  S/p gastric bypass = a1c 5.2/  LDL 84/  MAB negative    Dx:  a1c 6.9 8/2016    Eye exam: 6/3/2020.  Normal exam.             Elevated ferritin ICD-10-CM: R79.89  ICD-9-CM: 790.6         H/O colonoscopy ICD-10-CM: Z98.890  ICD-9-CM: V45.89     Overview Addendum 12/19/2017  2:12 PM by Paola Graham     2015, next 2020  +FH colon cancer in mom             DDD (degenerative disc disease), lumbar ICD-10-CM: M51.36  ICD-9-CM: 722.52     Overview Signed 12/19/2017  1:57 PM by Paola Macias  S/p NEREYDA x 3             FH: colon cancer ICD-10-CM: Z80.0  ICD-9-CM: V16.0     Overview Signed 12/19/2017  2:12 PM by Paola Graham     mom             H/O gastric bypass ICD-10-CM: Z98.84  ICD-9-CM: V45.86     Overview Addendum 11/3/2018  9:51 AM by Paola Esposito 1/2017  lost from 230 to 154 lbs             Hypokalemia ICD-10-CM: E87.6  ICD-9-CM: 276.8         History of pulmonary embolus (PE) ICD-10-CM: Z86.711  ICD-9-CM: V12.55         Anxiety ICD-10-CM: F41.9  ICD-9-CM: 300.00     Overview Addendum 9/18/2018  4:54 PM by José Luis Garcia     Needs to be seen at least twice yearly for BNZ  FTF 12/19/2017, 9/18/18   as expected 12/19/2017, 9/18/18             FH: diabetes mellitus ICD-10-CM: Z83.3  ICD-9-CM: W18.7         Metabolic syndrome SFM-78-VK: E88.81  ICD-9-CM: 277.7     Overview Addendum 8/4/2015 11:38 AM by Reba Jeffries V     TG up, sugar up, HTN, waist 54 inches  Rx metformin  Needs yearly eye exams and labs             Essential hypertension ICD-10-CM: I10  ICD-9-CM: 401.9         Fibromyalgia ICD-10-CM: M79.7  ICD-9-CM: 729.1     Overview Addendum 7/12/2017 11:40 AM by Katia Barbosa  S/p NEREYDA x 3 in back via Dr. Shreyas Loredo. He uses flexeril HS for pain             GARCIA (nonalcoholic steatohepatitis) ICD-10-CM: K75.81  ICD-9-CM: 571.8     Overview Signed 1/24/2011  7:18 PM by José Luis Garcia     Ultrasound 2008             Asthma ICD-10-CM: J45.909  ICD-9-CM: 493.90         Arthritis ICD-10-CM: M19.90  ICD-9-CM: 716.90         GERD (gastroesophageal reflux disease) ICD-10-CM: K21.9  ICD-9-CM: 530.81            Past Medical History:      has a past medical history of Arthritis, Asthma, Autoimmune disease ([facility] [address] CAD (coronary artery disease), Cardiac murmur, Depression, Diabetes ([facility] [address] DVT (deep venous thrombosis) ([facility] [address] (1981), GERD (gastroesophageal reflux disease), Hypertension, Lumbar radiculitis, Morbid obesity ([facility] [address] Musculoskeletal disorder, EDMOND (obstructive sleep apnea), S/P TONJA (total abdominal hysterectomy), Thromboembolus ([facility] [address] (1996), Trigger finger, right little finger (10/19/2020), and Trigger finger, right ring finger (10/19/2020).     Past Surgical History:      has a past surgical history that includes hx total abdominal hysterectomy; pr unlisted procedure cardiac surgery (11/2015); hx appendectomy (1963); hx hysterectomy (); hx  section (1978); johnny stereo  bx breast rt addl w/clip and specimen (Right, ); hx breast biopsy (Right, ); ir inj spine fluoro guided (2021); hx gastric bypass (2017); hx cholecystectomy (); colonoscopy (N/A, 2021); and ir inj spine fluoro guided (2022). Home Medications:     Prior to Admission medications    Medication Sig Start Date End Date Taking? Authorizing Provider   ciprofloxacin HCl (CIPRO) 500 mg tablet Take 1 Tablet by mouth two (2) times a day for 10 days. 3/24/23 4/3/23 Yes Akosua Issa MD   metroNIDAZOLE (FLAGYL) 250 mg tablet Take 1 Tablet by mouth three (3) times daily for 10 days. 3/24/23 4/3/23 Yes Akosua Issa MD   gabapentin (NEURONTIN) 600 mg tablet Take 1 Tablet by mouth three (3) times daily. 3/20/23  Yes Lorenza Bass MD   albuterol (ACCUNEB) 0.63 mg/3 mL nebulizer solution 3 mL by Nebulization route every six (6) hours as needed for Wheezing. 3/20/23  Yes Lorenza Bass MD   L.acid,para-B. bifidum-S.therm (RISAQUAD) 8 billion cell cap cap Take 1 Capsule by mouth daily. 3/20/23  Yes Lorenza Bass MD   polyethylene glycol (MIRALAX) 17 gram packet Take 1 Packet by mouth daily for 30 days. 3/15/23 4/14/23 Yes Africa Guevara MD   ondansetron (ZOFRAN ODT) 4 mg disintegrating tablet TAKE 1 TABLET BY MOUTH EVERY 8 HOURS AS NEEDED FOR NAUSEA 23  Yes Kulwinder Walker NP   cyclobenzaprine (FLEXERIL) 10 mg tablet Take 1 Tablet by mouth three (3) times daily as needed for Muscle Spasm(s). 23  Yes German Elliott MD   nitroglycerin (NITROSTAT) 0.4 mg SL tablet TAKE 1 TABLET BY MOUTH EVERY 5 MINUTES UP TO 3 DOSES THEN CALL 911 IF PAIN PERSISTS 22  Yes Henrry Lopez MD   buPROPion XL (WELLBUTRIN XL) 300 mg XL tablet Take 300 mg by mouth two (2) times a day.  22  Yes Provider, Historical   atorvastatin (LIPITOR) 20 mg tablet TAKE 1 TABLET BY MOUTH EVERY DAY 22  Yes John Poli Whalen MD   hydrOXYzine pamoate (VISTARIL) 25 mg capsule TAKE 1 CAPSULE BY MOUTH TWICE A DAY AS NEEDED FOR ANXIETY 11/4/22  Yes Kalee Medley MD   metoprolol tartrate (LOPRESSOR) 25 mg tablet TAKE 1 TABLET BY MOUTH TWICE A DAY 11/4/22  Yes Kalee Medley MD   bumetanide (BUMEX) 0.5 mg tablet TAKE 1 TABLET BY MOUTH EVERY DAY AS NEEDED 9/16/22  Yes Kalee Medley MD   omeprazole (PRILOSEC) 40 mg capsule TAKE 1 CAPSULE BY MOUTH EVERY DAY 5/23/22  Yes Kalee Medley MD   albuterol (ProAir HFA) 90 mcg/actuation inhaler Take 1 Puff by inhalation every four (4) hours as needed for Wheezing or Shortness of Breath. 4/27/22  Yes Kalee Medley MD   budesonide (Pulmicort Flexhaler) 180 mcg/actuation aepb inhaler Take 2 Puffs by inhalation daily. 4/27/22  Yes Kalee Medley MD   alcohol swabs (ALCOHOL PADS) padm Use when checking blood glucose 7/15/16  Yes Chandu Loving MD   co-enzyme Q-10 (CO Q-10) 100 mg capsule TAKE 1 CAPSULE BY MOUTH EVERY DAY 2/22/23   Kalee Medley MD   valACYclovir (VALTREX) 500 mg tablet TAKE 1 PILL TWICE DAILY FOR 3 DAYS AT SIGN OF HERPES FLARE. Patient not taking: Reported on 3/26/2023 7/5/21   Kalee Medley MD   glucose blood VI test strips (BLOOD GLUCOSE TEST) strip Use once a day  Patient not taking: Reported on 3/26/2023 3/10/20   Josué De Anda MD   Blood-Glucose Meter monitoring kit Use to check glucose once a day  Patient not taking: Reported on 3/26/2023 7/15/16   Chandu Loving MD   Lancets misc Use once a day. Please give one compatible with patient's meter  Patient not taking: Reported on 3/26/2023 7/15/16   Chandu Loving MD       Allergies/Social/Family History: Allergies   Allergen Reactions    Accupril [Quinapril] Cough    Adhesive Tape-Silicones Other (comments)     Makes skin tears easily.      Lipitor [Atorvastatin] Other (comments)     aches    Norvasc [Amlodipine] Swelling     On legs at 10 mg dose      Social History     Tobacco Use Smoking status: Never    Smokeless tobacco: Never   Substance Use Topics    Alcohol use: No     Alcohol/week: 0.0 standard drinks      Family History   Problem Relation Age of Onset    Cancer Mother 67        colon    Diabetes Mother     OSTEOARTHRITIS Mother     Stroke Mother     Migraines Mother     Diabetes Father     Heart Disease Father     Heart Attack Father     Hypertension Father     High Cholesterol Father     COPD Father     Heart Failure Father     Cancer Other     Diabetes Other     Heart Disease Other     Hypertension Other     Cancer Brother         lymphoma    Cancer Brother         PROSTATE AND LYMPHOMA    Cancer Maternal Grandmother         stomach    Asthma Brother     Asthma Brother     Stroke Brother     Cancer Maternal Aunt         lung     Breast Cancer Maternal Aunt     Cancer Maternal Uncle         lung    Cancer Maternal Uncle         melanoma    Anesth Problems Neg Hx        Review of Systems:     Pertinent items are noted in HPI.     Objective:   Vital Signs:  Visit Vitals  BP (!) 104/41   Pulse (!) 108   Temp 98.2 °F (36.8 °C)   Resp 16   Ht 5' 5\" (1.651 m)   Wt 59 kg (130 lb)   LMP 1985   SpO2 97%   Breastfeeding No   BMI 21.63 kg/m²      O2 Device: None (Room air) Temp (24hrs), Av.4 °F (36.9 °C), Min:97.4 °F (36.3 °C), Max:99 °F (37.2 °C)           Intake/Output:     Intake/Output Summary (Last 24 hours) at 3/26/2023 2045  Last data filed at 3/26/2023 2000  Gross per 24 hour   Intake 3800 ml   Output 1163 ml   Net 2637 ml       Physical Exam:   Gen: ill appearing, but converive  HEENT: PERRL  NC, AT  Neck: no JVD, midline trach  CV: tachcyardic  Pulm: symmetric chest rise  Abd: TTP , + rebound    LABS AND  DATA: Personally reviewed  Recent Labs     23  0544 23  1438   WBC 52.3* 46.5*   HGB 10.0* 11.7   HCT 29.3* 34.7*   * 520*     Recent Labs     23  1344 23  0544 23  1438   * 135* 132*   K 4.0 2.5* 2.1*    99 90*   CO2 24 29 35*   BUN 11 11 11   CREA 0.76 0.45* 0.79   GLU 96 115* 142*   CA 7.9* 7.4* 8.5   MG  --  2.2 1.6     Recent Labs     03/25/23  1438 03/24/23  1045   * 129*   TP 6.2* 5.4*   ALB 2.0* 2.2*   GLOB 4.2* 3.2   LPSE 25*  --      No results for input(s): INR, PTP, APTT, INREXT in the last 72 hours. No results for input(s): PHI, PCO2I, PO2I, FIO2I in the last 72 hours. No results for input(s): CPK, CKMB, TROIQ, BNPP in the last 72 hours. Hemodynamics:   PAP:   CO:     Wedge:   CI:     CVP:    SVR:       PVR:       Ventilator Settings:  Mode Rate Tidal Volume Pressure FiO2 PEEP                    Peak airway pressure:      Minute ventilation:          MEDS: Reviewed       I performed all aspects of the physical examination via Telemedicine associated with two way audio and video communication and with the on-site assistance of Nurse. Patient is critically ill in the ICU. I  personally  reviewed the pertinent medical records, laboratory/ pathology data and radiographic images. Due to  critical illness impairing one or more vital organs of this patient resulting in life threatening clinical situation  I have provided direct, frequent personal  assessment and manipulation in management plan and spent 35  minutes  of  critical care time excluding the time spent on procedures and teaching. Greater than 50% of this time  in patient's care was  employed  in counseling and coordination of care and engaged in face to face discussion of case management issues, addressing questions, and outlining a plan of  therapy.   Patti Purvis, [address]  3/26/2023

## 2023-03-27 NOTE — OP NOTES
[facility]  OPERATIVE REPORT    Name:  Florencio Barcenas  MR#:  [id number]  :  1960  ACCOUNT #:  [de-identified]  DATE OF SERVICE:  2023    PRIMARY CARE PROVIDER:  Frantz Gutierrez MD    PREOPERATIVE DIAGNOSIS:  Fulminant colitis. POSTOPERATIVE DIAGNOSIS:  Fulminant colitis. PROCEDURES PERFORMED:  1. Exploratory laparotomy. 2.  Subtotal abdominal colectomy. SURGEON:  Akanksha Bundy MD.    Corin Ireland    ANESTHESIA:  General.    COMPLICATIONS:  None. SPECIMENS REMOVED:  ***    IMPLANTS:  ***    ESTIMATED BLOOD LOSS:  ***. FINDINGS:  Boggy edematous colon with foul-smelling stool. INDICATIONS:  The patient is a 80-year-old female who had been to the hospital last week with relatively focal right abdominal colitis. After discharge, she re-presented to the hospital with worsening pain and CT scan consistent with a global colitis, right colon, transverse colon, descending colon, and rectum. The patient has not had a bowel movement for approximately 7 days. She worsened clinically over the last 12 hours becoming tachycardic and requiring increasing volume to maintain mean arterial pressure in the 50s. Given the known worsening of her colitis and current clinical picture, my concern was for fulminant colitis, so she was taken to the operating room for management. PROCEDURE:  Consent was obtained. She was taken to the operating room and placed in supine position. SCDs were turned on and working. Vail catheter was placed prior to the induction. After successful induction of general endotracheal anesthesia, the abdomen was prepped and draped in the usual sterile fashion. A time-out was performed per protocol. A generous midline incision was made with a skin knife and carried down to the fascia. .  Fascia was opened with a knife. Wound protector was placed.   When in the bowel, we could see that the colon was clearly abnormal, edematous, weepy, with air in the retroperitoneum that was abnormal.  The terminal ileum was  from the right colon with a single fire of 60-mm blue load stapler. Right colon mobilized first.  Hepatic flexure was mobilized in usual fashion with endpoints of dissection gaining visualization of the right ureter and C-loop of the duodenum after  the omental connections to the colon. Once this was mobilized appropriately, I was able to clearly see the right branch of the ileocolic artery and the ileocolic artery with a strong pulse going towards the clearly abnormal appearing colon. The ileocolic artery was taken with a single fire of a white load stapler. A right branch from the middle colic was taken with sequential fires of the vessel-sealing device. The patient had a bypass anatomy with antecolic gastrojejunostomy. To that end, the colon was divided at the intersection of the antecolic gastrojejunostomy and transverse colon. This was divided with two fires of a blue load stapler. The bowel was then pushed through the newly created Han's defect for continued mobilization. Attention was then turned to the descending colon and rectum. This did not have the same appearance as the right colon and transverse colon. The wall of the descending colon was relatively normal and the internal contents were full of thick stool. To that end, this was divided just as the sigmoid colon interface with single fire of a 60-mm blue load stapler. The intervening mesentery leading back to the descending and transverse colon was taken with a vessel-sealing device. The NIXON was taken with a single fire of a white load stapler. The left colic artery was taken with sequential fires of the vessel-sealing device.   The splenic flexure was mobilized in the usual fashion with the endpoints of dissections being *** visualization of the tail of pancreas, retroperitoneum and the posterior wall of the stomach and the connections to the spleen were taken down with electrocautery. Middle colic artery was taken with a single fire of blue load stapler. The second colon specimen was passed off as well. Han's defect was closed with a running 3-0 silk suture. It did not permit fingertips after this was closed. The blood supply to the gastrojejunostomy was intact and visibly pulsatile. The bowel was run from the ligament of Treitz to the common channel down to the stapled off terminal ileum and appeared appropriate. The abdomen was irrigated with 3 liters of saline until clear. A 15-Estonian CWV drain was brought across the left abdomen and laid adjacent to the long Hadley's pouch. Hadley's pouch was tagged with 0 Prolene sutures for consideration of future anastomosis. Right upper quadrant defect for the ileostomy was created. A disc of skin was removed and this was carried down to the rectus fascia. Rectus fascia was opened with electrocautery. Muscles were  bluntly and the posterior sheath was opened with electrocautery. The loop of terminal ileum was brought across the defect left in plane view. Gloves and gowns were exchanged and the closing table was brought in. Seprafilm was placed in the abdomen to help prevent future adhesions. Bilateral transversus abdominis plane block was injected to the abdominal wall with a mixture of 20 mL of Exparel, 30 mL of Marcaine and 50 mL of saline, 100 mL total.  Sponge, instruments, and needle counts were correct. Midline fascia was closed with a running 2-0 PDS stitch and staples. Dressings were placed. The drain was sutured to the skin with a 2-0 nylon stitch. When the dressings were placed, ileostomy was matured in the usual Lidia's fashion with an appropriate . *** was placed to collect stool. The patient was extubated and taken to PACU in stable condition.   I went to discuss the findings of the case with her family in the waiting area.      MD PAT [id number]  D:  03/27/2023 1:21  T:  03/27/2023 11:09  JOB #:  [id number]  CC:  Ariana Stallworth MD

## 2023-03-27 NOTE — PROGRESS NOTES
Spiritual Care Assessment/Progress Note  [address]      NAME: Asaf Webb      MRN: [id number]  AGE: 58 y.o.  SEX: female  Buddhism Affiliation: Voodoo   Language: English     3/27/2023     Total Time (in minutes): 45     Spiritual Assessment begun in [facility] INTENSIVE CARE through conversation with:         [x]Patient        [x] Family    [] Friend(s)        Reason for Consult: Advance medical directive consult     Spiritual beliefs: (Please include comment if needed)     [x] Identifies with a nicol tradition:    Cristobal Cortez     [] Supported by a nicol community:            [] Claims no spiritual orientation:           [] Seeking spiritual identity:                [] Adheres to an individual form of spirituality:           [] Not able to assess:                           Identified resources for coping:      [] Prayer                               [] Music                  [] Guided Imagery     [x] Family/friends                 [] Pet visits     [] Devotional reading                         [] Unknown     [] Other:                                               Interventions offered during this visit: (See comments for more details)    Patient Interventions: Advance medical directive consult, Catharsis/review of pertinent events in supportive environment, Coping skills reviewed/reinforced, End of life issues discussed           Plan of Care:     [] Support spiritual and/or cultural needs    [] Support AMD and/or advance care planning process      [] Support grieving process   [] Coordinate Rites and/or Rituals    [] Coordination with community clergy   [] No spiritual needs identified at this time   [] Detailed Plan of Care below (See Comments)  [] Make referral to Music Therapy  [] Make referral to Pet Therapy     [] Make referral to Addiction services  [] Make referral to [facility]  [] Make referral to Spiritual Care Partner  [] No future visits requested        [x] Contact Spiritual Care for further referrals     Comments: Received a request for an Advance Medical Directive (AMD) Consult. Reviewed patient's chart and spoke with patient's nurse. Pt, pt's spouse, pt's daughter and son were present. Introduced self and chaplaincy. Pt wu through family support. No spiritual or emotional needs at this time. Reviewed the AMD with the patient. Reviewed [facility] hierarchy of decision makers. Pt shared she would like to complete the form at a later time.  shared chaplains are available to assist when she is ready. Initiated a relationship of trust and support. Offered a listening ear. Provided a compassionate presence. Advised of  availability. Please contact Spiritual Care for further referrals.     [address], [address], [address], [facility]  Staff   Paging service: [phone] (OPAL)

## 2023-03-27 NOTE — BRIEF OP NOTE
Brief Postoperative Note    Patient: Yuridia López  YOB: 1960  MRN: [id number]    Date of Procedure: 3/26/2023     Pre-Op Diagnosis: Fulminant Colitis    Post-Op Diagnosis: Same as preoperative diagnosis. Procedure(s):  LAPAROTOMY EXPLORATORY, Subtotal ABDOMINAL COLECTOMY    Surgeon(s):  Sid Xiong MD    Surgical Assistant: Surg Asst-1: Bart Gil    Anesthesia: General     Estimated Blood Loss (mL): Minimal    Complications: None    Specimens:   ID Type Source Tests Collected by Time Destination   1 : Subtotal Colectomy Preservative Colon  Sid Xiong MD 3/26/2023 2310 Pathology        Implants: * No implants in log *    Drains:   Turner-Mcgill Drain 03/27/23 Left Abdomen (Active)   Site Assessment Clean, dry, & intact 03/27/23 0030   Dressing Status Clean, dry, & intact 03/27/23 0030   Status Patent; Charged;Draining 03/27/23 0030   Drainage Color Serosanguinous 03/27/23 0046   Output (ml) 65 ml 03/27/23 0046       Findings: Boggy, edematous colon    Electronically Signed by Emma Huerta MD on 3/27/2023 at 1:00 AM

## 2023-03-27 NOTE — CONSULTS
[address]     Name:  Alivia Rivera  MR#:  [id number]  :  1960  ACCOUNT #:  [de-identified]  DATE OF SERVICE:  2023    PRIMARY CARE PROVIDER:  Argelia Powell MD    REASON FOR CONSULTATION:  This is a consult note for inflammatory colitis. HISTORY OF PRESENT ILLNESS:  The patient is a 58-year-old female who presented to [facility] last week with colitis. She never completely recovered from the hospitalization, despite a course of IV and oral antibiotics and has ongoing mucousy stools and abdominal distention. She went back to her primary care provider for *** issues. She was found to have significant leukocytosis. Today, her leucocytosis is greater than 50,000 with a low-grade fever. She was started on fluid and she improved, continued to have *** abdominal pain. Initially, her CT scan showed thickening of the right colon, now there is thickening of the transverse colon, ascending colon, and rectum. There is also a significant stool burden as well. PAST MEDICAL HISTORY:  1. Arthritis. 2.  Asthma. 3.  Fibromyalgia. 4.  Coronary artery disease, status post stents ***.  5.  Cardiac murmur. 6.  Depression. 7.  Diabetes. 8.  Obesity. 9.  DVT. 10.  Gastroesophageal reflux disease. 11.  Hypertension. 12.  Lumbar radiculitis. 13.  Morbid obesity. 14   Musculoskeletal disorder. 15.  Obstructive sleep apnea. 16.  Thromboembolism. PAST SURGICAL HISTORY:  1. Appendectomy. 2.  Breast biopsy. 3.  Gastric bypass. 4.  Cholecystectomy. 5.  Hysterectomy. 6.  Percutaneous coronary intervention. ALLERGIES:  Dicie [place] TAPE, LIPITOR, NORVASC. SOCIAL HISTORY:  She is a never smoker. She does not drink alcohol.     FAMILY HISTORY:  Cancer, diabetes, osteoarthritis, strokes, migraines, heart attack, heart disease, hypertension, hypercholesterolemia, COPD, heart failure, lymphoma, prostate cancer, stomach cancer, lung cancer, breast cancer, melanoma. REVIEW OF SYSTEMS:  Positive for above complaints. Negative for general, HEENT, respiratory, cardiovascular, genitourinary, musculoskeletal, neurologic, psychiatric, endocrine, hematology. PHYSICAL EXAMINATION:  VITAL SIGNS:  Reviewed. Blood pressure is 122/58, pulse is 101 which has been relatively persistent since 0900. CONSTITUTIONAL:  Age-appropriate female, in mild distress. HEENT:  Normocephalic, atraumatic. NECK:  Supple. Trachea is midline. CHEST:  Clear. HEART:  Regular. ABDOMEN:  Soft. The patient has globally peritoneal with no evidence of guarding. MUSCULOSKELETAL:  No clubbing, cyanosis, or edema. PSYCHIATRIC:  Appropriate to questioning and pleasant. NEUROLOGIC: Grossly nonfocal.    RADIOLOGY:   Reviewed. LABORATORY DATA:  Reviewed. ASSESSMENT:  Symptomatic ischemic colitis, now clinically global.    PLAN:  At this juncture, I will continue to treat with IV antibiotics and bowl rest.  I recommended that the patient will see in the ICU for current management. Looking to see if any worsening pain, worsening vital signs, increased pressor need, etc.  The patient was counseled that she may require surgical exploration. It is likely unsafe for total colonoscopy given her stool burden she has as well with worsening thickening of the colon, colonoscopy might put her at high risk for perforation. All questions were answered to her satisfaction. We will continue to follow along.         Ludwig Villalba MD      [id number]  D:  03/26/2023 14:31  T:  03/26/2023 23:13  JOB #:  [id number]  CC:  German Lewis MD

## 2023-03-27 NOTE — WOUND CARE
Ostomy Progress Note:  First postoperative visit. Type of Ostomy;ileostomy  Location:[facility]  Date of Surgery:3/27  Surgeon:   Pain level-7  Treatments:  Pouch removed, stoma and peristomal skin cleaned and assessed, new pouch prepared and applied. Findings:  Current appliance:2 piece  Stoma size:35mm  Stoma color:pink  Characteristics:budded, swollen  Peristomal skin:clean  Abdomen:distended  Output:scant amount of green liquid    Midline incision- well approximated, no drainage, new dressing placed. Bilateral heels- no redness  TRISTA drain- pink serous drainage, nurse Don emptied  Sacrum- pink, blanches   Right hand- abrasions with dry scabs, cirilo-wound pink,small amount of pain, cleansed with NSS and hydrocolloid applied to protect    Teaching/subjects covered today:   at bedside , watched nurse change appliance and ask appropriate questions, patient alert and able to listen and understand teaching  -Normal and abnormal stoma characteristics & changes over time.  -Normal abnormal peristomal characteristics. -When/how to clean stoma & peristomal skin.  -When/how to change appliance   -When to notify nurse/Physician  -Manipulated/practiced with clean pouch and pouch closure  Encouraged patient to review handout given to patient/family and ask questions regarding material on next ostomy nurse visit. Recommendations:  Empty appliance when 1/3 full and PRN. Encourage patient/family to notify nurse and assist w/ pouch emptying to promote self care. Change appliance twice weekly and PRN for leaking ASAP. DO NOT REINFORCE LEAKS to avoid skin breakdown.   Transition of Care:  Ostomy nurse to return and continue teaching tomorrow  Ion Mead RN  wound

## 2023-03-27 NOTE — PERIOP NOTES
Seprafilm 6 pack x 2 placed in abdomen by Dr. Shalonda Farah, intraop.     EXP: 2025-07-02  LOT# [id number]

## 2023-03-28 LAB
ANION GAP SERPL CALC-SCNC: 5 MMOL/L (ref 5–15)
BASOPHILS # BLD: 0 K/UL (ref 0–0.1)
BASOPHILS NFR BLD: 0 % (ref 0–1)
BUN SERPL-MCNC: 10 MG/DL (ref 6–20)
BUN/CREAT SERPL: 31 (ref 12–20)
CALCIUM SERPL-MCNC: 7.5 MG/DL (ref 8.5–10.1)
CHLORIDE SERPL-SCNC: 112 MMOL/L (ref 97–108)
CO2 SERPL-SCNC: 24 MMOL/L (ref 21–32)
CREAT SERPL-MCNC: 0.32 MG/DL (ref 0.55–1.02)
DIFFERENTIAL METHOD BLD: ABNORMAL
EOSINOPHIL # BLD: 0 K/UL (ref 0–0.4)
EOSINOPHIL NFR BLD: 0 % (ref 0–7)
ERYTHROCYTE [DISTWIDTH] IN BLOOD BY AUTOMATED COUNT: 13.2 % (ref 11.5–14.5)
GLUCOSE SERPL-MCNC: 108 MG/DL (ref 65–100)
HCT VFR BLD AUTO: 21.7 % (ref 35–47)
HGB BLD-MCNC: 7.3 G/DL (ref 11.5–16)
IMM GRANULOCYTES # BLD AUTO: 0 K/UL
IMM GRANULOCYTES NFR BLD AUTO: 0 %
LYMPHOCYTES # BLD: 1 K/UL (ref 0.8–3.5)
LYMPHOCYTES NFR BLD: 3 % (ref 12–49)
MAGNESIUM SERPL-MCNC: 1.8 MG/DL (ref 1.6–2.4)
MCH RBC QN AUTO: 29.1 PG (ref 26–34)
MCHC RBC AUTO-ENTMCNC: 33.6 G/DL (ref 30–36.5)
MCV RBC AUTO: 86.5 FL (ref 80–99)
MONOCYTES # BLD: 0.3 K/UL (ref 0–1)
MONOCYTES NFR BLD: 1 % (ref 5–13)
MYELOCYTES NFR BLD MANUAL: 1 %
NEUTS BAND NFR BLD MANUAL: 7 % (ref 0–6)
NEUTS SEG # BLD: 32.7 K/UL (ref 1.8–8)
NEUTS SEG NFR BLD: 88 % (ref 32–75)
NRBC # BLD: 0.02 K/UL (ref 0–0.01)
NRBC BLD-RTO: 0.1 PER 100 WBC
PLATELET # BLD AUTO: 429 K/UL (ref 150–400)
PMV BLD AUTO: 8.4 FL (ref 8.9–12.9)
POTASSIUM SERPL-SCNC: 2.8 MMOL/L (ref 3.5–5.1)
RBC # BLD AUTO: 2.51 M/UL (ref 3.8–5.2)
RBC MORPH BLD: ABNORMAL
SODIUM SERPL-SCNC: 141 MMOL/L (ref 136–145)
WBC # BLD AUTO: 34.4 K/UL (ref 3.6–11)

## 2023-03-28 PROCEDURE — 97110 THERAPEUTIC EXERCISES: CPT

## 2023-03-28 PROCEDURE — 74011250636 HC RX REV CODE- 250/636

## 2023-03-28 PROCEDURE — 74011250637 HC RX REV CODE- 250/637

## 2023-03-28 PROCEDURE — 99233 SBSQ HOSP IP/OBS HIGH 50: CPT | Performed by: FAMILY MEDICINE

## 2023-03-28 PROCEDURE — 74011250636 HC RX REV CODE- 250/636: Performed by: STUDENT IN AN ORGANIZED HEALTH CARE EDUCATION/TRAINING PROGRAM

## 2023-03-28 PROCEDURE — 74011000272 HC RX REV CODE- 272

## 2023-03-28 PROCEDURE — 80048 BASIC METABOLIC PNL TOTAL CA: CPT

## 2023-03-28 PROCEDURE — 74011250637 HC RX REV CODE- 250/637: Performed by: STUDENT IN AN ORGANIZED HEALTH CARE EDUCATION/TRAINING PROGRAM

## 2023-03-28 PROCEDURE — 94640 AIRWAY INHALATION TREATMENT: CPT

## 2023-03-28 PROCEDURE — 97165 OT EVAL LOW COMPLEX 30 MIN: CPT

## 2023-03-28 PROCEDURE — 36415 COLL VENOUS BLD VENIPUNCTURE: CPT

## 2023-03-28 PROCEDURE — 2709999900 HC NON-CHARGEABLE SUPPLY

## 2023-03-28 PROCEDURE — 74011000250 HC RX REV CODE- 250

## 2023-03-28 PROCEDURE — 77030029065 HC DRSG HEMO QCLOT ZMED -B

## 2023-03-28 PROCEDURE — 97530 THERAPEUTIC ACTIVITIES: CPT

## 2023-03-28 PROCEDURE — 65270000029 HC RM PRIVATE

## 2023-03-28 PROCEDURE — 85025 COMPLETE CBC W/AUTO DIFF WBC: CPT

## 2023-03-28 PROCEDURE — 83735 ASSAY OF MAGNESIUM: CPT

## 2023-03-28 PROCEDURE — 97535 SELF CARE MNGMENT TRAINING: CPT

## 2023-03-28 RX ORDER — POTASSIUM CHLORIDE 7.45 MG/ML
10 INJECTION INTRAVENOUS
Status: COMPLETED | OUTPATIENT
Start: 2023-03-28 | End: 2023-03-28

## 2023-03-28 RX ORDER — LANOLIN ALCOHOL/MO/W.PET/CERES
3 CREAM (GRAM) TOPICAL
Status: DISCONTINUED | OUTPATIENT
Start: 2023-03-28 | End: 2023-03-31 | Stop reason: HOSPADM

## 2023-03-28 RX ORDER — POTASSIUM CHLORIDE 29.8 MG/ML
20 INJECTION INTRAVENOUS
Status: COMPLETED | OUTPATIENT
Start: 2023-03-28 | End: 2023-03-28

## 2023-03-28 RX ADMIN — GABAPENTIN 600 MG: 300 CAPSULE ORAL at 08:12

## 2023-03-28 RX ADMIN — VANCOMYCIN HYDROCHLORIDE 500 MG: 250 POWDER, FOR SOLUTION ORAL at 21:30

## 2023-03-28 RX ADMIN — GABAPENTIN 600 MG: 300 CAPSULE ORAL at 16:17

## 2023-03-28 RX ADMIN — SODIUM CHLORIDE 50 ML/HR: 9 INJECTION, SOLUTION INTRAVENOUS at 16:02

## 2023-03-28 RX ADMIN — VANCOMYCIN HYDROCHLORIDE 500 MG: 250 POWDER, FOR SOLUTION ORAL at 08:29

## 2023-03-28 RX ADMIN — SODIUM CHLORIDE 150 ML/HR: 9 INJECTION, SOLUTION INTRAVENOUS at 02:13

## 2023-03-28 RX ADMIN — BUDESONIDE 250 MCG: 0.5 SUSPENSION RESPIRATORY (INHALATION) at 20:54

## 2023-03-28 RX ADMIN — BUPROPION HYDROCHLORIDE 300 MG: 150 TABLET, EXTENDED RELEASE ORAL at 07:35

## 2023-03-28 RX ADMIN — Medication 3 MG: at 23:44

## 2023-03-28 RX ADMIN — VANCOMYCIN HYDROCHLORIDE 500 MG: 500 INJECTION, POWDER, LYOPHILIZED, FOR SOLUTION INTRAVENOUS at 10:36

## 2023-03-28 RX ADMIN — SODIUM CHLORIDE, PRESERVATIVE FREE 10 ML: 5 INJECTION INTRAVENOUS at 14:37

## 2023-03-28 RX ADMIN — ENOXAPARIN SODIUM 40 MG: 100 INJECTION SUBCUTANEOUS at 08:12

## 2023-03-28 RX ADMIN — GABAPENTIN 600 MG: 300 CAPSULE ORAL at 21:29

## 2023-03-28 RX ADMIN — VANCOMYCIN HYDROCHLORIDE 500 MG: 250 POWDER, FOR SOLUTION ORAL at 03:40

## 2023-03-28 RX ADMIN — VANCOMYCIN HYDROCHLORIDE 500 MG: 500 INJECTION, POWDER, LYOPHILIZED, FOR SOLUTION INTRAVENOUS at 16:19

## 2023-03-28 RX ADMIN — METRONIDAZOLE 500 MG: 500 INJECTION, SOLUTION INTRAVENOUS at 05:05

## 2023-03-28 RX ADMIN — SODIUM CHLORIDE, PRESERVATIVE FREE 10 ML: 5 INJECTION INTRAVENOUS at 21:32

## 2023-03-28 RX ADMIN — BUPROPION HYDROCHLORIDE 300 MG: 150 TABLET, EXTENDED RELEASE ORAL at 21:29

## 2023-03-28 RX ADMIN — IRON SUCROSE 100 MG: 20 INJECTION, SOLUTION INTRAVENOUS at 08:12

## 2023-03-28 RX ADMIN — POTASSIUM CHLORIDE 20 MEQ: 29.8 INJECTION, SOLUTION INTRAVENOUS at 06:28

## 2023-03-28 RX ADMIN — ACETAMINOPHEN 650 MG: 325 TABLET ORAL at 22:13

## 2023-03-28 RX ADMIN — POTASSIUM CHLORIDE 20 MEQ: 29.8 INJECTION, SOLUTION INTRAVENOUS at 03:40

## 2023-03-28 RX ADMIN — VANCOMYCIN HYDROCHLORIDE 500 MG: 500 INJECTION, POWDER, LYOPHILIZED, FOR SOLUTION INTRAVENOUS at 03:45

## 2023-03-28 RX ADMIN — SODIUM CHLORIDE, PRESERVATIVE FREE 10 ML: 5 INJECTION INTRAVENOUS at 06:35

## 2023-03-28 RX ADMIN — METRONIDAZOLE 500 MG: 500 INJECTION, SOLUTION INTRAVENOUS at 14:36

## 2023-03-28 RX ADMIN — POTASSIUM CHLORIDE 10 MEQ: 7.46 INJECTION, SOLUTION INTRAVENOUS at 07:35

## 2023-03-28 RX ADMIN — CEFTRIAXONE SODIUM 2 G: 2 INJECTION, POWDER, FOR SOLUTION INTRAMUSCULAR; INTRAVENOUS at 03:45

## 2023-03-28 RX ADMIN — ATORVASTATIN CALCIUM 20 MG: 20 TABLET, FILM COATED ORAL at 08:13

## 2023-03-28 RX ADMIN — METRONIDAZOLE 500 MG: 500 INJECTION, SOLUTION INTRAVENOUS at 21:32

## 2023-03-28 RX ADMIN — VANCOMYCIN HYDROCHLORIDE 500 MG: 500 INJECTION, POWDER, LYOPHILIZED, FOR SOLUTION INTRAVENOUS at 21:33

## 2023-03-28 RX ADMIN — SODIUM CHLORIDE 30 MCG/MIN: 9 INJECTION, SOLUTION INTRAVENOUS at 01:10

## 2023-03-28 RX ADMIN — VANCOMYCIN HYDROCHLORIDE 500 MG: 250 POWDER, FOR SOLUTION ORAL at 16:18

## 2023-03-28 NOTE — PROGRESS NOTES
[address]             GI PROGRESS NOTE        NAME: Esvin Villar   :  1960   MRN:  [id number]       Subjective:   Patient is 63yo female s/p subtotal colectomy for fulminant colitis with septic shock on 3/26. Patient is seated upright in bed and states she feels much better compared to yesterday. She reports pain around incision site only when rolling a certain way. She is tolerating regular diet well, eating about 1/2 of her meal, no nausea or vomiting. Per primary team IV fluids were decreased in setting of edema with little urine output, duplex BERENICE was negative for dvt and edema in L arm is improving. Objective: In NAD      VITALS:   Last 24hrs VS reviewed since prior progress note. Most recent are:  Visit Vitals  BP (!) 124/53   Pulse (!) 102   Temp 97.5 °F (36.4 °C)   Resp 22   Ht 5' 5\" (1.651 m)   Wt 75.9 kg (167 lb 4.8 oz)   SpO2 96%   Breastfeeding No   BMI 27.84 kg/m²       Intake/Output Summary (Last 24 hours) at 3/28/2023 1721  Last data filed at 3/28/2023 1438  Gross per 24 hour   Intake 6400.58 ml   Output 2389 ml   Net 4011.58 ml       PHYSICAL EXAM:  General: Alert, in no acute distress    HEENT: Anicteric sclerae. Lungs:            CTA Bilaterally. Heart:  Regular  rhythm,    Abdomen: Soft, Non distended, Non tender.  (+)Bowel sounds, no HSM  Extremities: 1+ edema noted over L hand, 1+ bilateral lower extremity edema  Neurologic:  CN 2-12 gi, Alert and oriented X 3. No acute neurological distress   Psych:   Good insight. Not anxious nor agitated. Lab Data Reviewed:   Recent Labs     23  0138 23   WBC 34.4* 39.0*   HGB 7.3* 7.9*   HCT 21.7* 23.5*   * 408*     Recent Labs     23  0138 23    139   K 2.8* 3.5   * 109*   CO2 24 25   BUN 10 13   CREA 0.32* 0.41*   * 147*   CA 7.5* 7.2*     No results for input(s): AP, TBIL, TP, ALB, GLOB, GGT, AML, LPSE in the last 72 hours.     No lab exists for component: SGOT, GPT, AMYP, HLPSE    ________________________________________________________________________  Patient Active Problem List   Diagnosis Code    Asthma J45.909    Arthritis M19.90    GERD (gastroesophageal reflux disease) K21.9    GARCIA (nonalcoholic steatohepatitis) K75.81    Fibromyalgia M79.7    Essential hypertension E07    Metabolic syndrome L80.13    FH: diabetes mellitus Z83.3    Anxiety F41.9    History of pulmonary embolus (PE) Z86.711    Hypokalemia E87.6    H/O gastric bypass Z98.84    H/O colonoscopy Z98.890    DDD (degenerative disc disease), lumbar M51.36    FH: colon cancer Z80.0    Elevated ferritin R79.89    Type 2 diabetes mellitus with diabetic neuropathy (HCC) E11.40    Abnormal mammogram of right breast R92.8    Abnormal CT of the abdomen R93.5    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 She continues to feel improved. Of note, edema in L arm has improved with reduced IV fluids per primary team.   Continue diet as tolerated.           Signed By: Geralene Snellen, PA     3/28/2023  5:21 PM

## 2023-03-28 NOTE — PROGRESS NOTES
Problem: Falls - Risk of  Goal: *Absence of Falls  Description: Document Knox Bolognese Fall Risk and appropriate interventions in the flowsheet. Outcome: Progressing Towards Goal  Note: Fall Risk Interventions:        Problem: Pressure Injury - Risk of  Goal: *Prevention of pressure injury  Description: Document Nadeem Scale and appropriate interventions in the flowsheet. Outcome: Progressing Towards Goal  Note: Pressure Injury Interventions:  Sensory Interventions: Assess changes in LOC, Assess need for specialty bed, Keep linens dry and wrinkle-free, Maintain/enhance activity level, Minimize linen layers, Monitor skin under medical devices, Pressure redistribution bed/mattress (bed type), Turn and reposition approx. every two hours (pillows and wedges if needed)    Moisture Interventions: Absorbent underpads, Assess need for specialty bed, Internal/External urinary devices, Minimize layers    Activity Interventions: Assess need for specialty bed, Pressure redistribution bed/mattress(bed type), PT/OT evaluation    Mobility Interventions: Assess need for specialty bed, HOB 30 degrees or less, Pressure redistribution bed/mattress (bed type), PT/OT evaluation, Suspension boots, Turn and reposition approx.  every two hours(pillow and wedges)    Nutrition Interventions: Document food/fluid/supplement intake    Friction and Shear Interventions: HOB 30 degrees or less, Lift sheet, Minimize layers, Transferring/repositioning devices

## 2023-03-28 NOTE — PROGRESS NOTES
Problem: Mobility Impaired (Adult and Pediatric)  Goal: *Acute Goals and Plan of Care (Insert Text)  Description: FUNCTIONAL STATUS PRIOR TO ADMISSION: patient reports being \"weaker\" prior to this admission. Patient reports fall prior to admission on the stairs. Patient having bilateral LE weakness that is progressive. Use of RW prior to admission    HOME SUPPORT PRIOR TO ADMISSION: patient lives with     Physical Therapy Goals  Initiated 3/27/2023  1. Patient will move from supine to sit and sit to supine  in bed with moderate assistance  within 7 day(s). 2.  Patient will transfer from bed to chair and chair to bed with moderate assistance  using the least restrictive device within 7 day(s). 3.  Patient will perform sit to stand with moderate assistance  within 7 day(s). 4.  Patient will ambulate with moderate assistance  for 10 feet with the least restrictive device within 7 day(s). Outcome: Progressing Towards Goal   PHYSICAL THERAPY TREATMENT  Patient: Krystle Doran (52 y.o. female)  Date: 3/28/2023  Diagnosis: Colitis [K52.9]  Leukocytosis [D72.829] Colitis  Procedure(s) (LRB):  LAPAROTOMY EXPLORATORY, Subtotal ABDOMINAL COLECTOMY (N/A) 2 Days Post-Op  Precautions: Fall, Skin, Contact  Chart, physical therapy assessment, plan of care and goals were reviewed. ASSESSMENT  Patient continues with skilled PT services and is progressing towards goals. She voices personal goal of progressive mobility to included prolonged sitting edge of bed. She then initiates transfer training x 3 trials and ambulates briefly to bedside chair. BP decreased on initial sitting and rebounds with time (asymptomatic). Despite high fear of falling she offers excellent participation in all offered interventions. Reviewed LE exercises and benefits of sitting out of bed in chair.      Current Level of Function Impacting Discharge (mobility/balance): mod to max A x 2 transfers and brief ambulation    Other factors to consider for discharge: none additional         PLAN :  Patient continues to benefit from skilled intervention to address the above impairments. Continue treatment per established plan of care. to address goals. Recommendation for discharge: (in order for the patient to meet his/her long term goals)  Therapy 3 hours per day 5-7 days per week    This discharge recommendation:  Has been made in collaboration with the attending provider and/or case management    IF patient discharges home will need the following DME: to be determined (TBD)       SUBJECTIVE:   Patient stated I told myself I was going to do it today, re: progress with PT. Pt received supine, agreeable to PT and cleared by RN. Spouse at bedside. OBJECTIVE DATA SUMMARY:   Critical Behavior:  Neurologic State: Alert, Eyes open spontaneously  Orientation Level: Oriented X4  Cognition: Follows commands, Appropriate decision making, Appropriate for age attention/concentration, Appropriate safety awareness     Functional Mobility Training:  Bed Mobility:  Rolling: Moderate assistance; Additional time; Adaptive equipment (cues for techniques all trials)  Supine to Sit: Assist x2; Additional time; Moderate assistance; Adaptive equipment;Bed Modified     Scooting: Moderate assistance;Maximum assistance; Additional time        Transfers:  Sit to Stand: Assist x2; Additional time; Moderate assistance;Maximum assistance (pull to stand, blocking LEs; x 3 trials)           Bed to Chair: Moderate assistance;Maximum assistance;Assist x2; Additional time (pivot steps to chair; educated regarding Khadra Leo use of future transfers with staff)                    Balance:  Sitting: Without support  Sitting - Static: Good (unsupported)  Sitting - Dynamic: Good (unsupported)  Ambulation/Gait Training:  Distance (ft):  (2' pivot steps to chair)  Assistive Device: Gait belt (hand held assist)  Ambulation - Level of Assistance: Moderate assistance; Additional time;Assist x2;Maximum assistance        Gait Abnormalities: Decreased step clearance        Base of Support: Narrowed     Speed/Dahiana: Pace decreased (<100 feet/min)                     Assist for weight shifts, verbal cues sequencing, posterior lean                Therapeutic Exercises: Ankle pumps x 10  LAQ x 10  Seated knee flexion x 10    Pain Rating:  Denies pain    Activity Tolerance:   requires rest breaks    After treatment patient left in no apparent distress:   Sitting in chair, Call bell within reach, and Caregiver / family present  Pt educated regarding safety precautions including proper footwear and need to contact staff for assistance with all mobility. Pt verbalizes understanding. COMMUNICATION/COLLABORATION:   The patients plan of care was discussed with: Occupational therapist, Registered nurse, and Rehabilitation technician.      Ирина Malone, PT, DPT   Time Calculation: 47 mins

## 2023-03-28 NOTE — PROGRESS NOTES
Problem: Self Care Deficits Care Plan (Adult)  Goal: *Acute Goals and Plan of Care (Insert Text)  Description: FUNCTIONAL STATUS PRIOR TO ADMISSION: Patient reports being \"weaker\" prior to this admission. Patient reports fall prior to admission on the stairs. Patient having bilateral LE weakness that is progressive. Use of RW prior to admission. She reports being independent with ADLs. HOME SUPPORT PRIOR TO ADMISSION: Patient lives with . Occupational Therapy Goals  Initiated 3/28/2023  1. Patient will perform grooming with modified independence within 7 day(s). 2.  Patient will perform upper body dressing and bathing with supervision/set-up within 7 day(s). 3.  Patient will perform lower body dressing and bathing with minimal assistance within 7 day(s). 4.  Patient will perform toilet transfers with minimal assistance within 7 day(s). 5.  Patient will perform all aspects of toileting with minimal assistance within 7 day(s). 6.  Patient will participate in upper extremity therapeutic exercise/activities with supervision/set-up for 10 minutes within 7 day(s). 7.  Patient will utilize energy conservation techniques during functional activities with verbal cues within 7 day(s). Outcome: Progressing Towards Goal   OCCUPATIONAL THERAPY EVALUATION  Patient: Pasco Duane (26 y.o. female)  Date: 3/28/2023  Primary Diagnosis: Colitis [K52.9]  Leukocytosis [D72.829]  Procedure(s) (LRB):  LAPAROTOMY EXPLORATORY, Subtotal ABDOMINAL COLECTOMY (N/A) 2 Days Post-Op   Precautions:  Fall, Skin, Contact    ASSESSMENT  Based on the objective data described below, the patient presents with decreased activity tolerance, significant generalized weakness, impaired balance, and poor endurance on POD 2 of subtotal colectomy. Patient was received in bed alert, oriented x4, and following 100% of commands.   Patient performed bed mobility, x2 sit to stand transfers, and stand-pivot transfer from bed to chair with significant x2 person assist.  Patient remained OOB to the chair at end of tx. She engaged in ADLs with fair tolerance; Increased assistance needed for LB ADLs and patient unsafe to attempt standing ADLs. Education provided regarding safe transfer techniques and adaptive ADL techniques. Patient would benefit from continued skilled OT to progress towards goals and improve overall independence. Current Level of Function Impacting Discharge (ADLs/self-care): Functional mobility, mod- max A x2; UB ADLs, setup-min A; LB ADLs, max- total A    Functional Outcome Measure: The patient scored 14/24 on the AM-PAC outcome measure. Patient will benefit from skilled therapy intervention to address the above noted impairments. PLAN :  Recommendations and Planned Interventions: self care training, functional mobility training, therapeutic exercise, balance training, therapeutic activities, endurance activities, patient education, home safety training, and family training/education    Frequency/Duration: Patient will be followed by occupational therapy 5 times a week to address goals. Recommendation for discharge: (in order for the patient to meet his/her long term goals)  Therapy 3 hours per day 5-7 days per week    This discharge recommendation:  Has been made in collaboration with the attending provider and/or case management    IF patient discharges home will need the following DME: none       SUBJECTIVE:   Patient agreeable to OT evaluation. OBJECTIVE DATA SUMMARY:   HISTORY:   Past Medical History:   Diagnosis Date    Arthritis     OA back,knees and hands    Asthma     Autoimmune disease ([facility] [address]     Aparna Cooper    CAD (coronary artery disease)     s/p stents 11/2015    Cardiac murmur     Depression     Diabetes ([facility] [address]     diet controlled at this time.      DVT (deep venous thrombosis) ([facility]) 1981    GERD (gastroesophageal reflux disease)     Hypertension     Lumbar radiculitis     follows with  d[facility]rell for epidural steroid injections    Morbid obesity (HCC)     Musculoskeletal disorder     EDMOND (obstructive sleep apnea)     wears cpap    S/P TONJA (total abdominal hysterectomy)     Thromboembolus ([address] 1996    PE    Trigger finger, right little finger 10/19/2020    follows with [facility]    Trigger finger, right ring finger 10/19/2020    [facility]     Past Surgical History:   Procedure Laterality Date    COLONOSCOPY N/A 12/6/2021    COLONOSCOPY (URGENT) performed by Anitra Mack MD at [facility] Right 2018    Fibroadenoma    [address]    HX CHOLECYSTECTOMY  2017    HX GASTRIC BYPASS  01/2017    HX HYSTERECTOMY  1985    HX TOTAL ABDOMINAL HYSTERECTOMY      age 22    IR INJ SPINE FLUORO GUIDED  2/4/2021    IR INJ SPINE FLUORO GUIDED  5/5/2022    DAYAN STEREO  BX BREAST RT ADDL W/CLIP AND SPECIMEN Right 2000    neg     IA UNLISTED PROCEDURE CARDIAC SURGERY  11/2015    STENT PLACED       Expanded or extensive additional review of patient history:     Home Situation  Home Environment: Private residence  # Steps to Enter: 4  One/Two Story Residence: Two story, live on 1st floor  Living Alone: No  Support Systems: Spouse/Significant Other, Child(radha)  Patient Expects to be Discharged to[de-identified] Home with home health  Current DME Used/Available at Home: None    Hand dominance: Right    EXAMINATION OF PERFORMANCE DEFICITS:  Cognitive/Behavioral Status:  Neurologic State: Alert  Orientation Level: Oriented X4  Cognition: Appropriate decision making; Appropriate for age attention/concentration; Appropriate safety awareness  Perception: Appears intact  Perseveration: No perseveration noted  Safety/Judgement: Awareness of environment    Skin: Intact in the uppers    Edema: None noted in the uppers    Hearing:   Auditory  Auditory Impairment: None    Vision/Perceptual:    Tracking: Able to track stimulus in all quadrants w/o difficulty    Diplopia: No Acuity: Within Defined Limits       Range of Motion:  WDL in the uppers     Strength:  Decreased but functional in the uppers    Coordination:  Fine Motor Skills-Upper: Left Intact; Right Intact    Gross Motor Skills-Upper: Left Intact; Right Intact    Tone & Sensation:  Tone:Normal  Sensation: intact      Balance:  Sitting: Intact  Sitting - Static: Good (unsupported)  Sitting - Dynamic: Good (unsupported)  Standing: Impaired  Standing - Static: Poor  Standing - Dynamic : Poor    Functional Mobility and Transfers for ADLs:  Bed Mobility:  Rolling: Moderate assistance; Additional time; Adaptive equipment (cues for techniques all trials)  Supine to Sit: Assist x2; Additional time; Moderate assistance; Adaptive equipment;Bed Modified  Sit to Supine:  (pt remained up at end of tx)  Scooting: Moderate assistance;Maximum assistance; Additional time    Transfers:  Sit to Stand: Assist x2; Additional time; Moderate assistance;Maximum assistance (pull to stand, blocking LEs; x 3 trials)  Bed to Chair: Moderate assistance;Maximum assistance;Assist x2; Additional time (pivot steps to chair; educated regarding Fernando Villarreal use of future transfers with staff)    ADL Assessment:  Feeding: Setup    Oral Facial Hygiene/Grooming: Setup    Bathing: Maximum assistance    Upper Body Dressing: Minimum assistance    Lower Body Dressing: Total assistance    Toileting: Maximum assistance    Cognitive Retraining  Safety/Judgement: Awareness of environment      Functional Measure:  Oscar Orona AM-PAC®      Daily Activity Inpatient Short Form (6-Clicks) Version 2  How much HELP from another person do you currently need. .. (If the patient hasn't done an activity recently, how much help from another person do you think they would need if they tried?) Total A Lot A Little None   1. Putting on and taking off regular lower body clothing? [x]   1 []   2 []   3 []   4   2. Bathing (including washing, rinsing, drying)?  []   1 [x]   2 []   3 []   4 3.  Toileting, which includes using toilet, bedpan, or urinal? []   1 [x]   2 []   3 []   4   4. Putting on and taking off regular upper body clothing? []   1 []   2 [x]   3 []   4   5. Taking care of personal grooming such as brushing teeth? []   1 []   2 [x]   3 []   4   6. Eating meals? []   1 []   2 [x]   3 []   4     Raw Score: 14/24                            Cutoff score ?191,2,3 had higher odds of discharging home with home health or need of SNF/IPR    1. Conchita Lennonanolenore Roque. Validity of the AM-PAC 6-Clicks Inpatient Daily Activity and Basic Mobility Short Forms. Physical Therapy Mar 2014, [phone]) [phone]; DOI: 10.2522/ptj.20171430  2. Bren Zhou. Association of AM-PAC \"6-Clicks\" Basic Mobility and Daily Activity Scores With Discharge Destination. Phys Ther. 2021 Apr 4;101(4):emtg414. doi: 10.1093/ptj/keph004. PMID: 15172858. V Benita Roberts, Jose D, Maria E Meneses, Pawan K, Elizabeth S. Activity Measure for Post-Acute Care \"6-Clicks\" Basic Mobility Scores Predict Discharge Destination After Acute Care Hospitalization in Select Patient Groups: A Retrospective, Observational Study. Arch Rehabil Res Clin Transl. 2022 Jul 16;4(3):943160. doi: 10.1016/j.arrct. 4398.307245. PMID: 85510536; PMCID: [id number]. 4. Chip Laguna Ni P. AM-PAC Short Forms Manual 4.0. Revised 2/2020.      Occupational Therapy Evaluation Charge Determination   History Examination Decision-Making   LOW Complexity : Brief history review  LOW Complexity : 1-3 performance deficits relating to physical, cognitive , or psychosocial skils that result in activity limitations and / or participation restrictions  LOW Complexity : No comorbidities that affect functional and no verbal or physical assistance needed to complete eval tasks       Based on the above components, the patient evaluation is determined to be of the following complexity level: LOW     Activity Tolerance:   Good    After treatment patient left in no apparent distress:    Supine in bed, Call bell within reach, Bed / chair alarm activated, and Caregiver / family present    COMMUNICATION/EDUCATION:   The patients plan of care was discussed with: Physical therapist, Registered nurse, and patient . Home safety education was provided and the patient/caregiver indicated understanding., Patient/family have participated as able in goal setting and plan of care. , and Patient/family agree to work toward stated goals and plan of care. This patients plan of care is appropriate for delegation to [facility].     Thank you for this referral.  Jannie Marks, OTR/L  Time Calculation: 50 mins

## 2023-03-28 NOTE — PROGRESS NOTES
I received notification that [facility] cannot accept pt for services-out of the territory [employer]. I have sent pt.'s clinicals to [facility] hoping they will accept pt.     Nayana Ruiz

## 2023-03-28 NOTE — PROGRESS NOTES
1900 Bedside shift change report given to Tuan Meyers RN (oncoming nurse) by Duarte Baker RN (offgoing nurse). Report included the following information SBAR, Kardex, ED Summary, Procedure Summary, Intake/Output, MAR, Recent Results, Med Rec Status, Cardiac Rhythm SR, Alarm Parameters , and Quality Measures. Primary Nurse Adri Bobby RN and Duarte Baker RN performed a dual skin assessment on this patient. Impairment noted - see wound doc flow sheet. Nadeem score is; see flow sheet. 0700 Bedside shift change report given to Santhosh Min RN (oncoming nurse) by Tuan Meyers RN (offgoing nurse). Report included the following information SBAR, Kardex, ED Summary, OR Summary, Procedure Summary, Intake/Output, MAR, Recent Results, Med Rec Status, Cardiac Rhythm SR, Alarm Parameters , and Quality Measures.

## 2023-03-28 NOTE — PROGRESS NOTES
Case Management note:    RUR 20%    Today:  I discussed pt with PT/OT who are recommending inpatient rehab. I met with pt an her  to discuss rehab. Pt states that when she is discharged,she only wants to return home with  home health services. Pt states she does not want to spend the night outside her home other than the hospital.  I discussed home health options with pt  Freedom of choice offered. Pt .'s first choice for home health services is [address]. See [place] of choice sheet placed in chart to be scanned. Other choices for home health are \"whatever home health company takes my insurance. \"  [facility] was notified through the cc link.  states he has been receiving education from the nurses on colostomy care also. 'I have been watching very closely. Therapy was more concerned because pt is weak and debilitated requiring two person assist currently. I updated attending regarding the above and I am requesting home health orders for SN for colostomy care/education,home PT and OT for evaluation and treatment. Monty Thorne    Orders obtained from Dr Nicole Fang and sent through cc link to [facility],pt.'s first choice. If accepted,I will place on AVS.    Debbe Medora

## 2023-03-28 NOTE — WOUND CARE
Wound Nurse Note    Patient seen briefly in follow-up visit; s/p subtotal abdominal colectomy with ileostomy creation per  3/27/23. Patient currently resting on an ICU bed; alert and oriented; denies pain at this time. Assessment:  RUQ - current one-piece appliance intact with small residue of greenish liquid in pouch; changed by ostomy nurse yesterday. Stoma appeared pink and moist and budded. Abdomen - dressing over midline incision appeared dry and intact; did not disturb. Plan:  Reviewed basic ostomy care with patient and placed written materials at bedside for her to review. Patient indicates that spouse will want to be present for next teaching session. Left Ostomy Nurse office number at bedside for spouse to contact; will be sure to include him whenever possible in teaching sessions. Sample ostomy appliance also left at bedside for patient to practice manipulating velcro closure to get more comfortable. Will need [facility] arrangements on discharge to ensure continuation of care once home.     Jesus Manuel Bonner RN [address] [place] [facility] [place] [facility]  [phone]

## 2023-03-28 NOTE — PROGRESS NOTES
[facility] ICU TEAM Progress Note    Name: Ronaldo Radford   : 1960   MRN: [id number]   Date: 3/28/2023           ICU Assessment     Severe colitis , possibl c-diff related pan colitis  Septic shock  Bandemia  Lecuocytosis  Hypopnatremia  DM  Asthma  HFpEF         ICU Comprehensive Plan of Care:   NEURO: patients mental status at baseline, pain control  CV: Keep MAP >65; remove TLC  PULM: supplemental o2 as needed, incentive spirometry  GI: adat  RENAL/: Monitor Cr and UOP; trend bmp  ENDO: BG Goal 140-180; glycemic control  HEME/ONC: Tx Hgb < 7, Plt<10k; transfuse as needed  MICRO:  FU on Cx; cont broad abx coverage, PO/rectal vanco fu  cx/c diff from OR  Code Status: Full Code  LOS: 2  Prophylaxis: GI: Pep   DVT: lovenox     Discussed Care Plan with Bedside RN     PT/OT    Subjective:   Progress Note: 3/28/2023      Reason for ICU Admission: fulminant colitis    HPI:     Patient off pressors, and diet has been advanced. Planning for PT/OT      Active Problem List:     Problem List  Date Reviewed: 3/26/2023            Codes Class    Neutrophilia ICD-10-CM: D72.9  ICD-9-CM: 288.8         Diarrhea of presumed infectious origin ICD-10-CM: R19.7  ICD-9-CM: 226. 3         Thrombocytosis ICD-10-CM: N47.797  ICD-9-CM: 238.71         * (Principal) Colitis ICD-10-CM: K52.9  ICD-9-CM: 558.9         JASIEL (acute kidney injury) ([facility] ICD-10-CM: N17.9  ICD-9-CM: 544. 9         Venous insufficiency ICD-10-CM: I87.2  ICD-9-CM: 459.81         Type 2 diabetes with nephropathy ([facility] ICD-10-CM: E11.21  ICD-9-CM: 250.40, 583.81         Primary fibromyalgia syndrome ICD-10-CM: M79.7  ICD-9-CM: 729.1         Inflammatory bowel disease ICD-10-CM: K52.9  ICD-9-CM: 558.9         Omental infarction [facility]) ICD-10-CM: J01.526  ICD-9-CM: 557.0         Abnormal CT of the abdomen ICD-10-CM: R93.5  ICD-9-CM: 793.6     Overview Signed 2018  6:01 AM by Natty Christie     2018:   Ongoing evolution of inflammatory changes in the left upper quadrant most  consistent with omental infarction. Decreased size of main inflamed fatty nodule  and less surrounding stranding. Otherwise, no acute pathology in the chest, abdomen or pelvis. Abnormal mammogram of right breast ICD-10-CM: R92.8  ICD-9-CM: 793.80     Overview Addendum 8/16/2018  4:36 PM by Suzan Randle did biopsy 3/4/6485. Biopsy  = fibroadenoma    Mammogram 8/2018: IMPRESSION: Small right breast mass. BI-RADS Assessment Category 4: Suspicious  Abnormality- Biopsy should be considered. RECOMMENDATION:  Ultrasound-guided right breast biopsy. Type 2 diabetes mellitus with diabetic neuropathy ([facility] ICD-10-CM: E11.40  ICD-9-CM: 250.60, 357.2     Overview Addendum 6/9/2020  1:26 PM by Ronnie Villafana MD     9/2018:  a1c 5.3    12/2017:  S/p gastric bypass = a1c 5.2/  LDL 84/  MAB negative    Dx:  a1c 6.9 8/2016    Eye exam: 6/3/2020.  Normal exam.             Elevated ferritin ICD-10-CM: R79.89  ICD-9-CM: 790.6         H/O colonoscopy ICD-10-CM: Z98.890  ICD-9-CM: V45.89     Overview Addendum 12/19/2017  2:12 PM by Suzan Humphrey     2015, next 2020  +FH colon cancer in mom             DDD (degenerative disc disease), lumbar ICD-10-CM: M51.36  ICD-9-CM: 722.52     Overview Signed 12/19/2017  1:57 PM by Suzan Escobar  S/p NEREYDA x 3             FH: colon cancer ICD-10-CM: Z80.0  ICD-9-CM: V16.0     Overview Signed 12/19/2017  2:12 PM by Suzan Humphrey     mom             H/O gastric bypass ICD-10-CM: Z98.84  ICD-9-CM: V45.86     Overview Addendum 11/3/2018  9:51 AM by Suzan Franco 1/2017  lost from 230 to 154 lbs             Hypokalemia ICD-10-CM: E87.6  ICD-9-CM: 276.8         History of pulmonary embolus (PE) ICD-10-CM: E26.103  ICD-9-CM: V12.55         Anxiety ICD-10-CM: F41.9  ICD-9-CM: 300.00     Overview Addendum 9/18/2018  4:54 PM by Suzan Humphrey     Needs to be seen at least twice yearly for BNZ  FTF 2017, 18   as expected 2017, 18             FH: diabetes mellitus ICD-10-CM: Z83.3  ICD-9-CM: K57.7         Metabolic syndrome RGABIEL-64-NP: E88.81  ICD-9-CM: 277.7     Overview Addendum 2015 11:38 AM by Amanda Pritchard     TG up, sugar up, HTN, waist 54 inches  Rx metformin  Needs yearly eye exams and labs             Essential hypertension ICD-10-CM: I10  ICD-9-CM: 401.9         Fibromyalgia ICD-10-CM: M79.7  ICD-9-CM: 729.1     Overview Addendum 2017 11:40 AM by Tavarez [employer]  S/p NEREYDA x 3 in back via Dr. Klarissa Mancera. He uses flexeril HS for pain             GARCIA (nonalcoholic steatohepatitis) ICD-10-CM: K75.81  ICD-9-CM: 571.8     Overview Signed 2011  7:18 PM by Amanda Pritchard     Ultrasound              Asthma ICD-10-CM: J45.909  ICD-9-CM: 493.90         Arthritis ICD-10-CM: M19.90  ICD-9-CM: 716.90         GERD (gastroesophageal reflux disease) ICD-10-CM: K21.9  ICD-9-CM: 530.81            Past Medical History:      has a past medical history of Arthritis, Asthma, Autoimmune disease ([address] CAD (coronary artery disease), Cardiac murmur, Depression, Diabetes ([address] DVT (deep venous thrombosis) ([address] (), GERD (gastroesophageal reflux disease), Hypertension, Lumbar radiculitis, Morbid obesity ([address] Musculoskeletal disorder, EDMOND (obstructive sleep apnea), S/P TONJA (total abdominal hysterectomy), Thromboembolus ([address] (), Trigger finger, right little finger (10/19/2020), and Trigger finger, right ring finger (10/19/2020).     Past Surgical History:      has a past surgical history that includes hx total abdominal hysterectomy; pr unlisted procedure cardiac surgery (2015); hx appendectomy (); hx hysterectomy (); hx  section (1978); johnny stereo  bx breast rt addl w/clip and specimen (Right, ); hx breast biopsy (Right, ); ir inj spine fluoro guided (2021); hx gastric bypass (2017); hx cholecystectomy (2017); colonoscopy (N/A, 12/6/2021); and ir inj spine fluoro guided (5/5/2022). Home Medications:     Prior to Admission medications    Medication Sig Start Date End Date Taking? Authorizing Provider   ciprofloxacin HCl (CIPRO) 500 mg tablet Take 1 Tablet by mouth two (2) times a day for 10 days. 3/24/23 4/3/23 Yes Vasile Rucker MD   metroNIDAZOLE (FLAGYL) 250 mg tablet Take 1 Tablet by mouth three (3) times daily for 10 days. 3/24/23 4/3/23 Yes Vasile Rucker MD   gabapentin (NEURONTIN) 600 mg tablet Take 1 Tablet by mouth three (3) times daily. 3/20/23  Yes Benji Lima MD   albuterol (ACCUNEB) 0.63 mg/3 mL nebulizer solution 3 mL by Nebulization route every six (6) hours as needed for Wheezing. 3/20/23  Yes Benji Lima MD   L.acid,para-B. bifidum-S.therm (RISAQUAD) 8 billion cell cap cap Take 1 Capsule by mouth daily. 3/20/23  Yes Benji Lima MD   polyethylene glycol (MIRALAX) 17 gram packet Take 1 Packet by mouth daily for 30 days. 3/15/23 4/14/23 Yes Misbah Khoury MD   ondansetron (ZOFRAN ODT) 4 mg disintegrating tablet TAKE 1 TABLET BY MOUTH EVERY 8 HOURS AS NEEDED FOR NAUSEA 2/28/23  Yes Joey Padgett NP   cyclobenzaprine (FLEXERIL) 10 mg tablet Take 1 Tablet by mouth three (3) times daily as needed for Muscle Spasm(s). 2/28/23  Yes Fadia Fortune MD   nitroglycerin (NITROSTAT) 0.4 mg SL tablet TAKE 1 TABLET BY MOUTH EVERY 5 MINUTES UP TO 3 DOSES THEN CALL 911 IF PAIN PERSISTS 12/12/22  Yes Henrry Lopez MD   buPROPion XL (WELLBUTRIN XL) 300 mg XL tablet Take 300 mg by mouth two (2) times a day.  8/26/22  Yes Provider, Historical   atorvastatin (LIPITOR) 20 mg tablet TAKE 1 TABLET BY MOUTH EVERY DAY 11/28/22  Yes Milka Lopez MD   hydrOXYzine pamoate (VISTARIL) 25 mg capsule TAKE 1 CAPSULE BY MOUTH TWICE A DAY AS NEEDED FOR ANXIETY 11/4/22  Yes Fadia Fortune MD   metoprolol tartrate (LOPRESSOR) 25 mg tablet TAKE 1 TABLET BY MOUTH TWICE A DAY 11/4/22  Yes Tomas Saul MD   bumetanide (BUMEX) 0.5 mg tablet TAKE 1 TABLET BY MOUTH EVERY DAY AS NEEDED 9/16/22  Yes Tomas Saul MD   omeprazole (PRILOSEC) 40 mg capsule TAKE 1 CAPSULE BY MOUTH EVERY DAY 5/23/22  Yes Tomas Saul MD   albuterol (ProAir HFA) 90 mcg/actuation inhaler Take 1 Puff by inhalation every four (4) hours as needed for Wheezing or Shortness of Breath. 4/27/22  Yes Tomas Saul MD   budesonide (Pulmicort Flexhaler) 180 mcg/actuation aepb inhaler Take 2 Puffs by inhalation daily. 4/27/22  Yes Tomas Saul MD   alcohol swabs (ALCOHOL PADS) padm Use when checking blood glucose 7/15/16  Yes Keon White MD   co-enzyme Q-10 (CO Q-10) 100 mg capsule TAKE 1 CAPSULE BY MOUTH EVERY DAY 2/22/23   Tomas Saul MD   valACYclovir (VALTREX) 500 mg tablet TAKE 1 PILL TWICE DAILY FOR 3 DAYS AT SIGN OF HERPES FLARE. Patient not taking: Reported on 3/26/2023 7/5/21   Tomas Saul MD   glucose blood VI test strips (BLOOD GLUCOSE TEST) strip Use once a day  Patient not taking: Reported on 3/26/2023 3/10/20   Mariel Yoo MD   Blood-Glucose Meter monitoring kit Use to check glucose once a day  Patient not taking: Reported on 3/26/2023 7/15/16   Keon White MD   Lancets misc Use once a day. Please give one compatible with patient's meter  Patient not taking: Reported on 3/26/2023 7/15/16   Keon White MD       Allergies/Social/Family History: Allergies   Allergen Reactions    Accupril [Quinapril] Cough    Adhesive Tape-Silicones Other (comments)     Makes skin tears easily.      Lipitor [Atorvastatin] Other (comments)     aches    Norvasc [Amlodipine] Swelling     On legs at 10 mg dose      Social History     Tobacco Use    Smoking status: Never    Smokeless tobacco: Never   Substance Use Topics    Alcohol use: No     Alcohol/week: 0.0 standard drinks      Family History   Problem Relation Age of Onset    Cancer Mother 67        colon    Diabetes Mother OSTEOARTHRITIS Mother     Stroke Mother     Migraines Mother     Diabetes Father     Heart Disease Father     Heart Attack Father     Hypertension Father     High Cholesterol Father     COPD Father     Heart Failure Father     Cancer Other     Diabetes Other     Heart Disease Other     Hypertension Other     Cancer Brother         lymphoma    Cancer Brother         PROSTATE AND LYMPHOMA    Cancer Maternal Grandmother         stomach    Asthma Brother     Asthma Brother     Stroke Brother     Cancer Maternal Aunt         lung     Breast Cancer Maternal Aunt     Cancer Maternal Uncle         lung    Cancer Maternal Uncle         melanoma    Anesth Problems Neg Hx            Objective:   Vital Signs:  Visit Vitals  /63 (BP Patient Position: Sitting) Comment (BP Patient Position): post activity   Pulse (!) 11   Temp 97.7 °F (36.5 °C)   Resp 24   Ht 5' 5\" (1.651 m)   Wt 75.9 kg (167 lb 4.8 oz)   LMP 1985   SpO2 97%   Breastfeeding No   BMI 27.84 kg/m²    O2 Flow Rate (L/min): 3 l/min O2 Device: None Temp (24hrs), Av.7 °F (36.5 °C), Min:97.6 °F (36.4 °C), Max:97.8 °F (36.6 °C)           Intake/Output:     Intake/Output Summary (Last 24 hours) at 3/28/2023 1413  Last data filed at 3/28/2023 1311  Gross per 24 hour   Intake 5850.58 ml   Output 2419 ml   Net 3431.58 ml          Physical Exam:   Gen: awake, NAD  HEENT: PERRL  NC, AT  Neck: no JVD, midline trach  CV: NSR  Pulm: symmetric chest rise  Abd- dressing in place    LABS AND  DATA: Personally reviewed  Recent Labs     23  0138 23  0324   WBC 34.4* 39.0*   HGB 7.3* 7.9*   HCT 21.7* 23.5*   * 408*     Recent Labs     23  0138 23  0324    139   K 2.8* 3.5   * 109*   CO2 24 25   BUN 10 13   CREA 0.32* 0.41*   * 147*   CA 7.5* 7.2*   MG 1.8 1.7     Recent Labs     23  1438   *   TP 6.2*   ALB 2.0*   GLOB 4.2*   LPSE 25*     No results for input(s): INR, PTP, APTT, INREXT in the last 72 hours. No results for input(s): PHI, PCO2I, PO2I, FIO2I in the last 72 hours. No results for input(s): CPK, CKMB, TROIQ, BNPP in the last 72 hours. Hemodynamics:   PAP:   CO:     Wedge:   CI:     CVP:    SVR:       PVR:       Ventilator Settings:  Mode Rate Tidal Volume Pressure FiO2 PEEP                    Peak airway pressure:      Minute ventilation:          MEDS: Reviewed       I performed all aspects of the physical examination via Telemedicine associated with two way audio and video communication and with the on-site assistance of Nurse. Patient is critically ill in the ICU. I  personally  reviewed the pertinent medical records, laboratory/ pathology data and radiographic images. Due to  critical illness impairing one or more vital organs of this patient resulting in life threatening clinical situation  I have provided direct, frequent personal  assessment and manipulation in management plan and spent 35  minutes  of  critical care time excluding the time spent on procedures and teaching. Greater than 50% of this time  in patient's care was  employed  in counseling and coordination of care and engaged in face to face discussion of case management issues, addressing questions, and outlining a plan of  therapy.   Reyes Brooke, [address]  3/28/2023

## 2023-03-28 NOTE — PROGRESS NOTES
[facility] Daily Progress Note    24 Hour Events: Patient with low MAPs overnight requiring resumption of vanita. Fluid rate decreased from 250 to 150ml/hr in the setting of edema. SUBJECTIVE: Patient feeling well with well controlled pain. She denies fever, chills, nausea, vomiting, or other symptoms at this time. OBJECTIVE:    Vitals: Visit Vitals  /60   Pulse 82   Temp 97.8 °F (36.6 °C)   Resp 13   Ht 5' 5\" (1.651 m)   Wt 155 lb (70.3 kg)   SpO2 97%   Breastfeeding No   BMI 25.79 kg/m²       Physical Exam:  General: No acute distress  Respiratory: Clear lung fields bilaterally, no wheeze, rales  Cardiovascular: Regular rate, rhythm no murmurs gallops  GI: no distension. + bowel sounds. Post-surgical dressing C/D/I with no surrounding erythema and appropriate post-op tenderness. TRISTA tube in place. Colostomy C/D/I. Extremities: 2+ lower extremity edema   Skin: Warm, dry.     Inpatient Medications  Current Facility-Administered Medications   Medication Dose Route Frequency    potassium chloride 20 mEq in 50 ml IVPB  20 mEq IntraVENous Q2H    potassium chloride 10 mEq in 100 ml IVPB  10 mEq IntraVENous Q1H    vancomycin (FIRVANQ) 50 mg/mL oral solution 500 mg  500 mg Oral Q6H    metroNIDAZOLE (FLAGYL) IVPB premix 500 mg  500 mg IntraVENous Q8H    cefTRIAXone (ROCEPHIN) 2 g in 0.9% sodium chloride 20 mL IV syringe  2 g IntraVENous Q12H    vancomycin (VANCOCIN) rectal enema 500 mg  500 mg Rectal Q6H    0.9% sodium chloride infusion  150 mL/hr IntraVENous CONTINUOUS    morphine injection 2 mg  2 mg IntraVENous Q3H PRN    PHENYLephrine (VANITA-SYNEPHRINE) 30 mg in 0.9% sodium chloride 250 mL infusion   mcg/min IntraVENous TITRATE    0.9% sodium chloride infusion 250 mL  250 mL IntraVENous PRN    sodium chloride (NS) flush 5-40 mL  5-40 mL IntraVENous Q8H    sodium chloride (NS) flush 5-40 mL  5-40 mL IntraVENous PRN    acetaminophen (TYLENOL) tablet 650 mg  650 mg Oral Q6H PRN    Or acetaminophen (TYLENOL) suppository 650 mg  650 mg Rectal Q6H PRN    polyethylene glycol (MIRALAX) packet 17 g  17 g Oral DAILY PRN    enoxaparin (LOVENOX) injection 40 mg  40 mg SubCUTAneous DAILY    promethazine (PHENERGAN) tablet 12.5 mg  12.5 mg Oral Q6H PRN    albuterol (PROVENTIL VENTOLIN) nebulizer solution 2.5 mg  2.5 mg Nebulization Q4H PRN    atorvastatin (LIPITOR) tablet 20 mg  20 mg Oral DAILY    budesonide (PULMICORT) 500 mcg/2 ml nebulizer suspension  250 mcg Nebulization BID RT    [Held by provider] bumetanide (BUMEX) tablet 0.5 mg  0.5 mg Oral DAILY    buPROPion XL (WELLBUTRIN XL) tablet 300 mg  300 mg Oral BID    [Held by provider] hydrOXYzine HCL (ATARAX) tablet 25 mg  25 mg Oral QHS    [Held by provider] metoprolol tartrate (LOPRESSOR) tablet 25 mg  25 mg Oral BID    gabapentin (NEURONTIN) capsule 600 mg  600 mg Oral TID    prochlorperazine (COMPAZINE) injection 10 mg  10 mg IntraVENous Q6H PRN       Allergies  Allergies   Allergen Reactions    Accupril [Quinapril] Cough    Adhesive Tape-Silicones Other (comments)     Makes skin tears easily.      Lipitor [Atorvastatin] Other (comments)     aches    Norvasc [Amlodipine] Swelling     On legs at 10 mg dose       CBC:  Recent Labs     03/28/23  0138 03/27/23  0324 03/26/23  0544   WBC 34.4* 39.0* 52.3*   HGB 7.3* 7.9* 10.0*   HCT 21.7* 23.5* 29.3*   * 408* 420*         Metabolic Panel:  Recent Labs     03/28/23  0138 03/27/23  0324 03/26/23  1344 03/26/23  0544 03/25/23  1438    139 133* 135* 132*   K 2.8* 3.5 4.0 2.5* 2.1*   * 109* 103 99 90*   CO2 24 25 24 29 35*   BUN 10 13 11 11 11   CREA 0.32* 0.41* 0.76 0.45* 0.79   * 147* 96 115* 142*   CA 7.5* 7.2* 7.9* 7.4* 8.5   MG 1.8 1.7  --  2.2 1.6   ALB  --   --   --   --  2.0*   ALT  --   --   --   --  9*              Assessment and Plan   Yuridia López is a 58 y.o. female with PMH of hypertension, asthma, GERD, gastric bypass,  CAD s/p PCI (2015), anxiety, type 2 diabetes mellitus, HFpEF (EF 65%, G1DD 2015)  who is admitted for hyperkalemia and leukocytosis. Septic shock 2/2 colitis: Improved, now s/p partial colectomy on 3/27/23. POA wbc 46. 5. presumed infectious etiology with concern for C. Diff given recent Abx vs IBD. Rpt CT w circumferential thickened colon up to rectum.  - GI, general surgery consulted, appreciate recs  - follow path results  - CTX, flagyl, vanc (PO and rectal enema)  - Enteric precautions  - Follow up blood culture  - Daily CBC, BMP  - pressors as indicated to maintain MAPs over 65  - IVF decreased to 50ml/hr     Hypokalemia: POA K 2.1 in s/o persistent GI loses. EKG sinus rhythm, no T wave changes.  - Replete as needed  - Monitor on BMP    Anemia: in the post-operative setting. LO evidenced on labs  - start IV venofer    Thrombocytosis: POA Plt 520. Likely reactive, per hemat. Ddx hemoconcentration, LO common cause. - Monitor on labs     H/o recent falls: patient recently evaluated at [facility] ED for mechanical GLF with x 6 staples placed on head. Wound clean and dry. CT imaging negative. H/o Gastric bypass: 2017. Avoid NSAIDs and ASA. CAD s/p PCI: Stent dLCx in 2015. F/w Dr Marianna Burch MD ( cards). LOV 10/22. Stress test 2017 EF 64%  - Continue Lipitor 20 mg daily     HFpEF: Stress test 2017 EF 64%. Trending towards hypervolemia.  - Hold home Bumex 0.5 mg daily in s/o soft BP  - decreased IVF to 50ml/hr     T2DM: Last HgA1C 5.4 04/2022. Diet controlled s/p gastric by pass. Anxiety:   - Continue Wellbutrin  mg BID     Asthma:   - Continue home Albuterol and Pulmicort BID     Hypertension:   - Hold Bumex 0.5 mg daily and Lopressor 25mg BID in s/o soft BP  - Monitor BP and adjust as needed     GERD:   - Cont home Protonix 40 mg IV daily. Fibromyalgia:   - Continue gabapentin 600mg TID.      Prolonged Qtc:   - Avoid qtc prolonging drugs     DDD, Arthritis: on home flexeril 10 mg TID prn    Cecille Caraballo MD  Family Medicine Resident       For Billing    Chief Complaint   Patient presents with    Abnormal White Blood Cell Count       Hospital Problems  Date Reviewed: 3/26/2023            Codes Class Noted POA    Neutrophilia ICD-10-CM: D72.9  ICD-9-CM: 288.8  3/25/2023 Unknown        Diarrhea of presumed infectious origin ICD-10-CM: R19.7  ICD-9-CM: 009.3  3/25/2023 Unknown        Thrombocytosis ICD-10-CM: D75.839  ICD-9-CM: 238.71  3/25/2023 Unknown        * (Principal) Colitis ICD-10-CM: K52.9  ICD-9-CM: 558.9  3/13/2023 Unknown        H/O gastric bypass ICD-10-CM: Z98.84  ICD-9-CM: V45.86  7/12/2017 Yes    Overview Addendum 11/3/2018  9:51 AM by Nicolás Snyder 1/2017  lost from 230 to 154 lbs             Hypokalemia ICD-10-CM: E87.6  ICD-9-CM: 276.8  1/26/2017 Yes        Anxiety ICD-10-CM: F41.9  ICD-9-CM: 300.00  6/1/2016 Yes    Overview Addendum 9/18/2018  4:54 PM by Nicolás Bills     Needs to be seen at least twice yearly for BNZ  FTF 12/19/2017, 9/18/18   as expected 12/19/2017, 9/18/18             FH: diabetes mellitus ICD-10-CM: Z83.3  ICD-9-CM: V18.0  8/4/2015 Yes        Essential hypertension ICD-10-CM: I10  ICD-9-CM: 401.9  5/17/2015 Yes        Fibromyalgia ICD-10-CM: M79.7  ICD-9-CM: 729.1  3/30/2015 Yes    Overview Addendum 7/12/2017 11:40 AM by Kulwinder Lofton  S/p NEREYDA x 3 in back via Dr. Frank Castaneda.   He uses flexeril HS for pain             Asthma ICD-10-CM: J45.909  ICD-9-CM: 493.90  9/22/2010 Yes        GERD (gastroesophageal reflux disease) ICD-10-CM: K21.9  ICD-9-CM: 530.81  9/22/2010 Yes

## 2023-03-29 LAB
ANION GAP SERPL CALC-SCNC: 6 MMOL/L (ref 5–15)
BASOPHILS # BLD: 0 K/UL (ref 0–0.1)
BASOPHILS NFR BLD: 0 % (ref 0–1)
BUN SERPL-MCNC: 5 MG/DL (ref 6–20)
BUN/CREAT SERPL: 14 (ref 12–20)
CALCIUM SERPL-MCNC: 7.7 MG/DL (ref 8.5–10.1)
CHLORIDE SERPL-SCNC: 111 MMOL/L (ref 97–108)
CO2 SERPL-SCNC: 23 MMOL/L (ref 21–32)
CREAT SERPL-MCNC: 0.37 MG/DL (ref 0.55–1.02)
DIFFERENTIAL METHOD BLD: ABNORMAL
EOSINOPHIL # BLD: 0 K/UL (ref 0–0.4)
EOSINOPHIL NFR BLD: 0 % (ref 0–7)
ERYTHROCYTE [DISTWIDTH] IN BLOOD BY AUTOMATED COUNT: 13.5 % (ref 11.5–14.5)
GLUCOSE SERPL-MCNC: 93 MG/DL (ref 65–100)
HCT VFR BLD AUTO: 23.2 % (ref 35–47)
HGB BLD-MCNC: 7.7 G/DL (ref 11.5–16)
IMM GRANULOCYTES # BLD AUTO: 0 K/UL
IMM GRANULOCYTES NFR BLD AUTO: 0 %
LYMPHOCYTES # BLD: 1.5 K/UL (ref 0.8–3.5)
LYMPHOCYTES NFR BLD: 7 % (ref 12–49)
MAGNESIUM SERPL-MCNC: 1.8 MG/DL (ref 1.6–2.4)
MCH RBC QN AUTO: 28.9 PG (ref 26–34)
MCHC RBC AUTO-ENTMCNC: 33.2 G/DL (ref 30–36.5)
MCV RBC AUTO: 87.2 FL (ref 80–99)
MONOCYTES # BLD: 0.6 K/UL (ref 0–1)
MONOCYTES NFR BLD: 3 % (ref 5–13)
MYELOCYTES NFR BLD MANUAL: 5 %
NEUTS BAND NFR BLD MANUAL: 1 % (ref 0–6)
NEUTS SEG # BLD: 18 K/UL (ref 1.8–8)
NEUTS SEG NFR BLD: 84 % (ref 32–75)
NRBC # BLD: 0 K/UL (ref 0–0.01)
NRBC BLD-RTO: 0 PER 100 WBC
PLATELET # BLD AUTO: 449 K/UL (ref 150–400)
PMV BLD AUTO: 8.6 FL (ref 8.9–12.9)
POTASSIUM SERPL-SCNC: 3 MMOL/L (ref 3.5–5.1)
RBC # BLD AUTO: 2.66 M/UL (ref 3.8–5.2)
RBC MORPH BLD: ABNORMAL
SODIUM SERPL-SCNC: 140 MMOL/L (ref 136–145)
WBC # BLD AUTO: 21.2 K/UL (ref 3.6–11)

## 2023-03-29 PROCEDURE — 97530 THERAPEUTIC ACTIVITIES: CPT

## 2023-03-29 PROCEDURE — 74011250636 HC RX REV CODE- 250/636: Performed by: STUDENT IN AN ORGANIZED HEALTH CARE EDUCATION/TRAINING PROGRAM

## 2023-03-29 PROCEDURE — 74011250636 HC RX REV CODE- 250/636

## 2023-03-29 PROCEDURE — 99233 SBSQ HOSP IP/OBS HIGH 50: CPT | Performed by: FAMILY MEDICINE

## 2023-03-29 PROCEDURE — 65270000046 HC RM TELEMETRY

## 2023-03-29 PROCEDURE — 85025 COMPLETE CBC W/AUTO DIFF WBC: CPT

## 2023-03-29 PROCEDURE — 83735 ASSAY OF MAGNESIUM: CPT

## 2023-03-29 PROCEDURE — 2709999900 HC NON-CHARGEABLE SUPPLY

## 2023-03-29 PROCEDURE — 74011000250 HC RX REV CODE- 250

## 2023-03-29 PROCEDURE — 74011250637 HC RX REV CODE- 250/637

## 2023-03-29 PROCEDURE — 97116 GAIT TRAINING THERAPY: CPT

## 2023-03-29 PROCEDURE — 74011000272 HC RX REV CODE- 272

## 2023-03-29 PROCEDURE — 77030029684 HC NEB SM VOL KT MONA -A

## 2023-03-29 PROCEDURE — 80048 BASIC METABOLIC PNL TOTAL CA: CPT

## 2023-03-29 PROCEDURE — 74011250637 HC RX REV CODE- 250/637: Performed by: STUDENT IN AN ORGANIZED HEALTH CARE EDUCATION/TRAINING PROGRAM

## 2023-03-29 PROCEDURE — 94640 AIRWAY INHALATION TREATMENT: CPT

## 2023-03-29 PROCEDURE — 36415 COLL VENOUS BLD VENIPUNCTURE: CPT

## 2023-03-29 RX ORDER — POTASSIUM CHLORIDE 7.45 MG/ML
10 INJECTION INTRAVENOUS
Status: COMPLETED | OUTPATIENT
Start: 2023-03-29 | End: 2023-03-29

## 2023-03-29 RX ORDER — HYDROXYZINE 25 MG/1
25 TABLET, FILM COATED ORAL
Status: DISCONTINUED | OUTPATIENT
Start: 2023-03-29 | End: 2023-03-31 | Stop reason: HOSPADM

## 2023-03-29 RX ADMIN — METRONIDAZOLE 500 MG: 500 INJECTION, SOLUTION INTRAVENOUS at 12:56

## 2023-03-29 RX ADMIN — VANCOMYCIN HYDROCHLORIDE 500 MG: 500 INJECTION, POWDER, LYOPHILIZED, FOR SOLUTION INTRAVENOUS at 10:58

## 2023-03-29 RX ADMIN — BUPROPION HYDROCHLORIDE 300 MG: 150 TABLET, EXTENDED RELEASE ORAL at 07:57

## 2023-03-29 RX ADMIN — Medication 3 MG: at 23:31

## 2023-03-29 RX ADMIN — VANCOMYCIN HYDROCHLORIDE 500 MG: 500 INJECTION, POWDER, LYOPHILIZED, FOR SOLUTION INTRAVENOUS at 04:00

## 2023-03-29 RX ADMIN — VANCOMYCIN HYDROCHLORIDE 500 MG: 250 POWDER, FOR SOLUTION ORAL at 16:32

## 2023-03-29 RX ADMIN — BUDESONIDE 250 MCG: 0.5 SUSPENSION RESPIRATORY (INHALATION) at 07:31

## 2023-03-29 RX ADMIN — POTASSIUM CHLORIDE 10 MEQ: 7.46 INJECTION, SOLUTION INTRAVENOUS at 07:57

## 2023-03-29 RX ADMIN — VANCOMYCIN HYDROCHLORIDE 500 MG: 250 POWDER, FOR SOLUTION ORAL at 04:00

## 2023-03-29 RX ADMIN — BUPROPION HYDROCHLORIDE 300 MG: 150 TABLET, EXTENDED RELEASE ORAL at 22:05

## 2023-03-29 RX ADMIN — SODIUM CHLORIDE 2 G: 9 INJECTION INTRAMUSCULAR; INTRAVENOUS; SUBCUTANEOUS at 06:12

## 2023-03-29 RX ADMIN — METRONIDAZOLE 500 MG: 500 INJECTION, SOLUTION INTRAVENOUS at 04:01

## 2023-03-29 RX ADMIN — POTASSIUM BICARBONATE 40 MEQ: 782 TABLET, EFFERVESCENT ORAL at 06:12

## 2023-03-29 RX ADMIN — POTASSIUM CHLORIDE 10 MEQ: 7.46 INJECTION, SOLUTION INTRAVENOUS at 06:13

## 2023-03-29 RX ADMIN — VANCOMYCIN HYDROCHLORIDE 500 MG: 500 INJECTION, POWDER, LYOPHILIZED, FOR SOLUTION INTRAVENOUS at 22:09

## 2023-03-29 RX ADMIN — MORPHINE SULFATE 2 MG: 2 INJECTION, SOLUTION INTRAMUSCULAR; INTRAVENOUS at 23:31

## 2023-03-29 RX ADMIN — IRON SUCROSE 100 MG: 20 INJECTION, SOLUTION INTRAVENOUS at 07:57

## 2023-03-29 RX ADMIN — GABAPENTIN 600 MG: 300 CAPSULE ORAL at 09:14

## 2023-03-29 RX ADMIN — VANCOMYCIN HYDROCHLORIDE 500 MG: 500 INJECTION, POWDER, LYOPHILIZED, FOR SOLUTION INTRAVENOUS at 18:15

## 2023-03-29 RX ADMIN — SODIUM CHLORIDE 50 ML/HR: 9 INJECTION, SOLUTION INTRAVENOUS at 16:42

## 2023-03-29 RX ADMIN — GABAPENTIN 600 MG: 300 CAPSULE ORAL at 16:31

## 2023-03-29 RX ADMIN — SODIUM CHLORIDE, PRESERVATIVE FREE 5 ML: 5 INJECTION INTRAVENOUS at 22:08

## 2023-03-29 RX ADMIN — BUDESONIDE 250 MCG: 0.5 SUSPENSION RESPIRATORY (INHALATION) at 21:19

## 2023-03-29 RX ADMIN — HYDROXYZINE HYDROCHLORIDE 25 MG: 25 TABLET, FILM COATED ORAL at 10:53

## 2023-03-29 RX ADMIN — VANCOMYCIN HYDROCHLORIDE 500 MG: 250 POWDER, FOR SOLUTION ORAL at 22:42

## 2023-03-29 RX ADMIN — GABAPENTIN 600 MG: 300 CAPSULE ORAL at 22:05

## 2023-03-29 RX ADMIN — VANCOMYCIN HYDROCHLORIDE 500 MG: 250 POWDER, FOR SOLUTION ORAL at 10:57

## 2023-03-29 RX ADMIN — MORPHINE SULFATE 2 MG: 2 INJECTION, SOLUTION INTRAMUSCULAR; INTRAVENOUS at 19:52

## 2023-03-29 RX ADMIN — ENOXAPARIN SODIUM 40 MG: 100 INJECTION SUBCUTANEOUS at 09:14

## 2023-03-29 RX ADMIN — ATORVASTATIN CALCIUM 20 MG: 20 TABLET, FILM COATED ORAL at 09:14

## 2023-03-29 RX ADMIN — METRONIDAZOLE 500 MG: 500 INJECTION, SOLUTION INTRAVENOUS at 21:58

## 2023-03-29 RX ADMIN — MORPHINE SULFATE 2 MG: 2 INJECTION, SOLUTION INTRAMUSCULAR; INTRAVENOUS at 16:41

## 2023-03-29 NOTE — PROGRESS NOTES
Occupational Therapy Note:  Chart reviewed and spoke with nursing. Attempted 2x today without success. On first attempt, patient with alternative staff at beside providing care. On second attempt this afternoon, patient was preparing for transfer to the 5th floor and unavailable for OT interventions.     Precious Patton OTR/L

## 2023-03-29 NOTE — PROGRESS NOTES
Problem: Risk for Spread of Infection  Goal: Prevent transmission of infectious organism to others  Description: Prevent the transmission of infectious organisms to other patients, staff members, and visitors. Outcome: Progressing Towards Goal     Problem: Patient Education:  Go to Education Activity  Goal: Patient/Family Education  Outcome: Progressing Towards Goal     Problem: Falls - Risk of  Goal: *Absence of Falls  Description: Document Juan Ramon Rummage Fall Risk and appropriate interventions in the flowsheet. Outcome: Progressing Towards Goal  Note: Fall Risk Interventions:                                Problem: Patient Education: Go to Patient Education Activity  Goal: Patient/Family Education  Outcome: Progressing Towards Goal     Problem: Pressure Injury - Risk of  Goal: *Prevention of pressure injury  Description: Document Nadeem Scale and appropriate interventions in the flowsheet. Outcome: Progressing Towards Goal  Note: Pressure Injury Interventions:  Sensory Interventions: Assess changes in LOC, Assess need for specialty bed, Avoid rigorous massage over bony prominences, Check visual cues for pain, Discuss PT/OT consult with provider, Float heels, Keep linens dry and wrinkle-free, Minimize linen layers, Maintain/enhance activity level, Monitor skin under medical devices, Pad between skin to skin, Pressure redistribution bed/mattress (bed type), Turn and reposition approx.  every two hours (pillows and wedges if needed)    Moisture Interventions: Absorbent underpads, Assess need for specialty bed, Apply protective barrier, creams and emollients, Internal/External urinary devices, Maintain skin hydration (lotion/cream), Limit adult briefs, Minimize layers, Moisture barrier    Activity Interventions: Assess need for specialty bed, Increase time out of bed, Pressure redistribution bed/mattress(bed type), PT/OT evaluation    Mobility Interventions: Assess need for specialty bed, HOB 30 degrees or less, Pressure redistribution bed/mattress (bed type), PT/OT evaluation, Turn and reposition approx.  every two hours(pillow and wedges)    Nutrition Interventions: Document food/fluid/supplement intake    Friction and Shear Interventions: Apply protective barrier, creams and emollients, HOB 30 degrees or less, Foam dressings/transparent film/skin sealants, Lift sheet, Lift team/patient mobility team, Minimize layers, Transferring/repositioning devices                Problem: Patient Education: Go to Patient Education Activity  Goal: Patient/Family Education  Outcome: Progressing Towards Goal     Problem: Patient Education: Go to Patient Education Activity  Goal: Patient/Family Education  Outcome: Progressing Towards Goal     Problem: Patient Education: Go to Patient Education Activity  Goal: Patient/Family Education  Outcome: Progressing Towards Goal

## 2023-03-29 NOTE — PROGRESS NOTES
1900-Bedside and Verbal shift change report given to Terrell Coy (oncoming nurse) by Deon Abel (offgoing nurse). Report included the following information SBAR, Kardex, Intake/Output, MAR, Recent Results, Cardiac Rhythm nsr, Alarm Parameters , and Quality Measures. 0700-Bedside and Verbal shift change report given to [facility] (oncoming nurse) by Terrell Coy (offgoing nurse). Report included the following information SBAR, Kardex, Intake/Output, MAR, Recent Results, Cardiac Rhythm nsr, Alarm Parameters , and Quality Measures.

## 2023-03-29 NOTE — PROGRESS NOTES
Case Management note:    RUR 20%    Today:    I met with pt after FP team rounded to discuss freedom of choice in relationship to inpatient rehab facilities. Pt.'s first choice is [facility]. Pt informed me that she realizes now she will need inpatient rehab for strengthening and endurance. Pt states her goal is to attend her granddaughter's graduation in May from surgical technician school. Pt became tearful when discussing how important this is to her to attend her granddaughter's graduation. [place] of choice letter signed by pt for inpatient rehab and placed in pt.'s chart to be scanned into EMR. Clinicals sent through [employer] to Henrry Bravo with pt.'s consent. Lucía Gimenez    Addendum-Pt is requesting to complete an AMD naming her  and eldest child as the secondary decision-maker.  consulted.

## 2023-03-29 NOTE — PROGRESS NOTES
[address]             GI PROGRESS NOTE        NAME: Eh Ryan   :  1960   MRN:  [id number]       Subjective:   Patient is feeling well with controlled pain today. She denies fever, chills, nausea, vomiting, abdominal pain. Continues to tolerate regular diet well      Objective:   Seated upright in bed in no apparent distress      VITALS:   Last 24hrs VS reviewed since prior progress note. Most recent are:  Visit Vitals  /64 (BP 1 Location: Right upper arm, BP Patient Position: Semi fowlers)   Pulse 98   Temp 98.4 °F (36.9 °C)   Resp 20   Ht 5' 5\" (1.651 m)   Wt 75.9 kg (167 lb 4.8 oz)   SpO2 97%   Breastfeeding No   BMI 27.84 kg/m²       Intake/Output Summary (Last 24 hours) at 3/29/2023 1632  Last data filed at 3/29/2023 1300  Gross per 24 hour   Intake 200 ml   Output 3970 ml   Net -3770 ml       PHYSICAL EXAM:  General: Alert, in no acute distress    HEENT: Anicteric sclerae. Lungs:            CTA Bilaterally. Heart:  Regular  rhythm,    Abdomen: Soft, Non distended, Non tender    Lab Data Reviewed:   Recent Labs     23  0200 23  0138   WBC 21.2* 34.4*   HGB 7.7* 7.3*   HCT 23.2* 21.7*   * 429*     Recent Labs     23  0200 23  0138    141   K 3.0* 2.8*   * 112*   CO2 23 24   BUN 5* 10   CREA 0.37* 0.32*   GLU 93 108*   CA 7.7* 7.5*     No results for input(s): AP, TBIL, TP, ALB, GLOB, GGT, AML, LPSE in the last 72 hours.     No lab exists for component: SGOT, GPT, AMYP, HLPSE    ________________________________________________________________________  Patient Active Problem List   Diagnosis Code    Asthma J45.909    Arthritis M19.90    GERD (gastroesophageal reflux disease) K21.9    GARCIA (nonalcoholic steatohepatitis) K75.81    Fibromyalgia M79.7    Essential hypertension I06    Metabolic syndrome K95.28    FH: diabetes mellitus Z83.3    Anxiety F41.9    History of pulmonary embolus (PE) Z86.711    Hypokalemia E87.6    H/O gastric bypass Z98.84    H/O colonoscopy Z98.890    DDD (degenerative disc disease), lumbar M51.36    FH: colon cancer Z80.0    Elevated ferritin R79.89    Type 2 diabetes mellitus with diabetic neuropathy (HCC) E11.40    Abnormal mammogram of right breast R92.8    Abnormal CT of the abdomen R93.5    Omental infarction [facility]) K55.069    Primary fibromyalgia syndrome M79.7    Inflammatory bowel disease K52.9    Type 2 diabetes with nephropathy (HCC) E11.21    Venous insufficiency I87.2    Colitis K52.9    JASIEL (acute kidney injury) ([facility] [address] N17.9    Neutrophilia D72.9    Diarrhea of presumed infectious origin R19.7    Thrombocytosis D75.839         Assessment and Plan:  Patient POD3 subtotal colectomy and is feeling well. GI is signing off.         Signed By: LUZ Gonzalez     3/29/2023  4:32 PM

## 2023-03-29 NOTE — PROGRESS NOTES
Feels better, tolerating small amounts. Denies F/C. Visit Vitals  BP (!) 122/58   Pulse 98   Temp 98.5 °F (36.9 °C)   Resp 23   Ht 5' 5\" (1.651 m)   Wt 75.9 kg (167 lb 4.8 oz)   SpO2 98%   Breastfeeding No   BMI 27.84 kg/m²     Abdomen soft, incision clean,  Ostomy appropriate, functional. Drain SS    POD2 subtotal colectomy  Diet as tolerated. OOB.     Rudi Mesa MD

## 2023-03-29 NOTE — PROGRESS NOTES
0700- Bedside and Verbal shift change report given to WESTLEY Acosta and Fina Sawyer, student RN (oncoming nurse) by Nimesh Arguello RN (offgoing nurse). Report included the following information SBAR, Kardex, Intake/Output, MAR, Cardiac Rhythm Sinus Rhythm, and Quality Measures. Primary Nurse Anna Villarreal and WESTLEY Acosta performed a dual skin assessment on this patient No impairment noted  Nadeem score is see flow sheets    0800- Shift assessment completed. Pt A&Ox4, PERRLA,  equal, tongue midine, and follows commands. Cardiac sinus rhythm, pulses palpable. Lungs clear, pt on room air. Abdomen soft and intact, ostomy, mid abd incision. Bowel sounds active. Skin intact and warm, healing scabs on upper extremities, tattoos. +1 edema in all extremities. Mckenna patent and draining. 1200- shift reassessment completed, see flow sheets. 1230- Pt with PT, up in chair with 2 assist.    1440- mckenna removed per order    1515- TRANSFER - OUT REPORT:    Verbal report given to [facility] (name) on Amedeo Bi  being transferred to 5th floor (unit) for routine progression of care       Report consisted of patients Situation, Background, Assessment and   Recommendations(SBAR). Information from the following report(s) SBAR, Kardex, Intake/Output, MAR, Cardiac Rhythm Sinus Rhythm, and Quality Measures was reviewed with the receiving nurse. Lines:   Peripheral IV 03/25/23 Anterior; Left Forearm (Active)   Site Assessment Clean, dry, & intact 03/29/23 1207   Phlebitis Assessment 0 03/29/23 1207   Infiltration Assessment 0 03/29/23 1207   Dressing Status Clean, dry, & intact 03/29/23 1207   Dressing Type Transparent 03/29/23 1207   Hub Color/Line Status Pink; Infusing 03/29/23 1207   Action Taken Open ports on tubing capped 03/29/23 1207   Alcohol Cap Used Yes 03/29/23 1207        Opportunity for questions and clarification was provided.       Patient transported with:   Registered Nurse  Tech Subjective   Maggie Alberto MD;  Location: [facility];  Service:    • COLOSTOMY     • HERNIA REPAIR     • LIPOMA EXCISION     • RECTAL ULTRASOUND N/A 7/19/2017    Procedure: ENDOSCOPIC ULTRASOUND (LOWER);  Surgeon: Lukas Bolaños,  MD;  Location: Boone Hospital BARBI]      ED Course User Index  [BARBI] Huy Norman 

## 2023-03-29 NOTE — PROGRESS NOTES
Received a [address] for the patient in ICU at the request of the patient.  visited the patient previously as an [address] Directive (AMD) consult and the patient was now ready to complete the document. Pt tearful as she shared she has had difficulty sleeping coupled with recent medical events. Pt shared she has 3 children, 9 grandchildren and 1 great-grandchild. Pt tearful as she shared she wants to be well enough that she can enjoy her family. Pt's goal is to attend her grandchild's graduation in [place]. Pt shared she has decided to go to rehab to regain her strength to be at the graduation, even it means attending in a wheelchair. Pt wu through the support of her family as well as her nicol in [de-identified] and prayer. With the patient, completed an Advance Medical Directive that named her  and eldest son as Primary Healthcare Decision Makers, asking them to work together when there is a time she cannot speak for herself. The AMD reflects the patient's values and wishes and shares her instructions for health care and anatomical gifts. Continued the relationship of trust and support. Provided an empathetic presence. Listened attentively. Affirmed pt's autonomy in decision making. Reviewed coping. Reflected value statements. Affirmed pt's nicol. Affirmed pt's hope. Offered assurance of requested prayer. Advised of  availability. Please contact [facility] for further referrals.     [address], [address], [facility]  Staff   Paging service: [phone] (OPAL)

## 2023-03-29 NOTE — PROGRESS NOTES
Problem: Mobility Impaired (Adult and Pediatric)  Goal: *Acute Goals and Plan of Care (Insert Text)  Description: FUNCTIONAL STATUS PRIOR TO ADMISSION: patient reports being \"weaker\" prior to this admission. Patient reports fall prior to admission on the stairs. Patient having bilateral LE weakness that is progressive. Use of RW prior to admission    HOME SUPPORT PRIOR TO ADMISSION: patient lives with     Physical Therapy Goals  Initiated 3/27/2023  1. Patient will move from supine to sit and sit to supine  in bed with moderate assistance  within 7 day(s). 2.  Patient will transfer from bed to chair and chair to bed with moderate assistance  using the least restrictive device within 7 day(s). 3.  Patient will perform sit to stand with moderate assistance  within 7 day(s). 4.  Patient will ambulate with moderate assistance  for 10 feet with the least restrictive device within 7 day(s). Outcome: Progressing Towards Goal   PHYSICAL THERAPY TREATMENT  Patient: Yuridia López (16 y.o. female)  Date: 3/29/2023  Diagnosis: Colitis [K52.9]  Leukocytosis [D72.829] Colitis  Procedure(s) (LRB):  LAPAROTOMY EXPLORATORY, Subtotal ABDOMINAL COLECTOMY (N/A) 3 Days Post-Op  Precautions: Fall, Skin, Contact  Chart, physical therapy assessment, plan of care and goals were reviewed. ASSESSMENT  Patient continues with skilled PT services and is progressing towards goals. The patient c/o fatigue but remains highly motivated for progress and demonstrates increased activity tolerance daily. Challenged with bed mobility and scooting requiring moderate assist x2 and greatly increased time to scoot to EOB. Initial standing trial limited d/t flexed posture and a posteriorly shifted COG requiring moderate assist x2 and a RW  to remain standing.  Balance and posture improved with cues and able to stand to a RW to briefly clear each foot x5 reps before c/o increased dizziness and requiring a seated rest. BP assessed with no significant change. On 2nd trial, gait training completed x4' using RW and moderate assist x2. Gait with tiny shuffling steps before recliner chair was provided to sit. The patient remains at high risk for falls with a recent history of progressive weakness. She is far below hoer baseline and recommend discharge to [facility]. Current Level of Function Impacting Discharge (mobility/balance): mod x2 for all transfers and gait, fatigues quickly         PLAN :  Patient continues to benefit from skilled intervention to address the above impairments. Continue treatment per established plan of care. to address goals. Recommendation for discharge: (in order for the patient to meet his/her long term goals)  Therapy 3 hours per day 5-7 days per week    This discharge recommendation:  Has been made in collaboration with the attending provider and/or case management    IF patient discharges home will need the following DME: to be determined (TBD)       SUBJECTIVE:   Patient stated I feel weak.     OBJECTIVE DATA SUMMARY:   Critical Behavior:  Neurologic State: Alert, Appropriate for age  Orientation Level: Oriented X4  Cognition: Appropriate decision making, Follows commands  Safety/Judgement: Awareness of environment  Functional Mobility Training:  Bed Mobility:  Rolling: Moderate assistance; Additional time  Supine to Sit: Moderate assistance;Assist x2     Scooting: Maximum assistance; Additional time        Transfers:  Sit to Stand: Moderate assistance;Assist x2; Additional time                                Balance:  Sitting: Intact  Sitting - Static: Good (unsupported)  Sitting - Dynamic: Good (unsupported)  Standing: Impaired  Standing - Static: Poor  Standing - Dynamic : Poor  Ambulation/Gait Training:  Distance (ft): 4 Feet (ft)  Assistive Device: Gait belt;Walker, rolling  Ambulation - Level of Assistance:  Moderate assistance;Assist x2        Gait Abnormalities: Decreased step clearance        Base of Support: Narrowed     Speed/Dahiana: Shuffled                       Stairs: Therapeutic Exercises:   Supine heel slides, ankle pumps  Seated LAQs  Pain Rating:      Activity Tolerance:   Fair    After treatment patient left in no apparent distress:   Sitting in chair, Call bell within reach, and Bed / chair alarm activated    COMMUNICATION/COLLABORATION:   The patients plan of care was discussed with: Registered nurse.      Sina Flores PT, DPT   Time Calculation: 34 mins

## 2023-03-29 NOTE — PROGRESS NOTES
Bedside and Verbal shift change report given to May Villalta RN (oncoming nurse) by aFriba Castaneda RN (offgoing nurse). Report included the following information SBAR, Kardex, Intake/Output, MAR, and Recent Results.

## 2023-03-29 NOTE — PROGRESS NOTES
Eating, ostomy functional. No issues. Visit Vitals  /64 (BP 1 Location: Right upper arm, BP Patient Position: Semi fowlers)   Pulse 98   Temp 98.4 °F (36.9 °C)   Resp 20   Ht 5' 5\" (1.651 m)   Wt 75.9 kg (167 lb 4.8 oz)   SpO2 97%   Breastfeeding No   BMI 27.84 kg/m²     Abdomen soft, incision clean,  Ostomy appropriate, functional. Drain SS    POD3 subtotal colectomy  Diet as tolerated. OOB.     Tejas Paz MD

## 2023-03-29 NOTE — PROGRESS NOTES
Case Management;    During am rounding,FP team informed me that pt now realizes she will benefit from inpatient rehab. I will discuss [place] of choice with pt in relationship to inpatient rehab.     Allan Haas

## 2023-03-29 NOTE — PROGRESS NOTES
[facility] Daily Progress Note    24 Hour Events: Maxime Greer. Patient given melatonin to assist with sleep. Stool studies obtained from ostomy    SUBJECTIVE: Patient feeling well with well controlled pain. She denies fever, chills, nausea, vomiting, or other symptoms at this time. She reports difficulty sleeping overnight secondary to anxiety. OBJECTIVE:    Vitals: Visit Vitals  BP (!) 128/49   Pulse 83   Temp 97.9 °F (36.6 °C)   Resp 18   Ht 5' 5\" (1.651 m)   Wt 167 lb 4.8 oz (75.9 kg)   SpO2 97%   Breastfeeding No   BMI 27.84 kg/m²       Physical Exam:  General: No acute distress  Respiratory: Clear lung fields bilaterally, no wheeze, rales  Cardiovascular: Regular rate, rhythm no murmurs gallops  GI: no distension. + bowel sounds. Post-surgical dressing C/D/I with no surrounding erythema and appropriate post-op tenderness. TRISTA tube in place. Colostomy C/D/I. Extremities: 2+ lower extremity edema   Skin: Warm, dry.     Inpatient Medications  Current Facility-Administered Medications   Medication Dose Route Frequency    potassium chloride 10 mEq in 100 ml IVPB  10 mEq IntraVENous Q1H    iron sucrose (VENOFER) injection 100 mg  100 mg IntraVENous Q24H    cefTRIAXone (ROCEPHIN) 2 g in 0.9% sodium chloride 20 mL IV syringe  2 g IntraVENous Q24H    melatonin tablet 3 mg  3 mg Oral QHS PRN    vancomycin (FIRVANQ) 50 mg/mL oral solution 500 mg  500 mg Oral Q6H    metroNIDAZOLE (FLAGYL) IVPB premix 500 mg  500 mg IntraVENous Q8H    vancomycin (VANCOCIN) rectal enema 500 mg  500 mg Rectal Q6H    0.9% sodium chloride infusion  50 mL/hr IntraVENous CONTINUOUS    morphine injection 2 mg  2 mg IntraVENous Q3H PRN    PHENYLephrine (VANITA-SYNEPHRINE) 30 mg in 0.9% sodium chloride 250 mL infusion   mcg/min IntraVENous TITRATE    0.9% sodium chloride infusion 250 mL  250 mL IntraVENous PRN    sodium chloride (NS) flush 5-40 mL  5-40 mL IntraVENous Q8H    sodium chloride (NS) flush 5-40 mL  5-40 mL IntraVENous PRN    acetaminophen (TYLENOL) tablet 650 mg  650 mg Oral Q6H PRN    Or    acetaminophen (TYLENOL) suppository 650 mg  650 mg Rectal Q6H PRN    polyethylene glycol (MIRALAX) packet 17 g  17 g Oral DAILY PRN    enoxaparin (LOVENOX) injection 40 mg  40 mg SubCUTAneous DAILY    promethazine (PHENERGAN) tablet 12.5 mg  12.5 mg Oral Q6H PRN    albuterol (PROVENTIL VENTOLIN) nebulizer solution 2.5 mg  2.5 mg Nebulization Q4H PRN    atorvastatin (LIPITOR) tablet 20 mg  20 mg Oral DAILY    budesonide (PULMICORT) 500 mcg/2 ml nebulizer suspension  250 mcg Nebulization BID RT    [Held by provider] bumetanide (BUMEX) tablet 0.5 mg  0.5 mg Oral DAILY    buPROPion XL (WELLBUTRIN XL) tablet 300 mg  300 mg Oral BID    [Held by provider] hydrOXYzine HCL (ATARAX) tablet 25 mg  25 mg Oral QHS    [Held by provider] metoprolol tartrate (LOPRESSOR) tablet 25 mg  25 mg Oral BID    gabapentin (NEURONTIN) capsule 600 mg  600 mg Oral TID    prochlorperazine (COMPAZINE) injection 10 mg  10 mg IntraVENous Q6H PRN       Allergies  Allergies   Allergen Reactions    Accupril [Quinapril] Cough    Adhesive Tape-Silicones Other (comments)     Makes skin tears easily.      Lipitor [Atorvastatin] Other (comments)     aches    Norvasc [Amlodipine] Swelling     On legs at 10 mg dose       CBC:  Recent Labs     03/29/23  0200 03/28/23  0138 03/27/23  0324   WBC 21.2* 34.4* 39.0*   HGB 7.7* 7.3* 7.9*   HCT 23.2* 21.7* 23.5*   * 429* 509*         Metabolic Panel:  Recent Labs     03/29/23  0200 03/28/23  0138 03/27/23  0324    141 139   K 3.0* 2.8* 3.5   * 112* 109*   CO2 23 24 25   BUN 5* 10 13   CREA 0.37* 0.32* 0.41*   GLU 93 108* 147*   CA 7.7* 7.5* 7.2*   MG 1.8 1.8 1.7              Assessment and Plan   Oswaldo Brown is a 58 y.o. female with PMH of hypertension, asthma, GERD, gastric bypass,  CAD s/p PCI (2015), anxiety, type 2 diabetes mellitus, HFpEF (EF 65%, G1DD 2015)  who is admitted for hyperkalemia and leukocytosis. Septic shock 2/2 colitis: Improved, now s/p partial colectomy on 3/27/23. POA wbc 46. 5. presumed infectious etiology with concern for C. Diff given recent Abx vs IBD. Rpt CT w circumferential thickened colon up to rectum.  - GI, general surgery consulted, appreciate recs  - path results consistent with C. diff  - CTX, flagyl, vanc (PO and rectal enema)  - Enteric precautions  - Follow up blood culture  - Daily CBC, BMP  - pressors as indicated to maintain MAPs over 65  - IVF decreased to 50ml/hr     Hypokalemia: POA K 2.1 in s/o persistent GI loses. EKG sinus rhythm, no T wave changes.  - Replete as needed  - Monitor on BMP    Anemia: in the post-operative setting. LO evidenced on labs  - start IV venofer    Thrombocytosis: POA Plt 520. Likely reactive, per hemat. Ddx hemoconcentration, LO common cause. - Monitor on labs     H/o recent falls: patient recently evaluated at [facility] ED for mechanical GLF with x 6 staples placed on head. Wound clean and dry. CT imaging negative. H/o Gastric bypass: 2017. Avoid NSAIDs and ASA. CAD s/p PCI: Stent dLCx in 2015. F/w Dr Raffaele Aldana MD ( cards). LOV 10/22. Stress test 2017 EF 64%  - Continue Lipitor 20 mg daily     HFpEF: Stress test 2017 EF 64%. Trending towards hypervolemia.  - Hold home Bumex 0.5 mg daily in s/o soft BP  - decreased IVF to 50ml/hr     T2DM: Last HgA1C 5.4 04/2022. Diet controlled s/p gastric bypass. Anxiety:   - Continue Wellbutrin  mg BID     Asthma:   - Continue home Albuterol and Pulmicort BID     Hypertension:   - Hold Bumex 0.5 mg daily and Lopressor 25mg BID in s/o soft BP  - Monitor BP and adjust as needed     GERD:   - Cont home Protonix 40 mg IV daily. Fibromyalgia:   - Continue gabapentin 600mg TID.      Prolonged Qtc:   - Avoid qtc prolonging drugs     DDD, Arthritis: on home flexeril 10 mg TID prn    Maday Wan MD  Family Medicine Resident       For Billing    Chief Complaint   Patient presents with    Abnormal White Blood Cell Count       Hospital Problems  Date Reviewed: 3/26/2023            Codes Class Noted POA    Neutrophilia ICD-10-CM: D72.9  ICD-9-CM: 288.8  3/25/2023 Unknown        Diarrhea of presumed infectious origin ICD-10-CM: R19.7  ICD-9-CM: 009.3  3/25/2023 Unknown        Thrombocytosis ICD-10-CM: D75.839  ICD-9-CM: 238.71  3/25/2023 Unknown        * (Principal) Colitis ICD-10-CM: K52.9  ICD-9-CM: 558.9  3/13/2023 Unknown        H/O gastric bypass ICD-10-CM: Z98.84  ICD-9-CM: V45.86  7/12/2017 Yes    Overview Addendum 11/3/2018  9:51 AM by Larena Bart Dr. Rich Najjar 1/2017  lost from 230 to 154 lbs             Hypokalemia ICD-10-CM: E87.6  ICD-9-CM: 276.8  1/26/2017 Yes        Anxiety ICD-10-CM: F41.9  ICD-9-CM: 300.00  6/1/2016 Yes    Overview Addendum 9/18/2018  4:54 PM by Christa Alcazar     Needs to be seen at least twice yearly for BNZ  FTF 12/19/2017, 9/18/18   as expected 12/19/2017, 9/18/18             FH: diabetes mellitus ICD-10-CM: Z83.3  ICD-9-CM: V18.0  8/4/2015 Yes        Essential hypertension ICD-10-CM: I10  ICD-9-CM: 401.9  5/17/2015 Yes        Fibromyalgia ICD-10-CM: M79.7  ICD-9-CM: 729.1  3/30/2015 Yes    Overview Addendum 7/12/2017 11:40 AM by Sri Blackmon  S/p NEREYDA x 3 in back via Dr. Vitaliy Floyd.   He uses flexeril HS for pain             Asthma ICD-10-CM: J45.909  ICD-9-CM: 493.90  9/22/2010 Yes        GERD (gastroesophageal reflux disease) ICD-10-CM: K21.9  ICD-9-CM: 530.81  9/22/2010 Yes

## 2023-03-29 NOTE — ACP (ADVANCE CARE PLANNING)
Advance Care Planning   Advance Care Planning Inpatient Note  [address] [facility] [place] Department    Today's Date: 3/29/2023  Unit: [facility] 3 INTENSIVE CARE    Received request from patient. Upon review of chart and communication with care team, patient's decision making abilities are not in question. Patient was/were present in the room during visit. Goals of ACP Conversation:  Discuss Advance Care planning documents    Health Care Decision Makers:      Primary Decision Maker: Regino Dai - [phone]    Primary Decision Maker: Karishma Otero - Son - [phone]    Summary:  Completed [facility]    Advance Care Planning Documents (Patient Wishes) on file:  Healthcare Power of /Advance Directive appointment of Health care agent  Living Will/ Advance Directive  Anatomical Gift/Organ donation     Assessment:     Received a Spiritual Care Consult for the patient in ICU at the request of the patient.  [facility] and the patient was now ready to complete the document. Pt tearful as she shared she has had difficulty sleeping coupled with recent medical events. Pt shared she has 3 children, 9 grandchildren and 1 great-grandchild. Pt tearful as she shared she wants to be well enough that she can enjoy her family. Pt's goal is to attend her grandchild's graduation in [place]. Pt shared she has decided to go to rehab to regain her strength to be at the graduation, even it means attending in a wheelchair. With the patient, completed an Advance Medical Directive that named her  and eldest son as Primary Healthcare Decision Makers, asking them to work together when there is a time she cannot speak for herself. The AMD reflects the patient's values and wishes and shares her instructions for health care and anatomical gifts.       Interventions:  Provided education on documents for clarity and greater understanding  Discussed and provided education on state decision maker hierarchy  Assisted in the completion of documents according to patient's wishes at this time    Care Preferences Communicated:  No    Outcomes/Plan:  New Advance Directive completed  Returned original document(s) to patient, as well as copies for distribution to appointed agents  Copy of Advance Directive given to staff to scan into medical record  Teach Back Method used to verify the patient/Healthcare Decision Maker's understanding of key information in the advance directive documents    [facility] on 3/29/2023 at 1:29 PM

## 2023-03-29 NOTE — WOUND CARE
Ileostomy follow up  Appliance intact with brown loose stool in bag,no leakage noted, abdomen slightly distended. Education re enforced and patient verbalized understanding. Will attempt to arrange time with  for additional teaching. Patient stated she would like to go to rehab due to daughter and  working full time and will not have the time to help her at home for her recovery.   Will follow up daily  Jude Palacios RN  Wound

## 2023-03-30 LAB
ANION GAP SERPL CALC-SCNC: 4 MMOL/L (ref 5–15)
BASOPHILS # BLD: 0 K/UL (ref 0–0.1)
BASOPHILS NFR BLD: 0 % (ref 0–1)
BUN SERPL-MCNC: 3 MG/DL (ref 6–20)
BUN/CREAT SERPL: 13 (ref 12–20)
CALCIUM SERPL-MCNC: 7.5 MG/DL (ref 8.5–10.1)
CHLORIDE SERPL-SCNC: 112 MMOL/L (ref 97–108)
CO2 SERPL-SCNC: 25 MMOL/L (ref 21–32)
CREAT SERPL-MCNC: 0.23 MG/DL (ref 0.55–1.02)
DIFFERENTIAL METHOD BLD: ABNORMAL
EOSINOPHIL # BLD: 1.1 K/UL (ref 0–0.4)
EOSINOPHIL NFR BLD: 6 % (ref 0–7)
ERYTHROCYTE [DISTWIDTH] IN BLOOD BY AUTOMATED COUNT: 13.8 % (ref 11.5–14.5)
GLUCOSE SERPL-MCNC: 88 MG/DL (ref 65–100)
HCT VFR BLD AUTO: 25.6 % (ref 35–47)
HGB BLD-MCNC: 8.4 G/DL (ref 11.5–16)
IMM GRANULOCYTES # BLD AUTO: 0 K/UL
IMM GRANULOCYTES NFR BLD AUTO: 0 %
LYMPHOCYTES # BLD: 2.1 K/UL (ref 0.8–3.5)
LYMPHOCYTES NFR BLD: 12 % (ref 12–49)
MAGNESIUM SERPL-MCNC: 1.7 MG/DL (ref 1.6–2.4)
MCH RBC QN AUTO: 28.8 PG (ref 26–34)
MCHC RBC AUTO-ENTMCNC: 32.8 G/DL (ref 30–36.5)
MCV RBC AUTO: 87.7 FL (ref 80–99)
METAMYELOCYTES NFR BLD MANUAL: 3 %
MONOCYTES # BLD: 0.7 K/UL (ref 0–1)
MONOCYTES NFR BLD: 4 % (ref 5–13)
MYELOCYTES NFR BLD MANUAL: 6 %
NEUTS SEG # BLD: 12.4 K/UL (ref 1.8–8)
NEUTS SEG NFR BLD: 69 % (ref 32–75)
NRBC # BLD: 0.05 K/UL (ref 0–0.01)
NRBC BLD-RTO: 0.3 PER 100 WBC
PLATELET # BLD AUTO: 487 K/UL (ref 150–400)
PMV BLD AUTO: 8.2 FL (ref 8.9–12.9)
POTASSIUM SERPL-SCNC: 3.2 MMOL/L (ref 3.5–5.1)
RBC # BLD AUTO: 2.92 M/UL (ref 3.8–5.2)
RBC MORPH BLD: ABNORMAL
SODIUM SERPL-SCNC: 141 MMOL/L (ref 136–145)
WBC # BLD AUTO: 17.9 K/UL (ref 3.6–11)

## 2023-03-30 PROCEDURE — 36415 COLL VENOUS BLD VENIPUNCTURE: CPT

## 2023-03-30 PROCEDURE — 94640 AIRWAY INHALATION TREATMENT: CPT

## 2023-03-30 PROCEDURE — 74011000272 HC RX REV CODE- 272

## 2023-03-30 PROCEDURE — 85025 COMPLETE CBC W/AUTO DIFF WBC: CPT

## 2023-03-30 PROCEDURE — 74011000250 HC RX REV CODE- 250

## 2023-03-30 PROCEDURE — 97110 THERAPEUTIC EXERCISES: CPT

## 2023-03-30 PROCEDURE — 97116 GAIT TRAINING THERAPY: CPT

## 2023-03-30 PROCEDURE — 99232 SBSQ HOSP IP/OBS MODERATE 35: CPT | Performed by: FAMILY MEDICINE

## 2023-03-30 PROCEDURE — 74011250636 HC RX REV CODE- 250/636

## 2023-03-30 PROCEDURE — 80048 BASIC METABOLIC PNL TOTAL CA: CPT

## 2023-03-30 PROCEDURE — 74011250637 HC RX REV CODE- 250/637

## 2023-03-30 PROCEDURE — 65270000046 HC RM TELEMETRY

## 2023-03-30 PROCEDURE — 74011250637 HC RX REV CODE- 250/637: Performed by: STUDENT IN AN ORGANIZED HEALTH CARE EDUCATION/TRAINING PROGRAM

## 2023-03-30 PROCEDURE — 97530 THERAPEUTIC ACTIVITIES: CPT

## 2023-03-30 PROCEDURE — 97535 SELF CARE MNGMENT TRAINING: CPT

## 2023-03-30 PROCEDURE — 74011250636 HC RX REV CODE- 250/636: Performed by: STUDENT IN AN ORGANIZED HEALTH CARE EDUCATION/TRAINING PROGRAM

## 2023-03-30 PROCEDURE — 83735 ASSAY OF MAGNESIUM: CPT

## 2023-03-30 PROCEDURE — 99232 SBSQ HOSP IP/OBS MODERATE 35: CPT | Performed by: INTERNAL MEDICINE

## 2023-03-30 RX ADMIN — METRONIDAZOLE 500 MG: 500 INJECTION, SOLUTION INTRAVENOUS at 21:50

## 2023-03-30 RX ADMIN — VANCOMYCIN HYDROCHLORIDE 500 MG: 500 INJECTION, POWDER, LYOPHILIZED, FOR SOLUTION INTRAVENOUS at 10:09

## 2023-03-30 RX ADMIN — GABAPENTIN 600 MG: 300 CAPSULE ORAL at 21:49

## 2023-03-30 RX ADMIN — ACETAMINOPHEN 650 MG: 325 TABLET ORAL at 14:34

## 2023-03-30 RX ADMIN — ENOXAPARIN SODIUM 40 MG: 100 INJECTION SUBCUTANEOUS at 08:51

## 2023-03-30 RX ADMIN — METRONIDAZOLE 500 MG: 500 INJECTION, SOLUTION INTRAVENOUS at 13:06

## 2023-03-30 RX ADMIN — VANCOMYCIN HYDROCHLORIDE 500 MG: 250 POWDER, FOR SOLUTION ORAL at 21:50

## 2023-03-30 RX ADMIN — POTASSIUM BICARBONATE 40 MEQ: 782 TABLET, EFFERVESCENT ORAL at 06:41

## 2023-03-30 RX ADMIN — VANCOMYCIN HYDROCHLORIDE 500 MG: 500 INJECTION, POWDER, LYOPHILIZED, FOR SOLUTION INTRAVENOUS at 16:31

## 2023-03-30 RX ADMIN — SODIUM CHLORIDE 50 ML/HR: 9 INJECTION, SOLUTION INTRAVENOUS at 17:20

## 2023-03-30 RX ADMIN — BUDESONIDE 250 MCG: 0.5 SUSPENSION RESPIRATORY (INHALATION) at 20:07

## 2023-03-30 RX ADMIN — BUPROPION HYDROCHLORIDE 300 MG: 150 TABLET, EXTENDED RELEASE ORAL at 08:50

## 2023-03-30 RX ADMIN — SODIUM CHLORIDE 2 G: 9 INJECTION INTRAMUSCULAR; INTRAVENOUS; SUBCUTANEOUS at 05:45

## 2023-03-30 RX ADMIN — SODIUM CHLORIDE, PRESERVATIVE FREE 10 ML: 5 INJECTION INTRAVENOUS at 21:58

## 2023-03-30 RX ADMIN — HYDROXYZINE HYDROCHLORIDE 25 MG: 25 TABLET, FILM COATED ORAL at 09:01

## 2023-03-30 RX ADMIN — MORPHINE SULFATE 2 MG: 2 INJECTION, SOLUTION INTRAMUSCULAR; INTRAVENOUS at 10:35

## 2023-03-30 RX ADMIN — METRONIDAZOLE 500 MG: 500 INJECTION, SOLUTION INTRAVENOUS at 04:08

## 2023-03-30 RX ADMIN — GABAPENTIN 600 MG: 300 CAPSULE ORAL at 15:24

## 2023-03-30 RX ADMIN — VANCOMYCIN HYDROCHLORIDE 500 MG: 250 POWDER, FOR SOLUTION ORAL at 04:06

## 2023-03-30 RX ADMIN — VANCOMYCIN HYDROCHLORIDE 500 MG: 500 INJECTION, POWDER, LYOPHILIZED, FOR SOLUTION INTRAVENOUS at 21:49

## 2023-03-30 RX ADMIN — BUDESONIDE 250 MCG: 0.5 SUSPENSION RESPIRATORY (INHALATION) at 07:49

## 2023-03-30 RX ADMIN — VANCOMYCIN HYDROCHLORIDE 500 MG: 250 POWDER, FOR SOLUTION ORAL at 14:35

## 2023-03-30 RX ADMIN — ATORVASTATIN CALCIUM 20 MG: 20 TABLET, FILM COATED ORAL at 08:51

## 2023-03-30 RX ADMIN — SODIUM CHLORIDE, PRESERVATIVE FREE 5 ML: 5 INJECTION INTRAVENOUS at 05:41

## 2023-03-30 RX ADMIN — VANCOMYCIN HYDROCHLORIDE 500 MG: 250 POWDER, FOR SOLUTION ORAL at 08:51

## 2023-03-30 RX ADMIN — BUPROPION HYDROCHLORIDE 300 MG: 150 TABLET, EXTENDED RELEASE ORAL at 21:49

## 2023-03-30 RX ADMIN — VANCOMYCIN HYDROCHLORIDE 500 MG: 500 INJECTION, POWDER, LYOPHILIZED, FOR SOLUTION INTRAVENOUS at 04:09

## 2023-03-30 RX ADMIN — GABAPENTIN 600 MG: 300 CAPSULE ORAL at 08:51

## 2023-03-30 NOTE — PROGRESS NOTES
Problem: Self Care Deficits Care Plan (Adult)  Goal: *Acute Goals and Plan of Care (Insert Text)  Description: FUNCTIONAL STATUS PRIOR TO ADMISSION: Patient reports being \"weaker\" prior to this admission. Patient reports fall prior to admission on the stairs. Patient having bilateral LE weakness that is progressive. Use of RW prior to admission. She reports being independent with ADLs. HOME SUPPORT PRIOR TO ADMISSION: Patient lives with . Occupational Therapy Goals  Initiated 3/28/2023  1. Patient will perform grooming with modified independence within 7 day(s). 2.  Patient will perform upper body dressing and bathing with supervision/set-up within 7 day(s). 3.  Patient will perform lower body dressing and bathing with minimal assistance within 7 day(s). 4.  Patient will perform toilet transfers with minimal assistance within 7 day(s). 5.  Patient will perform all aspects of toileting with minimal assistance within 7 day(s). 6.  Patient will participate in upper extremity therapeutic exercise/activities with supervision/set-up for 10 minutes within 7 day(s). 7.  Patient will utilize energy conservation techniques during functional activities with verbal cues within 7 day(s). Outcome: Progressing Towards Goal   OCCUPATIONAL THERAPY TREATMENT  Patient: Odalys Soriano (30 y.o. female)  Date: 3/30/2023  Diagnosis: Colitis [K52.9]  Leukocytosis [D72.829] Colitis  Procedure(s) (LRB):  LAPAROTOMY EXPLORATORY, Subtotal ABDOMINAL COLECTOMY (N/A) 4 Days Post-Op  Precautions: Fall, Skin, Contact  Chart, occupational therapy assessment, plan of care, and goals were reviewed. ASSESSMENT  Patient continues with skilled OT services and is progressing towards goals. Pt seen for ADL's and there ex seated edge of bed. She was able to engage with bathing and dressing UB with set-up. Assisted her to don brief in standing, max assist. Pt engaged with UE there ex with heart rate 101 with activity. Current Level of Function Impacting Discharge (ADLs): set-up UB bathe/dress, max assist to don brief    Other factors to consider for discharge:          PLAN :  Patient continues to benefit from skilled intervention to address the above impairments. Continue treatment per established plan of care to address goals. Recommend with staff: ADL's, there ex, there act    Recommend next OT session: cont towards goals    Recommendation for discharge: (in order for the patient to meet his/her long term goals)  Therapy 3 hours per day 5-7 days per week    This discharge recommendation:  Has not yet been discussed the attending provider and/or case management    IF patient discharges home will need the following DME:        SUBJECTIVE:   Patient stated thank you.     OBJECTIVE DATA SUMMARY:   Cognitive/Behavioral Status:  Neurologic State: Alert  Orientation Level: Oriented X4  Cognition: Follows commands             Functional Mobility and Transfers for ADLs:  Bed Mobility:       Transfers:  Sit to Stand: Moderate assistance;Assist x2     Bed to Chair: Moderate assistance; Additional time;Assist x1    Balance:  Sitting: Intact  Standing: Impaired  Standing - Static: Constant support;Fair;Poor  Standing - Dynamic : Fair;Poor    ADL Intervention:       Grooming  Grooming Assistance: Set-up  Washing Face: Set-up  Brushing/Combing Hair: Set-up    Upper Body Bathing  Bathing Assistance: Stand-by assistance  Position Performed: Seated edge of bed              Upper Body Dressing Assistance  Dressing Assistance: [place]: Set-up    Lower Body Dressing Assistance  Protective [place]: Maximum assistance  Position Performed: Standing              Therapeutic Exercises:     EXERCISE   Sets   Reps   Active Active Assist   Passive   Comments   Shoulder flex/ext 1 10 [x]           []           []              Chest presses 1 10 [x]           []           []              Elbow flex/ext 1 10 [x]           [] []              Forearm supination/pronation 1 10 [x]           []           []              Wrist flex/ext 1 10 [x]           []           []              Finger flex/ext 1 10 [x]           []           []                 []           []           []                 []           []           []                 []           []           []                 []           []           []                 []           []           []                 Activity Tolerance:   Fair    After treatment patient left in no apparent distress:   Sitting in chair and Call bell within reach    COMMUNICATION/COLLABORATION:   The patients plan of care was discussed with: Physical therapy assistant, Occupational therapist, and Registered nurse.      JENIFER Baltazar/L  Time Calculation: 31 mins

## 2023-03-30 NOTE — PROGRESS NOTES
3/30/2023   CARE MANAGEMENT NOTE:  Pt transferred from ICU to the 5th floor. EMR reviewed and handoff received from previous  (Mara). READMISSION:  Pt was admitted from 3/13-3/15/23. Pt was readmitted on 3/25 with colitis. Reportedly, pt resides with her  Mariya Olsen (c. [phone]) and 25 y.o granddtr. RUR 20%    Transition Plan of Care:  GI, General Surgery following for medical management - pt is s/p ex-lap and subtotal abd colectomy on 3/27  OT eval pending; PT eval complete; pt ambulated 4 feet on 3/29 and IPR was recommended  IPR - [facility] reviewing  Covid PCR will be needed  Outpatient follow up  Mode of transportation to be determined. CM will continue to follow pt for IPR  HELADIO Chaney

## 2023-03-30 NOTE — PROGRESS NOTES
[facility] Daily Progress Note    24 Hour Events: Sofie Sabianism. SUBJECTIVE: Patient feeling well with well controlled pain. She denies fever, chills, nausea, vomiting, or other symptoms at this time. She reports that her anxiety was improved with the addition of hydroxyzine. OBJECTIVE:    Vitals: Visit Vitals  /83 (BP 1 Location: Right upper arm, BP Patient Position: At rest)   Pulse 88   Temp 97.7 °F (36.5 °C)   Resp 16   Ht 5' 5\" (1.651 m)   Wt 167 lb 4.8 oz (75.9 kg)   SpO2 98%   Breastfeeding No   BMI 27.84 kg/m²       Physical Exam:  General: No acute distress  Respiratory: Clear lung fields bilaterally, no wheeze, rales  Cardiovascular: Regular rate, rhythm no murmurs gallops  GI: no distension. + bowel sounds. Post-surgical dressing C/D/I with no surrounding erythema and appropriate post-op tenderness. TRISTA tube in place with surrounding serosanguinous drainage. Colostomy C/D/I. Extremities: 2+ lower extremity edema   Skin: Warm, dry.     Inpatient Medications  Current Facility-Administered Medications   Medication Dose Route Frequency    hydrOXYzine HCL (ATARAX) tablet 25 mg  25 mg Oral TID PRN    cefTRIAXone (ROCEPHIN) 2 g in 0.9% sodium chloride 20 mL IV syringe  2 g IntraVENous Q24H    melatonin tablet 3 mg  3 mg Oral QHS PRN    vancomycin (FIRVANQ) 50 mg/mL oral solution 500 mg  500 mg Oral Q6H    metroNIDAZOLE (FLAGYL) IVPB premix 500 mg  500 mg IntraVENous Q8H    vancomycin (VANCOCIN) rectal enema 500 mg  500 mg Rectal Q6H    0.9% sodium chloride infusion  50 mL/hr IntraVENous CONTINUOUS    morphine injection 2 mg  2 mg IntraVENous Q3H PRN    PHENYLephrine (VANITA-SYNEPHRINE) 30 mg in 0.9% sodium chloride 250 mL infusion   mcg/min IntraVENous TITRATE    0.9% sodium chloride infusion 250 mL  250 mL IntraVENous PRN    sodium chloride (NS) flush 5-40 mL  5-40 mL IntraVENous Q8H    sodium chloride (NS) flush 5-40 mL  5-40 mL IntraVENous PRN    acetaminophen (TYLENOL) tablet 650 mg  650 mg Oral Q6H PRN    Or    acetaminophen (TYLENOL) suppository 650 mg  650 mg Rectal Q6H PRN    polyethylene glycol (MIRALAX) packet 17 g  17 g Oral DAILY PRN    enoxaparin (LOVENOX) injection 40 mg  40 mg SubCUTAneous DAILY    promethazine (PHENERGAN) tablet 12.5 mg  12.5 mg Oral Q6H PRN    albuterol (PROVENTIL VENTOLIN) nebulizer solution 2.5 mg  2.5 mg Nebulization Q4H PRN    atorvastatin (LIPITOR) tablet 20 mg  20 mg Oral DAILY    budesonide (PULMICORT) 500 mcg/2 ml nebulizer suspension  250 mcg Nebulization BID RT    [Held by provider] bumetanide (BUMEX) tablet 0.5 mg  0.5 mg Oral DAILY    buPROPion XL (WELLBUTRIN XL) tablet 300 mg  300 mg Oral BID    [Held by provider] hydrOXYzine HCL (ATARAX) tablet 25 mg  25 mg Oral QHS    [Held by provider] metoprolol tartrate (LOPRESSOR) tablet 25 mg  25 mg Oral BID    gabapentin (NEURONTIN) capsule 600 mg  600 mg Oral TID    prochlorperazine (COMPAZINE) injection 10 mg  10 mg IntraVENous Q6H PRN       Allergies  Allergies   Allergen Reactions    Accupril [Quinapril] Cough    Adhesive Tape-Silicones Other (comments)     Makes skin tears easily. Lipitor [Atorvastatin] Other (comments)     aches    Norvasc [Amlodipine] Swelling     On legs at 10 mg dose       CBC:  Recent Labs     03/30/23  0112 03/29/23  0200 03/28/23  0138   WBC 17.9* 21.2* 34.4*   HGB 8.4* 7.7* 7.3*   HCT 25.6* 23.2* 21.7*   * 449* 850*         Metabolic Panel:  Recent Labs     03/30/23  0112 03/29/23  0200 03/28/23  0138    140 141   K 3.2* 3.0* 2.8*   * 111* 112*   CO2 25 23 24   BUN 3* 5* 10   CREA 0.23* 0.37* 0.32*   GLU 88 93 108*   CA 7.5* 7.7* 7.5*   MG 1.7 1.8 1.8              Assessment and Plan   Eder Armenta is a 58 y.o. female with PMH of hypertension, asthma, GERD, gastric bypass,  CAD s/p PCI (2015), anxiety, type 2 diabetes mellitus, HFpEF (EF 65%, G1DD 2015)  who is admitted for hyperkalemia and leukocytosis.      Septic shock 2/2 fulminant colitis: Improved, now s/p partial colectomy on 3/27/23. POA wbc 46. 5. presumed infectious etiology with concern for C. Diff given recent Abx use. - general surgery consulted, appreciate recs  - path results consistent with C. diff  - CTX, flagyl, vanc (PO and rectal enema) to cover for fulminant colitis and C. Diff given recent Abx, leukocytosis, and path results  - blood culture no growth  - Daily CBC, BMP  - IVF at 50ml/hr     Hypokalemia: POA K 2.1 in s/o persistent GI loses. EKG sinus rhythm, no T wave changes.  - Replete as needed  - Monitor on BMP    Anemia: in the post-operative setting. LO evidenced on labs  - s/p IV venofer    Thrombocytosis: POA Plt 520. Likely reactive, per heme/onc. - Monitor on labs     H/o recent falls: patient recently evaluated at [facility] ED for mechanical GLF with x 6 staples placed on head. CT imaging negative. H/o Gastric bypass: 2017. Avoid NSAIDs and ASA. CAD s/p PCI: Stent dLCx in 2015. F/w Dr Katie Rosen MD ( cards). LOV 10/22. Stress test 2017 EF 64%  - Continue Lipitor 20 mg daily     HFpEF: Stress test 2017 EF 64%. Trending towards hypervolemia.  - Hold home Bumex 0.5 mg daily in s/o soft BP  - decreased IVF to 50ml/hr     T2DM: Last HgA1C 5.4 04/2022. Diet controlled s/p gastric bypass. Anxiety:   - Continue Wellbutrin  mg BID  - atarax TID prn     Asthma:   - Continue home Albuterol and Pulmicort BID     Hypertension:   - Hold Bumex 0.5 mg daily and Lopressor 25mg BID in s/o soft BP  - Monitor BP and adjust as needed     GERD:   - Cont home Protonix 40 mg IV daily. Fibromyalgia:   - Continue gabapentin 600mg TID.      Prolonged Qtc:   - Avoid qtc prolonging drugs     DDD, Arthritis: on home flexeril 10 mg TID prn    Renetta Oliveira MD  Family Medicine Resident       For Billing    Chief Complaint   Patient presents with    Abnormal [place] Problems  Date Reviewed: 3/26/2023            Codes Class Noted POA Neutrophilia ICD-10-CM: D72.9  ICD-9-CM: 288.8  3/25/2023 Unknown        Diarrhea of presumed infectious origin ICD-10-CM: R19.7  ICD-9-CM: 009.3  3/25/2023 Unknown        Thrombocytosis ICD-10-CM: D75.839  ICD-9-CM: 238.71  3/25/2023 Unknown        * (Principal) Colitis ICD-10-CM: K52.9  ICD-9-CM: 558.9  3/13/2023 Unknown        H/O gastric bypass ICD-10-CM: Z98.84  ICD-9-CM: V45.86  7/12/2017 Yes    Overview Addendum 11/3/2018  9:51 AM by Violetta Mitchell 1/2017  lost from 230 to 154 lbs             Hypokalemia ICD-10-CM: E87.6  ICD-9-CM: 276.8  1/26/2017 Yes        Anxiety ICD-10-CM: F41.9  ICD-9-CM: 300.00  6/1/2016 Yes    Overview Addendum 9/18/2018  4:54 PM by Violetta Juan     Needs to be seen at least twice yearly for BNZ  FTF 12/19/2017, 9/18/18   as expected 12/19/2017, 9/18/18             FH: diabetes mellitus ICD-10-CM: Z83.3  ICD-9-CM: V18.0  8/4/2015 Yes        Essential hypertension ICD-10-CM: I10  ICD-9-CM: 401.9  5/17/2015 Yes        Fibromyalgia ICD-10-CM: M79.7  ICD-9-CM: 729.1  3/30/2015 Yes    Overview Addendum 7/12/2017 11:40 AM by Caleb Cali  S/p NEREYDA x 3 in back via Dr. Je Pina.   He uses flexeril HS for pain             Asthma ICD-10-CM: J45.909  ICD-9-CM: 493.90  9/22/2010 Yes        GERD (gastroesophageal reflux disease) ICD-10-CM: K21.9  ICD-9-CM: 530.81  9/22/2010 Yes

## 2023-03-30 NOTE — WOUND CARE
Wound Nurse Note    Patient seen in follow-up ostomy care and teaching; s/p subtotal colectomy with ileostomy creation per  3/27/23. Patient currently resting on a [employer] bed; alert and oriented.  at bedside. Assessment:  RUQ - current appliance removed and skin cleansed with warm water. Stoma red moist and budded; peristomal skin intact. Approx 50mL loose brown stool in previous pouch. Abdomen - staple line well-approximated; some serous drainage on previous dressing. LQ - TRISTA drain in place; bulb emptied of approx 40mL serosanguinous drainage. Treatment:  RUQ ileostomy - while reviewing information with patient, measured stoma and cut wafer opening in one-piece appliance and placed around stoma. Secured velcro closure. Abdomen - cleansed staple line and TRISTA drain site with saline and redressed with dry gauze and tape. Plan:  Patient reports plans for inpatient rehab on discharge. Will continue ostomy care teaching in rehab. Would encourage patient to participate in emptying pouch whenever possible and assist with next appliance change. Supplies at bedside.     Araseli Jc RN [facility], [facility].  [facility] Wound Dept  [phone]    RUQ ileostomy       Abdomen

## 2023-03-30 NOTE — PROGRESS NOTES
[facility] [place] at [address], [address]  [place]ina [place]: [phone]  F: [phone] Patient ID  Name: Lori Wheeler  YOB: 1960  MRN: [id number]  Referring Provider:   No referring provider defined for this encounter. Primary Care Provider:   Lalitha Munson MD       HEMATOLOGY/MEDICAL ONCOLOGY  NOTE     Reason for Evaluation:     Chief Complaint   Patient presents with    Abnormal White Blood Cell Count     neutrophilia      Subjective:     History of Present Illness:   Date of Visit: 03/30/23   Lori Wheeler is a 58 y.o. F who presents as an inpatient consultation for neutrophilia. She was hospitalized earlier this month for colitis and was treated with antibiotics while C diff testing was negative. However, patient reports that she did not improve with prescribed antibiotic course. She reports loose stools. Denies any bleeding. Reports mild fever. Denies night sweats. Reports decreased appetite. Denies lymphadenopathy. Reports abdominal pain. .   This is a recurrent problem. Problem has occurred for weeks. Problem has worsened. Patient reports dealing with diarrhea. Patient denies any shortness of breath.  3/27/23 s/p subtotal colectomy     Interval History:     Resting comfortably in bed; concerned about weakness and inability to use legs. Has been working with PT. Hoping to go to [facility] at discharge. Granddaughter at bedside. Past Medical History:   Diagnosis Date    Arthritis     OA back,knees and hands    Asthma     Autoimmune disease ([address]     Awa Musca    CAD (coronary artery disease)     s/p stents 11/2015    Cardiac murmur     Depression     Diabetes ([address]     diet controlled at this time.      DVT (deep venous thrombosis) ([facility]) 1981    GERD (gastroesophageal reflux disease)     Hypertension     Lumbar radiculitis     follows with dr Jodi Rice for epidural steroid injections    Morbid obesity ([address] Musculoskeletal disorder     EDMOND (obstructive sleep apnea)     wears cpap    S/P TONJA (total abdominal hysterectomy)     Thromboembolus ([address] 1996    PE    Trigger finger, right little finger 10/19/2020    follows with [facility]    Trigger finger, right ring finger 10/19/2020    [facility]      Past Surgical History:   Procedure Laterality Date    COLONOSCOPY N/A 12/6/2021    COLONOSCOPY (URGENT) performed by Loreda Ganser, MD at [facility]    HX BREAST BIOPSY Right 2018    Fibroadenoma    [address]    HX CHOLECYSTECTOMY  2017    HX GASTRIC BYPASS  01/2017    HX HYSTERECTOMY  1985    HX TOTAL ABDOMINAL HYSTERECTOMY      age 22    IR INJ SPINE [address]  2/4/2021    IR INJ SPINE FLUORO GUIDED  5/5/2022    DAYAN STEREO  BX BREAST RT ADDL W/CLIP AND SPECIMEN Right 2000    neg     LA UNLISTED PROCEDURE CARDIAC SURGERY  11/2015    STENT PLACED      Social History     Tobacco Use    Smoking status: Never    Smokeless tobacco: Never   Substance Use Topics    Alcohol use: No     Alcohol/week: 0.0 standard drinks      Family History   Problem Relation Age of Onset    Cancer Mother 67        colon    Diabetes Mother     OSTEOARTHRITIS Mother     Stroke Mother     Migraines Mother     Diabetes Father     Heart Disease Father     Heart Attack Father     Hypertension Father     High Cholesterol Father     COPD Father     Heart Failure Father     Cancer Other     Diabetes Other     Heart Disease Other     Hypertension Other     Cancer Brother         lymphoma    Cancer Brother         PROSTATE AND LYMPHOMA    Cancer Maternal Grandmother         stomach    Asthma Brother     Asthma Brother     Stroke Brother     Cancer Maternal Aunt         lung     Breast Cancer Maternal Aunt     Cancer Maternal Uncle         lung    Cancer Maternal Uncle         melanoma    Anesth Problems Neg Hx      Current Facility-Administered Medications   Medication Dose Route Frequency hydrOXYzine HCL (ATARAX) tablet 25 mg  25 mg Oral TID PRN    cefTRIAXone (ROCEPHIN) 2 g in 0.9% sodium chloride 20 mL IV syringe  2 g IntraVENous Q24H    melatonin tablet 3 mg  3 mg Oral QHS PRN    vancomycin (FIRVANQ) 50 mg/mL oral solution 500 mg  500 mg Oral Q6H    metroNIDAZOLE (FLAGYL) IVPB premix 500 mg  500 mg IntraVENous Q8H    vancomycin (VANCOCIN) rectal enema 500 mg  500 mg Rectal Q6H    0.9% sodium chloride infusion  50 mL/hr IntraVENous CONTINUOUS    morphine injection 2 mg  2 mg IntraVENous Q3H PRN    PHENYLephrine (VANITA-SYNEPHRINE) 30 mg in 0.9% sodium chloride 250 mL infusion   mcg/min IntraVENous TITRATE    0.9% sodium chloride infusion 250 mL  250 mL IntraVENous PRN    sodium chloride (NS) flush 5-40 mL  5-40 mL IntraVENous Q8H    sodium chloride (NS) flush 5-40 mL  5-40 mL IntraVENous PRN    acetaminophen (TYLENOL) tablet 650 mg  650 mg Oral Q6H PRN    Or    acetaminophen (TYLENOL) suppository 650 mg  650 mg Rectal Q6H PRN    polyethylene glycol (MIRALAX) packet 17 g  17 g Oral DAILY PRN    enoxaparin (LOVENOX) injection 40 mg  40 mg SubCUTAneous DAILY    promethazine (PHENERGAN) tablet 12.5 mg  12.5 mg Oral Q6H PRN    albuterol (PROVENTIL VENTOLIN) nebulizer solution 2.5 mg  2.5 mg Nebulization Q4H PRN    atorvastatin (LIPITOR) tablet 20 mg  20 mg Oral DAILY    budesonide (PULMICORT) 500 mcg/2 ml nebulizer suspension  250 mcg Nebulization BID RT    [Held by provider] bumetanide (BUMEX) tablet 0.5 mg  0.5 mg Oral DAILY    buPROPion XL (WELLBUTRIN XL) tablet 300 mg  300 mg Oral BID    [Held by provider] hydrOXYzine HCL (ATARAX) tablet 25 mg  25 mg Oral QHS    [Held by provider] metoprolol tartrate (LOPRESSOR) tablet 25 mg  25 mg Oral BID    gabapentin (NEURONTIN) capsule 600 mg  600 mg Oral TID    prochlorperazine (COMPAZINE) injection 10 mg  10 mg IntraVENous Q6H PRN      Allergies   Allergen Reactions    Accupril [Quinapril] Cough    Adhesive Tape-Silicones Other (comments)     Makes skin tears easily. Lipitor [Atorvastatin] Other (comments)     aches    Norvasc [Amlodipine] Swelling     On legs at 10 mg dose        Objective:   Visit Vitals  /65 (BP 1 Location: Right upper arm, BP Patient Position: Lying)   Pulse 95   Temp 97.9 °F (36.6 °C)   Resp 16   Ht 5' 5\" (1.651 m)   Wt 167 lb 4.8 oz (75.9 kg)   LMP 01/01/1985   SpO2 98%   Breastfeeding No   BMI 27.84 kg/m²       Results:     Lab Results   Component Value Date/Time    WBC 17.9 (H) 03/30/2023 01:12 AM    HGB 8.4 (L) 03/30/2023 01:12 AM    HCT 25.6 (L) 03/30/2023 01:12 AM    PLATELET 137 (H) 42/89/7779 01:12 AM    MCV 87.7 03/30/2023 01:12 AM    ABS. NEUTROPHILS 12.4 (H) 03/30/2023 01:12 AM     Lab Results   Component Value Date/Time    Sodium 141 03/30/2023 01:12 AM    Potassium 3.2 (L) 03/30/2023 01:12 AM    Chloride 112 (H) 03/30/2023 01:12 AM    CO2 25 03/30/2023 01:12 AM    Glucose 88 03/30/2023 01:12 AM    BUN 3 (L) 03/30/2023 01:12 AM    Creatinine 0.23 (L) 03/30/2023 01:12 AM    GFR est AA >60 04/27/2022 11:10 AM    GFR est non-AA >60 04/27/2022 11:10 AM    Calcium 7.5 (L) 03/30/2023 01:12 AM    Glucose (POC) 121 (H) 03/27/2023 12:42 AM     Lab Results   Component Value Date/Time    Bilirubin, total 0.9 03/25/2023 02:38 PM    ALT (SGPT) 9 (L) 03/25/2023 02:38 PM    Alk. phosphatase 163 (H) 03/25/2023 02:38 PM    Protein, total 6.2 (L) 03/25/2023 02:38 PM    Albumin 2.0 (L) 03/25/2023 02:38 PM    Globulin 4.2 (H) 03/25/2023 02:38 PM     General: no distress  Respiratory: normal respiratory effort  CV: no peripheral edema  Skin: no rashes; no ecchymoses; no petechiae  Psych: alert, oriented, normal mood/affect       3/25/23 Peripheral smear:   FINAL PATHOLOGIC DIAGNOSIS* * *          Peripheral blood smear:        Normochromic, normocytic anemia with no increase in schistocytes   Neutrophilia        Thrombocytosis, reactive  * * *Comment* * *   Overall peripheral smear findings are concerning for infection.   Other considerations include acute/chronic bleeding, vitamin/mineral deficiency,   drug/toxin/alcohol exposure, splenomegaly, renal dysfunction, autoimmune   causes, and marrow impairment. Please correlate with clinical findings. Assessment and Recommendations:       Septic shock/ Fulminant colitis/ c-diff   3/27 General surgery following: S/p Exp Lap : subtotal abd colectomy/ ostomy in place   --abx per primary team     Neutrophilia   2/2 to infection   predominantly neutrophilic. Peripheral smear: no increase in schistocytes or  immature blast cells concerning for acute leukemia. She has septated nuclei and her neutrophils suggestive of reactive/toxic granulation. Improving improving since surgery/ abx   Continue to monitor      Anemia  Hx gastric bypass: Anemia of chronic disease and now post surgery  S/p Venofer 100 mg x 2 doses  --continue to monitor ; transfuse for Hgb < 7     Thrombocytosis  Likely reactive Hx of gastric bypass surgery.       Hypokalemia  Repletion  per primary team.      Plan reviewed with Dr Simon Aguilar By:   Christopher Blunt, CRISTAL

## 2023-03-30 NOTE — PROGRESS NOTES
Problem: Mobility Impaired (Adult and Pediatric)  Goal: *Acute Goals and Plan of Care (Insert Text)  Description: FUNCTIONAL STATUS PRIOR TO ADMISSION: patient reports being \"weaker\" prior to this admission. Patient reports fall prior to admission on the stairs. Patient having bilateral LE weakness that is progressive. Use of RW prior to admission    HOME SUPPORT PRIOR TO ADMISSION: patient lives with     Physical Therapy Goals  Initiated 3/27/2023  1. Patient will move from supine to sit and sit to supine  in bed with moderate assistance  within 7 day(s). 2.  Patient will transfer from bed to chair and chair to bed with moderate assistance  using the least restrictive device within 7 day(s). 3.  Patient will perform sit to stand with moderate assistance  within 7 day(s). 4.  Patient will ambulate with moderate assistance  for 10 feet with the least restrictive device within 7 day(s). Outcome: Progressing Towards Goal  Note:   PHYSICAL THERAPY TREATMENT  Patient: Moisés Lackey (58 y.o. female)  Date: 3/30/2023  Diagnosis: Colitis [K52.9]  Leukocytosis [D72.829] Colitis  Procedure(s) (LRB):  LAPAROTOMY EXPLORATORY, Subtotal ABDOMINAL COLECTOMY (N/A) 4 Days Post-Op  Precautions: Fall, Skin, Contact  Chart, physical therapy assessment, plan of care and goals were reviewed. ASSESSMENT  Patient continues with skilled PT services and progressing towards goals. Limited by impaired balance, decreased strength and activity tolerance requiring assist x 2 to come to standing. Verbal and tactile cues for upright posture. Min A for wt shifting with short distance gait training using RW. Pt fatigues quickly. Reports feeling SOB, O2 sat 100% on room air HR 87. Current Level of Function Impacting Discharge (mobility/balance): assist x 2    Other factors to consider for discharge:          PLAN :  Patient continues to benefit from skilled intervention to address the above impairments.   Continue treatment per established plan of care. to address goals. Recommendation for discharge: (in order for the patient to meet his/her long term goals)  Therapy 3 hours per day 5-7 days per week    This discharge recommendation:  Has been made in collaboration with the attending provider and/or case management    IF patient discharges home will need the following DME: to be determined (TBD)       SUBJECTIVE:   Patient stated my legs feel shaky/ weak.     OBJECTIVE DATA SUMMARY:   Critical Behavior:  Neurologic State: Alert  Orientation Level: Oriented X4  Cognition: Follows commands  Safety/Judgement: Awareness of environment  Functional Mobility Training:  Bed Mobility:                    Transfers:  Sit to Stand: Moderate assistance;Assist x2           Bed to Chair: Moderate assistance; Additional time;Assist x1                    Balance:  Sitting: Intact  Standing: Impaired  Standing - Static: Constant support;Fair;Poor  Standing - Dynamic : Fair;Poor  Ambulation/Gait Training:  Distance (ft): 5 Feet (ft)  Assistive Device: Walker, rolling;Gait belt  Ambulation - Level of Assistance: Moderate assistance        Gait Abnormalities: Decreased step clearance; Step to gait        Base of Support: Narrowed     Speed/Dahiana: Shuffled; Slow                       Stairs: Therapeutic Exercises: Ankle pumps, glute sets, knee extension  Pain Rating:      Activity Tolerance:   Good    After treatment patient left in no apparent distress:   Sitting in chair and Call bell within reach    COMMUNICATION/COLLABORATION:   The patients plan of care was discussed with: Registered nurseTrae Yee   Time Calculation: 25 mins

## 2023-03-30 NOTE — PROGRESS NOTES
Problem: Falls - Risk of  Goal: *Absence of Falls  Description: Document Gerri Martinez Fall Risk and appropriate interventions in the flowsheet. Outcome: Progressing Towards Goal  Note: Fall Risk Interventions:                                Problem: Pressure Injury - Risk of  Goal: *Prevention of pressure injury  Description: Document Nadeem Scale and appropriate interventions in the flowsheet.   Outcome: Progressing Towards Goal  Note: Pressure Injury Interventions:  Sensory Interventions: Assess changes in LOC, Float heels, Minimize linen layers    Moisture Interventions: Check for incontinence Q2 hours and as needed, Internal/External urinary devices, Minimize layers    Activity Interventions: Chair cushion, Increase time out of bed    Mobility Interventions: Assess need for specialty bed, Chair cushion, Float heels    Nutrition Interventions: Document food/fluid/supplement intake    Friction and Shear Interventions: Apply protective barrier, creams and emollients, Lift sheet

## 2023-03-30 NOTE — PROGRESS NOTES
Bedside and Verbal shift change report given to Sisi Pittman RN (oncoming nurse) by Jasmin Thornton RN (offgoing nurse). Report included the following information SBAR, Kardex, Intake/Output, MAR, and Recent Results.

## 2023-03-30 NOTE — PROGRESS NOTES
Bedside and Verbal shift change report given to [address] [place] [address] (oncoming nurse) by Alvarez Shaw RN (offgoing nurse). Report included the following information SBAR, Kardex, Intake/Output, MAR, Recent Results, and Med Rec Status.

## 2023-03-31 VITALS
OXYGEN SATURATION: 98 % | TEMPERATURE: 97.4 F | SYSTOLIC BLOOD PRESSURE: 100 MMHG | WEIGHT: 167.3 LBS | HEART RATE: 73 BPM | HEIGHT: 65 IN | BODY MASS INDEX: 27.88 KG/M2 | RESPIRATION RATE: 18 BRPM | DIASTOLIC BLOOD PRESSURE: 64 MMHG

## 2023-03-31 PROBLEM — Z90.49 S/P COLECTOMY: Status: ACTIVE | Noted: 2023-01-01

## 2023-03-31 PROBLEM — Z90.49 S/P COLECTOMY: Status: ACTIVE | Noted: 2023-03-31

## 2023-03-31 LAB
ANION GAP SERPL CALC-SCNC: 6 MMOL/L (ref 5–15)
BACTERIA SPEC CULT: NORMAL
BACTERIA SPEC CULT: NORMAL
BASOPHILS # BLD: 0.2 K/UL (ref 0–0.1)
BASOPHILS NFR BLD: 1 % (ref 0–1)
BUN SERPL-MCNC: 2 MG/DL (ref 6–20)
BUN/CREAT SERPL: 6 (ref 12–20)
CALCIUM SERPL-MCNC: 7.8 MG/DL (ref 8.5–10.1)
CHLORIDE SERPL-SCNC: 109 MMOL/L (ref 97–108)
CO2 SERPL-SCNC: 25 MMOL/L (ref 21–32)
CREAT SERPL-MCNC: 0.34 MG/DL (ref 0.55–1.02)
DIFFERENTIAL METHOD BLD: ABNORMAL
EOSINOPHIL # BLD: 0.6 K/UL (ref 0–0.4)
EOSINOPHIL NFR BLD: 3 % (ref 0–7)
ERYTHROCYTE [DISTWIDTH] IN BLOOD BY AUTOMATED COUNT: 13.7 % (ref 11.5–14.5)
GLUCOSE SERPL-MCNC: 116 MG/DL (ref 65–100)
HCT VFR BLD AUTO: 29.9 % (ref 35–47)
HGB BLD-MCNC: 10 G/DL (ref 11.5–16)
IMM GRANULOCYTES # BLD AUTO: 0 K/UL
IMM GRANULOCYTES NFR BLD AUTO: 0 %
LYMPHOCYTES # BLD: 3.3 K/UL (ref 0.8–3.5)
LYMPHOCYTES NFR BLD: 17 % (ref 12–49)
MAGNESIUM SERPL-MCNC: 1.6 MG/DL (ref 1.6–2.4)
MCH RBC QN AUTO: 29.3 PG (ref 26–34)
MCHC RBC AUTO-ENTMCNC: 33.4 G/DL (ref 30–36.5)
MCV RBC AUTO: 87.7 FL (ref 80–99)
METAMYELOCYTES NFR BLD MANUAL: 5 %
MONOCYTES # BLD: 1.3 K/UL (ref 0–1)
MONOCYTES NFR BLD: 7 % (ref 5–13)
MYELOCYTES NFR BLD MANUAL: 2 %
NEUTS SEG # BLD: 12.5 K/UL (ref 1.8–8)
NEUTS SEG NFR BLD: 65 % (ref 32–75)
NRBC # BLD: 0.08 K/UL (ref 0–0.01)
NRBC BLD-RTO: 0.4 PER 100 WBC
PLATELET # BLD AUTO: 698 K/UL (ref 150–400)
PMV BLD AUTO: 8 FL (ref 8.9–12.9)
POTASSIUM SERPL-SCNC: 3.1 MMOL/L (ref 3.5–5.1)
RBC # BLD AUTO: 3.41 M/UL (ref 3.8–5.2)
RBC MORPH BLD: ABNORMAL
SERVICE CMNT-IMP: NORMAL
SERVICE CMNT-IMP: NORMAL
SODIUM SERPL-SCNC: 140 MMOL/L (ref 136–145)
WBC # BLD AUTO: 19.2 K/UL (ref 3.6–11)

## 2023-03-31 PROCEDURE — 83735 ASSAY OF MAGNESIUM: CPT

## 2023-03-31 PROCEDURE — 74011250637 HC RX REV CODE- 250/637

## 2023-03-31 PROCEDURE — 74011250636 HC RX REV CODE- 250/636

## 2023-03-31 PROCEDURE — 85025 COMPLETE CBC W/AUTO DIFF WBC: CPT

## 2023-03-31 PROCEDURE — 80048 BASIC METABOLIC PNL TOTAL CA: CPT

## 2023-03-31 PROCEDURE — U0005 INFEC AGEN DETEC AMPLI PROBE: HCPCS

## 2023-03-31 PROCEDURE — 74011000272 HC RX REV CODE- 272

## 2023-03-31 PROCEDURE — 74011000250 HC RX REV CODE- 250

## 2023-03-31 PROCEDURE — 36415 COLL VENOUS BLD VENIPUNCTURE: CPT

## 2023-03-31 PROCEDURE — 97530 THERAPEUTIC ACTIVITIES: CPT

## 2023-03-31 PROCEDURE — 97116 GAIT TRAINING THERAPY: CPT

## 2023-03-31 PROCEDURE — 74011250636 HC RX REV CODE- 250/636: Performed by: STUDENT IN AN ORGANIZED HEALTH CARE EDUCATION/TRAINING PROGRAM

## 2023-03-31 PROCEDURE — 2709999900 HC NON-CHARGEABLE SUPPLY

## 2023-03-31 PROCEDURE — 74011250637 HC RX REV CODE- 250/637: Performed by: STUDENT IN AN ORGANIZED HEALTH CARE EDUCATION/TRAINING PROGRAM

## 2023-03-31 PROCEDURE — 97110 THERAPEUTIC EXERCISES: CPT

## 2023-03-31 PROCEDURE — 94761 N-INVAS EAR/PLS OXIMETRY MLT: CPT

## 2023-03-31 RX ORDER — METRONIDAZOLE 500 MG/1
500 TABLET ORAL 3 TIMES DAILY
Qty: 28 TABLET | Refills: 0 | Status: SHIPPED | OUTPATIENT
Start: 2023-03-31 | End: 2023-04-10

## 2023-03-31 RX ORDER — HYDROCODONE BITARTRATE AND ACETAMINOPHEN 5; 325 MG/1; MG/1
1 TABLET ORAL
Qty: 10 TABLET | Refills: 0 | Status: SHIPPED | OUTPATIENT
Start: 2023-03-31 | End: 2023-04-03

## 2023-03-31 RX ORDER — POTASSIUM CHLORIDE 20 MEQ/1
20 TABLET, EXTENDED RELEASE ORAL DAILY
Qty: 30 TABLET | Refills: 0 | Status: SHIPPED | OUTPATIENT
Start: 2023-03-31

## 2023-03-31 RX ORDER — MAGNESIUM SULFATE HEPTAHYDRATE 40 MG/ML
2 INJECTION, SOLUTION INTRAVENOUS ONCE
Status: COMPLETED | OUTPATIENT
Start: 2023-03-31 | End: 2023-03-31

## 2023-03-31 RX ORDER — POTASSIUM CHLORIDE 7.45 MG/ML
10 INJECTION INTRAVENOUS
Status: COMPLETED | OUTPATIENT
Start: 2023-03-31 | End: 2023-03-31

## 2023-03-31 RX ORDER — PANTOPRAZOLE SODIUM 40 MG/1
40 TABLET, DELAYED RELEASE ORAL
Status: DISCONTINUED | OUTPATIENT
Start: 2023-03-31 | End: 2023-03-31 | Stop reason: HOSPADM

## 2023-03-31 RX ADMIN — POTASSIUM BICARBONATE 40 MEQ: 782 TABLET, EFFERVESCENT ORAL at 06:44

## 2023-03-31 RX ADMIN — SODIUM CHLORIDE, PRESERVATIVE FREE 10 ML: 5 INJECTION INTRAVENOUS at 04:24

## 2023-03-31 RX ADMIN — SODIUM CHLORIDE, PRESERVATIVE FREE 10 ML: 5 INJECTION INTRAVENOUS at 14:17

## 2023-03-31 RX ADMIN — GABAPENTIN 600 MG: 300 CAPSULE ORAL at 15:26

## 2023-03-31 RX ADMIN — PANTOPRAZOLE SODIUM 40 MG: 40 TABLET, DELAYED RELEASE ORAL at 11:57

## 2023-03-31 RX ADMIN — METRONIDAZOLE 500 MG: 500 INJECTION, SOLUTION INTRAVENOUS at 12:00

## 2023-03-31 RX ADMIN — POTASSIUM CHLORIDE 10 MEQ: 7.46 INJECTION, SOLUTION INTRAVENOUS at 06:44

## 2023-03-31 RX ADMIN — SODIUM CHLORIDE 2 G: 9 INJECTION INTRAMUSCULAR; INTRAVENOUS; SUBCUTANEOUS at 05:36

## 2023-03-31 RX ADMIN — POTASSIUM CHLORIDE 10 MEQ: 7.46 INJECTION, SOLUTION INTRAVENOUS at 11:57

## 2023-03-31 RX ADMIN — MORPHINE SULFATE 2 MG: 2 INJECTION, SOLUTION INTRAMUSCULAR; INTRAVENOUS at 04:23

## 2023-03-31 RX ADMIN — VANCOMYCIN HYDROCHLORIDE 500 MG: 250 POWDER, FOR SOLUTION ORAL at 15:14

## 2023-03-31 RX ADMIN — PROCHLORPERAZINE EDISYLATE 10 MG: 5 INJECTION INTRAMUSCULAR; INTRAVENOUS at 07:32

## 2023-03-31 RX ADMIN — METRONIDAZOLE 500 MG: 500 INJECTION, SOLUTION INTRAVENOUS at 04:24

## 2023-03-31 RX ADMIN — VANCOMYCIN HYDROCHLORIDE 500 MG: 500 INJECTION, POWDER, LYOPHILIZED, FOR SOLUTION INTRAVENOUS at 03:17

## 2023-03-31 RX ADMIN — ATORVASTATIN CALCIUM 20 MG: 20 TABLET, FILM COATED ORAL at 08:47

## 2023-03-31 RX ADMIN — GABAPENTIN 600 MG: 300 CAPSULE ORAL at 08:47

## 2023-03-31 RX ADMIN — METOPROLOL TARTRATE 25 MG: 25 TABLET, FILM COATED ORAL at 08:47

## 2023-03-31 RX ADMIN — VANCOMYCIN HYDROCHLORIDE 500 MG: 250 POWDER, FOR SOLUTION ORAL at 10:05

## 2023-03-31 RX ADMIN — VANCOMYCIN HYDROCHLORIDE 500 MG: 500 INJECTION, POWDER, LYOPHILIZED, FOR SOLUTION INTRAVENOUS at 10:04

## 2023-03-31 RX ADMIN — VANCOMYCIN HYDROCHLORIDE 500 MG: 250 POWDER, FOR SOLUTION ORAL at 03:17

## 2023-03-31 RX ADMIN — MAGNESIUM SULFATE HEPTAHYDRATE 2 G: 40 INJECTION, SOLUTION INTRAVENOUS at 06:44

## 2023-03-31 RX ADMIN — POTASSIUM CHLORIDE 10 MEQ: 7.46 INJECTION, SOLUTION INTRAVENOUS at 15:06

## 2023-03-31 RX ADMIN — POTASSIUM CHLORIDE 10 MEQ: 7.46 INJECTION, SOLUTION INTRAVENOUS at 08:46

## 2023-03-31 RX ADMIN — BUPROPION HYDROCHLORIDE 300 MG: 150 TABLET, EXTENDED RELEASE ORAL at 08:47

## 2023-03-31 RX ADMIN — POTASSIUM CHLORIDE 10 MEQ: 7.46 INJECTION, SOLUTION INTRAVENOUS at 14:17

## 2023-03-31 RX ADMIN — POTASSIUM CHLORIDE 10 MEQ: 7.46 INJECTION, SOLUTION INTRAVENOUS at 13:04

## 2023-03-31 RX ADMIN — ENOXAPARIN SODIUM 40 MG: 100 INJECTION SUBCUTANEOUS at 08:47

## 2023-03-31 NOTE — PROGRESS NOTES
[facility] Family Medicine Service Daily Progress Note    24 Hour Events: Imtiaz Lundy. SUBJECTIVE: Patient feeling well with well controlled pain. She denies fever, chills, nausea, vomiting, or other symptoms at this time. Patient reports some abdominal pain overnight that is now resolved. OBJECTIVE:    Vitals: Visit Vitals  /83 (BP 1 Location: Left upper arm, BP Patient Position: At rest;Semi fowlers)   Pulse 86   Temp 97.7 °F (36.5 °C)   Resp 18   Ht 5' 5\" (1.651 m)   Wt 167 lb 4.8 oz (75.9 kg)   SpO2 94%   Breastfeeding No   BMI 27.84 kg/m²       Physical Exam:  General: No acute distress  Respiratory: Clear lung fields bilaterally, no wheeze, rales  Cardiovascular: Regular rate, rhythm no murmurs gallops  GI: no distension. + bowel sounds. Post-surgical dressing C/D/I with no surrounding erythema and appropriate post-op tenderness. TRISTA tube in place dry surrounding dressing. Colostomy C/D/I. Extremities: 1+ lower extremity edema   Skin: Warm, dry.     Inpatient Medications  Current Facility-Administered Medications   Medication Dose Route Frequency    potassium chloride 10 mEq in 100 ml IVPB  10 mEq IntraVENous Q1H    magnesium sulfate 2 g/50 ml IVPB (premix or compounded)  2 g IntraVENous ONCE    hydrOXYzine HCL (ATARAX) tablet 25 mg  25 mg Oral TID PRN    cefTRIAXone (ROCEPHIN) 2 g in 0.9% sodium chloride 20 mL IV syringe  2 g IntraVENous Q24H    melatonin tablet 3 mg  3 mg Oral QHS PRN    vancomycin (FIRVANQ) 50 mg/mL oral solution 500 mg  500 mg Oral Q6H    metroNIDAZOLE (FLAGYL) IVPB premix 500 mg  500 mg IntraVENous Q8H    vancomycin (VANCOCIN) rectal enema 500 mg  500 mg Rectal Q6H    0.9% sodium chloride infusion  50 mL/hr IntraVENous CONTINUOUS    morphine injection 2 mg  2 mg IntraVENous Q3H PRN    PHENYLephrine (VANITA-SYNEPHRINE) 30 mg in 0.9% sodium chloride 250 mL infusion   mcg/min IntraVENous TITRATE    0.9% sodium chloride infusion 250 mL  250 mL IntraVENous PRN    sodium chloride (NS) flush 5-40 mL  5-40 mL IntraVENous Q8H    sodium chloride (NS) flush 5-40 mL  5-40 mL IntraVENous PRN    acetaminophen (TYLENOL) tablet 650 mg  650 mg Oral Q6H PRN    Or    acetaminophen (TYLENOL) suppository 650 mg  650 mg Rectal Q6H PRN    polyethylene glycol (MIRALAX) packet 17 g  17 g Oral DAILY PRN    enoxaparin (LOVENOX) injection 40 mg  40 mg SubCUTAneous DAILY    promethazine (PHENERGAN) tablet 12.5 mg  12.5 mg Oral Q6H PRN    albuterol (PROVENTIL VENTOLIN) nebulizer solution 2.5 mg  2.5 mg Nebulization Q4H PRN    atorvastatin (LIPITOR) tablet 20 mg  20 mg Oral DAILY    budesonide (PULMICORT) 500 mcg/2 ml nebulizer suspension  250 mcg Nebulization BID RT    [Held by provider] bumetanide (BUMEX) tablet 0.5 mg  0.5 mg Oral DAILY    buPROPion XL (WELLBUTRIN XL) tablet 300 mg  300 mg Oral BID    [Held by provider] hydrOXYzine HCL (ATARAX) tablet 25 mg  25 mg Oral QHS    metoprolol tartrate (LOPRESSOR) tablet 25 mg  25 mg Oral BID    gabapentin (NEURONTIN) capsule 600 mg  600 mg Oral TID    prochlorperazine (COMPAZINE) injection 10 mg  10 mg IntraVENous Q6H PRN       Allergies  Allergies   Allergen Reactions    Accupril [Quinapril] Cough    Adhesive Tape-Silicones Other (comments)     Makes skin tears easily.      Lipitor [Atorvastatin] Other (comments)     aches    Norvasc [Amlodipine] Swelling     On legs at 10 mg dose       CBC:  Recent Labs     03/31/23  0012 03/30/23  0112 03/29/23  0200   WBC 19.2* 17.9* 21.2*   HGB 10.0* 8.4* 7.7*   HCT 29.9* 25.6* 23.2*   * 487* 037*         Metabolic Panel:  Recent Labs     03/31/23 0012 03/30/23  0112 03/29/23  0200    141 140   K 3.1* 3.2* 3.0*   * 112* 111*   CO2 25 25 23   BUN 2* 3* 5*   CREA 0.34* 0.23* 0.37*   * 88 93   CA 7.8* 7.5* 7.7*   MG 1.6 1.7 1.8              Assessment and Plan   Esvin Villar is a 58 y.o. female with PMH of hypertension, asthma, GERD, gastric bypass,  CAD s/p PCI (2015), anxiety, type 2 diabetes mellitus, HFpEF (EF 65%, G1DD 2015)  who is admitted for hypokalemia and leukocytosis. Septic shock 2/2 fulminant colitis: Improved, now s/p partial colectomy on 3/27/23. POA wbc 46. 5. presumed infectious etiology with concern for C. Diff given recent Abx use. - general surgery consulted, appreciate recs  - path results consistent with C. diff  - CTX, flagyl, vanc (PO and rectal enema) to cover for fulminant colitis and C. Diff given recent Abx, leukocytosis, and path results  - blood culture no growth  - Daily CBC, BMP  - IVF at 50ml/hr     Hypokalemia: POA K 2.1 in s/o persistent GI loses. EKG sinus rhythm, no T wave changes.  - Replete as needed  - Monitor on BMP    Anemia: in the post-operative setting. LO evidenced on labs  - s/p IV venofer    Thrombocytosis: POA Plt 520. Likely reactive, per heme/onc. - Monitor on labs     H/o recent falls: patient recently evaluated at [facility] ED for mechanical GLF. CT imaging negative. H/o Gastric bypass: 2017. Avoid NSAIDs and ASA. CAD s/p PCI: Stent dLCx in 2015. F/w Dr Deborah Spears MD ( cards). LOV 10/22. Stress test 2017 EF 64%  - Continue Lipitor 20 mg daily  - resume Lopressor 25mg BID     HFpEF: Stress test 2017 EF 64%. Trending towards hypervolemia.  - Hold home Bumex 0.5 mg daily in s/o soft BP  - resume Lopressor 25mg BID  - decreased IVF to 50ml/hr     T2DM: Last HgA1C 5.4 04/2022. Diet controlled s/p gastric bypass. Anxiety:   - Continue Wellbutrin  mg BID  - atarax TID prn     Asthma:   - Continue home Albuterol and Pulmicort BID     Hypertension:   - Hold Bumex 0.5 mg daily  - Monitor BP and adjust as needed     GERD:   - Cont home Protonix 40 mg IV daily. Fibromyalgia:   - Continue gabapentin 600mg TID.      Prolonged Qtc:   - Avoid qtc prolonging drugs     DDD, Arthritis: on home flexeril 10 mg TID prn    Chet Lazar MD  Family Medicine Resident       For Billing    Chief Complaint   Patient presents with    Abnormal White Blood Cell Count       Hospital Problems  Date Reviewed: 3/26/2023            Codes Class Noted POA    Neutrophilia ICD-10-CM: D72.9  ICD-9-CM: 288.8  3/25/2023 Unknown        Diarrhea of presumed infectious origin ICD-10-CM: R19.7  ICD-9-CM: 009.3  3/25/2023 Unknown        Thrombocytosis ICD-10-CM: D75.839  ICD-9-CM: 238.71  3/25/2023 Unknown        * (Principal) Colitis ICD-10-CM: K52.9  ICD-9-CM: 558.9  3/13/2023 Unknown        H/O gastric bypass ICD-10-CM: Z98.84  ICD-9-CM: V45.86  7/12/2017 Yes    Overview Addendum 11/3/2018  9:51 AM by Nicolás Snyder 1/2017  lost from 230 to 154 lbs             Hypokalemia ICD-10-CM: E87.6  ICD-9-CM: 276.8  1/26/2017 Yes        Anxiety ICD-10-CM: F41.9  ICD-9-CM: 300.00  6/1/2016 Yes    Overview Addendum 9/18/2018  4:54 PM by Nicolás Bills     Needs to be seen at least twice yearly for BNZ  FTF 12/19/2017, 9/18/18   as expected 12/19/2017, 9/18/18             FH: diabetes mellitus ICD-10-CM: Z83.3  ICD-9-CM: V18.0  8/4/2015 Yes        Essential hypertension ICD-10-CM: I10  ICD-9-CM: 401.9  5/17/2015 Yes        Fibromyalgia ICD-10-CM: M79.7  ICD-9-CM: 729.1  3/30/2015 Yes    Overview Addendum 7/12/2017 11:40 AM by Kulwinder Lofton  S/p NEREYDA x 3 in back via Dr. Frank Castaneda.   He uses flexeril HS for pain             Asthma ICD-10-CM: J45.909  ICD-9-CM: 493.90  9/22/2010 Yes        GERD (gastroesophageal reflux disease) ICD-10-CM: K21.9  ICD-9-CM: 530.81  9/22/2010 Yes

## 2023-03-31 NOTE — DISCHARGE SUMMARY
Discharge Note     Name: Victory Jeans MRN: [id number]  Sex: Female   YOB: 1960  Age: 58 y.o. PCP: Charlotte Laguna MD     Date of admission: 3/25/2023  Date of discharge/transfer: 3/31/2023    Attending physician at admission:  Audrey Martínez MD .  Attending physician at discharge/transfer: Wayne Aggarwal DO  Resident physician at discharge/transfer: Malathi Youngblood MD     Consultants during hospitalization  IP CONSULT TO [id number] TO GENERAL SURGERY     Admission diagnoses   Colitis [K52.9]  Leukocytosis [D72.829]    Recommended follow-up after discharge    1. PCP-Daniel Cheung MD  2. General surgery     Things to follow up on with PCP:   - post-surgical recovery and pain management  - repeat CBC to monitor for anemia, resolution of leukocytosis. Repeat CMP to monitor potassium. - completion of antibiotic course - flagyl EOT 4/10.  - oral potassium supplementation. Started on 20mEq daily. - monitor blood pressure  - ongoing ostomy management      Medication Changes:  Current Discharge Medication List        START taking these medications    Details   HYDROcodone-acetaminophen (Norco) 5-325 mg per tablet Take 1 Tablet by mouth every six (6) hours as needed for Pain for up to 3 days. Max Daily Amount: 4 Tablets. Qty: 10 Tablet, Refills: 0  Start date: 3/31/2023, End date: 4/3/2023    Associated Diagnoses: Colitis; S/P colectomy      potassium chloride (K-DUR, KLOR-CON M20) 20 mEq tablet Take 1 Tablet by mouth daily. Qty: 30 Tablet, Refills: 0  Start date: 3/31/2023           CONTINUE these medications which have CHANGED    Details   metroNIDAZOLE (FLAGYL) 500 mg tablet Take 1 Tablet by mouth three (3) times daily for 28 doses.   Qty: 28 Tablet, Refills: 0  Start date: 3/31/2023, End date: 4/10/2023           CONTINUE these medications which have NOT CHANGED    Details   gabapentin (NEURONTIN) 600 mg tablet Take 1 Tablet by mouth three (3) times daily. Qty: 90 Tablet, Refills: 0    Comments: This request is for a new prescription for a controlled substance as required by Federal/State law. Associated Diagnoses: Fibromyalgia      albuterol (ACCUNEB) 0.63 mg/3 mL nebulizer solution 3 mL by Nebulization route every six (6) hours as needed for Wheezing. Qty: 75 mL, Refills: 1    Associated Diagnoses: Moderate persistent asthma without complication      L.acid,para-B. bifidum-S.therm (RISAQUAD) 8 billion cell cap cap Take 1 Capsule by mouth daily. Qty: 30 Capsule, Refills: 5    Associated Diagnoses: Colitis      polyethylene glycol (MIRALAX) 17 gram packet Take 1 Packet by mouth daily for 30 days. Qty: 30 Packet, Refills: 0      ondansetron (ZOFRAN ODT) 4 mg disintegrating tablet TAKE 1 TABLET BY MOUTH EVERY 8 HOURS AS NEEDED FOR NAUSEA  Qty: 20 Tablet, Refills: 1      cyclobenzaprine (FLEXERIL) 10 mg tablet Take 1 Tablet by mouth three (3) times daily as needed for Muscle Spasm(s). Qty: 90 Tablet, Refills: 1    Associated Diagnoses: DDD (degenerative disc disease), lumbar      nitroglycerin (NITROSTAT) 0.4 mg SL tablet TAKE 1 TABLET BY MOUTH EVERY 5 MINUTES UP TO 3 DOSES THEN CALL 911 IF PAIN PERSISTS  Qty: 25 Tablet, Refills: 2      buPROPion XL (WELLBUTRIN XL) 300 mg XL tablet Take 300 mg by mouth two (2) times a day. atorvastatin (LIPITOR) 20 mg tablet TAKE 1 TABLET BY MOUTH EVERY DAY  Qty: 30 Tablet, Refills: 11    Comments: EMERGENCY HOLDOVER PROVIDED: UP#[id number]. [facility] GAVE 3 ATORVASTATIN 20 MG TABLET.  MD CONTACTED FOR AUTHORIZATION ON 11/26/2022      hydrOXYzine pamoate (VISTARIL) 25 mg capsule TAKE 1 CAPSULE BY MOUTH TWICE A DAY AS NEEDED FOR ANXIETY  Qty: 60 Capsule, Refills: 11    Associated Diagnoses: Anxiety      metoprolol tartrate (LOPRESSOR) 25 mg tablet TAKE 1 TABLET BY MOUTH TWICE A DAY  Qty: 180 Tablet, Refills: 1    Associated Diagnoses: Coronary artery disease involving native coronary artery of native heart without angina pectoris      bumetanide (BUMEX) 0.5 mg tablet TAKE 1 TABLET BY MOUTH EVERY DAY AS NEEDED  Qty: 90 Tablet, Refills: 2      omeprazole (PRILOSEC) 40 mg capsule TAKE 1 CAPSULE BY MOUTH EVERY DAY  Qty: 30 Capsule, Refills: 5    Associated Diagnoses: Gastroesophageal reflux disease without esophagitis      albuterol (ProAir HFA) 90 mcg/actuation inhaler Take 1 Puff by inhalation every four (4) hours as needed for Wheezing or Shortness of Breath. Qty: 1 Each, Refills: 3    Associated Diagnoses: Moderate persistent asthma without complication      budesonide (Pulmicort Flexhaler) 180 mcg/actuation aepb inhaler Take 2 Puffs by inhalation daily. Qty: 1 Each, Refills: 3    Associated Diagnoses: Moderate persistent asthma without complication      alcohol swabs (ALCOHOL PADS) padm Use when checking blood glucose  Qty: 100 Pad, Refills: 5    Associated Diagnoses: Diabetes mellitus type 2, controlled ([facility])      co-enzyme Q-10 (CO Q-10) 100 mg capsule TAKE 1 CAPSULE BY MOUTH EVERY DAY  Qty: 90 Capsule, Refills: 1    Associated Diagnoses: Muscle ache; Taking a statin medication      glucose blood VI test strips (BLOOD GLUCOSE TEST) strip Use once a day  Qty: 100 Strip, Refills: 5    Associated Diagnoses: Type 2 diabetes mellitus with diabetic neuropathy, without long-term current use of insulin ([facility])      Blood-Glucose Meter monitoring kit Use to check glucose once a day  Qty: 1 Kit, Refills: 0    Associated Diagnoses: Diabetes mellitus type 2, controlled ([facility])      Lancets misc Use once a day.  Please give one compatible with patient's meter  Qty: 1 Package, Refills: 5    Associated Diagnoses: Diabetes mellitus type 2, controlled ([facility]           STOP taking these medications       ciprofloxacin HCl (CIPRO) 500 mg tablet Comments:   Reason for Stopping:         valACYclovir (VALTREX) 500 mg tablet Comments:   Reason for Stopping:                 History of Present Illness    As per admitting provider:   George Johnson is a 58 y.o. female with PMH of hypertension, asthma, GERD, gastric bypass,  CAD s/p PCI (2015), anxiety, T2DM, HFpEF who presents to the ER for evaluation of outpatient leukocytosis. She was seen on 3/15 for ischemic colitis, seen by GI and was given IV zosyn. She felt symptomatically better and was discharged to complete a 10 day course of Augmentin. However she had ongoing mucus in stools, abdominal distention and LLQ pain. Yesterday she was seen by her PCP for removal of head sutures for a recent GLF. She had lab work which showed elevated white counts so she was asked to go to the ED. She was started on ciprofloxacin and flagyl which she took 1 dose. Today morning she has a temp of 100 F. She has been passing non bloody loose stools and flatus. Mother had colon cancer and brother had lymphoma. No personal h/o cancer. \"      Hospital course    Septic shock 2/2 fulminant colitis: Improved, now s/p partial colectomy w/ ileostomy on 3/27/23. POA wbc 46. 5. presumed infectious etiology with concern for C. Diff given recent Abx use - surgical pathology also indicative of c diff. Blood culture no growth. - continue flagyl PO for 10 day course (EOT 4/9)  - Will need ostomy care  - Follow up with general surgery for ostomy check and consideration of re-anastomosis      Hypokalemia: POA K 2.1 in s/o persistent GI loses. EKG sinus rhythm, no T wave changes. - Will discharge on klor-con 20mEq daily  - Monitor on BMP     Anemia: in the post-operative setting. LO evidenced on labs. S/p IV venofer. - Continue iron supplementation at discharge  - Monitor on CBC     Thrombocytosis: POA Plt 520. Likely reactive, per heme/onc. - Monitor on labs     H/o recent falls: patient recently evaluated at [facility] ED for mechanical GLF. CT imaging negative. Had skull laceration w/ staples now s/p removal.  - Continue rehabilitation w/ PT/OT     H/o Gastric bypass: 2017. Avoid NSAIDs and ASA. CAD s/p PCI: Stent dLCx in 2015.  F/w  MD John ( cards). LOV 10/22. Stress test 2017 EF 64%  - Continue Lipitor 20 mg daily  - Continue Lopressor 25mg BID     HFpEF: Stress test 2017 EF 64%. - Hold home Bumex 0.5 mg daily in s/o soft BP - consider restarting OP for HTN or signs of hypervolemia  - resume Lopressor 25mg BID     T2DM: Last HgA1C 5.4 04/2022. Diet controlled s/p gastric bypass. Anxiety:   - Continue Wellbutrin  mg BID  - atarax TID prn     Asthma:   - Continue home Albuterol and Pulmicort BID     Hypertension:   - Hold Bumex 0.5 mg daily  - Continue lopressor 25mg bid  - Monitor BP and adjust as needed     GERD:   - Cont home omeprazole 40 mg daily. Fibromyalgia:   - Continue gabapentin 600mg TID. Prolonged Qtc:   - Avoid qtc prolonging drugs     DDD, Arthritis: on home flexeril 10 mg TID prn        Physical exam at discharge:    Vitals Reviewed. Patient Vitals for the past 12 hrs:   Temp Pulse Resp BP SpO2   03/31/23 0811 97.7 °F (36.5 °C) 86 18 127/83 94 %   03/31/23 0700 -- 84 -- -- --   03/31/23 0405 97.8 °F (36.6 °C) 84 18 136/82 97 %        General: No acute distress  Respiratory: Clear lung fields bilaterally, no wheeze, rales  Cardiovascular: Regular rate, rhythm no murmurs gallops  GI: no distension. + bowel sounds. Post-surgical dressing C/D/I with no surrounding erythema and appropriate post-op tenderness. TRISTA tube in place dry surrounding dressing. Colostomy C/D/I. Extremities: 2+ lower extremity edema   Skin: Warm, dry. Condition at discharge: Stable.     Labs  Recent Labs     03/31/23  0012 03/30/23  0112 03/29/23  0200   WBC 19.2* 17.9* 21.2*   HGB 10.0* 8.4* 7.7*   HCT 29.9* 25.6* 23.2*   * 487* 449*     Recent Labs     03/31/23  0012 03/30/23  0112 03/29/23  0200    141 140   K 3.1* 3.2* 3.0*   * 112* 111*   CO2 25 25 23   BUN 2* 3* 5*   CREA 0.34* 0.23* 0.37*   * 88 93   CA 7.8* 7.5* 7.7*   MG 1.6 1.7 1.8     No results for input(s): ALT, AP, TBIL, TBILI, TP, ALB, GLOB, GGT, AML, LPSE in the last 72 hours. No lab exists for component: SGOT, GPT, AMYP, HLPSE  No results for input(s): PH, PCO2, PO2, TNIPOC, TROIQ, INR, PTP, APTT, FE, TIBC, PSAT, FERR, GLUCPOC, INREXT in the last 72 hours. No lab exists for component: GLPOC    Micro:  Lab Results   Component Value Date/Time    Culture result: NO GROWTH 6 DAYS 03/25/2023 05:56 PM    Culture result: NO GROWTH 6 DAYS 03/25/2023 02:38 PM    Culture result: NO GROWTH 5 DAYS 03/13/2023 03:19 PM       Imaging:  CT HEAD WO CONT    Result Date: 3/15/2023  HEAD CT WITHOUT CONTRAST: 3/15/2023 8:55 PM INDICATION: trauma COMPARISON: None. PROCEDURE: Axial images of the head were obtained without contrast. Coronal and sagittal reformats were performed. CT dose reduction was achieved through use of a standardized protocol tailored for this examination and automatic exposure control for dose modulation. Adaptive statistical iterative reconstruction (ASIR) was utilized. FINDINGS: There is a right frontal scalp laceration. The ventricles and sulci are appropriate in size and configuration for age. No loss of gray-white differentiation to suggest late acute or early subacute infarction. No mass effect or intracranial hemorrhage. No acute intracranial abnormality. CT ABD PELV W CONT    Result Date: 3/25/2023  INDICATION: Diarrhea versus megacolon. CT of the abdomen and pelvis is performed with 5 mm collimation. Study is performed with 100 cc of nonionic Isovue 370. Sagittal and coronal reformatted images were also performed. CT dose reduction was achieved with the use of the standardized protocol tailored for this examination and automatic exposure control for dose modulation. Direct comparison is made to prior CT dated March 13, 2023. Findings: Lung bases: There is very mild left basilar atelectasis. Liver: The liver is normal. Adrenals: Adrenal glands are normal. Pancreas: The pancreas is normal. Gallbladder:  The gallbladder is normal. Kidneys: The kidneys are normal. There is no hydronephrosis. Spleen: The spleen is normal. Lymph nodes. There is no mayte hepatitis, mesenteric, retroperitoneal or pelvic lymphadenopathy. Bowel: There is circumferential thickening of the right colon, transverse colon, descending, sigmoid colon and rectum. The colon is nondilated. No colonic pseudopolyps are visualized. There is a moderate amount of stool in the colon. The small bowel is nondilated. Gastric bypass postoperative changes are noted Urinary bladder: Urinary bladder is partially filled and grossly normal. Miscellaneous: There is no free intraperitoneal fluid or gas. There is no focal fluid collection to suggest abscess. Circumferential thickening of the colon and rectum, as described above. CT ABD PELV W CONT    Result Date: 3/13/2023  EXAM CLINICAL HISTORY: abd pain INDICATION: abd pain COMPARISON: 2019. CONTRAST: 100  ml Isovue 370 TECHNIQUE: Multislice helical CT was performed of the abdomen and pelvis following uneventful rapid bolus intravenous contrast administration. Oral contrast was not administered. Contiguous 5 mm axial images were reconstructed and lung and soft tissue windows were generated. Coronal and sagittal reformations were generated. CT dose reduction was achieved through use of a standardized protocol tailored for this examination and automatic exposure control for dose modulation. FINDINGS: LUNG BASES:No mass lesion or effusion. LIVER/GALLBLADDER: Hepatic steatosis and cholecystectomy. There is no intrahepatic duct dilatation. There is no hepatic parenchymal mass. Hepatic enhancement pattern is within normal limits. Portal vein is patent. SPLEEN/PANCREAS: No mass. There is no pancreatic duct dilatation. There is no evidence of splenomegaly. ADRENALS/KIDNEYS: No mass. There is no hydronephrosis. There is no renal mass. There is no perinephric mass.  STOMACH: Gastric bypass COLON AND SMALL BOWEL: There is extensive colonic wall thickening at the sigmoid colon and descending colon. Ascending colonic wall thickening is less conspicuous. There is fecal stasis. There is no free intraperitoneal air. There is no evidence of incarceration or obstruction. No mesenteric adenopathy. PERITONEUM: Unremarkable APPENDIX: Unremarkable. BLADDER/REPRODUCTIVE ORGANS: No mass or calculus. RETROPERITONEUM: Unremarkable. The abdominal aorta is normal in caliber. No aneurysm. No retroperitoneal adenopathy. OSSEOUS STRUCTURES: No destructive bone lesion. Imaging findings consistent with a moderate to severe infectious/inflammatory colitis most pronounced at the sigmoid and descending colon. Incidental and/or nonemergent findings are as described above. XR CHEST PORT    Result Date: 3/27/2023  INDICATION: Central Line placement EXAMINATION:  AP CHEST, PORTABLE COMPARISON: 12/27/2017 FINDINGS: Single AP portable view of the chest demonstrates interval placement of a right internal jugular venous catheter with tip over the distal superior vena cava. The cardiomediastinal silhouette is unchanged. There is no pneumothorax. Bibasilar atelectasis. No pneumothorax following insertion of internal jugular catheter. XR KNEE LT 3 V    Result Date: 3/15/2023  EXAM: XR KNEE LT 3 V INDICATION: trauma. Left knee pain COMPARISON: None. FINDINGS: Three views of the left knee demonstrate no fracture or other acute osseous or articular abnormality. There is no effusion. Mild generalized osteopenia. No acute bony abnormality. ECHO ADULT COMPLETE    Result Date: 3/27/2023    Left Ventricle: Normal left ventricular systolic function with a visually estimated EF of 60 - 65%. Left ventricle size is normal. LVIDd is 4.0 cm. Normal wall thickness. IVSd is 0.8 cm. LVPWd is 0.8 cm. Normal wall motion. Aortic Valve: Tricuspid valve.      DUPLEX UPPER EXT VENOUS LEFT    Result Date: 3/28/2023  Left upper extremity venous duplex negative for deep venous thrombosis or thrombophlebitis. Right subclavian vein is thrombus free. Procedures / Diagnostic Studies  S/p partial colectomy as above    Chronic diagnoses   Problem List as of 3/31/2023 Date Reviewed: 3/26/2023            Codes Class Noted - Resolved    Neutrophilia ICD-10-CM: D72.9  ICD-9-CM: 288.8  3/25/2023 - Present        Diarrhea of presumed infectious origin ICD-10-CM: R19.7  ICD-9-CM: 009.3  3/25/2023 - Present        Thrombocytosis ICD-10-CM: D75.839  ICD-9-CM: 238.71  3/25/2023 - Present        * (Principal) Colitis ICD-10-CM: K52.9  ICD-9-CM: 558.9  3/13/2023 - Present        JASIEL (acute kidney injury) ([facility] ICD-10-CM: N17.9  ICD-9-CM: 584.9  3/13/2023 - Present        Venous insufficiency ICD-10-CM: I87.2  ICD-9-CM: 459.81  7/28/2021 - Present        Type 2 diabetes with nephropathy ([facility] ICD-10-CM: E11.21  ICD-9-CM: 250.40, 583.81  10/28/2020 - Present        Primary fibromyalgia syndrome ICD-10-CM: M79.7  ICD-9-CM: 729.1  3/10/2020 - Present        Inflammatory bowel disease ICD-10-CM: K52.9  ICD-9-CM: 558.9  3/10/2020 - Present        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[facility] ICD-10-CM: X83.646  ICD-9-CM: 557.0  1/7/2019 - Present        Abnormal CT of the abdomen ICD-10-CM: R93.5  ICD-9-CM: 793.6  12/16/2018 - Present    Overview Signed 12/16/2018  6:01 AM by Jc Das     12/2018:   Ongoing evolution of inflammatory changes in the left upper quadrant most  consistent with omental infarction. Decreased size of main inflamed fatty nodule  and less surrounding stranding. Otherwise, no acute pathology in the chest, abdomen or pelvis. Abnormal mammogram of right breast ICD-10-CM: R92.8  ICD-9-CM: 793.80  8/6/2018 - Present    Overview Addendum 8/16/2018  4:36 PM by Jc Garcia did biopsy 0/9/0283. Biopsy  = fibroadenoma    Mammogram 8/2018: IMPRESSION: Small right breast mass.  BI-RADS Assessment Category 4: Suspicious  Abnormality- Biopsy should be considered. RECOMMENDATION:  Ultrasound-guided right breast biopsy. Type 2 diabetes mellitus with diabetic neuropathy ([facility] ICD-10-CM: E11.40  ICD-9-CM: 250.60, 357.2  6/29/2018 - Present    Overview Addendum 6/9/2020  1:26 PM by Charlotte Laguna MD     9/2018:  a1c 5.3    12/2017:  S/p gastric bypass = a1c 5.2/  LDL 84/  MAB negative    Dx:  a1c 6.9 8/2016    Eye exam: 6/3/2020.  Normal exam.             Elevated ferritin ICD-10-CM: R79.89  ICD-9-CM: 790.6  12/21/2017 - Present        H/O colonoscopy ICD-10-CM: Z98.890  ICD-9-CM: V45.89  12/19/2017 - Present    Overview Addendum 12/19/2017  2:12 PM by Genesis aRlph     2015, next 2020  +FH colon cancer in mom             DDD (degenerative disc disease), lumbar ICD-10-CM: M51.36  ICD-9-CM: 722.52  12/19/2017 - Present    Overview Signed 12/19/2017  1:57 PM by Genesis Lyn  S/p NEREYDA x 3             FH: colon cancer ICD-10-CM: Z80.0  ICD-9-CM: V16.0  12/19/2017 - Present    Overview Signed 12/19/2017  2:12 PM by Genesis Ralph     mom             H/O gastric bypass ICD-10-CM: Z98.84  ICD-9-CM: V45.86  7/12/2017 - Present    Overview Addendum 11/3/2018  9:51 AM by Genesis Beaver 1/2017  lost from 230 to 154 lbs             Hypokalemia ICD-10-CM: E87.6  ICD-9-CM: 276.8  1/26/2017 - Present        History of pulmonary embolus (PE) ICD-10-CM: Z86.711  ICD-9-CM: V12.55  11/7/2016 - Present        Anxiety ICD-10-CM: F41.9  ICD-9-CM: 300.00  6/1/2016 - Present    Overview Addendum 9/18/2018  4:54 PM by Genesis Ralph     Needs to be seen at least twice yearly for BNZ  FTF 12/19/2017, 9/18/18   as expected 12/19/2017, 9/18/18             FH: diabetes mellitus ICD-10-CM: Z83.3  ICD-9-CM: V18.0  8/4/2015 - Present        Metabolic syndrome FXP-81-RX: E88.81  ICD-9-CM: 277.7  8/3/2015 - Present    Overview Addendum 8/4/2015 11:38 AM by Kimberlyn Kumar V     TG up, sugar up, HTN, waist 54 inches  Rx metformin  Needs yearly eye exams and labs             Essential hypertension ICD-10-CM: I10  ICD-9-CM: 401.9  5/17/2015 - Present        Fibromyalgia ICD-10-CM: M79.7  ICD-9-CM: 729.1  3/30/2015 - Present    Overview Addendum 7/12/2017 11:40 AM by Faina Tenorio  S/p NEREYDA x 3 in back via Dr. Jose Raul Munguia. He uses flexeril HS for pain             GARCIA (nonalcoholic steatohepatitis) ICD-10-CM: K75.81  ICD-9-CM: 571.8  1/24/2011 - Present    Overview Signed 1/24/2011  7:18 PM by Mia Fish     Ultrasound 2008             Asthma ICD-10-CM: J45.909  ICD-9-CM: 493.90  9/22/2010 - Present        Arthritis ICD-10-CM: M19.90  ICD-9-CM: 716.90  9/22/2010 - Present        GERD (gastroesophageal reflux disease) ICD-10-CM: K21.9  ICD-9-CM: 530.81  9/22/2010 - Present        RESOLVED: Recurrent major depressive disorder, in remission ([facility] ICD-10-CM: F33.40  ICD-9-CM: 296.35  6/1/2016 - 1/26/2017        RESOLVED: CAD (coronary artery disease) ICD-10-CM: I25.10  ICD-9-CM: 414.00  12/29/2015 - 11/3/2018        RESOLVED: DM (diabetes mellitus) ([facility] ICD-10-CM: E11.9  ICD-9-CM: 250.00  11/30/2015 - 11/3/2018        RESOLVED: Morbid obesity ([facility] ICD-10-CM: E66.01  ICD-9-CM: 278.01  9/22/2010 - 11/3/2018    Overview Signed 12/19/2017  1:57 PM by Mia Fish     S/p gastric bypass 1/2017             RESOLVED: Obstructive sleep apnea ICD-10-CM: G47.33  ICD-9-CM: 327.23  9/22/2010 - 3/20/2023        RESOLVED: Edema ICD-10-CM: R60.9  ICD-9-CM: 782.3  9/22/2010 - 12/19/2017        RESOLVED: Pain, joint, multiple sites ICD-10-CM: M25.50  ICD-9-CM: 719.49  9/22/2010 - 8/13/2014              Diet:  Diabetic diet.     Activity:  Per PT/OT    Disposition: IPR    Discharge instructions to patient/family  Please seek medical attention for any new or worsening symptoms particularly fever, chest pain, shortness of breath, abdominal pain, nausea, vomiting    Follow up plans/appointments  Follow-up Information       Follow up With Specialties Details Why Contact Info    Pascual Pierce MD Family Medicine Follow up on 4/3/2023 Hospital Follow-up at 11:00 am [address] [address]  [phone]      Adele Ceballos MD Hematology and Oncology Call in 2 month(s) Call [phone] to make a follow-up appointment in about 2 months from late March 2023. [address] [facility].  [address] [facility]  [address] [facility]  [phone]               Radha Hinton MD  Family Medicine Resident, PGY1       For Billing    Chief Complaint   Patient presents with    Abnormal [place] Problems  Date Reviewed: 3/26/2023            Codes Class Noted POA    Neutrophilia ICD-10-CM: D72.9  ICD-9-CM: 288.8  3/25/2023 Unknown        Diarrhea of presumed infectious origin ICD-10-CM: R19.7  ICD-9-CM: 009.3  3/25/2023 Unknown        Thrombocytosis ICD-10-CM: D75.839  ICD-9-CM: 238.71  3/25/2023 Unknown        * (Principal) Colitis ICD-10-CM: K52.9  ICD-9-CM: 558.9  3/13/2023 Unknown        H/O gastric bypass ICD-10-CM: Z98.84  ICD-9-CM: V45.86  7/12/2017 Yes    Overview Addendum 11/3/2018  9:51 AM by Jermaine Jc 1/2017  lost from 230 to 154 lbs             Hypokalemia ICD-10-CM: E87.6  ICD-9-CM: 276.8  1/26/2017 Yes        Anxiety ICD-10-CM: F41.9  ICD-9-CM: 300.00  6/1/2016 Yes    Overview Addendum 9/18/2018  4:54 PM by Jermaine Pimentel     Needs to be seen at least twice yearly for BNZ  FTF 12/19/2017, 9/18/18   as expected 12/19/2017, 9/18/18             FH: diabetes mellitus ICD-10-CM: Z83.3  ICD-9-CM: V18.0  8/4/2015 Yes        Essential hypertension ICD-10-CM: I10  ICD-9-CM: 401.9  5/17/2015 Yes        Fibromyalgia ICD-10-CM: M79.7  ICD-9-CM: 729.1  3/30/2015 Yes    Overview Addendum 7/12/2017 11:40 AM by Susan Meehan  S/p NEREYDA x 3 in back via Dr. Angie Tanner.   He uses flexeril HS for pain             Asthma ICD-10-CM: J45.909  ICD-9-CM: 493.90  9/22/2010 Yes        GERD (gastroesophageal reflux disease) ICD-10-CM: K21.9  ICD-9-CM: 530.81  9/22/2010 Yes

## 2023-03-31 NOTE — PROGRESS NOTES
Feels pretty good, denies issues. Visit Vitals  /64 (BP 1 Location: Left upper arm, BP Patient Position: At rest;Semi fowlers)   Pulse 73   Temp 97.4 °F (36.3 °C)   Resp 18   Ht 5' 5\" (1.651 m)   Wt 75.9 kg (167 lb 4.8 oz)   SpO2 98%   Breastfeeding No   BMI 27.84 kg/m²     Abdomen soft, incision clean,  Ostomy appropriate, functional. Drain SS    POD5 subtotal colectomy  Agree with oral antibiotics  Keep drain until output decreases. If discharged, follow up in clinic within 1 week to remove. Will be available as needed throughout weekend. To resume care Monday.      Jennifer Murray MD

## 2023-03-31 NOTE — PROGRESS NOTES
Feels pretty good, denies issues. Visit Vitals  /70 (BP 1 Location: Left upper arm, BP Patient Position: Sitting)   Pulse 97   Temp 97.6 °F (36.4 °C)   Resp 16   Ht 5' 5\" (1.651 m)   Wt 75.9 kg (167 lb 4.8 oz)   SpO2 98%   Breastfeeding No   BMI 27.84 kg/m²     Abdomen soft, incision clean,  Ostomy appropriate, functional. Drain SS    POD4 subtotal colectomy  Ambulate  Diet as tolerated.      Tejas Paz MD

## 2023-03-31 NOTE — PROGRESS NOTES
Problem: Patient Education:  Go to Education Activity  Goal: Patient/Family Education  Outcome: Progressing Towards Goal     Problem: Falls - Risk of  Goal: *Absence of Falls  Description: Document Henrik Montes Fall Risk and appropriate interventions in the flowsheet. Outcome: Progressing Towards Goal  Note: Fall Risk Interventions:                                Problem: Pressure Injury - Risk of  Goal: *Prevention of pressure injury  Description: Document Nadeem Scale and appropriate interventions in the flowsheet. Outcome: Progressing Towards Goal  Note: Pressure Injury Interventions:  Sensory Interventions: Assess changes in LOC, Float heels, Keep linens dry and wrinkle-free, Maintain/enhance activity level, Minimize linen layers, Pressure redistribution bed/mattress (bed type)    Moisture Interventions: Absorbent underpads, Internal/External urinary devices, Maintain skin hydration (lotion/cream), Minimize layers    Activity Interventions: Assess need for specialty bed, Increase time out of bed, Pressure redistribution bed/mattress(bed type), PT/OT evaluation    Mobility Interventions: Assess need for specialty bed, Float heels, Pressure redistribution bed/mattress (bed type), PT/OT evaluation, Turn and reposition approx.  every two hours(pillow and wedges)    Nutrition Interventions: Document food/fluid/supplement intake    Friction and Shear Interventions: Apply protective barrier, creams and emollients, Lift sheet, Minimize layers, Foam dressings/transparent film/skin sealants

## 2023-03-31 NOTE — PROGRESS NOTES
3/31/2023   CARE MANAGEMENT NOTE:  CM reviewed EMR. READMISSION:  Pt was admitted from 3/13-3/15/23. Pt was readmitted on 3/25 with colitis. Reportedly, pt resides with her  Manisha Solorio ([phone]. [phone]) and 25 y.o granddtr. RUR 19%     Transition Plan of Care:  GI, General Surgery following for medical management - pt is s/p ex-lap and subtotal abd colectomy on 3/27  IPR - [facility] accepted and bed is available today. RN, please call report to [phone]. Pt will go to room 2008 under Dr. Rodrigo Magaña. Covid PCR was ordered; results pending. Radha Gomez will admit pt and quarantine her until PCR test results are in  Outpatient follow up  [facility] transport was arranged for 3:30 p.m however RN needs to give one final Potassium run so CM has requested 5 p.m pickup. No further post discharge needs indicated.   Darryl

## 2023-03-31 NOTE — PROGRESS NOTES
Bedside and Verbal shift change report given to Earl [address] [place] [address] (oncoming nurse) by Jeanette Hays RN (offgoing nurse). Report included the following information SBAR, ED Summary, Intake/Output, MAR, and Recent Results.

## 2023-03-31 NOTE — DISCHARGE INSTRUCTIONS
Patient Discharge Instructions    Georgina Aguillon / [id number] : 1960    Admitted 3/25/2023 Discharged: 3/31/2023 10:47 AM     Primary care provider: Rosette Woo MD    Discharging provider:  Maricel Baker MD  - Family Medicine Resident  Toshia Wright, [facility], Attending      . . . . . . . . . . . . . . . . . . . . . . . . . . . . . . . . . . . . . . . . . . . . . . . . . . . . . . . . . . . . . . . . . . . . . . Brynn Guzman FINAL DIAGNOSES & HOSPITAL COURSE:    Georgina Aguillon is a 58 y.o. female with PMH of hypertension, asthma, GERD, gastric bypass,  CAD s/p PCI (), anxiety, type 2 diabetes mellitus, HFpEF (EF 65%, G1DD )  who is admitted for hypokalemia and leukocytosis. The patient gradually worsened, requiring partial colectomy with ileostomy on 3/27 for fulminant colitis likely  . The patient recovered well post-operatively and was started on IV CTX, flagyl, PO and rectal vanc. The patient was transitioned to oral flagyl to complete a 14 day course. FOLLOW-UP CARE RECOMMENDATIONS:    APPOINTMENTS:  Future Appointments   Date Time Provider Yesy Olguin   4/3/2023 11:00 AM Scot Duncan MD [facility] BS AMB   2023 10:15 AM [facility] DAYAN 1 [facility]   2023 11:00 AM [facility] DEXA/DAYAN 1 [facility]   2023  1:00 PM Jorge Orellana MD [facility] BS AMB   10/30/2023  1:00 PM Scarlett Lopez MD [facility] BS AMB     Scot Duncan MD  [address] [address]  [phone]    Follow up on 4/3/2023  Hospital Follow-up at 11:00 am    Jorge Orellana, [address]  [phone]    Call in 2 month(s)  Call [phone] to make a follow-up appointment in about 2 months from late 2023. It is very important that you keep follow-up appointment(s). Bring discharge papers, medication list (and/or medication bottles) to follow-up appointments for review by outpatient provider(s).     FOLLOW-UP TESTS RECOMMENDED:   Repeat CBC, CMP outpatient  Rest as below    ONGOING TREATMENT PLAN:     Septic shock 2/2 fulminant colitis: Improved, now s/p partial colectomy w/ ileostomy on 3/27/23. POA wbc 46. 5. presumed infectious etiology with concern for C. Diff given recent Abx use - surgical pathology also indicative of c diff. Blood culture no growth. - continue flagyl PO for 10 day course (EOT 4/9)  - Will need ostomy care  - Follow up with general surgery for ostomy check and consideration of re-anastomosis      Hypokalemia: POA K 2.1 in s/o persistent GI loses. EKG sinus rhythm, no T wave changes. - Will discharge on klor-con 20mEq daily  - Monitor on BMP     Anemia: in the post-operative setting. LO evidenced on labs. S/p IV venofer. - Continue iron supplementation at discharge  - Monitor on CBC     Thrombocytosis: POA Plt 520. Likely reactive, per heme/onc. - Monitor on labs     H/o recent falls: patient recently evaluated at [facility] ED for mechanical GLF. CT imaging negative. Had skull laceration w/ staples now s/p removal.  - Continue rehabilitation w/ PT/OT     H/o Gastric bypass: 2017. Avoid NSAIDs and ASA. CAD s/p PCI: Stent dLCx in 2015. F/w Dr Betty Kwok MD ( cards). LOV 10/22. Stress test 2017 EF 64%  - Continue Lipitor 20 mg daily  - Continue Lopressor 25mg BID     HFpEF: Stress test 2017 EF 64%. - Hold home Bumex 0.5 mg daily in s/o soft BP - consider restarting OP for HTN or signs of hypervolemia  - resume Lopressor 25mg BID     T2DM: Last HgA1C 5.4 04/2022. Diet controlled s/p gastric bypass. Anxiety:   - Continue Wellbutrin  mg BID  - atarax TID prn     Asthma:   - Continue home Albuterol and Pulmicort BID     Hypertension:   - Hold Bumex 0.5 mg daily  - Continue lopressor 25mg bid  - Monitor BP and adjust as needed     GERD:   - Cont home omeprazole 40 mg daily. Fibromyalgia:   - Continue gabapentin 600mg TID.      Prolonged Qtc:   - Avoid qtc prolonging drugs     DDD, Arthritis: on home flexeril 10 mg TID prn    PENDING TEST RESULTS:  At the time of discharge the following test results are still pending: none. Please review these results as they become available. Specific symptoms to watch for: chest pain, shortness of breath, fever, chills, nausea, vomiting, diarrhea, change in mentation, falling, weakness, bleeding. DIET:  Regular Diet    ACTIVITY:  Activity as tolerated    WOUND CARE: ostomy care and replacement per rehab    EQUIPMENT needed:  per rehab    INCIDENTAL FINDINGS:  none    GOALS OF CARE:       [x] Eventual return to home/independent/assisted living       [] Long term SNF       [] Hospice    Patient condition at discharge:   Functional status       [] Poor       [x] Deconditioned       [] Independent  Cognition       [x] Lucid       [] Forgetful (Some senescence)       [] Dementia  Catheters/lines (plus indication)       [] Vail       [] PICC           [] PEG       [x] None  Code status       [x] Full code       [] DNR  . . . . . . . . . . . . . . . . . . . . . . . . . . . . . . . . . . . . . . . . . . . . . . . . . . . . . . . . . . . . . . . . . . . . . . . 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      Information obtained by :   I understand that if any problems occur once I am at home I am to contact my physician. I understand and acknowledge receipt of the instructions indicated above.                                                                                                                                              Physician's or R.N.'s Signature                                                                  Date/Time                                                                                                                                              Patient or Representative Signature                                                          Date/Time

## 2023-03-31 NOTE — PROGRESS NOTES
Pt will be dc to [facility]. IV and telemetry DC. Attempt to call report, but nurse was unable to take report at this time and she will call back. [facility] in to  pt and report was given to them.

## 2023-03-31 NOTE — PROGRESS NOTES
Problem: Mobility Impaired (Adult and Pediatric)  Goal: *Acute Goals and Plan of Care (Insert Text)  Description: FUNCTIONAL STATUS PRIOR TO ADMISSION: patient reports being \"weaker\" prior to this admission. Patient reports fall prior to admission on the stairs. Patient having bilateral LE weakness that is progressive. Use of RW prior to admission    HOME SUPPORT PRIOR TO ADMISSION: patient lives with     Physical Therapy Goals  Initiated 3/27/2023  1. Patient will move from supine to sit and sit to supine  in bed with moderate assistance  within 7 day(s). 2.  Patient will transfer from bed to chair and chair to bed with moderate assistance  using the least restrictive device within 7 day(s). 3.  Patient will perform sit to stand with moderate assistance  within 7 day(s). 4.  Patient will ambulate with moderate assistance  for 10 feet with the least restrictive device within 7 day(s). Note:   PHYSICAL THERAPY TREATMENT  Patient: Mayra Haro (1 y.o. female)  Date: 3/31/2023  Diagnosis: Colitis [K52.9]  Leukocytosis [D72.829] Colitis  Procedure(s) (LRB):  LAPAROTOMY EXPLORATORY, Subtotal ABDOMINAL COLECTOMY (N/A) 5 Days Post-Op  Precautions: Fall, Skin, Contact  Chart, physical therapy assessment, plan of care and goals were reviewed. ASSESSMENT  Patient continues with skilled PT services. Pt seen this AM.Pt supine to sit mod assist of 2. Pt sit to stand with min assist to mod of 2. Pt ambulated 10ft at slow pace CGA to min assist of 2. Pt left sitting up in chair. Pt progressing slowly. Continue goals. PLAN :  Patient continues to benefit from skilled intervention to address the above impairments. Continue treatment per established plan of care. to address goals.     Recommendation for discharge: (in order for the patient to meet his/her long term goals)  Therapy 3 hours per day 5-7 days a weak    This discharge recommendation:  Has been made in collaboration with the attending provider and/or case management    IF patient discharges home will need the following DME: rolling walker       SUBJECTIVE:       OBJECTIVE DATA SUMMARY:   Critical Behavior:  Neurologic State: Alert  Orientation Level: Oriented X4  Cognition: Appropriate decision making, Appropriate for age attention/concentration, Appropriate safety awareness, Follows commands  Safety/Judgement: Awareness of environment, Fall prevention, Good awareness of safety precautions, Home safety, Insight into deficits  Functional Mobility Training:  Bed Mobility:     Supine to Sit: Moderate assistance;Assist x2; Additional time;Bed Modified  Sit to Supine:  (pt seated up in chair at end of session)           Transfers:  Sit to Stand: Minimum assistance; Moderate assistance;Assist x2; Additional time; Adaptive equipment  Stand to Sit: Minimum assistance;Assist x2        Bed to Chair: Minimum assistance;Assist x2; Additional time; Adaptive equipment                    Balance:  Sitting: Intact  Sitting - Static: Good (unsupported)  Sitting - Dynamic: Good (unsupported)  Standing: Impaired; With support  Standing - Static: Fair  Standing - Dynamic : Constant support; Fair  Ambulation/Gait Training:  Distance (ft): 10 Feet (ft)  Assistive Device: Gait belt;Walker, rolling  Ambulation - Level of Assistance: Contact guard assistance;Minimal assistance;Assist x2        Gait Abnormalities: Decreased step clearance        Base of Support: Narrowed     Speed/Dahiana: Pace decreased (<100 feet/min)         Therapeutic Exercises:   LE AAROM with cues  Activity Tolerance:   Fair    After treatment patient left in no apparent distress:   Sitting in chair    COMMUNICATION/COLLABORATION:   The patients plan of care was discussed with: Physical therapist.     Rooney Schlatter, PTA   Time Calculation: 23 mins

## 2023-03-31 NOTE — PROGRESS NOTES
Problem: Self Care Deficits Care Plan (Adult)  Goal: *Acute Goals and Plan of Care (Insert Text)  Description: FUNCTIONAL STATUS PRIOR TO ADMISSION: Patient reports being \"weaker\" prior to this admission. Patient reports fall prior to admission on the stairs. Patient having bilateral LE weakness that is progressive. Use of RW prior to admission. She reports being independent with ADLs. HOME SUPPORT PRIOR TO ADMISSION: Patient lives with . Occupational Therapy Goals  Initiated 3/28/2023  1. Patient will perform grooming with modified independence within 7 day(s). 2.  Patient will perform upper body dressing and bathing with supervision/set-up within 7 day(s). 3.  Patient will perform lower body dressing and bathing with minimal assistance within 7 day(s). 4.  Patient will perform toilet transfers with minimal assistance within 7 day(s). 5.  Patient will perform all aspects of toileting with minimal assistance within 7 day(s). 6.  Patient will participate in upper extremity therapeutic exercise/activities with supervision/set-up for 10 minutes within 7 day(s). 7.  Patient will utilize energy conservation techniques during functional activities with verbal cues within 7 day(s). Outcome: Progressing Towards Goal     OCCUPATIONAL THERAPY TREATMENT  Patient: Dennie Bandy (9 y.o. female)  Date: 3/31/2023  Diagnosis: Colitis [K52.9]  Leukocytosis [D72.829] Colitis  Procedure(s) (LRB):  LAPAROTOMY EXPLORATORY, Subtotal ABDOMINAL COLECTOMY (N/A) 5 Days Post-Op  Precautions: Fall, Skin, Contact  Chart, occupational therapy assessment, plan of care, and goals were reviewed. ASSESSMENT  Patient continues with skilled OT services and is progressing towards goals. Patient today with great improvement in overall mobility and strength.  She is alert and oriented and motivated to participate, reporting improvement in nausea from this AM. Pt requires overall mod A to transition to EOB and min A x 2 for mobility with RW and today progresses to taking steps to bathroom. She benefits from cues for sequencing and pacing with RW for improved technique and safety. Once seated in chair, pt completes seated simple UB ADLs with set up and is instructed on additional therapeutic exercises. Current Level of Function Impacting Discharge (ADLs): up to min A for UB ADLs; max A for LB ADLs; min to mod A x 2 with RW    Other factors to consider for discharge: high fall risk; motivated         PLAN :  Patient continues to benefit from skilled intervention to address the above impairments. Continue treatment per established plan of care to address goals. Recommend with staff: A x 2 for safety for transfer to bed, chair, or BSC    Recommendation for discharge: (in order for the patient to meet his/her long term goals)  Therapy 3 hours per day 5-7 days per week    This discharge recommendation:  Has been made in collaboration with the attending provider and/or case management    IF patient discharges home will need the following DME: TBD       SUBJECTIVE:   Patient stated I feel pretty good about today.     OBJECTIVE DATA SUMMARY:   Cognitive/Behavioral Status:  Neurologic State: Alert  Orientation Level: Oriented X4  Cognition: Appropriate decision making; Appropriate for age attention/concentration; Appropriate safety awareness; Follows commands  Perception: Appears intact  Perseveration: No perseveration noted  Safety/Judgement: Awareness of environment; Fall prevention;Good awareness of safety precautions; Home safety; Insight into deficits    Functional Mobility and Transfers for ADLs:  Bed Mobility:  Supine to Sit: Moderate assistance;Assist x2; Additional time;Bed Modified  Sit to Supine:  (pt seated up in chair at end of session)    Transfers:  Sit to Stand: Minimum assistance; Moderate assistance;Assist x2; Additional time; Adaptive equipment     Bed to Chair: Minimum assistance;Assist x2; Additional time; Adaptive equipment    Balance:  Sitting: Intact  Sitting - Static: Good (unsupported)  Sitting - Dynamic: Good (unsupported)  Standing: Impaired; With support  Standing - Static: Fair  Standing - Dynamic : Constant support; Fair    ADL Intervention:  Cognitive Retraining  Safety/Judgement: Awareness of environment; Fall prevention;Good awareness of safety precautions; Home safety; Insight into deficits    Therapeutic Exercises:   Pt instructed on and performed UE exercises seated in chair at end of session for UE strengthening in prep for ADLs and ADL transfers. Pt requires increased cueing and visual/verbal prompts for form and pacing for duration of exercises. Instructed pt to perform exercises 2-3x/day with at least 5-10 reps of each. Pain:  Pt reporting minimal pain    Activity Tolerance:   Good and Fair    After treatment patient left in no apparent distress:   Sitting in chair, Call bell within reach, Bed / chair alarm activated, and Caregiver / family present    COMMUNICATION/COLLABORATION:   The patients plan of care was discussed with: Physical therapist and Registered nurse.      Noreene Gaucher, OT  Time Calculation: 23 mins

## 2023-04-06 NOTE — PROGRESS NOTES
Physician Progress Note      PATIENT:               Diane Blount  CSN #:                  [id number]  :                       1960  ADMIT DATE:       3/25/2023 2:41 PM  [address] [place] DATE:        3/31/2023 6:30 PM  RESPONDING  PROVIDER #:        Hosea Gomez MD          QUERY TEXT:    Good morning  Pt admitted with Colitis. Pt noted to have leukocytosis and . If possible, please document in progress notes and discharge summary the present on admission status of Sepsis:    The medical record reflects the following:  Risk Factors: septic shock, fulminant colitis, CDiff  Clinical Indicators:  presents to the ER for evaluation of outpatient leukocytosis. PN-  - Patient transferred to the ICU for hypotension/shock requiring pressor support with carolyn.   DC summary- - Admission diagnoses - Colitis, Leukocytosis  LA-1.84>>>0.7, WBC-46.5>>>46.6  Treatment: partial colectomy w/ ileostomy, IV Flagyl    Thank you  Dorene Gibson RN CDI  [phone]  Options provided:  -- Yes, Sepsis was present at the time of the order to admit to the hospital  -- No, Sepsis was not present on admission and developed during the inpatient stay  -- Sepsis developed during inpatient stay  -- Other - I will add my own diagnosis  -- Disagree - Not applicable / Not valid  -- Disagree - Clinically unable to determine / Unknown  -- Refer to Clinical Documentation Reviewer    PROVIDER RESPONSE TEXT:    Yes, Sepsis was present at the time of the order to admit to the hospital.    Query created by: Loraine Nixon on 2023 11:53 AM      Electronically signed by:  Hosea Gomez MD 2023 12:15 PM

## 2023-04-30 RX ORDER — POTASSIUM CHLORIDE 20 MEQ/1
20 TABLET, EXTENDED RELEASE ORAL EVERY MORNING
COMMUNITY
Start: 2023-03-31 | End: 2023-06-05

## 2023-05-01 ENCOUNTER — HOSPITAL ENCOUNTER (OUTPATIENT)
Dept: WOUND CARE | Age: 63
Discharge: HOME | End: 2023-05-01
Payer: COMMERCIAL

## 2023-05-01 VITALS — DIASTOLIC BLOOD PRESSURE: 60 MMHG | RESPIRATION RATE: 16 BRPM | SYSTOLIC BLOOD PRESSURE: 124 MMHG

## 2023-05-01 PROCEDURE — 99212 OFFICE O/P EST SF 10 MIN: CPT

## 2023-05-01 NOTE — WOUND CARE
Clinical Level of Care Assessment    Outpatient Ostomy Care      NAME:  Kim De Guzman OF BIRTH:  1960  MEDICAL RECORD NUMBER:  [id number]   DATE:  5/1/2023      Patient Jaylen Right Assessment- Document in Flowsheet I&O   Points   Review of chart []   0   Assess Complete Ostomy tab in Navigator for assessment of; stoma status, peristomal skin, presence of hernia/stool consistency/diet/related medications   Simple adjustments to pouch size/pouch system, new stoma pattern, accessory addition/deletion. []   1   Assess Complete Ostomy tab in Navigator for assessment of; stoma status, peristomal skin, presence of hernia/stool consistency/diet/related medications   Moderate adjustments to pouch size/pouch system, new stoma pattern, accessory addition/deletion. Observe patient/caregiver with hands-on care. 1-2 adjustments to pouch size/system/skin care/accessory addition or deletion. [x]   2   Assess Complete Ostomy tab in Navigator for assessment of; stoma status, peristomal skin, presence of hernia/stool consistency/diet/related medications   Complex adjustments to pouch size/pouch system, new stoma pattern, accessory addition/deletion. 3 or more complex adjustments to pouch size/system/skin care/accessory addition or deletion. Observe patient/caregiver with hands-on care. Assess patient/patient abdomen for optimal pre-marked stoma site. Assess patient abdomen for type of hernia belt/accessory needed. []   3         Ambulation Status Documented in CN Clinical Note  Status Definition Points   Independent Independently able to ambulate. Fully able (without any assistance) to get on/off exam table/chair. [x]   0   Minimal Physical Assistance Requires physical assistance of one person to ambulate and/or position patient to be examined. Includes necessary physical assistance to position lower extremities on/off stool.    []   1   Moderate Physical Assistance Requires at least one staff member to physically assist patient in ambulating into treatment room, and/or on off chair/bed. Requires assistance to bathroom. []   2   Full Assistance Requires assistance of at least two staff members to transfer patient into treatment room and/or on/off bed/chair. \"Total Transfer\". Unable to use bathroom requires bedside commode and/or bedpan []   3       Teaching Effort Documented in [facility] Clinical Note  Effort Definition Points   No Teaching  []   0   General Initial/Simple lesson:  Assess readiness to learn, assess patient learning style to determine educational flow/special needs for learning. Teaching related to 1-3 topics  Documentation in [facility] completed. []   1   Intermediate Assess readiness to learn, assess patient learning style to determine educational flow/special needs for learning. Teaching related to 3-4 topics. Hernia belt application and care considerations  Documentation in [facility] completed. [x]   2   Complex Assess readiness to learn, assess patient learning style to determine educational flow/special needs for learning. Teaching of greater than 5 additional topics   Pre-operative ostomy education with review of written resources for patient/family/caregiver as needed. Demonstration/return demonstration of ostomy irrigation  Documentation in [facility] completed. []   3     Patient Assessment and Planning in [facility] Clinical Note   Planning Definition Points   Simple Simple pouch change procedure completed and reviewed with patient/family/caregiver   Documentation in [facility] completed. []   1   Intermediate Moderate level of follow-up needs:   Pouch change/discharge procedure revised and reviewed with patient/caregiver. Communications with outside resources; i.e. Telephone calls to Surgeon/ PCP, family/caregiver, home health, ECF. Documentation in [facility] completed.      [x]   2   Complex Complex level of instructions/changes:   Family/Caregiver learning/demonstration/return demonstration visit. Pouching/discharge procedure revised/reviewed with patient/family/caregiver. Contact with outside resources; i.e. communication with Surgeon/ PCP, home health, ECF. Contact/referral to ostomy appliance supplier for new or additional products. Review when to call [facility] or schedule follow-up visit. Referral to Emergency Department   Documentation in [facility] completed. []   3       Is this the Patient's First Visit with [facility] @ [facility]? Yes    Is this Patient Established to this [facility] within the last 3 years?    Yes             Clinical Level of Care      Points  0-3  Level 1 []     Points  4-6  Level 2 [x]     Points  7-8  Level 3 []     Points  9-10  Level 4 []     Points  11-12  Level 5 []       Electronically signed by Kaveh Ramon RN on 5/1/2023 at 12:03 PM

## 2023-05-01 NOTE — WOUND CARE
CHIEF COMPLAINT:   Patient reports issues with leaking of ileostomy appliance several x per day, leading to skin irritation and endless frustration. She states she had some help from the ostomy nurse at [address] [place] [address] but was unable to resolve the leaking issues. OSTOMY Assessment    Stoma Tissue Assessment: _x__Post-op ___Follow-up       Type of Diversion: _x__New___ Established ___Revision___Permanent____Temporary    __x___  Ileostomy   _____  Colostomy: ____ Ascending, ____ Transverse, _____. Sigmoid   _____  Dyane Binning   _____  Ileal Conduit   _____  Mucous Fistula     Appearance of Ostomy:   _x__ Bright Red /Moist/Viable ___ Dark Red ___ Pink ___ Sloughing ___Necrotic____Pallor ____Red  ___Dry ____Moist    Stoma Size: ______25___ (mm) ___Round ___ Oval ___Irregular     Stoma Height:   ____Flush ____Retracted __x__Budded ____Edematous ____Prolapse     Stoma Location   Patient has deep crevices on either side of the stoma when in sitting position.    ____ Left Side          __x__Right Side     _____Umbilicus  _____Incision Line  __x__ Above Belt       ____ Below Belt    Abdominal Contours  ____ Firm ____ Flat __x__Flabby __x_Soft ___Round ___ Hard ___ Other    GI Stoma Function: _x__ Yes ___No   Output:   ____Sero-Sanguenous ____Sanguenous____ Bilious ____ Liquid ____Semi-liquid __x__ Pasty ____ Formed ___Soft ___Firm ____ Black____Brown ____Foul Odor    Peristomal skin:   ___ Intact __x_ Irritant Dermatitis ___ Allergic Contact Dermatitis ___Candidiasis ___Caput Medusae ___Folliculitis ___Mechanical Trauma ___Mucosal Transplantation    ___Pyoderma Gangrenosum ___Hyperplasia ___Radiation Trauma ___Allergies mpling  ___ Dimpling ____Blistered ____Fragile ____Macerated ___Peeling  ___Ulceration  ___ Fungal ___Peristomal Hernia ___Non-blanchable ___ Hypergranulation      Stoma Complications:   __Excessive Bleeding __Ischemia __ Abscess __Necrosis __Prolapse   __Hernia __ Retraction __Stenosis __Mucosal Separation __Melanosis Coli   __Laceration __Other     Application for Patient    Wafer and pouch used during assessment and education _____[place] [address]_______    Pouch System Recommended:   _x_One-Piece __Two-Piece ___Custom     Flat:   ___Pre-cut   _x__Cut-to fit     Convexity: ___Shallow ___Deep__x_ Flexible ___Creative Convexity   ___Pre-cut _x__Cut to [employer]     Accessory Products   _x_ Adhesive Seals __Adhesive Remover Wipes __Barrier Wafer _x_Barrier Wipes   __Deodorizer __Liquid Adhesive __ Paste _x_ Powder __Strip   _x_ Support Belt __Tape      Education for Patient & Family  1. Booklet of information on specific ostomy given and some verbal discussion of patients ostomy and expectations. 2. Instruction/demostration of ostomy wafer & pouch change. 3. Discuss concerns/ answer questions. 4. Provide follow up education in clinic if needed.        PICTURE INSERT:

## 2023-05-01 NOTE — WOUND CARE
Discharge Instructions/Wound Orders  [facility]  [address], [address] [facility] [address]  Telephone: [phone]    NAME:  Karla Mackenzie OF BIRTH:  1960  MEDICAL RECORD NUMBER:  [id number]  DATE:  5/1/2023  Ostomy Care Orders:  1) Remove old bag and clean stoma and peristomal skin with tap water and paper towels, then dry thoroughly. 2) If skin is irritated treat with Crusting (I.e. stomahesive powder and no sting skin prep) very lightly. 3) Shape 2 inch barrier to size, flatten inner edge and place over stoma site. 4) Cut Cordova 19684 to size (25 mm) and place over stoma site. 5) Apply stoma belt Cordova 7300.  6) Change 2 x week and as needed for leaking. Supply List  Cordova 06149  Cordova ostomy belt # 3551  CEPUUYLJNEV powder  No Sting skin prep  Sruthi barrier 224102    Dietary:  [x] Diet as tolerated: [] Calorie Diabetic Diet:Low carb and no Sugar [] No Added Salt:[x] Increase Protein: [] Other:Limit the amount of liquid you are drinking and avoid drinking in between meals   Activity:  [x] Activity as tolerated:  [] Patient has no activity restrictions     [] Strict Bedrest: [] Remain off Work:     [] May return to full duty work:                                   [] Return to work with restrictions:             Return Appointment:  [x] Return Appointment: With Suzy Massey, Follow-up as needed for complications. [] Ordered tests:    Electronically signed Berhane Guerra RN on 5/1/2023 at 11:30 AM     Jovana Vásquez 281: Should you experience any significant changes in your wound(s) or have questions about your wound care, please contact the [address] [place] [address] at [address] [place] [address] Street 8:00 am - 4:30. If you need help with your wound outside these hours and cannot wait until we are again available, contact your PCP or go to the hospital emergency room.      PLEASE NOTE: IF YOU ARE UNABLE TO OBTAIN WOUND SUPPLIES, CONTINUE TO USE THE SUPPLIES YOU HAVE AVAILABLE UNTIL YOU ARE ABLE TO REACH US. IT IS MOST IMPORTANT TO KEEP THE WOUND COVERED AT ALL TIMES.      CWON Signature:_______________________    Date: ___________ Time:  ____________

## 2023-05-04 ENCOUNTER — HOSPITAL ENCOUNTER (OUTPATIENT)
Dept: MAMMOGRAPHY | Age: 63
Discharge: HOME | End: 2023-05-04
Attending: STUDENT IN AN ORGANIZED HEALTH CARE EDUCATION/TRAINING PROGRAM
Payer: COMMERCIAL

## 2023-05-04 DIAGNOSIS — Z12.31 ENCOUNTER FOR SCREENING MAMMOGRAM FOR MALIGNANT NEOPLASM OF BREAST: ICD-10-CM

## 2023-05-04 DIAGNOSIS — M85.80 OSTEOPENIA, UNSPECIFIED LOCATION: ICD-10-CM

## 2023-05-04 PROCEDURE — 77080 DXA BONE DENSITY AXIAL: CPT

## 2023-05-04 PROCEDURE — 77067 SCR MAMMO BI INCL CAD: CPT

## 2023-05-04 RX ORDER — POTASSIUM CHLORIDE 1500 MG/1
TABLET, EXTENDED RELEASE ORAL
Qty: 30 TABLET | Refills: 0 | Status: SHIPPED | OUTPATIENT
Start: 2023-05-04

## 2023-05-08 ENCOUNTER — OFFICE VISIT (OUTPATIENT)
Age: 63
End: 2023-05-08

## 2023-05-08 VITALS
HEART RATE: 103 BPM | BODY MASS INDEX: 19.49 KG/M2 | SYSTOLIC BLOOD PRESSURE: 109 MMHG | DIASTOLIC BLOOD PRESSURE: 73 MMHG | TEMPERATURE: 98.2 F | HEIGHT: 65 IN | WEIGHT: 117 LBS | RESPIRATION RATE: 18 BRPM | OXYGEN SATURATION: 98 %

## 2023-05-08 DIAGNOSIS — Z53.20 HIV SCREENING DECLINED: ICD-10-CM

## 2023-05-08 DIAGNOSIS — E11.40 TYPE 2 DIABETES MELLITUS WITH DIABETIC NEUROPATHY, WITHOUT LONG-TERM CURRENT USE OF INSULIN (HCC): ICD-10-CM

## 2023-05-08 DIAGNOSIS — K21.9 GASTRO-ESOPHAGEAL REFLUX DISEASE WITHOUT ESOPHAGITIS: ICD-10-CM

## 2023-05-08 DIAGNOSIS — I11.0 HYPERTENSIVE HEART DISEASE WITH HEART FAILURE (HCC): ICD-10-CM

## 2023-05-08 DIAGNOSIS — F32.1 MAJOR DEPRESSIVE DISORDER, SINGLE EPISODE, MODERATE (HCC): ICD-10-CM

## 2023-05-08 DIAGNOSIS — E87.6 HYPOKALEMIA: ICD-10-CM

## 2023-05-08 DIAGNOSIS — D50.8 IRON DEFICIENCY ANEMIA SECONDARY TO INADEQUATE DIETARY IRON INTAKE: ICD-10-CM

## 2023-05-08 DIAGNOSIS — Z11.59 ENCOUNTER FOR HEPATITIS C SCREENING TEST FOR LOW RISK PATIENT: ICD-10-CM

## 2023-05-08 DIAGNOSIS — Z98.84 H/O GASTRIC BYPASS: ICD-10-CM

## 2023-05-08 DIAGNOSIS — K90.89 OTHER SPECIFIED INTESTINAL MALABSORPTION: ICD-10-CM

## 2023-05-08 DIAGNOSIS — M51.36 DDD (DEGENERATIVE DISC DISEASE), LUMBAR: ICD-10-CM

## 2023-05-08 DIAGNOSIS — Z09 HOSPITAL DISCHARGE FOLLOW-UP: ICD-10-CM

## 2023-05-08 DIAGNOSIS — I10 PRIMARY HYPERTENSION: Primary | ICD-10-CM

## 2023-05-08 DIAGNOSIS — K91.2 POSTOPERATIVE MALABSORPTION: ICD-10-CM

## 2023-05-08 DIAGNOSIS — J45.20 MILD INTERMITTENT ASTHMA WITHOUT COMPLICATION: ICD-10-CM

## 2023-05-08 DIAGNOSIS — Z11.4 ENCOUNTER FOR SCREENING FOR HIV: ICD-10-CM

## 2023-05-08 PROBLEM — R92.8 ABNORMAL MAMMOGRAM OF RIGHT BREAST: Status: ACTIVE | Noted: 2018-08-06

## 2023-05-08 PROBLEM — I25.10 CORONARY ATHEROSCLEROSIS: Status: ACTIVE | Noted: 2023-05-08

## 2023-05-08 PROBLEM — R93.5 ABNORMAL CT OF THE ABDOMEN: Status: ACTIVE | Noted: 2018-12-16

## 2023-05-08 PROBLEM — D50.9 IRON DEFICIENCY ANEMIA, UNSPECIFIED: Status: ACTIVE | Noted: 2023-01-01

## 2023-05-08 PROBLEM — I50.9 CHRONIC HEART FAILURE (HCC): Status: ACTIVE | Noted: 2023-03-25

## 2023-05-08 RX ORDER — CYCLOBENZAPRINE HCL 5 MG
5 TABLET ORAL 3 TIMES DAILY PRN
Qty: 30 TABLET | Refills: 2 | Status: SHIPPED | OUTPATIENT
Start: 2023-05-08

## 2023-05-08 RX ORDER — ONDANSETRON 4 MG/1
4 TABLET, ORALLY DISINTEGRATING ORAL EVERY 8 HOURS PRN
Qty: 30 TABLET | Refills: 3 | Status: SHIPPED | OUTPATIENT
Start: 2023-05-08

## 2023-05-08 RX ORDER — LOPERAMIDE HYDROCHLORIDE 2 MG/1
2 CAPSULE ORAL 4 TIMES DAILY PRN
COMMUNITY

## 2023-05-08 RX ORDER — GABAPENTIN 300 MG/1
600 CAPSULE ORAL 3 TIMES DAILY
Qty: 180 CAPSULE | Refills: 2 | Status: SHIPPED | OUTPATIENT
Start: 2023-05-08 | End: 2023-06-07

## 2023-05-08 RX ORDER — ALBUTEROL SULFATE 0.63 MG/3ML
1 SOLUTION RESPIRATORY (INHALATION) EVERY 6 HOURS PRN
Qty: 270 ML | Refills: 3 | Status: SHIPPED | OUTPATIENT
Start: 2023-05-08

## 2023-05-08 SDOH — ECONOMIC STABILITY: INCOME INSECURITY: HOW HARD IS IT FOR YOU TO PAY FOR THE VERY BASICS LIKE FOOD, HOUSING, MEDICAL CARE, AND HEATING?: NOT VERY HARD

## 2023-05-08 SDOH — ECONOMIC STABILITY: HOUSING INSECURITY
IN THE LAST 12 MONTHS, WAS THERE A TIME WHEN YOU DID NOT HAVE A STEADY PLACE TO SLEEP OR SLEPT IN A SHELTER (INCLUDING NOW)?: NO

## 2023-05-08 SDOH — ECONOMIC STABILITY: FOOD INSECURITY: WITHIN THE PAST 12 MONTHS, THE FOOD YOU BOUGHT JUST DIDN'T LAST AND YOU DIDN'T HAVE MONEY TO GET MORE.: NEVER TRUE

## 2023-05-08 SDOH — ECONOMIC STABILITY: FOOD INSECURITY: WITHIN THE PAST 12 MONTHS, YOU WORRIED THAT YOUR FOOD WOULD RUN OUT BEFORE YOU GOT MONEY TO BUY MORE.: NEVER TRUE

## 2023-05-08 SDOH — ECONOMIC STABILITY: TRANSPORTATION INSECURITY
IN THE PAST 12 MONTHS, HAS THE LACK OF TRANSPORTATION KEPT YOU FROM MEDICAL APPOINTMENTS OR FROM GETTING MEDICATIONS?: NO

## 2023-05-08 SDOH — ECONOMIC STABILITY: TRANSPORTATION INSECURITY
IN THE PAST 12 MONTHS, HAS LACK OF TRANSPORTATION KEPT YOU FROM MEETINGS, WORK, OR FROM GETTING THINGS NEEDED FOR DAILY LIVING?: NO

## 2023-05-08 ASSESSMENT — PATIENT HEALTH QUESTIONNAIRE - PHQ9
4. FEELING TIRED OR HAVING LITTLE ENERGY: 3
10. IF YOU CHECKED OFF ANY PROBLEMS, HOW DIFFICULT HAVE THESE PROBLEMS MADE IT FOR YOU TO DO YOUR WORK, TAKE CARE OF THINGS AT HOME, OR GET ALONG WITH OTHER PEOPLE: 1
SUM OF ALL RESPONSES TO PHQ QUESTIONS 1-9: 9
7. TROUBLE CONCENTRATING ON THINGS, SUCH AS READING THE NEWSPAPER OR WATCHING TELEVISION: 1
8. MOVING OR SPEAKING SO SLOWLY THAT OTHER PEOPLE COULD HAVE NOTICED. OR THE OPPOSITE, BEING SO FIGETY OR RESTLESS THAT YOU HAVE BEEN MOVING AROUND A LOT MORE THAN USUAL: 0
5. POOR APPETITE OR OVEREATING: 1
SUM OF ALL RESPONSES TO PHQ QUESTIONS 1-9: 9
1. LITTLE INTEREST OR PLEASURE IN DOING THINGS: 1
3. TROUBLE FALLING OR STAYING ASLEEP: 2
6. FEELING BAD ABOUT YOURSELF - OR THAT YOU ARE A FAILURE OR HAVE LET YOURSELF OR YOUR FAMILY DOWN: 0
SUM OF ALL RESPONSES TO PHQ QUESTIONS 1-9: 9
9. THOUGHTS THAT YOU WOULD BE BETTER OFF DEAD, OR OF HURTING YOURSELF: 0
SUM OF ALL RESPONSES TO PHQ9 QUESTIONS 1 & 2: 2
SUM OF ALL RESPONSES TO PHQ QUESTIONS 1-9: 9
2. FEELING DOWN, DEPRESSED OR HOPELESS: 1

## 2023-05-08 ASSESSMENT — SOCIAL DETERMINANTS OF HEALTH (SDOH): HOW HARD IS IT FOR YOU TO PAY FOR THE VERY BASICS LIKE FOOD, HOUSING, MEDICAL CARE, AND HEATING?: NOT VERY HARD

## 2023-05-08 ASSESSMENT — ANXIETY QUESTIONNAIRES
3. WORRYING TOO MUCH ABOUT DIFFERENT THINGS: 1
1. FEELING NERVOUS, ANXIOUS, OR ON EDGE: 1
2. NOT BEING ABLE TO STOP OR CONTROL WORRYING: 1
IF YOU CHECKED OFF ANY PROBLEMS ON THIS QUESTIONNAIRE, HOW DIFFICULT HAVE THESE PROBLEMS MADE IT FOR YOU TO DO YOUR WORK, TAKE CARE OF THINGS AT HOME, OR GET ALONG WITH OTHER PEOPLE: NOT DIFFICULT AT ALL
7. FEELING AFRAID AS IF SOMETHING AWFUL MIGHT HAPPEN: 0
4. TROUBLE RELAXING: 1

## 2023-05-08 ASSESSMENT — ENCOUNTER SYMPTOMS
COUGH: 0
BACK PAIN: 1
NAUSEA: 1
ABDOMINAL PAIN: 1
CONSTIPATION: 0
CHEST TIGHTNESS: 0
SHORTNESS OF BREATH: 0

## 2023-05-08 NOTE — PROGRESS NOTES
Chief Complaint   Patient presents with    Follow-Up from Hospital    Medication Refill    Depression     Discuss Medication. Wellbutrin D/C during hospitalization      Vitals:    05/08/23 1336   BP: 109/73   Pulse: (!) 103   Resp: 18   Temp: 98.2 °F (36.8 °C)   SpO2: 98%     1. Have you been to the ER, urgent care clinic since your last visit? Hospitalized since your last visit? [facility] 03/25/2023-03/31/2023    2. Have you seen or consulted any other health care providers outside of the [address] [place] [address] since your last visit? Include any pap smears or colon screening.  No
aches    Quinapril Cough         Review of Systems:     Review of Systems   Constitutional:  Positive for appetite change. Negative for activity change. Respiratory:  Negative for cough, chest tightness and shortness of breath. Cardiovascular:  Negative for chest pain, palpitations and leg swelling. Gastrointestinal:  Positive for abdominal pain and nausea. Negative for constipation. Musculoskeletal:  Positive for back pain. Neurological:  Positive for light-headedness. Objective:     Vitals:    05/08/23 1336   BP: 109/73   Site: Right Upper Arm   Position: Sitting   Cuff Size: Small Adult   Pulse: (!) 103   Resp: 18   Temp: 98.2 °F (36.8 °C)   TempSrc: Oral   SpO2: 98%   Weight: 117 lb (53.1 kg)   Height: 5' 5\" (1.651 m)       Physical Exam:  General appearance - alert, well appearing, and in no distress  Mental status - alert, oriented to person, place, and time, normal mood, behavior, speech, dress, motor activity, and thought processes  Chest - clear to auscultation, no wheezes, rales or rhonchi, symmetric air entry  Heart - normal rate, regular rhythm, normal S1, S2, no murmurs, rubs, clicks or gallops  Abdomen - Ostomy in place, stoma pink and well perfused    Recent Labs:  No results found for this or any previous visit (from the past 12 hour(s)). Assessment and Plan:      Diagnosis Orders   1. Primary hypertension  Basic Metabolic Panel    CBC with Partial Differential    Lipid Panel      2. Hypertensive heart disease with heart failure (HCC)  Lipid Panel      3. Type 2 diabetes mellitus with diabetic neuropathy, without long-term current use of insulin ([facility])  gabapentin (NEURONTIN) 300 MG capsule    Basic Metabolic Panel    Lipid Panel    AMB POC URINE, MICROALBUMIN, SEMIQUANT (1 RESULT)      4. DDD (degenerative disc disease), lumbar  cyclobenzaprine (FLEXERIL) 5 MG tablet    Urine Drug Screen      5.  H/O gastric bypass  CBC with Partial Differential    Vitamin B12    Folate    Iron

## 2023-05-09 LAB
25(OH)D3 SERPL-MCNC: 32.2 NG/ML (ref 30–100)
AMPHET UR QL SCN: NEGATIVE
ANION GAP SERPL CALC-SCNC: 7 MMOL/L (ref 5–15)
BARBITURATES UR QL SCN: NEGATIVE
BASOPHILS # BLD: 0.1 K/UL (ref 0–0.1)
BASOPHILS NFR BLD: 1 % (ref 0–1)
BENZODIAZ UR QL: NEGATIVE
BUN SERPL-MCNC: 7 MG/DL (ref 6–20)
BUN/CREAT SERPL: 9 (ref 12–20)
CALCIUM SERPL-MCNC: 9.7 MG/DL (ref 8.5–10.1)
CANNABINOIDS UR QL SCN: NEGATIVE
CHLORIDE SERPL-SCNC: 94 MMOL/L (ref 97–108)
CHOLEST SERPL-MCNC: 212 MG/DL
CO2 SERPL-SCNC: 35 MMOL/L (ref 21–32)
COCAINE UR QL SCN: NEGATIVE
COMMENT:: NORMAL
CREAT SERPL-MCNC: 0.74 MG/DL (ref 0.55–1.02)
CREAT UR-MCNC: 181 MG/DL
DIFFERENTIAL METHOD BLD: ABNORMAL
EOSINOPHIL # BLD: 0.4 K/UL (ref 0–0.4)
EOSINOPHIL NFR BLD: 5 % (ref 0–7)
ERYTHROCYTE [DISTWIDTH] IN BLOOD BY AUTOMATED COUNT: 15 % (ref 11.5–14.5)
FOLATE SERPL-MCNC: 18.8 NG/ML (ref 5–21)
GLUCOSE SERPL-MCNC: 103 MG/DL (ref 65–100)
HCT VFR BLD AUTO: 44.1 % (ref 35–47)
HCV AB SERPL QL IA: NONREACTIVE
HDLC SERPL-MCNC: 83 MG/DL
HDLC SERPL: 2.6 (ref 0–5)
HGB BLD-MCNC: 13.9 G/DL (ref 11.5–16)
HIV 1+2 AB+HIV1 P24 AG SERPL QL IA: NONREACTIVE
HIV 1/2 RESULT COMMENT: NORMAL
IMM GRANULOCYTES # BLD AUTO: 0 K/UL (ref 0–0.04)
IMM GRANULOCYTES NFR BLD AUTO: 0 % (ref 0–0.5)
IRON SERPL-MCNC: 32 UG/DL (ref 35–150)
LDLC SERPL CALC-MCNC: 94.4 MG/DL (ref 0–100)
LYMPHOCYTES # BLD: 1.9 K/UL (ref 0.8–3.5)
LYMPHOCYTES NFR BLD: 24 % (ref 12–49)
Lab: NORMAL
MAGNESIUM SERPL-MCNC: 2 MG/DL (ref 1.6–2.4)
MCH RBC QN AUTO: 30.3 PG (ref 26–34)
MCHC RBC AUTO-ENTMCNC: 31.5 G/DL (ref 30–36.5)
MCV RBC AUTO: 96.3 FL (ref 80–99)
METHADONE UR QL: NEGATIVE
MICROALBUMIN UR-MCNC: 4.96 MG/DL
MICROALBUMIN/CREAT UR-RTO: 27 MG/G (ref 0–30)
MONOCYTES # BLD: 0.6 K/UL (ref 0–1)
MONOCYTES NFR BLD: 7 % (ref 5–13)
NEUTS SEG # BLD: 5.1 K/UL (ref 1.8–8)
NEUTS SEG NFR BLD: 63 % (ref 32–75)
NRBC # BLD: 0 K/UL (ref 0–0.01)
NRBC BLD-RTO: 0 PER 100 WBC
OPIATES UR QL: NEGATIVE
PCP UR QL: NEGATIVE
PHOSPHATE SERPL-MCNC: 4.1 MG/DL (ref 2.6–4.7)
PLATELET # BLD AUTO: 435 K/UL (ref 150–400)
PMV BLD AUTO: 9.7 FL (ref 8.9–12.9)
POTASSIUM SERPL-SCNC: 3.7 MMOL/L (ref 3.5–5.1)
RBC # BLD AUTO: 4.58 M/UL (ref 3.8–5.2)
SODIUM SERPL-SCNC: 136 MMOL/L (ref 136–145)
SPECIMEN HOLD: NORMAL
TRIGL SERPL-MCNC: 173 MG/DL
VIT B12 SERPL-MCNC: 459 PG/ML (ref 193–986)
VLDLC SERPL CALC-MCNC: 34.6 MG/DL
WBC # BLD AUTO: 8.1 K/UL (ref 3.6–11)

## 2023-05-10 RX ORDER — OMEPRAZOLE 40 MG/1
CAPSULE, DELAYED RELEASE ORAL
Qty: 30 CAPSULE | Refills: 5 | Status: SHIPPED | OUTPATIENT
Start: 2023-05-10 | End: 2023-06-02

## 2023-05-11 ENCOUNTER — TELEPHONE (OUTPATIENT)
Age: 63
End: 2023-05-11

## 2023-05-11 DIAGNOSIS — M81.8 OTHER OSTEOPOROSIS WITHOUT CURRENT PATHOLOGICAL FRACTURE: ICD-10-CM

## 2023-05-11 NOTE — TELEPHONE ENCOUNTER
Called patient to discuss results of DEXA scan that was recently performed which shows osteoporosis. We discussed that patient is already taking a multivitamin that contains calcium and vitamin D. We also discussed consideration of taking a bisphosphonate medication such as Fosamax that would help to strengthen her bones. Patient has surgery upcoming within the next month to reverse a ileostomy that she had while she was hospitalized and would like to defer decision regarding this medication until after her surgery.     Keenan Briggs MD

## 2023-05-30 ENCOUNTER — ANESTHESIA EVENT (OUTPATIENT)
Facility: HOSPITAL | Age: 63
End: 2023-05-30

## 2023-05-30 ENCOUNTER — ANESTHESIA (OUTPATIENT)
Facility: HOSPITAL | Age: 63
End: 2023-05-30

## 2023-05-30 ENCOUNTER — HOSPITAL ENCOUNTER (OUTPATIENT)
Facility: HOSPITAL | Age: 63
Setting detail: OUTPATIENT SURGERY
Discharge: HOME | End: 2023-05-30
Attending: INTERNAL MEDICINE | Admitting: INTERNAL MEDICINE

## 2023-05-30 VITALS
DIASTOLIC BLOOD PRESSURE: 71 MMHG | TEMPERATURE: 98.1 F | WEIGHT: 115.3 LBS | OXYGEN SATURATION: 100 % | HEART RATE: 79 BPM | SYSTOLIC BLOOD PRESSURE: 118 MMHG | BODY MASS INDEX: 19.21 KG/M2 | HEIGHT: 65 IN | RESPIRATION RATE: 15 BRPM

## 2023-05-30 LAB
BASOPHILS # BLD: 0.1 K/UL (ref 0–0.1)
BASOPHILS NFR BLD: 1 % (ref 0–1)
DIFFERENTIAL METHOD BLD: ABNORMAL
EOSINOPHIL # BLD: 0.5 K/UL (ref 0–0.4)
EOSINOPHIL NFR BLD: 6 % (ref 0–7)
ERYTHROCYTE [DISTWIDTH] IN BLOOD BY AUTOMATED COUNT: 14.1 % (ref 11.5–14.5)
GLUCOSE BLD STRIP.AUTO-MCNC: 84 MG/DL (ref 65–117)
HCT VFR BLD AUTO: 35.6 % (ref 35–47)
HGB BLD-MCNC: 11.6 G/DL (ref 11.5–16)
IMM GRANULOCYTES # BLD AUTO: 0 K/UL (ref 0–0.04)
IMM GRANULOCYTES NFR BLD AUTO: 0 % (ref 0–0.5)
LYMPHOCYTES # BLD: 1.5 K/UL (ref 0.8–3.5)
LYMPHOCYTES NFR BLD: 18 % (ref 12–49)
MCH RBC QN AUTO: 30.1 PG (ref 26–34)
MCHC RBC AUTO-ENTMCNC: 32.6 G/DL (ref 30–36.5)
MCV RBC AUTO: 92.5 FL (ref 80–99)
MONOCYTES # BLD: 0.4 K/UL (ref 0–1)
MONOCYTES NFR BLD: 5 % (ref 5–13)
NEUTS SEG # BLD: 5.9 K/UL (ref 1.8–8)
NEUTS SEG NFR BLD: 70 % (ref 32–75)
NRBC # BLD: 0 K/UL (ref 0–0.01)
NRBC BLD-RTO: 0 PER 100 WBC
PLATELET # BLD AUTO: 354 K/UL (ref 150–400)
PMV BLD AUTO: 9.2 FL (ref 8.9–12.9)
RBC # BLD AUTO: 3.85 M/UL (ref 3.8–5.2)
SERVICE CMNT-IMP: NORMAL
WBC # BLD AUTO: 8.4 K/UL (ref 3.6–11)

## 2023-05-30 PROCEDURE — 82962 GLUCOSE BLOOD TEST: CPT

## 2023-05-30 PROCEDURE — 3700000000 HC ANESTHESIA ATTENDED CARE: Performed by: INTERNAL MEDICINE

## 2023-05-30 PROCEDURE — 2580000003 HC RX 258: Performed by: INTERNAL MEDICINE

## 2023-05-30 PROCEDURE — 3600007512: Performed by: INTERNAL MEDICINE

## 2023-05-30 PROCEDURE — 2709999900 HC NON-CHARGEABLE SUPPLY: Performed by: INTERNAL MEDICINE

## 2023-05-30 PROCEDURE — 7100000011 HC PHASE II RECOVERY - ADDTL 15 MIN: Performed by: INTERNAL MEDICINE

## 2023-05-30 PROCEDURE — 88305 TISSUE EXAM BY PATHOLOGIST: CPT

## 2023-05-30 PROCEDURE — 3600007502: Performed by: INTERNAL MEDICINE

## 2023-05-30 PROCEDURE — 85025 COMPLETE CBC W/AUTO DIFF WBC: CPT

## 2023-05-30 PROCEDURE — 3700000001 HC ADD 15 MINUTES (ANESTHESIA): Performed by: INTERNAL MEDICINE

## 2023-05-30 PROCEDURE — 7100000010 HC PHASE II RECOVERY - FIRST 15 MIN: Performed by: INTERNAL MEDICINE

## 2023-05-30 PROCEDURE — 2500000003 HC RX 250 WO HCPCS: Performed by: NURSE ANESTHETIST, CERTIFIED REGISTERED

## 2023-05-30 PROCEDURE — 6360000002 HC RX W HCPCS: Performed by: NURSE ANESTHETIST, CERTIFIED REGISTERED

## 2023-05-30 PROCEDURE — 36415 COLL VENOUS BLD VENIPUNCTURE: CPT

## 2023-05-30 RX ORDER — PROPOFOL 10 MG/ML
INJECTION, EMULSION INTRAVENOUS CONTINUOUS PRN
Status: DISCONTINUED | OUTPATIENT
Start: 2023-05-30 | End: 2023-05-30 | Stop reason: SDUPTHER

## 2023-05-30 RX ORDER — DEXMEDETOMIDINE HYDROCHLORIDE 100 UG/ML
INJECTION, SOLUTION INTRAVENOUS PRN
Status: DISCONTINUED | OUTPATIENT
Start: 2023-05-30 | End: 2023-05-30 | Stop reason: SDUPTHER

## 2023-05-30 RX ORDER — SODIUM CHLORIDE 9 MG/ML
INJECTION, SOLUTION INTRAVENOUS CONTINUOUS
Status: DISCONTINUED | OUTPATIENT
Start: 2023-05-30 | End: 2023-05-30 | Stop reason: HOSPADM

## 2023-05-30 RX ORDER — PROPOFOL 10 MG/ML
INJECTION, EMULSION INTRAVENOUS PRN
Status: DISCONTINUED | OUTPATIENT
Start: 2023-05-30 | End: 2023-05-30 | Stop reason: SDUPTHER

## 2023-05-30 RX ORDER — LIDOCAINE HYDROCHLORIDE 20 MG/ML
INJECTION, SOLUTION EPIDURAL; INFILTRATION; INTRACAUDAL; PERINEURAL PRN
Status: DISCONTINUED | OUTPATIENT
Start: 2023-05-30 | End: 2023-05-30 | Stop reason: SDUPTHER

## 2023-05-30 RX ADMIN — SODIUM CHLORIDE: 9 INJECTION, SOLUTION INTRAVENOUS at 12:22

## 2023-05-30 RX ADMIN — LIDOCAINE HYDROCHLORIDE 50 MG: 20 INJECTION, SOLUTION EPIDURAL; INFILTRATION; INTRACAUDAL; PERINEURAL at 12:29

## 2023-05-30 RX ADMIN — DEXMEDETOMIDINE 8 MCG: 100 INJECTION, SOLUTION INTRAVENOUS at 12:29

## 2023-05-30 RX ADMIN — PROPOFOL 100 MCG/KG/MIN: 10 INJECTION, EMULSION INTRAVENOUS at 12:29

## 2023-05-30 RX ADMIN — PROPOFOL 100 MG: 10 INJECTION, EMULSION INTRAVENOUS at 12:29

## 2023-05-30 ASSESSMENT — PAIN - FUNCTIONAL ASSESSMENT: PAIN_FUNCTIONAL_ASSESSMENT: 0-10

## 2023-05-30 NOTE — PROGRESS NOTES
Endoscopy discharge instructions have been reviewed and given to patient. The patient verbalized understanding and acceptance of instructions. Dr. Magalis Bates discussed with patient and spouse procedure findings and next steps.

## 2023-05-30 NOTE — ANESTHESIA PRE PROCEDURE
Department of Anesthesiology  Preprocedure Note       Name:  Mu Baron   Age:  61 y.o.  :  1960                                          MRN:  [id number]         Date:  2023      Surgeon: Heber Mims):  Wendy Tobin MD    Procedure: Procedure(s):  EGD ESOPHAGOGASTRODUODENOSCOPY  FLEXIBLE SIGMOIDOSCOPY    Medications prior to admission:   Prior to Admission medications    Medication Sig Start Date End Date Taking? Authorizing Provider   omeprazole (PRILOSEC) 40 MG delayed release capsule TAKE 1 CAPSULE BY MOUTH EVERY DAY 5/10/23   Josefina Harvey MD   albuterol (ACCUNEB) 0.63 MG/3ML nebulizer solution Take 3 mLs by nebulization every 6 hours as needed for Wheezing 23   Josefina Harvey MD   cyclobenzaprine (FLEXERIL) 5 MG tablet Take 1 tablet by mouth 3 times daily as needed for Muscle spasms 23   Josefina Harvey MD   ondansetron (ZOFRAN-ODT) 4 MG disintegrating tablet Take 1 tablet by mouth every 8 hours as needed for Nausea for nausea 23   Josefina Harvey MD   Probiotic Product (RISAQUAD PO) Take 1 capsule by mouth daily 3/20/23   Historical Provider, MD   loperamide (IMODIUM) 2 MG capsule Take 1 capsule by mouth 4 times daily as needed for Diarrhea    Historical Provider, MD   Nebulizers (COMPRESSOR/NEBULIZER) MISC Use to administer albuterol breathing treatments at home 23   Josefina Harvey MD   gabapentin (NEURONTIN) 300 MG capsule Take 2 capsules by mouth 3 times daily for 30 days. Max Daily Amount: 1,800 mg 23  Josefina Harvey MD   potassium chloride (KLOR-CON M) 20 MEQ extended release tablet Take 1 tablet by mouth daily 3/31/23   Ar Automatic Reconciliation   Lancets MISC Use once a day.  Please give one compatible with patient's meter 7/15/16   Ar Automatic Reconciliation   albuterol sulfate HFA (PROVENTIL;VENTOLIN;PROAIR) 108 (90 Base) MCG/ACT inhaler Inhale 1 puff into the lungs every 4 hours as needed 22   Ar Automatic Reconciliation   atorvastatin

## 2023-05-30 NOTE — PERIOP NOTE
Nursing report given to recovery RN, Dayton García RN. Patient tolerated procedure without problems. Abdomen soft and patient arousable and voices no complaints Report received from Cole Velazquez CRNA, see anesthesia note. Patient transported to endoscopy recovery area.scope cleaned by Que cox.

## 2023-05-30 NOTE — OP NOTE
Breana Bernabe M.D.  [phone]           2023                EGD Operative Report  Keshia Ulloa  :  1960  [address] [place] [address] Medical Record Number:  [id number]      Indication: Dyphagia/odynophagia    : Fern Espinal MD    Referring Provider:  Suzie Saab MD      Anesthesia/Sedation:  MAC anesthesia    Airway assessment: No airway problems anticipated    Pre-Procedural Exam:      Airway: clear, no airway problems anticipated  Heart: RRR, without gallops or rubs  Lungs: clear bilaterally without wheezes, crackles, or rhonchi  Abdomen: soft, nontender, nondistended, bowel sounds present  Mental Status: awake, alert and oriented to person, place and time       Procedure Details     After infomed consent was obtained for the procedure, with all risks and benefits of procedure explained the patient was taken to the endoscopy suite and placed in the left lateral decubitus position. Following sequential administration of sedation as per above, the endoscope was inserted into the mouth and advanced under direct vision to second portion of the duodenum. A careful inspection was made as the gastroscope was withdrawn, including a retroflexed view of the proximal stomach; findings and interventions are described below. Findings:   Esophagus:normal  Stomach: Small size hiatal hernia, evidence of a previous RYGB. Mucosa within normal in the gastric pouch, evidence of a large ulcer seen at the gastro-jejunal anastomosis, mainly on the jejunal side, friable with minor oozing to touch with the scope. The residual lumen appeared patent to about 15 mm. Duodenum/jejunum:  Mucosa within normal in the blind limb and efferent limb. Therapies:  none    Specimens:  Stomach           Complications:   None; patient tolerated the procedure well. EBL:  None.            Impression:   Large marginal ulcer with history of previous gastric bypass                         Small hiatal

## 2023-05-30 NOTE — DISCHARGE INSTRUCTIONS
[address] [facility]. Charity Caraballo M.D.  [phone]                 COLON and EGD DISCHARGE INSTRUCTIONS    2023    Holden Crew  :  1960  [facility] Medical Record Number:  [id number]      DISCOMFORT:  Sore throat- throat lozenges or warm salt water gargle  Redness at IV site- apply warm compress to area; if redness or soreness persist- contact your physician  There may be a slight amount of blood passed from the rectum  Gaseous discomfort- walking, belching will help relieve any discomfort  You may not operate a vehicle for 12 hours  You may not engage in an occupation involving machinery or appliances for rest of today  You may not drink alcoholic beverages for at least 12 hours  Avoid making any critical decisions for at least 24 hour  DIET:   Soft diet   - however -  remember your colon is empty and a heavy meal will produce gas. Avoid these foods:  vegetables, fried / greasy foods, carbonated drinks for today     ACTIVITY:  You may  resume your normal daily activities it is recommended that you spend the remainder of the day resting -  avoid any strenuous activity and driving. CALL M.D. ANY SIGN OF:   Increasing pain, nausea, vomiting  Abdominal distension (swelling)  New increased bleeding (oral or rectal)  Fever (chills)  Pain in chest area  Bloody discharge from nose or mouth  Shortness of breath      Follow-up Instructions:   Call Dr. Randy Tillman if any questions at [phone]. Results of procedure / biopsy in 7 to 10 days, we will call you with these results. Your endoscopy showed a large ulcer at the junction of the stomach and jejunum where the gastric bypass is. Did not appear narrowed and dilation not needed especially in the presence of the ulcer. You need to take acid suppression twice a day and sucralfate four times a day. Will check CBC today. Your sigmoidoscopy showed normal mucosa in the rectal pouch.

## 2023-05-30 NOTE — PROGRESS NOTES
Ericka Rodney  1960  [id number]    Situation:  Verbal report received from: Sam Jack RN  Procedure: Procedure(s):  EGD ESOPHAGOGASTRODUODENOSCOPY  FLEXIBLE SIGMOIDOSCOPY  EGD BIOPSY    Background:    Preoperative diagnosis: Chronic colitis [K52.9]  Dysphagia, unspecified type [R13.10]  Postoperative diagnosis: * No post-op diagnosis entered *    :  Dr. Aretha Richter  Assistant(s): Circulator: Cole Hurst RN  Endoscopy Technician: Indio Shah    Specimens:   ID Type Source Tests Collected by Time Destination   A : Stomach Biopsy. Tissue Stomach SURGICAL PATHOLOGY Jayson Ayers MD 5/30/2023 1232      H. Pylori  No    Assessment:  Intra-procedure medications   Anesthesia gave intra-procedure sedation and medications, see anesthesia flow sheet Yes    Intravenous fluids: NS@ KVO     Vital signs stable yes    Abdominal assessment: round and soft  yes    Recommendation:  Discharge patient per MD order yes.   Family or Friend  -   Permission to share finding with family or friend Yes

## 2023-05-30 NOTE — OP NOTE
Jose Taylor M.D.  [phone]            2023          Sigmoidscopy Operative Report  Merry Oppenheim  :  1960  [facility] Medical Record Number:  [id number]      Indications:    History of colitis s/p resection, taking a look at the rectum and distal sigmoid prior to colostomy reversal      :  Loreto Zavala MD    Referring Provider: Ana Velasquez MD    Sedation:  MAC anesthesia    Pre-Procedural Exam:      Airway: clear,  No airway problems anticipated  Heart: RRR, without gallops or rubs  Lungs: clear bilaterally without wheezes, crackles, or rhonchi  Abdomen: soft, nontender, nondistended, bowel sounds present  Mental Status: awake, alert and oriented to person, place and time     Procedure Details:  After informed consent was obtained with all risks and benefits of procedure explained and preoperative exam completed, the patient was taken to the endoscopy suite and placed in the left lateral decubitus position. Upon sequential sedation as per above, a digital rectal exam was performed. The Olympus videocolonoscope  was inserted in the rectum and carefully advanced to the sigmoid colon. The quality of preparation was good. The colonoscope was slowly withdrawn with careful inspection and evaluation between folds. Retroflexion in the rectum was performed. Findings: The scope was advanced into the rectum and advanced about 15 cm to the recto-sigmoid junction where it was closed by recent surgery. Mucosa appeared within normal. Small internal hemorrhoids noted. Interventions:  none    Specimen Removed:  none    Complications: None. EBL:  None. Impression:  Normal mucosa in the rectum to the recto-sigmoid junction    Recommendations:  Ok to proceed with colostomy reversal at planned date. Discharge Disposition:  Home in the company of a  when able to ambulate.     Loreto Zavala MD  2023  12:47 PM

## 2023-05-30 NOTE — ANESTHESIA POSTPROCEDURE EVALUATION
Department of Anesthesiology  Postprocedure Note    Patient: Adina Cline  MRN: [id number]  YOB: 1960  Date of evaluation: 5/30/2023      Procedure Summary     Date: 05/30/23 Room / Location: [facility] ENDO 03 / [facility] ENDOSCOPY    Anesthesia Start: 8052 Anesthesia Stop: 6189    Procedures:       EGD ESOPHAGOGASTRODUODENOSCOPY (Upper GI Region)      FLEXIBLE SIGMOIDOSCOPY (Lower GI Region)      EGD BIOPSY (Upper GI Region) Diagnosis:       Chronic colitis      Dysphagia, unspecified type      (Chronic colitis [K52.9])      (Dysphagia, unspecified type [R13.10])    Surgeons: Leti Vargas MD Responsible Provider: Bri Gage MD    Anesthesia Type: MAC ASA Status: 2          Anesthesia Type: No value filed.     Zuleima Phase I: Zuleima Score: 9    Zuleima Phase II:        Anesthesia Post Evaluation    Patient location during evaluation: PACU  Patient participation: complete - patient participated  Level of consciousness: awake  Airway patency: patent  Nausea & Vomiting: no vomiting and no nausea  Complications: no  Cardiovascular status: hemodynamically stable  Respiratory status: acceptable  Hydration status: stable

## 2023-05-30 NOTE — H&P
The patient is a 61year old female who presents to the practice today for a transition into care. Note for \"Transition into care\": last colonoscopy 12/2021 mild melanosis coli, sigmoid diverticulosis, internal hemorrhoids. Patient admitted to [facility] for ischemic colitis 3/14/23. 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 Cdiff during initial admission was negative 3/13 but none completed on second admission  -Patient presents to office today for follow up from admission. She went to [facility] for 2 weeks, then returned home April 15th.  -She saw Dr. Hamzah Mercado on Monday and on June 8th he is going to do a reversal of ileostomy. Dr. Hamzah Mercado requested Dr. Fernie Ivory do flex sig prior to reversal. Patient reports No rectal discharge. She has been having some episodic LUQ pain since surgery, Dr. Hamzah Mercado advised could be scar tissue. She was having loose stools in ostomy bag since dc from [facility], Dr. Hamzah Mercado advised PRN immodium. She has also been on miralax daily since going into [facility]. She stopped that 2 days ago and loose stool in ostomy bag has improved. She is seeing wound care and ostomy bag she has now has been great, no leaks.    -For upper GI symptoms she has having some difficulty swallowing pills, having to cut them in 1/2, since going to [facility]. Reports she had a swallowing study and it showed the bottom of her esophagus needed stretching but this is not available to review. She reports infrequent gerd sx with PPI. -Hx of gastric bypass  -last egd with us 2018 but she has had one with Dr. Ward Cornea since then she reports with stretching for stricture in esophagus.       Problem List/Past Medical (Michael Sanchez; 5/2/2023 9:05 AM)  Asthma    Hiatal Hernia    Arthritis    Heart Murmur    Depression    Hypertension    Fibromyalgia (M79.7)

## 2023-05-31 NOTE — PROGRESS NOTES
[facility] [place] [facility] [place] [address]  [address] [facility], [facility]: [phone]  F: [phone] Patient ID  Name: Bianca Avitia  YOB: 1960  MRN: [id number]  Referring Provider:   No referring provider defined for this encounter. Primary Care Provider:   Giana Mendosa MD       HEMATOLOGY/MEDICAL ONCOLOGY  NOTE     Reason for Evaluation:     Chief Complaint   Patient presents with    Follow-up     THROMBOCYTOSIS    Subjective:     History of Present Illness:   Date of Visit: 23   Bianca Avitia is a 61 y.o. female who presents for a follow-up evaluation for thrombocytosis. Fatigue:Grade 1  Nausea:Grade 1  Insomnia:Grade 1  Anxiety:Grade 1  Pain:in my back 4 of 10       Patient overall reports feeling improved. She reportedly is adjusting to having an ostomy. She is having an increase in her appetite but some nausea. She reportedly is set for anastomosis surgery next week. Current Outpatient Medications   Medication Instructions    albuterol (ACCUNEB) 0.63 mg, Nebulization, EVERY 6 HOURS PRN    albuterol sulfate HFA (PROVENTIL;VENTOLIN;PROAIR) 108 (90 Base) MCG/ACT inhaler 1 puff, Inhalation, EVERY 4 HOURS PRN    atorvastatin (LIPITOR) 20 MG tablet TAKE 1 TABLET BY MOUTH EVERY DAY    budesonide (PULMICORT) 180 MCG/ACT AEPB inhaler 2 puffs, DAILY    bumetanide (BUMEX) 0.5 MG tablet TAKE 1 TABLET BY MOUTH EVERY DAY AS NEEDED    buPROPion (WELLBUTRIN XL) 300 MG extended release tablet Oral, 2 TIMES DAILY    coenzyme Q10 100 MG CAPS capsule TAKE 1 CAPSULE BY MOUTH EVERY DAY    cyclobenzaprine (FLEXERIL) 5 mg, Oral, 3 TIMES DAILY PRN    gabapentin (NEURONTIN) 600 mg, Oral, 3 TIMES DAILY    hydrOXYzine pamoate (VISTARIL) 25 MG capsule     ibuprofen (ADVIL;MOTRIN) 600 MG tablet ceived the following from [facility]: Outside name: ibuprofen (MOTRIN) 600 mg tablet    Lancets MISC Use once a day.  Please give one compatible with

## 2023-06-01 ENCOUNTER — OFFICE VISIT (OUTPATIENT)
Age: 63
End: 2023-06-01

## 2023-06-01 VITALS
HEIGHT: 65 IN | RESPIRATION RATE: 18 BRPM | TEMPERATURE: 98.1 F | BODY MASS INDEX: 19.16 KG/M2 | OXYGEN SATURATION: 98 % | WEIGHT: 115 LBS | HEART RATE: 80 BPM | SYSTOLIC BLOOD PRESSURE: 118 MMHG | DIASTOLIC BLOOD PRESSURE: 78 MMHG

## 2023-06-01 DIAGNOSIS — K25.9 GASTRIC ULCER WITHOUT HEMORRHAGE OR PERFORATION, UNSPECIFIED CHRONICITY: ICD-10-CM

## 2023-06-01 DIAGNOSIS — R79.82 ELEVATED C-REACTIVE PROTEIN (CRP): ICD-10-CM

## 2023-06-01 DIAGNOSIS — R79.0 ABNORMAL IRON SATURATION: Primary | ICD-10-CM

## 2023-06-01 DIAGNOSIS — R79.0 ABNORMAL IRON SATURATION: ICD-10-CM

## 2023-06-01 ASSESSMENT — PATIENT HEALTH QUESTIONNAIRE - PHQ9
SUM OF ALL RESPONSES TO PHQ QUESTIONS 1-9: 0
2. FEELING DOWN, DEPRESSED OR HOPELESS: 0
SUM OF ALL RESPONSES TO PHQ QUESTIONS 1-9: 0
1. LITTLE INTEREST OR PLEASURE IN DOING THINGS: 0
SUM OF ALL RESPONSES TO PHQ QUESTIONS 1-9: 0
SUM OF ALL RESPONSES TO PHQ QUESTIONS 1-9: 0
SUM OF ALL RESPONSES TO PHQ9 QUESTIONS 1 & 2: 0

## 2023-06-01 NOTE — PROGRESS NOTES
Chief Complaint   Patient presents with    Follow-up           Vitals:    06/01/23 1308   BP: 118/78   Pulse: 80   Resp: 18   Temp: 98.1 °F (36.7 °C)   SpO2: 98%            1. Have you been to the ER, urgent care clinic since your last visit? Hospitalized since your last visit? No  2. Have you seen or consulted any other health care providers outside of the [address] [place] [address] since your last visit? Include any pap smears or colon screening.  No

## 2023-06-02 ENCOUNTER — HOSPITAL ENCOUNTER (OUTPATIENT)
Facility: HOSPITAL | Age: 63
End: 2023-06-02
Payer: MEDICARE

## 2023-06-02 ENCOUNTER — PATIENT MESSAGE (OUTPATIENT)
Age: 63
End: 2023-06-02

## 2023-06-02 VITALS
HEART RATE: 87 BPM | TEMPERATURE: 96.9 F | RESPIRATION RATE: 18 BRPM | SYSTOLIC BLOOD PRESSURE: 127 MMHG | DIASTOLIC BLOOD PRESSURE: 68 MMHG | OXYGEN SATURATION: 99 % | BODY MASS INDEX: 19.27 KG/M2 | WEIGHT: 112.9 LBS | HEIGHT: 64 IN

## 2023-06-02 LAB
ABO + RH BLD: NORMAL
ANION GAP SERPL CALC-SCNC: 5 MMOL/L (ref 5–15)
BASOPHILS # BLD: 0.1 K/UL (ref 0–0.1)
BASOPHILS NFR BLD: 1 % (ref 0–1)
BLOOD GROUP ANTIBODIES SERPL: NORMAL
BUN SERPL-MCNC: 12 MG/DL (ref 6–20)
BUN/CREAT SERPL: 17 (ref 12–20)
CALCIUM SERPL-MCNC: 9 MG/DL (ref 8.5–10.1)
CHLORIDE SERPL-SCNC: 99 MMOL/L (ref 97–108)
CO2 SERPL-SCNC: 31 MMOL/L (ref 21–32)
CREAT SERPL-MCNC: 0.69 MG/DL (ref 0.55–1.02)
CRP SERPL-MCNC: 0.58 MG/DL (ref 0–0.6)
DIFFERENTIAL METHOD BLD: NORMAL
EOSINOPHIL # BLD: 0.4 K/UL (ref 0–0.4)
EOSINOPHIL NFR BLD: 4 % (ref 0–7)
ERYTHROCYTE [DISTWIDTH] IN BLOOD BY AUTOMATED COUNT: 13.5 % (ref 11.5–14.5)
ERYTHROCYTE [SEDIMENTATION RATE] IN BLOOD: 22 MM/HR (ref 0–30)
EST. AVERAGE GLUCOSE BLD GHB EST-MCNC: 91 MG/DL
FERRITIN SERPL-MCNC: 60 NG/ML (ref 8–252)
GLUCOSE SERPL-MCNC: 108 MG/DL (ref 65–100)
HBA1C MFR BLD: 4.8 % (ref 4–5.6)
HCT VFR BLD AUTO: 40.9 % (ref 35–47)
HGB BLD-MCNC: 13.4 G/DL (ref 11.5–16)
IMM GRANULOCYTES # BLD AUTO: 0 K/UL (ref 0–0.04)
IMM GRANULOCYTES NFR BLD AUTO: 0 % (ref 0–0.5)
IRON SATN MFR SERPL: 20 % (ref 20–50)
IRON SERPL-MCNC: 46 UG/DL (ref 35–150)
LYMPHOCYTES # BLD: 1.7 K/UL (ref 0.8–3.5)
LYMPHOCYTES NFR BLD: 19 % (ref 12–49)
MCH RBC QN AUTO: 30 PG (ref 26–34)
MCHC RBC AUTO-ENTMCNC: 32.8 G/DL (ref 30–36.5)
MCV RBC AUTO: 91.7 FL (ref 80–99)
MONOCYTES # BLD: 0.6 K/UL (ref 0–1)
MONOCYTES NFR BLD: 7 % (ref 5–13)
NEUTS SEG # BLD: 6.2 K/UL (ref 1.8–8)
NEUTS SEG NFR BLD: 69 % (ref 32–75)
NRBC # BLD: 0 K/UL (ref 0–0.01)
NRBC BLD-RTO: 0 PER 100 WBC
PLATELET # BLD AUTO: 374 K/UL (ref 150–400)
PMV BLD AUTO: 9.7 FL (ref 8.9–12.9)
POTASSIUM SERPL-SCNC: 4.2 MMOL/L (ref 3.5–5.1)
RBC # BLD AUTO: 4.46 M/UL (ref 3.8–5.2)
SODIUM SERPL-SCNC: 135 MMOL/L (ref 136–145)
SPECIMEN EXP DATE BLD: NORMAL
TIBC SERPL-MCNC: 231 UG/DL (ref 250–450)
WBC # BLD AUTO: 9 K/UL (ref 3.6–11)

## 2023-06-02 PROCEDURE — 86850 RBC ANTIBODY SCREEN: CPT

## 2023-06-02 PROCEDURE — 80048 BASIC METABOLIC PNL TOTAL CA: CPT

## 2023-06-02 PROCEDURE — 86900 BLOOD TYPING SEROLOGIC ABO: CPT

## 2023-06-02 PROCEDURE — 85025 COMPLETE CBC W/AUTO DIFF WBC: CPT

## 2023-06-02 PROCEDURE — 71046 X-RAY EXAM CHEST 2 VIEWS: CPT

## 2023-06-02 PROCEDURE — 86901 BLOOD TYPING SEROLOGIC RH(D): CPT

## 2023-06-02 PROCEDURE — 36415 COLL VENOUS BLD VENIPUNCTURE: CPT

## 2023-06-02 PROCEDURE — 83036 HEMOGLOBIN GLYCOSYLATED A1C: CPT

## 2023-06-02 RX ORDER — VALACYCLOVIR HYDROCHLORIDE 500 MG/1
500 TABLET, FILM COATED ORAL 2 TIMES DAILY PRN
COMMUNITY

## 2023-06-02 RX ORDER — NITROGLYCERIN 0.4 MG/1
0.4 TABLET SUBLINGUAL EVERY 5 MIN PRN
COMMUNITY

## 2023-06-02 RX ORDER — CYCLOBENZAPRINE HCL 10 MG
TABLET ORAL
Qty: 90 TABLET | Refills: 1 | OUTPATIENT
Start: 2023-06-02

## 2023-06-02 RX ORDER — PANTOPRAZOLE SODIUM 40 MG/1
40 TABLET, DELAYED RELEASE ORAL 2 TIMES DAILY
COMMUNITY

## 2023-06-02 RX ORDER — ACETAMINOPHEN 500 MG
1000 TABLET ORAL EVERY 6 HOURS PRN
COMMUNITY

## 2023-06-02 RX ORDER — BUMETANIDE 0.5 MG/1
0.5 TABLET ORAL EVERY MORNING
Status: ON HOLD | COMMUNITY
End: 2023-06-08

## 2023-06-02 RX ORDER — SUCRALFATE 1 G/1
1 TABLET ORAL 4 TIMES DAILY
COMMUNITY

## 2023-06-02 NOTE — TELEPHONE ENCOUNTER
Medication Refill Request    Cedrick Patel is requesting a refill of the following medication(s):   Requested Prescriptions     Pending Prescriptions Disp Refills    cyclobenzaprine (FLEXERIL) 10 MG tablet [Pharmacy Med Name: CYCLOBENZAPRINE 10 MG TABLET] 90 tablet 1     Sig: TAKE 1 TABLET BY MOUTH THREE TIMES A DAY AS NEEDED FOR MUSCLE SPASMS      Last provider to prescribe medication: Dr. Sallie Booth  Last Date of Medication Prescribed: 05/08/2023   Last Office Visit Date: 05/08/2023    No follow up appointment scheduled    Please send refill to:    [facility]/pharmacy #[id number] Emy Laboy/Reji Arellano Final [phone] - F [phone]  [address] [place] [address]  Phone: [phone] Fax: [phone]

## 2023-06-02 NOTE — PERIOP NOTE
Pre-Operative Nutrition Score        Has the patient had an unplanned weight loss of 10%  of body weight or more in the last 6 months? Yes = 1    Has the patient suzanna eating less than 50% of their normal diet in the preceding week? Yes = 1    Patient instructed on Diabetic pre-operative nutrition plan to start 5 days prior to surgery. Patient given 0 shakes and 1 pre-surgery drinks. Opportunity given for questions and all questions answered. Patient status post gastric bypass. Patient instructed to buy 20 Garden of Qualys or Ensure MesoCoat. Instructions given for pre op shakes. Patient instructed to bring 10 shakes to the hospital the day of surgery. Ensure coupons given to patient.
Requested last office note and studies to be faxed to PAT from [address] [place] [address] at Dr. Duong Dec office.
patient verbalizes understanding of all instructions and DOES NOT need reinforcement.

## 2023-06-05 LAB
ABO + RH BLD: NORMAL
BLOOD GROUP ANTIBODIES SERPL: NORMAL
SPECIMEN EXP DATE BLD: NORMAL

## 2023-06-05 RX ORDER — POTASSIUM CHLORIDE 1500 MG/1
TABLET, EXTENDED RELEASE ORAL
Qty: 30 TABLET | Refills: 2 | Status: ON HOLD | OUTPATIENT
Start: 2023-06-05 | End: 2023-07-21 | Stop reason: HOSPADM

## 2023-06-08 ENCOUNTER — ANESTHESIA (OUTPATIENT)
Facility: HOSPITAL | Age: 63
End: 2023-06-08
Payer: MEDICARE

## 2023-06-08 ENCOUNTER — ANESTHESIA EVENT (OUTPATIENT)
Facility: HOSPITAL | Age: 63
End: 2023-06-08
Payer: MEDICARE

## 2023-06-08 PROCEDURE — P9045 ALBUMIN (HUMAN), 5%, 250 ML: HCPCS | Performed by: NURSE ANESTHETIST, CERTIFIED REGISTERED

## 2023-06-08 PROCEDURE — 2500000003 HC RX 250 WO HCPCS: Performed by: NURSE ANESTHETIST, CERTIFIED REGISTERED

## 2023-06-08 PROCEDURE — 2580000003 HC RX 258: Performed by: NURSE ANESTHETIST, CERTIFIED REGISTERED

## 2023-06-08 PROCEDURE — 2580000003 HC RX 258: Performed by: SURGERY

## 2023-06-08 PROCEDURE — 6360000002 HC RX W HCPCS: Performed by: SURGERY

## 2023-06-08 PROCEDURE — 2580000003 HC RX 258: Performed by: ANESTHESIOLOGY

## 2023-06-08 PROCEDURE — 6360000002 HC RX W HCPCS: Performed by: NURSE ANESTHETIST, CERTIFIED REGISTERED

## 2023-06-08 RX ORDER — BUMETANIDE 0.5 MG/1
TABLET ORAL
Qty: 30 TABLET | Refills: 8 | Status: SHIPPED | OUTPATIENT
Start: 2023-06-08

## 2023-06-08 RX ORDER — MORPHINE SULFATE 1 MG/ML
INJECTION, SOLUTION EPIDURAL; INTRATHECAL; INTRAVENOUS PRN
Status: DISCONTINUED | OUTPATIENT
Start: 2023-06-08 | End: 2023-06-08 | Stop reason: SDUPTHER

## 2023-06-08 RX ORDER — PROPOFOL 10 MG/ML
INJECTION, EMULSION INTRAVENOUS PRN
Status: DISCONTINUED | OUTPATIENT
Start: 2023-06-08 | End: 2023-06-08 | Stop reason: SDUPTHER

## 2023-06-08 RX ORDER — LIDOCAINE HYDROCHLORIDE 20 MG/ML
INJECTION, SOLUTION EPIDURAL; INFILTRATION; INTRACAUDAL; PERINEURAL PRN
Status: DISCONTINUED | OUTPATIENT
Start: 2023-06-08 | End: 2023-06-08 | Stop reason: SDUPTHER

## 2023-06-08 RX ORDER — EPHEDRINE SULFATE/0.9% NACL/PF 50 MG/5 ML
SYRINGE (ML) INTRAVENOUS PRN
Status: DISCONTINUED | OUTPATIENT
Start: 2023-06-08 | End: 2023-06-08 | Stop reason: SDUPTHER

## 2023-06-08 RX ORDER — ALBUMIN, HUMAN INJ 5% 5 %
SOLUTION INTRAVENOUS PRN
Status: DISCONTINUED | OUTPATIENT
Start: 2023-06-08 | End: 2023-06-08 | Stop reason: SDUPTHER

## 2023-06-08 RX ORDER — MAGNESIUM SULFATE HEPTAHYDRATE 40 MG/ML
INJECTION, SOLUTION INTRAVENOUS PRN
Status: DISCONTINUED | OUTPATIENT
Start: 2023-06-08 | End: 2023-06-08 | Stop reason: SDUPTHER

## 2023-06-08 RX ORDER — DEXAMETHASONE SODIUM PHOSPHATE 4 MG/ML
INJECTION, SOLUTION INTRA-ARTICULAR; INTRALESIONAL; INTRAMUSCULAR; INTRAVENOUS; SOFT TISSUE PRN
Status: DISCONTINUED | OUTPATIENT
Start: 2023-06-08 | End: 2023-06-08 | Stop reason: SDUPTHER

## 2023-06-08 RX ORDER — FAMOTIDINE 10 MG/ML
INJECTION, SOLUTION INTRAVENOUS PRN
Status: DISCONTINUED | OUTPATIENT
Start: 2023-06-08 | End: 2023-06-08 | Stop reason: SDUPTHER

## 2023-06-08 RX ORDER — ROCURONIUM BROMIDE 10 MG/ML
INJECTION, SOLUTION INTRAVENOUS PRN
Status: DISCONTINUED | OUTPATIENT
Start: 2023-06-08 | End: 2023-06-08 | Stop reason: SDUPTHER

## 2023-06-08 RX ORDER — FENTANYL CITRATE 50 UG/ML
INJECTION, SOLUTION INTRAMUSCULAR; INTRAVENOUS PRN
Status: DISCONTINUED | OUTPATIENT
Start: 2023-06-08 | End: 2023-06-08 | Stop reason: SDUPTHER

## 2023-06-08 RX ORDER — KETAMINE HYDROCHLORIDE 10 MG/ML
INJECTION INTRAMUSCULAR; INTRAVENOUS PRN
Status: DISCONTINUED | OUTPATIENT
Start: 2023-06-08 | End: 2023-06-08 | Stop reason: SDUPTHER

## 2023-06-08 RX ORDER — MIDAZOLAM HYDROCHLORIDE 1 MG/ML
INJECTION INTRAMUSCULAR; INTRAVENOUS PRN
Status: DISCONTINUED | OUTPATIENT
Start: 2023-06-08 | End: 2023-06-08 | Stop reason: SDUPTHER

## 2023-06-08 RX ORDER — SODIUM CHLORIDE, SODIUM LACTATE, POTASSIUM CHLORIDE, CALCIUM CHLORIDE 600; 310; 30; 20 MG/100ML; MG/100ML; MG/100ML; MG/100ML
INJECTION, SOLUTION INTRAVENOUS CONTINUOUS PRN
Status: DISCONTINUED | OUTPATIENT
Start: 2023-06-08 | End: 2023-06-08 | Stop reason: SDUPTHER

## 2023-06-08 RX ORDER — ONDANSETRON 2 MG/ML
INJECTION INTRAMUSCULAR; INTRAVENOUS PRN
Status: DISCONTINUED | OUTPATIENT
Start: 2023-06-08 | End: 2023-06-08 | Stop reason: SDUPTHER

## 2023-06-08 RX ORDER — LIDOCAINE HYDROCHLORIDE ANHYDROUS AND DEXTROSE MONOHYDRATE .8; 5 G/100ML; G/100ML
INJECTION, SOLUTION INTRAVENOUS CONTINUOUS PRN
Status: DISCONTINUED | OUTPATIENT
Start: 2023-06-08 | End: 2023-06-08 | Stop reason: SDUPTHER

## 2023-06-08 RX ADMIN — FAMOTIDINE 20 MG: 10 INJECTION INTRAVENOUS at 08:10

## 2023-06-08 RX ADMIN — ONDANSETRON HYDROCHLORIDE 4 MG: 2 SOLUTION INTRAMUSCULAR; INTRAVENOUS at 08:10

## 2023-06-08 RX ADMIN — MORPHINE SULFATE 0.15 MG: 1 INJECTION, SOLUTION EPIDURAL; INTRATHECAL; INTRAVENOUS at 07:44

## 2023-06-08 RX ADMIN — SODIUM CHLORIDE, SODIUM LACTATE, POTASSIUM CHLORIDE, AND CALCIUM CHLORIDE: 600; 310; 30; 20 INJECTION, SOLUTION INTRAVENOUS at 08:21

## 2023-06-08 RX ADMIN — LIDOCAINE HYDROCHLORIDE 80 MG: 20 INJECTION, SOLUTION EPIDURAL; INFILTRATION; INTRACAUDAL; PERINEURAL at 08:18

## 2023-06-08 RX ADMIN — ROCURONIUM BROMIDE 50 MG: 10 INJECTION INTRAVENOUS at 08:18

## 2023-06-08 RX ADMIN — MIDAZOLAM HYDROCHLORIDE 2 MG: 1 INJECTION, SOLUTION INTRAMUSCULAR; INTRAVENOUS at 07:34

## 2023-06-08 RX ADMIN — Medication 10 MG: at 09:15

## 2023-06-08 RX ADMIN — METRONIDAZOLE 500 MG: 500 INJECTION, SOLUTION INTRAVENOUS at 08:44

## 2023-06-08 RX ADMIN — FENTANYL CITRATE 50 MCG: 50 INJECTION, SOLUTION INTRAMUSCULAR; INTRAVENOUS at 08:18

## 2023-06-08 RX ADMIN — FENTANYL CITRATE 25 MCG: 50 INJECTION, SOLUTION INTRAMUSCULAR; INTRAVENOUS at 08:10

## 2023-06-08 RX ADMIN — DEXAMETHASONE SODIUM PHOSPHATE 4 MG: 4 INJECTION, SOLUTION INTRAMUSCULAR; INTRAVENOUS at 08:33

## 2023-06-08 RX ADMIN — MAGNESIUM SULFATE HEPTAHYDRATE 2000 MG: 40 INJECTION, SOLUTION INTRAVENOUS at 08:45

## 2023-06-08 RX ADMIN — ALBUMIN (HUMAN) 12.5 G: 12.5 SOLUTION INTRAVENOUS at 08:34

## 2023-06-08 RX ADMIN — SUGAMMADEX 200 MG: 100 INJECTION, SOLUTION INTRAVENOUS at 10:00

## 2023-06-08 RX ADMIN — SODIUM CHLORIDE, POTASSIUM CHLORIDE, SODIUM LACTATE AND CALCIUM CHLORIDE: 600; 310; 30; 20 INJECTION, SOLUTION INTRAVENOUS at 09:24

## 2023-06-08 RX ADMIN — PHENYLEPHRINE HYDROCHLORIDE 40 MCG/MIN: 10 INJECTION INTRAVENOUS at 09:21

## 2023-06-08 RX ADMIN — LIDOCAINE HYDROCHLORIDE ANHYDROUS AND DEXTROSE MONOHYDRATE 2 MG/KG/HR: .8; 5 INJECTION, SOLUTION INTRAVENOUS at 08:24

## 2023-06-08 RX ADMIN — SODIUM CHLORIDE, POTASSIUM CHLORIDE, SODIUM LACTATE AND CALCIUM CHLORIDE: 600; 310; 30; 20 INJECTION, SOLUTION INTRAVENOUS at 07:22

## 2023-06-08 RX ADMIN — CEFAZOLIN SODIUM 2000 MG: 1 POWDER, FOR SOLUTION INTRAMUSCULAR; INTRAVENOUS at 08:23

## 2023-06-08 RX ADMIN — ONDANSETRON HYDROCHLORIDE 4 MG: 2 SOLUTION INTRAMUSCULAR; INTRAVENOUS at 09:45

## 2023-06-08 RX ADMIN — KETAMINE HYDROCHLORIDE 25 MG: 10 INJECTION INTRAMUSCULAR; INTRAVENOUS at 08:18

## 2023-06-08 RX ADMIN — Medication 10 MG: at 09:17

## 2023-06-08 RX ADMIN — FENTANYL CITRATE 25 MCG: 50 INJECTION, SOLUTION INTRAMUSCULAR; INTRAVENOUS at 07:34

## 2023-06-08 RX ADMIN — PROPOFOL 100 MG: 10 INJECTION, EMULSION INTRAVENOUS at 08:18

## 2023-06-08 NOTE — ANESTHESIA PRE PROCEDURE
(VERSED) injection 2 mg  2 mg IntraVENous Once PRN Kathleen Jj MD           Allergies: Allergies   Allergen Reactions    Adhesive Tape Other (See Comments)     Makes skin tears easily.      Amlodipine Swelling     On legs at 10 mg dose    Quinapril Cough       Problem List:    Patient Active Problem List   Diagnosis Code    Abnormal CT of the abdomen R93.5    FH: colon cancer Z80.0    Hypertension I10    DDD (degenerative disc disease), lumbar M51.36    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    Asthma J45.909    Abnormal mammogram of right breast R92.8    Hypokalemia E87.6    MUNOZ (nonalcoholic steatohepatitis) K75.81    Type 2 diabetes mellitus with diabetic neuropathy (HCC) E11.40    History of pulmonary embolus (PE) Z86.711    Type 2 diabetes with nephropathy (HCC) E11.21    FH: diabetes mellitus Z83.3    Arthritis M19.90    H/O colonoscopy Z98.890    Venous insufficiency I87.2    GERD (gastroesophageal reflux disease) K21.9    Elevated ferritin R79.89    Inflammatory bowel disease A63.7    Metabolic syndrome D46.75    Anxiety F41.9    H/O gastric bypass Z98.84    Primary fibromyalgia syndrome M79.7    Colitis K52.9    DAWIT (acute kidney injury) (HCC) N17.9    Fibromyalgia M79.7    Neutrophilia D72.9    Diarrhea of presumed infectious origin R19.7    Thrombocytosis D75.839    S/P colectomy Z90.49    Coronary atherosclerosis I25.10    Iron deficiency anemia, unspecified D50.9    Hypertensive heart disease with heart failure (HCC) I11.0    Chronic heart failure (HCC) I50.9    Major depressive disorder, single episode, moderate (HCC) F32.1    Other osteoporosis without current pathological fracture M81.8    Abnormal iron saturation R79.0    Elevated C-reactive protein (CRP) R79.82    Gastric ulcer without hemorrhage or perforation K25.9       Past Medical History:        Diagnosis Date    Anesthesia complication 52/2308    unable to lift feet & legs post op 3/2023, admitted to

## 2023-06-08 NOTE — ANESTHESIA PROCEDURE NOTES
Spinal Block    Patient location during procedure: pre-op  End time: 6/8/2023 7:45 AM  Reason for block: procedure for pain, post-op pain management and at surgeon's request  Staffing  Performed: other anesthesia staff   Anesthesiologist: Nayely Salvador MD  Resident/CRNA: Nancy Balderas, [address]  Other anesthesia staff: Lilli Mckeon RN  Spinal Block  Patient position: sitting  Prep: ChloraPrep  Patient monitoring: cardiac monitor, continuous pulse ox, continuous capnometry, frequent blood pressure checks and oxygen  Approach: midline  Location: L3/L4  Provider prep: mask and sterile gloves  Needle  Needle type: Pencan   Needle gauge: 25 G  Needle length: 3.5 in  Assessment  Events: cerebrospinal fluid  Swirl obtained: Yes  CSF: clear  Attempts: 1  Hemodynamics: stable  Additional Notes  Pt tolerated procedure well.  T/O completed prior to start with  preop nurse

## 2023-06-08 NOTE — ANESTHESIA POSTPROCEDURE EVALUATION
Department of Anesthesiology  Postprocedure Note    Patient: Facundo Hargrove  MRN: [id number]  YOB: 1960  Date of evaluation: 6/8/2023      Procedure Summary     Date: 06/08/23 Room / Location: [facility] MAIN OR  / [facility] MAIN OR    Anesthesia Start: 0810 Anesthesia Stop: 5300    Procedure: EXPLORATORY LAPAROTOMY; LYSIS OF ADHESIONS; EXCISION OF OMENTAL MASS; CREATION OF ILEOCOLIC ANASTOMOSIS Diagnosis:       Colitis      (Colitis [K52.9])    Surgeons: Osmin Wynne MD Responsible Provider:     Anesthesia Type: General ASA Status: 3          Anesthesia Type: General    Zuleima Phase I: Zuleima Score: 7    Zuleima Phase II:        Anesthesia Post Evaluation    Patient location during evaluation: PACU  Patient participation: complete - patient participated  Level of consciousness: awake  Airway patency: patent  Nausea & Vomiting: no vomiting and no nausea  Complications: no  Cardiovascular status: hemodynamically stable  Respiratory status: acceptable  Hydration status: stable

## 2023-06-22 ENCOUNTER — HOSPITAL ENCOUNTER (INPATIENT)
Facility: HOSPITAL | Age: 63
LOS: 29 days | Discharge: NURSING, REHAB OR LONG-TERM CARE | DRG: 326 | End: 2023-07-21
Attending: EMERGENCY MEDICINE | Admitting: STUDENT IN AN ORGANIZED HEALTH CARE EDUCATION/TRAINING PROGRAM
Payer: MEDICARE

## 2023-06-22 DIAGNOSIS — R23.0 BLUE TOES: ICD-10-CM

## 2023-06-22 DIAGNOSIS — I82.411 ACUTE DEEP VEIN THROMBOSIS (DVT) OF FEMORAL VEIN OF RIGHT LOWER EXTREMITY (HCC): ICD-10-CM

## 2023-06-22 DIAGNOSIS — K66.8 FREE INTRAPERITONEAL AIR: ICD-10-CM

## 2023-06-22 DIAGNOSIS — R18.8 FREE FLUID IN PELVIS: Primary | ICD-10-CM

## 2023-06-22 DIAGNOSIS — M79.7 FIBROMYALGIA: ICD-10-CM

## 2023-06-22 DIAGNOSIS — K52.9 COLITIS: ICD-10-CM

## 2023-06-22 DIAGNOSIS — A04.72 C. DIFFICILE COLITIS: ICD-10-CM

## 2023-06-22 DIAGNOSIS — R57.9 SHOCK (HCC): ICD-10-CM

## 2023-06-22 DIAGNOSIS — K57.32 SIGMOID DIVERTICULITIS: ICD-10-CM

## 2023-06-22 LAB
ABO + RH BLD: NORMAL
ALBUMIN SERPL-MCNC: 1.8 G/DL (ref 3.5–5)
ALBUMIN/GLOB SERPL: 0.5 (ref 1.1–2.2)
ALP SERPL-CCNC: 116 U/L (ref 45–117)
ALT SERPL-CCNC: 11 U/L (ref 12–78)
ANION GAP SERPL CALC-SCNC: 10 MMOL/L (ref 5–15)
APTT PPP: 24.2 SEC (ref 22.1–31)
AST SERPL-CCNC: 15 U/L (ref 15–37)
BASOPHILS # BLD: 0.3 K/UL (ref 0–0.1)
BASOPHILS NFR BLD: 1 % (ref 0–1)
BILIRUB SERPL-MCNC: 0.9 MG/DL (ref 0.2–1)
BLOOD GROUP ANTIBODIES SERPL: NORMAL
BUN SERPL-MCNC: 16 MG/DL (ref 6–20)
BUN/CREAT SERPL: 19 (ref 12–20)
CALCIUM SERPL-MCNC: 8.9 MG/DL (ref 8.5–10.1)
CHLORIDE SERPL-SCNC: 87 MMOL/L (ref 97–108)
CO2 SERPL-SCNC: 32 MMOL/L (ref 21–32)
CREAT SERPL-MCNC: 0.85 MG/DL (ref 0.55–1.02)
DIFFERENTIAL METHOD BLD: ABNORMAL
EOSINOPHIL # BLD: 0.3 K/UL (ref 0–0.4)
EOSINOPHIL NFR BLD: 1 % (ref 0–7)
ERYTHROCYTE [DISTWIDTH] IN BLOOD BY AUTOMATED COUNT: 13.3 % (ref 11.5–14.5)
GLOBULIN SER CALC-MCNC: 3.8 G/DL (ref 2–4)
GLUCOSE SERPL-MCNC: 133 MG/DL (ref 65–100)
HCT VFR BLD AUTO: 34.7 % (ref 35–47)
HGB BLD-MCNC: 11.8 G/DL (ref 11.5–16)
IMM GRANULOCYTES # BLD AUTO: 0 K/UL
IMM GRANULOCYTES NFR BLD AUTO: 0 %
INR PPP: 1 (ref 0.9–1.1)
LACTATE BLD-SCNC: 2.19 MMOL/L (ref 0.4–2)
LIPASE SERPL-CCNC: 25 U/L (ref 73–393)
LYMPHOCYTES # BLD: 1.3 K/UL (ref 0.8–3.5)
LYMPHOCYTES NFR BLD: 5 % (ref 12–49)
MCH RBC QN AUTO: 29.4 PG (ref 26–34)
MCHC RBC AUTO-ENTMCNC: 34 G/DL (ref 30–36.5)
MCV RBC AUTO: 86.3 FL (ref 80–99)
MONOCYTES # BLD: 1.5 K/UL (ref 0–1)
MONOCYTES NFR BLD: 6 % (ref 5–13)
NEUTS BAND NFR BLD MANUAL: 3 % (ref 0–6)
NEUTS SEG # BLD: 22.2 K/UL (ref 1.8–8)
NEUTS SEG NFR BLD: 84 % (ref 32–75)
NRBC # BLD: 0 K/UL (ref 0–0.01)
NRBC BLD-RTO: 0 PER 100 WBC
PLATELET # BLD AUTO: 732 K/UL (ref 150–400)
PMV BLD AUTO: 8.6 FL (ref 8.9–12.9)
POTASSIUM SERPL-SCNC: 2.8 MMOL/L (ref 3.5–5.1)
PROT SERPL-MCNC: 5.6 G/DL (ref 6.4–8.2)
PROTHROMBIN TIME: 10.8 SEC (ref 9–11.1)
RBC # BLD AUTO: 4.02 M/UL (ref 3.8–5.2)
RBC MORPH BLD: ABNORMAL
SODIUM SERPL-SCNC: 129 MMOL/L (ref 136–145)
SPECIMEN EXP DATE BLD: NORMAL
THERAPEUTIC RANGE: NORMAL SECS (ref 58–77)
WBC # BLD AUTO: 25.6 K/UL (ref 3.6–11)

## 2023-06-22 PROCEDURE — 86850 RBC ANTIBODY SCREEN: CPT

## 2023-06-22 PROCEDURE — 36415 COLL VENOUS BLD VENIPUNCTURE: CPT

## 2023-06-22 PROCEDURE — 96375 TX/PRO/DX INJ NEW DRUG ADDON: CPT

## 2023-06-22 PROCEDURE — 86900 BLOOD TYPING SEROLOGIC ABO: CPT

## 2023-06-22 PROCEDURE — 83605 ASSAY OF LACTIC ACID: CPT

## 2023-06-22 PROCEDURE — 1100000000 HC RM PRIVATE

## 2023-06-22 PROCEDURE — 2580000003 HC RX 258

## 2023-06-22 PROCEDURE — 86901 BLOOD TYPING SEROLOGIC RH(D): CPT

## 2023-06-22 PROCEDURE — 85025 COMPLETE CBC W/AUTO DIFF WBC: CPT

## 2023-06-22 PROCEDURE — 2500000003 HC RX 250 WO HCPCS: Performed by: EMERGENCY MEDICINE

## 2023-06-22 PROCEDURE — 85610 PROTHROMBIN TIME: CPT

## 2023-06-22 PROCEDURE — 85730 THROMBOPLASTIN TIME PARTIAL: CPT

## 2023-06-22 PROCEDURE — 99285 EMERGENCY DEPT VISIT HI MDM: CPT

## 2023-06-22 PROCEDURE — 96361 HYDRATE IV INFUSION ADD-ON: CPT

## 2023-06-22 PROCEDURE — 6360000002 HC RX W HCPCS: Performed by: EMERGENCY MEDICINE

## 2023-06-22 PROCEDURE — 80053 COMPREHEN METABOLIC PANEL: CPT

## 2023-06-22 PROCEDURE — 96374 THER/PROPH/DIAG INJ IV PUSH: CPT

## 2023-06-22 PROCEDURE — 83690 ASSAY OF LIPASE: CPT

## 2023-06-22 RX ORDER — PROCHLORPERAZINE EDISYLATE 5 MG/ML
10 INJECTION INTRAMUSCULAR; INTRAVENOUS EVERY 6 HOURS PRN
Status: DISCONTINUED | OUTPATIENT
Start: 2023-06-22 | End: 2023-07-03 | Stop reason: SDUPTHER

## 2023-06-22 RX ORDER — POTASSIUM CHLORIDE 7.45 MG/ML
10 INJECTION INTRAVENOUS ONCE
Status: COMPLETED | OUTPATIENT
Start: 2023-06-22 | End: 2023-06-23

## 2023-06-22 RX ORDER — SODIUM CHLORIDE, SODIUM LACTATE, POTASSIUM CHLORIDE, AND CALCIUM CHLORIDE .6; .31; .03; .02 G/100ML; G/100ML; G/100ML; G/100ML
1000 INJECTION, SOLUTION INTRAVENOUS
Status: COMPLETED | OUTPATIENT
Start: 2023-06-22 | End: 2023-06-22

## 2023-06-22 RX ORDER — SODIUM CHLORIDE, SODIUM LACTATE, POTASSIUM CHLORIDE, CALCIUM CHLORIDE 600; 310; 30; 20 MG/100ML; MG/100ML; MG/100ML; MG/100ML
INJECTION, SOLUTION INTRAVENOUS CONTINUOUS
Status: DISCONTINUED | OUTPATIENT
Start: 2023-06-22 | End: 2023-06-26

## 2023-06-22 RX ORDER — METOPROLOL TARTRATE 5 MG/5ML
2.5 INJECTION INTRAVENOUS EVERY 12 HOURS
Status: DISCONTINUED | OUTPATIENT
Start: 2023-06-22 | End: 2023-07-21 | Stop reason: HOSPADM

## 2023-06-22 RX ORDER — ONDANSETRON 2 MG/ML
4 INJECTION INTRAMUSCULAR; INTRAVENOUS
Status: DISCONTINUED | OUTPATIENT
Start: 2023-06-22 | End: 2023-06-22

## 2023-06-22 RX ORDER — ENOXAPARIN SODIUM 100 MG/ML
40 INJECTION SUBCUTANEOUS DAILY
Status: DISCONTINUED | OUTPATIENT
Start: 2023-06-23 | End: 2023-06-27

## 2023-06-22 RX ORDER — HYDROMORPHONE HYDROCHLORIDE 1 MG/ML
1 INJECTION, SOLUTION INTRAMUSCULAR; INTRAVENOUS; SUBCUTANEOUS
Status: DISCONTINUED | OUTPATIENT
Start: 2023-06-22 | End: 2023-06-23

## 2023-06-22 RX ORDER — ONDANSETRON 2 MG/ML
4 INJECTION INTRAMUSCULAR; INTRAVENOUS EVERY 6 HOURS PRN
Status: DISCONTINUED | OUTPATIENT
Start: 2023-06-22 | End: 2023-07-21 | Stop reason: HOSPADM

## 2023-06-22 RX ORDER — HYDROMORPHONE HYDROCHLORIDE 1 MG/ML
1 INJECTION, SOLUTION INTRAMUSCULAR; INTRAVENOUS; SUBCUTANEOUS
Status: DISCONTINUED | OUTPATIENT
Start: 2023-06-22 | End: 2023-06-22

## 2023-06-22 RX ADMIN — POTASSIUM CHLORIDE 10 MEQ: 7.46 INJECTION, SOLUTION INTRAVENOUS at 22:30

## 2023-06-22 RX ADMIN — SODIUM CHLORIDE, POTASSIUM CHLORIDE, SODIUM LACTATE AND CALCIUM CHLORIDE 1000 ML: 600; 310; 30; 20 INJECTION, SOLUTION INTRAVENOUS at 21:23

## 2023-06-22 RX ADMIN — POTASSIUM CHLORIDE 10 MEQ: 7.46 INJECTION, SOLUTION INTRAVENOUS at 23:54

## 2023-06-22 RX ADMIN — HYDROMORPHONE HYDROCHLORIDE 1 MG: 1 INJECTION, SOLUTION INTRAMUSCULAR; INTRAVENOUS; SUBCUTANEOUS at 22:30

## 2023-06-22 ASSESSMENT — ENCOUNTER SYMPTOMS
ABDOMINAL DISTENTION: 0
CONSTIPATION: 0
SORE THROAT: 0
WHEEZING: 0
VOMITING: 1
NAUSEA: 0
SHORTNESS OF BREATH: 0
ABDOMINAL PAIN: 1
COUGH: 0
DIARRHEA: 0

## 2023-06-22 ASSESSMENT — PAIN - FUNCTIONAL ASSESSMENT: PAIN_FUNCTIONAL_ASSESSMENT: 0-10

## 2023-06-22 ASSESSMENT — PAIN DESCRIPTION - LOCATION
LOCATION: ABDOMEN
LOCATION: ABDOMEN

## 2023-06-22 ASSESSMENT — PAIN SCALES - GENERAL
PAINLEVEL_OUTOF10: 8
PAINLEVEL_OUTOF10: 9

## 2023-06-22 ASSESSMENT — LIFESTYLE VARIABLES
HOW OFTEN DO YOU HAVE A DRINK CONTAINING ALCOHOL: NEVER
HOW MANY STANDARD DRINKS CONTAINING ALCOHOL DO YOU HAVE ON A TYPICAL DAY: PATIENT DOES NOT DRINK

## 2023-06-23 LAB
ALBUMIN SERPL-MCNC: 1.1 G/DL (ref 3.5–5)
ALBUMIN/GLOB SERPL: 0.5 (ref 1.1–2.2)
ALP SERPL-CCNC: 66 U/L (ref 45–117)
ALT SERPL-CCNC: <6 U/L (ref 12–78)
ANION GAP SERPL CALC-SCNC: 9 MMOL/L (ref 5–15)
AST SERPL-CCNC: 8 U/L (ref 15–37)
BASOPHILS # BLD: 0 K/UL (ref 0–0.1)
BASOPHILS NFR BLD: 0 % (ref 0–1)
BILIRUB SERPL-MCNC: 0.6 MG/DL (ref 0.2–1)
BUN SERPL-MCNC: 13 MG/DL (ref 6–20)
BUN/CREAT SERPL: 28 (ref 12–20)
CALCIUM SERPL-MCNC: 7.5 MG/DL (ref 8.5–10.1)
CHLORIDE SERPL-SCNC: 95 MMOL/L (ref 97–108)
CO2 SERPL-SCNC: 29 MMOL/L (ref 21–32)
CREAT SERPL-MCNC: 0.46 MG/DL (ref 0.55–1.02)
DIFFERENTIAL METHOD BLD: ABNORMAL
EOSINOPHIL # BLD: 0 K/UL (ref 0–0.4)
EOSINOPHIL NFR BLD: 0 % (ref 0–7)
ERYTHROCYTE [DISTWIDTH] IN BLOOD BY AUTOMATED COUNT: 13.2 % (ref 11.5–14.5)
GLOBULIN SER CALC-MCNC: 2.1 G/DL (ref 2–4)
GLUCOSE SERPL-MCNC: 83 MG/DL (ref 65–100)
HCT VFR BLD AUTO: 22.5 % (ref 35–47)
HGB BLD-MCNC: 7.3 G/DL (ref 11.5–16)
IMM GRANULOCYTES # BLD AUTO: 0.2 K/UL (ref 0–0.04)
IMM GRANULOCYTES NFR BLD AUTO: 2 % (ref 0–0.5)
LACTATE BLD-SCNC: 1.82 MMOL/L (ref 0.4–2)
LACTATE SERPL-SCNC: 4.9 MMOL/L (ref 0.4–2)
LACTATE SERPL-SCNC: 7.1 MMOL/L (ref 0.4–2)
LYMPHOCYTES # BLD: 0.6 K/UL (ref 0.8–3.5)
LYMPHOCYTES NFR BLD: 5 % (ref 12–49)
MAGNESIUM SERPL-MCNC: 1.2 MG/DL (ref 1.6–2.4)
MCH RBC QN AUTO: 29 PG (ref 26–34)
MCHC RBC AUTO-ENTMCNC: 32.4 G/DL (ref 30–36.5)
MCV RBC AUTO: 89.3 FL (ref 80–99)
MONOCYTES # BLD: 0.6 K/UL (ref 0–1)
MONOCYTES NFR BLD: 5 % (ref 5–13)
NEUTS SEG # BLD: 10.6 K/UL (ref 1.8–8)
NEUTS SEG NFR BLD: 88 % (ref 32–75)
NRBC # BLD: 0 K/UL (ref 0–0.01)
NRBC BLD-RTO: 0 PER 100 WBC
PLATELET # BLD AUTO: 356 K/UL (ref 150–400)
PMV BLD AUTO: 8.3 FL (ref 8.9–12.9)
POTASSIUM SERPL-SCNC: 2.8 MMOL/L (ref 3.5–5.1)
PROT SERPL-MCNC: 3.2 G/DL (ref 6.4–8.2)
RBC # BLD AUTO: 2.52 M/UL (ref 3.8–5.2)
RBC MORPH BLD: ABNORMAL
SODIUM SERPL-SCNC: 133 MMOL/L (ref 136–145)
WBC # BLD AUTO: 12 K/UL (ref 3.6–11)

## 2023-06-23 PROCEDURE — 6360000002 HC RX W HCPCS: Performed by: STUDENT IN AN ORGANIZED HEALTH CARE EDUCATION/TRAINING PROGRAM

## 2023-06-23 PROCEDURE — 2580000003 HC RX 258: Performed by: SURGERY

## 2023-06-23 PROCEDURE — 1100000000 HC RM PRIVATE

## 2023-06-23 PROCEDURE — 6360000002 HC RX W HCPCS: Performed by: SURGERY

## 2023-06-23 PROCEDURE — 2580000003 HC RX 258: Performed by: STUDENT IN AN ORGANIZED HEALTH CARE EDUCATION/TRAINING PROGRAM

## 2023-06-23 PROCEDURE — 36415 COLL VENOUS BLD VENIPUNCTURE: CPT

## 2023-06-23 PROCEDURE — 83605 ASSAY OF LACTIC ACID: CPT

## 2023-06-23 PROCEDURE — C9113 INJ PANTOPRAZOLE SODIUM, VIA: HCPCS | Performed by: SURGERY

## 2023-06-23 PROCEDURE — A4216 STERILE WATER/SALINE, 10 ML: HCPCS | Performed by: STUDENT IN AN ORGANIZED HEALTH CARE EDUCATION/TRAINING PROGRAM

## 2023-06-23 PROCEDURE — 2500000003 HC RX 250 WO HCPCS: Performed by: STUDENT IN AN ORGANIZED HEALTH CARE EDUCATION/TRAINING PROGRAM

## 2023-06-23 PROCEDURE — C9113 INJ PANTOPRAZOLE SODIUM, VIA: HCPCS | Performed by: STUDENT IN AN ORGANIZED HEALTH CARE EDUCATION/TRAINING PROGRAM

## 2023-06-23 PROCEDURE — 80053 COMPREHEN METABOLIC PANEL: CPT

## 2023-06-23 PROCEDURE — 85025 COMPLETE CBC W/AUTO DIFF WBC: CPT

## 2023-06-23 PROCEDURE — 2500000003 HC RX 250 WO HCPCS: Performed by: SURGERY

## 2023-06-23 PROCEDURE — A4216 STERILE WATER/SALINE, 10 ML: HCPCS | Performed by: SURGERY

## 2023-06-23 PROCEDURE — 83735 ASSAY OF MAGNESIUM: CPT

## 2023-06-23 PROCEDURE — 6370000000 HC RX 637 (ALT 250 FOR IP): Performed by: SURGERY

## 2023-06-23 RX ORDER — METRONIDAZOLE 500 MG/100ML
500 INJECTION, SOLUTION INTRAVENOUS EVERY 8 HOURS
Status: DISCONTINUED | OUTPATIENT
Start: 2023-06-23 | End: 2023-06-25

## 2023-06-23 RX ORDER — POTASSIUM CHLORIDE 7.45 MG/ML
10 INJECTION INTRAVENOUS ONCE
Status: DISCONTINUED | OUTPATIENT
Start: 2023-06-23 | End: 2023-06-23

## 2023-06-23 RX ORDER — GABAPENTIN 300 MG/1
300 CAPSULE ORAL 2 TIMES DAILY
Status: DISCONTINUED | OUTPATIENT
Start: 2023-06-23 | End: 2023-07-06

## 2023-06-23 RX ORDER — NALOXONE HYDROCHLORIDE 0.4 MG/ML
INJECTION, SOLUTION INTRAMUSCULAR; INTRAVENOUS; SUBCUTANEOUS PRN
Status: DISCONTINUED | OUTPATIENT
Start: 2023-06-23 | End: 2023-07-21 | Stop reason: HOSPADM

## 2023-06-23 RX ORDER — ALPRAZOLAM 0.5 MG/1
0.5 TABLET ORAL EVERY 4 HOURS PRN
Status: CANCELLED | OUTPATIENT
Start: 2023-06-23

## 2023-06-23 RX ORDER — MORPHINE SULFATE 2 MG/ML
4 INJECTION, SOLUTION INTRAMUSCULAR; INTRAVENOUS
Status: DISCONTINUED | OUTPATIENT
Start: 2023-06-23 | End: 2023-06-28 | Stop reason: SDUPTHER

## 2023-06-23 RX ORDER — SUCRALFATE 1 G/1
1 TABLET ORAL EVERY 6 HOURS SCHEDULED
Status: DISCONTINUED | OUTPATIENT
Start: 2023-06-23 | End: 2023-07-07

## 2023-06-23 RX ORDER — DIPHENHYDRAMINE HYDROCHLORIDE AND LIDOCAINE HYDROCHLORIDE AND ALUMINUM HYDROXIDE AND MAGNESIUM HYDRO
5 KIT 4 TIMES DAILY PRN
Status: DISCONTINUED | OUTPATIENT
Start: 2023-06-23 | End: 2023-07-21 | Stop reason: HOSPADM

## 2023-06-23 RX ORDER — HYDROMORPHONE HYDROCHLORIDE 2 MG/ML
2 INJECTION, SOLUTION INTRAMUSCULAR; INTRAVENOUS; SUBCUTANEOUS
Status: DISCONTINUED | OUTPATIENT
Start: 2023-06-23 | End: 2023-07-18

## 2023-06-23 RX ORDER — SODIUM CHLORIDE, SODIUM LACTATE, POTASSIUM CHLORIDE, AND CALCIUM CHLORIDE .6; .31; .03; .02 G/100ML; G/100ML; G/100ML; G/100ML
1000 INJECTION, SOLUTION INTRAVENOUS ONCE
Status: COMPLETED | OUTPATIENT
Start: 2023-06-23 | End: 2023-06-23

## 2023-06-23 RX ORDER — POTASSIUM CHLORIDE 7.45 MG/ML
10 INJECTION INTRAVENOUS
Status: DISPENSED | OUTPATIENT
Start: 2023-06-23 | End: 2023-06-23

## 2023-06-23 RX ADMIN — METOPROLOL TARTRATE 2.5 MG: 5 INJECTION INTRAVENOUS at 13:53

## 2023-06-23 RX ADMIN — SODIUM CHLORIDE, PRESERVATIVE FREE 40 MG: 5 INJECTION INTRAVENOUS at 13:16

## 2023-06-23 RX ADMIN — PIPERACILLIN AND TAZOBACTAM 4500 MG: 4; .5 INJECTION, POWDER, LYOPHILIZED, FOR SOLUTION INTRAVENOUS at 00:51

## 2023-06-23 RX ADMIN — PIPERACILLIN AND TAZOBACTAM 3375 MG: 3; .375 INJECTION, POWDER, LYOPHILIZED, FOR SOLUTION INTRAVENOUS at 07:34

## 2023-06-23 RX ADMIN — SODIUM CHLORIDE, POTASSIUM CHLORIDE, SODIUM LACTATE AND CALCIUM CHLORIDE: 600; 310; 30; 20 INJECTION, SOLUTION INTRAVENOUS at 09:16

## 2023-06-23 RX ADMIN — MORPHINE SULFATE 4 MG: 2 INJECTION, SOLUTION INTRAMUSCULAR; INTRAVENOUS at 17:48

## 2023-06-23 RX ADMIN — HYDROMORPHONE HYDROCHLORIDE 0.5 MG: 1 INJECTION, SOLUTION INTRAMUSCULAR; INTRAVENOUS; SUBCUTANEOUS at 11:29

## 2023-06-23 RX ADMIN — GABAPENTIN 300 MG: 300 CAPSULE ORAL at 22:32

## 2023-06-23 RX ADMIN — SUCRALFATE 1 G: 1 TABLET ORAL at 23:26

## 2023-06-23 RX ADMIN — HYDROMORPHONE HYDROCHLORIDE 0.5 MG: 1 INJECTION, SOLUTION INTRAMUSCULAR; INTRAVENOUS; SUBCUTANEOUS at 07:38

## 2023-06-23 RX ADMIN — PIPERACILLIN AND TAZOBACTAM 3375 MG: 3; .375 INJECTION, POWDER, LYOPHILIZED, FOR SOLUTION INTRAVENOUS at 16:58

## 2023-06-23 RX ADMIN — SODIUM CHLORIDE, POTASSIUM CHLORIDE, SODIUM LACTATE AND CALCIUM CHLORIDE: 600; 310; 30; 20 INJECTION, SOLUTION INTRAVENOUS at 22:32

## 2023-06-23 RX ADMIN — CEFEPIME 2000 MG: 2 INJECTION, POWDER, FOR SOLUTION INTRAVENOUS at 22:00

## 2023-06-23 RX ADMIN — ENOXAPARIN SODIUM 40 MG: 40 INJECTION SUBCUTANEOUS at 09:16

## 2023-06-23 RX ADMIN — SODIUM CHLORIDE, POTASSIUM CHLORIDE, SODIUM LACTATE AND CALCIUM CHLORIDE 1000 ML: 600; 310; 30; 20 INJECTION, SOLUTION INTRAVENOUS at 07:31

## 2023-06-23 RX ADMIN — POTASSIUM CHLORIDE 10 MEQ: 7.45 INJECTION INTRAVENOUS at 11:30

## 2023-06-23 RX ADMIN — POTASSIUM CHLORIDE 10 MEQ: 7.45 INJECTION INTRAVENOUS at 09:15

## 2023-06-23 RX ADMIN — NYSTATIN 500000 UNITS: 100000 SUSPENSION ORAL at 22:33

## 2023-06-23 RX ADMIN — SODIUM CHLORIDE, POTASSIUM CHLORIDE, SODIUM LACTATE AND CALCIUM CHLORIDE: 600; 310; 30; 20 INJECTION, SOLUTION INTRAVENOUS at 00:52

## 2023-06-23 RX ADMIN — MORPHINE SULFATE 4 MG: 2 INJECTION, SOLUTION INTRAMUSCULAR; INTRAVENOUS at 23:32

## 2023-06-23 RX ADMIN — SODIUM CHLORIDE 40 MG: 9 INJECTION INTRAMUSCULAR; INTRAVENOUS; SUBCUTANEOUS at 00:49

## 2023-06-23 RX ADMIN — METRONIDAZOLE 500 MG: 500 INJECTION, SOLUTION INTRAVENOUS at 22:33

## 2023-06-23 RX ADMIN — POTASSIUM CHLORIDE 10 MEQ: 7.45 INJECTION INTRAVENOUS at 13:22

## 2023-06-23 ASSESSMENT — PAIN DESCRIPTION - LOCATION
LOCATION: ABDOMEN

## 2023-06-23 ASSESSMENT — PAIN SCALES - GENERAL
PAINLEVEL_OUTOF10: 9
PAINLEVEL_OUTOF10: 8
PAINLEVEL_OUTOF10: 10
PAINLEVEL_OUTOF10: 7
PAINLEVEL_OUTOF10: 10

## 2023-06-23 ASSESSMENT — PAIN DESCRIPTION - DESCRIPTORS
DESCRIPTORS: ACHING;STABBING
DESCRIPTORS: ACHING;SHOOTING
DESCRIPTORS: ACHING

## 2023-06-23 ASSESSMENT — PAIN DESCRIPTION - FREQUENCY: FREQUENCY: INTERMITTENT

## 2023-06-23 ASSESSMENT — PAIN DESCRIPTION - ORIENTATION
ORIENTATION: ANTERIOR
ORIENTATION: MID

## 2023-06-24 LAB
ALBUMIN SERPL-MCNC: 1 G/DL (ref 3.5–5)
ALBUMIN/GLOB SERPL: 0.5 (ref 1.1–2.2)
ALP SERPL-CCNC: 62 U/L (ref 45–117)
ALT SERPL-CCNC: <6 U/L (ref 12–78)
ANION GAP SERPL CALC-SCNC: 10 MMOL/L (ref 5–15)
AST SERPL-CCNC: 9 U/L (ref 15–37)
BASOPHILS # BLD: 0 K/UL (ref 0–0.1)
BASOPHILS NFR BLD: 0 % (ref 0–1)
BILIRUB SERPL-MCNC: 0.4 MG/DL (ref 0.2–1)
BUN SERPL-MCNC: 9 MG/DL (ref 6–20)
BUN/CREAT SERPL: 33 (ref 12–20)
C COLI+JEJUNI TUF STL QL NAA+PROBE: NEGATIVE
C DIFF TOX GENS STL QL NAA+PROBE: POSITIVE
C DIFF TOXIN INTERPRETATION: ABNORMAL
CALCIUM SERPL-MCNC: 7.8 MG/DL (ref 8.5–10.1)
CHLORIDE SERPL-SCNC: 98 MMOL/L (ref 97–108)
CO2 SERPL-SCNC: 26 MMOL/L (ref 21–32)
CREAT SERPL-MCNC: 0.27 MG/DL (ref 0.55–1.02)
DIFFERENTIAL METHOD BLD: ABNORMAL
EC STX1+STX2 GENES STL QL NAA+PROBE: NEGATIVE
EOSINOPHIL # BLD: 0 K/UL (ref 0–0.4)
EOSINOPHIL NFR BLD: 0 % (ref 0–7)
ERYTHROCYTE [DISTWIDTH] IN BLOOD BY AUTOMATED COUNT: 13.5 % (ref 11.5–14.5)
ETEC ELTA+ESTB GENES STL QL NAA+PROBE: NEGATIVE
GLOBULIN SER CALC-MCNC: 2.2 G/DL (ref 2–4)
GLUCOSE BLD STRIP.AUTO-MCNC: 110 MG/DL (ref 65–117)
GLUCOSE BLD STRIP.AUTO-MCNC: 127 MG/DL (ref 65–117)
GLUCOSE SERPL-MCNC: 53 MG/DL (ref 65–100)
HCT VFR BLD AUTO: 27.8 % (ref 35–47)
HGB BLD-MCNC: 9.1 G/DL (ref 11.5–16)
IMM GRANULOCYTES # BLD AUTO: 0 K/UL
IMM GRANULOCYTES NFR BLD AUTO: 0 %
LYMPHOCYTES # BLD: 0.7 K/UL (ref 0.8–3.5)
LYMPHOCYTES NFR BLD: 6 % (ref 12–49)
MCH RBC QN AUTO: 29.1 PG (ref 26–34)
MCHC RBC AUTO-ENTMCNC: 32.7 G/DL (ref 30–36.5)
MCV RBC AUTO: 88.8 FL (ref 80–99)
METAMYELOCYTES NFR BLD MANUAL: 1 %
MONOCYTES # BLD: 0.6 K/UL (ref 0–1)
MONOCYTES NFR BLD: 5 % (ref 5–13)
NEUTS BAND NFR BLD MANUAL: 2 % (ref 0–6)
NEUTS SEG # BLD: 9.9 K/UL (ref 1.8–8)
NEUTS SEG NFR BLD: 86 % (ref 32–75)
NRBC # BLD: 0 K/UL (ref 0–0.01)
NRBC BLD-RTO: 0 PER 100 WBC
P SHIGELLOIDES DNA STL QL NAA+PROBE: NEGATIVE
PLATELET # BLD AUTO: 509 K/UL (ref 150–400)
PMV BLD AUTO: 8.5 FL (ref 8.9–12.9)
POTASSIUM SERPL-SCNC: 3 MMOL/L (ref 3.5–5.1)
PROT SERPL-MCNC: 3.2 G/DL (ref 6.4–8.2)
RBC # BLD AUTO: 3.13 M/UL (ref 3.8–5.2)
RBC MORPH BLD: ABNORMAL
REFLEX: ABNORMAL
SALMONELLA SP SPAO STL QL NAA+PROBE: NEGATIVE
SERVICE CMNT-IMP: ABNORMAL
SERVICE CMNT-IMP: NORMAL
SHIGELLA SP+EIEC IPAH STL QL NAA+PROBE: NEGATIVE
SODIUM SERPL-SCNC: 134 MMOL/L (ref 136–145)
V CHOL+PARA+VUL DNA STL QL NAA+NON-PROBE: NEGATIVE
WBC # BLD AUTO: 11.2 K/UL (ref 3.6–11)
Y ENTEROCOL DNA STL QL NAA+NON-PROBE: NEGATIVE

## 2023-06-24 PROCEDURE — 36415 COLL VENOUS BLD VENIPUNCTURE: CPT

## 2023-06-24 PROCEDURE — 2500000003 HC RX 250 WO HCPCS: Performed by: SURGERY

## 2023-06-24 PROCEDURE — 87449 NOS EACH ORGANISM AG IA: CPT

## 2023-06-24 PROCEDURE — 6370000000 HC RX 637 (ALT 250 FOR IP): Performed by: SURGERY

## 2023-06-24 PROCEDURE — 2500000003 HC RX 250 WO HCPCS: Performed by: STUDENT IN AN ORGANIZED HEALTH CARE EDUCATION/TRAINING PROGRAM

## 2023-06-24 PROCEDURE — 6360000002 HC RX W HCPCS: Performed by: SURGERY

## 2023-06-24 PROCEDURE — C9113 INJ PANTOPRAZOLE SODIUM, VIA: HCPCS | Performed by: SURGERY

## 2023-06-24 PROCEDURE — 82962 GLUCOSE BLOOD TEST: CPT

## 2023-06-24 PROCEDURE — 80053 COMPREHEN METABOLIC PANEL: CPT

## 2023-06-24 PROCEDURE — 87506 IADNA-DNA/RNA PROBE TQ 6-11: CPT

## 2023-06-24 PROCEDURE — 6360000002 HC RX W HCPCS: Performed by: STUDENT IN AN ORGANIZED HEALTH CARE EDUCATION/TRAINING PROGRAM

## 2023-06-24 PROCEDURE — 2580000003 HC RX 258: Performed by: SURGERY

## 2023-06-24 PROCEDURE — 87493 C DIFF AMPLIFIED PROBE: CPT

## 2023-06-24 PROCEDURE — 87324 CLOSTRIDIUM AG IA: CPT

## 2023-06-24 PROCEDURE — 1100000000 HC RM PRIVATE

## 2023-06-24 PROCEDURE — 85025 COMPLETE CBC W/AUTO DIFF WBC: CPT

## 2023-06-24 PROCEDURE — 87040 BLOOD CULTURE FOR BACTERIA: CPT

## 2023-06-24 PROCEDURE — A4216 STERILE WATER/SALINE, 10 ML: HCPCS | Performed by: SURGERY

## 2023-06-24 PROCEDURE — 2580000003 HC RX 258: Performed by: STUDENT IN AN ORGANIZED HEALTH CARE EDUCATION/TRAINING PROGRAM

## 2023-06-24 RX ORDER — BUMETANIDE 1 MG/1
0.5 TABLET ORAL DAILY
Status: DISCONTINUED | OUTPATIENT
Start: 2023-06-24 | End: 2023-06-30

## 2023-06-24 RX ADMIN — SUCRALFATE 1 G: 1 TABLET ORAL at 11:34

## 2023-06-24 RX ADMIN — HYDROMORPHONE HYDROCHLORIDE 0.5 MG: 1 INJECTION, SOLUTION INTRAMUSCULAR; INTRAVENOUS; SUBCUTANEOUS at 23:12

## 2023-06-24 RX ADMIN — METRONIDAZOLE 500 MG: 500 INJECTION, SOLUTION INTRAVENOUS at 06:04

## 2023-06-24 RX ADMIN — SUCRALFATE 1 G: 1 TABLET ORAL at 17:37

## 2023-06-24 RX ADMIN — METRONIDAZOLE 500 MG: 500 INJECTION, SOLUTION INTRAVENOUS at 20:47

## 2023-06-24 RX ADMIN — SODIUM CHLORIDE, PRESERVATIVE FREE 40 MG: 5 INJECTION INTRAVENOUS at 02:30

## 2023-06-24 RX ADMIN — SODIUM CHLORIDE, POTASSIUM CHLORIDE, SODIUM LACTATE AND CALCIUM CHLORIDE: 600; 310; 30; 20 INJECTION, SOLUTION INTRAVENOUS at 23:46

## 2023-06-24 RX ADMIN — METOPROLOL TARTRATE 2.5 MG: 5 INJECTION INTRAVENOUS at 23:11

## 2023-06-24 RX ADMIN — GABAPENTIN 300 MG: 300 CAPSULE ORAL at 09:34

## 2023-06-24 RX ADMIN — CEFEPIME 2000 MG: 2 INJECTION, POWDER, FOR SOLUTION INTRAVENOUS at 22:30

## 2023-06-24 RX ADMIN — SUCRALFATE 1 G: 1 TABLET ORAL at 06:03

## 2023-06-24 RX ADMIN — ENOXAPARIN SODIUM 40 MG: 40 INJECTION SUBCUTANEOUS at 09:35

## 2023-06-24 RX ADMIN — SUCRALFATE 1 G: 1 TABLET ORAL at 11:36

## 2023-06-24 RX ADMIN — NYSTATIN 500000 UNITS: 100000 SUSPENSION ORAL at 20:47

## 2023-06-24 RX ADMIN — GABAPENTIN 300 MG: 300 CAPSULE ORAL at 20:47

## 2023-06-24 RX ADMIN — NYSTATIN 500000 UNITS: 100000 SUSPENSION ORAL at 17:37

## 2023-06-24 RX ADMIN — SUCRALFATE 1 G: 1 TABLET ORAL at 23:13

## 2023-06-24 RX ADMIN — METRONIDAZOLE 500 MG: 500 INJECTION, SOLUTION INTRAVENOUS at 12:39

## 2023-06-24 RX ADMIN — NYSTATIN 500000 UNITS: 100000 SUSPENSION ORAL at 09:34

## 2023-06-24 RX ADMIN — SODIUM CHLORIDE, PRESERVATIVE FREE 40 MG: 5 INJECTION INTRAVENOUS at 12:42

## 2023-06-24 RX ADMIN — HYDROMORPHONE HYDROCHLORIDE 2 MG: 2 INJECTION, SOLUTION INTRAMUSCULAR; INTRAVENOUS; SUBCUTANEOUS at 15:17

## 2023-06-24 RX ADMIN — CEFEPIME 2000 MG: 2 INJECTION, POWDER, FOR SOLUTION INTRAVENOUS at 09:34

## 2023-06-24 RX ADMIN — NYSTATIN 500000 UNITS: 100000 SUSPENSION ORAL at 12:38

## 2023-06-24 RX ADMIN — SODIUM CHLORIDE, POTASSIUM CHLORIDE, SODIUM LACTATE AND CALCIUM CHLORIDE: 600; 310; 30; 20 INJECTION, SOLUTION INTRAVENOUS at 11:29

## 2023-06-24 RX ADMIN — BUMETANIDE 0.5 MG: 1 TABLET ORAL at 19:14

## 2023-06-24 ASSESSMENT — PAIN SCALES - GENERAL
PAINLEVEL_OUTOF10: 0
PAINLEVEL_OUTOF10: 0
PAINLEVEL_OUTOF10: 7
PAINLEVEL_OUTOF10: 6

## 2023-06-24 ASSESSMENT — PAIN DESCRIPTION - LOCATION
LOCATION: ABDOMEN
LOCATION: ABDOMEN

## 2023-06-24 ASSESSMENT — PAIN DESCRIPTION - DESCRIPTORS: DESCRIPTORS: ACHING

## 2023-06-24 ASSESSMENT — PAIN DESCRIPTION - ORIENTATION: ORIENTATION: ANTERIOR

## 2023-06-25 LAB
ALBUMIN SERPL-MCNC: 1.4 G/DL (ref 3.5–5)
ALBUMIN/GLOB SERPL: 0.5 (ref 1.1–2.2)
ALP SERPL-CCNC: 73 U/L (ref 45–117)
ALT SERPL-CCNC: 9 U/L (ref 12–78)
ANION GAP SERPL CALC-SCNC: 5 MMOL/L (ref 5–15)
AST SERPL-CCNC: 9 U/L (ref 15–37)
BASOPHILS # BLD: 0 K/UL (ref 0–0.1)
BASOPHILS NFR BLD: 0 % (ref 0–1)
BILIRUB SERPL-MCNC: 0.3 MG/DL (ref 0.2–1)
BUN SERPL-MCNC: 8 MG/DL (ref 6–20)
BUN/CREAT SERPL: 15 (ref 12–20)
CALCIUM SERPL-MCNC: 8.3 MG/DL (ref 8.5–10.1)
CHLORIDE SERPL-SCNC: 96 MMOL/L (ref 97–108)
CO2 SERPL-SCNC: 33 MMOL/L (ref 21–32)
CREAT SERPL-MCNC: 0.53 MG/DL (ref 0.55–1.02)
DIFFERENTIAL METHOD BLD: ABNORMAL
EOSINOPHIL # BLD: 0.1 K/UL (ref 0–0.4)
EOSINOPHIL NFR BLD: 1 % (ref 0–7)
ERYTHROCYTE [DISTWIDTH] IN BLOOD BY AUTOMATED COUNT: 13.2 % (ref 11.5–14.5)
GLOBULIN SER CALC-MCNC: 2.6 G/DL (ref 2–4)
GLUCOSE BLD STRIP.AUTO-MCNC: 102 MG/DL (ref 65–117)
GLUCOSE BLD STRIP.AUTO-MCNC: 119 MG/DL (ref 65–117)
GLUCOSE BLD STRIP.AUTO-MCNC: 95 MG/DL (ref 65–117)
GLUCOSE BLD STRIP.AUTO-MCNC: 99 MG/DL (ref 65–117)
GLUCOSE SERPL-MCNC: 127 MG/DL (ref 65–100)
HCT VFR BLD AUTO: 31.3 % (ref 35–47)
HGB BLD-MCNC: 10.4 G/DL (ref 11.5–16)
IMM GRANULOCYTES # BLD AUTO: 0 K/UL
IMM GRANULOCYTES NFR BLD AUTO: 0 %
LYMPHOCYTES # BLD: 0.7 K/UL (ref 0.8–3.5)
LYMPHOCYTES NFR BLD: 6 % (ref 12–49)
MCH RBC QN AUTO: 29 PG (ref 26–34)
MCHC RBC AUTO-ENTMCNC: 33.2 G/DL (ref 30–36.5)
MCV RBC AUTO: 87.2 FL (ref 80–99)
MONOCYTES # BLD: 0.6 K/UL (ref 0–1)
MONOCYTES NFR BLD: 5 % (ref 5–13)
NEUTS BAND NFR BLD MANUAL: 4 % (ref 0–6)
NEUTS SEG # BLD: 9.6 K/UL (ref 1.8–8)
NEUTS SEG NFR BLD: 84 % (ref 32–75)
NRBC # BLD: 0 K/UL (ref 0–0.01)
NRBC BLD-RTO: 0 PER 100 WBC
PLATELET # BLD AUTO: 597 K/UL (ref 150–400)
PMV BLD AUTO: 8.3 FL (ref 8.9–12.9)
POTASSIUM SERPL-SCNC: 2.4 MMOL/L (ref 3.5–5.1)
PROT SERPL-MCNC: 4 G/DL (ref 6.4–8.2)
RBC # BLD AUTO: 3.59 M/UL (ref 3.8–5.2)
RBC MORPH BLD: ABNORMAL
SERVICE CMNT-IMP: ABNORMAL
SERVICE CMNT-IMP: NORMAL
SODIUM SERPL-SCNC: 134 MMOL/L (ref 136–145)
WBC # BLD AUTO: 11 K/UL (ref 3.6–11)

## 2023-06-25 PROCEDURE — 6360000002 HC RX W HCPCS: Performed by: SURGERY

## 2023-06-25 PROCEDURE — 80053 COMPREHEN METABOLIC PANEL: CPT

## 2023-06-25 PROCEDURE — 6360000002 HC RX W HCPCS: Performed by: STUDENT IN AN ORGANIZED HEALTH CARE EDUCATION/TRAINING PROGRAM

## 2023-06-25 PROCEDURE — 6370000000 HC RX 637 (ALT 250 FOR IP): Performed by: SURGERY

## 2023-06-25 PROCEDURE — C9113 INJ PANTOPRAZOLE SODIUM, VIA: HCPCS | Performed by: SURGERY

## 2023-06-25 PROCEDURE — 36415 COLL VENOUS BLD VENIPUNCTURE: CPT

## 2023-06-25 PROCEDURE — 82962 GLUCOSE BLOOD TEST: CPT

## 2023-06-25 PROCEDURE — 1100000000 HC RM PRIVATE

## 2023-06-25 PROCEDURE — 2500000003 HC RX 250 WO HCPCS: Performed by: STUDENT IN AN ORGANIZED HEALTH CARE EDUCATION/TRAINING PROGRAM

## 2023-06-25 PROCEDURE — 85025 COMPLETE CBC W/AUTO DIFF WBC: CPT

## 2023-06-25 PROCEDURE — A4216 STERILE WATER/SALINE, 10 ML: HCPCS | Performed by: SURGERY

## 2023-06-25 PROCEDURE — 2580000003 HC RX 258: Performed by: SURGERY

## 2023-06-25 PROCEDURE — 2580000003 HC RX 258: Performed by: STUDENT IN AN ORGANIZED HEALTH CARE EDUCATION/TRAINING PROGRAM

## 2023-06-25 PROCEDURE — 2500000003 HC RX 250 WO HCPCS: Performed by: SURGERY

## 2023-06-25 RX ORDER — VANCOMYCIN HYDROCHLORIDE 50 MG/ML
125 KIT ORAL EVERY 6 HOURS SCHEDULED
Status: DISCONTINUED | OUTPATIENT
Start: 2023-06-25 | End: 2023-07-03 | Stop reason: SDUPTHER

## 2023-06-25 RX ORDER — POTASSIUM CHLORIDE 7.45 MG/ML
10 INJECTION INTRAVENOUS
Status: COMPLETED | OUTPATIENT
Start: 2023-06-25 | End: 2023-06-25

## 2023-06-25 RX ADMIN — NYSTATIN 500000 UNITS: 100000 SUSPENSION ORAL at 13:22

## 2023-06-25 RX ADMIN — BUMETANIDE 0.5 MG: 1 TABLET ORAL at 07:55

## 2023-06-25 RX ADMIN — SUCRALFATE 1 G: 1 TABLET ORAL at 21:58

## 2023-06-25 RX ADMIN — METOPROLOL TARTRATE 2.5 MG: 5 INJECTION INTRAVENOUS at 23:27

## 2023-06-25 RX ADMIN — NYSTATIN 500000 UNITS: 100000 SUSPENSION ORAL at 21:58

## 2023-06-25 RX ADMIN — HYDROMORPHONE HYDROCHLORIDE 0.5 MG: 1 INJECTION, SOLUTION INTRAMUSCULAR; INTRAVENOUS; SUBCUTANEOUS at 11:25

## 2023-06-25 RX ADMIN — ENOXAPARIN SODIUM 40 MG: 40 INJECTION SUBCUTANEOUS at 09:14

## 2023-06-25 RX ADMIN — Medication 125 MG: at 07:17

## 2023-06-25 RX ADMIN — GABAPENTIN 300 MG: 300 CAPSULE ORAL at 21:57

## 2023-06-25 RX ADMIN — Medication 125 MG: at 18:39

## 2023-06-25 RX ADMIN — SUCRALFATE 1 G: 1 TABLET ORAL at 07:09

## 2023-06-25 RX ADMIN — POTASSIUM CHLORIDE 10 MEQ: 7.46 INJECTION, SOLUTION INTRAVENOUS at 07:54

## 2023-06-25 RX ADMIN — Medication 125 MG: at 11:41

## 2023-06-25 RX ADMIN — METRONIDAZOLE 500 MG: 500 INJECTION, SOLUTION INTRAVENOUS at 03:53

## 2023-06-25 RX ADMIN — SODIUM CHLORIDE, PRESERVATIVE FREE 40 MG: 5 INJECTION INTRAVENOUS at 13:23

## 2023-06-25 RX ADMIN — POTASSIUM CHLORIDE 10 MEQ: 7.46 INJECTION, SOLUTION INTRAVENOUS at 09:14

## 2023-06-25 RX ADMIN — DIPHENHYDRAMINE HYDROCHLORIDE AND LIDOCAINE HYDROCHLORIDE AND ALUMINUM HYDROXIDE AND MAGNESIUM HYDRO 5 ML: KIT at 21:59

## 2023-06-25 RX ADMIN — GABAPENTIN 300 MG: 300 CAPSULE ORAL at 09:14

## 2023-06-25 RX ADMIN — SUCRALFATE 1 G: 1 TABLET ORAL at 16:21

## 2023-06-25 RX ADMIN — NYSTATIN 500000 UNITS: 100000 SUSPENSION ORAL at 16:20

## 2023-06-25 RX ADMIN — METOPROLOL TARTRATE 2.5 MG: 5 INJECTION INTRAVENOUS at 11:25

## 2023-06-25 RX ADMIN — HYDROMORPHONE HYDROCHLORIDE 2 MG: 2 INJECTION, SOLUTION INTRAMUSCULAR; INTRAVENOUS; SUBCUTANEOUS at 16:31

## 2023-06-25 RX ADMIN — Medication 125 MG: at 02:16

## 2023-06-25 RX ADMIN — POTASSIUM CHLORIDE 10 MEQ: 7.46 INJECTION, SOLUTION INTRAVENOUS at 07:09

## 2023-06-25 RX ADMIN — NYSTATIN 500000 UNITS: 100000 SUSPENSION ORAL at 07:55

## 2023-06-25 RX ADMIN — Medication 125 MG: at 23:29

## 2023-06-25 RX ADMIN — SODIUM CHLORIDE, POTASSIUM CHLORIDE, SODIUM LACTATE AND CALCIUM CHLORIDE: 600; 310; 30; 20 INJECTION, SOLUTION INTRAVENOUS at 16:27

## 2023-06-25 RX ADMIN — SODIUM CHLORIDE, PRESERVATIVE FREE 40 MG: 5 INJECTION INTRAVENOUS at 01:13

## 2023-06-25 RX ADMIN — HYDROMORPHONE HYDROCHLORIDE 2 MG: 2 INJECTION, SOLUTION INTRAMUSCULAR; INTRAVENOUS; SUBCUTANEOUS at 21:59

## 2023-06-25 RX ADMIN — SUCRALFATE 1 G: 1 TABLET ORAL at 11:25

## 2023-06-25 ASSESSMENT — PAIN DESCRIPTION - DESCRIPTORS: DESCRIPTORS: ACHING

## 2023-06-25 ASSESSMENT — PAIN SCALES - GENERAL
PAINLEVEL_OUTOF10: 0
PAINLEVEL_OUTOF10: 4
PAINLEVEL_OUTOF10: 6
PAINLEVEL_OUTOF10: 0
PAINLEVEL_OUTOF10: 7
PAINLEVEL_OUTOF10: 7

## 2023-06-25 ASSESSMENT — PAIN DESCRIPTION - LOCATION
LOCATION: ABDOMEN

## 2023-06-26 LAB
ANION GAP SERPL CALC-SCNC: 3 MMOL/L (ref 5–15)
BASOPHILS # BLD: 0 K/UL (ref 0–0.1)
BASOPHILS NFR BLD: 0 % (ref 0–1)
BUN SERPL-MCNC: 6 MG/DL (ref 6–20)
BUN/CREAT SERPL: 18 (ref 12–20)
CALCIUM SERPL-MCNC: 8.7 MG/DL (ref 8.5–10.1)
CHLORIDE SERPL-SCNC: 97 MMOL/L (ref 97–108)
CO2 SERPL-SCNC: 33 MMOL/L (ref 21–32)
CREAT SERPL-MCNC: 0.34 MG/DL (ref 0.55–1.02)
DIFFERENTIAL METHOD BLD: ABNORMAL
EOSINOPHIL # BLD: 0 K/UL (ref 0–0.4)
EOSINOPHIL NFR BLD: 0 % (ref 0–7)
ERYTHROCYTE [DISTWIDTH] IN BLOOD BY AUTOMATED COUNT: 13.2 % (ref 11.5–14.5)
GLUCOSE SERPL-MCNC: 93 MG/DL (ref 65–100)
HCT VFR BLD AUTO: 32.2 % (ref 35–47)
HGB BLD-MCNC: 10.9 G/DL (ref 11.5–16)
IMM GRANULOCYTES # BLD AUTO: 0 K/UL
IMM GRANULOCYTES NFR BLD AUTO: 0 %
LYMPHOCYTES # BLD: 1.3 K/UL (ref 0.8–3.5)
LYMPHOCYTES NFR BLD: 15 % (ref 12–49)
MCH RBC QN AUTO: 29.7 PG (ref 26–34)
MCHC RBC AUTO-ENTMCNC: 33.9 G/DL (ref 30–36.5)
MCV RBC AUTO: 87.7 FL (ref 80–99)
METAMYELOCYTES NFR BLD MANUAL: 2 %
MONOCYTES # BLD: 0.6 K/UL (ref 0–1)
MONOCYTES NFR BLD: 7 % (ref 5–13)
MYELOCYTES NFR BLD MANUAL: 2 %
NEUTS BAND NFR BLD MANUAL: 1 % (ref 0–6)
NEUTS SEG # BLD: 6.6 K/UL (ref 1.8–8)
NEUTS SEG NFR BLD: 73 % (ref 32–75)
NRBC # BLD: 0 K/UL (ref 0–0.01)
NRBC BLD-RTO: 0 PER 100 WBC
PLATELET # BLD AUTO: 519 K/UL (ref 150–400)
PMV BLD AUTO: 8.3 FL (ref 8.9–12.9)
POTASSIUM SERPL-SCNC: 3 MMOL/L (ref 3.5–5.1)
RBC # BLD AUTO: 3.67 M/UL (ref 3.8–5.2)
RBC MORPH BLD: ABNORMAL
SODIUM SERPL-SCNC: 133 MMOL/L (ref 136–145)
WBC # BLD AUTO: 8.9 K/UL (ref 3.6–11)

## 2023-06-26 PROCEDURE — 2580000003 HC RX 258: Performed by: SURGERY

## 2023-06-26 PROCEDURE — 6370000000 HC RX 637 (ALT 250 FOR IP): Performed by: SURGERY

## 2023-06-26 PROCEDURE — 1100000000 HC RM PRIVATE

## 2023-06-26 PROCEDURE — 6360000002 HC RX W HCPCS: Performed by: STUDENT IN AN ORGANIZED HEALTH CARE EDUCATION/TRAINING PROGRAM

## 2023-06-26 PROCEDURE — C9113 INJ PANTOPRAZOLE SODIUM, VIA: HCPCS | Performed by: SURGERY

## 2023-06-26 PROCEDURE — 6360000002 HC RX W HCPCS: Performed by: SURGERY

## 2023-06-26 PROCEDURE — 2500000003 HC RX 250 WO HCPCS: Performed by: STUDENT IN AN ORGANIZED HEALTH CARE EDUCATION/TRAINING PROGRAM

## 2023-06-26 PROCEDURE — 85025 COMPLETE CBC W/AUTO DIFF WBC: CPT

## 2023-06-26 PROCEDURE — 2580000003 HC RX 258: Performed by: STUDENT IN AN ORGANIZED HEALTH CARE EDUCATION/TRAINING PROGRAM

## 2023-06-26 PROCEDURE — A4216 STERILE WATER/SALINE, 10 ML: HCPCS | Performed by: SURGERY

## 2023-06-26 PROCEDURE — 80048 BASIC METABOLIC PNL TOTAL CA: CPT

## 2023-06-26 PROCEDURE — 36415 COLL VENOUS BLD VENIPUNCTURE: CPT

## 2023-06-26 RX ORDER — POTASSIUM CHLORIDE 750 MG/1
40 TABLET, FILM COATED, EXTENDED RELEASE ORAL PRN
Status: DISCONTINUED | OUTPATIENT
Start: 2023-06-26 | End: 2023-07-21 | Stop reason: HOSPADM

## 2023-06-26 RX ORDER — FUROSEMIDE 10 MG/ML
10 INJECTION INTRAMUSCULAR; INTRAVENOUS ONCE
Status: COMPLETED | OUTPATIENT
Start: 2023-06-26 | End: 2023-06-26

## 2023-06-26 RX ORDER — POTASSIUM CHLORIDE 7.45 MG/ML
10 INJECTION INTRAVENOUS PRN
Status: DISCONTINUED | OUTPATIENT
Start: 2023-06-26 | End: 2023-07-21 | Stop reason: HOSPADM

## 2023-06-26 RX ADMIN — NYSTATIN 500000 UNITS: 100000 SUSPENSION ORAL at 08:28

## 2023-06-26 RX ADMIN — METOPROLOL TARTRATE 2.5 MG: 5 INJECTION INTRAVENOUS at 12:16

## 2023-06-26 RX ADMIN — SODIUM CHLORIDE, PRESERVATIVE FREE 40 MG: 5 INJECTION INTRAVENOUS at 12:14

## 2023-06-26 RX ADMIN — HYDROMORPHONE HYDROCHLORIDE 2 MG: 2 INJECTION, SOLUTION INTRAMUSCULAR; INTRAVENOUS; SUBCUTANEOUS at 20:27

## 2023-06-26 RX ADMIN — SUCRALFATE 1 G: 1 TABLET ORAL at 12:14

## 2023-06-26 RX ADMIN — SODIUM CHLORIDE, POTASSIUM CHLORIDE, SODIUM LACTATE AND CALCIUM CHLORIDE: 600; 310; 30; 20 INJECTION, SOLUTION INTRAVENOUS at 15:19

## 2023-06-26 RX ADMIN — ENOXAPARIN SODIUM 40 MG: 40 INJECTION SUBCUTANEOUS at 08:30

## 2023-06-26 RX ADMIN — NYSTATIN 500000 UNITS: 100000 SUSPENSION ORAL at 20:26

## 2023-06-26 RX ADMIN — GABAPENTIN 300 MG: 300 CAPSULE ORAL at 08:30

## 2023-06-26 RX ADMIN — METOPROLOL TARTRATE 2.5 MG: 5 INJECTION INTRAVENOUS at 23:42

## 2023-06-26 RX ADMIN — SUCRALFATE 1 G: 1 TABLET ORAL at 06:51

## 2023-06-26 RX ADMIN — BUMETANIDE 0.5 MG: 1 TABLET ORAL at 08:29

## 2023-06-26 RX ADMIN — FUROSEMIDE 10 MG: 10 INJECTION, SOLUTION INTRAMUSCULAR; INTRAVENOUS at 20:28

## 2023-06-26 RX ADMIN — Medication 125 MG: at 13:01

## 2023-06-26 RX ADMIN — POTASSIUM CHLORIDE 10 MEQ: 7.46 INJECTION, SOLUTION INTRAVENOUS at 18:20

## 2023-06-26 RX ADMIN — GABAPENTIN 300 MG: 300 CAPSULE ORAL at 20:26

## 2023-06-26 RX ADMIN — POTASSIUM CHLORIDE 10 MEQ: 7.46 INJECTION, SOLUTION INTRAVENOUS at 17:07

## 2023-06-26 RX ADMIN — SUCRALFATE 1 G: 1 TABLET ORAL at 20:26

## 2023-06-26 RX ADMIN — POTASSIUM CHLORIDE 10 MEQ: 7.46 INJECTION, SOLUTION INTRAVENOUS at 23:40

## 2023-06-26 RX ADMIN — DIPHENHYDRAMINE HYDROCHLORIDE AND LIDOCAINE HYDROCHLORIDE AND ALUMINUM HYDROXIDE AND MAGNESIUM HYDRO 5 ML: KIT at 20:25

## 2023-06-26 RX ADMIN — POTASSIUM CHLORIDE 10 MEQ: 7.46 INJECTION, SOLUTION INTRAVENOUS at 22:03

## 2023-06-26 RX ADMIN — POTASSIUM CHLORIDE 10 MEQ: 7.46 INJECTION, SOLUTION INTRAVENOUS at 15:59

## 2023-06-26 RX ADMIN — SUCRALFATE 1 G: 1 TABLET ORAL at 17:07

## 2023-06-26 RX ADMIN — HYDROMORPHONE HYDROCHLORIDE 2 MG: 2 INJECTION, SOLUTION INTRAMUSCULAR; INTRAVENOUS; SUBCUTANEOUS at 06:51

## 2023-06-26 RX ADMIN — SODIUM CHLORIDE, PRESERVATIVE FREE 40 MG: 5 INJECTION INTRAVENOUS at 02:10

## 2023-06-26 RX ADMIN — POTASSIUM CHLORIDE 10 MEQ: 7.46 INJECTION, SOLUTION INTRAVENOUS at 20:24

## 2023-06-26 RX ADMIN — NYSTATIN 500000 UNITS: 100000 SUSPENSION ORAL at 17:07

## 2023-06-26 RX ADMIN — NYSTATIN 500000 UNITS: 100000 SUSPENSION ORAL at 12:14

## 2023-06-26 RX ADMIN — Medication 125 MG: at 07:04

## 2023-06-26 RX ADMIN — SODIUM CHLORIDE, POTASSIUM CHLORIDE, SODIUM LACTATE AND CALCIUM CHLORIDE: 600; 310; 30; 20 INJECTION, SOLUTION INTRAVENOUS at 06:53

## 2023-06-26 RX ADMIN — Medication 125 MG: at 18:19

## 2023-06-26 ASSESSMENT — PAIN SCALES - GENERAL
PAINLEVEL_OUTOF10: 7
PAINLEVEL_OUTOF10: 7
PAINLEVEL_OUTOF10: 8
PAINLEVEL_OUTOF10: 0

## 2023-06-26 ASSESSMENT — PAIN DESCRIPTION - LOCATION
LOCATION: ABDOMEN
LOCATION: ABDOMEN

## 2023-06-26 ASSESSMENT — PAIN DESCRIPTION - DESCRIPTORS
DESCRIPTORS: ACHING
DESCRIPTORS: ACHING

## 2023-06-27 ENCOUNTER — APPOINTMENT (OUTPATIENT)
Facility: HOSPITAL | Age: 63
DRG: 326 | End: 2023-06-27
Payer: MEDICARE

## 2023-06-27 ENCOUNTER — APPOINTMENT (OUTPATIENT)
Dept: VASCULAR SURGERY | Facility: HOSPITAL | Age: 63
DRG: 326 | End: 2023-06-27
Attending: SURGERY
Payer: MEDICARE

## 2023-06-27 LAB
APTT PPP: 35.8 SEC (ref 22.1–31)
FIBRINOGEN PPP-MCNC: 402 MG/DL (ref 200–475)
INR PPP: 1.1 (ref 0.9–1.1)
POTASSIUM SERPL-SCNC: 3.9 MMOL/L (ref 3.5–5.1)
PROTHROMBIN TIME: 11.2 SEC (ref 9–11.1)
SARS-COV-2 RDRP RESP QL NAA+PROBE: NOT DETECTED
SOURCE: NORMAL
THERAPEUTIC RANGE: ABNORMAL SECS (ref 58–77)

## 2023-06-27 PROCEDURE — 85730 THROMBOPLASTIN TIME PARTIAL: CPT

## 2023-06-27 PROCEDURE — 6360000004 HC RX CONTRAST MEDICATION: Performed by: SURGERY

## 2023-06-27 PROCEDURE — 75635 CT ANGIO ABDOMINAL ARTERIES: CPT

## 2023-06-27 PROCEDURE — 87635 SARS-COV-2 COVID-19 AMP PRB: CPT

## 2023-06-27 PROCEDURE — 6360000002 HC RX W HCPCS: Performed by: SURGERY

## 2023-06-27 PROCEDURE — 85384 FIBRINOGEN ACTIVITY: CPT

## 2023-06-27 PROCEDURE — 1100000000 HC RM PRIVATE

## 2023-06-27 PROCEDURE — 93970 EXTREMITY STUDY: CPT

## 2023-06-27 PROCEDURE — 6360000002 HC RX W HCPCS: Performed by: STUDENT IN AN ORGANIZED HEALTH CARE EDUCATION/TRAINING PROGRAM

## 2023-06-27 PROCEDURE — 36415 COLL VENOUS BLD VENIPUNCTURE: CPT

## 2023-06-27 PROCEDURE — 6370000000 HC RX 637 (ALT 250 FOR IP): Performed by: SURGERY

## 2023-06-27 PROCEDURE — 2500000003 HC RX 250 WO HCPCS: Performed by: STUDENT IN AN ORGANIZED HEALTH CARE EDUCATION/TRAINING PROGRAM

## 2023-06-27 PROCEDURE — C9113 INJ PANTOPRAZOLE SODIUM, VIA: HCPCS | Performed by: SURGERY

## 2023-06-27 PROCEDURE — 84132 ASSAY OF SERUM POTASSIUM: CPT

## 2023-06-27 PROCEDURE — A4216 STERILE WATER/SALINE, 10 ML: HCPCS | Performed by: SURGERY

## 2023-06-27 PROCEDURE — 2580000003 HC RX 258: Performed by: SURGERY

## 2023-06-27 PROCEDURE — 85610 PROTHROMBIN TIME: CPT

## 2023-06-27 RX ORDER — ENOXAPARIN SODIUM 100 MG/ML
1 INJECTION SUBCUTANEOUS 2 TIMES DAILY
Status: DISCONTINUED | OUTPATIENT
Start: 2023-06-27 | End: 2023-06-28

## 2023-06-27 RX ADMIN — NYSTATIN 500000 UNITS: 100000 SUSPENSION ORAL at 22:40

## 2023-06-27 RX ADMIN — Medication 125 MG: at 08:03

## 2023-06-27 RX ADMIN — METOPROLOL TARTRATE 2.5 MG: 5 INJECTION INTRAVENOUS at 12:22

## 2023-06-27 RX ADMIN — ONDANSETRON 4 MG: 2 INJECTION INTRAMUSCULAR; INTRAVENOUS at 18:49

## 2023-06-27 RX ADMIN — SODIUM CHLORIDE, PRESERVATIVE FREE 40 MG: 5 INJECTION INTRAVENOUS at 00:22

## 2023-06-27 RX ADMIN — HYDROMORPHONE HYDROCHLORIDE 2 MG: 2 INJECTION, SOLUTION INTRAMUSCULAR; INTRAVENOUS; SUBCUTANEOUS at 09:20

## 2023-06-27 RX ADMIN — SUCRALFATE 1 G: 1 TABLET ORAL at 08:02

## 2023-06-27 RX ADMIN — NYSTATIN 500000 UNITS: 100000 SUSPENSION ORAL at 09:10

## 2023-06-27 RX ADMIN — PROCHLORPERAZINE EDISYLATE 10 MG: 5 INJECTION INTRAMUSCULAR; INTRAVENOUS at 22:40

## 2023-06-27 RX ADMIN — HYDROMORPHONE HYDROCHLORIDE 0.5 MG: 1 INJECTION, SOLUTION INTRAMUSCULAR; INTRAVENOUS; SUBCUTANEOUS at 15:38

## 2023-06-27 RX ADMIN — ENOXAPARIN SODIUM 50 MG: 60 INJECTION SUBCUTANEOUS at 22:38

## 2023-06-27 RX ADMIN — METOPROLOL TARTRATE 2.5 MG: 5 INJECTION INTRAVENOUS at 22:37

## 2023-06-27 RX ADMIN — BUMETANIDE 0.5 MG: 1 TABLET ORAL at 09:10

## 2023-06-27 RX ADMIN — NYSTATIN 500000 UNITS: 100000 SUSPENSION ORAL at 17:33

## 2023-06-27 RX ADMIN — ENOXAPARIN SODIUM 40 MG: 40 INJECTION SUBCUTANEOUS at 09:10

## 2023-06-27 RX ADMIN — Medication 125 MG: at 18:50

## 2023-06-27 RX ADMIN — SUCRALFATE 1 G: 1 TABLET ORAL at 15:38

## 2023-06-27 RX ADMIN — GABAPENTIN 300 MG: 300 CAPSULE ORAL at 09:10

## 2023-06-27 RX ADMIN — IOPAMIDOL 100 ML: 755 INJECTION, SOLUTION INTRAVENOUS at 21:31

## 2023-06-27 RX ADMIN — Medication 125 MG: at 13:31

## 2023-06-27 RX ADMIN — SUCRALFATE 1 G: 1 TABLET ORAL at 22:37

## 2023-06-27 RX ADMIN — NYSTATIN 500000 UNITS: 100000 SUSPENSION ORAL at 12:27

## 2023-06-27 RX ADMIN — GABAPENTIN 300 MG: 300 CAPSULE ORAL at 22:37

## 2023-06-27 RX ADMIN — SODIUM CHLORIDE, PRESERVATIVE FREE 40 MG: 5 INJECTION INTRAVENOUS at 12:24

## 2023-06-27 RX ADMIN — SUCRALFATE 1 G: 1 TABLET ORAL at 12:25

## 2023-06-27 RX ADMIN — HYDROMORPHONE HYDROCHLORIDE 2 MG: 2 INJECTION, SOLUTION INTRAMUSCULAR; INTRAVENOUS; SUBCUTANEOUS at 22:43

## 2023-06-27 RX ADMIN — Medication 125 MG: at 00:22

## 2023-06-27 ASSESSMENT — PAIN DESCRIPTION - LOCATION
LOCATION: ABDOMEN

## 2023-06-27 ASSESSMENT — PAIN SCALES - GENERAL
PAINLEVEL_OUTOF10: 6
PAINLEVEL_OUTOF10: 0
PAINLEVEL_OUTOF10: 9
PAINLEVEL_OUTOF10: 9
PAINLEVEL_OUTOF10: 2

## 2023-06-27 ASSESSMENT — PAIN DESCRIPTION - ORIENTATION
ORIENTATION: MID
ORIENTATION: MID;RIGHT;LEFT;LOWER;UPPER

## 2023-06-27 ASSESSMENT — PAIN DESCRIPTION - DESCRIPTORS
DESCRIPTORS: ACHING;TENDER
DESCRIPTORS: PRESSURE
DESCRIPTORS: ACHING;CRAMPING;PRESSURE

## 2023-06-28 ENCOUNTER — ANESTHESIA (OUTPATIENT)
Facility: HOSPITAL | Age: 63
End: 2023-06-28
Payer: COMMERCIAL

## 2023-06-28 ENCOUNTER — ANESTHESIA EVENT (OUTPATIENT)
Facility: HOSPITAL | Age: 63
End: 2023-06-28
Payer: COMMERCIAL

## 2023-06-28 ENCOUNTER — APPOINTMENT (OUTPATIENT)
Dept: VASCULAR SURGERY | Facility: HOSPITAL | Age: 63
DRG: 326 | End: 2023-06-28
Attending: STUDENT IN AN ORGANIZED HEALTH CARE EDUCATION/TRAINING PROGRAM
Payer: MEDICARE

## 2023-06-28 PROBLEM — K66.8 FREE INTRAPERITONEAL AIR: Status: ACTIVE | Noted: 2023-06-28

## 2023-06-28 PROBLEM — A04.72 C. DIFFICILE COLITIS: Status: ACTIVE | Noted: 2023-06-28

## 2023-06-28 PROBLEM — R18.8 FREE FLUID IN PELVIS: Status: ACTIVE | Noted: 2023-06-28

## 2023-06-28 PROBLEM — R23.0 BLUE TOES: Status: ACTIVE | Noted: 2023-06-28

## 2023-06-28 LAB
ALBUMIN SERPL-MCNC: 1.2 G/DL (ref 3.5–5)
ALBUMIN/GLOB SERPL: 0.5 (ref 1.1–2.2)
ALP SERPL-CCNC: 69 U/L (ref 45–117)
ALT SERPL-CCNC: <6 U/L (ref 12–78)
ANION GAP SERPL CALC-SCNC: 3 MMOL/L (ref 5–15)
APTT PPP: 37.2 SEC (ref 22.1–31)
APTT PPP: 90.5 SEC (ref 22.1–31)
ARTERIAL PATENCY WRIST A: YES
AST SERPL-CCNC: 28 U/L (ref 15–37)
BASE EXCESS BLDA CALC-SCNC: 6.4 MMOL/L
BASOPHILS # BLD: 0.1 K/UL (ref 0–0.1)
BASOPHILS NFR BLD: 1 % (ref 0–1)
BDY SITE: ABNORMAL
BILIRUB SERPL-MCNC: 0.5 MG/DL (ref 0.2–1)
BUN SERPL-MCNC: 7 MG/DL (ref 6–20)
BUN/CREAT SERPL: 17 (ref 12–20)
CALCIUM SERPL-MCNC: 8.2 MG/DL (ref 8.5–10.1)
CHLORIDE SERPL-SCNC: 98 MMOL/L (ref 97–108)
CO2 SERPL-SCNC: 32 MMOL/L (ref 21–32)
COHGB MFR BLD: 0 % (ref 1–2)
COMMENT:: NORMAL
CREAT SERPL-MCNC: 0.42 MG/DL (ref 0.55–1.02)
CRP SERPL-MCNC: 5.52 MG/DL (ref 0–0.6)
DIFFERENTIAL METHOD BLD: ABNORMAL
ECHO BSA: 1.55 M2
EOSINOPHIL # BLD: 0.1 K/UL (ref 0–0.4)
EOSINOPHIL NFR BLD: 1 % (ref 0–7)
ERYTHROCYTE [DISTWIDTH] IN BLOOD BY AUTOMATED COUNT: 13.5 % (ref 11.5–14.5)
ERYTHROCYTE [SEDIMENTATION RATE] IN BLOOD: 5 MM/HR (ref 0–30)
FACT VIII ACT/NOR PPP: 324 % (ref 80–200)
GAS FLOW.O2 O2 DELIVERY SYS: 2 L/MIN
GLOBULIN SER CALC-MCNC: 2.5 G/DL (ref 2–4)
GLUCOSE SERPL-MCNC: 96 MG/DL (ref 65–100)
HCO3 BLDA-SCNC: 31 MMOL/L (ref 22–26)
HCT VFR BLD AUTO: 34 % (ref 35–47)
HGB BLD-MCNC: 11.5 G/DL (ref 11.5–16)
IMM GRANULOCYTES # BLD AUTO: 0.7 K/UL (ref 0–0.04)
IMM GRANULOCYTES NFR BLD AUTO: 5 % (ref 0–0.5)
INR PPP: 1.2 (ref 0.9–1.1)
LYMPHOCYTES # BLD: 0.8 K/UL (ref 0.8–3.5)
LYMPHOCYTES NFR BLD: 6 % (ref 12–49)
MCH RBC QN AUTO: 29.4 PG (ref 26–34)
MCHC RBC AUTO-ENTMCNC: 33.8 G/DL (ref 30–36.5)
MCV RBC AUTO: 87 FL (ref 80–99)
METHGB MFR BLD: 0.2 % (ref 0–1.4)
MONOCYTES # BLD: 0.5 K/UL (ref 0–1)
MONOCYTES NFR BLD: 4 % (ref 5–13)
NEUTS SEG # BLD: 10.3 K/UL (ref 1.8–8)
NEUTS SEG NFR BLD: 83 % (ref 32–75)
NRBC # BLD: 0 K/UL (ref 0–0.01)
NRBC BLD-RTO: 0 PER 100 WBC
OXYHGB MFR BLD: 98.6 % (ref 94–97)
PCO2 BLDA: 42 MMHG (ref 35–45)
PH BLDA: 7.48 (ref 7.35–7.45)
PLATELET # BLD AUTO: 507 K/UL (ref 150–400)
PMV BLD AUTO: 8.3 FL (ref 8.9–12.9)
PO2 BLDA: 125 MMHG (ref 80–100)
POTASSIUM SERPL-SCNC: 4.7 MMOL/L (ref 3.5–5.1)
PROT SERPL-MCNC: 3.7 G/DL (ref 6.4–8.2)
PROTHROMBIN TIME: 12 SEC (ref 9–11.1)
RBC # BLD AUTO: 3.91 M/UL (ref 3.8–5.2)
SAO2 % BLD: 99 % (ref 95–99)
SAO2% DEVICE SAO2% SENSOR NAME: ABNORMAL
SODIUM SERPL-SCNC: 133 MMOL/L (ref 136–145)
SPECIMEN HOLD: NORMAL
SPECIMEN SITE: ABNORMAL
THERAPEUTIC RANGE: ABNORMAL SECS (ref 58–77)
THERAPEUTIC RANGE: ABNORMAL SECS (ref 58–77)
UFH PPP CHRO-ACNC: 0.3 IU/ML
WBC # BLD AUTO: 12.5 K/UL (ref 3.6–11)

## 2023-06-28 PROCEDURE — C9290 INJ, BUPIVACAINE LIPOSOME: HCPCS | Performed by: SURGERY

## 2023-06-28 PROCEDURE — 85520 HEPARIN ASSAY: CPT

## 2023-06-28 PROCEDURE — 2580000003 HC RX 258: Performed by: ANESTHESIOLOGY

## 2023-06-28 PROCEDURE — 6360000002 HC RX W HCPCS: Performed by: SURGERY

## 2023-06-28 PROCEDURE — 83516 IMMUNOASSAY NONANTIBODY: CPT

## 2023-06-28 PROCEDURE — 86235 NUCLEAR ANTIGEN ANTIBODY: CPT

## 2023-06-28 PROCEDURE — 6360000002 HC RX W HCPCS: Performed by: STUDENT IN AN ORGANIZED HEALTH CARE EDUCATION/TRAINING PROGRAM

## 2023-06-28 PROCEDURE — 7100000001 HC PACU RECOVERY - ADDTL 15 MIN: Performed by: SURGERY

## 2023-06-28 PROCEDURE — 85245 CLOT FACTOR VIII VW RISTOCTN: CPT

## 2023-06-28 PROCEDURE — 3700000001 HC ADD 15 MINUTES (ANESTHESIA): Performed by: SURGERY

## 2023-06-28 PROCEDURE — 2580000003 HC RX 258: Performed by: SURGERY

## 2023-06-28 PROCEDURE — 85025 COMPLETE CBC W/AUTO DIFF WBC: CPT

## 2023-06-28 PROCEDURE — 2700000000 HC OXYGEN THERAPY PER DAY

## 2023-06-28 PROCEDURE — 36600 WITHDRAWAL OF ARTERIAL BLOOD: CPT

## 2023-06-28 PROCEDURE — 6370000000 HC RX 637 (ALT 250 FOR IP): Performed by: SURGERY

## 2023-06-28 PROCEDURE — C1765 ADHESION BARRIER: HCPCS | Performed by: SURGERY

## 2023-06-28 PROCEDURE — 80074 ACUTE HEPATITIS PANEL: CPT

## 2023-06-28 PROCEDURE — 87186 SC STD MICRODIL/AGAR DIL: CPT

## 2023-06-28 PROCEDURE — 85303 CLOT INHIBIT PROT C ACTIVITY: CPT

## 2023-06-28 PROCEDURE — 87070 CULTURE OTHR SPECIMN AEROBIC: CPT

## 2023-06-28 PROCEDURE — 86037 ANCA TITER EACH ANTIBODY: CPT

## 2023-06-28 PROCEDURE — 85305 CLOT INHIBIT PROT S TOTAL: CPT

## 2023-06-28 PROCEDURE — 87077 CULTURE AEROBIC IDENTIFY: CPT

## 2023-06-28 PROCEDURE — 0DN60ZZ RELEASE STOMACH, OPEN APPROACH: ICD-10-PCS | Performed by: SURGERY

## 2023-06-28 PROCEDURE — 81240 F2 GENE: CPT

## 2023-06-28 PROCEDURE — 82803 BLOOD GASES ANY COMBINATION: CPT

## 2023-06-28 PROCEDURE — 3700000000 HC ANESTHESIA ATTENDED CARE: Performed by: SURGERY

## 2023-06-28 PROCEDURE — 2580000003 HC RX 258: Performed by: NURSE ANESTHETIST, CERTIFIED REGISTERED

## 2023-06-28 PROCEDURE — 86160 COMPLEMENT ANTIGEN: CPT

## 2023-06-28 PROCEDURE — 7100000000 HC PACU RECOVERY - FIRST 15 MIN: Performed by: SURGERY

## 2023-06-28 PROCEDURE — 36415 COLL VENOUS BLD VENIPUNCTURE: CPT

## 2023-06-28 PROCEDURE — A4216 STERILE WATER/SALINE, 10 ML: HCPCS | Performed by: SURGERY

## 2023-06-28 PROCEDURE — 86146 BETA-2 GLYCOPROTEIN ANTIBODY: CPT

## 2023-06-28 PROCEDURE — 85302 CLOT INHIBIT PROT C ANTIGEN: CPT

## 2023-06-28 PROCEDURE — 86225 DNA ANTIBODY NATIVE: CPT

## 2023-06-28 PROCEDURE — 80053 COMPREHEN METABOLIC PANEL: CPT

## 2023-06-28 PROCEDURE — 2500000003 HC RX 250 WO HCPCS: Performed by: SURGERY

## 2023-06-28 PROCEDURE — 3600000014 HC SURGERY LEVEL 4 ADDTL 15MIN: Performed by: SURGERY

## 2023-06-28 PROCEDURE — 3600000004 HC SURGERY LEVEL 4 BASE: Performed by: SURGERY

## 2023-06-28 PROCEDURE — 6360000002 HC RX W HCPCS: Performed by: NURSE ANESTHETIST, CERTIFIED REGISTERED

## 2023-06-28 PROCEDURE — 85613 RUSSELL VIPER VENOM DILUTED: CPT

## 2023-06-28 PROCEDURE — 85730 THROMBOPLASTIN TIME PARTIAL: CPT

## 2023-06-28 PROCEDURE — 85240 CLOT FACTOR VIII AHG 1 STAGE: CPT

## 2023-06-28 PROCEDURE — 85306 CLOT INHIBIT PROT S FREE: CPT

## 2023-06-28 PROCEDURE — C9113 INJ PANTOPRAZOLE SODIUM, VIA: HCPCS | Performed by: SURGERY

## 2023-06-28 PROCEDURE — 2709999900 HC NON-CHARGEABLE SUPPLY: Performed by: SURGERY

## 2023-06-28 PROCEDURE — 99221 1ST HOSP IP/OBS SF/LOW 40: CPT | Performed by: FAMILY MEDICINE

## 2023-06-28 PROCEDURE — 86147 CARDIOLIPIN ANTIBODY EA IG: CPT

## 2023-06-28 PROCEDURE — 1100000000 HC RM PRIVATE

## 2023-06-28 PROCEDURE — 85652 RBC SED RATE AUTOMATED: CPT

## 2023-06-28 PROCEDURE — 2500000003 HC RX 250 WO HCPCS: Performed by: NURSE ANESTHETIST, CERTIFIED REGISTERED

## 2023-06-28 PROCEDURE — 81241 F5 GENE: CPT

## 2023-06-28 PROCEDURE — 85610 PROTHROMBIN TIME: CPT

## 2023-06-28 PROCEDURE — 85732 THROMBOPLASTIN TIME PARTIAL: CPT

## 2023-06-28 PROCEDURE — 85598 HEXAGNAL PHOSPH PLTLT NEUTRL: CPT

## 2023-06-28 PROCEDURE — 86140 C-REACTIVE PROTEIN: CPT

## 2023-06-28 PROCEDURE — 85300 ANTITHROMBIN III ACTIVITY: CPT

## 2023-06-28 PROCEDURE — 87205 SMEAR GRAM STAIN: CPT

## 2023-06-28 PROCEDURE — 2500000003 HC RX 250 WO HCPCS: Performed by: STUDENT IN AN ORGANIZED HEALTH CARE EDUCATION/TRAINING PROGRAM

## 2023-06-28 RX ORDER — HEPARIN SODIUM 10000 [USP'U]/100ML
5-30 INJECTION, SOLUTION INTRAVENOUS CONTINUOUS
Status: DISCONTINUED | OUTPATIENT
Start: 2023-06-28 | End: 2023-06-29 | Stop reason: ALTCHOICE

## 2023-06-28 RX ORDER — HEPARIN SODIUM 1000 [USP'U]/ML
80 INJECTION, SOLUTION INTRAVENOUS; SUBCUTANEOUS PRN
Status: DISCONTINUED | OUTPATIENT
Start: 2023-06-28 | End: 2023-06-29

## 2023-06-28 RX ORDER — HEPARIN SODIUM 1000 [USP'U]/ML
40 INJECTION, SOLUTION INTRAVENOUS; SUBCUTANEOUS PRN
Status: DISCONTINUED | OUTPATIENT
Start: 2023-06-28 | End: 2023-06-29

## 2023-06-28 RX ORDER — LABETALOL HYDROCHLORIDE 5 MG/ML
10 INJECTION, SOLUTION INTRAVENOUS
Status: DISCONTINUED | OUTPATIENT
Start: 2023-06-28 | End: 2023-06-28 | Stop reason: HOSPADM

## 2023-06-28 RX ORDER — ONDANSETRON 2 MG/ML
INJECTION INTRAMUSCULAR; INTRAVENOUS PRN
Status: DISCONTINUED | OUTPATIENT
Start: 2023-06-28 | End: 2023-06-28 | Stop reason: SDUPTHER

## 2023-06-28 RX ORDER — EPHEDRINE SULFATE/0.9% NACL/PF 50 MG/5 ML
SYRINGE (ML) INTRAVENOUS PRN
Status: DISCONTINUED | OUTPATIENT
Start: 2023-06-28 | End: 2023-06-28 | Stop reason: SDUPTHER

## 2023-06-28 RX ORDER — DIPHENHYDRAMINE HYDROCHLORIDE 50 MG/ML
12.5 INJECTION INTRAMUSCULAR; INTRAVENOUS
Status: DISCONTINUED | OUTPATIENT
Start: 2023-06-28 | End: 2023-06-28 | Stop reason: HOSPADM

## 2023-06-28 RX ORDER — SODIUM CHLORIDE, SODIUM LACTATE, POTASSIUM CHLORIDE, CALCIUM CHLORIDE 600; 310; 30; 20 MG/100ML; MG/100ML; MG/100ML; MG/100ML
INJECTION, SOLUTION INTRAVENOUS CONTINUOUS
Status: DISCONTINUED | OUTPATIENT
Start: 2023-06-28 | End: 2023-06-28 | Stop reason: HOSPADM

## 2023-06-28 RX ORDER — PROPOFOL 10 MG/ML
INJECTION, EMULSION INTRAVENOUS PRN
Status: DISCONTINUED | OUTPATIENT
Start: 2023-06-28 | End: 2023-06-28 | Stop reason: SDUPTHER

## 2023-06-28 RX ORDER — PANTOPRAZOLE SODIUM 40 MG/1
40 TABLET, DELAYED RELEASE ORAL
Status: DISCONTINUED | OUTPATIENT
Start: 2023-06-29 | End: 2023-07-06

## 2023-06-28 RX ORDER — ONDANSETRON 2 MG/ML
4 INJECTION INTRAMUSCULAR; INTRAVENOUS
Status: DISCONTINUED | OUTPATIENT
Start: 2023-06-28 | End: 2023-06-28 | Stop reason: HOSPADM

## 2023-06-28 RX ORDER — HEPARIN SODIUM 10000 [USP'U]/100ML
5-30 INJECTION, SOLUTION INTRAVENOUS CONTINUOUS
Status: DISCONTINUED | OUTPATIENT
Start: 2023-06-28 | End: 2023-06-28 | Stop reason: SDUPTHER

## 2023-06-28 RX ORDER — SODIUM CHLORIDE, SODIUM LACTATE, POTASSIUM CHLORIDE, CALCIUM CHLORIDE 600; 310; 30; 20 MG/100ML; MG/100ML; MG/100ML; MG/100ML
INJECTION, SOLUTION INTRAVENOUS CONTINUOUS PRN
Status: DISCONTINUED | OUTPATIENT
Start: 2023-06-28 | End: 2023-06-28 | Stop reason: SDUPTHER

## 2023-06-28 RX ORDER — CEFAZOLIN SODIUM 1 G/3ML
INJECTION, POWDER, FOR SOLUTION INTRAMUSCULAR; INTRAVENOUS PRN
Status: DISCONTINUED | OUTPATIENT
Start: 2023-06-28 | End: 2023-06-28 | Stop reason: SDUPTHER

## 2023-06-28 RX ORDER — DEXAMETHASONE SODIUM PHOSPHATE 4 MG/ML
INJECTION, SOLUTION INTRA-ARTICULAR; INTRALESIONAL; INTRAMUSCULAR; INTRAVENOUS; SOFT TISSUE PRN
Status: DISCONTINUED | OUTPATIENT
Start: 2023-06-28 | End: 2023-06-28 | Stop reason: SDUPTHER

## 2023-06-28 RX ORDER — MEPERIDINE HYDROCHLORIDE 25 MG/ML
12.5 INJECTION INTRAMUSCULAR; INTRAVENOUS; SUBCUTANEOUS EVERY 5 MIN PRN
Status: DISCONTINUED | OUTPATIENT
Start: 2023-06-28 | End: 2023-06-28 | Stop reason: HOSPADM

## 2023-06-28 RX ORDER — LIDOCAINE HYDROCHLORIDE 10 MG/ML
1 INJECTION, SOLUTION EPIDURAL; INFILTRATION; INTRACAUDAL; PERINEURAL
Status: DISCONTINUED | OUTPATIENT
Start: 2023-06-28 | End: 2023-06-28 | Stop reason: HOSPADM

## 2023-06-28 RX ORDER — LIDOCAINE HYDROCHLORIDE 20 MG/ML
INJECTION, SOLUTION EPIDURAL; INFILTRATION; INTRACAUDAL; PERINEURAL PRN
Status: DISCONTINUED | OUTPATIENT
Start: 2023-06-28 | End: 2023-06-28 | Stop reason: SDUPTHER

## 2023-06-28 RX ORDER — ROCURONIUM BROMIDE 10 MG/ML
INJECTION, SOLUTION INTRAVENOUS PRN
Status: DISCONTINUED | OUTPATIENT
Start: 2023-06-28 | End: 2023-06-28 | Stop reason: SDUPTHER

## 2023-06-28 RX ORDER — DROPERIDOL 2.5 MG/ML
0.62 INJECTION, SOLUTION INTRAMUSCULAR; INTRAVENOUS
Status: DISCONTINUED | OUTPATIENT
Start: 2023-06-28 | End: 2023-06-28 | Stop reason: HOSPADM

## 2023-06-28 RX ORDER — SUCCINYLCHOLINE/SOD CL,ISO/PF 100 MG/5ML
SYRINGE (ML) INTRAVENOUS PRN
Status: DISCONTINUED | OUTPATIENT
Start: 2023-06-28 | End: 2023-06-28 | Stop reason: SDUPTHER

## 2023-06-28 RX ORDER — MIDAZOLAM HYDROCHLORIDE 1 MG/ML
INJECTION INTRAMUSCULAR; INTRAVENOUS PRN
Status: DISCONTINUED | OUTPATIENT
Start: 2023-06-28 | End: 2023-06-28 | Stop reason: SDUPTHER

## 2023-06-28 RX ORDER — SODIUM CHLORIDE, SODIUM LACTATE, POTASSIUM CHLORIDE, CALCIUM CHLORIDE 600; 310; 30; 20 MG/100ML; MG/100ML; MG/100ML; MG/100ML
INJECTION, SOLUTION INTRAVENOUS CONTINUOUS
Status: DISCONTINUED | OUTPATIENT
Start: 2023-06-28 | End: 2023-06-30

## 2023-06-28 RX ORDER — HYDROXYZINE HYDROCHLORIDE 25 MG/1
25 TABLET, FILM COATED ORAL DAILY
Status: DISCONTINUED | OUTPATIENT
Start: 2023-06-28 | End: 2023-07-06

## 2023-06-28 RX ORDER — FENTANYL CITRATE 50 UG/ML
INJECTION, SOLUTION INTRAMUSCULAR; INTRAVENOUS PRN
Status: DISCONTINUED | OUTPATIENT
Start: 2023-06-28 | End: 2023-06-28 | Stop reason: SDUPTHER

## 2023-06-28 RX ADMIN — NYSTATIN 500000 UNITS: 100000 SUSPENSION ORAL at 14:31

## 2023-06-28 RX ADMIN — FENTANYL CITRATE 50 MCG: 50 INJECTION, SOLUTION INTRAMUSCULAR; INTRAVENOUS at 01:27

## 2023-06-28 RX ADMIN — Medication 125 MG: at 07:00

## 2023-06-28 RX ADMIN — NYSTATIN 500000 UNITS: 100000 SUSPENSION ORAL at 09:44

## 2023-06-28 RX ADMIN — DEXAMETHASONE SODIUM PHOSPHATE 4 MG: 4 INJECTION, SOLUTION INTRAMUSCULAR; INTRAVENOUS at 01:45

## 2023-06-28 RX ADMIN — SODIUM CHLORIDE, PRESERVATIVE FREE 40 MG: 5 INJECTION INTRAVENOUS at 00:15

## 2023-06-28 RX ADMIN — Medication 125 MG: at 18:00

## 2023-06-28 RX ADMIN — SUCRALFATE 1 G: 1 TABLET ORAL at 12:08

## 2023-06-28 RX ADMIN — ROCURONIUM BROMIDE 50 MG: 10 INJECTION INTRAVENOUS at 01:33

## 2023-06-28 RX ADMIN — MIDAZOLAM HYDROCHLORIDE 2 MG: 1 INJECTION, SOLUTION INTRAMUSCULAR; INTRAVENOUS at 01:17

## 2023-06-28 RX ADMIN — Medication 125 MG: at 12:00

## 2023-06-28 RX ADMIN — HYDROMORPHONE HYDROCHLORIDE 0.5 MG: 1 INJECTION, SOLUTION INTRAMUSCULAR; INTRAVENOUS; SUBCUTANEOUS at 20:44

## 2023-06-28 RX ADMIN — Medication 80 MG: at 01:27

## 2023-06-28 RX ADMIN — FENTANYL CITRATE 50 MCG: 50 INJECTION, SOLUTION INTRAMUSCULAR; INTRAVENOUS at 02:04

## 2023-06-28 RX ADMIN — SUGAMMADEX 200 MG: 100 INJECTION, SOLUTION INTRAVENOUS at 03:00

## 2023-06-28 RX ADMIN — HYDROMORPHONE HYDROCHLORIDE 2 MG: 2 INJECTION, SOLUTION INTRAMUSCULAR; INTRAVENOUS; SUBCUTANEOUS at 06:02

## 2023-06-28 RX ADMIN — Medication 10 MG: at 01:47

## 2023-06-28 RX ADMIN — METOPROLOL TARTRATE 2.5 MG: 5 INJECTION INTRAVENOUS at 11:09

## 2023-06-28 RX ADMIN — SODIUM CHLORIDE, PRESERVATIVE FREE 40 MG: 5 INJECTION INTRAVENOUS at 14:32

## 2023-06-28 RX ADMIN — SODIUM CHLORIDE, POTASSIUM CHLORIDE, SODIUM LACTATE AND CALCIUM CHLORIDE: 600; 310; 30; 20 INJECTION, SOLUTION INTRAVENOUS at 01:30

## 2023-06-28 RX ADMIN — SUCRALFATE 1 G: 1 TABLET ORAL at 06:04

## 2023-06-28 RX ADMIN — SUCRALFATE 1 G: 1 TABLET ORAL at 17:57

## 2023-06-28 RX ADMIN — GABAPENTIN 300 MG: 300 CAPSULE ORAL at 20:44

## 2023-06-28 RX ADMIN — NYSTATIN 500000 UNITS: 100000 SUSPENSION ORAL at 20:44

## 2023-06-28 RX ADMIN — GABAPENTIN 300 MG: 300 CAPSULE ORAL at 09:44

## 2023-06-28 RX ADMIN — NYSTATIN 500000 UNITS: 100000 SUSPENSION ORAL at 17:00

## 2023-06-28 RX ADMIN — CEFAZOLIN SODIUM 2 G: 1 POWDER, FOR SOLUTION INTRAMUSCULAR; INTRAVENOUS at 01:40

## 2023-06-28 RX ADMIN — PHENYLEPHRINE HYDROCHLORIDE 30 MCG/MIN: 10 INJECTION INTRAVENOUS at 01:32

## 2023-06-28 RX ADMIN — Medication 10 MG: at 01:36

## 2023-06-28 RX ADMIN — SUCRALFATE 1 G: 1 TABLET ORAL at 22:06

## 2023-06-28 RX ADMIN — PROPOFOL 100 MG: 10 INJECTION, EMULSION INTRAVENOUS at 01:27

## 2023-06-28 RX ADMIN — ONDANSETRON HYDROCHLORIDE 4 MG: 2 SOLUTION INTRAMUSCULAR; INTRAVENOUS at 02:50

## 2023-06-28 RX ADMIN — HYDROMORPHONE HYDROCHLORIDE 2 MG: 2 INJECTION, SOLUTION INTRAMUSCULAR; INTRAVENOUS; SUBCUTANEOUS at 10:40

## 2023-06-28 RX ADMIN — SODIUM CHLORIDE, POTASSIUM CHLORIDE, SODIUM LACTATE AND CALCIUM CHLORIDE: 600; 310; 30; 20 INJECTION, SOLUTION INTRAVENOUS at 01:17

## 2023-06-28 RX ADMIN — Medication 125 MG: at 23:16

## 2023-06-28 RX ADMIN — Medication 125 MG: at 00:15

## 2023-06-28 RX ADMIN — HYDROXYZINE HYDROCHLORIDE 25 MG: 25 TABLET, FILM COATED ORAL at 20:44

## 2023-06-28 RX ADMIN — LIDOCAINE HYDROCHLORIDE 40 MG: 20 INJECTION, SOLUTION EPIDURAL; INFILTRATION; INTRACAUDAL; PERINEURAL at 01:27

## 2023-06-28 RX ADMIN — HEPARIN SODIUM 18 UNITS/KG/HR: 10000 INJECTION, SOLUTION INTRAVENOUS at 13:31

## 2023-06-28 ASSESSMENT — PAIN SCALES - GENERAL
PAINLEVEL_OUTOF10: 7
PAINLEVEL_OUTOF10: 0
PAINLEVEL_OUTOF10: 4
PAINLEVEL_OUTOF10: 0
PAINLEVEL_OUTOF10: 6
PAINLEVEL_OUTOF10: 10
PAINLEVEL_OUTOF10: 6
PAINLEVEL_OUTOF10: 0

## 2023-06-28 ASSESSMENT — PAIN DESCRIPTION - LOCATION
LOCATION: ABDOMEN

## 2023-06-28 ASSESSMENT — PAIN DESCRIPTION - DESCRIPTORS
DESCRIPTORS: ACHING;PRESSURE
DESCRIPTORS: ACHING;SORE

## 2023-06-28 ASSESSMENT — PAIN DESCRIPTION - PAIN TYPE: TYPE: SURGICAL PAIN

## 2023-06-28 ASSESSMENT — PAIN - FUNCTIONAL ASSESSMENT: PAIN_FUNCTIONAL_ASSESSMENT: ACTIVITIES ARE NOT PREVENTED

## 2023-06-28 ASSESSMENT — PAIN DESCRIPTION - ORIENTATION
ORIENTATION: ANTERIOR
ORIENTATION: ANTERIOR

## 2023-06-29 LAB
-: NORMAL
HAV IGM SER QL: NONREACTIVE
HBV CORE IGM SER QL: NONREACTIVE
HBV SURFACE AG SER QL: <0.1 INDEX
HBV SURFACE AG SER QL: NEGATIVE
HCV AB SERPL QL IA: NONREACTIVE

## 2023-06-29 PROCEDURE — 99232 SBSQ HOSP IP/OBS MODERATE 35: CPT | Performed by: FAMILY MEDICINE

## 2023-06-29 PROCEDURE — 6370000000 HC RX 637 (ALT 250 FOR IP): Performed by: SURGERY

## 2023-06-29 PROCEDURE — 2500000003 HC RX 250 WO HCPCS: Performed by: STUDENT IN AN ORGANIZED HEALTH CARE EDUCATION/TRAINING PROGRAM

## 2023-06-29 PROCEDURE — 1100000000 HC RM PRIVATE

## 2023-06-29 PROCEDURE — 6360000002 HC RX W HCPCS: Performed by: SURGERY

## 2023-06-29 PROCEDURE — 97163 PT EVAL HIGH COMPLEX 45 MIN: CPT

## 2023-06-29 PROCEDURE — 97110 THERAPEUTIC EXERCISES: CPT

## 2023-06-29 PROCEDURE — 97530 THERAPEUTIC ACTIVITIES: CPT

## 2023-06-29 RX ORDER — ENOXAPARIN SODIUM 100 MG/ML
1 INJECTION SUBCUTANEOUS EVERY 12 HOURS
Status: DISCONTINUED | OUTPATIENT
Start: 2023-06-29 | End: 2023-07-17

## 2023-06-29 RX ADMIN — NYSTATIN 500000 UNITS: 100000 SUSPENSION ORAL at 15:33

## 2023-06-29 RX ADMIN — HYDROXYZINE HYDROCHLORIDE 25 MG: 25 TABLET, FILM COATED ORAL at 11:44

## 2023-06-29 RX ADMIN — SUCRALFATE 1 G: 1 TABLET ORAL at 15:33

## 2023-06-29 RX ADMIN — HYDROMORPHONE HYDROCHLORIDE 2 MG: 2 INJECTION, SOLUTION INTRAMUSCULAR; INTRAVENOUS; SUBCUTANEOUS at 06:55

## 2023-06-29 RX ADMIN — NYSTATIN 500000 UNITS: 100000 SUSPENSION ORAL at 11:29

## 2023-06-29 RX ADMIN — HYDROMORPHONE HYDROCHLORIDE 2 MG: 2 INJECTION, SOLUTION INTRAMUSCULAR; INTRAVENOUS; SUBCUTANEOUS at 19:32

## 2023-06-29 RX ADMIN — Medication 125 MG: at 06:33

## 2023-06-29 RX ADMIN — Medication 125 MG: at 23:20

## 2023-06-29 RX ADMIN — GABAPENTIN 300 MG: 300 CAPSULE ORAL at 11:27

## 2023-06-29 RX ADMIN — GABAPENTIN 300 MG: 300 CAPSULE ORAL at 21:52

## 2023-06-29 RX ADMIN — Medication 125 MG: at 13:06

## 2023-06-29 RX ADMIN — NYSTATIN 500000 UNITS: 100000 SUSPENSION ORAL at 21:53

## 2023-06-29 RX ADMIN — SUCRALFATE 1 G: 1 TABLET ORAL at 21:53

## 2023-06-29 RX ADMIN — Medication 125 MG: at 18:35

## 2023-06-29 RX ADMIN — SUCRALFATE 1 G: 1 TABLET ORAL at 11:29

## 2023-06-29 RX ADMIN — BUMETANIDE 0.5 MG: 1 TABLET ORAL at 11:27

## 2023-06-29 RX ADMIN — PANTOPRAZOLE SODIUM 40 MG: 40 TABLET, DELAYED RELEASE ORAL at 06:33

## 2023-06-29 RX ADMIN — NYSTATIN 500000 UNITS: 100000 SUSPENSION ORAL at 18:35

## 2023-06-29 RX ADMIN — PANTOPRAZOLE SODIUM 40 MG: 40 TABLET, DELAYED RELEASE ORAL at 15:33

## 2023-06-29 RX ADMIN — METOPROLOL TARTRATE 2.5 MG: 5 INJECTION INTRAVENOUS at 11:27

## 2023-06-29 RX ADMIN — ENOXAPARIN SODIUM 60 MG: 60 INJECTION SUBCUTANEOUS at 18:34

## 2023-06-29 RX ADMIN — SUCRALFATE 1 G: 1 TABLET ORAL at 06:33

## 2023-06-29 ASSESSMENT — PAIN SCALES - GENERAL
PAINLEVEL_OUTOF10: 6
PAINLEVEL_OUTOF10: 8
PAINLEVEL_OUTOF10: 4
PAINLEVEL_OUTOF10: 0
PAINLEVEL_OUTOF10: 8
PAINLEVEL_OUTOF10: 0
PAINLEVEL_OUTOF10: 0

## 2023-06-29 ASSESSMENT — PAIN DESCRIPTION - DESCRIPTORS
DESCRIPTORS: CRAMPING;SHARP
DESCRIPTORS: CRAMPING;SHARP

## 2023-06-29 ASSESSMENT — PAIN DESCRIPTION - LOCATION
LOCATION: ABDOMEN

## 2023-06-29 ASSESSMENT — PAIN DESCRIPTION - ORIENTATION
ORIENTATION: ANTERIOR;MID;UPPER
ORIENTATION: ANTERIOR;MID;UPPER

## 2023-06-30 ENCOUNTER — APPOINTMENT (OUTPATIENT)
Facility: HOSPITAL | Age: 63
DRG: 326 | End: 2023-06-30
Payer: MEDICARE

## 2023-06-30 PROBLEM — I82.411 ACUTE DEEP VEIN THROMBOSIS (DVT) OF FEMORAL VEIN OF RIGHT LOWER EXTREMITY (HCC): Status: ACTIVE | Noted: 2023-06-30

## 2023-06-30 LAB
B2 GLYCOPROT1 IGA SER-ACNC: <9 GPI IGA UNITS (ref 0–25)
B2 GLYCOPROT1 IGG SER-ACNC: <9 GPI IGG UNITS (ref 0–20)
B2 GLYCOPROT1 IGM SER-ACNC: <9 GPI IGM UNITS (ref 0–32)
BACTERIA SPEC CULT: NORMAL
BACTERIA SPEC CULT: NORMAL
C-ANCA TITR SER IF: NORMAL TITER
C4 SERPL-MCNC: 20 MG/DL (ref 12–38)
CARDIOLIPIN IGA SER IA-ACNC: <9 APL U/ML (ref 0–11)
CARDIOLIPIN IGG SER IA-ACNC: <9 GPL U/ML (ref 0–14)
CARDIOLIPIN IGM SER IA-ACNC: <9 MPL U/ML (ref 0–12)
CENTROMERE B AB SER-ACNC: <0.2 AI (ref 0–0.9)
CHROMATIN AB SERPL-ACNC: <0.2 AI (ref 0–0.9)
DSDNA AB SER-ACNC: <1 IU/ML (ref 0–9)
ENA JO1 AB SER-ACNC: <0.2 AI (ref 0–0.9)
ENA RNP AB SER-ACNC: <0.2 AI (ref 0–0.9)
ENA SCL70 AB SER-ACNC: <0.2 AI (ref 0–0.9)
ENA SM AB SER-ACNC: <0.2 AI (ref 0–0.9)
ENA SS-A AB SER-ACNC: <0.2 AI (ref 0–0.9)
ENA SS-B AB SER-ACNC: <0.2 AI (ref 0–0.9)
GLUCOSE BLD STRIP.AUTO-MCNC: 71 MG/DL (ref 65–117)
GLUCOSE BLD STRIP.AUTO-MCNC: 81 MG/DL (ref 65–117)
GLUCOSE BLD STRIP.AUTO-MCNC: 89 MG/DL (ref 65–117)
GLUCOSE BLD STRIP.AUTO-MCNC: 93 MG/DL (ref 65–117)
Lab: NORMAL
MYELOPEROXIDASE AB SER IA-ACNC: <0.2 UNITS (ref 0–0.9)
P-ANCA ATYPICAL TITR SER IF: NORMAL TITER
P-ANCA TITR SER IF: NORMAL TITER
PROT C AG PPP IA-ACNC: 93 % (ref 60–150)
PROTEINASE3 AB SER IA-ACNC: <0.2 UNITS (ref 0–0.9)
SERVICE CMNT-IMP: NORMAL

## 2023-06-30 PROCEDURE — 97530 THERAPEUTIC ACTIVITIES: CPT

## 2023-06-30 PROCEDURE — 6370000000 HC RX 637 (ALT 250 FOR IP): Performed by: SURGERY

## 2023-06-30 PROCEDURE — 6360000002 HC RX W HCPCS: Performed by: INTERNAL MEDICINE

## 2023-06-30 PROCEDURE — 99233 SBSQ HOSP IP/OBS HIGH 50: CPT | Performed by: FAMILY MEDICINE

## 2023-06-30 PROCEDURE — 1100000000 HC RM PRIVATE

## 2023-06-30 PROCEDURE — 2580000003 HC RX 258: Performed by: INTERNAL MEDICINE

## 2023-06-30 PROCEDURE — 2500000003 HC RX 250 WO HCPCS: Performed by: STUDENT IN AN ORGANIZED HEALTH CARE EDUCATION/TRAINING PROGRAM

## 2023-06-30 PROCEDURE — 82962 GLUCOSE BLOOD TEST: CPT

## 2023-06-30 PROCEDURE — 76937 US GUIDE VASCULAR ACCESS: CPT

## 2023-06-30 PROCEDURE — 6360000002 HC RX W HCPCS: Performed by: SURGERY

## 2023-06-30 PROCEDURE — 6360000002 HC RX W HCPCS: Performed by: STUDENT IN AN ORGANIZED HEALTH CARE EDUCATION/TRAINING PROGRAM

## 2023-06-30 RX ORDER — BUMETANIDE 1 MG/1
1 TABLET ORAL DAILY
Status: DISCONTINUED | OUTPATIENT
Start: 2023-07-01 | End: 2023-07-02

## 2023-06-30 RX ADMIN — SUCRALFATE 1 G: 1 TABLET ORAL at 06:37

## 2023-06-30 RX ADMIN — Medication 125 MG: at 16:47

## 2023-06-30 RX ADMIN — GABAPENTIN 300 MG: 300 CAPSULE ORAL at 10:44

## 2023-06-30 RX ADMIN — HYDROMORPHONE HYDROCHLORIDE 2 MG: 2 INJECTION, SOLUTION INTRAMUSCULAR; INTRAVENOUS; SUBCUTANEOUS at 08:15

## 2023-06-30 RX ADMIN — BUMETANIDE 0.5 MG: 1 TABLET ORAL at 10:44

## 2023-06-30 RX ADMIN — ENOXAPARIN SODIUM 60 MG: 60 INJECTION SUBCUTANEOUS at 04:55

## 2023-06-30 RX ADMIN — PANTOPRAZOLE SODIUM 40 MG: 40 TABLET, DELAYED RELEASE ORAL at 17:43

## 2023-06-30 RX ADMIN — ONDANSETRON 4 MG: 2 INJECTION INTRAMUSCULAR; INTRAVENOUS at 08:15

## 2023-06-30 RX ADMIN — NYSTATIN 500000 UNITS: 100000 SUSPENSION ORAL at 20:50

## 2023-06-30 RX ADMIN — PANTOPRAZOLE SODIUM 40 MG: 40 TABLET, DELAYED RELEASE ORAL at 06:37

## 2023-06-30 RX ADMIN — NYSTATIN 500000 UNITS: 100000 SUSPENSION ORAL at 10:44

## 2023-06-30 RX ADMIN — GABAPENTIN 300 MG: 300 CAPSULE ORAL at 20:50

## 2023-06-30 RX ADMIN — HYDROMORPHONE HYDROCHLORIDE 2 MG: 2 INJECTION, SOLUTION INTRAMUSCULAR; INTRAVENOUS; SUBCUTANEOUS at 20:52

## 2023-06-30 RX ADMIN — SUCRALFATE 1 G: 1 TABLET ORAL at 13:08

## 2023-06-30 RX ADMIN — NYSTATIN 500000 UNITS: 100000 SUSPENSION ORAL at 17:43

## 2023-06-30 RX ADMIN — METOPROLOL TARTRATE 2.5 MG: 5 INJECTION INTRAVENOUS at 23:48

## 2023-06-30 RX ADMIN — NYSTATIN 500000 UNITS: 100000 SUSPENSION ORAL at 13:09

## 2023-06-30 RX ADMIN — Medication 125 MG: at 21:58

## 2023-06-30 RX ADMIN — SUCRALFATE 1 G: 1 TABLET ORAL at 20:50

## 2023-06-30 RX ADMIN — HYDROMORPHONE HYDROCHLORIDE 0.5 MG: 1 INJECTION, SOLUTION INTRAMUSCULAR; INTRAVENOUS; SUBCUTANEOUS at 16:47

## 2023-06-30 RX ADMIN — Medication 125 MG: at 06:37

## 2023-06-30 RX ADMIN — SUCRALFATE 1 G: 1 TABLET ORAL at 17:43

## 2023-06-30 RX ADMIN — PIPERACILLIN AND TAZOBACTAM 4500 MG: 4; .5 INJECTION, POWDER, LYOPHILIZED, FOR SOLUTION INTRAVENOUS at 13:09

## 2023-06-30 RX ADMIN — PIPERACILLIN AND TAZOBACTAM 3375 MG: 3; .375 INJECTION, POWDER, LYOPHILIZED, FOR SOLUTION INTRAVENOUS at 17:42

## 2023-06-30 RX ADMIN — METOPROLOL TARTRATE 2.5 MG: 5 INJECTION INTRAVENOUS at 13:10

## 2023-06-30 RX ADMIN — HYDROXYZINE HYDROCHLORIDE 25 MG: 25 TABLET, FILM COATED ORAL at 10:44

## 2023-06-30 RX ADMIN — ENOXAPARIN SODIUM 60 MG: 60 INJECTION SUBCUTANEOUS at 19:24

## 2023-06-30 ASSESSMENT — PAIN DESCRIPTION - DESCRIPTORS
DESCRIPTORS: ACHING;CRAMPING
DESCRIPTORS: ACHING

## 2023-06-30 ASSESSMENT — PAIN DESCRIPTION - LOCATION
LOCATION: ABDOMEN

## 2023-06-30 ASSESSMENT — PAIN SCALES - GENERAL
PAINLEVEL_OUTOF10: 5
PAINLEVEL_OUTOF10: 4
PAINLEVEL_OUTOF10: 7
PAINLEVEL_OUTOF10: 8
PAINLEVEL_OUTOF10: 8
PAINLEVEL_OUTOF10: 7

## 2023-06-30 ASSESSMENT — PAIN DESCRIPTION - ORIENTATION: ORIENTATION: UPPER

## 2023-07-01 LAB
AT III PPP CHRO-ACNC: 78 % (ref 75–135)
BACTERIA SPEC CULT: ABNORMAL
ERYTHROCYTE [DISTWIDTH] IN BLOOD BY AUTOMATED COUNT: 13.2 % (ref 11.5–14.5)
GLUCOSE BLD STRIP.AUTO-MCNC: 106 MG/DL (ref 65–117)
GLUCOSE BLD STRIP.AUTO-MCNC: 108 MG/DL (ref 65–117)
GLUCOSE BLD STRIP.AUTO-MCNC: 108 MG/DL (ref 65–117)
GLUCOSE BLD STRIP.AUTO-MCNC: 180 MG/DL (ref 65–117)
GRAM STN SPEC: ABNORMAL
GRAM STN SPEC: ABNORMAL
HCT VFR BLD AUTO: 27.4 % (ref 35–47)
HEXAGONAL PHASE PHOSPHOLIPID: 0 SEC (ref 0–11)
HGB BLD-MCNC: 9.1 G/DL (ref 11.5–16)
LA 2 SCREEN W REFLEX-IMP: ABNORMAL
MCH RBC QN AUTO: 28.8 PG (ref 26–34)
MCHC RBC AUTO-ENTMCNC: 33.2 G/DL (ref 30–36.5)
MCV RBC AUTO: 86.7 FL (ref 80–99)
NRBC # BLD: 0 K/UL (ref 0–0.01)
NRBC BLD-RTO: 0 PER 100 WBC
PLATELET # BLD AUTO: 333 K/UL (ref 150–400)
PMV BLD AUTO: 8.3 FL (ref 8.9–12.9)
PROT C PPP-ACNC: 106 % (ref 73–180)
PROT S ACT/NOR PPP: 36 % (ref 63–140)
PROT S AG ACT/NOR PPP IA: 64 % (ref 60–150)
PROT S FREE AG ACT/NOR PPP IA: 45 % (ref 61–136)
PTT-LA MIX: 47.7 SEC (ref 0–40.5)
RBC # BLD AUTO: 3.16 M/UL (ref 3.8–5.2)
SCREEN APTT: 53.1 SEC (ref 0–43.5)
SCREEN DRVVT: 41.6 SEC (ref 0–47)
SERVICE CMNT-IMP: ABNORMAL
SERVICE CMNT-IMP: ABNORMAL
SERVICE CMNT-IMP: NORMAL
VWF:RCO ACT/NOR PPP PL AGG: 335 % (ref 50–200)
WBC # BLD AUTO: 10.7 K/UL (ref 3.6–11)

## 2023-07-01 PROCEDURE — 6360000002 HC RX W HCPCS: Performed by: INTERNAL MEDICINE

## 2023-07-01 PROCEDURE — 1100000000 HC RM PRIVATE

## 2023-07-01 PROCEDURE — 82962 GLUCOSE BLOOD TEST: CPT

## 2023-07-01 PROCEDURE — 6370000000 HC RX 637 (ALT 250 FOR IP): Performed by: SURGERY

## 2023-07-01 PROCEDURE — 99232 SBSQ HOSP IP/OBS MODERATE 35: CPT | Performed by: FAMILY MEDICINE

## 2023-07-01 PROCEDURE — 36415 COLL VENOUS BLD VENIPUNCTURE: CPT

## 2023-07-01 PROCEDURE — 6370000000 HC RX 637 (ALT 250 FOR IP): Performed by: INTERNAL MEDICINE

## 2023-07-01 PROCEDURE — 6360000002 HC RX W HCPCS: Performed by: STUDENT IN AN ORGANIZED HEALTH CARE EDUCATION/TRAINING PROGRAM

## 2023-07-01 PROCEDURE — 6370000000 HC RX 637 (ALT 250 FOR IP)

## 2023-07-01 PROCEDURE — 2580000003 HC RX 258: Performed by: INTERNAL MEDICINE

## 2023-07-01 PROCEDURE — 6360000002 HC RX W HCPCS: Performed by: SURGERY

## 2023-07-01 PROCEDURE — 2500000003 HC RX 250 WO HCPCS: Performed by: STUDENT IN AN ORGANIZED HEALTH CARE EDUCATION/TRAINING PROGRAM

## 2023-07-01 PROCEDURE — 94761 N-INVAS EAR/PLS OXIMETRY MLT: CPT

## 2023-07-01 PROCEDURE — 6370000000 HC RX 637 (ALT 250 FOR IP): Performed by: STUDENT IN AN ORGANIZED HEALTH CARE EDUCATION/TRAINING PROGRAM

## 2023-07-01 PROCEDURE — 98960 EDU&TRN PT SELF-MGMT NQHP 1: CPT

## 2023-07-01 PROCEDURE — 85027 COMPLETE CBC AUTOMATED: CPT

## 2023-07-01 RX ORDER — METRONIDAZOLE 500 MG/1
500 TABLET ORAL EVERY 8 HOURS SCHEDULED
Status: DISCONTINUED | OUTPATIENT
Start: 2023-07-01 | End: 2023-07-07

## 2023-07-01 RX ORDER — LIDOCAINE 4 G/G
1 PATCH TOPICAL DAILY
Status: DISCONTINUED | OUTPATIENT
Start: 2023-07-01 | End: 2023-07-21 | Stop reason: HOSPADM

## 2023-07-01 RX ORDER — ACETAMINOPHEN 325 MG/1
650 TABLET ORAL EVERY 4 HOURS PRN
Status: DISCONTINUED | OUTPATIENT
Start: 2023-07-01 | End: 2023-07-21 | Stop reason: HOSPADM

## 2023-07-01 RX ADMIN — HYDROMORPHONE HYDROCHLORIDE 0.5 MG: 1 INJECTION, SOLUTION INTRAMUSCULAR; INTRAVENOUS; SUBCUTANEOUS at 11:29

## 2023-07-01 RX ADMIN — METOPROLOL TARTRATE 2.5 MG: 5 INJECTION INTRAVENOUS at 11:28

## 2023-07-01 RX ADMIN — PIPERACILLIN AND TAZOBACTAM 3375 MG: 3; .375 INJECTION, POWDER, LYOPHILIZED, FOR SOLUTION INTRAVENOUS at 09:18

## 2023-07-01 RX ADMIN — GABAPENTIN 300 MG: 300 CAPSULE ORAL at 21:34

## 2023-07-01 RX ADMIN — PANTOPRAZOLE SODIUM 40 MG: 40 TABLET, DELAYED RELEASE ORAL at 06:01

## 2023-07-01 RX ADMIN — NYSTATIN 500000 UNITS: 100000 SUSPENSION ORAL at 21:35

## 2023-07-01 RX ADMIN — ACETAMINOPHEN 650 MG: 325 TABLET ORAL at 14:43

## 2023-07-01 RX ADMIN — SUCRALFATE 1 G: 1 TABLET ORAL at 16:21

## 2023-07-01 RX ADMIN — Medication 125 MG: at 04:22

## 2023-07-01 RX ADMIN — WATER 2000 MG: 1 INJECTION INTRAMUSCULAR; INTRAVENOUS; SUBCUTANEOUS at 16:22

## 2023-07-01 RX ADMIN — PIPERACILLIN AND TAZOBACTAM 3375 MG: 3; .375 INJECTION, POWDER, LYOPHILIZED, FOR SOLUTION INTRAVENOUS at 01:22

## 2023-07-01 RX ADMIN — BUMETANIDE 1 MG: 1 TABLET ORAL at 09:17

## 2023-07-01 RX ADMIN — GABAPENTIN 300 MG: 300 CAPSULE ORAL at 09:17

## 2023-07-01 RX ADMIN — ENOXAPARIN SODIUM 60 MG: 60 INJECTION SUBCUTANEOUS at 04:23

## 2023-07-01 RX ADMIN — SUCRALFATE 1 G: 1 TABLET ORAL at 21:35

## 2023-07-01 RX ADMIN — ENOXAPARIN SODIUM 60 MG: 60 INJECTION SUBCUTANEOUS at 16:33

## 2023-07-01 RX ADMIN — NYSTATIN 500000 UNITS: 100000 SUSPENSION ORAL at 09:17

## 2023-07-01 RX ADMIN — METRONIDAZOLE 500 MG: 500 TABLET ORAL at 16:21

## 2023-07-01 RX ADMIN — NYSTATIN 500000 UNITS: 100000 SUSPENSION ORAL at 16:20

## 2023-07-01 RX ADMIN — NYSTATIN 500000 UNITS: 100000 SUSPENSION ORAL at 13:54

## 2023-07-01 RX ADMIN — Medication 125 MG: at 09:27

## 2023-07-01 RX ADMIN — HYDROMORPHONE HYDROCHLORIDE 2 MG: 2 INJECTION, SOLUTION INTRAMUSCULAR; INTRAVENOUS; SUBCUTANEOUS at 06:26

## 2023-07-01 RX ADMIN — SUCRALFATE 1 G: 1 TABLET ORAL at 11:29

## 2023-07-01 RX ADMIN — METRONIDAZOLE 500 MG: 500 TABLET ORAL at 21:35

## 2023-07-01 RX ADMIN — HYDROMORPHONE HYDROCHLORIDE 2 MG: 2 INJECTION, SOLUTION INTRAMUSCULAR; INTRAVENOUS; SUBCUTANEOUS at 16:23

## 2023-07-01 RX ADMIN — PANTOPRAZOLE SODIUM 40 MG: 40 TABLET, DELAYED RELEASE ORAL at 16:21

## 2023-07-01 RX ADMIN — SUCRALFATE 1 G: 1 TABLET ORAL at 06:00

## 2023-07-01 RX ADMIN — Medication 125 MG: at 16:32

## 2023-07-01 RX ADMIN — Medication 125 MG: at 21:36

## 2023-07-01 RX ADMIN — HYDROXYZINE HYDROCHLORIDE 25 MG: 25 TABLET, FILM COATED ORAL at 09:17

## 2023-07-01 RX ADMIN — HYDROMORPHONE HYDROCHLORIDE 2 MG: 2 INJECTION, SOLUTION INTRAMUSCULAR; INTRAVENOUS; SUBCUTANEOUS at 21:37

## 2023-07-01 ASSESSMENT — PAIN SCALES - GENERAL
PAINLEVEL_OUTOF10: 0
PAINLEVEL_OUTOF10: 5
PAINLEVEL_OUTOF10: 3
PAINLEVEL_OUTOF10: 9
PAINLEVEL_OUTOF10: 0
PAINLEVEL_OUTOF10: 7
PAINLEVEL_OUTOF10: 7
PAINLEVEL_OUTOF10: 0
PAINLEVEL_OUTOF10: 6

## 2023-07-01 ASSESSMENT — PAIN DESCRIPTION - LOCATION
LOCATION: ABDOMEN
LOCATION: ABDOMEN
LOCATION: ABDOMEN;ARM
LOCATION: ABDOMEN
LOCATION: ABDOMEN

## 2023-07-01 ASSESSMENT — PAIN DESCRIPTION - DESCRIPTORS
DESCRIPTORS: ACHING

## 2023-07-01 ASSESSMENT — PAIN - FUNCTIONAL ASSESSMENT
PAIN_FUNCTIONAL_ASSESSMENT: PREVENTS OR INTERFERES SOME ACTIVE ACTIVITIES AND ADLS
PAIN_FUNCTIONAL_ASSESSMENT: PREVENTS OR INTERFERES SOME ACTIVE ACTIVITIES AND ADLS
PAIN_FUNCTIONAL_ASSESSMENT: ACTIVITIES ARE NOT PREVENTED

## 2023-07-01 ASSESSMENT — PAIN DESCRIPTION - ORIENTATION
ORIENTATION: MID
ORIENTATION: ANTERIOR
ORIENTATION: ANTERIOR;RIGHT

## 2023-07-02 PROBLEM — D72.829 LEUKOCYTOSIS: Status: ACTIVE | Noted: 2023-07-02

## 2023-07-02 LAB
ERYTHROCYTE [DISTWIDTH] IN BLOOD BY AUTOMATED COUNT: 13.2 % (ref 11.5–14.5)
GLUCOSE BLD STRIP.AUTO-MCNC: 119 MG/DL (ref 65–117)
GLUCOSE BLD STRIP.AUTO-MCNC: 84 MG/DL (ref 65–117)
GLUCOSE BLD STRIP.AUTO-MCNC: 99 MG/DL (ref 65–117)
HCT VFR BLD AUTO: 27.6 % (ref 35–47)
HGB BLD-MCNC: 9.3 G/DL (ref 11.5–16)
MCH RBC QN AUTO: 29.2 PG (ref 26–34)
MCHC RBC AUTO-ENTMCNC: 33.7 G/DL (ref 30–36.5)
MCV RBC AUTO: 86.8 FL (ref 80–99)
NRBC # BLD: 0 K/UL (ref 0–0.01)
NRBC BLD-RTO: 0 PER 100 WBC
PLATELET # BLD AUTO: 346 K/UL (ref 150–400)
PMV BLD AUTO: 8.3 FL (ref 8.9–12.9)
RBC # BLD AUTO: 3.18 M/UL (ref 3.8–5.2)
SERVICE CMNT-IMP: ABNORMAL
SERVICE CMNT-IMP: NORMAL
SERVICE CMNT-IMP: NORMAL
WBC # BLD AUTO: 18.5 K/UL (ref 3.6–11)

## 2023-07-02 PROCEDURE — 82962 GLUCOSE BLOOD TEST: CPT

## 2023-07-02 PROCEDURE — 6360000002 HC RX W HCPCS: Performed by: INTERNAL MEDICINE

## 2023-07-02 PROCEDURE — 6360000002 HC RX W HCPCS: Performed by: SURGERY

## 2023-07-02 PROCEDURE — 2580000003 HC RX 258: Performed by: INTERNAL MEDICINE

## 2023-07-02 PROCEDURE — 36415 COLL VENOUS BLD VENIPUNCTURE: CPT

## 2023-07-02 PROCEDURE — 94761 N-INVAS EAR/PLS OXIMETRY MLT: CPT

## 2023-07-02 PROCEDURE — 6370000000 HC RX 637 (ALT 250 FOR IP): Performed by: SURGERY

## 2023-07-02 PROCEDURE — 6370000000 HC RX 637 (ALT 250 FOR IP): Performed by: STUDENT IN AN ORGANIZED HEALTH CARE EDUCATION/TRAINING PROGRAM

## 2023-07-02 PROCEDURE — 98960 EDU&TRN PT SELF-MGMT NQHP 1: CPT

## 2023-07-02 PROCEDURE — 99232 SBSQ HOSP IP/OBS MODERATE 35: CPT | Performed by: FAMILY MEDICINE

## 2023-07-02 PROCEDURE — 6370000000 HC RX 637 (ALT 250 FOR IP)

## 2023-07-02 PROCEDURE — 2700000000 HC OXYGEN THERAPY PER DAY

## 2023-07-02 PROCEDURE — 6360000002 HC RX W HCPCS: Performed by: STUDENT IN AN ORGANIZED HEALTH CARE EDUCATION/TRAINING PROGRAM

## 2023-07-02 PROCEDURE — 6370000000 HC RX 637 (ALT 250 FOR IP): Performed by: INTERNAL MEDICINE

## 2023-07-02 PROCEDURE — 85027 COMPLETE CBC AUTOMATED: CPT

## 2023-07-02 PROCEDURE — 1100000000 HC RM PRIVATE

## 2023-07-02 RX ORDER — BUMETANIDE 1 MG/1
1 TABLET ORAL 2 TIMES DAILY
Status: DISCONTINUED | OUTPATIENT
Start: 2023-07-02 | End: 2023-07-04

## 2023-07-02 RX ORDER — OXYCODONE HYDROCHLORIDE 5 MG/1
10 TABLET ORAL EVERY 6 HOURS PRN
Status: DISCONTINUED | OUTPATIENT
Start: 2023-07-02 | End: 2023-07-18

## 2023-07-02 RX ORDER — OXYCODONE HYDROCHLORIDE 5 MG/1
5 TABLET ORAL EVERY 6 HOURS PRN
Status: DISCONTINUED | OUTPATIENT
Start: 2023-07-02 | End: 2023-07-18

## 2023-07-02 RX ADMIN — SUCRALFATE 1 G: 1 TABLET ORAL at 05:59

## 2023-07-02 RX ADMIN — GABAPENTIN 300 MG: 300 CAPSULE ORAL at 08:56

## 2023-07-02 RX ADMIN — OXYCODONE HYDROCHLORIDE 10 MG: 5 TABLET ORAL at 14:13

## 2023-07-02 RX ADMIN — PANTOPRAZOLE SODIUM 40 MG: 40 TABLET, DELAYED RELEASE ORAL at 16:23

## 2023-07-02 RX ADMIN — HYDROXYZINE HYDROCHLORIDE 25 MG: 25 TABLET, FILM COATED ORAL at 08:55

## 2023-07-02 RX ADMIN — ENOXAPARIN SODIUM 60 MG: 60 INJECTION SUBCUTANEOUS at 03:52

## 2023-07-02 RX ADMIN — Medication 125 MG: at 16:23

## 2023-07-02 RX ADMIN — ONDANSETRON 4 MG: 2 INJECTION INTRAMUSCULAR; INTRAVENOUS at 17:08

## 2023-07-02 RX ADMIN — Medication 125 MG: at 11:01

## 2023-07-02 RX ADMIN — SUCRALFATE 1 G: 1 TABLET ORAL at 11:01

## 2023-07-02 RX ADMIN — ENOXAPARIN SODIUM 60 MG: 60 INJECTION SUBCUTANEOUS at 16:23

## 2023-07-02 RX ADMIN — SUCRALFATE 1 G: 1 TABLET ORAL at 21:22

## 2023-07-02 RX ADMIN — BUMETANIDE 1 MG: 1 TABLET ORAL at 21:22

## 2023-07-02 RX ADMIN — NYSTATIN 500000 UNITS: 100000 SUSPENSION ORAL at 16:23

## 2023-07-02 RX ADMIN — PANTOPRAZOLE SODIUM 40 MG: 40 TABLET, DELAYED RELEASE ORAL at 05:59

## 2023-07-02 RX ADMIN — ONDANSETRON 4 MG: 2 INJECTION INTRAMUSCULAR; INTRAVENOUS at 11:06

## 2023-07-02 RX ADMIN — METRONIDAZOLE 500 MG: 500 TABLET ORAL at 21:22

## 2023-07-02 RX ADMIN — PROCHLORPERAZINE EDISYLATE 10 MG: 5 INJECTION INTRAMUSCULAR; INTRAVENOUS at 21:35

## 2023-07-02 RX ADMIN — SUCRALFATE 1 G: 1 TABLET ORAL at 16:23

## 2023-07-02 RX ADMIN — METRONIDAZOLE 500 MG: 500 TABLET ORAL at 05:59

## 2023-07-02 RX ADMIN — WATER 2000 MG: 1 INJECTION INTRAMUSCULAR; INTRAVENOUS; SUBCUTANEOUS at 16:22

## 2023-07-02 RX ADMIN — GABAPENTIN 300 MG: 300 CAPSULE ORAL at 21:22

## 2023-07-02 RX ADMIN — Medication 125 MG: at 03:50

## 2023-07-02 RX ADMIN — Medication 125 MG: at 21:35

## 2023-07-02 RX ADMIN — HYDROMORPHONE HYDROCHLORIDE 2 MG: 2 INJECTION, SOLUTION INTRAMUSCULAR; INTRAVENOUS; SUBCUTANEOUS at 21:37

## 2023-07-02 RX ADMIN — NYSTATIN 500000 UNITS: 100000 SUSPENSION ORAL at 13:17

## 2023-07-02 RX ADMIN — NYSTATIN 500000 UNITS: 100000 SUSPENSION ORAL at 08:57

## 2023-07-02 RX ADMIN — BUMETANIDE 1 MG: 1 TABLET ORAL at 08:56

## 2023-07-02 ASSESSMENT — PAIN DESCRIPTION - ORIENTATION
ORIENTATION: ANTERIOR
ORIENTATION: LOWER

## 2023-07-02 ASSESSMENT — PAIN SCALES - GENERAL
PAINLEVEL_OUTOF10: 8
PAINLEVEL_OUTOF10: 0
PAINLEVEL_OUTOF10: 0
PAINLEVEL_OUTOF10: 10

## 2023-07-02 ASSESSMENT — PAIN DESCRIPTION - LOCATION
LOCATION: ABDOMEN
LOCATION: ABDOMEN

## 2023-07-02 ASSESSMENT — PAIN DESCRIPTION - DESCRIPTORS
DESCRIPTORS: ACHING
DESCRIPTORS: ACHING

## 2023-07-03 LAB
ANION GAP SERPL CALC-SCNC: 4 MMOL/L (ref 5–15)
ANION GAP SERPL CALC-SCNC: 6 MMOL/L (ref 5–15)
BUN SERPL-MCNC: 9 MG/DL (ref 6–20)
BUN SERPL-MCNC: 9 MG/DL (ref 6–20)
BUN/CREAT SERPL: 12 (ref 12–20)
BUN/CREAT SERPL: 12 (ref 12–20)
CALCIUM SERPL-MCNC: 7.8 MG/DL (ref 8.5–10.1)
CALCIUM SERPL-MCNC: 8 MG/DL (ref 8.5–10.1)
CHLORIDE SERPL-SCNC: 91 MMOL/L (ref 97–108)
CHLORIDE SERPL-SCNC: 92 MMOL/L (ref 97–108)
CO2 SERPL-SCNC: 35 MMOL/L (ref 21–32)
CO2 SERPL-SCNC: 35 MMOL/L (ref 21–32)
COMMENT:: NORMAL
CREAT SERPL-MCNC: 0.74 MG/DL (ref 0.55–1.02)
CREAT SERPL-MCNC: 0.77 MG/DL (ref 0.55–1.02)
ERYTHROCYTE [DISTWIDTH] IN BLOOD BY AUTOMATED COUNT: 13.3 % (ref 11.5–14.5)
F5 P.R506Q BLD/T QL: NORMAL
GLUCOSE BLD STRIP.AUTO-MCNC: 121 MG/DL (ref 65–117)
GLUCOSE BLD STRIP.AUTO-MCNC: 130 MG/DL (ref 65–117)
GLUCOSE BLD STRIP.AUTO-MCNC: 184 MG/DL (ref 65–117)
GLUCOSE SERPL-MCNC: 130 MG/DL (ref 65–100)
GLUCOSE SERPL-MCNC: 159 MG/DL (ref 65–100)
HCT VFR BLD AUTO: 29 % (ref 35–47)
HGB BLD-MCNC: 9.9 G/DL (ref 11.5–16)
IMP & REVIEW OF LAB RESULTS: NORMAL
MCH RBC QN AUTO: 29.1 PG (ref 26–34)
MCHC RBC AUTO-ENTMCNC: 34.1 G/DL (ref 30–36.5)
MCV RBC AUTO: 85.3 FL (ref 80–99)
NRBC # BLD: 0 K/UL (ref 0–0.01)
NRBC BLD-RTO: 0 PER 100 WBC
PLATELET # BLD AUTO: 383 K/UL (ref 150–400)
PMV BLD AUTO: 8.6 FL (ref 8.9–12.9)
POTASSIUM SERPL-SCNC: 2.3 MMOL/L (ref 3.5–5.1)
POTASSIUM SERPL-SCNC: 4.3 MMOL/L (ref 3.5–5.1)
RBC # BLD AUTO: 3.4 M/UL (ref 3.8–5.2)
SERVICE CMNT-IMP: ABNORMAL
SODIUM SERPL-SCNC: 131 MMOL/L (ref 136–145)
SODIUM SERPL-SCNC: 132 MMOL/L (ref 136–145)
SPECIMEN HOLD: NORMAL
WBC # BLD AUTO: 27.8 K/UL (ref 3.6–11)

## 2023-07-03 PROCEDURE — 1100000000 HC RM PRIVATE

## 2023-07-03 PROCEDURE — 2700000000 HC OXYGEN THERAPY PER DAY

## 2023-07-03 PROCEDURE — 6370000000 HC RX 637 (ALT 250 FOR IP): Performed by: SURGERY

## 2023-07-03 PROCEDURE — 85027 COMPLETE CBC AUTOMATED: CPT

## 2023-07-03 PROCEDURE — 36415 COLL VENOUS BLD VENIPUNCTURE: CPT

## 2023-07-03 PROCEDURE — 80048 BASIC METABOLIC PNL TOTAL CA: CPT

## 2023-07-03 PROCEDURE — 99233 SBSQ HOSP IP/OBS HIGH 50: CPT | Performed by: FAMILY MEDICINE

## 2023-07-03 PROCEDURE — 6370000000 HC RX 637 (ALT 250 FOR IP): Performed by: STUDENT IN AN ORGANIZED HEALTH CARE EDUCATION/TRAINING PROGRAM

## 2023-07-03 PROCEDURE — 2500000003 HC RX 250 WO HCPCS: Performed by: STUDENT IN AN ORGANIZED HEALTH CARE EDUCATION/TRAINING PROGRAM

## 2023-07-03 PROCEDURE — 6370000000 HC RX 637 (ALT 250 FOR IP): Performed by: INTERNAL MEDICINE

## 2023-07-03 PROCEDURE — 6360000002 HC RX W HCPCS: Performed by: SURGERY

## 2023-07-03 PROCEDURE — 6360000002 HC RX W HCPCS

## 2023-07-03 PROCEDURE — 82962 GLUCOSE BLOOD TEST: CPT

## 2023-07-03 PROCEDURE — 2580000003 HC RX 258

## 2023-07-03 PROCEDURE — 6370000000 HC RX 637 (ALT 250 FOR IP)

## 2023-07-03 PROCEDURE — 6360000002 HC RX W HCPCS: Performed by: STUDENT IN AN ORGANIZED HEALTH CARE EDUCATION/TRAINING PROGRAM

## 2023-07-03 RX ORDER — POTASSIUM CHLORIDE 750 MG/1
40 TABLET, FILM COATED, EXTENDED RELEASE ORAL
Status: COMPLETED | OUTPATIENT
Start: 2023-07-03 | End: 2023-07-03

## 2023-07-03 RX ORDER — VANCOMYCIN HYDROCHLORIDE 50 MG/ML
125 KIT ORAL EVERY 6 HOURS SCHEDULED
Status: DISCONTINUED | OUTPATIENT
Start: 2023-07-03 | End: 2023-07-21 | Stop reason: HOSPADM

## 2023-07-03 RX ORDER — POTASSIUM CHLORIDE 7.45 MG/ML
10 INJECTION INTRAVENOUS
Status: DISPENSED | OUTPATIENT
Start: 2023-07-03 | End: 2023-07-03

## 2023-07-03 RX ORDER — PROCHLORPERAZINE EDISYLATE 5 MG/ML
10 INJECTION INTRAMUSCULAR; INTRAVENOUS EVERY 6 HOURS PRN
Status: DISCONTINUED | OUTPATIENT
Start: 2023-07-03 | End: 2023-07-21 | Stop reason: HOSPADM

## 2023-07-03 RX ORDER — POTASSIUM CHLORIDE 7.45 MG/ML
10 INJECTION INTRAVENOUS ONCE
Status: COMPLETED | OUTPATIENT
Start: 2023-07-03 | End: 2023-07-03

## 2023-07-03 RX ADMIN — SUCRALFATE 1 G: 1 TABLET ORAL at 10:54

## 2023-07-03 RX ADMIN — METOPROLOL TARTRATE 2.5 MG: 5 INJECTION INTRAVENOUS at 23:29

## 2023-07-03 RX ADMIN — POTASSIUM CHLORIDE 10 MEQ: 7.46 INJECTION, SOLUTION INTRAVENOUS at 12:16

## 2023-07-03 RX ADMIN — BUMETANIDE 1 MG: 1 TABLET ORAL at 09:54

## 2023-07-03 RX ADMIN — POTASSIUM CHLORIDE 10 MEQ: 7.46 INJECTION, SOLUTION INTRAVENOUS at 14:04

## 2023-07-03 RX ADMIN — VANCOMYCIN HYDROCHLORIDE 125 MG: KIT at 09:53

## 2023-07-03 RX ADMIN — VANCOMYCIN HYDROCHLORIDE 125 MG: KIT at 21:34

## 2023-07-03 RX ADMIN — POTASSIUM CHLORIDE 10 MEQ: 7.46 INJECTION, SOLUTION INTRAVENOUS at 15:15

## 2023-07-03 RX ADMIN — ONDANSETRON 4 MG: 2 INJECTION INTRAMUSCULAR; INTRAVENOUS at 17:05

## 2023-07-03 RX ADMIN — METRONIDAZOLE 500 MG: 500 TABLET ORAL at 21:33

## 2023-07-03 RX ADMIN — Medication 125 MG: at 03:57

## 2023-07-03 RX ADMIN — NYSTATIN 500000 UNITS: 100000 SUSPENSION ORAL at 21:34

## 2023-07-03 RX ADMIN — METOPROLOL TARTRATE 2.5 MG: 5 INJECTION INTRAVENOUS at 10:56

## 2023-07-03 RX ADMIN — GABAPENTIN 300 MG: 300 CAPSULE ORAL at 09:53

## 2023-07-03 RX ADMIN — SUCRALFATE 1 G: 1 TABLET ORAL at 06:24

## 2023-07-03 RX ADMIN — PANTOPRAZOLE SODIUM 40 MG: 40 TABLET, DELAYED RELEASE ORAL at 17:05

## 2023-07-03 RX ADMIN — POTASSIUM CHLORIDE 10 MEQ: 7.46 INJECTION, SOLUTION INTRAVENOUS at 16:23

## 2023-07-03 RX ADMIN — METRONIDAZOLE 500 MG: 500 TABLET ORAL at 06:24

## 2023-07-03 RX ADMIN — HYDROXYZINE HYDROCHLORIDE 25 MG: 25 TABLET, FILM COATED ORAL at 09:53

## 2023-07-03 RX ADMIN — GABAPENTIN 300 MG: 300 CAPSULE ORAL at 21:33

## 2023-07-03 RX ADMIN — ENOXAPARIN SODIUM 60 MG: 60 INJECTION SUBCUTANEOUS at 03:56

## 2023-07-03 RX ADMIN — MEROPENEM 1000 MG: 1 INJECTION, POWDER, FOR SOLUTION INTRAVENOUS at 17:43

## 2023-07-03 RX ADMIN — POTASSIUM CHLORIDE 40 MEQ: 750 TABLET, FILM COATED, EXTENDED RELEASE ORAL at 14:05

## 2023-07-03 RX ADMIN — POTASSIUM CHLORIDE 10 MEQ: 7.46 INJECTION, SOLUTION INTRAVENOUS at 17:04

## 2023-07-03 RX ADMIN — ONDANSETRON 4 MG: 2 INJECTION INTRAMUSCULAR; INTRAVENOUS at 10:22

## 2023-07-03 RX ADMIN — NYSTATIN 500000 UNITS: 100000 SUSPENSION ORAL at 12:16

## 2023-07-03 RX ADMIN — POTASSIUM CHLORIDE 10 MEQ: 7.46 INJECTION, SOLUTION INTRAVENOUS at 17:43

## 2023-07-03 RX ADMIN — SUCRALFATE 1 G: 1 TABLET ORAL at 17:05

## 2023-07-03 RX ADMIN — PANTOPRAZOLE SODIUM 40 MG: 40 TABLET, DELAYED RELEASE ORAL at 06:24

## 2023-07-03 RX ADMIN — POTASSIUM CHLORIDE 10 MEQ: 7.46 INJECTION, SOLUTION INTRAVENOUS at 10:54

## 2023-07-03 RX ADMIN — HYDROMORPHONE HYDROCHLORIDE 0.5 MG: 1 INJECTION, SOLUTION INTRAMUSCULAR; INTRAVENOUS; SUBCUTANEOUS at 15:31

## 2023-07-03 RX ADMIN — VANCOMYCIN HYDROCHLORIDE 125 MG: KIT at 17:06

## 2023-07-03 RX ADMIN — POTASSIUM CHLORIDE 40 MEQ: 750 TABLET, FILM COATED, EXTENDED RELEASE ORAL at 12:16

## 2023-07-03 RX ADMIN — SUCRALFATE 1 G: 1 TABLET ORAL at 21:33

## 2023-07-03 RX ADMIN — NYSTATIN 500000 UNITS: 100000 SUSPENSION ORAL at 09:54

## 2023-07-03 RX ADMIN — ENOXAPARIN SODIUM 60 MG: 60 INJECTION SUBCUTANEOUS at 17:04

## 2023-07-03 RX ADMIN — NYSTATIN 500000 UNITS: 100000 SUSPENSION ORAL at 17:05

## 2023-07-03 RX ADMIN — METRONIDAZOLE 500 MG: 500 TABLET ORAL at 14:05

## 2023-07-03 ASSESSMENT — PAIN DESCRIPTION - DESCRIPTORS: DESCRIPTORS: ACHING

## 2023-07-03 ASSESSMENT — PAIN DESCRIPTION - ORIENTATION: ORIENTATION: LEFT

## 2023-07-03 ASSESSMENT — PAIN DESCRIPTION - LOCATION: LOCATION: ABDOMEN

## 2023-07-03 ASSESSMENT — PAIN SCALES - GENERAL
PAINLEVEL_OUTOF10: 6
PAINLEVEL_OUTOF10: 0
PAINLEVEL_OUTOF10: 0

## 2023-07-04 LAB
ANION GAP SERPL CALC-SCNC: 2 MMOL/L (ref 5–15)
BASOPHILS # BLD: 0 K/UL (ref 0–0.1)
BASOPHILS NFR BLD: 0 % (ref 0–1)
BUN SERPL-MCNC: 9 MG/DL (ref 6–20)
BUN/CREAT SERPL: 13 (ref 12–20)
CALCIUM SERPL-MCNC: 7.6 MG/DL (ref 8.5–10.1)
CHLORIDE SERPL-SCNC: 94 MMOL/L (ref 97–108)
CO2 SERPL-SCNC: 35 MMOL/L (ref 21–32)
CREAT SERPL-MCNC: 0.67 MG/DL (ref 0.55–1.02)
DIFFERENTIAL METHOD BLD: ABNORMAL
EOSINOPHIL # BLD: 0.1 K/UL (ref 0–0.4)
EOSINOPHIL NFR BLD: 0 % (ref 0–7)
ERYTHROCYTE [DISTWIDTH] IN BLOOD BY AUTOMATED COUNT: 13.5 % (ref 11.5–14.5)
GLUCOSE BLD STRIP.AUTO-MCNC: 114 MG/DL (ref 65–117)
GLUCOSE BLD STRIP.AUTO-MCNC: 92 MG/DL (ref 65–117)
GLUCOSE BLD STRIP.AUTO-MCNC: 99 MG/DL (ref 65–117)
GLUCOSE SERPL-MCNC: 106 MG/DL (ref 65–100)
HCT VFR BLD AUTO: 22.8 % (ref 35–47)
HGB BLD-MCNC: 7.7 G/DL (ref 11.5–16)
IMM GRANULOCYTES # BLD AUTO: 0.3 K/UL (ref 0–0.04)
IMM GRANULOCYTES NFR BLD AUTO: 2 % (ref 0–0.5)
LYMPHOCYTES # BLD: 1.6 K/UL (ref 0.8–3.5)
LYMPHOCYTES NFR BLD: 10 % (ref 12–49)
MCH RBC QN AUTO: 29.1 PG (ref 26–34)
MCHC RBC AUTO-ENTMCNC: 33.8 G/DL (ref 30–36.5)
MCV RBC AUTO: 86 FL (ref 80–99)
MONOCYTES # BLD: 1.2 K/UL (ref 0–1)
MONOCYTES NFR BLD: 8 % (ref 5–13)
NEUTS SEG # BLD: 11.9 K/UL (ref 1.8–8)
NEUTS SEG NFR BLD: 80 % (ref 32–75)
NRBC # BLD: 0 K/UL (ref 0–0.01)
NRBC BLD-RTO: 0 PER 100 WBC
PLATELET # BLD AUTO: 318 K/UL (ref 150–400)
PMV BLD AUTO: 8.4 FL (ref 8.9–12.9)
POTASSIUM SERPL-SCNC: 3.9 MMOL/L (ref 3.5–5.1)
RBC # BLD AUTO: 2.65 M/UL (ref 3.8–5.2)
SERVICE CMNT-IMP: NORMAL
SODIUM SERPL-SCNC: 131 MMOL/L (ref 136–145)
WBC # BLD AUTO: 15 K/UL (ref 3.6–11)

## 2023-07-04 PROCEDURE — 80048 BASIC METABOLIC PNL TOTAL CA: CPT

## 2023-07-04 PROCEDURE — 6370000000 HC RX 637 (ALT 250 FOR IP)

## 2023-07-04 PROCEDURE — 2500000003 HC RX 250 WO HCPCS: Performed by: SURGERY

## 2023-07-04 PROCEDURE — 6360000002 HC RX W HCPCS: Performed by: SURGERY

## 2023-07-04 PROCEDURE — 81001 URINALYSIS AUTO W/SCOPE: CPT

## 2023-07-04 PROCEDURE — 6360000002 HC RX W HCPCS: Performed by: STUDENT IN AN ORGANIZED HEALTH CARE EDUCATION/TRAINING PROGRAM

## 2023-07-04 PROCEDURE — 82962 GLUCOSE BLOOD TEST: CPT

## 2023-07-04 PROCEDURE — 2500000003 HC RX 250 WO HCPCS: Performed by: STUDENT IN AN ORGANIZED HEALTH CARE EDUCATION/TRAINING PROGRAM

## 2023-07-04 PROCEDURE — 6370000000 HC RX 637 (ALT 250 FOR IP): Performed by: SURGERY

## 2023-07-04 PROCEDURE — 99233 SBSQ HOSP IP/OBS HIGH 50: CPT | Performed by: FAMILY MEDICINE

## 2023-07-04 PROCEDURE — 6360000002 HC RX W HCPCS

## 2023-07-04 PROCEDURE — 1100000000 HC RM PRIVATE

## 2023-07-04 PROCEDURE — 2580000003 HC RX 258

## 2023-07-04 PROCEDURE — 6370000000 HC RX 637 (ALT 250 FOR IP): Performed by: INTERNAL MEDICINE

## 2023-07-04 PROCEDURE — 85025 COMPLETE CBC W/AUTO DIFF WBC: CPT

## 2023-07-04 PROCEDURE — 2700000000 HC OXYGEN THERAPY PER DAY

## 2023-07-04 PROCEDURE — 36415 COLL VENOUS BLD VENIPUNCTURE: CPT

## 2023-07-04 RX ORDER — DEXTROSE, SODIUM CHLORIDE, AND POTASSIUM CHLORIDE 5; .45; .15 G/100ML; G/100ML; G/100ML
INJECTION INTRAVENOUS CONTINUOUS
Status: DISCONTINUED | OUTPATIENT
Start: 2023-07-04 | End: 2023-07-10

## 2023-07-04 RX ORDER — BUMETANIDE 1 MG/1
2 TABLET ORAL 2 TIMES DAILY
Status: DISCONTINUED | OUTPATIENT
Start: 2023-07-04 | End: 2023-07-06

## 2023-07-04 RX ADMIN — ENOXAPARIN SODIUM 60 MG: 60 INJECTION SUBCUTANEOUS at 04:49

## 2023-07-04 RX ADMIN — PROCHLORPERAZINE EDISYLATE 10 MG: 5 INJECTION INTRAMUSCULAR; INTRAVENOUS at 13:05

## 2023-07-04 RX ADMIN — METRONIDAZOLE 500 MG: 500 TABLET ORAL at 13:04

## 2023-07-04 RX ADMIN — SUCRALFATE 1 G: 1 TABLET ORAL at 06:13

## 2023-07-04 RX ADMIN — PANTOPRAZOLE SODIUM 40 MG: 40 TABLET, DELAYED RELEASE ORAL at 16:38

## 2023-07-04 RX ADMIN — POTASSIUM CHLORIDE, DEXTROSE MONOHYDRATE AND SODIUM CHLORIDE: 150; 5; 450 INJECTION, SOLUTION INTRAVENOUS at 10:23

## 2023-07-04 RX ADMIN — METOPROLOL TARTRATE 2.5 MG: 5 INJECTION INTRAVENOUS at 09:53

## 2023-07-04 RX ADMIN — ONDANSETRON 4 MG: 2 INJECTION INTRAMUSCULAR; INTRAVENOUS at 09:53

## 2023-07-04 RX ADMIN — NYSTATIN 500000 UNITS: 100000 SUSPENSION ORAL at 13:04

## 2023-07-04 RX ADMIN — MEROPENEM 1000 MG: 1 INJECTION, POWDER, FOR SOLUTION INTRAVENOUS at 00:36

## 2023-07-04 RX ADMIN — NYSTATIN 500000 UNITS: 100000 SUSPENSION ORAL at 16:39

## 2023-07-04 RX ADMIN — NYSTATIN 500000 UNITS: 100000 SUSPENSION ORAL at 09:53

## 2023-07-04 RX ADMIN — PROCHLORPERAZINE EDISYLATE 10 MG: 5 INJECTION INTRAMUSCULAR; INTRAVENOUS at 22:25

## 2023-07-04 RX ADMIN — MEROPENEM 1000 MG: 1 INJECTION, POWDER, FOR SOLUTION INTRAVENOUS at 09:52

## 2023-07-04 RX ADMIN — GABAPENTIN 300 MG: 300 CAPSULE ORAL at 09:51

## 2023-07-04 RX ADMIN — SUCRALFATE 1 G: 1 TABLET ORAL at 16:39

## 2023-07-04 RX ADMIN — HYDROMORPHONE HYDROCHLORIDE 2 MG: 2 INJECTION, SOLUTION INTRAMUSCULAR; INTRAVENOUS; SUBCUTANEOUS at 00:34

## 2023-07-04 RX ADMIN — METOPROLOL TARTRATE 2.5 MG: 5 INJECTION INTRAVENOUS at 21:15

## 2023-07-04 RX ADMIN — PANTOPRAZOLE SODIUM 40 MG: 40 TABLET, DELAYED RELEASE ORAL at 06:13

## 2023-07-04 RX ADMIN — METRONIDAZOLE 500 MG: 500 TABLET ORAL at 06:13

## 2023-07-04 RX ADMIN — VANCOMYCIN HYDROCHLORIDE 125 MG: KIT at 10:22

## 2023-07-04 RX ADMIN — HYDROXYZINE HYDROCHLORIDE 25 MG: 25 TABLET, FILM COATED ORAL at 09:50

## 2023-07-04 RX ADMIN — VANCOMYCIN HYDROCHLORIDE 125 MG: KIT at 04:48

## 2023-07-04 RX ADMIN — HYDROMORPHONE HYDROCHLORIDE 0.5 MG: 1 INJECTION, SOLUTION INTRAMUSCULAR; INTRAVENOUS; SUBCUTANEOUS at 22:26

## 2023-07-04 RX ADMIN — VANCOMYCIN HYDROCHLORIDE 125 MG: KIT at 16:40

## 2023-07-04 RX ADMIN — ENOXAPARIN SODIUM 60 MG: 60 INJECTION SUBCUTANEOUS at 16:42

## 2023-07-04 RX ADMIN — HYDROMORPHONE HYDROCHLORIDE 2 MG: 2 INJECTION, SOLUTION INTRAMUSCULAR; INTRAVENOUS; SUBCUTANEOUS at 13:06

## 2023-07-04 RX ADMIN — MEROPENEM 1000 MG: 1 INJECTION, POWDER, FOR SOLUTION INTRAVENOUS at 16:40

## 2023-07-04 RX ADMIN — ONDANSETRON 4 MG: 2 INJECTION INTRAMUSCULAR; INTRAVENOUS at 21:15

## 2023-07-04 RX ADMIN — SUCRALFATE 1 G: 1 TABLET ORAL at 09:50

## 2023-07-04 ASSESSMENT — PAIN SCALES - GENERAL
PAINLEVEL_OUTOF10: 1
PAINLEVEL_OUTOF10: 9
PAINLEVEL_OUTOF10: 0
PAINLEVEL_OUTOF10: 9

## 2023-07-04 ASSESSMENT — PAIN DESCRIPTION - LOCATION
LOCATION: ABDOMEN
LOCATION: ABDOMEN

## 2023-07-04 ASSESSMENT — PAIN DESCRIPTION - ORIENTATION: ORIENTATION: ANTERIOR

## 2023-07-04 ASSESSMENT — PAIN DESCRIPTION - DESCRIPTORS
DESCRIPTORS: CRAMPING;ACHING
DESCRIPTORS: CRAMPING

## 2023-07-05 ENCOUNTER — APPOINTMENT (OUTPATIENT)
Facility: HOSPITAL | Age: 63
DRG: 326 | End: 2023-07-05
Payer: MEDICARE

## 2023-07-05 LAB
ANION GAP SERPL CALC-SCNC: 3 MMOL/L (ref 5–15)
APPEARANCE UR: CLEAR
BACTERIA URNS QL MICRO: NEGATIVE /HPF
BASOPHILS # BLD: 0 K/UL (ref 0–0.1)
BASOPHILS NFR BLD: 0 % (ref 0–1)
BILIRUB UR QL: NEGATIVE
BUN SERPL-MCNC: 9 MG/DL (ref 6–20)
BUN/CREAT SERPL: 20 (ref 12–20)
CALCIUM SERPL-MCNC: 7.6 MG/DL (ref 8.5–10.1)
CHLORIDE SERPL-SCNC: 95 MMOL/L (ref 97–108)
CO2 SERPL-SCNC: 34 MMOL/L (ref 21–32)
COLOR UR: ABNORMAL
CREAT SERPL-MCNC: 0.45 MG/DL (ref 0.55–1.02)
DIFFERENTIAL METHOD BLD: ABNORMAL
EOSINOPHIL # BLD: 0.1 K/UL (ref 0–0.4)
EOSINOPHIL NFR BLD: 1 % (ref 0–7)
EPITH CASTS URNS QL MICRO: ABNORMAL /LPF
ERYTHROCYTE [DISTWIDTH] IN BLOOD BY AUTOMATED COUNT: 13.8 % (ref 11.5–14.5)
F2 GENE MUT ANL BLD/T: NORMAL
GLUCOSE BLD STRIP.AUTO-MCNC: 144 MG/DL (ref 65–117)
GLUCOSE SERPL-MCNC: 112 MG/DL (ref 65–100)
GLUCOSE UR STRIP.AUTO-MCNC: NEGATIVE MG/DL
HCT VFR BLD AUTO: 24.6 % (ref 35–47)
HGB BLD-MCNC: 8 G/DL (ref 11.5–16)
HGB UR QL STRIP: NEGATIVE
HYALINE CASTS URNS QL MICRO: ABNORMAL /LPF (ref 0–5)
IMM GRANULOCYTES # BLD AUTO: 0.2 K/UL (ref 0–0.04)
IMM GRANULOCYTES NFR BLD AUTO: 1 % (ref 0–0.5)
IMP & REVIEW OF LAB RESULTS: NORMAL
KETONES UR QL STRIP.AUTO: ABNORMAL MG/DL
LEUKOCYTE ESTERASE UR QL STRIP.AUTO: NEGATIVE
LYMPHOCYTES # BLD: 1.3 K/UL (ref 0.8–3.5)
LYMPHOCYTES NFR BLD: 7 % (ref 12–49)
MAGNESIUM SERPL-MCNC: 1.4 MG/DL (ref 1.6–2.4)
MCH RBC QN AUTO: 28.8 PG (ref 26–34)
MCHC RBC AUTO-ENTMCNC: 32.5 G/DL (ref 30–36.5)
MCV RBC AUTO: 88.5 FL (ref 80–99)
MONOCYTES # BLD: 1.1 K/UL (ref 0–1)
MONOCYTES NFR BLD: 6 % (ref 5–13)
MUCOUS THREADS URNS QL MICRO: ABNORMAL /LPF
NEUTS SEG # BLD: 14.4 K/UL (ref 1.8–8)
NEUTS SEG NFR BLD: 85 % (ref 32–75)
NITRITE UR QL STRIP.AUTO: NEGATIVE
NRBC # BLD: 0 K/UL (ref 0–0.01)
NRBC BLD-RTO: 0 PER 100 WBC
PH UR STRIP: 6 (ref 5–8)
PLATELET # BLD AUTO: 410 K/UL (ref 150–400)
PMV BLD AUTO: 8.7 FL (ref 8.9–12.9)
POTASSIUM SERPL-SCNC: 3.5 MMOL/L (ref 3.5–5.1)
PROT UR STRIP-MCNC: ABNORMAL MG/DL
RBC # BLD AUTO: 2.78 M/UL (ref 3.8–5.2)
RBC #/AREA URNS HPF: ABNORMAL /HPF (ref 0–5)
SERVICE CMNT-IMP: ABNORMAL
SODIUM SERPL-SCNC: 132 MMOL/L (ref 136–145)
SP GR UR REFRACTOMETRY: 1.02 (ref 1–1.03)
UROBILINOGEN UR QL STRIP.AUTO: 1 EU/DL (ref 0.2–1)
WBC # BLD AUTO: 17.1 K/UL (ref 3.6–11)
WBC URNS QL MICRO: ABNORMAL /HPF (ref 0–4)
YEAST URNS QL MICRO: PRESENT

## 2023-07-05 PROCEDURE — 85025 COMPLETE CBC W/AUTO DIFF WBC: CPT

## 2023-07-05 PROCEDURE — 51702 INSERT TEMP BLADDER CATH: CPT

## 2023-07-05 PROCEDURE — 2500000003 HC RX 250 WO HCPCS: Performed by: SURGERY

## 2023-07-05 PROCEDURE — 6360000002 HC RX W HCPCS

## 2023-07-05 PROCEDURE — 99233 SBSQ HOSP IP/OBS HIGH 50: CPT | Performed by: FAMILY MEDICINE

## 2023-07-05 PROCEDURE — 87086 URINE CULTURE/COLONY COUNT: CPT

## 2023-07-05 PROCEDURE — 6370000000 HC RX 637 (ALT 250 FOR IP): Performed by: SURGERY

## 2023-07-05 PROCEDURE — 94761 N-INVAS EAR/PLS OXIMETRY MLT: CPT

## 2023-07-05 PROCEDURE — 87106 FUNGI IDENTIFICATION YEAST: CPT

## 2023-07-05 PROCEDURE — 6370000000 HC RX 637 (ALT 250 FOR IP): Performed by: INTERNAL MEDICINE

## 2023-07-05 PROCEDURE — 2700000000 HC OXYGEN THERAPY PER DAY

## 2023-07-05 PROCEDURE — 83735 ASSAY OF MAGNESIUM: CPT

## 2023-07-05 PROCEDURE — 36415 COLL VENOUS BLD VENIPUNCTURE: CPT

## 2023-07-05 PROCEDURE — 51798 US URINE CAPACITY MEASURE: CPT

## 2023-07-05 PROCEDURE — 6360000002 HC RX W HCPCS: Performed by: SURGERY

## 2023-07-05 PROCEDURE — 97530 THERAPEUTIC ACTIVITIES: CPT

## 2023-07-05 PROCEDURE — 6360000002 HC RX W HCPCS: Performed by: STUDENT IN AN ORGANIZED HEALTH CARE EDUCATION/TRAINING PROGRAM

## 2023-07-05 PROCEDURE — 97165 OT EVAL LOW COMPLEX 30 MIN: CPT

## 2023-07-05 PROCEDURE — 6360000004 HC RX CONTRAST MEDICATION: Performed by: SURGERY

## 2023-07-05 PROCEDURE — 80048 BASIC METABOLIC PNL TOTAL CA: CPT

## 2023-07-05 PROCEDURE — 2580000003 HC RX 258

## 2023-07-05 PROCEDURE — 74177 CT ABD & PELVIS W/CONTRAST: CPT

## 2023-07-05 PROCEDURE — 1100000000 HC RM PRIVATE

## 2023-07-05 PROCEDURE — 97110 THERAPEUTIC EXERCISES: CPT

## 2023-07-05 PROCEDURE — 6370000000 HC RX 637 (ALT 250 FOR IP)

## 2023-07-05 PROCEDURE — 82962 GLUCOSE BLOOD TEST: CPT

## 2023-07-05 RX ORDER — MAGNESIUM SULFATE IN WATER 40 MG/ML
2000 INJECTION, SOLUTION INTRAVENOUS ONCE
Status: COMPLETED | OUTPATIENT
Start: 2023-07-05 | End: 2023-07-05

## 2023-07-05 RX ORDER — POTASSIUM CHLORIDE 7.45 MG/ML
10 INJECTION INTRAVENOUS
Status: COMPLETED | OUTPATIENT
Start: 2023-07-05 | End: 2023-07-05

## 2023-07-05 RX ORDER — POTASSIUM CHLORIDE 7.45 MG/ML
10 INJECTION INTRAVENOUS
Status: DISCONTINUED | OUTPATIENT
Start: 2023-07-05 | End: 2023-07-05

## 2023-07-05 RX ORDER — POTASSIUM CHLORIDE 750 MG/1
30 TABLET, FILM COATED, EXTENDED RELEASE ORAL
Status: ACTIVE | OUTPATIENT
Start: 2023-07-05 | End: 2023-07-05

## 2023-07-05 RX ORDER — LINEZOLID 600 MG/1
600 TABLET, FILM COATED ORAL 2 TIMES DAILY
Qty: 20 TABLET | Refills: 0 | Status: SHIPPED | OUTPATIENT
Start: 2023-07-05 | End: 2023-07-15

## 2023-07-05 RX ADMIN — ONDANSETRON 4 MG: 2 INJECTION INTRAMUSCULAR; INTRAVENOUS at 20:53

## 2023-07-05 RX ADMIN — ONDANSETRON 4 MG: 2 INJECTION INTRAMUSCULAR; INTRAVENOUS at 11:14

## 2023-07-05 RX ADMIN — PROCHLORPERAZINE EDISYLATE 10 MG: 5 INJECTION INTRAMUSCULAR; INTRAVENOUS at 16:36

## 2023-07-05 RX ADMIN — NYSTATIN 500000 UNITS: 100000 SUSPENSION ORAL at 08:25

## 2023-07-05 RX ADMIN — PANTOPRAZOLE SODIUM 40 MG: 40 TABLET, DELAYED RELEASE ORAL at 16:26

## 2023-07-05 RX ADMIN — ENOXAPARIN SODIUM 60 MG: 60 INJECTION SUBCUTANEOUS at 04:46

## 2023-07-05 RX ADMIN — IOPAMIDOL 100 ML: 755 INJECTION, SOLUTION INTRAVENOUS at 19:34

## 2023-07-05 RX ADMIN — GABAPENTIN 300 MG: 300 CAPSULE ORAL at 08:25

## 2023-07-05 RX ADMIN — METRONIDAZOLE 500 MG: 500 TABLET ORAL at 22:33

## 2023-07-05 RX ADMIN — VANCOMYCIN HYDROCHLORIDE 125 MG: KIT at 11:14

## 2023-07-05 RX ADMIN — ENOXAPARIN SODIUM 60 MG: 60 INJECTION SUBCUTANEOUS at 17:13

## 2023-07-05 RX ADMIN — MEROPENEM 1000 MG: 1 INJECTION, POWDER, FOR SOLUTION INTRAVENOUS at 01:00

## 2023-07-05 RX ADMIN — POTASSIUM CHLORIDE, DEXTROSE MONOHYDRATE AND SODIUM CHLORIDE: 150; 5; 450 INJECTION, SOLUTION INTRAVENOUS at 22:35

## 2023-07-05 RX ADMIN — POTASSIUM CHLORIDE 10 MEQ: 7.46 INJECTION, SOLUTION INTRAVENOUS at 13:25

## 2023-07-05 RX ADMIN — POTASSIUM CHLORIDE 10 MEQ: 7.46 INJECTION, SOLUTION INTRAVENOUS at 14:21

## 2023-07-05 RX ADMIN — HYDROXYZINE HYDROCHLORIDE 25 MG: 25 TABLET, FILM COATED ORAL at 09:16

## 2023-07-05 RX ADMIN — VANCOMYCIN HYDROCHLORIDE 125 MG: KIT at 04:44

## 2023-07-05 RX ADMIN — METRONIDAZOLE 500 MG: 500 TABLET ORAL at 13:25

## 2023-07-05 RX ADMIN — METRONIDAZOLE 500 MG: 500 TABLET ORAL at 04:44

## 2023-07-05 RX ADMIN — MEROPENEM 1000 MG: 1 INJECTION, POWDER, FOR SOLUTION INTRAVENOUS at 08:26

## 2023-07-05 RX ADMIN — MAGNESIUM SULFATE HEPTAHYDRATE 2000 MG: 40 INJECTION, SOLUTION INTRAVENOUS at 08:26

## 2023-07-05 RX ADMIN — SUCRALFATE 1 G: 1 TABLET ORAL at 10:33

## 2023-07-05 RX ADMIN — OXYCODONE HYDROCHLORIDE 10 MG: 5 TABLET ORAL at 16:26

## 2023-07-05 RX ADMIN — HYDROMORPHONE HYDROCHLORIDE 2 MG: 2 INJECTION, SOLUTION INTRAMUSCULAR; INTRAVENOUS; SUBCUTANEOUS at 22:33

## 2023-07-05 RX ADMIN — VANCOMYCIN HYDROCHLORIDE 125 MG: KIT at 17:14

## 2023-07-05 RX ADMIN — VANCOMYCIN HYDROCHLORIDE 125 MG: KIT at 20:53

## 2023-07-05 RX ADMIN — PROCHLORPERAZINE EDISYLATE 10 MG: 5 INJECTION INTRAMUSCULAR; INTRAVENOUS at 22:33

## 2023-07-05 RX ADMIN — NYSTATIN 500000 UNITS: 100000 SUSPENSION ORAL at 13:25

## 2023-07-05 RX ADMIN — GABAPENTIN 300 MG: 300 CAPSULE ORAL at 22:33

## 2023-07-05 RX ADMIN — MEROPENEM 1000 MG: 1 INJECTION, POWDER, FOR SOLUTION INTRAVENOUS at 16:27

## 2023-07-05 RX ADMIN — HYDROMORPHONE HYDROCHLORIDE 0.5 MG: 1 INJECTION, SOLUTION INTRAMUSCULAR; INTRAVENOUS; SUBCUTANEOUS at 17:13

## 2023-07-05 RX ADMIN — PANTOPRAZOLE SODIUM 40 MG: 40 TABLET, DELAYED RELEASE ORAL at 06:48

## 2023-07-05 RX ADMIN — SUCRALFATE 1 G: 1 TABLET ORAL at 22:33

## 2023-07-05 RX ADMIN — HYDROMORPHONE HYDROCHLORIDE 2 MG: 2 INJECTION, SOLUTION INTRAMUSCULAR; INTRAVENOUS; SUBCUTANEOUS at 05:18

## 2023-07-05 RX ADMIN — NYSTATIN 500000 UNITS: 100000 SUSPENSION ORAL at 16:27

## 2023-07-05 RX ADMIN — SUCRALFATE 1 G: 1 TABLET ORAL at 06:48

## 2023-07-05 RX ADMIN — SUCRALFATE 1 G: 1 TABLET ORAL at 16:26

## 2023-07-05 RX ADMIN — POTASSIUM CHLORIDE, DEXTROSE MONOHYDRATE AND SODIUM CHLORIDE: 150; 5; 450 INJECTION, SOLUTION INTRAVENOUS at 12:02

## 2023-07-05 ASSESSMENT — PAIN SCALES - GENERAL
PAINLEVEL_OUTOF10: 0
PAINLEVEL_OUTOF10: 4
PAINLEVEL_OUTOF10: 9
PAINLEVEL_OUTOF10: 8
PAINLEVEL_OUTOF10: 3
PAINLEVEL_OUTOF10: 0
PAINLEVEL_OUTOF10: 4
PAINLEVEL_OUTOF10: 9
PAINLEVEL_OUTOF10: 0

## 2023-07-05 ASSESSMENT — PAIN DESCRIPTION - ORIENTATION
ORIENTATION: MID;RIGHT
ORIENTATION: LOWER
ORIENTATION: MID
ORIENTATION: MID

## 2023-07-05 ASSESSMENT — PAIN DESCRIPTION - DESCRIPTORS
DESCRIPTORS: PRESSURE;SHARP
DESCRIPTORS: DISCOMFORT
DESCRIPTORS: CRAMPING

## 2023-07-05 ASSESSMENT — PAIN DESCRIPTION - FREQUENCY
FREQUENCY: CONTINUOUS
FREQUENCY: CONTINUOUS

## 2023-07-05 ASSESSMENT — PAIN DESCRIPTION - LOCATION
LOCATION: ABDOMEN
LOCATION: ABDOMEN;ARM
LOCATION: ABDOMEN
LOCATION: ABDOMEN

## 2023-07-05 ASSESSMENT — PAIN DESCRIPTION - PAIN TYPE: TYPE: SURGICAL PAIN

## 2023-07-05 ASSESSMENT — PAIN DESCRIPTION - ONSET: ONSET: ON-GOING

## 2023-07-05 ASSESSMENT — PAIN - FUNCTIONAL ASSESSMENT
PAIN_FUNCTIONAL_ASSESSMENT: PREVENTS OR INTERFERES WITH ALL ACTIVE AND SOME PASSIVE ACTIVITIES
PAIN_FUNCTIONAL_ASSESSMENT: PREVENTS OR INTERFERES WITH MANY ACTIVE NOT PASSIVE ACTIVITIES

## 2023-07-06 ENCOUNTER — APPOINTMENT (OUTPATIENT)
Dept: VASCULAR SURGERY | Facility: HOSPITAL | Age: 63
DRG: 326 | End: 2023-07-06
Payer: MEDICARE

## 2023-07-06 ENCOUNTER — APPOINTMENT (OUTPATIENT)
Facility: HOSPITAL | Age: 63
DRG: 326 | End: 2023-07-06
Payer: MEDICARE

## 2023-07-06 LAB
ANION GAP SERPL CALC-SCNC: 3 MMOL/L (ref 5–15)
BASOPHILS # BLD: 0 K/UL (ref 0–0.1)
BASOPHILS NFR BLD: 0 % (ref 0–1)
BUN SERPL-MCNC: 7 MG/DL (ref 6–20)
BUN/CREAT SERPL: 16 (ref 12–20)
CALCIUM SERPL-MCNC: 7.6 MG/DL (ref 8.5–10.1)
CHLORIDE SERPL-SCNC: 94 MMOL/L (ref 97–108)
CO2 SERPL-SCNC: 33 MMOL/L (ref 21–32)
CREAT SERPL-MCNC: 0.43 MG/DL (ref 0.55–1.02)
DIFFERENTIAL METHOD BLD: ABNORMAL
ECHO BSA: 1.55 M2
EOSINOPHIL # BLD: 0.2 K/UL (ref 0–0.4)
EOSINOPHIL NFR BLD: 2 % (ref 0–7)
ERYTHROCYTE [DISTWIDTH] IN BLOOD BY AUTOMATED COUNT: 13.8 % (ref 11.5–14.5)
GLUCOSE SERPL-MCNC: 90 MG/DL (ref 65–100)
HCT VFR BLD AUTO: 22.1 % (ref 35–47)
HGB BLD-MCNC: 7.3 G/DL (ref 11.5–16)
IMM GRANULOCYTES # BLD AUTO: 0.1 K/UL (ref 0–0.04)
IMM GRANULOCYTES NFR BLD AUTO: 1 % (ref 0–0.5)
LYMPHOCYTES # BLD: 1.3 K/UL (ref 0.8–3.5)
LYMPHOCYTES NFR BLD: 12 % (ref 12–49)
MCH RBC QN AUTO: 29.2 PG (ref 26–34)
MCHC RBC AUTO-ENTMCNC: 33 G/DL (ref 30–36.5)
MCV RBC AUTO: 88.4 FL (ref 80–99)
MONOCYTES # BLD: 0.8 K/UL (ref 0–1)
MONOCYTES NFR BLD: 7 % (ref 5–13)
NEUTS SEG # BLD: 8.2 K/UL (ref 1.8–8)
NEUTS SEG NFR BLD: 78 % (ref 32–75)
NRBC # BLD: 0 K/UL (ref 0–0.01)
NRBC BLD-RTO: 0 PER 100 WBC
PLATELET # BLD AUTO: 382 K/UL (ref 150–400)
PMV BLD AUTO: 8.7 FL (ref 8.9–12.9)
POTASSIUM SERPL-SCNC: 4.2 MMOL/L (ref 3.5–5.1)
RBC # BLD AUTO: 2.5 M/UL (ref 3.8–5.2)
SODIUM SERPL-SCNC: 130 MMOL/L (ref 136–145)
WBC # BLD AUTO: 10.5 K/UL (ref 3.6–11)

## 2023-07-06 PROCEDURE — 2700000000 HC OXYGEN THERAPY PER DAY

## 2023-07-06 PROCEDURE — 36415 COLL VENOUS BLD VENIPUNCTURE: CPT

## 2023-07-06 PROCEDURE — 6370000000 HC RX 637 (ALT 250 FOR IP): Performed by: SURGERY

## 2023-07-06 PROCEDURE — 6370000000 HC RX 637 (ALT 250 FOR IP): Performed by: INTERNAL MEDICINE

## 2023-07-06 PROCEDURE — 2500000003 HC RX 250 WO HCPCS

## 2023-07-06 PROCEDURE — 6360000002 HC RX W HCPCS: Performed by: STUDENT IN AN ORGANIZED HEALTH CARE EDUCATION/TRAINING PROGRAM

## 2023-07-06 PROCEDURE — 99233 SBSQ HOSP IP/OBS HIGH 50: CPT | Performed by: FAMILY MEDICINE

## 2023-07-06 PROCEDURE — 85025 COMPLETE CBC W/AUTO DIFF WBC: CPT

## 2023-07-06 PROCEDURE — 2500000003 HC RX 250 WO HCPCS: Performed by: SURGERY

## 2023-07-06 PROCEDURE — 71045 X-RAY EXAM CHEST 1 VIEW: CPT

## 2023-07-06 PROCEDURE — 51702 INSERT TEMP BLADDER CATH: CPT

## 2023-07-06 PROCEDURE — 2580000003 HC RX 258

## 2023-07-06 PROCEDURE — 97110 THERAPEUTIC EXERCISES: CPT

## 2023-07-06 PROCEDURE — 73030 X-RAY EXAM OF SHOULDER: CPT

## 2023-07-06 PROCEDURE — 6370000000 HC RX 637 (ALT 250 FOR IP)

## 2023-07-06 PROCEDURE — 94761 N-INVAS EAR/PLS OXIMETRY MLT: CPT

## 2023-07-06 PROCEDURE — 6360000002 HC RX W HCPCS

## 2023-07-06 PROCEDURE — 2500000003 HC RX 250 WO HCPCS: Performed by: STUDENT IN AN ORGANIZED HEALTH CARE EDUCATION/TRAINING PROGRAM

## 2023-07-06 PROCEDURE — 93971 EXTREMITY STUDY: CPT

## 2023-07-06 PROCEDURE — 1100000000 HC RM PRIVATE

## 2023-07-06 PROCEDURE — 80048 BASIC METABOLIC PNL TOTAL CA: CPT

## 2023-07-06 PROCEDURE — 6360000002 HC RX W HCPCS: Performed by: SURGERY

## 2023-07-06 RX ORDER — BUMETANIDE 0.25 MG/ML
2 INJECTION INTRAMUSCULAR; INTRAVENOUS 2 TIMES DAILY
Status: DISCONTINUED | OUTPATIENT
Start: 2023-07-06 | End: 2023-07-09

## 2023-07-06 RX ORDER — METOCLOPRAMIDE HYDROCHLORIDE 5 MG/ML
10 INJECTION INTRAMUSCULAR; INTRAVENOUS
Status: DISCONTINUED | OUTPATIENT
Start: 2023-07-06 | End: 2023-07-12

## 2023-07-06 RX ORDER — BUPROPION HYDROCHLORIDE 150 MG/1
300 TABLET ORAL DAILY
Status: DISCONTINUED | OUTPATIENT
Start: 2023-07-06 | End: 2023-07-07

## 2023-07-06 RX ORDER — GABAPENTIN 250 MG/5ML
300 SOLUTION ORAL EVERY 12 HOURS
Status: DISCONTINUED | OUTPATIENT
Start: 2023-07-06 | End: 2023-07-21 | Stop reason: HOSPADM

## 2023-07-06 RX ADMIN — SUCRALFATE 1 G: 1 TABLET ORAL at 21:52

## 2023-07-06 RX ADMIN — PROCHLORPERAZINE EDISYLATE 10 MG: 5 INJECTION INTRAMUSCULAR; INTRAVENOUS at 03:40

## 2023-07-06 RX ADMIN — BUMETANIDE 2 MG: 0.25 INJECTION INTRAMUSCULAR; INTRAVENOUS at 13:35

## 2023-07-06 RX ADMIN — NYSTATIN 500000 UNITS: 100000 SUSPENSION ORAL at 11:20

## 2023-07-06 RX ADMIN — BUPROPION HYDROCHLORIDE 300 MG: 150 TABLET, EXTENDED RELEASE ORAL at 13:38

## 2023-07-06 RX ADMIN — MEROPENEM 1000 MG: 1 INJECTION, POWDER, FOR SOLUTION INTRAVENOUS at 18:44

## 2023-07-06 RX ADMIN — HYDROXYZINE HYDROCHLORIDE 25 MG: 25 TABLET, FILM COATED ORAL at 13:58

## 2023-07-06 RX ADMIN — HYDROMORPHONE HYDROCHLORIDE 2 MG: 2 INJECTION, SOLUTION INTRAMUSCULAR; INTRAVENOUS; SUBCUTANEOUS at 22:09

## 2023-07-06 RX ADMIN — POTASSIUM CHLORIDE, DEXTROSE MONOHYDRATE AND SODIUM CHLORIDE: 150; 5; 450 INJECTION, SOLUTION INTRAVENOUS at 21:52

## 2023-07-06 RX ADMIN — VANCOMYCIN HYDROCHLORIDE 125 MG: KIT at 03:40

## 2023-07-06 RX ADMIN — VANCOMYCIN HYDROCHLORIDE 125 MG: KIT at 13:31

## 2023-07-06 RX ADMIN — METOCLOPRAMIDE 10 MG: 5 INJECTION, SOLUTION INTRAMUSCULAR; INTRAVENOUS at 21:52

## 2023-07-06 RX ADMIN — METRONIDAZOLE 500 MG: 500 TABLET ORAL at 06:21

## 2023-07-06 RX ADMIN — OXYCODONE HYDROCHLORIDE 10 MG: 5 TABLET ORAL at 06:32

## 2023-07-06 RX ADMIN — SUCRALFATE 1 G: 1 TABLET ORAL at 06:21

## 2023-07-06 RX ADMIN — PANTOPRAZOLE SODIUM 40 MG: 40 TABLET, DELAYED RELEASE ORAL at 06:21

## 2023-07-06 RX ADMIN — NYSTATIN 500000 UNITS: 100000 SUSPENSION ORAL at 13:38

## 2023-07-06 RX ADMIN — ONDANSETRON 4 MG: 2 INJECTION INTRAMUSCULAR; INTRAVENOUS at 14:42

## 2023-07-06 RX ADMIN — VANCOMYCIN HYDROCHLORIDE 125 MG: KIT at 21:55

## 2023-07-06 RX ADMIN — BUMETANIDE 2 MG: 0.25 INJECTION INTRAMUSCULAR; INTRAVENOUS at 21:51

## 2023-07-06 RX ADMIN — METRONIDAZOLE 500 MG: 500 TABLET ORAL at 21:52

## 2023-07-06 RX ADMIN — MEROPENEM 1000 MG: 1 INJECTION, POWDER, FOR SOLUTION INTRAVENOUS at 01:06

## 2023-07-06 RX ADMIN — NYSTATIN 500000 UNITS: 100000 SUSPENSION ORAL at 21:52

## 2023-07-06 RX ADMIN — SUCRALFATE 1 G: 1 TABLET ORAL at 11:18

## 2023-07-06 RX ADMIN — HYDROMORPHONE HYDROCHLORIDE 0.5 MG: 1 INJECTION, SOLUTION INTRAMUSCULAR; INTRAVENOUS; SUBCUTANEOUS at 04:01

## 2023-07-06 RX ADMIN — ENOXAPARIN SODIUM 60 MG: 60 INJECTION SUBCUTANEOUS at 03:40

## 2023-07-06 RX ADMIN — METOPROLOL TARTRATE 2.5 MG: 5 INJECTION INTRAVENOUS at 11:20

## 2023-07-06 RX ADMIN — MEROPENEM 1000 MG: 1 INJECTION, POWDER, FOR SOLUTION INTRAVENOUS at 11:17

## 2023-07-06 ASSESSMENT — PAIN DESCRIPTION - ONSET
ONSET: ON-GOING
ONSET: ON-GOING

## 2023-07-06 ASSESSMENT — PAIN SCALES - GENERAL
PAINLEVEL_OUTOF10: 4
PAINLEVEL_OUTOF10: 3
PAINLEVEL_OUTOF10: 9
PAINLEVEL_OUTOF10: 6
PAINLEVEL_OUTOF10: 0
PAINLEVEL_OUTOF10: 5
PAINLEVEL_OUTOF10: 9

## 2023-07-06 ASSESSMENT — PAIN - FUNCTIONAL ASSESSMENT
PAIN_FUNCTIONAL_ASSESSMENT: PREVENTS OR INTERFERES WITH MANY ACTIVE NOT PASSIVE ACTIVITIES
PAIN_FUNCTIONAL_ASSESSMENT: PREVENTS OR INTERFERES WITH MANY ACTIVE NOT PASSIVE ACTIVITIES
PAIN_FUNCTIONAL_ASSESSMENT: PREVENTS OR INTERFERES SOME ACTIVE ACTIVITIES AND ADLS

## 2023-07-06 ASSESSMENT — PAIN DESCRIPTION - DESCRIPTORS
DESCRIPTORS: SHARP;SORE
DESCRIPTORS: ACHING
DESCRIPTORS: ACHING

## 2023-07-06 ASSESSMENT — PAIN DESCRIPTION - PAIN TYPE
TYPE: SURGICAL PAIN
TYPE: SURGICAL PAIN

## 2023-07-06 ASSESSMENT — PAIN DESCRIPTION - FREQUENCY
FREQUENCY: CONTINUOUS
FREQUENCY: CONTINUOUS

## 2023-07-06 ASSESSMENT — PAIN DESCRIPTION - LOCATION
LOCATION: ABDOMEN

## 2023-07-06 ASSESSMENT — PAIN DESCRIPTION - ORIENTATION
ORIENTATION: MID;LOWER
ORIENTATION: ANTERIOR
ORIENTATION: MID;LOWER

## 2023-07-07 LAB
ALBUMIN SERPL-MCNC: 1.3 G/DL (ref 3.5–5)
ALBUMIN/GLOB SERPL: 0.4 (ref 1.1–2.2)
ALP SERPL-CCNC: 95 U/L (ref 45–117)
ALT SERPL-CCNC: 7 U/L (ref 12–78)
ANION GAP SERPL CALC-SCNC: 4 MMOL/L (ref 5–15)
AST SERPL-CCNC: 13 U/L (ref 15–37)
BASOPHILS # BLD: 0 K/UL (ref 0–0.1)
BASOPHILS NFR BLD: 0 % (ref 0–1)
BILIRUB SERPL-MCNC: 0.5 MG/DL (ref 0.2–1)
BUN SERPL-MCNC: 6 MG/DL (ref 6–20)
BUN/CREAT SERPL: 11 (ref 12–20)
CALCIUM SERPL-MCNC: 8.1 MG/DL (ref 8.5–10.1)
CHLORIDE SERPL-SCNC: 92 MMOL/L (ref 97–108)
CO2 SERPL-SCNC: 35 MMOL/L (ref 21–32)
COMMENT:: NORMAL
CREAT SERPL-MCNC: 0.57 MG/DL (ref 0.55–1.02)
DIFFERENTIAL METHOD BLD: ABNORMAL
EOSINOPHIL # BLD: 0.2 K/UL (ref 0–0.4)
EOSINOPHIL NFR BLD: 1 % (ref 0–7)
ERYTHROCYTE [DISTWIDTH] IN BLOOD BY AUTOMATED COUNT: 14 % (ref 11.5–14.5)
GLOBULIN SER CALC-MCNC: 3 G/DL (ref 2–4)
GLUCOSE SERPL-MCNC: 104 MG/DL (ref 65–100)
HCT VFR BLD AUTO: 25.4 % (ref 35–47)
HGB BLD-MCNC: 8.5 G/DL (ref 11.5–16)
IMM GRANULOCYTES # BLD AUTO: 0.2 K/UL (ref 0–0.04)
IMM GRANULOCYTES NFR BLD AUTO: 2 % (ref 0–0.5)
LYMPHOCYTES # BLD: 1.4 K/UL (ref 0.8–3.5)
LYMPHOCYTES NFR BLD: 13 % (ref 12–49)
MCH RBC QN AUTO: 29.4 PG (ref 26–34)
MCHC RBC AUTO-ENTMCNC: 33.5 G/DL (ref 30–36.5)
MCV RBC AUTO: 87.9 FL (ref 80–99)
MONOCYTES # BLD: 1 K/UL (ref 0–1)
MONOCYTES NFR BLD: 9 % (ref 5–13)
NEUTS SEG # BLD: 7.7 K/UL (ref 1.8–8)
NEUTS SEG NFR BLD: 75 % (ref 32–75)
NRBC # BLD: 0 K/UL (ref 0–0.01)
NRBC BLD-RTO: 0 PER 100 WBC
PLATELET # BLD AUTO: 460 K/UL (ref 150–400)
PMV BLD AUTO: 8.5 FL (ref 8.9–12.9)
POTASSIUM SERPL-SCNC: 3.4 MMOL/L (ref 3.5–5.1)
PROT SERPL-MCNC: 4.3 G/DL (ref 6.4–8.2)
RBC # BLD AUTO: 2.89 M/UL (ref 3.8–5.2)
SODIUM SERPL-SCNC: 131 MMOL/L (ref 136–145)
SPECIMEN HOLD: NORMAL
WBC # BLD AUTO: 10.4 K/UL (ref 3.6–11)

## 2023-07-07 PROCEDURE — 2580000003 HC RX 258

## 2023-07-07 PROCEDURE — 6370000000 HC RX 637 (ALT 250 FOR IP): Performed by: SURGERY

## 2023-07-07 PROCEDURE — 2500000003 HC RX 250 WO HCPCS: Performed by: STUDENT IN AN ORGANIZED HEALTH CARE EDUCATION/TRAINING PROGRAM

## 2023-07-07 PROCEDURE — 97530 THERAPEUTIC ACTIVITIES: CPT

## 2023-07-07 PROCEDURE — C9113 INJ PANTOPRAZOLE SODIUM, VIA: HCPCS | Performed by: SURGERY

## 2023-07-07 PROCEDURE — 6360000002 HC RX W HCPCS

## 2023-07-07 PROCEDURE — 80053 COMPREHEN METABOLIC PANEL: CPT

## 2023-07-07 PROCEDURE — A4216 STERILE WATER/SALINE, 10 ML: HCPCS | Performed by: SURGERY

## 2023-07-07 PROCEDURE — 6360000002 HC RX W HCPCS: Performed by: STUDENT IN AN ORGANIZED HEALTH CARE EDUCATION/TRAINING PROGRAM

## 2023-07-07 PROCEDURE — 98960 EDU&TRN PT SELF-MGMT NQHP 1: CPT

## 2023-07-07 PROCEDURE — 2700000000 HC OXYGEN THERAPY PER DAY

## 2023-07-07 PROCEDURE — 94761 N-INVAS EAR/PLS OXIMETRY MLT: CPT

## 2023-07-07 PROCEDURE — P9047 ALBUMIN (HUMAN), 25%, 50ML: HCPCS | Performed by: STUDENT IN AN ORGANIZED HEALTH CARE EDUCATION/TRAINING PROGRAM

## 2023-07-07 PROCEDURE — 36415 COLL VENOUS BLD VENIPUNCTURE: CPT

## 2023-07-07 PROCEDURE — 97110 THERAPEUTIC EXERCISES: CPT

## 2023-07-07 PROCEDURE — 85025 COMPLETE CBC W/AUTO DIFF WBC: CPT

## 2023-07-07 PROCEDURE — 6360000002 HC RX W HCPCS: Performed by: SURGERY

## 2023-07-07 PROCEDURE — 99232 SBSQ HOSP IP/OBS MODERATE 35: CPT | Performed by: STUDENT IN AN ORGANIZED HEALTH CARE EDUCATION/TRAINING PROGRAM

## 2023-07-07 PROCEDURE — 1100000000 HC RM PRIVATE

## 2023-07-07 PROCEDURE — 2500000003 HC RX 250 WO HCPCS

## 2023-07-07 PROCEDURE — 2580000003 HC RX 258: Performed by: SURGERY

## 2023-07-07 PROCEDURE — 6370000000 HC RX 637 (ALT 250 FOR IP): Performed by: INTERNAL MEDICINE

## 2023-07-07 RX ORDER — ALBUMIN (HUMAN) 12.5 G/50ML
12.5 SOLUTION INTRAVENOUS ONCE
Status: COMPLETED | OUTPATIENT
Start: 2023-07-07 | End: 2023-07-07

## 2023-07-07 RX ORDER — POTASSIUM CHLORIDE 7.45 MG/ML
10 INJECTION INTRAVENOUS
Status: COMPLETED | OUTPATIENT
Start: 2023-07-07 | End: 2023-07-07

## 2023-07-07 RX ORDER — BUPROPION HYDROCHLORIDE 150 MG/1
150 TABLET ORAL DAILY
Status: DISCONTINUED | OUTPATIENT
Start: 2023-07-07 | End: 2023-07-21 | Stop reason: HOSPADM

## 2023-07-07 RX ORDER — METRONIDAZOLE 500 MG/100ML
500 INJECTION, SOLUTION INTRAVENOUS EVERY 8 HOURS
Status: DISCONTINUED | OUTPATIENT
Start: 2023-07-07 | End: 2023-07-12

## 2023-07-07 RX ORDER — SUCRALFATE ORAL 1 G/10ML
1 SUSPENSION ORAL EVERY 6 HOURS SCHEDULED
Status: DISCONTINUED | OUTPATIENT
Start: 2023-07-07 | End: 2023-07-13

## 2023-07-07 RX ADMIN — ALBUMIN (HUMAN) 12.5 G: 0.25 INJECTION, SOLUTION INTRAVENOUS at 21:08

## 2023-07-07 RX ADMIN — SUCRALFATE ORAL 1 G: 1 SUSPENSION ORAL at 18:40

## 2023-07-07 RX ADMIN — Medication 300 MG: at 10:58

## 2023-07-07 RX ADMIN — ONDANSETRON 4 MG: 2 INJECTION INTRAMUSCULAR; INTRAVENOUS at 12:49

## 2023-07-07 RX ADMIN — VANCOMYCIN HYDROCHLORIDE 125 MG: KIT at 04:14

## 2023-07-07 RX ADMIN — METRONIDAZOLE 500 MG: 500 INJECTION, SOLUTION INTRAVENOUS at 21:37

## 2023-07-07 RX ADMIN — ENOXAPARIN SODIUM 60 MG: 60 INJECTION SUBCUTANEOUS at 04:14

## 2023-07-07 RX ADMIN — METOCLOPRAMIDE 10 MG: 5 INJECTION, SOLUTION INTRAMUSCULAR; INTRAVENOUS at 10:56

## 2023-07-07 RX ADMIN — METOPROLOL TARTRATE 2.5 MG: 5 INJECTION INTRAVENOUS at 11:00

## 2023-07-07 RX ADMIN — ENOXAPARIN SODIUM 60 MG: 60 INJECTION SUBCUTANEOUS at 18:40

## 2023-07-07 RX ADMIN — METOPROLOL TARTRATE 2.5 MG: 5 INJECTION INTRAVENOUS at 21:41

## 2023-07-07 RX ADMIN — POTASSIUM CHLORIDE 10 MEQ: 7.46 INJECTION, SOLUTION INTRAVENOUS at 06:10

## 2023-07-07 RX ADMIN — POTASSIUM CHLORIDE 10 MEQ: 7.46 INJECTION, SOLUTION INTRAVENOUS at 05:34

## 2023-07-07 RX ADMIN — SUCRALFATE 1 G: 1 TABLET ORAL at 05:46

## 2023-07-07 RX ADMIN — HYDROMORPHONE HYDROCHLORIDE 2 MG: 2 INJECTION, SOLUTION INTRAMUSCULAR; INTRAVENOUS; SUBCUTANEOUS at 21:09

## 2023-07-07 RX ADMIN — MEROPENEM 1000 MG: 1 INJECTION, POWDER, FOR SOLUTION INTRAVENOUS at 01:00

## 2023-07-07 RX ADMIN — BUMETANIDE 2 MG: 0.25 INJECTION INTRAMUSCULAR; INTRAVENOUS at 20:44

## 2023-07-07 RX ADMIN — MEROPENEM 1000 MG: 1 INJECTION, POWDER, FOR SOLUTION INTRAVENOUS at 11:29

## 2023-07-07 RX ADMIN — SUCRALFATE ORAL 1 G: 1 SUSPENSION ORAL at 20:44

## 2023-07-07 RX ADMIN — METRONIDAZOLE 500 MG: 500 TABLET ORAL at 05:45

## 2023-07-07 RX ADMIN — METOCLOPRAMIDE 10 MG: 5 INJECTION, SOLUTION INTRAMUSCULAR; INTRAVENOUS at 18:41

## 2023-07-07 RX ADMIN — METOPROLOL TARTRATE 2.5 MG: 5 INJECTION INTRAVENOUS at 00:04

## 2023-07-07 RX ADMIN — SODIUM CHLORIDE 40 MG: 9 INJECTION INTRAMUSCULAR; INTRAVENOUS; SUBCUTANEOUS at 10:56

## 2023-07-07 RX ADMIN — MEROPENEM 1000 MG: 1 INJECTION, POWDER, FOR SOLUTION INTRAVENOUS at 20:03

## 2023-07-07 RX ADMIN — NYSTATIN 500000 UNITS: 100000 SUSPENSION ORAL at 20:44

## 2023-07-07 RX ADMIN — BUMETANIDE 2 MG: 0.25 INJECTION INTRAMUSCULAR; INTRAVENOUS at 10:56

## 2023-07-07 RX ADMIN — METOCLOPRAMIDE 10 MG: 5 INJECTION, SOLUTION INTRAMUSCULAR; INTRAVENOUS at 05:45

## 2023-07-07 RX ADMIN — VANCOMYCIN HYDROCHLORIDE 125 MG: KIT at 20:44

## 2023-07-07 RX ADMIN — POTASSIUM CHLORIDE 10 MEQ: 7.46 INJECTION, SOLUTION INTRAVENOUS at 06:52

## 2023-07-07 RX ADMIN — METOCLOPRAMIDE 10 MG: 5 INJECTION, SOLUTION INTRAMUSCULAR; INTRAVENOUS at 20:44

## 2023-07-07 ASSESSMENT — PAIN - FUNCTIONAL ASSESSMENT: PAIN_FUNCTIONAL_ASSESSMENT: PREVENTS OR INTERFERES SOME ACTIVE ACTIVITIES AND ADLS

## 2023-07-07 ASSESSMENT — PAIN SCALES - GENERAL
PAINLEVEL_OUTOF10: 9
PAINLEVEL_OUTOF10: 0
PAINLEVEL_OUTOF10: 9
PAINLEVEL_OUTOF10: 0
PAINLEVEL_OUTOF10: 0

## 2023-07-07 ASSESSMENT — PAIN DESCRIPTION - DESCRIPTORS: DESCRIPTORS: SHARP;SORE

## 2023-07-07 ASSESSMENT — PAIN DESCRIPTION - LOCATION
LOCATION: FOOT;ABDOMEN
LOCATION: ABDOMEN;FOOT

## 2023-07-07 ASSESSMENT — PAIN DESCRIPTION - ORIENTATION: ORIENTATION: RIGHT;LEFT;ANTERIOR

## 2023-07-08 LAB
ALBUMIN SERPL-MCNC: 1.5 G/DL (ref 3.5–5)
ALBUMIN/GLOB SERPL: 0.6 (ref 1.1–2.2)
ALP SERPL-CCNC: 77 U/L (ref 45–117)
ALT SERPL-CCNC: 8 U/L (ref 12–78)
ANION GAP SERPL CALC-SCNC: 5 MMOL/L (ref 5–15)
AST SERPL-CCNC: 11 U/L (ref 15–37)
BACTERIA SPEC CULT: ABNORMAL
BASOPHILS # BLD: 0 K/UL (ref 0–0.1)
BASOPHILS NFR BLD: 0 % (ref 0–1)
BILIRUB SERPL-MCNC: 0.5 MG/DL (ref 0.2–1)
BUN SERPL-MCNC: 9 MG/DL (ref 6–20)
BUN/CREAT SERPL: 16 (ref 12–20)
CALCIUM SERPL-MCNC: 7.5 MG/DL (ref 8.5–10.1)
CC UR VC: ABNORMAL
CHLORIDE SERPL-SCNC: 88 MMOL/L (ref 97–108)
CO2 SERPL-SCNC: 38 MMOL/L (ref 21–32)
CREAT SERPL-MCNC: 0.55 MG/DL (ref 0.55–1.02)
DIFFERENTIAL METHOD BLD: ABNORMAL
EOSINOPHIL # BLD: 0.1 K/UL (ref 0–0.4)
EOSINOPHIL NFR BLD: 1 % (ref 0–7)
ERYTHROCYTE [DISTWIDTH] IN BLOOD BY AUTOMATED COUNT: 14.3 % (ref 11.5–14.5)
GLOBULIN SER CALC-MCNC: 2.5 G/DL (ref 2–4)
GLUCOSE BLD STRIP.AUTO-MCNC: 90 MG/DL (ref 65–117)
GLUCOSE SERPL-MCNC: 89 MG/DL (ref 65–100)
HCT VFR BLD AUTO: 20.9 % (ref 35–47)
HCT VFR BLD AUTO: 28.6 % (ref 35–47)
HGB BLD-MCNC: 7 G/DL (ref 11.5–16)
HGB BLD-MCNC: 9.2 G/DL (ref 11.5–16)
IMM GRANULOCYTES # BLD AUTO: 0.1 K/UL (ref 0–0.04)
IMM GRANULOCYTES NFR BLD AUTO: 1 % (ref 0–0.5)
LYMPHOCYTES # BLD: 1.2 K/UL (ref 0.8–3.5)
LYMPHOCYTES NFR BLD: 13 % (ref 12–49)
MAGNESIUM SERPL-MCNC: 1.5 MG/DL (ref 1.6–2.4)
MCH RBC QN AUTO: 29.3 PG (ref 26–34)
MCHC RBC AUTO-ENTMCNC: 33.5 G/DL (ref 30–36.5)
MCV RBC AUTO: 87.4 FL (ref 80–99)
MONOCYTES # BLD: 0.8 K/UL (ref 0–1)
MONOCYTES NFR BLD: 8 % (ref 5–13)
NEUTS SEG # BLD: 7.5 K/UL (ref 1.8–8)
NEUTS SEG NFR BLD: 77 % (ref 32–75)
NRBC # BLD: 0 K/UL (ref 0–0.01)
NRBC BLD-RTO: 0 PER 100 WBC
PLATELET # BLD AUTO: 420 K/UL (ref 150–400)
PMV BLD AUTO: 8.5 FL (ref 8.9–12.9)
POTASSIUM SERPL-SCNC: 2.2 MMOL/L (ref 3.5–5.1)
PROT SERPL-MCNC: 4 G/DL (ref 6.4–8.2)
RBC # BLD AUTO: 2.39 M/UL (ref 3.8–5.2)
SERVICE CMNT-IMP: ABNORMAL
SERVICE CMNT-IMP: NORMAL
SODIUM SERPL-SCNC: 131 MMOL/L (ref 136–145)
WBC # BLD AUTO: 9.7 K/UL (ref 3.6–11)

## 2023-07-08 PROCEDURE — 2700000000 HC OXYGEN THERAPY PER DAY

## 2023-07-08 PROCEDURE — 93005 ELECTROCARDIOGRAM TRACING: CPT

## 2023-07-08 PROCEDURE — 2580000003 HC RX 258

## 2023-07-08 PROCEDURE — 85014 HEMATOCRIT: CPT

## 2023-07-08 PROCEDURE — 6370000000 HC RX 637 (ALT 250 FOR IP): Performed by: INTERNAL MEDICINE

## 2023-07-08 PROCEDURE — C9113 INJ PANTOPRAZOLE SODIUM, VIA: HCPCS | Performed by: SURGERY

## 2023-07-08 PROCEDURE — 1100000000 HC RM PRIVATE

## 2023-07-08 PROCEDURE — 85018 HEMOGLOBIN: CPT

## 2023-07-08 PROCEDURE — 2500000003 HC RX 250 WO HCPCS

## 2023-07-08 PROCEDURE — 6370000000 HC RX 637 (ALT 250 FOR IP): Performed by: STUDENT IN AN ORGANIZED HEALTH CARE EDUCATION/TRAINING PROGRAM

## 2023-07-08 PROCEDURE — 6360000002 HC RX W HCPCS: Performed by: STUDENT IN AN ORGANIZED HEALTH CARE EDUCATION/TRAINING PROGRAM

## 2023-07-08 PROCEDURE — P9047 ALBUMIN (HUMAN), 25%, 50ML: HCPCS | Performed by: STUDENT IN AN ORGANIZED HEALTH CARE EDUCATION/TRAINING PROGRAM

## 2023-07-08 PROCEDURE — 6360000002 HC RX W HCPCS

## 2023-07-08 PROCEDURE — 94761 N-INVAS EAR/PLS OXIMETRY MLT: CPT

## 2023-07-08 PROCEDURE — 80053 COMPREHEN METABOLIC PANEL: CPT

## 2023-07-08 PROCEDURE — 2580000003 HC RX 258: Performed by: SURGERY

## 2023-07-08 PROCEDURE — 83735 ASSAY OF MAGNESIUM: CPT

## 2023-07-08 PROCEDURE — A4216 STERILE WATER/SALINE, 10 ML: HCPCS | Performed by: SURGERY

## 2023-07-08 PROCEDURE — 6370000000 HC RX 637 (ALT 250 FOR IP): Performed by: SURGERY

## 2023-07-08 PROCEDURE — 82962 GLUCOSE BLOOD TEST: CPT

## 2023-07-08 PROCEDURE — 2500000003 HC RX 250 WO HCPCS: Performed by: STUDENT IN AN ORGANIZED HEALTH CARE EDUCATION/TRAINING PROGRAM

## 2023-07-08 PROCEDURE — 85025 COMPLETE CBC W/AUTO DIFF WBC: CPT

## 2023-07-08 PROCEDURE — 36415 COLL VENOUS BLD VENIPUNCTURE: CPT

## 2023-07-08 PROCEDURE — 6360000002 HC RX W HCPCS: Performed by: SURGERY

## 2023-07-08 RX ORDER — POTASSIUM CHLORIDE 7.45 MG/ML
10 INJECTION INTRAVENOUS
Status: COMPLETED | OUTPATIENT
Start: 2023-07-08 | End: 2023-07-08

## 2023-07-08 RX ORDER — ALBUMIN (HUMAN) 12.5 G/50ML
12.5 SOLUTION INTRAVENOUS ONCE
Status: COMPLETED | OUTPATIENT
Start: 2023-07-08 | End: 2023-07-08

## 2023-07-08 RX ORDER — MAGNESIUM SULFATE IN WATER 40 MG/ML
2000 INJECTION, SOLUTION INTRAVENOUS ONCE
Status: COMPLETED | OUTPATIENT
Start: 2023-07-08 | End: 2023-07-08

## 2023-07-08 RX ORDER — SODIUM CHLORIDE, SODIUM LACTATE, POTASSIUM CHLORIDE, CALCIUM CHLORIDE 600; 310; 30; 20 MG/100ML; MG/100ML; MG/100ML; MG/100ML
INJECTION, SOLUTION INTRAVENOUS CONTINUOUS
Status: DISCONTINUED | OUTPATIENT
Start: 2023-07-08 | End: 2023-07-10

## 2023-07-08 RX ADMIN — MEROPENEM 1000 MG: 1 INJECTION, POWDER, FOR SOLUTION INTRAVENOUS at 11:10

## 2023-07-08 RX ADMIN — METRONIDAZOLE 500 MG: 500 INJECTION, SOLUTION INTRAVENOUS at 13:17

## 2023-07-08 RX ADMIN — SUCRALFATE ORAL 1 G: 1 SUSPENSION ORAL at 15:32

## 2023-07-08 RX ADMIN — VANCOMYCIN HYDROCHLORIDE 125 MG: KIT at 09:34

## 2023-07-08 RX ADMIN — BUMETANIDE 2 MG: 0.25 INJECTION INTRAMUSCULAR; INTRAVENOUS at 08:49

## 2023-07-08 RX ADMIN — MEROPENEM 1000 MG: 1 INJECTION, POWDER, FOR SOLUTION INTRAVENOUS at 21:54

## 2023-07-08 RX ADMIN — SUCRALFATE ORAL 1 G: 1 SUSPENSION ORAL at 22:15

## 2023-07-08 RX ADMIN — POTASSIUM CHLORIDE 10 MEQ: 7.46 INJECTION, SOLUTION INTRAVENOUS at 09:43

## 2023-07-08 RX ADMIN — NYSTATIN 500000 UNITS: 100000 SUSPENSION ORAL at 21:54

## 2023-07-08 RX ADMIN — ALBUMIN (HUMAN) 12.5 G: 0.25 INJECTION, SOLUTION INTRAVENOUS at 09:35

## 2023-07-08 RX ADMIN — VANCOMYCIN HYDROCHLORIDE 125 MG: KIT at 15:32

## 2023-07-08 RX ADMIN — METOCLOPRAMIDE 10 MG: 5 INJECTION, SOLUTION INTRAMUSCULAR; INTRAVENOUS at 21:54

## 2023-07-08 RX ADMIN — POTASSIUM CHLORIDE 10 MEQ: 7.46 INJECTION, SOLUTION INTRAVENOUS at 05:09

## 2023-07-08 RX ADMIN — SODIUM CHLORIDE, POTASSIUM CHLORIDE, SODIUM LACTATE AND CALCIUM CHLORIDE: 600; 310; 30; 20 INJECTION, SOLUTION INTRAVENOUS at 13:17

## 2023-07-08 RX ADMIN — METOCLOPRAMIDE 10 MG: 5 INJECTION, SOLUTION INTRAMUSCULAR; INTRAVENOUS at 16:11

## 2023-07-08 RX ADMIN — HYDROMORPHONE HYDROCHLORIDE 2 MG: 2 INJECTION, SOLUTION INTRAMUSCULAR; INTRAVENOUS; SUBCUTANEOUS at 21:55

## 2023-07-08 RX ADMIN — METOPROLOL TARTRATE 2.5 MG: 5 INJECTION INTRAVENOUS at 10:41

## 2023-07-08 RX ADMIN — NYSTATIN 500000 UNITS: 100000 SUSPENSION ORAL at 16:11

## 2023-07-08 RX ADMIN — POTASSIUM CHLORIDE 10 MEQ: 7.46 INJECTION, SOLUTION INTRAVENOUS at 06:56

## 2023-07-08 RX ADMIN — BUMETANIDE 2 MG: 0.25 INJECTION INTRAMUSCULAR; INTRAVENOUS at 21:53

## 2023-07-08 RX ADMIN — ENOXAPARIN SODIUM 60 MG: 60 INJECTION SUBCUTANEOUS at 16:11

## 2023-07-08 RX ADMIN — MEROPENEM 1000 MG: 1 INJECTION, POWDER, FOR SOLUTION INTRAVENOUS at 03:32

## 2023-07-08 RX ADMIN — Medication 300 MG: at 22:50

## 2023-07-08 RX ADMIN — METOPROLOL TARTRATE 2.5 MG: 5 INJECTION INTRAVENOUS at 21:53

## 2023-07-08 RX ADMIN — ONDANSETRON 4 MG: 2 INJECTION INTRAMUSCULAR; INTRAVENOUS at 09:43

## 2023-07-08 RX ADMIN — SUCRALFATE ORAL 1 G: 1 SUSPENSION ORAL at 06:02

## 2023-07-08 RX ADMIN — HYDROMORPHONE HYDROCHLORIDE 2 MG: 2 INJECTION, SOLUTION INTRAMUSCULAR; INTRAVENOUS; SUBCUTANEOUS at 03:33

## 2023-07-08 RX ADMIN — SUCRALFATE ORAL 1 G: 1 SUSPENSION ORAL at 10:41

## 2023-07-08 RX ADMIN — HYDROMORPHONE HYDROCHLORIDE 2 MG: 2 INJECTION, SOLUTION INTRAMUSCULAR; INTRAVENOUS; SUBCUTANEOUS at 10:51

## 2023-07-08 RX ADMIN — METRONIDAZOLE 500 MG: 500 INJECTION, SOLUTION INTRAVENOUS at 05:09

## 2023-07-08 RX ADMIN — NYSTATIN 500000 UNITS: 100000 SUSPENSION ORAL at 12:16

## 2023-07-08 RX ADMIN — NYSTATIN 500000 UNITS: 100000 SUSPENSION ORAL at 12:24

## 2023-07-08 RX ADMIN — Medication 300 MG: at 09:45

## 2023-07-08 RX ADMIN — SODIUM CHLORIDE 40 MG: 9 INJECTION INTRAMUSCULAR; INTRAVENOUS; SUBCUTANEOUS at 08:49

## 2023-07-08 RX ADMIN — METRONIDAZOLE 500 MG: 500 INJECTION, SOLUTION INTRAVENOUS at 21:54

## 2023-07-08 RX ADMIN — METOCLOPRAMIDE 10 MG: 5 INJECTION, SOLUTION INTRAMUSCULAR; INTRAVENOUS at 10:06

## 2023-07-08 RX ADMIN — POTASSIUM CHLORIDE 10 MEQ: 7.46 INJECTION, SOLUTION INTRAVENOUS at 08:50

## 2023-07-08 RX ADMIN — NYSTATIN 500000 UNITS: 100000 SUSPENSION ORAL at 08:49

## 2023-07-08 RX ADMIN — BUPROPION HYDROCHLORIDE 150 MG: 150 TABLET, EXTENDED RELEASE ORAL at 08:51

## 2023-07-08 RX ADMIN — VANCOMYCIN HYDROCHLORIDE 125 MG: KIT at 03:32

## 2023-07-08 RX ADMIN — POTASSIUM CHLORIDE 10 MEQ: 7.46 INJECTION, SOLUTION INTRAVENOUS at 06:02

## 2023-07-08 RX ADMIN — MAGNESIUM SULFATE HEPTAHYDRATE 2000 MG: 40 INJECTION, SOLUTION INTRAVENOUS at 06:43

## 2023-07-08 RX ADMIN — VANCOMYCIN HYDROCHLORIDE 125 MG: KIT at 21:54

## 2023-07-08 RX ADMIN — METOCLOPRAMIDE 10 MG: 5 INJECTION, SOLUTION INTRAMUSCULAR; INTRAVENOUS at 06:02

## 2023-07-08 RX ADMIN — POTASSIUM CHLORIDE 10 MEQ: 7.46 INJECTION, SOLUTION INTRAVENOUS at 10:00

## 2023-07-08 RX ADMIN — ENOXAPARIN SODIUM 60 MG: 60 INJECTION SUBCUTANEOUS at 03:33

## 2023-07-08 RX ADMIN — POTASSIUM CHLORIDE 10 MEQ: 7.46 INJECTION, SOLUTION INTRAVENOUS at 04:24

## 2023-07-08 ASSESSMENT — PAIN SCALES - GENERAL
PAINLEVEL_OUTOF10: 8
PAINLEVEL_OUTOF10: 0
PAINLEVEL_OUTOF10: 9
PAINLEVEL_OUTOF10: 2
PAINLEVEL_OUTOF10: 9

## 2023-07-08 ASSESSMENT — PAIN DESCRIPTION - LOCATION
LOCATION: ABDOMEN

## 2023-07-08 ASSESSMENT — PAIN DESCRIPTION - DESCRIPTORS
DESCRIPTORS: CRAMPING
DESCRIPTORS: SHARP;SORE
DESCRIPTORS: ACHING;THROBBING

## 2023-07-08 ASSESSMENT — PAIN - FUNCTIONAL ASSESSMENT
PAIN_FUNCTIONAL_ASSESSMENT: ACTIVITIES ARE NOT PREVENTED
PAIN_FUNCTIONAL_ASSESSMENT: PREVENTS OR INTERFERES SOME ACTIVE ACTIVITIES AND ADLS
PAIN_FUNCTIONAL_ASSESSMENT: PREVENTS OR INTERFERES SOME ACTIVE ACTIVITIES AND ADLS

## 2023-07-08 ASSESSMENT — PAIN DESCRIPTION - ORIENTATION
ORIENTATION: ANTERIOR
ORIENTATION: MID

## 2023-07-09 LAB
ALBUMIN SERPL-MCNC: 1.8 G/DL (ref 3.5–5)
ALBUMIN/GLOB SERPL: 0.7 (ref 1.1–2.2)
ALP SERPL-CCNC: 82 U/L (ref 45–117)
ALT SERPL-CCNC: 10 U/L (ref 12–78)
ANION GAP SERPL CALC-SCNC: 5 MMOL/L (ref 5–15)
ANION GAP SERPL CALC-SCNC: 6 MMOL/L (ref 5–15)
ANION GAP SERPL CALC-SCNC: 6 MMOL/L (ref 5–15)
AST SERPL-CCNC: 17 U/L (ref 15–37)
BASOPHILS # BLD: 0 K/UL (ref 0–0.1)
BASOPHILS NFR BLD: 0 % (ref 0–1)
BILIRUB SERPL-MCNC: 0.6 MG/DL (ref 0.2–1)
BUN SERPL-MCNC: 8 MG/DL (ref 6–20)
BUN SERPL-MCNC: 8 MG/DL (ref 6–20)
BUN SERPL-MCNC: 9 MG/DL (ref 6–20)
BUN/CREAT SERPL: 10 (ref 12–20)
BUN/CREAT SERPL: 11 (ref 12–20)
BUN/CREAT SERPL: 11 (ref 12–20)
CALCIUM SERPL-MCNC: 7.6 MG/DL (ref 8.5–10.1)
CALCIUM SERPL-MCNC: 7.7 MG/DL (ref 8.5–10.1)
CALCIUM SERPL-MCNC: 7.9 MG/DL (ref 8.5–10.1)
CHLORIDE SERPL-SCNC: 83 MMOL/L (ref 97–108)
CHLORIDE SERPL-SCNC: 84 MMOL/L (ref 97–108)
CHLORIDE SERPL-SCNC: 85 MMOL/L (ref 97–108)
CHLORIDE UR-SCNC: 133 MMOL/L
CO2 SERPL-SCNC: 41 MMOL/L (ref 21–32)
CO2 SERPL-SCNC: 42 MMOL/L (ref 21–32)
CO2 SERPL-SCNC: 42 MMOL/L (ref 21–32)
CREAT SERPL-MCNC: 0.72 MG/DL (ref 0.55–1.02)
CREAT SERPL-MCNC: 0.78 MG/DL (ref 0.55–1.02)
CREAT SERPL-MCNC: 0.81 MG/DL (ref 0.55–1.02)
DIFFERENTIAL METHOD BLD: ABNORMAL
EOSINOPHIL # BLD: 0.2 K/UL (ref 0–0.4)
EOSINOPHIL NFR BLD: 1 % (ref 0–7)
ERYTHROCYTE [DISTWIDTH] IN BLOOD BY AUTOMATED COUNT: 14.6 % (ref 11.5–14.5)
ERYTHROCYTE [DISTWIDTH] IN BLOOD BY AUTOMATED COUNT: 14.6 % (ref 11.5–14.5)
GLOBULIN SER CALC-MCNC: 2.6 G/DL (ref 2–4)
GLUCOSE BLD STRIP.AUTO-MCNC: 112 MG/DL (ref 65–117)
GLUCOSE BLD STRIP.AUTO-MCNC: 114 MG/DL (ref 65–117)
GLUCOSE BLD STRIP.AUTO-MCNC: 119 MG/DL (ref 65–117)
GLUCOSE SERPL-MCNC: 112 MG/DL (ref 65–100)
GLUCOSE SERPL-MCNC: 117 MG/DL (ref 65–100)
GLUCOSE SERPL-MCNC: 95 MG/DL (ref 65–100)
HCT VFR BLD AUTO: 22.4 % (ref 35–47)
HCT VFR BLD AUTO: 24.5 % (ref 35–47)
HGB BLD-MCNC: 7.4 G/DL (ref 11.5–16)
HGB BLD-MCNC: 8.1 G/DL (ref 11.5–16)
IMM GRANULOCYTES # BLD AUTO: 0.1 K/UL (ref 0–0.04)
IMM GRANULOCYTES NFR BLD AUTO: 1 % (ref 0–0.5)
LYMPHOCYTES # BLD: 1.3 K/UL (ref 0.8–3.5)
LYMPHOCYTES NFR BLD: 10 % (ref 12–49)
MAGNESIUM SERPL-MCNC: 1.3 MG/DL (ref 1.6–2.4)
MAGNESIUM SERPL-MCNC: 2 MG/DL (ref 1.6–2.4)
MCH RBC QN AUTO: 28.9 PG (ref 26–34)
MCH RBC QN AUTO: 29 PG (ref 26–34)
MCHC RBC AUTO-ENTMCNC: 33 G/DL (ref 30–36.5)
MCHC RBC AUTO-ENTMCNC: 33.1 G/DL (ref 30–36.5)
MCV RBC AUTO: 87.5 FL (ref 80–99)
MCV RBC AUTO: 87.8 FL (ref 80–99)
MONOCYTES # BLD: 1 K/UL (ref 0–1)
MONOCYTES NFR BLD: 8 % (ref 5–13)
NEUTS SEG # BLD: 9.4 K/UL (ref 1.8–8)
NEUTS SEG NFR BLD: 80 % (ref 32–75)
NRBC # BLD: 0 K/UL (ref 0–0.01)
NRBC # BLD: 0 K/UL (ref 0–0.01)
NRBC BLD-RTO: 0 PER 100 WBC
NRBC BLD-RTO: 0 PER 100 WBC
OSMOLALITY UR: 277 MOSM/KG H2O
PLATELET # BLD AUTO: 407 K/UL (ref 150–400)
PLATELET # BLD AUTO: 434 K/UL (ref 150–400)
PMV BLD AUTO: 8.2 FL (ref 8.9–12.9)
PMV BLD AUTO: 8.2 FL (ref 8.9–12.9)
POTASSIUM SERPL-SCNC: 2.3 MMOL/L (ref 3.5–5.1)
POTASSIUM SERPL-SCNC: 2.6 MMOL/L (ref 3.5–5.1)
POTASSIUM SERPL-SCNC: 2.6 MMOL/L (ref 3.5–5.1)
POTASSIUM UR-SCNC: 26 MMOL/L
PROT SERPL-MCNC: 4.4 G/DL (ref 6.4–8.2)
RBC # BLD AUTO: 2.55 M/UL (ref 3.8–5.2)
RBC # BLD AUTO: 2.8 M/UL (ref 3.8–5.2)
SERVICE CMNT-IMP: ABNORMAL
SERVICE CMNT-IMP: NORMAL
SERVICE CMNT-IMP: NORMAL
SODIUM SERPL-SCNC: 130 MMOL/L (ref 136–145)
SODIUM SERPL-SCNC: 132 MMOL/L (ref 136–145)
SODIUM SERPL-SCNC: 132 MMOL/L (ref 136–145)
SODIUM UR-SCNC: 101 MMOL/L
SPECIMEN HOLD: NORMAL
WBC # BLD AUTO: 12 K/UL (ref 3.6–11)
WBC # BLD AUTO: 14.6 K/UL (ref 3.6–11)

## 2023-07-09 PROCEDURE — 1100000000 HC RM PRIVATE

## 2023-07-09 PROCEDURE — 6370000000 HC RX 637 (ALT 250 FOR IP): Performed by: STUDENT IN AN ORGANIZED HEALTH CARE EDUCATION/TRAINING PROGRAM

## 2023-07-09 PROCEDURE — 6360000002 HC RX W HCPCS: Performed by: SURGERY

## 2023-07-09 PROCEDURE — 2500000003 HC RX 250 WO HCPCS: Performed by: STUDENT IN AN ORGANIZED HEALTH CARE EDUCATION/TRAINING PROGRAM

## 2023-07-09 PROCEDURE — 6360000002 HC RX W HCPCS

## 2023-07-09 PROCEDURE — 2580000003 HC RX 258: Performed by: SURGERY

## 2023-07-09 PROCEDURE — 6360000002 HC RX W HCPCS: Performed by: STUDENT IN AN ORGANIZED HEALTH CARE EDUCATION/TRAINING PROGRAM

## 2023-07-09 PROCEDURE — 2500000003 HC RX 250 WO HCPCS

## 2023-07-09 PROCEDURE — 2580000003 HC RX 258: Performed by: STUDENT IN AN ORGANIZED HEALTH CARE EDUCATION/TRAINING PROGRAM

## 2023-07-09 PROCEDURE — C9113 INJ PANTOPRAZOLE SODIUM, VIA: HCPCS | Performed by: SURGERY

## 2023-07-09 PROCEDURE — 82962 GLUCOSE BLOOD TEST: CPT

## 2023-07-09 PROCEDURE — 6370000000 HC RX 637 (ALT 250 FOR IP): Performed by: SURGERY

## 2023-07-09 PROCEDURE — A4216 STERILE WATER/SALINE, 10 ML: HCPCS | Performed by: STUDENT IN AN ORGANIZED HEALTH CARE EDUCATION/TRAINING PROGRAM

## 2023-07-09 PROCEDURE — 36592 COLLECT BLOOD FROM PICC: CPT

## 2023-07-09 PROCEDURE — 94761 N-INVAS EAR/PLS OXIMETRY MLT: CPT

## 2023-07-09 PROCEDURE — 83735 ASSAY OF MAGNESIUM: CPT

## 2023-07-09 PROCEDURE — 85027 COMPLETE CBC AUTOMATED: CPT

## 2023-07-09 PROCEDURE — 85025 COMPLETE CBC W/AUTO DIFF WBC: CPT

## 2023-07-09 PROCEDURE — 83935 ASSAY OF URINE OSMOLALITY: CPT

## 2023-07-09 PROCEDURE — 84133 ASSAY OF URINE POTASSIUM: CPT

## 2023-07-09 PROCEDURE — 84300 ASSAY OF URINE SODIUM: CPT

## 2023-07-09 PROCEDURE — A4216 STERILE WATER/SALINE, 10 ML: HCPCS | Performed by: SURGERY

## 2023-07-09 PROCEDURE — 2580000003 HC RX 258

## 2023-07-09 PROCEDURE — 99232 SBSQ HOSP IP/OBS MODERATE 35: CPT | Performed by: STUDENT IN AN ORGANIZED HEALTH CARE EDUCATION/TRAINING PROGRAM

## 2023-07-09 PROCEDURE — 80048 BASIC METABOLIC PNL TOTAL CA: CPT

## 2023-07-09 PROCEDURE — 6370000000 HC RX 637 (ALT 250 FOR IP): Performed by: INTERNAL MEDICINE

## 2023-07-09 PROCEDURE — 82436 ASSAY OF URINE CHLORIDE: CPT

## 2023-07-09 PROCEDURE — 36415 COLL VENOUS BLD VENIPUNCTURE: CPT

## 2023-07-09 PROCEDURE — 80053 COMPREHEN METABOLIC PANEL: CPT

## 2023-07-09 RX ORDER — SPIRONOLACTONE 25 MG/1
25 TABLET ORAL DAILY
Status: DISCONTINUED | OUTPATIENT
Start: 2023-07-09 | End: 2023-07-21 | Stop reason: HOSPADM

## 2023-07-09 RX ORDER — MAGNESIUM SULFATE IN WATER 40 MG/ML
2000 INJECTION, SOLUTION INTRAVENOUS ONCE
Status: COMPLETED | OUTPATIENT
Start: 2023-07-09 | End: 2023-07-09

## 2023-07-09 RX ORDER — POTASSIUM CHLORIDE 7.45 MG/ML
10 INJECTION INTRAVENOUS
Status: COMPLETED | OUTPATIENT
Start: 2023-07-09 | End: 2023-07-09

## 2023-07-09 RX ORDER — POTASSIUM CHLORIDE 7.45 MG/ML
10 INJECTION INTRAVENOUS
Status: COMPLETED | OUTPATIENT
Start: 2023-07-09 | End: 2023-07-10

## 2023-07-09 RX ORDER — BUMETANIDE 0.25 MG/ML
1 INJECTION INTRAMUSCULAR; INTRAVENOUS 2 TIMES DAILY
Status: DISCONTINUED | OUTPATIENT
Start: 2023-07-09 | End: 2023-07-21 | Stop reason: HOSPADM

## 2023-07-09 RX ADMIN — BUPROPION HYDROCHLORIDE 150 MG: 150 TABLET, EXTENDED RELEASE ORAL at 08:33

## 2023-07-09 RX ADMIN — METRONIDAZOLE 500 MG: 500 INJECTION, SOLUTION INTRAVENOUS at 06:15

## 2023-07-09 RX ADMIN — MAGNESIUM SULFATE HEPTAHYDRATE 2000 MG: 40 INJECTION, SOLUTION INTRAVENOUS at 16:26

## 2023-07-09 RX ADMIN — VANCOMYCIN HYDROCHLORIDE 125 MG: KIT at 22:19

## 2023-07-09 RX ADMIN — ENOXAPARIN SODIUM 60 MG: 60 INJECTION SUBCUTANEOUS at 04:12

## 2023-07-09 RX ADMIN — MEROPENEM 1000 MG: 1 INJECTION, POWDER, FOR SOLUTION INTRAVENOUS at 11:09

## 2023-07-09 RX ADMIN — HYDROMORPHONE HYDROCHLORIDE 2 MG: 2 INJECTION, SOLUTION INTRAMUSCULAR; INTRAVENOUS; SUBCUTANEOUS at 18:41

## 2023-07-09 RX ADMIN — NYSTATIN 500000 UNITS: 100000 SUSPENSION ORAL at 16:06

## 2023-07-09 RX ADMIN — NYSTATIN 500000 UNITS: 100000 SUSPENSION ORAL at 12:09

## 2023-07-09 RX ADMIN — METRONIDAZOLE 500 MG: 500 INJECTION, SOLUTION INTRAVENOUS at 13:06

## 2023-07-09 RX ADMIN — POTASSIUM CHLORIDE 10 MEQ: 7.46 INJECTION, SOLUTION INTRAVENOUS at 07:14

## 2023-07-09 RX ADMIN — METOCLOPRAMIDE 10 MG: 5 INJECTION, SOLUTION INTRAMUSCULAR; INTRAVENOUS at 16:06

## 2023-07-09 RX ADMIN — POTASSIUM CHLORIDE 10 MEQ: 7.46 INJECTION, SOLUTION INTRAVENOUS at 07:39

## 2023-07-09 RX ADMIN — SPIRONOLACTONE 25 MG: 25 TABLET ORAL at 11:09

## 2023-07-09 RX ADMIN — VANCOMYCIN HYDROCHLORIDE 125 MG: KIT at 10:32

## 2023-07-09 RX ADMIN — ONDANSETRON 4 MG: 2 INJECTION INTRAMUSCULAR; INTRAVENOUS at 09:23

## 2023-07-09 RX ADMIN — BUMETANIDE 2 MG: 0.25 INJECTION INTRAMUSCULAR; INTRAVENOUS at 08:33

## 2023-07-09 RX ADMIN — BUMETANIDE 1 MG: 0.25 INJECTION INTRAMUSCULAR; INTRAVENOUS at 22:18

## 2023-07-09 RX ADMIN — SUCRALFATE ORAL 1 G: 1 SUSPENSION ORAL at 07:39

## 2023-07-09 RX ADMIN — MEROPENEM 1000 MG: 1 INJECTION, POWDER, FOR SOLUTION INTRAVENOUS at 04:12

## 2023-07-09 RX ADMIN — Medication 300 MG: at 22:19

## 2023-07-09 RX ADMIN — METRONIDAZOLE 500 MG: 500 INJECTION, SOLUTION INTRAVENOUS at 22:20

## 2023-07-09 RX ADMIN — VANCOMYCIN HYDROCHLORIDE 125 MG: KIT at 15:37

## 2023-07-09 RX ADMIN — METOCLOPRAMIDE 10 MG: 5 INJECTION, SOLUTION INTRAMUSCULAR; INTRAVENOUS at 22:19

## 2023-07-09 RX ADMIN — NYSTATIN 500000 UNITS: 100000 SUSPENSION ORAL at 09:23

## 2023-07-09 RX ADMIN — SODIUM CHLORIDE, POTASSIUM CHLORIDE, SODIUM LACTATE AND CALCIUM CHLORIDE: 600; 310; 30; 20 INJECTION, SOLUTION INTRAVENOUS at 01:52

## 2023-07-09 RX ADMIN — SODIUM CHLORIDE 40 MG: 9 INJECTION INTRAMUSCULAR; INTRAVENOUS; SUBCUTANEOUS at 08:33

## 2023-07-09 RX ADMIN — HYDROMORPHONE HYDROCHLORIDE 2 MG: 2 INJECTION, SOLUTION INTRAMUSCULAR; INTRAVENOUS; SUBCUTANEOUS at 09:30

## 2023-07-09 RX ADMIN — METOCLOPRAMIDE 10 MG: 5 INJECTION, SOLUTION INTRAMUSCULAR; INTRAVENOUS at 06:16

## 2023-07-09 RX ADMIN — POTASSIUM CHLORIDE 10 MEQ: 7.46 INJECTION, SOLUTION INTRAVENOUS at 05:19

## 2023-07-09 RX ADMIN — POTASSIUM CHLORIDE 10 MEQ: 7.46 INJECTION, SOLUTION INTRAVENOUS at 04:12

## 2023-07-09 RX ADMIN — POTASSIUM CHLORIDE 10 MEQ: 7.46 INJECTION, SOLUTION INTRAVENOUS at 22:14

## 2023-07-09 RX ADMIN — Medication 300 MG: at 09:23

## 2023-07-09 RX ADMIN — ENOXAPARIN SODIUM 60 MG: 60 INJECTION SUBCUTANEOUS at 16:06

## 2023-07-09 RX ADMIN — MEROPENEM 1000 MG: 1 INJECTION, POWDER, FOR SOLUTION INTRAVENOUS at 22:20

## 2023-07-09 RX ADMIN — POTASSIUM CHLORIDE 10 MEQ: 7.46 INJECTION, SOLUTION INTRAVENOUS at 03:14

## 2023-07-09 RX ADMIN — SUCRALFATE ORAL 1 G: 1 SUSPENSION ORAL at 10:32

## 2023-07-09 RX ADMIN — METOCLOPRAMIDE 10 MG: 5 INJECTION, SOLUTION INTRAMUSCULAR; INTRAVENOUS at 10:32

## 2023-07-09 RX ADMIN — METOPROLOL TARTRATE 2.5 MG: 5 INJECTION INTRAVENOUS at 10:32

## 2023-07-09 RX ADMIN — SODIUM CHLORIDE, PRESERVATIVE FREE 20 MG: 5 INJECTION INTRAVENOUS at 22:19

## 2023-07-09 RX ADMIN — SUCRALFATE ORAL 1 G: 1 SUSPENSION ORAL at 15:37

## 2023-07-09 RX ADMIN — SODIUM CHLORIDE, POTASSIUM CHLORIDE, SODIUM LACTATE AND CALCIUM CHLORIDE: 600; 310; 30; 20 INJECTION, SOLUTION INTRAVENOUS at 23:14

## 2023-07-09 RX ADMIN — VANCOMYCIN HYDROCHLORIDE 125 MG: KIT at 04:15

## 2023-07-09 RX ADMIN — POTASSIUM CHLORIDE 10 MEQ: 7.46 INJECTION, SOLUTION INTRAVENOUS at 23:16

## 2023-07-09 RX ADMIN — SUCRALFATE ORAL 1 G: 1 SUSPENSION ORAL at 22:21

## 2023-07-09 RX ADMIN — POTASSIUM CHLORIDE 10 MEQ: 7.46 INJECTION, SOLUTION INTRAVENOUS at 06:15

## 2023-07-09 ASSESSMENT — PAIN - FUNCTIONAL ASSESSMENT
PAIN_FUNCTIONAL_ASSESSMENT: ACTIVITIES ARE NOT PREVENTED
PAIN_FUNCTIONAL_ASSESSMENT: ACTIVITIES ARE NOT PREVENTED

## 2023-07-09 ASSESSMENT — PAIN DESCRIPTION - ORIENTATION
ORIENTATION: RIGHT
ORIENTATION: ANTERIOR;UPPER

## 2023-07-09 ASSESSMENT — PAIN DESCRIPTION - LOCATION
LOCATION: ABDOMEN
LOCATION: ABDOMEN

## 2023-07-09 ASSESSMENT — PAIN DESCRIPTION - DESCRIPTORS
DESCRIPTORS: ACHING
DESCRIPTORS: CRAMPING

## 2023-07-09 ASSESSMENT — PAIN SCALES - GENERAL
PAINLEVEL_OUTOF10: 9
PAINLEVEL_OUTOF10: 1
PAINLEVEL_OUTOF10: 0
PAINLEVEL_OUTOF10: 3

## 2023-07-10 ENCOUNTER — APPOINTMENT (OUTPATIENT)
Facility: HOSPITAL | Age: 63
DRG: 326 | End: 2023-07-10
Payer: MEDICARE

## 2023-07-10 PROBLEM — B37.9 CANDIDA GLABRATA INFECTION: Status: ACTIVE | Noted: 2023-07-10

## 2023-07-10 LAB
ALBUMIN SERPL-MCNC: 1.6 G/DL (ref 3.5–5)
ALBUMIN/GLOB SERPL: 0.6 (ref 1.1–2.2)
ALP SERPL-CCNC: 84 U/L (ref 45–117)
ALT SERPL-CCNC: 8 U/L (ref 12–78)
AMMONIA PLAS-SCNC: <10 UMOL/L
ANION GAP SERPL CALC-SCNC: 3 MMOL/L (ref 5–15)
ANION GAP SERPL CALC-SCNC: 4 MMOL/L (ref 5–15)
ANION GAP SERPL CALC-SCNC: 5 MMOL/L (ref 5–15)
APPEARANCE UR: ABNORMAL
AST SERPL-CCNC: 21 U/L (ref 15–37)
BACTERIA URNS QL MICRO: NEGATIVE /HPF
BASOPHILS # BLD: 0 K/UL (ref 0–0.1)
BASOPHILS # BLD: 0 K/UL (ref 0–0.1)
BASOPHILS NFR BLD: 0 % (ref 0–1)
BASOPHILS NFR BLD: 0 % (ref 0–1)
BILIRUB DIRECT SERPL-MCNC: 0.2 MG/DL (ref 0–0.2)
BILIRUB SERPL-MCNC: 0.6 MG/DL (ref 0.2–1)
BILIRUB UR QL: NEGATIVE
BUN SERPL-MCNC: 7 MG/DL (ref 6–20)
BUN SERPL-MCNC: 8 MG/DL (ref 6–20)
BUN SERPL-MCNC: 8 MG/DL (ref 6–20)
BUN/CREAT SERPL: 10 (ref 12–20)
BUN/CREAT SERPL: 10 (ref 12–20)
BUN/CREAT SERPL: 11 (ref 12–20)
CALCIUM SERPL-MCNC: 7.3 MG/DL (ref 8.5–10.1)
CALCIUM SERPL-MCNC: 7.6 MG/DL (ref 8.5–10.1)
CALCIUM SERPL-MCNC: 8 MG/DL (ref 8.5–10.1)
CHLORIDE SERPL-SCNC: 84 MMOL/L (ref 97–108)
CHLORIDE SERPL-SCNC: 84 MMOL/L (ref 97–108)
CHLORIDE SERPL-SCNC: 86 MMOL/L (ref 97–108)
CO2 SERPL-SCNC: 40 MMOL/L (ref 21–32)
CO2 SERPL-SCNC: 40 MMOL/L (ref 21–32)
CO2 SERPL-SCNC: 41 MMOL/L (ref 21–32)
COLOR UR: ABNORMAL
COMMENT:: NORMAL
CREAT SERPL-MCNC: 0.67 MG/DL (ref 0.55–1.02)
CREAT SERPL-MCNC: 0.7 MG/DL (ref 0.55–1.02)
CREAT SERPL-MCNC: 0.81 MG/DL (ref 0.55–1.02)
DIFFERENTIAL METHOD BLD: ABNORMAL
DIFFERENTIAL METHOD BLD: ABNORMAL
EKG ATRIAL RATE: 74 BPM
EKG ATRIAL RATE: 80 BPM
EKG DIAGNOSIS: NORMAL
EKG DIAGNOSIS: NORMAL
EKG P AXIS: 25 DEGREES
EKG P AXIS: 44 DEGREES
EKG P-R INTERVAL: 128 MS
EKG P-R INTERVAL: 130 MS
EKG Q-T INTERVAL: 390 MS
EKG Q-T INTERVAL: 416 MS
EKG QRS DURATION: 74 MS
EKG QRS DURATION: 90 MS
EKG QTC CALCULATION (BAZETT): 449 MS
EKG QTC CALCULATION (BAZETT): 461 MS
EKG R AXIS: 15 DEGREES
EKG R AXIS: 25 DEGREES
EKG T AXIS: 211 DEGREES
EKG T AXIS: 214 DEGREES
EKG VENTRICULAR RATE: 74 BPM
EKG VENTRICULAR RATE: 80 BPM
EOSINOPHIL # BLD: 0.2 K/UL (ref 0–0.4)
EOSINOPHIL # BLD: 0.2 K/UL (ref 0–0.4)
EOSINOPHIL NFR BLD: 1 % (ref 0–7)
EOSINOPHIL NFR BLD: 2 % (ref 0–7)
EPITH CASTS URNS QL MICRO: ABNORMAL /LPF
ERYTHROCYTE [DISTWIDTH] IN BLOOD BY AUTOMATED COUNT: 15 % (ref 11.5–14.5)
ERYTHROCYTE [DISTWIDTH] IN BLOOD BY AUTOMATED COUNT: 15.2 % (ref 11.5–14.5)
GLOBULIN SER CALC-MCNC: 2.7 G/DL (ref 2–4)
GLUCOSE BLD STRIP.AUTO-MCNC: 93 MG/DL (ref 65–117)
GLUCOSE SERPL-MCNC: 100 MG/DL (ref 65–100)
GLUCOSE SERPL-MCNC: 88 MG/DL (ref 65–100)
GLUCOSE SERPL-MCNC: 95 MG/DL (ref 65–100)
GLUCOSE UR STRIP.AUTO-MCNC: NEGATIVE MG/DL
HCT VFR BLD AUTO: 21 % (ref 35–47)
HCT VFR BLD AUTO: 22.5 % (ref 35–47)
HGB BLD-MCNC: 7.1 G/DL (ref 11.5–16)
HGB BLD-MCNC: 7.6 G/DL (ref 11.5–16)
HGB UR QL STRIP: ABNORMAL
IMM GRANULOCYTES # BLD AUTO: 0.1 K/UL (ref 0–0.04)
IMM GRANULOCYTES # BLD AUTO: 0.1 K/UL (ref 0–0.04)
IMM GRANULOCYTES NFR BLD AUTO: 1 % (ref 0–0.5)
IMM GRANULOCYTES NFR BLD AUTO: 1 % (ref 0–0.5)
KETONES UR QL STRIP.AUTO: ABNORMAL MG/DL
LACTATE SERPL-SCNC: 1.3 MMOL/L (ref 0.4–2)
LEUKOCYTE ESTERASE UR QL STRIP.AUTO: ABNORMAL
LYMPHOCYTES # BLD: 0.9 K/UL (ref 0.8–3.5)
LYMPHOCYTES # BLD: 1.1 K/UL (ref 0.8–3.5)
LYMPHOCYTES NFR BLD: 7 % (ref 12–49)
LYMPHOCYTES NFR BLD: 8 % (ref 12–49)
MAGNESIUM SERPL-MCNC: 1.7 MG/DL (ref 1.6–2.4)
MAGNESIUM SERPL-MCNC: 1.8 MG/DL (ref 1.6–2.4)
MCH RBC QN AUTO: 29.6 PG (ref 26–34)
MCH RBC QN AUTO: 29.6 PG (ref 26–34)
MCHC RBC AUTO-ENTMCNC: 33.8 G/DL (ref 30–36.5)
MCHC RBC AUTO-ENTMCNC: 33.8 G/DL (ref 30–36.5)
MCV RBC AUTO: 87.5 FL (ref 80–99)
MCV RBC AUTO: 87.5 FL (ref 80–99)
MONOCYTES # BLD: 0.9 K/UL (ref 0–1)
MONOCYTES # BLD: 1 K/UL (ref 0–1)
MONOCYTES NFR BLD: 7 % (ref 5–13)
MONOCYTES NFR BLD: 8 % (ref 5–13)
NEUTS SEG # BLD: 10.3 K/UL (ref 1.8–8)
NEUTS SEG # BLD: 11 K/UL (ref 1.8–8)
NEUTS SEG NFR BLD: 81 % (ref 32–75)
NEUTS SEG NFR BLD: 84 % (ref 32–75)
NITRITE UR QL STRIP.AUTO: NEGATIVE
NRBC # BLD: 0 K/UL (ref 0–0.01)
NRBC # BLD: 0 K/UL (ref 0–0.01)
NRBC BLD-RTO: 0 PER 100 WBC
NRBC BLD-RTO: 0 PER 100 WBC
NT PRO BNP: 213 PG/ML
PH UR STRIP: 7.5 (ref 5–8)
PHOSPHATE SERPL-MCNC: 0.8 MG/DL (ref 2.6–4.7)
PLATELET # BLD AUTO: 299 K/UL (ref 150–400)
PLATELET # BLD AUTO: 367 K/UL (ref 150–400)
PMV BLD AUTO: 8.3 FL (ref 8.9–12.9)
PMV BLD AUTO: 8.4 FL (ref 8.9–12.9)
POTASSIUM SERPL-SCNC: 2.8 MMOL/L (ref 3.5–5.1)
POTASSIUM SERPL-SCNC: 2.9 MMOL/L (ref 3.5–5.1)
POTASSIUM SERPL-SCNC: 3 MMOL/L (ref 3.5–5.1)
PROT SERPL-MCNC: 4.3 G/DL (ref 6.4–8.2)
PROT UR STRIP-MCNC: ABNORMAL MG/DL
RBC # BLD AUTO: 2.4 M/UL (ref 3.8–5.2)
RBC # BLD AUTO: 2.57 M/UL (ref 3.8–5.2)
RBC #/AREA URNS HPF: ABNORMAL /HPF (ref 0–5)
SERVICE CMNT-IMP: NORMAL
SODIUM SERPL-SCNC: 128 MMOL/L (ref 136–145)
SODIUM SERPL-SCNC: 129 MMOL/L (ref 136–145)
SODIUM SERPL-SCNC: 130 MMOL/L (ref 136–145)
SP GR UR REFRACTOMETRY: 1.02 (ref 1–1.03)
SPECIMEN HOLD: NORMAL
TROPONIN I SERPL HS-MCNC: 7 NG/L (ref 0–51)
TSH SERPL DL<=0.05 MIU/L-ACNC: 2.23 UIU/ML (ref 0.36–3.74)
URINE CULTURE IF INDICATED: ABNORMAL
UROBILINOGEN UR QL STRIP.AUTO: 0.2 EU/DL (ref 0.2–1)
WBC # BLD AUTO: 12.7 K/UL (ref 3.6–11)
WBC # BLD AUTO: 13.1 K/UL (ref 3.6–11)
WBC URNS QL MICRO: ABNORMAL /HPF (ref 0–4)

## 2023-07-10 PROCEDURE — 6360000002 HC RX W HCPCS: Performed by: SURGERY

## 2023-07-10 PROCEDURE — 86900 BLOOD TYPING SEROLOGIC ABO: CPT

## 2023-07-10 PROCEDURE — 86923 COMPATIBILITY TEST ELECTRIC: CPT

## 2023-07-10 PROCEDURE — 80076 HEPATIC FUNCTION PANEL: CPT

## 2023-07-10 PROCEDURE — 6370000000 HC RX 637 (ALT 250 FOR IP): Performed by: STUDENT IN AN ORGANIZED HEALTH CARE EDUCATION/TRAINING PROGRAM

## 2023-07-10 PROCEDURE — 2500000003 HC RX 250 WO HCPCS: Performed by: STUDENT IN AN ORGANIZED HEALTH CARE EDUCATION/TRAINING PROGRAM

## 2023-07-10 PROCEDURE — 6360000002 HC RX W HCPCS: Performed by: STUDENT IN AN ORGANIZED HEALTH CARE EDUCATION/TRAINING PROGRAM

## 2023-07-10 PROCEDURE — C9113 INJ PANTOPRAZOLE SODIUM, VIA: HCPCS | Performed by: STUDENT IN AN ORGANIZED HEALTH CARE EDUCATION/TRAINING PROGRAM

## 2023-07-10 PROCEDURE — 2580000003 HC RX 258

## 2023-07-10 PROCEDURE — 86850 RBC ANTIBODY SCREEN: CPT

## 2023-07-10 PROCEDURE — 6370000000 HC RX 637 (ALT 250 FOR IP): Performed by: INTERNAL MEDICINE

## 2023-07-10 PROCEDURE — 6370000000 HC RX 637 (ALT 250 FOR IP): Performed by: SURGERY

## 2023-07-10 PROCEDURE — 87106 FUNGI IDENTIFICATION YEAST: CPT

## 2023-07-10 PROCEDURE — 82962 GLUCOSE BLOOD TEST: CPT

## 2023-07-10 PROCEDURE — 6360000004 HC RX CONTRAST MEDICATION: Performed by: FAMILY MEDICINE

## 2023-07-10 PROCEDURE — 81001 URINALYSIS AUTO W/SCOPE: CPT

## 2023-07-10 PROCEDURE — 2060000000 HC ICU INTERMEDIATE R&B

## 2023-07-10 PROCEDURE — 87205 SMEAR GRAM STAIN: CPT

## 2023-07-10 PROCEDURE — 36600 WITHDRAWAL OF ARTERIAL BLOOD: CPT

## 2023-07-10 PROCEDURE — 2580000003 HC RX 258: Performed by: STUDENT IN AN ORGANIZED HEALTH CARE EDUCATION/TRAINING PROGRAM

## 2023-07-10 PROCEDURE — 94761 N-INVAS EAR/PLS OXIMETRY MLT: CPT

## 2023-07-10 PROCEDURE — 87040 BLOOD CULTURE FOR BACTERIA: CPT

## 2023-07-10 PROCEDURE — 71260 CT THORAX DX C+: CPT

## 2023-07-10 PROCEDURE — 99233 SBSQ HOSP IP/OBS HIGH 50: CPT | Performed by: STUDENT IN AN ORGANIZED HEALTH CARE EDUCATION/TRAINING PROGRAM

## 2023-07-10 PROCEDURE — 70450 CT HEAD/BRAIN W/O DYE: CPT

## 2023-07-10 PROCEDURE — 80048 BASIC METABOLIC PNL TOTAL CA: CPT

## 2023-07-10 PROCEDURE — 87086 URINE CULTURE/COLONY COUNT: CPT

## 2023-07-10 PROCEDURE — A4216 STERILE WATER/SALINE, 10 ML: HCPCS | Performed by: STUDENT IN AN ORGANIZED HEALTH CARE EDUCATION/TRAINING PROGRAM

## 2023-07-10 PROCEDURE — 83605 ASSAY OF LACTIC ACID: CPT

## 2023-07-10 PROCEDURE — 6360000002 HC RX W HCPCS

## 2023-07-10 PROCEDURE — P9047 ALBUMIN (HUMAN), 25%, 50ML: HCPCS | Performed by: STUDENT IN AN ORGANIZED HEALTH CARE EDUCATION/TRAINING PROGRAM

## 2023-07-10 PROCEDURE — 84484 ASSAY OF TROPONIN QUANT: CPT

## 2023-07-10 PROCEDURE — 87070 CULTURE OTHR SPECIMN AEROBIC: CPT

## 2023-07-10 PROCEDURE — 6360000002 HC RX W HCPCS: Performed by: INTERNAL MEDICINE

## 2023-07-10 PROCEDURE — 83880 ASSAY OF NATRIURETIC PEPTIDE: CPT

## 2023-07-10 PROCEDURE — 82140 ASSAY OF AMMONIA: CPT

## 2023-07-10 PROCEDURE — 84100 ASSAY OF PHOSPHORUS: CPT

## 2023-07-10 PROCEDURE — 2580000003 HC RX 258: Performed by: INTERNAL MEDICINE

## 2023-07-10 PROCEDURE — 85025 COMPLETE CBC W/AUTO DIFF WBC: CPT

## 2023-07-10 PROCEDURE — 82803 BLOOD GASES ANY COMBINATION: CPT

## 2023-07-10 PROCEDURE — 83735 ASSAY OF MAGNESIUM: CPT

## 2023-07-10 PROCEDURE — 98960 EDU&TRN PT SELF-MGMT NQHP 1: CPT

## 2023-07-10 PROCEDURE — 93005 ELECTROCARDIOGRAM TRACING: CPT

## 2023-07-10 PROCEDURE — 86901 BLOOD TYPING SEROLOGIC RH(D): CPT

## 2023-07-10 PROCEDURE — 84443 ASSAY THYROID STIM HORMONE: CPT

## 2023-07-10 PROCEDURE — 93010 ELECTROCARDIOGRAM REPORT: CPT | Performed by: INTERNAL MEDICINE

## 2023-07-10 PROCEDURE — 71045 X-RAY EXAM CHEST 1 VIEW: CPT

## 2023-07-10 PROCEDURE — 36415 COLL VENOUS BLD VENIPUNCTURE: CPT

## 2023-07-10 PROCEDURE — 6370000000 HC RX 637 (ALT 250 FOR IP)

## 2023-07-10 RX ORDER — MAGNESIUM SULFATE IN WATER 40 MG/ML
2000 INJECTION, SOLUTION INTRAVENOUS ONCE
Status: COMPLETED | OUTPATIENT
Start: 2023-07-10 | End: 2023-07-10

## 2023-07-10 RX ORDER — SODIUM CHLORIDE AND POTASSIUM CHLORIDE 300; 900 MG/100ML; MG/100ML
INJECTION, SOLUTION INTRAVENOUS CONTINUOUS
Status: DISCONTINUED | OUTPATIENT
Start: 2023-07-10 | End: 2023-07-13

## 2023-07-10 RX ORDER — NOREPINEPHRINE BITARTRATE 0.06 MG/ML
1-100 INJECTION, SOLUTION INTRAVENOUS CONTINUOUS
Status: DISCONTINUED | OUTPATIENT
Start: 2023-07-10 | End: 2023-07-12

## 2023-07-10 RX ORDER — SODIUM CHLORIDE, SODIUM LACTATE, POTASSIUM CHLORIDE, AND CALCIUM CHLORIDE .6; .31; .03; .02 G/100ML; G/100ML; G/100ML; G/100ML
INJECTION, SOLUTION INTRAVENOUS CONTINUOUS PRN
Status: COMPLETED | OUTPATIENT
Start: 2023-07-10 | End: 2023-07-10

## 2023-07-10 RX ORDER — ALBUMIN (HUMAN) 12.5 G/50ML
12.5 SOLUTION INTRAVENOUS EVERY 8 HOURS
Status: DISCONTINUED | OUTPATIENT
Start: 2023-07-10 | End: 2023-07-10

## 2023-07-10 RX ORDER — ALBUMIN (HUMAN) 12.5 G/50ML
12.5 SOLUTION INTRAVENOUS EVERY 8 HOURS
Status: COMPLETED | OUTPATIENT
Start: 2023-07-10 | End: 2023-07-11

## 2023-07-10 RX ORDER — MAGNESIUM SULFATE 1 G/100ML
1000 INJECTION INTRAVENOUS ONCE
Status: COMPLETED | OUTPATIENT
Start: 2023-07-10 | End: 2023-07-10

## 2023-07-10 RX ADMIN — VANCOMYCIN HYDROCHLORIDE 125 MG: KIT at 09:42

## 2023-07-10 RX ADMIN — POTASSIUM CHLORIDE 10 MEQ: 7.46 INJECTION, SOLUTION INTRAVENOUS at 10:19

## 2023-07-10 RX ADMIN — POTASSIUM PHOSPHATE, MONOBASIC POTASSIUM PHOSPHATE, DIBASIC 20 MMOL: 224; 236 INJECTION, SOLUTION, CONCENTRATE INTRAVENOUS at 20:20

## 2023-07-10 RX ADMIN — POTASSIUM CHLORIDE 10 MEQ: 7.46 INJECTION, SOLUTION INTRAVENOUS at 11:49

## 2023-07-10 RX ADMIN — METOCLOPRAMIDE 10 MG: 5 INJECTION, SOLUTION INTRAMUSCULAR; INTRAVENOUS at 21:47

## 2023-07-10 RX ADMIN — POTASSIUM CHLORIDE 10 MEQ: 7.46 INJECTION, SOLUTION INTRAVENOUS at 14:23

## 2023-07-10 RX ADMIN — MEROPENEM 1000 MG: 1 INJECTION, POWDER, FOR SOLUTION INTRAVENOUS at 11:36

## 2023-07-10 RX ADMIN — POTASSIUM CHLORIDE 10 MEQ: 7.46 INJECTION, SOLUTION INTRAVENOUS at 08:24

## 2023-07-10 RX ADMIN — Medication 10 MCG/MIN: at 17:54

## 2023-07-10 RX ADMIN — SUCRALFATE ORAL 1 G: 1 SUSPENSION ORAL at 06:36

## 2023-07-10 RX ADMIN — POTASSIUM CHLORIDE 10 MEQ: 7.46 INJECTION, SOLUTION INTRAVENOUS at 09:22

## 2023-07-10 RX ADMIN — BUPROPION HYDROCHLORIDE 150 MG: 150 TABLET, EXTENDED RELEASE ORAL at 08:24

## 2023-07-10 RX ADMIN — SUCRALFATE ORAL 1 G: 1 SUSPENSION ORAL at 11:06

## 2023-07-10 RX ADMIN — MEROPENEM 1000 MG: 1 INJECTION, POWDER, FOR SOLUTION INTRAVENOUS at 04:47

## 2023-07-10 RX ADMIN — MAGNESIUM SULFATE HEPTAHYDRATE 2000 MG: 40 INJECTION, SOLUTION INTRAVENOUS at 09:14

## 2023-07-10 RX ADMIN — METRONIDAZOLE 500 MG: 500 INJECTION, SOLUTION INTRAVENOUS at 22:14

## 2023-07-10 RX ADMIN — NYSTATIN 500000 UNITS: 100000 SUSPENSION ORAL at 13:43

## 2023-07-10 RX ADMIN — SODIUM CHLORIDE 40 MG: 9 INJECTION INTRAMUSCULAR; INTRAVENOUS; SUBCUTANEOUS at 08:23

## 2023-07-10 RX ADMIN — VANCOMYCIN HYDROCHLORIDE 125 MG: KIT at 22:14

## 2023-07-10 RX ADMIN — POTASSIUM CHLORIDE 10 MEQ: 7.46 INJECTION, SOLUTION INTRAVENOUS at 13:16

## 2023-07-10 RX ADMIN — BUMETANIDE 1 MG: 0.25 INJECTION INTRAMUSCULAR; INTRAVENOUS at 08:24

## 2023-07-10 RX ADMIN — METOCLOPRAMIDE 10 MG: 5 INJECTION, SOLUTION INTRAMUSCULAR; INTRAVENOUS at 06:36

## 2023-07-10 RX ADMIN — METRONIDAZOLE 500 MG: 500 INJECTION, SOLUTION INTRAVENOUS at 13:44

## 2023-07-10 RX ADMIN — SODIUM CHLORIDE, SODIUM LACTATE, POTASSIUM CHLORIDE, AND CALCIUM CHLORIDE 250 ML: 600; 310; 30; 20 INJECTION, SOLUTION INTRAVENOUS at 12:14

## 2023-07-10 RX ADMIN — POTASSIUM CHLORIDE 10 MEQ: 7.46 INJECTION, SOLUTION INTRAVENOUS at 03:28

## 2023-07-10 RX ADMIN — SPIRONOLACTONE 25 MG: 25 TABLET ORAL at 08:24

## 2023-07-10 RX ADMIN — METOCLOPRAMIDE 10 MG: 5 INJECTION, SOLUTION INTRAMUSCULAR; INTRAVENOUS at 10:19

## 2023-07-10 RX ADMIN — POTASSIUM CHLORIDE 10 MEQ: 7.46 INJECTION, SOLUTION INTRAVENOUS at 01:11

## 2023-07-10 RX ADMIN — METRONIDAZOLE 500 MG: 500 INJECTION, SOLUTION INTRAVENOUS at 05:27

## 2023-07-10 RX ADMIN — POTASSIUM CHLORIDE AND SODIUM CHLORIDE: 900; 300 INJECTION, SOLUTION INTRAVENOUS at 17:54

## 2023-07-10 RX ADMIN — POTASSIUM CHLORIDE 10 MEQ: 7.46 INJECTION, SOLUTION INTRAVENOUS at 02:18

## 2023-07-10 RX ADMIN — VANCOMYCIN HYDROCHLORIDE 125 MG: KIT at 05:26

## 2023-07-10 RX ADMIN — VANCOMYCIN HYDROCHLORIDE 125 MG: KIT at 15:40

## 2023-07-10 RX ADMIN — POTASSIUM CHLORIDE 10 MEQ: 7.46 INJECTION, SOLUTION INTRAVENOUS at 00:30

## 2023-07-10 RX ADMIN — Medication 300 MG: at 09:22

## 2023-07-10 RX ADMIN — MAGNESIUM SULFATE HEPTAHYDRATE 1000 MG: 1 INJECTION, SOLUTION INTRAVENOUS at 20:20

## 2023-07-10 RX ADMIN — MEROPENEM 1000 MG: 1 INJECTION, POWDER, FOR SOLUTION INTRAVENOUS at 20:20

## 2023-07-10 RX ADMIN — SODIUM CHLORIDE 200 MG: 9 INJECTION, SOLUTION INTRAVENOUS at 18:24

## 2023-07-10 RX ADMIN — ALBUMIN (HUMAN) 12.5 G: 0.25 INJECTION, SOLUTION INTRAVENOUS at 20:20

## 2023-07-10 RX ADMIN — IOPAMIDOL 90 ML: 755 INJECTION, SOLUTION INTRAVENOUS at 16:21

## 2023-07-10 RX ADMIN — NYSTATIN 500000 UNITS: 100000 SUSPENSION ORAL at 08:24

## 2023-07-10 RX ADMIN — ENOXAPARIN SODIUM 60 MG: 60 INJECTION SUBCUTANEOUS at 04:47

## 2023-07-10 RX ADMIN — METOPROLOL TARTRATE 2.5 MG: 5 INJECTION INTRAVENOUS at 10:19

## 2023-07-10 RX ADMIN — SODIUM CHLORIDE, PRESERVATIVE FREE 20 MG: 5 INJECTION INTRAVENOUS at 21:47

## 2023-07-10 RX ADMIN — SODIUM CHLORIDE, PRESERVATIVE FREE 20 MG: 5 INJECTION INTRAVENOUS at 08:24

## 2023-07-10 ASSESSMENT — PAIN DESCRIPTION - DESCRIPTORS
DESCRIPTORS: ACHING
DESCRIPTORS: ACHING

## 2023-07-10 ASSESSMENT — PAIN SCALES - GENERAL
PAINLEVEL_OUTOF10: 6
PAINLEVEL_OUTOF10: 0
PAINLEVEL_OUTOF10: 6
PAINLEVEL_OUTOF10: 3

## 2023-07-10 ASSESSMENT — PAIN DESCRIPTION - ORIENTATION
ORIENTATION: RIGHT
ORIENTATION: MID

## 2023-07-10 ASSESSMENT — PAIN DESCRIPTION - LOCATION
LOCATION: BACK
LOCATION: BACK;SHOULDER

## 2023-07-11 ENCOUNTER — APPOINTMENT (OUTPATIENT)
Facility: HOSPITAL | Age: 63
DRG: 326 | End: 2023-07-11
Payer: MEDICARE

## 2023-07-11 PROBLEM — R57.9 SHOCK (HCC): Status: ACTIVE | Noted: 2023-07-11

## 2023-07-11 LAB
ANION GAP SERPL CALC-SCNC: 4 MMOL/L (ref 5–15)
ANION GAP SERPL CALC-SCNC: 6 MMOL/L (ref 5–15)
ARTERIAL PATENCY WRIST A: YES
BACTERIA SPEC CULT: NORMAL
BASOPHILS # BLD: 0 K/UL (ref 0–0.1)
BASOPHILS NFR BLD: 0 % (ref 0–1)
BDY SITE: ABNORMAL
BUN SERPL-MCNC: 7 MG/DL (ref 6–20)
BUN SERPL-MCNC: 8 MG/DL (ref 6–20)
BUN/CREAT SERPL: 11 (ref 12–20)
BUN/CREAT SERPL: 13 (ref 12–20)
CALCIUM SERPL-MCNC: 7.7 MG/DL (ref 8.5–10.1)
CALCIUM SERPL-MCNC: 7.9 MG/DL (ref 8.5–10.1)
CHLORIDE SERPL-SCNC: 91 MMOL/L (ref 97–108)
CHLORIDE SERPL-SCNC: 96 MMOL/L (ref 97–108)
CO2 SERPL-SCNC: 34 MMOL/L (ref 21–32)
CO2 SERPL-SCNC: 36 MMOL/L (ref 21–32)
CREAT SERPL-MCNC: 0.61 MG/DL (ref 0.55–1.02)
CREAT SERPL-MCNC: 0.65 MG/DL (ref 0.55–1.02)
DIFFERENTIAL METHOD BLD: ABNORMAL
ECHO AV AREA PEAK VELOCITY: 1.6 CM2
ECHO AV AREA VTI: 1.9 CM2
ECHO AV AREA/BSA PEAK VELOCITY: 1 CM2/M2
ECHO AV AREA/BSA VTI: 1.2 CM2/M2
ECHO AV MEAN GRADIENT: 6 MMHG
ECHO AV MEAN VELOCITY: 1.1 M/S
ECHO AV PEAK GRADIENT: 13 MMHG
ECHO AV PEAK VELOCITY: 1.8 M/S
ECHO AV VELOCITY RATIO: 0.56
ECHO AV VTI: 33.9 CM
ECHO BSA: 1.62 M2
ECHO LA DIAMETER INDEX: 1.67 CM/M2
ECHO LA DIAMETER: 2.7 CM
ECHO LA VOL 2C: 18 ML (ref 22–52)
ECHO LA VOL 2C: 21 ML (ref 22–52)
ECHO LA VOL 4C: 28 ML (ref 22–52)
ECHO LA VOL 4C: 33 ML (ref 22–52)
ECHO LA VOL BP: 23 ML (ref 22–52)
ECHO LA VOL/BSA BIPLANE: 14 ML/M2 (ref 16–34)
ECHO LA VOLUME AREA LENGTH: 27 ML
ECHO LA VOLUME INDEX AREA LENGTH: 17 ML/M2 (ref 16–34)
ECHO LV E' LATERAL VELOCITY: 7 CM/S
ECHO LV E' SEPTAL VELOCITY: 7 CM/S
ECHO LV FRACTIONAL SHORTENING: 23 % (ref 28–44)
ECHO LV INTERNAL DIMENSION DIASTOLE INDEX: 1.85 CM/M2
ECHO LV INTERNAL DIMENSION DIASTOLIC: 3 CM (ref 3.9–5.3)
ECHO LV INTERNAL DIMENSION SYSTOLIC INDEX: 1.42 CM/M2
ECHO LV INTERNAL DIMENSION SYSTOLIC: 2.3 CM
ECHO LV IVSD: 0.8 CM (ref 0.6–0.9)
ECHO LV MASS 2D: 59.1 G (ref 67–162)
ECHO LV MASS INDEX 2D: 36.5 G/M2 (ref 43–95)
ECHO LV POSTERIOR WALL DIASTOLIC: 0.8 CM (ref 0.6–0.9)
ECHO LV RELATIVE WALL THICKNESS RATIO: 0.53
ECHO LVOT AREA: 3.1 CM2
ECHO LVOT AV VTI INDEX: 0.62
ECHO LVOT DIAM: 2 CM
ECHO LVOT MEAN GRADIENT: 2 MMHG
ECHO LVOT PEAK GRADIENT: 4 MMHG
ECHO LVOT PEAK VELOCITY: 1 M/S
ECHO LVOT STROKE VOLUME INDEX: 40.5 ML/M2
ECHO LVOT SV: 65.6 ML
ECHO LVOT VTI: 20.9 CM
ECHO MV A VELOCITY: 1.03 M/S
ECHO MV E DECELERATION TIME (DT): 208.6 MS
ECHO MV E VELOCITY: 0.86 M/S
ECHO MV E/A RATIO: 0.83
ECHO MV E/E' LATERAL: 12.29
ECHO MV E/E' RATIO (AVERAGED): 12.29
ECHO MV E/E' SEPTAL: 12.29
ECHO RV INTERNAL DIMENSION: 3.4 CM
ECHO RV TAPSE: 2.5 CM (ref 1.7–?)
EOSINOPHIL # BLD: 0.1 K/UL (ref 0–0.4)
EOSINOPHIL # BLD: 0.2 K/UL (ref 0–0.4)
EOSINOPHIL # BLD: 0.2 K/UL (ref 0–0.4)
EOSINOPHIL NFR BLD: 1 % (ref 0–7)
EOSINOPHIL NFR BLD: 2 % (ref 0–7)
EOSINOPHIL NFR BLD: 2 % (ref 0–7)
ERYTHROCYTE [DISTWIDTH] IN BLOOD BY AUTOMATED COUNT: 15.5 % (ref 11.5–14.5)
ERYTHROCYTE [DISTWIDTH] IN BLOOD BY AUTOMATED COUNT: 15.5 % (ref 11.5–14.5)
ERYTHROCYTE [DISTWIDTH] IN BLOOD BY AUTOMATED COUNT: 15.7 % (ref 11.5–14.5)
GLUCOSE BLD STRIP.AUTO-MCNC: 69 MG/DL (ref 65–117)
GLUCOSE BLD STRIP.AUTO-MCNC: 85 MG/DL (ref 65–117)
GLUCOSE BLD STRIP.AUTO-MCNC: 89 MG/DL (ref 65–117)
GLUCOSE BLD STRIP.AUTO-MCNC: 93 MG/DL (ref 65–117)
GLUCOSE SERPL-MCNC: 74 MG/DL (ref 65–100)
GLUCOSE SERPL-MCNC: 80 MG/DL (ref 65–100)
HCO3 BLDA-SCNC: 39 MMOL/L (ref 22–26)
HCT VFR BLD AUTO: 18.7 % (ref 35–47)
HCT VFR BLD AUTO: 19.1 % (ref 35–47)
HCT VFR BLD AUTO: 24.1 % (ref 35–47)
HGB BLD-MCNC: 6.2 G/DL (ref 11.5–16)
HGB BLD-MCNC: 6.2 G/DL (ref 11.5–16)
HGB BLD-MCNC: 8.1 G/DL (ref 11.5–16)
HISTORY CHECK: NORMAL
IMM GRANULOCYTES # BLD AUTO: 0.1 K/UL (ref 0–0.04)
IMM GRANULOCYTES NFR BLD AUTO: 1 % (ref 0–0.5)
INR PPP: 1.1 (ref 0.9–1.1)
LYMPHOCYTES # BLD: 0.7 K/UL (ref 0.8–3.5)
LYMPHOCYTES # BLD: 0.8 K/UL (ref 0.8–3.5)
LYMPHOCYTES # BLD: 0.8 K/UL (ref 0.8–3.5)
LYMPHOCYTES NFR BLD: 8 % (ref 12–49)
LYMPHOCYTES NFR BLD: 9 % (ref 12–49)
LYMPHOCYTES NFR BLD: 9 % (ref 12–49)
MAGNESIUM SERPL-MCNC: 1.9 MG/DL (ref 1.6–2.4)
MAGNESIUM SERPL-MCNC: 2.2 MG/DL (ref 1.6–2.4)
MCH RBC QN AUTO: 28.7 PG (ref 26–34)
MCH RBC QN AUTO: 29.1 PG (ref 26–34)
MCH RBC QN AUTO: 29.3 PG (ref 26–34)
MCHC RBC AUTO-ENTMCNC: 32.5 G/DL (ref 30–36.5)
MCHC RBC AUTO-ENTMCNC: 33.2 G/DL (ref 30–36.5)
MCHC RBC AUTO-ENTMCNC: 33.6 G/DL (ref 30–36.5)
MCV RBC AUTO: 87.3 FL (ref 80–99)
MCV RBC AUTO: 87.8 FL (ref 80–99)
MCV RBC AUTO: 88.4 FL (ref 80–99)
MONOCYTES # BLD: 0.6 K/UL (ref 0–1)
MONOCYTES # BLD: 0.7 K/UL (ref 0–1)
MONOCYTES # BLD: 0.7 K/UL (ref 0–1)
MONOCYTES NFR BLD: 7 % (ref 5–13)
NEUTS SEG # BLD: 6.9 K/UL (ref 1.8–8)
NEUTS SEG # BLD: 7.6 K/UL (ref 1.8–8)
NEUTS SEG # BLD: 7.8 K/UL (ref 1.8–8)
NEUTS SEG NFR BLD: 81 % (ref 32–75)
NEUTS SEG NFR BLD: 82 % (ref 32–75)
NEUTS SEG NFR BLD: 83 % (ref 32–75)
NRBC # BLD: 0 K/UL (ref 0–0.01)
NRBC BLD-RTO: 0 PER 100 WBC
PCO2 BLDA: 43 MMHG (ref 35–45)
PH BLDA: 7.58 (ref 7.35–7.45)
PHOSPHATE SERPL-MCNC: 1.8 MG/DL (ref 2.6–4.7)
PLATELET # BLD AUTO: 255 K/UL (ref 150–400)
PLATELET # BLD AUTO: 270 K/UL (ref 150–400)
PLATELET # BLD AUTO: 280 K/UL (ref 150–400)
PMV BLD AUTO: 8.4 FL (ref 8.9–12.9)
PMV BLD AUTO: 8.5 FL (ref 8.9–12.9)
PMV BLD AUTO: 9 FL (ref 8.9–12.9)
PO2 BLDA: 77 MMHG (ref 80–100)
POTASSIUM SERPL-SCNC: 3.1 MMOL/L (ref 3.5–5.1)
POTASSIUM SERPL-SCNC: 3.2 MMOL/L (ref 3.5–5.1)
PROTHROMBIN TIME: 11.8 SEC (ref 9–11.1)
RBC # BLD AUTO: 2.13 M/UL (ref 3.8–5.2)
RBC # BLD AUTO: 2.16 M/UL (ref 3.8–5.2)
RBC # BLD AUTO: 2.76 M/UL (ref 3.8–5.2)
RBC MORPH BLD: ABNORMAL
RBC MORPH BLD: ABNORMAL
SAO2 % BLD: 97 % (ref 92–97)
SAO2% DEVICE SAO2% SENSOR NAME: ABNORMAL
SERVICE CMNT-IMP: ABNORMAL
SERVICE CMNT-IMP: NORMAL
SODIUM SERPL-SCNC: 131 MMOL/L (ref 136–145)
SODIUM SERPL-SCNC: 136 MMOL/L (ref 136–145)
SPECIMEN SITE: ABNORMAL
WBC # BLD AUTO: 8.4 K/UL (ref 3.6–11)
WBC # BLD AUTO: 9.4 K/UL (ref 3.6–11)
WBC # BLD AUTO: 9.6 K/UL (ref 3.6–11)

## 2023-07-11 PROCEDURE — 6360000002 HC RX W HCPCS: Performed by: SURGERY

## 2023-07-11 PROCEDURE — 6370000000 HC RX 637 (ALT 250 FOR IP)

## 2023-07-11 PROCEDURE — 83735 ASSAY OF MAGNESIUM: CPT

## 2023-07-11 PROCEDURE — 2580000003 HC RX 258: Performed by: STUDENT IN AN ORGANIZED HEALTH CARE EDUCATION/TRAINING PROGRAM

## 2023-07-11 PROCEDURE — 2060000000 HC ICU INTERMEDIATE R&B

## 2023-07-11 PROCEDURE — 84100 ASSAY OF PHOSPHORUS: CPT

## 2023-07-11 PROCEDURE — P9047 ALBUMIN (HUMAN), 25%, 50ML: HCPCS | Performed by: STUDENT IN AN ORGANIZED HEALTH CARE EDUCATION/TRAINING PROGRAM

## 2023-07-11 PROCEDURE — 85610 PROTHROMBIN TIME: CPT

## 2023-07-11 PROCEDURE — 2500000003 HC RX 250 WO HCPCS: Performed by: STUDENT IN AN ORGANIZED HEALTH CARE EDUCATION/TRAINING PROGRAM

## 2023-07-11 PROCEDURE — 2580000003 HC RX 258: Performed by: INTERNAL MEDICINE

## 2023-07-11 PROCEDURE — 6370000000 HC RX 637 (ALT 250 FOR IP): Performed by: SURGERY

## 2023-07-11 PROCEDURE — 6360000002 HC RX W HCPCS: Performed by: STUDENT IN AN ORGANIZED HEALTH CARE EDUCATION/TRAINING PROGRAM

## 2023-07-11 PROCEDURE — 2580000003 HC RX 258

## 2023-07-11 PROCEDURE — P9016 RBC LEUKOCYTES REDUCED: HCPCS

## 2023-07-11 PROCEDURE — 6360000002 HC RX W HCPCS: Performed by: INTERNAL MEDICINE

## 2023-07-11 PROCEDURE — 82962 GLUCOSE BLOOD TEST: CPT

## 2023-07-11 PROCEDURE — 6370000000 HC RX 637 (ALT 250 FOR IP): Performed by: INTERNAL MEDICINE

## 2023-07-11 PROCEDURE — 80048 BASIC METABOLIC PNL TOTAL CA: CPT

## 2023-07-11 PROCEDURE — 36430 TRANSFUSION BLD/BLD COMPNT: CPT

## 2023-07-11 PROCEDURE — 99233 SBSQ HOSP IP/OBS HIGH 50: CPT | Performed by: STUDENT IN AN ORGANIZED HEALTH CARE EDUCATION/TRAINING PROGRAM

## 2023-07-11 PROCEDURE — C9113 INJ PANTOPRAZOLE SODIUM, VIA: HCPCS | Performed by: STUDENT IN AN ORGANIZED HEALTH CARE EDUCATION/TRAINING PROGRAM

## 2023-07-11 PROCEDURE — A4216 STERILE WATER/SALINE, 10 ML: HCPCS | Performed by: STUDENT IN AN ORGANIZED HEALTH CARE EDUCATION/TRAINING PROGRAM

## 2023-07-11 PROCEDURE — 94761 N-INVAS EAR/PLS OXIMETRY MLT: CPT

## 2023-07-11 PROCEDURE — 36415 COLL VENOUS BLD VENIPUNCTURE: CPT

## 2023-07-11 PROCEDURE — 93306 TTE W/DOPPLER COMPLETE: CPT

## 2023-07-11 PROCEDURE — 6360000002 HC RX W HCPCS

## 2023-07-11 PROCEDURE — 85025 COMPLETE CBC W/AUTO DIFF WBC: CPT

## 2023-07-11 RX ORDER — MORPHINE SULFATE 2 MG/ML
2 INJECTION, SOLUTION INTRAMUSCULAR; INTRAVENOUS EVERY 4 HOURS PRN
Status: DISCONTINUED | OUTPATIENT
Start: 2023-07-11 | End: 2023-07-18

## 2023-07-11 RX ORDER — POTASSIUM CHLORIDE 7.45 MG/ML
10 INJECTION INTRAVENOUS
Status: COMPLETED | OUTPATIENT
Start: 2023-07-11 | End: 2023-07-11

## 2023-07-11 RX ORDER — DEXTROSE MONOHYDRATE 100 MG/ML
INJECTION, SOLUTION INTRAVENOUS CONTINUOUS PRN
Status: DISCONTINUED | OUTPATIENT
Start: 2023-07-11 | End: 2023-07-14

## 2023-07-11 RX ORDER — SODIUM CHLORIDE 9 MG/ML
INJECTION, SOLUTION INTRAVENOUS PRN
Status: DISCONTINUED | OUTPATIENT
Start: 2023-07-11 | End: 2023-07-11

## 2023-07-11 RX ADMIN — METOCLOPRAMIDE 10 MG: 5 INJECTION, SOLUTION INTRAMUSCULAR; INTRAVENOUS at 16:22

## 2023-07-11 RX ADMIN — POTASSIUM CHLORIDE AND SODIUM CHLORIDE: 900; 300 INJECTION, SOLUTION INTRAVENOUS at 21:49

## 2023-07-11 RX ADMIN — POTASSIUM CHLORIDE 10 MEQ: 7.46 INJECTION, SOLUTION INTRAVENOUS at 21:45

## 2023-07-11 RX ADMIN — METRONIDAZOLE 500 MG: 500 INJECTION, SOLUTION INTRAVENOUS at 05:53

## 2023-07-11 RX ADMIN — VANCOMYCIN HYDROCHLORIDE 125 MG: KIT at 04:51

## 2023-07-11 RX ADMIN — METOCLOPRAMIDE 10 MG: 5 INJECTION, SOLUTION INTRAMUSCULAR; INTRAVENOUS at 20:44

## 2023-07-11 RX ADMIN — POTASSIUM CHLORIDE 10 MEQ: 7.46 INJECTION, SOLUTION INTRAVENOUS at 22:48

## 2023-07-11 RX ADMIN — METOCLOPRAMIDE 10 MG: 5 INJECTION, SOLUTION INTRAMUSCULAR; INTRAVENOUS at 07:41

## 2023-07-11 RX ADMIN — SODIUM CHLORIDE 40 MG: 9 INJECTION INTRAMUSCULAR; INTRAVENOUS; SUBCUTANEOUS at 09:45

## 2023-07-11 RX ADMIN — OXYCODONE HYDROCHLORIDE 5 MG: 5 TABLET ORAL at 03:47

## 2023-07-11 RX ADMIN — MEROPENEM 1000 MG: 1 INJECTION, POWDER, FOR SOLUTION INTRAVENOUS at 20:45

## 2023-07-11 RX ADMIN — ONDANSETRON 4 MG: 2 INJECTION INTRAMUSCULAR; INTRAVENOUS at 15:19

## 2023-07-11 RX ADMIN — POTASSIUM CHLORIDE 10 MEQ: 7.46 INJECTION, SOLUTION INTRAVENOUS at 19:45

## 2023-07-11 RX ADMIN — SODIUM CHLORIDE 100 MG: 9 INJECTION, SOLUTION INTRAVENOUS at 16:31

## 2023-07-11 RX ADMIN — METOCLOPRAMIDE 10 MG: 5 INJECTION, SOLUTION INTRAMUSCULAR; INTRAVENOUS at 11:27

## 2023-07-11 RX ADMIN — ONDANSETRON 4 MG: 2 INJECTION INTRAMUSCULAR; INTRAVENOUS at 08:42

## 2023-07-11 RX ADMIN — ALBUMIN (HUMAN) 12.5 G: 0.25 INJECTION, SOLUTION INTRAVENOUS at 03:50

## 2023-07-11 RX ADMIN — OXYCODONE HYDROCHLORIDE 5 MG: 5 TABLET ORAL at 15:20

## 2023-07-11 RX ADMIN — VANCOMYCIN HYDROCHLORIDE 125 MG: KIT at 16:21

## 2023-07-11 RX ADMIN — ACETAMINOPHEN 650 MG: 325 TABLET ORAL at 06:04

## 2023-07-11 RX ADMIN — POTASSIUM CHLORIDE AND SODIUM CHLORIDE: 900; 300 INJECTION, SOLUTION INTRAVENOUS at 07:42

## 2023-07-11 RX ADMIN — MEROPENEM 1000 MG: 1 INJECTION, POWDER, FOR SOLUTION INTRAVENOUS at 03:57

## 2023-07-11 RX ADMIN — MEROPENEM 1000 MG: 1 INJECTION, POWDER, FOR SOLUTION INTRAVENOUS at 13:05

## 2023-07-11 RX ADMIN — PROCHLORPERAZINE EDISYLATE 10 MG: 5 INJECTION INTRAMUSCULAR; INTRAVENOUS at 20:44

## 2023-07-11 RX ADMIN — POTASSIUM PHOSPHATE, MONOBASIC POTASSIUM PHOSPHATE, DIBASIC 20 MMOL: 224; 236 INJECTION, SOLUTION, CONCENTRATE INTRAVENOUS at 07:41

## 2023-07-11 RX ADMIN — HYDROMORPHONE HYDROCHLORIDE 0.5 MG: 1 INJECTION, SOLUTION INTRAMUSCULAR; INTRAVENOUS; SUBCUTANEOUS at 20:44

## 2023-07-11 RX ADMIN — POTASSIUM CHLORIDE 10 MEQ: 7.46 INJECTION, SOLUTION INTRAVENOUS at 20:45

## 2023-07-11 RX ADMIN — VANCOMYCIN HYDROCHLORIDE 125 MG: KIT at 21:53

## 2023-07-11 RX ADMIN — SODIUM CHLORIDE, PRESERVATIVE FREE 20 MG: 5 INJECTION INTRAVENOUS at 20:44

## 2023-07-11 RX ADMIN — METRONIDAZOLE 500 MG: 500 INJECTION, SOLUTION INTRAVENOUS at 13:54

## 2023-07-11 RX ADMIN — METRONIDAZOLE 500 MG: 500 INJECTION, SOLUTION INTRAVENOUS at 21:49

## 2023-07-11 RX ADMIN — VANCOMYCIN HYDROCHLORIDE 125 MG: KIT at 09:46

## 2023-07-11 RX ADMIN — ALBUMIN (HUMAN) 12.5 G: 0.25 INJECTION, SOLUTION INTRAVENOUS at 13:06

## 2023-07-11 RX ADMIN — SODIUM CHLORIDE, PRESERVATIVE FREE 20 MG: 5 INJECTION INTRAVENOUS at 09:46

## 2023-07-11 ASSESSMENT — PAIN SCALES - GENERAL
PAINLEVEL_OUTOF10: 0
PAINLEVEL_OUTOF10: 6
PAINLEVEL_OUTOF10: 7
PAINLEVEL_OUTOF10: 0
PAINLEVEL_OUTOF10: 7
PAINLEVEL_OUTOF10: 0

## 2023-07-11 ASSESSMENT — PAIN DESCRIPTION - LOCATION
LOCATION: ABDOMEN
LOCATION: ABDOMEN;SHOULDER

## 2023-07-11 ASSESSMENT — PAIN DESCRIPTION - ORIENTATION: ORIENTATION: RIGHT

## 2023-07-11 ASSESSMENT — PAIN DESCRIPTION - DESCRIPTORS: DESCRIPTORS: DISCOMFORT

## 2023-07-12 LAB
ABO + RH BLD: NORMAL
ANION GAP SERPL CALC-SCNC: 3 MMOL/L (ref 5–15)
BASOPHILS # BLD: 0 K/UL (ref 0–0.1)
BASOPHILS NFR BLD: 0 % (ref 0–1)
BLD PROD TYP BPU: NORMAL
BLOOD BANK DISPENSE STATUS: NORMAL
BLOOD GROUP ANTIBODIES SERPL: NORMAL
BPU ID: NORMAL
BUN SERPL-MCNC: 7 MG/DL (ref 6–20)
BUN/CREAT SERPL: 13 (ref 12–20)
CALCIUM SERPL-MCNC: 7.8 MG/DL (ref 8.5–10.1)
CHLORIDE SERPL-SCNC: 99 MMOL/L (ref 97–108)
CO2 SERPL-SCNC: 34 MMOL/L (ref 21–32)
CREAT SERPL-MCNC: 0.52 MG/DL (ref 0.55–1.02)
CROSSMATCH RESULT: NORMAL
DIFFERENTIAL METHOD BLD: ABNORMAL
EOSINOPHIL # BLD: 0.2 K/UL (ref 0–0.4)
EOSINOPHIL NFR BLD: 2 % (ref 0–7)
ERYTHROCYTE [DISTWIDTH] IN BLOOD BY AUTOMATED COUNT: 16 % (ref 11.5–14.5)
GLUCOSE BLD STRIP.AUTO-MCNC: 61 MG/DL (ref 65–117)
GLUCOSE BLD STRIP.AUTO-MCNC: 66 MG/DL (ref 65–117)
GLUCOSE SERPL-MCNC: 67 MG/DL (ref 65–100)
HCT VFR BLD AUTO: 25.8 % (ref 35–47)
HGB BLD-MCNC: 8.6 G/DL (ref 11.5–16)
IMM GRANULOCYTES # BLD AUTO: 0.2 K/UL (ref 0–0.04)
IMM GRANULOCYTES NFR BLD AUTO: 2 % (ref 0–0.5)
LYMPHOCYTES # BLD: 0.9 K/UL (ref 0.8–3.5)
LYMPHOCYTES NFR BLD: 9 % (ref 12–49)
MAGNESIUM SERPL-MCNC: 1.8 MG/DL (ref 1.6–2.4)
MCH RBC QN AUTO: 28.9 PG (ref 26–34)
MCHC RBC AUTO-ENTMCNC: 33.3 G/DL (ref 30–36.5)
MCV RBC AUTO: 86.6 FL (ref 80–99)
MONOCYTES # BLD: 0.7 K/UL (ref 0–1)
MONOCYTES NFR BLD: 8 % (ref 5–13)
NEUTS SEG # BLD: 7.8 K/UL (ref 1.8–8)
NEUTS SEG NFR BLD: 79 % (ref 32–75)
NRBC # BLD: 0 K/UL (ref 0–0.01)
NRBC BLD-RTO: 0 PER 100 WBC
PLATELET # BLD AUTO: 280 K/UL (ref 150–400)
PMV BLD AUTO: 8.4 FL (ref 8.9–12.9)
POTASSIUM SERPL-SCNC: 3.5 MMOL/L (ref 3.5–5.1)
RBC # BLD AUTO: 2.98 M/UL (ref 3.8–5.2)
SERVICE CMNT-IMP: ABNORMAL
SERVICE CMNT-IMP: NORMAL
SODIUM SERPL-SCNC: 136 MMOL/L (ref 136–145)
SPECIMEN EXP DATE BLD: NORMAL
UNIT DIVISION: 0
WBC # BLD AUTO: 9.8 K/UL (ref 3.6–11)

## 2023-07-12 PROCEDURE — 2580000003 HC RX 258: Performed by: STUDENT IN AN ORGANIZED HEALTH CARE EDUCATION/TRAINING PROGRAM

## 2023-07-12 PROCEDURE — 2500000003 HC RX 250 WO HCPCS: Performed by: STUDENT IN AN ORGANIZED HEALTH CARE EDUCATION/TRAINING PROGRAM

## 2023-07-12 PROCEDURE — 97530 THERAPEUTIC ACTIVITIES: CPT

## 2023-07-12 PROCEDURE — 2580000003 HC RX 258: Performed by: INTERNAL MEDICINE

## 2023-07-12 PROCEDURE — 2580000003 HC RX 258

## 2023-07-12 PROCEDURE — 6370000000 HC RX 637 (ALT 250 FOR IP): Performed by: INTERNAL MEDICINE

## 2023-07-12 PROCEDURE — 82962 GLUCOSE BLOOD TEST: CPT

## 2023-07-12 PROCEDURE — 80048 BASIC METABOLIC PNL TOTAL CA: CPT

## 2023-07-12 PROCEDURE — A4216 STERILE WATER/SALINE, 10 ML: HCPCS | Performed by: STUDENT IN AN ORGANIZED HEALTH CARE EDUCATION/TRAINING PROGRAM

## 2023-07-12 PROCEDURE — 83735 ASSAY OF MAGNESIUM: CPT

## 2023-07-12 PROCEDURE — 6360000002 HC RX W HCPCS: Performed by: STUDENT IN AN ORGANIZED HEALTH CARE EDUCATION/TRAINING PROGRAM

## 2023-07-12 PROCEDURE — 6360000002 HC RX W HCPCS: Performed by: INTERNAL MEDICINE

## 2023-07-12 PROCEDURE — 94761 N-INVAS EAR/PLS OXIMETRY MLT: CPT

## 2023-07-12 PROCEDURE — C9113 INJ PANTOPRAZOLE SODIUM, VIA: HCPCS | Performed by: STUDENT IN AN ORGANIZED HEALTH CARE EDUCATION/TRAINING PROGRAM

## 2023-07-12 PROCEDURE — 36415 COLL VENOUS BLD VENIPUNCTURE: CPT

## 2023-07-12 PROCEDURE — 6360000002 HC RX W HCPCS

## 2023-07-12 PROCEDURE — 6360000002 HC RX W HCPCS: Performed by: SURGERY

## 2023-07-12 PROCEDURE — 2060000000 HC ICU INTERMEDIATE R&B

## 2023-07-12 PROCEDURE — 6370000000 HC RX 637 (ALT 250 FOR IP): Performed by: SURGERY

## 2023-07-12 PROCEDURE — 85025 COMPLETE CBC W/AUTO DIFF WBC: CPT

## 2023-07-12 PROCEDURE — 99232 SBSQ HOSP IP/OBS MODERATE 35: CPT | Performed by: STUDENT IN AN ORGANIZED HEALTH CARE EDUCATION/TRAINING PROGRAM

## 2023-07-12 PROCEDURE — 6370000000 HC RX 637 (ALT 250 FOR IP)

## 2023-07-12 RX ADMIN — VANCOMYCIN HYDROCHLORIDE 125 MG: KIT at 16:21

## 2023-07-12 RX ADMIN — VANCOMYCIN HYDROCHLORIDE 125 MG: KIT at 21:05

## 2023-07-12 RX ADMIN — VANCOMYCIN HYDROCHLORIDE 125 MG: KIT at 03:38

## 2023-07-12 RX ADMIN — METOCLOPRAMIDE 10 MG: 5 INJECTION, SOLUTION INTRAMUSCULAR; INTRAVENOUS at 06:25

## 2023-07-12 RX ADMIN — PROCHLORPERAZINE EDISYLATE 10 MG: 5 INJECTION INTRAMUSCULAR; INTRAVENOUS at 19:25

## 2023-07-12 RX ADMIN — SODIUM CHLORIDE, PRESERVATIVE FREE 20 MG: 5 INJECTION INTRAVENOUS at 08:19

## 2023-07-12 RX ADMIN — DEXTROSE MONOHYDRATE 125 ML: 100 INJECTION, SOLUTION INTRAVENOUS at 23:56

## 2023-07-12 RX ADMIN — SODIUM CHLORIDE 100 MG: 9 INJECTION, SOLUTION INTRAVENOUS at 16:21

## 2023-07-12 RX ADMIN — ONDANSETRON 4 MG: 2 INJECTION INTRAMUSCULAR; INTRAVENOUS at 23:14

## 2023-07-12 RX ADMIN — MEROPENEM 1000 MG: 1 INJECTION, POWDER, FOR SOLUTION INTRAVENOUS at 04:40

## 2023-07-12 RX ADMIN — NYSTATIN 500000 UNITS: 100000 SUSPENSION ORAL at 21:06

## 2023-07-12 RX ADMIN — HYDROMORPHONE HYDROCHLORIDE 2 MG: 2 INJECTION, SOLUTION INTRAMUSCULAR; INTRAVENOUS; SUBCUTANEOUS at 15:10

## 2023-07-12 RX ADMIN — ONDANSETRON 4 MG: 2 INJECTION INTRAMUSCULAR; INTRAVENOUS at 15:10

## 2023-07-12 RX ADMIN — SODIUM CHLORIDE, PRESERVATIVE FREE 20 MG: 5 INJECTION INTRAVENOUS at 21:05

## 2023-07-12 RX ADMIN — HYDROMORPHONE HYDROCHLORIDE 2 MG: 2 INJECTION, SOLUTION INTRAMUSCULAR; INTRAVENOUS; SUBCUTANEOUS at 19:25

## 2023-07-12 RX ADMIN — METOCLOPRAMIDE 10 MG: 5 INJECTION, SOLUTION INTRAMUSCULAR; INTRAVENOUS at 09:55

## 2023-07-12 RX ADMIN — POTASSIUM CHLORIDE AND SODIUM CHLORIDE: 900; 300 INJECTION, SOLUTION INTRAVENOUS at 21:52

## 2023-07-12 RX ADMIN — NYSTATIN 500000 UNITS: 100000 SUSPENSION ORAL at 16:15

## 2023-07-12 RX ADMIN — ONDANSETRON 4 MG: 2 INJECTION INTRAMUSCULAR; INTRAVENOUS at 08:51

## 2023-07-12 RX ADMIN — PROCHLORPERAZINE EDISYLATE 10 MG: 5 INJECTION INTRAMUSCULAR; INTRAVENOUS at 09:55

## 2023-07-12 RX ADMIN — NYSTATIN 500000 UNITS: 100000 SUSPENSION ORAL at 12:21

## 2023-07-12 RX ADMIN — VANCOMYCIN HYDROCHLORIDE 125 MG: KIT at 10:34

## 2023-07-12 RX ADMIN — POTASSIUM CHLORIDE AND SODIUM CHLORIDE: 900; 300 INJECTION, SOLUTION INTRAVENOUS at 11:29

## 2023-07-12 RX ADMIN — SODIUM CHLORIDE 40 MG: 9 INJECTION INTRAMUSCULAR; INTRAVENOUS; SUBCUTANEOUS at 08:19

## 2023-07-12 RX ADMIN — METRONIDAZOLE 500 MG: 500 INJECTION, SOLUTION INTRAVENOUS at 06:25

## 2023-07-12 RX ADMIN — HYDROMORPHONE HYDROCHLORIDE 0.5 MG: 1 INJECTION, SOLUTION INTRAMUSCULAR; INTRAVENOUS; SUBCUTANEOUS at 23:14

## 2023-07-12 RX ADMIN — ONDANSETRON 4 MG: 2 INJECTION INTRAMUSCULAR; INTRAVENOUS at 03:38

## 2023-07-12 RX ADMIN — MEROPENEM 1000 MG: 1 INJECTION, POWDER, FOR SOLUTION INTRAVENOUS at 12:21

## 2023-07-12 RX ADMIN — HYDROMORPHONE HYDROCHLORIDE 0.5 MG: 1 INJECTION, SOLUTION INTRAMUSCULAR; INTRAVENOUS; SUBCUTANEOUS at 03:38

## 2023-07-12 ASSESSMENT — PAIN SCALES - GENERAL
PAINLEVEL_OUTOF10: 7
PAINLEVEL_OUTOF10: 6
PAINLEVEL_OUTOF10: 8
PAINLEVEL_OUTOF10: 5

## 2023-07-12 ASSESSMENT — PAIN DESCRIPTION - LOCATION
LOCATION: ABDOMEN

## 2023-07-12 ASSESSMENT — PAIN DESCRIPTION - DESCRIPTORS
DESCRIPTORS: ACHING;SHARP
DESCRIPTORS: ACHING;SHARP

## 2023-07-12 ASSESSMENT — PAIN DESCRIPTION - ORIENTATION
ORIENTATION: MID
ORIENTATION: RIGHT
ORIENTATION: UPPER

## 2023-07-13 ENCOUNTER — ANESTHESIA (OUTPATIENT)
Facility: HOSPITAL | Age: 63
End: 2023-07-13
Payer: MEDICARE

## 2023-07-13 ENCOUNTER — ANESTHESIA EVENT (OUTPATIENT)
Facility: HOSPITAL | Age: 63
End: 2023-07-13
Payer: MEDICARE

## 2023-07-13 LAB
ANION GAP SERPL CALC-SCNC: 5 MMOL/L (ref 5–15)
BASOPHILS # BLD: 0 K/UL (ref 0–0.1)
BASOPHILS NFR BLD: 0 % (ref 0–1)
BUN SERPL-MCNC: 8 MG/DL (ref 6–20)
BUN/CREAT SERPL: 18 (ref 12–20)
CALCIUM SERPL-MCNC: 8.1 MG/DL (ref 8.5–10.1)
CHLORIDE SERPL-SCNC: 103 MMOL/L (ref 97–108)
CO2 SERPL-SCNC: 29 MMOL/L (ref 21–32)
CREAT SERPL-MCNC: 0.44 MG/DL (ref 0.55–1.02)
DIFFERENTIAL METHOD BLD: ABNORMAL
EOSINOPHIL # BLD: 0.3 K/UL (ref 0–0.4)
EOSINOPHIL NFR BLD: 3 % (ref 0–7)
ERYTHROCYTE [DISTWIDTH] IN BLOOD BY AUTOMATED COUNT: 16.4 % (ref 11.5–14.5)
GLUCOSE BLD STRIP.AUTO-MCNC: 103 MG/DL (ref 65–117)
GLUCOSE BLD STRIP.AUTO-MCNC: 129 MG/DL (ref 65–117)
GLUCOSE BLD STRIP.AUTO-MCNC: 133 MG/DL (ref 65–117)
GLUCOSE BLD STRIP.AUTO-MCNC: 63 MG/DL (ref 65–117)
GLUCOSE BLD STRIP.AUTO-MCNC: 65 MG/DL (ref 65–117)
GLUCOSE BLD STRIP.AUTO-MCNC: 77 MG/DL (ref 65–117)
GLUCOSE BLD STRIP.AUTO-MCNC: 88 MG/DL (ref 65–117)
GLUCOSE BLD STRIP.AUTO-MCNC: 91 MG/DL (ref 65–117)
GLUCOSE BLD STRIP.AUTO-MCNC: 98 MG/DL (ref 65–117)
GLUCOSE SERPL-MCNC: 67 MG/DL (ref 65–100)
HCT VFR BLD AUTO: 28 % (ref 35–47)
HGB BLD-MCNC: 9.1 G/DL (ref 11.5–16)
IMM GRANULOCYTES # BLD AUTO: 0.2 K/UL (ref 0–0.04)
IMM GRANULOCYTES NFR BLD AUTO: 2 % (ref 0–0.5)
LYMPHOCYTES # BLD: 1 K/UL (ref 0.8–3.5)
LYMPHOCYTES NFR BLD: 11 % (ref 12–49)
MCH RBC QN AUTO: 28.7 PG (ref 26–34)
MCHC RBC AUTO-ENTMCNC: 32.5 G/DL (ref 30–36.5)
MCV RBC AUTO: 88.3 FL (ref 80–99)
MONOCYTES # BLD: 0.7 K/UL (ref 0–1)
MONOCYTES NFR BLD: 8 % (ref 5–13)
NEUTS SEG # BLD: 7.1 K/UL (ref 1.8–8)
NEUTS SEG NFR BLD: 76 % (ref 32–75)
NRBC # BLD: 0 K/UL (ref 0–0.01)
NRBC BLD-RTO: 0 PER 100 WBC
PLATELET # BLD AUTO: 306 K/UL (ref 150–400)
PMV BLD AUTO: 8.5 FL (ref 8.9–12.9)
POTASSIUM SERPL-SCNC: 4 MMOL/L (ref 3.5–5.1)
RBC # BLD AUTO: 3.17 M/UL (ref 3.8–5.2)
SERVICE CMNT-IMP: ABNORMAL
SERVICE CMNT-IMP: NORMAL
SODIUM SERPL-SCNC: 137 MMOL/L (ref 136–145)
WBC # BLD AUTO: 9.3 K/UL (ref 3.6–11)

## 2023-07-13 PROCEDURE — 6370000000 HC RX 637 (ALT 250 FOR IP)

## 2023-07-13 PROCEDURE — 2580000003 HC RX 258: Performed by: STUDENT IN AN ORGANIZED HEALTH CARE EDUCATION/TRAINING PROGRAM

## 2023-07-13 PROCEDURE — 85025 COMPLETE CBC W/AUTO DIFF WBC: CPT

## 2023-07-13 PROCEDURE — A4216 STERILE WATER/SALINE, 10 ML: HCPCS | Performed by: INTERNAL MEDICINE

## 2023-07-13 PROCEDURE — 0D748ZZ DILATION OF ESOPHAGOGASTRIC JUNCTION, VIA NATURAL OR ARTIFICIAL OPENING ENDOSCOPIC: ICD-10-PCS | Performed by: INTERNAL MEDICINE

## 2023-07-13 PROCEDURE — 6360000002 HC RX W HCPCS: Performed by: SURGERY

## 2023-07-13 PROCEDURE — 2500000003 HC RX 250 WO HCPCS: Performed by: STUDENT IN AN ORGANIZED HEALTH CARE EDUCATION/TRAINING PROGRAM

## 2023-07-13 PROCEDURE — 36415 COLL VENOUS BLD VENIPUNCTURE: CPT

## 2023-07-13 PROCEDURE — 7100000011 HC PHASE II RECOVERY - ADDTL 15 MIN: Performed by: INTERNAL MEDICINE

## 2023-07-13 PROCEDURE — 6360000002 HC RX W HCPCS: Performed by: INTERNAL MEDICINE

## 2023-07-13 PROCEDURE — 3700000000 HC ANESTHESIA ATTENDED CARE: Performed by: INTERNAL MEDICINE

## 2023-07-13 PROCEDURE — 2580000003 HC RX 258: Performed by: SURGERY

## 2023-07-13 PROCEDURE — 94761 N-INVAS EAR/PLS OXIMETRY MLT: CPT

## 2023-07-13 PROCEDURE — C9113 INJ PANTOPRAZOLE SODIUM, VIA: HCPCS | Performed by: STUDENT IN AN ORGANIZED HEALTH CARE EDUCATION/TRAINING PROGRAM

## 2023-07-13 PROCEDURE — 6360000002 HC RX W HCPCS: Performed by: STUDENT IN AN ORGANIZED HEALTH CARE EDUCATION/TRAINING PROGRAM

## 2023-07-13 PROCEDURE — 2580000003 HC RX 258: Performed by: INTERNAL MEDICINE

## 2023-07-13 PROCEDURE — 3600007512: Performed by: INTERNAL MEDICINE

## 2023-07-13 PROCEDURE — 82962 GLUCOSE BLOOD TEST: CPT

## 2023-07-13 PROCEDURE — 6370000000 HC RX 637 (ALT 250 FOR IP): Performed by: INTERNAL MEDICINE

## 2023-07-13 PROCEDURE — 3600007502: Performed by: INTERNAL MEDICINE

## 2023-07-13 PROCEDURE — C9113 INJ PANTOPRAZOLE SODIUM, VIA: HCPCS | Performed by: INTERNAL MEDICINE

## 2023-07-13 PROCEDURE — 99232 SBSQ HOSP IP/OBS MODERATE 35: CPT | Performed by: STUDENT IN AN ORGANIZED HEALTH CARE EDUCATION/TRAINING PROGRAM

## 2023-07-13 PROCEDURE — 80048 BASIC METABOLIC PNL TOTAL CA: CPT

## 2023-07-13 PROCEDURE — 2709999900 HC NON-CHARGEABLE SUPPLY: Performed by: INTERNAL MEDICINE

## 2023-07-13 PROCEDURE — C1726 CATH, BAL DIL, NON-VASCULAR: HCPCS | Performed by: INTERNAL MEDICINE

## 2023-07-13 PROCEDURE — 3700000001 HC ADD 15 MINUTES (ANESTHESIA): Performed by: INTERNAL MEDICINE

## 2023-07-13 PROCEDURE — 6370000000 HC RX 637 (ALT 250 FOR IP): Performed by: SURGERY

## 2023-07-13 PROCEDURE — 7100000010 HC PHASE II RECOVERY - FIRST 15 MIN: Performed by: INTERNAL MEDICINE

## 2023-07-13 PROCEDURE — A4216 STERILE WATER/SALINE, 10 ML: HCPCS | Performed by: STUDENT IN AN ORGANIZED HEALTH CARE EDUCATION/TRAINING PROGRAM

## 2023-07-13 PROCEDURE — 6360000002 HC RX W HCPCS: Performed by: NURSE ANESTHETIST, CERTIFIED REGISTERED

## 2023-07-13 PROCEDURE — 2060000000 HC ICU INTERMEDIATE R&B

## 2023-07-13 RX ORDER — SODIUM CHLORIDE 9 MG/ML
INJECTION, SOLUTION INTRAVENOUS CONTINUOUS
Status: DISCONTINUED | OUTPATIENT
Start: 2023-07-13 | End: 2023-07-13 | Stop reason: SDUPTHER

## 2023-07-13 RX ORDER — PROPOFOL 10 MG/ML
INJECTION, EMULSION INTRAVENOUS PRN
Status: DISCONTINUED | OUTPATIENT
Start: 2023-07-13 | End: 2023-07-13 | Stop reason: SDUPTHER

## 2023-07-13 RX ORDER — DEXTROSE AND SODIUM CHLORIDE 5; .45 G/100ML; G/100ML
INJECTION, SOLUTION INTRAVENOUS CONTINUOUS
Status: DISCONTINUED | OUTPATIENT
Start: 2023-07-13 | End: 2023-07-14

## 2023-07-13 RX ORDER — SUCRALFATE 1 G/1
1 TABLET ORAL
Status: DISCONTINUED | OUTPATIENT
Start: 2023-07-13 | End: 2023-07-21 | Stop reason: HOSPADM

## 2023-07-13 RX ORDER — HYDROXYZINE HYDROCHLORIDE 10 MG/1
25 TABLET, FILM COATED ORAL 3 TIMES DAILY PRN
Status: DISCONTINUED | OUTPATIENT
Start: 2023-07-13 | End: 2023-07-17

## 2023-07-13 RX ORDER — SODIUM CHLORIDE 9 MG/ML
INJECTION, SOLUTION INTRAVENOUS CONTINUOUS
Status: DISCONTINUED | OUTPATIENT
Start: 2023-07-13 | End: 2023-07-14

## 2023-07-13 RX ADMIN — COLLAGENASE SANTYL: 250 OINTMENT TOPICAL at 10:47

## 2023-07-13 RX ADMIN — HYDROMORPHONE HYDROCHLORIDE 2 MG: 2 INJECTION, SOLUTION INTRAMUSCULAR; INTRAVENOUS; SUBCUTANEOUS at 20:16

## 2023-07-13 RX ADMIN — HYDROXYZINE HYDROCHLORIDE 25 MG: 25 TABLET, FILM COATED ORAL at 20:16

## 2023-07-13 RX ADMIN — PROPOFOL 20 MG: 10 INJECTION, EMULSION INTRAVENOUS at 13:10

## 2023-07-13 RX ADMIN — SODIUM CHLORIDE, PRESERVATIVE FREE 40 MG: 5 INJECTION INTRAVENOUS at 20:16

## 2023-07-13 RX ADMIN — DEXTROSE AND SODIUM CHLORIDE: 5; 450 INJECTION, SOLUTION INTRAVENOUS at 11:09

## 2023-07-13 RX ADMIN — DEXTROSE MONOHYDRATE 125 ML: 100 INJECTION, SOLUTION INTRAVENOUS at 05:08

## 2023-07-13 RX ADMIN — SODIUM CHLORIDE 100 MG: 9 INJECTION, SOLUTION INTRAVENOUS at 16:32

## 2023-07-13 RX ADMIN — PROPOFOL 10 MG: 10 INJECTION, EMULSION INTRAVENOUS at 13:14

## 2023-07-13 RX ADMIN — SODIUM CHLORIDE, PRESERVATIVE FREE 20 MG: 5 INJECTION INTRAVENOUS at 08:44

## 2023-07-13 RX ADMIN — PROPOFOL 80 MG: 10 INJECTION, EMULSION INTRAVENOUS at 13:05

## 2023-07-13 RX ADMIN — SUCRALFATE 1 G: 1 TABLET ORAL at 20:16

## 2023-07-13 RX ADMIN — VANCOMYCIN HYDROCHLORIDE 125 MG: KIT at 03:38

## 2023-07-13 RX ADMIN — HYDROMORPHONE HYDROCHLORIDE 2 MG: 2 INJECTION, SOLUTION INTRAMUSCULAR; INTRAVENOUS; SUBCUTANEOUS at 03:38

## 2023-07-13 RX ADMIN — DEXTROSE MONOHYDRATE 125 ML: 100 INJECTION, SOLUTION INTRAVENOUS at 10:46

## 2023-07-13 RX ADMIN — HYDROMORPHONE HYDROCHLORIDE 2 MG: 2 INJECTION, SOLUTION INTRAMUSCULAR; INTRAVENOUS; SUBCUTANEOUS at 14:34

## 2023-07-13 RX ADMIN — VANCOMYCIN HYDROCHLORIDE 125 MG: KIT at 14:23

## 2023-07-13 RX ADMIN — PROPOFOL 20 MG: 10 INJECTION, EMULSION INTRAVENOUS at 13:08

## 2023-07-13 RX ADMIN — SUCRALFATE 1 G: 1 TABLET ORAL at 16:32

## 2023-07-13 RX ADMIN — NYSTATIN 500000 UNITS: 100000 SUSPENSION ORAL at 08:43

## 2023-07-13 RX ADMIN — ONDANSETRON 4 MG: 2 INJECTION INTRAMUSCULAR; INTRAVENOUS at 20:27

## 2023-07-13 RX ADMIN — SODIUM CHLORIDE 40 MG: 9 INJECTION INTRAMUSCULAR; INTRAVENOUS; SUBCUTANEOUS at 08:43

## 2023-07-13 RX ADMIN — ONDANSETRON 4 MG: 2 INJECTION INTRAMUSCULAR; INTRAVENOUS at 14:34

## 2023-07-13 ASSESSMENT — PAIN DESCRIPTION - ORIENTATION
ORIENTATION: RIGHT;LOWER
ORIENTATION: RIGHT;LOWER
ORIENTATION: MID
ORIENTATION: RIGHT;LOWER
ORIENTATION: MID

## 2023-07-13 ASSESSMENT — PAIN DESCRIPTION - LOCATION
LOCATION: ABDOMEN

## 2023-07-13 ASSESSMENT — PAIN SCALES - GENERAL
PAINLEVEL_OUTOF10: 8
PAINLEVEL_OUTOF10: 6
PAINLEVEL_OUTOF10: 7

## 2023-07-13 ASSESSMENT — PAIN DESCRIPTION - DESCRIPTORS
DESCRIPTORS: SHARP
DESCRIPTORS: ACHING;SHARP

## 2023-07-13 ASSESSMENT — PAIN - FUNCTIONAL ASSESSMENT: PAIN_FUNCTIONAL_ASSESSMENT: 0-10

## 2023-07-13 NOTE — ANESTHESIA PRE PROCEDURE
Department of Anesthesiology  Preprocedure Note       Name:  Lviier Jewell   Age:  61 y.o.  :  1960                                          MRN:  [id number]         Date:  2023      Surgeon: Oli Garcia):  Manjit Simon MD    Procedure: Procedure(s):  EGD ESOPHAGOGASTRODUODENOSCOPY with Dobhoff placement    Medications prior to admission:   Prior to Admission medications    Medication Sig Start Date End Date Taking? Authorizing Provider   linezolid (ZYVOX) 600 MG tablet Take 1 tablet by mouth 2 times daily for 10 days 7/5/23 7/15/23  Yvette Francisco DO   ondansetron (ZOFRAN) 4 MG tablet Take 1 tablet by mouth 3 times daily as needed for Nausea or Vomiting 23   Cole Tran MD   bumetanide (BUMEX) 0.5 MG tablet TAKE 1 TABLET BY MOUTH EVERY DAY AS NEEDED 23   Berna Main MD   KLOR-CON M20 20 MEQ extended release tablet TAKE 1 TABLET BY MOUTH EVERY DAY 23   Catalino Frost MD   valACYclovir (VALTREX) 500 MG tablet Take 1 tablet by mouth 2 times daily as needed  Patient not taking: Reported on 2023    Historical Provider, MD   nitroGLYCERIN (NITROSTAT) 0.4 MG SL tablet Place 1 tablet under the tongue every 5 minutes as needed for Chest pain up to max of 3 total doses. If no relief after 1 dose, call 911.   Patient not taking: Reported on 2023    Historical Provider, MD   pantoprazole (PROTONIX) 40 MG tablet Take 1 tablet by mouth in the morning and at bedtime    Historical Provider, MD   acetaminophen (TYLENOL) 500 MG tablet Take 2 tablets by mouth every 6 hours as needed for Pain    Historical Provider, MD   Simethicone (GAS-X PO) Take 150 mg by mouth 4 times daily as needed  Patient not taking: Reported on 2023    Historical Provider, MD   sucralfate (CARAFATE) 1 GM tablet Take 1 tablet by mouth 4 times daily    Historical Provider, MD   albuterol (ACCUNEB) 0.63 MG/3ML nebulizer solution Take 3 mLs by nebulization every 6 hours as needed for Wheezing 23   Beatriz LICONA

## 2023-07-13 NOTE — ANESTHESIA POSTPROCEDURE EVALUATION
Department of Anesthesiology  Postprocedure Note    Patient: Cuca Velázquez  MRN: [id number]  YOB: 1960  Date of evaluation: 7/13/2023      Procedure Summary     Date: 07/13/23 Room / Location: [facility] ENDO 03 / [facility] ENDOSCOPY    Anesthesia Start: 1255 Anesthesia Stop: 5    Procedures:       EGD ESOPHAGOGASTRODUODENOSCOPY with Dobhoff placement (Upper GI Region)      EGD DILATION BALLOON (Upper GI Region) Diagnosis:       Dysphagia, unspecified type      (Dysphagia, unspecified type [R13.10])    Surgeons: Srinivasa Barriga MD Responsible Provider: Michael Gamez MD    Anesthesia Type: MAC ASA Status: 3          Anesthesia Type: MAC    Zuleima Phase I: Zuleima Score: 10    Zuleima Phase II:        Anesthesia Post Evaluation    Patient location during evaluation: PACU  Patient participation: complete - patient participated  Level of consciousness: awake  Airway patency: patent  Nausea & Vomiting: no vomiting and no nausea  Complications: no  Cardiovascular status: hemodynamically stable  Respiratory status: acceptable  Hydration status: stable

## 2023-07-14 LAB
ANION GAP SERPL CALC-SCNC: 3 MMOL/L (ref 5–15)
BACTERIA SPEC CULT: ABNORMAL
BACTERIA SPEC CULT: ABNORMAL
BASOPHILS # BLD: 0 K/UL (ref 0–0.1)
BASOPHILS NFR BLD: 0 % (ref 0–1)
BUN SERPL-MCNC: 8 MG/DL (ref 6–20)
BUN/CREAT SERPL: 15 (ref 12–20)
CALCIUM SERPL-MCNC: 8.3 MG/DL (ref 8.5–10.1)
CHLORIDE SERPL-SCNC: 104 MMOL/L (ref 97–108)
CO2 SERPL-SCNC: 26 MMOL/L (ref 21–32)
CREAT SERPL-MCNC: 0.55 MG/DL (ref 0.55–1.02)
DIFFERENTIAL METHOD BLD: ABNORMAL
EOSINOPHIL # BLD: 0.6 K/UL (ref 0–0.4)
EOSINOPHIL NFR BLD: 6 % (ref 0–7)
ERYTHROCYTE [DISTWIDTH] IN BLOOD BY AUTOMATED COUNT: 16.2 % (ref 11.5–14.5)
GLUCOSE BLD STRIP.AUTO-MCNC: 109 MG/DL (ref 65–117)
GLUCOSE BLD STRIP.AUTO-MCNC: 124 MG/DL (ref 65–117)
GLUCOSE SERPL-MCNC: 102 MG/DL (ref 65–100)
GRAM STN SPEC: ABNORMAL
GRAM STN SPEC: ABNORMAL
HCT VFR BLD AUTO: 27.8 % (ref 35–47)
HGB BLD-MCNC: 9 G/DL (ref 11.5–16)
IMM GRANULOCYTES # BLD AUTO: 0.1 K/UL (ref 0–0.04)
IMM GRANULOCYTES NFR BLD AUTO: 2 % (ref 0–0.5)
LYMPHOCYTES # BLD: 1.3 K/UL (ref 0.8–3.5)
LYMPHOCYTES NFR BLD: 14 % (ref 12–49)
MCH RBC QN AUTO: 29.4 PG (ref 26–34)
MCHC RBC AUTO-ENTMCNC: 32.4 G/DL (ref 30–36.5)
MCV RBC AUTO: 90.8 FL (ref 80–99)
MONOCYTES # BLD: 0.9 K/UL (ref 0–1)
MONOCYTES NFR BLD: 9 % (ref 5–13)
NEUTS SEG # BLD: 6.6 K/UL (ref 1.8–8)
NEUTS SEG NFR BLD: 69 % (ref 32–75)
NRBC # BLD: 0 K/UL (ref 0–0.01)
NRBC BLD-RTO: 0 PER 100 WBC
PHOSPHATE SERPL-MCNC: 1.1 MG/DL (ref 2.6–4.7)
PLATELET # BLD AUTO: 319 K/UL (ref 150–400)
PMV BLD AUTO: 8.4 FL (ref 8.9–12.9)
POTASSIUM SERPL-SCNC: 3.8 MMOL/L (ref 3.5–5.1)
RBC # BLD AUTO: 3.06 M/UL (ref 3.8–5.2)
SERVICE CMNT-IMP: ABNORMAL
SERVICE CMNT-IMP: ABNORMAL
SERVICE CMNT-IMP: NORMAL
SODIUM SERPL-SCNC: 133 MMOL/L (ref 136–145)
WBC # BLD AUTO: 9.5 K/UL (ref 3.6–11)

## 2023-07-14 PROCEDURE — 6360000002 HC RX W HCPCS: Performed by: SURGERY

## 2023-07-14 PROCEDURE — 94761 N-INVAS EAR/PLS OXIMETRY MLT: CPT

## 2023-07-14 PROCEDURE — 84100 ASSAY OF PHOSPHORUS: CPT

## 2023-07-14 PROCEDURE — 2580000003 HC RX 258: Performed by: INTERNAL MEDICINE

## 2023-07-14 PROCEDURE — A4216 STERILE WATER/SALINE, 10 ML: HCPCS | Performed by: INTERNAL MEDICINE

## 2023-07-14 PROCEDURE — 6370000000 HC RX 637 (ALT 250 FOR IP): Performed by: INTERNAL MEDICINE

## 2023-07-14 PROCEDURE — 80048 BASIC METABOLIC PNL TOTAL CA: CPT

## 2023-07-14 PROCEDURE — 85025 COMPLETE CBC W/AUTO DIFF WBC: CPT

## 2023-07-14 PROCEDURE — 2580000003 HC RX 258: Performed by: STUDENT IN AN ORGANIZED HEALTH CARE EDUCATION/TRAINING PROGRAM

## 2023-07-14 PROCEDURE — 6360000002 HC RX W HCPCS: Performed by: INTERNAL MEDICINE

## 2023-07-14 PROCEDURE — 82962 GLUCOSE BLOOD TEST: CPT

## 2023-07-14 PROCEDURE — C9113 INJ PANTOPRAZOLE SODIUM, VIA: HCPCS | Performed by: INTERNAL MEDICINE

## 2023-07-14 PROCEDURE — 98960 EDU&TRN PT SELF-MGMT NQHP 1: CPT

## 2023-07-14 PROCEDURE — 97530 THERAPEUTIC ACTIVITIES: CPT

## 2023-07-14 PROCEDURE — 36415 COLL VENOUS BLD VENIPUNCTURE: CPT

## 2023-07-14 PROCEDURE — 6370000000 HC RX 637 (ALT 250 FOR IP)

## 2023-07-14 PROCEDURE — 97110 THERAPEUTIC EXERCISES: CPT

## 2023-07-14 PROCEDURE — 99233 SBSQ HOSP IP/OBS HIGH 50: CPT | Performed by: FAMILY MEDICINE

## 2023-07-14 PROCEDURE — 6360000002 HC RX W HCPCS: Performed by: STUDENT IN AN ORGANIZED HEALTH CARE EDUCATION/TRAINING PROGRAM

## 2023-07-14 PROCEDURE — 2500000003 HC RX 250 WO HCPCS: Performed by: STUDENT IN AN ORGANIZED HEALTH CARE EDUCATION/TRAINING PROGRAM

## 2023-07-14 PROCEDURE — 1100000003 HC PRIVATE W/ TELEMETRY

## 2023-07-14 PROCEDURE — 2580000003 HC RX 258

## 2023-07-14 RX ORDER — DEXTROSE AND SODIUM CHLORIDE 5; .9 G/100ML; G/100ML
INJECTION, SOLUTION INTRAVENOUS CONTINUOUS
Status: DISCONTINUED | OUTPATIENT
Start: 2023-07-14 | End: 2023-07-20

## 2023-07-14 RX ADMIN — VANCOMYCIN HYDROCHLORIDE 125 MG: KIT at 08:30

## 2023-07-14 RX ADMIN — DEXTROSE AND SODIUM CHLORIDE: 5; 900 INJECTION, SOLUTION INTRAVENOUS at 11:33

## 2023-07-14 RX ADMIN — SODIUM CHLORIDE, PRESERVATIVE FREE 40 MG: 5 INJECTION INTRAVENOUS at 20:56

## 2023-07-14 RX ADMIN — VANCOMYCIN HYDROCHLORIDE 125 MG: KIT at 14:00

## 2023-07-14 RX ADMIN — HYDROMORPHONE HYDROCHLORIDE 2 MG: 2 INJECTION, SOLUTION INTRAMUSCULAR; INTRAVENOUS; SUBCUTANEOUS at 06:36

## 2023-07-14 RX ADMIN — SODIUM CHLORIDE 100 MG: 9 INJECTION, SOLUTION INTRAVENOUS at 16:42

## 2023-07-14 RX ADMIN — HYDROMORPHONE HYDROCHLORIDE 2 MG: 2 INJECTION, SOLUTION INTRAMUSCULAR; INTRAVENOUS; SUBCUTANEOUS at 20:57

## 2023-07-14 RX ADMIN — VANCOMYCIN HYDROCHLORIDE 125 MG: KIT at 01:56

## 2023-07-14 RX ADMIN — SODIUM PHOSPHATE, MONOBASIC, MONOHYDRATE AND SODIUM PHOSPHATE, DIBASIC, ANHYDROUS 20 MMOL: 276; 142 INJECTION, SOLUTION INTRAVENOUS at 06:55

## 2023-07-14 RX ADMIN — SODIUM CHLORIDE, PRESERVATIVE FREE 40 MG: 5 INJECTION INTRAVENOUS at 08:23

## 2023-07-14 RX ADMIN — VANCOMYCIN HYDROCHLORIDE 125 MG: KIT at 21:27

## 2023-07-14 RX ADMIN — SUCRALFATE 1 G: 1 TABLET ORAL at 11:29

## 2023-07-14 RX ADMIN — COLLAGENASE SANTYL: 250 OINTMENT TOPICAL at 08:26

## 2023-07-14 RX ADMIN — HYDROXYZINE HYDROCHLORIDE 25 MG: 25 TABLET, FILM COATED ORAL at 13:18

## 2023-07-14 RX ADMIN — SUCRALFATE 1 G: 1 TABLET ORAL at 16:42

## 2023-07-14 RX ADMIN — HYDROMORPHONE HYDROCHLORIDE 0.5 MG: 1 INJECTION, SOLUTION INTRAMUSCULAR; INTRAVENOUS; SUBCUTANEOUS at 13:18

## 2023-07-14 RX ADMIN — HYDROMORPHONE HYDROCHLORIDE 2 MG: 2 INJECTION, SOLUTION INTRAMUSCULAR; INTRAVENOUS; SUBCUTANEOUS at 00:32

## 2023-07-14 ASSESSMENT — PAIN SCALES - GENERAL
PAINLEVEL_OUTOF10: 4
PAINLEVEL_OUTOF10: 8
PAINLEVEL_OUTOF10: 7
PAINLEVEL_OUTOF10: 7

## 2023-07-14 ASSESSMENT — PAIN DESCRIPTION - ORIENTATION
ORIENTATION: MID
ORIENTATION: UPPER;RIGHT

## 2023-07-14 ASSESSMENT — PAIN DESCRIPTION - LOCATION
LOCATION: ABDOMEN
LOCATION: ABDOMEN

## 2023-07-14 ASSESSMENT — PAIN DESCRIPTION - DESCRIPTORS: DESCRIPTORS: DISCOMFORT

## 2023-07-15 PROBLEM — E87.1 HYPONATREMIA: Status: ACTIVE | Noted: 2023-07-15

## 2023-07-15 LAB
ANION GAP SERPL CALC-SCNC: 4 MMOL/L (ref 5–15)
BUN SERPL-MCNC: 6 MG/DL (ref 6–20)
BUN/CREAT SERPL: 15 (ref 12–20)
CALCIUM SERPL-MCNC: 7.9 MG/DL (ref 8.5–10.1)
CHLORIDE SERPL-SCNC: 105 MMOL/L (ref 97–108)
CO2 SERPL-SCNC: 27 MMOL/L (ref 21–32)
CREAT SERPL-MCNC: 0.39 MG/DL (ref 0.55–1.02)
GLUCOSE BLD STRIP.AUTO-MCNC: 105 MG/DL (ref 65–117)
GLUCOSE BLD STRIP.AUTO-MCNC: 90 MG/DL (ref 65–117)
GLUCOSE SERPL-MCNC: 87 MG/DL (ref 65–100)
POTASSIUM SERPL-SCNC: 3.1 MMOL/L (ref 3.5–5.1)
SERVICE CMNT-IMP: NORMAL
SERVICE CMNT-IMP: NORMAL
SODIUM SERPL-SCNC: 136 MMOL/L (ref 136–145)

## 2023-07-15 PROCEDURE — 99233 SBSQ HOSP IP/OBS HIGH 50: CPT | Performed by: FAMILY MEDICINE

## 2023-07-15 PROCEDURE — 80048 BASIC METABOLIC PNL TOTAL CA: CPT

## 2023-07-15 PROCEDURE — 6370000000 HC RX 637 (ALT 250 FOR IP): Performed by: SURGERY

## 2023-07-15 PROCEDURE — 6360000002 HC RX W HCPCS: Performed by: INTERNAL MEDICINE

## 2023-07-15 PROCEDURE — 51702 INSERT TEMP BLADDER CATH: CPT

## 2023-07-15 PROCEDURE — 36415 COLL VENOUS BLD VENIPUNCTURE: CPT

## 2023-07-15 PROCEDURE — 6360000002 HC RX W HCPCS

## 2023-07-15 PROCEDURE — 1100000003 HC PRIVATE W/ TELEMETRY

## 2023-07-15 PROCEDURE — 2580000003 HC RX 258: Performed by: INTERNAL MEDICINE

## 2023-07-15 PROCEDURE — 2580000003 HC RX 258

## 2023-07-15 PROCEDURE — 6370000000 HC RX 637 (ALT 250 FOR IP): Performed by: INTERNAL MEDICINE

## 2023-07-15 PROCEDURE — A4216 STERILE WATER/SALINE, 10 ML: HCPCS | Performed by: INTERNAL MEDICINE

## 2023-07-15 PROCEDURE — 6370000000 HC RX 637 (ALT 250 FOR IP)

## 2023-07-15 PROCEDURE — 97535 SELF CARE MNGMENT TRAINING: CPT

## 2023-07-15 PROCEDURE — 82962 GLUCOSE BLOOD TEST: CPT

## 2023-07-15 PROCEDURE — C9113 INJ PANTOPRAZOLE SODIUM, VIA: HCPCS | Performed by: INTERNAL MEDICINE

## 2023-07-15 PROCEDURE — 6370000000 HC RX 637 (ALT 250 FOR IP): Performed by: STUDENT IN AN ORGANIZED HEALTH CARE EDUCATION/TRAINING PROGRAM

## 2023-07-15 PROCEDURE — 6360000002 HC RX W HCPCS: Performed by: SURGERY

## 2023-07-15 RX ORDER — POTASSIUM CHLORIDE 7.45 MG/ML
10 INJECTION INTRAVENOUS
Status: COMPLETED | OUTPATIENT
Start: 2023-07-15 | End: 2023-07-15

## 2023-07-15 RX ADMIN — HYDROMORPHONE HYDROCHLORIDE 2 MG: 2 INJECTION, SOLUTION INTRAMUSCULAR; INTRAVENOUS; SUBCUTANEOUS at 16:39

## 2023-07-15 RX ADMIN — OXYCODONE HYDROCHLORIDE 5 MG: 5 TABLET ORAL at 00:25

## 2023-07-15 RX ADMIN — SODIUM CHLORIDE, PRESERVATIVE FREE 40 MG: 5 INJECTION INTRAVENOUS at 20:43

## 2023-07-15 RX ADMIN — HYDROMORPHONE HYDROCHLORIDE 2 MG: 2 INJECTION, SOLUTION INTRAMUSCULAR; INTRAVENOUS; SUBCUTANEOUS at 10:25

## 2023-07-15 RX ADMIN — VANCOMYCIN HYDROCHLORIDE 125 MG: KIT at 02:45

## 2023-07-15 RX ADMIN — COLLAGENASE SANTYL: 250 OINTMENT TOPICAL at 08:40

## 2023-07-15 RX ADMIN — VANCOMYCIN HYDROCHLORIDE 125 MG: KIT at 16:35

## 2023-07-15 RX ADMIN — OXYCODONE HYDROCHLORIDE 10 MG: 5 TABLET ORAL at 22:23

## 2023-07-15 RX ADMIN — ENOXAPARIN SODIUM 60 MG: 60 INJECTION SUBCUTANEOUS at 16:34

## 2023-07-15 RX ADMIN — SUCRALFATE 1 G: 1 TABLET ORAL at 20:43

## 2023-07-15 RX ADMIN — DEXTROSE AND SODIUM CHLORIDE: 5; 900 INJECTION, SOLUTION INTRAVENOUS at 18:31

## 2023-07-15 RX ADMIN — VANCOMYCIN HYDROCHLORIDE 125 MG: KIT at 20:43

## 2023-07-15 RX ADMIN — HYDROMORPHONE HYDROCHLORIDE 2 MG: 2 INJECTION, SOLUTION INTRAMUSCULAR; INTRAVENOUS; SUBCUTANEOUS at 20:43

## 2023-07-15 RX ADMIN — BUPROPION HYDROCHLORIDE 150 MG: 150 TABLET, EXTENDED RELEASE ORAL at 08:39

## 2023-07-15 RX ADMIN — POTASSIUM CHLORIDE 10 MEQ: 7.45 INJECTION INTRAVENOUS at 11:29

## 2023-07-15 RX ADMIN — POTASSIUM CHLORIDE 10 MEQ: 7.45 INJECTION INTRAVENOUS at 10:25

## 2023-07-15 RX ADMIN — SODIUM CHLORIDE 100 MG: 9 INJECTION, SOLUTION INTRAVENOUS at 16:34

## 2023-07-15 RX ADMIN — SUCRALFATE 1 G: 1 TABLET ORAL at 10:25

## 2023-07-15 RX ADMIN — DEXTROSE AND SODIUM CHLORIDE: 5; 900 INJECTION, SOLUTION INTRAVENOUS at 02:19

## 2023-07-15 RX ADMIN — VANCOMYCIN HYDROCHLORIDE 125 MG: KIT at 10:00

## 2023-07-15 RX ADMIN — HYDROMORPHONE HYDROCHLORIDE 2 MG: 2 INJECTION, SOLUTION INTRAMUSCULAR; INTRAVENOUS; SUBCUTANEOUS at 02:44

## 2023-07-15 RX ADMIN — SODIUM CHLORIDE, PRESERVATIVE FREE 40 MG: 5 INJECTION INTRAVENOUS at 08:39

## 2023-07-15 RX ADMIN — SUCRALFATE 1 G: 1 TABLET ORAL at 06:11

## 2023-07-15 RX ADMIN — POTASSIUM CHLORIDE 10 MEQ: 7.45 INJECTION INTRAVENOUS at 09:05

## 2023-07-15 RX ADMIN — SUCRALFATE 1 G: 1 TABLET ORAL at 16:34

## 2023-07-15 RX ADMIN — POTASSIUM CHLORIDE 10 MEQ: 7.45 INJECTION INTRAVENOUS at 12:35

## 2023-07-15 ASSESSMENT — PAIN DESCRIPTION - LOCATION
LOCATION: ABDOMEN

## 2023-07-15 ASSESSMENT — PAIN SCALES - GENERAL
PAINLEVEL_OUTOF10: 8
PAINLEVEL_OUTOF10: 9
PAINLEVEL_OUTOF10: 2
PAINLEVEL_OUTOF10: 8
PAINLEVEL_OUTOF10: 9
PAINLEVEL_OUTOF10: 8
PAINLEVEL_OUTOF10: 8
PAINLEVEL_OUTOF10: 9
PAINLEVEL_OUTOF10: 0

## 2023-07-15 ASSESSMENT — PAIN DESCRIPTION - DESCRIPTORS: DESCRIPTORS: ACHING

## 2023-07-15 ASSESSMENT — PAIN DESCRIPTION - ORIENTATION
ORIENTATION: MID
ORIENTATION: ANTERIOR
ORIENTATION: LEFT

## 2023-07-16 PROBLEM — E11.9 DIABETES MELLITUS (HCC): Status: ACTIVE | Noted: 2020-10-28

## 2023-07-16 LAB
BACTERIA SPEC CULT: NORMAL
BACTERIA SPEC CULT: NORMAL
GLUCOSE BLD STRIP.AUTO-MCNC: 101 MG/DL (ref 65–117)
GLUCOSE BLD STRIP.AUTO-MCNC: 103 MG/DL (ref 65–117)
GLUCOSE BLD STRIP.AUTO-MCNC: 106 MG/DL (ref 65–117)
GLUCOSE BLD STRIP.AUTO-MCNC: 111 MG/DL (ref 65–117)
SERVICE CMNT-IMP: NORMAL

## 2023-07-16 PROCEDURE — 6360000002 HC RX W HCPCS: Performed by: INTERNAL MEDICINE

## 2023-07-16 PROCEDURE — 6370000000 HC RX 637 (ALT 250 FOR IP): Performed by: INTERNAL MEDICINE

## 2023-07-16 PROCEDURE — 51702 INSERT TEMP BLADDER CATH: CPT

## 2023-07-16 PROCEDURE — 99233 SBSQ HOSP IP/OBS HIGH 50: CPT | Performed by: FAMILY MEDICINE

## 2023-07-16 PROCEDURE — 6370000000 HC RX 637 (ALT 250 FOR IP): Performed by: SURGERY

## 2023-07-16 PROCEDURE — C9113 INJ PANTOPRAZOLE SODIUM, VIA: HCPCS | Performed by: INTERNAL MEDICINE

## 2023-07-16 PROCEDURE — 6360000002 HC RX W HCPCS: Performed by: SURGERY

## 2023-07-16 PROCEDURE — 82962 GLUCOSE BLOOD TEST: CPT

## 2023-07-16 PROCEDURE — A4216 STERILE WATER/SALINE, 10 ML: HCPCS | Performed by: INTERNAL MEDICINE

## 2023-07-16 PROCEDURE — 2580000003 HC RX 258: Performed by: INTERNAL MEDICINE

## 2023-07-16 PROCEDURE — 6370000000 HC RX 637 (ALT 250 FOR IP): Performed by: STUDENT IN AN ORGANIZED HEALTH CARE EDUCATION/TRAINING PROGRAM

## 2023-07-16 PROCEDURE — 2580000003 HC RX 258

## 2023-07-16 PROCEDURE — 6360000002 HC RX W HCPCS: Performed by: STUDENT IN AN ORGANIZED HEALTH CARE EDUCATION/TRAINING PROGRAM

## 2023-07-16 PROCEDURE — 1100000003 HC PRIVATE W/ TELEMETRY

## 2023-07-16 PROCEDURE — 6370000000 HC RX 637 (ALT 250 FOR IP)

## 2023-07-16 RX ADMIN — HYDROMORPHONE HYDROCHLORIDE 0.5 MG: 1 INJECTION, SOLUTION INTRAMUSCULAR; INTRAVENOUS; SUBCUTANEOUS at 02:21

## 2023-07-16 RX ADMIN — ENOXAPARIN SODIUM 60 MG: 60 INJECTION SUBCUTANEOUS at 18:04

## 2023-07-16 RX ADMIN — HYDROMORPHONE HYDROCHLORIDE 0.5 MG: 1 INJECTION, SOLUTION INTRAMUSCULAR; INTRAVENOUS; SUBCUTANEOUS at 14:51

## 2023-07-16 RX ADMIN — VANCOMYCIN HYDROCHLORIDE 125 MG: KIT at 16:10

## 2023-07-16 RX ADMIN — SUCRALFATE 1 G: 1 TABLET ORAL at 20:22

## 2023-07-16 RX ADMIN — SUCRALFATE 1 G: 1 TABLET ORAL at 16:10

## 2023-07-16 RX ADMIN — SUCRALFATE 1 G: 1 TABLET ORAL at 12:24

## 2023-07-16 RX ADMIN — DEXTROSE AND SODIUM CHLORIDE: 5; 900 INJECTION, SOLUTION INTRAVENOUS at 08:37

## 2023-07-16 RX ADMIN — VANCOMYCIN HYDROCHLORIDE 125 MG: KIT at 20:22

## 2023-07-16 RX ADMIN — SUCRALFATE 1 G: 1 TABLET ORAL at 05:34

## 2023-07-16 RX ADMIN — SODIUM CHLORIDE, PRESERVATIVE FREE 40 MG: 5 INJECTION INTRAVENOUS at 08:38

## 2023-07-16 RX ADMIN — VANCOMYCIN HYDROCHLORIDE 125 MG: KIT at 09:00

## 2023-07-16 RX ADMIN — HYDROMORPHONE HYDROCHLORIDE 2 MG: 2 INJECTION, SOLUTION INTRAMUSCULAR; INTRAVENOUS; SUBCUTANEOUS at 08:44

## 2023-07-16 RX ADMIN — SODIUM CHLORIDE 100 MG: 9 INJECTION, SOLUTION INTRAVENOUS at 17:55

## 2023-07-16 RX ADMIN — OXYCODONE HYDROCHLORIDE 10 MG: 5 TABLET ORAL at 05:33

## 2023-07-16 RX ADMIN — VANCOMYCIN HYDROCHLORIDE 125 MG: KIT at 02:21

## 2023-07-16 RX ADMIN — HYDROMORPHONE HYDROCHLORIDE 2 MG: 2 INJECTION, SOLUTION INTRAMUSCULAR; INTRAVENOUS; SUBCUTANEOUS at 20:22

## 2023-07-16 RX ADMIN — SODIUM CHLORIDE, PRESERVATIVE FREE 40 MG: 5 INJECTION INTRAVENOUS at 20:22

## 2023-07-16 RX ADMIN — ENOXAPARIN SODIUM 60 MG: 60 INJECTION SUBCUTANEOUS at 05:30

## 2023-07-16 RX ADMIN — BUPROPION HYDROCHLORIDE 150 MG: 150 TABLET, EXTENDED RELEASE ORAL at 08:37

## 2023-07-16 RX ADMIN — COLLAGENASE SANTYL: 250 OINTMENT TOPICAL at 08:38

## 2023-07-16 ASSESSMENT — PAIN DESCRIPTION - ORIENTATION
ORIENTATION: ANTERIOR;RIGHT;LEFT
ORIENTATION: MID

## 2023-07-16 ASSESSMENT — PAIN - FUNCTIONAL ASSESSMENT: PAIN_FUNCTIONAL_ASSESSMENT: PREVENTS OR INTERFERES SOME ACTIVE ACTIVITIES AND ADLS

## 2023-07-16 ASSESSMENT — PAIN SCALES - GENERAL
PAINLEVEL_OUTOF10: 2
PAINLEVEL_OUTOF10: 6
PAINLEVEL_OUTOF10: 8
PAINLEVEL_OUTOF10: 7

## 2023-07-16 ASSESSMENT — PAIN DESCRIPTION - DESCRIPTORS
DESCRIPTORS: ACHING
DESCRIPTORS: ACHING;THROBBING

## 2023-07-16 ASSESSMENT — PAIN DESCRIPTION - LOCATION
LOCATION: ABDOMEN
LOCATION: ABDOMEN
LOCATION: ABDOMEN;LEG

## 2023-07-17 LAB
ANION GAP SERPL CALC-SCNC: 4 MMOL/L (ref 5–15)
BASOPHILS # BLD: 0 K/UL (ref 0–0.1)
BASOPHILS NFR BLD: 1 % (ref 0–1)
BUN SERPL-MCNC: 3 MG/DL (ref 6–20)
BUN/CREAT SERPL: 12 (ref 12–20)
CALCIUM SERPL-MCNC: 7.7 MG/DL (ref 8.5–10.1)
CHLORIDE SERPL-SCNC: 103 MMOL/L (ref 97–108)
CO2 SERPL-SCNC: 30 MMOL/L (ref 21–32)
CREAT SERPL-MCNC: 0.25 MG/DL (ref 0.55–1.02)
DIFFERENTIAL METHOD BLD: ABNORMAL
EOSINOPHIL # BLD: 0.3 K/UL (ref 0–0.4)
EOSINOPHIL NFR BLD: 4 % (ref 0–7)
ERYTHROCYTE [DISTWIDTH] IN BLOOD BY AUTOMATED COUNT: 15.3 % (ref 11.5–14.5)
GLUCOSE BLD STRIP.AUTO-MCNC: 68 MG/DL (ref 65–117)
GLUCOSE BLD STRIP.AUTO-MCNC: 92 MG/DL (ref 65–117)
GLUCOSE SERPL-MCNC: 100 MG/DL (ref 65–100)
HCT VFR BLD AUTO: 26 % (ref 35–47)
HGB BLD-MCNC: 8.8 G/DL (ref 11.5–16)
IMM GRANULOCYTES # BLD AUTO: 0.1 K/UL (ref 0–0.04)
IMM GRANULOCYTES NFR BLD AUTO: 1 % (ref 0–0.5)
LYMPHOCYTES # BLD: 0.8 K/UL (ref 0.8–3.5)
LYMPHOCYTES NFR BLD: 12 % (ref 12–49)
MCH RBC QN AUTO: 29.4 PG (ref 26–34)
MCHC RBC AUTO-ENTMCNC: 33.8 G/DL (ref 30–36.5)
MCV RBC AUTO: 87 FL (ref 80–99)
MONOCYTES # BLD: 0.7 K/UL (ref 0–1)
MONOCYTES NFR BLD: 11 % (ref 5–13)
NEUTS SEG # BLD: 4.7 K/UL (ref 1.8–8)
NEUTS SEG NFR BLD: 72 % (ref 32–75)
NRBC # BLD: 0 K/UL (ref 0–0.01)
NRBC BLD-RTO: 0 PER 100 WBC
PLATELET # BLD AUTO: 298 K/UL (ref 150–400)
PMV BLD AUTO: 8.5 FL (ref 8.9–12.9)
POTASSIUM SERPL-SCNC: 3 MMOL/L (ref 3.5–5.1)
RBC # BLD AUTO: 2.99 M/UL (ref 3.8–5.2)
SERVICE CMNT-IMP: NORMAL
SERVICE CMNT-IMP: NORMAL
SODIUM SERPL-SCNC: 137 MMOL/L (ref 136–145)
WBC # BLD AUTO: 6.6 K/UL (ref 3.6–11)

## 2023-07-17 PROCEDURE — 6360000002 HC RX W HCPCS: Performed by: SURGERY

## 2023-07-17 PROCEDURE — 6360000002 HC RX W HCPCS: Performed by: INTERNAL MEDICINE

## 2023-07-17 PROCEDURE — 6370000000 HC RX 637 (ALT 250 FOR IP)

## 2023-07-17 PROCEDURE — 36600 WITHDRAWAL OF ARTERIAL BLOOD: CPT

## 2023-07-17 PROCEDURE — A4216 STERILE WATER/SALINE, 10 ML: HCPCS | Performed by: INTERNAL MEDICINE

## 2023-07-17 PROCEDURE — 6360000002 HC RX W HCPCS: Performed by: STUDENT IN AN ORGANIZED HEALTH CARE EDUCATION/TRAINING PROGRAM

## 2023-07-17 PROCEDURE — 97116 GAIT TRAINING THERAPY: CPT

## 2023-07-17 PROCEDURE — 36415 COLL VENOUS BLD VENIPUNCTURE: CPT

## 2023-07-17 PROCEDURE — C9113 INJ PANTOPRAZOLE SODIUM, VIA: HCPCS | Performed by: INTERNAL MEDICINE

## 2023-07-17 PROCEDURE — 80048 BASIC METABOLIC PNL TOTAL CA: CPT

## 2023-07-17 PROCEDURE — 97530 THERAPEUTIC ACTIVITIES: CPT

## 2023-07-17 PROCEDURE — 6370000000 HC RX 637 (ALT 250 FOR IP): Performed by: INTERNAL MEDICINE

## 2023-07-17 PROCEDURE — 6360000002 HC RX W HCPCS

## 2023-07-17 PROCEDURE — 99233 SBSQ HOSP IP/OBS HIGH 50: CPT | Performed by: FAMILY MEDICINE

## 2023-07-17 PROCEDURE — 2580000003 HC RX 258: Performed by: INTERNAL MEDICINE

## 2023-07-17 PROCEDURE — 6370000000 HC RX 637 (ALT 250 FOR IP): Performed by: SURGERY

## 2023-07-17 PROCEDURE — 85025 COMPLETE CBC W/AUTO DIFF WBC: CPT

## 2023-07-17 PROCEDURE — 82962 GLUCOSE BLOOD TEST: CPT

## 2023-07-17 PROCEDURE — 6370000000 HC RX 637 (ALT 250 FOR IP): Performed by: STUDENT IN AN ORGANIZED HEALTH CARE EDUCATION/TRAINING PROGRAM

## 2023-07-17 PROCEDURE — 1100000003 HC PRIVATE W/ TELEMETRY

## 2023-07-17 RX ORDER — POTASSIUM CHLORIDE 7.45 MG/ML
10 INJECTION INTRAVENOUS
Status: COMPLETED | OUTPATIENT
Start: 2023-07-17 | End: 2023-07-17

## 2023-07-17 RX ORDER — HYDROXYZINE HYDROCHLORIDE 10 MG/1
25 TABLET, FILM COATED ORAL 3 TIMES DAILY PRN
Status: DISCONTINUED | OUTPATIENT
Start: 2023-07-17 | End: 2023-07-17 | Stop reason: SDUPTHER

## 2023-07-17 RX ORDER — HYDROXYZINE HYDROCHLORIDE 25 MG/1
25 TABLET, FILM COATED ORAL 3 TIMES DAILY PRN
Status: DISCONTINUED | OUTPATIENT
Start: 2023-07-17 | End: 2023-07-21 | Stop reason: HOSPADM

## 2023-07-17 RX ADMIN — PROCHLORPERAZINE EDISYLATE 10 MG: 5 INJECTION INTRAMUSCULAR; INTRAVENOUS at 22:15

## 2023-07-17 RX ADMIN — POTASSIUM CHLORIDE 10 MEQ: 7.45 INJECTION INTRAVENOUS at 08:05

## 2023-07-17 RX ADMIN — POTASSIUM CHLORIDE 10 MEQ: 7.45 INJECTION INTRAVENOUS at 11:34

## 2023-07-17 RX ADMIN — HYDROXYZINE HYDROCHLORIDE 25 MG: 25 TABLET, FILM COATED ORAL at 13:02

## 2023-07-17 RX ADMIN — SUCRALFATE 1 G: 1 TABLET ORAL at 06:24

## 2023-07-17 RX ADMIN — SUCRALFATE 1 G: 1 TABLET ORAL at 20:56

## 2023-07-17 RX ADMIN — SODIUM CHLORIDE, PRESERVATIVE FREE 40 MG: 5 INJECTION INTRAVENOUS at 08:05

## 2023-07-17 RX ADMIN — HYDROMORPHONE HYDROCHLORIDE 0.5 MG: 1 INJECTION, SOLUTION INTRAMUSCULAR; INTRAVENOUS; SUBCUTANEOUS at 20:56

## 2023-07-17 RX ADMIN — VANCOMYCIN HYDROCHLORIDE 125 MG: KIT at 09:41

## 2023-07-17 RX ADMIN — HYDROMORPHONE HYDROCHLORIDE 2 MG: 2 INJECTION, SOLUTION INTRAMUSCULAR; INTRAVENOUS; SUBCUTANEOUS at 02:08

## 2023-07-17 RX ADMIN — POTASSIUM CHLORIDE 10 MEQ: 7.45 INJECTION INTRAVENOUS at 06:24

## 2023-07-17 RX ADMIN — VANCOMYCIN HYDROCHLORIDE 125 MG: KIT at 22:43

## 2023-07-17 RX ADMIN — SUCRALFATE 1 G: 1 TABLET ORAL at 11:35

## 2023-07-17 RX ADMIN — OXYCODONE HYDROCHLORIDE 10 MG: 5 TABLET ORAL at 22:16

## 2023-07-17 RX ADMIN — COLLAGENASE SANTYL: 250 OINTMENT TOPICAL at 09:31

## 2023-07-17 RX ADMIN — VANCOMYCIN HYDROCHLORIDE 125 MG: KIT at 15:26

## 2023-07-17 RX ADMIN — HYDROMORPHONE HYDROCHLORIDE 2 MG: 2 INJECTION, SOLUTION INTRAMUSCULAR; INTRAVENOUS; SUBCUTANEOUS at 12:37

## 2023-07-17 RX ADMIN — ONDANSETRON 4 MG: 2 INJECTION INTRAMUSCULAR; INTRAVENOUS at 08:05

## 2023-07-17 RX ADMIN — SUCRALFATE 1 G: 1 TABLET ORAL at 16:54

## 2023-07-17 RX ADMIN — POTASSIUM CHLORIDE 10 MEQ: 7.45 INJECTION INTRAVENOUS at 12:37

## 2023-07-17 RX ADMIN — ENOXAPARIN SODIUM 60 MG: 60 INJECTION SUBCUTANEOUS at 04:39

## 2023-07-17 RX ADMIN — BUPROPION HYDROCHLORIDE 150 MG: 150 TABLET, EXTENDED RELEASE ORAL at 09:30

## 2023-07-17 RX ADMIN — POTASSIUM CHLORIDE 10 MEQ: 7.45 INJECTION INTRAVENOUS at 09:30

## 2023-07-17 RX ADMIN — SODIUM CHLORIDE, PRESERVATIVE FREE 40 MG: 5 INJECTION INTRAVENOUS at 20:56

## 2023-07-17 RX ADMIN — HYDROMORPHONE HYDROCHLORIDE 2 MG: 2 INJECTION, SOLUTION INTRAMUSCULAR; INTRAVENOUS; SUBCUTANEOUS at 08:05

## 2023-07-17 RX ADMIN — APIXABAN 10 MG: 5 TABLET, FILM COATED ORAL at 16:54

## 2023-07-17 RX ADMIN — VANCOMYCIN HYDROCHLORIDE 125 MG: KIT at 04:39

## 2023-07-17 RX ADMIN — ONDANSETRON 4 MG: 2 INJECTION INTRAMUSCULAR; INTRAVENOUS at 20:56

## 2023-07-17 ASSESSMENT — PAIN DESCRIPTION - DESCRIPTORS
DESCRIPTORS: ACHING

## 2023-07-17 ASSESSMENT — PAIN SCALES - GENERAL
PAINLEVEL_OUTOF10: 2
PAINLEVEL_OUTOF10: 8
PAINLEVEL_OUTOF10: 2
PAINLEVEL_OUTOF10: 8
PAINLEVEL_OUTOF10: 8
PAINLEVEL_OUTOF10: 2
PAINLEVEL_OUTOF10: 8
PAINLEVEL_OUTOF10: 2
PAINLEVEL_OUTOF10: 8

## 2023-07-17 ASSESSMENT — PAIN DESCRIPTION - LOCATION
LOCATION: ABDOMEN;SHOULDER
LOCATION: ABDOMEN

## 2023-07-17 ASSESSMENT — PAIN DESCRIPTION - FREQUENCY
FREQUENCY: CONTINUOUS

## 2023-07-17 ASSESSMENT — PAIN DESCRIPTION - ONSET
ONSET: ON-GOING

## 2023-07-17 ASSESSMENT — PAIN - FUNCTIONAL ASSESSMENT
PAIN_FUNCTIONAL_ASSESSMENT: PREVENTS OR INTERFERES SOME ACTIVE ACTIVITIES AND ADLS

## 2023-07-17 ASSESSMENT — PAIN DESCRIPTION - PAIN TYPE
TYPE: SURGICAL PAIN;CHRONIC PAIN
TYPE: SURGICAL PAIN
TYPE: SURGICAL PAIN

## 2023-07-17 ASSESSMENT — PAIN DESCRIPTION - ORIENTATION
ORIENTATION: ANTERIOR
ORIENTATION: ANTERIOR;RIGHT

## 2023-07-18 LAB
ANION GAP SERPL CALC-SCNC: 7 MMOL/L (ref 5–15)
BASOPHILS # BLD: 0.1 K/UL (ref 0–0.1)
BASOPHILS NFR BLD: 1 % (ref 0–1)
BUN SERPL-MCNC: 3 MG/DL (ref 6–20)
BUN/CREAT SERPL: 11 (ref 12–20)
CALCIUM SERPL-MCNC: 7.8 MG/DL (ref 8.5–10.1)
CHLORIDE SERPL-SCNC: 105 MMOL/L (ref 97–108)
CO2 SERPL-SCNC: 24 MMOL/L (ref 21–32)
CREAT SERPL-MCNC: 0.27 MG/DL (ref 0.55–1.02)
DIFFERENTIAL METHOD BLD: ABNORMAL
EOSINOPHIL # BLD: 0.2 K/UL (ref 0–0.4)
EOSINOPHIL NFR BLD: 4 % (ref 0–7)
ERYTHROCYTE [DISTWIDTH] IN BLOOD BY AUTOMATED COUNT: 15.4 % (ref 11.5–14.5)
GLUCOSE BLD STRIP.AUTO-MCNC: 85 MG/DL (ref 65–117)
GLUCOSE SERPL-MCNC: 89 MG/DL (ref 65–100)
HCT VFR BLD AUTO: 26.8 % (ref 35–47)
HGB BLD-MCNC: 8.7 G/DL (ref 11.5–16)
IMM GRANULOCYTES # BLD AUTO: 0 K/UL (ref 0–0.04)
IMM GRANULOCYTES NFR BLD AUTO: 1 % (ref 0–0.5)
LYMPHOCYTES # BLD: 1 K/UL (ref 0.8–3.5)
LYMPHOCYTES NFR BLD: 18 % (ref 12–49)
MCH RBC QN AUTO: 29.4 PG (ref 26–34)
MCHC RBC AUTO-ENTMCNC: 32.5 G/DL (ref 30–36.5)
MCV RBC AUTO: 90.5 FL (ref 80–99)
MONOCYTES # BLD: 0.7 K/UL (ref 0–1)
MONOCYTES NFR BLD: 13 % (ref 5–13)
NEUTS SEG # BLD: 3.5 K/UL (ref 1.8–8)
NEUTS SEG NFR BLD: 63 % (ref 32–75)
NRBC # BLD: 0 K/UL (ref 0–0.01)
NRBC BLD-RTO: 0 PER 100 WBC
PLATELET # BLD AUTO: 284 K/UL (ref 150–400)
PMV BLD AUTO: 8.6 FL (ref 8.9–12.9)
POTASSIUM SERPL-SCNC: 3.3 MMOL/L (ref 3.5–5.1)
RBC # BLD AUTO: 2.96 M/UL (ref 3.8–5.2)
SERVICE CMNT-IMP: NORMAL
SODIUM SERPL-SCNC: 136 MMOL/L (ref 136–145)
WBC # BLD AUTO: 5.5 K/UL (ref 3.6–11)

## 2023-07-18 PROCEDURE — 6370000000 HC RX 637 (ALT 250 FOR IP): Performed by: INTERNAL MEDICINE

## 2023-07-18 PROCEDURE — 6370000000 HC RX 637 (ALT 250 FOR IP)

## 2023-07-18 PROCEDURE — A4216 STERILE WATER/SALINE, 10 ML: HCPCS | Performed by: INTERNAL MEDICINE

## 2023-07-18 PROCEDURE — 6360000002 HC RX W HCPCS: Performed by: INTERNAL MEDICINE

## 2023-07-18 PROCEDURE — C9113 INJ PANTOPRAZOLE SODIUM, VIA: HCPCS | Performed by: INTERNAL MEDICINE

## 2023-07-18 PROCEDURE — 6360000002 HC RX W HCPCS

## 2023-07-18 PROCEDURE — 6360000002 HC RX W HCPCS: Performed by: SURGERY

## 2023-07-18 PROCEDURE — 2580000003 HC RX 258: Performed by: INTERNAL MEDICINE

## 2023-07-18 PROCEDURE — 6370000000 HC RX 637 (ALT 250 FOR IP): Performed by: SURGERY

## 2023-07-18 PROCEDURE — 82962 GLUCOSE BLOOD TEST: CPT

## 2023-07-18 PROCEDURE — 2580000003 HC RX 258: Performed by: STUDENT IN AN ORGANIZED HEALTH CARE EDUCATION/TRAINING PROGRAM

## 2023-07-18 PROCEDURE — 80048 BASIC METABOLIC PNL TOTAL CA: CPT

## 2023-07-18 PROCEDURE — 2500000003 HC RX 250 WO HCPCS: Performed by: SURGERY

## 2023-07-18 PROCEDURE — 97530 THERAPEUTIC ACTIVITIES: CPT

## 2023-07-18 PROCEDURE — 6370000000 HC RX 637 (ALT 250 FOR IP): Performed by: STUDENT IN AN ORGANIZED HEALTH CARE EDUCATION/TRAINING PROGRAM

## 2023-07-18 PROCEDURE — 97116 GAIT TRAINING THERAPY: CPT

## 2023-07-18 PROCEDURE — 36415 COLL VENOUS BLD VENIPUNCTURE: CPT

## 2023-07-18 PROCEDURE — 97165 OT EVAL LOW COMPLEX 30 MIN: CPT

## 2023-07-18 PROCEDURE — 99232 SBSQ HOSP IP/OBS MODERATE 35: CPT | Performed by: FAMILY MEDICINE

## 2023-07-18 PROCEDURE — 6360000002 HC RX W HCPCS: Performed by: STUDENT IN AN ORGANIZED HEALTH CARE EDUCATION/TRAINING PROGRAM

## 2023-07-18 PROCEDURE — 1100000000 HC RM PRIVATE

## 2023-07-18 PROCEDURE — 97535 SELF CARE MNGMENT TRAINING: CPT

## 2023-07-18 PROCEDURE — 85025 COMPLETE CBC W/AUTO DIFF WBC: CPT

## 2023-07-18 RX ORDER — HYDROMORPHONE HYDROCHLORIDE 1 MG/ML
4 SOLUTION ORAL EVERY 4 HOURS PRN
Status: DISCONTINUED | OUTPATIENT
Start: 2023-07-18 | End: 2023-07-19

## 2023-07-18 RX ORDER — POTASSIUM CHLORIDE 7.45 MG/ML
10 INJECTION INTRAVENOUS
Status: DISPENSED | OUTPATIENT
Start: 2023-07-18 | End: 2023-07-18

## 2023-07-18 RX ORDER — HYDROMORPHONE HYDROCHLORIDE 1 MG/ML
2 INJECTION, SOLUTION INTRAMUSCULAR; INTRAVENOUS; SUBCUTANEOUS EVERY 6 HOURS PRN
Status: DISCONTINUED | OUTPATIENT
Start: 2023-07-18 | End: 2023-07-21 | Stop reason: HOSPADM

## 2023-07-18 RX ORDER — HYDROMORPHONE HYDROCHLORIDE 1 MG/ML
2 SOLUTION ORAL EVERY 4 HOURS PRN
Status: DISCONTINUED | OUTPATIENT
Start: 2023-07-18 | End: 2023-07-19

## 2023-07-18 RX ORDER — POTASSIUM CHLORIDE 750 MG/1
40 TABLET, FILM COATED, EXTENDED RELEASE ORAL ONCE
Status: COMPLETED | OUTPATIENT
Start: 2023-07-18 | End: 2023-07-18

## 2023-07-18 RX ADMIN — APIXABAN 10 MG: 5 TABLET, FILM COATED ORAL at 09:52

## 2023-07-18 RX ADMIN — SUCRALFATE 1 G: 1 TABLET ORAL at 20:44

## 2023-07-18 RX ADMIN — SODIUM CHLORIDE, PRESERVATIVE FREE 40 MG: 5 INJECTION INTRAVENOUS at 09:52

## 2023-07-18 RX ADMIN — OXYCODONE HYDROCHLORIDE 10 MG: 5 TABLET ORAL at 18:37

## 2023-07-18 RX ADMIN — COLLAGENASE SANTYL: 250 OINTMENT TOPICAL at 09:53

## 2023-07-18 RX ADMIN — HYDROMORPHONE HYDROCHLORIDE 2 MG: 1 INJECTION, SOLUTION INTRAMUSCULAR; INTRAVENOUS; SUBCUTANEOUS at 20:51

## 2023-07-18 RX ADMIN — ONDANSETRON 4 MG: 2 INJECTION INTRAMUSCULAR; INTRAVENOUS at 05:28

## 2023-07-18 RX ADMIN — VANCOMYCIN HYDROCHLORIDE 125 MG: KIT at 22:29

## 2023-07-18 RX ADMIN — POTASSIUM CHLORIDE 40 MEQ: 750 TABLET, FILM COATED, EXTENDED RELEASE ORAL at 08:01

## 2023-07-18 RX ADMIN — SUCRALFATE 1 G: 1 TABLET ORAL at 09:59

## 2023-07-18 RX ADMIN — POTASSIUM CHLORIDE 10 MEQ: 7.46 INJECTION, SOLUTION INTRAVENOUS at 08:01

## 2023-07-18 RX ADMIN — DEXTROSE AND SODIUM CHLORIDE: 5; 900 INJECTION, SOLUTION INTRAVENOUS at 01:26

## 2023-07-18 RX ADMIN — ONDANSETRON 4 MG: 2 INJECTION INTRAMUSCULAR; INTRAVENOUS at 20:52

## 2023-07-18 RX ADMIN — HYDROMORPHONE HYDROCHLORIDE 0.5 MG: 1 INJECTION, SOLUTION INTRAMUSCULAR; INTRAVENOUS; SUBCUTANEOUS at 05:27

## 2023-07-18 RX ADMIN — HYDROMORPHONE HYDROCHLORIDE 2 MG: 2 INJECTION, SOLUTION INTRAMUSCULAR; INTRAVENOUS; SUBCUTANEOUS at 15:32

## 2023-07-18 RX ADMIN — OXYCODONE HYDROCHLORIDE 10 MG: 5 TABLET ORAL at 12:46

## 2023-07-18 RX ADMIN — APIXABAN 10 MG: 5 TABLET, FILM COATED ORAL at 20:43

## 2023-07-18 RX ADMIN — HYDROMORPHONE HYDROCHLORIDE 0.5 MG: 1 INJECTION, SOLUTION INTRAMUSCULAR; INTRAVENOUS; SUBCUTANEOUS at 11:00

## 2023-07-18 RX ADMIN — SUCRALFATE 1 G: 1 TABLET ORAL at 15:32

## 2023-07-18 RX ADMIN — VANCOMYCIN HYDROCHLORIDE 125 MG: KIT at 04:40

## 2023-07-18 RX ADMIN — SODIUM CHLORIDE, PRESERVATIVE FREE 40 MG: 5 INJECTION INTRAVENOUS at 20:41

## 2023-07-18 RX ADMIN — SUCRALFATE 1 G: 1 TABLET ORAL at 05:28

## 2023-07-18 RX ADMIN — ONDANSETRON 4 MG: 2 INJECTION INTRAMUSCULAR; INTRAVENOUS at 12:46

## 2023-07-18 RX ADMIN — POTASSIUM CHLORIDE 10 MEQ: 7.46 INJECTION, SOLUTION INTRAVENOUS at 11:17

## 2023-07-18 RX ADMIN — VANCOMYCIN HYDROCHLORIDE 125 MG: KIT at 09:59

## 2023-07-18 RX ADMIN — POTASSIUM CHLORIDE 10 MEQ: 7.46 INJECTION, SOLUTION INTRAVENOUS at 09:52

## 2023-07-18 RX ADMIN — VANCOMYCIN HYDROCHLORIDE 125 MG: KIT at 15:25

## 2023-07-18 RX ADMIN — BUPROPION HYDROCHLORIDE 150 MG: 150 TABLET, EXTENDED RELEASE ORAL at 09:53

## 2023-07-18 ASSESSMENT — PAIN DESCRIPTION - DESCRIPTORS
DESCRIPTORS: ACHING
DESCRIPTORS: CRAMPING;SHARP
DESCRIPTORS: ACHING
DESCRIPTORS: CRAMPING;SHARP
DESCRIPTORS: ACHING
DESCRIPTORS: ACHING;BURNING;STABBING
DESCRIPTORS: ACHING
DESCRIPTORS: ACHING;CRAMPING;SHARP

## 2023-07-18 ASSESSMENT — PAIN SCALES - GENERAL
PAINLEVEL_OUTOF10: 8
PAINLEVEL_OUTOF10: 4
PAINLEVEL_OUTOF10: 9
PAINLEVEL_OUTOF10: 9
PAINLEVEL_OUTOF10: 6
PAINLEVEL_OUTOF10: 7
PAINLEVEL_OUTOF10: 4
PAINLEVEL_OUTOF10: 3
PAINLEVEL_OUTOF10: 8
PAINLEVEL_OUTOF10: 8

## 2023-07-18 ASSESSMENT — PAIN DESCRIPTION - ORIENTATION
ORIENTATION: ANTERIOR
ORIENTATION: ANTERIOR
ORIENTATION: MID
ORIENTATION: RIGHT;LEFT
ORIENTATION: MID
ORIENTATION: LOWER

## 2023-07-18 ASSESSMENT — PAIN DESCRIPTION - LOCATION
LOCATION: ABDOMEN;LEG
LOCATION: ABDOMEN;LEG
LOCATION: ABDOMEN
LOCATION: ABDOMEN;LEG
LOCATION: ABDOMEN

## 2023-07-18 ASSESSMENT — PAIN SCALES - WONG BAKER: WONGBAKER_NUMERICALRESPONSE: 0

## 2023-07-18 ASSESSMENT — PAIN - FUNCTIONAL ASSESSMENT
PAIN_FUNCTIONAL_ASSESSMENT: PREVENTS OR INTERFERES SOME ACTIVE ACTIVITIES AND ADLS
PAIN_FUNCTIONAL_ASSESSMENT: PREVENTS OR INTERFERES SOME ACTIVE ACTIVITIES AND ADLS

## 2023-07-18 NOTE — WOUND CARE
Wound Consult: Follow Up Visit. Chart reviewed. Consulted for sacral area and following. Spoke with patients nurse,  [facility] [place] and we worked together to move patient from [address] [place], [place] [address]e S to [place]. Patient is alert, oriented x 4; requires one to two person to move side to side in bed. Rodrick score 14, has engle. Assessment:  Sacrum - 4.2 x 4 x 0.1 cm, pale white devitalized tissue 40%, 50% brown, 5% red, no surrounding redness, scant serous drainage, Unstageable PI POA (as stage 3). [facility] [place] recently applied new foam dressings to heels, did not take down. Left abdomen - old ASPEN site, draining creamy yellow/green drainage in large amount. No redness in area. Patient reports some diffuse abdominal discomfort. Abdomen soft. Right knee foam in place. Left thigh abrasion changed foam.  Midline incision well approximated and dry, staples still present. Treatment:  Used hydrogel today to sacrum as Santyl not present. [place] will request from pharmacy for next dressing change. Reached out to Dr. Olivier Cevallos and removed staples. Placed an ostomy pouching on old ASPEN site to collect and quantify output. Wound Recommendations:  Sacrum: Santyl ointment in nickel thick layer daily to sacral ulcer with layer of petroleum gauze over; cover with dry outer dressing daily. Continue foam dressings to heels. Change every three days and off loading heel boots. Skin Care / PI Prevention Recommendations:  1. Minimize friction/shear: minimize layers of linen/pads under patient. 2. Off load pressure/reposition:  turn and reposition approximately every 2 hours; float heels with pillows or use off loading heel boots; waffle cushion for sitting; Prius air mattress (called and delivered today and placed). 3. Manage Moisture - keep skin folds dry; incontinence skin care with incontinence wipes; add barrier ointment if needed; engle. Having frequent loose stools.   4. Continue to monitor nutrition, pain, and skin risk scale, and

## 2023-07-19 LAB
ANION GAP SERPL CALC-SCNC: 5 MMOL/L (ref 5–15)
BASOPHILS # BLD: 0 K/UL (ref 0–0.1)
BASOPHILS NFR BLD: 1 % (ref 0–1)
BUN SERPL-MCNC: 2 MG/DL (ref 6–20)
BUN/CREAT SERPL: 6 (ref 12–20)
CALCIUM SERPL-MCNC: 8 MG/DL (ref 8.5–10.1)
CHLORIDE SERPL-SCNC: 103 MMOL/L (ref 97–108)
CO2 SERPL-SCNC: 29 MMOL/L (ref 21–32)
CREAT SERPL-MCNC: 0.32 MG/DL (ref 0.55–1.02)
DIFFERENTIAL METHOD BLD: ABNORMAL
EOSINOPHIL # BLD: 0.2 K/UL (ref 0–0.4)
EOSINOPHIL NFR BLD: 4 % (ref 0–7)
ERYTHROCYTE [DISTWIDTH] IN BLOOD BY AUTOMATED COUNT: 15.6 % (ref 11.5–14.5)
GLUCOSE BLD STRIP.AUTO-MCNC: 108 MG/DL (ref 65–117)
GLUCOSE BLD STRIP.AUTO-MCNC: 120 MG/DL (ref 65–117)
GLUCOSE BLD STRIP.AUTO-MCNC: 71 MG/DL (ref 65–117)
GLUCOSE BLD STRIP.AUTO-MCNC: 96 MG/DL (ref 65–117)
GLUCOSE SERPL-MCNC: 91 MG/DL (ref 65–100)
HCT VFR BLD AUTO: 26.5 % (ref 35–47)
HGB BLD-MCNC: 8.7 G/DL (ref 11.5–16)
IMM GRANULOCYTES # BLD AUTO: 0 K/UL (ref 0–0.04)
IMM GRANULOCYTES NFR BLD AUTO: 1 % (ref 0–0.5)
LYMPHOCYTES # BLD: 0.9 K/UL (ref 0.8–3.5)
LYMPHOCYTES NFR BLD: 18 % (ref 12–49)
MAGNESIUM SERPL-MCNC: 0.7 MG/DL (ref 1.6–2.4)
MCH RBC QN AUTO: 29.5 PG (ref 26–34)
MCHC RBC AUTO-ENTMCNC: 32.8 G/DL (ref 30–36.5)
MCV RBC AUTO: 89.8 FL (ref 80–99)
MONOCYTES # BLD: 0.8 K/UL (ref 0–1)
MONOCYTES NFR BLD: 17 % (ref 5–13)
NEUTS SEG # BLD: 2.9 K/UL (ref 1.8–8)
NEUTS SEG NFR BLD: 59 % (ref 32–75)
NRBC # BLD: 0 K/UL (ref 0–0.01)
NRBC BLD-RTO: 0 PER 100 WBC
PHOSPHATE SERPL-MCNC: 1.6 MG/DL (ref 2.6–4.7)
PLATELET # BLD AUTO: 352 K/UL (ref 150–400)
PMV BLD AUTO: 8.7 FL (ref 8.9–12.9)
POTASSIUM SERPL-SCNC: 3.7 MMOL/L (ref 3.5–5.1)
RBC # BLD AUTO: 2.95 M/UL (ref 3.8–5.2)
SERVICE CMNT-IMP: ABNORMAL
SERVICE CMNT-IMP: NORMAL
SODIUM SERPL-SCNC: 137 MMOL/L (ref 136–145)
WBC # BLD AUTO: 4.9 K/UL (ref 3.6–11)

## 2023-07-19 PROCEDURE — 6360000002 HC RX W HCPCS: Performed by: INTERNAL MEDICINE

## 2023-07-19 PROCEDURE — 6370000000 HC RX 637 (ALT 250 FOR IP): Performed by: SURGERY

## 2023-07-19 PROCEDURE — 6360000002 HC RX W HCPCS

## 2023-07-19 PROCEDURE — 6370000000 HC RX 637 (ALT 250 FOR IP): Performed by: INTERNAL MEDICINE

## 2023-07-19 PROCEDURE — 6360000002 HC RX W HCPCS: Performed by: SURGERY

## 2023-07-19 PROCEDURE — 84100 ASSAY OF PHOSPHORUS: CPT

## 2023-07-19 PROCEDURE — 80048 BASIC METABOLIC PNL TOTAL CA: CPT

## 2023-07-19 PROCEDURE — C9113 INJ PANTOPRAZOLE SODIUM, VIA: HCPCS | Performed by: INTERNAL MEDICINE

## 2023-07-19 PROCEDURE — 6370000000 HC RX 637 (ALT 250 FOR IP)

## 2023-07-19 PROCEDURE — 51798 US URINE CAPACITY MEASURE: CPT

## 2023-07-19 PROCEDURE — A4216 STERILE WATER/SALINE, 10 ML: HCPCS | Performed by: INTERNAL MEDICINE

## 2023-07-19 PROCEDURE — 2500000003 HC RX 250 WO HCPCS: Performed by: SURGERY

## 2023-07-19 PROCEDURE — 94761 N-INVAS EAR/PLS OXIMETRY MLT: CPT

## 2023-07-19 PROCEDURE — 6360000002 HC RX W HCPCS: Performed by: STUDENT IN AN ORGANIZED HEALTH CARE EDUCATION/TRAINING PROGRAM

## 2023-07-19 PROCEDURE — 6370000000 HC RX 637 (ALT 250 FOR IP): Performed by: STUDENT IN AN ORGANIZED HEALTH CARE EDUCATION/TRAINING PROGRAM

## 2023-07-19 PROCEDURE — 2580000003 HC RX 258: Performed by: INTERNAL MEDICINE

## 2023-07-19 PROCEDURE — 2500000003 HC RX 250 WO HCPCS

## 2023-07-19 PROCEDURE — 83735 ASSAY OF MAGNESIUM: CPT

## 2023-07-19 PROCEDURE — 99232 SBSQ HOSP IP/OBS MODERATE 35: CPT | Performed by: FAMILY MEDICINE

## 2023-07-19 PROCEDURE — 82962 GLUCOSE BLOOD TEST: CPT

## 2023-07-19 PROCEDURE — 2580000003 HC RX 258: Performed by: STUDENT IN AN ORGANIZED HEALTH CARE EDUCATION/TRAINING PROGRAM

## 2023-07-19 PROCEDURE — 1100000000 HC RM PRIVATE

## 2023-07-19 PROCEDURE — 85025 COMPLETE CBC W/AUTO DIFF WBC: CPT

## 2023-07-19 PROCEDURE — 36415 COLL VENOUS BLD VENIPUNCTURE: CPT

## 2023-07-19 PROCEDURE — 2580000003 HC RX 258

## 2023-07-19 RX ORDER — MULTIVITAMIN WITH IRON
1 TABLET ORAL DAILY
Status: DISCONTINUED | OUTPATIENT
Start: 2023-07-19 | End: 2023-07-21 | Stop reason: HOSPADM

## 2023-07-19 RX ORDER — CHOLECALCIFEROL (VITAMIN D3) 125 MCG
500 CAPSULE ORAL DAILY
Status: DISCONTINUED | OUTPATIENT
Start: 2023-07-19 | End: 2023-07-21 | Stop reason: HOSPADM

## 2023-07-19 RX ORDER — GAUZE BANDAGE 2" X 2"
100 BANDAGE TOPICAL DAILY
Status: DISCONTINUED | OUTPATIENT
Start: 2023-07-19 | End: 2023-07-21 | Stop reason: HOSPADM

## 2023-07-19 RX ORDER — HYDROMORPHONE HYDROCHLORIDE 1 MG/ML
6 SOLUTION ORAL EVERY 4 HOURS PRN
Status: DISCONTINUED | OUTPATIENT
Start: 2023-07-19 | End: 2023-07-21 | Stop reason: HOSPADM

## 2023-07-19 RX ORDER — MAGNESIUM SULFATE 1 G/100ML
1000 INJECTION INTRAVENOUS
Status: COMPLETED | OUTPATIENT
Start: 2023-07-19 | End: 2023-07-19

## 2023-07-19 RX ORDER — CALCIUM CARBONATE 500 MG/1
500 TABLET, CHEWABLE ORAL 2 TIMES DAILY
Status: DISCONTINUED | OUTPATIENT
Start: 2023-07-19 | End: 2023-07-21 | Stop reason: HOSPADM

## 2023-07-19 RX ORDER — MAGNESIUM SULFATE 1 G/100ML
1000 INJECTION INTRAVENOUS
Status: DISPENSED | OUTPATIENT
Start: 2023-07-19 | End: 2023-07-19

## 2023-07-19 RX ORDER — HYDROMORPHONE HYDROCHLORIDE 1 MG/ML
4 SOLUTION ORAL EVERY 4 HOURS PRN
Status: DISCONTINUED | OUTPATIENT
Start: 2023-07-19 | End: 2023-07-21 | Stop reason: HOSPADM

## 2023-07-19 RX ORDER — VITAMIN B COMPLEX
1000 TABLET ORAL 2 TIMES DAILY
Status: DISCONTINUED | OUTPATIENT
Start: 2023-07-19 | End: 2023-07-21 | Stop reason: HOSPADM

## 2023-07-19 RX ADMIN — ANTACID TABLETS 500 MG: 500 TABLET, CHEWABLE ORAL at 10:04

## 2023-07-19 RX ADMIN — VANCOMYCIN HYDROCHLORIDE 125 MG: KIT at 09:59

## 2023-07-19 RX ADMIN — MAGNESIUM SULFATE HEPTAHYDRATE 1000 MG: 1 INJECTION, SOLUTION INTRAVENOUS at 15:05

## 2023-07-19 RX ADMIN — ONDANSETRON 4 MG: 2 INJECTION INTRAMUSCULAR; INTRAVENOUS at 17:12

## 2023-07-19 RX ADMIN — HYDROMORPHONE HYDROCHLORIDE 4 MG: 5 SOLUTION ORAL at 18:23

## 2023-07-19 RX ADMIN — ONDANSETRON 4 MG: 2 INJECTION INTRAMUSCULAR; INTRAVENOUS at 04:16

## 2023-07-19 RX ADMIN — Medication 100 MG: at 10:04

## 2023-07-19 RX ADMIN — DEXTROSE AND SODIUM CHLORIDE: 5; 900 INJECTION, SOLUTION INTRAVENOUS at 01:21

## 2023-07-19 RX ADMIN — SODIUM CHLORIDE, PRESERVATIVE FREE 40 MG: 5 INJECTION INTRAVENOUS at 20:56

## 2023-07-19 RX ADMIN — Medication 1000 UNITS: at 20:57

## 2023-07-19 RX ADMIN — APIXABAN 10 MG: 5 TABLET, FILM COATED ORAL at 20:56

## 2023-07-19 RX ADMIN — HYDROMORPHONE HYDROCHLORIDE 2 MG: 1 INJECTION, SOLUTION INTRAMUSCULAR; INTRAVENOUS; SUBCUTANEOUS at 04:16

## 2023-07-19 RX ADMIN — APIXABAN 10 MG: 5 TABLET, FILM COATED ORAL at 09:53

## 2023-07-19 RX ADMIN — SODIUM CHLORIDE, PRESERVATIVE FREE 40 MG: 5 INJECTION INTRAVENOUS at 09:53

## 2023-07-19 RX ADMIN — VANCOMYCIN HYDROCHLORIDE 125 MG: KIT at 04:08

## 2023-07-19 RX ADMIN — CYANOCOBALAMIN TAB 500 MCG 500 MCG: 500 TAB at 10:04

## 2023-07-19 RX ADMIN — SUCRALFATE 1 G: 1 TABLET ORAL at 11:47

## 2023-07-19 RX ADMIN — SUCRALFATE 1 G: 1 TABLET ORAL at 09:52

## 2023-07-19 RX ADMIN — HYDROMORPHONE HYDROCHLORIDE 4 MG: 5 SOLUTION ORAL at 22:20

## 2023-07-19 RX ADMIN — THERA TABS 1 TABLET: TAB at 10:04

## 2023-07-19 RX ADMIN — COLLAGENASE SANTYL: 250 OINTMENT TOPICAL at 09:59

## 2023-07-19 RX ADMIN — VANCOMYCIN HYDROCHLORIDE 125 MG: KIT at 17:04

## 2023-07-19 RX ADMIN — VANCOMYCIN HYDROCHLORIDE 125 MG: KIT at 21:12

## 2023-07-19 RX ADMIN — ANTACID TABLETS 500 MG: 500 TABLET, CHEWABLE ORAL at 20:56

## 2023-07-19 RX ADMIN — SUCRALFATE 1 G: 1 TABLET ORAL at 20:57

## 2023-07-19 RX ADMIN — Medication 1000 UNITS: at 10:04

## 2023-07-19 RX ADMIN — HYDROMORPHONE HYDROCHLORIDE 4 MG: 5 SOLUTION ORAL at 13:53

## 2023-07-19 RX ADMIN — SUCRALFATE 1 G: 1 TABLET ORAL at 16:58

## 2023-07-19 RX ADMIN — POTASSIUM PHOSPHATE, MONOBASIC POTASSIUM PHOSPHATE, DIBASIC 30 MMOL: 224; 236 INJECTION, SOLUTION, CONCENTRATE INTRAVENOUS at 12:56

## 2023-07-19 RX ADMIN — MAGNESIUM SULFATE HEPTAHYDRATE 1000 MG: 1 INJECTION, SOLUTION INTRAVENOUS at 12:56

## 2023-07-19 RX ADMIN — MAGNESIUM SULFATE HEPTAHYDRATE 1000 MG: 1 INJECTION, SOLUTION INTRAVENOUS at 16:59

## 2023-07-19 RX ADMIN — MAGNESIUM SULFATE HEPTAHYDRATE 1000 MG: 1 INJECTION, SOLUTION INTRAVENOUS at 20:54

## 2023-07-19 RX ADMIN — MAGNESIUM SULFATE HEPTAHYDRATE 1000 MG: 1 INJECTION, SOLUTION INTRAVENOUS at 18:36

## 2023-07-19 RX ADMIN — MAGNESIUM SULFATE HEPTAHYDRATE 1000 MG: 1 INJECTION, SOLUTION INTRAVENOUS at 11:47

## 2023-07-19 RX ADMIN — BUPROPION HYDROCHLORIDE 150 MG: 150 TABLET, EXTENDED RELEASE ORAL at 09:53

## 2023-07-19 RX ADMIN — MAGNESIUM SULFATE HEPTAHYDRATE 1000 MG: 1 INJECTION, SOLUTION INTRAVENOUS at 18:37

## 2023-07-19 ASSESSMENT — PAIN SCALES - GENERAL
PAINLEVEL_OUTOF10: 2
PAINLEVEL_OUTOF10: 8
PAINLEVEL_OUTOF10: 8
PAINLEVEL_OUTOF10: 9
PAINLEVEL_OUTOF10: 6

## 2023-07-19 ASSESSMENT — PAIN - FUNCTIONAL ASSESSMENT: PAIN_FUNCTIONAL_ASSESSMENT: PREVENTS OR INTERFERES SOME ACTIVE ACTIVITIES AND ADLS

## 2023-07-19 ASSESSMENT — PAIN DESCRIPTION - LOCATION
LOCATION: ABDOMEN
LOCATION: ABDOMEN

## 2023-07-19 ASSESSMENT — PAIN DESCRIPTION - DESCRIPTORS
DESCRIPTORS: ACHING
DESCRIPTORS: ACHING

## 2023-07-19 ASSESSMENT — PAIN DESCRIPTION - ORIENTATION
ORIENTATION: RIGHT
ORIENTATION: MID

## 2023-07-19 ASSESSMENT — PAIN DESCRIPTION - FREQUENCY: FREQUENCY: CONTINUOUS

## 2023-07-19 ASSESSMENT — PAIN DESCRIPTION - ONSET: ONSET: ON-GOING

## 2023-07-19 ASSESSMENT — PAIN DESCRIPTION - PAIN TYPE: TYPE: SURGICAL PAIN

## 2023-07-20 PROBLEM — R39.198 DIFFICULTY IN VOIDING: Status: ACTIVE | Noted: 2023-07-20

## 2023-07-20 LAB
ANION GAP SERPL CALC-SCNC: 6 MMOL/L (ref 5–15)
APPEARANCE UR: CLEAR
BACTERIA URNS QL MICRO: NEGATIVE /HPF
BASOPHILS # BLD: 0.1 K/UL (ref 0–0.1)
BASOPHILS NFR BLD: 1 % (ref 0–1)
BILIRUB UR QL: NEGATIVE
BUN SERPL-MCNC: 2 MG/DL (ref 6–20)
BUN/CREAT SERPL: 6 (ref 12–20)
CALCIUM SERPL-MCNC: 7.9 MG/DL (ref 8.5–10.1)
CHLORIDE SERPL-SCNC: 103 MMOL/L (ref 97–108)
CO2 SERPL-SCNC: 25 MMOL/L (ref 21–32)
COLOR UR: YELLOW
CREAT SERPL-MCNC: 0.34 MG/DL (ref 0.55–1.02)
DIFFERENTIAL METHOD BLD: ABNORMAL
EOSINOPHIL # BLD: 0.2 K/UL (ref 0–0.4)
EOSINOPHIL NFR BLD: 4 % (ref 0–7)
EPITH CASTS URNS QL MICRO: ABNORMAL /LPF
ERYTHROCYTE [DISTWIDTH] IN BLOOD BY AUTOMATED COUNT: 15.5 % (ref 11.5–14.5)
GLUCOSE BLD STRIP.AUTO-MCNC: 85 MG/DL (ref 65–117)
GLUCOSE BLD STRIP.AUTO-MCNC: 95 MG/DL (ref 65–117)
GLUCOSE BLD STRIP.AUTO-MCNC: 95 MG/DL (ref 65–117)
GLUCOSE SERPL-MCNC: 98 MG/DL (ref 65–100)
GLUCOSE UR STRIP.AUTO-MCNC: NEGATIVE MG/DL
HCT VFR BLD AUTO: 26 % (ref 35–47)
HGB BLD-MCNC: 8.5 G/DL (ref 11.5–16)
HGB UR QL STRIP: ABNORMAL
IMM GRANULOCYTES # BLD AUTO: 0 K/UL (ref 0–0.04)
IMM GRANULOCYTES NFR BLD AUTO: 1 % (ref 0–0.5)
KETONES UR QL STRIP.AUTO: 15 MG/DL
LEUKOCYTE ESTERASE UR QL STRIP.AUTO: ABNORMAL
LYMPHOCYTES # BLD: 1 K/UL (ref 0.8–3.5)
LYMPHOCYTES NFR BLD: 18 % (ref 12–49)
MAGNESIUM SERPL-MCNC: 2.1 MG/DL (ref 1.6–2.4)
MCH RBC QN AUTO: 29.6 PG (ref 26–34)
MCHC RBC AUTO-ENTMCNC: 32.7 G/DL (ref 30–36.5)
MCV RBC AUTO: 90.6 FL (ref 80–99)
MONOCYTES # BLD: 0.8 K/UL (ref 0–1)
MONOCYTES NFR BLD: 14 % (ref 5–13)
NEUTS SEG # BLD: 3.4 K/UL (ref 1.8–8)
NEUTS SEG NFR BLD: 62 % (ref 32–75)
NITRITE UR QL STRIP.AUTO: NEGATIVE
NRBC # BLD: 0 K/UL (ref 0–0.01)
NRBC BLD-RTO: 0 PER 100 WBC
PH UR STRIP: 5.5 (ref 5–8)
PHOSPHATE SERPL-MCNC: 3 MG/DL (ref 2.6–4.7)
PLATELET # BLD AUTO: 399 K/UL (ref 150–400)
PMV BLD AUTO: 8.5 FL (ref 8.9–12.9)
POTASSIUM SERPL-SCNC: 3.4 MMOL/L (ref 3.5–5.1)
PROT UR STRIP-MCNC: 30 MG/DL
RBC # BLD AUTO: 2.87 M/UL (ref 3.8–5.2)
RBC #/AREA URNS HPF: ABNORMAL /HPF (ref 0–5)
SERVICE CMNT-IMP: NORMAL
SODIUM SERPL-SCNC: 134 MMOL/L (ref 136–145)
SP GR UR REFRACTOMETRY: 1.01 (ref 1–1.03)
UROBILINOGEN UR QL STRIP.AUTO: 0.2 EU/DL (ref 0.2–1)
WBC # BLD AUTO: 5.4 K/UL (ref 3.6–11)
WBC URNS QL MICRO: ABNORMAL /HPF (ref 0–4)
YEAST URNS QL MICRO: PRESENT

## 2023-07-20 PROCEDURE — 99232 SBSQ HOSP IP/OBS MODERATE 35: CPT | Performed by: FAMILY MEDICINE

## 2023-07-20 PROCEDURE — 6370000000 HC RX 637 (ALT 250 FOR IP)

## 2023-07-20 PROCEDURE — 6370000000 HC RX 637 (ALT 250 FOR IP): Performed by: SURGERY

## 2023-07-20 PROCEDURE — 97535 SELF CARE MNGMENT TRAINING: CPT

## 2023-07-20 PROCEDURE — 6370000000 HC RX 637 (ALT 250 FOR IP): Performed by: INTERNAL MEDICINE

## 2023-07-20 PROCEDURE — 1100000000 HC RM PRIVATE

## 2023-07-20 PROCEDURE — A4216 STERILE WATER/SALINE, 10 ML: HCPCS | Performed by: INTERNAL MEDICINE

## 2023-07-20 PROCEDURE — 84100 ASSAY OF PHOSPHORUS: CPT

## 2023-07-20 PROCEDURE — 6370000000 HC RX 637 (ALT 250 FOR IP): Performed by: STUDENT IN AN ORGANIZED HEALTH CARE EDUCATION/TRAINING PROGRAM

## 2023-07-20 PROCEDURE — 51701 INSERT BLADDER CATHETER: CPT

## 2023-07-20 PROCEDURE — 97530 THERAPEUTIC ACTIVITIES: CPT

## 2023-07-20 PROCEDURE — 82962 GLUCOSE BLOOD TEST: CPT

## 2023-07-20 PROCEDURE — 80048 BASIC METABOLIC PNL TOTAL CA: CPT

## 2023-07-20 PROCEDURE — 85025 COMPLETE CBC W/AUTO DIFF WBC: CPT

## 2023-07-20 PROCEDURE — 83935 ASSAY OF URINE OSMOLALITY: CPT

## 2023-07-20 PROCEDURE — 6360000002 HC RX W HCPCS: Performed by: STUDENT IN AN ORGANIZED HEALTH CARE EDUCATION/TRAINING PROGRAM

## 2023-07-20 PROCEDURE — 6360000002 HC RX W HCPCS: Performed by: INTERNAL MEDICINE

## 2023-07-20 PROCEDURE — 83735 ASSAY OF MAGNESIUM: CPT

## 2023-07-20 PROCEDURE — 36415 COLL VENOUS BLD VENIPUNCTURE: CPT

## 2023-07-20 PROCEDURE — 51798 US URINE CAPACITY MEASURE: CPT

## 2023-07-20 PROCEDURE — C9113 INJ PANTOPRAZOLE SODIUM, VIA: HCPCS | Performed by: INTERNAL MEDICINE

## 2023-07-20 PROCEDURE — 2580000003 HC RX 258: Performed by: INTERNAL MEDICINE

## 2023-07-20 PROCEDURE — 81001 URINALYSIS AUTO W/SCOPE: CPT

## 2023-07-20 RX ADMIN — Medication 1000 UNITS: at 09:17

## 2023-07-20 RX ADMIN — HYDROMORPHONE HYDROCHLORIDE 4 MG: 5 SOLUTION ORAL at 06:45

## 2023-07-20 RX ADMIN — SUCRALFATE 1 G: 1 TABLET ORAL at 17:14

## 2023-07-20 RX ADMIN — SUCRALFATE 1 G: 1 TABLET ORAL at 06:44

## 2023-07-20 RX ADMIN — COLLAGENASE SANTYL: 250 OINTMENT TOPICAL at 15:29

## 2023-07-20 RX ADMIN — SUCRALFATE 1 G: 1 TABLET ORAL at 21:59

## 2023-07-20 RX ADMIN — SODIUM CHLORIDE, PRESERVATIVE FREE 40 MG: 5 INJECTION INTRAVENOUS at 21:33

## 2023-07-20 RX ADMIN — Medication 100 MG: at 09:18

## 2023-07-20 RX ADMIN — VANCOMYCIN HYDROCHLORIDE 125 MG: KIT at 15:45

## 2023-07-20 RX ADMIN — VANCOMYCIN HYDROCHLORIDE 125 MG: KIT at 09:17

## 2023-07-20 RX ADMIN — APIXABAN 10 MG: 5 TABLET, FILM COATED ORAL at 09:18

## 2023-07-20 RX ADMIN — CYANOCOBALAMIN TAB 500 MCG 500 MCG: 500 TAB at 09:17

## 2023-07-20 RX ADMIN — ANTACID TABLETS 500 MG: 500 TABLET, CHEWABLE ORAL at 09:17

## 2023-07-20 RX ADMIN — VANCOMYCIN HYDROCHLORIDE 125 MG: KIT at 03:30

## 2023-07-20 RX ADMIN — APIXABAN 5 MG: 5 TABLET, FILM COATED ORAL at 21:33

## 2023-07-20 RX ADMIN — ONDANSETRON 4 MG: 2 INJECTION INTRAMUSCULAR; INTRAVENOUS at 17:15

## 2023-07-20 RX ADMIN — SODIUM CHLORIDE, PRESERVATIVE FREE 40 MG: 5 INJECTION INTRAVENOUS at 09:19

## 2023-07-20 RX ADMIN — HYDROMORPHONE HYDROCHLORIDE 4 MG: 5 SOLUTION ORAL at 02:32

## 2023-07-20 RX ADMIN — BUPROPION HYDROCHLORIDE 150 MG: 150 TABLET, EXTENDED RELEASE ORAL at 09:17

## 2023-07-20 RX ADMIN — Medication 1000 UNITS: at 21:33

## 2023-07-20 RX ADMIN — HYDROMORPHONE HYDROCHLORIDE 4 MG: 5 SOLUTION ORAL at 17:15

## 2023-07-20 RX ADMIN — HYDROMORPHONE HYDROCHLORIDE 4 MG: 5 SOLUTION ORAL at 09:54

## 2023-07-20 RX ADMIN — HYDROMORPHONE HYDROCHLORIDE 6 MG: 5 SOLUTION ORAL at 21:59

## 2023-07-20 RX ADMIN — VANCOMYCIN HYDROCHLORIDE 125 MG: KIT at 22:55

## 2023-07-20 RX ADMIN — ANTACID TABLETS 500 MG: 500 TABLET, CHEWABLE ORAL at 21:59

## 2023-07-20 RX ADMIN — THERA TABS 1 TABLET: TAB at 09:17

## 2023-07-20 ASSESSMENT — PAIN DESCRIPTION - ORIENTATION
ORIENTATION: MID;LOWER
ORIENTATION: LOWER;UPPER
ORIENTATION: MID;LOWER

## 2023-07-20 ASSESSMENT — PAIN DESCRIPTION - FREQUENCY
FREQUENCY: CONTINUOUS
FREQUENCY: INTERMITTENT

## 2023-07-20 ASSESSMENT — PAIN DESCRIPTION - LOCATION
LOCATION: ABDOMEN

## 2023-07-20 ASSESSMENT — PAIN SCALES - GENERAL
PAINLEVEL_OUTOF10: 2
PAINLEVEL_OUTOF10: 8
PAINLEVEL_OUTOF10: 6

## 2023-07-20 ASSESSMENT — PAIN DESCRIPTION - PAIN TYPE
TYPE: SURGICAL PAIN
TYPE: SURGICAL PAIN

## 2023-07-20 ASSESSMENT — PAIN DESCRIPTION - DESCRIPTORS
DESCRIPTORS: ACHING

## 2023-07-20 ASSESSMENT — PAIN DESCRIPTION - ONSET
ONSET: GRADUAL
ONSET: GRADUAL

## 2023-07-21 VITALS
HEIGHT: 64 IN | TEMPERATURE: 98.1 F | WEIGHT: 143.9 LBS | OXYGEN SATURATION: 100 % | RESPIRATION RATE: 16 BRPM | HEART RATE: 102 BPM | DIASTOLIC BLOOD PRESSURE: 85 MMHG | BODY MASS INDEX: 24.57 KG/M2 | SYSTOLIC BLOOD PRESSURE: 135 MMHG

## 2023-07-21 LAB
GLUCOSE BLD STRIP.AUTO-MCNC: 107 MG/DL (ref 65–117)
GLUCOSE BLD STRIP.AUTO-MCNC: 90 MG/DL (ref 65–117)
OSMOLALITY UR: 382 MOSM/KG H2O
SERVICE CMNT-IMP: NORMAL
SERVICE CMNT-IMP: NORMAL

## 2023-07-21 PROCEDURE — 6370000000 HC RX 637 (ALT 250 FOR IP)

## 2023-07-21 PROCEDURE — 6370000000 HC RX 637 (ALT 250 FOR IP): Performed by: SURGERY

## 2023-07-21 PROCEDURE — 6370000000 HC RX 637 (ALT 250 FOR IP): Performed by: INTERNAL MEDICINE

## 2023-07-21 PROCEDURE — 2580000003 HC RX 258: Performed by: INTERNAL MEDICINE

## 2023-07-21 PROCEDURE — 6360000002 HC RX W HCPCS: Performed by: INTERNAL MEDICINE

## 2023-07-21 PROCEDURE — 51798 US URINE CAPACITY MEASURE: CPT

## 2023-07-21 PROCEDURE — 82962 GLUCOSE BLOOD TEST: CPT

## 2023-07-21 PROCEDURE — 6360000002 HC RX W HCPCS: Performed by: STUDENT IN AN ORGANIZED HEALTH CARE EDUCATION/TRAINING PROGRAM

## 2023-07-21 PROCEDURE — 6370000000 HC RX 637 (ALT 250 FOR IP): Performed by: STUDENT IN AN ORGANIZED HEALTH CARE EDUCATION/TRAINING PROGRAM

## 2023-07-21 PROCEDURE — 99232 SBSQ HOSP IP/OBS MODERATE 35: CPT | Performed by: FAMILY MEDICINE

## 2023-07-21 PROCEDURE — A4216 STERILE WATER/SALINE, 10 ML: HCPCS | Performed by: INTERNAL MEDICINE

## 2023-07-21 PROCEDURE — C9113 INJ PANTOPRAZOLE SODIUM, VIA: HCPCS | Performed by: INTERNAL MEDICINE

## 2023-07-21 RX ORDER — THIAMINE MONONITRATE (VIT B1) 100 MG
100 TABLET ORAL DAILY
Qty: 30 TABLET | Refills: 1 | Status: SHIPPED | OUTPATIENT
Start: 2023-07-22

## 2023-07-21 RX ORDER — FLUCONAZOLE 100 MG/1
150 TABLET ORAL ONCE
Status: COMPLETED | OUTPATIENT
Start: 2023-07-21 | End: 2023-07-21

## 2023-07-21 RX ORDER — CALCIUM CARBONATE 500 MG/1
500 TABLET, CHEWABLE ORAL 2 TIMES DAILY
Qty: 60 TABLET | Refills: 0 | Status: SHIPPED | OUTPATIENT
Start: 2023-07-21 | End: 2023-08-20

## 2023-07-21 RX ORDER — DIPHENHYDRAMINE HYDROCHLORIDE AND LIDOCAINE HYDROCHLORIDE AND ALUMINUM HYDROXIDE AND MAGNESIUM HYDRO
5 KIT 4 TIMES DAILY PRN
Qty: 237 ML | Refills: 0 | Status: SHIPPED | OUTPATIENT
Start: 2023-07-21

## 2023-07-21 RX ORDER — VANCOMYCIN HYDROCHLORIDE 50 MG/ML
125 KIT ORAL EVERY 6 HOURS
Qty: 50 ML | Refills: 0 | Status: SHIPPED | OUTPATIENT
Start: 2023-07-21 | End: 2023-07-26

## 2023-07-21 RX ORDER — HYDROMORPHONE HYDROCHLORIDE 1 MG/ML
6 SOLUTION ORAL EVERY 4 HOURS PRN
Qty: 210 ML | Refills: 0 | Status: SHIPPED | OUTPATIENT
Start: 2023-07-21 | End: 2023-07-27

## 2023-07-21 RX ORDER — CHOLECALCIFEROL (VITAMIN D3) 25 MCG
1000 TABLET ORAL 2 TIMES DAILY
Qty: 60 TABLET | Refills: 1 | Status: SHIPPED | OUTPATIENT
Start: 2023-07-21

## 2023-07-21 RX ORDER — SUCRALFATE 1 G/1
1 TABLET ORAL 4 TIMES DAILY
Qty: 120 TABLET | Refills: 3 | Status: SHIPPED | OUTPATIENT
Start: 2023-07-21

## 2023-07-21 RX ORDER — GABAPENTIN 250 MG/5ML
300 SOLUTION ORAL 2 TIMES DAILY
Qty: 473 ML | Refills: 3 | Status: SHIPPED | OUTPATIENT
Start: 2023-07-21 | End: 2023-12-26

## 2023-07-21 RX ORDER — NALOXONE HYDROCHLORIDE 4 MG/.1ML
1 SPRAY NASAL PRN
Qty: 1 EACH | Refills: 5 | Status: SHIPPED | OUTPATIENT
Start: 2023-07-21

## 2023-07-21 RX ORDER — HYDROXYZINE HYDROCHLORIDE 25 MG/1
25 TABLET, FILM COATED ORAL 3 TIMES DAILY PRN
Qty: 30 TABLET | Refills: 0 | Status: SHIPPED | OUTPATIENT
Start: 2023-07-21 | End: 2023-07-31

## 2023-07-21 RX ORDER — LIDOCAINE 4 G/G
1 PATCH TOPICAL DAILY
Qty: 20 PATCH | Refills: 0 | Status: SHIPPED | OUTPATIENT
Start: 2023-07-22

## 2023-07-21 RX ORDER — HYDROMORPHONE HYDROCHLORIDE 1 MG/ML
4 SOLUTION ORAL EVERY 4 HOURS PRN
Qty: 210 ML | Refills: 0 | Status: SHIPPED | OUTPATIENT
Start: 2023-07-21 | End: 2023-07-30

## 2023-07-21 RX ORDER — BUPROPION HYDROCHLORIDE 150 MG/1
150 TABLET ORAL DAILY
Qty: 30 TABLET | Refills: 3 | Status: SHIPPED | OUTPATIENT
Start: 2023-07-22

## 2023-07-21 RX ADMIN — ANTACID TABLETS 500 MG: 500 TABLET, CHEWABLE ORAL at 10:32

## 2023-07-21 RX ADMIN — BUPROPION HYDROCHLORIDE 150 MG: 150 TABLET, EXTENDED RELEASE ORAL at 09:06

## 2023-07-21 RX ADMIN — VANCOMYCIN HYDROCHLORIDE 125 MG: KIT at 06:34

## 2023-07-21 RX ADMIN — CYANOCOBALAMIN TAB 500 MCG 500 MCG: 500 TAB at 09:07

## 2023-07-21 RX ADMIN — FLUCONAZOLE 150 MG: 100 TABLET ORAL at 06:34

## 2023-07-21 RX ADMIN — THERA TABS 1 TABLET: TAB at 09:06

## 2023-07-21 RX ADMIN — APIXABAN 5 MG: 5 TABLET, FILM COATED ORAL at 09:07

## 2023-07-21 RX ADMIN — HYDROMORPHONE HYDROCHLORIDE 4 MG: 5 SOLUTION ORAL at 14:44

## 2023-07-21 RX ADMIN — ONDANSETRON 4 MG: 2 INJECTION INTRAMUSCULAR; INTRAVENOUS at 11:04

## 2023-07-21 RX ADMIN — COLLAGENASE SANTYL: 250 OINTMENT TOPICAL at 10:33

## 2023-07-21 RX ADMIN — Medication 100 MG: at 09:07

## 2023-07-21 RX ADMIN — HYDROMORPHONE HYDROCHLORIDE 4 MG: 5 SOLUTION ORAL at 09:16

## 2023-07-21 RX ADMIN — VANCOMYCIN HYDROCHLORIDE 125 MG: KIT at 10:32

## 2023-07-21 RX ADMIN — SUCRALFATE 1 G: 1 TABLET ORAL at 06:34

## 2023-07-21 RX ADMIN — SUCRALFATE 1 G: 1 TABLET ORAL at 10:32

## 2023-07-21 RX ADMIN — SODIUM CHLORIDE, PRESERVATIVE FREE 40 MG: 5 INJECTION INTRAVENOUS at 09:06

## 2023-07-21 RX ADMIN — Medication 1000 UNITS: at 09:06

## 2023-07-21 ASSESSMENT — PAIN SCALES - GENERAL
PAINLEVEL_OUTOF10: 6
PAINLEVEL_OUTOF10: 6
PAINLEVEL_OUTOF10: 0
PAINLEVEL_OUTOF10: 0

## 2023-07-21 NOTE — CARE COORDINATION
7/17/2023  4:37 PM  CM received TC from MD Enoc Arcos, he expects pt will be ready for DC in next 48 H, pt will require PO vancomycin through 7/26 and GI follow up  Will need updated OT notes   CM spoke w/ [facility] liaison Freddy Desai who will review notes and submit for insurance Flavio Oates      2:35 PM  Pt emergently admitted 6/22/23 w/ sigmoid diverticulitis, update via chart review, pt is continuing to require medical management Fulminant colitis / C.  Difficileor,     Poor PO intake iso intractable nausea, metabolic acidosis, hypokalemia, anemia, leukocytosis,   Transitions of Care Plan:  RUR 19 % High Risk of Readmission/Red  LOS 25  Days  Medical management continues,on  full liquids pending re-eval by GI  ID following, vancomycin 125 mg PO every 6 H stop date TBD   GI following, s/p EDG  7/13   Nutrition following   PT, OT following, the recommendation is for IPR at DC, pt is approved for [facility], will need insurance pre-auth  Wound Care following   CM to follow through for treatment/response  Await medical stability for DC to [facility], insurance auth closer to anticipated DC date   Transport TBD pending progress  Hazel Cabrera  
7/18/2023 3:51 PM   Updated pt on auth pending for [facility]. CM will follow. SVETLANA Hicks      Care Management Progress Note      ICD-10-CM    1. Free fluid in pelvis  R18.8       2. Fibromyalgia  M79.7 CANCELED: Vascular duplex vein mapping lower bilateral     CANCELED: Vascular duplex vein mapping lower bilateral     CANCELED: Vascular duplex vein mapping lower bilateral     CANCELED: Vascular duplex vein mapping lower bilateral      3. Colitis  K52.9       4. Free intraperitoneal air  K66.8       5. Sigmoid diverticulitis  K57.32 Vascular duplex lower extremity venous bilateral     Vascular duplex lower extremity venous bilateral      6. Blue toes  R23.0 CANCELED: Vascular ankle brachial index (MADELINE)     CANCELED: Vascular duplex lower extremity arteries bilateral     CANCELED: Vascular ankle brachial index (MADELINE)     CANCELED: Vascular duplex lower extremity arteries bilateral      7. Acute deep vein thrombosis (DVT) of femoral vein of right lower extremity (HCC)  I82.411 Vascular duplex upper extremity venous right     Vascular duplex upper extremity venous right      8. Shock ([address])  R57.9 Transthoracic echocardiogram (TTE) complete with contrast, bubble, strain, and 3D PRN     Transthoracic echocardiogram (TTE) complete with contrast, bubble, strain, and 3D PRN        Readmission   RUR: 20%   Risk Level: []Low []Moderate [x]High  Value-based purchasing: [] Yes [x] No  Bundle patient: [] Yes [x] No   Specify:     Transition of care plan:  Ongoing medical management   Pt to discharge on po vanc  [facility] has accepted for IPR, auth pending  Outpatient follow-up.   TBD on discharge transport
7/19/2023 4:32 PM CM updated pt and pt's brother at bedside, [facility] 7/19/2023  1:33 PM Confirmed with Elmira Potts with Uma Espinal remains pending. 7/19/2023 9:02 AM     Care Management Progress Note         ICD-10-CM     1. Free fluid in pelvis  R18.8         2. Fibromyalgia  M79.7 CANCELED: Vascular duplex vein mapping lower bilateral       CANCELED: Vascular duplex vein mapping lower bilateral       CANCELED: Vascular duplex vein mapping lower bilateral       CANCELED: Vascular duplex vein mapping lower bilateral       3. Colitis  K52.9         4. Free intraperitoneal air  K66.8         5. Sigmoid diverticulitis  K57.32 Vascular duplex lower extremity venous bilateral       Vascular duplex lower extremity venous bilateral       6. Blue toes  R23.0 CANCELED: Vascular ankle brachial index (MADELINE)       CANCELED: Vascular duplex lower extremity arteries bilateral       CANCELED: Vascular ankle brachial index (MADELINE)       CANCELED: Vascular duplex lower extremity arteries bilateral       7. Acute deep vein thrombosis (DVT) of femoral vein of right lower extremity (HCC)  I82.411 Vascular duplex upper extremity venous right       Vascular duplex upper extremity venous right       8. Shock ([address])  R57.9 Transthoracic echocardiogram (TTE) complete with contrast, bubble, strain, and 3D PRN       Transthoracic echocardiogram (TTE) complete with contrast, bubble, strain, and 3D PRN          Readmission   RUR: 20%   Risk Level: []Low []Moderate [x]High  Value-based purchasing: [] Yes [x] No  Bundle patient: [] Yes [x] No              Specify:      Transition of care plan:  Ongoing medical management   Pt to discharge on po vanc ending on 7/26  [facility] has accepted for IPR, auth pending  Outpatient follow-up.   TBD on discharge transport
7/20/2023 12:03 PM   Care Management Progress Note         ICD-10-CM     1. Free fluid in pelvis  R18.8         2. Fibromyalgia  M79.7 CANCELED: Vascular duplex vein mapping lower bilateral       CANCELED: Vascular duplex vein mapping lower bilateral       CANCELED: Vascular duplex vein mapping lower bilateral       CANCELED: Vascular duplex vein mapping lower bilateral       3. Colitis  K52.9         4. Free intraperitoneal air  K66.8         5. Sigmoid diverticulitis  K57.32 Vascular duplex lower extremity venous bilateral       Vascular duplex lower extremity venous bilateral       6. Blue toes  R23.0 CANCELED: Vascular ankle brachial index (MADELINE)       CANCELED: Vascular duplex lower extremity arteries bilateral       CANCELED: Vascular ankle brachial index (MADELINE)       CANCELED: Vascular duplex lower extremity arteries bilateral       7. Acute deep vein thrombosis (DVT) of femoral vein of right lower extremity (HCC)  I82.411 Vascular duplex upper extremity venous right       Vascular duplex upper extremity venous right       8. Shock ([address])  R57.9 Transthoracic echocardiogram (TTE) complete with contrast, bubble, strain, and 3D PRN       Transthoracic echocardiogram (TTE) complete with contrast, bubble, strain, and 3D PRN          Readmission   RUR: 20%   Risk Level: []Low []Moderate [x]High  Value-based purchasing: [] Yes [x] No  Bundle patient: [] Yes [x] No              Specify:      Transition of care plan:  Ongoing medical management   Pt to discharge on po vanc ending on 7/26  [facility] has accepted for IPR, auth received, they can accept pt on 7/21 when a bed is available. Pt and MD aware. Outpatient follow-up.   Pt agreeable to covering costs for wheelchair transport at discharge
R23.0 CANCELED: Vascular ankle brachial index (MADELINE)       CANCELED: Vascular duplex lower extremity arteries bilateral       CANCELED: Vascular ankle brachial index (MADELINE)       CANCELED: Vascular duplex lower extremity arteries bilateral       7. Acute deep vein thrombosis (DVT) of femoral vein of right lower extremity (HCC)  I82.411 Vascular duplex upper extremity venous right       Vascular duplex upper extremity venous right       8. Shock ([address])  R57.9 Transthoracic echocardiogram (TTE) complete with contrast, bubble, strain, and 3D PRN       Transthoracic echocardiogram (TTE) complete with contrast, bubble, strain, and 3D PRN          Readmission   RUR: 20%   Risk Level: []Low []Moderate [x]High  Value-based purchasing: [] Yes [x] No  Bundle patient: [] Yes [x] No              Specify:      Transition of care plan:  Ongoing medical management   Pt to discharge on po vanc ending on 7/26  [facility] has accepted for IPR, for admission today  Outpatient follow-up.   Hospital to Home stretcher transport scheduled for 3PM          07/21/23 1115   Discharge Planning   Type of Residence Acute Rehab   Patient expects to be discharged to: Acute rehab   Services At/After Discharge   Transition of Care Consult (CM Consult) [address] Discharge Inpatient rehab   Mode of Transport at Discharge   (Private pay stretcher through Hospital to [facility] Transport Time of Discharge 1500

## 2023-07-21 NOTE — PLAN OF CARE
Problem: Occupational Therapy - Adult  Goal: By Discharge: Performs self-care activities at highest level of function for planned discharge setting. See evaluation for individualized goals. Description: FUNCTIONAL STATUS PRIOR TO ADMISSION:  per pt report, pt is MI for self care (bathing using shower chair) and MI for functional transfers/mobility with Rw as needed. Pt reporting increased assist required prior to admission. HOME SUPPORT: Patient lived with family but didn't require assistance at baseline however has assist as needed. Occupational Therapy Goals:  Initiated 7/5/2023; Goals reviewed and continued at re-assessment, 7/12/23;  1. Patient will perform grooming with Modified [place] within 7 day(s). 2.  Patient will perform upper body dressing with Stand by Assist within 7 day(s). 3.  Patient will perform lower body dressing with Stand by Assist within 7 day(s). 4.  Patient will perform toilet transfers with Stand by Assist  within 7 day(s). 5.  Patient will perform all aspects of toileting with Stand by Assist within 7 day(s). 6.  Patient will participate in upper extremity therapeutic exercise/activities with Modified [place] for 10 minutes within 7 day(s). 7.  Patient will utilize energy conservation techniques during functional activities with verbal cues within 7 day(s). Outcome: Progressing     OCCUPATIONAL THERAPY TREATMENT  Patient: Mike Echeverria (32 y.o. female)  Date: 7/17/2023  Primary Diagnosis: Colitis [K52.9]  Sigmoid diverticulitis [K57.32]  Free intraperitoneal air [K66.8]  Free fluid in pelvis [R18.8]  Procedure(s) (LRB):  EGD ESOPHAGOGASTRODUODENOSCOPY with Dobhoff placement (N/A)  EGD DILATION BALLOON 4 Days Post-Op   Precautions: Fall Risk (enteric contact)                Chart, occupational therapy assessment, plan of care, and goals were reviewed.     ASSESSMENT  Patient continues to benefit from skilled OT services and is slowly progressing towards
Problem: Occupational Therapy - Adult  Goal: By Discharge: Performs self-care activities at highest level of function for planned discharge setting. See evaluation for individualized goals. Description: FUNCTIONAL STATUS PRIOR TO ADMISSION:  per pt report, pt is MI for self care (bathing using shower chair) and MI for functional transfers/mobility with Rw as needed. Pt reporting increased assist required prior to admission. HOME SUPPORT: Patient lived with family but didn't require assistance at baseline however has assist as needed. Occupational Therapy Goals:  Initiated 7/5/2023; Goals reviewed and continued at re-assessment, 7/12/23;  1. Patient will perform grooming with Modified [place] within 7 day(s). 2.  Patient will perform upper body dressing with Stand by Assist within 7 day(s). 3.  Patient will perform lower body dressing with Stand by Assist within 7 day(s). 4.  Patient will perform toilet transfers with Stand by Assist  within 7 day(s). 5.  Patient will perform all aspects of toileting with Stand by Assist within 7 day(s). 6.  Patient will participate in upper extremity therapeutic exercise/activities with Modified [place] for 10 minutes within 7 day(s). 7.  Patient will utilize energy conservation techniques during functional activities with verbal cues within 7 day(s). Outcome: Progressing     OCCUPATIONAL THERAPY TREATMENT  Patient: Fabian Burkitt (30 y.o. female)  Date: 7/18/2023  Primary Diagnosis: Colitis [K52.9]  Sigmoid diverticulitis [K57.32]  Free intraperitoneal air [K66.8]  Free fluid in pelvis [R18.8]  Procedure(s) (LRB):  EGD ESOPHAGOGASTRODUODENOSCOPY with Dobhoff placement (N/A)  EGD DILATION BALLOON 5 Days Post-Op   Precautions: Fall Risk (enteric contact)                Chart, occupational therapy assessment, plan of care, and goals were reviewed. ASSESSMENT  Patient continues to benefit from skilled OT services and is progressing towards goals.
Problem: Occupational Therapy - Adult  Goal: By Discharge: Performs self-care activities at highest level of function for planned discharge setting. See evaluation for individualized goals. Description: FUNCTIONAL STATUS PRIOR TO ADMISSION:  per pt report, pt is MI for self care (bathing using shower chair) and MI for functional transfers/mobility with Rw as needed. Pt reporting increased assist required prior to admission. HOME SUPPORT: Patient lived with family but didn't require assistance at baseline however has assist as needed. Occupational Therapy Goals:  Initiated 7/5/2023; Goals reviewed and continued at re-assessment, 7/12/23;; goals reviewed 7/20/2023, continue all. 1.  Patient will perform grooming with Modified [place] within 7 day(s). 2.  Patient will perform upper body dressing with Stand by Assist within 7 day(s). 3.  Patient will perform lower body dressing with Stand by Assist within 7 day(s). 4.  Patient will perform toilet transfers with Stand by Assist  within 7 day(s). 5.  Patient will perform all aspects of toileting with Stand by Assist within 7 day(s). 6.  Patient will participate in upper extremity therapeutic exercise/activities with Modified [place] for 10 minutes within 7 day(s). 7.  Patient will utilize energy conservation techniques during functional activities with verbal cues within 7 day(s). Outcome: Progressing     OCCUPATIONAL THERAPY TREATMENT: WEEKLY REASSESSMENT    Patient: Radha Barton (13 y.o. female)  Date: 7/20/2023  Primary Diagnosis: Colitis [K52.9]  Sigmoid diverticulitis [K57.32]  Free intraperitoneal air [K66.8]  Free fluid in pelvis [R18.8]  Procedure(s) (LRB):  EGD ESOPHAGOGASTRODUODENOSCOPY with Dobhoff placement (N/A)  EGD DILATION BALLOON 7 Days Post-Op   Precautions: Fall Risk (enteric contact)                Chart, occupational therapy assessment, plan of care, and goals were reviewed.     ASSESSMENT  Patient continues to
Problem: Physical Therapy - Adult  Goal: By Discharge: Performs mobility at highest level of function for planned discharge setting. See evaluation for individualized goals. Description: FUNCTIONAL STATUS PRIOR TO ADMISSION: Patient was modified independent using a rollator for functional mobility progressing to no asst device at  home. HOME SUPPORT PRIOR TO ADMISSION: The patient lived with spouse and required minimal assistance for ADL's . Physical Therapy Goals  Initiated 6/29/2023  1. Patient will move from supine to sit and sit to supine in bed with minimal assistance within 7 day(s). 2.  Patient will perform sit to stand with minimal assistance within 7 day(s). 3.  Patient will transfer from bed to chair and chair to bed with minimal assistance using the least restrictive device within 7 day(s). 4.  Patient will ambulate with minimal assistance for 50'x2 feet with the least restrictive device within 7 day(s). 5.  Patient will ascend/descend 4 stairs with  handrail(s) with minimal assistance within 7 day(s). Physical Therapy Goals  Revised 7/12/2023  1. Patient will move from supine to sit and sit to supine , scoot up and down, and roll side to side in bed with contact guard assist within 7 day(s). 2.  Patient will transfer from bed to chair and chair to bed with minimal assistance/contact guard assist using the least restrictive device within 7 day(s). 3.  Patient will perform sit to stand with minimal assistance/contact guard assist within 7 day(s). 4.  Patient will ambulate with minimal assistance/contact guard assist for 15 feet with the least restrictive device within 7 day(s).         Outcome: Progressing   PHYSICAL THERAPY TREATMENT    Patient: Darien Thomas (89 y.o. female)  Date: 7/18/2023  Diagnosis: Colitis [K52.9]  Sigmoid diverticulitis [K57.32]  Free intraperitoneal air [K66.8]  Free fluid in pelvis [R18.8] Sigmoid diverticulitis  Procedure(s) (LRB):  EGD
Problem: Physical Therapy - Adult  Goal: By Discharge: Performs mobility at highest level of function for planned discharge setting. See evaluation for individualized goals. Description: FUNCTIONAL STATUS PRIOR TO ADMISSION: Patient was modified independent using a rollator for functional mobility progressing to no asst device at  home. HOME SUPPORT PRIOR TO ADMISSION: The patient lived with spouse and required minimal assistance for ADL's . Physical Therapy Goals  Initiated 6/29/2023  1. Patient will move from supine to sit and sit to supine in bed with minimal assistance within 7 day(s). 2.  Patient will perform sit to stand with minimal assistance within 7 day(s). 3.  Patient will transfer from bed to chair and chair to bed with minimal assistance using the least restrictive device within 7 day(s). 4.  Patient will ambulate with minimal assistance for 50'x2 feet with the least restrictive device within 7 day(s). 5.  Patient will ascend/descend 4 stairs with  handrail(s) with minimal assistance within 7 day(s). Physical Therapy Goals  Revised 7/12/2023  1. Patient will move from supine to sit and sit to supine , scoot up and down, and roll side to side in bed with contact guard assist within 7 day(s). 2.  Patient will transfer from bed to chair and chair to bed with minimal assistance/contact guard assist using the least restrictive device within 7 day(s). 3.  Patient will perform sit to stand with minimal assistance/contact guard assist within 7 day(s). 4.  Patient will ambulate with minimal assistance/contact guard assist for 15 feet with the least restrictive device within 7 day(s).         Outcome: Progressing   PHYSICAL THERAPY TREATMENT    Patient: Zoila Zhang (51 y.o. female)  Date: 7/17/2023  Diagnosis: Colitis [K52.9]  Sigmoid diverticulitis [K57.32]  Free intraperitoneal air [K66.8]  Free fluid in pelvis [R18.8] Sigmoid diverticulitis  Procedure(s) (LRB):  EGD
Problem: Safety - Adult  Goal: Free from fall injury  7/17/2023 0428 by Rodney Liao RN  Outcome: Progressing  7/16/2023 2003 by Jacky Healy RN  Outcome: Progressing     Problem: Pain  Goal: Verbalizes/displays adequate comfort level or baseline comfort level  7/17/2023 0428 by Rodney Liao RN  Outcome: Progressing  7/16/2023 2003 by Jacky Healy RN  Outcome: Progressing     Problem: Chronic Conditions and Co-morbidities  Goal: Patient's chronic conditions and co-morbidity symptoms are monitored and maintained or improved  Outcome: Progressing     Problem: Nutrition Deficit:  Goal: Optimize nutritional status  Outcome: Progressing     Problem: Skin/Tissue Integrity  Goal: Absence of new skin breakdown  Description: 1. Monitor for areas of redness and/or skin breakdown  2. Assess vascular access sites hourly  3. Every 4-6 hours minimum:  Change oxygen saturation probe site  4. Every 4-6 hours:  If on nasal continuous positive airway pressure, respiratory therapy assess nares and determine need for appliance change or resting period.   Outcome: Progressing     Problem: Respiratory - Adult  Goal: Achieves optimal ventilation and oxygenation  Outcome: Progressing     Problem: ABCDS Injury Assessment  Goal: Absence of physical injury  Outcome: Progressing     Problem: Discharge Planning  Goal: Discharge to home or other facility with appropriate resources  Outcome: Progressing
Problem: Safety - Adult  Goal: Free from fall injury  Outcome: Progressing     Problem: Pain  Goal: Verbalizes/displays adequate comfort level or baseline comfort level  Outcome: Progressing
Problem: Safety - Adult  Goal: Free from fall injury  Outcome: Progressing     Problem: Pain  Goal: Verbalizes/displays adequate comfort level or baseline comfort level  Outcome: Progressing     Problem: Chronic Conditions and Co-morbidities  Goal: Patient's chronic conditions and co-morbidity symptoms are monitored and maintained or improved  Outcome: Progressing
Problem: Safety - Adult  Goal: Free from fall injury  Outcome: Progressing     Problem: Pain  Goal: Verbalizes/displays adequate comfort level or baseline comfort level  Outcome: Progressing     Problem: Chronic Conditions and Co-morbidities  Goal: Patient's chronic conditions and co-morbidity symptoms are monitored and maintained or improved  Outcome: Progressing     Problem: Nutrition Deficit:  Goal: Optimize nutritional status  Outcome: Progressing     Problem: Skin/Tissue Integrity  Goal: Absence of new skin breakdown  Description: 1. Monitor for areas of redness and/or skin breakdown  2. Assess vascular access sites hourly  3. Every 4-6 hours minimum:  Change oxygen saturation probe site  4. Every 4-6 hours:  If on nasal continuous positive airway pressure, respiratory therapy assess nares and determine need for appliance change or resting period.   Outcome: Progressing     Problem: Respiratory - Adult  Goal: Achieves optimal ventilation and oxygenation  Outcome: Progressing     Problem: ABCDS Injury Assessment  Goal: Absence of physical injury  Outcome: Progressing     Problem: Discharge Planning  Goal: Discharge to home or other facility with appropriate resources  Outcome: Progressing
Problem: Safety - Adult  Goal: Free from fall injury  Outcome: Progressing     Problem: Pain  Goal: Verbalizes/displays adequate comfort level or baseline comfort level  Outcome: Progressing     Problem: Chronic Conditions and Co-morbidities  Goal: Patient's chronic conditions and co-morbidity symptoms are monitored and maintained or improved  Outcome: Progressing     Problem: Nutrition Deficit:  Goal: Optimize nutritional status  Outcome: Progressing  Flowsheets (Taken 3/38/2417 [id number] by Amalia Power RD)  Nutrient intake appropriate for improving, restoring, or maintaining nutritional needs:   Assess nutritional status and recommend course of action   Recommend appropriate diets, oral nutritional supplements, and vitamin/mineral supplements     Problem: Skin/Tissue Integrity  Goal: Absence of new skin breakdown  Description: 1. Monitor for areas of redness and/or skin breakdown  2. Assess vascular access sites hourly  3. Every 4-6 hours minimum:  Change oxygen saturation probe site  4. Every 4-6 hours:  If on nasal continuous positive airway pressure, respiratory therapy assess nares and determine need for appliance change or resting period.   Outcome: Progressing     Problem: Respiratory - Adult  Goal: Achieves optimal ventilation and oxygenation  Outcome: Progressing     Problem: ABCDS Injury Assessment  Goal: Absence of physical injury  Outcome: Progressing     Problem: Discharge Planning  Goal: Discharge to home or other facility with appropriate resources  Outcome: Progressing
Impaired  Standing - Static: Constant support; Fair  Standing - Dynamic: Constant support;Poor   Ambulation/Gait Training:     Gait  Interventions: Safety awareness training;Weight shifting training/pressure relief;Verbal cues;Manual cues  Base of Support: Narrowed  Speed/Maki: Slow;Shuffled  Gait Abnormalities: Decreased step clearance  Distance (ft): 4 Feet  Neuro Re-Education:                    Pain Ratin/10   Pain Intervention(s):   nursing notified and addressing and rest    Activity Tolerance:   Fair  and requires frequent rest breaks    After treatment:   Patient left in no apparent distress sitting up in chair, Call bell within reach, Bed/ chair alarm activated, and Heels elevated for pressure relief      COMMUNICATION/EDUCATION:   The patient's plan of care was discussed with: occupational therapist and registered nurse    Patient Education  Education Given To: Patient  Education Provided: Role of Therapy;Transfer Training  Education Method: Verbal  Barriers to Learning: None  Education Outcome: Continued education needed      Henny Smith PTA  Minutes: 40
[place] for 10 minutes within 7 day(s). 7.  Patient will utilize energy conservation techniques during functional activities with verbal cues within 7 day(s).     7/18/2023 1436 by Haley Finney OT  Outcome: Progressing     Problem: ABCDS Injury Assessment  Goal: Absence of physical injury  Outcome: Progressing     Problem: Discharge Planning  Goal: Discharge to home or other facility with appropriate resources  Outcome: Progressing

## 2023-07-28 ENCOUNTER — APPOINTMENT (OUTPATIENT)
Facility: HOSPITAL | Age: 63
DRG: 004 | End: 2023-07-28
Payer: MEDICARE

## 2023-07-28 ENCOUNTER — HOSPITAL ENCOUNTER (INPATIENT)
Facility: HOSPITAL | Age: 63
DRG: 004 | End: 2023-07-28
Attending: STUDENT IN AN ORGANIZED HEALTH CARE EDUCATION/TRAINING PROGRAM | Admitting: ANESTHESIOLOGY
Payer: MEDICARE

## 2023-07-28 ENCOUNTER — ANESTHESIA EVENT (OUTPATIENT)
Facility: HOSPITAL | Age: 63
End: 2023-07-28
Payer: MEDICARE

## 2023-07-28 ENCOUNTER — ANESTHESIA (OUTPATIENT)
Facility: HOSPITAL | Age: 63
End: 2023-07-28
Payer: MEDICARE

## 2023-07-28 DIAGNOSIS — K92.2 GASTROINTESTINAL HEMORRHAGE, UNSPECIFIED GASTROINTESTINAL HEMORRHAGE TYPE: Primary | ICD-10-CM

## 2023-07-28 DIAGNOSIS — R60.0 LOCALIZED EDEMA: ICD-10-CM

## 2023-07-28 DIAGNOSIS — R09.02 HYPOXIA: ICD-10-CM

## 2023-07-28 DIAGNOSIS — J96.01 ACUTE RESPIRATORY FAILURE WITH HYPOXIA (HCC): ICD-10-CM

## 2023-07-28 PROBLEM — K92.0 HEMATEMESIS: Status: ACTIVE | Noted: 2023-01-01

## 2023-07-28 LAB
ALBUMIN SERPL-MCNC: 1.5 G/DL (ref 3.5–5)
ALBUMIN/GLOB SERPL: 0.6 (ref 1.1–2.2)
ALP SERPL-CCNC: 85 U/L (ref 45–117)
ALT SERPL-CCNC: 11 U/L (ref 12–78)
ANION GAP SERPL CALC-SCNC: 9 MMOL/L (ref 5–15)
AST SERPL-CCNC: 17 U/L (ref 15–37)
BASOPHILS # BLD: 0 K/UL (ref 0–0.1)
BASOPHILS NFR BLD: 0 % (ref 0–1)
BILIRUB SERPL-MCNC: 0.4 MG/DL (ref 0.2–1)
BUN SERPL-MCNC: 13 MG/DL (ref 6–20)
BUN/CREAT SERPL: 14 (ref 12–20)
CALCIUM SERPL-MCNC: 9.4 MG/DL (ref 8.5–10.1)
CHLORIDE SERPL-SCNC: 97 MMOL/L (ref 97–108)
CO2 SERPL-SCNC: 27 MMOL/L (ref 21–32)
COMMENT:: NORMAL
CREAT SERPL-MCNC: 0.93 MG/DL (ref 0.55–1.02)
DIFFERENTIAL METHOD BLD: ABNORMAL
EOSINOPHIL # BLD: 0.1 K/UL (ref 0–0.4)
EOSINOPHIL NFR BLD: 1 % (ref 0–7)
ERYTHROCYTE [DISTWIDTH] IN BLOOD BY AUTOMATED COUNT: 17.1 % (ref 11.5–14.5)
GLOBULIN SER CALC-MCNC: 2.7 G/DL (ref 2–4)
GLUCOSE BLD STRIP.AUTO-MCNC: 74 MG/DL (ref 65–117)
GLUCOSE SERPL-MCNC: 75 MG/DL (ref 65–100)
HCT VFR BLD AUTO: 23.1 % (ref 35–47)
HGB BLD-MCNC: 7.6 G/DL (ref 11.5–16)
HISTORY CHECK: NORMAL
IMM GRANULOCYTES # BLD AUTO: 0 K/UL
IMM GRANULOCYTES NFR BLD AUTO: 0 %
LACTATE SERPL-SCNC: 0.7 MMOL/L (ref 0.4–2)
LYMPHOCYTES # BLD: 0.5 K/UL (ref 0.8–3.5)
LYMPHOCYTES NFR BLD: 4 % (ref 12–49)
MCH RBC QN AUTO: 30.2 PG (ref 26–34)
MCHC RBC AUTO-ENTMCNC: 32.9 G/DL (ref 30–36.5)
MCV RBC AUTO: 91.7 FL (ref 80–99)
MONOCYTES # BLD: 0.6 K/UL (ref 0–1)
MONOCYTES NFR BLD: 5 % (ref 5–13)
NEUTS BAND NFR BLD MANUAL: 4 % (ref 0–6)
NEUTS SEG # BLD: 10.7 K/UL (ref 1.8–8)
NEUTS SEG NFR BLD: 86 % (ref 32–75)
NRBC # BLD: 0 K/UL (ref 0–0.01)
NRBC BLD-RTO: 0 PER 100 WBC
PLATELET # BLD AUTO: 455 K/UL (ref 150–400)
PMV BLD AUTO: 8.8 FL (ref 8.9–12.9)
POTASSIUM SERPL-SCNC: 3.1 MMOL/L (ref 3.5–5.1)
PROT SERPL-MCNC: 4.2 G/DL (ref 6.4–8.2)
RBC # BLD AUTO: 2.52 M/UL (ref 3.8–5.2)
RBC MORPH BLD: ABNORMAL
SERVICE CMNT-IMP: NORMAL
SODIUM SERPL-SCNC: 133 MMOL/L (ref 136–145)
SPECIMEN HOLD: NORMAL
WBC # BLD AUTO: 11.9 K/UL (ref 3.6–11)

## 2023-07-28 PROCEDURE — 83605 ASSAY OF LACTIC ACID: CPT

## 2023-07-28 PROCEDURE — 86900 BLOOD TYPING SEROLOGIC ABO: CPT

## 2023-07-28 PROCEDURE — 6370000000 HC RX 637 (ALT 250 FOR IP): Performed by: FAMILY MEDICINE

## 2023-07-28 PROCEDURE — C9113 INJ PANTOPRAZOLE SODIUM, VIA: HCPCS | Performed by: STUDENT IN AN ORGANIZED HEALTH CARE EDUCATION/TRAINING PROGRAM

## 2023-07-28 PROCEDURE — 80053 COMPREHEN METABOLIC PANEL: CPT

## 2023-07-28 PROCEDURE — 36430 TRANSFUSION BLD/BLD COMPNT: CPT

## 2023-07-28 PROCEDURE — P9016 RBC LEUKOCYTES REDUCED: HCPCS

## 2023-07-28 PROCEDURE — 82962 GLUCOSE BLOOD TEST: CPT

## 2023-07-28 PROCEDURE — 6360000002 HC RX W HCPCS: Performed by: FAMILY MEDICINE

## 2023-07-28 PROCEDURE — 02HV33Z INSERTION OF INFUSION DEVICE INTO SUPERIOR VENA CAVA, PERCUTANEOUS APPROACH: ICD-10-PCS | Performed by: ANESTHESIOLOGY

## 2023-07-28 PROCEDURE — 0JB70ZZ EXCISION OF BACK SUBCUTANEOUS TISSUE AND FASCIA, OPEN APPROACH: ICD-10-PCS | Performed by: SURGERY

## 2023-07-28 PROCEDURE — 86923 COMPATIBILITY TEST ELECTRIC: CPT

## 2023-07-28 PROCEDURE — 6360000004 HC RX CONTRAST MEDICATION: Performed by: STUDENT IN AN ORGANIZED HEALTH CARE EDUCATION/TRAINING PROGRAM

## 2023-07-28 PROCEDURE — 2060000000 HC ICU INTERMEDIATE R&B

## 2023-07-28 PROCEDURE — 93005 ELECTROCARDIOGRAM TRACING: CPT | Performed by: STUDENT IN AN ORGANIZED HEALTH CARE EDUCATION/TRAINING PROGRAM

## 2023-07-28 PROCEDURE — 36415 COLL VENOUS BLD VENIPUNCTURE: CPT

## 2023-07-28 PROCEDURE — 86850 RBC ANTIBODY SCREEN: CPT

## 2023-07-28 PROCEDURE — 74178 CT ABD&PLV WO CNTR FLWD CNTR: CPT

## 2023-07-28 PROCEDURE — 85018 HEMOGLOBIN: CPT

## 2023-07-28 PROCEDURE — 85014 HEMATOCRIT: CPT

## 2023-07-28 PROCEDURE — 99285 EMERGENCY DEPT VISIT HI MDM: CPT

## 2023-07-28 PROCEDURE — A4216 STERILE WATER/SALINE, 10 ML: HCPCS | Performed by: STUDENT IN AN ORGANIZED HEALTH CARE EDUCATION/TRAINING PROGRAM

## 2023-07-28 PROCEDURE — 2580000003 HC RX 258: Performed by: STUDENT IN AN ORGANIZED HEALTH CARE EDUCATION/TRAINING PROGRAM

## 2023-07-28 PROCEDURE — 30233N1 TRANSFUSION OF NONAUTOLOGOUS RED BLOOD CELLS INTO PERIPHERAL VEIN, PERCUTANEOUS APPROACH: ICD-10-PCS | Performed by: FAMILY MEDICINE

## 2023-07-28 PROCEDURE — 83036 HEMOGLOBIN GLYCOSYLATED A1C: CPT

## 2023-07-28 PROCEDURE — 85025 COMPLETE CBC W/AUTO DIFF WBC: CPT

## 2023-07-28 PROCEDURE — P9045 ALBUMIN (HUMAN), 5%, 250 ML: HCPCS | Performed by: FAMILY MEDICINE

## 2023-07-28 PROCEDURE — 86901 BLOOD TYPING SEROLOGIC RH(D): CPT

## 2023-07-28 PROCEDURE — 36556 INSERT NON-TUNNEL CV CATH: CPT | Performed by: ANESTHESIOLOGY

## 2023-07-28 PROCEDURE — 6360000002 HC RX W HCPCS: Performed by: STUDENT IN AN ORGANIZED HEALTH CARE EDUCATION/TRAINING PROGRAM

## 2023-07-28 PROCEDURE — 2580000003 HC RX 258: Performed by: FAMILY MEDICINE

## 2023-07-28 RX ORDER — SUCRALFATE 1 G/1
1 TABLET ORAL 4 TIMES DAILY
Status: DISCONTINUED | OUTPATIENT
Start: 2023-07-28 | End: 2023-08-07

## 2023-07-28 RX ORDER — ONDANSETRON 4 MG/1
4 TABLET, ORALLY DISINTEGRATING ORAL EVERY 8 HOURS PRN
Status: DISCONTINUED | OUTPATIENT
Start: 2023-07-28 | End: 2023-10-10

## 2023-07-28 RX ORDER — SODIUM CHLORIDE 9 MG/ML
INJECTION, SOLUTION INTRAVENOUS PRN
Status: DISCONTINUED | OUTPATIENT
Start: 2023-07-28 | End: 2023-08-10

## 2023-07-28 RX ORDER — ACETAMINOPHEN 325 MG/1
650 TABLET ORAL EVERY 6 HOURS PRN
Status: DISCONTINUED | OUTPATIENT
Start: 2023-07-28 | End: 2023-10-20 | Stop reason: HOSPADM

## 2023-07-28 RX ORDER — INSULIN LISPRO 100 [IU]/ML
0-4 INJECTION, SOLUTION INTRAVENOUS; SUBCUTANEOUS NIGHTLY
Status: DISCONTINUED | OUTPATIENT
Start: 2023-07-28 | End: 2023-08-10

## 2023-07-28 RX ORDER — SODIUM CHLORIDE 9 MG/ML
INJECTION, SOLUTION INTRAVENOUS PRN
Status: DISCONTINUED | OUTPATIENT
Start: 2023-07-28 | End: 2023-07-30

## 2023-07-28 RX ORDER — ONDANSETRON 2 MG/ML
4 INJECTION INTRAMUSCULAR; INTRAVENOUS ONCE
Status: COMPLETED | OUTPATIENT
Start: 2023-07-28 | End: 2023-07-28

## 2023-07-28 RX ORDER — DEXTROSE MONOHYDRATE 100 MG/ML
INJECTION, SOLUTION INTRAVENOUS CONTINUOUS PRN
Status: DISCONTINUED | OUTPATIENT
Start: 2023-07-28 | End: 2023-10-19

## 2023-07-28 RX ORDER — GABAPENTIN 300 MG/1
600 CAPSULE ORAL 3 TIMES DAILY
Status: DISCONTINUED | OUTPATIENT
Start: 2023-07-28 | End: 2023-08-03

## 2023-07-28 RX ORDER — ALBUMIN, HUMAN INJ 5% 5 %
25 SOLUTION INTRAVENOUS ONCE
Status: COMPLETED | OUTPATIENT
Start: 2023-07-28 | End: 2023-07-29

## 2023-07-28 RX ORDER — POTASSIUM CHLORIDE 7.45 MG/ML
10 INJECTION INTRAVENOUS ONCE
Status: COMPLETED | OUTPATIENT
Start: 2023-07-28 | End: 2023-07-29

## 2023-07-28 RX ORDER — POLYETHYLENE GLYCOL 3350 17 G/17G
17 POWDER, FOR SOLUTION ORAL DAILY PRN
Status: DISCONTINUED | OUTPATIENT
Start: 2023-07-28 | End: 2023-10-10

## 2023-07-28 RX ORDER — THIAMINE MONONITRATE (VIT B1) 100 MG
100 TABLET ORAL DAILY
Status: DISCONTINUED | OUTPATIENT
Start: 2023-07-28 | End: 2023-07-30

## 2023-07-28 RX ORDER — SODIUM CHLORIDE 0.9 % (FLUSH) 0.9 %
5-40 SYRINGE (ML) INJECTION EVERY 12 HOURS SCHEDULED
Status: DISCONTINUED | OUTPATIENT
Start: 2023-07-28 | End: 2023-10-20 | Stop reason: HOSPADM

## 2023-07-28 RX ORDER — INSULIN LISPRO 100 [IU]/ML
0-4 INJECTION, SOLUTION INTRAVENOUS; SUBCUTANEOUS
Status: DISCONTINUED | OUTPATIENT
Start: 2023-07-29 | End: 2023-08-09

## 2023-07-28 RX ORDER — ONDANSETRON 2 MG/ML
4 INJECTION INTRAMUSCULAR; INTRAVENOUS EVERY 6 HOURS PRN
Status: DISCONTINUED | OUTPATIENT
Start: 2023-07-28 | End: 2023-10-20 | Stop reason: HOSPADM

## 2023-07-28 RX ORDER — BUPROPION HYDROCHLORIDE 150 MG/1
150 TABLET, EXTENDED RELEASE ORAL DAILY
Status: DISCONTINUED | OUTPATIENT
Start: 2023-07-28 | End: 2023-08-18

## 2023-07-28 RX ORDER — ACETAMINOPHEN 650 MG/1
650 SUPPOSITORY RECTAL EVERY 6 HOURS PRN
Status: DISCONTINUED | OUTPATIENT
Start: 2023-07-28 | End: 2023-10-20 | Stop reason: HOSPADM

## 2023-07-28 RX ORDER — SODIUM CHLORIDE 0.9 % (FLUSH) 0.9 %
5-40 SYRINGE (ML) INJECTION PRN
Status: DISCONTINUED | OUTPATIENT
Start: 2023-07-28 | End: 2023-10-20 | Stop reason: HOSPADM

## 2023-07-28 RX ORDER — MAGNESIUM SULFATE 1 G/100ML
1000 INJECTION INTRAVENOUS ONCE
Status: COMPLETED | OUTPATIENT
Start: 2023-07-28 | End: 2023-07-29

## 2023-07-28 RX ADMIN — BUPROPION HYDROCHLORIDE 150 MG: 150 TABLET, EXTENDED RELEASE ORAL at 23:03

## 2023-07-28 RX ADMIN — Medication 10 ML: at 23:08

## 2023-07-28 RX ADMIN — PANTOPRAZOLE SODIUM 40 MG: 40 INJECTION, POWDER, LYOPHILIZED, FOR SOLUTION INTRAVENOUS at 20:30

## 2023-07-28 RX ADMIN — ALBUMIN (HUMAN) 25 G: 12.5 INJECTION, SOLUTION INTRAVENOUS at 23:02

## 2023-07-28 RX ADMIN — DEXTROSE MONOHYDRATE 250 ML: 100 INJECTION, SOLUTION INTRAVENOUS at 23:03

## 2023-07-28 RX ADMIN — IOPAMIDOL 100 ML: 755 INJECTION, SOLUTION INTRAVENOUS at 22:00

## 2023-07-28 RX ADMIN — MAGNESIUM SULFATE HEPTAHYDRATE 1000 MG: 1 INJECTION, SOLUTION INTRAVENOUS at 23:02

## 2023-07-28 RX ADMIN — ONDANSETRON 4 MG: 2 INJECTION INTRAMUSCULAR; INTRAVENOUS at 20:30

## 2023-07-28 RX ADMIN — POTASSIUM CHLORIDE 10 MEQ: 7.46 INJECTION, SOLUTION INTRAVENOUS at 23:02

## 2023-07-28 NOTE — ED TRIAGE NOTES
Is This A New Presentation, Or A Follow-Up?: Follow Up Acne Patient sent from [facility] for weakness and hematemesis.  EMS notes ST depression on ekg

## 2023-07-28 NOTE — H&P
History and Physical    Date of Service:  7/28/2023  Primary Care Provider: Librado Meade MD  Source of information: patient, electronic medical record    Chief Complaint: Fatigue      History of Presenting Illness:   Darien Thomas is a 61 y.o. female with apmhx gastric bypass, asthma, CAD, DM II, dyslipidemia, GERD, past PE, ANCA, anxiety, depression, and fibromyalgia who presents from [address] [place] [address] with weakness and hematemesis. She was previously admitted to [facility] from 6/22-7/21, being readmitted after episode of fulminant colitis requiring subtotal colectomy with end ileostomy, s/p reversal and subsequent ileocolonic anastomosis. She was found to have a recurrent C. Diff colitis, under went ex lap that showed no evidence of performation, and underwent EGD that showed GJ ulcer, and stricture at  gastric bypass anastamosis that was dilated. She was started on anticoagulation for RLE DVT. She was discharged to [address] [place] [address]. In the ED VSS. Labs showed WBC 11.9,  Hgb 7.6, Na 133, K+ 3.1, Mg 1.7, and albumin 1.5. REVIEW OF SYSTEMS:  A comprehensive review of systems was negative except for that written in the History of Present Illness. Past Medical History:   Diagnosis Date    Anesthesia complication 03/9625    unable to lift feet & legs post op 3/2023, admitted to rehab post op; patient states \"due to anesthesia\"    Arthritis     OA back,knees and hands    Asthma     Autoimmune disease ([address])     Maverick Layman    CAD (coronary artery disease)     s/p stents 11/2015    Cardiac murmur     Colitis 2023    Depression     Diabetes ([address])     diet controlled at this time.      DVT (deep venous thrombosis) ([address]) 1981    Fibromyalgia     Fragile skin 06/02/2023    tears easily per patient; multple scabs noted    GERD (gastroesophageal reflux disease)     History of blood transfusion 1978    post c section    History of vascular access device 06/30/2023    4.5 cm Midline right Lymphocytes Absolute 0.5 (*)     All other components within normal limits   COMPREHENSIVE METABOLIC PANEL - Abnormal; Notable for the following components:    Sodium 133 (*)     Potassium 3.1 (*)     ALT 11 (*)     Total Protein 4.2 (*)     Albumin 1.5 (*)     Albumin/Globulin Ratio 0.6 (*)     All other components within normal limits       Results       ** No results found for the last 336 hours. **             IMAGING:   CT ABDOMEN PELVIS W WO CONTRAST Additional Contrast? None    (Results Pending)        ECG/ECHO:    Encounter Date: 07/28/23   EKG 12 Lead   Result Value    Ventricular Rate 79    Atrial Rate 79    P-R Interval 118    QRS Duration 74    Q-T Interval 340    QTc Calculation (Bazett) 389    P Axis 38    R Axis 23    T Axis 93    Diagnosis      Normal sinus rhythm  Nonspecific T wave abnormality  Abnormal ECG  When compared with ECG of 10-JUL-2023 12:22,  QT has shortened       06/22/23    TRANSTHORACIC ECHOCARDIOGRAM (TTE) COMPLETE (CONTRAST/BUBBLE/3D PRN) 07/11/2023  1:33 PM (Final)    Interpretation Summary    Left Ventricle: Normal left ventricular systolic function with a visually estimated EF of 55 - 60%. Left ventricle size is normal. Normal wall thickness. Normal wall motion. Grade I diastolic dysfunction with normal LAP. Signed by: Monico Cota MD on 7/11/2023  1:33 PM        Notes reviewed from all clinical/nonclinical/nursing services involved in patient's clinical care. Care coordination discussions were held with appropriate clinical/nonclinical/ nursing providers based on care coordination needs. Assessment:   Given the patient's current clinical presentation, there is a high level of concern for decompensation if discharged from the emergency department. Complex decision making was performed, which includes reviewing the patient's available past medical records, laboratory results, and imaging studies.     Principal Problem:    GI bleed  Resolved Problems:    * No resolved

## 2023-07-28 NOTE — ED PROVIDER NOTES
[address] EMERGENCY [facility]      Pt Name: Phil Villafuerte  MRN: [id number]  [facility] 1960  Date of evaluation: 7/28/2023  Provider: Ilana Goddard MD    CHIEF COMPLAINT       Chief Complaint   Patient presents with    Fatigue         HISTORY OF PRESENT ILLNESS   (Location/Symptom, Timing/Onset, Context/Setting, Quality, Duration, Modifying Factors, Severity)  Note limiting factors. Pt is a 66-year-old female present emergency department for weakness, fatigue and concerns of GI bleed. Patient's had a extremely complicated recent medical course with recent multiple abdominal surgeries, EGD who had recently been admitted to [facility] then [facility] as [facility] were concerned the patient was having a recurrent GI bleed with hematemesis. On arrival patient has vague abdominal discomfort but has been noticing episodes of bloody vomit. Patient is also extremely edematous and she states that this waxes and wanes. Review of External Medical Records:     Nursing Notes were reviewed. REVIEW OF SYSTEMS    (2-9 systems for level 4, 10 or more for level 5)     Review of Systems    Except as noted above the remainder of the review of systems was reviewed and negative. PAST MEDICAL HISTORY     Past Medical History:   Diagnosis Date    Anesthesia complication 80/9109    unable to lift feet & legs post op 3/2023, admitted to rehab post op; patient states \"due to anesthesia\"    Arthritis     OA back,knees and hands    Asthma     Autoimmune disease ([address])     Rust Spore    CAD (coronary artery disease)     s/p stents 11/2015    Cardiac murmur     Colitis 2023    Depression     Diabetes ([address])     diet controlled at this time.      DVT (deep venous thrombosis) ([address]) 1981    Fibromyalgia     Fragile skin 06/02/2023    tears easily per patient; multple scabs noted    GERD (gastroesophageal reflux disease)     History

## 2023-07-29 LAB
ABO + RH BLD: NORMAL
ANION GAP SERPL CALC-SCNC: 5 MMOL/L (ref 5–15)
BLD PROD TYP BPU: NORMAL
BLOOD BANK DISPENSE STATUS: NORMAL
BLOOD GROUP ANTIBODIES SERPL: NORMAL
BPU ID: NORMAL
BUN SERPL-MCNC: 13 MG/DL (ref 6–20)
BUN/CREAT SERPL: 14 (ref 12–20)
CALCIUM SERPL-MCNC: 9.1 MG/DL (ref 8.5–10.1)
CHLORIDE SERPL-SCNC: 98 MMOL/L (ref 97–108)
CO2 SERPL-SCNC: 29 MMOL/L (ref 21–32)
CREAT SERPL-MCNC: 0.9 MG/DL (ref 0.55–1.02)
CROSSMATCH RESULT: NORMAL
EKG ATRIAL RATE: 79 BPM
EKG DIAGNOSIS: NORMAL
EKG P AXIS: 38 DEGREES
EKG P-R INTERVAL: 118 MS
EKG Q-T INTERVAL: 340 MS
EKG QRS DURATION: 74 MS
EKG QTC CALCULATION (BAZETT): 389 MS
EKG R AXIS: 23 DEGREES
EKG T AXIS: 93 DEGREES
EKG VENTRICULAR RATE: 79 BPM
EST. AVERAGE GLUCOSE BLD GHB EST-MCNC: ABNORMAL MG/DL
GLUCOSE BLD STRIP.AUTO-MCNC: 119 MG/DL (ref 65–117)
GLUCOSE BLD STRIP.AUTO-MCNC: 123 MG/DL (ref 65–117)
GLUCOSE BLD STRIP.AUTO-MCNC: 51 MG/DL (ref 65–117)
GLUCOSE BLD STRIP.AUTO-MCNC: 60 MG/DL (ref 65–117)
GLUCOSE BLD STRIP.AUTO-MCNC: 76 MG/DL (ref 65–117)
GLUCOSE BLD STRIP.AUTO-MCNC: 81 MG/DL (ref 65–117)
GLUCOSE BLD STRIP.AUTO-MCNC: 86 MG/DL (ref 65–117)
GLUCOSE SERPL-MCNC: 78 MG/DL (ref 65–100)
HBA1C MFR BLD: <3.8 % (ref 4–5.6)
HCT VFR BLD AUTO: 22.2 % (ref 35–47)
HCT VFR BLD AUTO: 23.5 % (ref 35–47)
HCT VFR BLD AUTO: 24.7 % (ref 35–47)
HGB BLD-MCNC: 7.2 G/DL (ref 11.5–16)
HGB BLD-MCNC: 7.8 G/DL (ref 11.5–16)
HGB BLD-MCNC: 8.2 G/DL (ref 11.5–16)
MAGNESIUM SERPL-MCNC: 1.8 MG/DL (ref 1.6–2.4)
POTASSIUM SERPL-SCNC: 3 MMOL/L (ref 3.5–5.1)
SERVICE CMNT-IMP: ABNORMAL
SERVICE CMNT-IMP: NORMAL
SODIUM SERPL-SCNC: 132 MMOL/L (ref 136–145)
SPECIMEN EXP DATE BLD: NORMAL
UNIT DIVISION: 0

## 2023-07-29 PROCEDURE — 93010 ELECTROCARDIOGRAM REPORT: CPT | Performed by: SPECIALIST

## 2023-07-29 PROCEDURE — 82962 GLUCOSE BLOOD TEST: CPT

## 2023-07-29 PROCEDURE — 2580000003 HC RX 258: Performed by: FAMILY MEDICINE

## 2023-07-29 PROCEDURE — 6360000002 HC RX W HCPCS: Performed by: INTERNAL MEDICINE

## 2023-07-29 PROCEDURE — A4216 STERILE WATER/SALINE, 10 ML: HCPCS | Performed by: SURGERY

## 2023-07-29 PROCEDURE — 36556 INSERT NON-TUNNEL CV CATH: CPT

## 2023-07-29 PROCEDURE — 85014 HEMATOCRIT: CPT

## 2023-07-29 PROCEDURE — 2580000003 HC RX 258: Performed by: SURGERY

## 2023-07-29 PROCEDURE — 6360000002 HC RX W HCPCS: Performed by: FAMILY MEDICINE

## 2023-07-29 PROCEDURE — 80048 BASIC METABOLIC PNL TOTAL CA: CPT

## 2023-07-29 PROCEDURE — 6360000002 HC RX W HCPCS: Performed by: SURGERY

## 2023-07-29 PROCEDURE — 2060000000 HC ICU INTERMEDIATE R&B

## 2023-07-29 PROCEDURE — P9045 ALBUMIN (HUMAN), 5%, 250 ML: HCPCS | Performed by: INTERNAL MEDICINE

## 2023-07-29 PROCEDURE — 99253 IP/OBS CNSLTJ NEW/EST LOW 45: CPT | Performed by: SURGERY

## 2023-07-29 PROCEDURE — 83735 ASSAY OF MAGNESIUM: CPT

## 2023-07-29 PROCEDURE — 36415 COLL VENOUS BLD VENIPUNCTURE: CPT

## 2023-07-29 PROCEDURE — 2100000000 HC CCU R&B

## 2023-07-29 PROCEDURE — 85018 HEMOGLOBIN: CPT

## 2023-07-29 PROCEDURE — C9113 INJ PANTOPRAZOLE SODIUM, VIA: HCPCS | Performed by: SURGERY

## 2023-07-29 RX ORDER — POTASSIUM CHLORIDE 7.45 MG/ML
10 INJECTION INTRAVENOUS
Status: COMPLETED | OUTPATIENT
Start: 2023-07-29 | End: 2023-07-29

## 2023-07-29 RX ORDER — HYDROMORPHONE HYDROCHLORIDE 1 MG/ML
0.5 INJECTION, SOLUTION INTRAMUSCULAR; INTRAVENOUS; SUBCUTANEOUS EVERY 4 HOURS PRN
Status: DISCONTINUED | OUTPATIENT
Start: 2023-07-29 | End: 2023-08-05

## 2023-07-29 RX ORDER — ALBUMIN, HUMAN INJ 5% 5 %
12.5 SOLUTION INTRAVENOUS EVERY 6 HOURS PRN
Status: DISCONTINUED | OUTPATIENT
Start: 2023-07-29 | End: 2023-10-08

## 2023-07-29 RX ORDER — SODIUM CHLORIDE AND POTASSIUM CHLORIDE 150; 900 MG/100ML; MG/100ML
INJECTION, SOLUTION INTRAVENOUS CONTINUOUS
Status: DISCONTINUED | OUTPATIENT
Start: 2023-07-29 | End: 2023-07-30

## 2023-07-29 RX ORDER — ALBUMIN, HUMAN INJ 5% 5 %
12.5 SOLUTION INTRAVENOUS ONCE
Status: COMPLETED | OUTPATIENT
Start: 2023-07-29 | End: 2023-07-29

## 2023-07-29 RX ADMIN — PIPERACILLIN AND TAZOBACTAM 3375 MG: 3; .375 INJECTION, POWDER, LYOPHILIZED, FOR SOLUTION INTRAVENOUS at 20:49

## 2023-07-29 RX ADMIN — POTASSIUM CHLORIDE 10 MEQ: 7.46 INJECTION, SOLUTION INTRAVENOUS at 08:43

## 2023-07-29 RX ADMIN — HYDROMORPHONE HYDROCHLORIDE 0.5 MG: 1 INJECTION, SOLUTION INTRAMUSCULAR; INTRAVENOUS; SUBCUTANEOUS at 08:44

## 2023-07-29 RX ADMIN — Medication 10 ML: at 20:52

## 2023-07-29 RX ADMIN — POTASSIUM CHLORIDE AND SODIUM CHLORIDE: 900; 150 INJECTION, SOLUTION INTRAVENOUS at 08:43

## 2023-07-29 RX ADMIN — SODIUM CHLORIDE, PRESERVATIVE FREE 40 MG: 5 INJECTION INTRAVENOUS at 14:08

## 2023-07-29 RX ADMIN — ALBUMIN (HUMAN) 12.5 G: 12.5 INJECTION, SOLUTION INTRAVENOUS at 18:48

## 2023-07-29 RX ADMIN — ONDANSETRON 4 MG: 2 INJECTION INTRAMUSCULAR; INTRAVENOUS at 08:44

## 2023-07-29 RX ADMIN — POTASSIUM CHLORIDE AND SODIUM CHLORIDE: 900; 150 INJECTION, SOLUTION INTRAVENOUS at 21:41

## 2023-07-29 RX ADMIN — ALBUMIN (HUMAN) 12.5 G: 12.5 INJECTION, SOLUTION INTRAVENOUS at 10:40

## 2023-07-29 RX ADMIN — POTASSIUM CHLORIDE 10 MEQ: 7.46 INJECTION, SOLUTION INTRAVENOUS at 11:15

## 2023-07-29 RX ADMIN — PIPERACILLIN AND TAZOBACTAM 3375 MG: 3; .375 INJECTION, POWDER, LYOPHILIZED, FOR SOLUTION INTRAVENOUS at 03:48

## 2023-07-29 RX ADMIN — POTASSIUM CHLORIDE 10 MEQ: 7.46 INJECTION, SOLUTION INTRAVENOUS at 12:20

## 2023-07-29 RX ADMIN — SODIUM CHLORIDE, PRESERVATIVE FREE 40 MG: 5 INJECTION INTRAVENOUS at 03:48

## 2023-07-29 RX ADMIN — POTASSIUM CHLORIDE 10 MEQ: 7.46 INJECTION, SOLUTION INTRAVENOUS at 10:12

## 2023-07-29 RX ADMIN — DEXTROSE MONOHYDRATE 125 ML: 100 INJECTION, SOLUTION INTRAVENOUS at 11:42

## 2023-07-29 RX ADMIN — PIPERACILLIN AND TAZOBACTAM 3375 MG: 3; .375 INJECTION, POWDER, LYOPHILIZED, FOR SOLUTION INTRAVENOUS at 11:42

## 2023-07-29 ASSESSMENT — PAIN DESCRIPTION - ORIENTATION
ORIENTATION: LOWER;MID;UPPER
ORIENTATION: LOWER

## 2023-07-29 ASSESSMENT — PAIN SCALES - GENERAL
PAINLEVEL_OUTOF10: 10
PAINLEVEL_OUTOF10: 0
PAINLEVEL_OUTOF10: 0
PAINLEVEL_OUTOF10: 7

## 2023-07-29 ASSESSMENT — PAIN DESCRIPTION - DESCRIPTORS
DESCRIPTORS: SHARP;STABBING;CRAMPING
DESCRIPTORS: SHOOTING;STABBING

## 2023-07-29 ASSESSMENT — PAIN DESCRIPTION - LOCATION
LOCATION: ABDOMEN;BACK;LEG
LOCATION: ABDOMEN;BACK

## 2023-07-29 NOTE — ED NOTES
Bedside and Verbal shift change report given to [address] (oncoming nurse) by Altagracia Yang RN (offgoing nurse). Report included the following information ED Encounter Summary, ED SBAR, MAR, Cardiac Rhythm  , and Neuro Assessment.         Onur Gonzales RN  07/29/23 1466

## 2023-07-29 NOTE — ED NOTES
Patient transferred to inpatient air bed.       [address] [place] [address], RN  07/29/23 5476

## 2023-07-29 NOTE — PROGRESS NOTES
[address] [facility] Hospitalist Group                                                                               Hospitalist Progress Note  Ariel Alejandre MD  Answering service: [phone] from in house phone        Date of Service:  2023  NAME:  Sonia French  :  1960  MRN:  [id number]       Admission Summary:   Sonia French is a 61 y.o. female with apmhx gastric bypass, asthma, CAD, DM II, dyslipidemia, GERD, past PE, ANCA, anxiety, depression, and fibromyalgia who presents from [address] [place] [address] with weakness and hematemesis. 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 She was found to have a recurrent C. Diff colitis, under went ex lap that showed no evidence of performation, and underwent EGD that showed GJ ulcer, and stricture at  gastric bypass anastamosis that was dilated. She was started on anticoagulation for RLE DVT. She was discharged to [address] [place] [address]. In the ED VSS.   Labs showed WBC 11.9,  Hgb 7.6, Na 133, K+ 3.1, Mg 1.7, and albumin 1.5     Interval history / Subjective:   Source of information: patient    No acute complaints, feeling better s/p IV pain meds  Hematemesis overnight, none this AM  No recent BM  NAD  Hypotensive though MAP>65     Assessment & Plan:     #Acute Blood loss anemia 2/2 Hematemesis  #hypotension 2/2 blood loss  #leukocytosis  #hx gastric bypass  #GJ ulcer and stricture s/p dilatation  #intraperitoneal fluid collection  #hx fulminant colitis s/p subtotal colectomy with end ileostomy s/p reversal with ileocolonic anastamosis  S/p 1 PRBC  for hgb 7.6 with active bleeding  Hgb post transfusion 8.2, cont to monitor closely  -cont IV protonix, cannot tolerate sucralfate (or any PO)  -difficulty eating likely d/t stricture, may need dilated again  -CT What Type Of Note Output Would You Prefer (Optional)?: Bullet Format On A Scale Of 0 To 10 How Would You Rate Your Itching?: 8 How Did Your Itching Occur?: sudden in onset (over a period of weeks to a few months) How Severe Is Your Itching?: moderate

## 2023-07-29 NOTE — ED NOTES
2239: MD paged regarding patient's bp and bs.  0010: MD paged regarding patient failed her swallow screen.         Thayne Fleischer, RN  07/29/23 [facility], RN  07/31/23 7437

## 2023-07-29 NOTE — ED NOTES
Gave report to Consolidated Mark on CCU. Discussed Nurse Handoff Report, Recent Vital Changes, Labs, and MAR.       Bc Clark RN  07/29/23 5090

## 2023-07-29 NOTE — ANESTHESIA PROCEDURE NOTES
Central Venous Line:    A central venous line was placed using ultrasound guidance, in the ED for the following indication(s): central venous access. 7/28/2023 9:17 PM7/28/2023 9:27 PM    Sterility preparation included the following: hand hygiene performed prior to procedure, maximum sterile barriers used and sterile technique used to drape from head to toe. The patient was placed in Trendelenburg position. The right internal jugular vein was prepped. The site was prepped with Chloraprep. A 7.5 Fr (size), 16 (length), triple lumen was placed. During the procedure, the following specific steps were taken: target vein identified, needle advanced into vein and blood aspirated and guidewire advanced into vein. Intravenous verification was obtained by ultrasound, venous blood return and manometry. Post insertion care included: all ports aspirated, all ports flushed easily, guidewire removed intact, Biopatch applied, line sutured in place and dressing applied. During the procedure the patient experienced: patient tolerated procedure well with no complications and EBL < 5mL.       Outcomes: uncomplicated  Anesthesia type: local  Staffing  Anesthesiologist: Fantasma Lopez DO  Preanesthetic Checklist  Completed: patient identified, IV checked, site marked, risks and benefits discussed, surgical/procedural consents, equipment checked, pre-op evaluation, timeout performed, anesthesia consent given, oxygen available, monitors applied/VS acknowledged, fire risk safety assessment completed and verbalized and blood product R/B/A discussed and consented

## 2023-07-29 NOTE — ED NOTES
Bedside shift change report given to [address] (oncoming nurse) by Gerardo Yepez (offgoing nurse). Report included the following information Nurse Handoff Report, ED Encounter Summary, ED SBAR, and Recent Results.         Jesus Alberto Navarro RN  07/29/23 1048

## 2023-07-29 NOTE — PROGRESS NOTES
16:15 Admitted pt from ED. Multiple wounds on skin, see assessment  4 Eyes Skin Assessment     NAME:  Ayo Shah  YOB: 1960  MEDICAL RECORD NUMBER:  [id number]    The patient is being assessed for  Admission    I agree that at least one RN has performed a thorough Head to Toe Skin Assessment on the patient. ALL assessment sites listed below have been assessed. Areas assessed by both nurses:    Head, Face, Ears, Shoulders, Back, Chest, Arms, Elbows, Hands, Sacrum. Buttock, Coccyx, Ischium, Legs. Feet and Heels, and Under Medical Devices         Does the Patient have a Wound? Yes wound(s) were present on assessment.  LDA wound assessment was Initiated and completed by RN       Rodrick Prevention initiated by RN: Yes  Wound Care Orders initiated by RN: Yes    Pressure Injury (Stage 3,4, Unstageable, DTI, NWPT, and Complex wounds) if present, place Wound referral order by RN under : Yes    New Ostomies, if present place, Ostomy referral order under : Yes     Nurse 1 eSignature: Electronically signed by Carla Montano RN on 7/29/23 at 5:32 PM EDT    **SHARE this note so that the co-signing nurse can place an eSignature**    Nurse 2 eSignature: Electronically signed by Carla Montano RN on 7/29/23 at 5:32 PM EDT    19:30 SBAR given to Salima Lindsey

## 2023-07-29 NOTE — CONSULTS
GI Consultation Note Bernardo Villafana)    NAME: Reji Sofia : 1960 MRN: [id number]   ATTG: MD Elly PCP: Aisha Nieto MD  Date/Time:  2023 8:35 AM  Subjective:   REASON FOR CONSULT:      Sharyn Ly is a 61 y.o.  female who I was asked to see for evaluation of hematemesis. 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 At that time was found to have a recurrent C. Diff colitis, under went ex lap that showed no evidence of perforation, although 2.5L yellow peritoneal fluid drained then. She underwent EGD 23 with  at [facility] noting nl esophagus, small hiatal hernia, evidence of previous RYGB with a tight stricture and ulcer at the gastro-jejunal anastomosis. The upper endoscope could not pass until after balloon dilation was performed from 10mm to 12mm. Normal mucosa in the blind limb and enteric limbShe was started on anticoagulation for RLE DVT as Eliquis. She was discharged to [address] [place] [address]. She is on Meloxicam, in addition to her Eliquis as OP, with compliance with Pantoprazole and Carafate as OP. Since her discharge to [facility], she described poor appetite and difficulty eating despite prior balloon dilatation. On presentation to [facility] ER, was noted to have Hgb 7.6 that improved to 8.2 after transfusion 1u PRBC, Nl BUN:Cr  (unchanged over last 24hrs) , Alb 1.5.  No tachycardia (on metoprolol), but had hypotension with SBP 88. CT noted loculated intraperitoneal fluid in the anterior abdomen, increased compared to the prior exam. There was persistent free intraperitoneal air, however free air and free fluid

## 2023-07-29 NOTE — ED NOTES
Report given to [id number] Chris Angel RN from off going nurses Akiko and Pacov included ED SBAR, Recent Labs, Nurse Handoff, and [place]. Discussed vitals, drips, and glucose management.       Louis Bryan RN  07/29/23 2942

## 2023-07-30 LAB
ALBUMIN SERPL-MCNC: 2 G/DL (ref 3.5–5)
ANION GAP SERPL CALC-SCNC: 7 MMOL/L (ref 5–15)
BASOPHILS # BLD: 0 K/UL (ref 0–0.1)
BASOPHILS NFR BLD: 0 % (ref 0–1)
BUN SERPL-MCNC: 11 MG/DL (ref 6–20)
BUN/CREAT SERPL: 16 (ref 12–20)
CALCIUM SERPL-MCNC: 8.3 MG/DL (ref 8.5–10.1)
CHLORIDE SERPL-SCNC: 101 MMOL/L (ref 97–108)
CO2 SERPL-SCNC: 28 MMOL/L (ref 21–32)
CREAT SERPL-MCNC: 0.67 MG/DL (ref 0.55–1.02)
DIFFERENTIAL METHOD BLD: ABNORMAL
EOSINOPHIL # BLD: 0 K/UL (ref 0–0.4)
EOSINOPHIL NFR BLD: 0 % (ref 0–7)
ERYTHROCYTE [DISTWIDTH] IN BLOOD BY AUTOMATED COUNT: 16.7 % (ref 11.5–14.5)
GLUCOSE BLD STRIP.AUTO-MCNC: 101 MG/DL (ref 65–117)
GLUCOSE BLD STRIP.AUTO-MCNC: 181 MG/DL (ref 65–117)
GLUCOSE BLD STRIP.AUTO-MCNC: 69 MG/DL (ref 65–117)
GLUCOSE BLD STRIP.AUTO-MCNC: 85 MG/DL (ref 65–117)
GLUCOSE BLD STRIP.AUTO-MCNC: 93 MG/DL (ref 65–117)
GLUCOSE SERPL-MCNC: 62 MG/DL (ref 65–100)
HCT VFR BLD AUTO: 21.6 % (ref 35–47)
HCT VFR BLD AUTO: 23.7 % (ref 35–47)
HGB BLD-MCNC: 7 G/DL (ref 11.5–16)
HGB BLD-MCNC: 7.8 G/DL (ref 11.5–16)
IMM GRANULOCYTES # BLD AUTO: 0 K/UL
IMM GRANULOCYTES NFR BLD AUTO: 0 %
LYMPHOCYTES # BLD: 0.4 K/UL (ref 0.8–3.5)
LYMPHOCYTES NFR BLD: 5 % (ref 12–49)
MAGNESIUM SERPL-MCNC: 1.4 MG/DL (ref 1.6–2.4)
MCH RBC QN AUTO: 29.3 PG (ref 26–34)
MCHC RBC AUTO-ENTMCNC: 32.4 G/DL (ref 30–36.5)
MCV RBC AUTO: 90.4 FL (ref 80–99)
MONOCYTES # BLD: 0.2 K/UL (ref 0–1)
MONOCYTES NFR BLD: 2 % (ref 5–13)
NEUTS SEG # BLD: 7.2 K/UL (ref 1.8–8)
NEUTS SEG NFR BLD: 93 % (ref 32–75)
NRBC # BLD: 0 K/UL (ref 0–0.01)
NRBC BLD-RTO: 0 PER 100 WBC
PHOSPHATE SERPL-MCNC: 2.1 MG/DL (ref 2.6–4.7)
PLATELET # BLD AUTO: 270 K/UL (ref 150–400)
PMV BLD AUTO: 8.6 FL (ref 8.9–12.9)
POTASSIUM SERPL-SCNC: 2.9 MMOL/L (ref 3.5–5.1)
POTASSIUM SERPL-SCNC: 3.5 MMOL/L (ref 3.5–5.1)
RBC # BLD AUTO: 2.39 M/UL (ref 3.8–5.2)
RBC MORPH BLD: ABNORMAL
SERVICE CMNT-IMP: ABNORMAL
SERVICE CMNT-IMP: NORMAL
SODIUM SERPL-SCNC: 136 MMOL/L (ref 136–145)
WBC # BLD AUTO: 7.8 K/UL (ref 3.6–11)

## 2023-07-30 PROCEDURE — 83735 ASSAY OF MAGNESIUM: CPT

## 2023-07-30 PROCEDURE — 6360000002 HC RX W HCPCS: Performed by: INTERNAL MEDICINE

## 2023-07-30 PROCEDURE — 2060000000 HC ICU INTERMEDIATE R&B

## 2023-07-30 PROCEDURE — 2580000003 HC RX 258: Performed by: FAMILY MEDICINE

## 2023-07-30 PROCEDURE — 84132 ASSAY OF SERUM POTASSIUM: CPT

## 2023-07-30 PROCEDURE — 6360000002 HC RX W HCPCS: Performed by: FAMILY MEDICINE

## 2023-07-30 PROCEDURE — 82962 GLUCOSE BLOOD TEST: CPT

## 2023-07-30 PROCEDURE — 94761 N-INVAS EAR/PLS OXIMETRY MLT: CPT

## 2023-07-30 PROCEDURE — 85014 HEMATOCRIT: CPT

## 2023-07-30 PROCEDURE — 6370000000 HC RX 637 (ALT 250 FOR IP): Performed by: INTERNAL MEDICINE

## 2023-07-30 PROCEDURE — A4216 STERILE WATER/SALINE, 10 ML: HCPCS | Performed by: SURGERY

## 2023-07-30 PROCEDURE — 6360000002 HC RX W HCPCS: Performed by: SURGERY

## 2023-07-30 PROCEDURE — C9113 INJ PANTOPRAZOLE SODIUM, VIA: HCPCS | Performed by: SURGERY

## 2023-07-30 PROCEDURE — 6370000000 HC RX 637 (ALT 250 FOR IP): Performed by: FAMILY MEDICINE

## 2023-07-30 PROCEDURE — 99232 SBSQ HOSP IP/OBS MODERATE 35: CPT | Performed by: SURGERY

## 2023-07-30 PROCEDURE — 85025 COMPLETE CBC W/AUTO DIFF WBC: CPT

## 2023-07-30 PROCEDURE — 80069 RENAL FUNCTION PANEL: CPT

## 2023-07-30 PROCEDURE — 94760 N-INVAS EAR/PLS OXIMETRY 1: CPT

## 2023-07-30 PROCEDURE — 85018 HEMOGLOBIN: CPT

## 2023-07-30 PROCEDURE — 36415 COLL VENOUS BLD VENIPUNCTURE: CPT

## 2023-07-30 PROCEDURE — 6360000002 HC RX W HCPCS: Performed by: NURSE PRACTITIONER

## 2023-07-30 PROCEDURE — 2580000003 HC RX 258: Performed by: SURGERY

## 2023-07-30 RX ORDER — MAGNESIUM SULFATE IN WATER 40 MG/ML
4000 INJECTION, SOLUTION INTRAVENOUS ONCE
Status: COMPLETED | OUTPATIENT
Start: 2023-07-30 | End: 2023-07-30

## 2023-07-30 RX ORDER — SODIUM CHLORIDE AND POTASSIUM CHLORIDE 150; 900 MG/100ML; MG/100ML
INJECTION, SOLUTION INTRAVENOUS CONTINUOUS
Status: DISCONTINUED | OUTPATIENT
Start: 2023-07-30 | End: 2023-08-03

## 2023-07-30 RX ORDER — POTASSIUM CHLORIDE 29.8 MG/ML
20 INJECTION INTRAVENOUS
Status: COMPLETED | OUTPATIENT
Start: 2023-07-30 | End: 2023-07-30

## 2023-07-30 RX ORDER — POTASSIUM CHLORIDE 7.45 MG/ML
10 INJECTION INTRAVENOUS
Status: DISCONTINUED | OUTPATIENT
Start: 2023-07-30 | End: 2023-07-30

## 2023-07-30 RX ORDER — MAGNESIUM HYDROXIDE/ALUMINUM HYDROXICE/SIMETHICONE 120; 1200; 1200 MG/30ML; MG/30ML; MG/30ML
15 SUSPENSION ORAL EVERY 6 HOURS PRN
Status: DISCONTINUED | OUTPATIENT
Start: 2023-07-30 | End: 2023-08-22

## 2023-07-30 RX ORDER — METHION/INOS/CHOL BT/B COM/LIV 110MG-86MG
100 CAPSULE ORAL DAILY
Status: DISCONTINUED | OUTPATIENT
Start: 2023-07-31 | End: 2023-08-02

## 2023-07-30 RX ORDER — DICYCLOMINE HYDROCHLORIDE 10 MG/1
10 CAPSULE ORAL 3 TIMES DAILY PRN
Status: DISCONTINUED | OUTPATIENT
Start: 2023-07-30 | End: 2023-08-22

## 2023-07-30 RX ORDER — CASTOR OIL AND BALSAM, PERU 788; 87 MG/G; MG/G
OINTMENT TOPICAL EVERY 8 HOURS
Status: DISCONTINUED | OUTPATIENT
Start: 2023-07-30 | End: 2023-10-20 | Stop reason: HOSPADM

## 2023-07-30 RX ADMIN — HYDROMORPHONE HYDROCHLORIDE 0.5 MG: 1 INJECTION, SOLUTION INTRAMUSCULAR; INTRAVENOUS; SUBCUTANEOUS at 19:01

## 2023-07-30 RX ADMIN — SUCRALFATE 1 G: 1 TABLET ORAL at 21:08

## 2023-07-30 RX ADMIN — HYDROMORPHONE HYDROCHLORIDE 0.5 MG: 1 INJECTION, SOLUTION INTRAMUSCULAR; INTRAVENOUS; SUBCUTANEOUS at 03:38

## 2023-07-30 RX ADMIN — POTASSIUM CHLORIDE 20 MEQ: 29.8 INJECTION, SOLUTION INTRAVENOUS at 05:36

## 2023-07-30 RX ADMIN — HYDROMORPHONE HYDROCHLORIDE 0.5 MG: 1 INJECTION, SOLUTION INTRAMUSCULAR; INTRAVENOUS; SUBCUTANEOUS at 13:01

## 2023-07-30 RX ADMIN — ONDANSETRON 4 MG: 2 INJECTION INTRAMUSCULAR; INTRAVENOUS at 03:38

## 2023-07-30 RX ADMIN — ONDANSETRON 4 MG: 2 INJECTION INTRAMUSCULAR; INTRAVENOUS at 19:01

## 2023-07-30 RX ADMIN — DEXTROSE MONOHYDRATE 125 ML: 100 INJECTION, SOLUTION INTRAVENOUS at 08:48

## 2023-07-30 RX ADMIN — POTASSIUM CHLORIDE AND SODIUM CHLORIDE: 900; 150 INJECTION, SOLUTION INTRAVENOUS at 11:08

## 2023-07-30 RX ADMIN — Medication 10 ML: at 21:06

## 2023-07-30 RX ADMIN — POTASSIUM CHLORIDE 20 MEQ: 29.8 INJECTION, SOLUTION INTRAVENOUS at 12:12

## 2023-07-30 RX ADMIN — SODIUM CHLORIDE, PRESERVATIVE FREE 40 MG: 5 INJECTION INTRAVENOUS at 03:02

## 2023-07-30 RX ADMIN — Medication 10 ML: at 08:53

## 2023-07-30 RX ADMIN — MAGNESIUM SULFATE HEPTAHYDRATE 4000 MG: 40 INJECTION, SOLUTION INTRAVENOUS at 12:10

## 2023-07-30 RX ADMIN — Medication: at 18:12

## 2023-07-30 RX ADMIN — ONDANSETRON 4 MG: 2 INJECTION INTRAMUSCULAR; INTRAVENOUS at 12:03

## 2023-07-30 RX ADMIN — POTASSIUM CHLORIDE 20 MEQ: 29.8 INJECTION, SOLUTION INTRAVENOUS at 04:34

## 2023-07-30 RX ADMIN — PIPERACILLIN AND TAZOBACTAM 3375 MG: 3; .375 INJECTION, POWDER, LYOPHILIZED, FOR SOLUTION INTRAVENOUS at 18:13

## 2023-07-30 RX ADMIN — PIPERACILLIN AND TAZOBACTAM 3375 MG: 3; .375 INJECTION, POWDER, LYOPHILIZED, FOR SOLUTION INTRAVENOUS at 03:02

## 2023-07-30 RX ADMIN — PIPERACILLIN AND TAZOBACTAM 3375 MG: 3; .375 INJECTION, POWDER, LYOPHILIZED, FOR SOLUTION INTRAVENOUS at 10:36

## 2023-07-30 RX ADMIN — SODIUM CHLORIDE, PRESERVATIVE FREE 40 MG: 5 INJECTION INTRAVENOUS at 12:06

## 2023-07-30 ASSESSMENT — PAIN DESCRIPTION - LOCATION
LOCATION: OTHER (COMMENT)
LOCATION: ABDOMEN

## 2023-07-30 ASSESSMENT — PAIN DESCRIPTION - DESCRIPTORS
DESCRIPTORS: ACHING
DESCRIPTORS: CRAMPING;SHARP

## 2023-07-30 ASSESSMENT — PAIN SCALES - WONG BAKER
WONGBAKER_NUMERICALRESPONSE: NO HURT
WONGBAKER_NUMERICALRESPONSE: 0
WONGBAKER_NUMERICALRESPONSE: 0
WONGBAKER_NUMERICALRESPONSE: NO HURT

## 2023-07-30 ASSESSMENT — PAIN SCALES - GENERAL
PAINLEVEL_OUTOF10: 0
PAINLEVEL_OUTOF10: 0
PAINLEVEL_OUTOF10: 8
PAINLEVEL_OUTOF10: 8
PAINLEVEL_OUTOF10: 0
PAINLEVEL_OUTOF10: 8

## 2023-07-30 ASSESSMENT — PAIN DESCRIPTION - ORIENTATION: ORIENTATION: MID

## 2023-07-30 NOTE — PROGRESS NOTES
[address] [facility] Hospitalist Group                                                                               Hospitalist Progress Note  Lupita Schilder, MD  Answering service: [phone] from in house phone        Date of Service:  2023  NAME:  Taryn Francis  :  1960  MRN:  [id number]       Admission Summary:   Taryn Francis is a 61 y.o. female with apmhx gastric bypass, asthma, CAD, DM II, dyslipidemia, GERD, past PE, ANCA, anxiety, depression, and fibromyalgia who presents from [address] [place] [address] with weakness and hematemesis. She was previously admitted to [facility] from -, being readmitted after episode of fulminant colitis requiring subtotal colectomy with end ileostomy, s/p reversal and subsequent ileocolonic anastomosis. She was found to have a recurrent C. Diff colitis, under went ex lap that showed no evidence of performation, and underwent EGD that showed GJ ulcer, and stricture at  gastric bypass anastamosis that was dilated. She was started on anticoagulation for RLE DVT. She was discharged to [address] [place] [address]. In the ED VSS.   Labs showed WBC 11.9,  Hgb 7.6, Na 133, K+ 3.1, Mg 1.7, and albumin 1.5     Interval history / Subjective:   Source of information: patient    Hypotension to 83/53 overnight, required another PRN dose of IV albumin  No acute complaints, feeling about the same  Pain controlled with meds  No hematemesis x24h now  No BMs  NAD     Assessment & Plan:     #Acute Blood loss anemia 2/2 Hematemesis  #hypotension 2/2 blood loss  #leukocytosis, resolved  #hx gastric bypass  #GJ ulcer and stricture s/p dilatation  #intraperitoneal fluid collection  #hx fulminant colitis s/p subtotal colectomy with end ileostomy s/p reversal with ileocolonic anastamosis  -thrombocytosis likely reactive, resolved  -S/p 1 PRBC  for hgb 7.6 with active bleeding  -Hgb post transfusion 8.2  -Hgb today was 7.0 but increased to 7.8 after 7 hours, cont

## 2023-07-30 NOTE — PROGRESS NOTES
GI Progress Note (for ROXI Mims Temoi/Elver)  NAME:Tina Mccray :1960 CMC:[id number]   ATTG: MD Elly    Primary GI: Reji Valentino MD  PCP: Arnel Cardenas MD  Date/Time:  2023 8:17 AM   Assessment:     Hematemesis- resolved; nl BUN:Cr. No melena or Bms noted  Hx gastrojejunal anastomotic ulcer- no overt bleeding and has undergone previous dilatation  for tight associated stricture. Potentially exacerbated by Meloxicam given in setting of Eliquis. Abnormal GI CT- FC noted and being managed by surgery     Plan:     Maintain 2 peripheral IV and/or CVL  Transfuse PRN to keep Hgb >7  Daily H/H  Stop NSAIDS- should not be on Meloxicam or ASA in setting of ulcer  Continue to hold Eliquis- will be 72hrs by tomorrow AM  IV PPI q12hrs  QID Sucralfate  Allow CLD, then NPO after MN  EGD tomorrow  Consider for IVC filter placement if appropriate based on EGD findings and ability to resume Eliquis after review     Subjective:   Discussed with RN events overnight. Nausea only. No emesis. No melena. No hematemesis    Complaint Y/N Description   Abdominal Pain y No change n mild baseline discomfort   Hematemesis n    Hematochezia n    Melena n    Constipation n    Diarrhea n    Dyspepsia n    Dysphagia n    Jaundiced n    Nausea/vomiting y Nausea alone     Review of Systems:  Symptom Y/N Comments  Symptom Y/N Comments   Fever/Chills n   Chest Pain n    Cough n   Headaches n    Sputum n   Joint Pain n    SOB/WANG n   Pruritis/Rash n    Tolerating Diet  NPO  Other       Could NOT obtain due to:      Objective:   VITALS:   Last 24hrs VS reviewed since prior progress note. Most recent are:  Vitals:    23 0700   BP: (!) 99/59   Pulse: 80   Resp: 14   Temp:    SpO2: 98%       Intake/Output Summary (Last 24 hours) at 2023 0817  Last data filed at 2023 0700  Gross per 24 hour   Intake 3061.25 ml   Output 550 ml   Net 2511.25 ml     PHYSICAL EXAM:  General: WD, WN.  Alert, cooperative, no acute distress

## 2023-07-30 NOTE — CARE COORDINATION
Care Management Initial Assessment       RUR: 23%  Readmission? Yes - 07/30/23  1st IM letter given? Yes - 7-29-23 6-22 to 7-21-23 [facility] colectomy, c-diff, right DVT  7-21-23 to 7-28-23 [facility], sent to [facility] ED  7-28-23 Admitted to [facility] for GIB, hematemesis    Spoke with patient's RN. Patient with multiple admissions this year. Patient with multiple pressure sores from lengthy hospital stays. Met with patient in room. Patient confirmed her home address, phone number and emergency contacts. Patient said prior to her admission to [facility] on [facility]23, she lived with her  and was independent with her ADLs. She said when she went to [facility] she did not make much progress because she was getting sicker. She said she needs assist with all of her ADLs including feeding. Patient stated she would like to return home if possible. Discussed the fact that if she is still total care, we would have to make sure that she has enough care at home. Patient in agreement with therapy evaluation. 07/30/23 1116   Service Assessment   Patient Orientation Alert and Oriented   Cognition Alert   History Provided By Patient   Primary Caregiver Self   Support Systems Spouse/Significant Other;Children;Family Members   Patient's Healthcare Decision Maker is: Legal Next of [address] [place] [address]   PCP Verified by CM Yes  (Dr. Kodak Bishop)   Last Visit to PCP Within last 3 months   Prior Functional Level Bathing;Dressing; Toileting;Feeding;Cooking;Housework; Mobility   Current Functional Level Bathing;Dressing; Toileting;Feeding;Cooking;Housework; Mobility   Can patient return to prior living arrangement Unknown at present   Ability to make needs known: Good   Family able to assist with home care needs: Yes   Would you like for me to discuss the discharge plan with any other family members/significant others, and if so, who?  Yes  (, children)   Financial Resources

## 2023-07-30 NOTE — PROGRESS NOTES
Verbal bedside shift change report given to Corinna Patricia (oncoming nurse) by MOM (offgoing nurse). Report included the following information SBAR, Kardex, ED Summary, Procedure Summary, Intake/Output, MAR, Recent Results, Cardiac Rhythm Sinus rhythm, and Alarm Parameters. 0410 Hgb 7.0, K 2.9.  NP ordered K replacement, recheck Hgb @ 1000    0730 Report to MOM RN

## 2023-07-30 NOTE — PROGRESS NOTES
07:30 SBAR from Bere    10:20 labs repeated    12:15 Runs of KCL and mag given. Refused Carafate and K phos. 13:01 Dilaudid for pain     18:02 C/o gassy stomach, and cramping M d notified    18:50 Offered Maalox and Bentyl, she is refusing, afraid she may vomit, offered Zofran and repositioning    19:03 Zofran and Dilaudid, she is hoping after Zofran she will be able to take Bentyl and Zofran.      19:30 SBAR given to Bere

## 2023-07-31 ENCOUNTER — APPOINTMENT (OUTPATIENT)
Facility: HOSPITAL | Age: 63
DRG: 004 | End: 2023-07-31
Payer: MEDICARE

## 2023-07-31 ENCOUNTER — ANESTHESIA (OUTPATIENT)
Facility: HOSPITAL | Age: 63
End: 2023-07-31
Payer: MEDICARE

## 2023-07-31 ENCOUNTER — ANESTHESIA EVENT (OUTPATIENT)
Facility: HOSPITAL | Age: 63
End: 2023-07-31
Payer: MEDICARE

## 2023-07-31 LAB
ALBUMIN SERPL-MCNC: 1.8 G/DL (ref 3.5–5)
ANION GAP SERPL CALC-SCNC: 9 MMOL/L (ref 5–15)
BASOPHILS # BLD: 0 K/UL (ref 0–0.1)
BASOPHILS NFR BLD: 0 % (ref 0–1)
BUN SERPL-MCNC: 10 MG/DL (ref 6–20)
BUN/CREAT SERPL: 18 (ref 12–20)
CALCIUM SERPL-MCNC: 8.4 MG/DL (ref 8.5–10.1)
CHLORIDE SERPL-SCNC: 105 MMOL/L (ref 97–108)
CO2 SERPL-SCNC: 26 MMOL/L (ref 21–32)
CREAT SERPL-MCNC: 0.55 MG/DL (ref 0.55–1.02)
DIFFERENTIAL METHOD BLD: ABNORMAL
EOSINOPHIL # BLD: 0.2 K/UL (ref 0–0.4)
EOSINOPHIL NFR BLD: 2 % (ref 0–7)
ERYTHROCYTE [DISTWIDTH] IN BLOOD BY AUTOMATED COUNT: 17.1 % (ref 11.5–14.5)
GLUCOSE BLD STRIP.AUTO-MCNC: 104 MG/DL (ref 65–117)
GLUCOSE BLD STRIP.AUTO-MCNC: 111 MG/DL (ref 65–117)
GLUCOSE BLD STRIP.AUTO-MCNC: 64 MG/DL (ref 65–117)
GLUCOSE BLD STRIP.AUTO-MCNC: 68 MG/DL (ref 65–117)
GLUCOSE BLD STRIP.AUTO-MCNC: 74 MG/DL (ref 65–117)
GLUCOSE BLD STRIP.AUTO-MCNC: 80 MG/DL (ref 65–117)
GLUCOSE BLD STRIP.AUTO-MCNC: 85 MG/DL (ref 65–117)
GLUCOSE BLD STRIP.AUTO-MCNC: 85 MG/DL (ref 65–117)
GLUCOSE BLD STRIP.AUTO-MCNC: 88 MG/DL (ref 65–117)
GLUCOSE SERPL-MCNC: 81 MG/DL (ref 65–100)
HCT VFR BLD AUTO: 24.2 % (ref 35–47)
HGB BLD-MCNC: 7.9 G/DL (ref 11.5–16)
IMM GRANULOCYTES # BLD AUTO: 0.2 K/UL (ref 0–0.04)
IMM GRANULOCYTES NFR BLD AUTO: 2 % (ref 0–0.5)
LYMPHOCYTES # BLD: 0.7 K/UL (ref 0.8–3.5)
LYMPHOCYTES NFR BLD: 9 % (ref 12–49)
MAGNESIUM SERPL-MCNC: 1.9 MG/DL (ref 1.6–2.4)
MCH RBC QN AUTO: 29.7 PG (ref 26–34)
MCHC RBC AUTO-ENTMCNC: 32.6 G/DL (ref 30–36.5)
MCV RBC AUTO: 91 FL (ref 80–99)
MONOCYTES # BLD: 0.8 K/UL (ref 0–1)
MONOCYTES NFR BLD: 10 % (ref 5–13)
NEUTS SEG # BLD: 5.8 K/UL (ref 1.8–8)
NEUTS SEG NFR BLD: 77 % (ref 32–75)
NRBC # BLD: 0 K/UL (ref 0–0.01)
NRBC BLD-RTO: 0 PER 100 WBC
PHOSPHATE SERPL-MCNC: 1.5 MG/DL (ref 2.6–4.7)
PLATELET # BLD AUTO: 298 K/UL (ref 150–400)
PMV BLD AUTO: 8.7 FL (ref 8.9–12.9)
POTASSIUM SERPL-SCNC: 3.3 MMOL/L (ref 3.5–5.1)
RBC # BLD AUTO: 2.66 M/UL (ref 3.8–5.2)
RBC MORPH BLD: ABNORMAL
SERVICE CMNT-IMP: ABNORMAL
SERVICE CMNT-IMP: NORMAL
SODIUM SERPL-SCNC: 140 MMOL/L (ref 136–145)
WBC # BLD AUTO: 7.7 K/UL (ref 3.6–11)

## 2023-07-31 PROCEDURE — 2580000003 HC RX 258: Performed by: STUDENT IN AN ORGANIZED HEALTH CARE EDUCATION/TRAINING PROGRAM

## 2023-07-31 PROCEDURE — C9113 INJ PANTOPRAZOLE SODIUM, VIA: HCPCS | Performed by: SURGERY

## 2023-07-31 PROCEDURE — 6370000000 HC RX 637 (ALT 250 FOR IP): Performed by: STUDENT IN AN ORGANIZED HEALTH CARE EDUCATION/TRAINING PROGRAM

## 2023-07-31 PROCEDURE — 2500000003 HC RX 250 WO HCPCS: Performed by: NURSE ANESTHETIST, CERTIFIED REGISTERED

## 2023-07-31 PROCEDURE — 2500000003 HC RX 250 WO HCPCS: Performed by: STUDENT IN AN ORGANIZED HEALTH CARE EDUCATION/TRAINING PROGRAM

## 2023-07-31 PROCEDURE — 80069 RENAL FUNCTION PANEL: CPT

## 2023-07-31 PROCEDURE — 6360000002 HC RX W HCPCS: Performed by: FAMILY MEDICINE

## 2023-07-31 PROCEDURE — 85025 COMPLETE CBC W/AUTO DIFF WBC: CPT

## 2023-07-31 PROCEDURE — 7100000011 HC PHASE II RECOVERY - ADDTL 15 MIN: Performed by: INTERNAL MEDICINE

## 2023-07-31 PROCEDURE — 49406 IMAGE CATH FLUID PERI/RETRO: CPT

## 2023-07-31 PROCEDURE — 2580000003 HC RX 258: Performed by: FAMILY MEDICINE

## 2023-07-31 PROCEDURE — 83735 ASSAY OF MAGNESIUM: CPT

## 2023-07-31 PROCEDURE — 87070 CULTURE OTHR SPECIMN AEROBIC: CPT

## 2023-07-31 PROCEDURE — 87205 SMEAR GRAM STAIN: CPT

## 2023-07-31 PROCEDURE — 3700000000 HC ANESTHESIA ATTENDED CARE: Performed by: INTERNAL MEDICINE

## 2023-07-31 PROCEDURE — 2580000003 HC RX 258: Performed by: SURGERY

## 2023-07-31 PROCEDURE — 6360000002 HC RX W HCPCS: Performed by: INTERNAL MEDICINE

## 2023-07-31 PROCEDURE — 6360000002 HC RX W HCPCS: Performed by: NURSE ANESTHETIST, CERTIFIED REGISTERED

## 2023-07-31 PROCEDURE — A4216 STERILE WATER/SALINE, 10 ML: HCPCS | Performed by: SURGERY

## 2023-07-31 PROCEDURE — 0DJ08ZZ INSPECTION OF UPPER INTESTINAL TRACT, VIA NATURAL OR ARTIFICIAL OPENING ENDOSCOPIC: ICD-10-PCS | Performed by: INTERNAL MEDICINE

## 2023-07-31 PROCEDURE — 82962 GLUCOSE BLOOD TEST: CPT

## 2023-07-31 PROCEDURE — 7100000010 HC PHASE II RECOVERY - FIRST 15 MIN: Performed by: INTERNAL MEDICINE

## 2023-07-31 PROCEDURE — 99231 SBSQ HOSP IP/OBS SF/LOW 25: CPT | Performed by: NURSE PRACTITIONER

## 2023-07-31 PROCEDURE — 6370000000 HC RX 637 (ALT 250 FOR IP): Performed by: NURSE PRACTITIONER

## 2023-07-31 PROCEDURE — 6360000002 HC RX W HCPCS: Performed by: NURSE PRACTITIONER

## 2023-07-31 PROCEDURE — 36415 COLL VENOUS BLD VENIPUNCTURE: CPT

## 2023-07-31 PROCEDURE — 87077 CULTURE AEROBIC IDENTIFY: CPT

## 2023-07-31 PROCEDURE — 2580000003 HC RX 258: Performed by: INTERNAL MEDICINE

## 2023-07-31 PROCEDURE — 3600007502: Performed by: INTERNAL MEDICINE

## 2023-07-31 PROCEDURE — 87186 SC STD MICRODIL/AGAR DIL: CPT

## 2023-07-31 PROCEDURE — 2060000000 HC ICU INTERMEDIATE R&B

## 2023-07-31 PROCEDURE — 0W9G30Z DRAINAGE OF PERITONEAL CAVITY WITH DRAINAGE DEVICE, PERCUTANEOUS APPROACH: ICD-10-PCS

## 2023-07-31 PROCEDURE — 6360000002 HC RX W HCPCS: Performed by: STUDENT IN AN ORGANIZED HEALTH CARE EDUCATION/TRAINING PROGRAM

## 2023-07-31 PROCEDURE — 6360000002 HC RX W HCPCS: Performed by: SURGERY

## 2023-07-31 RX ORDER — SCOLOPAMINE TRANSDERMAL SYSTEM 1 MG/1
1 PATCH, EXTENDED RELEASE TRANSDERMAL
Status: DISCONTINUED | OUTPATIENT
Start: 2023-07-31 | End: 2023-08-16

## 2023-07-31 RX ORDER — POTASSIUM CHLORIDE 14.9 MG/ML
10 INJECTION INTRAVENOUS
Status: COMPLETED | OUTPATIENT
Start: 2023-07-31 | End: 2023-07-31

## 2023-07-31 RX ORDER — LIDOCAINE HYDROCHLORIDE 20 MG/ML
INJECTION, SOLUTION EPIDURAL; INFILTRATION; INTRACAUDAL; PERINEURAL PRN
Status: DISCONTINUED | OUTPATIENT
Start: 2023-07-31 | End: 2023-07-31 | Stop reason: SDUPTHER

## 2023-07-31 RX ORDER — POTASSIUM CHLORIDE 29.8 MG/ML
20 INJECTION INTRAVENOUS
Status: COMPLETED | OUTPATIENT
Start: 2023-07-31 | End: 2023-07-31

## 2023-07-31 RX ORDER — SODIUM CHLORIDE 9 MG/ML
25 INJECTION, SOLUTION INTRAVENOUS PRN
Status: DISCONTINUED | OUTPATIENT
Start: 2023-07-31 | End: 2023-07-31 | Stop reason: HOSPADM

## 2023-07-31 RX ORDER — SODIUM CHLORIDE 0.9 % (FLUSH) 0.9 %
5-40 SYRINGE (ML) INJECTION EVERY 12 HOURS SCHEDULED
Status: DISCONTINUED | OUTPATIENT
Start: 2023-07-31 | End: 2023-07-31 | Stop reason: HOSPADM

## 2023-07-31 RX ORDER — POTASSIUM CHLORIDE 29.8 MG/ML
20 INJECTION INTRAVENOUS
Status: CANCELLED | OUTPATIENT
Start: 2023-07-31 | End: 2023-07-31

## 2023-07-31 RX ORDER — SODIUM CHLORIDE 0.9 % (FLUSH) 0.9 %
5-40 SYRINGE (ML) INJECTION PRN
Status: DISCONTINUED | OUTPATIENT
Start: 2023-07-31 | End: 2023-07-31 | Stop reason: HOSPADM

## 2023-07-31 RX ORDER — SODIUM CHLORIDE 9 MG/ML
INJECTION, SOLUTION INTRAVENOUS CONTINUOUS
Status: DISCONTINUED | OUTPATIENT
Start: 2023-07-31 | End: 2023-08-06

## 2023-07-31 RX ORDER — PHENYLEPHRINE HCL IN 0.9% NACL 0.4MG/10ML
SYRINGE (ML) INTRAVENOUS PRN
Status: DISCONTINUED | OUTPATIENT
Start: 2023-07-31 | End: 2023-07-31 | Stop reason: SDUPTHER

## 2023-07-31 RX ADMIN — Medication: at 18:10

## 2023-07-31 RX ADMIN — PROPOFOL 50 MG: 10 INJECTION, EMULSION INTRAVENOUS at 17:00

## 2023-07-31 RX ADMIN — Medication: at 08:23

## 2023-07-31 RX ADMIN — POTASSIUM CHLORIDE AND SODIUM CHLORIDE: 900; 150 INJECTION, SOLUTION INTRAVENOUS at 23:44

## 2023-07-31 RX ADMIN — PIPERACILLIN AND TAZOBACTAM 3375 MG: 3; .375 INJECTION, POWDER, LYOPHILIZED, FOR SOLUTION INTRAVENOUS at 11:44

## 2023-07-31 RX ADMIN — HYDROMORPHONE HYDROCHLORIDE 0.5 MG: 1 INJECTION, SOLUTION INTRAMUSCULAR; INTRAVENOUS; SUBCUTANEOUS at 08:45

## 2023-07-31 RX ADMIN — DEXTROSE MONOHYDRATE 125 ML: 100 INJECTION, SOLUTION INTRAVENOUS at 11:48

## 2023-07-31 RX ADMIN — SODIUM CHLORIDE, PRESERVATIVE FREE 40 MG: 5 INJECTION INTRAVENOUS at 00:05

## 2023-07-31 RX ADMIN — POTASSIUM CHLORIDE AND SODIUM CHLORIDE: 900; 150 INJECTION, SOLUTION INTRAVENOUS at 03:35

## 2023-07-31 RX ADMIN — PIPERACILLIN AND TAZOBACTAM 3375 MG: 3; .375 INJECTION, POWDER, LYOPHILIZED, FOR SOLUTION INTRAVENOUS at 02:55

## 2023-07-31 RX ADMIN — SODIUM CHLORIDE, PRESERVATIVE FREE 40 MG: 5 INJECTION INTRAVENOUS at 11:39

## 2023-07-31 RX ADMIN — DEXTROSE MONOHYDRATE 125 ML: 100 INJECTION, SOLUTION INTRAVENOUS at 21:25

## 2023-07-31 RX ADMIN — Medication: at 23:43

## 2023-07-31 RX ADMIN — Medication 10 ML: at 08:42

## 2023-07-31 RX ADMIN — POTASSIUM CHLORIDE 10 MEQ: 14.9 INJECTION, SOLUTION INTRAVENOUS at 11:24

## 2023-07-31 RX ADMIN — PROPOFOL 25 MG: 10 INJECTION, EMULSION INTRAVENOUS at 17:04

## 2023-07-31 RX ADMIN — HYDROMORPHONE HYDROCHLORIDE 0.5 MG: 1 INJECTION, SOLUTION INTRAMUSCULAR; INTRAVENOUS; SUBCUTANEOUS at 00:14

## 2023-07-31 RX ADMIN — POTASSIUM CHLORIDE 10 MEQ: 14.9 INJECTION, SOLUTION INTRAVENOUS at 08:49

## 2023-07-31 RX ADMIN — Medication 80 MCG: at 17:00

## 2023-07-31 RX ADMIN — Medication 120 MCG: at 17:04

## 2023-07-31 RX ADMIN — ONDANSETRON 4 MG: 2 INJECTION INTRAMUSCULAR; INTRAVENOUS at 18:03

## 2023-07-31 RX ADMIN — SODIUM CHLORIDE, PRESERVATIVE FREE 40 MG: 5 INJECTION INTRAVENOUS at 23:43

## 2023-07-31 RX ADMIN — POTASSIUM CHLORIDE 20 MEQ: 29.8 INJECTION, SOLUTION INTRAVENOUS at 04:26

## 2023-07-31 RX ADMIN — ONDANSETRON 4 MG: 2 INJECTION INTRAMUSCULAR; INTRAVENOUS at 23:54

## 2023-07-31 RX ADMIN — POTASSIUM PHOSPHATE, MONOBASIC AND POTASSIUM PHOSPHATE, DIBASIC 10 MMOL: 224; 236 INJECTION, SOLUTION, CONCENTRATE INTRAVENOUS at 11:40

## 2023-07-31 RX ADMIN — ONDANSETRON 4 MG: 2 INJECTION INTRAMUSCULAR; INTRAVENOUS at 00:14

## 2023-07-31 RX ADMIN — Medication: at 00:01

## 2023-07-31 RX ADMIN — HYDROMORPHONE HYDROCHLORIDE 0.5 MG: 1 INJECTION, SOLUTION INTRAMUSCULAR; INTRAVENOUS; SUBCUTANEOUS at 04:26

## 2023-07-31 RX ADMIN — LIDOCAINE HYDROCHLORIDE 50 MG: 20 INJECTION, SOLUTION EPIDURAL; INFILTRATION; INTRACAUDAL; PERINEURAL at 17:00

## 2023-07-31 RX ADMIN — HYDROMORPHONE HYDROCHLORIDE 0.5 MG: 1 INJECTION, SOLUTION INTRAMUSCULAR; INTRAVENOUS; SUBCUTANEOUS at 18:02

## 2023-07-31 RX ADMIN — PROPOFOL 50 MG: 10 INJECTION, EMULSION INTRAVENOUS at 17:02

## 2023-07-31 RX ADMIN — COLLAGENASE SANTYL: 250 OINTMENT TOPICAL at 18:27

## 2023-07-31 RX ADMIN — HYDROMORPHONE HYDROCHLORIDE 0.5 MG: 1 INJECTION, SOLUTION INTRAMUSCULAR; INTRAVENOUS; SUBCUTANEOUS at 13:06

## 2023-07-31 RX ADMIN — POTASSIUM CHLORIDE 10 MEQ: 14.9 INJECTION, SOLUTION INTRAVENOUS at 10:08

## 2023-07-31 RX ADMIN — SODIUM CHLORIDE: 9 INJECTION, SOLUTION INTRAVENOUS at 16:44

## 2023-07-31 RX ADMIN — ONDANSETRON 4 MG: 2 INJECTION INTRAMUSCULAR; INTRAVENOUS at 08:48

## 2023-07-31 RX ADMIN — PIPERACILLIN AND TAZOBACTAM 3375 MG: 3; .375 INJECTION, POWDER, LYOPHILIZED, FOR SOLUTION INTRAVENOUS at 21:21

## 2023-07-31 RX ADMIN — HYDROMORPHONE HYDROCHLORIDE 0.5 MG: 1 INJECTION, SOLUTION INTRAMUSCULAR; INTRAVENOUS; SUBCUTANEOUS at 23:55

## 2023-07-31 ASSESSMENT — PAIN SCALES - GENERAL
PAINLEVEL_OUTOF10: 0
PAINLEVEL_OUTOF10: 8
PAINLEVEL_OUTOF10: 0
PAINLEVEL_OUTOF10: 7
PAINLEVEL_OUTOF10: 0
PAINLEVEL_OUTOF10: 7
PAINLEVEL_OUTOF10: 7
PAINLEVEL_OUTOF10: 0
PAINLEVEL_OUTOF10: 7
PAINLEVEL_OUTOF10: 2
PAINLEVEL_OUTOF10: 0

## 2023-07-31 ASSESSMENT — PAIN - FUNCTIONAL ASSESSMENT: PAIN_FUNCTIONAL_ASSESSMENT: ACTIVITIES ARE NOT PREVENTED

## 2023-07-31 ASSESSMENT — PAIN DESCRIPTION - ORIENTATION
ORIENTATION: ANTERIOR
ORIENTATION: RIGHT;LEFT;MID
ORIENTATION: RIGHT;LEFT;ANTERIOR;POSTERIOR
ORIENTATION: MID
ORIENTATION: MID

## 2023-07-31 ASSESSMENT — PAIN DESCRIPTION - DESCRIPTORS
DESCRIPTORS: CRAMPING
DESCRIPTORS: ACHING
DESCRIPTORS: CRAMPING

## 2023-07-31 ASSESSMENT — PAIN DESCRIPTION - LOCATION
LOCATION: OTHER (COMMENT)
LOCATION: OTHER (COMMENT)
LOCATION: ABDOMEN
LOCATION: ABDOMEN

## 2023-07-31 ASSESSMENT — PAIN DESCRIPTION - ONSET: ONSET: GRADUAL

## 2023-07-31 ASSESSMENT — PAIN DESCRIPTION - FREQUENCY: FREQUENCY: INTERMITTENT

## 2023-07-31 NOTE — CARE COORDINATION
Transition of Care Plan:    RUR: 25%--high  Prior Level of Functioning: total care after multiple hospitalizations  Disposition: return to [facility] v SNF  If SNF or IPR: Date FOC offered:   Date FOC received:   Accepting facility:   Date authorization started with reference number:   Date authorization received and expires: Follow up appointments:   DME needed: N/A  Transportation at discharge: BLS  IM/IMM Medicare/ letter given: 7/29/2023  Caregiver Contact: Maria Alejandra Wood, spouse, [phone]  Discharge Caregiver contacted prior to discharge? Care Conference needed? N/A  Barriers to discharge:  insurance authorization, placement    Patient re-admitted on 7/28 from [facility] due to GI bleed. Previous hospitalization at [facility] from 6/22-7/21/23. H: gastric bypass, asthma, CAD, DM2, sleep apnea, anxiety, depression and fibromyalgia. Prior to initial hospitalization, patient was independent in ADLs. After patient's long hospital and rehab course, she is unable to care for herself and needs total assistance. Wound care following for multiple pressure wounds. Plan for EGD. CM will continue to follow.     Pierre Hernandez, [address]  [phone]

## 2023-07-31 NOTE — PROGRESS NOTES
Verbal bedside shift change report given to Bailey Collins (oncoming nurse) by MOM (offgoing nurse). Report included the following information SBAR, Kardex, Intake/Output, MAR, Recent Results, Cardiac Rhythm Sinus rhythm, and Alarm Parameters. 2100 Refused gabapentin, Phos-NaK. Attempted to take carafate, but vomited it up.   0015 Gave PRN dilaudid, zofran  0350 K 3.3, hospitalist ordered 20mEq K repletion  0430 Gave PRN dilaudid  0630 Feeling anxious, contacted hospitalist for orders, awaiting response    0730 Report to MOM RN

## 2023-07-31 NOTE — BRIEF OP NOTE
TRANSFER - IN REPORT:    Verbal report received from [address] [place] [address]  being received from CCU--21 for ordered procedure      Report consisted of patient's Situation, Background, Assessment and   Recommendations(SBAR). Information from the following report(s) Nurse Handoff Report was reviewed with the receiving nurse. Opportunity for questions and clarification was provided. Assessment completed upon patient's arrival to unit and care assumed.

## 2023-07-31 NOTE — PROGRESS NOTES
[address] [facility] Hospitalist Group                                                                               Hospitalist Progress Note  Crow Perrin MD  Answering service: [phone] from in house phone        Date of Service:  2023  NAME:  Lizy Corral  :  1960  MRN:  [id number]       Admission Summary:   Lizy Corral is a 61 y.o. female with apmhx gastric bypass, asthma, CAD, DM II, dyslipidemia, GERD, past PE, ANCA, anxiety, depression, and fibromyalgia who presents from [address] [place] [address] with weakness and hematemesis. She was previously admitted to  [facility] from -, being readmitted after episode of fulminant colitis requiring subtotal colectomy with end ileostomy, s/p reversal and subsequent ileocolonic anastomosis. She was found to have a recurrent C. Diff colitis, under went ex lap that showed no evidence of performation, and underwent EGD that showed GJ ulcer, and stricture at  gastric bypass anastamosis that was dilated. She was started on anticoagulation for RLE DVT. She was discharged to [address] [place] [address]. In the ED VSS.   Labs showed WBC 11.9,  Hgb 7.6, Na 133, K+ 3.1, Mg 1.7, and albumin 1.5     Interval history / Subjective:   Patient seen and examined at bedside earlier, plan for EGD today also plan for ct guided drainage of intraperitoneal fluid collection, basely eating, + nausea      Assessment & Plan:     #Acute Blood loss anemia 2/2 Hematemesis  #hypotension 2/2 blood loss  #leukocytosis, resolved  #hx gastric bypass  #GJ ulcer and stricture s/p dilatation  #intraperitoneal fluid collection  #hx fulminant colitis s/p subtotal colectomy with end ileostomy s/p reversal with ileocolonic anastamosis  #Unstageable sacral decubitus ulcer/ infected   -thrombocytosis likely reactive, resolved  -S/p 1 PRBC  for hgb 7.6 with active bleeding  -Hgb post transfusion 8.2  -watch hgb, closely cont to monitor closely, transfuse for

## 2023-07-31 NOTE — PROGRESS NOTES
Referral source:   Peng Haywood at [facility] in [address].  attended rounds in the CCU as part of the Interdisciplinary team where the patient's ongoing care was discussed. I reviewed the medical record as part of this encounter. Outcome: Interdisciplinary team are aware of  availability and were encouraged to request support as needed. Advised nurse to contact [address] [place] [address] for any further referrals. The  on-call can be reached at [phone]). Rev.  Gina Huber MDiv, [facility]  Staff

## 2023-07-31 NOTE — ANESTHESIA PRE PROCEDURE
Department of Anesthesiology  Preprocedure Note       Name:  Radha Barton   Age:  61 y.o.  :  1960                                          MRN:  [id number]         Date:  2023      Surgeon: Whitney Tucker):  Morales Guzman MD    Procedure: Procedure(s):  EGD DIAGNOSTIC ONLY    Medications prior to admission:   Prior to Admission medications    Medication Sig Start Date End Date Taking? Authorizing Provider   sucralfate (CARAFATE) 1 GM tablet Take 1 tablet by mouth 4 times daily 23   Rossy Dutta MD   calcium carbonate (TUMS) 500 MG chewable tablet Take 1 tablet by mouth in the morning and at bedtime 23  Rossy Dutta MD   hydrOXYzine HCl (ATARAX) 25 MG tablet Take 1 tablet by mouth 3 times daily as needed for Itching or Anxiety 23  Rossy Dutta MD   apixaban (ELIQUIS) 5 MG TABS tablet Take 1 tablet by mouth 2 times daily 23   Rossy Dutta MD   gabapentin (NEURONTIN) 250 MG/5ML solution Take 6 mLs by mouth 2 times daily for 158 days.  Max Daily Amount: 600 mg 23  Rossy Dutta MD   buPROPion (WELLBUTRIN XL) 150 MG extended release tablet Take 1 tablet by mouth daily 23   Rossy Dutta MD   lidocaine 4 % external patch Place 1 patch onto the skin daily 23   Rossy Dutta MD   DPH-Lido-AlHydr-MgHydr-Simeth (MAGIC MOUTHWASH) SUSP Swish and spit 5 mLs 4 times daily as needed for Irritation 23   Rossy Dutta MD   thiamine mononitrate (THIAMINE) 100 MG tablet Take 1 tablet by mouth daily 23   Rossy Dutta MD   Cholecalciferol (VITAMIN D) 25 MCG TABS Take 1 tablet by mouth in the morning and at bedtime 23   Rossy Dutta MD   naloxone 4 MG/0.1ML LIQD nasal spray 1 spray by Nasal route as needed for Opioid Reversal 23   Rossy Dutta MD   bumetanide (BUMEX) 0.5 MG tablet TAKE 1 TABLET BY MOUTH EVERY DAY AS NEEDED 23   Alvaro Barker MD   nitroGLYCERIN (NITROSTAT) 0.4 MG SL tablet Place

## 2023-07-31 NOTE — PROGRESS NOTES
07:30 SBAR from Daymon Mays    08:23 Dr Destinee Santana made aware of electrolytes and her refusing po med's. (She's afraid of vomiting)    08:45 Dilaudid for generalized pain    08:48 Zofran given and will re attempt po med's but this did not work all weekend. 11:00 Wound care at bedside as well as Dilcia Castañeda NP. Dr. Deborah Sawant at bedside    11:10 Remains NPO, Dr Destinee Santana at bedside    11:55 Blood glucose 64, D 10 started  [facility]    Patient with hypoglycemic episode(s) at 11:50(time) on today(date). BG value(s) pre-treatment 59    Was patient symptomatic? [] yes, [x] no  Patient was treated with the following rescue medications/treatments: [x] D50                [] Glucose tablets                [] Glucagon                [] 4oz juice                [] 6oz reg soda                [] 8oz low fat milk  BG value post-treatment: 111  Once BG treated and value greater than 80mg/dl, pt was provided with the following:  [] snack  [] meal  Name of MD notified:Serjio  The following orders were received: protocol    Calls x 2 to IR, CT then US to request time of fluid packet draining, \"No time scheduled yet\"    13:10 US NP at bedside for procedure    14:30 left abdomen drain placed, yellow/green creamy srainage. 15:30 TRANSFER - OUT REPORT:    Verbal report given to Endo Rn on [place] [facility]  being transferred to Endo for ordered procedure       Report consisted of patient's Situation, Background, Assessment and   Recommendations(SBAR). Information from the following report(s) Nurse Handoff Report was reviewed with the receiving nurse.            Lines:   CVC Triple Lumen 07/28/23 Right Internal jugular (Active)   $ Central line insertion $ Yes 07/29/23 1630   Central Line Being Utilized Yes 07/31/23 1200   Criteria for Appropriate Use Hemodynamically unstable, requiring monitoring lines, vasopressors, or volume resuscitation 07/31/23 1200   Site Assessment Clean, dry & intact 07/31/23 1200

## 2023-07-31 NOTE — BRIEF OP NOTE
TRANSFER - OUT REPORT:    Verbal report given to yessy strange on Shefali Gutierrez  being transferred to [facility] for routine post-op       Report consisted of patient's Situation, Background, Assessment and   Recommendations(SBAR). Information from the following report(s) Nurse Handoff Report was reviewed with the receiving nurse. Lines:   CVC Triple Lumen 07/28/23 Right Internal jugular (Active)   $ Central line insertion $ Yes 07/29/23 1630   Central Line Being Utilized Yes 07/31/23 1200   Criteria for Appropriate Use Hemodynamically unstable, requiring monitoring lines, vasopressors, or volume resuscitation 07/31/23 1200   Site Assessment Clean, dry & intact 07/31/23 1200   Phlebitis Assessment No symptoms 07/31/23 1200   Infiltration Assessment 0 07/31/23 1200   Color/Movement/Sensation Capillary refill less than 3 sec 07/31/23 1200   Proximal Lumen Color/Status White 07/31/23 1200   Medial Lumen Status Blue 07/31/23 1200   Distal Lumen Color/Status Brown 07/31/23 1200   Line Care Connections checked and tightened; Chlorhexidine wipes;Ports disinfected;Cap changed 07/31/23 1200   Alcohol Cap Used Yes 07/31/23 1200   Date of Last Dressing Change 07/29/23 07/31/23 1200   Dressing Type Transparent w/CHG gel 07/31/23 1200   Dressing Status Clean, dry & intact 07/31/23 1200       Peripheral IV 07/28/23 Distal;Right Forearm (Active)   Site Assessment Clean, dry & intact 07/31/23 1200   Line Status Capped;Normal saline locked 07/31/23 1200   Line Care Connections checked and tightened 07/31/23 1200   Phlebitis Assessment No symptoms 07/31/23 1200   Infiltration Assessment 0 07/31/23 1200   Alcohol Cap Used Yes 07/31/23 1200   Dressing Status Clean, dry & intact 07/31/23 1200   Dressing Type Transparent 07/31/23 1200        Opportunity for questions and clarification was provided.       Patient transported with:  Registered Nurse

## 2023-07-31 NOTE — WOUND CARE
WOCN Note:     New consult placed for assessment of wounds present on admission. Chart reviewed. Assessed in room 4221 with Izabella Vivas NP. Admitted DX:  Hematemesis;GI bleed; Gastrointestinal hemorrhage; cdiff    Past Medical History: gastric bypass; asthma; cad; dm2  [facility] from [facility]    Assessment:   Patient is A&O x 3, communicative and requires assist with repositioning. Bed: cecelia  Patient has a suction incontinence manager. Patient reports no pain. Patient repositioned on left side with pillow. Generalized edema lower legs and feet. Heels offloaded with pillows. Right knee resurfaced pink scar. 1. POA Sacrum and bilateral buttocks, Unstageable Pressure Injury: 10 x 8 x 0.1 cm; 80% brown/tan devitalized tissue; 20% non blanching purple deep tissue injury; scant serosang exudate; no odor. Cleansed with saline; applied petrolatum gauze and foam dressing. Will order Santyl to be applied. 2.  Right abdomen, former ileostomy site. Removed 6.2023. No active draining. Covered with foam dressing. 3.  Left abdomen, former ASPEN site that is currently draining creamy yellow drainage. Cleansed and applied urostomy pouch. 4.  Left thigh partial thickness skin tear (2  2 x 0.1 cm) with large serous weeping. Cleansed with saline; applied dry gauze and abd pad. 5.  Right heel, Unstageable Pressure Injury with bullae/dark non blanching base:  1.5 x 1.5 x 0 cm. Applied Venelex and foam dressing. 6.  Left heel, Deep tissue pressure injury:  0.5 x 1 x 0 cm; surrounded by blanching red erythema. Applied Venelex and foam dressing. 7.  Left arm, skin tear:  2 x 2 x 0.1 cm; 100% moist pink; scant serous drainage; no odor. Not photographed. Cleansed and applied petrolatum and foam dressing. Wound, Pressure Prevention & Skin Care Recommendations:    Minimize layers of linen/pads under patient to optimize support surface.     2.  Turn/reposition

## 2023-07-31 NOTE — PROGRESS NOTES

## 2023-07-31 NOTE — PROGRESS NOTES
Comprehensive Nutrition Assessment    Type and Reason for Visit: Initial, Consult    Nutrition Recommendations/Plan:      -Change B1 to IV route     -Advance diet per GI following EGD; add Ensure HP bid    -Supplement with post-op gastric bypass vitamin/mineral regimen:   - Chewable Flintstones MVI BID   - 500mcg Vit B12   - 600mg Calcium Carbonate BID (switch to calcium citrate after discharge)   - 3000IU Vit D            Malnutrition Assessment:  Malnutrition Status:  Insufficient data (07/31/23 1241)    Context:  Acute Illness     Did not see patient. Per RN very nauseated; 2 tests scheduled for this afternoon       Nutrition Assessment:    Pt admitted from [facility] d/t GI Bleed. 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 PMHx: Gastric bypass 2017, CAD, DM 2, Dyslipidemia, GERD, PE. Poor PO intake and ongoing abdominal pain @ [facility]. GI consulted-plan for EGD today. Bleeding 2/2 Eliquis (on hold) and Meloxicam (discontinued). May need IVC filter if unable to resume blood thinner. Intraperitoneal fluid collection-possible IR drainage after Eliquis washout. WOCN consulted-multple wounds including large unstageable sacral pressure injury. Pt most likely meets criteria for at least moderate malnutrition (per RD assessment @ [facility] 7/17). Significant edema noted with weeping-most likely d/t 3rd spacing from severe hypoalbuminemia. Potassium and phosphorus replaced today. Thiamine ordered orally-suggest giving via IV for now. CTM lytes daily and replace PRN. Weight trends in EHR indicate significant fluctuations-suspect d/t edema. UTD dry weight. Once diet advanced-recommend adding ONS.  Also resume bariatric vitamin supplementation. Nutritionally Significant Medications: Thiamine, Humalog, Protonix, KCL, K Phos, Carafate, NS with KCL @ 50 ml/hr,       Estimated Daily Nutrient Needs:  Energy Requirements Based On: Kcal/kg  Weight Used for Energy Requirements: Ideal (54 kg)  Energy (kcal/day): 1900 (35 kcal/kg)  Weight Used for Protein Requirements: Ideal  Protein (g/day): 81-97 (1.5-1.8g/kg)  Method Used for Fluid Requirements: 1 ml/kcal  Fluid (ml/day):      Nutrition Related Findings:   Edema: Left lower extremity, Right lower extremity, Left upper extremity, Right upper extremity, Generalized   Edema Generalized: Weeping, Pitting  RUE Edema: +1, Non-pitting  LUE Edema: +1, Non-pitting  RLE Edema: +4, Pitting, Weeping  LLE Edema: +4, Pitting, Weeping    Bowel Movement:  07/28/23    Wounds: Wound Type: Unstageable, Skin Tears, Deep Tissue Injury      Current Nutrition Therapies:  Diet: NPO  Supplements: None  Meal Intake:   Patient Vitals for the past 168 hrs:   PO Meals Eaten (%)   07/30/23 1000 1 - 25%     Nutrition Support: None      Anthropometric Measures:  Height: 162 cm (5' 3.78\")  Ideal Body Weight (IBW): 119 lbs (54 kg)    Admission Body Weight: 135 lb 12.9 oz (61.6 kg)  Current Body Weight: 141 lb 1.5 oz (64 kg), 118.6 % IBW. Weight Source: Bed Scale  Current BMI (kg/m2): 24.4      . BMI Categories: Normal Weight (BMI 18.5-24. 9)      Wt Readings from Last 10 Encounters:   07/31/23 64 kg (141 lb 1.5 oz)   07/18/23 65.3 kg (143 lb 14.4 oz)   06/08/23 50.8 kg (112 lb)   06/02/23 51.2 kg (112 lb 14.4 oz)   06/01/23 52.2 kg (115 lb)   05/30/23 52.3 kg (115 lb 4.8 oz)   05/08/23 53.1 kg (117 lb)   03/28/23 75.9 kg (167 lb 4.8 oz)   03/24/23 62.2 kg (137 lb 3.2 oz)   03/20/23 64.5 kg (142 lb 3.2 oz)               Nutrition Diagnosis:   Increased nutrient needs related to increase demand for energy/nutrients as evidenced by wounds.     Nutrition Interventions:   Food and/or

## 2023-07-31 NOTE — OP NOTE
[address] [facility], [address] [facility]        [facility]ago- Gastroduodenoscopy (EGD) Procedure Note    Salima Arita  1960  [id number]      Procedure: Endoscopic Gastroduodenoscopy --diagnostic    Indication: hematemesis , s/p EGD around 2 weeks ago= anastomotic ulcer      Pre-operative Diagnosis: see indication above    Post-operative Diagnosis: see findings below    : Caryn Weaver MD    Surgical Assistant: Circulator: Erika Alejo RN  Endoscopy Technician: Love Triplett    Implants:  None    Referring Provider:  Panfilo Myers MD      Anesthesia/Sedation:  MAC anesthesia Propofol        Procedure Details     After infomed consent was obtained for the procedure, with all risks and benefits of procedure explained the patient was taken to the endoscopy suite and placed in the left lateral decubitus position. Following sequential administration of sedation as per above, the endoscope was inserted into the mouth and advanced under direct vision to third portion of the duodenum. A careful inspection was made as the gastroscope was withdrawn, including a retroflexed view of the proximal stomach; findings and interventions are described below. Findings:   Esophagus:normal  Stomach: gastric bypass anatomy    There was anastomotic ulcer, non bleeding, circumferential , around 2 x3 cm in size  No stricture seen  Able to pass the scope without resistance   No fistulae  No perforation seen     jejunum: normal      Therapies:  none    Specimens: none         EBL: None      Complications:   None; patient tolerated the procedure well. Impression:    -See post-procedure diagnoses.     Recommendations:  -Continue acid suppression.  -consider TPN   -follow surgery's recommendations  -clear liquids  -she is at risk of bleeding from ulcer if eliquis is resumed, to consider IVC filter  -discussed results with    -will follow as needed    Signed By: Artemio Grimm

## 2023-08-01 LAB
ALBUMIN SERPL-MCNC: 1.6 G/DL (ref 3.5–5)
ALBUMIN/GLOB SERPL: 1 (ref 1.1–2.2)
ALP SERPL-CCNC: 57 U/L (ref 45–117)
ALT SERPL-CCNC: 10 U/L (ref 12–78)
ANION GAP SERPL CALC-SCNC: 5 MMOL/L (ref 5–15)
AST SERPL-CCNC: 16 U/L (ref 15–37)
BASOPHILS # BLD: 0 K/UL (ref 0–0.1)
BASOPHILS NFR BLD: 0 % (ref 0–1)
BILIRUB SERPL-MCNC: 0.5 MG/DL (ref 0.2–1)
BUN SERPL-MCNC: 8 MG/DL (ref 6–20)
BUN/CREAT SERPL: 19 (ref 12–20)
CALCIUM SERPL-MCNC: 7.9 MG/DL (ref 8.5–10.1)
CHLORIDE SERPL-SCNC: 107 MMOL/L (ref 97–108)
CO2 SERPL-SCNC: 28 MMOL/L (ref 21–32)
CREAT SERPL-MCNC: 0.42 MG/DL (ref 0.55–1.02)
DIFFERENTIAL METHOD BLD: ABNORMAL
EOSINOPHIL # BLD: 0.2 K/UL (ref 0–0.4)
EOSINOPHIL NFR BLD: 3 % (ref 0–7)
ERYTHROCYTE [DISTWIDTH] IN BLOOD BY AUTOMATED COUNT: 17.4 % (ref 11.5–14.5)
GLOBULIN SER CALC-MCNC: 1.6 G/DL (ref 2–4)
GLUCOSE BLD STRIP.AUTO-MCNC: 118 MG/DL (ref 65–117)
GLUCOSE BLD STRIP.AUTO-MCNC: 127 MG/DL (ref 65–117)
GLUCOSE BLD STRIP.AUTO-MCNC: 82 MG/DL (ref 65–117)
GLUCOSE SERPL-MCNC: 70 MG/DL (ref 65–100)
HCT VFR BLD AUTO: 23.5 % (ref 35–47)
HGB BLD-MCNC: 7.5 G/DL (ref 11.5–16)
IMM GRANULOCYTES # BLD AUTO: 0.1 K/UL (ref 0–0.04)
IMM GRANULOCYTES NFR BLD AUTO: 2 % (ref 0–0.5)
LYMPHOCYTES # BLD: 0.9 K/UL (ref 0.8–3.5)
LYMPHOCYTES NFR BLD: 15 % (ref 12–49)
MAGNESIUM SERPL-MCNC: 1.7 MG/DL (ref 1.6–2.4)
MCH RBC QN AUTO: 29.8 PG (ref 26–34)
MCHC RBC AUTO-ENTMCNC: 31.9 G/DL (ref 30–36.5)
MCV RBC AUTO: 93.3 FL (ref 80–99)
MONOCYTES # BLD: 0.6 K/UL (ref 0–1)
MONOCYTES NFR BLD: 10 % (ref 5–13)
NEUTS SEG # BLD: 4.1 K/UL (ref 1.8–8)
NEUTS SEG NFR BLD: 70 % (ref 32–75)
NRBC # BLD: 0 K/UL (ref 0–0.01)
NRBC BLD-RTO: 0 PER 100 WBC
PHOSPHATE SERPL-MCNC: 1.8 MG/DL (ref 2.6–4.7)
PLATELET # BLD AUTO: 270 K/UL (ref 150–400)
PMV BLD AUTO: 8.9 FL (ref 8.9–12.9)
POTASSIUM SERPL-SCNC: 3.8 MMOL/L (ref 3.5–5.1)
PROT SERPL-MCNC: 3.2 G/DL (ref 6.4–8.2)
RBC # BLD AUTO: 2.52 M/UL (ref 3.8–5.2)
SERVICE CMNT-IMP: ABNORMAL
SERVICE CMNT-IMP: ABNORMAL
SERVICE CMNT-IMP: NORMAL
SODIUM SERPL-SCNC: 140 MMOL/L (ref 136–145)
WBC # BLD AUTO: 5.8 K/UL (ref 3.6–11)

## 2023-08-01 PROCEDURE — 2580000003 HC RX 258: Performed by: SURGERY

## 2023-08-01 PROCEDURE — 6360000002 HC RX W HCPCS: Performed by: INTERNAL MEDICINE

## 2023-08-01 PROCEDURE — 6360000002 HC RX W HCPCS: Performed by: FAMILY MEDICINE

## 2023-08-01 PROCEDURE — 2500000003 HC RX 250 WO HCPCS: Performed by: NURSE PRACTITIONER

## 2023-08-01 PROCEDURE — C9113 INJ PANTOPRAZOLE SODIUM, VIA: HCPCS | Performed by: SURGERY

## 2023-08-01 PROCEDURE — 99232 SBSQ HOSP IP/OBS MODERATE 35: CPT | Performed by: SURGERY

## 2023-08-01 PROCEDURE — 6360000002 HC RX W HCPCS: Performed by: SURGERY

## 2023-08-01 PROCEDURE — 82962 GLUCOSE BLOOD TEST: CPT

## 2023-08-01 PROCEDURE — A4216 STERILE WATER/SALINE, 10 ML: HCPCS | Performed by: SURGERY

## 2023-08-01 PROCEDURE — 2060000000 HC ICU INTERMEDIATE R&B

## 2023-08-01 PROCEDURE — 84100 ASSAY OF PHOSPHORUS: CPT

## 2023-08-01 PROCEDURE — 36415 COLL VENOUS BLD VENIPUNCTURE: CPT

## 2023-08-01 PROCEDURE — 2500000003 HC RX 250 WO HCPCS: Performed by: STUDENT IN AN ORGANIZED HEALTH CARE EDUCATION/TRAINING PROGRAM

## 2023-08-01 PROCEDURE — 2580000003 HC RX 258: Performed by: NURSE PRACTITIONER

## 2023-08-01 PROCEDURE — 85025 COMPLETE CBC W/AUTO DIFF WBC: CPT

## 2023-08-01 PROCEDURE — 6360000002 HC RX W HCPCS: Performed by: STUDENT IN AN ORGANIZED HEALTH CARE EDUCATION/TRAINING PROGRAM

## 2023-08-01 PROCEDURE — 2580000003 HC RX 258: Performed by: FAMILY MEDICINE

## 2023-08-01 PROCEDURE — 2580000003 HC RX 258: Performed by: STUDENT IN AN ORGANIZED HEALTH CARE EDUCATION/TRAINING PROGRAM

## 2023-08-01 PROCEDURE — 83735 ASSAY OF MAGNESIUM: CPT

## 2023-08-01 PROCEDURE — 80053 COMPREHEN METABOLIC PANEL: CPT

## 2023-08-01 RX ORDER — PROCHLORPERAZINE EDISYLATE 5 MG/ML
10 INJECTION INTRAMUSCULAR; INTRAVENOUS EVERY 6 HOURS PRN
Status: DISCONTINUED | OUTPATIENT
Start: 2023-08-01 | End: 2023-10-20 | Stop reason: HOSPADM

## 2023-08-01 RX ADMIN — SODIUM CHLORIDE, PRESERVATIVE FREE 40 MG: 5 INJECTION INTRAVENOUS at 23:58

## 2023-08-01 RX ADMIN — Medication: at 08:53

## 2023-08-01 RX ADMIN — ONDANSETRON 4 MG: 2 INJECTION INTRAMUSCULAR; INTRAVENOUS at 21:19

## 2023-08-01 RX ADMIN — HYDROMORPHONE HYDROCHLORIDE 0.5 MG: 1 INJECTION, SOLUTION INTRAMUSCULAR; INTRAVENOUS; SUBCUTANEOUS at 11:59

## 2023-08-01 RX ADMIN — POTASSIUM CHLORIDE: 2 INJECTION, SOLUTION, CONCENTRATE INTRAVENOUS at 18:46

## 2023-08-01 RX ADMIN — HYDROMORPHONE HYDROCHLORIDE 0.5 MG: 1 INJECTION, SOLUTION INTRAMUSCULAR; INTRAVENOUS; SUBCUTANEOUS at 21:19

## 2023-08-01 RX ADMIN — Medication 10 ML: at 21:20

## 2023-08-01 RX ADMIN — I.V. FAT EMULSION 250 ML: 20 EMULSION INTRAVENOUS at 18:47

## 2023-08-01 RX ADMIN — DEXTROSE MONOHYDRATE 125 ML: 100 INJECTION, SOLUTION INTRAVENOUS at 05:30

## 2023-08-01 RX ADMIN — HYDROMORPHONE HYDROCHLORIDE 0.5 MG: 1 INJECTION, SOLUTION INTRAMUSCULAR; INTRAVENOUS; SUBCUTANEOUS at 16:36

## 2023-08-01 RX ADMIN — COLLAGENASE SANTYL: 250 OINTMENT TOPICAL at 08:54

## 2023-08-01 RX ADMIN — PIPERACILLIN AND TAZOBACTAM 3375 MG: 3; .375 INJECTION, POWDER, LYOPHILIZED, FOR SOLUTION INTRAVENOUS at 23:55

## 2023-08-01 RX ADMIN — Medication 10 ML: at 08:54

## 2023-08-01 RX ADMIN — SODIUM PHOSPHATE, MONOBASIC, MONOHYDRATE AND SODIUM PHOSPHATE, DIBASIC, ANHYDROUS 20 MMOL: 142; 276 INJECTION, SOLUTION INTRAVENOUS at 05:57

## 2023-08-01 RX ADMIN — SODIUM CHLORIDE, PRESERVATIVE FREE 40 MG: 5 INJECTION INTRAVENOUS at 11:58

## 2023-08-01 RX ADMIN — Medication: at 16:37

## 2023-08-01 RX ADMIN — PIPERACILLIN AND TAZOBACTAM 3375 MG: 3; .375 INJECTION, POWDER, LYOPHILIZED, FOR SOLUTION INTRAVENOUS at 12:00

## 2023-08-01 RX ADMIN — PIPERACILLIN AND TAZOBACTAM 3375 MG: 3; .375 INJECTION, POWDER, LYOPHILIZED, FOR SOLUTION INTRAVENOUS at 03:32

## 2023-08-01 RX ADMIN — ONDANSETRON 4 MG: 2 INJECTION INTRAMUSCULAR; INTRAVENOUS at 08:45

## 2023-08-01 ASSESSMENT — PAIN SCALES - GENERAL
PAINLEVEL_OUTOF10: 9
PAINLEVEL_OUTOF10: 9
PAINLEVEL_OUTOF10: 5
PAINLEVEL_OUTOF10: 8
PAINLEVEL_OUTOF10: 0
PAINLEVEL_OUTOF10: 9

## 2023-08-01 ASSESSMENT — PAIN DESCRIPTION - ORIENTATION: ORIENTATION: POSTERIOR;ANTERIOR

## 2023-08-01 ASSESSMENT — PAIN DESCRIPTION - LOCATION
LOCATION: NECK;ABDOMEN
LOCATION: ABDOMEN;BACK
LOCATION: ABDOMEN

## 2023-08-01 ASSESSMENT — PAIN DESCRIPTION - DESCRIPTORS
DESCRIPTORS: SHARP;CRAMPING
DESCRIPTORS: ACHING;CRAMPING
DESCRIPTORS: SHARP

## 2023-08-01 NOTE — PROGRESS NOTES
Still with some abdominal (LUQ) pain, persistent nausea. Patient just transferred to . Hemoglobin is stable. Reviewed issues with patient to include VTE treatment in setting of recent UGI bleed (recommended IVC filter insertion) and severe protein calorie malnutrition (recommended starting TPN tonight); feeding tube to excluded stomach may be an option in the future once large abdominal collection resolves. PT, OT consults placed; discussed with nursing staff patient's need for specialty bed given sacral decubitus wound.

## 2023-08-01 NOTE — WOUND CARE
WOKATE Note    Ordered immerse Air bed from [facility].     RONALD BurgessN RN [facility]  Available on [employer]  Office [phone]

## 2023-08-01 NOTE — ANESTHESIA POSTPROCEDURE EVALUATION
Department of Anesthesiology  Postprocedure Note    Patient: Livier Jewell  MRN: [id number]  YOB: 1960  Date of evaluation: 8/1/2023      Procedure Summary     Date: 07/31/23 Room / Location: [facility] ENDO  / [facility] ENDOSCOPY    Anesthesia Start: 1658 Anesthesia Stop: 4637    Procedures:       EGD DIAGNOSTIC ONLY (Upper GI Region)      EGD BIOPSY (Upper GI Region) Diagnosis:       Anemia, unspecified type      (Anemia, unspecified type [D64.9])    Surgeons: Asaf Duenas MD Responsible Provider: Shay Walker MD    Anesthesia Type: MAC ASA Status: 3          Anesthesia Type: MAC    Zuleima Phase I: Zuleima Score: 10    Zuleima Phase II: Zuleima Score: 10      Anesthesia Post Evaluation    Patient location during evaluation: PACU  Patient participation: complete - patient participated  Level of consciousness: awake  Pain score: 0  Airway patency: patent  Nausea & Vomiting: no nausea  Complications: no  Cardiovascular status: blood pressure returned to baseline and hemodynamically stable  Respiratory status: acceptable  Hydration status: stable  Pain management: adequate and satisfactory to patient

## 2023-08-01 NOTE — PROGRESS NOTES
NUTRITION brief    Recommendations:     Consider starting TPN:      -Replace Phosphorus before starting TPN      -Add 100 mg B1 to TPN      -Day 1: 1000 ml 50 gm protein and 100 gm CHO      -Day 2: 1000 ml 60 gm protein and 150 gm CHO      -Day 3: 1560 ml 94 gm protein and 234 gm CHO      -Day 4: Continue 1560 ml (65 ml/hr), 94 gm protein, 234 gm CHO; add 250 ml 20% lipid daily    -Monitor lytes daily; if phosphorous remains severely BNL do not advance TPN; keep with previous day's solution until lytes stable       Diet: Clear liquid  Supplements/Nutrition Support: None  Nutrition-related meds: B1 tablets, Sodium phosphate, Carafate, Zofran PRN      Malnutrition Assessment:  Malnutrition Status:  Severe malnutrition (08/01/23 1000)    Context:  Acute Illness     Findings of the 6 clinical characteristics of malnutrition:  Energy Intake:  50% or less of estimated energy requirements for 5 or more days  Weight Loss:  Unable to assess     Body Fat Loss:  Mild body fat loss Buccal region, Orbital   Muscle Mass Loss: Moderate muscle mass loss Temples (temporalis), Clavicles (pectoralis & deltoids)  Fluid Accumulation:  Moderate to Severe Generalized, Extremities   Strength:  Not Performed      New events impacting nutrition plan of care:   Results of EGD noted; GI recommending starting TPN. Visited with patient this morning-reports ongoing nausea; worse after attempting to eat a little bit of breakfast. Meets criteria for severe malnutrition. Pt asking about IV nutrition. Phosphorus severely BNL; has not taken B1 for a couple of days d/t nausea. Above goal TPN will provide 94 gm protein, 234 gm CHO (3 mg/kg/min) and 1670 calories per day to meet % estimated energy and protein needs, respectively. See full RD assessment from 7/31 for additional details, goals, and monitoring/evaluation.    Estimated Nutrition Needs:   Energy Requirements Based On: Kcal/kg  Weight Used for Energy Requirements: Ideal (54

## 2023-08-01 NOTE — PROGRESS NOTES
0730 Bedside and Verbal shift change report given to Cat RN (oncoming nurse) by Eduardo Gardiner RN (offgoing nurse). Report included the following information Nurse Handoff Report. 0845 PRN zofran given for nausea. Oral medications refused. Education provided. [address] at bedside. 1030 TRANSFER - OUT REPORT:  Verbal report given to [facility] RN on Peng Gregorio  being transferred to [facility] for routine progression of patient care     Report consisted of patient's Situation, Background, Assessment and   Recommendations(SBAR). Information from the following report(s) Nurse Handoff Report was reviewed with the receiving nurse. Lines:   CVC Triple Lumen 07/28/23 Right Internal jugular (Active)   $ Central line insertion $ Yes 07/29/23 1630   Central Line Being Utilized Yes 08/01/23 0400   Criteria for Appropriate Use Hemodynamically unstable, requiring monitoring lines, vasopressors, or volume resuscitation 08/01/23 0400   Site Assessment Clean, dry & intact 08/01/23 0400   Phlebitis Assessment No symptoms 08/01/23 0400   Infiltration Assessment 0 08/01/23 0400   Color/Movement/Sensation Capillary refill less than 3 sec 08/01/23 0400   Proximal Lumen Color/Status White 08/01/23 0400   Medial Lumen Status Blue 08/01/23 0400   Distal Lumen Color/Status Brown 08/01/23 0400   Line Care Connections checked and tightened; Chlorhexidine wipes;Ports disinfected;Cap changed 08/01/23 0400   Alcohol Cap Used Yes 08/01/23 0400   Date of Last Dressing Change 08/01/23 08/01/23 0400   Dressing Type Transparent w/CHG gel 08/01/23 0400   Dressing Status New dressing applied;Clean, dry & intact 08/01/23 0400       Peripheral IV 07/28/23 Distal;Right Forearm (Active)   Site Assessment Clean, dry & intact 08/01/23 0400   Line Status Capped;Normal saline locked 08/01/23 0400   Line Care Connections checked and tightened 08/01/23 0400   Phlebitis Assessment No symptoms 08/01/23 0400   Infiltration Assessment 0 08/01/23 0400   Alcohol

## 2023-08-01 NOTE — PROGRESS NOTES
1930-Bedside shift change report given to Yosef Bustillos (oncoming nurse) by Marimar Mcclelland, RN (offgoing nurse). Report included the following information Nurse Handoff Report, Index, Adult Overview, Intake/Output, MAR, Recent Results, and Cardiac Rhythm NSR .     2125-Pt BG 68, alert but refused oral treatment d/t nausea. Hypoglycemia tx initiated per order. 0515-NP notified of electrolytes this AM, sodium phosphate ordered to replete. Blood sugar on blood work only 70, D10% given. 0730-Bedside shift change report given to BONY Etienne (oncoming nurse) by Aniyah Bacon, BONY (offgoing nurse). Report included the following information Nurse Handoff Report, Index, Adult Overview, Intake/Output, MAR, Recent Results, and Cardiac Rhythm NSR .

## 2023-08-01 NOTE — PROGRESS NOTES
[address] [facility] Adult Hospitalist Group                                                                               Hospitalist Progress Note  Suresh Jenkins MD  Answering service: [phone] from in house phone        Date of Service:  2023  NAME:  Jayleen Sparks  :  1960  MRN:  [id number]       Admission Summary:   Jayleen Sparks is a 61 y.o. female with apmhx gastric bypass, asthma, CAD, DM II, dyslipidemia, GERD, past PE, ANCA, anxiety, depression, and fibromyalgia who presents from [address] [place] [address] with weakness and hematemesis. 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 She was found to have a recurrent C. Diff colitis, under went ex lap that showed no evidence of performation, and underwent EGD that showed GJ ulcer, and stricture at  gastric bypass anastamosis that was dilated. She was started on anticoagulation for RLE DVT. She was discharged to [address] [place] [address]. In the ED VSS.   Labs showed WBC 11.9,  Hgb 7.6, Na 133, K+ 3.1, Mg 1.7, and albumin 1.5     Interval history / Subjective:   Patient seen and examined at bedside earlier, still positive nausea, baseline having po intake, surgery suggesting TPN, discussed with patient as well she is agreeable     Assessment & Plan:     #Acute Blood loss anemia 2/2 Hematemesis  #hypotension 2/2 blood loss  #leukocytosis, resolved  #hx gastric bypass  #GJ ulcer and stricture s/p dilatation  #intraperitoneal fluid collection  #hx fulminant colitis s/p subtotal colectomy with end ileostomy s/p reversal with ileocolonic anastamosis  #Unstageable sacral decubitus ulcer/ infected   -thrombocytosis likely reactive, resolved  -S/p 1 PRBC  for hgb 7.6 with active bleeding  -Hgb post transfusion 8.2  -watch hgb, closely cont to monitor closely, transfuse for the Last Year: Not on file    Unstable Housing in the Last Year: No       Review of Systems:   Pertinent items are noted in HPI. Vital Signs:    Last 24hrs VS reviewed since prior progress note. Most recent are:  Vitals:    08/01/23 1151   BP:    Pulse: (!) 105   Resp:    Temp:    SpO2:          Intake/Output Summary (Last 24 hours) at 8/1/2023 1348  Last data filed at 8/1/2023 0900  Gross per 24 hour   Intake 1949.39 ml   Output 2375 ml   Net -425.61 ml        Physical Examination:   I had a face to face encounter with this patient and independently examined them on 8/1/2023 as outlined below:    General: Alert, no acute distress , ill appearing   EENT:  Bruise R upper chest chronic per pt d/t injury  Resp:  CTA bilaterally, No accessory muscle use  CV:  RRR, No audible murmur  GI:  Soft, Non distended, mild tender  Neurologic:  Alert and oriented, normal speech, DOMINGUEZ  Extremities: 3+ edema, anasarca throughout  Psych:  Not anxious or agitated  Skin:  No rashes. No jaundice    Data Review:   I have personally and independently reviewed all pertinent labs, diagnostic studies, imaging, and have provided independent interpretation of the same. Labs:     Recent Labs     07/31/23  0253 08/01/23  0336   WBC 7.7 5.8   HGB 7.9* 7.5*   HCT 24.2* 23.5*    270       Recent Labs     07/30/23  0301 07/30/23  1035 07/31/23  0253 08/01/23  0336     --  140 140   K 2.9* 3.5 3.3* 3.8     --  105 107   CO2 28  --  26 28   BUN 11  --  10 8   MG 1.4*  --  1.9 1.7   PHOS 2.1*  --  1.5* 1.8*       Recent Labs     08/01/23  0336   ALT 10*   GLOB 1.6*       No results for input(s): INR, APTT in the last 72 hours. Invalid input(s): PTP   No results for input(s): TIBC, FERR in the last 72 hours. Invalid input(s): FE, PSAT   No results found for: FOL, RBCF   No results for input(s): PH, PCO2, PO2 in the last 72 hours. No results for input(s): CPK in the last 72 hours.     Invalid input(s): Marla Redding,

## 2023-08-02 ENCOUNTER — APPOINTMENT (OUTPATIENT)
Facility: HOSPITAL | Age: 63
DRG: 004 | End: 2023-08-02
Attending: STUDENT IN AN ORGANIZED HEALTH CARE EDUCATION/TRAINING PROGRAM
Payer: MEDICARE

## 2023-08-02 LAB
ALBUMIN SERPL-MCNC: 1.4 G/DL (ref 3.5–5)
ALBUMIN SERPL-MCNC: 1.5 G/DL (ref 3.5–5)
ALBUMIN/GLOB SERPL: 0.7 (ref 1.1–2.2)
ALBUMIN/GLOB SERPL: 0.7 (ref 1.1–2.2)
ALP SERPL-CCNC: 62 U/L (ref 45–117)
ALP SERPL-CCNC: 62 U/L (ref 45–117)
ALT SERPL-CCNC: 12 U/L (ref 12–78)
ALT SERPL-CCNC: 12 U/L (ref 12–78)
ANION GAP SERPL CALC-SCNC: 2 MMOL/L (ref 5–15)
AST SERPL-CCNC: 14 U/L (ref 15–37)
AST SERPL-CCNC: 18 U/L (ref 15–37)
BACTERIA SPEC CULT: ABNORMAL
BACTERIA SPEC CULT: ABNORMAL
BASOPHILS # BLD: 0 K/UL (ref 0–0.1)
BASOPHILS NFR BLD: 0 % (ref 0–1)
BILIRUB DIRECT SERPL-MCNC: 0.2 MG/DL (ref 0–0.2)
BILIRUB SERPL-MCNC: 0.4 MG/DL (ref 0.2–1)
BILIRUB SERPL-MCNC: 0.4 MG/DL (ref 0.2–1)
BUN SERPL-MCNC: 7 MG/DL (ref 6–20)
BUN/CREAT SERPL: 16 (ref 12–20)
CALCIUM SERPL-MCNC: 7.8 MG/DL (ref 8.5–10.1)
CHLORIDE SERPL-SCNC: 108 MMOL/L (ref 97–108)
CO2 SERPL-SCNC: 28 MMOL/L (ref 21–32)
CREAT SERPL-MCNC: 0.45 MG/DL (ref 0.55–1.02)
DIFFERENTIAL METHOD BLD: ABNORMAL
EOSINOPHIL # BLD: 0.2 K/UL (ref 0–0.4)
EOSINOPHIL NFR BLD: 3 % (ref 0–7)
ERYTHROCYTE [DISTWIDTH] IN BLOOD BY AUTOMATED COUNT: 17.2 % (ref 11.5–14.5)
GLOBULIN SER CALC-MCNC: 2 G/DL (ref 2–4)
GLOBULIN SER CALC-MCNC: 2.1 G/DL (ref 2–4)
GLUCOSE BLD STRIP.AUTO-MCNC: 125 MG/DL (ref 65–117)
GLUCOSE BLD STRIP.AUTO-MCNC: 131 MG/DL (ref 65–117)
GLUCOSE BLD STRIP.AUTO-MCNC: 136 MG/DL (ref 65–117)
GLUCOSE BLD STRIP.AUTO-MCNC: 137 MG/DL (ref 65–117)
GLUCOSE SERPL-MCNC: 139 MG/DL (ref 65–100)
GRAM STN SPEC: ABNORMAL
GRAM STN SPEC: ABNORMAL
HCT VFR BLD AUTO: 24.2 % (ref 35–47)
HGB BLD-MCNC: 7.9 G/DL (ref 11.5–16)
IMM GRANULOCYTES # BLD AUTO: 0.1 K/UL (ref 0–0.04)
IMM GRANULOCYTES NFR BLD AUTO: 2 % (ref 0–0.5)
LYMPHOCYTES # BLD: 0.7 K/UL (ref 0.8–3.5)
LYMPHOCYTES NFR BLD: 10 % (ref 12–49)
MAGNESIUM SERPL-MCNC: 1.5 MG/DL (ref 1.6–2.4)
MCH RBC QN AUTO: 30.3 PG (ref 26–34)
MCHC RBC AUTO-ENTMCNC: 32.6 G/DL (ref 30–36.5)
MCV RBC AUTO: 92.7 FL (ref 80–99)
MONOCYTES # BLD: 0.6 K/UL (ref 0–1)
MONOCYTES NFR BLD: 8 % (ref 5–13)
NEUTS SEG # BLD: 5.4 K/UL (ref 1.8–8)
NEUTS SEG NFR BLD: 77 % (ref 32–75)
NRBC # BLD: 0 K/UL (ref 0–0.01)
NRBC BLD-RTO: 0 PER 100 WBC
PHOSPHATE SERPL-MCNC: 2.1 MG/DL (ref 2.6–4.7)
PLATELET # BLD AUTO: 272 K/UL (ref 150–400)
PMV BLD AUTO: 8.8 FL (ref 8.9–12.9)
POTASSIUM SERPL-SCNC: 3.3 MMOL/L (ref 3.5–5.1)
PROT SERPL-MCNC: 3.4 G/DL (ref 6.4–8.2)
PROT SERPL-MCNC: 3.6 G/DL (ref 6.4–8.2)
RBC # BLD AUTO: 2.61 M/UL (ref 3.8–5.2)
RBC MORPH BLD: ABNORMAL
SERVICE CMNT-IMP: ABNORMAL
SODIUM SERPL-SCNC: 138 MMOL/L (ref 136–145)
TRIGL SERPL-MCNC: 112 MG/DL
WBC # BLD AUTO: 7 K/UL (ref 3.6–11)

## 2023-08-02 PROCEDURE — 06H03DZ INSERTION OF INTRALUMINAL DEVICE INTO INFERIOR VENA CAVA, PERCUTANEOUS APPROACH: ICD-10-PCS | Performed by: STUDENT IN AN ORGANIZED HEALTH CARE EDUCATION/TRAINING PROGRAM

## 2023-08-02 PROCEDURE — 36415 COLL VENOUS BLD VENIPUNCTURE: CPT

## 2023-08-02 PROCEDURE — A4216 STERILE WATER/SALINE, 10 ML: HCPCS | Performed by: SURGERY

## 2023-08-02 PROCEDURE — 84478 ASSAY OF TRIGLYCERIDES: CPT

## 2023-08-02 PROCEDURE — 2709999900 IR GUIDED IVC FILTER PLACEMENT

## 2023-08-02 PROCEDURE — 2580000003 HC RX 258: Performed by: STUDENT IN AN ORGANIZED HEALTH CARE EDUCATION/TRAINING PROGRAM

## 2023-08-02 PROCEDURE — 6370000000 HC RX 637 (ALT 250 FOR IP): Performed by: INTERNAL MEDICINE

## 2023-08-02 PROCEDURE — 2500000003 HC RX 250 WO HCPCS: Performed by: STUDENT IN AN ORGANIZED HEALTH CARE EDUCATION/TRAINING PROGRAM

## 2023-08-02 PROCEDURE — 85025 COMPLETE CBC W/AUTO DIFF WBC: CPT

## 2023-08-02 PROCEDURE — 97161 PT EVAL LOW COMPLEX 20 MIN: CPT

## 2023-08-02 PROCEDURE — 97530 THERAPEUTIC ACTIVITIES: CPT

## 2023-08-02 PROCEDURE — 2580000003 HC RX 258: Performed by: FAMILY MEDICINE

## 2023-08-02 PROCEDURE — 2060000000 HC ICU INTERMEDIATE R&B

## 2023-08-02 PROCEDURE — 6360000002 HC RX W HCPCS: Performed by: INTERNAL MEDICINE

## 2023-08-02 PROCEDURE — 6370000000 HC RX 637 (ALT 250 FOR IP): Performed by: NURSE PRACTITIONER

## 2023-08-02 PROCEDURE — C9113 INJ PANTOPRAZOLE SODIUM, VIA: HCPCS | Performed by: SURGERY

## 2023-08-02 PROCEDURE — 6360000002 HC RX W HCPCS: Performed by: STUDENT IN AN ORGANIZED HEALTH CARE EDUCATION/TRAINING PROGRAM

## 2023-08-02 PROCEDURE — 82962 GLUCOSE BLOOD TEST: CPT

## 2023-08-02 PROCEDURE — 6360000002 HC RX W HCPCS: Performed by: SURGERY

## 2023-08-02 PROCEDURE — B5191ZA FLUOROSCOPY OF INFERIOR VENA CAVA USING LOW OSMOLAR CONTRAST, GUIDANCE: ICD-10-PCS | Performed by: STUDENT IN AN ORGANIZED HEALTH CARE EDUCATION/TRAINING PROGRAM

## 2023-08-02 PROCEDURE — 97535 SELF CARE MNGMENT TRAINING: CPT

## 2023-08-02 PROCEDURE — 80076 HEPATIC FUNCTION PANEL: CPT

## 2023-08-02 PROCEDURE — 83735 ASSAY OF MAGNESIUM: CPT

## 2023-08-02 PROCEDURE — 6360000002 HC RX W HCPCS: Performed by: FAMILY MEDICINE

## 2023-08-02 PROCEDURE — 99231 SBSQ HOSP IP/OBS SF/LOW 25: CPT | Performed by: NURSE PRACTITIONER

## 2023-08-02 PROCEDURE — 6360000004 HC RX CONTRAST MEDICATION: Performed by: STUDENT IN AN ORGANIZED HEALTH CARE EDUCATION/TRAINING PROGRAM

## 2023-08-02 PROCEDURE — 2580000003 HC RX 258: Performed by: SURGERY

## 2023-08-02 PROCEDURE — C1880 VENA CAVA FILTER: HCPCS | Performed by: STUDENT IN AN ORGANIZED HEALTH CARE EDUCATION/TRAINING PROGRAM

## 2023-08-02 PROCEDURE — 84100 ASSAY OF PHOSPHORUS: CPT

## 2023-08-02 PROCEDURE — 80053 COMPREHEN METABOLIC PANEL: CPT

## 2023-08-02 PROCEDURE — 97166 OT EVAL MOD COMPLEX 45 MIN: CPT

## 2023-08-02 RX ORDER — LIDOCAINE HYDROCHLORIDE 10 MG/ML
INJECTION, SOLUTION EPIDURAL; INFILTRATION; INTRACAUDAL; PERINEURAL PRN
Status: COMPLETED | OUTPATIENT
Start: 2023-08-02 | End: 2023-08-02

## 2023-08-02 RX ADMIN — LIDOCAINE HYDROCHLORIDE 2 ML: 10 INJECTION, SOLUTION EPIDURAL; INFILTRATION; INTRACAUDAL; PERINEURAL at 15:33

## 2023-08-02 RX ADMIN — IOPAMIDOL 30 ML: 755 INJECTION, SOLUTION INTRAVENOUS at 15:46

## 2023-08-02 RX ADMIN — Medication: at 16:00

## 2023-08-02 RX ADMIN — POTASSIUM CHLORIDE: 2 INJECTION, SOLUTION, CONCENTRATE INTRAVENOUS at 20:00

## 2023-08-02 RX ADMIN — PIPERACILLIN AND TAZOBACTAM 3375 MG: 3; .375 INJECTION, POWDER, LYOPHILIZED, FOR SOLUTION INTRAVENOUS at 16:43

## 2023-08-02 RX ADMIN — PROCHLORPERAZINE EDISYLATE 10 MG: 5 INJECTION, SOLUTION INTRAMUSCULAR; INTRAVENOUS at 13:12

## 2023-08-02 RX ADMIN — SODIUM CHLORIDE, PRESERVATIVE FREE 40 MG: 5 INJECTION INTRAVENOUS at 23:56

## 2023-08-02 RX ADMIN — HYDROMORPHONE HYDROCHLORIDE 0.5 MG: 1 INJECTION, SOLUTION INTRAMUSCULAR; INTRAVENOUS; SUBCUTANEOUS at 16:53

## 2023-08-02 RX ADMIN — ONDANSETRON 4 MG: 2 INJECTION INTRAMUSCULAR; INTRAVENOUS at 09:02

## 2023-08-02 RX ADMIN — Medication 10 ML: at 09:00

## 2023-08-02 RX ADMIN — Medication: at 00:00

## 2023-08-02 RX ADMIN — PIPERACILLIN AND TAZOBACTAM 3375 MG: 3; .375 INJECTION, POWDER, LYOPHILIZED, FOR SOLUTION INTRAVENOUS at 09:02

## 2023-08-02 RX ADMIN — POTASSIUM CHLORIDE AND SODIUM CHLORIDE: 900; 150 INJECTION, SOLUTION INTRAVENOUS at 02:25

## 2023-08-02 RX ADMIN — I.V. FAT EMULSION 250 ML: 20 EMULSION INTRAVENOUS at 19:20

## 2023-08-02 RX ADMIN — POTASSIUM PHOSPHATE, MONOBASIC AND POTASSIUM PHOSPHATE, DIBASIC 20 MMOL: 224; 236 INJECTION, SOLUTION, CONCENTRATE INTRAVENOUS at 12:05

## 2023-08-02 RX ADMIN — PIPERACILLIN AND TAZOBACTAM 3375 MG: 3; .375 INJECTION, POWDER, LYOPHILIZED, FOR SOLUTION INTRAVENOUS at 23:57

## 2023-08-02 RX ADMIN — SODIUM CHLORIDE, PRESERVATIVE FREE 40 MG: 5 INJECTION INTRAVENOUS at 12:05

## 2023-08-02 RX ADMIN — HYDROMORPHONE HYDROCHLORIDE 0.5 MG: 1 INJECTION, SOLUTION INTRAMUSCULAR; INTRAVENOUS; SUBCUTANEOUS at 13:10

## 2023-08-02 RX ADMIN — Medication: at 23:57

## 2023-08-02 RX ADMIN — HYDROMORPHONE HYDROCHLORIDE 0.5 MG: 1 INJECTION, SOLUTION INTRAMUSCULAR; INTRAVENOUS; SUBCUTANEOUS at 22:00

## 2023-08-02 RX ADMIN — Medication: at 08:00

## 2023-08-02 RX ADMIN — HYDROMORPHONE HYDROCHLORIDE 0.5 MG: 1 INJECTION, SOLUTION INTRAMUSCULAR; INTRAVENOUS; SUBCUTANEOUS at 03:14

## 2023-08-02 RX ADMIN — HYDROMORPHONE HYDROCHLORIDE 0.5 MG: 1 INJECTION, SOLUTION INTRAMUSCULAR; INTRAVENOUS; SUBCUTANEOUS at 09:02

## 2023-08-02 RX ADMIN — ONDANSETRON 4 MG: 2 INJECTION INTRAMUSCULAR; INTRAVENOUS at 16:54

## 2023-08-02 RX ADMIN — COLLAGENASE SANTYL: 250 OINTMENT TOPICAL at 09:00

## 2023-08-02 RX ADMIN — DICYCLOMINE HYDROCHLORIDE 10 MG: 10 CAPSULE ORAL at 19:20

## 2023-08-02 ASSESSMENT — PAIN DESCRIPTION - DESCRIPTORS
DESCRIPTORS: SHARP
DESCRIPTORS: ACHING
DESCRIPTORS: ACHING
DESCRIPTORS: CRAMPING
DESCRIPTORS: SHARP
DESCRIPTORS: ACHING

## 2023-08-02 ASSESSMENT — PAIN DESCRIPTION - FREQUENCY: FREQUENCY: CONTINUOUS

## 2023-08-02 ASSESSMENT — PAIN SCALES - GENERAL
PAINLEVEL_OUTOF10: 8
PAINLEVEL_OUTOF10: 6
PAINLEVEL_OUTOF10: 7
PAINLEVEL_OUTOF10: 8

## 2023-08-02 ASSESSMENT — PAIN DESCRIPTION - LOCATION
LOCATION: ABDOMEN;BACK
LOCATION: ABDOMEN;BACK
LOCATION: HEAD
LOCATION: HEAD
LOCATION: ABDOMEN
LOCATION: ABDOMEN;BACK
LOCATION: ABDOMEN;BACK

## 2023-08-02 ASSESSMENT — PAIN - FUNCTIONAL ASSESSMENT: PAIN_FUNCTIONAL_ASSESSMENT: NONE - DENIES PAIN

## 2023-08-02 ASSESSMENT — PAIN DESCRIPTION - PAIN TYPE: TYPE: ACUTE PAIN

## 2023-08-02 NOTE — PROGRESS NOTES
1440:TRANSFER - IN REPORT:    Verbal report received from Carolina on Zoila Pronto  being received from 3N for ordered procedure      Report consisted of patient's Situation, Background, Assessment and   Recommendations(SBAR). Information from the following report(s) Nurse Handoff Report was reviewed with the receiving nurse. Opportunity for questions and clarification was provided. Assessment completed upon patient's arrival to unit and care assumed. 1550: Report attempted to RN. Patients RIJ site clean dry and intact. Pt tolerated procedure well. Central line dressing changed after to procedure.

## 2023-08-02 NOTE — PLAN OF CARE
2000 Received Pt from Eugenio Morales, [place]  2030 Dilaudid   0300 Dilaudid   0730 Bedside and Verbal Shift change report given to Eugenio Morales RN (oncoming nurse) by Sander Stern RN. Report included the following information SBAR, Kardex, MAR, Recent Results, and Cardiac Rhythm NSR. Problem: Skin/Tissue Integrity  Goal: Absence of new skin breakdown  Description: 1. Monitor for areas of redness and/or skin breakdown  2. Assess vascular access sites hourly  3. Every 4-6 hours minimum:  Change oxygen saturation probe site  4. Every 4-6 hours:  If on nasal continuous positive airway pressure, respiratory therapy assess nares and determine need for appliance change or resting period.   Outcome: Progressing     Problem: Discharge Planning  Goal: Discharge to home or other facility with appropriate resources  Outcome: Progressing     Problem: Safety - Adult  Goal: Free from fall injury  Outcome: Progressing  Flowsheets (Taken 8/1/2023 2100)  Free From Fall Injury: Instruct family/caregiver on patient safety     Problem: Chronic Conditions and Co-morbidities  Goal: Patient's chronic conditions and co-morbidity symptoms are monitored and maintained or improved  Outcome: Progressing     Problem: Cardiovascular - Adult  Goal: Maintains optimal cardiac output and hemodynamic stability  Outcome: Progressing  Flowsheets (Taken 8/1/2023 2100)  Maintains optimal cardiac output and hemodynamic stability:   Monitor blood pressure and heart rate   Monitor urine output and notify Licensed Independent Practitioner for values outside of normal range   Assess for signs of decreased cardiac output   Administer fluid and/or volume expanders as ordered   Administer vasoactive medications as ordered  Goal: Absence of cardiac dysrhythmias or at baseline  Outcome: Progressing  Flowsheets (Taken 8/1/2023 2100)  Absence of cardiac dysrhythmias or at baseline:   Monitor cardiac rate and rhythm   Assess for signs of decreased cardiac output

## 2023-08-02 NOTE — PLAN OF CARE
Problem: Occupational Therapy - Adult  Goal: By Discharge: Performs self-care activities at highest level of function for planned discharge setting. See evaluation for individualized goals. Description: FUNCTIONAL STATUS PRIOR TO ADMISSION:  Patient was independent, active, working and a  prior to admission at [facility]. She was independent to mod I for all self-care and functional mobility. Since onset of illness the patient has been in need of assistance for all ADLs and functional mobility. DC to [facility] prior to her admission, however, limited there and stating she was unable to complete much OOB therapy. She is able to verbalize and demonstrate a home exercise program involving UE movement. HOME SUPPORT: Patient lived with spouse but didn't require assistance at her true baseline. Occupational Therapy Goals:  Initiated 8/2/2023  1. Patient will perform grooming with Set-up sitting EOB within 7 day(s). 2.  Patient will perform upper body dressing with Set-up long sitting in bed within 7 day(s). 3.  Patient will perform bathing with Moderate Assist within 7 day(s). 4.  Patient will perform toilet transfers to least restrictive device with Minimal Assist  within 7 day(s). 5.  Patient will perform all aspects of toileting with Moderate Assist within 7 day(s). 6.  Patient will participate in upper extremity therapeutic exercise/activities with Set-up for 10 minutes within 7 day(s). 7.  Patient will utilize energy conservation techniques during functional activities with verbal cues within 7 day(s).    Outcome: Progressing     OCCUPATIONAL THERAPY EVALUATION    Patient: Manuela Stevenson (11 y.o. female)  Date: 8/2/2023  Primary Diagnosis: Hematemesis [K92.0]  GI bleed [K92.2]  Gastrointestinal hemorrhage, unspecified gastrointestinal hemorrhage type [K92.2]  Procedure(s) (LRB):  EGD DIAGNOSTIC ONLY (N/A)  EGD BIOPSY (N/A) 2 Days Post-Op     Precautions: skin    ASSESSMENT :  The https://doi.org/[email or web address]    Pain Rating:  None reported at the time. Activity Tolerance:   Fair  and requires frequent rest breaks    After treatment:   Patient left in no apparent distress in bed, Call bell within reach, Side rails x3, Heels elevated for pressure relief, and Patient offloaded in partial right side lying for pressure relief    COMMUNICATION/EDUCATION:   The patient's plan of care was discussed with: physical therapist, registered nurse, and     Patient Education  Education Given To: Patient  Education Provided: Role of Therapy;Plan of Care  Education Method: Demonstration  Barriers to Learning: None  Education Outcome: Verbalized understanding;Continued education needed    Thank you for this referral.  Cecelia Obrien OT  Minutes: 40    Occupational Therapy Evaluation Charge Determination   History Examination Decision-Making   MEDIUM Complexity : Expanded review of history including physical, cognitive and psychocial history  MEDIUM Complexity: 3-5 Performance deficits relating to physical, cognitive, or psychosocial skills that result in activity limitations and/or participation restrictions MEDIUM Complexity: Patient may present with comorbidities that affect occupational performance.  Minimal to moderate modifications of tasks or assist (eg. physical or verbal) with assist is necessary to enable pt to complete eval   Based on the above components, the patient evaluation is determined to be of the following complexity level: Medium

## 2023-08-02 NOTE — PROGRESS NOTES
[facility] Hospitalist Group                                                                               Hospitalist Progress Note  Stevie Becker MD  Answering service: [phone] from in house phone        Date of Service:  2023  NAME:  Hue Pollock  :  1960  MRN:  [id number]       Admission Summary:   Hue Pollock is a 61 y.o. female with apmhx gastric bypass, asthma, CAD, DM II, dyslipidemia, GERD, past PE, ANCA, anxiety, depression, and fibromyalgia who presents from [address] [place] [address] with weakness and hematemesis. 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 She was found to have a recurrent C. Diff colitis, under went ex lap that showed no evidence of performation, and underwent EGD that showed GJ ulcer, and stricture at  gastric bypass anastamosis that was dilated. She was started on anticoagulation for RLE DVT. She was discharged to [address] [place] [address]. In the ED VSS. Labs showed WBC 11.9,  Hgb 7.6, Na 133, K+ 3.1, Mg 1.7, and albumin 1.5     Interval history / Subjective:   Seen and examined for follow up of blood loss anemia, GJ ulcer and stricture, and other conditions below. She reports not feeling well. Reports nausea. Says she has not had any BMs.      Assessment & Plan:     #Acute Blood loss anemia 2/2 Hematemesis  #hypotension 2/2 blood loss  #leukocytosis, resolved  #hx gastric bypass  #GJ ulcer and stricture s/p dilatation  #intraperitoneal fluid collection  #hx fulminant colitis s/p subtotal colectomy with end ileostomy s/p reversal with ileocolonic anastamosis  #Unstageable sacral decubitus ulcer/ infected   -thrombocytosis likely reactive, resolved  -S/p 1 PRBC  for hgb 7.6 with active bleeding  -Hgb post transfusion 8.2  -watch hgb, closely cont to monitor WBC 5.8 7.0   HGB 7.5* 7.9*   HCT 23.5* 24.2*    272       Recent Labs     07/31/23  0253 08/01/23  0336 08/02/23  0400    140 138   K 3.3* 3.8 3.3*    107 108   CO2 26 28 28   BUN 10 8 7   MG 1.9 1.7 1.5*   PHOS 1.5* 1.8* 2.1*       Recent Labs     08/01/23  0336 08/02/23  0400   ALT 10* 12  12   GLOB 1.6* 2.1  2.0       No results for input(s): INR, APTT in the last 72 hours. Invalid input(s): PTP   No results for input(s): TIBC, FERR in the last 72 hours. Invalid input(s): FE, PSAT   No results found for: FOL, RBCF   No results for input(s): PH, PCO2, PO2 in the last 72 hours. No results for input(s): CPK in the last 72 hours. Invalid input(s): CPKMB, CKNDX, TROIQ  Lab Results   Component Value Date/Time    CHOL 212 05/08/2023 02:55 PM    HDL 83 05/08/2023 02:55 PM     No results found for: [address]  Lab Results   Component Value Date/Time    APPEARANCE CLEAR 07/20/2023 09:57 PM    COLORU YELLOW 07/20/2023 09:57 PM    LABPH 5.5 07/20/2023 09:57 PM    NITRU Negative 07/20/2023 09:57 PM    GLUCOSEU Negative 07/20/2023 09:57 PM    KETUA 15 07/20/2023 09:57 PM    UROBILINOGEN 0.2 07/20/2023 09:57 PM    BILIRUBINUR Negative 07/20/2023 09:57 PM    BILIRUBINUR Negative 03/26/2023 01:06 AM       Notes reviewed from all clinical/nonclinical/nursing services involved in patient's clinical care. Care coordination discussions were held with appropriate clinical/nonclinical/ nursing providers based on care coordination needs. Patients current active Medications were reviewed, considered, added and adjusted based on the clinical condition today.       Medications Reviewed:     Current Facility-Administered Medications   Medication Dose Route Frequency    PN-Adult 2-in-1 Central Line (Standard)   IntraVENous Continuous TPN    potassium phosphate 20 mmol in sodium chloride 0.9 % 250 mL IVPB  20 mmol IntraVENous Once    fat emulsion (INTRALIPID/NUTRILIPID) 20 % infusion 250 mL  250 mL

## 2023-08-02 NOTE — PLAN OF CARE
Problem: Physical Therapy - Adult  Goal: By Discharge: Performs mobility at highest level of function for planned discharge setting. See evaluation for individualized goals. Description: FUNCTIONAL STATUS PRIOR TO ADMISSION: Patient was independent and active without use of DME prior to last hospitalization at [facility] for abdominal surgery. D/c'ed to [facility] where she was for 1 week-- states she did not get out of bed at [facility] and worked on bed exercises. 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 HOME SUPPORT PRIOR TO ADMISSION: The patient lived with her  at baseline. Physical Therapy Goals  Initiated 8/2/2023  1. Patient will move from supine to sit and sit to supine, scoot up and down, and roll side to side in bed with minimal assistance x2 within 7 day(s). 2.  Patient will perform sit to stand with moderate assistance x2 within 7 day(s). 3.  Patient will transfer from bed to chair and chair to bed with moderate assistance x2 using the least restrictive device within 7 day(s). 4.  Patient will ambulate with moderate assistance x2 for 10 feet with the least restrictive device within 7 day(s). Outcome: Progressing   PHYSICAL THERAPY EVALUATION    Patient: Belle Clarke (89 y.o. female)  Date: 8/2/2023  Primary Diagnosis: Hematemesis [K92.0]  GI bleed [K92.2]  Gastrointestinal hemorrhage, unspecified gastrointestinal hemorrhage type [K92.2]  Procedure(s) (LRB):  EGD DIAGNOSTIC ONLY (N/A)  EGD BIOPSY (N/A) 2 Days Post-Op   Precautions:       ASSESSMENT :   DEFICITS/IMPAIRMENTS:   The patient is limited by decreased functional mobility, ROM, strength, sensation, activity tolerance, endurance, increased pain levels following admission for weakness and GI bleed, from [facility]. Received in bed, agreeable to therapy.  Due to have IVC filter placed and

## 2023-08-03 ENCOUNTER — APPOINTMENT (OUTPATIENT)
Facility: HOSPITAL | Age: 63
DRG: 004 | End: 2023-08-03
Payer: MEDICARE

## 2023-08-03 LAB
ALBUMIN SERPL-MCNC: 1.6 G/DL (ref 3.5–5)
ALBUMIN SERPL-MCNC: 1.6 G/DL (ref 3.5–5)
ALBUMIN/GLOB SERPL: 0.7 (ref 1.1–2.2)
ALBUMIN/GLOB SERPL: 0.8 (ref 1.1–2.2)
ALP SERPL-CCNC: 71 U/L (ref 45–117)
ALP SERPL-CCNC: 73 U/L (ref 45–117)
ALT SERPL-CCNC: 12 U/L (ref 12–78)
ALT SERPL-CCNC: 13 U/L (ref 12–78)
ANION GAP SERPL CALC-SCNC: 5 MMOL/L (ref 5–15)
AST SERPL-CCNC: 15 U/L (ref 15–37)
AST SERPL-CCNC: 16 U/L (ref 15–37)
BASOPHILS # BLD: 0 K/UL (ref 0–0.1)
BASOPHILS NFR BLD: 0 % (ref 0–1)
BILIRUB DIRECT SERPL-MCNC: 0.2 MG/DL (ref 0–0.2)
BILIRUB SERPL-MCNC: 0.3 MG/DL (ref 0.2–1)
BILIRUB SERPL-MCNC: 0.4 MG/DL (ref 0.2–1)
BUN SERPL-MCNC: 9 MG/DL (ref 6–20)
BUN/CREAT SERPL: 25 (ref 12–20)
CALCIUM SERPL-MCNC: 8.2 MG/DL (ref 8.5–10.1)
CHLORIDE SERPL-SCNC: 107 MMOL/L (ref 97–108)
CO2 SERPL-SCNC: 27 MMOL/L (ref 21–32)
CREAT SERPL-MCNC: 0.36 MG/DL (ref 0.55–1.02)
DIFFERENTIAL METHOD BLD: ABNORMAL
EOSINOPHIL # BLD: 0.3 K/UL (ref 0–0.4)
EOSINOPHIL NFR BLD: 3 % (ref 0–7)
ERYTHROCYTE [DISTWIDTH] IN BLOOD BY AUTOMATED COUNT: 17.2 % (ref 11.5–14.5)
GLOBULIN SER CALC-MCNC: 2.1 G/DL (ref 2–4)
GLOBULIN SER CALC-MCNC: 2.2 G/DL (ref 2–4)
GLUCOSE BLD STRIP.AUTO-MCNC: 128 MG/DL (ref 65–117)
GLUCOSE BLD STRIP.AUTO-MCNC: 131 MG/DL (ref 65–117)
GLUCOSE BLD STRIP.AUTO-MCNC: 135 MG/DL (ref 65–117)
GLUCOSE BLD STRIP.AUTO-MCNC: 153 MG/DL (ref 65–117)
GLUCOSE SERPL-MCNC: 108 MG/DL (ref 65–100)
HCT VFR BLD AUTO: 27.1 % (ref 35–47)
HGB BLD-MCNC: 8.8 G/DL (ref 11.5–16)
IMM GRANULOCYTES # BLD AUTO: 0.2 K/UL (ref 0–0.04)
IMM GRANULOCYTES NFR BLD AUTO: 2 % (ref 0–0.5)
LYMPHOCYTES # BLD: 0.8 K/UL (ref 0.8–3.5)
LYMPHOCYTES NFR BLD: 9 % (ref 12–49)
MAGNESIUM SERPL-MCNC: 1.5 MG/DL (ref 1.6–2.4)
MCH RBC QN AUTO: 30 PG (ref 26–34)
MCHC RBC AUTO-ENTMCNC: 32.5 G/DL (ref 30–36.5)
MCV RBC AUTO: 92.5 FL (ref 80–99)
MONOCYTES # BLD: 0.6 K/UL (ref 0–1)
MONOCYTES NFR BLD: 7 % (ref 5–13)
NEUTS SEG # BLD: 7.3 K/UL (ref 1.8–8)
NEUTS SEG NFR BLD: 79 % (ref 32–75)
NRBC # BLD: 0 K/UL (ref 0–0.01)
NRBC BLD-RTO: 0 PER 100 WBC
PHOSPHATE SERPL-MCNC: 2.6 MG/DL (ref 2.6–4.7)
PLATELET # BLD AUTO: 259 K/UL (ref 150–400)
PLATELET COMMENT: ABNORMAL
PMV BLD AUTO: 9.1 FL (ref 8.9–12.9)
POTASSIUM SERPL-SCNC: 3.8 MMOL/L (ref 3.5–5.1)
PROT SERPL-MCNC: 3.7 G/DL (ref 6.4–8.2)
PROT SERPL-MCNC: 3.8 G/DL (ref 6.4–8.2)
RBC # BLD AUTO: 2.93 M/UL (ref 3.8–5.2)
RBC MORPH BLD: ABNORMAL
SERVICE CMNT-IMP: ABNORMAL
SODIUM SERPL-SCNC: 139 MMOL/L (ref 136–145)
WBC # BLD AUTO: 9.2 K/UL (ref 3.6–11)

## 2023-08-03 PROCEDURE — 6370000000 HC RX 637 (ALT 250 FOR IP): Performed by: FAMILY MEDICINE

## 2023-08-03 PROCEDURE — 80076 HEPATIC FUNCTION PANEL: CPT

## 2023-08-03 PROCEDURE — 36415 COLL VENOUS BLD VENIPUNCTURE: CPT

## 2023-08-03 PROCEDURE — 74177 CT ABD & PELVIS W/CONTRAST: CPT

## 2023-08-03 PROCEDURE — 99232 SBSQ HOSP IP/OBS MODERATE 35: CPT | Performed by: SURGERY

## 2023-08-03 PROCEDURE — 6360000002 HC RX W HCPCS: Performed by: SURGERY

## 2023-08-03 PROCEDURE — 6360000002 HC RX W HCPCS: Performed by: FAMILY MEDICINE

## 2023-08-03 PROCEDURE — 2580000003 HC RX 258: Performed by: FAMILY MEDICINE

## 2023-08-03 PROCEDURE — 2060000000 HC ICU INTERMEDIATE R&B

## 2023-08-03 PROCEDURE — 83735 ASSAY OF MAGNESIUM: CPT

## 2023-08-03 PROCEDURE — 6370000000 HC RX 637 (ALT 250 FOR IP): Performed by: STUDENT IN AN ORGANIZED HEALTH CARE EDUCATION/TRAINING PROGRAM

## 2023-08-03 PROCEDURE — 2500000003 HC RX 250 WO HCPCS: Performed by: FAMILY MEDICINE

## 2023-08-03 PROCEDURE — 84100 ASSAY OF PHOSPHORUS: CPT

## 2023-08-03 PROCEDURE — 6360000002 HC RX W HCPCS: Performed by: INTERNAL MEDICINE

## 2023-08-03 PROCEDURE — 85025 COMPLETE CBC W/AUTO DIFF WBC: CPT

## 2023-08-03 PROCEDURE — A4216 STERILE WATER/SALINE, 10 ML: HCPCS | Performed by: SURGERY

## 2023-08-03 PROCEDURE — 80053 COMPREHEN METABOLIC PANEL: CPT

## 2023-08-03 PROCEDURE — 6360000004 HC RX CONTRAST MEDICATION: Performed by: RADIOLOGY

## 2023-08-03 PROCEDURE — C9113 INJ PANTOPRAZOLE SODIUM, VIA: HCPCS | Performed by: SURGERY

## 2023-08-03 PROCEDURE — 2580000003 HC RX 258: Performed by: INTERNAL MEDICINE

## 2023-08-03 PROCEDURE — 2580000003 HC RX 258: Performed by: SURGERY

## 2023-08-03 PROCEDURE — 2500000003 HC RX 250 WO HCPCS: Performed by: STUDENT IN AN ORGANIZED HEALTH CARE EDUCATION/TRAINING PROGRAM

## 2023-08-03 PROCEDURE — 6360000002 HC RX W HCPCS: Performed by: STUDENT IN AN ORGANIZED HEALTH CARE EDUCATION/TRAINING PROGRAM

## 2023-08-03 PROCEDURE — 82962 GLUCOSE BLOOD TEST: CPT

## 2023-08-03 RX ADMIN — Medication: at 17:18

## 2023-08-03 RX ADMIN — SODIUM CHLORIDE: 9 INJECTION, SOLUTION INTRAVENOUS at 08:35

## 2023-08-03 RX ADMIN — ONDANSETRON 4 MG: 2 INJECTION INTRAMUSCULAR; INTRAVENOUS at 21:39

## 2023-08-03 RX ADMIN — HYDROMORPHONE HYDROCHLORIDE 0.5 MG: 1 INJECTION, SOLUTION INTRAMUSCULAR; INTRAVENOUS; SUBCUTANEOUS at 17:09

## 2023-08-03 RX ADMIN — Medication 10 ML: at 08:31

## 2023-08-03 RX ADMIN — IOPAMIDOL 100 ML: 755 INJECTION, SOLUTION INTRAVENOUS at 17:52

## 2023-08-03 RX ADMIN — SODIUM CHLORIDE, PRESERVATIVE FREE 40 MG: 5 INJECTION INTRAVENOUS at 15:08

## 2023-08-03 RX ADMIN — PROCHLORPERAZINE EDISYLATE 10 MG: 5 INJECTION, SOLUTION INTRAMUSCULAR; INTRAVENOUS at 17:09

## 2023-08-03 RX ADMIN — HYDROMORPHONE HYDROCHLORIDE 0.5 MG: 1 INJECTION, SOLUTION INTRAMUSCULAR; INTRAVENOUS; SUBCUTANEOUS at 09:29

## 2023-08-03 RX ADMIN — HYDROMORPHONE HYDROCHLORIDE 0.5 MG: 1 INJECTION, SOLUTION INTRAMUSCULAR; INTRAVENOUS; SUBCUTANEOUS at 04:15

## 2023-08-03 RX ADMIN — PIPERACILLIN AND TAZOBACTAM 3375 MG: 3; .375 INJECTION, POWDER, LYOPHILIZED, FOR SOLUTION INTRAVENOUS at 08:30

## 2023-08-03 RX ADMIN — I.V. FAT EMULSION 250 ML: 20 EMULSION INTRAVENOUS at 21:40

## 2023-08-03 RX ADMIN — Medication: at 09:30

## 2023-08-03 RX ADMIN — PROCHLORPERAZINE EDISYLATE 10 MG: 5 INJECTION, SOLUTION INTRAMUSCULAR; INTRAVENOUS at 09:30

## 2023-08-03 RX ADMIN — HYDROMORPHONE HYDROCHLORIDE 0.5 MG: 1 INJECTION, SOLUTION INTRAMUSCULAR; INTRAVENOUS; SUBCUTANEOUS at 21:39

## 2023-08-03 RX ADMIN — ONDANSETRON 4 MG: 2 INJECTION INTRAMUSCULAR; INTRAVENOUS at 14:50

## 2023-08-03 RX ADMIN — PIPERACILLIN AND TAZOBACTAM 3375 MG: 3; .375 INJECTION, POWDER, LYOPHILIZED, FOR SOLUTION INTRAVENOUS at 16:00

## 2023-08-03 RX ADMIN — POTASSIUM CHLORIDE: 2 INJECTION, SOLUTION, CONCENTRATE INTRAVENOUS at 18:37

## 2023-08-03 RX ADMIN — PROCHLORPERAZINE EDISYLATE 10 MG: 5 INJECTION, SOLUTION INTRAMUSCULAR; INTRAVENOUS at 04:16

## 2023-08-03 RX ADMIN — COLLAGENASE SANTYL: 250 OINTMENT TOPICAL at 08:30

## 2023-08-03 ASSESSMENT — PAIN DESCRIPTION - ORIENTATION
ORIENTATION: ANTERIOR
ORIENTATION: ANTERIOR;LEFT

## 2023-08-03 ASSESSMENT — PAIN DESCRIPTION - DESCRIPTORS
DESCRIPTORS: ACHING
DESCRIPTORS: ACHING
DESCRIPTORS: PATIENT UNABLE TO DESCRIBE
DESCRIPTORS: DISCOMFORT

## 2023-08-03 ASSESSMENT — PAIN DESCRIPTION - LOCATION
LOCATION: ABDOMEN
LOCATION: ABDOMEN
LOCATION: ABDOMEN;LEG
LOCATION: ABDOMEN

## 2023-08-03 ASSESSMENT — PAIN SCALES - GENERAL
PAINLEVEL_OUTOF10: 8

## 2023-08-03 NOTE — PROGRESS NOTES
Hospitalist Progress Note  Loyda Aldridge MD  Answering service: [phone]        Date of Service:  8/3/2023  NAME:  Zoila Zhang  :  1960  MRN:  [id number]      Admission Summary:     Patient admitted with hematemesis and acute blood loss anemia, history of gastric bypass with GJ ulcer. Interval history / Subjective:     Patient still with nausea and limited p.o. intake.      Assessment & Plan:     GI bleed  -Upper GI bleed, history of gastric bypass with GJ anastomotic ulcer  -S/p EGD  shows anastomotic ulcer, nonbleeding, negative for stricture  -GI has signed off, on clear liquid, on TPN, continuing PPI and Carafate    Acute blood loss anemia  -Acute blood loss anemia due to GI bleed  -S/p transfusion of 1 unit PRBCs, monitor H&H    Intra-abdominal abscess  -CT abdomen and pelvis on  shows loculated intraperitoneal fluid in the anterior abdomen, persistent free intraperitoneal air  -S/p ultrasound-guided drainage   -Fluid culture growing E. coli, on empiric Zosyn  -General surgery following, interval follow-up CT abdomen and pelvis, consult ID for antibiotic management    Infected sacral decubitus ulcer  -Wound cultures grew Klebsiella and Citrobacter  -On Zosyn, general surgery following, tentative plan for debridement during this hospitalization    DVT  -DVT of the right lower extremity  -S/p IVC filter placement 2023, now off anticoagulation    Prior history of C. difficile colitis  -S/p abdominal colectomy on 3/26/2023, reversal of end ileostomy with ileocolic anastomosis 6149    History of gastric bypass  -History of gastric bypass with GJ anastomotic ulcer, prior history of stricture requiring dilatation  -S/p EGD this admission 731, showing no active bleeding and no stricture  -Patient with persistent nausea, on clear liquid, on TPN    Severe protein calorie malnutrition  -On

## 2023-08-03 NOTE — WOUND CARE
WOCN Note    Reconsult received. Patient seen Monday (7.31.23) with Lizy Paul NP. Continue current ordered therapy. Plan for debridement by Dr Luz Becerra on Monday. Discussed with Dr Luz Becerra and Amadeo Acuña RN.     Michael Luke BSN RN [facility]  [address] Inpatient Wound Care  Available on [employer]  Office [phone]

## 2023-08-03 NOTE — PLAN OF CARE
Problem: Skin/Tissue Integrity  Goal: Absence of new skin breakdown  Description: 1. Monitor for areas of redness and/or skin breakdown  2. Assess vascular access sites hourly  3. Every 4-6 hours minimum:  Change oxygen saturation probe site  4. Every 4-6 hours:  If on nasal continuous positive airway pressure, respiratory therapy assess nares and determine need for appliance change or resting period.   Outcome: Progressing     Problem: Safety - Adult  Goal: Free from fall injury  8/3/2023 1400 by Geni Hernandez RN  Outcome: Progressing  8/3/2023 0257 by Hiram Lopez RN  Outcome: Progressing  Flowsheets (Taken 8/2/2023 2200)  Free From Fall Injury:   Linus Arnold family/caregiver on patient safety   Based on caregiver fall risk screen, instruct family/caregiver to ask for assistance with transferring infant if caregiver noted to have fall risk factors  Note: Ensured safety     Problem: Chronic Conditions and Co-morbidities  Goal: Patient's chronic conditions and co-morbidity symptoms are monitored and maintained or improved  Outcome: Progressing     Problem: Cardiovascular - Adult  Goal: Maintains optimal cardiac output and hemodynamic stability  Outcome: Progressing

## 2023-08-03 NOTE — PROGRESS NOTES
Physical Therapy  8/3/2023    Chart reviewed. Attempted to see for PT session. Patient and family immediately reporting a \"rough day\" upon PT/OT entry to room. Attempted to encourage patient to participate and she politely refused participation due to nausea. Agreeable to PT follow up tomorrow. Will follow. Thank you.     Caridad Mehta, PT, DPT

## 2023-08-03 NOTE — PLAN OF CARE
Problem: Safety - Adult  Goal: Free from fall injury  Outcome: Progressing  Flowsheets (Taken 8/2/2023 2200)  Free From Fall Injury:   Instruct family/caregiver on patient safety   Based on caregiver fall risk screen, instruct family/caregiver to ask for assistance with transferring infant if caregiver noted to have fall risk factors  Note: Ensured safety

## 2023-08-03 NOTE — PROGRESS NOTES
Occupational Therapy   [phone]    Chart reviewed in prep for OT treatment. Patient reporting nausea and requesting therapy defer for today and check in tomorrow. Will defer and follow up as able and medically appropriate. Thank you. Jason Reed MS, OTR/L

## 2023-08-04 ENCOUNTER — APPOINTMENT (OUTPATIENT)
Facility: HOSPITAL | Age: 63
DRG: 004 | End: 2023-08-04
Attending: FAMILY MEDICINE
Payer: MEDICARE

## 2023-08-04 ENCOUNTER — APPOINTMENT (OUTPATIENT)
Facility: HOSPITAL | Age: 63
DRG: 004 | End: 2023-08-04
Payer: MEDICARE

## 2023-08-04 ENCOUNTER — APPOINTMENT (OUTPATIENT)
Facility: HOSPITAL | Age: 63
DRG: 004 | End: 2023-08-04
Attending: SURGERY
Payer: MEDICARE

## 2023-08-04 LAB
ALBUMIN SERPL-MCNC: 1.6 G/DL (ref 3.5–5)
ALBUMIN/GLOB SERPL: 0.7 (ref 1.1–2.2)
ALP SERPL-CCNC: 72 U/L (ref 45–117)
ALT SERPL-CCNC: 14 U/L (ref 12–78)
ANION GAP SERPL CALC-SCNC: 5 MMOL/L (ref 5–15)
ANION GAP SERPL CALC-SCNC: 6 MMOL/L (ref 5–15)
AST SERPL-CCNC: 12 U/L (ref 15–37)
BASOPHILS # BLD: 0 K/UL (ref 0–0.1)
BASOPHILS NFR BLD: 0 % (ref 0–1)
BILIRUB DIRECT SERPL-MCNC: 0.2 MG/DL (ref 0–0.2)
BILIRUB SERPL-MCNC: 0.3 MG/DL (ref 0.2–1)
BUN SERPL-MCNC: 12 MG/DL (ref 6–20)
BUN SERPL-MCNC: 14 MG/DL (ref 6–20)
BUN/CREAT SERPL: 33 (ref 12–20)
BUN/CREAT SERPL: 37 (ref 12–20)
CALCIUM SERPL-MCNC: 8.3 MG/DL (ref 8.5–10.1)
CALCIUM SERPL-MCNC: 8.4 MG/DL (ref 8.5–10.1)
CHLORIDE SERPL-SCNC: 109 MMOL/L (ref 97–108)
CHLORIDE SERPL-SCNC: 110 MMOL/L (ref 97–108)
CO2 SERPL-SCNC: 26 MMOL/L (ref 21–32)
CO2 SERPL-SCNC: 26 MMOL/L (ref 21–32)
CREAT SERPL-MCNC: 0.36 MG/DL (ref 0.55–1.02)
CREAT SERPL-MCNC: 0.38 MG/DL (ref 0.55–1.02)
DIFFERENTIAL METHOD BLD: ABNORMAL
EOSINOPHIL # BLD: 0.1 K/UL (ref 0–0.4)
EOSINOPHIL NFR BLD: 1 % (ref 0–7)
ERYTHROCYTE [DISTWIDTH] IN BLOOD BY AUTOMATED COUNT: 17.2 % (ref 11.5–14.5)
GLOBULIN SER CALC-MCNC: 2.3 G/DL (ref 2–4)
GLUCOSE BLD STRIP.AUTO-MCNC: 133 MG/DL (ref 65–117)
GLUCOSE BLD STRIP.AUTO-MCNC: 164 MG/DL (ref 65–117)
GLUCOSE BLD STRIP.AUTO-MCNC: 170 MG/DL (ref 65–117)
GLUCOSE SERPL-MCNC: 122 MG/DL (ref 65–100)
GLUCOSE SERPL-MCNC: 145 MG/DL (ref 65–100)
HCT VFR BLD AUTO: 27.3 % (ref 35–47)
HGB BLD-MCNC: 9 G/DL (ref 11.5–16)
IMM GRANULOCYTES # BLD AUTO: 0.2 K/UL (ref 0–0.04)
IMM GRANULOCYTES NFR BLD AUTO: 2 % (ref 0–0.5)
LYMPHOCYTES # BLD: 1 K/UL (ref 0.8–3.5)
LYMPHOCYTES NFR BLD: 10 % (ref 12–49)
MAGNESIUM SERPL-MCNC: 1.6 MG/DL (ref 1.6–2.4)
MCH RBC QN AUTO: 30.5 PG (ref 26–34)
MCHC RBC AUTO-ENTMCNC: 33 G/DL (ref 30–36.5)
MCV RBC AUTO: 92.5 FL (ref 80–99)
MONOCYTES # BLD: 0.7 K/UL (ref 0–1)
MONOCYTES NFR BLD: 7 % (ref 5–13)
NEUTS SEG # BLD: 8.2 K/UL (ref 1.8–8)
NEUTS SEG NFR BLD: 80 % (ref 32–75)
NRBC # BLD: 0 K/UL (ref 0–0.01)
NRBC BLD-RTO: 0 PER 100 WBC
PHOSPHATE SERPL-MCNC: 2.7 MG/DL (ref 2.6–4.7)
PLATELET # BLD AUTO: 263 K/UL (ref 150–400)
PMV BLD AUTO: 9 FL (ref 8.9–12.9)
POTASSIUM SERPL-SCNC: 3.7 MMOL/L (ref 3.5–5.1)
POTASSIUM SERPL-SCNC: 4 MMOL/L (ref 3.5–5.1)
PROT SERPL-MCNC: 3.9 G/DL (ref 6.4–8.2)
RBC # BLD AUTO: 2.95 M/UL (ref 3.8–5.2)
RBC MORPH BLD: ABNORMAL
SERVICE CMNT-IMP: ABNORMAL
SODIUM SERPL-SCNC: 141 MMOL/L (ref 136–145)
SODIUM SERPL-SCNC: 141 MMOL/L (ref 136–145)
TROPONIN I SERPL HS-MCNC: 6 NG/L (ref 0–51)
WBC # BLD AUTO: 10.2 K/UL (ref 3.6–11)

## 2023-08-04 PROCEDURE — 2500000003 HC RX 250 WO HCPCS: Performed by: FAMILY MEDICINE

## 2023-08-04 PROCEDURE — 6360000002 HC RX W HCPCS: Performed by: SURGERY

## 2023-08-04 PROCEDURE — 2060000000 HC ICU INTERMEDIATE R&B

## 2023-08-04 PROCEDURE — 80076 HEPATIC FUNCTION PANEL: CPT

## 2023-08-04 PROCEDURE — 2500000003 HC RX 250 WO HCPCS: Performed by: STUDENT IN AN ORGANIZED HEALTH CARE EDUCATION/TRAINING PROGRAM

## 2023-08-04 PROCEDURE — 93005 ELECTROCARDIOGRAM TRACING: CPT | Performed by: HOSPITALIST

## 2023-08-04 PROCEDURE — 85025 COMPLETE CBC W/AUTO DIFF WBC: CPT

## 2023-08-04 PROCEDURE — 6360000002 HC RX W HCPCS: Performed by: FAMILY MEDICINE

## 2023-08-04 PROCEDURE — 80048 BASIC METABOLIC PNL TOTAL CA: CPT

## 2023-08-04 PROCEDURE — 84484 ASSAY OF TROPONIN QUANT: CPT

## 2023-08-04 PROCEDURE — 2580000003 HC RX 258: Performed by: FAMILY MEDICINE

## 2023-08-04 PROCEDURE — 49440 PLACE GASTROSTOMY TUBE PERC: CPT

## 2023-08-04 PROCEDURE — 6360000002 HC RX W HCPCS: Performed by: INTERNAL MEDICINE

## 2023-08-04 PROCEDURE — 93931 UPPER EXTREMITY STUDY: CPT

## 2023-08-04 PROCEDURE — 36415 COLL VENOUS BLD VENIPUNCTURE: CPT

## 2023-08-04 PROCEDURE — 84100 ASSAY OF PHOSPHORUS: CPT

## 2023-08-04 PROCEDURE — 3E0G76Z INTRODUCTION OF NUTRITIONAL SUBSTANCE INTO UPPER GI, VIA NATURAL OR ARTIFICIAL OPENING: ICD-10-PCS | Performed by: STUDENT IN AN ORGANIZED HEALTH CARE EDUCATION/TRAINING PROGRAM

## 2023-08-04 PROCEDURE — 6360000002 HC RX W HCPCS: Performed by: STUDENT IN AN ORGANIZED HEALTH CARE EDUCATION/TRAINING PROGRAM

## 2023-08-04 PROCEDURE — 0DH67UZ INSERTION OF FEEDING DEVICE INTO STOMACH, VIA NATURAL OR ARTIFICIAL OPENING: ICD-10-PCS | Performed by: STUDENT IN AN ORGANIZED HEALTH CARE EDUCATION/TRAINING PROGRAM

## 2023-08-04 PROCEDURE — 83735 ASSAY OF MAGNESIUM: CPT

## 2023-08-04 PROCEDURE — 82962 GLUCOSE BLOOD TEST: CPT

## 2023-08-04 PROCEDURE — 2580000003 HC RX 258: Performed by: INTERNAL MEDICINE

## 2023-08-04 PROCEDURE — C9113 INJ PANTOPRAZOLE SODIUM, VIA: HCPCS | Performed by: SURGERY

## 2023-08-04 PROCEDURE — 2580000003 HC RX 258: Performed by: SURGERY

## 2023-08-04 PROCEDURE — 99232 SBSQ HOSP IP/OBS MODERATE 35: CPT | Performed by: SURGERY

## 2023-08-04 PROCEDURE — 74018 RADEX ABDOMEN 1 VIEW: CPT

## 2023-08-04 PROCEDURE — A4216 STERILE WATER/SALINE, 10 ML: HCPCS | Performed by: SURGERY

## 2023-08-04 RX ORDER — FENTANYL CITRATE 50 UG/ML
INJECTION, SOLUTION INTRAMUSCULAR; INTRAVENOUS PRN
Status: COMPLETED | OUTPATIENT
Start: 2023-08-04 | End: 2023-08-04

## 2023-08-04 RX ORDER — MIDAZOLAM HYDROCHLORIDE 1 MG/ML
INJECTION INTRAMUSCULAR; INTRAVENOUS PRN
Status: COMPLETED | OUTPATIENT
Start: 2023-08-04 | End: 2023-08-04

## 2023-08-04 RX ADMIN — HYDROMORPHONE HYDROCHLORIDE 0.5 MG: 1 INJECTION, SOLUTION INTRAMUSCULAR; INTRAVENOUS; SUBCUTANEOUS at 17:40

## 2023-08-04 RX ADMIN — POTASSIUM CHLORIDE: 2 INJECTION, SOLUTION, CONCENTRATE INTRAVENOUS at 18:49

## 2023-08-04 RX ADMIN — HYDROMORPHONE HYDROCHLORIDE 0.5 MG: 1 INJECTION, SOLUTION INTRAMUSCULAR; INTRAVENOUS; SUBCUTANEOUS at 11:32

## 2023-08-04 RX ADMIN — I.V. FAT EMULSION 250 ML: 20 EMULSION INTRAVENOUS at 22:43

## 2023-08-04 RX ADMIN — SODIUM CHLORIDE, PRESERVATIVE FREE 40 MG: 5 INJECTION INTRAVENOUS at 11:31

## 2023-08-04 RX ADMIN — ONDANSETRON 4 MG: 2 INJECTION INTRAMUSCULAR; INTRAVENOUS at 15:40

## 2023-08-04 RX ADMIN — SODIUM CHLORIDE: 9 INJECTION, SOLUTION INTRAVENOUS at 11:33

## 2023-08-04 RX ADMIN — HYDROMORPHONE HYDROCHLORIDE 0.5 MG: 1 INJECTION, SOLUTION INTRAMUSCULAR; INTRAVENOUS; SUBCUTANEOUS at 06:04

## 2023-08-04 RX ADMIN — HYDROMORPHONE HYDROCHLORIDE 0.5 MG: 1 INJECTION, SOLUTION INTRAMUSCULAR; INTRAVENOUS; SUBCUTANEOUS at 01:57

## 2023-08-04 RX ADMIN — Medication 10 ML: at 08:54

## 2023-08-04 RX ADMIN — Medication: at 11:32

## 2023-08-04 RX ADMIN — PIPERACILLIN AND TAZOBACTAM 3375 MG: 3; .375 INJECTION, POWDER, LYOPHILIZED, FOR SOLUTION INTRAVENOUS at 19:16

## 2023-08-04 RX ADMIN — PROCHLORPERAZINE EDISYLATE 10 MG: 5 INJECTION, SOLUTION INTRAMUSCULAR; INTRAVENOUS at 08:54

## 2023-08-04 RX ADMIN — FENTANYL CITRATE 50 MCG: 50 INJECTION, SOLUTION INTRAMUSCULAR; INTRAVENOUS at 16:06

## 2023-08-04 RX ADMIN — SODIUM CHLORIDE, PRESERVATIVE FREE 40 MG: 5 INJECTION INTRAVENOUS at 00:02

## 2023-08-04 RX ADMIN — PROCHLORPERAZINE EDISYLATE 10 MG: 5 INJECTION, SOLUTION INTRAMUSCULAR; INTRAVENOUS at 01:57

## 2023-08-04 RX ADMIN — PROCHLORPERAZINE EDISYLATE 10 MG: 5 INJECTION, SOLUTION INTRAMUSCULAR; INTRAVENOUS at 22:33

## 2023-08-04 RX ADMIN — FENTANYL CITRATE 50 MCG: 50 INJECTION, SOLUTION INTRAMUSCULAR; INTRAVENOUS at 16:24

## 2023-08-04 RX ADMIN — MIDAZOLAM 1 MG: 1 INJECTION INTRAMUSCULAR; INTRAVENOUS at 16:07

## 2023-08-04 RX ADMIN — ONDANSETRON 4 MG: 2 INJECTION INTRAMUSCULAR; INTRAVENOUS at 06:04

## 2023-08-04 RX ADMIN — PIPERACILLIN AND TAZOBACTAM 3375 MG: 3; .375 INJECTION, POWDER, LYOPHILIZED, FOR SOLUTION INTRAVENOUS at 00:02

## 2023-08-04 RX ADMIN — COLLAGENASE SANTYL: 250 OINTMENT TOPICAL at 11:31

## 2023-08-04 RX ADMIN — PIPERACILLIN AND TAZOBACTAM 3375 MG: 3; .375 INJECTION, POWDER, LYOPHILIZED, FOR SOLUTION INTRAVENOUS at 09:09

## 2023-08-04 RX ADMIN — Medication: at 00:03

## 2023-08-04 ASSESSMENT — PAIN DESCRIPTION - PAIN TYPE: TYPE: ACUTE PAIN

## 2023-08-04 ASSESSMENT — PULMONARY FUNCTION TESTS
PIF_VALUE: 0
PIF_VALUE: 1

## 2023-08-04 ASSESSMENT — PAIN DESCRIPTION - DESCRIPTORS
DESCRIPTORS: ACHING
DESCRIPTORS: ACHING

## 2023-08-04 ASSESSMENT — PAIN DESCRIPTION - LOCATION
LOCATION: ABDOMEN

## 2023-08-04 ASSESSMENT — PAIN DESCRIPTION - FREQUENCY: FREQUENCY: INTERMITTENT

## 2023-08-04 ASSESSMENT — PAIN SCALES - GENERAL
PAINLEVEL_OUTOF10: 9
PAINLEVEL_OUTOF10: 9
PAINLEVEL_OUTOF10: 7
PAINLEVEL_OUTOF10: 2
PAINLEVEL_OUTOF10: 9
PAINLEVEL_OUTOF10: 8
PAINLEVEL_OUTOF10: 6

## 2023-08-04 ASSESSMENT — PAIN DESCRIPTION - ORIENTATION: ORIENTATION: ANTERIOR

## 2023-08-04 NOTE — PROGRESS NOTES
TRANSFER - IN REPORT:    Verbal report received from [address] on Jon Llamas  being received from 350 for ordered procedure      Report consisted of patient's Situation, Background, Assessment and   Recommendations(SBAR). Information from the following report(s) Nurse Handoff Report was reviewed with the receiving nurse. Opportunity for questions and clarification was provided. Assessment completed upon patient's arrival to unit and care assumed.

## 2023-08-04 NOTE — CONSULTS
MG/DL    Creatinine 0.36 (L) 0.55 - 1.02 MG/DL    Bun/Cre Ratio 33 (H) 12 - 20      Est, Glom Filt Rate >60 >60 ml/min/1.73m2    Calcium 8.4 (L) 8.5 - 10.1 MG/DL   Magnesium    Collection Time: 08/04/23  4:43 AM   Result Value Ref Range    Magnesium 1.6 1.6 - 2.4 mg/dL   Phosphorus    Collection Time: 08/04/23  4:43 AM   Result Value Ref Range    Phosphorus 2.7 2.6 - 4.7 MG/DL   Hepatic Function Panel    Collection Time: 08/04/23  4:43 AM   Result Value Ref Range    Total Protein 3.9 (L) 6.4 - 8.2 g/dL    Albumin 1.6 (L) 3.5 - 5.0 g/dL    Globulin 2.3 2.0 - 4.0 g/dL    Albumin/Globulin Ratio 0.7 (L) 1.1 - 2.2      Total Bilirubin 0.3 0.2 - 1.0 MG/DL    Bilirubin, Direct 0.2 0.0 - 0.2 MG/DL    Alk Phosphatase 72 45 - 117 U/L    AST 12 (L) 15 - 37 U/L    ALT 14 12 - 78 U/L   POCT Glucose    Collection Time: 08/04/23  8:30 AM   Result Value Ref Range    POC Glucose 170 (H) 65 - 117 mg/dL    Performed by: Kennedy Solorzano         Microbiology:      Studies:      Signed By: GARETT Ledesma NP     August 4, 2023       INFECTIOUS  DISEASE  ATTENDING  NOTE:      The patient was persistently evaluated and examined by me at bedside with large family group in attendance. The patient appeared to be fairly comfortable with the exception of abdominal discomfort which he says is unchanged. The patient had a temp today and PICC line insertion without success. She denies any overt night sweats, fevers, chills or headache. There has been no shortness of breath, cough, sputum chest pain or chest pressure. She states that there has been no nausea, vomiting, or unusual abdominal pain. She tells me that there was some mucus upon coughing that was brownish in color which was of concern. He is afebrile without leukocytosis and continues on Zosyn which she has tolerated  well,which has a broad antibiotic spectrum and appropriate WILLEM for organisms isolated.     The patient's chart has been reviewed along with pertinent laboratory

## 2023-08-04 NOTE — PROGRESS NOTES
Order noted for PICC line if effort to remove IJ. Unable to place PICC today due to high volume, will defer until tomorrow.

## 2023-08-04 NOTE — CARE COORDINATION
Transition of Care Plan:    RUR: 27%-High  Prior Level of Functioning: max assist with all ADLs  Disposition: SNF vs IPR  Caregiver Contact: Spouse Avni Shah  Barriers to discharge:   Admitted from [facility]. She is maximum assist with all ADLs. Prior to admit to [facility] she was hospitalized for 30 days. General surgery following. Possible surgery on Monday for debridement of decubitus wound. Will likely need further rehab at discharge. CM will follow after surgery to determine level of care needed.

## 2023-08-04 NOTE — PROGRESS NOTES
Comprehensive Nutrition Assessment    Type and Reason for Visit: Reassess    Nutrition Recommendations/Plan:     Continue TPN as ordered:      1560 ml (65 ml/hr) of 94 gm AA, 234 gm Dex + 250 ml 20% lipids daily         Malnutrition Assessment:  Malnutrition Status:  Severe malnutrition (08/01/23 1000)    Context:  Acute Illness     Findings of the 6 clinical characteristics of malnutrition:  Energy Intake:  50% or less of estimated energy requirements for 5 or more days  Weight Loss:  Unable to assess     Body Fat Loss:  Mild body fat loss Buccal region, Orbital   Muscle Mass Loss: Moderate muscle mass loss Temples (temporalis), Clavicles (pectoralis & deltoids)  Fluid Accumulation:  Moderate to Severe Generalized, Extremities   Strength:  Not Performed       Nutrition Assessment:    Pt admitted from [facility] d/t GI Bleed. Recent extended hospitalization @ [facility]-readmitted after episode of fulminant colitis requiring subtotal colectomy with end ileostomy, s/p reversal and subsequent ileocolonic anastomosis; EGD 7/13/23 showed nl esophagus, small hiatal hernia, evidence of previous RYGB with a tight stricture and ulcer at the gastro-jejunal anastomosis-s/p dilatation; recurrent C Diff colitis. PMHx: Gastric bypass 2017, CAD, DM 2, Dyslipidemia, GERD, PE.    8/4: Follow up. IVC filter was inserted 8/2. CT abdomen/pelvis from 8/3 reveals resolution of collection but demonstrates significant distention of excluded stomach, duodenum. MD recommends removal of current drain, CT guided gastrostomy to excluded stomach to allow drainage, later small bowel follow-through, possible tube feeds. Also plan for operative debridement of sacral decubitus wound on Monday morning. Remains NPO with TPN ordered at goal rate of 1560 ml: 94 gm AA, 234 gm Dextrose + 250 ml 20% lipids daily. This is providing 1671 kcals (88% kcal needs), 94 gm protein (100% protein needs), 234 gm CHO. Will continue with this order. Labs reviewed.

## 2023-08-04 NOTE — PLAN OF CARE
Problem: Musculoskeletal - Adult  Goal: Return mobility to safest level of function  8/4/2023 1123 by Shiela Rodríguez LPN  Outcome: Not Progressing  8/4/2023 0616 by Kylee Nuñez RN  Outcome: Not Progressing  Goal: Return ADL status to a safe level of function  8/4/2023 1123 by Shiela Rodríguez LPN  Outcome: Not Progressing  8/4/2023 0616 by Kylee Nuñez RN  Outcome: Not Progressing     Problem: Gastrointestinal - Adult  Goal: Maintains or returns to baseline bowel function  8/4/2023 0616 by Kylee Nuñez RN  Outcome: Not Progressing     Problem: Skin/Tissue Integrity  Goal: Absence of new skin breakdown  Description: 1. Monitor for areas of redness and/or skin breakdown  2. Assess vascular access sites hourly  3. Every 4-6 hours minimum:  Change oxygen saturation probe site  4. Every 4-6 hours:  If on nasal continuous positive airway pressure, respiratory therapy assess nares and determine need for appliance change or resting period.   8/4/2023 1123 by Shiela Rodríguez LPN  Outcome: Progressing  8/4/2023 0616 by Kylee Nuñez RN  Outcome: Not Progressing     Problem: Discharge Planning  Goal: Discharge to home or other facility with appropriate resources  8/4/2023 1123 by Shiela Rodríguez LPN  Outcome: Progressing  8/4/2023 0616 by Kylee Nuñez RN  Outcome: Not Progressing     Problem: Safety - Adult  Goal: Free from fall injury  8/4/2023 1123 by Sihela Rodríguez LPN  Outcome: Progressing  8/4/2023 0616 by Kylee Nuñez RN  Outcome: Not Progressing     Problem: Chronic Conditions and Co-morbidities  Goal: Patient's chronic conditions and co-morbidity symptoms are monitored and maintained or improved  8/4/2023 1123 by Shiela Rodríguez LPN  Outcome: Progressing  8/4/2023 0616 by Kylee Nuñez RN  Outcome: Not Progressing     Problem: Cardiovascular - Adult  Goal: Maintains optimal cardiac output and hemodynamic stability  8/4/2023 1123 by Shiela Rodríguez LPN  Outcome: Progressing  8/4/2023 Progressing  Goal: Return ADL status to a safe level of function  8/4/2023 1123 by Kathy Gramajo LPN  Outcome: Not Progressing  8/4/2023 0616 by Zoila Cardoza RN  Outcome: Not Progressing     Problem: Gastrointestinal - Adult  Goal: Minimal or absence of nausea and vomiting  8/4/2023 1123 by Kathy Gramajo LPN  Outcome: Progressing  8/4/2023 0616 by Zoila Cardoza RN  Outcome: Not Progressing  Goal: Maintains or returns to baseline bowel function  8/4/2023 0616 by Zoila Cardoza RN  Outcome: Not Progressing  Goal: Maintains adequate nutritional intake  8/4/2023 1123 by Kathy Gramajo LPN  Outcome: Progressing  8/4/2023 0616 by Zoila Cardoza RN  Outcome: Not Progressing  Goal: Establish and maintain optimal ostomy function  8/4/2023 1123 by Kathy Gramajo LPN  Outcome: Progressing  8/4/2023 0616 by Zoila Cardoza RN  Outcome: Not Progressing     Problem: Hematologic - Adult  Goal: Maintains hematologic stability  8/4/2023 1123 by Kathy Gramajo LPN  Outcome: Progressing  8/4/2023 0616 by Zoila Cardoza RN  Outcome: Not Progressing     Problem: Pain  Goal: Verbalizes/displays adequate comfort level or baseline comfort level  8/4/2023 1123 by Kathy Gramajo LPN  Outcome: Progressing  8/4/2023 0616 by Ziola Cardoza RN  Outcome: Not Progressing     Problem: ABCDS Injury Assessment  Goal: Absence of physical injury  8/4/2023 1123 by Kathy Gramajo LPN  Outcome: Progressing  8/4/2023 0616 by Zoila Cardoza RN  Outcome: Not Progressing     Problem: Infection - Adult  Goal: Absence of infection at discharge  8/4/2023 1123 by Kathy Gramajo LPN  Outcome: Progressing  8/4/2023 0616 by Zoila Cardoza RN  Outcome: Not Progressing  Goal: Absence of infection during hospitalization  8/4/2023 1123 by Kathy Gramajo LPN  Outcome: Progressing  8/4/2023 0616 by Zoila Cardoza RN  Outcome: Not Progressing     Problem: Nutrition Deficit:  Goal: Optimize nutritional status  8/4/2023 1123 by Dav Shelby

## 2023-08-04 NOTE — PROGRESS NOTES
Hospitalist Progress Note  Dona Caballero MD  Answering service:         Date of Service:  2023  NAME:  Ayo Shah  :  1960  MRN:  [id number]      Admission Summary:     Patient admitted with hematemesis and acute blood loss anemia, history of gastric bypass with GJ ulcer. Interval history / Subjective:     Patient still with nausea and limited p.o. intake.      Assessment & Plan:     GI bleed  -Upper GI bleed, history of gastric bypass with GJ anastomotic ulcer  -S/p EGD  shows anastomotic ulcer, nonbleeding, negative for stricture  -GI has signed off, on clear liquid, on TPN, continuing PPI and Carafate    Acute blood loss anemia  -Acute blood loss anemia due to GI bleed  -S/p transfusion of 1 unit PRBCs, monitor H&H    Intra-abdominal abscess  -CT abdomen and pelvis on  shows loculated intraperitoneal fluid in the anterior abdomen, persistent free intraperitoneal air  -S/p ultrasound-guided drainage   -Fluid culture growing E. coli, on empiric Zosyn  -General surgery following, interval follow-up CT abdomen and pelvis, consult ID for antibiotic management    Infected sacral decubitus ulcer  -Wound cultures grew Klebsiella and Citrobacter  -On Zosyn, general surgery following, tentative plan for debridement on Monday    DVT  -DVT of the right lower extremity  -S/p IVC filter placement 2023, now off anticoagulation    Prior history of C. difficile colitis  -S/p abdominal colectomy on 3/26/2023, reversal of end ileostomy with ileocolic anastomosis 737    History of gastric bypass  -History of gastric bypass with GJ anastomotic ulcer, prior history of stricture requiring dilatation  -S/p EGD this admission 731, showing no active bleeding and no stricture  -Patient with persistent nausea, on clear liquid, on TPN, plan for gastrostomy tube placement today    Severe protein calorie 263     Recent Labs     08/02/23  0400 08/03/23  0454 08/04/23  0443    139 141   K 3.3* 3.8 3.7    107 109*   CO2 28 27 26   BUN 7 9 12   MG 1.5* 1.5* 1.6   PHOS 2.1* 2.6 2.7     Recent Labs     08/02/23  0400 08/03/23  0454 08/04/23  0443   ALT 12  12 12  13 14   GLOB 2.1  2.0 2.1  2.2 2.3     No results for input(s): INR, APTT in the last 72 hours. Invalid input(s): PTP   No results for input(s): TIBC, FERR in the last 72 hours. Invalid input(s): FE, PSAT   No results found for: FOL, RBCF   No results for input(s): PH, PCO2, PO2 in the last 72 hours. No results for input(s): CPK in the last 72 hours.     Invalid input(s): CPKMB, CKNDX, TROIQ  Lab Results   Component Value Date/Time    CHOL 212 05/08/2023 02:55 PM    HDL 83 05/08/2023 02:55 PM     No results found for: GLUCPOC  [unfilled]      Medications Reviewed:     Current Facility-Administered Medications   Medication Dose Route Frequency    PN-Adult 2-in-1 Central Line (Standard)   IntraVENous Continuous TPN    PN-Adult 2-in-1 Central Line (Standard)   IntraVENous Continuous TPN    fat emulsion (INTRALIPID/NUTRILIPID) 20 % infusion 250 mL  250 mL IntraVENous Q24H    prochlorperazine (COMPAZINE) injection 10 mg  10 mg IntraVENous Q6H PRN    potassium phosphate 15 mmol in sodium chloride 0.9 % 250 mL IVPB  15 mmol IntraVENous Once    alteplase (CATHFLO) 1 mg in sterile water 1 mL injection  1 mg IntraCATHeter PRN    0.9 % sodium chloride infusion   IntraVENous Continuous    scopolamine (TRANSDERM-SCOP) transdermal patch 1 patch  1 patch TransDERmal Q72H    collagenase ointment   Topical Daily    balsum peru-castor oil (VENELEX) ointment   Topical q8h    dicyclomine (BENTYL) capsule 10 mg  10 mg Oral TID PRN    aluminum & magnesium hydroxide-simethicone (MAALOX) 200-200-20 MG/5ML suspension 15 mL  15 mL Oral Q6H PRN    pantoprazole (PROTONIX) 40 mg in sodium chloride (PF) 0.9 % 10 mL injection  40 mg IntraVENous Q12H

## 2023-08-04 NOTE — PROGRESS NOTES
Rapid response called for chest pain. On exam she appears uncomfortable, VSS  Heart RRR lungs CTAB  Abd soft +epigastric TTP, +G-tube (just had this placed an hour ago)  Suspect pain related to the recent procedure, no acute abdomen signs but monitor closely. D/w on-call general surgery Dr. Jluis Collier who suggested KUB and placing G-tube to gravity. Check EKG and troponins. Provide pain control    Addendum: EKG reviewed, no acute ischemic changes, unchanged from prior in July. Troponin, KUB pending. Pt feels better after analgesics. CRITICAL CARE ATTESTATION:  I had a face to face encounter with the patient, reviewed and interpreted patient data including clinical events, labs, images, vital signs, I/O's, and examined patient. I have discussed the case and the plan and management of the patient's care with the consulting services, the bedside nurses and necessary ancillary providers. NOTE OF PERSONAL INVOLVEMENT IN CARE   This patient has a high probability of imminent, clinically significant deterioration, which requires the highest level of preparedness to intervene urgently. I participated in the decision-making and personally managed or directed the management of the following life and organ supporting interventions that required my frequent assessment to treat or prevent imminent deterioration. I personally spent 35 minutes of critical care time. This is time spent at this critically ill patient's bedside actively involved in patient care as well as the coordination of care and discussions with the patient's family. This does not include any procedural time which has been billed separately.

## 2023-08-04 NOTE — PLAN OF CARE
0200: Patient called out for pain medication at this time, reporting epigastric/chest pain. Vital signs taken, slightly more tachycardic than normal. This RN sent message to NP to evaluate patient, medications given (see MAR for more details). NP to bedside to evaluate patient, now resting comfortably, answers simple question and goes back to sleep stating pain was reduced. 4362: Patient uses call bell to request pain medication. 0600: Patient begins yelling for nurse. Upon entering room patient states she has been waiting since 0500 for pain medication. Patient reoriented to current time and time of her call. Patient states pain is back in epigastric region, slightly tachycardic again but patient anxious/agitated at this time. Patient agitated that no one came to see her about pain, reoriented patient that NP came to bedside - patient states that she does not recall interaction. See MAR for medications given at this time. Patient oriented to self and place, disoriented to time and situation. Patient states still uncomfortable, this RN and PCT reposition patient in bed. Patient mentioned desire to get transferred to different hospital. Patient calm and sleeping before this RN leaves the room. Problem: Skin/Tissue Integrity  Goal: Absence of new skin breakdown  Description: 1. Monitor for areas of redness and/or skin breakdown  2. Assess vascular access sites hourly  3. Every 4-6 hours minimum:  Change oxygen saturation probe site  4. Every 4-6 hours:  If on nasal continuous positive airway pressure, respiratory therapy assess nares and determine need for appliance change or resting period.   Outcome: Not Progressing     Problem: Discharge Planning  Goal: Discharge to home or other facility with appropriate resources  Outcome: Not Progressing     Problem: Safety - Adult  Goal: Free from fall injury  Outcome: Not Progressing     Problem: Chronic Conditions and Co-morbidities  Goal: Patient's chronic

## 2023-08-04 NOTE — PROGRESS NOTES
Occupational Therapy   Chart reviewed; off unit for testing, will cont to follow.  Rebeka Mendoza OTR/L

## 2023-08-04 NOTE — PROGRESS NOTES
TRANSFER - OUT REPORT:    Verbal report given to [address] on [facility]  being transferred to [facility] for routine progression of patient care       Report consisted of patient's Situation, Background, Assessment and   Recommendations(SBAR). Information from the following report(s) Surgery Report and MAR was reviewed with the receiving nurse. Lines:   CVC Triple Lumen 07/28/23 Right Internal jugular (Active)   $ Central line insertion $ Yes 07/29/23 1630   Central Line Being Utilized Yes 08/04/23 1215   Criteria for Appropriate Use Limited/no vessel suitable for conventional peripheral access 08/03/23 2000   Site Assessment Clean, dry & intact 08/04/23 1215   Phlebitis Assessment No symptoms 08/04/23 1215   Infiltration Assessment 0 08/04/23 1215   Color/Movement/Sensation Capillary refill less than 3 sec 08/01/23 0400   Proximal Lumen Color/Status White 08/04/23 1215   Medial Lumen Status Brown 08/04/23 1215   Distal Lumen Color/Status Blue 08/04/23 1215   Line Care Connections checked and tightened 08/04/23 1215   Alcohol Cap Used Yes 08/04/23 1215   Date of Last Dressing Change 08/04/23 08/04/23 0807   Dressing Type Transparent w/CHG gel 08/04/23 1215   Dressing Status Clean, dry & intact 08/04/23 1215        Opportunity for questions and clarification was provided.       Patient transported with:  Monitor and Registered Nurse

## 2023-08-04 NOTE — PROGRESS NOTES
1830- rapid response called for chest pain , pt just came back from GT placement with drain  Stat ECG,troponin done

## 2023-08-05 LAB
ANION GAP SERPL CALC-SCNC: 6 MMOL/L (ref 5–15)
BASOPHILS # BLD: 0 K/UL (ref 0–0.1)
BASOPHILS NFR BLD: 0 % (ref 0–1)
BUN SERPL-MCNC: 17 MG/DL (ref 6–20)
BUN/CREAT SERPL: 50 (ref 12–20)
CALCIUM SERPL-MCNC: 8.2 MG/DL (ref 8.5–10.1)
CHLORIDE SERPL-SCNC: 111 MMOL/L (ref 97–108)
CO2 SERPL-SCNC: 26 MMOL/L (ref 21–32)
CREAT SERPL-MCNC: 0.34 MG/DL (ref 0.55–1.02)
DIFFERENTIAL METHOD BLD: ABNORMAL
ECHO BSA: 1.66 M2
EOSINOPHIL # BLD: 0 K/UL (ref 0–0.4)
EOSINOPHIL NFR BLD: 0 % (ref 0–7)
ERYTHROCYTE [DISTWIDTH] IN BLOOD BY AUTOMATED COUNT: 17.2 % (ref 11.5–14.5)
GLUCOSE BLD STRIP.AUTO-MCNC: 105 MG/DL (ref 65–117)
GLUCOSE BLD STRIP.AUTO-MCNC: 128 MG/DL (ref 65–117)
GLUCOSE BLD STRIP.AUTO-MCNC: 158 MG/DL (ref 65–117)
GLUCOSE BLD STRIP.AUTO-MCNC: 179 MG/DL (ref 65–117)
GLUCOSE SERPL-MCNC: 110 MG/DL (ref 65–100)
HCT VFR BLD AUTO: 24.1 % (ref 35–47)
HGB BLD-MCNC: 7.7 G/DL (ref 11.5–16)
IMM GRANULOCYTES # BLD AUTO: 0.2 K/UL (ref 0–0.04)
IMM GRANULOCYTES NFR BLD AUTO: 1 % (ref 0–0.5)
LYMPHOCYTES # BLD: 0.6 K/UL (ref 0.8–3.5)
LYMPHOCYTES NFR BLD: 3 % (ref 12–49)
MAGNESIUM SERPL-MCNC: 1.7 MG/DL (ref 1.6–2.4)
MCH RBC QN AUTO: 30.3 PG (ref 26–34)
MCHC RBC AUTO-ENTMCNC: 32 G/DL (ref 30–36.5)
MCV RBC AUTO: 94.9 FL (ref 80–99)
MONOCYTES # BLD: 0.6 K/UL (ref 0–1)
MONOCYTES NFR BLD: 3 % (ref 5–13)
NEUTS SEG # BLD: 17.9 K/UL (ref 1.8–8)
NEUTS SEG NFR BLD: 93 % (ref 32–75)
NRBC # BLD: 0 K/UL (ref 0–0.01)
NRBC BLD-RTO: 0 PER 100 WBC
PHOSPHATE SERPL-MCNC: 2 MG/DL (ref 2.6–4.7)
PLATELET # BLD AUTO: 268 K/UL (ref 150–400)
PMV BLD AUTO: 9.6 FL (ref 8.9–12.9)
POTASSIUM SERPL-SCNC: 3.9 MMOL/L (ref 3.5–5.1)
RBC # BLD AUTO: 2.54 M/UL (ref 3.8–5.2)
RBC MORPH BLD: ABNORMAL
SERVICE CMNT-IMP: ABNORMAL
SERVICE CMNT-IMP: NORMAL
SODIUM SERPL-SCNC: 143 MMOL/L (ref 136–145)
VAS RIGHT AX A MID PSV: 39.9 CM/S
VAS RIGHT BRACHIAL A DIST EDV: 0 CM/S
VAS RIGHT BRACHIAL A DIST PSV: 110.1 CM/S
VAS RIGHT BRACHIAL A PROX EDV: 0 CM/S
VAS RIGHT BRACHIAL A PROX PSV: 149.7 CM/S
VAS RIGHT RADIAL A DIST PSV: 42.1 CM/S
VAS RIGHT SUBCLAVIAN DIST EDV: 0 CM/S
VAS RIGHT SUBCLAVIAN DIST PSV: 66.3 CM/S
VAS RIGHT SUBCLAVIAN PROX EDV: 11.4 CM/S
VAS RIGHT SUBCLAVIAN PROX PSV: 116.8 CM/S
VAS RIGHT ULNAR A DIST PSV: 42.1 CM/S
WBC # BLD AUTO: 19.3 K/UL (ref 3.6–11)

## 2023-08-05 PROCEDURE — 6370000000 HC RX 637 (ALT 250 FOR IP): Performed by: FAMILY MEDICINE

## 2023-08-05 PROCEDURE — 36415 COLL VENOUS BLD VENIPUNCTURE: CPT

## 2023-08-05 PROCEDURE — 2580000003 HC RX 258: Performed by: INTERNAL MEDICINE

## 2023-08-05 PROCEDURE — 80048 BASIC METABOLIC PNL TOTAL CA: CPT

## 2023-08-05 PROCEDURE — 6360000002 HC RX W HCPCS: Performed by: SURGERY

## 2023-08-05 PROCEDURE — 85025 COMPLETE CBC W/AUTO DIFF WBC: CPT

## 2023-08-05 PROCEDURE — C9113 INJ PANTOPRAZOLE SODIUM, VIA: HCPCS | Performed by: SURGERY

## 2023-08-05 PROCEDURE — A4216 STERILE WATER/SALINE, 10 ML: HCPCS | Performed by: SURGERY

## 2023-08-05 PROCEDURE — 83735 ASSAY OF MAGNESIUM: CPT

## 2023-08-05 PROCEDURE — 2060000000 HC ICU INTERMEDIATE R&B

## 2023-08-05 PROCEDURE — 84100 ASSAY OF PHOSPHORUS: CPT

## 2023-08-05 PROCEDURE — 6360000002 HC RX W HCPCS: Performed by: INTERNAL MEDICINE

## 2023-08-05 PROCEDURE — 2580000003 HC RX 258: Performed by: FAMILY MEDICINE

## 2023-08-05 PROCEDURE — 6360000002 HC RX W HCPCS: Performed by: FAMILY MEDICINE

## 2023-08-05 PROCEDURE — 2580000003 HC RX 258: Performed by: SURGERY

## 2023-08-05 PROCEDURE — 82962 GLUCOSE BLOOD TEST: CPT

## 2023-08-05 PROCEDURE — 2500000003 HC RX 250 WO HCPCS: Performed by: STUDENT IN AN ORGANIZED HEALTH CARE EDUCATION/TRAINING PROGRAM

## 2023-08-05 PROCEDURE — 2500000003 HC RX 250 WO HCPCS: Performed by: FAMILY MEDICINE

## 2023-08-05 PROCEDURE — 6360000002 HC RX W HCPCS: Performed by: STUDENT IN AN ORGANIZED HEALTH CARE EDUCATION/TRAINING PROGRAM

## 2023-08-05 RX ORDER — HYDROMORPHONE HYDROCHLORIDE 1 MG/ML
1 INJECTION, SOLUTION INTRAMUSCULAR; INTRAVENOUS; SUBCUTANEOUS
Status: DISCONTINUED | OUTPATIENT
Start: 2023-08-05 | End: 2023-08-10

## 2023-08-05 RX ADMIN — POTASSIUM CHLORIDE: 2 INJECTION, SOLUTION, CONCENTRATE INTRAVENOUS at 18:26

## 2023-08-05 RX ADMIN — BUPROPION HYDROCHLORIDE 150 MG: 150 TABLET, EXTENDED RELEASE ORAL at 08:28

## 2023-08-05 RX ADMIN — HYDROMORPHONE HYDROCHLORIDE 0.5 MG: 1 INJECTION, SOLUTION INTRAMUSCULAR; INTRAVENOUS; SUBCUTANEOUS at 05:22

## 2023-08-05 RX ADMIN — HYDROMORPHONE HYDROCHLORIDE 1 MG: 1 INJECTION, SOLUTION INTRAMUSCULAR; INTRAVENOUS; SUBCUTANEOUS at 15:42

## 2023-08-05 RX ADMIN — HYDROMORPHONE HYDROCHLORIDE 0.5 MG: 1 INJECTION, SOLUTION INTRAMUSCULAR; INTRAVENOUS; SUBCUTANEOUS at 01:02

## 2023-08-05 RX ADMIN — Medication: at 00:07

## 2023-08-05 RX ADMIN — SODIUM CHLORIDE: 9 INJECTION, SOLUTION INTRAVENOUS at 18:14

## 2023-08-05 RX ADMIN — HYDROMORPHONE HYDROCHLORIDE 1 MG: 1 INJECTION, SOLUTION INTRAMUSCULAR; INTRAVENOUS; SUBCUTANEOUS at 22:35

## 2023-08-05 RX ADMIN — Medication: at 15:48

## 2023-08-05 RX ADMIN — SUCRALFATE 1 G: 1 TABLET ORAL at 08:28

## 2023-08-05 RX ADMIN — Medication 10 ML: at 08:28

## 2023-08-05 RX ADMIN — Medication 10 ML: at 00:00

## 2023-08-05 RX ADMIN — SUCRALFATE 1 G: 1 TABLET ORAL at 17:06

## 2023-08-05 RX ADMIN — PROCHLORPERAZINE EDISYLATE 10 MG: 5 INJECTION, SOLUTION INTRAMUSCULAR; INTRAVENOUS at 22:34

## 2023-08-05 RX ADMIN — HYDROMORPHONE HYDROCHLORIDE 1 MG: 1 INJECTION, SOLUTION INTRAMUSCULAR; INTRAVENOUS; SUBCUTANEOUS at 19:26

## 2023-08-05 RX ADMIN — SODIUM CHLORIDE: 9 INJECTION, SOLUTION INTRAVENOUS at 04:21

## 2023-08-05 RX ADMIN — PIPERACILLIN AND TAZOBACTAM 3375 MG: 3; .375 INJECTION, POWDER, LYOPHILIZED, FOR SOLUTION INTRAVENOUS at 00:00

## 2023-08-05 RX ADMIN — SODIUM CHLORIDE, PRESERVATIVE FREE 40 MG: 5 INJECTION INTRAVENOUS at 11:25

## 2023-08-05 RX ADMIN — COLLAGENASE SANTYL: 250 OINTMENT TOPICAL at 08:32

## 2023-08-05 RX ADMIN — PIPERACILLIN AND TAZOBACTAM 3375 MG: 3; .375 INJECTION, POWDER, LYOPHILIZED, FOR SOLUTION INTRAVENOUS at 08:25

## 2023-08-05 RX ADMIN — PIPERACILLIN AND TAZOBACTAM 3375 MG: 3; .375 INJECTION, POWDER, LYOPHILIZED, FOR SOLUTION INTRAVENOUS at 15:47

## 2023-08-05 RX ADMIN — I.V. FAT EMULSION 250 ML: 20 EMULSION INTRAVENOUS at 18:25

## 2023-08-05 RX ADMIN — PROCHLORPERAZINE EDISYLATE 10 MG: 5 INJECTION, SOLUTION INTRAMUSCULAR; INTRAVENOUS at 15:43

## 2023-08-05 RX ADMIN — ONDANSETRON 4 MG: 2 INJECTION INTRAMUSCULAR; INTRAVENOUS at 08:45

## 2023-08-05 RX ADMIN — HYDROMORPHONE HYDROCHLORIDE 0.5 MG: 1 INJECTION, SOLUTION INTRAMUSCULAR; INTRAVENOUS; SUBCUTANEOUS at 11:25

## 2023-08-05 RX ADMIN — SODIUM CHLORIDE, PRESERVATIVE FREE 40 MG: 5 INJECTION INTRAVENOUS at 00:00

## 2023-08-05 RX ADMIN — Medication: at 08:32

## 2023-08-05 ASSESSMENT — PAIN DESCRIPTION - LOCATION
LOCATION: ABDOMEN;BUTTOCKS;BACK
LOCATION: ABDOMEN;BUTTOCKS;BACK
LOCATION: ABDOMEN
LOCATION: ABDOMEN;BACK;BUTTOCKS
LOCATION: ABDOMEN;BACK

## 2023-08-05 ASSESSMENT — PAIN DESCRIPTION - ORIENTATION
ORIENTATION: LEFT;ANTERIOR;POSTERIOR
ORIENTATION: LEFT;UPPER;ANTERIOR
ORIENTATION: ANTERIOR;POSTERIOR

## 2023-08-05 ASSESSMENT — PAIN SCALES - GENERAL
PAINLEVEL_OUTOF10: 8
PAINLEVEL_OUTOF10: 8
PAINLEVEL_OUTOF10: 7
PAINLEVEL_OUTOF10: 7
PAINLEVEL_OUTOF10: 5
PAINLEVEL_OUTOF10: 10
PAINLEVEL_OUTOF10: 4
PAINLEVEL_OUTOF10: 9
PAINLEVEL_OUTOF10: 8

## 2023-08-05 ASSESSMENT — PAIN DESCRIPTION - DESCRIPTORS
DESCRIPTORS: DISCOMFORT
DESCRIPTORS: ACHING
DESCRIPTORS: ACHING;DISCOMFORT
DESCRIPTORS: DISCOMFORT;ACHING
DESCRIPTORS: ACHING
DESCRIPTORS: ACHING
DESCRIPTORS: ACHING;SHARP

## 2023-08-05 ASSESSMENT — PULMONARY FUNCTION TESTS: PIF_VALUE: 0

## 2023-08-05 ASSESSMENT — PAIN DESCRIPTION - PAIN TYPE
TYPE: CHRONIC PAIN
TYPE: ACUTE PAIN
TYPE: SURGICAL PAIN

## 2023-08-05 ASSESSMENT — PAIN DESCRIPTION - ONSET: ONSET: AWAKENED FROM SLEEP

## 2023-08-05 ASSESSMENT — PAIN - FUNCTIONAL ASSESSMENT: PAIN_FUNCTIONAL_ASSESSMENT: PREVENTS OR INTERFERES SOME ACTIVE ACTIVITIES AND ADLS

## 2023-08-05 ASSESSMENT — PAIN DESCRIPTION - FREQUENCY: FREQUENCY: CONTINUOUS

## 2023-08-05 NOTE — PROGRESS NOTES
PICC Team Note  At bedside assessing vessels by ultrasound for PICC placement. R basilic is non-compressible and appears thrombosed, presumably from midline placed June 30, 2023, that was removed 7/19/23 due to leaking (likely b/c of dvt). R brachial is diminutive. Attention was turned to the left arm. Her left arm has very small vessels. Catheter to vessel ratio of the left brachial (the largest vessel) is 68%. Research shows catheter to vessel ratio of > 45% doubles risk of DVT. Thus, she is at very high risk of additional DVT due to PICC if placed, and given the rest of the picture, apparent DVT on the right from previous midline and recent lower extremity DVT s/p IVC filter, as well as unable to anticoagulate due to other medical problems, we are strongly recommending a Philipp for Ms Molly Grant. This information was conveyed to Dr Leila Bearden. Dr Leila Bearden agrees on not placing PICC.     Jon Gomez RN

## 2023-08-05 NOTE — PROGRESS NOTES
PICC Team Note  Discussed placing PICC on patient with recent DVT, no longer anticoagulated. Concern for DVT risk associated with PICC. Dr Yamini Clement said ok to proceed with PICC.    Donia Claude, RN

## 2023-08-05 NOTE — PROGRESS NOTES
Hospitalist Progress Note  Dona Caballero MD  Answering service: [phone]        Date of Service:  2023  NAME:  Ayo Shah  :  1960  MRN:  [id number]      Admission Summary:     Patient admitted with hematemesis and acute blood loss anemia, history of gastric bypass with GJ ulcer. Interval history / Subjective:     Patient still with nausea and limited p.o. intake. Chest pain yesterday evening.      Assessment & Plan:     Chest pain  -Likely atypical, EKG and troponin unremarkable  -Likely source from gastrostomy tube    GI bleed  -Upper GI bleed, history of gastric bypass with GJ anastomotic ulcer  -S/p EGD  shows anastomotic ulcer, nonbleeding, negative for stricture  -GI has signed off, on clear liquid, on TPN, continuing PPI and Carafate    Acute blood loss anemia  -Acute blood loss anemia due to GI bleed  -S/p transfusion of 1 unit PRBCs, monitor H&H    Intra-abdominal abscess  -CT abdomen and pelvis on  shows loculated intraperitoneal fluid in the anterior abdomen, persistent free intraperitoneal air  -S/p ultrasound-guided drainage   -Fluid culture growing E. coli, on empiric Zosyn  -General surgery following, interval follow-up CT abdomen and pelvis, consult ID for antibiotic management    Infected sacral decubitus ulcer  -Wound cultures grew Klebsiella and Citrobacter  -On Zosyn, general surgery following, tentative plan for debridement on Monday    DVT  -DVT of the right lower extremity  -S/p IVC filter placement 2023, now off anticoagulation    Prior history of C. difficile colitis  -S/p abdominal colectomy on 3/26/2023, reversal of end ileostomy with ileocolic anastomosis 987    History of gastric bypass  -History of gastric bypass with GJ anastomotic ulcer, prior history of stricture requiring dilatation  -S/p EGD this admission 731, showing no active bleeding and no

## 2023-08-05 NOTE — PLAN OF CARE
Problem: Pain  Goal: Verbalizes/displays adequate comfort level or baseline comfort level  Outcome: Progressing     Problem: Nutrition Deficit:  Goal: Optimize nutritional status  Outcome: Progressing     Problem: Skin/Tissue Integrity - Adult  Goal: Skin integrity remains intact  Recent Flowsheet Documentation  Taken 8/5/2023 0809 by Cordelia Roberts RN  Skin Integrity Remains Intact: Monitor for areas of redness and/or skin breakdown     Problem: Skin/Tissue Integrity - Adult  Goal: Skin integrity remains intact  Recent Flowsheet Documentation  Taken 8/5/2023 0809 by Cordelia Roberts RN  Skin Integrity Remains Intact: Monitor for areas of redness and/or skin breakdown

## 2023-08-06 ENCOUNTER — APPOINTMENT (OUTPATIENT)
Facility: HOSPITAL | Age: 63
DRG: 004 | End: 2023-08-06
Payer: MEDICARE

## 2023-08-06 LAB
ALBUMIN SERPL-MCNC: 1.2 G/DL (ref 3.5–5)
ALBUMIN/GLOB SERPL: 0.5 (ref 1.1–2.2)
ALP SERPL-CCNC: 77 U/L (ref 45–117)
ALT SERPL-CCNC: 10 U/L (ref 12–78)
ANION GAP SERPL CALC-SCNC: 3 MMOL/L (ref 5–15)
ANION GAP SERPL CALC-SCNC: 6 MMOL/L (ref 5–15)
ARTERIAL PATENCY WRIST A: POSITIVE
AST SERPL-CCNC: 16 U/L (ref 15–37)
BASE EXCESS BLD CALC-SCNC: 0.4 MMOL/L
BASOPHILS # BLD: 0 K/UL (ref 0–0.1)
BASOPHILS # BLD: 0 K/UL (ref 0–0.1)
BASOPHILS NFR BLD: 0 % (ref 0–1)
BASOPHILS NFR BLD: 0 % (ref 0–1)
BDY SITE: ABNORMAL
BILIRUB SERPL-MCNC: 0.3 MG/DL (ref 0.2–1)
BUN SERPL-MCNC: 20 MG/DL (ref 6–20)
BUN SERPL-MCNC: 20 MG/DL (ref 6–20)
BUN/CREAT SERPL: 59 (ref 12–20)
BUN/CREAT SERPL: 80 (ref 12–20)
CALCIUM SERPL-MCNC: 8.2 MG/DL (ref 8.5–10.1)
CALCIUM SERPL-MCNC: 8.2 MG/DL (ref 8.5–10.1)
CHLORIDE SERPL-SCNC: 109 MMOL/L (ref 97–108)
CHLORIDE SERPL-SCNC: 110 MMOL/L (ref 97–108)
CO2 SERPL-SCNC: 25 MMOL/L (ref 21–32)
CO2 SERPL-SCNC: 27 MMOL/L (ref 21–32)
CREAT SERPL-MCNC: 0.25 MG/DL (ref 0.55–1.02)
CREAT SERPL-MCNC: 0.34 MG/DL (ref 0.55–1.02)
D DIMER PPP FEU-MCNC: 6.2 MG/L FEU (ref 0–0.65)
DIFFERENTIAL METHOD BLD: ABNORMAL
DIFFERENTIAL METHOD BLD: ABNORMAL
EKG ATRIAL RATE: 114 BPM
EKG DIAGNOSIS: NORMAL
EKG P AXIS: 47 DEGREES
EKG P-R INTERVAL: 116 MS
EKG Q-T INTERVAL: 290 MS
EKG QRS DURATION: 64 MS
EKG QTC CALCULATION (BAZETT): 399 MS
EKG R AXIS: 11 DEGREES
EKG T AXIS: 198 DEGREES
EKG VENTRICULAR RATE: 114 BPM
EOSINOPHIL # BLD: 0 K/UL (ref 0–0.4)
EOSINOPHIL # BLD: 0.4 K/UL (ref 0–0.4)
EOSINOPHIL NFR BLD: 0 % (ref 0–7)
EOSINOPHIL NFR BLD: 2 % (ref 0–7)
ERYTHROCYTE [DISTWIDTH] IN BLOOD BY AUTOMATED COUNT: 16.5 % (ref 11.5–14.5)
ERYTHROCYTE [DISTWIDTH] IN BLOOD BY AUTOMATED COUNT: 17.1 % (ref 11.5–14.5)
GAS FLOW.O2 O2 DELIVERY SYS: ABNORMAL
GLOBULIN SER CALC-MCNC: 2.5 G/DL (ref 2–4)
GLUCOSE BLD STRIP.AUTO-MCNC: 101 MG/DL (ref 65–117)
GLUCOSE BLD STRIP.AUTO-MCNC: 135 MG/DL (ref 65–117)
GLUCOSE BLD STRIP.AUTO-MCNC: 143 MG/DL (ref 65–117)
GLUCOSE BLD STRIP.AUTO-MCNC: 146 MG/DL (ref 65–117)
GLUCOSE SERPL-MCNC: 116 MG/DL (ref 65–100)
GLUCOSE SERPL-MCNC: 96 MG/DL (ref 65–100)
HCO3 BLD-SCNC: 25.5 MMOL/L (ref 22–26)
HCT VFR BLD AUTO: 22.5 % (ref 35–47)
HCT VFR BLD AUTO: 22.9 % (ref 35–47)
HGB BLD-MCNC: 7.3 G/DL (ref 11.5–16)
HGB BLD-MCNC: 7.4 G/DL (ref 11.5–16)
IMM GRANULOCYTES # BLD AUTO: 0.2 K/UL (ref 0–0.04)
IMM GRANULOCYTES # BLD AUTO: 0.4 K/UL (ref 0–0.04)
IMM GRANULOCYTES NFR BLD AUTO: 1 % (ref 0–0.5)
IMM GRANULOCYTES NFR BLD AUTO: 2 % (ref 0–0.5)
LACTATE SERPL-SCNC: 1.7 MMOL/L (ref 0.4–2)
LYMPHOCYTES # BLD: 0.5 K/UL (ref 0.8–3.5)
LYMPHOCYTES # BLD: 0.8 K/UL (ref 0.8–3.5)
LYMPHOCYTES NFR BLD: 2 % (ref 12–49)
LYMPHOCYTES NFR BLD: 4 % (ref 12–49)
MAGNESIUM SERPL-MCNC: 1.5 MG/DL (ref 1.6–2.4)
MCH RBC QN AUTO: 30 PG (ref 26–34)
MCH RBC QN AUTO: 30.7 PG (ref 26–34)
MCHC RBC AUTO-ENTMCNC: 32.3 G/DL (ref 30–36.5)
MCHC RBC AUTO-ENTMCNC: 32.4 G/DL (ref 30–36.5)
MCV RBC AUTO: 92.7 FL (ref 80–99)
MCV RBC AUTO: 94.5 FL (ref 80–99)
MONOCYTES # BLD: 0.9 K/UL (ref 0–1)
MONOCYTES # BLD: 1 K/UL (ref 0–1)
MONOCYTES NFR BLD: 4 % (ref 5–13)
MONOCYTES NFR BLD: 5 % (ref 5–13)
NEUTS SEG # BLD: 16.3 K/UL (ref 1.8–8)
NEUTS SEG # BLD: 22.5 K/UL (ref 1.8–8)
NEUTS SEG NFR BLD: 87 % (ref 32–75)
NEUTS SEG NFR BLD: 93 % (ref 32–75)
NRBC # BLD: 0 K/UL (ref 0–0.01)
NRBC # BLD: 0 K/UL (ref 0–0.01)
NRBC BLD-RTO: 0 PER 100 WBC
NRBC BLD-RTO: 0 PER 100 WBC
NT PRO BNP: 506 PG/ML
O2/TOTAL GAS SETTING VFR VENT: 8 %
PCO2 BLD: 42 MMHG (ref 35–45)
PH BLD: 7.39 (ref 7.35–7.45)
PHOSPHATE SERPL-MCNC: 1.9 MG/DL (ref 2.6–4.7)
PLATELET # BLD AUTO: 170 K/UL (ref 150–400)
PLATELET # BLD AUTO: 226 K/UL (ref 150–400)
PMV BLD AUTO: 9.2 FL (ref 8.9–12.9)
PMV BLD AUTO: 9.6 FL (ref 8.9–12.9)
PO2 BLD: 51 MMHG (ref 80–100)
POTASSIUM SERPL-SCNC: 3.4 MMOL/L (ref 3.5–5.1)
POTASSIUM SERPL-SCNC: 3.9 MMOL/L (ref 3.5–5.1)
PROT SERPL-MCNC: 3.7 G/DL (ref 6.4–8.2)
RBC # BLD AUTO: 2.38 M/UL (ref 3.8–5.2)
RBC # BLD AUTO: 2.47 M/UL (ref 3.8–5.2)
RBC MORPH BLD: ABNORMAL
SAO2 % BLD: 85.2 % (ref 92–97)
SERVICE CMNT-IMP: ABNORMAL
SERVICE CMNT-IMP: NORMAL
SODIUM SERPL-SCNC: 139 MMOL/L (ref 136–145)
SODIUM SERPL-SCNC: 141 MMOL/L (ref 136–145)
SPECIMEN TYPE: ABNORMAL
TROPONIN I SERPL HS-MCNC: 5 NG/L (ref 0–51)
WBC # BLD AUTO: 18.8 K/UL (ref 3.6–11)
WBC # BLD AUTO: 24.2 K/UL (ref 3.6–11)

## 2023-08-06 PROCEDURE — 6370000000 HC RX 637 (ALT 250 FOR IP): Performed by: FAMILY MEDICINE

## 2023-08-06 PROCEDURE — 80053 COMPREHEN METABOLIC PANEL: CPT

## 2023-08-06 PROCEDURE — 74177 CT ABD & PELVIS W/CONTRAST: CPT

## 2023-08-06 PROCEDURE — 5A1955Z RESPIRATORY VENTILATION, GREATER THAN 96 CONSECUTIVE HOURS: ICD-10-PCS | Performed by: SURGERY

## 2023-08-06 PROCEDURE — C9113 INJ PANTOPRAZOLE SODIUM, VIA: HCPCS | Performed by: SURGERY

## 2023-08-06 PROCEDURE — 36556 INSERT NON-TUNNEL CV CATH: CPT

## 2023-08-06 PROCEDURE — 82803 BLOOD GASES ANY COMBINATION: CPT

## 2023-08-06 PROCEDURE — 83605 ASSAY OF LACTIC ACID: CPT

## 2023-08-06 PROCEDURE — 2580000003 HC RX 258: Performed by: FAMILY MEDICINE

## 2023-08-06 PROCEDURE — 71045 X-RAY EXAM CHEST 1 VIEW: CPT

## 2023-08-06 PROCEDURE — 36600 WITHDRAWAL OF ARTERIAL BLOOD: CPT

## 2023-08-06 PROCEDURE — 85379 FIBRIN DEGRADATION QUANT: CPT

## 2023-08-06 PROCEDURE — 6360000002 HC RX W HCPCS: Performed by: FAMILY MEDICINE

## 2023-08-06 PROCEDURE — 6360000002 HC RX W HCPCS: Performed by: SURGERY

## 2023-08-06 PROCEDURE — 6360000002 HC RX W HCPCS: Performed by: STUDENT IN AN ORGANIZED HEALTH CARE EDUCATION/TRAINING PROGRAM

## 2023-08-06 PROCEDURE — 87040 BLOOD CULTURE FOR BACTERIA: CPT

## 2023-08-06 PROCEDURE — 94660 CPAP INITIATION&MGMT: CPT

## 2023-08-06 PROCEDURE — 6370000000 HC RX 637 (ALT 250 FOR IP): Performed by: STUDENT IN AN ORGANIZED HEALTH CARE EDUCATION/TRAINING PROGRAM

## 2023-08-06 PROCEDURE — 84100 ASSAY OF PHOSPHORUS: CPT

## 2023-08-06 PROCEDURE — 2500000003 HC RX 250 WO HCPCS: Performed by: STUDENT IN AN ORGANIZED HEALTH CARE EDUCATION/TRAINING PROGRAM

## 2023-08-06 PROCEDURE — 2580000003 HC RX 258: Performed by: INTERNAL MEDICINE

## 2023-08-06 PROCEDURE — 83880 ASSAY OF NATRIURETIC PEPTIDE: CPT

## 2023-08-06 PROCEDURE — 71275 CT ANGIOGRAPHY CHEST: CPT

## 2023-08-06 PROCEDURE — 2500000003 HC RX 250 WO HCPCS: Performed by: FAMILY MEDICINE

## 2023-08-06 PROCEDURE — A4216 STERILE WATER/SALINE, 10 ML: HCPCS | Performed by: SURGERY

## 2023-08-06 PROCEDURE — 2580000003 HC RX 258: Performed by: SURGERY

## 2023-08-06 PROCEDURE — 84484 ASSAY OF TROPONIN QUANT: CPT

## 2023-08-06 PROCEDURE — 36415 COLL VENOUS BLD VENIPUNCTURE: CPT

## 2023-08-06 PROCEDURE — 83735 ASSAY OF MAGNESIUM: CPT

## 2023-08-06 PROCEDURE — 93005 ELECTROCARDIOGRAM TRACING: CPT

## 2023-08-06 PROCEDURE — 85025 COMPLETE CBC W/AUTO DIFF WBC: CPT

## 2023-08-06 PROCEDURE — 6360000002 HC RX W HCPCS

## 2023-08-06 PROCEDURE — 2060000000 HC ICU INTERMEDIATE R&B

## 2023-08-06 PROCEDURE — 82962 GLUCOSE BLOOD TEST: CPT

## 2023-08-06 PROCEDURE — 94640 AIRWAY INHALATION TREATMENT: CPT

## 2023-08-06 PROCEDURE — 5A09457 ASSISTANCE WITH RESPIRATORY VENTILATION, 24-96 CONSECUTIVE HOURS, CONTINUOUS POSITIVE AIRWAY PRESSURE: ICD-10-PCS

## 2023-08-06 RX ORDER — HYDRALAZINE HYDROCHLORIDE 20 MG/ML
10 INJECTION INTRAMUSCULAR; INTRAVENOUS
Status: CANCELLED | OUTPATIENT
Start: 2023-08-06

## 2023-08-06 RX ORDER — LIDOCAINE HYDROCHLORIDE 10 MG/ML
1 INJECTION, SOLUTION EPIDURAL; INFILTRATION; INTRACAUDAL; PERINEURAL
Status: CANCELLED | OUTPATIENT
Start: 2023-08-06 | End: 2023-08-07

## 2023-08-06 RX ORDER — OXYCODONE HYDROCHLORIDE 5 MG/1
5 TABLET ORAL
Status: CANCELLED | OUTPATIENT
Start: 2023-08-06 | End: 2023-08-07

## 2023-08-06 RX ORDER — SODIUM CHLORIDE, SODIUM LACTATE, POTASSIUM CHLORIDE, CALCIUM CHLORIDE 600; 310; 30; 20 MG/100ML; MG/100ML; MG/100ML; MG/100ML
INJECTION, SOLUTION INTRAVENOUS CONTINUOUS
Status: CANCELLED | OUTPATIENT
Start: 2023-08-06

## 2023-08-06 RX ORDER — POTASSIUM CHLORIDE 7.45 MG/ML
10 INJECTION INTRAVENOUS
Status: COMPLETED | OUTPATIENT
Start: 2023-08-06 | End: 2023-08-07

## 2023-08-06 RX ORDER — FUROSEMIDE 10 MG/ML
40 INJECTION INTRAMUSCULAR; INTRAVENOUS 2 TIMES DAILY
Status: DISCONTINUED | OUTPATIENT
Start: 2023-08-06 | End: 2023-08-11

## 2023-08-06 RX ORDER — FENTANYL CITRATE 50 UG/ML
25 INJECTION, SOLUTION INTRAMUSCULAR; INTRAVENOUS EVERY 5 MIN PRN
Status: CANCELLED | OUTPATIENT
Start: 2023-08-06

## 2023-08-06 RX ORDER — SODIUM CHLORIDE 0.9 % (FLUSH) 0.9 %
5-40 SYRINGE (ML) INJECTION PRN
Status: CANCELLED | OUTPATIENT
Start: 2023-08-06

## 2023-08-06 RX ORDER — MIDAZOLAM HYDROCHLORIDE 1 MG/ML
2 INJECTION INTRAMUSCULAR; INTRAVENOUS
Status: CANCELLED | OUTPATIENT
Start: 2023-08-06 | End: 2023-08-07

## 2023-08-06 RX ORDER — ONDANSETRON 2 MG/ML
4 INJECTION INTRAMUSCULAR; INTRAVENOUS
Status: CANCELLED | OUTPATIENT
Start: 2023-08-06 | End: 2023-08-07

## 2023-08-06 RX ORDER — SODIUM CHLORIDE 9 MG/ML
INJECTION, SOLUTION INTRAVENOUS PRN
Status: CANCELLED | OUTPATIENT
Start: 2023-08-06

## 2023-08-06 RX ORDER — HYDROMORPHONE HYDROCHLORIDE 1 MG/ML
0.5 INJECTION, SOLUTION INTRAMUSCULAR; INTRAVENOUS; SUBCUTANEOUS EVERY 5 MIN PRN
Status: CANCELLED | OUTPATIENT
Start: 2023-08-06

## 2023-08-06 RX ORDER — ACETAMINOPHEN 500 MG
1000 TABLET ORAL ONCE
Status: CANCELLED | OUTPATIENT
Start: 2023-08-06 | End: 2023-08-06

## 2023-08-06 RX ORDER — ALBUTEROL SULFATE 2.5 MG/3ML
2.5 SOLUTION RESPIRATORY (INHALATION) EVERY 6 HOURS
Status: DISCONTINUED | OUTPATIENT
Start: 2023-08-06 | End: 2023-08-07

## 2023-08-06 RX ORDER — FENTANYL CITRATE 50 UG/ML
100 INJECTION, SOLUTION INTRAMUSCULAR; INTRAVENOUS
Status: CANCELLED | OUTPATIENT
Start: 2023-08-06 | End: 2023-08-07

## 2023-08-06 RX ORDER — SODIUM CHLORIDE 0.9 % (FLUSH) 0.9 %
5-40 SYRINGE (ML) INJECTION EVERY 12 HOURS SCHEDULED
Status: CANCELLED | OUTPATIENT
Start: 2023-08-06

## 2023-08-06 RX ORDER — PROCHLORPERAZINE EDISYLATE 5 MG/ML
5 INJECTION INTRAMUSCULAR; INTRAVENOUS
Status: CANCELLED | OUTPATIENT
Start: 2023-08-06 | End: 2023-08-07

## 2023-08-06 RX ORDER — FUROSEMIDE 10 MG/ML
40 INJECTION INTRAMUSCULAR; INTRAVENOUS ONCE
Status: COMPLETED | OUTPATIENT
Start: 2023-08-06 | End: 2023-08-06

## 2023-08-06 RX ADMIN — PIPERACILLIN AND TAZOBACTAM 3375 MG: 3; .375 INJECTION, POWDER, LYOPHILIZED, FOR SOLUTION INTRAVENOUS at 00:12

## 2023-08-06 RX ADMIN — Medication: at 08:44

## 2023-08-06 RX ADMIN — FUROSEMIDE 40 MG: 10 INJECTION, SOLUTION INTRAMUSCULAR; INTRAVENOUS at 14:46

## 2023-08-06 RX ADMIN — Medication 2500 MG: at 19:23

## 2023-08-06 RX ADMIN — COLLAGENASE SANTYL: 250 OINTMENT TOPICAL at 08:45

## 2023-08-06 RX ADMIN — SODIUM CHLORIDE: 9 INJECTION, SOLUTION INTRAVENOUS at 08:43

## 2023-08-06 RX ADMIN — SODIUM CHLORIDE, PRESERVATIVE FREE 40 MG: 5 INJECTION INTRAVENOUS at 00:12

## 2023-08-06 RX ADMIN — POTASSIUM CHLORIDE 10 MEQ: 7.46 INJECTION, SOLUTION INTRAVENOUS at 21:58

## 2023-08-06 RX ADMIN — SUCRALFATE 1 G: 1 TABLET ORAL at 08:45

## 2023-08-06 RX ADMIN — HYDROMORPHONE HYDROCHLORIDE 1 MG: 1 INJECTION, SOLUTION INTRAMUSCULAR; INTRAVENOUS; SUBCUTANEOUS at 13:08

## 2023-08-06 RX ADMIN — POTASSIUM CHLORIDE: 2 INJECTION, SOLUTION, CONCENTRATE INTRAVENOUS at 18:38

## 2023-08-06 RX ADMIN — PIPERACILLIN AND TAZOBACTAM 3375 MG: 3; .375 INJECTION, POWDER, LYOPHILIZED, FOR SOLUTION INTRAVENOUS at 16:40

## 2023-08-06 RX ADMIN — PROCHLORPERAZINE EDISYLATE 10 MG: 5 INJECTION, SOLUTION INTRAMUSCULAR; INTRAVENOUS at 08:46

## 2023-08-06 RX ADMIN — SUCRALFATE 1 G: 1 TABLET ORAL at 13:04

## 2023-08-06 RX ADMIN — Medication 10 ML: at 22:11

## 2023-08-06 RX ADMIN — FUROSEMIDE 40 MG: 10 INJECTION, SOLUTION INTRAMUSCULAR; INTRAVENOUS at 20:19

## 2023-08-06 RX ADMIN — SODIUM CHLORIDE, PRESERVATIVE FREE 40 MG: 5 INJECTION INTRAVENOUS at 13:03

## 2023-08-06 RX ADMIN — HYDROMORPHONE HYDROCHLORIDE 1 MG: 1 INJECTION, SOLUTION INTRAMUSCULAR; INTRAVENOUS; SUBCUTANEOUS at 05:58

## 2023-08-06 RX ADMIN — PIPERACILLIN AND TAZOBACTAM 3375 MG: 3; .375 INJECTION, POWDER, LYOPHILIZED, FOR SOLUTION INTRAVENOUS at 08:41

## 2023-08-06 RX ADMIN — HYDROMORPHONE HYDROCHLORIDE 1 MG: 1 INJECTION, SOLUTION INTRAMUSCULAR; INTRAVENOUS; SUBCUTANEOUS at 17:40

## 2023-08-06 RX ADMIN — HYDROMORPHONE HYDROCHLORIDE 1 MG: 1 INJECTION, SOLUTION INTRAMUSCULAR; INTRAVENOUS; SUBCUTANEOUS at 08:54

## 2023-08-06 RX ADMIN — ALBUTEROL SULFATE 2.5 MG: 2.5 SOLUTION RESPIRATORY (INHALATION) at 20:26

## 2023-08-06 RX ADMIN — POTASSIUM CHLORIDE 10 MEQ: 7.46 INJECTION, SOLUTION INTRAVENOUS at 23:08

## 2023-08-06 RX ADMIN — Medication: at 16:41

## 2023-08-06 RX ADMIN — BUPROPION HYDROCHLORIDE 150 MG: 150 TABLET, EXTENDED RELEASE ORAL at 08:45

## 2023-08-06 RX ADMIN — I.V. FAT EMULSION 250 ML: 20 EMULSION INTRAVENOUS at 18:37

## 2023-08-06 RX ADMIN — ALBUTEROL SULFATE 2.5 MG: 2.5 SOLUTION RESPIRATORY (INHALATION) at 14:36

## 2023-08-06 RX ADMIN — Medication 10 ML: at 08:44

## 2023-08-06 RX ADMIN — HYDROMORPHONE HYDROCHLORIDE 1 MG: 1 INJECTION, SOLUTION INTRAMUSCULAR; INTRAVENOUS; SUBCUTANEOUS at 03:02

## 2023-08-06 RX ADMIN — Medication: at 00:12

## 2023-08-06 ASSESSMENT — PAIN DESCRIPTION - FREQUENCY: FREQUENCY: INTERMITTENT

## 2023-08-06 ASSESSMENT — PAIN SCALES - GENERAL
PAINLEVEL_OUTOF10: 8
PAINLEVEL_OUTOF10: 9
PAINLEVEL_OUTOF10: 8
PAINLEVEL_OUTOF10: 5
PAINLEVEL_OUTOF10: 10

## 2023-08-06 ASSESSMENT — PAIN DESCRIPTION - DESCRIPTORS
DESCRIPTORS: ACHING;DISCOMFORT
DESCRIPTORS: ACHING
DESCRIPTORS: ACHING;DISCOMFORT
DESCRIPTORS: ACHING;DISCOMFORT
DESCRIPTORS: CRAMPING
DESCRIPTORS: ACHING
DESCRIPTORS: ACHING
DESCRIPTORS: ACHING;DISCOMFORT
DESCRIPTORS: ACHING

## 2023-08-06 ASSESSMENT — PAIN DESCRIPTION - LOCATION
LOCATION: ABDOMEN
LOCATION: ABDOMEN;LEG;SACRUM
LOCATION: ABDOMEN;BACK;BUTTOCKS
LOCATION: ABDOMEN;BACK;LEG
LOCATION: ABDOMEN;LEG;SACRUM
LOCATION: ABDOMEN;CHEST;BUTTOCKS
LOCATION: ABDOMEN;BACK;LEG

## 2023-08-06 ASSESSMENT — PAIN DESCRIPTION - PAIN TYPE
TYPE: CHRONIC PAIN
TYPE: ACUTE PAIN

## 2023-08-06 ASSESSMENT — PAIN DESCRIPTION - ORIENTATION
ORIENTATION: OTHER (COMMENT)
ORIENTATION: ANTERIOR;UPPER
ORIENTATION: RIGHT;LEFT;POSTERIOR;ANTERIOR

## 2023-08-06 ASSESSMENT — PAIN DESCRIPTION - ONSET: ONSET: ON-GOING

## 2023-08-06 NOTE — PLAN OF CARE
Problem: Safety - Adult  Goal: Free from fall injury  Outcome: Progressing  Flowsheets (Taken 8/5/2023 2300)  Free From Fall Injury: Instruct family/caregiver on patient safety  Note: Ensured patient's safety

## 2023-08-06 NOTE — PROGRESS NOTES
1200: During reassessment wheezing noted notified Provider. 1300: patient has labored breathing and her spo2 drop to 86% with 2 lit oxygen. Oxygen increased to 3 L SPO2 is 91%     .  1500: IV Fluid stopped 40mg Lasix given, Breathing treatment given after that patient feel better. 1600: Requested for Chest X-Ray. 1700: SPO2  drop 85% with 3 L O2. Increased to 5 L. Notified Provider. 1830: Provider at bedside vancomycin added waiting for pharmacy verify. 1930: Bedside and Verbal shift change report given to BONY Caldwell (oncoming nurse) by Jimi Leon (offgoing nurse). Report included the following information Intake/Output, MAR, Recent Results, and Cardiac Rhythm   .

## 2023-08-06 NOTE — PROGRESS NOTES
Hospitalist Progress Note  Kendall Castillo MD  Answering service: [phone]        Date of Service:  2023  NAME:  Radha Barton  :  1960  MRN:  [id number]      Admission Summary:     Patient admitted with hematemesis and acute blood loss anemia, history of gastric bypass with GJ ulcer. Interval history / Subjective:     Patient still with nausea and limited p.o. intake.        Assessment & Plan:     Chest pain  -Likely atypical, EKG and troponin unremarkable  -Likely source from gastrostomy tube  -Chest pain resolved    GI bleed  -Upper GI bleed, history of gastric bypass with GJ anastomotic ulcer  -S/p EGD  shows anastomotic ulcer, nonbleeding, negative for stricture  -GI has signed off, on clear liquid, on TPN, continuing PPI and Carafate    Acute blood loss anemia  -Acute blood loss anemia due to GI bleed  -S/p transfusion of 1 unit PRBCs, monitor H&H    Intra-abdominal abscess  -CT abdomen and pelvis on  shows loculated intraperitoneal fluid in the anterior abdomen, persistent free intraperitoneal air  -S/p ultrasound-guided drainage   -Fluid culture growing E. coli, on empiric Zosyn  -General surgery following  -ID evaluated the patient    Infected sacral decubitus ulcer  -Wound cultures grew Klebsiella and Citrobacter  -On Zosyn, general surgery following, tentative plan for debridement on Monday    DVT  -DVT of the right lower extremity  -S/p IVC filter placement 2023, now off anticoagulation    Prior history of C. difficile colitis  -S/p abdominal colectomy on 3/26/2023, reversal of end ileostomy with ileocolic anastomosis 4063    History of gastric bypass  -History of gastric bypass with GJ anastomotic ulcer, prior history of stricture requiring dilatation  -S/p EGD this admission 731, showing no active bleeding and no stricture  -Patient with persistent nausea, on clear liquid, on

## 2023-08-07 ENCOUNTER — APPOINTMENT (OUTPATIENT)
Facility: HOSPITAL | Age: 63
DRG: 004 | End: 2023-08-07
Payer: MEDICARE

## 2023-08-07 LAB
ALBUMIN SERPL-MCNC: 1.1 G/DL (ref 3.5–5)
ALBUMIN SERPL-MCNC: 1.2 G/DL (ref 3.5–5)
ALBUMIN/GLOB SERPL: 0.3 (ref 1.1–2.2)
ALBUMIN/GLOB SERPL: 0.4 (ref 1.1–2.2)
ALP SERPL-CCNC: 88 U/L (ref 45–117)
ALP SERPL-CCNC: 94 U/L (ref 45–117)
ALT SERPL-CCNC: 11 U/L (ref 12–78)
ALT SERPL-CCNC: 9 U/L (ref 12–78)
ANION GAP SERPL CALC-SCNC: 4 MMOL/L (ref 5–15)
ANION GAP SERPL CALC-SCNC: 7 MMOL/L (ref 5–15)
ANION GAP SERPL CALC-SCNC: 8 MMOL/L (ref 5–15)
ARTERIAL PATENCY WRIST A: POSITIVE
ARTERIAL PATENCY WRIST A: POSITIVE
AST SERPL-CCNC: 20 U/L (ref 15–37)
AST SERPL-CCNC: 21 U/L (ref 15–37)
BACTERIA SPEC CULT: NORMAL
BACTERIA SPEC CULT: NORMAL
BASE EXCESS BLD CALC-SCNC: 2.4 MMOL/L
BASE EXCESS BLD CALC-SCNC: 2.6 MMOL/L
BASOPHILS # BLD: 0 K/UL (ref 0–0.1)
BASOPHILS NFR BLD: 0 % (ref 0–1)
BDY SITE: ABNORMAL
BDY SITE: ABNORMAL
BILIRUB DIRECT SERPL-MCNC: 0.1 MG/DL (ref 0–0.2)
BILIRUB SERPL-MCNC: 0.3 MG/DL (ref 0.2–1)
BILIRUB SERPL-MCNC: 0.3 MG/DL (ref 0.2–1)
BUN SERPL-MCNC: 20 MG/DL (ref 6–20)
BUN SERPL-MCNC: 20 MG/DL (ref 6–20)
BUN SERPL-MCNC: 21 MG/DL (ref 6–20)
BUN/CREAT SERPL: 43 (ref 12–20)
BUN/CREAT SERPL: 44 (ref 12–20)
BUN/CREAT SERPL: 53 (ref 12–20)
CA-I BLD-SCNC: 1.31 MMOL/L (ref 1.12–1.32)
CALCIUM SERPL-MCNC: 8.3 MG/DL (ref 8.5–10.1)
CALCIUM SERPL-MCNC: 8.4 MG/DL (ref 8.5–10.1)
CALCIUM SERPL-MCNC: 8.4 MG/DL (ref 8.5–10.1)
CHLORIDE SERPL-SCNC: 106 MMOL/L (ref 97–108)
CHLORIDE SERPL-SCNC: 106 MMOL/L (ref 97–108)
CHLORIDE SERPL-SCNC: 107 MMOL/L (ref 97–108)
CO2 SERPL-SCNC: 27 MMOL/L (ref 21–32)
COMMENT:: NORMAL
CREAT SERPL-MCNC: 0.4 MG/DL (ref 0.55–1.02)
CREAT SERPL-MCNC: 0.45 MG/DL (ref 0.55–1.02)
CREAT SERPL-MCNC: 0.46 MG/DL (ref 0.55–1.02)
DIFFERENTIAL METHOD BLD: ABNORMAL
EKG ATRIAL RATE: 250 BPM
EKG DIAGNOSIS: NORMAL
EKG Q-T INTERVAL: 222 MS
EKG QRS DURATION: 54 MS
EKG QTC CALCULATION (BAZETT): 320 MS
EKG R AXIS: 6 DEGREES
EKG T AXIS: 213 DEGREES
EKG VENTRICULAR RATE: 125 BPM
EOSINOPHIL # BLD: 0 K/UL (ref 0–0.4)
EOSINOPHIL NFR BLD: 0 % (ref 0–7)
ERYTHROCYTE [DISTWIDTH] IN BLOOD BY AUTOMATED COUNT: 16.5 % (ref 11.5–14.5)
GAS FLOW.O2 O2 DELIVERY SYS: ABNORMAL
GAS FLOW.O2 O2 DELIVERY SYS: ABNORMAL
GAS FLOW.O2 SETTING OXYMISER: 20 BPM
GLOBULIN SER CALC-MCNC: 2.8 G/DL (ref 2–4)
GLOBULIN SER CALC-MCNC: 3.2 G/DL (ref 2–4)
GLUCOSE BLD STRIP.AUTO-MCNC: 135 MG/DL (ref 65–117)
GLUCOSE BLD STRIP.AUTO-MCNC: 135 MG/DL (ref 65–117)
GLUCOSE BLD STRIP.AUTO-MCNC: 153 MG/DL (ref 65–117)
GLUCOSE BLD STRIP.AUTO-MCNC: 160 MG/DL (ref 65–117)
GLUCOSE BLD STRIP.AUTO-MCNC: 174 MG/DL (ref 65–117)
GLUCOSE BLD STRIP.AUTO-MCNC: 579 MG/DL (ref 65–117)
GLUCOSE SERPL-MCNC: 133 MG/DL (ref 65–100)
GLUCOSE SERPL-MCNC: 134 MG/DL (ref 65–100)
GLUCOSE SERPL-MCNC: 180 MG/DL (ref 65–100)
HCO3 BLD-SCNC: 25.6 MMOL/L (ref 22–26)
HCO3 BLD-SCNC: 26.5 MMOL/L (ref 22–26)
HCT VFR BLD AUTO: 24.5 % (ref 35–47)
HGB BLD-MCNC: 7.9 G/DL (ref 11.5–16)
IMM GRANULOCYTES # BLD AUTO: 0.6 K/UL (ref 0–0.04)
IMM GRANULOCYTES NFR BLD AUTO: 2 % (ref 0–0.5)
LYMPHOCYTES # BLD: 0.3 K/UL (ref 0.8–3.5)
LYMPHOCYTES NFR BLD: 1 % (ref 12–49)
MAGNESIUM SERPL-MCNC: 1.3 MG/DL (ref 1.6–2.4)
MCH RBC QN AUTO: 30.2 PG (ref 26–34)
MCHC RBC AUTO-ENTMCNC: 32.2 G/DL (ref 30–36.5)
MCV RBC AUTO: 93.5 FL (ref 80–99)
MONOCYTES # BLD: 1 K/UL (ref 0–1)
MONOCYTES NFR BLD: 3 % (ref 5–13)
NEUTS SEG # BLD: 29.9 K/UL (ref 1.8–8)
NEUTS SEG NFR BLD: 94 % (ref 32–75)
NRBC # BLD: 0 K/UL (ref 0–0.01)
NRBC BLD-RTO: 0 PER 100 WBC
NT PRO BNP: 571 PG/ML
PAW @ MEAN EXP FLOW ON VENT: 16 CMH2O
PCO2 BLD: 31.6 MMHG (ref 35–45)
PCO2 BLD: 38.2 MMHG (ref 35–45)
PEEP RESPIRATORY: 10 CMH2O
PH BLD: 7.45 (ref 7.35–7.45)
PH BLD: 7.52 (ref 7.35–7.45)
PHOSPHATE SERPL-MCNC: 2.2 MG/DL (ref 2.6–4.7)
PLATELET # BLD AUTO: 255 K/UL (ref 150–400)
PMV BLD AUTO: 9.8 FL (ref 8.9–12.9)
PO2 BLD: 238 MMHG (ref 80–100)
PO2 BLD: 65 MMHG (ref 80–100)
POTASSIUM SERPL-SCNC: 3.2 MMOL/L (ref 3.5–5.1)
POTASSIUM SERPL-SCNC: 3.4 MMOL/L (ref 3.5–5.1)
POTASSIUM SERPL-SCNC: 3.5 MMOL/L (ref 3.5–5.1)
PROT SERPL-MCNC: 4 G/DL (ref 6.4–8.2)
PROT SERPL-MCNC: 4.3 G/DL (ref 6.4–8.2)
RBC # BLD AUTO: 2.62 M/UL (ref 3.8–5.2)
RBC MORPH BLD: ABNORMAL
SAO2 % BLD: 93.3 % (ref 92–97)
SAO2 % BLD: 99.9 % (ref 92–97)
SERVICE CMNT-IMP: ABNORMAL
SERVICE CMNT-IMP: NORMAL
SODIUM SERPL-SCNC: 138 MMOL/L (ref 136–145)
SODIUM SERPL-SCNC: 140 MMOL/L (ref 136–145)
SODIUM SERPL-SCNC: 141 MMOL/L (ref 136–145)
SPECIMEN HOLD: NORMAL
SPECIMEN TYPE: ABNORMAL
SPECIMEN TYPE: ABNORMAL
TROPONIN I SERPL HS-MCNC: 6 NG/L (ref 0–51)
VENTILATION MODE VENT: ABNORMAL
VT SETTING VENT: 350 ML
WBC # BLD AUTO: 31.8 K/UL (ref 3.6–11)

## 2023-08-07 PROCEDURE — 6360000004 HC RX CONTRAST MEDICATION: Performed by: RADIOLOGY

## 2023-08-07 PROCEDURE — 6360000002 HC RX W HCPCS

## 2023-08-07 PROCEDURE — 6370000000 HC RX 637 (ALT 250 FOR IP): Performed by: FAMILY MEDICINE

## 2023-08-07 PROCEDURE — 82803 BLOOD GASES ANY COMBINATION: CPT

## 2023-08-07 PROCEDURE — A4216 STERILE WATER/SALINE, 10 ML: HCPCS | Performed by: SURGERY

## 2023-08-07 PROCEDURE — 2500000003 HC RX 250 WO HCPCS: Performed by: NURSE PRACTITIONER

## 2023-08-07 PROCEDURE — 82962 GLUCOSE BLOOD TEST: CPT

## 2023-08-07 PROCEDURE — 99233 SBSQ HOSP IP/OBS HIGH 50: CPT | Performed by: INTERNAL MEDICINE

## 2023-08-07 PROCEDURE — 6370000000 HC RX 637 (ALT 250 FOR IP): Performed by: INTERNAL MEDICINE

## 2023-08-07 PROCEDURE — 2580000003 HC RX 258: Performed by: FAMILY MEDICINE

## 2023-08-07 PROCEDURE — 6360000002 HC RX W HCPCS: Performed by: FAMILY MEDICINE

## 2023-08-07 PROCEDURE — 86923 COMPATIBILITY TEST ELECTRIC: CPT

## 2023-08-07 PROCEDURE — 6360000002 HC RX W HCPCS: Performed by: NURSE PRACTITIONER

## 2023-08-07 PROCEDURE — 94760 N-INVAS EAR/PLS OXIMETRY 1: CPT

## 2023-08-07 PROCEDURE — 80048 BASIC METABOLIC PNL TOTAL CA: CPT

## 2023-08-07 PROCEDURE — 94660 CPAP INITIATION&MGMT: CPT

## 2023-08-07 PROCEDURE — C9113 INJ PANTOPRAZOLE SODIUM, VIA: HCPCS | Performed by: SURGERY

## 2023-08-07 PROCEDURE — 85025 COMPLETE CBC W/AUTO DIFF WBC: CPT

## 2023-08-07 PROCEDURE — 6360000002 HC RX W HCPCS: Performed by: SURGERY

## 2023-08-07 PROCEDURE — 94002 VENT MGMT INPAT INIT DAY: CPT

## 2023-08-07 PROCEDURE — 36415 COLL VENOUS BLD VENIPUNCTURE: CPT

## 2023-08-07 PROCEDURE — 36600 WITHDRAWAL OF ARTERIAL BLOOD: CPT

## 2023-08-07 PROCEDURE — 2580000003 HC RX 258: Performed by: SURGERY

## 2023-08-07 PROCEDURE — 99232 SBSQ HOSP IP/OBS MODERATE 35: CPT | Performed by: SURGERY

## 2023-08-07 PROCEDURE — 83880 ASSAY OF NATRIURETIC PEPTIDE: CPT

## 2023-08-07 PROCEDURE — 84484 ASSAY OF TROPONIN QUANT: CPT

## 2023-08-07 PROCEDURE — 6370000000 HC RX 637 (ALT 250 FOR IP): Performed by: NURSE PRACTITIONER

## 2023-08-07 PROCEDURE — P9047 ALBUMIN (HUMAN), 25%, 50ML: HCPCS | Performed by: NURSE PRACTITIONER

## 2023-08-07 PROCEDURE — 86850 RBC ANTIBODY SCREEN: CPT

## 2023-08-07 PROCEDURE — 86900 BLOOD TYPING SEROLOGIC ABO: CPT

## 2023-08-07 PROCEDURE — 2580000003 HC RX 258: Performed by: NURSE PRACTITIONER

## 2023-08-07 PROCEDURE — 94640 AIRWAY INHALATION TREATMENT: CPT

## 2023-08-07 PROCEDURE — 86901 BLOOD TYPING SEROLOGIC RH(D): CPT

## 2023-08-07 PROCEDURE — 71045 X-RAY EXAM CHEST 1 VIEW: CPT

## 2023-08-07 PROCEDURE — 74018 RADEX ABDOMEN 1 VIEW: CPT

## 2023-08-07 PROCEDURE — 3E043XZ INTRODUCTION OF VASOPRESSOR INTO CENTRAL VEIN, PERCUTANEOUS APPROACH: ICD-10-PCS | Performed by: NURSE PRACTITIONER

## 2023-08-07 PROCEDURE — 80076 HEPATIC FUNCTION PANEL: CPT

## 2023-08-07 PROCEDURE — 2500000003 HC RX 250 WO HCPCS: Performed by: STUDENT IN AN ORGANIZED HEALTH CARE EDUCATION/TRAINING PROGRAM

## 2023-08-07 PROCEDURE — 83735 ASSAY OF MAGNESIUM: CPT

## 2023-08-07 PROCEDURE — 0BH17EZ INSERTION OF ENDOTRACHEAL AIRWAY INTO TRACHEA, VIA NATURAL OR ARTIFICIAL OPENING: ICD-10-PCS | Performed by: NURSE PRACTITIONER

## 2023-08-07 PROCEDURE — 2000000000 HC ICU R&B

## 2023-08-07 PROCEDURE — 2700000000 HC OXYGEN THERAPY PER DAY

## 2023-08-07 PROCEDURE — 84100 ASSAY OF PHOSPHORUS: CPT

## 2023-08-07 PROCEDURE — 31500 INSERT EMERGENCY AIRWAY: CPT

## 2023-08-07 PROCEDURE — 93010 ELECTROCARDIOGRAM REPORT: CPT | Performed by: SPECIALIST

## 2023-08-07 PROCEDURE — 80053 COMPREHEN METABOLIC PANEL: CPT

## 2023-08-07 RX ORDER — SUCCINYLCHOLINE/SOD CL,ISO/PF 200MG/10ML
60 SYRINGE (ML) INTRAVENOUS ONCE
Status: COMPLETED | OUTPATIENT
Start: 2023-08-07 | End: 2023-08-07

## 2023-08-07 RX ORDER — ALBUMIN (HUMAN) 12.5 G/50ML
25 SOLUTION INTRAVENOUS EVERY 6 HOURS
Status: COMPLETED | OUTPATIENT
Start: 2023-08-07 | End: 2023-08-09

## 2023-08-07 RX ORDER — ETOMIDATE 2 MG/ML
30 INJECTION INTRAVENOUS ONCE
Status: COMPLETED | OUTPATIENT
Start: 2023-08-07 | End: 2023-08-07

## 2023-08-07 RX ORDER — PROPOFOL 10 MG/ML
5-50 INJECTION, EMULSION INTRAVENOUS CONTINUOUS
Status: DISCONTINUED | OUTPATIENT
Start: 2023-08-07 | End: 2023-08-10

## 2023-08-07 RX ORDER — MAGNESIUM SULFATE IN WATER 40 MG/ML
2000 INJECTION, SOLUTION INTRAVENOUS ONCE
Status: COMPLETED | OUTPATIENT
Start: 2023-08-07 | End: 2023-08-07

## 2023-08-07 RX ORDER — NOREPINEPHRINE BITARTRATE 0.06 MG/ML
1-100 INJECTION, SOLUTION INTRAVENOUS CONTINUOUS
Status: DISCONTINUED | OUTPATIENT
Start: 2023-08-07 | End: 2023-08-09

## 2023-08-07 RX ORDER — METRONIDAZOLE 500 MG/100ML
500 INJECTION, SOLUTION INTRAVENOUS EVERY 8 HOURS
Status: DISCONTINUED | OUTPATIENT
Start: 2023-08-07 | End: 2023-08-21

## 2023-08-07 RX ORDER — MAGNESIUM SULFATE IN WATER 40 MG/ML
INJECTION, SOLUTION INTRAVENOUS
Status: COMPLETED
Start: 2023-08-07 | End: 2023-08-07

## 2023-08-07 RX ORDER — POTASSIUM CHLORIDE 29.8 MG/ML
20 INJECTION INTRAVENOUS
Status: COMPLETED | OUTPATIENT
Start: 2023-08-07 | End: 2023-08-07

## 2023-08-07 RX ORDER — FUROSEMIDE 10 MG/ML
40 INJECTION INTRAMUSCULAR; INTRAVENOUS ONCE
Status: COMPLETED | OUTPATIENT
Start: 2023-08-07 | End: 2023-08-07

## 2023-08-07 RX ORDER — SUCRALFATE 1 G/1
1 TABLET ORAL
Status: DISCONTINUED | OUTPATIENT
Start: 2023-08-07 | End: 2023-08-22

## 2023-08-07 RX ORDER — ALBUTEROL SULFATE 2.5 MG/3ML
2.5 SOLUTION RESPIRATORY (INHALATION) EVERY 4 HOURS PRN
Status: DISCONTINUED | OUTPATIENT
Start: 2023-08-07 | End: 2023-10-03

## 2023-08-07 RX ORDER — CHLORHEXIDINE GLUCONATE ORAL RINSE 1.2 MG/ML
15 SOLUTION DENTAL 2 TIMES DAILY
Status: DISCONTINUED | OUTPATIENT
Start: 2023-08-07 | End: 2023-09-27 | Stop reason: ALTCHOICE

## 2023-08-07 RX ADMIN — FUROSEMIDE 40 MG: 10 INJECTION, SOLUTION INTRAMUSCULAR; INTRAVENOUS at 05:43

## 2023-08-07 RX ADMIN — IOPAMIDOL 100 ML: 755 INJECTION, SOLUTION INTRAVENOUS at 03:14

## 2023-08-07 RX ADMIN — COLLAGENASE SANTYL: 250 OINTMENT TOPICAL at 09:02

## 2023-08-07 RX ADMIN — HYDROMORPHONE HYDROCHLORIDE 1 MG: 1 INJECTION, SOLUTION INTRAMUSCULAR; INTRAVENOUS; SUBCUTANEOUS at 14:54

## 2023-08-07 RX ADMIN — POTASSIUM CHLORIDE 20 MEQ: 29.8 INJECTION, SOLUTION INTRAVENOUS at 06:59

## 2023-08-07 RX ADMIN — ALBUTEROL SULFATE 2.5 MG: 2.5 SOLUTION RESPIRATORY (INHALATION) at 14:51

## 2023-08-07 RX ADMIN — POTASSIUM PHOSPHATE, MONOBASIC AND POTASSIUM PHOSPHATE, DIBASIC 10 MMOL: 224; 236 INJECTION, SOLUTION, CONCENTRATE INTRAVENOUS at 06:11

## 2023-08-07 RX ADMIN — ALBUTEROL SULFATE 2.5 MG: 2.5 SOLUTION RESPIRATORY (INHALATION) at 03:31

## 2023-08-07 RX ADMIN — Medication 60 MG: at 04:45

## 2023-08-07 RX ADMIN — PIPERACILLIN AND TAZOBACTAM 3375 MG: 3; .375 INJECTION, POWDER, LYOPHILIZED, FOR SOLUTION INTRAVENOUS at 09:02

## 2023-08-07 RX ADMIN — PIPERACILLIN AND TAZOBACTAM 3375 MG: 3; .375 INJECTION, POWDER, LYOPHILIZED, FOR SOLUTION INTRAVENOUS at 15:12

## 2023-08-07 RX ADMIN — ALBUMIN (HUMAN) 25 G: 0.25 INJECTION, SOLUTION INTRAVENOUS at 20:38

## 2023-08-07 RX ADMIN — FUROSEMIDE 40 MG: 10 INJECTION, SOLUTION INTRAMUSCULAR; INTRAVENOUS at 09:02

## 2023-08-07 RX ADMIN — Medication 10 ML: at 09:03

## 2023-08-07 RX ADMIN — POTASSIUM CHLORIDE 10 MEQ: 7.46 INJECTION, SOLUTION INTRAVENOUS at 00:26

## 2023-08-07 RX ADMIN — MAGNESIUM SULFATE HEPTAHYDRATE 2000 MG: 40 INJECTION, SOLUTION INTRAVENOUS at 09:01

## 2023-08-07 RX ADMIN — Medication: at 15:13

## 2023-08-07 RX ADMIN — FUROSEMIDE 40 MG: 10 INJECTION, SOLUTION INTRAMUSCULAR; INTRAVENOUS at 17:00

## 2023-08-07 RX ADMIN — SODIUM CHLORIDE, PRESERVATIVE FREE 40 MG: 5 INJECTION INTRAVENOUS at 12:11

## 2023-08-07 RX ADMIN — VANCOMYCIN HYDROCHLORIDE 1250 MG: 1.25 INJECTION, POWDER, LYOPHILIZED, FOR SOLUTION INTRAVENOUS at 07:00

## 2023-08-07 RX ADMIN — METRONIDAZOLE 500 MG: 5 INJECTION, SOLUTION INTRAVENOUS at 22:41

## 2023-08-07 RX ADMIN — ALBUMIN (HUMAN) 25 G: 0.25 INJECTION, SOLUTION INTRAVENOUS at 14:20

## 2023-08-07 RX ADMIN — POTASSIUM BICARBONATE 40 MEQ: 782 TABLET, EFFERVESCENT ORAL at 16:58

## 2023-08-07 RX ADMIN — SUCRALFATE 1 G: 1 TABLET ORAL at 16:58

## 2023-08-07 RX ADMIN — MAGNESIUM SULFATE HEPTAHYDRATE 2000 MG: 40 INJECTION, SOLUTION INTRAVENOUS at 05:28

## 2023-08-07 RX ADMIN — VANCOMYCIN HYDROCHLORIDE 1250 MG: 1.25 INJECTION, POWDER, LYOPHILIZED, FOR SOLUTION INTRAVENOUS at 18:47

## 2023-08-07 RX ADMIN — ETOMIDATE 30 MG: 2 INJECTION INTRAVENOUS at 04:45

## 2023-08-07 RX ADMIN — PROPOFOL 40 MCG/KG/MIN: 10 INJECTION, EMULSION INTRAVENOUS at 08:42

## 2023-08-07 RX ADMIN — POTASSIUM CHLORIDE 20 MEQ: 29.8 INJECTION, SOLUTION INTRAVENOUS at 05:55

## 2023-08-07 RX ADMIN — POTASSIUM CHLORIDE: 2 INJECTION, SOLUTION, CONCENTRATE INTRAVENOUS at 18:37

## 2023-08-07 RX ADMIN — Medication 10 ML: at 22:42

## 2023-08-07 RX ADMIN — NOREPINEPHRINE BITARTRATE 8 MCG/MIN: 1 INJECTION, SOLUTION, CONCENTRATE INTRAVENOUS at 04:48

## 2023-08-07 RX ADMIN — Medication: at 09:02

## 2023-08-07 RX ADMIN — MAGNESIUM SULFATE IN WATER 2000 MG: 40 INJECTION, SOLUTION INTRAVENOUS at 05:28

## 2023-08-07 RX ADMIN — SUCRALFATE 1 G: 1 TABLET ORAL at 22:41

## 2023-08-07 RX ADMIN — CHLORHEXIDINE GLUCONATE 15 ML: 1.2 RINSE ORAL at 12:12

## 2023-08-07 RX ADMIN — SUCRALFATE 1 G: 1 TABLET ORAL at 12:11

## 2023-08-07 RX ADMIN — ALBUMIN (HUMAN) 25 G: 0.25 INJECTION, SOLUTION INTRAVENOUS at 09:03

## 2023-08-07 RX ADMIN — I.V. FAT EMULSION 250 ML: 20 EMULSION INTRAVENOUS at 18:38

## 2023-08-07 RX ADMIN — CHLORHEXIDINE GLUCONATE 15 ML: 1.2 RINSE ORAL at 20:42

## 2023-08-07 RX ADMIN — METRONIDAZOLE 500 MG: 5 INJECTION, SOLUTION INTRAVENOUS at 14:18

## 2023-08-07 RX ADMIN — PROPOFOL 15 MCG/KG/MIN: 10 INJECTION, EMULSION INTRAVENOUS at 15:30

## 2023-08-07 RX ADMIN — ALBUTEROL SULFATE 2.5 MG: 2.5 SOLUTION RESPIRATORY (INHALATION) at 07:50

## 2023-08-07 ASSESSMENT — PAIN SCALES - GENERAL
PAINLEVEL_OUTOF10: 0
PAINLEVEL_OUTOF10: 1

## 2023-08-07 ASSESSMENT — PULMONARY FUNCTION TESTS
PIF_VALUE: 1
PIF_VALUE: 26
PIF_VALUE: 13
PIF_VALUE: 1
PIF_VALUE: 1
PIF_VALUE: 0
PIF_VALUE: 1
PIF_VALUE: 21
PIF_VALUE: 30
PIF_VALUE: 24
PIF_VALUE: 0
PIF_VALUE: 26
PIF_VALUE: 23
PIF_VALUE: 1
PIF_VALUE: 24
PIF_VALUE: 0
PIF_VALUE: 0
PIF_VALUE: 1

## 2023-08-07 NOTE — PROGRESS NOTES
Occupational Therapy    Completed chart review. Patient transferred to ICU overnight for worsening resp status with intubation and pressor support. Chart reviewed, patient received sedated and on vent. Per ABCDEF protocol, will work with patient when PEEP is 10.0 or less, FIO2 60% or less, and patient is following basic commands. Will follow patient and re-eval when medically stable. Recommend nursing to complete with patient, as able and per protocol, in order to promote cardiopulmonary systems, maintain strength, endurance and independence:   -bed in chair position or maximize full reverse Trendelenburg position to facilitate upright activity with foot board and non-skid footwear on 3x/day ~30-60 mins each   -ROM during bathing B UEs and LEs  -positioning to prevent contractures and edema  RASS -1/0/+1 (during SAT) Active ROM  Sitting (bed in chair position)  Standing (reverse Trendelenburg)  ADLs   RASS -3/2 Passive ROM  Sit (bed in chair position)   RASS -5/-4 Passive ROM       Thank you for your assistance.      Luis Carlos Ramos, OT

## 2023-08-07 NOTE — PROCEDURES
Procedure Note - Intubation:   Performed by WILMAN Larose . Diagnosis: Acute hypoxic respiratory failure, sepsis  Insertion Date: 8/7/2023 time: 0445   Obtained Consent? yes; emergent -verbal consent via family at bedside  Procedure Location:  ICU. Immediately prior to the procedure, the patient was reevaluated and found suitable for the planned procedure and any planned medications. Immediately prior to the procedure a time out was called to verify the correct patient, procedure, equipment, staff, and marking as appropriate. Preoxygenation applied via BiPAP mask at 100%. Then switched to BVM on induction   medications given were 30 etomidate and 60 succinylcholine. Grade 3 view obtained with MAC 3 video scope, CMAC  A number 7.5 cuffed   ETT was placed to 22 cm at the teeth. Placement was evaluated by noting bilateral, symmetric breath sounds, good end-tidal CO2 detector color change , no breath sounds over stomach, and chest x-ray visualization. Attempts required: 1. Complications: none. RSI was used. , Cricoid pressure was applied. .  The procedure was tolerated well. A follow-up chest x-ray was ordered post procedure. Procedure was directly assisted and supervised by Lesa Rivera in its entirety at bedside.      WILMAN Larose  Critical Care Medicine  [facility]

## 2023-08-07 NOTE — PROGRESS NOTES
CRITICAL CARE NOTE      Name: Salima Arita   : 1960   MRN: [id number]   Date: 2023      REASON FOR ICU ADMISSION: Acute hypoxic respiratory failure    PRINCIPAL ICU DIAGNOSIS   Acute hypoxic respiratory failure    BRIEF PATIENT SUMMARY   Dominic Ambrosio is a 58-year-old female with past medical history of gastric bypass in 2017, asthma, CAD, DM, hyperlipidemia, GERD, PE, ANCA, anxiety, depression, who presented to ED on  from [facility] for weakness and hematemesis. Back in March she was seen for fulminant C. difficile colitis and underwent subtotal colectomy with ileostomy. She was subsequently reversed on . She was admitted to [facility] from  - . During that admission she underwent EGD showing GJ ulcer and stenosis at [address] [facility] [address]. On  she went to the OR for an ex lap for free air and free fluid but there was no sign of perforation. General surgery was consulted during this admission and has been following. No surgical intervention has been recommended. At this time it appears she has a fluid collection as opposed to a perforation. It was recommended for IR to drain this collection which was performed on . GI has been consulted for GIB. She was started on PPI twice daily and Carafate 4 times daily. Eliquis and meloxicam were held. On  she underwent EGD which found a nonbleeding anastomotic ulcer. On  she had IVC filter placed. Overnight on  a rapid response was called for hypoxia and respiratory distress. She was transferred ICU on BiPAP. She was ultimately intubated for hypoxic respiratory failure. This appears to be due from pulmonary edema. She was aggressively diuresed with Lasix.     COMPREHENSIVE ASSESSMENT & PLAN:SYSTEM BASED     24 HOUR EVENTS: As above    NEUROLOGICAL:  Depression   Close neurologic monitoring  RASS goal 0 to -1  Sedation: Propofol  Analgesia: continue Dilaudid  Daily SAT  Delirium precautions  Hold

## 2023-08-07 NOTE — PROGRESS NOTES
Critical care brief note -full consult note to follow    Evaluated patient at bedside with intensivist attending. Patient with respiratory worsening respiratory status on BiPAP. Needs further diuresis. Mild hypotension. Transferred to ICU. Patient was intubated upon arrival to ICU, see procedure note for details. Started on Levophed via central line. CVL was placed on 7/28/2023 by anesthesia. General surgery is planning to take patient to the OR for debridement of sacral ulcer  Patient is currently n.p.o. and on TPN. Electrolytes was replaced. Updated family at bedside. No OG tube was placed as patient has a history of gastric bypass, NPO, and is going to surgery.     Carlie Davenport, NP-C    Critical Care Medicine  [facility] [place]

## 2023-08-07 NOTE — PROGRESS NOTES
2151:Pt has arrived to unit w/ Rapid Nurse and second RN, Magdiel Pina (Orienting). V/S taken and pt placed on Bipap. 2236: This nurse made aware by hospitalist BLAS Barrett that CXR shows triple lumen CVC in RIJ overlies right atrium by 5.5cm.

## 2023-08-07 NOTE — H&P
Received patient awake in bed, with complains of moderate shortness of breath at rest. Desaturating at 84-86% oxygen via nasal cannula at 5 liters per minute. Vital signs monitored. Noted to have increased work of breathing minimal exertion. With expiratory wheezes and coarse crackles auscultated over the lungs. Increased oxygen level to 6 liters per minute via nasal cannula. Called Rapid Response Team. Furosemide 40mg IV given at an early dose. Nebulization given. Draw labs as ordered. Complaining of mild chest discomfort. ECG done. With orders to transfer patient to room 446. Patient transferred to room 446 without any changes.

## 2023-08-07 NOTE — PROGRESS NOTES
Pt transferred from 4W to ICU on NIV.  Bedside handoff with ICU RT.         08/07/23 9928   Patient Transport   Time Spent Transporting 1-15   Transport Ventillation Type Noninvasive   Transport From [facility]

## 2023-08-07 NOTE — PROGRESS NOTES
[facility] Hospitalist cross coverage (7p-7a) progress note:    Rapid response called for hypoxia, O2 sats 80s on 6 L mid flow. Increased oxygen demand throughout the day, respiratory rate 30 with increased work of breathing. -ABG PO2 51, apply BiPAP for work of breathing and repeat ABG within 2 hours after, can transition to high flow when improved work of breathing  -Patient also complains of some worsening chest pain-EKG, Trop, BNP, CBC, CMP pending  -Patient states she has a history of PE and DVT-she is tachycardic and mildly anxious, mostly bedbound, will get D-dimer and CTA chest if appropriate  -Chest x-ray done at 5 PM showing bilateral pleural effusions, give evening dose of Lasix, good urine output  -Transfer to intermediate care      Addendum: Chest x-ray from 8/6 5 PM shows right IJ central line needs to be pulled back 5.5 cm, new chest x-ray looks similar - w pull back in the morning or replace with picc- will inform day team    D-dimer 6.2-could be multifactorial as she has DVTs and recent surgery but with acute hypoxic respiratory failure we will get CTA chest     Discussed worsening leukocytosis and mid abdominal pain that has worsened throughout the day with general surgery-we will obtain CT abdomen pelvis with contrast    Lactic, UA and paired blood cultures pending    Repeat EKG pending, poor data quality previously    Updated younger brother in person about the changes in condition and plan of care. Addendum:  Patient returned from CT and now tachypniec to 38-42 respirations a minute. On bipap with 100% fio2 since previous abg at 2029, pO2 increased from 51 to 65. She is pale, diaphoretic, and states she feels like she is going to die. -ICU consulted and coming to bedside  -repeat labs  -lasix 40mg IV now    Patient is at risk for further decompensation and will likely need a higher level of care.     Both brothers at bedside and updated on plan to move to ICU for intubation, opportunity

## 2023-08-07 NOTE — PROGRESS NOTES
Went from overnight noted; transferred to intensive care unit, intubated for management of acute respiratory failure. She was on the operating room schedule for debridement of sacral wound, but given current deterioration, I reviewed findings with her sons, and recommended deferring debridement to a time when she is more stable from a cardiopulmonary standpoint. Continue local wound care, antibiotics. Continue TPN, gastrostomy to excluded stomach to gravity drainage. Wean pressors as tolerated.

## 2023-08-07 NOTE — CARE COORDINATION
Transition of Care Plan:    RUR: 24% high  Prior Level of Functioning: Max assist with ADL's  Disposition: IPR vs SNF  DME needed: TBD  Transportation at discharge: BLS  IM/[facility] Medicare 7/29   Is patient a [place] and connected with [facility]? No   Caregiver Contact: Spouse Luis Argueta: [phone]  Care Conference needed? No  Barriers to discharge:    Transferred to ICU for Respiratory distress requiring intubation. Started on Levophed.    Roland Jordan RN,Care Management

## 2023-08-07 NOTE — PROGRESS NOTES
Physical Therapy: Hold    Chart reviewed. Note patient transferred to ICU d/t worsening respiratory status and required intubation. Per ABCDE protocol, will work with patient when PEEP is 10.0 or less, FIO2 60% or less (currently FiO2 70%), and patient is following basic commands. Will hold and follow up for PT re-eval once medically appropriate.     Paty Dowling, PT, DPT

## 2023-08-08 ENCOUNTER — APPOINTMENT (OUTPATIENT)
Facility: HOSPITAL | Age: 63
DRG: 004 | End: 2023-08-08
Payer: MEDICARE

## 2023-08-08 LAB
ANION GAP SERPL CALC-SCNC: 5 MMOL/L (ref 5–15)
ANION GAP SERPL CALC-SCNC: 5 MMOL/L (ref 5–15)
ARTERIAL PATENCY WRIST A: POSITIVE
BASE EXCESS BLD CALC-SCNC: 7.2 MMOL/L
BDY SITE: ABNORMAL
BUN SERPL-MCNC: 20 MG/DL (ref 6–20)
BUN SERPL-MCNC: 20 MG/DL (ref 6–20)
BUN/CREAT SERPL: 32 (ref 12–20)
BUN/CREAT SERPL: 50 (ref 12–20)
CALCIUM SERPL-MCNC: 8.1 MG/DL (ref 8.5–10.1)
CALCIUM SERPL-MCNC: 8.5 MG/DL (ref 8.5–10.1)
CHLORIDE SERPL-SCNC: 105 MMOL/L (ref 97–108)
CHLORIDE SERPL-SCNC: 106 MMOL/L (ref 97–108)
CO2 SERPL-SCNC: 29 MMOL/L (ref 21–32)
CO2 SERPL-SCNC: 31 MMOL/L (ref 21–32)
CREAT SERPL-MCNC: 0.4 MG/DL (ref 0.55–1.02)
CREAT SERPL-MCNC: 0.63 MG/DL (ref 0.55–1.02)
ECHO AO ROOT DIAM: 3 CM
ECHO AO ROOT INDEX: 1.71 CM/M2
ECHO AV AREA PEAK VELOCITY: 1.3 CM2
ECHO AV AREA/BSA PEAK VELOCITY: 0.7 CM2/M2
ECHO AV PEAK GRADIENT: 16 MMHG
ECHO AV PEAK VELOCITY: 2 M/S
ECHO AV VELOCITY RATIO: 0.5
ECHO BSA: 1.78 M2
ECHO EST RA PRESSURE: 3 MMHG
ECHO LA DIAMETER INDEX: 1.89 CM/M2
ECHO LA DIAMETER: 3.3 CM
ECHO LA TO AORTIC ROOT RATIO: 1.1
ECHO LA VOL 2C: 81 ML (ref 22–52)
ECHO LA VOL 2C: 84 ML (ref 22–52)
ECHO LA VOL 4C: 82 ML (ref 22–52)
ECHO LA VOL 4C: 91 ML (ref 22–52)
ECHO LA VOLUME AREA LENGTH: 91 ML
ECHO LA VOLUME INDEX AREA LENGTH: 52 ML/M2 (ref 16–34)
ECHO LV E' LATERAL VELOCITY: 8 CM/S
ECHO LV E' SEPTAL VELOCITY: 7 CM/S
ECHO LV EF PHYSICIAN: 50 %
ECHO LV FRACTIONAL SHORTENING: 38 % (ref 28–44)
ECHO LV INTERNAL DIMENSION DIASTOLE INDEX: 2.29 CM/M2
ECHO LV INTERNAL DIMENSION DIASTOLIC: 4 CM (ref 3.9–5.3)
ECHO LV INTERNAL DIMENSION SYSTOLIC INDEX: 1.43 CM/M2
ECHO LV INTERNAL DIMENSION SYSTOLIC: 2.5 CM
ECHO LV IVSD: 0.8 CM (ref 0.6–0.9)
ECHO LV MASS 2D: 93.5 G (ref 67–162)
ECHO LV MASS INDEX 2D: 53.4 G/M2 (ref 43–95)
ECHO LV POSTERIOR WALL DIASTOLIC: 0.8 CM (ref 0.6–0.9)
ECHO LV RELATIVE WALL THICKNESS RATIO: 0.4
ECHO LVOT AREA: 2.5 CM2
ECHO LVOT DIAM: 1.8 CM
ECHO LVOT PEAK GRADIENT: 4 MMHG
ECHO LVOT PEAK VELOCITY: 1 M/S
ECHO MV A VELOCITY: 0.93 M/S
ECHO MV AREA PHT: 4.5 CM2
ECHO MV E DECELERATION TIME (DT): 169.3 MS
ECHO MV E VELOCITY: 1.13 M/S
ECHO MV E/A RATIO: 1.22
ECHO MV E/E' LATERAL: 14.13
ECHO MV E/E' RATIO (AVERAGED): 15.13
ECHO MV E/E' SEPTAL: 16.14
ECHO MV PRESSURE HALF TIME (PHT): 49.1 MS
ECHO PV MAX VELOCITY: 0.9 M/S
ECHO PV PEAK GRADIENT: 3 MMHG
ECHO RIGHT VENTRICULAR SYSTOLIC PRESSURE (RVSP): 51 MMHG
ECHO RV FREE WALL PEAK S': 15 CM/S
ECHO RV INTERNAL DIMENSION: 4.1 CM
ECHO RV TAPSE: 1.8 CM (ref 1.7–?)
ECHO TV REGURGITANT MAX VELOCITY: 3.47 M/S
ECHO TV REGURGITANT PEAK GRADIENT: 48 MMHG
GAS FLOW.O2 O2 DELIVERY SYS: ABNORMAL
GAS FLOW.O2 SETTING OXYMISER: 18 BPM
GLUCOSE BLD STRIP.AUTO-MCNC: 126 MG/DL (ref 65–117)
GLUCOSE BLD STRIP.AUTO-MCNC: 127 MG/DL (ref 65–117)
GLUCOSE BLD STRIP.AUTO-MCNC: 128 MG/DL (ref 65–117)
GLUCOSE BLD STRIP.AUTO-MCNC: 134 MG/DL (ref 65–117)
GLUCOSE BLD STRIP.AUTO-MCNC: 136 MG/DL (ref 65–117)
GLUCOSE SERPL-MCNC: 106 MG/DL (ref 65–100)
GLUCOSE SERPL-MCNC: 110 MG/DL (ref 65–100)
HCO3 BLD-SCNC: 30.1 MMOL/L (ref 22–26)
MAGNESIUM SERPL-MCNC: 1.9 MG/DL (ref 1.6–2.4)
O2/TOTAL GAS SETTING VFR VENT: 50 %
PAW @ MEAN EXP FLOW ON VENT: 12 CMH2O
PCO2 BLD: 33.7 MMHG (ref 35–45)
PEEP RESPIRATORY: 8 CMH2O
PH BLD: 7.56 (ref 7.35–7.45)
PHOSPHATE SERPL-MCNC: 2.1 MG/DL (ref 2.6–4.7)
PO2 BLD: 77 MMHG (ref 80–100)
POTASSIUM SERPL-SCNC: 3 MMOL/L (ref 3.5–5.1)
POTASSIUM SERPL-SCNC: 3.6 MMOL/L (ref 3.5–5.1)
PROCALCITONIN SERPL-MCNC: 0.72 NG/ML
SAO2 % BLD: 96.9 % (ref 92–97)
SERVICE CMNT-IMP: ABNORMAL
SODIUM SERPL-SCNC: 140 MMOL/L (ref 136–145)
SODIUM SERPL-SCNC: 141 MMOL/L (ref 136–145)
SPECIMEN TYPE: ABNORMAL
VANCOMYCIN SERPL-MCNC: 32.7 UG/ML
VENTILATION MODE VENT: ABNORMAL
VT SETTING VENT: 330 ML

## 2023-08-08 PROCEDURE — 6360000002 HC RX W HCPCS

## 2023-08-08 PROCEDURE — 6360000002 HC RX W HCPCS: Performed by: SURGERY

## 2023-08-08 PROCEDURE — 36600 WITHDRAWAL OF ARTERIAL BLOOD: CPT

## 2023-08-08 PROCEDURE — 80202 ASSAY OF VANCOMYCIN: CPT

## 2023-08-08 PROCEDURE — 93306 TTE W/DOPPLER COMPLETE: CPT

## 2023-08-08 PROCEDURE — A4216 STERILE WATER/SALINE, 10 ML: HCPCS | Performed by: SURGERY

## 2023-08-08 PROCEDURE — 36415 COLL VENOUS BLD VENIPUNCTURE: CPT

## 2023-08-08 PROCEDURE — 6370000000 HC RX 637 (ALT 250 FOR IP): Performed by: NURSE PRACTITIONER

## 2023-08-08 PROCEDURE — 82962 GLUCOSE BLOOD TEST: CPT

## 2023-08-08 PROCEDURE — 84100 ASSAY OF PHOSPHORUS: CPT

## 2023-08-08 PROCEDURE — 6360000002 HC RX W HCPCS: Performed by: NURSE PRACTITIONER

## 2023-08-08 PROCEDURE — 93306 TTE W/DOPPLER COMPLETE: CPT | Performed by: SPECIALIST

## 2023-08-08 PROCEDURE — 94003 VENT MGMT INPAT SUBQ DAY: CPT

## 2023-08-08 PROCEDURE — 2500000003 HC RX 250 WO HCPCS: Performed by: NURSE PRACTITIONER

## 2023-08-08 PROCEDURE — 2700000000 HC OXYGEN THERAPY PER DAY

## 2023-08-08 PROCEDURE — 80048 BASIC METABOLIC PNL TOTAL CA: CPT

## 2023-08-08 PROCEDURE — C9113 INJ PANTOPRAZOLE SODIUM, VIA: HCPCS | Performed by: SURGERY

## 2023-08-08 PROCEDURE — 99233 SBSQ HOSP IP/OBS HIGH 50: CPT | Performed by: INTERNAL MEDICINE

## 2023-08-08 PROCEDURE — 2580000003 HC RX 258: Performed by: NURSE PRACTITIONER

## 2023-08-08 PROCEDURE — 2580000003 HC RX 258: Performed by: FAMILY MEDICINE

## 2023-08-08 PROCEDURE — 94760 N-INVAS EAR/PLS OXIMETRY 1: CPT

## 2023-08-08 PROCEDURE — 2000000000 HC ICU R&B

## 2023-08-08 PROCEDURE — 84145 PROCALCITONIN (PCT): CPT

## 2023-08-08 PROCEDURE — 83735 ASSAY OF MAGNESIUM: CPT

## 2023-08-08 PROCEDURE — 6370000000 HC RX 637 (ALT 250 FOR IP): Performed by: FAMILY MEDICINE

## 2023-08-08 PROCEDURE — P9047 ALBUMIN (HUMAN), 25%, 50ML: HCPCS | Performed by: NURSE PRACTITIONER

## 2023-08-08 PROCEDURE — 6360000002 HC RX W HCPCS: Performed by: FAMILY MEDICINE

## 2023-08-08 PROCEDURE — 76604 US EXAM CHEST: CPT

## 2023-08-08 PROCEDURE — 2500000003 HC RX 250 WO HCPCS: Performed by: STUDENT IN AN ORGANIZED HEALTH CARE EDUCATION/TRAINING PROGRAM

## 2023-08-08 PROCEDURE — 2580000003 HC RX 258: Performed by: SURGERY

## 2023-08-08 PROCEDURE — 82803 BLOOD GASES ANY COMBINATION: CPT

## 2023-08-08 PROCEDURE — 32555 ASPIRATE PLEURA W/ IMAGING: CPT

## 2023-08-08 RX ORDER — MAGNESIUM SULFATE IN WATER 40 MG/ML
2000 INJECTION, SOLUTION INTRAVENOUS ONCE
Status: COMPLETED | OUTPATIENT
Start: 2023-08-08 | End: 2023-08-08

## 2023-08-08 RX ORDER — POTASSIUM CHLORIDE 29.8 MG/ML
20 INJECTION INTRAVENOUS
Status: COMPLETED | OUTPATIENT
Start: 2023-08-08 | End: 2023-08-08

## 2023-08-08 RX ADMIN — METRONIDAZOLE 500 MG: 5 INJECTION, SOLUTION INTRAVENOUS at 22:09

## 2023-08-08 RX ADMIN — METRONIDAZOLE 500 MG: 5 INJECTION, SOLUTION INTRAVENOUS at 15:15

## 2023-08-08 RX ADMIN — SUCRALFATE 1 G: 1 TABLET ORAL at 11:59

## 2023-08-08 RX ADMIN — Medication: at 23:05

## 2023-08-08 RX ADMIN — SUCRALFATE 1 G: 1 TABLET ORAL at 20:28

## 2023-08-08 RX ADMIN — SODIUM CHLORIDE, PRESERVATIVE FREE 40 MG: 5 INJECTION INTRAVENOUS at 00:24

## 2023-08-08 RX ADMIN — Medication: at 16:17

## 2023-08-08 RX ADMIN — HYDROMORPHONE HYDROCHLORIDE 1 MG: 1 INJECTION, SOLUTION INTRAMUSCULAR; INTRAVENOUS; SUBCUTANEOUS at 06:30

## 2023-08-08 RX ADMIN — FUROSEMIDE 40 MG: 10 INJECTION, SOLUTION INTRAMUSCULAR; INTRAVENOUS at 17:23

## 2023-08-08 RX ADMIN — Medication: at 08:31

## 2023-08-08 RX ADMIN — MAGNESIUM SULFATE HEPTAHYDRATE 2000 MG: 40 INJECTION, SOLUTION INTRAVENOUS at 08:57

## 2023-08-08 RX ADMIN — I.V. FAT EMULSION 250 ML: 20 EMULSION INTRAVENOUS at 18:47

## 2023-08-08 RX ADMIN — METRONIDAZOLE 500 MG: 5 INJECTION, SOLUTION INTRAVENOUS at 05:36

## 2023-08-08 RX ADMIN — HYDROMORPHONE HYDROCHLORIDE 1 MG: 1 INJECTION, SOLUTION INTRAMUSCULAR; INTRAVENOUS; SUBCUTANEOUS at 00:45

## 2023-08-08 RX ADMIN — Medication 10 ML: at 08:32

## 2023-08-08 RX ADMIN — ALBUMIN (HUMAN) 25 G: 0.25 INJECTION, SOLUTION INTRAVENOUS at 08:18

## 2023-08-08 RX ADMIN — HYDROMORPHONE HYDROCHLORIDE 1 MG: 1 INJECTION, SOLUTION INTRAMUSCULAR; INTRAVENOUS; SUBCUTANEOUS at 09:57

## 2023-08-08 RX ADMIN — POTASSIUM CHLORIDE 20 MEQ: 29.8 INJECTION, SOLUTION INTRAVENOUS at 08:57

## 2023-08-08 RX ADMIN — SODIUM CHLORIDE, PRESERVATIVE FREE 40 MG: 5 INJECTION INTRAVENOUS at 11:59

## 2023-08-08 RX ADMIN — NOREPINEPHRINE BITARTRATE 3 MCG/MIN: 1 INJECTION, SOLUTION, CONCENTRATE INTRAVENOUS at 00:00

## 2023-08-08 RX ADMIN — PROPOFOL 15 MCG/KG/MIN: 10 INJECTION, EMULSION INTRAVENOUS at 00:24

## 2023-08-08 RX ADMIN — HYDROMORPHONE HYDROCHLORIDE 1 MG: 1 INJECTION, SOLUTION INTRAMUSCULAR; INTRAVENOUS; SUBCUTANEOUS at 23:06

## 2023-08-08 RX ADMIN — Medication 10 ML: at 20:31

## 2023-08-08 RX ADMIN — PIPERACILLIN AND TAZOBACTAM 3375 MG: 3; .375 INJECTION, POWDER, LYOPHILIZED, FOR SOLUTION INTRAVENOUS at 08:48

## 2023-08-08 RX ADMIN — POTASSIUM BICARBONATE 40 MEQ: 782 TABLET, EFFERVESCENT ORAL at 17:23

## 2023-08-08 RX ADMIN — POTASSIUM CHLORIDE 20 MEQ: 29.8 INJECTION, SOLUTION INTRAVENOUS at 09:51

## 2023-08-08 RX ADMIN — POTASSIUM CHLORIDE: 2 INJECTION, SOLUTION, CONCENTRATE INTRAVENOUS at 18:48

## 2023-08-08 RX ADMIN — CHLORHEXIDINE GLUCONATE 15 ML: 1.2 RINSE ORAL at 20:29

## 2023-08-08 RX ADMIN — SODIUM CHLORIDE, PRESERVATIVE FREE 40 MG: 5 INJECTION INTRAVENOUS at 23:03

## 2023-08-08 RX ADMIN — SUCRALFATE 1 G: 1 TABLET ORAL at 17:23

## 2023-08-08 RX ADMIN — COLLAGENASE SANTYL: 250 OINTMENT TOPICAL at 08:52

## 2023-08-08 RX ADMIN — PIPERACILLIN AND TAZOBACTAM 3375 MG: 3; .375 INJECTION, POWDER, LYOPHILIZED, FOR SOLUTION INTRAVENOUS at 23:03

## 2023-08-08 RX ADMIN — PIPERACILLIN AND TAZOBACTAM 3375 MG: 3; .375 INJECTION, POWDER, LYOPHILIZED, FOR SOLUTION INTRAVENOUS at 00:24

## 2023-08-08 RX ADMIN — HYDROMORPHONE HYDROCHLORIDE 1 MG: 1 INJECTION, SOLUTION INTRAMUSCULAR; INTRAVENOUS; SUBCUTANEOUS at 13:28

## 2023-08-08 RX ADMIN — PROPOFOL 15 MCG/KG/MIN: 10 INJECTION, EMULSION INTRAVENOUS at 14:31

## 2023-08-08 RX ADMIN — ALBUMIN (HUMAN) 25 G: 0.25 INJECTION, SOLUTION INTRAVENOUS at 01:51

## 2023-08-08 RX ADMIN — FUROSEMIDE 40 MG: 10 INJECTION, SOLUTION INTRAMUSCULAR; INTRAVENOUS at 08:49

## 2023-08-08 RX ADMIN — SUCRALFATE 1 G: 1 TABLET ORAL at 06:38

## 2023-08-08 RX ADMIN — PIPERACILLIN AND TAZOBACTAM 3375 MG: 3; .375 INJECTION, POWDER, LYOPHILIZED, FOR SOLUTION INTRAVENOUS at 16:16

## 2023-08-08 RX ADMIN — Medication: at 00:25

## 2023-08-08 RX ADMIN — ALBUMIN (HUMAN) 25 G: 0.25 INJECTION, SOLUTION INTRAVENOUS at 14:37

## 2023-08-08 RX ADMIN — ALBUMIN (HUMAN) 25 G: 0.25 INJECTION, SOLUTION INTRAVENOUS at 20:28

## 2023-08-08 RX ADMIN — CHLORHEXIDINE GLUCONATE 15 ML: 1.2 RINSE ORAL at 08:24

## 2023-08-08 RX ADMIN — POTASSIUM BICARBONATE 40 MEQ: 782 TABLET, EFFERVESCENT ORAL at 08:31

## 2023-08-08 ASSESSMENT — PULMONARY FUNCTION TESTS
PIF_VALUE: 15
PIF_VALUE: 21
PIF_VALUE: 19
PIF_VALUE: 14
PIF_VALUE: 32
PIF_VALUE: 20

## 2023-08-08 ASSESSMENT — PAIN SCALES - GENERAL
PAINLEVEL_OUTOF10: 1
PAINLEVEL_OUTOF10: 0
PAINLEVEL_OUTOF10: 7
PAINLEVEL_OUTOF10: 1
PAINLEVEL_OUTOF10: 0
PAINLEVEL_OUTOF10: 7
PAINLEVEL_OUTOF10: 7
PAINLEVEL_OUTOF10: 5
PAINLEVEL_OUTOF10: 0

## 2023-08-08 ASSESSMENT — PAIN DESCRIPTION - LOCATION: LOCATION: BUTTOCKS;SACRUM

## 2023-08-08 NOTE — PROGRESS NOTES
Physical Therapy  8/8/2023    Attempted to see patient for PT session, intubated on FiO2 50% PEEP 8, cleared for bed level activity by RN. Upon entry patient with RR elevating to 40s, requiring increased level of sedation by RN. RT at bedside as well. Session aborted. Will reattempt tomorrow as medically appropriate.      Adithya Martinez, PT, DPT

## 2023-08-08 NOTE — WOUND CARE
WOCN Note:     Follow up assessment of wounds present on admission. Chart reviewed. Assessed in room 7120 with Ky Huff RN. Admitted DX:  Hematemesis;GI bleed; Gastrointestinal hemorrhage; cdiff    Past Medical History: gastric bypass; asthma; cad; dm2  [facility]    Assessment:   Patient is intubated, sedated and requires assist with repositioning. Bed: [facility]  Patient has a engle and 2 drains to left abdomen. Patient repositioned on right side with pillow. Generalized edema lower legs and feet. Heels offloaded with pillows. Generalized weepy skin. 1. POA Sacrum and bilateral buttocks, Unstageable Pressure Injury: 10 x 8 x 0.1 cm; 90% brown/tan devitalized tissue; 10% non blanching purple deep tissue injury; scant serosang exudate; no odor. Cleansed with saline; applied Santyl/saline moist gauze; covered with foam dressing. 2.  Right abdomen, former ileostomy site. Removed 6.2023. No active draining. Covered with foam dressing. 3.  Right heel, Unstageable Pressure Injury with deflated bullae:  1.5 x 1.5 x 0 cm. Applied Venelex. 4.  Left heel, Deep tissue pressure injury:  0.5 x 1 x 0 cm. Applied Venelex. 5.  Left shoulder, skin tear:  3 x 2 x 0.1 cm; 100% pink/red; no drainage or odor. Cleansed with saline; applied petrolatum and foam dressing. 6.  Chest, skin tear:  0.6 x 0.6 x 0.1 cm; 100% red; surrounded by ecchymosis; no drainage or odor. Cleansed with saline; applied petrolatum and foam dressing. Wound, Pressure Prevention & Skin Care Recommendations:    Minimize layers of linen/pads under patient to optimize support surface. 2.  Turn/reposition approximately every 2 hours and offload heels. 3.  Manage moisture/ Keep skin folds clean and dry/minimize brief usage. 4.  Specialty bed:[facility].  5.  Sacrum:  Daily cleanse with saline;  Apply Santyl and saline moist gauze; cover with dry dressing/foam.  6.  Left shoulder and chest skin

## 2023-08-08 NOTE — MANAGEMENT PLAN
Pharmacist Note - Vancomycin Dosing  Therapy day 3  Indication: HAP  Current regimen: 1250 mg q12h    Recent Labs     08/06/23  0609 08/06/23  2023 08/07/23  0402 08/07/23  0954 08/07/23  1357 08/08/23  0433   WBC 18.8* 24.2* 31.8*  --   --   --    CREATININE  --  0.34* 0.40* 0.46* 0.45* 0.40*   BUN  --  20 21* 20 20 20       A random vancomycin level of 32.7 mcg/mL was obtained and from this level, the patient's AUC24 is calculated to be 811 with the current regimen. Goal target range AUC/WILLEM 400-600      Plan: Change to hold until level is <15. Pharmacy will continue to monitor this patient daily for changes in clinical status and renal function.

## 2023-08-08 NOTE — PROGRESS NOTES
CRITICAL CARE NOTE      Name: Marleni Cobos   : 1960   MRN: [id number]   Date: 2023      REASON FOR ICU ADMISSION: Acute hypoxic respiratory failure    PRINCIPAL ICU DIAGNOSIS   Acute hypoxic respiratory failure    BRIEF PATIENT SUMMARY   Ricardo Sanabria is a 59-year-old female with past medical history of gastric bypass in 2017, asthma, CAD, DM, hyperlipidemia, GERD, PE, ANCA, anxiety, depression, who presented to ED on  from [facility] for weakness and hematemesis. Back in March she was seen for fulminant C. difficile colitis and underwent subtotal colectomy with ileostomy. She was subsequently reversed on . She was admitted to [facility] from  - . During that admission she underwent EGD showing GJ ulcer and stenosis at [address] [facility] [address]. On  she went to the OR for an ex lap for free air and free fluid but there was no sign of perforation. General surgery was consulted during this admission and has been following. No surgical intervention has been recommended. At this time it appears she has a fluid collection as opposed to a perforation. It was recommended for IR to drain this collection which was performed on . GI has been consulted for GIB. She was started on PPI twice daily and Carafate 4 times daily. Eliquis and meloxicam were held. On  she underwent EGD which found a nonbleeding anastomotic ulcer. On  she had IVC filter placed. Overnight on  a rapid response was called for hypoxia and respiratory distress. She was transferred ICU on BiPAP. She was ultimately intubated for hypoxic respiratory failure. This appears to be due from pulmonary edema. She was aggressively diuresed with Lasix. COMPREHENSIVE ASSESSMENT & PLAN:SYSTEM BASED     24 HOUR EVENTS: No acute events overnight. Plan for thoracentesis today. Continue to diurese aggressively.     NEUROLOGICAL:  Depression   Close neurologic monitoring  RASS goal 0 to -1  Sedation: Recent Labs     08/06/23 2023 08/07/23  0402 08/07/23  0404   AST 16 20 21   ALT 10* 11* 9*   BILIDIR  --   --  0.1   BILITOT 0.3 0.3 0.3   ALKPHOS 77 88 94       No results for input(s): PROTIME, INR in the last 72 hours. Medications: Reviewed 08/08/23  Imaging: Reviewed 08/08/23   Most Recent XR  XR ABDOMEN (KUB) (SINGLE AP VIEW) 08/07/2023    Narrative  ABDOMINAL RADIOGRAPHS. 8/7/2023 11:35 AM    INDICATION: OG tube placement. COMPARISON: 8/4/2023. CT 8/7/2023. TECHNIQUE: AP supine  view/s of the abdomen centered around the diaphragms  excludes the pelvis. Impression  An OG tube is in appropriate position. Anastomotic staple lines in  the left upper quadrant suggest Jose-en-Y gastric bypass. There are pigtail  drainage catheters in the mid abdomen, cholecystectomy clips in the right upper  quadrant, and an IVC filter in place. Passive atelectasis is associated with  bilateral pleural effusions. There is diffuse anasarca. Most Recent CT  CT ABDOMEN PELVIS W IV CONTRAST 08/07/2023 (Preliminary)  This result has not been signed. Information might be incomplete. Narrative  **PRELIMINARY REPORT**    1. No acute pulmonary embolism. Bilateral pleural effusions and diffuse  pulmonary edema versus pneumonia. 2. Possible peritonitis as seen previously with decreased ascites. Air in the  urinary bladder can be seen with recent catheterization or infection. Marked  body wall edema. Preliminary report was provided by Dr. Jens Cortez, the on-call radiologist, on  8/7/2023 at 0345 hours. Final report to follow. **END PRELIMINARY REPORT**    Most Recent MRI  No results found for this or any previous visit from the past 3650 days. Most Recent Echo  06/22/23    TRANSTHORACIC ECHOCARDIOGRAM (TTE) COMPLETE (CONTRAST/BUBBLE/3D PRN) 07/11/2023  1:33 PM (Final)    Interpretation Summary    Left Ventricle: Normal left ventricular systolic function with a visually estimated EF of 55 - 60%.  Left ventricle

## 2023-08-08 NOTE — PROGRESS NOTES
Occupational Therapy  8/8/2023    Attempted to see patient for OT session, on FiO2 50% PEEP 8, cleared for bed level activity by RN. Upon entry patient with RR elevating to 40s, requiring increased level of sedation by RN. RT at bedside as well. Session aborted. Will reattempt tomorrow as medically appropriate.      Deepthi Palafox, OTD, OTR/L

## 2023-08-09 ENCOUNTER — APPOINTMENT (OUTPATIENT)
Facility: HOSPITAL | Age: 63
DRG: 004 | End: 2023-08-09
Payer: MEDICARE

## 2023-08-09 LAB
ALBUMIN SERPL-MCNC: 2.8 G/DL (ref 3.5–5)
ALBUMIN/GLOB SERPL: 1.4 (ref 1.1–2.2)
ALP SERPL-CCNC: 68 U/L (ref 45–117)
ALT SERPL-CCNC: 7 U/L (ref 12–78)
ANION GAP SERPL CALC-SCNC: 6 MMOL/L (ref 5–15)
ANION GAP SERPL CALC-SCNC: 6 MMOL/L (ref 5–15)
ANION GAP SERPL CALC-SCNC: 7 MMOL/L (ref 5–15)
AST SERPL-CCNC: 9 U/L (ref 15–37)
BILIRUB DIRECT SERPL-MCNC: 0.4 MG/DL (ref 0–0.2)
BILIRUB SERPL-MCNC: 0.7 MG/DL (ref 0.2–1)
BUN SERPL-MCNC: 20 MG/DL (ref 6–20)
BUN SERPL-MCNC: 21 MG/DL (ref 6–20)
BUN SERPL-MCNC: 22 MG/DL (ref 6–20)
BUN/CREAT SERPL: 44 (ref 12–20)
BUN/CREAT SERPL: 46 (ref 12–20)
BUN/CREAT SERPL: 50 (ref 12–20)
CALCIUM SERPL-MCNC: 8.1 MG/DL (ref 8.5–10.1)
CALCIUM SERPL-MCNC: 8.1 MG/DL (ref 8.5–10.1)
CALCIUM SERPL-MCNC: 8.6 MG/DL (ref 8.5–10.1)
CHLORIDE SERPL-SCNC: 105 MMOL/L (ref 97–108)
CHLORIDE SERPL-SCNC: 106 MMOL/L (ref 97–108)
CHLORIDE SERPL-SCNC: 108 MMOL/L (ref 97–108)
CO2 SERPL-SCNC: 28 MMOL/L (ref 21–32)
CO2 SERPL-SCNC: 30 MMOL/L (ref 21–32)
CO2 SERPL-SCNC: 31 MMOL/L (ref 21–32)
CREAT SERPL-MCNC: 0.44 MG/DL (ref 0.55–1.02)
CREAT SERPL-MCNC: 0.45 MG/DL (ref 0.55–1.02)
CREAT SERPL-MCNC: 0.46 MG/DL (ref 0.55–1.02)
ERYTHROCYTE [DISTWIDTH] IN BLOOD BY AUTOMATED COUNT: 17.5 % (ref 11.5–14.5)
GLOBULIN SER CALC-MCNC: 2 G/DL (ref 2–4)
GLUCOSE BLD STRIP.AUTO-MCNC: 119 MG/DL (ref 65–117)
GLUCOSE BLD STRIP.AUTO-MCNC: 119 MG/DL (ref 65–117)
GLUCOSE BLD STRIP.AUTO-MCNC: 124 MG/DL (ref 65–117)
GLUCOSE SERPL-MCNC: 107 MG/DL (ref 65–100)
GLUCOSE SERPL-MCNC: 112 MG/DL (ref 65–100)
GLUCOSE SERPL-MCNC: 116 MG/DL (ref 65–100)
HCT VFR BLD AUTO: 13.9 % (ref 35–47)
HCT VFR BLD AUTO: 20.3 % (ref 35–47)
HCT VFR BLD AUTO: 25.6 % (ref 35–47)
HGB BLD-MCNC: 4.4 G/DL (ref 11.5–16)
HGB BLD-MCNC: 6.6 G/DL (ref 11.5–16)
HGB BLD-MCNC: 8.4 G/DL (ref 11.5–16)
HISTORY CHECK: NORMAL
MAGNESIUM SERPL-MCNC: 2.1 MG/DL (ref 1.6–2.4)
MCH RBC QN AUTO: 29.7 PG (ref 26–34)
MCHC RBC AUTO-ENTMCNC: 31.7 G/DL (ref 30–36.5)
MCV RBC AUTO: 93.9 FL (ref 80–99)
NRBC # BLD: 0 K/UL (ref 0–0.01)
NRBC BLD-RTO: 0 PER 100 WBC
PHOSPHATE SERPL-MCNC: 1.6 MG/DL (ref 2.6–4.7)
PLATELET # BLD AUTO: 172 K/UL (ref 150–400)
PMV BLD AUTO: 9.3 FL (ref 8.9–12.9)
POTASSIUM SERPL-SCNC: 3.1 MMOL/L (ref 3.5–5.1)
POTASSIUM SERPL-SCNC: 3.1 MMOL/L (ref 3.5–5.1)
POTASSIUM SERPL-SCNC: 4 MMOL/L (ref 3.5–5.1)
PROT SERPL-MCNC: 4.8 G/DL (ref 6.4–8.2)
RBC # BLD AUTO: 1.48 M/UL (ref 3.8–5.2)
SERVICE CMNT-IMP: ABNORMAL
SODIUM SERPL-SCNC: 142 MMOL/L (ref 136–145)
SODIUM SERPL-SCNC: 142 MMOL/L (ref 136–145)
SODIUM SERPL-SCNC: 143 MMOL/L (ref 136–145)
VANCOMYCIN SERPL-MCNC: 16.9 UG/ML
WBC # BLD AUTO: 9.3 K/UL (ref 3.6–11)

## 2023-08-09 PROCEDURE — 94003 VENT MGMT INPAT SUBQ DAY: CPT

## 2023-08-09 PROCEDURE — 2500000003 HC RX 250 WO HCPCS: Performed by: NURSE PRACTITIONER

## 2023-08-09 PROCEDURE — 6360000002 HC RX W HCPCS: Performed by: SURGERY

## 2023-08-09 PROCEDURE — 2580000003 HC RX 258: Performed by: FAMILY MEDICINE

## 2023-08-09 PROCEDURE — 85014 HEMATOCRIT: CPT

## 2023-08-09 PROCEDURE — 6370000000 HC RX 637 (ALT 250 FOR IP): Performed by: NURSE PRACTITIONER

## 2023-08-09 PROCEDURE — 2000000000 HC ICU R&B

## 2023-08-09 PROCEDURE — 6370000000 HC RX 637 (ALT 250 FOR IP): Performed by: STUDENT IN AN ORGANIZED HEALTH CARE EDUCATION/TRAINING PROGRAM

## 2023-08-09 PROCEDURE — 6370000000 HC RX 637 (ALT 250 FOR IP): Performed by: FAMILY MEDICINE

## 2023-08-09 PROCEDURE — 83735 ASSAY OF MAGNESIUM: CPT

## 2023-08-09 PROCEDURE — 71045 X-RAY EXAM CHEST 1 VIEW: CPT

## 2023-08-09 PROCEDURE — 36415 COLL VENOUS BLD VENIPUNCTURE: CPT

## 2023-08-09 PROCEDURE — 80076 HEPATIC FUNCTION PANEL: CPT

## 2023-08-09 PROCEDURE — 2580000003 HC RX 258: Performed by: NURSE PRACTITIONER

## 2023-08-09 PROCEDURE — 6360000002 HC RX W HCPCS

## 2023-08-09 PROCEDURE — 84100 ASSAY OF PHOSPHORUS: CPT

## 2023-08-09 PROCEDURE — P9016 RBC LEUKOCYTES REDUCED: HCPCS

## 2023-08-09 PROCEDURE — 6360000002 HC RX W HCPCS: Performed by: FAMILY MEDICINE

## 2023-08-09 PROCEDURE — 36430 TRANSFUSION BLD/BLD COMPNT: CPT

## 2023-08-09 PROCEDURE — 80048 BASIC METABOLIC PNL TOTAL CA: CPT

## 2023-08-09 PROCEDURE — 85027 COMPLETE CBC AUTOMATED: CPT

## 2023-08-09 PROCEDURE — C9113 INJ PANTOPRAZOLE SODIUM, VIA: HCPCS | Performed by: SURGERY

## 2023-08-09 PROCEDURE — 80202 ASSAY OF VANCOMYCIN: CPT

## 2023-08-09 PROCEDURE — 76937 US GUIDE VASCULAR ACCESS: CPT

## 2023-08-09 PROCEDURE — P9047 ALBUMIN (HUMAN), 25%, 50ML: HCPCS | Performed by: NURSE PRACTITIONER

## 2023-08-09 PROCEDURE — 82962 GLUCOSE BLOOD TEST: CPT

## 2023-08-09 PROCEDURE — A4216 STERILE WATER/SALINE, 10 ML: HCPCS | Performed by: SURGERY

## 2023-08-09 PROCEDURE — 99232 SBSQ HOSP IP/OBS MODERATE 35: CPT | Performed by: SURGERY

## 2023-08-09 PROCEDURE — 2580000003 HC RX 258: Performed by: SURGERY

## 2023-08-09 PROCEDURE — 6360000002 HC RX W HCPCS: Performed by: NURSE PRACTITIONER

## 2023-08-09 PROCEDURE — 85018 HEMOGLOBIN: CPT

## 2023-08-09 RX ORDER — SODIUM CHLORIDE 9 MG/ML
INJECTION, SOLUTION INTRAVENOUS PRN
Status: DISCONTINUED | OUTPATIENT
Start: 2023-08-09 | End: 2023-08-10

## 2023-08-09 RX ORDER — INSULIN LISPRO 100 [IU]/ML
0-4 INJECTION, SOLUTION INTRAVENOUS; SUBCUTANEOUS EVERY 6 HOURS
Status: DISCONTINUED | OUTPATIENT
Start: 2023-08-09 | End: 2023-09-08

## 2023-08-09 RX ORDER — NOREPINEPHRINE BITARTRATE 0.06 MG/ML
1-30 INJECTION, SOLUTION INTRAVENOUS CONTINUOUS
Status: DISCONTINUED | OUTPATIENT
Start: 2023-08-09 | End: 2023-08-10

## 2023-08-09 RX ORDER — POTASSIUM CHLORIDE 29.8 MG/ML
20 INJECTION INTRAVENOUS
Status: COMPLETED | OUTPATIENT
Start: 2023-08-09 | End: 2023-08-09

## 2023-08-09 RX ADMIN — COLLAGENASE SANTYL: 250 OINTMENT TOPICAL at 08:54

## 2023-08-09 RX ADMIN — Medication 10 ML: at 08:54

## 2023-08-09 RX ADMIN — POTASSIUM CHLORIDE 20 MEQ: 29.8 INJECTION, SOLUTION INTRAVENOUS at 11:11

## 2023-08-09 RX ADMIN — VANCOMYCIN HYDROCHLORIDE 1250 MG: 1.25 INJECTION, POWDER, LYOPHILIZED, FOR SOLUTION INTRAVENOUS at 10:55

## 2023-08-09 RX ADMIN — SUCRALFATE 1 G: 1 TABLET ORAL at 10:56

## 2023-08-09 RX ADMIN — SUCRALFATE 1 G: 1 TABLET ORAL at 06:29

## 2023-08-09 RX ADMIN — POTASSIUM CHLORIDE 20 MEQ: 29.8 INJECTION, SOLUTION INTRAVENOUS at 10:06

## 2023-08-09 RX ADMIN — HYDROMORPHONE HYDROCHLORIDE 1 MG: 1 INJECTION, SOLUTION INTRAMUSCULAR; INTRAVENOUS; SUBCUTANEOUS at 13:07

## 2023-08-09 RX ADMIN — Medication: at 23:54

## 2023-08-09 RX ADMIN — SUCRALFATE 1 G: 1 TABLET ORAL at 20:39

## 2023-08-09 RX ADMIN — FUROSEMIDE 40 MG: 10 INJECTION, SOLUTION INTRAMUSCULAR; INTRAVENOUS at 17:29

## 2023-08-09 RX ADMIN — POTASSIUM CHLORIDE: 2 INJECTION, SOLUTION, CONCENTRATE INTRAVENOUS at 18:11

## 2023-08-09 RX ADMIN — ALBUMIN (HUMAN) 25 G: 0.25 INJECTION, SOLUTION INTRAVENOUS at 02:08

## 2023-08-09 RX ADMIN — PIPERACILLIN AND TAZOBACTAM 3375 MG: 3; .375 INJECTION, POWDER, LYOPHILIZED, FOR SOLUTION INTRAVENOUS at 16:03

## 2023-08-09 RX ADMIN — CHLORHEXIDINE GLUCONATE 15 ML: 1.2 RINSE ORAL at 08:54

## 2023-08-09 RX ADMIN — HYDROMORPHONE HYDROCHLORIDE 1 MG: 1 INJECTION, SOLUTION INTRAMUSCULAR; INTRAVENOUS; SUBCUTANEOUS at 18:10

## 2023-08-09 RX ADMIN — METRONIDAZOLE 500 MG: 5 INJECTION, SOLUTION INTRAVENOUS at 05:15

## 2023-08-09 RX ADMIN — METRONIDAZOLE 500 MG: 5 INJECTION, SOLUTION INTRAVENOUS at 22:14

## 2023-08-09 RX ADMIN — Medication: at 08:53

## 2023-08-09 RX ADMIN — PIPERACILLIN AND TAZOBACTAM 3375 MG: 3; .375 INJECTION, POWDER, LYOPHILIZED, FOR SOLUTION INTRAVENOUS at 08:30

## 2023-08-09 RX ADMIN — HYDROMORPHONE HYDROCHLORIDE 1 MG: 1 INJECTION, SOLUTION INTRAMUSCULAR; INTRAVENOUS; SUBCUTANEOUS at 06:30

## 2023-08-09 RX ADMIN — POTASSIUM CHLORIDE 20 MEQ: 29.8 INJECTION, SOLUTION INTRAVENOUS at 08:53

## 2023-08-09 RX ADMIN — PIPERACILLIN AND TAZOBACTAM 3375 MG: 3; .375 INJECTION, POWDER, LYOPHILIZED, FOR SOLUTION INTRAVENOUS at 23:55

## 2023-08-09 RX ADMIN — SODIUM CHLORIDE, PRESERVATIVE FREE 40 MG: 5 INJECTION INTRAVENOUS at 23:55

## 2023-08-09 RX ADMIN — POTASSIUM PHOSPHATE, MONOBASIC POTASSIUM PHOSPHATE, DIBASIC 30 MMOL: 224; 236 INJECTION, SOLUTION, CONCENTRATE INTRAVENOUS at 08:53

## 2023-08-09 RX ADMIN — SODIUM CHLORIDE, PRESERVATIVE FREE 40 MG: 5 INJECTION INTRAVENOUS at 11:11

## 2023-08-09 RX ADMIN — PROPOFOL 10 MCG/KG/MIN: 10 INJECTION, EMULSION INTRAVENOUS at 04:47

## 2023-08-09 RX ADMIN — SUCRALFATE 1 G: 1 TABLET ORAL at 16:03

## 2023-08-09 RX ADMIN — CHLORHEXIDINE GLUCONATE 15 ML: 1.2 RINSE ORAL at 20:32

## 2023-08-09 RX ADMIN — PROPOFOL 10 MCG/KG/MIN: 10 INJECTION, EMULSION INTRAVENOUS at 17:31

## 2023-08-09 RX ADMIN — Medication: at 16:00

## 2023-08-09 RX ADMIN — METRONIDAZOLE 500 MG: 5 INJECTION, SOLUTION INTRAVENOUS at 14:20

## 2023-08-09 RX ADMIN — FUROSEMIDE 40 MG: 10 INJECTION, SOLUTION INTRAMUSCULAR; INTRAVENOUS at 08:53

## 2023-08-09 ASSESSMENT — PAIN SCALES - GENERAL
PAINLEVEL_OUTOF10: 0
PAINLEVEL_OUTOF10: 0
PAINLEVEL_OUTOF10: 4

## 2023-08-09 ASSESSMENT — PULMONARY FUNCTION TESTS
PIF_VALUE: 35
PIF_VALUE: 29
PIF_VALUE: 27
PIF_VALUE: 27
PIF_VALUE: 30

## 2023-08-09 NOTE — PROCEDURES
Ultrasound guided peripheral IV placed on L Upper Arm See LDA. Veins too small for midline. See VAT notes 8/5). 2nd peripheral IV started left lower basilic x 1 attempt.

## 2023-08-09 NOTE — PROGRESS NOTES
K+ 3.1, phos 1.5 (both being repleted)      8/4: Follow up. IVC filter was inserted 8/2. CT abdomen/pelvis from 8/3 reveals resolution of collection but demonstrates significant distention of excluded stomach, duodenum. MD recommends removal of current drain, CT guided gastrostomy to excluded stomach to allow drainage, later small bowel follow-through, possible tube feeds. Also plan for operative debridement of sacral decubitus wound on Monday morning. Remains NPO with TPN ordered at goal rate of 1560 ml: 94 gm AA, 234 gm Dextrose + 250 ml 20% lipids daily. This is providing 1671 kcals (88% kcal needs), 94 gm protein (100% protein needs), 234 gm CHO. Will continue with this order. Labs reviewed. 8/1: Results of EGD noted; GI recommending starting TPN. Visited with patient this morning-reports ongoing nausea; worse after attempting to eat a little bit of breakfast. Meets criteria for severe malnutrition. Pt asking about IV nutrition. Phosphorus severely BNL; has not taken B1 for a couple of days d/t nausea. 7/31: Poor PO intake and ongoing abdominal pain @ [facility]. GI consulted-plan for EGD today. Bleeding 2/2 Eliquis (on hold) and Meloxicam (discontinued). May need IVC filter if unable to resume blood thinner. Intraperitoneal fluid collection-possible IR drainage after Eliquis washout. WOCN consulted-multple wounds including large unstageable sacral pressure injury. Pt most likely meets criteria for at least moderate malnutrition (per RD assessment @ [facility] 7/17). Significant edema noted with weeping-most likely d/t 3rd spacing from severe hypoalbuminemia. Potassium and phosphorus replaced today. Thiamine ordered orally-suggest giving via IV for now. CTM lytes daily and replace PRN. Weight trends in EHR indicate significant fluctuations-suspect d/t edema. UTD dry weight. Once diet advanced-recommend adding ONS. Also resume bariatric vitamin supplementation.       Nutritionally Significant Education/Counseling: No recommendation at this time  Coordination of Nutrition Care: Continue to monitor while inpatient       Goals:  Previous Goal Met: Goal(s) Achieved  Goals: other (specify)  Specify Other Goals: Continue to meet > 85% EEN's with PN over next 5-7 days    Nutrition Monitoring and Evaluation:   Behavioral-Environmental Outcomes: None Identified  Food/Nutrient Intake Outcomes: Parenteral Nutrition Intake/Tolerance, Diet Advancement/Tolerance  Physical Signs/Symptoms Outcomes: Biochemical Data, Weight, Fluid Status or Edema, GI Status, Skin    Discharge Planning:     Too soon to determine     Maria E Coma, RD  Available via [employer]

## 2023-08-09 NOTE — PROGRESS NOTES
Physical Therapy:     Chart reviewed, patient received sedated and on vent. Per ABCDEF protocol, will work with patient when PEEP is 10.0 or less, FIO2 60% or less, and patient is following basic commands (FiO2 70%, PEEP 5). Also noted pt's most recent Hgb of 4.4. Will follow patient peripherally. Recommend nursing to complete with patient, as able and per protocol, in order to promote cardiopulmonary systems, maintain strength, endurance and independence:   -bed in chair position or maximize full reverse Trendelenburg position to facilitate upright activity with foot board and non-skid footwear on 3x/day ~30-60 mins each   -ROM during bathing B UEs and LEs  -positioning to prevent contractures and edema  RASS -1/0/+1 (during SAT) Active ROM  Sitting (bed in chair position)  Standing (reverse Trendelenburg)  ADLs   RASS -3/2 Passive ROM  Sit (bed in chair position)   RASS -5/-4 Passive ROM       Thank you for your assistance.    Meg Graves, PT, DPT

## 2023-08-09 NOTE — PROGRESS NOTES
CRITICAL CARE NOTE      Name: Eula Ferreira   : 1960   MRN: [id number]   Date: 2023      REASON FOR ICU ADMISSION: Acute hypoxic respiratory failure    PRINCIPAL ICU DIAGNOSIS   Acute hypoxic respiratory failure    BRIEF PATIENT SUMMARY   Danilo Ayala is a 71-year-old female with past medical history of gastric bypass in 2017, asthma, CAD, DM, hyperlipidemia, GERD, PE, ANCA, anxiety, depression, who presented to ED on  from [facility] for weakness and hematemesis. Back in March she was seen for fulminant C. difficile colitis and underwent subtotal colectomy with ileostomy. She was subsequently reversed on . She was admitted to [facility] from  - . During that admission she underwent EGD showing GJ ulcer and stenosis at [address] [facility] [address]. On  she went to the OR for an ex lap for free air and free fluid but there was no sign of perforation. General surgery was consulted during this admission and has been following. No surgical intervention has been recommended. At this time it appears she has a fluid collection as opposed to a perforation. It was recommended for IR to drain this collection which was performed on . GI has been consulted for GIB. She was started on PPI twice daily and Carafate 4 times daily. Eliquis and meloxicam were held. On  she underwent EGD which found a nonbleeding anastomotic ulcer. On  she had IVC filter placed. Overnight on  a rapid response was called for hypoxia and respiratory distress. She was transferred ICU on BiPAP. She was ultimately intubated for hypoxic respiratory failure. This appears to be due from pulmonary edema. She was aggressively diuresed with Lasix. COMPREHENSIVE ASSESSMENT & PLAN:SYSTEM BASED     24 HOUR EVENTS: Hemoglobin 4.4 this a.m.  2 units PRBC ordered. No signs of GIB. Unable to perform thoracentesis yesterday due to inadequate window. Plan for SAT/SBT this a.m. Some improvement noted on CXR.

## 2023-08-09 NOTE — PROGRESS NOTES
Occupational Therapy:     Chart reviewed, patient received sedated and on vent. Per ABCDEF protocol, will work with patient when PEEP is 10.0 or less, FIO2 60% or less, and patient is following basic commands (FiO2 70%, PEEP 5). Also noted pt's most recent Hgb of 4.4. Will follow patient peripherally. Recommend nursing to complete with patient, as able and per protocol, in order to promote cardiopulmonary systems, maintain strength, endurance and independence:   -bed in chair position or maximize full reverse Trendelenburg position to facilitate upright activity with foot board and non-skid footwear on 3x/day ~30-60 mins each   -ROM during bathing B UEs and LEs  -positioning to prevent contractures and edema  RASS -1/0/+1 (during SAT) Active ROM  Sitting (bed in chair position)  Standing (reverse Trendelenburg)  ADLs   RASS -3/2 Passive ROM  Sit (bed in chair position)   RASS -5/-4 Passive ROM       Thank you for your assistance.    Julia Damian, OTKIMI, OTR/L

## 2023-08-10 LAB
ABO + RH BLD: NORMAL
ANION GAP SERPL CALC-SCNC: 7 MMOL/L (ref 5–15)
BASOPHILS # BLD: 0 K/UL (ref 0–0.1)
BASOPHILS NFR BLD: 0 % (ref 0–1)
BLD PROD TYP BPU: NORMAL
BLD PROD TYP BPU: NORMAL
BLOOD BANK DISPENSE STATUS: NORMAL
BLOOD BANK DISPENSE STATUS: NORMAL
BLOOD GROUP ANTIBODIES SERPL: NORMAL
BPU ID: NORMAL
BPU ID: NORMAL
BUN SERPL-MCNC: 26 MG/DL (ref 6–20)
BUN/CREAT SERPL: 65 (ref 12–20)
CALCIUM SERPL-MCNC: 8.2 MG/DL (ref 8.5–10.1)
CHLORIDE SERPL-SCNC: 107 MMOL/L (ref 97–108)
CO2 SERPL-SCNC: 30 MMOL/L (ref 21–32)
CREAT SERPL-MCNC: 0.4 MG/DL (ref 0.55–1.02)
CROSSMATCH RESULT: NORMAL
CROSSMATCH RESULT: NORMAL
DIFFERENTIAL METHOD BLD: ABNORMAL
EOSINOPHIL # BLD: 0.2 K/UL (ref 0–0.4)
EOSINOPHIL NFR BLD: 2 % (ref 0–7)
ERYTHROCYTE [DISTWIDTH] IN BLOOD BY AUTOMATED COUNT: 19.1 % (ref 11.5–14.5)
GLUCOSE BLD STRIP.AUTO-MCNC: 112 MG/DL (ref 65–117)
GLUCOSE BLD STRIP.AUTO-MCNC: 116 MG/DL (ref 65–117)
GLUCOSE BLD STRIP.AUTO-MCNC: 119 MG/DL (ref 65–117)
GLUCOSE BLD STRIP.AUTO-MCNC: 127 MG/DL (ref 65–117)
GLUCOSE BLD STRIP.AUTO-MCNC: 136 MG/DL (ref 65–117)
GLUCOSE SERPL-MCNC: 112 MG/DL (ref 65–100)
HCT VFR BLD AUTO: 25 % (ref 35–47)
HCT VFR BLD AUTO: 25.6 % (ref 35–47)
HCT VFR BLD AUTO: 25.9 % (ref 35–47)
HGB BLD-MCNC: 8.2 G/DL (ref 11.5–16)
HGB BLD-MCNC: 8.4 G/DL (ref 11.5–16)
HGB BLD-MCNC: 8.4 G/DL (ref 11.5–16)
IMM GRANULOCYTES # BLD AUTO: 0 K/UL
IMM GRANULOCYTES NFR BLD AUTO: 0 %
LYMPHOCYTES # BLD: 0.6 K/UL (ref 0.8–3.5)
LYMPHOCYTES NFR BLD: 7 % (ref 12–49)
MAGNESIUM SERPL-MCNC: 1.9 MG/DL (ref 1.6–2.4)
MCH RBC QN AUTO: 28.7 PG (ref 26–34)
MCHC RBC AUTO-ENTMCNC: 32.8 G/DL (ref 30–36.5)
MCV RBC AUTO: 87.4 FL (ref 80–99)
MONOCYTES # BLD: 0.3 K/UL (ref 0–1)
MONOCYTES NFR BLD: 4 % (ref 5–13)
MYELOCYTES NFR BLD MANUAL: 1 %
NEUTS SEG # BLD: 7.2 K/UL (ref 1.8–8)
NEUTS SEG NFR BLD: 86 % (ref 32–75)
NRBC # BLD: 0 K/UL (ref 0–0.01)
NRBC BLD-RTO: 0 PER 100 WBC
PHOSPHATE SERPL-MCNC: 2.8 MG/DL (ref 2.6–4.7)
PLATELET # BLD AUTO: 182 K/UL (ref 150–400)
PMV BLD AUTO: 9.6 FL (ref 8.9–12.9)
POTASSIUM SERPL-SCNC: 3.2 MMOL/L (ref 3.5–5.1)
RBC # BLD AUTO: 2.93 M/UL (ref 3.8–5.2)
RBC MORPH BLD: ABNORMAL
SERVICE CMNT-IMP: ABNORMAL
SERVICE CMNT-IMP: NORMAL
SERVICE CMNT-IMP: NORMAL
SODIUM SERPL-SCNC: 144 MMOL/L (ref 136–145)
SPECIMEN EXP DATE BLD: NORMAL
UNIT DIVISION: 0
UNIT DIVISION: 0
WBC # BLD AUTO: 8.4 K/UL (ref 3.6–11)

## 2023-08-10 PROCEDURE — 6360000002 HC RX W HCPCS: Performed by: NURSE PRACTITIONER

## 2023-08-10 PROCEDURE — 82962 GLUCOSE BLOOD TEST: CPT

## 2023-08-10 PROCEDURE — 2580000003 HC RX 258: Performed by: SURGERY

## 2023-08-10 PROCEDURE — 2580000003 HC RX 258: Performed by: FAMILY MEDICINE

## 2023-08-10 PROCEDURE — 2000000000 HC ICU R&B

## 2023-08-10 PROCEDURE — 6360000002 HC RX W HCPCS: Performed by: FAMILY MEDICINE

## 2023-08-10 PROCEDURE — 2500000003 HC RX 250 WO HCPCS: Performed by: NURSE PRACTITIONER

## 2023-08-10 PROCEDURE — 2580000003 HC RX 258: Performed by: NURSE PRACTITIONER

## 2023-08-10 PROCEDURE — 6360000002 HC RX W HCPCS

## 2023-08-10 PROCEDURE — 0WPGX0Z REMOVAL OF DRAINAGE DEVICE FROM PERITONEAL CAVITY, EXTERNAL APPROACH: ICD-10-PCS

## 2023-08-10 PROCEDURE — 6360000002 HC RX W HCPCS: Performed by: SURGERY

## 2023-08-10 PROCEDURE — 6370000000 HC RX 637 (ALT 250 FOR IP): Performed by: NURSE PRACTITIONER

## 2023-08-10 PROCEDURE — C9113 INJ PANTOPRAZOLE SODIUM, VIA: HCPCS | Performed by: SURGERY

## 2023-08-10 PROCEDURE — 84100 ASSAY OF PHOSPHORUS: CPT

## 2023-08-10 PROCEDURE — 94760 N-INVAS EAR/PLS OXIMETRY 1: CPT

## 2023-08-10 PROCEDURE — 80048 BASIC METABOLIC PNL TOTAL CA: CPT

## 2023-08-10 PROCEDURE — P9047 ALBUMIN (HUMAN), 25%, 50ML: HCPCS | Performed by: NURSE PRACTITIONER

## 2023-08-10 PROCEDURE — 83735 ASSAY OF MAGNESIUM: CPT

## 2023-08-10 PROCEDURE — 6370000000 HC RX 637 (ALT 250 FOR IP): Performed by: FAMILY MEDICINE

## 2023-08-10 PROCEDURE — A4216 STERILE WATER/SALINE, 10 ML: HCPCS | Performed by: SURGERY

## 2023-08-10 PROCEDURE — 94003 VENT MGMT INPAT SUBQ DAY: CPT

## 2023-08-10 PROCEDURE — 99232 SBSQ HOSP IP/OBS MODERATE 35: CPT | Performed by: SURGERY

## 2023-08-10 PROCEDURE — 85025 COMPLETE CBC W/AUTO DIFF WBC: CPT

## 2023-08-10 RX ORDER — FENTANYL CITRATE 50 UG/ML
50 INJECTION, SOLUTION INTRAMUSCULAR; INTRAVENOUS ONCE
Status: COMPLETED | OUTPATIENT
Start: 2023-08-10 | End: 2023-08-10

## 2023-08-10 RX ORDER — DEXMEDETOMIDINE HYDROCHLORIDE 4 UG/ML
.1-1.5 INJECTION, SOLUTION INTRAVENOUS CONTINUOUS
Status: DISCONTINUED | OUTPATIENT
Start: 2023-08-10 | End: 2023-08-21

## 2023-08-10 RX ORDER — ALBUMIN (HUMAN) 12.5 G/50ML
25 SOLUTION INTRAVENOUS ONCE
Status: COMPLETED | OUTPATIENT
Start: 2023-08-10 | End: 2023-08-10

## 2023-08-10 RX ORDER — MAGNESIUM SULFATE IN WATER 40 MG/ML
2000 INJECTION, SOLUTION INTRAVENOUS ONCE
Status: COMPLETED | OUTPATIENT
Start: 2023-08-10 | End: 2023-08-10

## 2023-08-10 RX ORDER — POTASSIUM CHLORIDE 29.8 MG/ML
20 INJECTION INTRAVENOUS
Status: COMPLETED | OUTPATIENT
Start: 2023-08-10 | End: 2023-08-10

## 2023-08-10 RX ORDER — ALBUMIN (HUMAN) 12.5 G/50ML
50 SOLUTION INTRAVENOUS ONCE
Status: DISCONTINUED | OUTPATIENT
Start: 2023-08-10 | End: 2023-08-10

## 2023-08-10 RX ORDER — FENTANYL CITRATE 50 UG/ML
INJECTION, SOLUTION INTRAMUSCULAR; INTRAVENOUS
Status: COMPLETED
Start: 2023-08-10 | End: 2023-08-10

## 2023-08-10 RX ORDER — HYDROMORPHONE HYDROCHLORIDE 1 MG/ML
0.5 INJECTION, SOLUTION INTRAMUSCULAR; INTRAVENOUS; SUBCUTANEOUS
Status: DISCONTINUED | OUTPATIENT
Start: 2023-08-10 | End: 2023-08-15

## 2023-08-10 RX ADMIN — METRONIDAZOLE 500 MG: 5 INJECTION, SOLUTION INTRAVENOUS at 21:41

## 2023-08-10 RX ADMIN — POTASSIUM CHLORIDE 20 MEQ: 29.8 INJECTION, SOLUTION INTRAVENOUS at 09:26

## 2023-08-10 RX ADMIN — PIPERACILLIN AND TAZOBACTAM 3375 MG: 3; .375 INJECTION, POWDER, LYOPHILIZED, FOR SOLUTION INTRAVENOUS at 15:38

## 2023-08-10 RX ADMIN — MAGNESIUM SULFATE HEPTAHYDRATE 2000 MG: 40 INJECTION, SOLUTION INTRAVENOUS at 09:29

## 2023-08-10 RX ADMIN — POTASSIUM CHLORIDE 20 MEQ: 29.8 INJECTION, SOLUTION INTRAVENOUS at 10:39

## 2023-08-10 RX ADMIN — FUROSEMIDE 40 MG: 10 INJECTION, SOLUTION INTRAMUSCULAR; INTRAVENOUS at 07:57

## 2023-08-10 RX ADMIN — Medication 10 ML: at 21:27

## 2023-08-10 RX ADMIN — Medication: at 07:56

## 2023-08-10 RX ADMIN — HYDROMORPHONE HYDROCHLORIDE 1 MG: 1 INJECTION, SOLUTION INTRAMUSCULAR; INTRAVENOUS; SUBCUTANEOUS at 12:42

## 2023-08-10 RX ADMIN — METRONIDAZOLE 500 MG: 5 INJECTION, SOLUTION INTRAVENOUS at 13:46

## 2023-08-10 RX ADMIN — PIPERACILLIN AND TAZOBACTAM 3375 MG: 3; .375 INJECTION, POWDER, LYOPHILIZED, FOR SOLUTION INTRAVENOUS at 07:57

## 2023-08-10 RX ADMIN — FENTANYL CITRATE 50 MCG: 50 INJECTION INTRAMUSCULAR; INTRAVENOUS at 15:56

## 2023-08-10 RX ADMIN — CHLORHEXIDINE GLUCONATE 15 ML: 1.2 RINSE ORAL at 07:56

## 2023-08-10 RX ADMIN — DEXMEDETOMIDINE HYDROCHLORIDE 0.2 MCG/KG/HR: 4 INJECTION, SOLUTION INTRAVENOUS at 21:45

## 2023-08-10 RX ADMIN — SUCRALFATE 1 G: 1 TABLET ORAL at 21:41

## 2023-08-10 RX ADMIN — CHLORHEXIDINE GLUCONATE 15 ML: 1.2 RINSE ORAL at 21:25

## 2023-08-10 RX ADMIN — SUCRALFATE 1 G: 1 TABLET ORAL at 06:51

## 2023-08-10 RX ADMIN — Medication 10 ML: at 07:57

## 2023-08-10 RX ADMIN — COLLAGENASE SANTYL: 250 OINTMENT TOPICAL at 07:56

## 2023-08-10 RX ADMIN — DEXMEDETOMIDINE HYDROCHLORIDE 0.2 MCG/KG/HR: 4 INJECTION, SOLUTION INTRAVENOUS at 11:17

## 2023-08-10 RX ADMIN — HYDROMORPHONE HYDROCHLORIDE 1 MG: 1 INJECTION, SOLUTION INTRAMUSCULAR; INTRAVENOUS; SUBCUTANEOUS at 03:53

## 2023-08-10 RX ADMIN — SUCRALFATE 1 G: 1 TABLET ORAL at 17:29

## 2023-08-10 RX ADMIN — SUCRALFATE 1 G: 1 TABLET ORAL at 10:39

## 2023-08-10 RX ADMIN — ALBUMIN (HUMAN) 25 G: 0.25 INJECTION, SOLUTION INTRAVENOUS at 13:20

## 2023-08-10 RX ADMIN — I.V. FAT EMULSION 250 ML: 20 EMULSION INTRAVENOUS at 18:14

## 2023-08-10 RX ADMIN — VANCOMYCIN HYDROCHLORIDE 1250 MG: 1.25 INJECTION, POWDER, LYOPHILIZED, FOR SOLUTION INTRAVENOUS at 09:30

## 2023-08-10 RX ADMIN — FUROSEMIDE 40 MG: 10 INJECTION, SOLUTION INTRAMUSCULAR; INTRAVENOUS at 17:29

## 2023-08-10 RX ADMIN — Medication: at 15:57

## 2023-08-10 RX ADMIN — SODIUM CHLORIDE, PRESERVATIVE FREE 40 MG: 5 INJECTION INTRAVENOUS at 11:17

## 2023-08-10 RX ADMIN — METRONIDAZOLE 500 MG: 5 INJECTION, SOLUTION INTRAVENOUS at 06:45

## 2023-08-10 RX ADMIN — POTASSIUM CHLORIDE 20 MEQ: 29.8 INJECTION, SOLUTION INTRAVENOUS at 08:28

## 2023-08-10 RX ADMIN — FENTANYL CITRATE 50 MCG: 50 INJECTION INTRAMUSCULAR; INTRAVENOUS at 15:55

## 2023-08-10 RX ADMIN — PROPOFOL 15 MCG/KG/MIN: 10 INJECTION, EMULSION INTRAVENOUS at 06:11

## 2023-08-10 RX ADMIN — POTASSIUM CHLORIDE: 2 INJECTION, SOLUTION, CONCENTRATE INTRAVENOUS at 18:15

## 2023-08-10 ASSESSMENT — PULMONARY FUNCTION TESTS
PIF_VALUE: 11
PIF_VALUE: 43
PIF_VALUE: 27
PIF_VALUE: 27
PIF_VALUE: 30

## 2023-08-10 ASSESSMENT — PAIN SCALES - GENERAL: PAINLEVEL_OUTOF10: 8

## 2023-08-10 ASSESSMENT — PAIN DESCRIPTION - LOCATION: LOCATION: GENERALIZED

## 2023-08-10 NOTE — MR AVS SNAPSHOT
Visit Information Date & Time Provider Department Dept. Phone Encounter #  
 12/19/2017  1:00 PM Mylene Banda MD [address] [facility] [phone] [id number] Your Appointments 1/17/2018  1:00 PM  
ESTABLISHED PATIENT with Jaki oDminguez NP [address] Surgery ([facility]) Appt Note: 3mos. fs  
 [address]  
[phone]  
  
   
 [address]  
  
    
 6/29/2018  1:00 PM  
ESTABLISHED PATIENT with Telly Maloney MD  
[address] ([facility]) Appt Note: 1yr f/u  
 [address] [facility] [address]  
[address] [place] [address] [address] Upcoming Health Maintenance Date Due  
 EYE EXAM RETINAL OR DILATED Q1 12/19/2018* HEMOGLOBIN A1C Q6M 6/19/2018 FOOT EXAM Q1 12/19/2018 MICROALBUMIN Q1 12/19/2018 LIPID PANEL Q1 12/19/2018 PAP AKA CERVICAL CYTOLOGY 8/23/2019 COLONOSCOPY 6/23/2020 DTaP/Tdap/Td series (2 - Td) 8/14/2022 *Topic was postponed. The date shown is not the original due date. Allergies as of 12/19/2017  Review Complete On: 12/19/2017 By: Sandra Moscoso LPN Severity Noted Reaction Type Reactions Accupril [Quinapril]  07/07/2010    Cough Lipitor [Atorvastatin]  07/07/2010    Other (comments)  
 aches Norvasc [Amlodipine]  07/22/2015    Swelling On legs at 10 mg dose Current Immunizations  Reviewed on 1/26/2017 Name Date Influenza Vaccine 9/1/2015 Pneumococcal Polysaccharide (PPSV-23) 8/4/2015 TD Vaccine 11/7/2005 TDAP Vaccine 8/14/2012 Not reviewed this visit You Were Diagnosed With   
  
 Codes Comments Healthcare maintenance    -  Primary ICD-10-CM: Z00.00 ICD-9-CM: V70.0 Gastric bypass status for obesity     ICD-10-CM: Z98.84 ICD-9-CM: V45.86 1/2017 Dr. Jaja Ackerman [FreeTextEntry1] : The natural history of prostate cancer and ongoing controversy regarding screening and potential treatment outcomes of prostate cancer has been discussed with the patient. The meaning of a false positive PSA and a false negative PSA has been discussed. He indicates understanding of the limitations of this screening test  REviewed ptions continue  surveillance , biopsy or mri  Risks and benefits reviewed will get mri to eval for target fuaion bx   cont daily tadalfil  can increase dose for intercourse and then skip Type 2 diabetes mellitus without complication, unspecified long term insulin use status (HCC)     ICD-10-CM: E11.9 ICD-9-CM: 250.00 diet controlled, now s/p gastric bypass 1/2017 H/O colonoscopy     ICD-10-CM: Z98.890 ICD-9-CM: V45.89 DDD (degenerative disc disease), lumbar     ICD-10-CM: M51.36 
ICD-9-CM: 722.52 Morbid obesity ([address]     ICD-10-CM: E66.01 
ICD-9-CM: 278.01 Anxiety     ICD-10-CM: F41.9 ICD-9-CM: 300.00 Anxiety     ICD-10-CM: F41.9 ICD-9-CM: 300.00 PHQ9 =8 Adjustment disorder with anxiety     ICD-10-CM: F43.22 
ICD-9-CM: 309.24 PHQ9 = 10 Vitals BP Pulse Temp Resp Height(growth percentile) Weight(growth percentile) 147/81 (BP 1 Location: Left arm, BP Patient Position: Sitting) 64 97.8 °F (36.6 °C) (Oral) 18 5' 5\" (1.651 m) 141 lb (64 kg) LMP SpO2 BMI OB Status Smoking Status 01/01/1985 100% 23.46 kg/m2 Hysterectomy Never Smoker Vitals History BMI and BSA Data Body Mass Index Body Surface Area  
 23.46 kg/m 2 1.71 m 2 Preferred Pharmacy Pharmacy Name Phone [facility]Y [id number] AT [facility] [phone] Your Updated Medication List  
  
   
This list is accurate as of: 12/19/17  2:09 PM.  Always use your most recent med list.  
  
  
  
  
 * albuterol 90 mcg/actuation inhaler Commonly known as:  PROAIR HFA Take 1 Puff by inhalation every four (4) hours as needed for Wheezing or Shortness of Breath. * albuterol 0.63 mg/3 mL nebulizer solution Commonly known as:  ACCUNEB  
3 ML BY NEBULIZATION ROUTE EVERY FOUR (4) HOURS AS NEEDED FOR WHEEZING OR SHORTNESS OF BREATH. alcohol swabs Padm Commonly known as:  ALCOHOL PADS Use when checking blood glucose ALPRAZolam 0.5 mg tablet Commonly known as:  Charlett Lester Take 1/2 or one whole pill twice daily as needed for anxiety  
  
 aspirin delayed-release 81 mg tablet Take  by mouth daily. Blood-Glucose Meter monitoring kit Use to check glucose once a day buPROPion  mg tablet Commonly known as:  Johnita Plump Take 1 Tab by mouth every morning. Indications: ANXIETY WITH DEPRESSION  
  
 dicyclomine 10 mg capsule Commonly known as:  BENTYL TAKE 1 (ONE) CAPSULE ORALLY AS NEEDED EVERY 6 HOURS  
  
 furosemide 20 mg tablet Commonly known as:  LASIX TAKE 1 TAB BY MOUTH AS NEEDED.  
  
 gabapentin 600 mg tablet Commonly known as:  NEURONTIN  
TAKE 1 TABLET BY MOUTH THREE (3) TIMES DAILY. glucose blood VI test strips strip Commonly known as:  blood glucose test  
Use once a day Lancets Misc Use once a day. Please give one compatible with patient's meter  
  
 metoprolol tartrate 25 mg tablet Commonly known as:  LOPRESSOR Take 1 Tab by mouth two (2) times a day. nitroglycerin 0.4 mg SL tablet Commonly known as:  NITROSTAT  
DISSOLVE 1 TABLET IN MOUTH EVERY 5 MINUTES AS NEEDED FOR CHEST PAIN  
  
 omeprazole 20 mg capsule Commonly known as:  PRILOSEC  
TAKE ONE CAPSULE BY MOUTH EVERY DAY  
  
 PULMICORT FLEXHALER 180 mcg/actuation Aepb inhaler Generic drug:  budesonide TAKE 1 PUFF BY INHALATION TWO (2) TIMES A DAY. INDICATIONS: MAINTENANCE THERAPY FOR ASTHMA  
  
 rosuvastatin 40 mg tablet Commonly known as:  CRESTOR  
TAKE 1 TABLET BY MOUTH NIGHTLY  
  
 valACYclovir 500 mg tablet Commonly known as:  VALTREX Take 1 Tab by mouth two (2) times a day. ZyrTEC 10 mg tablet Generic drug:  cetirizine Take  by mouth daily. * Notice: This list has 2 medication(s) that are the same as other medications prescribed for you. Read the directions carefully, and ask your doctor or other care provider to review them with you. Prescriptions Printed Refills ALPRAZolam (XANAX) 0.5 mg tablet 0 Sig: Take 1/2 or one whole pill twice daily as needed for anxiety Class: Print Prescriptions Sent to Pharmacy Refills buPROPion XL (WELLBUTRIN XL) 150 mg tablet 3 Sig: Take 1 Tab by mouth every morning. Indications: ANXIETY WITH DEPRESSION Class: Normal  
 Pharmacy: [id number] [facility], [address] [place] [address] Ph #: [phone] Route: Oral  
  
We Performed the Following ALT [id number] CPT(R)] AMB POC URINE, MICROALBUMIN, SEMIQUANT (3 RESULTS) [78684 CPT(R)] FERRITIN [54404 CPT(R)] HEMOGLOBIN A1C WITH EAG [60783 CPT(R)]  DIABETES EYE EXAM [HM6 Custom]  DIABETES FOOT EXAM [7 Custom] LDL, DIRECT [id number] CPT(R)] METABOLIC PANEL, BASIC [96416 CPT(R)] VITAMIN B12 [id number] CPT(R)] VITAMIN D, 25 HYDROXY [id number] CPT(R)] Patient Instructions Stay active Follow up with your specialists Obtain PCV 13 vaccine Labs today Introducing [facility] SERVICES! Dear Jc Michelle: 
Thank you for requesting a [employer] account. Our records indicate that you already have an active [employer] account. You can access your account anytime at [email or web address] Did you know that you can access your hospital and ER discharge instructions at any time in [employer]? You can also review all of your test results from your hospital stay or ER visit. Additional Information If you have questions, please visit the Frequently Asked Questions section of the [employer] website at [email or web address] Remember, Nov[employer]is is NOT to be used for urgent needs. For medical emergencies, dial 911. Now available from your iPhone and Android! Please provide this summary of care documentation to your next provider. Your primary care clinician is listed as Wilian Kenyon. If you have any questions after today's visit, please call [phone].

## 2023-08-10 NOTE — PROGRESS NOTES
08/10/23 1547   Weaning Parameters   Respiratory Rate Observed 42   RSBI 144     Switched patient to previous rate   Saturations dropped to 70%

## 2023-08-10 NOTE — PROGRESS NOTES
08/10/23 1552   Oxygen Therapy/Pulse Ox   O2 Device Ventilator   FiO2  100 %   Pulse 76   Respirations (!) 33   SpO2 (!) 82 %     RN and NP aware

## 2023-08-10 NOTE — PROGRESS NOTES
08/10/23 1350   Weaning Parameters   Spontaneous Breathing Trial Complete Yes   Respiratory Rate Observed 25   Ve 7.75      RSBI 80     SBT 5/5

## 2023-08-10 NOTE — PROGRESS NOTES
Interventional Radiology Procedure Note    Procedure Date:  8/10/2023    Procedure:  Abdominal fluid collection drain removal    :  Ervin Dao NP    Attending:  Patricio Harris MD    Technique: The skin was prepped and draped in sterile fashion. The abdominal fluid collection drain was transected to release the pigtail and then removed. Pressure was applied locally at the dermatotomy site. A dry sterile occlusive dressing was applied. Complications:  none    Estimated Blood Loss:  < 5 ml    Specimens:  none    Procedure Findings:  Successful removal of the abdominal fluid collection drain. Condition:  The patient tolerated the procedure without difficulty and remained in stable condition throughout.

## 2023-08-10 NOTE — PROGRESS NOTES
Occupational Therapy:     Chart reviewed in prep for PT re-evaluation and tx s/p transfer to ICU. Pt currently on SAT will defer and follow up pending medical stability.      Thank you,   Radha Aggarwal, PT, DPT

## 2023-08-10 NOTE — PROGRESS NOTES
Occupational Therapy:     Chart reviewed in prep for OT re-evaluation and tx s/p transfer to ICU. Pt currently on SAT will defer and follow up pending medical stability.      Thank you,   Virgil Stevenson, OTD, OTR/L

## 2023-08-10 NOTE — CARE COORDINATION
Transition of Care Plan:     RUR: 24% high  Prior Level of Functioning: Max assist with ADL's  Disposition: IPR vs SNF  DME needed: TBD  Transportation at discharge: BLS  IM/[facility] Medicare 7/29   Is patient a [place] and connected with VA? No   Caregiver Contact: Spouse Que Smith: [phone]  Care Conference needed? No  Barriers to discharge:    Remains intubated   For Surgical debridement of sacral wound. Care management is continuing to follow.   Volodymyr Bull RN,Care Management

## 2023-08-11 ENCOUNTER — APPOINTMENT (OUTPATIENT)
Facility: HOSPITAL | Age: 63
DRG: 004 | End: 2023-08-11
Payer: MEDICARE

## 2023-08-11 LAB
ANION GAP SERPL CALC-SCNC: 5 MMOL/L (ref 5–15)
ARTERIAL PATENCY WRIST A: POSITIVE
BACTERIA SPEC CULT: NORMAL
BACTERIA SPEC CULT: NORMAL
BASE EXCESS BLD CALC-SCNC: 6.4 MMOL/L
BASOPHILS # BLD: 0 K/UL (ref 0–0.1)
BASOPHILS NFR BLD: 0 % (ref 0–1)
BDY SITE: ABNORMAL
BUN SERPL-MCNC: 28 MG/DL (ref 6–20)
BUN/CREAT SERPL: 64 (ref 12–20)
CALCIUM SERPL-MCNC: 8.6 MG/DL (ref 8.5–10.1)
CHLORIDE SERPL-SCNC: 108 MMOL/L (ref 97–108)
CO2 SERPL-SCNC: 30 MMOL/L (ref 21–32)
CREAT SERPL-MCNC: 0.44 MG/DL (ref 0.55–1.02)
DIFFERENTIAL METHOD BLD: ABNORMAL
EOSINOPHIL # BLD: 0.3 K/UL (ref 0–0.4)
EOSINOPHIL NFR BLD: 4 % (ref 0–7)
ERYTHROCYTE [DISTWIDTH] IN BLOOD BY AUTOMATED COUNT: 18.6 % (ref 11.5–14.5)
GAS FLOW.O2 O2 DELIVERY SYS: ABNORMAL
GAS FLOW.O2 SETTING OXYMISER: 14 BPM
GLUCOSE BLD STRIP.AUTO-MCNC: 115 MG/DL (ref 65–117)
GLUCOSE BLD STRIP.AUTO-MCNC: 119 MG/DL (ref 65–117)
GLUCOSE BLD STRIP.AUTO-MCNC: 126 MG/DL (ref 65–117)
GLUCOSE BLD STRIP.AUTO-MCNC: 136 MG/DL (ref 65–117)
GLUCOSE SERPL-MCNC: 121 MG/DL (ref 65–100)
HCO3 BLD-SCNC: 28.9 MMOL/L (ref 22–26)
HCT VFR BLD AUTO: 24.5 % (ref 35–47)
HGB BLD-MCNC: 8 G/DL (ref 11.5–16)
IMM GRANULOCYTES # BLD AUTO: 0.1 K/UL (ref 0–0.04)
IMM GRANULOCYTES NFR BLD AUTO: 1 % (ref 0–0.5)
LYMPHOCYTES # BLD: 0.5 K/UL (ref 0.8–3.5)
LYMPHOCYTES NFR BLD: 7 % (ref 12–49)
MAGNESIUM SERPL-MCNC: 1.9 MG/DL (ref 1.6–2.4)
MCH RBC QN AUTO: 28.4 PG (ref 26–34)
MCHC RBC AUTO-ENTMCNC: 32.7 G/DL (ref 30–36.5)
MCV RBC AUTO: 86.9 FL (ref 80–99)
MONOCYTES # BLD: 0.5 K/UL (ref 0–1)
MONOCYTES NFR BLD: 6 % (ref 5–13)
NEUTS SEG # BLD: 6.4 K/UL (ref 1.8–8)
NEUTS SEG NFR BLD: 82 % (ref 32–75)
NRBC # BLD: 0 K/UL (ref 0–0.01)
NRBC BLD-RTO: 0 PER 100 WBC
O2/TOTAL GAS SETTING VFR VENT: 50 %
PAW @ MEAN EXP FLOW ON VENT: 15 CMH2O
PCO2 BLD: 32.3 MMHG (ref 35–45)
PEEP RESPIRATORY: 5 CMH2O
PH BLD: 7.56 (ref 7.35–7.45)
PHOSPHATE SERPL-MCNC: 2.3 MG/DL (ref 2.6–4.7)
PLATELET # BLD AUTO: 189 K/UL (ref 150–400)
PMV BLD AUTO: 9.5 FL (ref 8.9–12.9)
PO2 BLD: 72 MMHG (ref 80–100)
POTASSIUM SERPL-SCNC: 3.1 MMOL/L (ref 3.5–5.1)
RBC # BLD AUTO: 2.82 M/UL (ref 3.8–5.2)
RBC MORPH BLD: ABNORMAL
SAO2 % BLD: 96.4 % (ref 92–97)
SERVICE CMNT-IMP: ABNORMAL
SERVICE CMNT-IMP: NORMAL
SODIUM SERPL-SCNC: 143 MMOL/L (ref 136–145)
SPECIMEN TYPE: ABNORMAL
VANCOMYCIN SERPL-MCNC: 17.9 UG/ML
VENTILATION MODE VENT: ABNORMAL
VT SETTING VENT: 330 ML
WBC # BLD AUTO: 7.8 K/UL (ref 3.6–11)

## 2023-08-11 PROCEDURE — 83735 ASSAY OF MAGNESIUM: CPT

## 2023-08-11 PROCEDURE — 2580000003 HC RX 258: Performed by: SURGERY

## 2023-08-11 PROCEDURE — 99232 SBSQ HOSP IP/OBS MODERATE 35: CPT | Performed by: INTERNAL MEDICINE

## 2023-08-11 PROCEDURE — 94760 N-INVAS EAR/PLS OXIMETRY 1: CPT

## 2023-08-11 PROCEDURE — 2580000003 HC RX 258: Performed by: NURSE PRACTITIONER

## 2023-08-11 PROCEDURE — 6360000002 HC RX W HCPCS: Performed by: SURGERY

## 2023-08-11 PROCEDURE — 36415 COLL VENOUS BLD VENIPUNCTURE: CPT

## 2023-08-11 PROCEDURE — 70450 CT HEAD/BRAIN W/O DYE: CPT

## 2023-08-11 PROCEDURE — 6360000002 HC RX W HCPCS: Performed by: FAMILY MEDICINE

## 2023-08-11 PROCEDURE — 85025 COMPLETE CBC W/AUTO DIFF WBC: CPT

## 2023-08-11 PROCEDURE — 82962 GLUCOSE BLOOD TEST: CPT

## 2023-08-11 PROCEDURE — 2580000003 HC RX 258: Performed by: FAMILY MEDICINE

## 2023-08-11 PROCEDURE — A4216 STERILE WATER/SALINE, 10 ML: HCPCS | Performed by: SURGERY

## 2023-08-11 PROCEDURE — 99232 SBSQ HOSP IP/OBS MODERATE 35: CPT | Performed by: SURGERY

## 2023-08-11 PROCEDURE — 36600 WITHDRAWAL OF ARTERIAL BLOOD: CPT

## 2023-08-11 PROCEDURE — 2000000000 HC ICU R&B

## 2023-08-11 PROCEDURE — C9113 INJ PANTOPRAZOLE SODIUM, VIA: HCPCS | Performed by: SURGERY

## 2023-08-11 PROCEDURE — 80048 BASIC METABOLIC PNL TOTAL CA: CPT

## 2023-08-11 PROCEDURE — 6360000002 HC RX W HCPCS

## 2023-08-11 PROCEDURE — 6360000002 HC RX W HCPCS: Performed by: NURSE PRACTITIONER

## 2023-08-11 PROCEDURE — 80202 ASSAY OF VANCOMYCIN: CPT

## 2023-08-11 PROCEDURE — 6370000000 HC RX 637 (ALT 250 FOR IP): Performed by: FAMILY MEDICINE

## 2023-08-11 PROCEDURE — 2500000003 HC RX 250 WO HCPCS: Performed by: NURSE PRACTITIONER

## 2023-08-11 PROCEDURE — 2580000003 HC RX 258: Performed by: INTERNAL MEDICINE

## 2023-08-11 PROCEDURE — 6370000000 HC RX 637 (ALT 250 FOR IP): Performed by: ANESTHESIOLOGY

## 2023-08-11 PROCEDURE — 82803 BLOOD GASES ANY COMBINATION: CPT

## 2023-08-11 PROCEDURE — 84100 ASSAY OF PHOSPHORUS: CPT

## 2023-08-11 PROCEDURE — 94003 VENT MGMT INPAT SUBQ DAY: CPT

## 2023-08-11 RX ORDER — SODIUM CHLORIDE 9 MG/ML
25 INJECTION, SOLUTION INTRAVENOUS PRN
Status: DISCONTINUED | OUTPATIENT
Start: 2023-08-11 | End: 2023-10-20 | Stop reason: HOSPADM

## 2023-08-11 RX ORDER — MAGNESIUM SULFATE IN WATER 40 MG/ML
2000 INJECTION, SOLUTION INTRAVENOUS ONCE
Status: COMPLETED | OUTPATIENT
Start: 2023-08-11 | End: 2023-08-11

## 2023-08-11 RX ORDER — SODIUM CHLORIDE 0.9 % (FLUSH) 0.9 %
5-40 SYRINGE (ML) INJECTION PRN
Status: DISCONTINUED | OUTPATIENT
Start: 2023-08-11 | End: 2023-09-22

## 2023-08-11 RX ORDER — SODIUM CHLORIDE 0.9 % (FLUSH) 0.9 %
5-40 SYRINGE (ML) INJECTION EVERY 12 HOURS SCHEDULED
Status: DISCONTINUED | OUTPATIENT
Start: 2023-08-11 | End: 2023-09-22

## 2023-08-11 RX ORDER — POTASSIUM CHLORIDE 29.8 MG/ML
20 INJECTION INTRAVENOUS
Status: COMPLETED | OUTPATIENT
Start: 2023-08-11 | End: 2023-08-11

## 2023-08-11 RX ORDER — FUROSEMIDE 10 MG/ML
40 INJECTION INTRAMUSCULAR; INTRAVENOUS ONCE
Status: COMPLETED | OUTPATIENT
Start: 2023-08-11 | End: 2023-08-11

## 2023-08-11 RX ADMIN — POTASSIUM CHLORIDE 20 MEQ: 29.8 INJECTION, SOLUTION INTRAVENOUS at 08:36

## 2023-08-11 RX ADMIN — METRONIDAZOLE 500 MG: 5 INJECTION, SOLUTION INTRAVENOUS at 22:08

## 2023-08-11 RX ADMIN — SUCRALFATE 1 G: 1 TABLET ORAL at 05:51

## 2023-08-11 RX ADMIN — PIPERACILLIN AND TAZOBACTAM 3375 MG: 3; .375 INJECTION, POWDER, LYOPHILIZED, FOR SOLUTION INTRAVENOUS at 01:31

## 2023-08-11 RX ADMIN — METRONIDAZOLE 500 MG: 5 INJECTION, SOLUTION INTRAVENOUS at 14:10

## 2023-08-11 RX ADMIN — POTASSIUM CHLORIDE: 2 INJECTION, SOLUTION, CONCENTRATE INTRAVENOUS at 19:14

## 2023-08-11 RX ADMIN — Medication: at 15:49

## 2023-08-11 RX ADMIN — VANCOMYCIN HYDROCHLORIDE 1250 MG: 1.25 INJECTION, POWDER, LYOPHILIZED, FOR SOLUTION INTRAVENOUS at 10:40

## 2023-08-11 RX ADMIN — CHLORHEXIDINE GLUCONATE 15 ML: 1.2 RINSE ORAL at 08:10

## 2023-08-11 RX ADMIN — FUROSEMIDE 40 MG: 10 INJECTION, SOLUTION INTRAMUSCULAR; INTRAVENOUS at 12:50

## 2023-08-11 RX ADMIN — SUCRALFATE 1 G: 1 TABLET ORAL at 17:28

## 2023-08-11 RX ADMIN — HYDROMORPHONE HYDROCHLORIDE 0.5 MG: 1 INJECTION, SOLUTION INTRAMUSCULAR; INTRAVENOUS; SUBCUTANEOUS at 08:30

## 2023-08-11 RX ADMIN — Medication: at 08:10

## 2023-08-11 RX ADMIN — Medication 10 ML: at 08:11

## 2023-08-11 RX ADMIN — SODIUM CHLORIDE, PRESERVATIVE FREE 30 ML: 5 INJECTION INTRAVENOUS at 20:18

## 2023-08-11 RX ADMIN — SUCRALFATE 1 G: 1 TABLET ORAL at 10:41

## 2023-08-11 RX ADMIN — HYDROMORPHONE HYDROCHLORIDE 0.5 MG: 1 INJECTION, SOLUTION INTRAMUSCULAR; INTRAVENOUS; SUBCUTANEOUS at 12:14

## 2023-08-11 RX ADMIN — HYDROMORPHONE HYDROCHLORIDE 0.5 MG: 1 INJECTION, SOLUTION INTRAMUSCULAR; INTRAVENOUS; SUBCUTANEOUS at 16:21

## 2023-08-11 RX ADMIN — SODIUM CHLORIDE, PRESERVATIVE FREE 40 MG: 5 INJECTION INTRAVENOUS at 11:43

## 2023-08-11 RX ADMIN — DEXMEDETOMIDINE HYDROCHLORIDE 0.1 MCG/KG/HR: 4 INJECTION, SOLUTION INTRAVENOUS at 23:57

## 2023-08-11 RX ADMIN — COLLAGENASE SANTYL: 250 OINTMENT TOPICAL at 08:10

## 2023-08-11 RX ADMIN — METRONIDAZOLE 500 MG: 5 INJECTION, SOLUTION INTRAVENOUS at 05:51

## 2023-08-11 RX ADMIN — ACETAMINOPHEN 650 MG: 325 TABLET ORAL at 12:15

## 2023-08-11 RX ADMIN — CHLORHEXIDINE GLUCONATE 15 ML: 1.2 RINSE ORAL at 22:08

## 2023-08-11 RX ADMIN — HYDROMORPHONE HYDROCHLORIDE 0.5 MG: 1 INJECTION, SOLUTION INTRAMUSCULAR; INTRAVENOUS; SUBCUTANEOUS at 20:14

## 2023-08-11 RX ADMIN — MAGNESIUM SULFATE HEPTAHYDRATE 2000 MG: 40 INJECTION, SOLUTION INTRAVENOUS at 08:15

## 2023-08-11 RX ADMIN — POTASSIUM CHLORIDE 20 MEQ: 29.8 INJECTION, SOLUTION INTRAVENOUS at 10:39

## 2023-08-11 RX ADMIN — I.V. FAT EMULSION 250 ML: 20 EMULSION INTRAVENOUS at 19:14

## 2023-08-11 RX ADMIN — SUCRALFATE 1 G: 1 TABLET ORAL at 20:19

## 2023-08-11 RX ADMIN — PIPERACILLIN AND TAZOBACTAM 3375 MG: 3; .375 INJECTION, POWDER, LYOPHILIZED, FOR SOLUTION INTRAVENOUS at 08:33

## 2023-08-11 RX ADMIN — SODIUM CHLORIDE, PRESERVATIVE FREE 10 ML: 5 INJECTION INTRAVENOUS at 08:11

## 2023-08-11 RX ADMIN — POTASSIUM CHLORIDE 20 MEQ: 29.8 INJECTION, SOLUTION INTRAVENOUS at 09:34

## 2023-08-11 RX ADMIN — Medication: at 01:28

## 2023-08-11 RX ADMIN — PIPERACILLIN AND TAZOBACTAM 3375 MG: 3; .375 INJECTION, POWDER, LYOPHILIZED, FOR SOLUTION INTRAVENOUS at 15:47

## 2023-08-11 RX ADMIN — SODIUM CHLORIDE, PRESERVATIVE FREE 40 MG: 5 INJECTION INTRAVENOUS at 01:31

## 2023-08-11 ASSESSMENT — PAIN DESCRIPTION - LOCATION
LOCATION: ABDOMEN
LOCATION: ABDOMEN

## 2023-08-11 ASSESSMENT — PAIN DESCRIPTION - ORIENTATION: ORIENTATION: MID

## 2023-08-11 ASSESSMENT — PULMONARY FUNCTION TESTS
PIF_VALUE: 23
PIF_VALUE: 14
PIF_VALUE: 27
PIF_VALUE: 14
PIF_VALUE: 31

## 2023-08-11 ASSESSMENT — PAIN SCALES - GENERAL
PAINLEVEL_OUTOF10: 9
PAINLEVEL_OUTOF10: 0
PAINLEVEL_OUTOF10: 0

## 2023-08-11 NOTE — PROGRESS NOTES
Occupational Therapy:     Chart reviewed in prep for OT re-evaluation and tx. Spoke with RN at bedside who reports potential weaning to extubation today. Will hold at this time and follow up this PM as able and appropriate.      Thank you,   Ynes Glynn, OTKIMI, OTR/L

## 2023-08-11 NOTE — PROGRESS NOTES
Pharmacist Note - Vancomycin Dosing  Therapy day 6  Indication: intra-abdominal abscess, possible peritonitis  Current regimen:  1250mg q24h    Recent Labs     08/09/23  0614 08/09/23  0624 08/09/23  1459 08/10/23  0650 08/11/23  0540   WBC 9.3  --   --  8.4 7.8   CREATININE 0.46*   < > 0.44* 0.40* 0.44*   BUN 21*   < > 22* 26* 28*    < > = values in this interval not displayed. A random vancomycin level of 17.9 mcg/mL was obtained and from this level, the patient's AUC24 is calculated to be 489 with the current regimen. Goal target range AUC/WILLEM 400-600      Plan: Continue current regimen. Pharmacy will continue to monitor this patient daily for changes in clinical status and renal function. *Random vancomycin levels are used to calculate AUC/WILLEM, this level should not be interpreted as a trough. Vancomycin has been dosed using Bayesian kinetics software to target an AUC24:WILLEM of 400-600, which provides adequate exposure for as assumed infection due to MRSA with an WILLEM of 1 or less while reducing the risk of nephrotoxicity as seen with traditional trough based dosing goals.

## 2023-08-11 NOTE — PROGRESS NOTES
Physical Therapy:  08/11/23     Chart reviewed in preparation for PT treatment. Spoke to RN, patient currently on SBT with possible extubation later this morning. Will defer pending possible extubation and continue to follow as able.      Thank you,  Kadi Horne, PT, DPT

## 2023-08-11 NOTE — PROGRESS NOTES
RT Note:  Vent assessment    Pt easily agitated. NARD noted but tachypnea at times.      08/10/23 2058 08/10/23 2100   Vent Information   Vent Mode AC/VC  --    Ventilator Settings   FiO2  (S)  100 %  (weaned to 60% per protocol) (S)  60 %  (weaned per protocol)   Vt (Set, mL) 330 mL  --    Resp Rate (Set) 14 bmp  --    PEEP/CPAP (cmH2O) 5  --    Vent Patient Data (Readings)   Vt (Measured) 342 mL  --    Peak Inspiratory Pressure (cmH2O) 27 cmH2O  --    Rate Measured 25 br/min  --    Minute Volume (L/min) 8.39 Liters  --    Peak Inspiratory Flow (lpm) 42 L/sec  (1:4)  --    Mean Airway Pressure (cmH2O) 12 cmH20  --    Plateau Pressure (cm H2O) 25 cm H2O  --    Driving Pressure 20  --    I:E Ratio 1:1.9  --    Flow Sensitivity 3 L/min  --    PEEP Intrinsic (cm H2O)   (unable to obtain d/t spont breathing)  --    Static Compliance (L/cm H2O) 14  --

## 2023-08-11 NOTE — PROGRESS NOTES
0810-pt. Reports decreased sensation on right arm/leg. Notified Jake Reynoso NP who ordered head CT w/o contrast.  Received order to place bilat wrist restraints from Jake Reynoso NP to prevent patient from pulling out ETT.

## 2023-08-11 NOTE — PROGRESS NOTES
CRITICAL CARE NOTE      Name: Macy Gaona   : 1960   MRN: [id number]   Date: 2023      REASON FOR ICU ADMISSION: Acute hypoxic respiratory failure    PRINCIPAL ICU DIAGNOSIS   Acute hypoxic respiratory failure    BRIEF PATIENT SUMMARY   Luan Boo is a 59-year-old female with past medical history of gastric bypass in 2017, asthma, CAD, DM, hyperlipidemia, GERD, PE, ANCA, anxiety, depression, who presented to ED on  from [facility] for weakness and hematemesis. Back in March she was seen for fulminant C. difficile colitis and underwent subtotal colectomy with ileostomy. She was subsequently reversed on . She was admitted to [facility] from  - . During that admission she underwent EGD showing GJ ulcer and stenosis at [address] [facility] junction. On  she went to the OR for an ex lap for free air and free fluid but there was no sign of perforation. General surgery was consulted during this admission and has been following. No surgical intervention has been recommended. At this time it appears she has a fluid collection as opposed to a perforation. It was recommended for IR to drain this collection which was performed on . GI has been consulted for GIB. She was started on PPI twice daily and Carafate 4 times daily. Eliquis and meloxicam were held. On  she underwent EGD which found a nonbleeding anastomotic ulcer. On  she had IVC filter placed. Overnight on  a rapid response was called for hypoxia and respiratory distress. She was transferred ICU on BiPAP. She was ultimately intubated for hypoxic respiratory failure. This appears to be due from pulmonary edema. She was aggressively diuresed with Lasix. COMPREHENSIVE ASSESSMENT & PLAN:SYSTEM BASED     24 HOUR EVENTS: Will hold on diuresis today. Plan for SAT/SBT this morning. She tolerated 5/5 for 2 hours yesterday.     NEUROLOGICAL:  Depression   Close neurologic monitoring  RASS goal 0 to -1  Sedation:

## 2023-08-11 NOTE — PROGRESS NOTES
08/11/23 1002   Weaning Parameters   Spontaneous Breathing Trial Complete Yes  (PS 8/5)   Respiratory Rate Observed 41   Ve 12.4      RSBI 139

## 2023-08-11 NOTE — CARE COORDINATION
Transition of Care Plan:     RUR: 24% high  Prior Level of Functioning: Max assist with ADL's  Disposition: IPR vs SNF  DME needed: TBD  Transportation at discharge: BLS  IM/[facility] Medicare 7/29   Is patient a [place] and connected with VA? No   Caregiver Contact: Spouse Luis Argueta: [phone]  Care Conference needed? No  Barriers to discharge:    Remains intubated, per notes patient is alert off sedation. For Surgical debridement of sacral wound. Care management is continuing to follow.   Roland Jordan RN,Care Management

## 2023-08-11 NOTE — PROGRESS NOTES
RT Note:  ABG    No changes made after ABG. Pt breathing over vent with resp rate at 30.      Latest Reference Range & Units 08/11/23 05:13   HCO3, Mixed 22 - 26 MMOL/L 28.9 (H)   Set Rate bpm 14   DEVICE -   ADULT VENT   Site -   LEFT RADIAL   Mode -   Volume Control   POC Cory's Test -   Positive   POC TIDAL VOLUME ml 330   FIO2 % 50   pH, Arterial, POC 7.35 - 7.45   7.56 (HH)   pCO2, Arterial, POC 35.0 - 45.0 MMHG 32.3 (L)   pO2, Arterial, POC 80 - 100 MMHG 72 (L)   SO2c, Arterial, POC 92 - 97 % 96.4   Base Excess mmol/L 6.4   POC PEEP cmH2O 5

## 2023-08-12 LAB
ANION GAP SERPL CALC-SCNC: 6 MMOL/L (ref 5–15)
BASOPHILS # BLD: 0 K/UL (ref 0–0.1)
BASOPHILS # BLD: 0.1 K/UL (ref 0–0.1)
BASOPHILS NFR BLD: 0 % (ref 0–1)
BASOPHILS NFR BLD: 1 % (ref 0–1)
BUN SERPL-MCNC: 32 MG/DL (ref 6–20)
BUN/CREAT SERPL: 73 (ref 12–20)
CALCIUM SERPL-MCNC: 8.8 MG/DL (ref 8.5–10.1)
CHLORIDE SERPL-SCNC: 110 MMOL/L (ref 97–108)
CO2 SERPL-SCNC: 27 MMOL/L (ref 21–32)
CREAT SERPL-MCNC: 0.44 MG/DL (ref 0.55–1.02)
DIFFERENTIAL METHOD BLD: ABNORMAL
DIFFERENTIAL METHOD BLD: ABNORMAL
EOSINOPHIL # BLD: 0.2 K/UL (ref 0–0.4)
EOSINOPHIL # BLD: 0.4 K/UL (ref 0–0.4)
EOSINOPHIL NFR BLD: 3 % (ref 0–7)
EOSINOPHIL NFR BLD: 6 % (ref 0–7)
ERYTHROCYTE [DISTWIDTH] IN BLOOD BY AUTOMATED COUNT: 17.8 % (ref 11.5–14.5)
ERYTHROCYTE [DISTWIDTH] IN BLOOD BY AUTOMATED COUNT: 18 % (ref 11.5–14.5)
GLUCOSE BLD STRIP.AUTO-MCNC: 116 MG/DL (ref 65–117)
GLUCOSE BLD STRIP.AUTO-MCNC: 124 MG/DL (ref 65–117)
GLUCOSE BLD STRIP.AUTO-MCNC: 128 MG/DL (ref 65–117)
GLUCOSE BLD STRIP.AUTO-MCNC: 129 MG/DL (ref 65–117)
GLUCOSE SERPL-MCNC: 126 MG/DL (ref 65–100)
HCT VFR BLD AUTO: 26.1 % (ref 35–47)
HCT VFR BLD AUTO: 26.3 % (ref 35–47)
HGB BLD-MCNC: 8.2 G/DL (ref 11.5–16)
HGB BLD-MCNC: 8.3 G/DL (ref 11.5–16)
IMM GRANULOCYTES # BLD AUTO: 0 K/UL
IMM GRANULOCYTES # BLD AUTO: 0 K/UL
IMM GRANULOCYTES NFR BLD AUTO: 0 %
IMM GRANULOCYTES NFR BLD AUTO: 0 %
LYMPHOCYTES # BLD: 0.4 K/UL (ref 0.8–3.5)
LYMPHOCYTES # BLD: 0.8 K/UL (ref 0.8–3.5)
LYMPHOCYTES NFR BLD: 12 % (ref 12–49)
LYMPHOCYTES NFR BLD: 5 % (ref 12–49)
MCH RBC QN AUTO: 28.1 PG (ref 26–34)
MCH RBC QN AUTO: 28.3 PG (ref 26–34)
MCHC RBC AUTO-ENTMCNC: 31.4 G/DL (ref 30–36.5)
MCHC RBC AUTO-ENTMCNC: 31.6 G/DL (ref 30–36.5)
MCV RBC AUTO: 89.4 FL (ref 80–99)
MCV RBC AUTO: 89.8 FL (ref 80–99)
MONOCYTES # BLD: 0.2 K/UL (ref 0–1)
MONOCYTES # BLD: 0.4 K/UL (ref 0–1)
MONOCYTES NFR BLD: 3 % (ref 5–13)
MONOCYTES NFR BLD: 6 % (ref 5–13)
NEUTS SEG # BLD: 4.8 K/UL (ref 1.8–8)
NEUTS SEG # BLD: 6.3 K/UL (ref 1.8–8)
NEUTS SEG NFR BLD: 78 % (ref 32–75)
NEUTS SEG NFR BLD: 86 % (ref 32–75)
NRBC # BLD: 0 K/UL (ref 0–0.01)
NRBC # BLD: 0.02 K/UL (ref 0–0.01)
NRBC BLD-RTO: 0 PER 100 WBC
NRBC BLD-RTO: 0.3 PER 100 WBC
PLATELET # BLD AUTO: 217 K/UL (ref 150–400)
PLATELET # BLD AUTO: 239 K/UL (ref 150–400)
PMV BLD AUTO: 10.3 FL (ref 8.9–12.9)
PMV BLD AUTO: 9.8 FL (ref 8.9–12.9)
POTASSIUM SERPL-SCNC: 3.8 MMOL/L (ref 3.5–5.1)
RBC # BLD AUTO: 2.92 M/UL (ref 3.8–5.2)
RBC # BLD AUTO: 2.93 M/UL (ref 3.8–5.2)
RBC MORPH BLD: ABNORMAL
RBC MORPH BLD: ABNORMAL
SERVICE CMNT-IMP: ABNORMAL
SERVICE CMNT-IMP: NORMAL
SODIUM SERPL-SCNC: 143 MMOL/L (ref 136–145)
WBC # BLD AUTO: 6.3 K/UL (ref 3.6–11)
WBC # BLD AUTO: 7.3 K/UL (ref 3.6–11)

## 2023-08-12 PROCEDURE — 80048 BASIC METABOLIC PNL TOTAL CA: CPT

## 2023-08-12 PROCEDURE — 6360000002 HC RX W HCPCS: Performed by: FAMILY MEDICINE

## 2023-08-12 PROCEDURE — 2500000003 HC RX 250 WO HCPCS: Performed by: ANESTHESIOLOGY

## 2023-08-12 PROCEDURE — C9113 INJ PANTOPRAZOLE SODIUM, VIA: HCPCS | Performed by: SURGERY

## 2023-08-12 PROCEDURE — 6360000002 HC RX W HCPCS

## 2023-08-12 PROCEDURE — 99232 SBSQ HOSP IP/OBS MODERATE 35: CPT | Performed by: SURGERY

## 2023-08-12 PROCEDURE — 6360000002 HC RX W HCPCS: Performed by: ANESTHESIOLOGY

## 2023-08-12 PROCEDURE — 6360000002 HC RX W HCPCS: Performed by: NURSE PRACTITIONER

## 2023-08-12 PROCEDURE — 6370000000 HC RX 637 (ALT 250 FOR IP): Performed by: ANESTHESIOLOGY

## 2023-08-12 PROCEDURE — 94760 N-INVAS EAR/PLS OXIMETRY 1: CPT

## 2023-08-12 PROCEDURE — A4216 STERILE WATER/SALINE, 10 ML: HCPCS | Performed by: SURGERY

## 2023-08-12 PROCEDURE — 82962 GLUCOSE BLOOD TEST: CPT

## 2023-08-12 PROCEDURE — 6360000002 HC RX W HCPCS: Performed by: SURGERY

## 2023-08-12 PROCEDURE — 2000000000 HC ICU R&B

## 2023-08-12 PROCEDURE — 94003 VENT MGMT INPAT SUBQ DAY: CPT

## 2023-08-12 PROCEDURE — 2580000003 HC RX 258: Performed by: FAMILY MEDICINE

## 2023-08-12 PROCEDURE — 6370000000 HC RX 637 (ALT 250 FOR IP): Performed by: NURSE PRACTITIONER

## 2023-08-12 PROCEDURE — 2500000003 HC RX 250 WO HCPCS: Performed by: NURSE PRACTITIONER

## 2023-08-12 PROCEDURE — 6370000000 HC RX 637 (ALT 250 FOR IP): Performed by: STUDENT IN AN ORGANIZED HEALTH CARE EDUCATION/TRAINING PROGRAM

## 2023-08-12 PROCEDURE — 85025 COMPLETE CBC W/AUTO DIFF WBC: CPT

## 2023-08-12 PROCEDURE — 2580000003 HC RX 258: Performed by: ANESTHESIOLOGY

## 2023-08-12 PROCEDURE — 2580000003 HC RX 258: Performed by: INTERNAL MEDICINE

## 2023-08-12 PROCEDURE — 36415 COLL VENOUS BLD VENIPUNCTURE: CPT

## 2023-08-12 PROCEDURE — 2580000003 HC RX 258: Performed by: SURGERY

## 2023-08-12 RX ADMIN — VANCOMYCIN HYDROCHLORIDE 1250 MG: 1.25 INJECTION, POWDER, LYOPHILIZED, FOR SOLUTION INTRAVENOUS at 10:49

## 2023-08-12 RX ADMIN — POTASSIUM CHLORIDE: 2 INJECTION, SOLUTION, CONCENTRATE INTRAVENOUS at 18:26

## 2023-08-12 RX ADMIN — SUCRALFATE 1 G: 1 TABLET ORAL at 17:09

## 2023-08-12 RX ADMIN — HYDROMORPHONE HYDROCHLORIDE 0.5 MG: 1 INJECTION, SOLUTION INTRAMUSCULAR; INTRAVENOUS; SUBCUTANEOUS at 17:10

## 2023-08-12 RX ADMIN — Medication: at 17:09

## 2023-08-12 RX ADMIN — I.V. FAT EMULSION 250 ML: 20 EMULSION INTRAVENOUS at 18:27

## 2023-08-12 RX ADMIN — METRONIDAZOLE 500 MG: 5 INJECTION, SOLUTION INTRAVENOUS at 14:29

## 2023-08-12 RX ADMIN — CHLORHEXIDINE GLUCONATE 15 ML: 1.2 RINSE ORAL at 22:05

## 2023-08-12 RX ADMIN — METRONIDAZOLE 500 MG: 5 INJECTION, SOLUTION INTRAVENOUS at 22:05

## 2023-08-12 RX ADMIN — COLLAGENASE SANTYL: 250 OINTMENT TOPICAL at 08:46

## 2023-08-12 RX ADMIN — CHLORHEXIDINE GLUCONATE 15 ML: 1.2 RINSE ORAL at 08:47

## 2023-08-12 RX ADMIN — SODIUM CHLORIDE, PRESERVATIVE FREE 40 MG: 5 INJECTION INTRAVENOUS at 12:11

## 2023-08-12 RX ADMIN — DEXMEDETOMIDINE HYDROCHLORIDE 0.3 MCG/KG/HR: 4 INJECTION, SOLUTION INTRAVENOUS at 14:29

## 2023-08-12 RX ADMIN — Medication: at 00:00

## 2023-08-12 RX ADMIN — HYDROMORPHONE HYDROCHLORIDE 0.5 MG: 1 INJECTION, SOLUTION INTRAMUSCULAR; INTRAVENOUS; SUBCUTANEOUS at 19:55

## 2023-08-12 RX ADMIN — PIPERACILLIN AND TAZOBACTAM 3375 MG: 3; .375 INJECTION, POWDER, LYOPHILIZED, FOR SOLUTION INTRAVENOUS at 00:01

## 2023-08-12 RX ADMIN — HYDROMORPHONE HYDROCHLORIDE 0.5 MG: 1 INJECTION, SOLUTION INTRAMUSCULAR; INTRAVENOUS; SUBCUTANEOUS at 14:29

## 2023-08-12 RX ADMIN — PIPERACILLIN AND TAZOBACTAM 3375 MG: 3; .375 INJECTION, POWDER, LYOPHILIZED, FOR SOLUTION INTRAVENOUS at 16:40

## 2023-08-12 RX ADMIN — HYDROMORPHONE HYDROCHLORIDE 0.5 MG: 1 INJECTION, SOLUTION INTRAMUSCULAR; INTRAVENOUS; SUBCUTANEOUS at 22:57

## 2023-08-12 RX ADMIN — SUCRALFATE 1 G: 1 TABLET ORAL at 10:49

## 2023-08-12 RX ADMIN — HYDROMORPHONE HYDROCHLORIDE 0.5 MG: 1 INJECTION, SOLUTION INTRAMUSCULAR; INTRAVENOUS; SUBCUTANEOUS at 00:11

## 2023-08-12 RX ADMIN — SUCRALFATE 1 G: 1 TABLET ORAL at 22:05

## 2023-08-12 RX ADMIN — Medication: at 08:47

## 2023-08-12 RX ADMIN — PIPERACILLIN AND TAZOBACTAM 3375 MG: 3; .375 INJECTION, POWDER, LYOPHILIZED, FOR SOLUTION INTRAVENOUS at 08:46

## 2023-08-12 RX ADMIN — SODIUM CHLORIDE, PRESERVATIVE FREE 40 MG: 5 INJECTION INTRAVENOUS at 00:01

## 2023-08-12 RX ADMIN — SUCRALFATE 1 G: 1 TABLET ORAL at 06:55

## 2023-08-12 RX ADMIN — METRONIDAZOLE 500 MG: 5 INJECTION, SOLUTION INTRAVENOUS at 06:54

## 2023-08-12 RX ADMIN — HYDROMORPHONE HYDROCHLORIDE 0.5 MG: 1 INJECTION, SOLUTION INTRAMUSCULAR; INTRAVENOUS; SUBCUTANEOUS at 03:02

## 2023-08-12 RX ADMIN — SODIUM CHLORIDE, PRESERVATIVE FREE 30 ML: 5 INJECTION INTRAVENOUS at 22:05

## 2023-08-12 RX ADMIN — HYDROMORPHONE HYDROCHLORIDE 0.5 MG: 1 INJECTION, SOLUTION INTRAMUSCULAR; INTRAVENOUS; SUBCUTANEOUS at 09:40

## 2023-08-12 ASSESSMENT — PULMONARY FUNCTION TESTS
PIF_VALUE: 16
PIF_VALUE: 30
PIF_VALUE: 10
PIF_VALUE: 13
PIF_VALUE: 15
PIF_VALUE: 19
PIF_VALUE: 16

## 2023-08-12 ASSESSMENT — PAIN SCALES - GENERAL: PAINLEVEL_OUTOF10: 0

## 2023-08-12 NOTE — PROGRESS NOTES
CRITICAL CARE NOTE      Name: Radha Barton   : 1960   MRN: [id number]   Date: 2023      REASON FOR ICU ADMISSION: Acute hypoxic respiratory failure    PRINCIPAL ICU DIAGNOSIS   Acute hypoxic respiratory failure    BRIEF PATIENT SUMMARY   Meli Rodriguez is a 79-year-old female with past medical history of gastric bypass in 2017, asthma, CAD, DM, hyperlipidemia, GERD, PE, ANCA, anxiety, depression, who presented to ED on  [facility] for weakness and hematemesis. Back in March she was seen for fulminant C. difficile colitis and underwent subtotal colectomy with ileostomy. She was subsequently reversed on . She was admitted to [facility] from  - . During that admission she underwent EGD showing GJ ulcer and stenosis at [address] [facility] junction. On  she went to the OR for an ex lap for free air and free fluid but there was no sign of perforation. General surgery was consulted during this admission and has been following. No surgical intervention has been recommended. At this time it appears she has a fluid collection as opposed to a perforation. It was recommended for IR to drain this collection which was performed on . GI has been consulted for GIB. She was started on PPI twice daily and Carafate 4 times daily. Eliquis and meloxicam were held. On  she underwent EGD which found a nonbleeding anastomotic ulcer. On  she had IVC filter placed. Overnight on  a rapid response was called for hypoxia and respiratory distress. She was transferred ICU on BiPAP. She was ultimately intubated for hypoxic respiratory failure. This appears to be due from pulmonary edema. She was aggressively diuresed with Lasix.     COMPREHENSIVE ASSESSMENT & PLAN:SYSTEM BASED     24 HOUR EVENTS:     NEUROLOGICAL:  Depression   Close neurologic monitoring  RASS goal 0 to -1  Sedation: Precedex  Analgesia: continue Dilaudid, dose reduced for hypotension  Daily SAT  Delirium for input(s): AST, ALT, BILIDIR, BILITOT, ALKPHOS in the last 72 hours. No results for input(s): PROTIME, INR in the last 72 hours. Medications: Reviewed 08/12/23  Imaging: Reviewed 08/12/23   Most Recent XR  XR CHEST PORTABLE 08/09/2023    Narrative  EXAM:  XR CHEST PORTABLE    INDICATION: Interval change    COMPARISON: 8/7/2023    TECHNIQUE: Upright portable chest AP view    FINDINGS: Tubes and lines are stable. The cardiac silhouette is within normal  limits. Resident    Decreased bilateral interstitial and airspace opacities with possible increase  in right effusion and decrease in left effusion. The visualized bones and upper  abdomen are age-appropriate. Impression  Decreased interstitial and airspace opacities with increased right effusion and  decreased left effusion    Most Recent CT  CT ABDOMEN PELVIS W IV CONTRAST 08/07/2023 (Preliminary)  This result has not been signed. Information might be incomplete. Narrative  **PRELIMINARY REPORT**    1. No acute pulmonary embolism. Bilateral pleural effusions and diffuse  pulmonary edema versus pneumonia. 2. Possible peritonitis as seen previously with decreased ascites. Air in the  urinary bladder can be seen with recent catheterization or infection. Marked  body wall edema. Preliminary report was provided by Dr. Bhavani Mclean, the on-call radiologist, on  8/7/2023 at 0345 hours. Final report to follow. **END PRELIMINARY REPORT**    Most Recent MRI  No results found for this or any previous visit from the past 3650 days. Most Recent Echo  06/22/23    TRANSTHORACIC ECHOCARDIOGRAM (TTE) COMPLETE (CONTRAST/BUBBLE/3D PRN) 07/11/2023  1:33 PM (Final)    Interpretation Summary    Left Ventricle: Normal left ventricular systolic function with a visually estimated EF of 55 - 60%. Left ventricle size is normal. Normal wall thickness. Normal wall motion. Grade I diastolic dysfunction with normal LAP.     Signed by: Kateryna Godfrey MD on 7/11/2023  1:33

## 2023-08-12 NOTE — PROGRESS NOTES
Occupational Therapy:     Chart reviewed, patient received sedated and on vent. Per ABCDEF protocol, will work with patient when PEEP is 10.0 or less, FIO2 60% or less, and patient is following basic commands (8/12 - FiO2 50%, PEEP 5). Discussed with RN who reports pt remains inappropriate for participation in skilled therapy services. As pt has been inappropriate for >3 consecutive days acute OT will sign off. Please re-consult when appropriate. Thank you for your assistance.    MELISSA Laguerre, OTR/L

## 2023-08-12 NOTE — PROGRESS NOTES
Physical Therapy Note  08/12/2023    Chart reviewed, patient received sedated and on vent. Per ABCDEF protocol, will work with patient when PEEP is 10.0 or less, FIO2 60% or less, and patient is following basic commands (8/12 - FiO2 50%, PEEP 5). Discussed with RN who reports pt remains inappropriate for participation in skilled therapy services. As pt has been inappropriate for >3 consecutive days, acute PT will sign off. Please re-consult when appropriate.      Thank you,  Olaf Ralph, PT, DPT

## 2023-08-12 NOTE — PROGRESS NOTES
RT Note:  Vent Assessment    Pt stable on vent, NARD noted, Vent compliant. Will monitor & wean as tolerated.      08/11/23 2046   Vent Information   Vent Mode AC/VC   Ventilator Settings   FiO2  40 %   Vt (Set, mL) 330 mL   Resp Rate (Set) 14 bmp   PEEP/CPAP (cmH2O) 5   Vent Patient Data (Readings)   Vt (Measured) 338 mL   Peak Inspiratory Pressure (cmH2O) 14 cmH2O   Rate Measured 21 br/min   Minute Volume (L/min) 7.72 Liters   Peak Inspiratory Flow (lpm) 34 L/sec  (1:3)   Mean Airway Pressure (cmH2O) 8.3 cmH20   Plateau Pressure (cm H2O) 19 cm H2O   Driving Pressure 14   I:E Ratio 1:1.5   Flow Sensitivity 3 L/min   PEEP Intrinsic (cm H2O)   (unable to obtain d/t spont breathing)   Static Compliance (L/cm H2O) 19

## 2023-08-13 ENCOUNTER — APPOINTMENT (OUTPATIENT)
Facility: HOSPITAL | Age: 63
DRG: 004 | End: 2023-08-13
Payer: MEDICARE

## 2023-08-13 LAB
ANION GAP SERPL CALC-SCNC: 6 MMOL/L (ref 5–15)
BUN SERPL-MCNC: 36 MG/DL (ref 6–20)
BUN/CREAT SERPL: 97 (ref 12–20)
CALCIUM SERPL-MCNC: 8.3 MG/DL (ref 8.5–10.1)
CHLORIDE SERPL-SCNC: 113 MMOL/L (ref 97–108)
CO2 SERPL-SCNC: 25 MMOL/L (ref 21–32)
CREAT SERPL-MCNC: 0.37 MG/DL (ref 0.55–1.02)
ERYTHROCYTE [DISTWIDTH] IN BLOOD BY AUTOMATED COUNT: 17.2 % (ref 11.5–14.5)
GLUCOSE BLD STRIP.AUTO-MCNC: 125 MG/DL (ref 65–117)
GLUCOSE BLD STRIP.AUTO-MCNC: 126 MG/DL (ref 65–117)
GLUCOSE BLD STRIP.AUTO-MCNC: 137 MG/DL (ref 65–117)
GLUCOSE BLD STRIP.AUTO-MCNC: 91 MG/DL (ref 65–117)
GLUCOSE SERPL-MCNC: 125 MG/DL (ref 65–100)
HCT VFR BLD AUTO: 27.7 % (ref 35–47)
HGB BLD-MCNC: 8.5 G/DL (ref 11.5–16)
MCH RBC QN AUTO: 28.1 PG (ref 26–34)
MCHC RBC AUTO-ENTMCNC: 30.7 G/DL (ref 30–36.5)
MCV RBC AUTO: 91.4 FL (ref 80–99)
NRBC # BLD: 0 K/UL (ref 0–0.01)
NRBC BLD-RTO: 0 PER 100 WBC
PLATELET # BLD AUTO: 270 K/UL (ref 150–400)
PMV BLD AUTO: 10.1 FL (ref 8.9–12.9)
POTASSIUM SERPL-SCNC: 4.3 MMOL/L (ref 3.5–5.1)
RBC # BLD AUTO: 3.03 M/UL (ref 3.8–5.2)
SERVICE CMNT-IMP: ABNORMAL
SERVICE CMNT-IMP: NORMAL
SODIUM SERPL-SCNC: 144 MMOL/L (ref 136–145)
WBC # BLD AUTO: 8.2 K/UL (ref 3.6–11)

## 2023-08-13 PROCEDURE — 6370000000 HC RX 637 (ALT 250 FOR IP): Performed by: ANESTHESIOLOGY

## 2023-08-13 PROCEDURE — 2580000003 HC RX 258: Performed by: INTERNAL MEDICINE

## 2023-08-13 PROCEDURE — 2580000003 HC RX 258: Performed by: FAMILY MEDICINE

## 2023-08-13 PROCEDURE — 2500000003 HC RX 250 WO HCPCS: Performed by: NURSE PRACTITIONER

## 2023-08-13 PROCEDURE — 6360000002 HC RX W HCPCS: Performed by: NURSE PRACTITIONER

## 2023-08-13 PROCEDURE — C9113 INJ PANTOPRAZOLE SODIUM, VIA: HCPCS | Performed by: SURGERY

## 2023-08-13 PROCEDURE — 80048 BASIC METABOLIC PNL TOTAL CA: CPT

## 2023-08-13 PROCEDURE — 82962 GLUCOSE BLOOD TEST: CPT

## 2023-08-13 PROCEDURE — 85027 COMPLETE CBC AUTOMATED: CPT

## 2023-08-13 PROCEDURE — 94760 N-INVAS EAR/PLS OXIMETRY 1: CPT

## 2023-08-13 PROCEDURE — 36415 COLL VENOUS BLD VENIPUNCTURE: CPT

## 2023-08-13 PROCEDURE — A4216 STERILE WATER/SALINE, 10 ML: HCPCS | Performed by: SURGERY

## 2023-08-13 PROCEDURE — 6360000002 HC RX W HCPCS

## 2023-08-13 PROCEDURE — 6360000002 HC RX W HCPCS: Performed by: SURGERY

## 2023-08-13 PROCEDURE — 6370000000 HC RX 637 (ALT 250 FOR IP): Performed by: NURSE PRACTITIONER

## 2023-08-13 PROCEDURE — 6360000002 HC RX W HCPCS: Performed by: FAMILY MEDICINE

## 2023-08-13 PROCEDURE — 99231 SBSQ HOSP IP/OBS SF/LOW 25: CPT | Performed by: SURGERY

## 2023-08-13 PROCEDURE — 71045 X-RAY EXAM CHEST 1 VIEW: CPT

## 2023-08-13 PROCEDURE — 2580000003 HC RX 258: Performed by: NURSE PRACTITIONER

## 2023-08-13 PROCEDURE — 94003 VENT MGMT INPAT SUBQ DAY: CPT

## 2023-08-13 PROCEDURE — 2000000000 HC ICU R&B

## 2023-08-13 PROCEDURE — 2580000003 HC RX 258: Performed by: SURGERY

## 2023-08-13 RX ORDER — FUROSEMIDE 10 MG/ML
40 INJECTION INTRAMUSCULAR; INTRAVENOUS EVERY 12 HOURS
Status: COMPLETED | OUTPATIENT
Start: 2023-08-13 | End: 2023-08-13

## 2023-08-13 RX ADMIN — SUCRALFATE 1 G: 1 TABLET ORAL at 10:36

## 2023-08-13 RX ADMIN — SODIUM CHLORIDE, PRESERVATIVE FREE 40 MG: 5 INJECTION INTRAVENOUS at 00:18

## 2023-08-13 RX ADMIN — COLLAGENASE SANTYL: 250 OINTMENT TOPICAL at 08:04

## 2023-08-13 RX ADMIN — METRONIDAZOLE 500 MG: 5 INJECTION, SOLUTION INTRAVENOUS at 22:15

## 2023-08-13 RX ADMIN — Medication: at 08:03

## 2023-08-13 RX ADMIN — POTASSIUM CHLORIDE: 2 INJECTION, SOLUTION, CONCENTRATE INTRAVENOUS at 18:25

## 2023-08-13 RX ADMIN — SODIUM CHLORIDE, PRESERVATIVE FREE 10 ML: 5 INJECTION INTRAVENOUS at 20:33

## 2023-08-13 RX ADMIN — DEXMEDETOMIDINE HYDROCHLORIDE 0.7 MCG/KG/HR: 4 INJECTION, SOLUTION INTRAVENOUS at 08:16

## 2023-08-13 RX ADMIN — SUCRALFATE 1 G: 1 TABLET ORAL at 20:46

## 2023-08-13 RX ADMIN — FUROSEMIDE 40 MG: 10 INJECTION, SOLUTION INTRAMUSCULAR; INTRAVENOUS at 23:23

## 2023-08-13 RX ADMIN — PIPERACILLIN AND TAZOBACTAM 3375 MG: 3; .375 INJECTION, POWDER, LYOPHILIZED, FOR SOLUTION INTRAVENOUS at 15:30

## 2023-08-13 RX ADMIN — PIPERACILLIN AND TAZOBACTAM 3375 MG: 3; .375 INJECTION, POWDER, LYOPHILIZED, FOR SOLUTION INTRAVENOUS at 00:18

## 2023-08-13 RX ADMIN — HYDROMORPHONE HYDROCHLORIDE 0.5 MG: 1 INJECTION, SOLUTION INTRAMUSCULAR; INTRAVENOUS; SUBCUTANEOUS at 15:26

## 2023-08-13 RX ADMIN — Medication: at 15:31

## 2023-08-13 RX ADMIN — SODIUM CHLORIDE, PRESERVATIVE FREE 40 MG: 5 INJECTION INTRAVENOUS at 11:32

## 2023-08-13 RX ADMIN — SUCRALFATE 1 G: 1 TABLET ORAL at 07:04

## 2023-08-13 RX ADMIN — DEXMEDETOMIDINE HYDROCHLORIDE 0.7 MCG/KG/HR: 4 INJECTION, SOLUTION INTRAVENOUS at 16:21

## 2023-08-13 RX ADMIN — HYDROMORPHONE HYDROCHLORIDE 0.5 MG: 1 INJECTION, SOLUTION INTRAMUSCULAR; INTRAVENOUS; SUBCUTANEOUS at 20:10

## 2023-08-13 RX ADMIN — Medication: at 00:18

## 2023-08-13 RX ADMIN — FUROSEMIDE 40 MG: 10 INJECTION, SOLUTION INTRAMUSCULAR; INTRAVENOUS at 11:32

## 2023-08-13 RX ADMIN — VANCOMYCIN HYDROCHLORIDE 1250 MG: 1.25 INJECTION, POWDER, LYOPHILIZED, FOR SOLUTION INTRAVENOUS at 09:58

## 2023-08-13 RX ADMIN — DEXMEDETOMIDINE HYDROCHLORIDE 0.6 MCG/KG/HR: 4 INJECTION, SOLUTION INTRAVENOUS at 03:00

## 2023-08-13 RX ADMIN — HYDROMORPHONE HYDROCHLORIDE 0.5 MG: 1 INJECTION, SOLUTION INTRAMUSCULAR; INTRAVENOUS; SUBCUTANEOUS at 08:05

## 2023-08-13 RX ADMIN — HYDROMORPHONE HYDROCHLORIDE 0.5 MG: 1 INJECTION, SOLUTION INTRAMUSCULAR; INTRAVENOUS; SUBCUTANEOUS at 05:14

## 2023-08-13 RX ADMIN — HYDROMORPHONE HYDROCHLORIDE 0.5 MG: 1 INJECTION, SOLUTION INTRAMUSCULAR; INTRAVENOUS; SUBCUTANEOUS at 11:52

## 2023-08-13 RX ADMIN — CHLORHEXIDINE GLUCONATE 15 ML: 1.2 RINSE ORAL at 20:33

## 2023-08-13 RX ADMIN — Medication 10 ML: at 20:34

## 2023-08-13 RX ADMIN — SUCRALFATE 1 G: 1 TABLET ORAL at 17:36

## 2023-08-13 RX ADMIN — I.V. FAT EMULSION 250 ML: 20 EMULSION INTRAVENOUS at 18:25

## 2023-08-13 RX ADMIN — PIPERACILLIN AND TAZOBACTAM 3375 MG: 3; .375 INJECTION, POWDER, LYOPHILIZED, FOR SOLUTION INTRAVENOUS at 08:05

## 2023-08-13 RX ADMIN — METRONIDAZOLE 500 MG: 5 INJECTION, SOLUTION INTRAVENOUS at 13:56

## 2023-08-13 RX ADMIN — METRONIDAZOLE 500 MG: 5 INJECTION, SOLUTION INTRAVENOUS at 05:50

## 2023-08-13 RX ADMIN — CHLORHEXIDINE GLUCONATE 15 ML: 1.2 RINSE ORAL at 08:05

## 2023-08-13 ASSESSMENT — PULMONARY FUNCTION TESTS
PIF_VALUE: 17
PIF_VALUE: 21
PIF_VALUE: 28
PIF_VALUE: 31
PIF_VALUE: 29
PIF_VALUE: 17
PIF_VALUE: 23

## 2023-08-13 ASSESSMENT — PAIN SCALES - GENERAL
PAINLEVEL_OUTOF10: 8
PAINLEVEL_OUTOF10: 8
PAINLEVEL_OUTOF10: 4
PAINLEVEL_OUTOF10: 4
PAINLEVEL_OUTOF10: 1
PAINLEVEL_OUTOF10: 0
PAINLEVEL_OUTOF10: 0

## 2023-08-13 ASSESSMENT — PAIN DESCRIPTION - LOCATION
LOCATION: ABDOMEN
LOCATION: HEAD

## 2023-08-13 NOTE — PROGRESS NOTES
Bedside and Verbal shift change report given to Rich Manzo RN (oncoming nurse) by Gigi Johnson RN (offgoing nurse). Report included the following information Nurse Handoff Report, Adult Overview, Intake/Output, MAR, Recent Results, Cardiac Rhythm  , Alarm Parameters, and Neuro Assessment. 0815: SBT performed by RT. Pt failed secondary to RR 30-40s. 0825Pao Chandler MD at bedside. No orders provided at this time. 0830: SBT attempt performed again by RT. Pt failed secondary to RR 30-40s. 0935: Wound debridement surgery is scheduled with Dr. Pao Chandler for Monday 8/14 at 1130a.     1000: Pt's current vital signs are stable  (RR 27, O2 96%) undergoing 3rd SBT trial.     1100: Family [place] and [address] [place] [address]) at bedside and were updated. 1400: Pt switched back to ventilator setting secondary to tachypnea and increased WOB. 1605: 2x SBPs are soft 88/54 (MAP 60), informed Niecy Jackson NP. New order is to maintain MAP > 60. Bedside and Verbal shift change report given to Gigi Johnson RN (oncoming nurse) by Rich Manzo RN (offgoing nurse). Report included the following information Nurse Handoff Report, Adult Overview, Intake/Output, MAR, Recent Results, Cardiac Rhythm  , Alarm Parameters, and Neuro Assessment.

## 2023-08-13 NOTE — PROGRESS NOTES
CRITICAL CARE NOTE      Name: Jon Llamas   : 1960   MRN: [id number]   Date: 2023      REASON FOR ICU ADMISSION: Acute hypoxic respiratory failure    PRINCIPAL ICU DIAGNOSIS   Acute hypoxic respiratory failure    BRIEF PATIENT SUMMARY   Andrea Gonzalez is a 79-year-old female with past medical history of gastric bypass in 2017, asthma, CAD, DM, hyperlipidemia, GERD, PE, ANCA, anxiety, depression, who [facility] ED on  [facility] and hematemesis. Back in March she was seen for fulminant C. difficile colitis and underwent subtotal colectomy with ileostomy. She was subsequently reversed on . She was admitted to [facility] from  - . During that admission she underwent EGD showing GJ ulcer and stenosis at [address] [facility] junction. On  she went to the OR for an ex lap for free air and free fluid but there was no sign of perforation. General surgery was consulted during this admission and has been following. No surgical intervention has been recommended. At this time it appears she has a fluid collection as opposed to a perforation. It was recommended for IR to drain this collection which was performed on . GI has been consulted for GIB. She was started on PPI twice daily and Carafate 4 times daily. Eliquis and meloxicam were held. On  she underwent EGD which found a nonbleeding anastomotic ulcer. On  she had IVC filter placed. Overnight on  a rapid response was called for hypoxia and respiratory distress. She was transferred ICU on BiPAP. She was ultimately intubated for hypoxic respiratory failure. This appears to be due from pulmonary edema. She was aggressively diuresed with Lasix. COMPREHENSIVE ASSESSMENT & PLAN:SYSTEM BASED     24 HOUR EVENTS: Failed SBT this morning and needed to increase PEEP slightly.  Planning for OR on Monday for debridement    NEUROLOGICAL:  Depression   RASS goal 0 to -1  Sedation: Precedex  Analgesia: sufficiently effective to warrant routine use. Culture, Blood 1 [[id number]] Collected: 08/06/23 2118    Order Status: Completed Specimen: Blood Updated: 08/11/23 2231     Special Requests NO SPECIAL REQUESTS        Culture NO GROWTH 5 DAYS       Culture, Blood 2 [[id number]] Collected: 08/06/23 2118    Order Status: Completed Specimen: Blood Updated: 08/11/23 2231     Special Requests NO SPECIAL REQUESTS        Culture NO GROWTH 5 DAYS       Culture, Body Fluid [[id number]]  (Abnormal)  (Susceptibility) Collected: 07/31/23 1350    Order Status: Completed Specimen:  Body Fluid Updated: 08/02/23 1259     Special Requests NO SPECIAL REQUESTS        Gram stain 4+ WBCS SEEN         NO ORGANISMS SEEN        Culture FEW Escherichia coli               Culture performed on Unspun Fluid          Susceptibility        Escherichia coli      BACTERIAL SUSCEPTIBILITY PANEL WILLEM      amikacin <=2 ug/mL Sensitive      ampicillin >=32 ug/mL Resistant      ampicillin-sulbactam 16 ug/mL Intermediate      ceFAZolin >=64 ug/mL Resistant      cefepime <=1 ug/mL Sensitive      cefOXitin >=64 ug/mL Resistant      cefTAZidime 16 ug/mL Intermediate      cefTRIAXone 8 ug/mL Sensitive      ciprofloxacin <=0.25 ug/mL Sensitive      gentamicin <=1 ug/mL Sensitive      levofloxacin <=0.12 ug/mL Sensitive      meropenem <=0.25 ug/mL Sensitive      piperacillin-tazobactam <=4 ug/mL Sensitive      tobramycin <=1 ug/mL Sensitive      trimethoprim-sulfamethoxazole <=20 ug/mL Sensitive                                    ICU DAILY CHECKLIST     Code Status: Full Code    DVT Prophylaxis: IVC filter  T/L/D: Tubes: ETT and Orogastric Tube  Lines: Central Line  Drains: Lutz Catheter  SUP: PPI twice daily  Diet: Diet NPO Exceptions are: Sips of Water with Meds  PN-Adult 2-in-1 Central Line (Standard)   Activity Level: Bedrest while on MV  ABCDEF Bundle/Checklist Completed: Yes  Disposition: Stay in ICU  Multidisciplinary Rounds Completed:  Yes  Goals of Care Discussion/Palliative: Yes  Patient/Family Updated: Yes    HOSPITAL COURSE/DAILY EVENT LOG       SUBJECTIVE   Review of Systems   Unable to perform ROS: Intubated        OBJECTIVE   Physical Exam  Vitals and nursing note reviewed. Constitutional:       General: She is not in acute distress. Appearance: She is ill-appearing. Interventions: She is sedated, intubated and restrained. HENT:      Head: Normocephalic. Mouth/Throat:      Mouth: Mucous membranes are moist.      Pharynx: Oropharynx is clear. Eyes:      Conjunctiva/sclera: Conjunctivae normal.      Pupils: Pupils are equal, round, and reactive to light. Cardiovascular:      Rate and Rhythm: Normal rate and regular rhythm. Pulmonary:      Effort: Pulmonary effort is normal. She is intubated. Comments: Diminished to bases and coarse  Abdominal:      General: A surgical scar is present. Bowel sounds are decreased. There is no distension. Palpations: Abdomen is soft. Tenderness: There is no abdominal tenderness. Comments: Surgical drains   Musculoskeletal:         General: Normal range of motion. Cervical back: Neck supple. Right lower leg: Edema present. Left lower leg: Edema present. Comments: Pitting   Skin:     General: Skin is dry. Comments: Sacral wound   Neurological:      General: No focal deficit present. Sensory: No sensory deficit (RUE/RLE). Comments: Following commands on SAT.  Able to nod head yes or no   Psychiatric:      Comments: Unable to assess       Labs and Data: Reviewed 08/13/23  Recent Labs     08/12/23  0544 08/12/23  1717 08/13/23  0547   WBC 7.3 6.3 8.2   RBC 2.93* 2.92* 3.03*   HGB 8.3* 8.2* 8.5*   HCT 26.3* 26.1* 27.7*   MCV 89.8 89.4 91.4   RDW 18.0* 17.8* 17.2*    239 270       Recent Labs     08/11/23  0540 08/12/23  0544 08/13/23  0547    143 144   K 3.1* 3.8 4.3    110* 113*   CO2 30 27 25   BUN 28* 32* 36*   CREATININE 0.44* 0.44*

## 2023-08-13 NOTE — PROGRESS NOTES
Ms. Sj Canela remains intubated and sedated. Tm 99.1 HR: 60 BP: 110/65 Resp Rate: 35 99% sat    Intake/Output Summary (Last 24 hours) at 8/13/2023 0829  Last data filed at 8/13/2023 0700  Gross per 24 hour   Intake 2031.8 ml   Output 1625 ml   Net 406.8 ml   Exam: Cor: RRR. Lungs: Bilateral breath sounds. Abd: Soft. Non distended. Percutaneous drain in place. Labs:   Recent Results (from the past 12 hour(s))   POCT Glucose    Collection Time: 08/13/23 12:41 AM   Result Value Ref Range    POC Glucose 137 (H) 65 - 117 mg/dL    Performed by: Desire Spence    Basic Metabolic Panel    Collection Time: 08/13/23  5:47 AM   Result Value Ref Range    Sodium 144 136 - 145 mmol/L    Potassium 4.3 3.5 - 5.1 mmol/L    Chloride 113 (H) 97 - 108 mmol/L    CO2 25 21 - 32 mmol/L    Anion Gap 6 5 - 15 mmol/L    Glucose 125 (H) 65 - 100 mg/dL    BUN 36 (H) 6 - 20 MG/DL    Creatinine 0.37 (L) 0.55 - 1.02 MG/DL    Bun/Cre Ratio 97 (H) 12 - 20      Est, Glom Filt Rate >60 >60 ml/min/1.73m2    Calcium 8.3 (L) 8.5 - 10.1 MG/DL   CBC    Collection Time: 08/13/23  5:47 AM   Result Value Ref Range    WBC 8.2 3.6 - 11.0 K/uL    RBC 3.03 (L) 3.80 - 5.20 M/uL    Hemoglobin 8.5 (L) 11.5 - 16.0 g/dL    Hematocrit 27.7 (L) 35.0 - 47.0 %    MCV 91.4 80.0 - 99.0 FL    MCH 28.1 26.0 - 34.0 PG    MCHC 30.7 30.0 - 36.5 g/dL    RDW 17.2 (H) 11.5 - 14.5 %    Platelets 178 604 - 317 K/uL    MPV 10.1 8.9 - 12.9 FL    Nucleated RBCs 0.0 0  WBC    nRBC 0.00 0.00 - 0.01 K/uL   POCT Glucose    Collection Time: 08/13/23  5:49 AM   Result Value Ref Range    POC Glucose 126 (H) 65 - 117 mg/dL    Performed by: Desire Spence    Ms. Sj Canela is doing well. Would keep intubated for now as she is going to the OR on August 14, 2023 for debridement of her sacral wound. Continue TPN. IV abx as ordered. Keep gastrostomy tube to drainage. Plans per Dr. Jay Candelario.

## 2023-08-13 NOTE — PROGRESS NOTES
RT Note: PSV trial    Pt completed 10.5hrs on PSV 10/5. Trial ended due to tachypnea & low SpO2.   Pt unable to tolerate SBT.     08/12/23 2241 08/12/23 2243   Vent Information   Vent Mode CPAP/PS (S)  AC/VC  (Pt completed PSV trial)   Ventilator Settings   FiO2  50 % 50 %   Vt (Set, mL)  --  330 mL   Resp Rate (Set)  --  14 bmp   PEEP/CPAP (cmH2O) 5 5   Pressure Support (cm H2O) 10 cm H2O  --    Vent Patient Data (Readings)   Vt Spont (mL) 355 mL  --    Vt (Measured)  --  338 mL   Peak Inspiratory Pressure (cmH2O) 10 cmH2O 30 cmH2O   Rate Measured 30 br/min 29 br/min   Minute Volume (L/min) 10.7 Liters 9.17 Liters   Peak Inspiratory Flow (lpm)  --  50 L/sec  (1:5)   Mean Airway Pressure (cmH2O) 9.2 cmH20 14 cmH20   Plateau Pressure (cm H2O)  --  23 cm H2O   Driving Pressure  --  18   I:E Ratio 1:1.8 1:1.8   Flow Sensitivity 3 L/min 3 L/min   Expiratory Sensitivity (%) 25 %  --    PEEP Intrinsic (cm H2O)  --    (unable to obtain d/t tachypnea)   Static Compliance (L/cm H2O)  --  13   Insp Rise Time (%) 65 %  --

## 2023-08-14 ENCOUNTER — ANESTHESIA (OUTPATIENT)
Facility: HOSPITAL | Age: 63
End: 2023-08-14
Payer: MEDICARE

## 2023-08-14 ENCOUNTER — APPOINTMENT (OUTPATIENT)
Facility: HOSPITAL | Age: 63
DRG: 004 | End: 2023-08-14
Payer: MEDICARE

## 2023-08-14 ENCOUNTER — ANESTHESIA EVENT (OUTPATIENT)
Facility: HOSPITAL | Age: 63
End: 2023-08-14
Payer: MEDICARE

## 2023-08-14 LAB
ANION GAP SERPL CALC-SCNC: 4 MMOL/L (ref 5–15)
ARTERIAL PATENCY WRIST A: YES
BASE EXCESS BLDA CALC-SCNC: 3 MMOL/L
BASOPHILS # BLD: 0 K/UL (ref 0–0.1)
BASOPHILS NFR BLD: 0 % (ref 0–1)
BDY SITE: ABNORMAL
BUN SERPL-MCNC: 38 MG/DL (ref 6–20)
BUN/CREAT SERPL: 81 (ref 12–20)
CALCIUM SERPL-MCNC: 8.6 MG/DL (ref 8.5–10.1)
CHLORIDE SERPL-SCNC: 112 MMOL/L (ref 97–108)
CO2 SERPL-SCNC: 29 MMOL/L (ref 21–32)
CREAT SERPL-MCNC: 0.47 MG/DL (ref 0.55–1.02)
DIFFERENTIAL METHOD BLD: ABNORMAL
EOSINOPHIL # BLD: 0.3 K/UL (ref 0–0.4)
EOSINOPHIL NFR BLD: 3 % (ref 0–7)
ERYTHROCYTE [DISTWIDTH] IN BLOOD BY AUTOMATED COUNT: 16.7 % (ref 11.5–14.5)
FIO2 ON VENT: 45 %
GLUCOSE BLD STRIP.AUTO-MCNC: 106 MG/DL (ref 65–117)
GLUCOSE BLD STRIP.AUTO-MCNC: 119 MG/DL (ref 65–117)
GLUCOSE BLD STRIP.AUTO-MCNC: 128 MG/DL (ref 65–117)
GLUCOSE BLD STRIP.AUTO-MCNC: 137 MG/DL (ref 65–117)
GLUCOSE SERPL-MCNC: 128 MG/DL (ref 65–100)
HCO3 BLDA-SCNC: 25 MMOL/L (ref 22–26)
HCT VFR BLD AUTO: 26.4 % (ref 35–47)
HGB BLD-MCNC: 8.4 G/DL (ref 11.5–16)
IMM GRANULOCYTES # BLD AUTO: 0 K/UL
IMM GRANULOCYTES NFR BLD AUTO: 0 %
INR PPP: 1.2 (ref 0.9–1.1)
LYMPHOCYTES # BLD: 0.5 K/UL (ref 0.8–3.5)
LYMPHOCYTES NFR BLD: 6 % (ref 12–49)
MAGNESIUM SERPL-MCNC: 1.7 MG/DL (ref 1.6–2.4)
MCH RBC QN AUTO: 28.6 PG (ref 26–34)
MCHC RBC AUTO-ENTMCNC: 31.8 G/DL (ref 30–36.5)
MCV RBC AUTO: 89.8 FL (ref 80–99)
MONOCYTES # BLD: 0.3 K/UL (ref 0–1)
MONOCYTES NFR BLD: 3 % (ref 5–13)
NEUTS SEG # BLD: 7.3 K/UL (ref 1.8–8)
NEUTS SEG NFR BLD: 88 % (ref 32–75)
NRBC # BLD: 0 K/UL (ref 0–0.01)
NRBC BLD-RTO: 0 PER 100 WBC
PCO2 BLDA: 33 MMHG (ref 35–45)
PEEP RESPIRATORY: 5
PH BLDA: 7.51 (ref 7.35–7.45)
PHOSPHATE SERPL-MCNC: 3.9 MG/DL (ref 2.6–4.7)
PLATELET # BLD AUTO: 288 K/UL (ref 150–400)
PMV BLD AUTO: 10.2 FL (ref 8.9–12.9)
PO2 BLDA: 65 MMHG (ref 80–100)
POTASSIUM SERPL-SCNC: 3.5 MMOL/L (ref 3.5–5.1)
PRESSURE SUPPORT SETTING VENT: 5
PROTHROMBIN TIME: 12.8 SEC (ref 9–11.1)
RBC # BLD AUTO: 2.94 M/UL (ref 3.8–5.2)
RBC MORPH BLD: ABNORMAL
SAO2 % BLD: 95 % (ref 92–97)
SAO2% DEVICE SAO2% SENSOR NAME: ABNORMAL
SERVICE CMNT-IMP: ABNORMAL
SERVICE CMNT-IMP: NORMAL
SODIUM SERPL-SCNC: 145 MMOL/L (ref 136–145)
SPECIMEN SITE: ABNORMAL
VENTILATION MODE VENT: ABNORMAL
WBC # BLD AUTO: 8.4 K/UL (ref 3.6–11)

## 2023-08-14 PROCEDURE — 6360000002 HC RX W HCPCS

## 2023-08-14 PROCEDURE — 6360000002 HC RX W HCPCS: Performed by: NURSE ANESTHETIST, CERTIFIED REGISTERED

## 2023-08-14 PROCEDURE — 94003 VENT MGMT INPAT SUBQ DAY: CPT

## 2023-08-14 PROCEDURE — 6360000002 HC RX W HCPCS: Performed by: SURGERY

## 2023-08-14 PROCEDURE — 2580000003 HC RX 258: Performed by: SURGERY

## 2023-08-14 PROCEDURE — 99153 MOD SED SAME PHYS/QHP EA: CPT | Performed by: SURGERY

## 2023-08-14 PROCEDURE — 3600000012 HC SURGERY LEVEL 2 ADDTL 15MIN: Performed by: SURGERY

## 2023-08-14 PROCEDURE — 36600 WITHDRAWAL OF ARTERIAL BLOOD: CPT

## 2023-08-14 PROCEDURE — 0JB70ZZ EXCISION OF BACK SUBCUTANEOUS TISSUE AND FASCIA, OPEN APPROACH: ICD-10-PCS | Performed by: SURGERY

## 2023-08-14 PROCEDURE — 85610 PROTHROMBIN TIME: CPT

## 2023-08-14 PROCEDURE — 82962 GLUCOSE BLOOD TEST: CPT

## 2023-08-14 PROCEDURE — 2500000003 HC RX 250 WO HCPCS: Performed by: NURSE ANESTHETIST, CERTIFIED REGISTERED

## 2023-08-14 PROCEDURE — C9113 INJ PANTOPRAZOLE SODIUM, VIA: HCPCS | Performed by: SURGERY

## 2023-08-14 PROCEDURE — 2500000003 HC RX 250 WO HCPCS: Performed by: NURSE PRACTITIONER

## 2023-08-14 PROCEDURE — 99152 MOD SED SAME PHYS/QHP 5/>YRS: CPT | Performed by: SURGERY

## 2023-08-14 PROCEDURE — 36415 COLL VENOUS BLD VENIPUNCTURE: CPT

## 2023-08-14 PROCEDURE — 80048 BASIC METABOLIC PNL TOTAL CA: CPT

## 2023-08-14 PROCEDURE — 2580000003 HC RX 258: Performed by: NURSE ANESTHETIST, CERTIFIED REGISTERED

## 2023-08-14 PROCEDURE — 2580000003 HC RX 258: Performed by: INTERNAL MEDICINE

## 2023-08-14 PROCEDURE — 85025 COMPLETE CBC W/AUTO DIFF WBC: CPT

## 2023-08-14 PROCEDURE — 2700000000 HC OXYGEN THERAPY PER DAY

## 2023-08-14 PROCEDURE — 6370000000 HC RX 637 (ALT 250 FOR IP): Performed by: SURGERY

## 2023-08-14 PROCEDURE — 71045 X-RAY EXAM CHEST 1 VIEW: CPT

## 2023-08-14 PROCEDURE — 3600000002 HC SURGERY LEVEL 2 BASE: Performed by: SURGERY

## 2023-08-14 PROCEDURE — A4216 STERILE WATER/SALINE, 10 ML: HCPCS | Performed by: SURGERY

## 2023-08-14 PROCEDURE — 2000000000 HC ICU R&B

## 2023-08-14 PROCEDURE — 6360000002 HC RX W HCPCS: Performed by: NURSE PRACTITIONER

## 2023-08-14 PROCEDURE — 2580000003 HC RX 258: Performed by: FAMILY MEDICINE

## 2023-08-14 PROCEDURE — 6370000000 HC RX 637 (ALT 250 FOR IP): Performed by: ANESTHESIOLOGY

## 2023-08-14 PROCEDURE — 99232 SBSQ HOSP IP/OBS MODERATE 35: CPT | Performed by: INTERNAL MEDICINE

## 2023-08-14 PROCEDURE — 2709999900 HC NON-CHARGEABLE SUPPLY: Performed by: SURGERY

## 2023-08-14 PROCEDURE — 2500000003 HC RX 250 WO HCPCS: Performed by: SURGERY

## 2023-08-14 PROCEDURE — 82803 BLOOD GASES ANY COMBINATION: CPT

## 2023-08-14 PROCEDURE — 83735 ASSAY OF MAGNESIUM: CPT

## 2023-08-14 PROCEDURE — 84100 ASSAY OF PHOSPHORUS: CPT

## 2023-08-14 RX ORDER — FUROSEMIDE 10 MG/ML
40 INJECTION INTRAMUSCULAR; INTRAVENOUS ONCE
Status: COMPLETED | OUTPATIENT
Start: 2023-08-14 | End: 2023-08-14

## 2023-08-14 RX ORDER — MIDAZOLAM HYDROCHLORIDE 1 MG/ML
INJECTION INTRAMUSCULAR; INTRAVENOUS PRN
Status: DISCONTINUED | OUTPATIENT
Start: 2023-08-14 | End: 2023-08-14 | Stop reason: SDUPTHER

## 2023-08-14 RX ORDER — SODIUM CHLORIDE, SODIUM LACTATE, POTASSIUM CHLORIDE, CALCIUM CHLORIDE 600; 310; 30; 20 MG/100ML; MG/100ML; MG/100ML; MG/100ML
INJECTION, SOLUTION INTRAVENOUS CONTINUOUS PRN
Status: DISCONTINUED | OUTPATIENT
Start: 2023-08-14 | End: 2023-08-14 | Stop reason: SDUPTHER

## 2023-08-14 RX ORDER — FENTANYL CITRATE 50 UG/ML
INJECTION, SOLUTION INTRAMUSCULAR; INTRAVENOUS PRN
Status: DISCONTINUED | OUTPATIENT
Start: 2023-08-14 | End: 2023-08-14 | Stop reason: SDUPTHER

## 2023-08-14 RX ORDER — KETAMINE HCL IN NACL, ISO-OSM 100MG/10ML
SYRINGE (ML) INJECTION PRN
Status: DISCONTINUED | OUTPATIENT
Start: 2023-08-14 | End: 2023-08-14 | Stop reason: SDUPTHER

## 2023-08-14 RX ADMIN — SUCRALFATE 1 G: 1 TABLET ORAL at 05:47

## 2023-08-14 RX ADMIN — Medication 10 ML: at 08:03

## 2023-08-14 RX ADMIN — SODIUM CHLORIDE, PRESERVATIVE FREE 40 MG: 5 INJECTION INTRAVENOUS at 00:08

## 2023-08-14 RX ADMIN — PHENYLEPHRINE HYDROCHLORIDE 80 MCG: 10 INJECTION INTRAVENOUS at 11:29

## 2023-08-14 RX ADMIN — CHLORHEXIDINE GLUCONATE 15 ML: 1.2 RINSE ORAL at 20:33

## 2023-08-14 RX ADMIN — PIPERACILLIN AND TAZOBACTAM 3375 MG: 3; .375 INJECTION, POWDER, LYOPHILIZED, FOR SOLUTION INTRAVENOUS at 08:03

## 2023-08-14 RX ADMIN — MIDAZOLAM 2 MG: 1 INJECTION INTRAMUSCULAR; INTRAVENOUS at 11:20

## 2023-08-14 RX ADMIN — METRONIDAZOLE 500 MG: 5 INJECTION, SOLUTION INTRAVENOUS at 05:47

## 2023-08-14 RX ADMIN — PIPERACILLIN AND TAZOBACTAM 3375 MG: 3; .375 INJECTION, POWDER, LYOPHILIZED, FOR SOLUTION INTRAVENOUS at 00:08

## 2023-08-14 RX ADMIN — HYDROMORPHONE HYDROCHLORIDE 0.5 MG: 1 INJECTION, SOLUTION INTRAMUSCULAR; INTRAVENOUS; SUBCUTANEOUS at 21:42

## 2023-08-14 RX ADMIN — FENTANYL CITRATE 100 MCG: 50 INJECTION, SOLUTION INTRAMUSCULAR; INTRAVENOUS at 11:20

## 2023-08-14 RX ADMIN — PHENYLEPHRINE HYDROCHLORIDE 80 MCG: 10 INJECTION INTRAVENOUS at 11:32

## 2023-08-14 RX ADMIN — PIPERACILLIN AND TAZOBACTAM 3375 MG: 3; .375 INJECTION, POWDER, LYOPHILIZED, FOR SOLUTION INTRAVENOUS at 15:56

## 2023-08-14 RX ADMIN — POTASSIUM CHLORIDE: 2 INJECTION, SOLUTION, CONCENTRATE INTRAVENOUS at 18:23

## 2023-08-14 RX ADMIN — Medication 10 ML: at 20:33

## 2023-08-14 RX ADMIN — METRONIDAZOLE 500 MG: 5 INJECTION, SOLUTION INTRAVENOUS at 14:54

## 2023-08-14 RX ADMIN — HYDROMORPHONE HYDROCHLORIDE 0.5 MG: 1 INJECTION, SOLUTION INTRAMUSCULAR; INTRAVENOUS; SUBCUTANEOUS at 02:42

## 2023-08-14 RX ADMIN — Medication: at 08:03

## 2023-08-14 RX ADMIN — METRONIDAZOLE 500 MG: 5 INJECTION, SOLUTION INTRAVENOUS at 21:47

## 2023-08-14 RX ADMIN — HYDROMORPHONE HYDROCHLORIDE 0.5 MG: 1 INJECTION, SOLUTION INTRAMUSCULAR; INTRAVENOUS; SUBCUTANEOUS at 05:44

## 2023-08-14 RX ADMIN — VANCOMYCIN HYDROCHLORIDE 1250 MG: 1.25 INJECTION, POWDER, LYOPHILIZED, FOR SOLUTION INTRAVENOUS at 15:56

## 2023-08-14 RX ADMIN — Medication: at 15:01

## 2023-08-14 RX ADMIN — Medication 25 MG: at 11:37

## 2023-08-14 RX ADMIN — I.V. FAT EMULSION 250 ML: 20 EMULSION INTRAVENOUS at 18:23

## 2023-08-14 RX ADMIN — HYDROMORPHONE HYDROCHLORIDE 0.5 MG: 1 INJECTION, SOLUTION INTRAMUSCULAR; INTRAVENOUS; SUBCUTANEOUS at 17:41

## 2023-08-14 RX ADMIN — HYDROMORPHONE HYDROCHLORIDE 0.5 MG: 1 INJECTION, SOLUTION INTRAMUSCULAR; INTRAVENOUS; SUBCUTANEOUS at 00:09

## 2023-08-14 RX ADMIN — SODIUM CHLORIDE, PRESERVATIVE FREE 40 MG: 5 INJECTION INTRAVENOUS at 13:24

## 2023-08-14 RX ADMIN — SODIUM CHLORIDE, SODIUM LACTATE, POTASSIUM CHLORIDE, AND CALCIUM CHLORIDE: 600; 310; 30; 20 INJECTION, SOLUTION INTRAVENOUS at 11:10

## 2023-08-14 RX ADMIN — COLLAGENASE SANTYL: 250 OINTMENT TOPICAL at 08:03

## 2023-08-14 RX ADMIN — ONDANSETRON 4 MG: 2 INJECTION INTRAMUSCULAR; INTRAVENOUS at 19:04

## 2023-08-14 RX ADMIN — SODIUM CHLORIDE, PRESERVATIVE FREE 10 ML: 5 INJECTION INTRAVENOUS at 20:32

## 2023-08-14 RX ADMIN — DEXMEDETOMIDINE HYDROCHLORIDE 0.7 MCG/KG/HR: 4 INJECTION, SOLUTION INTRAVENOUS at 01:17

## 2023-08-14 RX ADMIN — Medication: at 00:08

## 2023-08-14 RX ADMIN — SODIUM CHLORIDE, PRESERVATIVE FREE 20 ML: 5 INJECTION INTRAVENOUS at 08:03

## 2023-08-14 RX ADMIN — FENTANYL CITRATE 100 MCG: 50 INJECTION, SOLUTION INTRAMUSCULAR; INTRAVENOUS at 11:43

## 2023-08-14 RX ADMIN — SUCRALFATE 1 G: 1 TABLET ORAL at 16:34

## 2023-08-14 RX ADMIN — FUROSEMIDE 40 MG: 10 INJECTION, SOLUTION INTRAMUSCULAR; INTRAVENOUS at 14:49

## 2023-08-14 RX ADMIN — HYDROMORPHONE HYDROCHLORIDE 0.5 MG: 1 INJECTION, SOLUTION INTRAMUSCULAR; INTRAVENOUS; SUBCUTANEOUS at 14:31

## 2023-08-14 RX ADMIN — CHLORHEXIDINE GLUCONATE 15 ML: 1.2 RINSE ORAL at 08:04

## 2023-08-14 RX ADMIN — DEXMEDETOMIDINE HYDROCHLORIDE 0.3 MCG/KG/HR: 4 INJECTION, SOLUTION INTRAVENOUS at 17:42

## 2023-08-14 RX ADMIN — PROCHLORPERAZINE EDISYLATE 10 MG: 5 INJECTION, SOLUTION INTRAMUSCULAR; INTRAVENOUS at 21:43

## 2023-08-14 ASSESSMENT — PAIN DESCRIPTION - LOCATION
LOCATION: GENERALIZED
LOCATION: BUTTOCKS
LOCATION: ABDOMEN
LOCATION: GENERALIZED
LOCATION: ABDOMEN

## 2023-08-14 ASSESSMENT — PAIN SCALES - GENERAL
PAINLEVEL_OUTOF10: 0
PAINLEVEL_OUTOF10: 1
PAINLEVEL_OUTOF10: 6
PAINLEVEL_OUTOF10: 6
PAINLEVEL_OUTOF10: 0
PAINLEVEL_OUTOF10: 1
PAINLEVEL_OUTOF10: 0
PAINLEVEL_OUTOF10: 2
PAINLEVEL_OUTOF10: 9
PAINLEVEL_OUTOF10: 2
PAINLEVEL_OUTOF10: 7
PAINLEVEL_OUTOF10: 0
PAINLEVEL_OUTOF10: 0
PAINLEVEL_OUTOF10: 4
PAINLEVEL_OUTOF10: 6

## 2023-08-14 ASSESSMENT — PULMONARY FUNCTION TESTS
PIF_VALUE: 26
PIF_VALUE: 27
PIF_VALUE: 23
PIF_VALUE: 19
PIF_VALUE: 22

## 2023-08-14 ASSESSMENT — PAIN DESCRIPTION - DESCRIPTORS: DESCRIPTORS: ACHING

## 2023-08-14 ASSESSMENT — ENCOUNTER SYMPTOMS: SHORTNESS OF BREATH: 1

## 2023-08-14 NOTE — PROGRESS NOTES
I participated in the IDT meeting where the plan of care for Amado Blanton was discussed. I reviewed the patient's medical record prior to this meeting. We will continue to follow for spiritual/emotional care. Please contact  paging service for any immediate needs. _____________________________  Reilly Garcia M.Div., M.S., B.C.C.   Staff

## 2023-08-14 NOTE — PROGRESS NOTES
08/14/23 1250   Weaning Parameters   Spontaneous Breathing Trial Complete Yes   Respiratory Rate Observed 25   Ve 7.28      RSBI 78       1650: Patient extubated to Bipap 10/5, 40% per NP order. RN at bedside.

## 2023-08-14 NOTE — PROGRESS NOTES
08/14/23 1704   NIV Type   NIV Started/Stopped On   Mode Bilevel   Mask Type Full face mask   Mask Size Medium   Assessment   Respirations (!) 35   Comfort Level Good   Using Accessory Muscles No   Mask Compliance Good   Settings/Measurements   PIP Observed 11 cm H20   IPAP 10 cmH20   CPAP/EPAP 5 cmH2O   Vt (Measured) 358 mL   Rate Ordered 4   Insp Rise Time (%) 3 %   FiO2  40 %   Minute Volume (L/min) 6.58 Liters   Mask Leak (lpm) 10 lpm   Patient's Home Machine No   Alarm Settings   Alarms On Y   High Pressure (cmH2O) 30 cmH2O   Apnea (secs) 20 secs   RR Low (bpm) 8   RR High (bpm) 45 br/min

## 2023-08-14 NOTE — PROGRESS NOTES
Bedside and Verbal shift change report given to Jose Cline RN (oncoming nurse) by Saint Mallick, RN (offgoing nurse). Report included the following information Nurse Handoff Report, Adult Overview, Intake/Output, MAR, Recent Results, Cardiac Rhythm  , Alarm Parameters, and Neuro Assessment. 1120: Pt was transported off the unit for wound debridement and returned at 1219. VSS. Dressing was clean, dry and intact. 1650: Pt extubated and placed on BiPap 10/5 40%    1745: Pt WOB has remained elevated with RR 30s-40s. Restarted precedex 0.3. Bedside and Verbal shift change report given to BONY Smith (oncoming nurse) by Jose Cline RN (offgoing nurse). Report included the following information Nurse Handoff Report, Adult Overview, Intake/Output, MAR, Recent Results, Cardiac Rhythm  , Alarm Parameters, and Neuro Assessment.

## 2023-08-14 NOTE — BRIEF OP NOTE
Brief Postoperative Note      Patient: Miroslava Caro  YOB: 1960  MRN: [id number]    Date of Procedure: 8/14/2023    Pre-Op Diagnosis Codes:     * Pressure injury of skin of sacral region, unspecified injury stage [L89.159]    Post-Op Diagnosis: Stage IV decubitus wound       Procedure(s):  DEBRIDEMENT OF SACRAL DECUBITUS WOUND    Surgeon(s):  Lisa Dempsey MD    Assistant:  Surgical Assistant: Cuong Manjarrez    Anesthesia: IV Sedation    Estimated Blood Loss (mL): 40 mL    Complications: None    Specimens:   * No specimens in log *    Implants:  * No implants in log *      Drains:   Open Drain 07/31/23 Left LUQ (Active)   Site Description Clean, dry & intact 08/14/23 0800   Dressing Status Clean, dry & intact 08/14/23 0800   Drainage Appearance Yellow; Thin 08/14/23 0800   Status Unclamped 08/14/23 0800   Output (ml) 70 ml 08/14/23 0700       NG/OG/NJ/NE Tube Center mouth (Active)   Surrounding Skin Clean, dry & intact 08/14/23 0800   Securement device Adhesive based aldridge 08/14/23 0800   Status Clamped 08/14/23 0800   Placement Verified External Catheter Length 08/14/23 0800   NG/OG/NJ/NE External Measurement (cm) 50 cm 08/14/23 0800   Drainage Appearance None 08/14/23 0800   Tube Feeding Supplement Amount (mL) 30 08/14/23 0600   Free Water/Flush (mL) 30 mL 08/14/23 0600       Urinary Catheter 08/07/23 Lutz (Active)   Catheter Indications Need for fluid volume management of the critically ill patient in a critical care setting 08/14/23 0800   Site Assessment No urethral drainage 08/14/23 0800   Urine Color Yellow 08/14/23 0800   Urine Appearance Clear 08/14/23 0800   Urine Odor Other (Comment) 08/09/23 0800   Collection Container Standard 08/14/23 0800   Securement Method Securing device (Describe) 08/14/23 0800   Catheter Care  Perineal wipes 08/13/23 2000   Catheter Best Practices  Drainage tube clipped to bed;Catheter secured to thigh; Tamper seal intact; Bag not on floor; Lack of dependent loop 06/30/23 2000   Status Draining;Patent 06/30/23 2000   Output (mL) 200 mL 06/30/23 1049       [REMOVED] Urinary Catheter 07/05/23 2 Way (Removed)   $ Urethral catheter insertion $ Not inserted for procedure 07/05/23 1733   Catheter Indications Urinary retention (acute or chronic), continuous bladder irrigation or bladder outlet obstruction 07/06/23 0658   Site Assessment Edema 07/06/23 0658   Urine Color Yellow 07/06/23 0658   Urine Appearance Cloudy 07/06/23 0658   Urine Odor Other (Comment) 07/05/23 1733   Collection Container Standard 07/05/23 1733   Securement Method Securing device (Describe) 07/06/23 0658   Catheter Care  Perineal wipes 07/06/23 1500   Catheter Best Practices  Catheter secured to thigh 07/06/23 0658   Status Draining;Patent 07/06/23 0658   Output (mL) 850 mL 07/06/23 1649       [REMOVED] Urinary Catheter 07/06/23 Lutz (Removed)   Catheter Indications Urinary retention (acute or chronic), continuous bladder irrigation or bladder outlet obstruction 07/10/23 1600   Site Assessment No urethral drainage 07/10/23 1600   Urine Color Yellow 07/10/23 1600   Urine Appearance Clear 07/10/23 1600   Collection Container Standard 07/10/23 1600   Securement Method Securing device (Describe) 07/10/23 1600   Catheter Care  Perineal wipes 07/10/23 1600   Catheter Best Practices  Drainage tube clipped to bed; Tamper seal intact; Catheter secured to thigh; Bag below bladder;Bag not on floor; Lack of dependent loop in tubing;Drainage bag less than half full 07/10/23 1600   Status Draining 07/10/23 1600   Output (mL) 500 mL 07/10/23 1730   Discontinuation Reason Per provider order 07/10/23 0119       [REMOVED] Urinary Catheter 07/10/23 Lutz-Temperature (Removed)   $ Urethral catheter insertion $ Not inserted for procedure 07/16/23 2000   Catheter Indications Urinary retention (acute or chronic), continuous bladder irrigation or bladder outlet obstruction 07/18/23 2020   Site Assessment No urethral drainage 07/18/23

## 2023-08-14 NOTE — PROGRESS NOTES
CRITICAL CARE NOTE      Name: Fabian Burkitt   : 1960   MRN: [id number]   Date: 2023      REASON FOR ICU ADMISSION: Acute hypoxic respiratory failure    PRINCIPAL ICU DIAGNOSIS   Acute hypoxic respiratory failure    BRIEF PATIENT SUMMARY   Anali Whaley is a 51-year-old female with past medical history of gastric bypass in 2017, asthma, CAD, DM, hyperlipidemia, GERD, PE, ANCA, anxiety, depression, who presented [facility] ED on  [facility] and hematemesis. Back in March she was seen for fulminant C. difficile colitis and underwent subtotal colectomy with ileostomy. She was subsequently reversed on . She was admitted to [facility] from  - . During that admission she underwent EGD showing GJ ulcer and stenosis at [address] [facility] junction. On  she went to the OR for an ex lap for free air and free fluid but there was no sign of perforation. General surgery was consulted during this admission and has been following. No surgical intervention has been recommended. At this time it appears she has a fluid collection as opposed to a perforation. It was recommended for IR to drain this collection which was performed on . GI has been consulted for GIB. She was started on PPI twice daily and Carafate 4 times daily. Eliquis and meloxicam were held. On  she underwent EGD which found a nonbleeding anastomotic ulcer. On  she had IVC filter placed. Overnight on  a rapid response was called for hypoxia and respiratory distress. She was transferred ICU on BiPAP. She was ultimately intubated for hypoxic respiratory failure. This appears to be due from pulmonary edema. She was aggressively diuresed with Lasix. She underwent wound debridement on . She is undergoing SAT/SBT today. COMPREHENSIVE ASSESSMENT & PLAN:SYSTEM BASED     24 HOUR EVENTS: She underwent wound debridement on . She is undergoing SAT/SBT today.      NEUROLOGICAL:  Depression   RASS goal

## 2023-08-15 ENCOUNTER — APPOINTMENT (OUTPATIENT)
Facility: HOSPITAL | Age: 63
DRG: 004 | End: 2023-08-15
Payer: MEDICARE

## 2023-08-15 PROBLEM — S31.000A SACRAL WOUND: Status: ACTIVE | Noted: 2023-08-15

## 2023-08-15 LAB
ANION GAP SERPL CALC-SCNC: 7 MMOL/L (ref 5–15)
BASOPHILS # BLD: 0 K/UL (ref 0–0.1)
BASOPHILS NFR BLD: 0 % (ref 0–1)
BUN SERPL-MCNC: 34 MG/DL (ref 6–20)
BUN/CREAT SERPL: 79 (ref 12–20)
CALCIUM SERPL-MCNC: 8.4 MG/DL (ref 8.5–10.1)
CHLORIDE SERPL-SCNC: 114 MMOL/L (ref 97–108)
CO2 SERPL-SCNC: 26 MMOL/L (ref 21–32)
CREAT SERPL-MCNC: 0.43 MG/DL (ref 0.55–1.02)
DIFFERENTIAL METHOD BLD: ABNORMAL
EOSINOPHIL # BLD: 0.1 K/UL (ref 0–0.4)
EOSINOPHIL NFR BLD: 1 % (ref 0–7)
ERYTHROCYTE [DISTWIDTH] IN BLOOD BY AUTOMATED COUNT: 16.8 % (ref 11.5–14.5)
GLUCOSE BLD STRIP.AUTO-MCNC: 115 MG/DL (ref 65–117)
GLUCOSE BLD STRIP.AUTO-MCNC: 117 MG/DL (ref 65–117)
GLUCOSE BLD STRIP.AUTO-MCNC: 120 MG/DL (ref 65–117)
GLUCOSE BLD STRIP.AUTO-MCNC: 126 MG/DL (ref 65–117)
GLUCOSE BLD STRIP.AUTO-MCNC: 150 MG/DL (ref 65–117)
GLUCOSE SERPL-MCNC: 114 MG/DL (ref 65–100)
HCT VFR BLD AUTO: 26.2 % (ref 35–47)
HGB BLD-MCNC: 8 G/DL (ref 11.5–16)
IMM GRANULOCYTES # BLD AUTO: 0 K/UL
IMM GRANULOCYTES NFR BLD AUTO: 0 %
LYMPHOCYTES # BLD: 0.2 K/UL (ref 0.8–3.5)
LYMPHOCYTES NFR BLD: 2 % (ref 12–49)
MCH RBC QN AUTO: 28 PG (ref 26–34)
MCHC RBC AUTO-ENTMCNC: 30.5 G/DL (ref 30–36.5)
MCV RBC AUTO: 91.6 FL (ref 80–99)
METAMYELOCYTES NFR BLD MANUAL: 1 %
MONOCYTES # BLD: 0.6 K/UL (ref 0–1)
MONOCYTES NFR BLD: 7 % (ref 5–13)
NEUTS SEG # BLD: 8.1 K/UL (ref 1.8–8)
NEUTS SEG NFR BLD: 89 % (ref 32–75)
NRBC # BLD: 0 K/UL (ref 0–0.01)
NRBC BLD-RTO: 0 PER 100 WBC
PHOSPHATE SERPL-MCNC: 3.1 MG/DL (ref 2.6–4.7)
PLATELET # BLD AUTO: 311 K/UL (ref 150–400)
PMV BLD AUTO: 10.4 FL (ref 8.9–12.9)
POTASSIUM SERPL-SCNC: 3.7 MMOL/L (ref 3.5–5.1)
RBC # BLD AUTO: 2.86 M/UL (ref 3.8–5.2)
RBC MORPH BLD: ABNORMAL
SERVICE CMNT-IMP: ABNORMAL
SERVICE CMNT-IMP: NORMAL
SERVICE CMNT-IMP: NORMAL
SODIUM SERPL-SCNC: 147 MMOL/L (ref 136–145)
VANCOMYCIN SERPL-MCNC: 22 UG/ML
WBC # BLD AUTO: 9.1 K/UL (ref 3.6–11)

## 2023-08-15 PROCEDURE — 2580000003 HC RX 258: Performed by: NURSE PRACTITIONER

## 2023-08-15 PROCEDURE — 6360000002 HC RX W HCPCS: Performed by: SURGERY

## 2023-08-15 PROCEDURE — 2580000003 HC RX 258: Performed by: SURGERY

## 2023-08-15 PROCEDURE — C9113 INJ PANTOPRAZOLE SODIUM, VIA: HCPCS | Performed by: SURGERY

## 2023-08-15 PROCEDURE — 11042 DBRDMT SUBQ TIS 1ST 20SQCM/<: CPT | Performed by: SURGERY

## 2023-08-15 PROCEDURE — 2000000000 HC ICU R&B

## 2023-08-15 PROCEDURE — 80202 ASSAY OF VANCOMYCIN: CPT

## 2023-08-15 PROCEDURE — 6360000002 HC RX W HCPCS: Performed by: NURSE PRACTITIONER

## 2023-08-15 PROCEDURE — A4216 STERILE WATER/SALINE, 10 ML: HCPCS | Performed by: SURGERY

## 2023-08-15 PROCEDURE — 36415 COLL VENOUS BLD VENIPUNCTURE: CPT

## 2023-08-15 PROCEDURE — 2500000003 HC RX 250 WO HCPCS: Performed by: SURGERY

## 2023-08-15 PROCEDURE — 82962 GLUCOSE BLOOD TEST: CPT

## 2023-08-15 PROCEDURE — 71045 X-RAY EXAM CHEST 1 VIEW: CPT

## 2023-08-15 PROCEDURE — 85025 COMPLETE CBC W/AUTO DIFF WBC: CPT

## 2023-08-15 PROCEDURE — 6370000000 HC RX 637 (ALT 250 FOR IP): Performed by: SURGERY

## 2023-08-15 PROCEDURE — 84100 ASSAY OF PHOSPHORUS: CPT

## 2023-08-15 PROCEDURE — 11045 DBRDMT SUBQ TISS EACH ADDL: CPT | Performed by: SURGERY

## 2023-08-15 PROCEDURE — 2500000003 HC RX 250 WO HCPCS: Performed by: NURSE PRACTITIONER

## 2023-08-15 PROCEDURE — 80048 BASIC METABOLIC PNL TOTAL CA: CPT

## 2023-08-15 PROCEDURE — 99232 SBSQ HOSP IP/OBS MODERATE 35: CPT | Performed by: INTERNAL MEDICINE

## 2023-08-15 RX ORDER — HYDROMORPHONE HYDROCHLORIDE 1 MG/ML
0.5 INJECTION, SOLUTION INTRAMUSCULAR; INTRAVENOUS; SUBCUTANEOUS
Status: DISCONTINUED | OUTPATIENT
Start: 2023-08-15 | End: 2023-08-21

## 2023-08-15 RX ORDER — HYDROMORPHONE HYDROCHLORIDE 1 MG/ML
1 INJECTION, SOLUTION INTRAMUSCULAR; INTRAVENOUS; SUBCUTANEOUS EVERY 4 HOURS PRN
Status: DISCONTINUED | OUTPATIENT
Start: 2023-08-15 | End: 2023-08-21

## 2023-08-15 RX ADMIN — Medication: at 00:19

## 2023-08-15 RX ADMIN — Medication 10 ML: at 20:33

## 2023-08-15 RX ADMIN — CHLORHEXIDINE GLUCONATE 15 ML: 1.2 RINSE ORAL at 20:33

## 2023-08-15 RX ADMIN — METRONIDAZOLE 500 MG: 5 INJECTION, SOLUTION INTRAVENOUS at 14:00

## 2023-08-15 RX ADMIN — POTASSIUM CHLORIDE: 2 INJECTION, SOLUTION, CONCENTRATE INTRAVENOUS at 18:17

## 2023-08-15 RX ADMIN — VANCOMYCIN HYDROCHLORIDE 1250 MG: 1.25 INJECTION, POWDER, LYOPHILIZED, FOR SOLUTION INTRAVENOUS at 16:23

## 2023-08-15 RX ADMIN — HYDROMORPHONE HYDROCHLORIDE 1 MG: 1 INJECTION, SOLUTION INTRAMUSCULAR; INTRAVENOUS; SUBCUTANEOUS at 02:46

## 2023-08-15 RX ADMIN — METRONIDAZOLE 500 MG: 5 INJECTION, SOLUTION INTRAVENOUS at 06:00

## 2023-08-15 RX ADMIN — Medication: at 08:06

## 2023-08-15 RX ADMIN — HYDROMORPHONE HYDROCHLORIDE 1 MG: 1 INJECTION, SOLUTION INTRAMUSCULAR; INTRAVENOUS; SUBCUTANEOUS at 16:54

## 2023-08-15 RX ADMIN — SODIUM CHLORIDE, PRESERVATIVE FREE 10 ML: 5 INJECTION INTRAVENOUS at 20:33

## 2023-08-15 RX ADMIN — HYDROMORPHONE HYDROCHLORIDE 1 MG: 1 INJECTION, SOLUTION INTRAMUSCULAR; INTRAVENOUS; SUBCUTANEOUS at 12:48

## 2023-08-15 RX ADMIN — PIPERACILLIN AND TAZOBACTAM 3375 MG: 3; .375 INJECTION, POWDER, LYOPHILIZED, FOR SOLUTION INTRAVENOUS at 16:22

## 2023-08-15 RX ADMIN — HYDROMORPHONE HYDROCHLORIDE 1 MG: 1 INJECTION, SOLUTION INTRAMUSCULAR; INTRAVENOUS; SUBCUTANEOUS at 20:32

## 2023-08-15 RX ADMIN — HYDROMORPHONE HYDROCHLORIDE 0.5 MG: 1 INJECTION, SOLUTION INTRAMUSCULAR; INTRAVENOUS; SUBCUTANEOUS at 00:55

## 2023-08-15 RX ADMIN — COLLAGENASE SANTYL: 250 OINTMENT TOPICAL at 08:06

## 2023-08-15 RX ADMIN — METRONIDAZOLE 500 MG: 5 INJECTION, SOLUTION INTRAVENOUS at 21:50

## 2023-08-15 RX ADMIN — Medication: at 16:32

## 2023-08-15 RX ADMIN — PIPERACILLIN AND TAZOBACTAM 3375 MG: 3; .375 INJECTION, POWDER, LYOPHILIZED, FOR SOLUTION INTRAVENOUS at 08:06

## 2023-08-15 RX ADMIN — Medication 10 ML: at 08:12

## 2023-08-15 RX ADMIN — HYDROMORPHONE HYDROCHLORIDE 1 MG: 1 INJECTION, SOLUTION INTRAMUSCULAR; INTRAVENOUS; SUBCUTANEOUS at 08:21

## 2023-08-15 RX ADMIN — PIPERACILLIN AND TAZOBACTAM 3375 MG: 3; .375 INJECTION, POWDER, LYOPHILIZED, FOR SOLUTION INTRAVENOUS at 00:14

## 2023-08-15 RX ADMIN — I.V. FAT EMULSION 250 ML: 20 EMULSION INTRAVENOUS at 18:08

## 2023-08-15 RX ADMIN — SODIUM CHLORIDE, PRESERVATIVE FREE 10 ML: 5 INJECTION INTRAVENOUS at 08:12

## 2023-08-15 RX ADMIN — SODIUM CHLORIDE, PRESERVATIVE FREE 40 MG: 5 INJECTION INTRAVENOUS at 00:14

## 2023-08-15 RX ADMIN — CHLORHEXIDINE GLUCONATE 15 ML: 1.2 RINSE ORAL at 08:12

## 2023-08-15 RX ADMIN — SODIUM CHLORIDE, PRESERVATIVE FREE 40 MG: 5 INJECTION INTRAVENOUS at 12:09

## 2023-08-15 ASSESSMENT — PAIN DESCRIPTION - DESCRIPTORS
DESCRIPTORS: ACHING
DESCRIPTORS: DISCOMFORT;CRAMPING;NAGGING
DESCRIPTORS: ACHING
DESCRIPTORS: ACHING
DESCRIPTORS: ACHING;DULL;DISCOMFORT;NAGGING

## 2023-08-15 ASSESSMENT — PAIN SCALES - GENERAL
PAINLEVEL_OUTOF10: 7
PAINLEVEL_OUTOF10: 7
PAINLEVEL_OUTOF10: 0
PAINLEVEL_OUTOF10: 0
PAINLEVEL_OUTOF10: 7
PAINLEVEL_OUTOF10: 3
PAINLEVEL_OUTOF10: 8
PAINLEVEL_OUTOF10: 8
PAINLEVEL_OUTOF10: 7
PAINLEVEL_OUTOF10: 3
PAINLEVEL_OUTOF10: 0
PAINLEVEL_OUTOF10: 7
PAINLEVEL_OUTOF10: 3
PAINLEVEL_OUTOF10: 4

## 2023-08-15 ASSESSMENT — PAIN DESCRIPTION - LOCATION
LOCATION: GENERALIZED
LOCATION: BACK;GENERALIZED

## 2023-08-15 ASSESSMENT — PAIN DESCRIPTION - PAIN TYPE: TYPE: CHRONIC PAIN

## 2023-08-15 ASSESSMENT — PAIN DESCRIPTION - ONSET
ONSET: GRADUAL
ONSET: ON-GOING
ONSET: ON-GOING

## 2023-08-15 ASSESSMENT — PAIN DESCRIPTION - ORIENTATION
ORIENTATION: ANTERIOR;POSTERIOR
ORIENTATION: ANTERIOR;POSTERIOR
ORIENTATION: POSTERIOR;ANTERIOR

## 2023-08-15 ASSESSMENT — PAIN DESCRIPTION - FREQUENCY
FREQUENCY: CONTINUOUS
FREQUENCY: CONTINUOUS
FREQUENCY: INTERMITTENT

## 2023-08-15 ASSESSMENT — ENCOUNTER SYMPTOMS
CHEST TIGHTNESS: 0
SHORTNESS OF BREATH: 0
ABDOMINAL PAIN: 1

## 2023-08-15 NOTE — PROGRESS NOTES
Day #10 of Vancomycin  Indication: intra-abdominal abscess, possible peritonitis  Current regimen:  1250mg q24h      Recent Labs     08/13/23  0547 08/14/23  0332 08/15/23  0553   WBC 8.2 8.4 9.1   CREATININE 0.37* 0.47* 0.43*   BUN 36* 38* 34*       A random vancomycin level of 22 mcg/mL was obtained and from this level, the patient's AUC24 is calculated to be 470 with the current regimen. Goal target range AUC/WILLEM 400-600      Plan: Continue current regimen. Pharmacy will continue to monitor this patient daily for changes in clinical status and renal function. *Random vancomycin levels are used to calculate AUC/WILLEM, this level should not be interpreted as a trough. Vancomycin has been dosed using Bayesian kinetics software to target an AUC24:WILLEM of 400-600, which provides adequate exposure for as assumed infection due to MRSA with an WILLEM of 1 or less while reducing the risk of nephrotoxicity as seen with traditional trough based dosing goals.

## 2023-08-15 NOTE — PROGRESS NOTES
CRITICAL CARE NOTE      Name: Hue Pollock   : 1960   MRN: [id number]   Date: 8/15/2023      REASON FOR ICU ADMISSION: Acute hepatic respiratory failure    PRINCIPAL ICU DIAGNOSIS   Weakness  Hematemesis   Sacral decubitus requiring debridement  Acute hepatic respiratory failure    BRIEF PATIENT SUMMARY   Carla Alejandre is a 59-year-old female with a PMHx of gastric bypass (), asthma, CAD, T2DM, HLD, GERD, PE, ANCA, anxiety/depression who initially presented to the emergency department () [facility] for weakness and hematemesis. In March of this year she was seen for fulminant C. difficile colitis and underwent subtotal colectomy with ileostomy. On () she underwent reversal.  She was then readmitted ( through ) to [address] [place] [address] during which time she underwent EGD which revealed GE J ulcer and stenosis at [address] [facility]. On () she went to the OR for exploratory laparotomy for free air and free fluid but there was no evidence of perforation. Surgery has been consulted, it appears this is a fluid collection versus a perforation. IR consulted and recommends draining fluid collection. GI consulted for GIB. She was treated with PPI twice daily and Carafate 4 times daily. Eliquis and meloxicam were held. () she again underwent EGD which revealed a nonbleeding anastomotic ulcer. On () IVC filter placed. Overnight on () a rapid response was called for hypoxia and respiratory distress and she was transferred to the ICU for further observation and management on BiPAP. Unfortunately her status continued to decline and she was intubated for management of acute hypoxic respiratory failure. Underlying etiology appears to be pulmonary edema. She was aggressively diuresed with Lasix and underwent debridement (). She was successfully extubated to CPAP later that evening.       COMPREHENSIVE ASSESSMENT & PLAN:SYSTEM BASED     24 HOUR EVENTS: Patient successfully [address] [facility])           Intake/Output:     Intake/Output Summary (Last 24 hours) at 8/15/2023 1642  Last data filed at 8/15/2023 1630  Gross per 24 hour   Intake 2626.67 ml   Output 2345 ml   Net 281.67 ml       Imaging    No valid procedures specified. CRITICAL CARE DOCUMENTATION  I had a face to face encounter with the patient, reviewed and interpreted patient data including clinical events, labs, images, vital signs, I/O's, and examined patient. I have discussed the case and the plan and management of the patient's care with the consulting services, the bedside nurses and the respiratory therapist.      NOTE OF PERSONAL INVOLVEMENT IN CARE   This patient has a high probability of imminent, clinically significant deterioration, which requires the highest level of preparedness to intervene urgently. I participated in the decision-making and personally managed or directed the management of the following life and organ supporting interventions that required my frequent assessment to treat or prevent imminent deterioration. I personally spent 50 minutes of critical care time. This is time spent at this critically ill patient's bedside actively involved in patient care as well as the coordination of care. This does not include any procedural time which has been billed separately.     Pascual Gonzalez, APRN - CNP   Critical Care Medicine  [facility]

## 2023-08-15 NOTE — WOUND CARE
WOCN Note:     Follow-up visit for sacral wound post debridement. Seen in 7120    Chart shows:  Admitted on 7/28/23 from [facility]. Admitted for GIB, respiratory failure with intubation and now extubated. History of DM, gastric bypass. WBC = 9.1; on Flagyl, zosyn, & vancomycin    Assessment:   Communicative with RNElizabeth, at bedside to help turn left & right. Has a Lutz and multiple BM's yesterday in OR with non today per RN. Surface: MAL mattress  Diet: TPN    1. POA stage 4 sacral pressure injury newly debrided yesterday in OR by Dr. Silvina Cortez  7.5 x 8 x 0.6 cm skin is mobile and measurements will vary  Drying central yellow with scattered granulation buds and devitalized brown at margins  No exudate  Tx: packed with Vashe-damp gauze and applied sacral foam       2. POA right heel unstageable pressure injury  1.5 x 1.5 x 0 cm  Drying and beginning to flake away  Venelex applied and offloaded with pillow    3. POA left heel deep tissue injury  1 x 1 x 0 cm  Dry & flat with no mobile skin or fluctuance  Appears drying  Venelex applied and offloaded with pillow    Wound Recommendations:    Continue Venelex to heels as ordered  Sacrum:  pack with Vashe-damp gauze and cover with foam dressing. Change daily and as needed with soiling. PI Prevention:  Turn/reposition approximately every 2 hours  Offload heels with heels hanging off end of pillow at all times while in bed. Sacral Foam dressing: lift to assess regularly; change as needed. Discontinue if incontinence is frequently soiling dressing. Low Air Loss mattress: Use only flat sheet and one incontinence pad. Discussed with Dr. Brenton Zavala at bedside and Elizabeth LOVETT.     Transition of Care: Plan to follow as needed while admitted to hospital.    Iveth Murphy, RONALDN, RN, [facility]  Certified Wound, Ostomy, Continence Nurse  office [phone]  Available via [facility]

## 2023-08-15 NOTE — CARE COORDINATION
Transition of Care Plan:    RUR: 24% high  Prior Level of Functioning: Max assistance with ADL's   Disposition: IPR vs SNF  DME needed: TBD  Transportation at discharge: BLS  IM/[facility] Medicare 7/29/23  Is patient a [place] and connected with [facility]? No   Caregiver Contact:  Spouse Corby Player: [phone]  Care Conference needed? No  Barriers to discharge:    Intubated   To OR for Excisional debridement, sacral decubitus   Wound Care to follow s/p sacral wound debridement  Bipap  Care management is continuing to follow.    Balaji Fabian RN,Care Management

## 2023-08-15 NOTE — PROGRESS NOTES
2055: Pt tachypnic in the upper 30's to low 40's. BLAS Kendall made aware and at bedside to assess. BiPAP settings changed to 14/6 40% from 10/5 40% and orders received for a STAT chest x-ray and ABG. 2142: Pt complaining of nausea at this time. Pts BiPAP removed and pt placed on 6 L NC. PRN Compazine given. Trixie Busby NP notified. Pt okay to remain on 6 L NC overnight as long as oxygenation is appropriate. 2300: Pt o2 sats 89%. Pt placed on 8 L of midflow. BLAS Kendall made aware. 0255 :Pt desating to the 70's. Midflow increased to 15 L. Pt o2 up to 85%. BLAS Kendall made aware and pt place back on BiPAP 12/6 50%. 9627: Pt requested to be switched back to midflow. Pt oxygenating well with BiPAP BLAS Kendall made aware and pt switched to 10 L midflow.

## 2023-08-15 NOTE — OP NOTE
[address] [facility]  OPERATIVE REPORT    Name:  Susie Roberts  MR#:  [id number]  :  1960  ACCOUNT #:  [de-identified]  DATE OF SERVICE:  2023    PREOPERATIVE DIAGNOSIS:  Sacral decubitus wound. POSTOPERATIVE DIAGNOSIS:  Sacral decubitus wound. PROCEDURE PERFORMED:  Excisional debridement, sacral decubitus wound (CPT 38284, CPT 47315). SURGEON:  MD Jaspal Magallon SA    ANESTHESIA:  General endotracheal.    COMPLICATIONS:  None. SPECIMENS REMOVED:  None. IMPLANTS:  None. ESTIMATED BLOOD LOSS:  40 mL. DRAINS:  None. COUNTS:  Sponge count correct. Needle count correct. INDICATIONS:  The patient is a 60-year-old white female with a history of morbid obesity, managed through laparoscopic gastric bypass seven years ago with satisfactory weight loss results. She was recently admitted at an outside hospital with pancolitis, secondary to C. difficile, requiring emergent abdominal colectomy, end ileostomy, which was later reversed. She was later reexplored for findings of pneumoperitoneum, but no bowel perforation was identified. Several weeks ago, she was transferred to an outside rehabilitation facility, but then developed upper GI hemorrhage. She was transferred to the [facility], for resuscitation, transfusion, with endoscopy revealing a marginal ulcer present at the gastrojejunal anastomosis. Anticoagulation was held, and an IVC filter was placed. Last week, she developed respiratory failure requiring intubation. She has a known sacral decubitus wound, but debridement was deferred given severe protein-calorie malnutrition, recent respiratory failure. She was taken to the operating room for excision with debridement. FINDINGS:  Excisional debridement of a 30 cm^2 area down to the coccyx, with healthy surrounding subcutaneous tissue and wound edges following debridement.     PROCEDURE:  The patient was identified as the correct

## 2023-08-16 ENCOUNTER — APPOINTMENT (OUTPATIENT)
Facility: HOSPITAL | Age: 63
DRG: 004 | End: 2023-08-16
Payer: MEDICARE

## 2023-08-16 LAB
ANION GAP SERPL CALC-SCNC: 5 MMOL/L (ref 5–15)
BASOPHILS # BLD: 0.1 K/UL (ref 0–0.1)
BASOPHILS NFR BLD: 1 % (ref 0–1)
BUN SERPL-MCNC: 33 MG/DL (ref 6–20)
BUN/CREAT SERPL: 97 (ref 12–20)
CALCIUM SERPL-MCNC: 8.7 MG/DL (ref 8.5–10.1)
CHLORIDE SERPL-SCNC: 117 MMOL/L (ref 97–108)
CO2 SERPL-SCNC: 28 MMOL/L (ref 21–32)
CREAT SERPL-MCNC: 0.34 MG/DL (ref 0.55–1.02)
DIFFERENTIAL METHOD BLD: ABNORMAL
EOSINOPHIL # BLD: 0.2 K/UL (ref 0–0.4)
EOSINOPHIL NFR BLD: 2 % (ref 0–7)
ERYTHROCYTE [DISTWIDTH] IN BLOOD BY AUTOMATED COUNT: 16.9 % (ref 11.5–14.5)
GLUCOSE BLD STRIP.AUTO-MCNC: 113 MG/DL (ref 65–117)
GLUCOSE BLD STRIP.AUTO-MCNC: 118 MG/DL (ref 65–117)
GLUCOSE SERPL-MCNC: 109 MG/DL (ref 65–100)
HCT VFR BLD AUTO: 26 % (ref 35–47)
HGB BLD-MCNC: 7.9 G/DL (ref 11.5–16)
IMM GRANULOCYTES # BLD AUTO: 0 K/UL
IMM GRANULOCYTES NFR BLD AUTO: 0 %
LYMPHOCYTES # BLD: 0.5 K/UL (ref 0.8–3.5)
LYMPHOCYTES NFR BLD: 5 % (ref 12–49)
MAGNESIUM SERPL-MCNC: 1.9 MG/DL (ref 1.6–2.4)
MCH RBC QN AUTO: 28.1 PG (ref 26–34)
MCHC RBC AUTO-ENTMCNC: 30.4 G/DL (ref 30–36.5)
MCV RBC AUTO: 92.5 FL (ref 80–99)
MONOCYTES # BLD: 0.4 K/UL (ref 0–1)
MONOCYTES NFR BLD: 4 % (ref 5–13)
NEUTS SEG # BLD: 8.2 K/UL (ref 1.8–8)
NEUTS SEG NFR BLD: 88 % (ref 32–75)
NRBC # BLD: 0 K/UL (ref 0–0.01)
NRBC BLD-RTO: 0 PER 100 WBC
PHOSPHATE SERPL-MCNC: 3.2 MG/DL (ref 2.6–4.7)
PLATELET # BLD AUTO: 411 K/UL (ref 150–400)
PMV BLD AUTO: 10.3 FL (ref 8.9–12.9)
POTASSIUM SERPL-SCNC: 4.2 MMOL/L (ref 3.5–5.1)
RBC # BLD AUTO: 2.81 M/UL (ref 3.8–5.2)
RBC MORPH BLD: ABNORMAL
RBC MORPH BLD: ABNORMAL
SERVICE CMNT-IMP: ABNORMAL
SERVICE CMNT-IMP: NORMAL
SODIUM SERPL-SCNC: 150 MMOL/L (ref 136–145)
WBC # BLD AUTO: 9.4 K/UL (ref 3.6–11)

## 2023-08-16 PROCEDURE — 2709999900 HC NON-CHARGEABLE SUPPLY

## 2023-08-16 PROCEDURE — 84100 ASSAY OF PHOSPHORUS: CPT

## 2023-08-16 PROCEDURE — 02HV33Z INSERTION OF INFUSION DEVICE INTO SUPERIOR VENA CAVA, PERCUTANEOUS APPROACH: ICD-10-PCS | Performed by: SURGERY

## 2023-08-16 PROCEDURE — 2500000003 HC RX 250 WO HCPCS: Performed by: NURSE PRACTITIONER

## 2023-08-16 PROCEDURE — 2580000003 HC RX 258: Performed by: SURGERY

## 2023-08-16 PROCEDURE — 80048 BASIC METABOLIC PNL TOTAL CA: CPT

## 2023-08-16 PROCEDURE — 82962 GLUCOSE BLOOD TEST: CPT

## 2023-08-16 PROCEDURE — A4216 STERILE WATER/SALINE, 10 ML: HCPCS | Performed by: SURGERY

## 2023-08-16 PROCEDURE — 94660 CPAP INITIATION&MGMT: CPT

## 2023-08-16 PROCEDURE — 6360000002 HC RX W HCPCS: Performed by: NURSE PRACTITIONER

## 2023-08-16 PROCEDURE — 6360000002 HC RX W HCPCS: Performed by: SURGERY

## 2023-08-16 PROCEDURE — 71045 X-RAY EXAM CHEST 1 VIEW: CPT

## 2023-08-16 PROCEDURE — 36415 COLL VENOUS BLD VENIPUNCTURE: CPT

## 2023-08-16 PROCEDURE — 83735 ASSAY OF MAGNESIUM: CPT

## 2023-08-16 PROCEDURE — P9047 ALBUMIN (HUMAN), 25%, 50ML: HCPCS | Performed by: NURSE PRACTITIONER

## 2023-08-16 PROCEDURE — C9113 INJ PANTOPRAZOLE SODIUM, VIA: HCPCS | Performed by: SURGERY

## 2023-08-16 PROCEDURE — C1751 CATH, INF, PER/CENT/MIDLINE: HCPCS

## 2023-08-16 PROCEDURE — 76937 US GUIDE VASCULAR ACCESS: CPT

## 2023-08-16 PROCEDURE — 2500000003 HC RX 250 WO HCPCS: Performed by: SURGERY

## 2023-08-16 PROCEDURE — 2580000003 HC RX 258: Performed by: NURSE PRACTITIONER

## 2023-08-16 PROCEDURE — 99232 SBSQ HOSP IP/OBS MODERATE 35: CPT | Performed by: SURGERY

## 2023-08-16 PROCEDURE — 85025 COMPLETE CBC W/AUTO DIFF WBC: CPT

## 2023-08-16 PROCEDURE — 6370000000 HC RX 637 (ALT 250 FOR IP): Performed by: SURGERY

## 2023-08-16 PROCEDURE — 3E0436Z INTRODUCTION OF NUTRITIONAL SUBSTANCE INTO CENTRAL VEIN, PERCUTANEOUS APPROACH: ICD-10-PCS | Performed by: SURGERY

## 2023-08-16 PROCEDURE — 2000000000 HC ICU R&B

## 2023-08-16 RX ORDER — ALBUMIN (HUMAN) 12.5 G/50ML
25 SOLUTION INTRAVENOUS ONCE
Status: COMPLETED | OUTPATIENT
Start: 2023-08-16 | End: 2023-08-16

## 2023-08-16 RX ADMIN — HYDROMORPHONE HYDROCHLORIDE 1 MG: 1 INJECTION, SOLUTION INTRAMUSCULAR; INTRAVENOUS; SUBCUTANEOUS at 23:12

## 2023-08-16 RX ADMIN — SODIUM CHLORIDE, PRESERVATIVE FREE 10 ML: 5 INJECTION INTRAVENOUS at 21:17

## 2023-08-16 RX ADMIN — Medication: at 08:28

## 2023-08-16 RX ADMIN — SODIUM CHLORIDE, PRESERVATIVE FREE 40 MG: 5 INJECTION INTRAVENOUS at 00:08

## 2023-08-16 RX ADMIN — METRONIDAZOLE 500 MG: 5 INJECTION, SOLUTION INTRAVENOUS at 05:28

## 2023-08-16 RX ADMIN — ONDANSETRON 4 MG: 2 INJECTION INTRAMUSCULAR; INTRAVENOUS at 15:57

## 2023-08-16 RX ADMIN — DEXMEDETOMIDINE HYDROCHLORIDE 0.4 MCG/KG/HR: 4 INJECTION, SOLUTION INTRAVENOUS at 19:25

## 2023-08-16 RX ADMIN — HYDROMORPHONE HYDROCHLORIDE 1 MG: 1 INJECTION, SOLUTION INTRAMUSCULAR; INTRAVENOUS; SUBCUTANEOUS at 17:16

## 2023-08-16 RX ADMIN — HYDROMORPHONE HYDROCHLORIDE 1 MG: 1 INJECTION, SOLUTION INTRAMUSCULAR; INTRAVENOUS; SUBCUTANEOUS at 04:06

## 2023-08-16 RX ADMIN — ALBUMIN (HUMAN) 25 G: 0.25 INJECTION, SOLUTION INTRAVENOUS at 09:44

## 2023-08-16 RX ADMIN — HYDROMORPHONE HYDROCHLORIDE 1 MG: 1 INJECTION, SOLUTION INTRAMUSCULAR; INTRAVENOUS; SUBCUTANEOUS at 13:33

## 2023-08-16 RX ADMIN — HYDROMORPHONE HYDROCHLORIDE 1 MG: 1 INJECTION, SOLUTION INTRAMUSCULAR; INTRAVENOUS; SUBCUTANEOUS at 00:12

## 2023-08-16 RX ADMIN — PIPERACILLIN AND TAZOBACTAM 3375 MG: 3; .375 INJECTION, POWDER, LYOPHILIZED, FOR SOLUTION INTRAVENOUS at 15:27

## 2023-08-16 RX ADMIN — PIPERACILLIN AND TAZOBACTAM 3375 MG: 3; .375 INJECTION, POWDER, LYOPHILIZED, FOR SOLUTION INTRAVENOUS at 08:24

## 2023-08-16 RX ADMIN — Medication 10 ML: at 08:29

## 2023-08-16 RX ADMIN — CHLORHEXIDINE GLUCONATE 15 ML: 1.2 RINSE ORAL at 21:00

## 2023-08-16 RX ADMIN — HYDROMORPHONE HYDROCHLORIDE 1 MG: 1 INJECTION, SOLUTION INTRAMUSCULAR; INTRAVENOUS; SUBCUTANEOUS at 09:09

## 2023-08-16 RX ADMIN — PIPERACILLIN AND TAZOBACTAM 3375 MG: 3; .375 INJECTION, POWDER, LYOPHILIZED, FOR SOLUTION INTRAVENOUS at 00:08

## 2023-08-16 RX ADMIN — Medication 10 ML: at 21:18

## 2023-08-16 RX ADMIN — SODIUM CHLORIDE, PRESERVATIVE FREE 40 MG: 5 INJECTION INTRAVENOUS at 11:34

## 2023-08-16 RX ADMIN — FUROSEMIDE 5 MG/HR: 10 INJECTION, SOLUTION INTRAMUSCULAR; INTRAVENOUS at 18:04

## 2023-08-16 RX ADMIN — POTASSIUM CHLORIDE: 2 INJECTION, SOLUTION, CONCENTRATE INTRAVENOUS at 18:00

## 2023-08-16 RX ADMIN — Medication: at 00:16

## 2023-08-16 RX ADMIN — SODIUM CHLORIDE, PRESERVATIVE FREE 10 ML: 5 INJECTION INTRAVENOUS at 08:29

## 2023-08-16 RX ADMIN — I.V. FAT EMULSION 250 ML: 20 EMULSION INTRAVENOUS at 18:00

## 2023-08-16 RX ADMIN — METRONIDAZOLE 500 MG: 5 INJECTION, SOLUTION INTRAVENOUS at 13:36

## 2023-08-16 RX ADMIN — Medication: at 15:17

## 2023-08-16 RX ADMIN — METRONIDAZOLE 500 MG: 5 INJECTION, SOLUTION INTRAVENOUS at 22:14

## 2023-08-16 RX ADMIN — VANCOMYCIN HYDROCHLORIDE 1250 MG: 1.25 INJECTION, POWDER, LYOPHILIZED, FOR SOLUTION INTRAVENOUS at 15:27

## 2023-08-16 ASSESSMENT — PAIN DESCRIPTION - DESCRIPTORS
DESCRIPTORS: ACHING

## 2023-08-16 ASSESSMENT — PAIN SCALES - GENERAL
PAINLEVEL_OUTOF10: 7
PAINLEVEL_OUTOF10: 7
PAINLEVEL_OUTOF10: 0
PAINLEVEL_OUTOF10: 7
PAINLEVEL_OUTOF10: 1
PAINLEVEL_OUTOF10: 0

## 2023-08-16 ASSESSMENT — PAIN DESCRIPTION - ORIENTATION
ORIENTATION: ANTERIOR;POSTERIOR
ORIENTATION: OTHER (COMMENT)
ORIENTATION: ANTERIOR;POSTERIOR

## 2023-08-16 ASSESSMENT — PAIN DESCRIPTION - LOCATION
LOCATION: GENERALIZED

## 2023-08-16 ASSESSMENT — PAIN SCALES - WONG BAKER
WONGBAKER_NUMERICALRESPONSE: NO HURT
WONGBAKER_NUMERICALRESPONSE: 0

## 2023-08-16 ASSESSMENT — ENCOUNTER SYMPTOMS
SHORTNESS OF BREATH: 1
CHEST TIGHTNESS: 0
ABDOMINAL PAIN: 1

## 2023-08-16 NOTE — PROGRESS NOTES
CRITICAL CARE NOTE      Name: Zoila Zhang   : 1960   MRN: [id number]   Date: 2023      REASON FOR ICU ADMISSION: Acute hepatic respiratory failure    PRINCIPAL ICU DIAGNOSIS   Weakness  Hematemesis   Sacral decubitus requiring debridement  Acute hepatic respiratory failure    BRIEF PATIENT SUMMARY   Sage Moyer is a 69-year-old female with a PMHx of gastric bypass (), asthma, CAD, T2DM, HLD, GERD, PE, ANCA, anxiety/depression who initially [facility] and hematemesis. In March of this year she was seen for fulminant C. difficile colitis and underwent subtotal colectomy with ileostomy. On () she underwent reversal.  She was then readmitted ( through ) to [facility] during which time she underwent EGD which revealed GE J ulcer and stenosis at [address]. On () she went to the OR for exploratory laparotomy for free air and free fluid but there was no evidence of perforation. Surgery has been consulted, it appears this is a fluid collection versus a perforation. IR consulted and recommends draining fluid collection. GI consulted for GIB. She was treated with PPI twice daily and Carafate 4 times daily. Eliquis and meloxicam were held. () she again underwent EGD which revealed a nonbleeding anastomotic ulcer. On () IVC filter placed. Overnight on () a rapid response was called for hypoxia and respiratory distress and she was transferred to the ICU for further observation and management on BiPAP. Unfortunately her status continued to decline and she was intubated for management of acute hypoxic respiratory failure. Underlying etiology appears to be pulmonary edema. She was aggressively diuresed with Lasix and underwent debridement (). She was successfully extubated to CPAP later that evening.       COMPREHENSIVE ASSESSMENT & PLAN:SYSTEM BASED     24 HOUR EVENTS: Nocturnal NIV, on 6 L NC, increased WOB/ronchi>cxr pending. H&H trending down. LUQ drain clamped x4 hours> low residual. If residual remains low may start TF w/Dietitian recs. NEUROLOGICAL:  Anxiety/depression   -Continue as needed analgesia with Dilaudid  -Delirium Precautions/CAM negative  -Once P.o. route established continue home Wellbutrin  -Alert, answering questions appropriately and following commands, very deconditioned/ftigued  -PT/OT as able    PULMONOLOGY:  Respiratory failure  Pulmonary edema  Pleural effusions   -Intubated (8/7) acute hypoxic respiratory failure due to pulmonary edema, successfully extubated (8/14)  -NPO, aspiration precautions, HOB > 30 degrees  -Diuresis indicated  -NIV/CPAP overnight  -8/16 increased WOB, cxr pending    CARDIOVASCULAR:     -Goal MAP > 65  -Holding home antihypertensives in the setting of hypotension  -Holding eliquis and antiplatelet in the setting of GIB    GASTROINTESTINAL:  Anastomotic ulcer  GI bleed  Intra-- abdominal abscess  History of gastric bypass  History of C. difficile colitis   -Surgery following, appreciate recommendations  -GI Signed off   -Repeat ultrasound-guided drainage (7/31)  -Fluid grew E.  Coli > zosyn  -ID following, appreciate commendations  -S/p colectomy (3/23) with reversal and ileostomy (6/23)  -NPO, TPN started (8/1)> drain clamped 8/16 x 4 hours, if residual remains low will start TF in the Am  -Dietitian consulted, appreciate recommendations  -Continue PPI twice daily    RENAL/ELECTROLYTE/FLUIDS:   -Function stable, BUN 33, creatinine 0.34, GFR >60  -Serial laboratory values, replete as indicated  -Hypernatremic, sodium acetate d/c'd from TPN  -Diuresis as indicated  -Closely monitor UO    ENDOCRINE:   -Goal BG range 120 -140, avoid hypoglycemia    HEMATOLOGY/ONCOLOGY:  GI bleed  DVT   -Trend CBC  -Transfuse goal hgb > 7.0  -S/p IVC filter (8/1)    INFECTIOUS DISEASE:  Sacral wound  Intra-- abdominal abscess   ID following  S/p event of sacral

## 2023-08-16 NOTE — PROGRESS NOTES
Gastrostomy to excluded stomach clamped 6 hours; minimal initial output following unclamp. Appreciate dietitian recommendations for tube feeds. We will follow drain output overnight, and if output remains low to minimal, can start tube feeds through excluded stomach feeding tube.

## 2023-08-16 NOTE — PROGRESS NOTES
2030: PRN Dilaudid given. 2100: Pt increased from 4L NC to 6L NC- Pt still sat 86-88%. Pt placed back on BiPAP- O2 sats 100%. 2300: Pt switched to 6L NC per pt request- O2 sats 97%. 0010: PRN dilaudid given. 0400: Labs obtained and sent. PRN dilaudid given.

## 2023-08-16 NOTE — PROGRESS NOTES
Comprehensive Nutrition Assessment    Type and Reason for Visit: Reassess, Consult    Nutrition Recommendations/Plan:     -Start trophic tube feeding per surgery: Vital 1.5 @ 20 ml/hr with 50 ml water flush q 4 hr      If tolerated x 24 hr start advancing to goal 55 ml/hr with 150 ml water flush q 4 hr and 1 packet Prosource daily     -Stop IV lipid once tube feeding starts; discontinue TPN once EN well tolerated at 30-35 ml/hr         Malnutrition Assessment:  Malnutrition Status:  Severe malnutrition (08/01/23 1000)    Context:  Acute Illness     Findings of the 6 clinical characteristics of malnutrition:  Energy Intake:  50% or less of estimated energy requirements for 5 or more days  Weight Loss:  Unable to assess     Body Fat Loss:  Mild body fat loss Buccal region, Orbital   Muscle Mass Loss: Moderate muscle mass loss Temples (temporalis), Clavicles (pectoralis & deltoids)  Fluid Accumulation:  Moderate to Severe Generalized, Extremities   Strength:  Not Performed       Nutrition Assessment:    Pt admitted from [facility] d/t GI Bleed. Recent extended hospitalization @ 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 PMHx: Gastric bypass 2017, CAD, DM 2, Dyslipidemia, GERD, PE.    8/16: Consult/follow up. Acute hypoxic respiratory failure 2/2 pulmonary edema; s/p diuresis (stopped 8/11). Extubated 8/14 after debridement of sacral wound in the OR. WOCN following-will need wound vac. Surgery plans to start EN if pt tolerates clamping LUQ drain and excluded stomach x 4 hr. Recommend a peptide based formula initially; Vital 1.5 @ 55 ml/hr with 150 ml water flush q 4 hr and 1 packet Prosource daily will meet estimated needs. patient this morning-reports ongoing nausea; worse after attempting to eat a little bit of breakfast. Meets criteria for severe malnutrition. Pt asking about IV nutrition. Phosphorus severely BNL; has not taken B1 for a couple of days d/t nausea. Nutritionally Significant Medications: Albumin x 1, Humalog, Protonix, Carafate      Estimated Daily Nutrient Needs:  Energy Requirements Based On: Kcal/kg  Weight Used for Energy Requirements: Ideal  Energy (kcal/day): 1900 (35 kcal/kg)  Weight Used for Protein Requirements: Ideal  Protein (g/day): 81-92 (1.5-1.7g/kg IBW)  Method Used for Fluid Requirements: 1 ml/kcal  Fluid (ml/day): 1440    Nutrition Related Findings:   Edema: Generalized   Edema Generalized: Pitting, Weeping, Anasarca  RUE Edema: +4, Pitting  LUE Edema: +4, Pitting, Weeping  RLE Edema: +4, Weeping  LLE Edema: +4, Pitting    Bowel Movement:  08/14/23    Wounds: Wound Type: Unstageable, Skin Tears, Deep Tissue Injury      Current Nutrition Therapies:  Diet: NPO  Nutrition Support: TPN: 1560 ml, 94 gm protein, 234 gm CHO and 250 ml 20% lipid daily      Anthropometric Measures:  Height: 162.6 cm (5' 4.02\")  Ideal Body Weight (IBW): 120 lbs (55 kg)    Admission Body Weight: 135 lb 12.9 oz (61.6 kg)  Current Body Weight: 149 lb 4 oz (67.7 kg), 124.4 % IBW. Weight Source: Bed Scale  Current BMI (kg/m2): 25.6        Weight Adjustment For: No Adjustment                 BMI Categories: Normal Weight (BMI 18.5-24. 9)    Wt Readings from Last 10 Encounters:   08/16/23 67.7 kg (149 lb 4 oz)   07/18/23 65.3 kg (143 lb 14.4 oz)   06/08/23 50.8 kg (112 lb)   06/02/23 51.2 kg (112 lb 14.4 oz)   06/01/23 52.2 kg (115 lb)   05/30/23 52.3 kg (115 lb 4.8 oz)   05/08/23 53.1 kg (117 lb)   03/28/23 75.9 kg (167 lb 4.8 oz)   03/24/23 62.2 kg (137 lb 3.2 oz)   03/20/23 64.5 kg (142 lb 3.2 oz)           Nutrition Diagnosis:   Increased nutrient needs related to increase demand for energy/nutrients as evidenced by wounds related to   as evidenced by      Nutrition Interventions:   Food and/or Nutrient Delivery: Continue Current Parenteral Nutrition, Start Tube Feeding  Nutrition Education/Counseling: No recommendation at this time  Coordination of Nutrition Care: Continue to monitor while inpatient       Goals:  Previous Goal Met: Goal(s) Achieved  Goals:  (Meet >85% estimated needs with PN x 5-7 days or until tolerates full EN support)  Specify Other Goals: Continue to meet > 85% EEN's with PN over next 5-7 days    Nutrition Monitoring and Evaluation:   Behavioral-Environmental Outcomes: None Identified  Food/Nutrient Intake Outcomes: Enteral Nutrition Intake/Tolerance, Parenteral Nutrition Intake/Tolerance, Diet Advancement/Tolerance  Physical Signs/Symptoms Outcomes: Biochemical Data, Weight, GI Status, Fluid Status or Edema, Skin    Discharge Planning:     Too soon to determine     Maday Segal RD CNSC  Available via [employer] yes

## 2023-08-17 ENCOUNTER — APPOINTMENT (OUTPATIENT)
Facility: HOSPITAL | Age: 63
DRG: 004 | End: 2023-08-17
Payer: MEDICARE

## 2023-08-17 LAB
ANION GAP SERPL CALC-SCNC: 5 MMOL/L (ref 5–15)
ANION GAP SERPL CALC-SCNC: 5 MMOL/L (ref 5–15)
ANION GAP SERPL CALC-SCNC: 6 MMOL/L (ref 5–15)
ANION GAP SERPL CALC-SCNC: 7 MMOL/L (ref 5–15)
ARTERIAL PATENCY WRIST A: YES
BASE EXCESS BLDA CALC-SCNC: 0.3 MMOL/L
BASOPHILS # BLD: 0 K/UL (ref 0–0.1)
BASOPHILS NFR BLD: 0 % (ref 0–1)
BDY SITE: NORMAL
BUN SERPL-MCNC: 34 MG/DL (ref 6–20)
BUN SERPL-MCNC: 35 MG/DL (ref 6–20)
BUN SERPL-MCNC: 38 MG/DL (ref 6–20)
BUN SERPL-MCNC: 41 MG/DL (ref 6–20)
BUN/CREAT SERPL: 75 (ref 12–20)
BUN/CREAT SERPL: 83 (ref 12–20)
BUN/CREAT SERPL: 85 (ref 12–20)
BUN/CREAT SERPL: 91 (ref 12–20)
CALCIUM SERPL-MCNC: 8.4 MG/DL (ref 8.5–10.1)
CALCIUM SERPL-MCNC: 8.7 MG/DL (ref 8.5–10.1)
CALCIUM SERPL-MCNC: 8.7 MG/DL (ref 8.5–10.1)
CALCIUM SERPL-MCNC: 8.9 MG/DL (ref 8.5–10.1)
CHLORIDE SERPL-SCNC: 117 MMOL/L (ref 97–108)
CHLORIDE SERPL-SCNC: 117 MMOL/L (ref 97–108)
CHLORIDE SERPL-SCNC: 119 MMOL/L (ref 97–108)
CHLORIDE SERPL-SCNC: 119 MMOL/L (ref 97–108)
CO2 SERPL-SCNC: 28 MMOL/L (ref 21–32)
CO2 SERPL-SCNC: 28 MMOL/L (ref 21–32)
CO2 SERPL-SCNC: 30 MMOL/L (ref 21–32)
CO2 SERPL-SCNC: 30 MMOL/L (ref 21–32)
CREAT SERPL-MCNC: 0.4 MG/DL (ref 0.55–1.02)
CREAT SERPL-MCNC: 0.42 MG/DL (ref 0.55–1.02)
CREAT SERPL-MCNC: 0.45 MG/DL (ref 0.55–1.02)
CREAT SERPL-MCNC: 0.51 MG/DL (ref 0.55–1.02)
DIFFERENTIAL METHOD BLD: ABNORMAL
EOSINOPHIL # BLD: 0.7 K/UL (ref 0–0.4)
EOSINOPHIL NFR BLD: 6 % (ref 0–7)
ERYTHROCYTE [DISTWIDTH] IN BLOOD BY AUTOMATED COUNT: 16.7 % (ref 11.5–14.5)
FIO2 ON VENT: 80 %
GLUCOSE BLD STRIP.AUTO-MCNC: 129 MG/DL (ref 65–117)
GLUCOSE BLD STRIP.AUTO-MCNC: 131 MG/DL (ref 65–117)
GLUCOSE BLD STRIP.AUTO-MCNC: 139 MG/DL (ref 65–117)
GLUCOSE BLD STRIP.AUTO-MCNC: 147 MG/DL (ref 65–117)
GLUCOSE SERPL-MCNC: 130 MG/DL (ref 65–100)
GLUCOSE SERPL-MCNC: 134 MG/DL (ref 65–100)
GLUCOSE SERPL-MCNC: 144 MG/DL (ref 65–100)
GLUCOSE SERPL-MCNC: 152 MG/DL (ref 65–100)
HCO3 BLDA-SCNC: 26 MMOL/L (ref 22–26)
HCT VFR BLD AUTO: 26.7 % (ref 35–47)
HGB BLD-MCNC: 7.9 G/DL (ref 11.5–16)
IMM GRANULOCYTES # BLD AUTO: 0 K/UL
IMM GRANULOCYTES NFR BLD AUTO: 0 %
LYMPHOCYTES # BLD: 1.2 K/UL (ref 0.8–3.5)
LYMPHOCYTES NFR BLD: 11 % (ref 12–49)
MAGNESIUM SERPL-MCNC: 1.9 MG/DL (ref 1.6–2.4)
MCH RBC QN AUTO: 27.7 PG (ref 26–34)
MCHC RBC AUTO-ENTMCNC: 29.6 G/DL (ref 30–36.5)
MCV RBC AUTO: 93.7 FL (ref 80–99)
MONOCYTES # BLD: 0.7 K/UL (ref 0–1)
MONOCYTES NFR BLD: 6 % (ref 5–13)
MYELOCYTES NFR BLD MANUAL: 1 %
NEUTS SEG # BLD: 8.6 K/UL (ref 1.8–8)
NEUTS SEG NFR BLD: 76 % (ref 32–75)
NRBC # BLD: 0 K/UL (ref 0–0.01)
NRBC BLD-RTO: 0 PER 100 WBC
PCO2 BLDA: 44 MMHG (ref 35–45)
PH BLDA: 7.39 (ref 7.35–7.45)
PHOSPHATE SERPL-MCNC: 3.5 MG/DL (ref 2.6–4.7)
PLATELET # BLD AUTO: 400 K/UL (ref 150–400)
PMV BLD AUTO: 10.5 FL (ref 8.9–12.9)
POTASSIUM SERPL-SCNC: 3.6 MMOL/L (ref 3.5–5.1)
POTASSIUM SERPL-SCNC: 3.6 MMOL/L (ref 3.5–5.1)
POTASSIUM SERPL-SCNC: 3.8 MMOL/L (ref 3.5–5.1)
POTASSIUM SERPL-SCNC: 4 MMOL/L (ref 3.5–5.1)
RBC # BLD AUTO: 2.85 M/UL (ref 3.8–5.2)
RBC MORPH BLD: ABNORMAL
SAO2% DEVICE SAO2% SENSOR NAME: NORMAL
SERVICE CMNT-IMP: ABNORMAL
SODIUM SERPL-SCNC: 152 MMOL/L (ref 136–145)
SODIUM SERPL-SCNC: 152 MMOL/L (ref 136–145)
SODIUM SERPL-SCNC: 153 MMOL/L (ref 136–145)
SODIUM SERPL-SCNC: 154 MMOL/L (ref 136–145)
SPECIMEN SITE: NORMAL
WBC # BLD AUTO: 11.3 K/UL (ref 3.6–11)

## 2023-08-17 PROCEDURE — 94660 CPAP INITIATION&MGMT: CPT

## 2023-08-17 PROCEDURE — 2580000003 HC RX 258: Performed by: NURSE PRACTITIONER

## 2023-08-17 PROCEDURE — 82803 BLOOD GASES ANY COMBINATION: CPT

## 2023-08-17 PROCEDURE — 80048 BASIC METABOLIC PNL TOTAL CA: CPT

## 2023-08-17 PROCEDURE — 36600 WITHDRAWAL OF ARTERIAL BLOOD: CPT

## 2023-08-17 PROCEDURE — 2580000003 HC RX 258: Performed by: SURGERY

## 2023-08-17 PROCEDURE — A4216 STERILE WATER/SALINE, 10 ML: HCPCS | Performed by: SURGERY

## 2023-08-17 PROCEDURE — 82962 GLUCOSE BLOOD TEST: CPT

## 2023-08-17 PROCEDURE — 85025 COMPLETE CBC W/AUTO DIFF WBC: CPT

## 2023-08-17 PROCEDURE — 51702 INSERT TEMP BLADDER CATH: CPT

## 2023-08-17 PROCEDURE — 2700000000 HC OXYGEN THERAPY PER DAY

## 2023-08-17 PROCEDURE — 83735 ASSAY OF MAGNESIUM: CPT

## 2023-08-17 PROCEDURE — 97606 NEG PRS WND THER DME>50 SQCM: CPT

## 2023-08-17 PROCEDURE — 71045 X-RAY EXAM CHEST 1 VIEW: CPT

## 2023-08-17 PROCEDURE — 84100 ASSAY OF PHOSPHORUS: CPT

## 2023-08-17 PROCEDURE — 2500000003 HC RX 250 WO HCPCS: Performed by: NURSE PRACTITIONER

## 2023-08-17 PROCEDURE — 51798 US URINE CAPACITY MEASURE: CPT

## 2023-08-17 PROCEDURE — 6360000002 HC RX W HCPCS: Performed by: NURSE PRACTITIONER

## 2023-08-17 PROCEDURE — 6360000002 HC RX W HCPCS: Performed by: SURGERY

## 2023-08-17 PROCEDURE — 6370000000 HC RX 637 (ALT 250 FOR IP): Performed by: SURGERY

## 2023-08-17 PROCEDURE — 36415 COLL VENOUS BLD VENIPUNCTURE: CPT

## 2023-08-17 PROCEDURE — 2000000000 HC ICU R&B

## 2023-08-17 PROCEDURE — P9047 ALBUMIN (HUMAN), 25%, 50ML: HCPCS | Performed by: NURSE PRACTITIONER

## 2023-08-17 PROCEDURE — C9113 INJ PANTOPRAZOLE SODIUM, VIA: HCPCS | Performed by: SURGERY

## 2023-08-17 RX ORDER — NOREPINEPHRINE BITARTRATE 0.06 MG/ML
1-100 INJECTION, SOLUTION INTRAVENOUS CONTINUOUS
Status: DISCONTINUED | OUTPATIENT
Start: 2023-08-17 | End: 2023-08-24

## 2023-08-17 RX ORDER — ALBUMIN (HUMAN) 12.5 G/50ML
SOLUTION INTRAVENOUS
Status: DISPENSED
Start: 2023-08-17 | End: 2023-08-18

## 2023-08-17 RX ORDER — POTASSIUM CHLORIDE 29.8 MG/ML
20 INJECTION INTRAVENOUS ONCE
Status: COMPLETED | OUTPATIENT
Start: 2023-08-17 | End: 2023-08-17

## 2023-08-17 RX ORDER — ALBUMIN (HUMAN) 12.5 G/50ML
SOLUTION INTRAVENOUS
Status: DISPENSED
Start: 2023-08-17 | End: 2023-08-17

## 2023-08-17 RX ORDER — ALBUMIN (HUMAN) 12.5 G/50ML
25 SOLUTION INTRAVENOUS ONCE
Status: COMPLETED | OUTPATIENT
Start: 2023-08-17 | End: 2023-08-17

## 2023-08-17 RX ADMIN — FUROSEMIDE 10 MG/HR: 10 INJECTION, SOLUTION INTRAMUSCULAR; INTRAVENOUS at 21:56

## 2023-08-17 RX ADMIN — POTASSIUM CHLORIDE 20 MEQ: 29.8 INJECTION, SOLUTION INTRAVENOUS at 05:06

## 2023-08-17 RX ADMIN — Medication: at 09:00

## 2023-08-17 RX ADMIN — HYDROMORPHONE HYDROCHLORIDE 1 MG: 1 INJECTION, SOLUTION INTRAMUSCULAR; INTRAVENOUS; SUBCUTANEOUS at 18:41

## 2023-08-17 RX ADMIN — Medication 10 ML: at 20:38

## 2023-08-17 RX ADMIN — HYDROMORPHONE HYDROCHLORIDE 1 MG: 1 INJECTION, SOLUTION INTRAMUSCULAR; INTRAVENOUS; SUBCUTANEOUS at 11:14

## 2023-08-17 RX ADMIN — HYDROMORPHONE HYDROCHLORIDE 1 MG: 1 INJECTION, SOLUTION INTRAMUSCULAR; INTRAVENOUS; SUBCUTANEOUS at 05:56

## 2023-08-17 RX ADMIN — SODIUM CHLORIDE, PRESERVATIVE FREE 40 MG: 5 INJECTION INTRAVENOUS at 00:59

## 2023-08-17 RX ADMIN — METRONIDAZOLE 500 MG: 5 INJECTION, SOLUTION INTRAVENOUS at 21:57

## 2023-08-17 RX ADMIN — Medication: at 00:59

## 2023-08-17 RX ADMIN — ALBUMIN (HUMAN) 25 G: 5 SOLUTION INTRAVENOUS at 11:45

## 2023-08-17 RX ADMIN — VANCOMYCIN HYDROCHLORIDE 1250 MG: 1.25 INJECTION, POWDER, LYOPHILIZED, FOR SOLUTION INTRAVENOUS at 15:40

## 2023-08-17 RX ADMIN — DEXMEDETOMIDINE HYDROCHLORIDE 0.4 MCG/KG/HR: 4 INJECTION, SOLUTION INTRAVENOUS at 03:32

## 2023-08-17 RX ADMIN — PIPERACILLIN AND TAZOBACTAM 3375 MG: 3; .375 INJECTION, POWDER, LYOPHILIZED, FOR SOLUTION INTRAVENOUS at 15:40

## 2023-08-17 RX ADMIN — METRONIDAZOLE 500 MG: 5 INJECTION, SOLUTION INTRAVENOUS at 06:09

## 2023-08-17 RX ADMIN — Medication: at 15:38

## 2023-08-17 RX ADMIN — DEXMEDETOMIDINE HYDROCHLORIDE 0.5 MCG/KG/HR: 4 INJECTION, SOLUTION INTRAVENOUS at 13:53

## 2023-08-17 RX ADMIN — PIPERACILLIN AND TAZOBACTAM 3375 MG: 3; .375 INJECTION, POWDER, LYOPHILIZED, FOR SOLUTION INTRAVENOUS at 00:57

## 2023-08-17 RX ADMIN — SODIUM CHLORIDE, PRESERVATIVE FREE 40 MG: 5 INJECTION INTRAVENOUS at 10:15

## 2023-08-17 RX ADMIN — METRONIDAZOLE 500 MG: 5 INJECTION, SOLUTION INTRAVENOUS at 13:59

## 2023-08-17 RX ADMIN — NOREPINEPHRINE BITARTRATE 5 MCG/MIN: 1 SOLUTION INTRAVENOUS at 06:56

## 2023-08-17 RX ADMIN — CHLORHEXIDINE GLUCONATE 15 ML: 1.2 RINSE ORAL at 10:21

## 2023-08-17 RX ADMIN — PIPERACILLIN AND TAZOBACTAM 3375 MG: 3; .375 INJECTION, POWDER, LYOPHILIZED, FOR SOLUTION INTRAVENOUS at 08:16

## 2023-08-17 RX ADMIN — Medication 10 ML: at 10:21

## 2023-08-17 RX ADMIN — SODIUM CHLORIDE, PRESERVATIVE FREE 10 ML: 5 INJECTION INTRAVENOUS at 20:38

## 2023-08-17 RX ADMIN — POTASSIUM CHLORIDE: 2 INJECTION, SOLUTION, CONCENTRATE INTRAVENOUS at 19:05

## 2023-08-17 RX ADMIN — SODIUM CHLORIDE, PRESERVATIVE FREE 10 ML: 5 INJECTION INTRAVENOUS at 10:21

## 2023-08-17 RX ADMIN — CHLORHEXIDINE GLUCONATE 15 ML: 1.2 RINSE ORAL at 20:37

## 2023-08-17 RX ADMIN — HYDROMORPHONE HYDROCHLORIDE 1 MG: 1 INJECTION, SOLUTION INTRAMUSCULAR; INTRAVENOUS; SUBCUTANEOUS at 15:33

## 2023-08-17 ASSESSMENT — PAIN DESCRIPTION - LOCATION: LOCATION: GENERALIZED

## 2023-08-17 ASSESSMENT — ENCOUNTER SYMPTOMS
SHORTNESS OF BREATH: 1
CHEST TIGHTNESS: 0
ABDOMINAL PAIN: 1

## 2023-08-17 ASSESSMENT — PAIN SCALES - GENERAL
PAINLEVEL_OUTOF10: 7
PAINLEVEL_OUTOF10: 0
PAINLEVEL_OUTOF10: 0

## 2023-08-17 NOTE — PROGRESS NOTES
Spiritual Care Assessment/Progress Note  [facility]    Name: Sarabjit Lord MRN: [id number]    Age: 61 y.o. Sex: female   Language: English     Date: 8/17/2023            Total Time Calculated: 5 min              Spiritual Assessment begun in [address] [place] [address] Provided For[de-identified] Patient  Referral/Consult From[de-identified] Other   Encounter Overview/Reason : Attempted Encounter    Spiritual beliefs:      [] Involved in a ramez tradition/spiritual practice:      [] Supported by a ramez community:      [] Claims no spiritual orientation:      [] Seeking spiritual identity:           [] Adheres to an individual form of spirituality:      [x] Not able to assess:                Identified resources for coping and support system:   Support System: Unknown       [] Prayer                  [] Devotional reading               [] Music                  [] Guided Imagery     [] Pet visits                                        [] Other: (COMMENT)     Specific area/focus of visit   Encounter:    Crisis:    Spiritual/Emotional needs: Type: Spiritual Support  Ritual, Rites and Sacraments:    Grief, Loss, and Adjustments:    Ethics/Mediation:    Behavioral Health:    Palliative Care: Advance Care Planning:           Narrative:   Looked in on patient in ICU and she was asleep. I asked a nurse if the patient would like a  visit. Nurse advised to let her continue sleeping as she has a family meeting tonight at 6 p.m. I thanked the nurse and departed. Merrick Estrada [facility] [phone]

## 2023-08-17 NOTE — PROGRESS NOTES
I participated in the IDT meeting where the plan of care for Nimo Horn was discussed. I reviewed the patient's medical record prior to this meeting. We will continue to follow for spiritual/emotional care. Please contact  paging service for any immediate needs. _____________________________  Teresa Lim M.Div., M.S., B.C.C.   Staff

## 2023-08-17 NOTE — WOUND CARE
WOCN Note:     Follow-up visit for sacral wound post debridement and initiation of NPWT. Seen in 7120    Chart shows:  Admitted on 7/28/23 [facility]. Admitted for GIB, respiratory failure with intubation and now extubated. History of DM, gastric bypass. WBC = 11.3; on Flagyl, zosyn, & vancomycin    Assessment:   Communicative with RN, Elizabeth, at bedside to help turn left & right. Has a Lutz and multiple BM's yesterday in OR with non today per RN. Surface: MAL mattress  Diet: TPN    1. POA stage 4 sacral pressure injury newly debrided yesterday in OR by Dr. Ty Durán  7.5 x 8 x 0.6 cm skin is mobile and measurements will vary  Drying central yellow with scattered granulation buds and devitalized brown at margins  No exudate  Tx: irrigated with saline, 1 white foam placed in wound bed and 1 black foam used to bridge to left side. 125 mmHg suction reached. Wound Recommendations:    Continue Venelex to heels as ordered  Sacrum:  maintain VAC as ordered @ 125 mmHg suction with twice weekly dressing changes. PI Prevention:  Turn/reposition approximately every 2 hours  Offload heels with heels hanging off end of pillow at all times while in bed. Sacral Foam dressing: lift to assess regularly; change as needed. Discontinue if incontinence is frequently soiling dressing. Low Air Loss mattress: Use only flat sheet and one incontinence pad. Discussed with Dr. Ty Durán.     Transition of Care: Plan to follow as needed while admitted to hospital.    Emily Garcia, RONALDN, RN, [facility] [facility] [employer]  Certified Wound, Ostomy, Continence Nurse  office [phone]  Available via [facility]

## 2023-08-17 NOTE — PROGRESS NOTES
CRITICAL CARE NOTE      Name: Jayleen Sparks   : 1960   MRN: [id number]   Date: 2023      REASON FOR ICU ADMISSION: Acute hepatic respiratory failure    PRINCIPAL ICU DIAGNOSIS   Weakness  Hematemesis   Sacral decubitus requiring debridement  Acute hepatic respiratory failure    BRIEF PATIENT SUMMARY   Yanet Snyder is a 80-year-old female with a PMHx of gastric bypass (), asthma, CAD, T2DM, HLD, GERD, PE, ANCA, anxiety/depression who [facility] and hematemesis. In March of this year she was seen for fulminant C. difficile colitis and underwent subtotal colectomy with ileostomy. On () she underwent reversal.  She was then readmitted ( through ) to [address] [place] [address] during which time she underwent EGD which revealed GE J ulcer and stenosis at [address]. On () she went to the OR for exploratory laparotomy for free air and free fluid but there was no evidence of perforation. Surgery has been consulted, it appears this is a fluid collection versus a perforation. IR consulted and recommends draining fluid collection. GI consulted for GIB. She was treated with PPI twice daily and Carafate 4 times daily. Eliquis and meloxicam were held. () she again underwent EGD which revealed a nonbleeding anastomotic ulcer. On () IVC filter placed. Overnight on () a rapid response was called for hypoxia and respiratory distress and she was transferred to the ICU for further observation and management on BiPAP. Unfortunately her status continued to decline and she was intubated for management of acute hypoxic respiratory failure. Underlying etiology appears to be pulmonary edema. She was aggressively diuresed with Lasix and underwent debridement (). She was successfully extubated to CPAP later that evening.       COMPREHENSIVE ASSESSMENT & PLAN:SYSTEM BASED     24 HOUR EVENTS: Worsening respiratory range 120 -140, avoid hypoglycemia    HEMATOLOGY/ONCOLOGY:  GI bleed  DVT   -Trend CBC  -Transfuse goal hgb > 7.0  -S/p IVC filter (8/1)    INFECTIOUS DISEASE:  Sacral wound  Intra-- abdominal abscess   ID following  S/p event of sacral decubitus (8/14)    ANTIBIOTICS TO DATE:  Zosyn 7/24 through current  Vancomycin 8/6 through current  Flagyl 8/7 through current    CULTURES TO DATE:  Grew Klebsiella pneumonia and Citrobacter  C. difficile colitis      ICU DAILY CHECKLIST     Code Status:Full  DVT Prophylaxis:SCDs and IVC filter  T/L/D: None  Lines: CVC  Drains: Lutz  SUP: PPI  Diet: NPO TPN  Activity Level:Bed rest  ABCDEF Bundle/Checklist Completed:Yes  Disposition: ICU  Multidisciplinary Rounds Completed: Yes  Goals of Care Discussion/Palliative: Yes  Patient/Family Updated: Yes      [address]   8/15: Patient successfully extubated overnight. Did have some increased WOB/and was placed on CPAP. This morning she reports some generalized sparing which resolved with Dilaudid. She is n.p.o., continue TPN.  8/16: Nocturnal NIV, on 6 L NC, increased WOB/ronchi>cxr pending. H&H trending down. LUQ drain clamped x4 hours> low residual. If residual remains low may start TF w/Dietitian recs. SUBJECTIVE   Review of Systems   Constitutional:  Positive for activity change, appetite change and fatigue. Respiratory:  Positive for shortness of breath. Negative for chest tightness. Cardiovascular:  Positive for leg swelling. Negative for chest pain. Gastrointestinal:  Positive for abdominal pain. Neurological:  Positive for weakness. OBJECTIVE   Physical Exam  Vitals reviewed. Constitutional:       General: She is in acute distress. Appearance: She is ill-appearing. HENT:      Head: Normocephalic and atraumatic. Mouth/Throat:      Mouth: Mucous membranes are dry. Eyes:      Extraocular Movements: Extraocular movements intact.       Conjunctiva/sclera: Conjunctivae normal.

## 2023-08-18 ENCOUNTER — APPOINTMENT (OUTPATIENT)
Facility: HOSPITAL | Age: 63
DRG: 004 | End: 2023-08-18
Payer: MEDICARE

## 2023-08-18 LAB
ANION GAP SERPL CALC-SCNC: 4 MMOL/L (ref 5–15)
ANION GAP SERPL CALC-SCNC: 5 MMOL/L (ref 5–15)
ANION GAP SERPL CALC-SCNC: 5 MMOL/L (ref 5–15)
ANION GAP SERPL CALC-SCNC: 6 MMOL/L (ref 5–15)
ARTERIAL PATENCY WRIST A: ABNORMAL
ARTERIAL PATENCY WRIST A: POSITIVE
ARTERIAL PATENCY WRIST A: YES
BASE DEFICIT BLDA-SCNC: 1.7 MMOL/L
BASE DEFICIT BLDA-SCNC: 3.2 MMOL/L
BASE EXCESS BLD CALC-SCNC: 1.3 MMOL/L
BASOPHILS # BLD: 0.1 K/UL (ref 0–0.1)
BASOPHILS NFR BLD: 1 % (ref 0–1)
BDY SITE: ABNORMAL
BUN SERPL-MCNC: 41 MG/DL (ref 6–20)
BUN SERPL-MCNC: 42 MG/DL (ref 6–20)
BUN SERPL-MCNC: 42 MG/DL (ref 6–20)
BUN SERPL-MCNC: 43 MG/DL (ref 6–20)
BUN/CREAT SERPL: 63 (ref 12–20)
BUN/CREAT SERPL: 68 (ref 12–20)
BUN/CREAT SERPL: 74 (ref 12–20)
BUN/CREAT SERPL: 85 (ref 12–20)
CA-I BLD-SCNC: 1.22 MMOL/L (ref 1.13–1.32)
CA-I BLD-SCNC: 1.24 MMOL/L (ref 1.13–1.32)
CALCIUM SERPL-MCNC: 8.5 MG/DL (ref 8.5–10.1)
CALCIUM SERPL-MCNC: 8.6 MG/DL (ref 8.5–10.1)
CALCIUM SERPL-MCNC: 8.7 MG/DL (ref 8.5–10.1)
CALCIUM SERPL-MCNC: 8.8 MG/DL (ref 8.5–10.1)
CHLORIDE SERPL-SCNC: 118 MMOL/L (ref 97–108)
CHLORIDE SERPL-SCNC: 119 MMOL/L (ref 97–108)
CHLORIDE SERPL-SCNC: 119 MMOL/L (ref 97–108)
CHLORIDE SERPL-SCNC: 122 MMOL/L (ref 97–108)
CO2 SERPL-SCNC: 28 MMOL/L (ref 21–32)
CO2 SERPL-SCNC: 29 MMOL/L (ref 21–32)
CO2 SERPL-SCNC: 30 MMOL/L (ref 21–32)
CO2 SERPL-SCNC: 30 MMOL/L (ref 21–32)
CREAT SERPL-MCNC: 0.48 MG/DL (ref 0.55–1.02)
CREAT SERPL-MCNC: 0.57 MG/DL (ref 0.55–1.02)
CREAT SERPL-MCNC: 0.62 MG/DL (ref 0.55–1.02)
CREAT SERPL-MCNC: 0.68 MG/DL (ref 0.55–1.02)
DIFFERENTIAL METHOD BLD: ABNORMAL
EOSINOPHIL # BLD: 0.6 K/UL (ref 0–0.4)
EOSINOPHIL NFR BLD: 5 % (ref 0–7)
ERYTHROCYTE [DISTWIDTH] IN BLOOD BY AUTOMATED COUNT: 16.4 % (ref 11.5–14.5)
ERYTHROCYTE [DISTWIDTH] IN BLOOD BY AUTOMATED COUNT: 16.8 % (ref 11.5–14.5)
ERYTHROCYTE [DISTWIDTH] IN BLOOD BY AUTOMATED COUNT: 17 % (ref 11.5–14.5)
FIO2 ON VENT: 50 %
FIO2 ON VENT: 55 %
GAS FLOW.O2 O2 DELIVERY SYS: ABNORMAL
GAS FLOW.O2 SETTING OXYMISER: 22
GLUCOSE BLD STRIP.AUTO-MCNC: 123 MG/DL (ref 65–117)
GLUCOSE BLD STRIP.AUTO-MCNC: 145 MG/DL (ref 65–117)
GLUCOSE BLD STRIP.AUTO-MCNC: 159 MG/DL (ref 65–117)
GLUCOSE BLD STRIP.AUTO-MCNC: 198 MG/DL (ref 65–117)
GLUCOSE BLD STRIP.AUTO-MCNC: 201 MG/DL (ref 65–117)
GLUCOSE BLD STRIP.AUTO-MCNC: NORMAL MG/DL (ref 65–117)
GLUCOSE SERPL-MCNC: 136 MG/DL (ref 65–100)
GLUCOSE SERPL-MCNC: 140 MG/DL (ref 65–100)
GLUCOSE SERPL-MCNC: 160 MG/DL (ref 65–100)
GLUCOSE SERPL-MCNC: 183 MG/DL (ref 65–100)
HCO3 BLD-SCNC: 29.6 MMOL/L (ref 22–26)
HCO3 BLDA-SCNC: 26 MMOL/L (ref 22–26)
HCO3 BLDA-SCNC: 26 MMOL/L (ref 22–26)
HCT VFR BLD AUTO: 22 % (ref 35–47)
HCT VFR BLD AUTO: 24 % (ref 35–47)
HCT VFR BLD AUTO: 30.2 % (ref 35–47)
HEMOCCULT STL QL: POSITIVE
HGB BLD-MCNC: 6.4 G/DL (ref 11.5–16)
HGB BLD-MCNC: 7 G/DL (ref 11.5–16)
HGB BLD-MCNC: 9.2 G/DL (ref 11.5–16)
HISTORY CHECK: NORMAL
IMM GRANULOCYTES # BLD AUTO: 0 K/UL
IMM GRANULOCYTES NFR BLD AUTO: 0 %
IPAP/PIP/HIGH PEEP: 16
IPAP/PIP: 18
LYMPHOCYTES # BLD: 0.6 K/UL (ref 0.8–3.5)
LYMPHOCYTES NFR BLD: 5 % (ref 12–49)
MAGNESIUM SERPL-MCNC: 1.8 MG/DL (ref 1.6–2.4)
MAGNESIUM SERPL-MCNC: 2.2 MG/DL (ref 1.6–2.4)
MCH RBC QN AUTO: 27.7 PG (ref 26–34)
MCH RBC QN AUTO: 27.8 PG (ref 26–34)
MCH RBC QN AUTO: 29.4 PG (ref 26–34)
MCHC RBC AUTO-ENTMCNC: 29.1 G/DL (ref 30–36.5)
MCHC RBC AUTO-ENTMCNC: 29.2 G/DL (ref 30–36.5)
MCHC RBC AUTO-ENTMCNC: 30.5 G/DL (ref 30–36.5)
MCV RBC AUTO: 95.2 FL (ref 80–99)
MCV RBC AUTO: 95.2 FL (ref 80–99)
MCV RBC AUTO: 96.5 FL (ref 80–99)
MONOCYTES # BLD: 0.7 K/UL (ref 0–1)
MONOCYTES NFR BLD: 6 % (ref 5–13)
NEUTS SEG # BLD: 9.5 K/UL (ref 1.8–8)
NEUTS SEG NFR BLD: 83 % (ref 32–75)
NRBC # BLD: 0 K/UL (ref 0–0.01)
NRBC # BLD: 0 K/UL (ref 0–0.01)
NRBC # BLD: 0.02 K/UL (ref 0–0.01)
NRBC BLD-RTO: 0 PER 100 WBC
NRBC BLD-RTO: 0 PER 100 WBC
NRBC BLD-RTO: 0.2 PER 100 WBC
O2/TOTAL GAS SETTING VFR VENT: 60 %
PCO2 BLD: 61.9 MMHG (ref 35–45)
PCO2 BLDA: 57 MMHG (ref 35–45)
PCO2 BLDA: 68 MMHG (ref 35–45)
PEEP RESPIRATORY: 8
PEEP RESPIRATORY: 8
PEEP RESPIRATORY: 8 CMH2O
PH BLD: 7.29 (ref 7.35–7.45)
PH BLDA: 7.21 (ref 7.35–7.45)
PH BLDA: 7.28 (ref 7.35–7.45)
PHOSPHATE SERPL-MCNC: 3.9 MG/DL (ref 2.6–4.7)
PHOSPHATE SERPL-MCNC: 3.9 MG/DL (ref 2.6–4.7)
PLATELET # BLD AUTO: 383 K/UL (ref 150–400)
PLATELET # BLD AUTO: 409 K/UL (ref 150–400)
PLATELET # BLD AUTO: 466 K/UL (ref 150–400)
PMV BLD AUTO: 10.1 FL (ref 8.9–12.9)
PMV BLD AUTO: 10.3 FL (ref 8.9–12.9)
PMV BLD AUTO: 10.5 FL (ref 8.9–12.9)
PO2 BLD: 109 MMHG (ref 80–100)
PO2 BLDA: 75 MMHG (ref 80–100)
PO2 BLDA: 82 MMHG (ref 80–100)
POTASSIUM SERPL-SCNC: 3.1 MMOL/L (ref 3.5–5.1)
POTASSIUM SERPL-SCNC: 3.6 MMOL/L (ref 3.5–5.1)
RBC # BLD AUTO: 2.31 M/UL (ref 3.8–5.2)
RBC # BLD AUTO: 2.52 M/UL (ref 3.8–5.2)
RBC # BLD AUTO: 3.13 M/UL (ref 3.8–5.2)
RBC MORPH BLD: ABNORMAL
SAO2 % BLD: 93 % (ref 92–97)
SAO2 % BLD: 93 % (ref 92–97)
SAO2 % BLD: 97.4 % (ref 92–97)
SAO2% DEVICE SAO2% SENSOR NAME: ABNORMAL
SAO2% DEVICE SAO2% SENSOR NAME: ABNORMAL
SERVICE CMNT-IMP: ABNORMAL
SERVICE CMNT-IMP: NORMAL
SODIUM SERPL-SCNC: 153 MMOL/L (ref 136–145)
SODIUM SERPL-SCNC: 154 MMOL/L (ref 136–145)
SPECIMEN SITE: ABNORMAL
SPECIMEN SITE: ABNORMAL
SPECIMEN TYPE: ABNORMAL
TROPONIN I SERPL HS-MCNC: 25 NG/L (ref 0–51)
VENTILATION MODE VENT: ABNORMAL
VT SETTING VENT: 330
WBC # BLD AUTO: 11.3 K/UL (ref 3.6–11)
WBC # BLD AUTO: 11.5 K/UL (ref 3.6–11)
WBC # BLD AUTO: 9.8 K/UL (ref 3.6–11)

## 2023-08-18 PROCEDURE — 2580000003 HC RX 258: Performed by: SURGERY

## 2023-08-18 PROCEDURE — 31500 INSERT EMERGENCY AIRWAY: CPT

## 2023-08-18 PROCEDURE — 0BH17EZ INSERTION OF ENDOTRACHEAL AIRWAY INTO TRACHEA, VIA NATURAL OR ARTIFICIAL OPENING: ICD-10-PCS | Performed by: NURSE PRACTITIONER

## 2023-08-18 PROCEDURE — 82803 BLOOD GASES ANY COMBINATION: CPT

## 2023-08-18 PROCEDURE — 86923 COMPATIBILITY TEST ELECTRIC: CPT

## 2023-08-18 PROCEDURE — 5A1955Z RESPIRATORY VENTILATION, GREATER THAN 96 CONSECUTIVE HOURS: ICD-10-PCS | Performed by: SURGERY

## 2023-08-18 PROCEDURE — 6360000002 HC RX W HCPCS: Performed by: NURSE PRACTITIONER

## 2023-08-18 PROCEDURE — 84100 ASSAY OF PHOSPHORUS: CPT

## 2023-08-18 PROCEDURE — 82330 ASSAY OF CALCIUM: CPT

## 2023-08-18 PROCEDURE — 2000000000 HC ICU R&B

## 2023-08-18 PROCEDURE — 83735 ASSAY OF MAGNESIUM: CPT

## 2023-08-18 PROCEDURE — 6360000002 HC RX W HCPCS: Performed by: SURGERY

## 2023-08-18 PROCEDURE — 94660 CPAP INITIATION&MGMT: CPT

## 2023-08-18 PROCEDURE — 2580000003 HC RX 258: Performed by: NURSE PRACTITIONER

## 2023-08-18 PROCEDURE — 85025 COMPLETE CBC W/AUTO DIFF WBC: CPT

## 2023-08-18 PROCEDURE — 82272 OCCULT BLD FECES 1-3 TESTS: CPT

## 2023-08-18 PROCEDURE — 2500000003 HC RX 250 WO HCPCS: Performed by: NURSE PRACTITIONER

## 2023-08-18 PROCEDURE — 71045 X-RAY EXAM CHEST 1 VIEW: CPT

## 2023-08-18 PROCEDURE — 85027 COMPLETE CBC AUTOMATED: CPT

## 2023-08-18 PROCEDURE — 99232 SBSQ HOSP IP/OBS MODERATE 35: CPT | Performed by: SURGERY

## 2023-08-18 PROCEDURE — 87070 CULTURE OTHR SPECIMN AEROBIC: CPT

## 2023-08-18 PROCEDURE — 36600 WITHDRAWAL OF ARTERIAL BLOOD: CPT

## 2023-08-18 PROCEDURE — 87205 SMEAR GRAM STAIN: CPT

## 2023-08-18 PROCEDURE — 84484 ASSAY OF TROPONIN QUANT: CPT

## 2023-08-18 PROCEDURE — 86850 RBC ANTIBODY SCREEN: CPT

## 2023-08-18 PROCEDURE — 86901 BLOOD TYPING SEROLOGIC RH(D): CPT

## 2023-08-18 PROCEDURE — 6370000000 HC RX 637 (ALT 250 FOR IP): Performed by: SURGERY

## 2023-08-18 PROCEDURE — 6370000000 HC RX 637 (ALT 250 FOR IP): Performed by: NURSE PRACTITIONER

## 2023-08-18 PROCEDURE — C9113 INJ PANTOPRAZOLE SODIUM, VIA: HCPCS | Performed by: SURGERY

## 2023-08-18 PROCEDURE — P9047 ALBUMIN (HUMAN), 25%, 50ML: HCPCS | Performed by: NURSE PRACTITIONER

## 2023-08-18 PROCEDURE — 94002 VENT MGMT INPAT INIT DAY: CPT

## 2023-08-18 PROCEDURE — 82962 GLUCOSE BLOOD TEST: CPT

## 2023-08-18 PROCEDURE — 36415 COLL VENOUS BLD VENIPUNCTURE: CPT

## 2023-08-18 PROCEDURE — 80048 BASIC METABOLIC PNL TOTAL CA: CPT

## 2023-08-18 PROCEDURE — 86900 BLOOD TYPING SEROLOGIC ABO: CPT

## 2023-08-18 PROCEDURE — A4216 STERILE WATER/SALINE, 10 ML: HCPCS | Performed by: SURGERY

## 2023-08-18 PROCEDURE — P9016 RBC LEUKOCYTES REDUCED: HCPCS

## 2023-08-18 PROCEDURE — 36430 TRANSFUSION BLD/BLD COMPNT: CPT

## 2023-08-18 RX ORDER — ETOMIDATE 2 MG/ML
20 INJECTION INTRAVENOUS ONCE
Status: COMPLETED | OUTPATIENT
Start: 2023-08-18 | End: 2023-08-18

## 2023-08-18 RX ORDER — SODIUM CHLORIDE 9 MG/ML
INJECTION, SOLUTION INTRAVENOUS PRN
Status: DISCONTINUED | OUTPATIENT
Start: 2023-08-18 | End: 2023-08-21

## 2023-08-18 RX ORDER — PROPOFOL 10 MG/ML
INJECTION, EMULSION INTRAVENOUS
Status: DISPENSED
Start: 2023-08-18 | End: 2023-08-19

## 2023-08-18 RX ORDER — ALPRAZOLAM 0.5 MG/1
0.25 TABLET ORAL EVERY 8 HOURS PRN
Status: DISCONTINUED | OUTPATIENT
Start: 2023-08-18 | End: 2023-08-21

## 2023-08-18 RX ORDER — BUPROPION HYDROCHLORIDE 100 MG/1
100 TABLET ORAL 2 TIMES DAILY
Status: DISCONTINUED | OUTPATIENT
Start: 2023-08-18 | End: 2023-10-10

## 2023-08-18 RX ORDER — POTASSIUM CHLORIDE 29.8 MG/ML
20 INJECTION INTRAVENOUS
Status: COMPLETED | OUTPATIENT
Start: 2023-08-18 | End: 2023-08-18

## 2023-08-18 RX ORDER — ROCURONIUM BROMIDE 10 MG/ML
50 INJECTION, SOLUTION INTRAVENOUS ONCE
Status: COMPLETED | OUTPATIENT
Start: 2023-08-18 | End: 2023-08-18

## 2023-08-18 RX ORDER — MAGNESIUM SULFATE 1 G/100ML
1000 INJECTION INTRAVENOUS ONCE
Status: COMPLETED | OUTPATIENT
Start: 2023-08-18 | End: 2023-08-18

## 2023-08-18 RX ORDER — DEXTROSE MONOHYDRATE 50 MG/ML
INJECTION, SOLUTION INTRAVENOUS CONTINUOUS
Status: DISCONTINUED | OUTPATIENT
Start: 2023-08-18 | End: 2023-08-21

## 2023-08-18 RX ORDER — FENTANYL CITRATE 50 UG/ML
50 INJECTION, SOLUTION INTRAMUSCULAR; INTRAVENOUS ONCE
Status: COMPLETED | OUTPATIENT
Start: 2023-08-18 | End: 2023-08-18

## 2023-08-18 RX ORDER — ALBUMIN (HUMAN) 12.5 G/50ML
25 SOLUTION INTRAVENOUS ONCE
Status: DISCONTINUED | OUTPATIENT
Start: 2023-08-18 | End: 2023-08-18

## 2023-08-18 RX ORDER — PROPOFOL 10 MG/ML
5-50 INJECTION, EMULSION INTRAVENOUS CONTINUOUS
Status: DISCONTINUED | OUTPATIENT
Start: 2023-08-18 | End: 2023-08-19

## 2023-08-18 RX ORDER — OXYCODONE HYDROCHLORIDE AND ACETAMINOPHEN 5; 325 MG/1; MG/1
1 TABLET ORAL EVERY 6 HOURS
Status: DISCONTINUED | OUTPATIENT
Start: 2023-08-18 | End: 2023-08-19

## 2023-08-18 RX ORDER — ALBUMIN (HUMAN) 12.5 G/50ML
25 SOLUTION INTRAVENOUS ONCE
Status: COMPLETED | OUTPATIENT
Start: 2023-08-18 | End: 2023-08-18

## 2023-08-18 RX ADMIN — BUPROPION HYDROCHLORIDE 100 MG: 100 TABLET, FILM COATED ORAL at 09:15

## 2023-08-18 RX ADMIN — POTASSIUM CHLORIDE 20 MEQ: 29.8 INJECTION, SOLUTION INTRAVENOUS at 03:09

## 2023-08-18 RX ADMIN — CHLORHEXIDINE GLUCONATE 15 ML: 1.2 RINSE ORAL at 21:37

## 2023-08-18 RX ADMIN — PIPERACILLIN AND TAZOBACTAM 3375 MG: 3; .375 INJECTION, POWDER, LYOPHILIZED, FOR SOLUTION INTRAVENOUS at 15:58

## 2023-08-18 RX ADMIN — OXYCODONE HYDROCHLORIDE AND ACETAMINOPHEN 1 TABLET: 5; 325 TABLET ORAL at 14:17

## 2023-08-18 RX ADMIN — PROPOFOL 20 MCG/KG/MIN: 10 INJECTION, EMULSION INTRAVENOUS at 21:31

## 2023-08-18 RX ADMIN — ALPRAZOLAM 0.25 MG: 0.5 TABLET ORAL at 09:15

## 2023-08-18 RX ADMIN — SODIUM CHLORIDE, PRESERVATIVE FREE 40 MG: 5 INJECTION INTRAVENOUS at 01:07

## 2023-08-18 RX ADMIN — Medication 1 UNITS: at 12:29

## 2023-08-18 RX ADMIN — VANCOMYCIN HYDROCHLORIDE 1250 MG: 1.25 INJECTION, POWDER, LYOPHILIZED, FOR SOLUTION INTRAVENOUS at 15:53

## 2023-08-18 RX ADMIN — HYDROMORPHONE HYDROCHLORIDE 1 MG: 1 INJECTION, SOLUTION INTRAMUSCULAR; INTRAVENOUS; SUBCUTANEOUS at 10:12

## 2023-08-18 RX ADMIN — DEXMEDETOMIDINE HYDROCHLORIDE 0.5 MCG/KG/HR: 4 INJECTION, SOLUTION INTRAVENOUS at 17:30

## 2023-08-18 RX ADMIN — OXYCODONE HYDROCHLORIDE AND ACETAMINOPHEN 1 TABLET: 5; 325 TABLET ORAL at 09:15

## 2023-08-18 RX ADMIN — HYDROMORPHONE HYDROCHLORIDE 1 MG: 1 INJECTION, SOLUTION INTRAMUSCULAR; INTRAVENOUS; SUBCUTANEOUS at 17:28

## 2023-08-18 RX ADMIN — OXYCODONE HYDROCHLORIDE AND ACETAMINOPHEN 1 TABLET: 5; 325 TABLET ORAL at 21:37

## 2023-08-18 RX ADMIN — DEXTROSE MONOHYDRATE: 50 INJECTION, SOLUTION INTRAVENOUS at 09:26

## 2023-08-18 RX ADMIN — DEXMEDETOMIDINE HYDROCHLORIDE 0.9 MCG/KG/HR: 4 INJECTION, SOLUTION INTRAVENOUS at 09:31

## 2023-08-18 RX ADMIN — Medication: at 00:09

## 2023-08-18 RX ADMIN — METRONIDAZOLE 500 MG: 5 INJECTION, SOLUTION INTRAVENOUS at 06:23

## 2023-08-18 RX ADMIN — POTASSIUM CHLORIDE 20 MEQ: 29.8 INJECTION, SOLUTION INTRAVENOUS at 01:27

## 2023-08-18 RX ADMIN — PIPERACILLIN AND TAZOBACTAM 3375 MG: 3; .375 INJECTION, POWDER, LYOPHILIZED, FOR SOLUTION INTRAVENOUS at 23:58

## 2023-08-18 RX ADMIN — SODIUM CHLORIDE, PRESERVATIVE FREE 10 ML: 5 INJECTION INTRAVENOUS at 08:32

## 2023-08-18 RX ADMIN — DEXMEDETOMIDINE HYDROCHLORIDE 0.7 MCG/KG/HR: 4 INJECTION, SOLUTION INTRAVENOUS at 01:07

## 2023-08-18 RX ADMIN — PIPERACILLIN AND TAZOBACTAM 3375 MG: 3; .375 INJECTION, POWDER, LYOPHILIZED, FOR SOLUTION INTRAVENOUS at 01:07

## 2023-08-18 RX ADMIN — SODIUM CHLORIDE, PRESERVATIVE FREE 40 MG: 5 INJECTION INTRAVENOUS at 12:22

## 2023-08-18 RX ADMIN — MAGNESIUM SULFATE HEPTAHYDRATE 1000 MG: 1 INJECTION, SOLUTION INTRAVENOUS at 04:30

## 2023-08-18 RX ADMIN — ALBUMIN (HUMAN) 25 G: 0.25 INJECTION, SOLUTION INTRAVENOUS at 09:32

## 2023-08-18 RX ADMIN — POTASSIUM CHLORIDE 20 MEQ: 29.8 INJECTION, SOLUTION INTRAVENOUS at 08:24

## 2023-08-18 RX ADMIN — PIPERACILLIN AND TAZOBACTAM 3375 MG: 3; .375 INJECTION, POWDER, LYOPHILIZED, FOR SOLUTION INTRAVENOUS at 08:25

## 2023-08-18 RX ADMIN — Medication: at 14:16

## 2023-08-18 RX ADMIN — FENTANYL CITRATE 50 MCG: 50 INJECTION INTRAMUSCULAR; INTRAVENOUS at 21:13

## 2023-08-18 RX ADMIN — Medication: at 09:00

## 2023-08-18 RX ADMIN — HYDROMORPHONE HYDROCHLORIDE 1 MG: 1 INJECTION, SOLUTION INTRAMUSCULAR; INTRAVENOUS; SUBCUTANEOUS at 01:28

## 2023-08-18 RX ADMIN — BUPROPION HYDROCHLORIDE 100 MG: 100 TABLET, FILM COATED ORAL at 21:38

## 2023-08-18 RX ADMIN — ALBUMIN (HUMAN) 25 G: 0.25 INJECTION, SOLUTION INTRAVENOUS at 14:17

## 2023-08-18 RX ADMIN — ROCURONIUM BROMIDE 50 MG: 10 INJECTION, SOLUTION INTRAVENOUS at 21:19

## 2023-08-18 RX ADMIN — Medication 10 ML: at 21:38

## 2023-08-18 RX ADMIN — ETOMIDATE 20 MG: 2 INJECTION, SOLUTION INTRAVENOUS at 21:18

## 2023-08-18 RX ADMIN — METRONIDAZOLE 500 MG: 5 INJECTION, SOLUTION INTRAVENOUS at 22:34

## 2023-08-18 RX ADMIN — Medication 10 ML: at 08:42

## 2023-08-18 RX ADMIN — CHLORHEXIDINE GLUCONATE 15 ML: 1.2 RINSE ORAL at 08:34

## 2023-08-18 RX ADMIN — SODIUM CHLORIDE, PRESERVATIVE FREE 10 ML: 5 INJECTION INTRAVENOUS at 21:38

## 2023-08-18 RX ADMIN — METRONIDAZOLE 500 MG: 5 INJECTION, SOLUTION INTRAVENOUS at 14:09

## 2023-08-18 ASSESSMENT — PAIN SCALES - GENERAL
PAINLEVEL_OUTOF10: 0
PAINLEVEL_OUTOF10: 9
PAINLEVEL_OUTOF10: 4
PAINLEVEL_OUTOF10: 0
PAINLEVEL_OUTOF10: 0
PAINLEVEL_OUTOF10: 9
PAINLEVEL_OUTOF10: 9
PAINLEVEL_OUTOF10: 8

## 2023-08-18 ASSESSMENT — ENCOUNTER SYMPTOMS
ABDOMINAL PAIN: 1
WHEEZING: 1
SHORTNESS OF BREATH: 0
CHEST TIGHTNESS: 0

## 2023-08-18 ASSESSMENT — PAIN DESCRIPTION - LOCATION
LOCATION: ABDOMEN

## 2023-08-18 ASSESSMENT — PAIN DESCRIPTION - DESCRIPTORS
DESCRIPTORS: ACHING

## 2023-08-18 ASSESSMENT — PAIN DESCRIPTION - ONSET: ONSET: ON-GOING

## 2023-08-18 ASSESSMENT — PAIN DESCRIPTION - ORIENTATION: ORIENTATION: ANTERIOR

## 2023-08-18 ASSESSMENT — PULMONARY FUNCTION TESTS: PIF_VALUE: 37

## 2023-08-18 ASSESSMENT — PAIN DESCRIPTION - FREQUENCY: FREQUENCY: CONTINUOUS

## 2023-08-18 NOTE — PROGRESS NOTES
CRITICAL CARE NOTE      Name: Juan Pope   : 1960   MRN: [id number]   Date: 2023      REASON FOR ICU ADMISSION: Acute hepatic respiratory failure    PRINCIPAL ICU DIAGNOSIS   Weakness  Hematemesis   Sacral decubitus requiring debridement  Acute hepatic respiratory failure    BRIEF PATIENT SUMMARY   Winston Perez is a 59-year-old female with a PMHx of gastric bypass (), asthma, CAD, T2DM, HLD, GERD, PE, ANCA, anxiety/depression who [facility] and hematem[facility]. In March of this year she was seen for fulminant C. difficile colitis and underwent subtotal colectomy with ileostomy. On () she underwent reversal.  She was then readmitted ( through ) to [facility] during which time she underwent EGD which revealed GE J ulcer and stenosis at [address] [facility]. On () she went to the OR for exploratory laparotomy for free air and free fluid but there was no evidence of perforation. Surgery has been consulted, it appears this is a fluid collection versus a perforation. IR consulted and recommends draining fluid collection. GI consulted for GIB. She was treated with PPI twice daily and Carafate 4 times daily. Eliquis and meloxicam were held. () she again underwent EGD which revealed a nonbleeding anastomotic ulcer. On () IVC filter placed. Overnight on () a rapid response was called for hypoxia and respiratory distress and she was transferred to the ICU for further observation and management on BiPAP. Unfortunately her status continued to decline and she was intubated for management of acute hypoxic respiratory failure. Underlying etiology appears to be pulmonary edema. She was aggressively diuresed with Lasix and underwent debridement (). She was successfully extubated to CPAP later that evening.       COMPREHENSIVE ASSESSMENT & PLAN:SYSTEM BASED     24 HOUR EVENTS: Family meeting  to 8/16 x 4 hours/residual remained low> start TF 8/17  -Dietitian consulted, appreciate recommendations  -Continue PPI twice daily    RENAL/ELECTROLYTE/FLUIDS:   -Function stable, BUN 38, creatinine 0.51, GFR >60  -Serial laboratory values, replete as indicated  -Hypernatremic, 154 mmol/L, increase FWF and D5W infusion  -Diuresis on lasix infusion as tolerated  -Closely monitor UO    ENDOCRINE:   -Goal BG range 120 -140, avoid hypoglycemia  -TSH 2.23    HEMATOLOGY/ONCOLOGY:  GI bleed  DVT   -Trend CBC  -Transfuse goal hgb > 7.0  -S/p IVC filter (8/1)    INFECTIOUS DISEASE:  Sacral wound  Intra-- abdominal abscess   ID following  S/p event of sacral decubitus (8/14)    ANTIBIOTICS TO DATE:  Zosyn 7/24 through current  Vancomycin 8/6 through current  Flagyl 8/7 through current    CULTURES TO DATE:  6/28 wound cx +Klebsiella pneumonia and Citrobacter  7/5 Ucx + candida glabrata  7/10 Ucx NGTD  7/31 fluid cx + E coli  C. difficile colitis   7/10, 8/6 BC NGTD  8/7 MRSA negative       ICU DAILY CHECKLIST     Code Status:Full  DVT Prophylaxis:SCDs and IVC filter  T/L/D: G-tube  Lines: CVC  Drains: Lutz  SUP: PPI  Diet: NPO TPN  Activity Level:Bed rest  ABCDEF Bundle/Checklist Completed:Yes  Disposition: ICU  Multidisciplinary Rounds Completed: Yes  Goals of Care Discussion/Palliative: Yes  Patient/Family Updated: Yes      HOSPITAL COURSE/DAILY EVENT LOG   8/15: Patient successfully extubated overnight. Did have some increased WOB/and was placed on CPAP. This morning she reports some generalized sparing which resolved with Dilaudid. She is n.p.o., continue TPN.  8/16: Nocturnal NIV, on 6 L NC, increased WOB/ronchi>cxr pending. H&H trending down. LUQ drain clamped x4 hours> low residual. If residual remains low may start TF w/Dietitian recs. 8/17: Worsening respiratory status overnight. Increase lasix infusion,cxr re-demonstrates worsening pleural effusions. Requiring vasopressor support. T max 99.1.  High risk for data filed at 8/18/2023 1200  Gross per 24 hour   Intake 3786.39 ml   Output 6395 ml   Net -2608.61 ml       Imaging    No valid procedures specified. CRITICAL CARE DOCUMENTATION  I had a face to face encounter with the patient, reviewed and interpreted patient data including clinical events, labs, images, vital signs, I/O's, and examined patient. I have discussed the case and the plan and management of the patient's care with the consulting services, the bedside nurses and the respiratory therapist.      NOTE OF PERSONAL INVOLVEMENT IN CARE   This patient has a high probability of imminent, clinically significant deterioration, which requires the highest level of preparedness to intervene urgently. I participated in the decision-making and personally managed or directed the management of the following life and organ supporting interventions that required my frequent assessment to treat or prevent imminent deterioration. I personally spent 45 minutes of critical care time. This is time spent at this critically ill patient's bedside actively involved in patient care as well as the coordination of care. This does not include any procedural time which has been billed separately.     Saad Blood, APRN - CNP   Critical Care Medicine  [facility] [place] [facility]

## 2023-08-18 NOTE — PROGRESS NOTES
Palliative Medicine-       Palliative Medicine consultation received and appreciated- noted that family meeting was held yesterday, 8/17 with ICU team.     Our team will be able to see the patient on Monday for ongoing goals of care and support to patient and family.        Thank you for including Palliative Medicine in the care of [place], [place]

## 2023-08-18 NOTE — PROGRESS NOTES
NUTRITION brief    Recommendations:     -Stop TPN    -Continue to advance tube feeding to goal; add 1 packet Prosource daily    -Consider increasing FWF to 125 ml q 2 hr; stop IVF       Diet: NPO  Supplements/Nutrition Support: TPN: 1560 ml, 92 gm protein, 234 gm CHO, No lipid  TF: Vital 1.5 @ 40 ml/hr; advance by 10 ml q 4 hr to goal 55 ml/hr with 200 ml water flush q 4 hr  Nutrition-related meds: D5 @ 50 ml/hr, Lasix drip, Levo @ 5, KCL, magnesium sulfate, Humalog    New events impacting nutrition plan of care:   EN initiated yesterday and slowly being advanced to goal. FMS placed yesterday afternoon (prior to start of EN) d/t frequent BM's. Suspect this is not helping sodium level (remains elevated at 154). Water flush increased today to 200 ml q 4 hr. Volume overloaded-remains on lasix drip and BiPAP support. Plan to stop TPN today; discussed during IDR. D5 ordered today will provide 1200 ml and 200 dextrose calories per day. May wish to increase FWF to above in order to get off of IVF d/t volume overload. Tube feeding at goal with increased FWF will provide a total of 2170 ml free water (tube feeding and flush) per day. Will add 1 packet Prosource daily to meet upper end of estimated protein needs. CTM tolerance to EN. Suspect excluded stomach needs to adapt to enteral stimulation again since original bypass was in 2017. Hope to see loose stools improve soon. See full RD assessment from 8/16 for additional details, goals, and monitoring/evaluation.    Estimated Nutrition Needs:   Energy Requirements Based On: Kcal/kg  Weight Used for Energy Requirements: Ideal  Energy (kcal/day): 1900 (35 kcal/kg)  Weight Used for Protein Requirements: Ideal  Protein (g/day): 81-92 (1.5-1.7g/kg IBW)  Method Used for Fluid Requirements: 1 ml/kcal  Fluid (ml/day): [address], [address] [facility]

## 2023-08-18 NOTE — PROGRESS NOTES
Back on BIPAP; tolerating tube feeds thus far (currently at 40 mL/hr). Continue gradual increase in feeds to goal rate 55 mL/hr). Continue PPI, wound VAC dressing, diuresis.

## 2023-08-19 ENCOUNTER — APPOINTMENT (OUTPATIENT)
Facility: HOSPITAL | Age: 63
DRG: 004 | End: 2023-08-19
Payer: MEDICARE

## 2023-08-19 LAB
ABO + RH BLD: NORMAL
ANION GAP SERPL CALC-SCNC: 6 MMOL/L (ref 5–15)
ANION GAP SERPL CALC-SCNC: 6 MMOL/L (ref 5–15)
ARTERIAL PATENCY WRIST A: POSITIVE
BASE DEFICIT BLD-SCNC: 1.5 MMOL/L
BASOPHILS # BLD: 0.3 K/UL (ref 0–0.1)
BASOPHILS NFR BLD: 2 % (ref 0–1)
BDY SITE: ABNORMAL
BLD PROD TYP BPU: NORMAL
BLOOD BANK DISPENSE STATUS: NORMAL
BLOOD GROUP ANTIBODIES SERPL: NORMAL
BPU ID: NORMAL
BUN SERPL-MCNC: 47 MG/DL (ref 6–20)
BUN SERPL-MCNC: 50 MG/DL (ref 6–20)
BUN/CREAT SERPL: 49 (ref 12–20)
BUN/CREAT SERPL: 53 (ref 12–20)
CALCIUM SERPL-MCNC: 8.5 MG/DL (ref 8.5–10.1)
CALCIUM SERPL-MCNC: 8.8 MG/DL (ref 8.5–10.1)
CHLORIDE SERPL-SCNC: 119 MMOL/L (ref 97–108)
CHLORIDE SERPL-SCNC: 120 MMOL/L (ref 97–108)
CO2 SERPL-SCNC: 25 MMOL/L (ref 21–32)
CO2 SERPL-SCNC: 27 MMOL/L (ref 21–32)
CREAT SERPL-MCNC: 0.89 MG/DL (ref 0.55–1.02)
CREAT SERPL-MCNC: 1.03 MG/DL (ref 0.55–1.02)
CROSSMATCH RESULT: NORMAL
DIFFERENTIAL METHOD BLD: ABNORMAL
EOSINOPHIL # BLD: 1.1 K/UL (ref 0–0.4)
EOSINOPHIL NFR BLD: 8 % (ref 0–7)
ERYTHROCYTE [DISTWIDTH] IN BLOOD BY AUTOMATED COUNT: 16.4 % (ref 11.5–14.5)
GAS FLOW.O2 O2 DELIVERY SYS: ABNORMAL
GAS FLOW.O2 SETTING OXYMISER: 24 BPM
GLUCOSE BLD STRIP.AUTO-MCNC: 107 MG/DL (ref 65–117)
GLUCOSE BLD STRIP.AUTO-MCNC: 108 MG/DL (ref 65–117)
GLUCOSE BLD STRIP.AUTO-MCNC: 121 MG/DL (ref 65–117)
GLUCOSE BLD STRIP.AUTO-MCNC: 130 MG/DL (ref 65–117)
GLUCOSE SERPL-MCNC: 110 MG/DL (ref 65–100)
GLUCOSE SERPL-MCNC: 150 MG/DL (ref 65–100)
HCO3 BLD-SCNC: 25 MMOL/L (ref 22–26)
HCT VFR BLD AUTO: 31.5 % (ref 35–47)
HGB BLD-MCNC: 9.7 G/DL (ref 11.5–16)
IMM GRANULOCYTES # BLD AUTO: 0 K/UL
IMM GRANULOCYTES NFR BLD AUTO: 0 %
LYMPHOCYTES # BLD: 1.1 K/UL (ref 0.8–3.5)
LYMPHOCYTES NFR BLD: 8 % (ref 12–49)
MAGNESIUM SERPL-MCNC: 1.8 MG/DL (ref 1.6–2.4)
MAGNESIUM SERPL-MCNC: 1.9 MG/DL (ref 1.6–2.4)
MCH RBC QN AUTO: 29.4 PG (ref 26–34)
MCHC RBC AUTO-ENTMCNC: 30.8 G/DL (ref 30–36.5)
MCV RBC AUTO: 95.5 FL (ref 80–99)
MONOCYTES # BLD: 0.9 K/UL (ref 0–1)
MONOCYTES NFR BLD: 7 % (ref 5–13)
NEUTS BAND NFR BLD MANUAL: 1 % (ref 0–6)
NEUTS SEG # BLD: 9.8 K/UL (ref 1.8–8)
NEUTS SEG NFR BLD: 74 % (ref 32–75)
NRBC # BLD: 0.02 K/UL (ref 0–0.01)
NRBC BLD-RTO: 0.2 PER 100 WBC
O2/TOTAL GAS SETTING VFR VENT: 40 %
PCO2 BLD: 49.8 MMHG (ref 35–45)
PEEP RESPIRATORY: 8 CMH2O
PH BLD: 7.31 (ref 7.35–7.45)
PHOSPHATE SERPL-MCNC: 2.1 MG/DL (ref 2.6–4.7)
PHOSPHATE SERPL-MCNC: 2.4 MG/DL (ref 2.6–4.7)
PLATELET # BLD AUTO: 478 K/UL (ref 150–400)
PMV BLD AUTO: 10.5 FL (ref 8.9–12.9)
PO2 BLD: 74 MMHG (ref 80–100)
POTASSIUM SERPL-SCNC: 3.3 MMOL/L (ref 3.5–5.1)
POTASSIUM SERPL-SCNC: 4 MMOL/L (ref 3.5–5.1)
RBC # BLD AUTO: 3.3 M/UL (ref 3.8–5.2)
RBC MORPH BLD: ABNORMAL
SAO2 % BLD: 92.9 % (ref 92–97)
SERVICE CMNT-IMP: ABNORMAL
SERVICE CMNT-IMP: ABNORMAL
SERVICE CMNT-IMP: NORMAL
SERVICE CMNT-IMP: NORMAL
SODIUM SERPL-SCNC: 151 MMOL/L (ref 136–145)
SODIUM SERPL-SCNC: 152 MMOL/L (ref 136–145)
SPECIMEN EXP DATE BLD: NORMAL
SPECIMEN TYPE: ABNORMAL
UNIT DIVISION: 0
VANCOMYCIN SERPL-MCNC: 33.7 UG/ML
VENTILATION MODE VENT: ABNORMAL
VT SETTING VENT: 330 ML
WBC # BLD AUTO: 13.2 K/UL (ref 3.6–11)

## 2023-08-19 PROCEDURE — 2580000003 HC RX 258: Performed by: NURSE PRACTITIONER

## 2023-08-19 PROCEDURE — 2000000000 HC ICU R&B

## 2023-08-19 PROCEDURE — 2580000003 HC RX 258: Performed by: SURGERY

## 2023-08-19 PROCEDURE — 6370000000 HC RX 637 (ALT 250 FOR IP): Performed by: SURGERY

## 2023-08-19 PROCEDURE — P9047 ALBUMIN (HUMAN), 25%, 50ML: HCPCS | Performed by: NURSE PRACTITIONER

## 2023-08-19 PROCEDURE — 80202 ASSAY OF VANCOMYCIN: CPT

## 2023-08-19 PROCEDURE — 6370000000 HC RX 637 (ALT 250 FOR IP): Performed by: NURSE PRACTITIONER

## 2023-08-19 PROCEDURE — 36415 COLL VENOUS BLD VENIPUNCTURE: CPT

## 2023-08-19 PROCEDURE — 6360000002 HC RX W HCPCS: Performed by: NURSE PRACTITIONER

## 2023-08-19 PROCEDURE — 82803 BLOOD GASES ANY COMBINATION: CPT

## 2023-08-19 PROCEDURE — 82962 GLUCOSE BLOOD TEST: CPT

## 2023-08-19 PROCEDURE — 85025 COMPLETE CBC W/AUTO DIFF WBC: CPT

## 2023-08-19 PROCEDURE — 2500000003 HC RX 250 WO HCPCS: Performed by: NURSE PRACTITIONER

## 2023-08-19 PROCEDURE — 71045 X-RAY EXAM CHEST 1 VIEW: CPT

## 2023-08-19 PROCEDURE — 36600 WITHDRAWAL OF ARTERIAL BLOOD: CPT

## 2023-08-19 PROCEDURE — 87040 BLOOD CULTURE FOR BACTERIA: CPT

## 2023-08-19 PROCEDURE — 94003 VENT MGMT INPAT SUBQ DAY: CPT

## 2023-08-19 PROCEDURE — 83735 ASSAY OF MAGNESIUM: CPT

## 2023-08-19 PROCEDURE — 84100 ASSAY OF PHOSPHORUS: CPT

## 2023-08-19 PROCEDURE — A4216 STERILE WATER/SALINE, 10 ML: HCPCS | Performed by: SURGERY

## 2023-08-19 PROCEDURE — 6360000002 HC RX W HCPCS: Performed by: SURGERY

## 2023-08-19 PROCEDURE — 80048 BASIC METABOLIC PNL TOTAL CA: CPT

## 2023-08-19 PROCEDURE — C9113 INJ PANTOPRAZOLE SODIUM, VIA: HCPCS | Performed by: SURGERY

## 2023-08-19 RX ORDER — FENTANYL CITRATE 50 UG/ML
100 INJECTION, SOLUTION INTRAMUSCULAR; INTRAVENOUS ONCE
Status: COMPLETED | OUTPATIENT
Start: 2023-08-19 | End: 2023-08-19

## 2023-08-19 RX ORDER — ALBUMIN (HUMAN) 12.5 G/50ML
25 SOLUTION INTRAVENOUS ONCE
Status: COMPLETED | OUTPATIENT
Start: 2023-08-19 | End: 2023-08-19

## 2023-08-19 RX ORDER — POTASSIUM CHLORIDE 29.8 MG/ML
20 INJECTION INTRAVENOUS
Status: COMPLETED | OUTPATIENT
Start: 2023-08-19 | End: 2023-08-19

## 2023-08-19 RX ORDER — FENTANYL CITRATE-0.9 % NACL/PF 10 MCG/ML
25-200 PLASTIC BAG, INJECTION (ML) INTRAVENOUS CONTINUOUS
Status: DISCONTINUED | OUTPATIENT
Start: 2023-08-19 | End: 2023-08-21

## 2023-08-19 RX ORDER — PROPOFOL 10 MG/ML
5-50 INJECTION, EMULSION INTRAVENOUS CONTINUOUS
Status: DISCONTINUED | OUTPATIENT
Start: 2023-08-19 | End: 2023-08-21

## 2023-08-19 RX ORDER — OXYCODONE HYDROCHLORIDE AND ACETAMINOPHEN 5; 325 MG/1; MG/1
1 TABLET ORAL EVERY 6 HOURS PRN
Status: DISCONTINUED | OUTPATIENT
Start: 2023-08-19 | End: 2023-08-22

## 2023-08-19 RX ORDER — FENTANYL CITRATE 50 UG/ML
50 INJECTION, SOLUTION INTRAMUSCULAR; INTRAVENOUS
Status: DISCONTINUED | OUTPATIENT
Start: 2023-08-19 | End: 2023-08-24

## 2023-08-19 RX ADMIN — Medication: at 07:57

## 2023-08-19 RX ADMIN — BUPROPION HYDROCHLORIDE 100 MG: 100 TABLET, FILM COATED ORAL at 07:57

## 2023-08-19 RX ADMIN — Medication 50 MCG/HR: at 09:35

## 2023-08-19 RX ADMIN — FENTANYL CITRATE 100 MCG: 50 INJECTION INTRAMUSCULAR; INTRAVENOUS at 00:43

## 2023-08-19 RX ADMIN — METRONIDAZOLE 500 MG: 5 INJECTION, SOLUTION INTRAVENOUS at 14:39

## 2023-08-19 RX ADMIN — NOREPINEPHRINE BITARTRATE 8 MCG/MIN: 1 SOLUTION INTRAVENOUS at 04:55

## 2023-08-19 RX ADMIN — Medication 10 ML: at 21:13

## 2023-08-19 RX ADMIN — ALBUMIN (HUMAN) 25 G: 5 SOLUTION INTRAVENOUS at 09:23

## 2023-08-19 RX ADMIN — METRONIDAZOLE 500 MG: 5 INJECTION, SOLUTION INTRAVENOUS at 22:30

## 2023-08-19 RX ADMIN — PROPOFOL 25 MCG/KG/MIN: 10 INJECTION, EMULSION INTRAVENOUS at 21:24

## 2023-08-19 RX ADMIN — CHLORHEXIDINE GLUCONATE 15 ML: 1.2 RINSE ORAL at 07:56

## 2023-08-19 RX ADMIN — Medication: at 15:07

## 2023-08-19 RX ADMIN — BUPROPION HYDROCHLORIDE 100 MG: 100 TABLET, FILM COATED ORAL at 21:10

## 2023-08-19 RX ADMIN — DEXTROSE MONOHYDRATE: 50 INJECTION, SOLUTION INTRAVENOUS at 05:02

## 2023-08-19 RX ADMIN — SODIUM CHLORIDE, PRESERVATIVE FREE 40 MG: 5 INJECTION INTRAVENOUS at 00:00

## 2023-08-19 RX ADMIN — OXYCODONE HYDROCHLORIDE AND ACETAMINOPHEN 1 TABLET: 5; 325 TABLET ORAL at 07:57

## 2023-08-19 RX ADMIN — PIPERACILLIN AND TAZOBACTAM 3375 MG: 3; .375 INJECTION, POWDER, LYOPHILIZED, FOR SOLUTION INTRAVENOUS at 16:01

## 2023-08-19 RX ADMIN — SODIUM CHLORIDE, PRESERVATIVE FREE 10 ML: 5 INJECTION INTRAVENOUS at 07:56

## 2023-08-19 RX ADMIN — Medication: at 00:01

## 2023-08-19 RX ADMIN — METRONIDAZOLE 500 MG: 5 INJECTION, SOLUTION INTRAVENOUS at 06:03

## 2023-08-19 RX ADMIN — SODIUM CHLORIDE, PRESERVATIVE FREE 40 MG: 5 INJECTION INTRAVENOUS at 11:57

## 2023-08-19 RX ADMIN — CHLORHEXIDINE GLUCONATE 15 ML: 1.2 RINSE ORAL at 21:13

## 2023-08-19 RX ADMIN — POTASSIUM CHLORIDE 20 MEQ: 29.8 INJECTION, SOLUTION INTRAVENOUS at 09:23

## 2023-08-19 RX ADMIN — PROPOFOL 25 MCG/KG/MIN: 10 INJECTION, EMULSION INTRAVENOUS at 13:46

## 2023-08-19 RX ADMIN — OXYCODONE HYDROCHLORIDE AND ACETAMINOPHEN 1 TABLET: 5; 325 TABLET ORAL at 03:05

## 2023-08-19 RX ADMIN — DEXTROSE MONOHYDRATE: 50 INJECTION, SOLUTION INTRAVENOUS at 17:45

## 2023-08-19 RX ADMIN — PIPERACILLIN AND TAZOBACTAM 3375 MG: 3; .375 INJECTION, POWDER, LYOPHILIZED, FOR SOLUTION INTRAVENOUS at 07:57

## 2023-08-19 RX ADMIN — POTASSIUM PHOSPHATE, MONOBASIC AND POTASSIUM PHOSPHATE, DIBASIC 10 MMOL: 224; 236 INJECTION, SOLUTION, CONCENTRATE INTRAVENOUS at 15:05

## 2023-08-19 RX ADMIN — Medication 10 ML: at 07:56

## 2023-08-19 RX ADMIN — PROPOFOL 30 MCG/KG/MIN: 10 INJECTION, EMULSION INTRAVENOUS at 04:52

## 2023-08-19 RX ADMIN — POTASSIUM CHLORIDE 20 MEQ: 29.8 INJECTION, SOLUTION INTRAVENOUS at 11:15

## 2023-08-19 RX ADMIN — ALBUMIN (HUMAN) 25 G: 0.25 INJECTION, SOLUTION INTRAVENOUS at 16:04

## 2023-08-19 RX ADMIN — SODIUM CHLORIDE, PRESERVATIVE FREE 10 ML: 5 INJECTION INTRAVENOUS at 21:13

## 2023-08-19 ASSESSMENT — PULMONARY FUNCTION TESTS
PIF_VALUE: 35
PIF_VALUE: 30
PIF_VALUE: 31
PIF_VALUE: 35
PIF_VALUE: 33
PIF_VALUE: 28
PIF_VALUE: 31

## 2023-08-19 ASSESSMENT — PAIN SCALES - GENERAL
PAINLEVEL_OUTOF10: 0

## 2023-08-19 ASSESSMENT — PAIN SCALES - WONG BAKER
WONGBAKER_NUMERICALRESPONSE: 0
WONGBAKER_NUMERICALRESPONSE: NO HURT

## 2023-08-19 NOTE — PROCEDURES
Procedure Note - Intubation:   Performed by WILMAN Branch . Diagnosis: Acute hypercapnic respiratory failure  Insertion Date: 8/18/23 Time:2120   Obtained Consent? yes; informed -  called and notified. Verbal consent over phone. Procedure Location:  ICU. Immediately prior to the procedure, the patient was reevaluated and found suitable for the planned procedure and any planned medications. Immediately prior to the procedure a time out was called to verify the correct patient, procedure, equipment, staff, and marking as appropriate. Preoxygenation applied via Bi-pap 100% 3 minutes prior  Medications given were etomidate, rocuronium (Zemuron), and 50 Fentanyl . Grade 1 view obtained with MAC 3 blade, C- mac  A number 7.5 cuffed   ETT was placed to 22 cm at the teeth. Placement was evaluated by noting bilateral, symmetric breath sounds, good end-tidal CO2 detector color change , and chest x-ray visualization. Attempts required: 1. Complications: none. RSI was used. .  The procedure was tolerated well. A follow-up chest x-ray was ordered post procedure. Procedure was directly assisted and supervised by Dr. Ian Hilton in its entirety at bedside.      WILMAN Branch  Critical Care Medicine  [facility]

## 2023-08-19 NOTE — PROGRESS NOTES
ABG   @17:59    PH 7.31  PCO2 - 49.8  PO2 - 73.6  HCO3 - 25  BE - -1.5  O2 SAT - 92.9    Patient in on vent. Rate - 24, VT - 330 PEEP 8 , FIO2 40%.

## 2023-08-19 NOTE — PROGRESS NOTES
ICU NIGHT CHECKLIST     Night round completed with attending physician and bedside nurse. Plan of care for patient reviewed. Medication weaning / changes discussed. Necessity of any lines, drains, and/or tubes addressed. Respiratory status / modalities discussed. Worsening respiratory status, ABG with worsening hypercapnia despite Bi-pap setting change earlier. Patient becoming more unresponsive. Attending called patient's  and notified of patient status and plan to intubate. Verbal consent from . See intubation note for details.      Propofol for Sedation   PRN Fentanyl   Vent bundle ordered  Levophed for MAP > 65 mmHg    Julien Thompson NP-C    Critical Care Medicine  [facility] [place] [facility]

## 2023-08-19 NOTE — PROGRESS NOTES
1930- Bedside shift change report given to NENA Ledezma RN (oncoming nurse) by Gracy Coto RN (offgoing nurse). Report included the following information Nurse Handoff Report, Adult Overview, Intake/Output, MAR, Recent Results, and Cardiac Rhythm ST . Assumed care of patient. 2000- Assessment completed, no family or visitors at this time. Patient responds to painful stimulus only, lethargic. Blood gases obtained by Resp. 2010- NP notified of patients condition and worsening blood gases. Orders given to Intubate patient. 2030- Patients  notified of current condition and plans to intubate. 2120- Patient intubated by NP Tom Awad, Dr. Citlaly Duarte present. CXR confirmed placement. Patient placed on Propofol gtt with Levophed support. 2230- Patient lethargic, nods to questions. 2300- Restraint order received for safety measure.

## 2023-08-20 LAB
ALBUMIN SERPL-MCNC: 2.4 G/DL (ref 3.5–5)
ALBUMIN/GLOB SERPL: 1 (ref 1.1–2.2)
ALP SERPL-CCNC: 105 U/L (ref 45–117)
ALT SERPL-CCNC: 8 U/L (ref 12–78)
ANION GAP SERPL CALC-SCNC: 5 MMOL/L (ref 5–15)
ARTERIAL PATENCY WRIST A: POSITIVE
AST SERPL-CCNC: 11 U/L (ref 15–37)
BACTERIA SPEC CULT: NORMAL
BASE DEFICIT BLD-SCNC: 2.1 MMOL/L
BASOPHILS # BLD: 0 K/UL (ref 0–0.1)
BASOPHILS NFR BLD: 0 % (ref 0–1)
BDY SITE: ABNORMAL
BILIRUB DIRECT SERPL-MCNC: 0.4 MG/DL (ref 0–0.2)
BILIRUB SERPL-MCNC: 0.7 MG/DL (ref 0.2–1)
BUN SERPL-MCNC: 50 MG/DL (ref 6–20)
BUN/CREAT SERPL: 47 (ref 12–20)
CALCIUM SERPL-MCNC: 8.5 MG/DL (ref 8.5–10.1)
CHLORIDE SERPL-SCNC: 115 MMOL/L (ref 97–108)
CO2 SERPL-SCNC: 26 MMOL/L (ref 21–32)
CREAT SERPL-MCNC: 1.07 MG/DL (ref 0.55–1.02)
DIFFERENTIAL METHOD BLD: ABNORMAL
EOSINOPHIL # BLD: 0.6 K/UL (ref 0–0.4)
EOSINOPHIL NFR BLD: 5 % (ref 0–7)
ERYTHROCYTE [DISTWIDTH] IN BLOOD BY AUTOMATED COUNT: 17.1 % (ref 11.5–14.5)
GAS FLOW.O2 SETTING OXYMISER: 24 BPM
GLOBULIN SER CALC-MCNC: 2.5 G/DL (ref 2–4)
GLUCOSE BLD STRIP.AUTO-MCNC: 103 MG/DL (ref 65–117)
GLUCOSE BLD STRIP.AUTO-MCNC: 115 MG/DL (ref 65–117)
GLUCOSE BLD STRIP.AUTO-MCNC: 125 MG/DL (ref 65–117)
GLUCOSE BLD STRIP.AUTO-MCNC: 128 MG/DL (ref 65–117)
GLUCOSE SERPL-MCNC: 108 MG/DL (ref 65–100)
GRAM STN SPEC: NORMAL
HCO3 BLD-SCNC: 24.4 MMOL/L (ref 22–26)
HCT VFR BLD AUTO: 25.6 % (ref 35–47)
HGB BLD-MCNC: 8 G/DL (ref 11.5–16)
IMM GRANULOCYTES # BLD AUTO: 0 K/UL
IMM GRANULOCYTES NFR BLD AUTO: 0 %
INR PPP: 1.4 (ref 0.9–1.1)
LYMPHOCYTES # BLD: 0.7 K/UL (ref 0.8–3.5)
LYMPHOCYTES NFR BLD: 6 % (ref 12–49)
MAGNESIUM SERPL-MCNC: 1.8 MG/DL (ref 1.6–2.4)
MCH RBC QN AUTO: 29.5 PG (ref 26–34)
MCHC RBC AUTO-ENTMCNC: 31.3 G/DL (ref 30–36.5)
MCV RBC AUTO: 94.5 FL (ref 80–99)
METAMYELOCYTES NFR BLD MANUAL: 2 %
MONOCYTES # BLD: 0.6 K/UL (ref 0–1)
MONOCYTES NFR BLD: 5 % (ref 5–13)
MYELOCYTES NFR BLD MANUAL: 1 %
NEUTS BAND NFR BLD MANUAL: 1 % (ref 0–6)
NEUTS SEG # BLD: 10 K/UL (ref 1.8–8)
NEUTS SEG NFR BLD: 80 % (ref 32–75)
NRBC # BLD: 0.02 K/UL (ref 0–0.01)
NRBC BLD-RTO: 0.2 PER 100 WBC
O2/TOTAL GAS SETTING VFR VENT: 35 %
PCO2 BLD: 49.7 MMHG (ref 35–45)
PEEP RESPIRATORY: 8 CMH2O
PH BLD: 7.3 (ref 7.35–7.45)
PHOSPHATE SERPL-MCNC: 2.4 MG/DL (ref 2.6–4.7)
PLATELET # BLD AUTO: 376 K/UL (ref 150–400)
PMV BLD AUTO: 10.5 FL (ref 8.9–12.9)
PO2 BLD: 76 MMHG (ref 80–100)
POTASSIUM SERPL-SCNC: 4 MMOL/L (ref 3.5–5.1)
PROCALCITONIN SERPL-MCNC: 2.06 NG/ML
PROT SERPL-MCNC: 4.9 G/DL (ref 6.4–8.2)
PROTHROMBIN TIME: 14.7 SEC (ref 9–11.1)
RBC # BLD AUTO: 2.71 M/UL (ref 3.8–5.2)
RBC MORPH BLD: ABNORMAL
SAO2 % BLD: 93.2 % (ref 92–97)
SERVICE CMNT-IMP: ABNORMAL
SERVICE CMNT-IMP: ABNORMAL
SERVICE CMNT-IMP: NORMAL
SODIUM SERPL-SCNC: 146 MMOL/L (ref 136–145)
SPECIMEN TYPE: ABNORMAL
TROPONIN I SERPL HS-MCNC: 37 NG/L (ref 0–51)
VANCOMYCIN SERPL-MCNC: 25.3 UG/ML
VENTILATION MODE VENT: ABNORMAL
VT SETTING VENT: 330 ML
WBC # BLD AUTO: 12.4 K/UL (ref 3.6–11)

## 2023-08-20 PROCEDURE — 6370000000 HC RX 637 (ALT 250 FOR IP): Performed by: NURSE PRACTITIONER

## 2023-08-20 PROCEDURE — 2580000003 HC RX 258: Performed by: SURGERY

## 2023-08-20 PROCEDURE — 2500000003 HC RX 250 WO HCPCS: Performed by: NURSE PRACTITIONER

## 2023-08-20 PROCEDURE — 2000000000 HC ICU R&B

## 2023-08-20 PROCEDURE — 2580000003 HC RX 258: Performed by: NURSE PRACTITIONER

## 2023-08-20 PROCEDURE — 80202 ASSAY OF VANCOMYCIN: CPT

## 2023-08-20 PROCEDURE — 84100 ASSAY OF PHOSPHORUS: CPT

## 2023-08-20 PROCEDURE — C9113 INJ PANTOPRAZOLE SODIUM, VIA: HCPCS | Performed by: SURGERY

## 2023-08-20 PROCEDURE — 6360000002 HC RX W HCPCS: Performed by: SURGERY

## 2023-08-20 PROCEDURE — 82803 BLOOD GASES ANY COMBINATION: CPT

## 2023-08-20 PROCEDURE — 94003 VENT MGMT INPAT SUBQ DAY: CPT

## 2023-08-20 PROCEDURE — 36600 WITHDRAWAL OF ARTERIAL BLOOD: CPT

## 2023-08-20 PROCEDURE — 6360000002 HC RX W HCPCS: Performed by: NURSE PRACTITIONER

## 2023-08-20 PROCEDURE — 84484 ASSAY OF TROPONIN QUANT: CPT

## 2023-08-20 PROCEDURE — 80048 BASIC METABOLIC PNL TOTAL CA: CPT

## 2023-08-20 PROCEDURE — 85025 COMPLETE CBC W/AUTO DIFF WBC: CPT

## 2023-08-20 PROCEDURE — 82962 GLUCOSE BLOOD TEST: CPT

## 2023-08-20 PROCEDURE — 83735 ASSAY OF MAGNESIUM: CPT

## 2023-08-20 PROCEDURE — 6370000000 HC RX 637 (ALT 250 FOR IP): Performed by: SURGERY

## 2023-08-20 PROCEDURE — 85610 PROTHROMBIN TIME: CPT

## 2023-08-20 PROCEDURE — 80076 HEPATIC FUNCTION PANEL: CPT

## 2023-08-20 PROCEDURE — 84145 PROCALCITONIN (PCT): CPT

## 2023-08-20 PROCEDURE — P9047 ALBUMIN (HUMAN), 25%, 50ML: HCPCS | Performed by: NURSE PRACTITIONER

## 2023-08-20 PROCEDURE — 36415 COLL VENOUS BLD VENIPUNCTURE: CPT

## 2023-08-20 PROCEDURE — A4216 STERILE WATER/SALINE, 10 ML: HCPCS | Performed by: SURGERY

## 2023-08-20 RX ORDER — MAGNESIUM SULFATE 1 G/100ML
1000 INJECTION INTRAVENOUS ONCE
Status: COMPLETED | OUTPATIENT
Start: 2023-08-20 | End: 2023-08-20

## 2023-08-20 RX ORDER — ALBUMIN (HUMAN) 12.5 G/50ML
25 SOLUTION INTRAVENOUS ONCE
Status: COMPLETED | OUTPATIENT
Start: 2023-08-20 | End: 2023-08-20

## 2023-08-20 RX ADMIN — PROPOFOL 25 MCG/KG/MIN: 10 INJECTION, EMULSION INTRAVENOUS at 07:18

## 2023-08-20 RX ADMIN — SODIUM CHLORIDE, PRESERVATIVE FREE 40 MG: 5 INJECTION INTRAVENOUS at 12:41

## 2023-08-20 RX ADMIN — PROPOFOL 20 MCG/KG/MIN: 10 INJECTION, EMULSION INTRAVENOUS at 15:19

## 2023-08-20 RX ADMIN — Medication 50 MCG/HR: at 21:10

## 2023-08-20 RX ADMIN — ALBUMIN (HUMAN) 25 G: 0.25 INJECTION, SOLUTION INTRAVENOUS at 11:03

## 2023-08-20 RX ADMIN — CHLORHEXIDINE GLUCONATE 15 ML: 1.2 RINSE ORAL at 21:10

## 2023-08-20 RX ADMIN — CHLORHEXIDINE GLUCONATE 15 ML: 1.2 RINSE ORAL at 08:07

## 2023-08-20 RX ADMIN — DEXTROSE MONOHYDRATE: 50 INJECTION, SOLUTION INTRAVENOUS at 15:20

## 2023-08-20 RX ADMIN — BUPROPION HYDROCHLORIDE 100 MG: 100 TABLET, FILM COATED ORAL at 21:10

## 2023-08-20 RX ADMIN — METRONIDAZOLE 500 MG: 5 INJECTION, SOLUTION INTRAVENOUS at 06:41

## 2023-08-20 RX ADMIN — PIPERACILLIN AND TAZOBACTAM 3375 MG: 3; .375 INJECTION, POWDER, LYOPHILIZED, FOR SOLUTION INTRAVENOUS at 15:37

## 2023-08-20 RX ADMIN — SODIUM CHLORIDE, PRESERVATIVE FREE 40 MG: 5 INJECTION INTRAVENOUS at 00:26

## 2023-08-20 RX ADMIN — Medication 10 ML: at 21:16

## 2023-08-20 RX ADMIN — SODIUM CHLORIDE, PRESERVATIVE FREE 10 ML: 5 INJECTION INTRAVENOUS at 07:59

## 2023-08-20 RX ADMIN — ACETAMINOPHEN 650 MG: 325 TABLET ORAL at 21:18

## 2023-08-20 RX ADMIN — PIPERACILLIN AND TAZOBACTAM 3375 MG: 3; .375 INJECTION, POWDER, LYOPHILIZED, FOR SOLUTION INTRAVENOUS at 00:26

## 2023-08-20 RX ADMIN — PIPERACILLIN AND TAZOBACTAM 3375 MG: 3; .375 INJECTION, POWDER, LYOPHILIZED, FOR SOLUTION INTRAVENOUS at 07:58

## 2023-08-20 RX ADMIN — METRONIDAZOLE 500 MG: 5 INJECTION, SOLUTION INTRAVENOUS at 22:44

## 2023-08-20 RX ADMIN — Medication 50 MCG/HR: at 03:36

## 2023-08-20 RX ADMIN — METRONIDAZOLE 500 MG: 5 INJECTION, SOLUTION INTRAVENOUS at 14:03

## 2023-08-20 RX ADMIN — Medication: at 15:38

## 2023-08-20 RX ADMIN — Medication: at 00:25

## 2023-08-20 RX ADMIN — MAGNESIUM SULFATE HEPTAHYDRATE 1000 MG: 1 INJECTION, SOLUTION INTRAVENOUS at 07:56

## 2023-08-20 RX ADMIN — Medication 10 ML: at 07:59

## 2023-08-20 RX ADMIN — BUPROPION HYDROCHLORIDE 100 MG: 100 TABLET, FILM COATED ORAL at 07:59

## 2023-08-20 RX ADMIN — SODIUM CHLORIDE, PRESERVATIVE FREE 10 ML: 5 INJECTION INTRAVENOUS at 21:10

## 2023-08-20 RX ADMIN — Medication: at 07:59

## 2023-08-20 RX ADMIN — DEXTROSE MONOHYDRATE: 50 INJECTION, SOLUTION INTRAVENOUS at 04:40

## 2023-08-20 ASSESSMENT — PULMONARY FUNCTION TESTS
PIF_VALUE: 3
PIF_VALUE: 33
PIF_VALUE: 32
PIF_VALUE: 27
PIF_VALUE: 30

## 2023-08-20 ASSESSMENT — PAIN SCALES - WONG BAKER
WONGBAKER_NUMERICALRESPONSE: 0
WONGBAKER_NUMERICALRESPONSE: NO HURT

## 2023-08-20 ASSESSMENT — PAIN SCALES - GENERAL
PAINLEVEL_OUTOF10: 0

## 2023-08-20 NOTE — PROGRESS NOTES
CRITICAL CARE NOTE      Name: Darien Thomas   : 1960   MRN: [id number]   Date: 2023      REASON FOR ICU ADMISSION: Acute hepatic respiratory failure    PRINCIPAL ICU DIAGNOSIS   Weakness  Hematemesis   Sacral decubitus requiring debridement  Acute hepatic respiratory failure    BRIEF PATIENT SUMMARY   Amber Hess is a 71-year-old female with a PMHx of gastric bypass (), asthma, CAD, T2DM, HLD, GERD, PE, ANCA, anxiety/depression who [facility] and [facility]emesis. In March of this year she was seen for fulminant C. difficile colitis and underwent subtotal colectomy with ileostomy. On () she underwent reversal.  She was then readmitted ( through ) to [address] [place] [address] during which time she underwent EGD which revealed GE J ulcer and stenosis at [address] [facility]. On () she went to the OR for exploratory laparotomy for free air and free fluid but there was no evidence of perforation. Surgery has been consulted, it appears this is a fluid collection versus a perforation. IR consulted and recommends draining fluid collection. GI consulted for GIB. She was treated with PPI twice daily and Carafate 4 times daily. Eliquis and meloxicam were held. () she again underwent EGD which revealed a nonbleeding anastomotic ulcer. On () IVC filter placed. Overnight on () a rapid response was called for hypoxia and respiratory distress and she was transferred to the ICU for further observation and management on BiPAP. Unfortunately her status continued to decline and she was intubated for management of acute hypoxic respiratory failure. Underlying etiology appears to be pulmonary edema. She was aggressively diuresed with Lasix and underwent debridement (). She was successfully extubated to CPAP later that evening. COMPREHENSIVE ASSESSMENT & PLAN:SYSTEM BASED     24 HOUR EVENTS: T max overnight 100.3.

## 2023-08-21 ENCOUNTER — APPOINTMENT (OUTPATIENT)
Facility: HOSPITAL | Age: 63
DRG: 004 | End: 2023-08-21
Payer: MEDICARE

## 2023-08-21 PROBLEM — R53.81 DEBILITY: Status: ACTIVE | Noted: 2023-01-01

## 2023-08-21 PROBLEM — J96.01 ACUTE RESPIRATORY FAILURE WITH HYPOXIA (HCC): Status: ACTIVE | Noted: 2023-08-21

## 2023-08-21 PROBLEM — Z51.5 PALLIATIVE CARE BY SPECIALIST: Status: ACTIVE | Noted: 2023-08-21

## 2023-08-21 LAB
ALBUMIN SERPL-MCNC: 2.1 G/DL (ref 3.5–5)
ALBUMIN/GLOB SERPL: 0.7 (ref 1.1–2.2)
ALP SERPL-CCNC: 128 U/L (ref 45–117)
ALT SERPL-CCNC: 8 U/L (ref 12–78)
ANION GAP SERPL CALC-SCNC: 8 MMOL/L (ref 5–15)
ANION GAP SERPL CALC-SCNC: 8 MMOL/L (ref 5–15)
ARTERIAL PATENCY WRIST A: YES
AST SERPL-CCNC: 13 U/L (ref 15–37)
BASE DEFICIT BLDA-SCNC: 1.7 MMOL/L
BDY SITE: NORMAL
BILIRUB SERPL-MCNC: 0.7 MG/DL (ref 0.2–1)
BUN SERPL-MCNC: 50 MG/DL (ref 6–20)
BUN SERPL-MCNC: 50 MG/DL (ref 6–20)
BUN/CREAT SERPL: 38 (ref 12–20)
BUN/CREAT SERPL: 40 (ref 12–20)
CALCIUM SERPL-MCNC: 8.5 MG/DL (ref 8.5–10.1)
CALCIUM SERPL-MCNC: 8.5 MG/DL (ref 8.5–10.1)
CHLORIDE SERPL-SCNC: 108 MMOL/L (ref 97–108)
CHLORIDE SERPL-SCNC: 109 MMOL/L (ref 97–108)
CO2 SERPL-SCNC: 22 MMOL/L (ref 21–32)
CO2 SERPL-SCNC: 23 MMOL/L (ref 21–32)
CREAT SERPL-MCNC: 1.26 MG/DL (ref 0.55–1.02)
CREAT SERPL-MCNC: 1.31 MG/DL (ref 0.55–1.02)
ERYTHROCYTE [DISTWIDTH] IN BLOOD BY AUTOMATED COUNT: 17.1 % (ref 11.5–14.5)
FIO2 ON VENT: 40 %
GAS FLOW.O2 SETTING OXYMISER: 20
GLOBULIN SER CALC-MCNC: 2.9 G/DL (ref 2–4)
GLUCOSE BLD STRIP.AUTO-MCNC: 103 MG/DL (ref 65–117)
GLUCOSE BLD STRIP.AUTO-MCNC: 109 MG/DL (ref 65–117)
GLUCOSE BLD STRIP.AUTO-MCNC: 120 MG/DL (ref 65–117)
GLUCOSE BLD STRIP.AUTO-MCNC: 135 MG/DL (ref 65–117)
GLUCOSE SERPL-MCNC: 114 MG/DL (ref 65–100)
GLUCOSE SERPL-MCNC: 122 MG/DL (ref 65–100)
HCO3 BLDA-SCNC: 23 MMOL/L (ref 22–26)
HCT VFR BLD AUTO: 25.9 % (ref 35–47)
HGB BLD-MCNC: 7.9 G/DL (ref 11.5–16)
MAGNESIUM SERPL-MCNC: 1.9 MG/DL (ref 1.6–2.4)
MAGNESIUM SERPL-MCNC: 1.9 MG/DL (ref 1.6–2.4)
MCH RBC QN AUTO: 29 PG (ref 26–34)
MCHC RBC AUTO-ENTMCNC: 30.5 G/DL (ref 30–36.5)
MCV RBC AUTO: 95.2 FL (ref 80–99)
NRBC # BLD: 0.03 K/UL (ref 0–0.01)
NRBC BLD-RTO: 0.2 PER 100 WBC
PCO2 BLDA: 38 MMHG (ref 35–45)
PEEP RESPIRATORY: 10
PH BLDA: 7.4 (ref 7.35–7.45)
PHOSPHATE SERPL-MCNC: 2.4 MG/DL (ref 2.6–4.7)
PLATELET # BLD AUTO: 389 K/UL (ref 150–400)
PMV BLD AUTO: 10.5 FL (ref 8.9–12.9)
PO2 BLDA: 89 MMHG (ref 80–100)
POTASSIUM SERPL-SCNC: 4 MMOL/L (ref 3.5–5.1)
POTASSIUM SERPL-SCNC: 4.3 MMOL/L (ref 3.5–5.1)
PROCALCITONIN SERPL-MCNC: 1.8 NG/ML
PROT SERPL-MCNC: 5 G/DL (ref 6.4–8.2)
RBC # BLD AUTO: 2.72 M/UL (ref 3.8–5.2)
SAO2 % BLD: 97 % (ref 92–97)
SAO2% DEVICE SAO2% SENSOR NAME: NORMAL
SERVICE CMNT-IMP: ABNORMAL
SERVICE CMNT-IMP: ABNORMAL
SERVICE CMNT-IMP: NORMAL
SERVICE CMNT-IMP: NORMAL
SODIUM SERPL-SCNC: 139 MMOL/L (ref 136–145)
SODIUM SERPL-SCNC: 139 MMOL/L (ref 136–145)
SPECIMEN SITE: NORMAL
VANCOMYCIN SERPL-MCNC: 19.3 UG/ML
VENTILATION MODE VENT: NORMAL
WBC # BLD AUTO: 17 K/UL (ref 3.6–11)

## 2023-08-21 PROCEDURE — 36415 COLL VENOUS BLD VENIPUNCTURE: CPT

## 2023-08-21 PROCEDURE — 82962 GLUCOSE BLOOD TEST: CPT

## 2023-08-21 PROCEDURE — 6360000002 HC RX W HCPCS: Performed by: SURGERY

## 2023-08-21 PROCEDURE — 2580000003 HC RX 258: Performed by: SURGERY

## 2023-08-21 PROCEDURE — 97606 NEG PRS WND THER DME>50 SQCM: CPT

## 2023-08-21 PROCEDURE — 87040 BLOOD CULTURE FOR BACTERIA: CPT

## 2023-08-21 PROCEDURE — A4216 STERILE WATER/SALINE, 10 ML: HCPCS | Performed by: NURSE PRACTITIONER

## 2023-08-21 PROCEDURE — A4216 STERILE WATER/SALINE, 10 ML: HCPCS | Performed by: SURGERY

## 2023-08-21 PROCEDURE — 36600 WITHDRAWAL OF ARTERIAL BLOOD: CPT

## 2023-08-21 PROCEDURE — 2000000000 HC ICU R&B

## 2023-08-21 PROCEDURE — 80053 COMPREHEN METABOLIC PANEL: CPT

## 2023-08-21 PROCEDURE — 2W05X6Z CHANGE PRESSURE DRESSING ON BACK: ICD-10-PCS | Performed by: STUDENT IN AN ORGANIZED HEALTH CARE EDUCATION/TRAINING PROGRAM

## 2023-08-21 PROCEDURE — 99232 SBSQ HOSP IP/OBS MODERATE 35: CPT | Performed by: INTERNAL MEDICINE

## 2023-08-21 PROCEDURE — 85027 COMPLETE CBC AUTOMATED: CPT

## 2023-08-21 PROCEDURE — 2580000003 HC RX 258: Performed by: NURSE PRACTITIONER

## 2023-08-21 PROCEDURE — 6360000002 HC RX W HCPCS: Performed by: NURSE PRACTITIONER

## 2023-08-21 PROCEDURE — 6370000000 HC RX 637 (ALT 250 FOR IP): Performed by: NURSE PRACTITIONER

## 2023-08-21 PROCEDURE — C9113 INJ PANTOPRAZOLE SODIUM, VIA: HCPCS | Performed by: SURGERY

## 2023-08-21 PROCEDURE — 84145 PROCALCITONIN (PCT): CPT

## 2023-08-21 PROCEDURE — P9047 ALBUMIN (HUMAN), 25%, 50ML: HCPCS | Performed by: NURSE PRACTITIONER

## 2023-08-21 PROCEDURE — 6370000000 HC RX 637 (ALT 250 FOR IP): Performed by: SURGERY

## 2023-08-21 PROCEDURE — 2500000003 HC RX 250 WO HCPCS: Performed by: NURSE PRACTITIONER

## 2023-08-21 PROCEDURE — 83735 ASSAY OF MAGNESIUM: CPT

## 2023-08-21 PROCEDURE — 71045 X-RAY EXAM CHEST 1 VIEW: CPT

## 2023-08-21 PROCEDURE — 94003 VENT MGMT INPAT SUBQ DAY: CPT

## 2023-08-21 PROCEDURE — 84100 ASSAY OF PHOSPHORUS: CPT

## 2023-08-21 PROCEDURE — 80202 ASSAY OF VANCOMYCIN: CPT

## 2023-08-21 PROCEDURE — 94640 AIRWAY INHALATION TREATMENT: CPT

## 2023-08-21 PROCEDURE — 82803 BLOOD GASES ANY COMBINATION: CPT

## 2023-08-21 PROCEDURE — 99221 1ST HOSP IP/OBS SF/LOW 40: CPT | Performed by: INTERNAL MEDICINE

## 2023-08-21 RX ORDER — MORPHINE SULFATE 2 MG/ML
2 INJECTION, SOLUTION INTRAMUSCULAR; INTRAVENOUS ONCE
Status: COMPLETED | OUTPATIENT
Start: 2023-08-21 | End: 2023-08-21

## 2023-08-21 RX ORDER — DEXMEDETOMIDINE HYDROCHLORIDE 4 UG/ML
.1-1.5 INJECTION, SOLUTION INTRAVENOUS CONTINUOUS
Status: DISCONTINUED | OUTPATIENT
Start: 2023-08-21 | End: 2023-08-23

## 2023-08-21 RX ORDER — FUROSEMIDE 10 MG/ML
40 INJECTION INTRAMUSCULAR; INTRAVENOUS ONCE
Status: COMPLETED | OUTPATIENT
Start: 2023-08-21 | End: 2023-08-21

## 2023-08-21 RX ORDER — ALBUMIN (HUMAN) 12.5 G/50ML
25 SOLUTION INTRAVENOUS ONCE
Status: COMPLETED | OUTPATIENT
Start: 2023-08-21 | End: 2023-08-21

## 2023-08-21 RX ADMIN — MORPHINE SULFATE 2 MG: 2 INJECTION, SOLUTION INTRAMUSCULAR; INTRAVENOUS at 16:57

## 2023-08-21 RX ADMIN — Medication: at 16:54

## 2023-08-21 RX ADMIN — SODIUM CHLORIDE, PRESERVATIVE FREE 10 ML: 5 INJECTION INTRAVENOUS at 09:18

## 2023-08-21 RX ADMIN — BUPROPION HYDROCHLORIDE 100 MG: 100 TABLET, FILM COATED ORAL at 20:52

## 2023-08-21 RX ADMIN — Medication 10 ML: at 09:19

## 2023-08-21 RX ADMIN — Medication: at 00:21

## 2023-08-21 RX ADMIN — DIBASIC SODIUM PHOSPHATE, MONOBASIC POTASSIUM PHOSPHATE AND MONOBASIC SODIUM PHOSPHATE 2 TABLET: 852; 155; 130 TABLET ORAL at 11:19

## 2023-08-21 RX ADMIN — DEXMEDETOMIDINE HYDROCHLORIDE 0.2 MCG/KG/HR: 400 INJECTION, SOLUTION INTRAVENOUS at 09:46

## 2023-08-21 RX ADMIN — CHLORHEXIDINE GLUCONATE 15 ML: 1.2 RINSE ORAL at 20:52

## 2023-08-21 RX ADMIN — FUROSEMIDE 40 MG: 10 INJECTION, SOLUTION INTRAMUSCULAR; INTRAVENOUS at 09:45

## 2023-08-21 RX ADMIN — PIPERACILLIN AND TAZOBACTAM 3375 MG: 3; .375 INJECTION, POWDER, LYOPHILIZED, FOR SOLUTION INTRAVENOUS at 00:21

## 2023-08-21 RX ADMIN — DEXTROSE MONOHYDRATE: 50 INJECTION, SOLUTION INTRAVENOUS at 07:53

## 2023-08-21 RX ADMIN — ACETAMINOPHEN 650 MG: 325 TABLET ORAL at 16:57

## 2023-08-21 RX ADMIN — PIPERACILLIN AND TAZOBACTAM 3375 MG: 3; .375 INJECTION, POWDER, LYOPHILIZED, FOR SOLUTION INTRAVENOUS at 09:17

## 2023-08-21 RX ADMIN — FENTANYL CITRATE 50 MCG: 50 INJECTION INTRAMUSCULAR; INTRAVENOUS at 13:39

## 2023-08-21 RX ADMIN — PROPOFOL 20 MCG/KG/MIN: 10 INJECTION, EMULSION INTRAVENOUS at 01:30

## 2023-08-21 RX ADMIN — Medication 10 ML: at 20:52

## 2023-08-21 RX ADMIN — CHLORHEXIDINE GLUCONATE 15 ML: 1.2 RINSE ORAL at 09:19

## 2023-08-21 RX ADMIN — DEXTROSE MONOHYDRATE: 50 INJECTION, SOLUTION INTRAVENOUS at 01:52

## 2023-08-21 RX ADMIN — Medication: at 09:18

## 2023-08-21 RX ADMIN — ALBUTEROL SULFATE 2.5 MG: 2.5 SOLUTION RESPIRATORY (INHALATION) at 14:24

## 2023-08-21 RX ADMIN — BUPROPION HYDROCHLORIDE 100 MG: 100 TABLET, FILM COATED ORAL at 09:18

## 2023-08-21 RX ADMIN — METRONIDAZOLE 500 MG: 5 INJECTION, SOLUTION INTRAVENOUS at 06:04

## 2023-08-21 RX ADMIN — SODIUM CHLORIDE, PRESERVATIVE FREE 40 MG: 5 INJECTION INTRAVENOUS at 00:21

## 2023-08-21 RX ADMIN — SODIUM CHLORIDE 1000 MG: 9 INJECTION INTRAMUSCULAR; INTRAVENOUS; SUBCUTANEOUS at 14:07

## 2023-08-21 RX ADMIN — FENTANYL CITRATE 50 MCG: 50 INJECTION INTRAMUSCULAR; INTRAVENOUS at 18:19

## 2023-08-21 RX ADMIN — SODIUM CHLORIDE, PRESERVATIVE FREE 10 ML: 5 INJECTION INTRAVENOUS at 20:52

## 2023-08-21 RX ADMIN — SODIUM CHLORIDE, PRESERVATIVE FREE 40 MG: 5 INJECTION INTRAVENOUS at 11:18

## 2023-08-21 RX ADMIN — ALBUMIN (HUMAN) 25 G: 0.25 INJECTION, SOLUTION INTRAVENOUS at 17:36

## 2023-08-21 RX ADMIN — NOREPINEPHRINE BITARTRATE 9 MCG/MIN: 1 SOLUTION INTRAVENOUS at 03:53

## 2023-08-21 ASSESSMENT — PAIN SCALES - GENERAL
PAINLEVEL_OUTOF10: 0

## 2023-08-21 ASSESSMENT — PULMONARY FUNCTION TESTS
PIF_VALUE: 36
PIF_VALUE: 30
PIF_VALUE: 42
PIF_VALUE: 36
PIF_VALUE: 31
PIF_VALUE: 36
PIF_VALUE: 30
PIF_VALUE: 33

## 2023-08-21 NOTE — PROGRESS NOTES
CRITICAL CARE NOTE      Name: Macy Gaona   : 1960   MRN: [id number]   Date: 2023      REASON FOR ICU ADMISSION:  Acute hypoxic respiratory failure     PRINCIPAL ICU DIAGNOSIS   Acute hypoxic respiratory failure  Sacral wounds  RLE femoral vein DVT s/p IVC filter    BRIEF PATIENT SUMMARY   75 yo female PMHx of gastric bypass (), T2DM, CAD, hx PE, fulminant C- Diff colitis with subtotal colectomy/ileostomy (3/2023), s/p reversal 2023, recent admit at [facility] (23-23) for increased pain and vomiting with concern for free air on CT scan s/p ex- lap with no signs of perforation, EGD at this time demonstrated a anastomotic ulcer with stenosis at the [address] [facility]. Discharged to [address] [place] [address] where she presented from on  for weakness and hematemesis      ICU Course/Txfer on : for acute hypoxic respiratory failure  in setting of pulmonary edema intubation (-), re-intubated  due to worsening hypoxia. COMPREHENSIVE ASSESSMENT & PLAN:SYSTEM BASED     24 HOUR EVENTS:   Febrile overnight, increased WBC and pressor requirement, cultures ordered, broadened to meropenem  Weaned off propofol/fentanyl, now on precedex, utilize PRNs  CXR slightly improved edema pattern    NEUROLOGICAL:  Hx of Anxiety/Depression    Precedex, PRN fentanyl  Close neurological monitoring, delirium precautions    PULMONOLOGY:  Hx of ANCA   Acute hypoxic respiratory failure in setting of volume overload requiring intubation (-), required re-intubation (-current), Volume Overload  Lung protective ventilation with goal plateau pressure < 30, driving pressure < 15  HOB elevated  Diurese as renal function allows    CARDIOVASCULAR:   Hypotension: Septic +/- sedation related  TTE: EF 50 %, mild TR, mild elevated RVSP 51 mm Hg  Vasopressors to keep MAP 65 and above for adequate tissue perfusion.      GASTROINTESTINAL:  Hx of gastric bypass () with GJ anastomoic ulcer, prior hx of stricture

## 2023-08-21 NOTE — PROGRESS NOTES
Shift Summary:    Patient febrile early in shift, with tmax over night of 102. 1. Tylenol given without relief so ice packs applied and fever broke. NP notified and no new orders; blood cultures sent 08/19. Patient well sedated, sedation paused for neuro assessment. Will follow minimal commands and opens eyes when sedation is off.      Drips:    Fentanyl @ 50 mcg/hr  Levophed @ 7 mcg/min  Propofol @ 15 mcg/kg/min  D5 @ 100 mL/hr  KVO x2 @ 5 mL/hr

## 2023-08-21 NOTE — WOUND CARE
WOCN Note:     Follow up assessment of wounds and change VAC. Chart reviewed. Assessed in room 7120 with Dayna Aguilar. Admitted DX:  Hematemesis;GI bleed; Gastrointestinal hemorrhage; cdiff    Past Medical History: gastric bypass; asthma; cad; dm2  [facility]    Assessment:   Patient is intubated, sedated and requires assist with repositioning. Bed: [facility]  Patient has a engle and FMS. Patient repositioned on left side with pillow. Generalized edema lower legs and feet. Heels offloaded with pillows. Generalized weepy skin. 1. POA Sacrum and bilateral buttocks, Unstageable Pressure Injury: 7.4 x 7.8 x 1.4 cm; undermining 1.8 cm at 4 - 8 o' clock; 70% tan and brown; 30% red; scant serosang exudate; no odor. Cleansed with Vashe; resumed NPWT at 125 using 1 white foam to wound bed and 1 black bridged to left side. 2. Right heel, Unstageable Pressure Injury with deflated bullae:  1.5 x 1.5 x 0 cm. Applied Venelex. 3.  Left heel, Deep tissue pressure injury:  1 x 1 x 0 cm. Applied Venelex. Wound, Pressure Prevention & Skin Care Recommendations:    Minimize layers of linen/pads under patient to optimize support surface. 2.  Turn/reposition approximately every 2 hours and offload heels. 3.  Manage moisture/ Keep skin folds clean and dry/minimize brief usage. 4.  Specialty bed:[facility].  5.  Sacrum:  Continue NPWT at 125 mmHg with changes twice weekly. 6.  Heels:  Apply Venelex BID and protect with foam dressings. Discussed above plan with RN. Transition of Care:   Plan to follow Monday and Thursday for VAC changes.     RONALD BurroughsN [facility]  Available on [employer]  Office [phone]

## 2023-08-21 NOTE — PROGRESS NOTES
Comprehensive Nutrition Assessment    Type and Reason for Visit: Reassess, Consult    Nutrition Recommendations/Plan:     -Add Probiotic    -Modify tube feeding: Vial 1.5 @ 45 ml/hr with 2 packets Prosource daily and 100 ml water flush q 4 hr         Malnutrition Assessment:  Malnutrition Status:  Severe malnutrition (08/01/23 1000)    Context:  Acute Illness     Findings of the 6 clinical characteristics of malnutrition:  Energy Intake:  50% or less of estimated energy requirements for 5 or more days  Weight Loss:  Unable to assess     Body Fat Loss:  Mild body fat loss Buccal region, Orbital   Muscle Mass Loss: Moderate muscle mass loss Temples (temporalis), Clavicles (pectoralis & deltoids)  Fluid Accumulation:  Moderate to Severe Generalized, Extremities   Strength:  Not Performed       Nutrition Assessment:    Pt admitted from [facility] d/t GI Bleed. Recent extended hospitalization @ [facility]-readmitted after episode of fulminant colitis requiring subtotal colectomy with end ileostomy, s/p reversal and subsequent ileocolonic anastomosis; EGD 7/13/23 showed nl esophagus, small hiatal hernia, evidence of previous RYGB with a tight stricture and ulcer at the gastro-jejunal anastomosis-s/p dilatation; recurrent C Diff colitis. PMHx: Gastric bypass 2017, CAD, DM 2, Dyslipidemia, GERD, PE.    8/21: Consult received per mechanical ventilation orders. Pt intubated 8/19-worsening respiratory failure. Hypernatremia resolved with IVF. Remains volume overloaded; IVF discontinued and FWF reduced today to 100 ml q 4 hr. FMS placed 8/17; putting out at least 1 liter for the past 2 days. Not on a bowel regimen does have a recent history of C Diff. Consider adding a probiotic to restore colonic mallika. Phosphorus slightly BNL and was replaced. Weight up 2.1 kg in the past 3 days; remains severely edematous.     Tube feeding as ordered (with reduced flush) provides 1210 ml, 1850 calories, 96 gm protein and 1585 ml free water (tube 03/28/23 75.9 kg (167 lb 4.8 oz)   03/24/23 62.2 kg (137 lb 3.2 oz)   03/20/23 64.5 kg (142 lb 3.2 oz)           Nutrition Diagnosis:   Increased nutrient needs related to increase demand for energy/nutrients as evidenced by wounds  Inadequate oral intake related to impaired respiratory function as evidenced by intubation, nutrition support - enteral nutrition    Nutrition Interventions:   Food and/or Nutrient Delivery: Modify Tube Feeding  Nutrition Education/Counseling: No recommendation at this time  Coordination of Nutrition Care: Continue to monitor while inpatient       Goals:  Previous Goal Met: Goal(s) Achieved  Goals:  (EN to meet at least 85% estimated needs x 5-7 days.)      Nutrition Monitoring and Evaluation:   Behavioral-Environmental Outcomes: None Identified  Food/Nutrient Intake Outcomes: Enteral Nutrition Intake/Tolerance  Physical Signs/Symptoms Outcomes: Biochemical Data, Weight, Diarrhea, Fluid Status or Edema, Hemodynamic Status    Discharge Planning:     Too soon to determine     Real Jerome RD CNSC  Available via [employer]

## 2023-08-21 NOTE — CARE COORDINATION
Transition of Care Plan:     RUR: 24% high  Prior Level of Functioning: Max assistance with ADL's   Disposition: IPR vs SNF  DME needed: TBD  Transportation at discharge: BLS  IM/IMM Medicare 7/29/23  Is patient a  and connected with VA? No   Caregiver Contact:  Spouse Jurgen Weller: [phone]  Care Conference needed?  Palliative Care team following   Barriers to discharge:    Intubated   Levophed gtt  Propofol  Opal Spivey RN,Care Management

## 2023-08-21 NOTE — PROGRESS NOTES
Palliative Medicine      Code Status: Full Code    Advance Care Planning:    The patient has an AMD on file listing her  Emmanuelle Omalley as primary MPOA, secondary is son Sherman Alatorre. Patient / Family Encounter Documentation    Participants (names): Liana Bell     Narrative: The Palliative Medicine SW and Dr. Rakesh Espinal spoke with bedside RN, no family present. The Palliative Medicine SW called the patient's /MPOA [place]ier Cassette to introduce role, offer support, and also offer family meeting to further discuss the patient's care. SW inquired if he was getting updates from the ICU team, and he shared \"I guess\" and shared that his KEYLA (the patient's brother) is often at bedside and provides him with updates- [place] Cassette shares that he is still working, works from home as an  for a pipe and supply company. Bharat Cassette provided additional psychosocial history (see below). SW acknowledged all that his wife has been through. SW also inquired about what they talked about during the family meeting last week with the ICU team- (SW also spent time explaining our role). Mr. Caruso Ama shares that what he gathered from the meeting last week was that the patient was at high risk for needing the ventilator (since then has been re-iterated), he did not elaborate further on other discussions. Mr. Chiara Paul did however demonstrated insight into how sick the patient is- he told this writer, \"I would love to be wrong, but I don't see her coming home or coming off that ventilator. \"     SW acknowledged how sick the patient is and acknowledged that this appears to be something that Montpelier Gera has been thinking about- SW inquired if the patient had ever voiced her opinion or if they had ever had a conversation about what her wishes would be. Bharat Cassette shares no, they never really talked about it- SW could hear the weight of the conversation in Speedy's voice. . he shares that his stepmother did have a stroke and

## 2023-08-22 ENCOUNTER — APPOINTMENT (OUTPATIENT)
Facility: HOSPITAL | Age: 63
DRG: 004 | End: 2023-08-22
Payer: MEDICARE

## 2023-08-22 LAB
ANION GAP SERPL CALC-SCNC: 8 MMOL/L (ref 5–15)
BUN SERPL-MCNC: 56 MG/DL (ref 6–20)
BUN/CREAT SERPL: 40 (ref 12–20)
CALCIUM SERPL-MCNC: 8.8 MG/DL (ref 8.5–10.1)
CHLORIDE SERPL-SCNC: 110 MMOL/L (ref 97–108)
CO2 SERPL-SCNC: 24 MMOL/L (ref 21–32)
CREAT SERPL-MCNC: 1.39 MG/DL (ref 0.55–1.02)
ERYTHROCYTE [DISTWIDTH] IN BLOOD BY AUTOMATED COUNT: 17.1 % (ref 11.5–14.5)
GLUCOSE BLD STRIP.AUTO-MCNC: 108 MG/DL (ref 65–117)
GLUCOSE BLD STRIP.AUTO-MCNC: 109 MG/DL (ref 65–117)
GLUCOSE BLD STRIP.AUTO-MCNC: 114 MG/DL (ref 65–117)
GLUCOSE BLD STRIP.AUTO-MCNC: 127 MG/DL (ref 65–117)
GLUCOSE SERPL-MCNC: 104 MG/DL (ref 65–100)
HCT VFR BLD AUTO: 23.4 % (ref 35–47)
HGB BLD-MCNC: 7.2 G/DL (ref 11.5–16)
MAGNESIUM SERPL-MCNC: 2 MG/DL (ref 1.6–2.4)
MCH RBC QN AUTO: 28.2 PG (ref 26–34)
MCHC RBC AUTO-ENTMCNC: 30.8 G/DL (ref 30–36.5)
MCV RBC AUTO: 91.8 FL (ref 80–99)
NRBC # BLD: 0 K/UL (ref 0–0.01)
NRBC BLD-RTO: 0 PER 100 WBC
PHOSPHATE SERPL-MCNC: 3 MG/DL (ref 2.6–4.7)
PLATELET # BLD AUTO: 386 K/UL (ref 150–400)
PMV BLD AUTO: 10.7 FL (ref 8.9–12.9)
POTASSIUM SERPL-SCNC: 4.2 MMOL/L (ref 3.5–5.1)
PROCALCITONIN SERPL-MCNC: 2.14 NG/ML
RBC # BLD AUTO: 2.55 M/UL (ref 3.8–5.2)
SERVICE CMNT-IMP: ABNORMAL
SERVICE CMNT-IMP: NORMAL
SODIUM SERPL-SCNC: 142 MMOL/L (ref 136–145)
WBC # BLD AUTO: 16.6 K/UL (ref 3.6–11)

## 2023-08-22 PROCEDURE — 80048 BASIC METABOLIC PNL TOTAL CA: CPT

## 2023-08-22 PROCEDURE — 85027 COMPLETE CBC AUTOMATED: CPT

## 2023-08-22 PROCEDURE — 2580000003 HC RX 258: Performed by: SURGERY

## 2023-08-22 PROCEDURE — 2500000003 HC RX 250 WO HCPCS: Performed by: NURSE PRACTITIONER

## 2023-08-22 PROCEDURE — 49406 IMAGE CATH FLUID PERI/RETRO: CPT

## 2023-08-22 PROCEDURE — A4216 STERILE WATER/SALINE, 10 ML: HCPCS | Performed by: SURGERY

## 2023-08-22 PROCEDURE — 2580000003 HC RX 258: Performed by: NURSE PRACTITIONER

## 2023-08-22 PROCEDURE — 87205 SMEAR GRAM STAIN: CPT

## 2023-08-22 PROCEDURE — 87106 FUNGI IDENTIFICATION YEAST: CPT

## 2023-08-22 PROCEDURE — C9113 INJ PANTOPRAZOLE SODIUM, VIA: HCPCS | Performed by: SURGERY

## 2023-08-22 PROCEDURE — 36415 COLL VENOUS BLD VENIPUNCTURE: CPT

## 2023-08-22 PROCEDURE — 94003 VENT MGMT INPAT SUBQ DAY: CPT

## 2023-08-22 PROCEDURE — 6360000002 HC RX W HCPCS: Performed by: SURGERY

## 2023-08-22 PROCEDURE — 84100 ASSAY OF PHOSPHORUS: CPT

## 2023-08-22 PROCEDURE — 2000000000 HC ICU R&B

## 2023-08-22 PROCEDURE — 84145 PROCALCITONIN (PCT): CPT

## 2023-08-22 PROCEDURE — 6360000004 HC RX CONTRAST MEDICATION: Performed by: RADIOLOGY

## 2023-08-22 PROCEDURE — 71260 CT THORAX DX C+: CPT

## 2023-08-22 PROCEDURE — 0W9G30Z DRAINAGE OF PERITONEAL CAVITY WITH DRAINAGE DEVICE, PERCUTANEOUS APPROACH: ICD-10-PCS | Performed by: PHYSICIAN ASSISTANT

## 2023-08-22 PROCEDURE — 6360000002 HC RX W HCPCS: Performed by: NURSE PRACTITIONER

## 2023-08-22 PROCEDURE — 87015 SPECIMEN INFECT AGNT CONCNTJ: CPT

## 2023-08-22 PROCEDURE — 74177 CT ABD & PELVIS W/CONTRAST: CPT

## 2023-08-22 PROCEDURE — 6370000000 HC RX 637 (ALT 250 FOR IP): Performed by: SURGERY

## 2023-08-22 PROCEDURE — 83735 ASSAY OF MAGNESIUM: CPT

## 2023-08-22 PROCEDURE — 6370000000 HC RX 637 (ALT 250 FOR IP): Performed by: NURSE PRACTITIONER

## 2023-08-22 PROCEDURE — 87070 CULTURE OTHR SPECIMN AEROBIC: CPT

## 2023-08-22 PROCEDURE — 82962 GLUCOSE BLOOD TEST: CPT

## 2023-08-22 RX ORDER — MORPHINE SULFATE 2 MG/ML
2 INJECTION, SOLUTION INTRAMUSCULAR; INTRAVENOUS ONCE
Status: COMPLETED | OUTPATIENT
Start: 2023-08-22 | End: 2023-08-22

## 2023-08-22 RX ORDER — LANSOPRAZOLE 30 MG/1
30 TABLET, ORALLY DISINTEGRATING, DELAYED RELEASE ORAL 2 TIMES DAILY
Status: DISCONTINUED | OUTPATIENT
Start: 2023-08-22 | End: 2023-08-22

## 2023-08-22 RX ORDER — MIDODRINE HYDROCHLORIDE 5 MG/1
5 TABLET ORAL EVERY 8 HOURS
Status: DISCONTINUED | OUTPATIENT
Start: 2023-08-22 | End: 2023-08-22

## 2023-08-22 RX ORDER — ALUMINUM ZIRCONIUM OCTACHLOROHYDREX GLY 16 G/100G
1 GEL TOPICAL DAILY
Status: DISCONTINUED | OUTPATIENT
Start: 2023-08-22 | End: 2023-08-22

## 2023-08-22 RX ADMIN — MEROPENEM 1000 MG: 1 INJECTION, POWDER, FOR SOLUTION INTRAVENOUS at 01:05

## 2023-08-22 RX ADMIN — Medication: at 15:55

## 2023-08-22 RX ADMIN — IOPAMIDOL 100 ML: 755 INJECTION, SOLUTION INTRAVENOUS at 10:33

## 2023-08-22 RX ADMIN — MIDODRINE HYDROCHLORIDE 5 MG: 5 TABLET ORAL at 11:11

## 2023-08-22 RX ADMIN — DEXMEDETOMIDINE HYDROCHLORIDE 0.4 MCG/KG/HR: 400 INJECTION, SOLUTION INTRAVENOUS at 02:10

## 2023-08-22 RX ADMIN — Medication 10 ML: at 08:38

## 2023-08-22 RX ADMIN — SODIUM CHLORIDE, PRESERVATIVE FREE 10 ML: 5 INJECTION INTRAVENOUS at 08:37

## 2023-08-22 RX ADMIN — NOREPINEPHRINE BITARTRATE 2 MCG/MIN: 1 SOLUTION INTRAVENOUS at 10:43

## 2023-08-22 RX ADMIN — MEROPENEM 1000 MG: 1 INJECTION, POWDER, FOR SOLUTION INTRAVENOUS at 12:30

## 2023-08-22 RX ADMIN — Medication 1 CAPSULE: at 11:11

## 2023-08-22 RX ADMIN — Medication: at 00:40

## 2023-08-22 RX ADMIN — Medication: at 08:38

## 2023-08-22 RX ADMIN — SODIUM CHLORIDE, PRESERVATIVE FREE 10 ML: 5 INJECTION INTRAVENOUS at 20:34

## 2023-08-22 RX ADMIN — SODIUM CHLORIDE, PRESERVATIVE FREE 40 MG: 5 INJECTION INTRAVENOUS at 01:06

## 2023-08-22 RX ADMIN — CHLORHEXIDINE GLUCONATE 15 ML: 1.2 RINSE ORAL at 20:35

## 2023-08-22 RX ADMIN — MORPHINE SULFATE 2 MG: 2 INJECTION, SOLUTION INTRAMUSCULAR; INTRAVENOUS at 11:11

## 2023-08-22 RX ADMIN — SODIUM CHLORIDE, PRESERVATIVE FREE 40 MG: 5 INJECTION INTRAVENOUS at 11:13

## 2023-08-22 RX ADMIN — Medication 10 ML: at 20:34

## 2023-08-22 RX ADMIN — CHLORHEXIDINE GLUCONATE 15 ML: 1.2 RINSE ORAL at 08:38

## 2023-08-22 RX ADMIN — BUPROPION HYDROCHLORIDE 100 MG: 100 TABLET, FILM COATED ORAL at 08:37

## 2023-08-22 RX ADMIN — MIDODRINE HYDROCHLORIDE 5 MG: 5 TABLET ORAL at 04:23

## 2023-08-22 ASSESSMENT — PAIN SCALES - GENERAL
PAINLEVEL_OUTOF10: 0

## 2023-08-22 ASSESSMENT — PULMONARY FUNCTION TESTS
PIF_VALUE: 35
PIF_VALUE: 36
PIF_VALUE: 35

## 2023-08-22 NOTE — PROGRESS NOTES
CRITICAL CARE NOTE      Name: Eula Ferreira   : 1960   MRN: [id number]   Date: 2023      REASON FOR ICU ADMISSION:  Acute hypoxic respiratory failure     PRINCIPAL ICU DIAGNOSIS   Acute hypoxic respiratory failure  Bilateral pneumonia  Recurrent intra abdominal fluid collection  Sacral wound s/p wound debridement   Severe protein calorie malnutrition  Generalized anasarca  RLE femoral vein DVT s/p IVC filter    BRIEF PATIENT SUMMARY   77 yo female PMHx of gastric bypass (), T2DM, CAD, hx PE, fulminant C- Diff colitis with subtotal colectomy/ileostomy (3/2023), s/p reversal 2023, recent admit at [facility] (23-23 for recurrent C- Diff colitis s/p ex- lap with no signs of perforation, EGD revealed GJ stricture s/p 2 mm dilation,started on anticoagulation for DVT and discharged to [address] [place] [address]. This admission: Presented to [facility]  with hematemesis and diffuse abdominal pain. CT noted loculated intraperitoneal fluid in the anterior abdomen, with persistent free intraperitoneal air, per GI report the free air and free fluid were also noted on prior exam. S/p US guided drainage , fluid culture grew E-Coli, completed course of Zosyn,  fluid culture grew E-Coli, completed course fo Zosyn, drain removed on 8/10. S/p G tube placement on () Transferred to ICU on  for acute hypoxic respiratory failure in setting of pulmonary edema requiring intubation (-), CT A/P () Resolution of peritoneal fluid, resolution of gastric distention required reintubation on (-current) for worsening hypoxia.        COMPREHENSIVE ASSESSMENT & PLAN:SYSTEM BASED     24 HOUR EVENTS:   Increased WBCs and intermittent fevers, CT A/P demonstrated recurrent intra-abdominal fluid collection with air, IR to place drain today at bedside, continue meropenem  CT chest with bilateral PNA and effusions    NEUROLOGICAL:  Hx of Anxiety/Depression    CT head : no acute process  Precedex, PRN

## 2023-08-22 NOTE — PROGRESS NOTES
Shift Summary:    Patient febrile at start of shift, Tylenol unable to be given so ice packs applied and fever broke. Able to wean levophed throughout shift and midodrine started to support blood pressure. Patient occasionally wakes and opens eyes to voice and moves L upper extremity although no command following while on Precedex.  PICC dressing and lines changed, CHG bath completed, and all wounds protected with foam.    Drips:    Precedex @  0.2 mcg/kg/hr  KVO x2 @ 5 mL/hr

## 2023-08-22 NOTE — PROGRESS NOTES
[id number] CT chest, abdomen, and pelvis. Pt transported with RN, RT, and HCT on monitor and portable vent    1510 12 F ultrasound guided peritoneal catheter placed by IR at bedside. Output appears to be fecal matter. MD and NP aware, Gen surgeryRosalie notified. Per IR, needs to be flushed q8 hrs with 20 cc     1520 Dr. Claudia Hunt at bedside assessing new drain and patient. Verbal orders to stop tube feeds, keep patient NPO at this time and hook G tube to gravity.

## 2023-08-22 NOTE — PROGRESS NOTES
Bedside ultrasound-guided drainage of intra-abdominal collection consistent with enteric contents. She is now off pressors, afebrile, and nontachycardic. She has anasarca, severe protein calorie malnutrition. Discussed these findings with critical care attending, patient's ; concerns included additional bowel injury attempting to locate perforation, and low likelihood of durable repair or resection unless ileostomy were recreated. Recommended short course of attempt at source control through newly placed drain with careful follow-up of physiologic status to improve heart rate, tachycardia, and pressor requirement/urine output. Would reimage early tomorrow morning to assess adequacy of drainage. Patient's  agrees with this plan. Tube feeds discontinued, gastrostomy to gravity. Continue mechanical ventilation, restart TPN, continue antibiotics.

## 2023-08-23 ENCOUNTER — APPOINTMENT (OUTPATIENT)
Facility: HOSPITAL | Age: 63
DRG: 004 | End: 2023-08-23
Payer: MEDICARE

## 2023-08-23 LAB
ABO + RH BLD: NORMAL
ALBUMIN SERPL-MCNC: 2 G/DL (ref 3.5–5)
ALBUMIN SERPL-MCNC: 2.1 G/DL (ref 3.5–5)
ALBUMIN/GLOB SERPL: 0.7 (ref 1.1–2.2)
ALBUMIN/GLOB SERPL: 0.7 (ref 1.1–2.2)
ALP SERPL-CCNC: 157 U/L (ref 45–117)
ALP SERPL-CCNC: 173 U/L (ref 45–117)
ALT SERPL-CCNC: 14 U/L (ref 12–78)
ALT SERPL-CCNC: 18 U/L (ref 12–78)
ANION GAP SERPL CALC-SCNC: 7 MMOL/L (ref 5–15)
ANION GAP SERPL CALC-SCNC: 8 MMOL/L (ref 5–15)
AST SERPL-CCNC: 30 U/L (ref 15–37)
AST SERPL-CCNC: 40 U/L (ref 15–37)
BASOPHILS # BLD: 0 K/UL (ref 0–0.1)
BASOPHILS NFR BLD: 0 % (ref 0–1)
BILIRUB DIRECT SERPL-MCNC: 0.5 MG/DL (ref 0–0.2)
BILIRUB SERPL-MCNC: 0.8 MG/DL (ref 0.2–1)
BILIRUB SERPL-MCNC: 0.8 MG/DL (ref 0.2–1)
BLOOD GROUP ANTIBODIES SERPL: NORMAL
BUN SERPL-MCNC: 57 MG/DL (ref 6–20)
BUN SERPL-MCNC: 59 MG/DL (ref 6–20)
BUN/CREAT SERPL: 43 (ref 12–20)
BUN/CREAT SERPL: 45 (ref 12–20)
CALCIUM SERPL-MCNC: 9 MG/DL (ref 8.5–10.1)
CALCIUM SERPL-MCNC: 9.3 MG/DL (ref 8.5–10.1)
CHLORIDE SERPL-SCNC: 112 MMOL/L (ref 97–108)
CHLORIDE SERPL-SCNC: 113 MMOL/L (ref 97–108)
CO2 SERPL-SCNC: 24 MMOL/L (ref 21–32)
CO2 SERPL-SCNC: 24 MMOL/L (ref 21–32)
CREAT SERPL-MCNC: 1.3 MG/DL (ref 0.55–1.02)
CREAT SERPL-MCNC: 1.34 MG/DL (ref 0.55–1.02)
DIFFERENTIAL METHOD BLD: ABNORMAL
EOSINOPHIL # BLD: 0.9 K/UL (ref 0–0.4)
EOSINOPHIL NFR BLD: 5 % (ref 0–7)
ERYTHROCYTE [DISTWIDTH] IN BLOOD BY AUTOMATED COUNT: 17.3 % (ref 11.5–14.5)
ERYTHROCYTE [DISTWIDTH] IN BLOOD BY AUTOMATED COUNT: 17.5 % (ref 11.5–14.5)
GLOBULIN SER CALC-MCNC: 3 G/DL (ref 2–4)
GLOBULIN SER CALC-MCNC: 3.2 G/DL (ref 2–4)
GLUCOSE BLD STRIP.AUTO-MCNC: 145 MG/DL (ref 65–117)
GLUCOSE BLD STRIP.AUTO-MCNC: 94 MG/DL (ref 65–117)
GLUCOSE BLD STRIP.AUTO-MCNC: 94 MG/DL (ref 65–117)
GLUCOSE BLD STRIP.AUTO-MCNC: 96 MG/DL (ref 65–117)
GLUCOSE BLD STRIP.AUTO-MCNC: 98 MG/DL (ref 65–117)
GLUCOSE SERPL-MCNC: 85 MG/DL (ref 65–100)
GLUCOSE SERPL-MCNC: 92 MG/DL (ref 65–100)
HCT VFR BLD AUTO: 21.4 % (ref 35–47)
HCT VFR BLD AUTO: 24.9 % (ref 35–47)
HGB BLD-MCNC: 6.8 G/DL (ref 11.5–16)
HGB BLD-MCNC: 7.9 G/DL (ref 11.5–16)
IMM GRANULOCYTES # BLD AUTO: 0 K/UL
IMM GRANULOCYTES NFR BLD AUTO: 0 %
LYMPHOCYTES # BLD: 1.3 K/UL (ref 0.8–3.5)
LYMPHOCYTES NFR BLD: 7 % (ref 12–49)
MAGNESIUM SERPL-MCNC: 2.1 MG/DL (ref 1.6–2.4)
MCH RBC QN AUTO: 28.9 PG (ref 26–34)
MCH RBC QN AUTO: 29.1 PG (ref 26–34)
MCHC RBC AUTO-ENTMCNC: 31.7 G/DL (ref 30–36.5)
MCHC RBC AUTO-ENTMCNC: 31.8 G/DL (ref 30–36.5)
MCV RBC AUTO: 91.2 FL (ref 80–99)
MCV RBC AUTO: 91.5 FL (ref 80–99)
METAMYELOCYTES NFR BLD MANUAL: 1 %
MONOCYTES # BLD: 1.7 K/UL (ref 0–1)
MONOCYTES NFR BLD: 9 % (ref 5–13)
NEUTS BAND NFR BLD MANUAL: 10 % (ref 0–6)
NEUTS SEG # BLD: 14.4 K/UL (ref 1.8–8)
NEUTS SEG NFR BLD: 68 % (ref 32–75)
NRBC # BLD: 0 K/UL (ref 0–0.01)
NRBC # BLD: 0 K/UL (ref 0–0.01)
NRBC BLD-RTO: 0 PER 100 WBC
NRBC BLD-RTO: 0 PER 100 WBC
PHOSPHATE SERPL-MCNC: 3.7 MG/DL (ref 2.6–4.7)
PLATELET # BLD AUTO: 419 K/UL (ref 150–400)
PLATELET # BLD AUTO: 445 K/UL (ref 150–400)
PLATELET COMMENT: ABNORMAL
PMV BLD AUTO: 10.5 FL (ref 8.9–12.9)
PMV BLD AUTO: 10.8 FL (ref 8.9–12.9)
POTASSIUM SERPL-SCNC: 3.8 MMOL/L (ref 3.5–5.1)
POTASSIUM SERPL-SCNC: 3.8 MMOL/L (ref 3.5–5.1)
PROCALCITONIN SERPL-MCNC: 1.59 NG/ML
PROT SERPL-MCNC: 5 G/DL (ref 6.4–8.2)
PROT SERPL-MCNC: 5.3 G/DL (ref 6.4–8.2)
RBC # BLD AUTO: 2.34 M/UL (ref 3.8–5.2)
RBC # BLD AUTO: 2.73 M/UL (ref 3.8–5.2)
RBC MORPH BLD: ABNORMAL
SERVICE CMNT-IMP: ABNORMAL
SERVICE CMNT-IMP: NORMAL
SODIUM SERPL-SCNC: 144 MMOL/L (ref 136–145)
SODIUM SERPL-SCNC: 144 MMOL/L (ref 136–145)
SPECIMEN EXP DATE BLD: NORMAL
VANCOMYCIN SERPL-MCNC: 14.2 UG/ML
WBC # BLD AUTO: 18.5 K/UL (ref 3.6–11)
WBC # BLD AUTO: 20.2 K/UL (ref 3.6–11)

## 2023-08-23 PROCEDURE — 86900 BLOOD TYPING SEROLOGIC ABO: CPT

## 2023-08-23 PROCEDURE — 2580000003 HC RX 258: Performed by: SURGERY

## 2023-08-23 PROCEDURE — 74176 CT ABD & PELVIS W/O CONTRAST: CPT

## 2023-08-23 PROCEDURE — 2500000003 HC RX 250 WO HCPCS: Performed by: NURSE PRACTITIONER

## 2023-08-23 PROCEDURE — 80076 HEPATIC FUNCTION PANEL: CPT

## 2023-08-23 PROCEDURE — 80202 ASSAY OF VANCOMYCIN: CPT

## 2023-08-23 PROCEDURE — 6370000000 HC RX 637 (ALT 250 FOR IP): Performed by: SURGERY

## 2023-08-23 PROCEDURE — 2700000000 HC OXYGEN THERAPY PER DAY

## 2023-08-23 PROCEDURE — 6360000002 HC RX W HCPCS: Performed by: NURSE PRACTITIONER

## 2023-08-23 PROCEDURE — 94003 VENT MGMT INPAT SUBQ DAY: CPT

## 2023-08-23 PROCEDURE — 85027 COMPLETE CBC AUTOMATED: CPT

## 2023-08-23 PROCEDURE — 84100 ASSAY OF PHOSPHORUS: CPT

## 2023-08-23 PROCEDURE — C9113 INJ PANTOPRAZOLE SODIUM, VIA: HCPCS | Performed by: NURSE PRACTITIONER

## 2023-08-23 PROCEDURE — 80053 COMPREHEN METABOLIC PANEL: CPT

## 2023-08-23 PROCEDURE — 36415 COLL VENOUS BLD VENIPUNCTURE: CPT

## 2023-08-23 PROCEDURE — 2580000003 HC RX 258: Performed by: NURSE PRACTITIONER

## 2023-08-23 PROCEDURE — A4216 STERILE WATER/SALINE, 10 ML: HCPCS | Performed by: NURSE PRACTITIONER

## 2023-08-23 PROCEDURE — 84145 PROCALCITONIN (PCT): CPT

## 2023-08-23 PROCEDURE — 83735 ASSAY OF MAGNESIUM: CPT

## 2023-08-23 PROCEDURE — 85025 COMPLETE CBC W/AUTO DIFF WBC: CPT

## 2023-08-23 PROCEDURE — 2000000000 HC ICU R&B

## 2023-08-23 PROCEDURE — 86901 BLOOD TYPING SEROLOGIC RH(D): CPT

## 2023-08-23 PROCEDURE — 86850 RBC ANTIBODY SCREEN: CPT

## 2023-08-23 PROCEDURE — 82962 GLUCOSE BLOOD TEST: CPT

## 2023-08-23 RX ADMIN — FENTANYL CITRATE 50 MCG: 50 INJECTION INTRAMUSCULAR; INTRAVENOUS at 14:12

## 2023-08-23 RX ADMIN — FENTANYL CITRATE 50 MCG: 50 INJECTION INTRAMUSCULAR; INTRAVENOUS at 18:13

## 2023-08-23 RX ADMIN — ASCORBIC ACID, VITAMIN A PALMITATE, CHOLECALCIFEROL, THIAMINE HYDROCHLORIDE, RIBOFLAVIN-5 PHOSPHATE SODIUM, PYRIDOXINE HYDROCHLORIDE, NIACINAMIDE, DEXPANTHENOL, ALPHA-TOCOPHEROL ACETATE, VITAMIN K1, FOLIC ACID, BIOTIN, CYANOCOBALAMIN: 200; 3300; 200; 6; 3.6; 6; 40; 15; 10; 150; 600; 60; 5 INJECTION, SOLUTION INTRAVENOUS at 17:02

## 2023-08-23 RX ADMIN — Medication: at 16:16

## 2023-08-23 RX ADMIN — SODIUM CHLORIDE, PRESERVATIVE FREE 10 ML: 5 INJECTION INTRAVENOUS at 08:28

## 2023-08-23 RX ADMIN — SODIUM CHLORIDE, PRESERVATIVE FREE 40 MG: 5 INJECTION INTRAVENOUS at 08:28

## 2023-08-23 RX ADMIN — VANCOMYCIN HYDROCHLORIDE 1250 MG: 1.25 INJECTION, POWDER, LYOPHILIZED, FOR SOLUTION INTRAVENOUS at 12:32

## 2023-08-23 RX ADMIN — SODIUM CHLORIDE, PRESERVATIVE FREE 10 ML: 5 INJECTION INTRAVENOUS at 20:38

## 2023-08-23 RX ADMIN — FENTANYL CITRATE 50 MCG: 50 INJECTION INTRAMUSCULAR; INTRAVENOUS at 23:34

## 2023-08-23 RX ADMIN — FENTANYL CITRATE 50 MCG: 50 INJECTION INTRAMUSCULAR; INTRAVENOUS at 16:16

## 2023-08-23 RX ADMIN — FENTANYL CITRATE 50 MCG: 50 INJECTION INTRAMUSCULAR; INTRAVENOUS at 11:58

## 2023-08-23 RX ADMIN — FENTANYL CITRATE 50 MCG: 50 INJECTION INTRAMUSCULAR; INTRAVENOUS at 20:24

## 2023-08-23 RX ADMIN — CHLORHEXIDINE GLUCONATE 15 ML: 1.2 RINSE ORAL at 08:28

## 2023-08-23 RX ADMIN — FENTANYL CITRATE 50 MCG: 50 INJECTION INTRAMUSCULAR; INTRAVENOUS at 06:02

## 2023-08-23 RX ADMIN — Medication 10 ML: at 08:28

## 2023-08-23 RX ADMIN — MEROPENEM 1000 MG: 1 INJECTION, POWDER, FOR SOLUTION INTRAVENOUS at 12:33

## 2023-08-23 RX ADMIN — MEROPENEM 1000 MG: 1 INJECTION, POWDER, FOR SOLUTION INTRAVENOUS at 00:39

## 2023-08-23 RX ADMIN — Medication: at 00:40

## 2023-08-23 RX ADMIN — I.V. FAT EMULSION 250 ML: 20 EMULSION INTRAVENOUS at 17:03

## 2023-08-23 RX ADMIN — CHLORHEXIDINE GLUCONATE 15 ML: 1.2 RINSE ORAL at 20:39

## 2023-08-23 RX ADMIN — Medication: at 08:29

## 2023-08-23 ASSESSMENT — PAIN SCALES - GENERAL
PAINLEVEL_OUTOF10: 0
PAINLEVEL_OUTOF10: 2
PAINLEVEL_OUTOF10: 2
PAINLEVEL_OUTOF10: 6
PAINLEVEL_OUTOF10: 6
PAINLEVEL_OUTOF10: 0
PAINLEVEL_OUTOF10: 0

## 2023-08-23 ASSESSMENT — PULMONARY FUNCTION TESTS
PIF_VALUE: 34
PIF_VALUE: 34
PIF_VALUE: 35
PIF_VALUE: 35

## 2023-08-23 ASSESSMENT — PAIN DESCRIPTION - LOCATION
LOCATION: GENERALIZED
LOCATION: GENERALIZED
LOCATION: OTHER (COMMENT)

## 2023-08-23 NOTE — CARE COORDINATION
Transition of Care Plan:     RUR: 24% high  Prior Level of Functioning: Max assistance with ADL's   Disposition: IPR vs SNF  DME needed: TBD  Transportation at discharge: BLS  IM/IMM Medicare 7/29/23  Is patient a  and connected with VA? No   Caregiver Contact:  Spouse Luis Argueta: [phone]  Care Conference needed? Palliative Care team following   Barriers to discharge:    Intubated   Levophed gtt  Propofol    CM reviewed case with treatment team during ICU Interdisciplinary Rounds. Patient continues with elevated WBCs. Plan is to start TPN today.

## 2023-08-23 NOTE — PROGRESS NOTES
Comprehensive Nutrition Assessment    Type and Reason for Visit: Reassess    Nutrition Recommendations/Plan:     Resume previous TPN @ 65 mL/hr with 94 gm AA, 234 gm dex  Start 250 mL 20% lipids 3x/week         Malnutrition Assessment:  Malnutrition Status:  Severe malnutrition (08/01/23 1000)    Context:  Acute Illness     Findings of the 6 clinical characteristics of malnutrition:  Energy Intake:  50% or less of estimated energy requirements for 5 or more days  Weight Loss:  Unable to assess     Body Fat Loss:  Mild body fat loss Buccal region, Orbital   Muscle Mass Loss: Moderate muscle mass loss Temples (temporalis), Clavicles (pectoralis & deltoids)  Fluid Accumulation:  Moderate to Severe Generalized, Extremities   Strength:  Not Performed       Nutrition Assessment:    Pt admitted from [facility] d/t GI Bleed. Recent extended hospitalization @ [facility]-readmitted after episode of fulminant colitis requiring subtotal colectomy with end ileostomy, s/p reversal and subsequent ileocolonic anastomosis; EGD 7/13/23 showed nl esophagus, small hiatal hernia, evidence of previous RYGB with a tight stricture and ulcer at the gastro-jejunal anastomosis-s/p dilatation; recurrent C Diff colitis. PMHx: Gastric bypass 2017, CAD, DM 2, Dyslipidemia, GERD, PE.      8/23: received request from ICU pharmacist regarding TPN recommendations. Tube feeds d/c'd yesterday (Bedside ultrasound-guided drainage of intra-abdominal collection consistent with enteric contents). G-tube placed to gravity. Pt off pressors, off propofol. TPN @ 65 mL/hr as before with 94 gm AA, 234 gm dex provides 1172 kcal, with 250 mL 20% lipids 3x/week would provide on average 1386 kcal to meet EEN while intubated. 8/21: Consult received per mechanical ventilation orders. Pt intubated 8/19-worsening respiratory failure. Hypernatremia resolved with IVF. Remains volume overloaded; IVF discontinued and FWF reduced today to 100 ml q 4 hr.      FMS placed 8/17;

## 2023-08-23 NOTE — PROGRESS NOTES
injection, 1 mg, IntraCATHeter, PRN  balsum peru-castor oil (VENELEX) ointment, , Topical, q8h  albumin human 5% IV solution 12.5 g, 12.5 g, IntraVENous, Q6H PRN  sodium chloride flush 0.9 % injection 5-40 mL, 5-40 mL, IntraVENous, 2 times per day  sodium chloride flush 0.9 % injection 5-40 mL, 5-40 mL, IntraVENous, PRN  ondansetron (ZOFRAN-ODT) disintegrating tablet 4 mg, 4 mg, Oral, Q8H PRN **OR** ondansetron (ZOFRAN) injection 4 mg, 4 mg, IntraVENous, Q6H PRN  polyethylene glycol (GLYCOLAX) packet 17 g, 17 g, Oral, Daily PRN  acetaminophen (TYLENOL) tablet 650 mg, 650 mg, Oral, Q6H PRN **OR** acetaminophen (TYLENOL) suppository 650 mg, 650 mg, Rectal, Q6H PRN  glucose chewable tablet 16 g, 4 tablet, Oral, PRN  dextrose bolus 10% 125 mL, 125 mL, IntraVENous, PRN **OR** dextrose bolus 10% 250 mL, 250 mL, IntraVENous, PRN  glucagon injection 1 mg, 1 mg, SubCUTAneous, PRN  dextrose 10 % infusion, , IntraVENous, Continuous PRN    ASSESSMENT AND PLAN    61 y.o. female with history of remote gastric bypass, ileostomy reversal following abdominal colectomy several months ago, with sacral decubitus wound, marginal ulcer, severe protein calorie malnutrition/anasarca, now with abdominal collection drained yesterday, consistent with leak at ileocolic anastomosis. She remains off pressors, afebrile. Repeat CT scan earlier this morning reveals complete control of collection with drain. Findings reviewed with critical care team, patient's , with discussion of risks/benefits of continuing current treatment plan versus reexploration. We have decided to continue nonoperative measures as her clinical condition is not worsening, well-positioned drain is providing source control. Continue TPN. Continue gastrostomy to gravity drainage, continue rectal tube for now. Continue wound VAC to sacral wound. Intravenous antibiotics. Continue drain; will need interval CT scan in 40 hours.   Continue efforts at weaning from mechanical ventilation.

## 2023-08-23 NOTE — PROGRESS NOTES
CRITICAL CARE NOTE      Name: Taryn Francis   : 1960   MRN: [id number]   Date: 2023      REASON FOR ICU ADMISSION:  Acute hypoxic respiratory failure     PRINCIPAL ICU DIAGNOSIS   Acute hypoxic respiratory failure  Bilateral pneumonia  Recurrent intra abdominal fluid collection  Sacral wound s/p wound debridement   Severe protein calorie malnutrition  Generalized anasarca  RLE femoral vein DVT s/p IVC filter    BRIEF PATIENT SUMMARY   75 yo female PMHx of gastric bypass (), T2DM, CAD, hx PE, fulminant C- Diff colitis with subtotal colectomy/ileostomy (3/2023), s/p reversal 2023, recent admit at [facility] (23-23 for recurrent C- Diff colitis s/p ex- lap with no signs of perforation, EGD revealed GJ stricture s/p 2 mm dilation,started on anticoagulation for DVT and discharged to [address] [place] [address]. This admission: Presented to [address] [place] [address],[address]  with hematemesis and diffuse abdominal pain. CT noted loculated intraperitoneal fluid in the anterior abdomen, with persistent free intraperitoneal air, per GI report the free air and free fluid were also noted on prior exam. S/p US guided drainage , fluid culture grew E-Coli, completed course of Zosyn,  fluid culture grew E-Coli, completed course fo Zosyn, drain removed on 8/10.  S/p G tube placement on () Transferred to ICU on  for acute hypoxic respiratory failure in setting of pulmonary edema requiring intubation (-), CT A/P () Resolution of peritoneal fluid, resolution of gastric distention required reintubation on (-current) for worsening hypoxia.     (- current) Increased WBC, + Fevers, CT A/P demonstrated recurrent intra-abdominal fluid collection s/p US drainage, collection consistent with enteric content    COMPREHENSIVE ASSESSMENT & PLAN:SYSTEM BASED     24 HOUR EVENTS:   Yesterday CT A/P w/ recurrent intra-abdominal fluid collection s/p US drainage, collection consistent with enteric contents Repeat imaging

## 2023-08-23 NOTE — PROGRESS NOTES
I participated in the IDT meeting where the plan of care for Riley Muro was discussed. I reviewed the patient's medical record prior to this meeting. We will continue to follow for spiritual/emotional care and family support. Please contact  paging service for any immediate needs. _____________________________  Leslie Schilling M.Div., M.S., B.C.C.   Staff

## 2023-08-23 NOTE — PROGRESS NOTES
Shift Summary:    Patient started to wake up throughout shift. Precedex stopped early this morning. Would open eyes spontaneously and track and follow nurse around room. Follows minimal commands such as squeezing hands, giving a thumbs up, and wiggling toes. No nodding when asked questions during shift. Drains with steady output overnight. LUQ drain to gravity changed from yellow milky to brown congealed output. Accordion drain with brown output overnight, flushed once during shift. Patient taken to CT this AM and findings communicated with Franchesca Lopez NP. Labs:    Hgb 6.8 -> Type and screen sent. Communicated with Franchesca Lopez NP. Per NP, patient asymptomatic of low hgb and will hold on giving blood at this time.

## 2023-08-24 ENCOUNTER — APPOINTMENT (OUTPATIENT)
Facility: HOSPITAL | Age: 63
DRG: 004 | End: 2023-08-24
Payer: MEDICARE

## 2023-08-24 LAB
ANION GAP SERPL CALC-SCNC: 7 MMOL/L (ref 5–15)
BACTERIA SPEC CULT: NORMAL
BACTERIA SPEC CULT: NORMAL
BASOPHILS # BLD: 0.2 K/UL (ref 0–0.1)
BASOPHILS NFR BLD: 1 % (ref 0–1)
BUN SERPL-MCNC: 62 MG/DL (ref 6–20)
BUN/CREAT SERPL: 45 (ref 12–20)
CALCIUM SERPL-MCNC: 9.4 MG/DL (ref 8.5–10.1)
CHLORIDE SERPL-SCNC: 112 MMOL/L (ref 97–108)
CO2 SERPL-SCNC: 23 MMOL/L (ref 21–32)
CREAT SERPL-MCNC: 1.38 MG/DL (ref 0.55–1.02)
DIFFERENTIAL METHOD BLD: ABNORMAL
EOSINOPHIL # BLD: 0.4 K/UL (ref 0–0.4)
EOSINOPHIL NFR BLD: 2 % (ref 0–7)
ERYTHROCYTE [DISTWIDTH] IN BLOOD BY AUTOMATED COUNT: 17.8 % (ref 11.5–14.5)
GLUCOSE BLD STRIP.AUTO-MCNC: 149 MG/DL (ref 65–117)
GLUCOSE BLD STRIP.AUTO-MCNC: 150 MG/DL (ref 65–117)
GLUCOSE BLD STRIP.AUTO-MCNC: 163 MG/DL (ref 65–117)
GLUCOSE SERPL-MCNC: 147 MG/DL (ref 65–100)
HCT VFR BLD AUTO: 25.6 % (ref 35–47)
HGB BLD-MCNC: 8.1 G/DL (ref 11.5–16)
IMM GRANULOCYTES # BLD AUTO: 0 K/UL
IMM GRANULOCYTES NFR BLD AUTO: 0 %
LYMPHOCYTES # BLD: 0.7 K/UL (ref 0.8–3.5)
LYMPHOCYTES NFR BLD: 3 % (ref 12–49)
MAGNESIUM SERPL-MCNC: 2 MG/DL (ref 1.6–2.4)
MCH RBC QN AUTO: 29.6 PG (ref 26–34)
MCHC RBC AUTO-ENTMCNC: 31.6 G/DL (ref 30–36.5)
MCV RBC AUTO: 93.4 FL (ref 80–99)
METAMYELOCYTES NFR BLD MANUAL: 3 %
MONOCYTES # BLD: 0.9 K/UL (ref 0–1)
MONOCYTES NFR BLD: 4 % (ref 5–13)
MYELOCYTES NFR BLD MANUAL: 1 %
NEUTS BAND NFR BLD MANUAL: 1 % (ref 0–6)
NEUTS SEG # BLD: 18.7 K/UL (ref 1.8–8)
NEUTS SEG NFR BLD: 85 % (ref 32–75)
NRBC # BLD: 0 K/UL (ref 0–0.01)
NRBC BLD-RTO: 0 PER 100 WBC
PHOSPHATE SERPL-MCNC: 4.5 MG/DL (ref 2.6–4.7)
PLATELET # BLD AUTO: 484 K/UL (ref 150–400)
PMV BLD AUTO: 10.8 FL (ref 8.9–12.9)
POTASSIUM SERPL-SCNC: 3.5 MMOL/L (ref 3.5–5.1)
RBC # BLD AUTO: 2.74 M/UL (ref 3.8–5.2)
RBC MORPH BLD: ABNORMAL
SERVICE CMNT-IMP: ABNORMAL
SERVICE CMNT-IMP: NORMAL
SERVICE CMNT-IMP: NORMAL
SODIUM SERPL-SCNC: 142 MMOL/L (ref 136–145)
WBC # BLD AUTO: 21.8 K/UL (ref 3.6–11)

## 2023-08-24 PROCEDURE — 84100 ASSAY OF PHOSPHORUS: CPT

## 2023-08-24 PROCEDURE — 99232 SBSQ HOSP IP/OBS MODERATE 35: CPT | Performed by: INTERNAL MEDICINE

## 2023-08-24 PROCEDURE — 6370000000 HC RX 637 (ALT 250 FOR IP): Performed by: SURGERY

## 2023-08-24 PROCEDURE — 6360000002 HC RX W HCPCS: Performed by: SURGERY

## 2023-08-24 PROCEDURE — 2700000000 HC OXYGEN THERAPY PER DAY

## 2023-08-24 PROCEDURE — 6360000002 HC RX W HCPCS: Performed by: STUDENT IN AN ORGANIZED HEALTH CARE EDUCATION/TRAINING PROGRAM

## 2023-08-24 PROCEDURE — 6360000002 HC RX W HCPCS: Performed by: NURSE PRACTITIONER

## 2023-08-24 PROCEDURE — 83735 ASSAY OF MAGNESIUM: CPT

## 2023-08-24 PROCEDURE — 97606 NEG PRS WND THER DME>50 SQCM: CPT

## 2023-08-24 PROCEDURE — 6370000000 HC RX 637 (ALT 250 FOR IP): Performed by: NURSE PRACTITIONER

## 2023-08-24 PROCEDURE — 85025 COMPLETE CBC W/AUTO DIFF WBC: CPT

## 2023-08-24 PROCEDURE — 2500000003 HC RX 250 WO HCPCS: Performed by: NURSE PRACTITIONER

## 2023-08-24 PROCEDURE — 80048 BASIC METABOLIC PNL TOTAL CA: CPT

## 2023-08-24 PROCEDURE — C9113 INJ PANTOPRAZOLE SODIUM, VIA: HCPCS | Performed by: NURSE PRACTITIONER

## 2023-08-24 PROCEDURE — 2580000003 HC RX 258: Performed by: SURGERY

## 2023-08-24 PROCEDURE — 82962 GLUCOSE BLOOD TEST: CPT

## 2023-08-24 PROCEDURE — 71045 X-RAY EXAM CHEST 1 VIEW: CPT

## 2023-08-24 PROCEDURE — 94003 VENT MGMT INPAT SUBQ DAY: CPT

## 2023-08-24 PROCEDURE — A4216 STERILE WATER/SALINE, 10 ML: HCPCS | Performed by: NURSE PRACTITIONER

## 2023-08-24 PROCEDURE — 94640 AIRWAY INHALATION TREATMENT: CPT

## 2023-08-24 PROCEDURE — 2000000000 HC ICU R&B

## 2023-08-24 PROCEDURE — 2580000003 HC RX 258: Performed by: NURSE PRACTITIONER

## 2023-08-24 PROCEDURE — 36415 COLL VENOUS BLD VENIPUNCTURE: CPT

## 2023-08-24 RX ORDER — HYDROMORPHONE HYDROCHLORIDE 1 MG/ML
0.5 INJECTION, SOLUTION INTRAMUSCULAR; INTRAVENOUS; SUBCUTANEOUS ONCE
Status: COMPLETED | OUTPATIENT
Start: 2023-08-24 | End: 2023-08-24

## 2023-08-24 RX ORDER — FENTANYL CITRATE 50 UG/ML
50 INJECTION, SOLUTION INTRAMUSCULAR; INTRAVENOUS
Status: DISCONTINUED | OUTPATIENT
Start: 2023-08-24 | End: 2023-08-24

## 2023-08-24 RX ORDER — FUROSEMIDE 10 MG/ML
20 INJECTION INTRAMUSCULAR; INTRAVENOUS ONCE
Status: COMPLETED | OUTPATIENT
Start: 2023-08-24 | End: 2023-08-24

## 2023-08-24 RX ORDER — FENTANYL CITRATE 50 UG/ML
25 INJECTION, SOLUTION INTRAMUSCULAR; INTRAVENOUS
Status: DISCONTINUED | OUTPATIENT
Start: 2023-08-24 | End: 2023-08-24

## 2023-08-24 RX ORDER — POTASSIUM CHLORIDE 29.8 MG/ML
20 INJECTION INTRAVENOUS ONCE
Status: COMPLETED | OUTPATIENT
Start: 2023-08-24 | End: 2023-08-24

## 2023-08-24 RX ORDER — ALBUTEROL SULFATE 2.5 MG/3ML
2.5 SOLUTION RESPIRATORY (INHALATION)
Status: DISCONTINUED | OUTPATIENT
Start: 2023-08-24 | End: 2023-08-30

## 2023-08-24 RX ORDER — IPRATROPIUM BROMIDE AND ALBUTEROL SULFATE 2.5; .5 MG/3ML; MG/3ML
1 SOLUTION RESPIRATORY (INHALATION)
Status: DISCONTINUED | OUTPATIENT
Start: 2023-08-24 | End: 2023-08-24 | Stop reason: ALTCHOICE

## 2023-08-24 RX ORDER — HYDROMORPHONE HYDROCHLORIDE 1 MG/ML
0.5 INJECTION, SOLUTION INTRAMUSCULAR; INTRAVENOUS; SUBCUTANEOUS EVERY 4 HOURS PRN
Status: DISCONTINUED | OUTPATIENT
Start: 2023-08-24 | End: 2023-08-25

## 2023-08-24 RX ADMIN — FUROSEMIDE 20 MG: 20 INJECTION, SOLUTION INTRAMUSCULAR; INTRAVENOUS at 15:43

## 2023-08-24 RX ADMIN — HYDROMORPHONE HYDROCHLORIDE 0.5 MG: 1 INJECTION, SOLUTION INTRAMUSCULAR; INTRAVENOUS; SUBCUTANEOUS at 16:50

## 2023-08-24 RX ADMIN — FENTANYL CITRATE 50 MCG: 50 INJECTION INTRAMUSCULAR; INTRAVENOUS at 13:21

## 2023-08-24 RX ADMIN — SODIUM CHLORIDE, PRESERVATIVE FREE 40 MG: 5 INJECTION INTRAVENOUS at 07:57

## 2023-08-24 RX ADMIN — SODIUM CHLORIDE 200 MG: 9 INJECTION, SOLUTION INTRAVENOUS at 10:08

## 2023-08-24 RX ADMIN — HYDROMORPHONE HYDROCHLORIDE 0.5 MG: 1 INJECTION, SOLUTION INTRAMUSCULAR; INTRAVENOUS; SUBCUTANEOUS at 21:04

## 2023-08-24 RX ADMIN — SODIUM CHLORIDE, PRESERVATIVE FREE 10 ML: 5 INJECTION INTRAVENOUS at 10:35

## 2023-08-24 RX ADMIN — MEROPENEM 1000 MG: 1 INJECTION, POWDER, FOR SOLUTION INTRAVENOUS at 00:30

## 2023-08-24 RX ADMIN — POTASSIUM CHLORIDE 20 MEQ: 29.8 INJECTION, SOLUTION INTRAVENOUS at 11:36

## 2023-08-24 RX ADMIN — Medication 10 ML: at 10:35

## 2023-08-24 RX ADMIN — MEROPENEM 1000 MG: 1 INJECTION, POWDER, FOR SOLUTION INTRAVENOUS at 12:47

## 2023-08-24 RX ADMIN — Medication: at 00:15

## 2023-08-24 RX ADMIN — FENTANYL CITRATE 50 MCG: 50 INJECTION INTRAMUSCULAR; INTRAVENOUS at 02:20

## 2023-08-24 RX ADMIN — FENTANYL CITRATE 50 MCG: 50 INJECTION INTRAMUSCULAR; INTRAVENOUS at 11:35

## 2023-08-24 RX ADMIN — ASCORBIC ACID, VITAMIN A PALMITATE, CHOLECALCIFEROL, THIAMINE HYDROCHLORIDE, RIBOFLAVIN-5 PHOSPHATE SODIUM, PYRIDOXINE HYDROCHLORIDE, NIACINAMIDE, DEXPANTHENOL, ALPHA-TOCOPHEROL ACETATE, VITAMIN K1, FOLIC ACID, BIOTIN, CYANOCOBALAMIN: 200; 3300; 200; 6; 3.6; 6; 40; 15; 10; 150; 600; 60; 5 INJECTION, SOLUTION INTRAVENOUS at 17:30

## 2023-08-24 RX ADMIN — Medication 10 ML: at 21:08

## 2023-08-24 RX ADMIN — CHLORHEXIDINE GLUCONATE 15 ML: 1.2 RINSE ORAL at 21:07

## 2023-08-24 RX ADMIN — CHLORHEXIDINE GLUCONATE 15 ML: 1.2 RINSE ORAL at 07:58

## 2023-08-24 RX ADMIN — SODIUM CHLORIDE, PRESERVATIVE FREE 10 ML: 5 INJECTION INTRAVENOUS at 21:07

## 2023-08-24 RX ADMIN — ALBUTEROL SULFATE 2.5 MG: 2.5 SOLUTION RESPIRATORY (INHALATION) at 11:56

## 2023-08-24 RX ADMIN — FENTANYL CITRATE 50 MCG: 50 INJECTION INTRAMUSCULAR; INTRAVENOUS at 08:32

## 2023-08-24 RX ADMIN — IPRATROPIUM BROMIDE AND ALBUTEROL SULFATE 1 DOSE: .5; 3 SOLUTION RESPIRATORY (INHALATION) at 16:04

## 2023-08-24 RX ADMIN — FENTANYL CITRATE 50 MCG: 50 INJECTION INTRAMUSCULAR; INTRAVENOUS at 06:24

## 2023-08-24 RX ADMIN — Medication: at 15:48

## 2023-08-24 RX ADMIN — ALBUTEROL SULFATE 2.5 MG: 2.5 SOLUTION RESPIRATORY (INHALATION) at 21:16

## 2023-08-24 RX ADMIN — FENTANYL CITRATE 50 MCG: 50 INJECTION INTRAMUSCULAR; INTRAVENOUS at 15:42

## 2023-08-24 RX ADMIN — Medication: at 07:59

## 2023-08-24 ASSESSMENT — PAIN SCALES - GENERAL
PAINLEVEL_OUTOF10: 0
PAINLEVEL_OUTOF10: 0
PAINLEVEL_OUTOF10: 2
PAINLEVEL_OUTOF10: 6
PAINLEVEL_OUTOF10: 0
PAINLEVEL_OUTOF10: 0

## 2023-08-24 ASSESSMENT — PULMONARY FUNCTION TESTS
PIF_VALUE: 24
PIF_VALUE: 29
PIF_VALUE: 33
PIF_VALUE: 28
PIF_VALUE: 33
PIF_VALUE: 32

## 2023-08-24 NOTE — CARE COORDINATION
Transition of Care Plan:    RUR: 22%, high  Prior Level of Functioning: Max assist with ADL's  Disposition: IPR vs SNF  Transportation at discharge: BLS  IM/[facility] Medicare 7/29/23  Is patient a [place] and connected with [facility]? No  Caregiver Contact: Spouse Wero Roll: [phone]  Discharge Caregiver contacted prior to discharge? Yes  Care Conference needed?    Barriers to discharge:    Intubated  TPN  Anitbiotics, Antifungals  G tube to gravity   Accordian drain   Failed SBT yesterday   True Sarah LOVETT,Care Management

## 2023-08-24 NOTE — PROGRESS NOTES
Palliative Medicine   [place] [phone] - [phone] (COPE)      The Palliative Medicine SW called and spoke with the patient's  Speedy/EMILY for psychosocial support, Katey Preciado shares that he is getting good updates from the ICU team.     Katey Preciado asked SW if the patient understands what is going on- SW shared that we always assume people can hear us, she is awake and alert for Russel shared that it is hard to determine if she understands the complexities of her condition, but encouraged the patient's  to keep talking to her, play music in the room, etc. Katey Preciado shares that he comes in the evenings, usually between 5:00-6:00 p.m., he shares tonight he and the patient's daughter Deena Olivares will be coming to visit. He shares that he has a good understanding of her condition, he told her yesterday that Ana Hoyts get better, get stronger so they can fix you. \"     SW offered space for him to ask questions, Katey Preciado seems to understand how sick the patient is. Katey Preciado has my contact information- supportive phone call provided, he is aware our team is available to meet and discuss goals of care.      Thank you for including Palliative Medicine in the care of Stella, [place]

## 2023-08-24 NOTE — WOUND CARE
WOCN Note:     Follow up for VAC dressing change. Chart reviewed. Seen in room 7120 with PCT. Admitted DX:  Hematemesis;GI bleed; Gastrointestinal hemorrhage; cdiff    Past Medical History: gastric bypass; asthma; cad; dm2  [facility]    Assessment:   Patient is intubated, awake and requires assist with repositioning. Bed: cecelia  Patient has a engle. Patient repositioned on left side with pillow. Generalized edema lower legs and feet. Heels offloaded with pillows. Generalized weepy skin. 1. POA Sacrum and bilateral buttocks, Unstageable Pressure Injury: 7.4 x 7.8 x 1.4 cm; undermining 1.8 cm at 4 - 8 o' clock; 70% tan and brown; 30% red; scant serosang exudate; no odor. Cleansed with Vashe; resumed NPWT at 125 using 1 white foam to wound bed and 1 black bridged to left side. Wound, Pressure Prevention & Skin Care Recommendations:    Minimize layers of linen/pads under patient to optimize support surface. 2.  Turn/reposition approximately every 2 hours and offload heels. 3.  Manage moisture/ Keep skin folds clean and dry/minimize brief usage. 4.  Specialty bed:[facility].  5.  Sacrum:  Continue NPWT at 125 mmHg with changes twice weekly. 6.  Heels:  Apply Venelex BID and protect with foam dressings. Discussed above plan with RN. Transition of Care:   Plan to follow Monday and Thursday for VAC changes.     RONALD MarshN RN [address] Inpatient Wound Care  Available on [employer]  Office [phone]

## 2023-08-25 ENCOUNTER — APPOINTMENT (OUTPATIENT)
Facility: HOSPITAL | Age: 63
DRG: 004 | End: 2023-08-25
Payer: MEDICARE

## 2023-08-25 LAB
ALBUMIN SERPL-MCNC: 1.9 G/DL (ref 3.5–5)
ALBUMIN/GLOB SERPL: 0.6 (ref 1.1–2.2)
ALP SERPL-CCNC: 205 U/L (ref 45–117)
ALT SERPL-CCNC: 65 U/L (ref 12–78)
ANION GAP SERPL CALC-SCNC: 9 MMOL/L (ref 5–15)
ARTERIAL PATENCY WRIST A: POSITIVE
AST SERPL-CCNC: 95 U/L (ref 15–37)
BACTERIA SPEC CULT: ABNORMAL
BASE EXCESS BLD CALC-SCNC: 0 MMOL/L
BASOPHILS # BLD: 0.2 K/UL (ref 0–0.1)
BASOPHILS NFR BLD: 1 % (ref 0–1)
BDY SITE: ABNORMAL
BILIRUB DIRECT SERPL-MCNC: 0.2 MG/DL (ref 0–0.2)
BILIRUB SERPL-MCNC: 0.4 MG/DL (ref 0.2–1)
BUN SERPL-MCNC: 65 MG/DL (ref 6–20)
BUN/CREAT SERPL: 60 (ref 12–20)
CA-I BLD-SCNC: 1.34 MMOL/L (ref 1.12–1.32)
CALCIUM SERPL-MCNC: 8.6 MG/DL (ref 8.5–10.1)
CHLORIDE SERPL-SCNC: 115 MMOL/L (ref 97–108)
CO2 SERPL-SCNC: 22 MMOL/L (ref 21–32)
CREAT SERPL-MCNC: 1.09 MG/DL (ref 0.55–1.02)
DIFFERENTIAL METHOD BLD: ABNORMAL
EOSINOPHIL # BLD: 0.7 K/UL (ref 0–0.4)
EOSINOPHIL NFR BLD: 3 % (ref 0–7)
ERYTHROCYTE [DISTWIDTH] IN BLOOD BY AUTOMATED COUNT: 17.8 % (ref 11.5–14.5)
GAS FLOW.O2 O2 DELIVERY SYS: ABNORMAL
GAS FLOW.O2 SETTING OXYMISER: 20 BPM
GLOBULIN SER CALC-MCNC: 3 G/DL (ref 2–4)
GLUCOSE BLD STRIP.AUTO-MCNC: 127 MG/DL (ref 65–117)
GLUCOSE BLD STRIP.AUTO-MCNC: 131 MG/DL (ref 65–117)
GLUCOSE BLD STRIP.AUTO-MCNC: 144 MG/DL (ref 65–117)
GLUCOSE BLD STRIP.AUTO-MCNC: 147 MG/DL (ref 65–117)
GLUCOSE SERPL-MCNC: 117 MG/DL (ref 65–100)
GRAM STN SPEC: ABNORMAL
HCO3 BLD-SCNC: 24.6 MMOL/L (ref 22–26)
HCT VFR BLD AUTO: 24 % (ref 35–47)
HGB BLD-MCNC: 7.4 G/DL (ref 11.5–16)
IMM GRANULOCYTES # BLD AUTO: 0 K/UL
IMM GRANULOCYTES NFR BLD AUTO: 0 %
INR PPP: 1.1 (ref 0.9–1.1)
INSPIRATION.DURATION SETTING TIME VENT: 0.55 SEC
IPAP/PIP/HIGH PEEP: 22
LYMPHOCYTES # BLD: 1.3 K/UL (ref 0.8–3.5)
LYMPHOCYTES NFR BLD: 6 % (ref 12–49)
MAGNESIUM SERPL-MCNC: 1.8 MG/DL (ref 1.6–2.4)
MCH RBC QN AUTO: 28.8 PG (ref 26–34)
MCHC RBC AUTO-ENTMCNC: 30.8 G/DL (ref 30–36.5)
MCV RBC AUTO: 93.4 FL (ref 80–99)
MONOCYTES # BLD: 1.6 K/UL (ref 0–1)
MONOCYTES NFR BLD: 7 % (ref 5–13)
NEUTS BAND NFR BLD MANUAL: 12 % (ref 0–6)
NEUTS SEG # BLD: 18.6 K/UL (ref 1.8–8)
NEUTS SEG NFR BLD: 71 % (ref 32–75)
NRBC # BLD: 0 K/UL (ref 0–0.01)
NRBC BLD-RTO: 0 PER 100 WBC
O2/TOTAL GAS SETTING VFR VENT: 35 %
PCO2 BLD: 38.9 MMHG (ref 35–45)
PEEP RESPIRATORY: 5 CMH2O
PH BLD: 7.41 (ref 7.35–7.45)
PHOSPHATE SERPL-MCNC: 4.9 MG/DL (ref 2.6–4.7)
PLATELET # BLD AUTO: 473 K/UL (ref 150–400)
PMV BLD AUTO: 10.5 FL (ref 8.9–12.9)
PO2 BLD: 89 MMHG (ref 80–100)
POTASSIUM SERPL-SCNC: 3.8 MMOL/L (ref 3.5–5.1)
PROT SERPL-MCNC: 4.9 G/DL (ref 6.4–8.2)
PROTHROMBIN TIME: 11.4 SEC (ref 9–11.1)
RBC # BLD AUTO: 2.57 M/UL (ref 3.8–5.2)
RBC MORPH BLD: ABNORMAL
SAO2 % BLD: 97 % (ref 92–97)
SERVICE CMNT-IMP: ABNORMAL
SODIUM SERPL-SCNC: 146 MMOL/L (ref 136–145)
SPECIMEN TYPE: ABNORMAL
VANCOMYCIN SERPL-MCNC: 18.4 UG/ML
VENTILATION MODE VENT: ABNORMAL
WBC # BLD AUTO: 22.4 K/UL (ref 3.6–11)

## 2023-08-25 PROCEDURE — 6360000002 HC RX W HCPCS: Performed by: NURSE PRACTITIONER

## 2023-08-25 PROCEDURE — 2580000003 HC RX 258: Performed by: SURGERY

## 2023-08-25 PROCEDURE — 83735 ASSAY OF MAGNESIUM: CPT

## 2023-08-25 PROCEDURE — 85025 COMPLETE CBC W/AUTO DIFF WBC: CPT

## 2023-08-25 PROCEDURE — 94003 VENT MGMT INPAT SUBQ DAY: CPT

## 2023-08-25 PROCEDURE — C9113 INJ PANTOPRAZOLE SODIUM, VIA: HCPCS | Performed by: NURSE PRACTITIONER

## 2023-08-25 PROCEDURE — 82962 GLUCOSE BLOOD TEST: CPT

## 2023-08-25 PROCEDURE — 2000000000 HC ICU R&B

## 2023-08-25 PROCEDURE — 36592 COLLECT BLOOD FROM PICC: CPT

## 2023-08-25 PROCEDURE — 80202 ASSAY OF VANCOMYCIN: CPT

## 2023-08-25 PROCEDURE — 6370000000 HC RX 637 (ALT 250 FOR IP): Performed by: SURGERY

## 2023-08-25 PROCEDURE — 2500000003 HC RX 250 WO HCPCS: Performed by: NURSE PRACTITIONER

## 2023-08-25 PROCEDURE — 80048 BASIC METABOLIC PNL TOTAL CA: CPT

## 2023-08-25 PROCEDURE — 84100 ASSAY OF PHOSPHORUS: CPT

## 2023-08-25 PROCEDURE — 80076 HEPATIC FUNCTION PANEL: CPT

## 2023-08-25 PROCEDURE — P9047 ALBUMIN (HUMAN), 25%, 50ML: HCPCS | Performed by: NURSE PRACTITIONER

## 2023-08-25 PROCEDURE — 2580000003 HC RX 258: Performed by: NURSE PRACTITIONER

## 2023-08-25 PROCEDURE — 94640 AIRWAY INHALATION TREATMENT: CPT

## 2023-08-25 PROCEDURE — 82803 BLOOD GASES ANY COMBINATION: CPT

## 2023-08-25 PROCEDURE — A4216 STERILE WATER/SALINE, 10 ML: HCPCS | Performed by: NURSE PRACTITIONER

## 2023-08-25 PROCEDURE — 36415 COLL VENOUS BLD VENIPUNCTURE: CPT

## 2023-08-25 PROCEDURE — 85610 PROTHROMBIN TIME: CPT

## 2023-08-25 PROCEDURE — 71045 X-RAY EXAM CHEST 1 VIEW: CPT

## 2023-08-25 PROCEDURE — 74176 CT ABD & PELVIS W/O CONTRAST: CPT

## 2023-08-25 PROCEDURE — 6360000002 HC RX W HCPCS: Performed by: STUDENT IN AN ORGANIZED HEALTH CARE EDUCATION/TRAINING PROGRAM

## 2023-08-25 PROCEDURE — 36600 WITHDRAWAL OF ARTERIAL BLOOD: CPT

## 2023-08-25 RX ORDER — DEXMEDETOMIDINE HYDROCHLORIDE 4 UG/ML
.1-1.5 INJECTION, SOLUTION INTRAVENOUS CONTINUOUS
Status: DISCONTINUED | OUTPATIENT
Start: 2023-08-25 | End: 2023-09-03

## 2023-08-25 RX ORDER — ALBUMIN (HUMAN) 12.5 G/50ML
25 SOLUTION INTRAVENOUS ONCE
Status: COMPLETED | OUTPATIENT
Start: 2023-08-25 | End: 2023-08-25

## 2023-08-25 RX ORDER — HYDROMORPHONE HYDROCHLORIDE 1 MG/ML
0.5 INJECTION, SOLUTION INTRAMUSCULAR; INTRAVENOUS; SUBCUTANEOUS
Status: DISCONTINUED | OUTPATIENT
Start: 2023-08-25 | End: 2023-08-27

## 2023-08-25 RX ADMIN — MEROPENEM 1000 MG: 1 INJECTION, POWDER, FOR SOLUTION INTRAVENOUS at 01:06

## 2023-08-25 RX ADMIN — ALBUTEROL SULFATE 2.5 MG: 2.5 SOLUTION RESPIRATORY (INHALATION) at 07:12

## 2023-08-25 RX ADMIN — CHLORHEXIDINE GLUCONATE 15 ML: 1.2 RINSE ORAL at 20:49

## 2023-08-25 RX ADMIN — DEXMEDETOMIDINE HYDROCHLORIDE 0.2 MCG/KG/HR: 400 INJECTION, SOLUTION INTRAVENOUS at 15:24

## 2023-08-25 RX ADMIN — I.V. FAT EMULSION 250 ML: 20 EMULSION INTRAVENOUS at 18:29

## 2023-08-25 RX ADMIN — Medication: at 00:10

## 2023-08-25 RX ADMIN — HYDROMORPHONE HYDROCHLORIDE 0.5 MG: 1 INJECTION, SOLUTION INTRAMUSCULAR; INTRAVENOUS; SUBCUTANEOUS at 17:52

## 2023-08-25 RX ADMIN — MEROPENEM 1000 MG: 1 INJECTION, POWDER, FOR SOLUTION INTRAVENOUS at 13:21

## 2023-08-25 RX ADMIN — ALBUTEROL SULFATE 2.5 MG: 2.5 SOLUTION RESPIRATORY (INHALATION) at 03:23

## 2023-08-25 RX ADMIN — Medication: at 08:07

## 2023-08-25 RX ADMIN — SODIUM CHLORIDE, PRESERVATIVE FREE 10 ML: 5 INJECTION INTRAVENOUS at 20:53

## 2023-08-25 RX ADMIN — ALBUTEROL SULFATE 2.5 MG: 2.5 SOLUTION RESPIRATORY (INHALATION) at 19:58

## 2023-08-25 RX ADMIN — CHLORHEXIDINE GLUCONATE 15 ML: 1.2 RINSE ORAL at 08:08

## 2023-08-25 RX ADMIN — HYDROMORPHONE HYDROCHLORIDE 0.5 MG: 1 INJECTION, SOLUTION INTRAMUSCULAR; INTRAVENOUS; SUBCUTANEOUS at 14:57

## 2023-08-25 RX ADMIN — Medication: at 15:28

## 2023-08-25 RX ADMIN — SODIUM CHLORIDE 100 MG: 9 INJECTION, SOLUTION INTRAVENOUS at 11:08

## 2023-08-25 RX ADMIN — HYDROMORPHONE HYDROCHLORIDE 0.5 MG: 1 INJECTION, SOLUTION INTRAMUSCULAR; INTRAVENOUS; SUBCUTANEOUS at 11:07

## 2023-08-25 RX ADMIN — SODIUM CHLORIDE, PRESERVATIVE FREE 10 ML: 5 INJECTION INTRAVENOUS at 08:08

## 2023-08-25 RX ADMIN — SODIUM CHLORIDE, PRESERVATIVE FREE 40 MG: 5 INJECTION INTRAVENOUS at 08:07

## 2023-08-25 RX ADMIN — HYDROMORPHONE HYDROCHLORIDE 0.5 MG: 1 INJECTION, SOLUTION INTRAMUSCULAR; INTRAVENOUS; SUBCUTANEOUS at 08:05

## 2023-08-25 RX ADMIN — POTASSIUM CHLORIDE: 2 INJECTION, SOLUTION, CONCENTRATE INTRAVENOUS at 18:35

## 2023-08-25 RX ADMIN — ALBUMIN (HUMAN) 25 G: 0.25 INJECTION, SOLUTION INTRAVENOUS at 11:08

## 2023-08-25 RX ADMIN — HYDROMORPHONE HYDROCHLORIDE 0.5 MG: 1 INJECTION, SOLUTION INTRAMUSCULAR; INTRAVENOUS; SUBCUTANEOUS at 03:09

## 2023-08-25 RX ADMIN — HYDROMORPHONE HYDROCHLORIDE 0.5 MG: 1 INJECTION, SOLUTION INTRAMUSCULAR; INTRAVENOUS; SUBCUTANEOUS at 00:09

## 2023-08-25 RX ADMIN — Medication 10 ML: at 08:08

## 2023-08-25 RX ADMIN — Medication 10 ML: at 20:53

## 2023-08-25 RX ADMIN — HYDROMORPHONE HYDROCHLORIDE 0.5 MG: 1 INJECTION, SOLUTION INTRAMUSCULAR; INTRAVENOUS; SUBCUTANEOUS at 20:49

## 2023-08-25 RX ADMIN — ALBUTEROL SULFATE 2.5 MG: 2.5 SOLUTION RESPIRATORY (INHALATION) at 13:03

## 2023-08-25 ASSESSMENT — PAIN SCALES - GENERAL
PAINLEVEL_OUTOF10: 6
PAINLEVEL_OUTOF10: 0
PAINLEVEL_OUTOF10: 5
PAINLEVEL_OUTOF10: 3
PAINLEVEL_OUTOF10: 6
PAINLEVEL_OUTOF10: 6
PAINLEVEL_OUTOF10: 0

## 2023-08-25 ASSESSMENT — PULMONARY FUNCTION TESTS
PIF_VALUE: 31
PIF_VALUE: 32
PIF_VALUE: 32
PIF_VALUE: 31

## 2023-08-25 NOTE — PROGRESS NOTES
RT Note:  Vent assessment    Will monitor & adjust as needed per protocol.      08/24/23 0452   Vent Information   Vent Mode AC/PC   Ventilator Settings   FiO2  35 %   Insp Time (sec) 0.55 sec  (1:4.5)   Resp Rate (Set) 20 bmp   Pressure Ordered 22   Vent Patient Data (Readings)   Vt (Measured) 370 mL   Peak Inspiratory Pressure (cmH2O) 32 cmH2O   Rate Measured 34 br/min   Minute Volume (L/min) 122 Liters   Mean Airway Pressure (cmH2O) 13 cmH20   Plateau Pressure (cm H2O) 27 cm H2O   I:E Ratio 1:2.3   Pressure Sensitivity 2 cm H2O   PEEP Intrinsic (cm H2O)   (unable to obtain d/t tachypnea)   Static Compliance (L/cm H2O) 14   Insp Rise Time (%) 70 %   Treatment   $Treatment Type $Inhaled Therapy/Meds

## 2023-08-25 NOTE — PLAN OF CARE
Problem: Skin/Tissue Integrity  Goal: Absence of new skin breakdown  Description: 1. Monitor for areas of redness and/or skin breakdown  2. Assess vascular access sites hourly  3. Every 4-6 hours minimum:  Change oxygen saturation probe site  4. Every 4-6 hours:  If on nasal continuous positive airway pressure, respiratory therapy assess nares and determine need for appliance change or resting period.   Outcome: Progressing     Problem: Discharge Planning  Goal: Discharge to home or other facility with appropriate resources  Outcome: Progressing  Flowsheets (Taken 8/24/2023 2000)  Discharge to home or other facility with appropriate resources:   Identify barriers to discharge with patient and caregiver   Arrange for needed discharge resources and transportation as appropriate   Identify discharge learning needs (meds, wound care, etc)     Problem: Safety - Adult  Goal: Free from fall injury  Outcome: Progressing     Problem: Chronic Conditions and Co-morbidities  Goal: Patient's chronic conditions and co-morbidity symptoms are monitored and maintained or improved  Outcome: Progressing  Flowsheets (Taken 8/24/2023 2000)  Care Plan - Patient's Chronic Conditions and Co-Morbidity Symptoms are Monitored and Maintained or Improved:   Monitor and assess patient's chronic conditions and comorbid symptoms for stability, deterioration, or improvement   Collaborate with multidisciplinary team to address chronic and comorbid conditions and prevent exacerbation or deterioration   Update acute care plan with appropriate goals if chronic or comorbid symptoms are exacerbated and prevent overall improvement and discharge     Problem: Cardiovascular - Adult  Goal: Maintains optimal cardiac output and hemodynamic stability  Outcome: Progressing  Flowsheets (Taken 8/24/2023 2000)  Maintains optimal cardiac output and hemodynamic stability:   Monitor blood pressure and heart rate   Monitor urine output and notify Licensed needed  Goal: Maintain proper alignment of affected body part  Outcome: Progressing  Flowsheets (Taken 8/24/2023 2000)  Maintain proper alignment of affected body part:   Support and protect limb and body alignment per provider's orders   Instruct and reinforce with patient and family use of appropriate assistive device and precautions (e.g. spinal or hip dislocation precautions)  Goal: Return ADL status to a safe level of function  Outcome: Progressing  Flowsheets (Taken 8/24/2023 2000)  Return ADL Status to a Safe Level of Function:   Administer medication as ordered   Obtain physical therapy/occupational therapy consults as needed     Problem: Gastrointestinal - Adult  Goal: Minimal or absence of nausea and vomiting  Outcome: Progressing  Flowsheets (Taken 8/24/2023 2000)  Minimal or absence of nausea and vomiting:   Administer IV fluids as ordered to ensure adequate hydration   Maintain NPO status until nausea and vomiting are resolved   Provide nonpharmacologic comfort measures as appropriate   Nutrition consult to assist patient with adequate nutrition and appropriate food choices  Goal: Maintains or returns to baseline bowel function  Outcome: Progressing  Flowsheets (Taken 8/24/2023 2000)  Maintains or returns to baseline bowel function:   Assess bowel function   Administer IV fluids as ordered to ensure adequate hydration   Administer ordered medications as needed   Nutrition consult to assist patient with appropriate food choices  Goal: Maintains adequate nutritional intake  Outcome: Progressing  Flowsheets (Taken 8/24/2023 2000)  Maintains adequate nutritional intake: Monitor intake and output, weight and lab values  Goal: Establish and maintain optimal ostomy function  Outcome: Progressing  Flowsheets (Taken 8/24/2023 2000)  Establish and maintain optimal ostomy function:   Monitor output from ostomies   Administer IV fluids and TPN as ordered   Nutrition consult   Infuse IV Fluids/TPN as ordered

## 2023-08-25 NOTE — PROGRESS NOTES
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 This admission: Presented to [facility]  with hematemesis and diffuse abdominal pain. CT noted loculated intraperitoneal fluid in the anterior abdomen, with persistent free intraperitoneal air, per GI report the free air and free fluid were also noted on prior exam. S/p US guided drainage , fluid culture grew E-Coli, completed course of Zosyn,  fluid culture grew E-Coli, completed course fo Zosyn, drain removed on 8/10. S/p G tube placement on () Transferred to ICU on  for acute hypoxic respiratory failure in setting of pulmonary edema requiring intubation (-), CT A/P () Resolution of peritoneal fluid, resolution of gastric distention required reintubation on (-current) for worsening hypoxia.     (- current) Increased WBC, + Fevers, CT A/P demonstrated recurrent intra-abdominal fluid collection s/p US drainage, collection consistent with enteric content    COMPREHENSIVE ASSESSMENT & PLAN:SYSTEM BASED     24 HOUR EVENTS:   Hgb->trending down 8.1->7.4   Repeat CXR-stable/similar pneumomediastinum.   CT A/P: faint contrast in left anterior abdomen , meropenem, Weaned off propofol/fentanyl, now on precedex, utilize PRNs, CXR slightly improved edema pattern  8/22: Increased WBCs and intermittent fevers, CT A/P demonstrated recurrent intra-abdominal fluid collection with air, IR to place drain today at bedside, continue meropenem, CT chest with bilateral PNA and effusions  8/23: Yesterday CT A/P w/ recurrent intra-abdominal fluid collection s/p US drainage, collection consistent with enteric contents Repeat imaging 8/23 with interval resolution of anterior pelvic collection and minimal residual contiguous gas, Plan to continue Bowel rest, G- Tube to gravity, TPN to start tonight, Hgb trending down, currently off of levophed, repeat this afternoon, hold on transfusion at this time, Continue vent liberalization->Did not tolerate PS, now on PC SIMV- tolerating  8/24: + pneumomediastinum, decreased Peep, repeat CXR tomorrow a. m. Norval CT tomorrow a.m. per surgery, Fluid Culture with yeast- start on Eraxis    SUBJECTIVE   Review of Systems   Unable to perform ROS: Intubated        OBJECTIVE   Physical Exam  Vitals and nursing note reviewed. Constitutional:       Appearance: She is ill-appearing and toxic-appearing. HENT:      Head: Normocephalic and atraumatic. Mouth/Throat:      Mouth: Mucous membranes are moist.   Eyes:      Pupils: Pupils are equal, round, and reactive to light. Cardiovascular:      Rate and Rhythm: Normal rate and regular rhythm. Pulses: Normal pulses. Heart sounds: Normal heart sounds. Pulmonary:      Comments: Scattered coarse breath sounds, diminished bilateral lower bases  Abdominal:      General: There is distension. Palpations: Abdomen is soft. Comments: G- Tube to gravity  LUQ accordion drain   Musculoskeletal:         General: Normal range of motion. Cervical back: Normal range of motion and neck supple. Right lower leg: Edema present. Left lower leg: Edema present.       Comments: Generalized

## 2023-08-25 NOTE — PROGRESS NOTES
Pharmacist Note - Vancomycin Dosing  Therapy day 20  Indication:  intra-abdominal abscess, possible peritonitis, sacral decubitus wound  Current regimen: dose per levels    Recent Labs     08/23/23  1223 08/24/23  0428 08/24/23  0429 08/25/23  0418   WBC 20.2*  --  21.8* 22.4*   CREATININE 1.30* 1.38*  --  1.09*   BUN 59* 62*  --  65*       A random vancomycin level of 18.4 mcg/mL was obtained this morning @ 04:18 ~ 40 hrs p last dose  Goal target range Trough 10-15 mcg/mL      Plan: . Reported level this am is above goal.  Continue to hold Vancomycin today. Repeat level in the morning. Predicted 13-15  Pharmacy will continue to monitor this patient daily for changes in clinical status and renal function.

## 2023-08-25 NOTE — PROGRESS NOTES
NUTRITION brief    Recommendations:     -Continue TPN as ordered today: 1560 ml, 94 gm protein, 234 gm CHO and 250 ml 20% lipid 3 x/week       Diet: NPO  Supplements/Nutrition Support: TPN:  Clinimix 5% AA D15 @ 75 ml/hr with 250 ml 20% lipid 3 x/week (90 gm protein, 270 gm CHO)  Nutrition-related meds: Humalog, Protonix    New events impacting nutrition plan of care:   Possible leak @ ileocolic anastomosis per CT but no drainable collection. FMS removed yesterday. Remains on TPN for nutrition support. TPN slightly modified to above and meets pt's estimated needs (provides 1385 calories/94 gm protein per day). See full RD assessment from 8/23 for additional details, goals, and monitoring/evaluation.    Estimated Nutrition Needs:   Energy Requirements Based On: Formula  Weight Used for Energy Requirements: Ideal  Energy (kcal/day): 6292-4354 ([id number])  Weight Used for Protein Requirements: Ideal  Protein (g/day):  (1.5-2 gm/kg IBW)  Method Used for Fluid Requirements: 1 ml/kcal  Fluid (ml/day): 1 mL/kcal    Salima Jimenez RD [facility]

## 2023-08-26 ENCOUNTER — APPOINTMENT (OUTPATIENT)
Facility: HOSPITAL | Age: 63
DRG: 004 | End: 2023-08-26
Payer: MEDICARE

## 2023-08-26 LAB
ANION GAP SERPL CALC-SCNC: 8 MMOL/L (ref 5–15)
BACTERIA SPEC CULT: NORMAL
BACTERIA SPEC CULT: NORMAL
BUN SERPL-MCNC: 63 MG/DL (ref 6–20)
BUN/CREAT SERPL: 65 (ref 12–20)
CALCIUM SERPL-MCNC: 8.9 MG/DL (ref 8.5–10.1)
CHLORIDE SERPL-SCNC: 114 MMOL/L (ref 97–108)
CO2 SERPL-SCNC: 23 MMOL/L (ref 21–32)
CREAT SERPL-MCNC: 0.97 MG/DL (ref 0.55–1.02)
ERYTHROCYTE [DISTWIDTH] IN BLOOD BY AUTOMATED COUNT: 17.7 % (ref 11.5–14.5)
GLUCOSE BLD STRIP.AUTO-MCNC: 116 MG/DL (ref 65–117)
GLUCOSE BLD STRIP.AUTO-MCNC: 131 MG/DL (ref 65–117)
GLUCOSE BLD STRIP.AUTO-MCNC: 134 MG/DL (ref 65–117)
GLUCOSE BLD STRIP.AUTO-MCNC: 137 MG/DL (ref 65–117)
GLUCOSE SERPL-MCNC: 100 MG/DL (ref 65–100)
HCT VFR BLD AUTO: 22.9 % (ref 35–47)
HGB BLD-MCNC: 7.3 G/DL (ref 11.5–16)
MAGNESIUM SERPL-MCNC: 1.8 MG/DL (ref 1.6–2.4)
MCH RBC QN AUTO: 29.6 PG (ref 26–34)
MCHC RBC AUTO-ENTMCNC: 31.9 G/DL (ref 30–36.5)
MCV RBC AUTO: 92.7 FL (ref 80–99)
NRBC # BLD: 0 K/UL (ref 0–0.01)
NRBC BLD-RTO: 0 PER 100 WBC
PHOSPHATE SERPL-MCNC: 3.9 MG/DL (ref 2.6–4.7)
PLATELET # BLD AUTO: 542 K/UL (ref 150–400)
PMV BLD AUTO: 10.3 FL (ref 8.9–12.9)
POTASSIUM SERPL-SCNC: 3.9 MMOL/L (ref 3.5–5.1)
RBC # BLD AUTO: 2.47 M/UL (ref 3.8–5.2)
SERVICE CMNT-IMP: ABNORMAL
SERVICE CMNT-IMP: NORMAL
SODIUM SERPL-SCNC: 145 MMOL/L (ref 136–145)
VANCOMYCIN SERPL-MCNC: 16.5 UG/ML
WBC # BLD AUTO: 23.3 K/UL (ref 3.6–11)

## 2023-08-26 PROCEDURE — 2000000000 HC ICU R&B

## 2023-08-26 PROCEDURE — 99232 SBSQ HOSP IP/OBS MODERATE 35: CPT | Performed by: SURGERY

## 2023-08-26 PROCEDURE — 6360000002 HC RX W HCPCS: Performed by: NURSE PRACTITIONER

## 2023-08-26 PROCEDURE — 94640 AIRWAY INHALATION TREATMENT: CPT

## 2023-08-26 PROCEDURE — 80202 ASSAY OF VANCOMYCIN: CPT

## 2023-08-26 PROCEDURE — 83735 ASSAY OF MAGNESIUM: CPT

## 2023-08-26 PROCEDURE — 94003 VENT MGMT INPAT SUBQ DAY: CPT

## 2023-08-26 PROCEDURE — 2580000003 HC RX 258: Performed by: STUDENT IN AN ORGANIZED HEALTH CARE EDUCATION/TRAINING PROGRAM

## 2023-08-26 PROCEDURE — 2500000003 HC RX 250 WO HCPCS: Performed by: STUDENT IN AN ORGANIZED HEALTH CARE EDUCATION/TRAINING PROGRAM

## 2023-08-26 PROCEDURE — 2500000003 HC RX 250 WO HCPCS: Performed by: NURSE PRACTITIONER

## 2023-08-26 PROCEDURE — 80048 BASIC METABOLIC PNL TOTAL CA: CPT

## 2023-08-26 PROCEDURE — 2580000003 HC RX 258: Performed by: SURGERY

## 2023-08-26 PROCEDURE — 6360000002 HC RX W HCPCS: Performed by: STUDENT IN AN ORGANIZED HEALTH CARE EDUCATION/TRAINING PROGRAM

## 2023-08-26 PROCEDURE — 3E04317 INTRODUCTION OF OTHER THROMBOLYTIC INTO CENTRAL VEIN, PERCUTANEOUS APPROACH: ICD-10-PCS | Performed by: SURGERY

## 2023-08-26 PROCEDURE — A4216 STERILE WATER/SALINE, 10 ML: HCPCS | Performed by: NURSE PRACTITIONER

## 2023-08-26 PROCEDURE — 6370000000 HC RX 637 (ALT 250 FOR IP): Performed by: SURGERY

## 2023-08-26 PROCEDURE — C9113 INJ PANTOPRAZOLE SODIUM, VIA: HCPCS | Performed by: NURSE PRACTITIONER

## 2023-08-26 PROCEDURE — 71045 X-RAY EXAM CHEST 1 VIEW: CPT

## 2023-08-26 PROCEDURE — 36415 COLL VENOUS BLD VENIPUNCTURE: CPT

## 2023-08-26 PROCEDURE — 82962 GLUCOSE BLOOD TEST: CPT

## 2023-08-26 PROCEDURE — 6360000002 HC RX W HCPCS: Performed by: SURGERY

## 2023-08-26 PROCEDURE — 2580000003 HC RX 258: Performed by: NURSE PRACTITIONER

## 2023-08-26 PROCEDURE — 85027 COMPLETE CBC AUTOMATED: CPT

## 2023-08-26 PROCEDURE — 84100 ASSAY OF PHOSPHORUS: CPT

## 2023-08-26 RX ORDER — MAGNESIUM SULFATE IN WATER 40 MG/ML
2000 INJECTION, SOLUTION INTRAVENOUS ONCE
Status: COMPLETED | OUTPATIENT
Start: 2023-08-26 | End: 2023-08-26

## 2023-08-26 RX ADMIN — WATER 1 MG: 1 INJECTION INTRAMUSCULAR; INTRAVENOUS; SUBCUTANEOUS at 01:54

## 2023-08-26 RX ADMIN — Medication: at 16:00

## 2023-08-26 RX ADMIN — MEROPENEM 1000 MG: 1 INJECTION, POWDER, FOR SOLUTION INTRAVENOUS at 17:45

## 2023-08-26 RX ADMIN — MEROPENEM 1000 MG: 1 INJECTION, POWDER, FOR SOLUTION INTRAVENOUS at 00:58

## 2023-08-26 RX ADMIN — Medication: at 01:02

## 2023-08-26 RX ADMIN — ALBUTEROL SULFATE 2.5 MG: 2.5 SOLUTION RESPIRATORY (INHALATION) at 19:47

## 2023-08-26 RX ADMIN — MAGNESIUM SULFATE HEPTAHYDRATE 2000 MG: 40 INJECTION, SOLUTION INTRAVENOUS at 02:44

## 2023-08-26 RX ADMIN — SODIUM CHLORIDE 100 MG: 9 INJECTION, SOLUTION INTRAVENOUS at 10:49

## 2023-08-26 RX ADMIN — HYDROMORPHONE HYDROCHLORIDE 0.5 MG: 1 INJECTION, SOLUTION INTRAMUSCULAR; INTRAVENOUS; SUBCUTANEOUS at 10:20

## 2023-08-26 RX ADMIN — HYDROMORPHONE HYDROCHLORIDE 0.5 MG: 1 INJECTION, SOLUTION INTRAMUSCULAR; INTRAVENOUS; SUBCUTANEOUS at 00:37

## 2023-08-26 RX ADMIN — MEROPENEM 1000 MG: 1 INJECTION, POWDER, FOR SOLUTION INTRAVENOUS at 08:44

## 2023-08-26 RX ADMIN — SODIUM CHLORIDE, PRESERVATIVE FREE 10 ML: 5 INJECTION INTRAVENOUS at 08:46

## 2023-08-26 RX ADMIN — POTASSIUM CHLORIDE: 2 INJECTION, SOLUTION, CONCENTRATE INTRAVENOUS at 18:44

## 2023-08-26 RX ADMIN — CHLORHEXIDINE GLUCONATE 15 ML: 1.2 RINSE ORAL at 08:23

## 2023-08-26 RX ADMIN — ALBUTEROL SULFATE 2.5 MG: 2.5 SOLUTION RESPIRATORY (INHALATION) at 08:24

## 2023-08-26 RX ADMIN — ALBUTEROL SULFATE 2.5 MG: 2.5 SOLUTION RESPIRATORY (INHALATION) at 02:57

## 2023-08-26 RX ADMIN — DEXMEDETOMIDINE HYDROCHLORIDE 0.4 MCG/KG/HR: 400 INJECTION, SOLUTION INTRAVENOUS at 15:38

## 2023-08-26 RX ADMIN — CHLORHEXIDINE GLUCONATE 15 ML: 1.2 RINSE ORAL at 21:01

## 2023-08-26 RX ADMIN — SODIUM CHLORIDE, PRESERVATIVE FREE 10 ML: 5 INJECTION INTRAVENOUS at 21:01

## 2023-08-26 RX ADMIN — Medication: at 08:46

## 2023-08-26 RX ADMIN — Medication 10 ML: at 08:46

## 2023-08-26 RX ADMIN — HYDROMORPHONE HYDROCHLORIDE 0.5 MG: 1 INJECTION, SOLUTION INTRAMUSCULAR; INTRAVENOUS; SUBCUTANEOUS at 04:33

## 2023-08-26 RX ADMIN — HYDROMORPHONE HYDROCHLORIDE 0.5 MG: 1 INJECTION, SOLUTION INTRAMUSCULAR; INTRAVENOUS; SUBCUTANEOUS at 15:37

## 2023-08-26 RX ADMIN — Medication 10 ML: at 21:01

## 2023-08-26 RX ADMIN — SODIUM CHLORIDE, PRESERVATIVE FREE 40 MG: 5 INJECTION INTRAVENOUS at 08:44

## 2023-08-26 ASSESSMENT — PULMONARY FUNCTION TESTS
PIF_VALUE: 31
PIF_VALUE: 30
PIF_VALUE: 17
PIF_VALUE: 30
PIF_VALUE: 31
PIF_VALUE: 30
PIF_VALUE: 31

## 2023-08-26 ASSESSMENT — PAIN SCALES - GENERAL
PAINLEVEL_OUTOF10: 0
PAINLEVEL_OUTOF10: 6
PAINLEVEL_OUTOF10: 0
PAINLEVEL_OUTOF10: 0
PAINLEVEL_OUTOF10: 6

## 2023-08-26 NOTE — PROGRESS NOTES
Pharmacist Note - Vancomycin Dosing  Therapy day 21  Indication: intra-abdominal abscess, possible peritonitis, sacral decubitus wound  Current regimen: Dosing by levels- most recent dose 1250 mg x1 on 8/23 @ 12:32    Recent Labs     08/24/23  0428 08/24/23  0429 08/25/23  0418 08/26/23  0054   WBC  --  21.8* 22.4* 23.3*   CREATININE 1.38*  --  1.09* 0.97   BUN 62*  --  65* 63*       A random vancomycin level of 16.5 mcg/mL was obtained approximately 60.5 hours post dose. Goal target range Trough 10-15 mcg/mL      Plan: Random level this morning remains above goal trough range. Will continue to hold vancomycin for now. Plan to re-dose once random level <15 mcg/mL. Will order repeat random level for tomorrow AM. Pharmacy will continue to monitor this patient daily for changes in clinical status and renal function.

## 2023-08-26 NOTE — PROGRESS NOTES
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 This admission: Presented to [facility]  with hematemesis and diffuse abdominal pain. CT noted loculated intraperitoneal fluid in the anterior abdomen, with persistent free intraperitoneal air, per GI report the free air and free fluid were also noted on prior exam. S/p US guided drainage , fluid culture grew E-Coli, completed course of Zosyn,  fluid culture grew E-Coli, completed course fo Zosyn, drain removed on 8/10. S/p G tube placement on () Transferred to ICU on  for acute hypoxic respiratory failure in setting of pulmonary edema requiring intubation (-), CT A/P () Resolution of peritoneal fluid, resolution of gastric distention required reintubation on (-current) for worsening hypoxia.     (- current) Increased WBC, + Fevers, CT A/P demonstrated recurrent intra-abdominal fluid collection s/p US drainage, collection consistent with enteric content.  Repeat imaging  with demonstrated contrast adjacent to drain (no drainable collection, no free air)    COMPREHENSIVE ASSESSMENT & PLAN:SYSTEM BASED     24 HOUR EVENTS:   Persistent Leukocytosis (T- Max 100.2), CXR with no significant change, ongoing pneumomediastinum, pneumonia  Tolerating SIMV, continue vent liberalization, will need further conversation with  on potential need for trach if unable to extubate/fails extubation  Renal function improved    NEUROLOGICAL:  Hx of Anxiety/Depression    Toxic metabolic encephalopathy in setting of severe illness/sepsis  CT head 8/11: no acute process  PRN Dilaudid, low dose precedex gtt  Close neurological monitoring, delirium precautions    PULMONOLOGY:  Hx of ANCA   Acute hypoxic respiratory failure in setting of volume overload requiring intubation (8/7-8/14), required re-intubation (8/18-current), Volume Overload, Bilateral Pneumonia, Pneumomediastinum  CTA chest (8/7) no PE, diffuse overload, bilateral pleural effusion  CT Chest (8/22) with bilateral pneumonia, bilateral pleural effusions  Lung protective ventilation with goal plateau pressure < 30, driving pressure < 15  HOB elevated, Diurese as renal function allows/indicated     CARDIOVASCULAR:   Hypotension: Septic +/- sedation related  TTE: EF 50 %, mild TR, mild elevated RVSP 51 mm Hg  Hemodynamically stable, off pressors, keep MAP 65 and above for adequate tissue perfusion.      GASTROINTESTINAL:  Hx of gastric bypass (2017) with GJ anastomoic ulcer, prior hx of stricture requiring dilation  Prior Hx of C Difficile Colitis S/p abdominal colectomy on 3/23/23, reversal of end ioelstomy with ileocolic anastomosis    Intra- abdominal fluid collection  CT A/P (7/28) demonstrated loculated intraperitoneal fluid in the anterior abdomen, persistent free intraperitonial air  CT A/P (8/7) Resolution of peritoneal fluid, resolution of gastric distention  S/p US guided drainage (7/31), fluid culture grew E-Coli, completed course of Zosyn, drain removed on 8/10  S/p G tube placement on (8/4)   CT A/P demonstrated recurrent intra-abdominal fluid collection s/p US drainage, collection consistent

## 2023-08-26 NOTE — PROGRESS NOTES
0830: RTRN at bedside. Pt failed PSV trial. Vent mode changed to SIMV. Pt tolerating well. 1345: Pt sustaining RR >35 with Vt <300. RTRN notified. Vent mode changed back to AC/PC.

## 2023-08-26 NOTE — PLAN OF CARE
Problem: Skin/Tissue Integrity  Goal: Absence of new skin breakdown  Description: 1. Monitor for areas of redness and/or skin breakdown  2. Assess vascular access sites hourly  3. Every 4-6 hours minimum:  Change oxygen saturation probe site  4. Every 4-6 hours:  If on nasal continuous positive airway pressure, respiratory therapy assess nares and determine need for appliance change or resting period.   Outcome: Progressing     Problem: Discharge Planning  Goal: Discharge to home or other facility with appropriate resources  Outcome: Progressing  Flowsheets (Taken 8/25/2023 2000)  Discharge to home or other facility with appropriate resources:   Identify barriers to discharge with patient and caregiver   Arrange for needed discharge resources and transportation as appropriate   Identify discharge learning needs (meds, wound care, etc)     Problem: Safety - Adult  Goal: Free from fall injury  Outcome: Progressing     Problem: Chronic Conditions and Co-morbidities  Goal: Patient's chronic conditions and co-morbidity symptoms are monitored and maintained or improved  Outcome: Progressing  Flowsheets (Taken 8/25/2023 2000)  Care Plan - Patient's Chronic Conditions and Co-Morbidity Symptoms are Monitored and Maintained or Improved:   Monitor and assess patient's chronic conditions and comorbid symptoms for stability, deterioration, or improvement   Collaborate with multidisciplinary team to address chronic and comorbid conditions and prevent exacerbation or deterioration   Update acute care plan with appropriate goals if chronic or comorbid symptoms are exacerbated and prevent overall improvement and discharge     Problem: Cardiovascular - Adult  Goal: Maintains optimal cardiac output and hemodynamic stability  Outcome: Progressing  Flowsheets  Taken 8/25/2023 2000 by Teo Hidalgo RN  Maintains optimal cardiac output and hemodynamic stability:   Monitor blood pressure and heart rate   Monitor urine output and notify Progressing  Flowsheets (Taken 8/25/2023 2000)  Return Mobility to Safest Level of Function:   Assess patient stability and activity tolerance for standing, transferring and ambulating with or without assistive devices   Assist with transfers and ambulation using safe patient handling equipment as needed   Ensure adequate protection for wounds/incisions during mobilization   Obtain physical therapy/occupational therapy consults as needed   Apply continuous passive motion per provider or physical therapy orders to increase flexion toward goal  Goal: Maintain proper alignment of affected body part  Outcome: Progressing  Flowsheets (Taken 8/25/2023 2000)  Maintain proper alignment of affected body part: Support and protect limb and body alignment per provider's orders  Goal: Return ADL status to a safe level of function  Outcome: Progressing  Flowsheets (Taken 8/25/2023 2000)  Return ADL Status to a Safe Level of Function:   Administer medication as ordered   Obtain physical therapy/occupational therapy consults as needed     Problem: Gastrointestinal - Adult  Goal: Minimal or absence of nausea and vomiting  Outcome: Progressing  Flowsheets (Taken 8/25/2023 1200 by Bandar Brooks RN)  Minimal or absence of nausea and vomiting: Administer IV fluids as ordered to ensure adequate hydration  Goal: Maintains or returns to baseline bowel function  Outcome: Progressing  Flowsheets (Taken 8/25/2023 2000)  Maintains or returns to baseline bowel function:   Administer IV fluids as ordered to ensure adequate hydration   Administer ordered medications as needed   Encourage mobilization and activity  Goal: Maintains adequate nutritional intake  Outcome: Progressing  Flowsheets (Taken 8/25/2023 2000)  Maintains adequate nutritional intake:   Identify factors contributing to decreased intake, treat as appropriate   Monitor intake and output, weight and lab values  Goal: Establish and maintain optimal ostomy function  Outcome:

## 2023-08-27 ENCOUNTER — APPOINTMENT (OUTPATIENT)
Facility: HOSPITAL | Age: 63
DRG: 004 | End: 2023-08-27
Payer: MEDICARE

## 2023-08-27 LAB
ALBUMIN SERPL-MCNC: 2.1 G/DL (ref 3.5–5)
ALBUMIN/GLOB SERPL: 0.7 (ref 1.1–2.2)
ALP SERPL-CCNC: 334 U/L (ref 45–117)
ALT SERPL-CCNC: 106 U/L (ref 12–78)
ANION GAP SERPL CALC-SCNC: 3 MMOL/L (ref 5–15)
ANION GAP SERPL CALC-SCNC: 7 MMOL/L (ref 5–15)
AST SERPL-CCNC: 125 U/L (ref 15–37)
BILIRUB SERPL-MCNC: 0.5 MG/DL (ref 0.2–1)
BUN SERPL-MCNC: 61 MG/DL (ref 6–20)
BUN SERPL-MCNC: 62 MG/DL (ref 6–20)
BUN/CREAT SERPL: 81 (ref 12–20)
BUN/CREAT SERPL: 82 (ref 12–20)
CALCIUM SERPL-MCNC: 8.8 MG/DL (ref 8.5–10.1)
CALCIUM SERPL-MCNC: 9 MG/DL (ref 8.5–10.1)
CHLORIDE SERPL-SCNC: 115 MMOL/L (ref 97–108)
CHLORIDE SERPL-SCNC: 117 MMOL/L (ref 97–108)
CO2 SERPL-SCNC: 25 MMOL/L (ref 21–32)
CO2 SERPL-SCNC: 27 MMOL/L (ref 21–32)
CREAT SERPL-MCNC: 0.75 MG/DL (ref 0.55–1.02)
CREAT SERPL-MCNC: 0.76 MG/DL (ref 0.55–1.02)
ERYTHROCYTE [DISTWIDTH] IN BLOOD BY AUTOMATED COUNT: 17.7 % (ref 11.5–14.5)
ERYTHROCYTE [DISTWIDTH] IN BLOOD BY AUTOMATED COUNT: 17.9 % (ref 11.5–14.5)
GLOBULIN SER CALC-MCNC: 3 G/DL (ref 2–4)
GLUCOSE BLD STRIP.AUTO-MCNC: 133 MG/DL (ref 65–117)
GLUCOSE BLD STRIP.AUTO-MCNC: 134 MG/DL (ref 65–117)
GLUCOSE BLD STRIP.AUTO-MCNC: 134 MG/DL (ref 65–117)
GLUCOSE BLD STRIP.AUTO-MCNC: 138 MG/DL (ref 65–117)
GLUCOSE SERPL-MCNC: 119 MG/DL (ref 65–100)
GLUCOSE SERPL-MCNC: 130 MG/DL (ref 65–100)
HCT VFR BLD AUTO: 21.9 % (ref 35–47)
HCT VFR BLD AUTO: 22.6 % (ref 35–47)
HGB BLD-MCNC: 6.8 G/DL (ref 11.5–16)
HGB BLD-MCNC: 7 G/DL (ref 11.5–16)
MAGNESIUM SERPL-MCNC: 1.9 MG/DL (ref 1.6–2.4)
MCH RBC QN AUTO: 28.8 PG (ref 26–34)
MCH RBC QN AUTO: 29.8 PG (ref 26–34)
MCHC RBC AUTO-ENTMCNC: 30.1 G/DL (ref 30–36.5)
MCHC RBC AUTO-ENTMCNC: 32 G/DL (ref 30–36.5)
MCV RBC AUTO: 93.2 FL (ref 80–99)
MCV RBC AUTO: 95.8 FL (ref 80–99)
NRBC # BLD: 0 K/UL (ref 0–0.01)
NRBC # BLD: 0 K/UL (ref 0–0.01)
NRBC BLD-RTO: 0 PER 100 WBC
NRBC BLD-RTO: 0 PER 100 WBC
PHOSPHATE SERPL-MCNC: 3.1 MG/DL (ref 2.6–4.7)
PLATELET # BLD AUTO: 563 K/UL (ref 150–400)
PLATELET # BLD AUTO: 581 K/UL (ref 150–400)
PMV BLD AUTO: 10 FL (ref 8.9–12.9)
PMV BLD AUTO: 9.7 FL (ref 8.9–12.9)
POTASSIUM SERPL-SCNC: 3.9 MMOL/L (ref 3.5–5.1)
POTASSIUM SERPL-SCNC: 4 MMOL/L (ref 3.5–5.1)
PROT SERPL-MCNC: 5.1 G/DL (ref 6.4–8.2)
RBC # BLD AUTO: 2.35 M/UL (ref 3.8–5.2)
RBC # BLD AUTO: 2.36 M/UL (ref 3.8–5.2)
SERVICE CMNT-IMP: ABNORMAL
SODIUM SERPL-SCNC: 147 MMOL/L (ref 136–145)
SODIUM SERPL-SCNC: 147 MMOL/L (ref 136–145)
VANCOMYCIN SERPL-MCNC: 10.9 UG/ML
WBC # BLD AUTO: 21.1 K/UL (ref 3.6–11)
WBC # BLD AUTO: 23.1 K/UL (ref 3.6–11)

## 2023-08-27 PROCEDURE — 6360000002 HC RX W HCPCS: Performed by: SURGERY

## 2023-08-27 PROCEDURE — 83735 ASSAY OF MAGNESIUM: CPT

## 2023-08-27 PROCEDURE — 2580000003 HC RX 258: Performed by: SURGERY

## 2023-08-27 PROCEDURE — 99232 SBSQ HOSP IP/OBS MODERATE 35: CPT | Performed by: SURGERY

## 2023-08-27 PROCEDURE — 94640 AIRWAY INHALATION TREATMENT: CPT

## 2023-08-27 PROCEDURE — 6360000002 HC RX W HCPCS: Performed by: STUDENT IN AN ORGANIZED HEALTH CARE EDUCATION/TRAINING PROGRAM

## 2023-08-27 PROCEDURE — 6360000002 HC RX W HCPCS: Performed by: ANESTHESIOLOGY

## 2023-08-27 PROCEDURE — 80053 COMPREHEN METABOLIC PANEL: CPT

## 2023-08-27 PROCEDURE — 99232 SBSQ HOSP IP/OBS MODERATE 35: CPT | Performed by: INTERNAL MEDICINE

## 2023-08-27 PROCEDURE — 2580000003 HC RX 258: Performed by: NURSE PRACTITIONER

## 2023-08-27 PROCEDURE — 2500000003 HC RX 250 WO HCPCS: Performed by: NURSE PRACTITIONER

## 2023-08-27 PROCEDURE — 94003 VENT MGMT INPAT SUBQ DAY: CPT

## 2023-08-27 PROCEDURE — 36415 COLL VENOUS BLD VENIPUNCTURE: CPT

## 2023-08-27 PROCEDURE — C9113 INJ PANTOPRAZOLE SODIUM, VIA: HCPCS | Performed by: NURSE PRACTITIONER

## 2023-08-27 PROCEDURE — 85027 COMPLETE CBC AUTOMATED: CPT

## 2023-08-27 PROCEDURE — 2500000003 HC RX 250 WO HCPCS: Performed by: ANESTHESIOLOGY

## 2023-08-27 PROCEDURE — 6370000000 HC RX 637 (ALT 250 FOR IP): Performed by: SURGERY

## 2023-08-27 PROCEDURE — 84100 ASSAY OF PHOSPHORUS: CPT

## 2023-08-27 PROCEDURE — 82962 GLUCOSE BLOOD TEST: CPT

## 2023-08-27 PROCEDURE — 6360000002 HC RX W HCPCS: Performed by: NURSE PRACTITIONER

## 2023-08-27 PROCEDURE — 2580000003 HC RX 258: Performed by: STUDENT IN AN ORGANIZED HEALTH CARE EDUCATION/TRAINING PROGRAM

## 2023-08-27 PROCEDURE — 2000000000 HC ICU R&B

## 2023-08-27 PROCEDURE — 80202 ASSAY OF VANCOMYCIN: CPT

## 2023-08-27 PROCEDURE — 71045 X-RAY EXAM CHEST 1 VIEW: CPT

## 2023-08-27 PROCEDURE — 2580000003 HC RX 258: Performed by: ANESTHESIOLOGY

## 2023-08-27 PROCEDURE — A4216 STERILE WATER/SALINE, 10 ML: HCPCS | Performed by: NURSE PRACTITIONER

## 2023-08-27 RX ORDER — BUDESONIDE 0.5 MG/2ML
0.5 INHALANT ORAL
Status: DISCONTINUED | OUTPATIENT
Start: 2023-08-27 | End: 2023-10-09

## 2023-08-27 RX ORDER — ARFORMOTEROL TARTRATE 15 UG/2ML
15 SOLUTION RESPIRATORY (INHALATION)
Status: DISCONTINUED | OUTPATIENT
Start: 2023-08-27 | End: 2023-10-09

## 2023-08-27 RX ORDER — HYDROMORPHONE HYDROCHLORIDE 1 MG/ML
0.5 INJECTION, SOLUTION INTRAMUSCULAR; INTRAVENOUS; SUBCUTANEOUS
Status: DISCONTINUED | OUTPATIENT
Start: 2023-08-27 | End: 2023-09-12

## 2023-08-27 RX ADMIN — SODIUM CHLORIDE 100 MG: 9 INJECTION, SOLUTION INTRAVENOUS at 10:19

## 2023-08-27 RX ADMIN — HYDROMORPHONE HYDROCHLORIDE 0.5 MG: 1 INJECTION, SOLUTION INTRAMUSCULAR; INTRAVENOUS; SUBCUTANEOUS at 12:24

## 2023-08-27 RX ADMIN — Medication: at 16:15

## 2023-08-27 RX ADMIN — HYDROMORPHONE HYDROCHLORIDE 0.5 MG: 1 INJECTION, SOLUTION INTRAMUSCULAR; INTRAVENOUS; SUBCUTANEOUS at 23:12

## 2023-08-27 RX ADMIN — CHLORHEXIDINE GLUCONATE 15 ML: 1.2 RINSE ORAL at 08:40

## 2023-08-27 RX ADMIN — BUDESONIDE 500 MCG: 0.5 INHALANT RESPIRATORY (INHALATION) at 21:15

## 2023-08-27 RX ADMIN — DEXMEDETOMIDINE HYDROCHLORIDE 0.6 MCG/KG/HR: 400 INJECTION, SOLUTION INTRAVENOUS at 20:22

## 2023-08-27 RX ADMIN — SODIUM CHLORIDE, PRESERVATIVE FREE 40 MG: 5 INJECTION INTRAVENOUS at 08:40

## 2023-08-27 RX ADMIN — ALBUTEROL SULFATE 2.5 MG: 2.5 SOLUTION RESPIRATORY (INHALATION) at 21:15

## 2023-08-27 RX ADMIN — SODIUM CHLORIDE, PRESERVATIVE FREE 10 ML: 5 INJECTION INTRAVENOUS at 08:41

## 2023-08-27 RX ADMIN — ALBUTEROL SULFATE 2.5 MG: 2.5 SOLUTION RESPIRATORY (INHALATION) at 07:49

## 2023-08-27 RX ADMIN — DEXMEDETOMIDINE HYDROCHLORIDE 0.4 MCG/KG/HR: 400 INJECTION, SOLUTION INTRAVENOUS at 08:57

## 2023-08-27 RX ADMIN — Medication: at 08:41

## 2023-08-27 RX ADMIN — ALBUTEROL SULFATE 2.5 MG: 2.5 SOLUTION RESPIRATORY (INHALATION) at 17:19

## 2023-08-27 RX ADMIN — HYDROMORPHONE HYDROCHLORIDE 0.5 MG: 1 INJECTION, SOLUTION INTRAMUSCULAR; INTRAVENOUS; SUBCUTANEOUS at 08:59

## 2023-08-27 RX ADMIN — POTASSIUM CHLORIDE: 2 INJECTION, SOLUTION, CONCENTRATE INTRAVENOUS at 19:24

## 2023-08-27 RX ADMIN — ARFORMOTEROL TARTRATE 15 MCG: 15 SOLUTION RESPIRATORY (INHALATION) at 21:15

## 2023-08-27 RX ADMIN — HYDROMORPHONE HYDROCHLORIDE 0.5 MG: 1 INJECTION, SOLUTION INTRAMUSCULAR; INTRAVENOUS; SUBCUTANEOUS at 15:28

## 2023-08-27 RX ADMIN — CHLORHEXIDINE GLUCONATE 15 ML: 1.2 RINSE ORAL at 22:14

## 2023-08-27 RX ADMIN — ALBUTEROL SULFATE 2.5 MG: 2.5 SOLUTION RESPIRATORY (INHALATION) at 01:59

## 2023-08-27 RX ADMIN — MEROPENEM 1000 MG: 1 INJECTION, POWDER, FOR SOLUTION INTRAVENOUS at 17:52

## 2023-08-27 RX ADMIN — VANCOMYCIN HYDROCHLORIDE 1000 MG: 1 INJECTION, POWDER, LYOPHILIZED, FOR SOLUTION INTRAVENOUS at 08:37

## 2023-08-27 RX ADMIN — Medication: at 23:59

## 2023-08-27 RX ADMIN — Medication: at 00:12

## 2023-08-27 RX ADMIN — MEROPENEM 1000 MG: 1 INJECTION, POWDER, FOR SOLUTION INTRAVENOUS at 00:42

## 2023-08-27 RX ADMIN — HYDROMORPHONE HYDROCHLORIDE 0.5 MG: 1 INJECTION, SOLUTION INTRAMUSCULAR; INTRAVENOUS; SUBCUTANEOUS at 00:50

## 2023-08-27 RX ADMIN — Medication 10 ML: at 08:41

## 2023-08-27 RX ADMIN — MEROPENEM 1000 MG: 1 INJECTION, POWDER, FOR SOLUTION INTRAVENOUS at 08:37

## 2023-08-27 ASSESSMENT — PAIN SCALES - GENERAL
PAINLEVEL_OUTOF10: 1
PAINLEVEL_OUTOF10: 0
PAINLEVEL_OUTOF10: 8
PAINLEVEL_OUTOF10: 6

## 2023-08-27 ASSESSMENT — PULMONARY FUNCTION TESTS
PIF_VALUE: 32
PIF_VALUE: 30
PIF_VALUE: 29
PIF_VALUE: 30
PIF_VALUE: 30

## 2023-08-27 ASSESSMENT — PAIN DESCRIPTION - LOCATION: LOCATION: GENERALIZED

## 2023-08-27 NOTE — PLAN OF CARE
Problem: Skin/Tissue Integrity  Goal: Absence of new skin breakdown  Description: 1. Monitor for areas of redness and/or skin breakdown  2. Assess vascular access sites hourly  3. Every 4-6 hours minimum:  Change oxygen saturation probe site  4. Every 4-6 hours:  If on nasal continuous positive airway pressure, respiratory therapy assess nares and determine need for appliance change or resting period.   Outcome: Progressing     Problem: Discharge Planning  Goal: Discharge to home or other facility with appropriate resources  Outcome: Progressing  Flowsheets (Taken 8/26/2023 2000)  Discharge to home or other facility with appropriate resources:   Identify barriers to discharge with patient and caregiver   Arrange for needed discharge resources and transportation as appropriate   Identify discharge learning needs (meds, wound care, etc)     Problem: Safety - Adult  Goal: Free from fall injury  Outcome: Progressing     Problem: Chronic Conditions and Co-morbidities  Goal: Patient's chronic conditions and co-morbidity symptoms are monitored and maintained or improved  Outcome: Progressing  Flowsheets (Taken 8/26/2023 2000)  Care Plan - Patient's Chronic Conditions and Co-Morbidity Symptoms are Monitored and Maintained or Improved:   Monitor and assess patient's chronic conditions and comorbid symptoms for stability, deterioration, or improvement   Collaborate with multidisciplinary team to address chronic and comorbid conditions and prevent exacerbation or deterioration   Update acute care plan with appropriate goals if chronic or comorbid symptoms are exacerbated and prevent overall improvement and discharge     Problem: Cardiovascular - Adult  Goal: Maintains optimal cardiac output and hemodynamic stability  Outcome: Progressing  Flowsheets (Taken 8/26/2023 2000)  Maintains optimal cardiac output and hemodynamic stability:   Monitor blood pressure and heart rate   Monitor urine output and notify Licensed

## 2023-08-27 NOTE — PROGRESS NOTES
Spiritual Care Assessment/Progress Note  [facility]    Name: Evita Garcia MRN: [id number]    Age: 61 y.o. Sex: female   Language: English     Date: 8/27/2023            Total Time Calculated: 20 min              Spiritual Assessment begun in [address] [place] [address] Provided For[de-identified] Family  Referral/Consult From[de-identified] Palliative Care  Encounter Overview/Reason : Palliative Care    Spiritual beliefs:      [x] Involved in a ramez tradition/spiritual practice: Stefany Caller      [] Supported by a ramez community:      [] Claims no spiritual orientation:      [] Seeking spiritual identity:           [] Adheres to an individual form of spirituality:      [] Not able to assess:                Identified resources for coping and support system:   Support System: Family members       [x] Prayer                  [] Devotional reading               [] Music                  [] Guided Imagery     [] Pet visits                                        [] Other: (COMMENT)     Specific area/focus of visit   Encounter:    Crisis:    Spiritual/Emotional needs: Type: Spiritual Support  Ritual, Rites and Sacraments:    Grief, Loss, and Adjustments: Type: Adjustment to illness  Ethics/Mediation:    Behavioral Health:    Palliative Care: Type: Palliative Care, Family Care  Advance Care Planning:      Visited patient for palliative initial spiritual assessment. The patient is on vent support. Her brother Hipolito Avila and his wife were at bedside. He was tearful as he spoke of her illness and lengthy stay. He did not choose to engage a lengthy conversation, only requested that she be remembered in prayer.     Telma Noriega , MDiv, MS, [facility]

## 2023-08-27 NOTE — PROGRESS NOTES
Pharmacist Note - Vancomycin Dosing  Therapy day 22  Indication: intra-abdominal abscess, possible peritonitis, sacral decubitus wound  Current regimen: Dosing by levels- most recent dose 1250 mg x1 on 8/23 @ 12:32    Recent Labs     08/25/23  0418 08/26/23  0054 08/27/23  0547   WBC 22.4* 23.3* 21.1*   CREATININE 1.09* 0.97 0.76   BUN 65* 63* 62*       A random vancomycin level of 10.9 mcg/mL was obtained approximately 89 hours post dose. Goal target range Trough 10-15 mcg/mL      Plan: Random level this morning within goal of 10-15 mcg/mL. Will order one time dose of vancomycin 1000 mg IV. Continue current regimen of dosing by levels. Pharmacy will continue to monitor this patient daily for changes in clinical status and renal function.

## 2023-08-28 ENCOUNTER — APPOINTMENT (OUTPATIENT)
Facility: HOSPITAL | Age: 63
DRG: 004 | End: 2023-08-28
Payer: MEDICARE

## 2023-08-28 LAB
ANION GAP SERPL CALC-SCNC: 4 MMOL/L (ref 5–15)
ARTERIAL PATENCY WRIST A: POSITIVE
BASE EXCESS BLD CALC-SCNC: 0.7 MMOL/L
BDY SITE: NORMAL
BUN SERPL-MCNC: 60 MG/DL (ref 6–20)
BUN/CREAT SERPL: 92 (ref 12–20)
CALCIUM SERPL-MCNC: 8.8 MG/DL (ref 8.5–10.1)
CHLORIDE SERPL-SCNC: 117 MMOL/L (ref 97–108)
CO2 SERPL-SCNC: 25 MMOL/L (ref 21–32)
CREAT SERPL-MCNC: 0.65 MG/DL (ref 0.55–1.02)
GAS FLOW.O2 O2 DELIVERY SYS: NORMAL
GAS FLOW.O2 SETTING OXYMISER: 20 BPM
GLUCOSE BLD STRIP.AUTO-MCNC: 145 MG/DL (ref 65–117)
GLUCOSE BLD STRIP.AUTO-MCNC: 149 MG/DL (ref 65–117)
GLUCOSE BLD STRIP.AUTO-MCNC: 150 MG/DL (ref 65–117)
GLUCOSE BLD STRIP.AUTO-MCNC: 150 MG/DL (ref 65–117)
GLUCOSE BLD STRIP.AUTO-MCNC: 154 MG/DL (ref 65–117)
GLUCOSE SERPL-MCNC: 135 MG/DL (ref 65–100)
HCO3 BLD-SCNC: 25.7 MMOL/L (ref 22–26)
INSPIRATION.DURATION SETTING TIME VENT: 0.6 SEC
IPAP/PIP/HIGH PEEP: 22
MAGNESIUM SERPL-MCNC: 1.7 MG/DL (ref 1.6–2.4)
O2/TOTAL GAS SETTING VFR VENT: 30 %
PAW @ MEAN EXP FLOW ON VENT: 11 CMH2O
PCO2 BLD: 42.2 MMHG (ref 35–45)
PEEP RESPIRATORY: 5 CMH2O
PH BLD: 7.39 (ref 7.35–7.45)
PHOSPHATE SERPL-MCNC: 3 MG/DL (ref 2.6–4.7)
PO2 BLD: 84 MMHG (ref 80–100)
POTASSIUM SERPL-SCNC: 3.8 MMOL/L (ref 3.5–5.1)
SAO2 % BLD: 96.1 % (ref 92–97)
SERVICE CMNT-IMP: ABNORMAL
SODIUM SERPL-SCNC: 146 MMOL/L (ref 136–145)
SPECIMEN TYPE: NORMAL
VENTILATION MODE VENT: NORMAL

## 2023-08-28 PROCEDURE — 71045 X-RAY EXAM CHEST 1 VIEW: CPT

## 2023-08-28 PROCEDURE — 94640 AIRWAY INHALATION TREATMENT: CPT

## 2023-08-28 PROCEDURE — 6360000002 HC RX W HCPCS: Performed by: ANESTHESIOLOGY

## 2023-08-28 PROCEDURE — 2500000003 HC RX 250 WO HCPCS: Performed by: NURSE PRACTITIONER

## 2023-08-28 PROCEDURE — 2580000003 HC RX 258: Performed by: SURGERY

## 2023-08-28 PROCEDURE — 2000000000 HC ICU R&B

## 2023-08-28 PROCEDURE — 2580000003 HC RX 258: Performed by: NURSE PRACTITIONER

## 2023-08-28 PROCEDURE — 6360000002 HC RX W HCPCS: Performed by: NURSE PRACTITIONER

## 2023-08-28 PROCEDURE — 6360000002 HC RX W HCPCS: Performed by: STUDENT IN AN ORGANIZED HEALTH CARE EDUCATION/TRAINING PROGRAM

## 2023-08-28 PROCEDURE — 83735 ASSAY OF MAGNESIUM: CPT

## 2023-08-28 PROCEDURE — 6370000000 HC RX 637 (ALT 250 FOR IP): Performed by: SURGERY

## 2023-08-28 PROCEDURE — 82962 GLUCOSE BLOOD TEST: CPT

## 2023-08-28 PROCEDURE — 80048 BASIC METABOLIC PNL TOTAL CA: CPT

## 2023-08-28 PROCEDURE — 36415 COLL VENOUS BLD VENIPUNCTURE: CPT

## 2023-08-28 PROCEDURE — 97605 NEG PRS WND THER DME<=50SQCM: CPT

## 2023-08-28 PROCEDURE — 2580000003 HC RX 258: Performed by: STUDENT IN AN ORGANIZED HEALTH CARE EDUCATION/TRAINING PROGRAM

## 2023-08-28 PROCEDURE — 94003 VENT MGMT INPAT SUBQ DAY: CPT

## 2023-08-28 PROCEDURE — 82803 BLOOD GASES ANY COMBINATION: CPT

## 2023-08-28 PROCEDURE — 84100 ASSAY OF PHOSPHORUS: CPT

## 2023-08-28 PROCEDURE — C9113 INJ PANTOPRAZOLE SODIUM, VIA: HCPCS | Performed by: NURSE PRACTITIONER

## 2023-08-28 PROCEDURE — A4216 STERILE WATER/SALINE, 10 ML: HCPCS | Performed by: NURSE PRACTITIONER

## 2023-08-28 PROCEDURE — 36600 WITHDRAWAL OF ARTERIAL BLOOD: CPT

## 2023-08-28 RX ADMIN — MEROPENEM 1000 MG: 1 INJECTION, POWDER, FOR SOLUTION INTRAVENOUS at 00:49

## 2023-08-28 RX ADMIN — HYDROMORPHONE HYDROCHLORIDE 0.5 MG: 1 INJECTION, SOLUTION INTRAMUSCULAR; INTRAVENOUS; SUBCUTANEOUS at 16:58

## 2023-08-28 RX ADMIN — SODIUM CHLORIDE, PRESERVATIVE FREE 10 ML: 5 INJECTION INTRAVENOUS at 09:33

## 2023-08-28 RX ADMIN — SODIUM CHLORIDE, PRESERVATIVE FREE 10 ML: 5 INJECTION INTRAVENOUS at 21:03

## 2023-08-28 RX ADMIN — CHLORHEXIDINE GLUCONATE 15 ML: 1.2 RINSE ORAL at 09:00

## 2023-08-28 RX ADMIN — ALBUTEROL SULFATE 2.5 MG: 2.5 SOLUTION RESPIRATORY (INHALATION) at 13:17

## 2023-08-28 RX ADMIN — ALBUTEROL SULFATE 2.5 MG: 2.5 SOLUTION RESPIRATORY (INHALATION) at 07:54

## 2023-08-28 RX ADMIN — MEROPENEM 1000 MG: 1 INJECTION, POWDER, FOR SOLUTION INTRAVENOUS at 09:32

## 2023-08-28 RX ADMIN — Medication 10 ML: at 21:03

## 2023-08-28 RX ADMIN — HYDROMORPHONE HYDROCHLORIDE 0.5 MG: 1 INJECTION, SOLUTION INTRAMUSCULAR; INTRAVENOUS; SUBCUTANEOUS at 20:14

## 2023-08-28 RX ADMIN — Medication: at 08:00

## 2023-08-28 RX ADMIN — CHLORHEXIDINE GLUCONATE 15 ML: 1.2 RINSE ORAL at 21:03

## 2023-08-28 RX ADMIN — HYDROMORPHONE HYDROCHLORIDE 0.5 MG: 1 INJECTION, SOLUTION INTRAMUSCULAR; INTRAVENOUS; SUBCUTANEOUS at 13:45

## 2023-08-28 RX ADMIN — Medication: at 16:00

## 2023-08-28 RX ADMIN — DEXMEDETOMIDINE HYDROCHLORIDE 0.7 MCG/KG/HR: 400 INJECTION, SOLUTION INTRAVENOUS at 05:03

## 2023-08-28 RX ADMIN — SODIUM CHLORIDE, PRESERVATIVE FREE 40 MG: 5 INJECTION INTRAVENOUS at 09:33

## 2023-08-28 RX ADMIN — BUDESONIDE 500 MCG: 0.5 INHALANT RESPIRATORY (INHALATION) at 07:54

## 2023-08-28 RX ADMIN — HYDROMORPHONE HYDROCHLORIDE 0.5 MG: 1 INJECTION, SOLUTION INTRAMUSCULAR; INTRAVENOUS; SUBCUTANEOUS at 09:33

## 2023-08-28 RX ADMIN — DEXMEDETOMIDINE HYDROCHLORIDE 0.7 MCG/KG/HR: 400 INJECTION, SOLUTION INTRAVENOUS at 13:40

## 2023-08-28 RX ADMIN — POTASSIUM CHLORIDE: 2 INJECTION, SOLUTION, CONCENTRATE INTRAVENOUS at 18:49

## 2023-08-28 RX ADMIN — ALBUTEROL SULFATE 2.5 MG: 2.5 SOLUTION RESPIRATORY (INHALATION) at 20:26

## 2023-08-28 RX ADMIN — MEROPENEM 1000 MG: 1 INJECTION, POWDER, FOR SOLUTION INTRAVENOUS at 16:58

## 2023-08-28 RX ADMIN — BUDESONIDE 500 MCG: 0.5 INHALANT RESPIRATORY (INHALATION) at 20:26

## 2023-08-28 RX ADMIN — Medication 10 ML: at 09:00

## 2023-08-28 RX ADMIN — I.V. FAT EMULSION 250 ML: 20 EMULSION INTRAVENOUS at 18:55

## 2023-08-28 RX ADMIN — DEXMEDETOMIDINE HYDROCHLORIDE 0.7 MCG/KG/HR: 400 INJECTION, SOLUTION INTRAVENOUS at 22:07

## 2023-08-28 RX ADMIN — Medication: at 23:34

## 2023-08-28 RX ADMIN — ARFORMOTEROL TARTRATE 15 MCG: 15 SOLUTION RESPIRATORY (INHALATION) at 07:54

## 2023-08-28 RX ADMIN — HYDROMORPHONE HYDROCHLORIDE 0.5 MG: 1 INJECTION, SOLUTION INTRAMUSCULAR; INTRAVENOUS; SUBCUTANEOUS at 22:20

## 2023-08-28 RX ADMIN — HYDROMORPHONE HYDROCHLORIDE 0.5 MG: 1 INJECTION, SOLUTION INTRAMUSCULAR; INTRAVENOUS; SUBCUTANEOUS at 03:37

## 2023-08-28 RX ADMIN — SODIUM CHLORIDE 100 MG: 9 INJECTION, SOLUTION INTRAVENOUS at 09:33

## 2023-08-28 RX ADMIN — ARFORMOTEROL TARTRATE 15 MCG: 15 SOLUTION RESPIRATORY (INHALATION) at 20:26

## 2023-08-28 RX ADMIN — ALBUTEROL SULFATE 2.5 MG: 2.5 SOLUTION RESPIRATORY (INHALATION) at 03:15

## 2023-08-28 ASSESSMENT — PAIN SCALES - GENERAL
PAINLEVEL_OUTOF10: 6
PAINLEVEL_OUTOF10: 0
PAINLEVEL_OUTOF10: 0
PAINLEVEL_OUTOF10: 5
PAINLEVEL_OUTOF10: 2
PAINLEVEL_OUTOF10: 4
PAINLEVEL_OUTOF10: 0
PAINLEVEL_OUTOF10: 8
PAINLEVEL_OUTOF10: 0
PAINLEVEL_OUTOF10: 1

## 2023-08-28 ASSESSMENT — PAIN DESCRIPTION - ORIENTATION: ORIENTATION: ANTERIOR

## 2023-08-28 ASSESSMENT — PULMONARY FUNCTION TESTS
PIF_VALUE: 31
PIF_VALUE: 31
PIF_VALUE: 32
PIF_VALUE: 31
PIF_VALUE: 30
PIF_VALUE: 31

## 2023-08-28 ASSESSMENT — ENCOUNTER SYMPTOMS
SHORTNESS OF BREATH: 0
ABDOMINAL PAIN: 1

## 2023-08-28 ASSESSMENT — PAIN DESCRIPTION - LOCATION: LOCATION: ABDOMEN

## 2023-08-28 ASSESSMENT — PAIN DESCRIPTION - DESCRIPTORS
DESCRIPTORS: OTHER (COMMENT)
DESCRIPTORS: PATIENT UNABLE TO DESCRIBE

## 2023-08-28 NOTE — PROGRESS NOTES
Bedside and Verbal shift change report given to Nixon Lopez RN (oncoming nurse) by Monty Pascual RN (offgoing nurse). Report included the following information Nurse Handoff Report, Adult Overview, Intake/Output, MAR, Recent Results, Cardiac Rhythm  , Alarm Parameters, and Neuro Assessment. 1100: Dieterian     12:00 Wound care at bedside     1600: Family at bedside. Niranjan Milligan NP updated family. Per family request to discuss the pt's progression of care, a family meeting is scheduled for Wed 8/30/23 at .

## 2023-08-28 NOTE — PROGRESS NOTES
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 difficile colitis, s/p ex lap with no signs of perforation, EGD revealing GJ stricture, s/p 2 mm dilatation, started on anticoagulation for DVT) [facility], developed hematemesis/GIB     BRIEF PATIENT SUMMARY   45-year-old female who presented to the emergency department  from [facility] for weakness and Hematemesis. 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 She was readmitted to [facility] ( through ) at which time she underwent EGD revealing GJ stricture. CT abdomen/pelvis demonstrated loculated intraperitoneal fluid in the anterior abdomen with persistent free air.   she underwent US guided drainage +E. coli. She completed a course of Zosyn and drain removed 8/10.   G-tube was placed.  patient transferred to the ICU in the setting of acute hypoxic respiratory failure d/t pulmonary edema requiring diuresis and eventual intubation ( - ). Patient reintubated  through current for worsening hypoxic respiratory failure. Course further complicated by persistent leukocytosis, fevers and CT evidence of intra-abdominal fluid collection post ultrasound drainage.   Repeat imaging  did not reveal any drainable

## 2023-08-28 NOTE — WOUND CARE
WOCN Note:     Follow up assessment of wounds and change VAC. Chart reviewed. Assessed in room 7120 with RN. Admitted DX:  Hematemesis;GI bleed; Gastrointestinal hemorrhage; cdiff    Past Medical History: gastric bypass; asthma; cad; dm2  [facility]    Assessment:   Patient is intubated, opens eyes intermittently and requires assist with repositioning. Bed: cecelia  Patient has a engle. Patient repositioned on right side with pillow. Generalized edema lower legs and feet. Heels offloaded with pillows. Generalized weepy skin. 1. POA Sacrum and bilateral buttocks, Unstageable Pressure Injury: 6.8 x 7 x 1.1 cm; undermining 2.1 cm at 4 - 8 o' clock; 60% tan and brown; 40% red; 125 cc serosang drainage in canister; no odor; denudation to distal constantine wound; constantine wound edges hyperpigmented. Cleansed with Vashe; applied silver foam to distal constantine wound; resumed NPWT at 125 using 1 white foam to wound bed and 1 black bridged to left side. 2. Right heel, Unstageable Pressure Injury with deflated bullae:  1 x 1 x 0 cm. Applied Venelex. 3.  Left heel, Deep tissue pressure injury:  0.8  x 0.8 x 0 cm. Applied Venelex. Wound, Pressure Prevention & Skin Care Recommendations:    Minimize layers of linen/pads under patient to optimize support surface. 2.  Turn/reposition approximately every 2 hours and offload heels. 3.  Manage moisture/ Keep skin folds clean and dry/minimize brief usage. 4.  Specialty bed:[facility].  5.  Sacrum:  Continue NPWT at 125 mmHg with changes twice weekly. 6.  Heels:  Apply Venelex BID and protect with foam dressings. Discussed above plan with RN. Transition of Care:   Plan to follow Monday and Thursday for VAC changes.     ONEL Evans RN [facility]patient [facility]  Available on [employer]  Office [phone]

## 2023-08-28 NOTE — PROGRESS NOTES
RT Note:  Vent assessment     08/27/23 4794   Vent Information   Vent Mode AC/PC   Ventilator Settings   FiO2  (S)  35 %  (weaned to 30% per protocol)   Insp Time (sec) 0.6 sec  (1:4)   Resp Rate (Set) 20 bmp   PEEP/CPAP (cmH2O) 5   Target Vt 350   Pressure Ordered 22   Vent Patient Data (Readings)   Vt (Measured) 330 mL   Peak Inspiratory Pressure (cmH2O) 32 cmH2O   Rate Measured 30 br/min   Minute Volume (L/min) 9.87 Liters   Mean Airway Pressure (cmH2O) 13 cmH20   Plateau Pressure (cm H2O) 24 cm H2O   Driving Pressure 19   I:E Ratio 1:2.3   Pressure Sensitivity 2 cm H2O   PEEP Intrinsic (cm H2O)   (unable to obtain d/t tachypnea)   Static Compliance (L/cm H2O) 14   Insp Rise Time (%) 70 %   Treatment   $Treatment Type $Inhaled Therapy/Meds

## 2023-08-28 NOTE — PROGRESS NOTES
I participated in the IDT meeting where the plan of care for Radha Barton was discussed. I reviewed the patient's medical record prior to this meeting. We will continue to follow for spiritual/emotional care. Please contact  paging service for any immediate needs. _____________________________  Olivier Wilkins M.Div., M.S., B.C.C.   Staff

## 2023-08-29 LAB
ANION GAP SERPL CALC-SCNC: 5 MMOL/L (ref 5–15)
BASOPHILS # BLD: 0.2 K/UL (ref 0–0.1)
BASOPHILS NFR BLD: 1 % (ref 0–1)
BUN SERPL-MCNC: 58 MG/DL (ref 6–20)
BUN/CREAT SERPL: 107 (ref 12–20)
CALCIUM SERPL-MCNC: 8.9 MG/DL (ref 8.5–10.1)
CHLORIDE SERPL-SCNC: 117 MMOL/L (ref 97–108)
CO2 SERPL-SCNC: 25 MMOL/L (ref 21–32)
CREAT SERPL-MCNC: 0.54 MG/DL (ref 0.55–1.02)
DIFFERENTIAL METHOD BLD: ABNORMAL
EOSINOPHIL # BLD: 0.4 K/UL (ref 0–0.4)
EOSINOPHIL NFR BLD: 2 % (ref 0–7)
ERYTHROCYTE [DISTWIDTH] IN BLOOD BY AUTOMATED COUNT: 17.2 % (ref 11.5–14.5)
GLUCOSE BLD STRIP.AUTO-MCNC: 122 MG/DL (ref 65–117)
GLUCOSE BLD STRIP.AUTO-MCNC: 131 MG/DL (ref 65–117)
GLUCOSE SERPL-MCNC: 116 MG/DL (ref 65–100)
HCT VFR BLD AUTO: 24.2 % (ref 35–47)
HGB BLD-MCNC: 7.5 G/DL (ref 11.5–16)
IMM GRANULOCYTES # BLD AUTO: 0 K/UL
IMM GRANULOCYTES NFR BLD AUTO: 0 %
LYMPHOCYTES # BLD: 0.8 K/UL (ref 0.8–3.5)
LYMPHOCYTES NFR BLD: 4 % (ref 12–49)
MAGNESIUM SERPL-MCNC: 1.6 MG/DL (ref 1.6–2.4)
MCH RBC QN AUTO: 29.4 PG (ref 26–34)
MCHC RBC AUTO-ENTMCNC: 31 G/DL (ref 30–36.5)
MCV RBC AUTO: 94.9 FL (ref 80–99)
METAMYELOCYTES NFR BLD MANUAL: 1 %
MONOCYTES # BLD: 1.8 K/UL (ref 0–1)
MONOCYTES NFR BLD: 9 % (ref 5–13)
MYELOCYTES NFR BLD MANUAL: 4 %
NEUTS SEG # BLD: 16.2 K/UL (ref 1.8–8)
NEUTS SEG NFR BLD: 79 % (ref 32–75)
NRBC # BLD: 0 K/UL (ref 0–0.01)
NRBC BLD-RTO: 0 PER 100 WBC
PHOSPHATE SERPL-MCNC: 2.6 MG/DL (ref 2.6–4.7)
PLATELET # BLD AUTO: 593 K/UL (ref 150–400)
PMV BLD AUTO: 9.7 FL (ref 8.9–12.9)
POTASSIUM SERPL-SCNC: 3.6 MMOL/L (ref 3.5–5.1)
RBC # BLD AUTO: 2.55 M/UL (ref 3.8–5.2)
RBC MORPH BLD: ABNORMAL
SERVICE CMNT-IMP: ABNORMAL
SERVICE CMNT-IMP: ABNORMAL
SODIUM SERPL-SCNC: 147 MMOL/L (ref 136–145)
VANCOMYCIN SERPL-MCNC: 9.6 UG/ML
WBC # BLD AUTO: 20.5 K/UL (ref 3.6–11)

## 2023-08-29 PROCEDURE — 2500000003 HC RX 250 WO HCPCS: Performed by: NURSE PRACTITIONER

## 2023-08-29 PROCEDURE — 94640 AIRWAY INHALATION TREATMENT: CPT

## 2023-08-29 PROCEDURE — 6360000002 HC RX W HCPCS: Performed by: NURSE PRACTITIONER

## 2023-08-29 PROCEDURE — 80202 ASSAY OF VANCOMYCIN: CPT

## 2023-08-29 PROCEDURE — 82962 GLUCOSE BLOOD TEST: CPT

## 2023-08-29 PROCEDURE — 84100 ASSAY OF PHOSPHORUS: CPT

## 2023-08-29 PROCEDURE — 36415 COLL VENOUS BLD VENIPUNCTURE: CPT

## 2023-08-29 PROCEDURE — 80048 BASIC METABOLIC PNL TOTAL CA: CPT

## 2023-08-29 PROCEDURE — 6360000002 HC RX W HCPCS: Performed by: ANESTHESIOLOGY

## 2023-08-29 PROCEDURE — 85025 COMPLETE CBC W/AUTO DIFF WBC: CPT

## 2023-08-29 PROCEDURE — 2580000003 HC RX 258: Performed by: SURGERY

## 2023-08-29 PROCEDURE — 2580000003 HC RX 258: Performed by: NURSE PRACTITIONER

## 2023-08-29 PROCEDURE — A4216 STERILE WATER/SALINE, 10 ML: HCPCS | Performed by: NURSE PRACTITIONER

## 2023-08-29 PROCEDURE — C9113 INJ PANTOPRAZOLE SODIUM, VIA: HCPCS | Performed by: NURSE PRACTITIONER

## 2023-08-29 PROCEDURE — 94003 VENT MGMT INPAT SUBQ DAY: CPT

## 2023-08-29 PROCEDURE — 83735 ASSAY OF MAGNESIUM: CPT

## 2023-08-29 PROCEDURE — 6360000002 HC RX W HCPCS: Performed by: STUDENT IN AN ORGANIZED HEALTH CARE EDUCATION/TRAINING PROGRAM

## 2023-08-29 PROCEDURE — 2000000000 HC ICU R&B

## 2023-08-29 PROCEDURE — 6370000000 HC RX 637 (ALT 250 FOR IP): Performed by: SURGERY

## 2023-08-29 RX ADMIN — SODIUM CHLORIDE, PRESERVATIVE FREE 40 MG: 5 INJECTION INTRAVENOUS at 08:53

## 2023-08-29 RX ADMIN — SODIUM CHLORIDE, PRESERVATIVE FREE 10 ML: 5 INJECTION INTRAVENOUS at 08:54

## 2023-08-29 RX ADMIN — CHLORHEXIDINE GLUCONATE 15 ML: 1.2 RINSE ORAL at 08:56

## 2023-08-29 RX ADMIN — HYDROMORPHONE HYDROCHLORIDE 0.5 MG: 1 INJECTION, SOLUTION INTRAMUSCULAR; INTRAVENOUS; SUBCUTANEOUS at 11:25

## 2023-08-29 RX ADMIN — DEXMEDETOMIDINE HYDROCHLORIDE 0.7 MCG/KG/HR: 400 INJECTION, SOLUTION INTRAVENOUS at 06:03

## 2023-08-29 RX ADMIN — ALBUTEROL SULFATE 2.5 MG: 2.5 SOLUTION RESPIRATORY (INHALATION) at 07:22

## 2023-08-29 RX ADMIN — ALBUTEROL SULFATE 2.5 MG: 2.5 SOLUTION RESPIRATORY (INHALATION) at 20:29

## 2023-08-29 RX ADMIN — MEROPENEM 1000 MG: 1 INJECTION, POWDER, FOR SOLUTION INTRAVENOUS at 08:53

## 2023-08-29 RX ADMIN — Medication: at 16:30

## 2023-08-29 RX ADMIN — HYDROMORPHONE HYDROCHLORIDE 0.5 MG: 1 INJECTION, SOLUTION INTRAMUSCULAR; INTRAVENOUS; SUBCUTANEOUS at 04:24

## 2023-08-29 RX ADMIN — BUDESONIDE 500 MCG: 0.5 INHALANT RESPIRATORY (INHALATION) at 20:29

## 2023-08-29 RX ADMIN — CHLORHEXIDINE GLUCONATE 15 ML: 1.2 RINSE ORAL at 20:07

## 2023-08-29 RX ADMIN — HYDROMORPHONE HYDROCHLORIDE 0.5 MG: 1 INJECTION, SOLUTION INTRAMUSCULAR; INTRAVENOUS; SUBCUTANEOUS at 13:16

## 2023-08-29 RX ADMIN — SODIUM CHLORIDE 100 MG: 9 INJECTION, SOLUTION INTRAVENOUS at 11:12

## 2023-08-29 RX ADMIN — HYDROMORPHONE HYDROCHLORIDE 0.5 MG: 1 INJECTION, SOLUTION INTRAMUSCULAR; INTRAVENOUS; SUBCUTANEOUS at 00:04

## 2023-08-29 RX ADMIN — POTASSIUM CHLORIDE: 2 INJECTION, SOLUTION, CONCENTRATE INTRAVENOUS at 19:02

## 2023-08-29 RX ADMIN — Medication 10 ML: at 08:54

## 2023-08-29 RX ADMIN — HYDROMORPHONE HYDROCHLORIDE 0.5 MG: 1 INJECTION, SOLUTION INTRAMUSCULAR; INTRAVENOUS; SUBCUTANEOUS at 06:02

## 2023-08-29 RX ADMIN — VANCOMYCIN HYDROCHLORIDE 1000 MG: 1 INJECTION, POWDER, LYOPHILIZED, FOR SOLUTION INTRAVENOUS at 08:52

## 2023-08-29 RX ADMIN — BUDESONIDE 500 MCG: 0.5 INHALANT RESPIRATORY (INHALATION) at 07:22

## 2023-08-29 RX ADMIN — HYDROMORPHONE HYDROCHLORIDE 0.5 MG: 1 INJECTION, SOLUTION INTRAMUSCULAR; INTRAVENOUS; SUBCUTANEOUS at 17:15

## 2023-08-29 RX ADMIN — MEROPENEM 1000 MG: 1 INJECTION, POWDER, FOR SOLUTION INTRAVENOUS at 16:29

## 2023-08-29 RX ADMIN — HYDROMORPHONE HYDROCHLORIDE 0.5 MG: 1 INJECTION, SOLUTION INTRAMUSCULAR; INTRAVENOUS; SUBCUTANEOUS at 20:06

## 2023-08-29 RX ADMIN — ALBUTEROL SULFATE 2.5 MG: 2.5 SOLUTION RESPIRATORY (INHALATION) at 02:59

## 2023-08-29 RX ADMIN — Medication: at 08:55

## 2023-08-29 RX ADMIN — ARFORMOTEROL TARTRATE 15 MCG: 15 SOLUTION RESPIRATORY (INHALATION) at 07:22

## 2023-08-29 RX ADMIN — DEXMEDETOMIDINE HYDROCHLORIDE 0.8 MCG/KG/HR: 400 INJECTION, SOLUTION INTRAVENOUS at 23:15

## 2023-08-29 RX ADMIN — ALBUTEROL SULFATE 2.5 MG: 2.5 SOLUTION RESPIRATORY (INHALATION) at 17:37

## 2023-08-29 RX ADMIN — HYDROMORPHONE HYDROCHLORIDE 0.5 MG: 1 INJECTION, SOLUTION INTRAMUSCULAR; INTRAVENOUS; SUBCUTANEOUS at 08:53

## 2023-08-29 RX ADMIN — ARFORMOTEROL TARTRATE 15 MCG: 15 SOLUTION RESPIRATORY (INHALATION) at 20:29

## 2023-08-29 ASSESSMENT — PULMONARY FUNCTION TESTS
PIF_VALUE: 28
PIF_VALUE: 27
PIF_VALUE: 28
PIF_VALUE: 26
PIF_VALUE: 28
PIF_VALUE: 26

## 2023-08-29 ASSESSMENT — ENCOUNTER SYMPTOMS
ABDOMINAL PAIN: 1
SHORTNESS OF BREATH: 0

## 2023-08-29 ASSESSMENT — PAIN SCALES - GENERAL
PAINLEVEL_OUTOF10: 4
PAINLEVEL_OUTOF10: 5
PAINLEVEL_OUTOF10: 4
PAINLEVEL_OUTOF10: 0
PAINLEVEL_OUTOF10: 0
PAINLEVEL_OUTOF10: 8
PAINLEVEL_OUTOF10: 0
PAINLEVEL_OUTOF10: 0

## 2023-08-29 ASSESSMENT — PAIN DESCRIPTION - DESCRIPTORS: DESCRIPTORS: PATIENT UNABLE TO DESCRIBE

## 2023-08-29 ASSESSMENT — PAIN DESCRIPTION - LOCATION
LOCATION: GENERALIZED
LOCATION: ABDOMEN

## 2023-08-29 NOTE — CARE COORDINATION
Transition of Care Plan:    RUR: 24 % High  Prior Level of Functioning: Max assist with ADL's  Disposition: TBD  DME needed: TBD  Transportation at discharge: BLS vs ALS  IM/IMM Medicare 7/29/23   Is patient a  and connected with [facility]? No  Caregiver Contact:  Doug Godfrey [phone]  Discharge Caregiver contacted prior to discharge? Care Conference needed? No  Barriers to discharge:    Vented   Accordion drain, G tube  Wound Care following for Wound Vac   TPN    Per notes patient unable to tolerate SBT's, will meet with family to discuss potential trach placement.   Francia Emmanuel RN,Care Management

## 2023-08-29 NOTE — PROGRESS NOTES
2000- Bedside report completed, pt assessed. Patient nodding to question about pain, appears to be abdominal pain and patient would like to be medicated  2100- RT in room, adjusted   2200- while in room to reassess, patient tapped on bed with right hand to grab writer's attention. After some questions with patient nodding and shaking head appropriately, patient indicated she was in pain again, abdominal pain, worse in severity than earlier and she would like to be medicated. Medication was given and patient hair was washed and combed after obtaining consent to proceed. 2330- Writer in room, checking blood sugar and heels. Pt opened eyes and shook head appearig to want to grab attention, after some questioning patient would like some pain medication, pt was told it wasn't due yet but if pain intolerable we can contact MD. Patient communicated she can wait. Pain med is available every 2 hours, will return closer to med due time to assess patient needs. Pt turned to supine position  0000- pain reassessed with patient and she would like to receive her PRN pain medication.  Med was given and pt declined turn at this moment, mouth care completed

## 2023-08-29 NOTE — PROGRESS NOTES
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 difficile colitis, s/p ex lap with no signs of perforation, EGD revealing GJ stricture, s/p 2 mm dilatation, started on anticoagulation for DVT) [facility], developed hematemesis/GIB     BRIEF PATIENT SUMMARY   42-year-old female who presented to the emergency department  from [place] for weakness and Hematemesis. 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 She was readmitted to [facility] ( through ) at which time she underwent EGD revealing GJ stricture. CT abdomen/pelvis demonstrated loculated intraperitoneal fluid in the anterior abdomen with persistent free air.   she underwent US guided drainage +E. coli. She completed a course of Zosyn and drain removed 8/10.   G-tube was placed.  patient transferred to the ICU in the setting of acute hypoxic respiratory failure d/t pulmonary edema requiring diuresis and eventual intubation ( - ). Patient reintubated  through current for worsening hypoxic respiratory failure. Course further complicated by persistent leukocytosis, fevers and CT evidence of intra-abdominal fluid collection post ultrasound drainage.   Repeat imaging  did not reveal any drainable to intervene urgently. I participated in the decision-making and personally managed or directed the management of the following life and organ supporting interventions that required my frequent assessment to treat or prevent imminent deterioration. I personally spent 45 minutes of critical care time. This is time spent at this critically ill patient's bedside actively involved in patient care as well as the coordination of care. This does not include any procedural time which has been billed separately.     Yesika Billingsley, APRN - CNP   Critical Care Medicine  [facility] [place]

## 2023-08-30 ENCOUNTER — APPOINTMENT (OUTPATIENT)
Facility: HOSPITAL | Age: 63
DRG: 004 | End: 2023-08-30
Payer: MEDICARE

## 2023-08-30 LAB
ANION GAP SERPL CALC-SCNC: 3 MMOL/L (ref 5–15)
BUN SERPL-MCNC: 60 MG/DL (ref 6–20)
BUN/CREAT SERPL: 128 (ref 12–20)
CALCIUM SERPL-MCNC: 9.1 MG/DL (ref 8.5–10.1)
CHLORIDE SERPL-SCNC: 116 MMOL/L (ref 97–108)
CO2 SERPL-SCNC: 26 MMOL/L (ref 21–32)
CREAT SERPL-MCNC: 0.47 MG/DL (ref 0.55–1.02)
ERYTHROCYTE [DISTWIDTH] IN BLOOD BY AUTOMATED COUNT: 17.1 % (ref 11.5–14.5)
GLUCOSE BLD STRIP.AUTO-MCNC: 120 MG/DL (ref 65–117)
GLUCOSE BLD STRIP.AUTO-MCNC: 124 MG/DL (ref 65–117)
GLUCOSE BLD STRIP.AUTO-MCNC: 129 MG/DL (ref 65–117)
GLUCOSE BLD STRIP.AUTO-MCNC: 130 MG/DL (ref 65–117)
GLUCOSE BLD STRIP.AUTO-MCNC: 134 MG/DL (ref 65–117)
GLUCOSE SERPL-MCNC: 122 MG/DL (ref 65–100)
HCT VFR BLD AUTO: 21.6 % (ref 35–47)
HCT VFR BLD AUTO: 28 % (ref 35–47)
HGB BLD-MCNC: 6.5 G/DL (ref 11.5–16)
HGB BLD-MCNC: 8.8 G/DL (ref 11.5–16)
HISTORY CHECK: NORMAL
MAGNESIUM SERPL-MCNC: 1.4 MG/DL (ref 1.6–2.4)
MCH RBC QN AUTO: 28.8 PG (ref 26–34)
MCHC RBC AUTO-ENTMCNC: 30.1 G/DL (ref 30–36.5)
MCV RBC AUTO: 95.6 FL (ref 80–99)
NRBC # BLD: 0 K/UL (ref 0–0.01)
NRBC BLD-RTO: 0 PER 100 WBC
PHOSPHATE SERPL-MCNC: 2.2 MG/DL (ref 2.6–4.7)
PLATELET # BLD AUTO: 567 K/UL (ref 150–400)
PMV BLD AUTO: 9.9 FL (ref 8.9–12.9)
POTASSIUM SERPL-SCNC: 3.6 MMOL/L (ref 3.5–5.1)
RBC # BLD AUTO: 2.26 M/UL (ref 3.8–5.2)
SERVICE CMNT-IMP: ABNORMAL
SODIUM SERPL-SCNC: 145 MMOL/L (ref 136–145)
VANCOMYCIN SERPL-MCNC: 15.3 UG/ML
WBC # BLD AUTO: 16 K/UL (ref 3.6–11)

## 2023-08-30 PROCEDURE — 83735 ASSAY OF MAGNESIUM: CPT

## 2023-08-30 PROCEDURE — 85027 COMPLETE CBC AUTOMATED: CPT

## 2023-08-30 PROCEDURE — 82962 GLUCOSE BLOOD TEST: CPT

## 2023-08-30 PROCEDURE — 6360000002 HC RX W HCPCS: Performed by: NURSE PRACTITIONER

## 2023-08-30 PROCEDURE — 2580000003 HC RX 258: Performed by: SURGERY

## 2023-08-30 PROCEDURE — 0W9B3ZZ DRAINAGE OF LEFT PLEURAL CAVITY, PERCUTANEOUS APPROACH: ICD-10-PCS | Performed by: INTERNAL MEDICINE

## 2023-08-30 PROCEDURE — 2500000003 HC RX 250 WO HCPCS: Performed by: NURSE PRACTITIONER

## 2023-08-30 PROCEDURE — 86901 BLOOD TYPING SEROLOGIC RH(D): CPT

## 2023-08-30 PROCEDURE — P9040 RBC LEUKOREDUCED IRRADIATED: HCPCS

## 2023-08-30 PROCEDURE — 86900 BLOOD TYPING SEROLOGIC ABO: CPT

## 2023-08-30 PROCEDURE — P9016 RBC LEUKOCYTES REDUCED: HCPCS

## 2023-08-30 PROCEDURE — 71045 X-RAY EXAM CHEST 1 VIEW: CPT

## 2023-08-30 PROCEDURE — 94003 VENT MGMT INPAT SUBQ DAY: CPT

## 2023-08-30 PROCEDURE — 2000000000 HC ICU R&B

## 2023-08-30 PROCEDURE — 6360000002 HC RX W HCPCS: Performed by: SURGERY

## 2023-08-30 PROCEDURE — 85018 HEMOGLOBIN: CPT

## 2023-08-30 PROCEDURE — 85014 HEMATOCRIT: CPT

## 2023-08-30 PROCEDURE — 80202 ASSAY OF VANCOMYCIN: CPT

## 2023-08-30 PROCEDURE — 80048 BASIC METABOLIC PNL TOTAL CA: CPT

## 2023-08-30 PROCEDURE — 36415 COLL VENOUS BLD VENIPUNCTURE: CPT

## 2023-08-30 PROCEDURE — 6360000002 HC RX W HCPCS: Performed by: STUDENT IN AN ORGANIZED HEALTH CARE EDUCATION/TRAINING PROGRAM

## 2023-08-30 PROCEDURE — 94640 AIRWAY INHALATION TREATMENT: CPT

## 2023-08-30 PROCEDURE — 2700000000 HC OXYGEN THERAPY PER DAY

## 2023-08-30 PROCEDURE — 2580000003 HC RX 258: Performed by: NURSE PRACTITIONER

## 2023-08-30 PROCEDURE — 6370000000 HC RX 637 (ALT 250 FOR IP): Performed by: SURGERY

## 2023-08-30 PROCEDURE — 86923 COMPATIBILITY TEST ELECTRIC: CPT

## 2023-08-30 PROCEDURE — 86850 RBC ANTIBODY SCREEN: CPT

## 2023-08-30 PROCEDURE — A4216 STERILE WATER/SALINE, 10 ML: HCPCS | Performed by: NURSE PRACTITIONER

## 2023-08-30 PROCEDURE — 84100 ASSAY OF PHOSPHORUS: CPT

## 2023-08-30 PROCEDURE — C9113 INJ PANTOPRAZOLE SODIUM, VIA: HCPCS | Performed by: NURSE PRACTITIONER

## 2023-08-30 PROCEDURE — 6360000002 HC RX W HCPCS: Performed by: ANESTHESIOLOGY

## 2023-08-30 PROCEDURE — 36430 TRANSFUSION BLD/BLD COMPNT: CPT

## 2023-08-30 RX ORDER — SODIUM CHLORIDE 9 MG/ML
INJECTION, SOLUTION INTRAVENOUS PRN
Status: DISCONTINUED | OUTPATIENT
Start: 2023-08-30 | End: 2023-09-12

## 2023-08-30 RX ORDER — LORAZEPAM 2 MG/ML
0.5 INJECTION INTRAMUSCULAR 2 TIMES DAILY
Status: DISCONTINUED | OUTPATIENT
Start: 2023-08-30 | End: 2023-08-31

## 2023-08-30 RX ORDER — MAGNESIUM SULFATE IN WATER 40 MG/ML
4000 INJECTION, SOLUTION INTRAVENOUS ONCE
Status: COMPLETED | OUTPATIENT
Start: 2023-08-30 | End: 2023-08-30

## 2023-08-30 RX ADMIN — ARFORMOTEROL TARTRATE 15 MCG: 15 SOLUTION RESPIRATORY (INHALATION) at 07:28

## 2023-08-30 RX ADMIN — VANCOMYCIN HYDROCHLORIDE 1000 MG: 1 INJECTION, POWDER, LYOPHILIZED, FOR SOLUTION INTRAVENOUS at 08:07

## 2023-08-30 RX ADMIN — Medication 10 ML: at 08:08

## 2023-08-30 RX ADMIN — SODIUM CHLORIDE, PRESERVATIVE FREE 40 MG: 5 INJECTION INTRAVENOUS at 08:11

## 2023-08-30 RX ADMIN — CHLORHEXIDINE GLUCONATE 15 ML: 1.2 RINSE ORAL at 21:13

## 2023-08-30 RX ADMIN — Medication: at 23:47

## 2023-08-30 RX ADMIN — SODIUM CHLORIDE, PRESERVATIVE FREE 10 ML: 5 INJECTION INTRAVENOUS at 21:14

## 2023-08-30 RX ADMIN — HYDROMORPHONE HYDROCHLORIDE 0.5 MG: 1 INJECTION, SOLUTION INTRAMUSCULAR; INTRAVENOUS; SUBCUTANEOUS at 11:40

## 2023-08-30 RX ADMIN — HYDROMORPHONE HYDROCHLORIDE 0.5 MG: 1 INJECTION, SOLUTION INTRAMUSCULAR; INTRAVENOUS; SUBCUTANEOUS at 14:37

## 2023-08-30 RX ADMIN — POTASSIUM CHLORIDE: 2 INJECTION, SOLUTION, CONCENTRATE INTRAVENOUS at 18:37

## 2023-08-30 RX ADMIN — MEROPENEM 1000 MG: 1 INJECTION, POWDER, FOR SOLUTION INTRAVENOUS at 09:26

## 2023-08-30 RX ADMIN — BUDESONIDE 500 MCG: 0.5 INHALANT RESPIRATORY (INHALATION) at 07:28

## 2023-08-30 RX ADMIN — HYDROMORPHONE HYDROCHLORIDE 0.5 MG: 1 INJECTION, SOLUTION INTRAMUSCULAR; INTRAVENOUS; SUBCUTANEOUS at 09:27

## 2023-08-30 RX ADMIN — POTASSIUM PHOSPHATE, MONOBASIC POTASSIUM PHOSPHATE, DIBASIC 15 MMOL: 224; 236 INJECTION, SOLUTION, CONCENTRATE INTRAVENOUS at 07:25

## 2023-08-30 RX ADMIN — MEROPENEM 1000 MG: 1 INJECTION, POWDER, FOR SOLUTION INTRAVENOUS at 00:20

## 2023-08-30 RX ADMIN — Medication 10 ML: at 21:14

## 2023-08-30 RX ADMIN — ARFORMOTEROL TARTRATE 15 MCG: 15 SOLUTION RESPIRATORY (INHALATION) at 21:20

## 2023-08-30 RX ADMIN — Medication: at 08:49

## 2023-08-30 RX ADMIN — HYDROMORPHONE HYDROCHLORIDE 0.5 MG: 1 INJECTION, SOLUTION INTRAMUSCULAR; INTRAVENOUS; SUBCUTANEOUS at 17:25

## 2023-08-30 RX ADMIN — HYDROMORPHONE HYDROCHLORIDE 0.5 MG: 1 INJECTION, SOLUTION INTRAMUSCULAR; INTRAVENOUS; SUBCUTANEOUS at 07:00

## 2023-08-30 RX ADMIN — MAGNESIUM SULFATE HEPTAHYDRATE 4000 MG: 40 INJECTION, SOLUTION INTRAVENOUS at 12:45

## 2023-08-30 RX ADMIN — LORAZEPAM 0.5 MG: 2 INJECTION INTRAMUSCULAR; INTRAVENOUS at 21:13

## 2023-08-30 RX ADMIN — Medication: at 00:12

## 2023-08-30 RX ADMIN — Medication: at 17:35

## 2023-08-30 RX ADMIN — SODIUM CHLORIDE, PRESERVATIVE FREE 10 ML: 5 INJECTION INTRAVENOUS at 08:09

## 2023-08-30 RX ADMIN — DEXMEDETOMIDINE HYDROCHLORIDE 0.8 MCG/KG/HR: 400 INJECTION, SOLUTION INTRAVENOUS at 06:55

## 2023-08-30 RX ADMIN — MEROPENEM 1000 MG: 1 INJECTION, POWDER, FOR SOLUTION INTRAVENOUS at 17:15

## 2023-08-30 RX ADMIN — HYDROMORPHONE HYDROCHLORIDE 0.5 MG: 1 INJECTION, SOLUTION INTRAMUSCULAR; INTRAVENOUS; SUBCUTANEOUS at 03:43

## 2023-08-30 RX ADMIN — HYDROMORPHONE HYDROCHLORIDE 0.5 MG: 1 INJECTION, SOLUTION INTRAMUSCULAR; INTRAVENOUS; SUBCUTANEOUS at 22:14

## 2023-08-30 RX ADMIN — ALBUTEROL SULFATE 2.5 MG: 2.5 SOLUTION RESPIRATORY (INHALATION) at 13:56

## 2023-08-30 RX ADMIN — SODIUM CHLORIDE 100 MG: 9 INJECTION, SOLUTION INTRAVENOUS at 11:20

## 2023-08-30 RX ADMIN — ALBUTEROL SULFATE 2.5 MG: 2.5 SOLUTION RESPIRATORY (INHALATION) at 07:28

## 2023-08-30 RX ADMIN — ALBUTEROL SULFATE 2.5 MG: 2.5 SOLUTION RESPIRATORY (INHALATION) at 01:23

## 2023-08-30 RX ADMIN — HYDROMORPHONE HYDROCHLORIDE 0.5 MG: 1 INJECTION, SOLUTION INTRAMUSCULAR; INTRAVENOUS; SUBCUTANEOUS at 01:17

## 2023-08-30 RX ADMIN — DEXMEDETOMIDINE HYDROCHLORIDE 0.8 MCG/KG/HR: 400 INJECTION, SOLUTION INTRAVENOUS at 14:42

## 2023-08-30 RX ADMIN — BUDESONIDE 500 MCG: 0.5 INHALANT RESPIRATORY (INHALATION) at 21:20

## 2023-08-30 RX ADMIN — CHLORHEXIDINE GLUCONATE 15 ML: 1.2 RINSE ORAL at 08:51

## 2023-08-30 RX ADMIN — I.V. FAT EMULSION 250 ML: 20 EMULSION INTRAVENOUS at 18:39

## 2023-08-30 ASSESSMENT — PAIN SCALES - GENERAL
PAINLEVEL_OUTOF10: 2
PAINLEVEL_OUTOF10: 2
PAINLEVEL_OUTOF10: 4

## 2023-08-30 ASSESSMENT — ENCOUNTER SYMPTOMS
SHORTNESS OF BREATH: 0
ABDOMINAL PAIN: 1

## 2023-08-30 ASSESSMENT — PULMONARY FUNCTION TESTS
PIF_VALUE: 27
PIF_VALUE: 30
PIF_VALUE: 27
PIF_VALUE: 31
PIF_VALUE: 27
PIF_VALUE: 30
PIF_VALUE: 29

## 2023-08-30 ASSESSMENT — PAIN DESCRIPTION - LOCATION: LOCATION: ABDOMEN

## 2023-08-30 NOTE — PROGRESS NOTES
I participated in the IDT meeting where the plan of care for Julien Chen was discussed. I reviewed the patient's medical record prior to this meeting. We will continue to follow for spiritual/emotional care. Please contact  paging service for any immediate needs. _____________________________  Matt Woodard M.Div., M.S., B.C.C.   Staff

## 2023-08-30 NOTE — PROGRESS NOTES
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 difficile colitis, s/p ex lap with no signs of perforation, EGD revealing GJ stricture, s/p 2 mm dilatation, started on anticoagulation for DVT) [facility], developed hematemesis/GIB     BRIEF PATIENT SUMMARY   77-year-old female who presented to the emergency department  from [facility] for weakness and Hematemesis. 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 She was readmitted to [facility] ( through ) at which time she underwent EGD revealing GJ stricture. CT abdomen/pelvis demonstrated loculated intraperitoneal fluid in the anterior abdomen with persistent free air.   she underwent US guided drainage +E. coli. She completed a course of Zosyn and drain removed 8/10.   G-tube was placed.  patient transferred to the ICU in the setting of acute hypoxic respiratory failure d/t pulmonary edema requiring diuresis and eventual intubation ( - ). Patient reintubated  through current for worsening hypoxic respiratory failure. Course further complicated by persistent leukocytosis, fevers and CT evidence of intra-abdominal fluid collection post ultrasound drainage.   Repeat imaging  did not reveal any drainable for gastrointestinal bleeding  Extensive history of abdominal surgeries, s/p gastric bypass 2017, GJ anastomotic ulcer, GJ stricture requiring dilatation, fulminant C. difficile colitis x2, s/p colectomy 3/2023 with reversal of end ileostomy and ileocolic anastomosis, recurrent intra-abdominal fluid collection   -NPO, TPN-- Bowel rest on TPN started 8/23   -G-tube to gravity, FMS in place  -LUQ drain/ ? Anastomotic leak/no evidence of drainable collection  -Continue daily PPI  -Surgery following, appreciate recommendations:  -Continue TPN for nutritional support  -Gastrostomy to gravity drain, abd drain  -Wound VAC to sacral wound    RENAL/ELECTROLYTE/FLUIDS:  Acute kidney injury likely prerenal-resolving  Hypernatremia-resolved   -Serial laboratory results, replete as indicated  -Nephrotoxic agents  -Strict I&Os    ENDOCRINE:  Hx TTDM   -A1c 3.8%  -Blood glucose range 120-180, avoid hypo-/hyperglycemia  -TSH 2.23    HEMATOLOGY/ONCOLOGY:  R femoral T s/p IVC (8/2/2023)   Hx of PE (on Eliquis at home)   -8/30 hgb 6.5, Tx 1 PRBCs  -Trend CBC  -Hgb greater than 7.0, transfuse as indicated, H&H 7.5/24.2  -SCDs for DVT prophylaxis    INFECTIOUS DISEASE:  Severe sepsis/septic shock of likely intra-abdominal origin  Concern for bilateral pneumonia  Sacral wound s/p wound debridement 8/14  History of C. difficile colitis   ID following  Continue meropenem, vancomycin and Eraxis  Repeat cultures NGTD  Afebrile, WBC 16.0    ANTIBIOTICS TO DATE:  Eraxis (8/24 - current)  Meropenem (8/21 - current)  Vancomycin (8/6 - current)  Zosyn (7/29 - 8/21)    CULTURES TO DATE:  8/22: PD fluid demonstrated moderate yeast  8/21: BC NGTD  8/19: BC NGTD  8/18: RC NGTD  8/7: MRSA negative  8/6: BC NGTD  7/31: PD fluid +E. coli    ICU DAILY CHECKLIST     Code Status:Full  DVT Prophylaxis:SCDs  T/L/D: ETT, PICC, Lutz catheter and G-tube, LUQ drain  SUP: PPI  Diet: NPO, TPN  Activity Level:Bedrest  ABCDEF Bundle/Checklist

## 2023-08-30 NOTE — PROGRESS NOTES
Comprehensive Nutrition Assessment    Type and Reason for Visit: Reassess    Nutrition Recommendations/Plan:     -Transition to cyclic TPN (16 hours)     -Continue same solution/volume but       Run TPN @ 75 ml/hr x first and final hour; 100 ml/hr x 14 hr              Malnutrition Assessment:  Malnutrition Status:  Severe malnutrition (08/01/23 1000)    Context:  Acute Illness     Findings of the 6 clinical characteristics of malnutrition:  Energy Intake:  50% or less of estimated energy requirements for 5 or more days  Weight Loss:  Unable to assess     Body Fat Loss:  Mild body fat loss Buccal region, Orbital   Muscle Mass Loss: Moderate muscle mass loss Temples (temporalis), Clavicles (pectoralis & deltoids)  Fluid Accumulation:  Moderate to Severe Generalized, Extremities   Strength:  Not Performed       Nutrition Assessment:    Pt admitted from [facility] d/t GI Bleed. Recent extended hospitalization @ [facility]-readmitted after episode of fulminant colitis requiring subtotal colectomy with end ileostomy, s/p reversal and subsequent ileocolonic anastomosis; EGD 7/13/23 showed nl esophagus, small hiatal hernia, evidence of previous RYGB with a tight stricture and ulcer at the gastro-jejunal anastomosis-s/p dilatation; recurrent C Diff colitis. PMHx: Gastric bypass 2017, CAD, DM 2, Dyslipidemia, GERD, PE.    8/30: Follow-up. Remains intubated; failed SBT today fairly quickly. Ileocolonic leak controlled by drain. Persistent leukocytosis-ID following. Family meeting scheduled today to discuss trach placement. Weight down 11# in the past week but pt remains severely edematous (4+ pitting of all extremities). Nutrient needs based on IBW which is lower than pt's CBW. Phosphorous slightly BNL and was replaced. Phosphorous also increased in the TPN solution. TPN meets estimated needs. Increased total protein in TPN to provide 110 gm/day (upper end of pt's estimated needs) to assist with closure of leak.  Pt now receives a total of 1450 average daily calories per day. LFT's trending up-?related to TPN. Recommend transitioning to cyclic TPN schedule to allow liver to \"rest\"/reduce hepatobiliary dysfunction associated with TPN (see above). 8/25: Possible leak @ ileocolic anastomosis per CT but no drainable collection. FMS removed yesterday. Remains on TPN for nutrition support. TPN slightly modified to above and meets pt's estimated needs (provides 1385 calories/94 gm protein per day). 8/23: received request from ICU pharmacist regarding TPN recommendations. Tube feeds d/c'd yesterday (Bedside ultrasound-guided drainage of intra-abdominal collection consistent with enteric contents). G-tube placed to gravity. Pt off pressors, off propofol. TPN @ 65 mL/hr as before with 94 gm AA, 234 gm dex provides 1172 kcal, with 250 mL 20% lipids 3x/week would provide on average 1386 kcal to meet EEN while intubated. Nutritionally Significant Medications:  Humalog, magnesium sulfate, Protonix, K Phos      Estimated Daily Nutrient Needs:  Energy Requirements Based On: Formula  Weight Used for Energy Requirements: Ideal  Energy (kcal/day): 1400 ([employer] 2003b)  Weight Used for Protein Requirements: Ideal  Protein (g/day):  (1.5-2 gm/kg IBW)  Method Used for Fluid Requirements: 1 ml/kcal  Fluid (ml/day): 1 mL/kcal    Nutrition Related Findings:   Edema: Generalized   Edema Generalized:  Anasarca  RUE Edema: +4, Pitting  LUE Edema: +4, Pitting  RLE Edema: +4, Pitting  LLE Edema: +4, Pitting    Bowel Movement:  08/29/23    Wounds: Wound Type: Unstageable with wound vac, Skin Tears, Deep Tissue Injury      Current Nutrition Therapies:  Diet: NPO  Nutrition Support: PN: 1560 ml, 110 gm protein, 234 gm CHO with 100 mg B1 and 250 ml 205 lipid 3 x/week      Anthropometric Measures:  Height: 162.6 cm (5' 4\")  Ideal Body Weight (IBW): 120 lbs (55 kg)    Admission Body Weight: 135 lb 12.9 oz (61.6 kg)  Current Body Weight: 137 lb 5.6 oz

## 2023-08-31 ENCOUNTER — APPOINTMENT (OUTPATIENT)
Facility: HOSPITAL | Age: 63
DRG: 004 | End: 2023-08-31
Payer: MEDICARE

## 2023-08-31 LAB
ABO + RH BLD: NORMAL
ANION GAP SERPL CALC-SCNC: 5 MMOL/L (ref 5–15)
BLD PROD TYP BPU: NORMAL
BLD PROD TYP BPU: NORMAL
BLOOD BANK DISPENSE STATUS: NORMAL
BLOOD BANK DISPENSE STATUS: NORMAL
BLOOD GROUP ANTIBODIES SERPL: NORMAL
BPU ID: NORMAL
BPU ID: NORMAL
BUN SERPL-MCNC: 58 MG/DL (ref 6–20)
BUN/CREAT SERPL: 121 (ref 12–20)
CALCIUM SERPL-MCNC: 8.8 MG/DL (ref 8.5–10.1)
CHLORIDE SERPL-SCNC: 118 MMOL/L (ref 97–108)
CO2 SERPL-SCNC: 24 MMOL/L (ref 21–32)
CREAT SERPL-MCNC: 0.48 MG/DL (ref 0.55–1.02)
CROSSMATCH RESULT: NORMAL
CROSSMATCH RESULT: NORMAL
ERYTHROCYTE [DISTWIDTH] IN BLOOD BY AUTOMATED COUNT: 17.2 % (ref 11.5–14.5)
GLUCOSE BLD STRIP.AUTO-MCNC: 120 MG/DL (ref 65–117)
GLUCOSE BLD STRIP.AUTO-MCNC: 121 MG/DL (ref 65–117)
GLUCOSE BLD STRIP.AUTO-MCNC: 123 MG/DL (ref 65–117)
GLUCOSE BLD STRIP.AUTO-MCNC: 129 MG/DL (ref 65–117)
GLUCOSE SERPL-MCNC: 113 MG/DL (ref 65–100)
HCT VFR BLD AUTO: 27.3 % (ref 35–47)
HGB BLD-MCNC: 8.7 G/DL (ref 11.5–16)
LACTATE SERPL-SCNC: 0.7 MMOL/L (ref 0.4–2)
MAGNESIUM SERPL-MCNC: 2.3 MG/DL (ref 1.6–2.4)
MCH RBC QN AUTO: 28.9 PG (ref 26–34)
MCHC RBC AUTO-ENTMCNC: 31.9 G/DL (ref 30–36.5)
MCV RBC AUTO: 90.7 FL (ref 80–99)
NRBC # BLD: 0 K/UL (ref 0–0.01)
NRBC BLD-RTO: 0 PER 100 WBC
PHOSPHATE SERPL-MCNC: 2.6 MG/DL (ref 2.6–4.7)
PLATELET # BLD AUTO: 601 K/UL (ref 150–400)
PMV BLD AUTO: 9.8 FL (ref 8.9–12.9)
POTASSIUM SERPL-SCNC: 3.8 MMOL/L (ref 3.5–5.1)
PROCALCITONIN SERPL-MCNC: 0.62 NG/ML
RBC # BLD AUTO: 3.01 M/UL (ref 3.8–5.2)
SERVICE CMNT-IMP: ABNORMAL
SODIUM SERPL-SCNC: 147 MMOL/L (ref 136–145)
SPECIMEN EXP DATE BLD: NORMAL
UNIT DIVISION: 0
UNIT DIVISION: NORMAL
WBC # BLD AUTO: 20.8 K/UL (ref 3.6–11)

## 2023-08-31 PROCEDURE — 6360000002 HC RX W HCPCS: Performed by: NURSE PRACTITIONER

## 2023-08-31 PROCEDURE — 80048 BASIC METABOLIC PNL TOTAL CA: CPT

## 2023-08-31 PROCEDURE — 2580000003 HC RX 258: Performed by: NURSE PRACTITIONER

## 2023-08-31 PROCEDURE — 2580000003 HC RX 258: Performed by: SURGERY

## 2023-08-31 PROCEDURE — 6360000002 HC RX W HCPCS: Performed by: ANESTHESIOLOGY

## 2023-08-31 PROCEDURE — 71045 X-RAY EXAM CHEST 1 VIEW: CPT

## 2023-08-31 PROCEDURE — A4216 STERILE WATER/SALINE, 10 ML: HCPCS | Performed by: NURSE PRACTITIONER

## 2023-08-31 PROCEDURE — 83605 ASSAY OF LACTIC ACID: CPT

## 2023-08-31 PROCEDURE — 85027 COMPLETE CBC AUTOMATED: CPT

## 2023-08-31 PROCEDURE — 2500000003 HC RX 250 WO HCPCS: Performed by: NURSE PRACTITIONER

## 2023-08-31 PROCEDURE — 36415 COLL VENOUS BLD VENIPUNCTURE: CPT

## 2023-08-31 PROCEDURE — 6370000000 HC RX 637 (ALT 250 FOR IP): Performed by: SURGERY

## 2023-08-31 PROCEDURE — 97606 NEG PRS WND THER DME>50 SQCM: CPT

## 2023-08-31 PROCEDURE — 94003 VENT MGMT INPAT SUBQ DAY: CPT

## 2023-08-31 PROCEDURE — C9113 INJ PANTOPRAZOLE SODIUM, VIA: HCPCS | Performed by: NURSE PRACTITIONER

## 2023-08-31 PROCEDURE — 6360000002 HC RX W HCPCS: Performed by: SURGERY

## 2023-08-31 PROCEDURE — 84145 PROCALCITONIN (PCT): CPT

## 2023-08-31 PROCEDURE — 2700000000 HC OXYGEN THERAPY PER DAY

## 2023-08-31 PROCEDURE — 84100 ASSAY OF PHOSPHORUS: CPT

## 2023-08-31 PROCEDURE — 6370000000 HC RX 637 (ALT 250 FOR IP): Performed by: INTERNAL MEDICINE

## 2023-08-31 PROCEDURE — 82962 GLUCOSE BLOOD TEST: CPT

## 2023-08-31 PROCEDURE — 2000000000 HC ICU R&B

## 2023-08-31 PROCEDURE — 83735 ASSAY OF MAGNESIUM: CPT

## 2023-08-31 PROCEDURE — 94640 AIRWAY INHALATION TREATMENT: CPT

## 2023-08-31 RX ORDER — LORAZEPAM 2 MG/ML
0.5 INJECTION INTRAMUSCULAR EVERY 6 HOURS
Status: DISCONTINUED | OUTPATIENT
Start: 2023-08-31 | End: 2023-09-06

## 2023-08-31 RX ORDER — HYDROMORPHONE HYDROCHLORIDE 1 MG/ML
1 INJECTION, SOLUTION INTRAMUSCULAR; INTRAVENOUS; SUBCUTANEOUS ONCE
Status: COMPLETED | OUTPATIENT
Start: 2023-08-31 | End: 2023-08-31

## 2023-08-31 RX ADMIN — SODIUM CHLORIDE, PRESERVATIVE FREE 40 MG: 5 INJECTION INTRAVENOUS at 08:25

## 2023-08-31 RX ADMIN — SODIUM CHLORIDE, PRESERVATIVE FREE 10 ML: 5 INJECTION INTRAVENOUS at 20:22

## 2023-08-31 RX ADMIN — MEROPENEM 1000 MG: 1 INJECTION, POWDER, FOR SOLUTION INTRAVENOUS at 08:24

## 2023-08-31 RX ADMIN — MEROPENEM 1000 MG: 1 INJECTION, POWDER, FOR SOLUTION INTRAVENOUS at 17:26

## 2023-08-31 RX ADMIN — CHLORHEXIDINE GLUCONATE 15 ML: 1.2 RINSE ORAL at 08:25

## 2023-08-31 RX ADMIN — DEXMEDETOMIDINE HYDROCHLORIDE 0.4 MCG/KG/HR: 400 INJECTION, SOLUTION INTRAVENOUS at 07:05

## 2023-08-31 RX ADMIN — BUDESONIDE 500 MCG: 0.5 INHALANT RESPIRATORY (INHALATION) at 07:03

## 2023-08-31 RX ADMIN — BUDESONIDE 500 MCG: 0.5 INHALANT RESPIRATORY (INHALATION) at 20:40

## 2023-08-31 RX ADMIN — HYDROMORPHONE HYDROCHLORIDE 1 MG: 1 INJECTION, SOLUTION INTRAMUSCULAR; INTRAVENOUS; SUBCUTANEOUS at 00:35

## 2023-08-31 RX ADMIN — ARFORMOTEROL TARTRATE 15 MCG: 15 SOLUTION RESPIRATORY (INHALATION) at 07:03

## 2023-08-31 RX ADMIN — LORAZEPAM 0.5 MG: 2 INJECTION INTRAMUSCULAR; INTRAVENOUS at 08:24

## 2023-08-31 RX ADMIN — HYDROMORPHONE HYDROCHLORIDE 0.5 MG: 1 INJECTION, SOLUTION INTRAMUSCULAR; INTRAVENOUS; SUBCUTANEOUS at 19:26

## 2023-08-31 RX ADMIN — HYDROMORPHONE HYDROCHLORIDE 0.5 MG: 1 INJECTION, SOLUTION INTRAMUSCULAR; INTRAVENOUS; SUBCUTANEOUS at 17:26

## 2023-08-31 RX ADMIN — Medication: at 15:14

## 2023-08-31 RX ADMIN — Medication 10 ML: at 20:22

## 2023-08-31 RX ADMIN — Medication: at 08:23

## 2023-08-31 RX ADMIN — ARFORMOTEROL TARTRATE 15 MCG: 15 SOLUTION RESPIRATORY (INHALATION) at 20:40

## 2023-08-31 RX ADMIN — MEROPENEM 1000 MG: 1 INJECTION, POWDER, FOR SOLUTION INTRAVENOUS at 00:43

## 2023-08-31 RX ADMIN — LORAZEPAM 0.5 MG: 2 INJECTION INTRAMUSCULAR; INTRAVENOUS at 20:22

## 2023-08-31 RX ADMIN — CALCIUM GLUCONATE: 94 INJECTION, SOLUTION INTRAVENOUS at 18:39

## 2023-08-31 RX ADMIN — HYDROMORPHONE HYDROCHLORIDE 0.5 MG: 1 INJECTION, SOLUTION INTRAMUSCULAR; INTRAVENOUS; SUBCUTANEOUS at 23:40

## 2023-08-31 RX ADMIN — HYDROMORPHONE HYDROCHLORIDE 0.5 MG: 1 INJECTION, SOLUTION INTRAMUSCULAR; INTRAVENOUS; SUBCUTANEOUS at 04:11

## 2023-08-31 RX ADMIN — LORAZEPAM 0.5 MG: 2 INJECTION INTRAMUSCULAR; INTRAVENOUS at 15:08

## 2023-08-31 RX ADMIN — SODIUM CHLORIDE 100 MG: 9 INJECTION, SOLUTION INTRAVENOUS at 11:35

## 2023-08-31 RX ADMIN — SODIUM CHLORIDE, PRESERVATIVE FREE 10 ML: 5 INJECTION INTRAVENOUS at 08:25

## 2023-08-31 RX ADMIN — VANCOMYCIN HYDROCHLORIDE 1000 MG: 1 INJECTION, POWDER, LYOPHILIZED, FOR SOLUTION INTRAVENOUS at 08:24

## 2023-08-31 RX ADMIN — CHLORHEXIDINE GLUCONATE 15 ML: 1.2 RINSE ORAL at 20:28

## 2023-08-31 RX ADMIN — HYDROMORPHONE HYDROCHLORIDE 0.5 MG: 1 INJECTION, SOLUTION INTRAMUSCULAR; INTRAVENOUS; SUBCUTANEOUS at 11:33

## 2023-08-31 RX ADMIN — HYDROMORPHONE HYDROCHLORIDE 0.5 MG: 1 INJECTION, SOLUTION INTRAMUSCULAR; INTRAVENOUS; SUBCUTANEOUS at 15:11

## 2023-08-31 RX ADMIN — Medication: at 23:40

## 2023-08-31 ASSESSMENT — PAIN SCALES - WONG BAKER
WONGBAKER_NUMERICALRESPONSE: NO HURT
WONGBAKER_NUMERICALRESPONSE: 0

## 2023-08-31 ASSESSMENT — PULMONARY FUNCTION TESTS
PIF_VALUE: 29
PIF_VALUE: 24
PIF_VALUE: 27
PIF_VALUE: 30
PIF_VALUE: 30
PIF_VALUE: 26

## 2023-08-31 ASSESSMENT — PAIN SCALES - GENERAL
PAINLEVEL_OUTOF10: 0
PAINLEVEL_OUTOF10: 8
PAINLEVEL_OUTOF10: 0
PAINLEVEL_OUTOF10: 6

## 2023-08-31 ASSESSMENT — ENCOUNTER SYMPTOMS
ABDOMINAL PAIN: 1
SHORTNESS OF BREATH: 0

## 2023-08-31 NOTE — PROGRESS NOTES
2000: pt tachycardic, tachypneic, and SBP >160. Scheduled 0.5mg ativan given. No change in vital signs. Jeffery Moon NP notified. 2200: 0.5mg PRN dilaudid given. No change in vital signs as stated above. Jeffery Moon NP notified. 2324: pt becoming more tachycardic. Precedex restarted at 0.2mcg/kg/hr and will titrate as needed. Jeffery Moon NP notified. 0020: Precedex currently at 0.4mcg/kg/hr with no change in vital signs as previously stated. Jeffery Moon NP notified. One time order for 1mg dilaudid ordered per Jeffery Mono NP.    0100: pt resting comfortably at this time.

## 2023-08-31 NOTE — PROGRESS NOTES
08/31/23 1709   Weaning Parameters   Spontaneous Breathing Trial Complete Yes  (PS 15/5)   Respiratory Rate Observed 25   Ve 8.2      RSBI 90

## 2023-08-31 NOTE — PROGRESS NOTES
0730: Bedside and Verbal shift change report given to RAMÍREZ Street RN (oncoming nurse) by L. Legrand Lundborg and Louis Ellison (offgoing nurse). Report included the following information Nurse Handoff Report, Adult Overview, Intake/Output, MAR, Recent Results, Med Rec Status, Cardiac Rhythm NSR, Alarm Parameters, and Neuro Assessment. 1030: ICU multidisciplinary rounds lead by Luis Felipe Ochoa NP (Intensivist)  The following elements were discussed:   A. Pain Management Plan: yes  B. SAT/SBT:yes  C. Sedation and RASS Goal yes  D. CAM-ICU: Negative Prevention/Management & Sleep plan? yes  Restraints: Needed? NO   Order Renewed? E. Mobility Level   PT/OT Consulted: no - sedated  F. Family Involvement: Has the family been updated in the last 24 hours? yes  Central line? NO Still Indicated? NoTPN, Antibiotic use >10 days, and Limited/No vessel suitable for PIV  Stress Ulcer Prophylaxis Indicated/in place: YES  DVT Prophylaxis SCD's or Sequential Compression Device  Nutrition: TPN    Review of body systems, fluid balance I/Os, recent labs and imaging, length of ICU stay discussed. Daily Goals include: attempt SBT, wean precedex as able, pain control (Note written by RN)    [phone]: Patient's daughter at bedside. Updated on today's event. Questions answered to the best of this RN's ability. 1700: Patient placed on SBT by RT.    9166: Patient's  at bedside. Updated and questions answered. 1815: Patient taken off SBT by RT.    1930: Bedside and Verbal shift change report given to MASSIEL Larkin (oncoming nurse) by [employer]. Cole [address], RN (offgoing nurse). Report included the following information Adult Overview, Surgery Report, Intake/Output, MAR, Recent Results, Med Rec Status, Cardiac Rhythm NSR, Alarm Parameters, and Neuro Assessment.

## 2023-08-31 NOTE — WOUND CARE
Wound Care Note:     Follow-up visit for wound VAC dressing change. Chart shows:  Admitted for GI Bleed with a history of arthritis, asthma, fibromylgia, CAD, cardiac murmur, CHF, colitis, depression, DM, DVT, GERD, vascular access device, blood clots, hyperlipidemia, HTN, lumbar radiculitis, musculoskeletal disorder, ANCA, pulmonary embolus, total abdominal hysterectomy, septic shock, thromboembolus, trigger finger    WBC = 20.8 on 8/31/23    Assessment:   Patient is intubated, incontinent with moderate assistance needed in repositioning. Bed: In Touch Lorene  Patient has a Lutz. Diet: NPO  Patient tolerated wound care well. 1. POA sacral wound bed with 40% pink and 60% tan tissue, wound edges are open with hyperpigmentation, undermining from 4 to 8 o'clock and is approximately 2 cm, small serous drainage. Negative Pressure Wound Therapy applied. Skin prep applied to constantine-wound, drape applied to left hip for trac pad placement, 1 piece of black granufoam placed in wound bed with 1 Mepitel One wrapped around, an additional piece of black granufoam used to bridge wound to left hip, occlusively sealed at 125 mmhg continuously. TOTAL OF 2 BLACK GRANUFOAMS AND 1 MEPITEL ONE. 2.  Left heel with superficial slough covering wound, approximately 0.8 cm x 0.8 cm, wound edges are open, constantine-wound intact. Venelex ointment applied and offloaded. 3.  Right heel with hyperpigmented area approximatley 1 cm x 1 cm, no open area, wound edges are closed. Venelex ointment applied and offloaded. 4. Right upper back with hyperpigmented linear line measur ing 3 cm x 0.6 cm, no open area, constantine-wound intact. Venelex ointment applied and replaced foam dressing. 5.  Right lower arm skin tear measures 2 cm x 2 cm x 0.1 cm, wound bed is pink, moist, wound edges are open, constantine-wound edematous.     6.  Right anterior thigh skin tear measures 2.5 cm x 0.8 cm x 0.1 cm, wound bed is pink, moist, wound edges are open, constantine-wound edematous. 7.  Left lateral lower leg with purple, non-blanchable tissue measuring 0.5 cm x 0.8 cm, no open area, constantine-wound intact. Venelex ointment applied. Spoke with Dr. Vickey Campoverde, wound care orders obtained. Patient repositioned on right side. Heels offloaded on pillows. Recommendations:    Maintain wound VAC  Sacral wound- Change wound VAC twice a week, 125 mmhg continuous pressure. Bilateral heels, right upper back, left lateral lower leg- Every 12 hours and as needed apply Venelex ointment. Right lower arm and right anterior thigh- Every other day cleanse with normal saline, wipe wound bed clean and dry, apply a piece of Xeroform gauze and cover. Skin Care & Pressure Prevention:  Minimize layers of linen/pads under patient to optimize support surface. Turn/reposition approximately every 2 hours and offload heels.   Manage incontinence / promote continence   Nourishing Skin Cream to dry skin, minimize use of briefs when able    Discussed above plan with patient & Yovana Thompson RN    Transition of Care: Plan to follow as needed while admitted to hospital.    ONEL Kirkpatrick, RN, [facility]  Certified Wound and Ostomy Nurse  office [phone]  Best way to contact me is through [facility]

## 2023-08-31 NOTE — CARE COORDINATION
RUR: 24 % High  Prior Level of Functioning: Max assist with ADL's  Disposition: TBD  DME needed: TBD  Transportation at discharge: BLS vs ALS  IM/IMM Medicare 7/29/23   Is patient a [place] and connected with [facility]? No  Caregiver Contact:  Sahara Briseno [phone]  Discharge Caregiver contacted prior to discharge? Care Conference needed?  No  Barriers to discharge:    Vented , follows commands  Accordion drain, G tube  Wound Care following for Wound Vac   TPN    Precedex gtt   Received a unit of PRBC's yesterday for Hgb 6.5  Family meeting to discuss Trach/peg   Bree Ceja RN,Care Management

## 2023-08-31 NOTE — PROGRESS NOTES
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 difficile colitis, s/p ex lap with no signs of perforation, EGD revealing GJ stricture, s/p 2 mm dilatation, started on anticoagulation for DVT) [facility], developed hematemesis/GIB     BRIEF PATIENT SUMMARY   57-year-old female who presented to the emergency department  from [facility] for weakness and Hematemesis. 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 She was readmitted to [facility] ( through ) at which time she underwent EGD revealing GJ stricture. CT abdomen/pelvis demonstrated loculated intraperitoneal fluid in the anterior abdomen with persistent free air.   she underwent US guided drainage +E. coli. She completed a course of Zosyn and drain removed 8/10.   G-tube was placed.  patient transferred to the ICU in the setting of acute hypoxic respiratory failure d/t pulmonary edema requiring diuresis and eventual intubation ( - ). Patient reintubated  through current for worsening hypoxic respiratory failure. Course further complicated by persistent leukocytosis, fevers and CT evidence of intra-abdominal fluid collection post ultrasound drainage.   Repeat imaging  did not reveal any drainable have spoken with family and will set up family meeting today to discuss potential for tracheostomy. 8/29: Afebrile overnight. Plan for family meeting tomorrow to discuss tracheostomy placement. Continued bowel rest, TPN for nutrition. H&H holding 7.5/20.5.    8/30: Hg 6.5 this morning, Tx 1 PRBCs. Renal function stable. Family meeting today to discuss [facility]. AM CXR demonstrates interval decrease in pneumomediastinum and ongoing right wall chest emphysema. Persistent bilateral effusions with patchy interstitial and airspace opacities. Bedside point of care US re-demonstrates moderate bilateral pleural effusions L>R. Will do L thoracentesis today and likely right thoracentesis tomorrow. SUBJECTIVE   Review of Systems   Respiratory:  Negative for shortness of breath. Gastrointestinal:  Positive for abdominal pain. Neurological:  Positive for weakness. OBJECTIVE   Physical Exam  Constitutional:       General: She is not in acute distress. Appearance: She is ill-appearing. HENT:      Head: Normocephalic and atraumatic. Mouth/Throat:      Mouth: Mucous membranes are moist.      Pharynx: Oropharynx is clear. Eyes:      Extraocular Movements: Extraocular movements intact. Conjunctiva/sclera: Conjunctivae normal.      Pupils: Pupils are equal, round, and reactive to light. Cardiovascular:      Rate and Rhythm: Normal rate. Pulses: Normal pulses. Pulmonary:      Comments: Intubated mechanically ventilated  Abdominal:      General: There is no distension. Tenderness: There is abdominal tenderness. Skin:     General: Skin is warm and dry. Capillary Refill: Capillary refill takes less than 2 seconds. Coloration: Skin is pale. Neurological:      General: No focal deficit present. Mental Status: She is alert and oriented to person, place, and time.       Comments: Intubated mechanically ventilated       Labs and Data: Reviewed 08/31/23  Medications: Reviewed

## 2023-08-31 NOTE — CARE COORDINATION
Transition of Care Plan:     RUR: 24 % High  Prior Level of Functioning: Max assist with ADL's  Disposition: TBD  DME needed: TBD  Transportation at discharge: BLS vs ALS  IM/IMM Medicare 7/29/23   Is patient a [place] and connected with [facility]? No  Caregiver Contact:  Ana Cristina Chaney [phone]  Discharge Caregiver contacted prior to discharge? Care Conference needed? No  Barriers to discharge:    Vented   Accordion drain, G tube  Wound Care following for Wound Vac   TPN    Family meeting to discuss trach and peg tube.   Red Sol RN,Care Management

## 2023-09-01 ENCOUNTER — APPOINTMENT (OUTPATIENT)
Facility: HOSPITAL | Age: 63
DRG: 004 | End: 2023-09-01
Payer: MEDICARE

## 2023-09-01 LAB
ALBUMIN SERPL-MCNC: 1.9 G/DL (ref 3.5–5)
ALBUMIN/GLOB SERPL: 0.6 (ref 1.1–2.2)
ALP SERPL-CCNC: 402 U/L (ref 45–117)
ALT SERPL-CCNC: 198 U/L (ref 12–78)
ANION GAP SERPL CALC-SCNC: 3 MMOL/L (ref 5–15)
AST SERPL-CCNC: 103 U/L (ref 15–37)
BASOPHILS # BLD: 0 K/UL (ref 0–0.1)
BASOPHILS NFR BLD: 0 % (ref 0–1)
BILIRUB DIRECT SERPL-MCNC: 0.3 MG/DL (ref 0–0.2)
BILIRUB SERPL-MCNC: 0.4 MG/DL (ref 0.2–1)
BUN SERPL-MCNC: 59 MG/DL (ref 6–20)
BUN/CREAT SERPL: 140 (ref 12–20)
CALCIUM SERPL-MCNC: 8.5 MG/DL (ref 8.5–10.1)
CHLORIDE SERPL-SCNC: 119 MMOL/L (ref 97–108)
CO2 SERPL-SCNC: 27 MMOL/L (ref 21–32)
CREAT SERPL-MCNC: 0.42 MG/DL (ref 0.55–1.02)
DIFFERENTIAL METHOD BLD: ABNORMAL
EOSINOPHIL # BLD: 0.3 K/UL (ref 0–0.4)
EOSINOPHIL NFR BLD: 2 % (ref 0–7)
ERYTHROCYTE [DISTWIDTH] IN BLOOD BY AUTOMATED COUNT: 16.8 % (ref 11.5–14.5)
GLOBULIN SER CALC-MCNC: 3.3 G/DL (ref 2–4)
GLUCOSE BLD STRIP.AUTO-MCNC: 131 MG/DL (ref 65–117)
GLUCOSE BLD STRIP.AUTO-MCNC: 93 MG/DL (ref 65–117)
GLUCOSE BLD STRIP.AUTO-MCNC: 97 MG/DL (ref 65–117)
GLUCOSE SERPL-MCNC: 117 MG/DL (ref 65–100)
HCT VFR BLD AUTO: 26.3 % (ref 35–47)
HGB BLD-MCNC: 8.1 G/DL (ref 11.5–16)
IMM GRANULOCYTES # BLD AUTO: 0 K/UL
IMM GRANULOCYTES NFR BLD AUTO: 0 %
LYMPHOCYTES # BLD: 0.5 K/UL (ref 0.8–3.5)
LYMPHOCYTES NFR BLD: 3 % (ref 12–49)
MAGNESIUM SERPL-MCNC: 1.9 MG/DL (ref 1.6–2.4)
MCH RBC QN AUTO: 28.6 PG (ref 26–34)
MCHC RBC AUTO-ENTMCNC: 30.8 G/DL (ref 30–36.5)
MCV RBC AUTO: 92.9 FL (ref 80–99)
MONOCYTES # BLD: 0.8 K/UL (ref 0–1)
MONOCYTES NFR BLD: 5 % (ref 5–13)
MYELOCYTES NFR BLD MANUAL: 3 %
NEUTS SEG # BLD: 14.5 K/UL (ref 1.8–8)
NEUTS SEG NFR BLD: 87 % (ref 32–75)
NRBC # BLD: 0 K/UL (ref 0–0.01)
NRBC BLD-RTO: 0 PER 100 WBC
PHOSPHATE SERPL-MCNC: 2.8 MG/DL (ref 2.6–4.7)
PLATELET # BLD AUTO: 520 K/UL (ref 150–400)
PMV BLD AUTO: 9.9 FL (ref 8.9–12.9)
POTASSIUM SERPL-SCNC: 4.1 MMOL/L (ref 3.5–5.1)
PROT SERPL-MCNC: 5.2 G/DL (ref 6.4–8.2)
RBC # BLD AUTO: 2.83 M/UL (ref 3.8–5.2)
RBC MORPH BLD: ABNORMAL
SERVICE CMNT-IMP: ABNORMAL
SERVICE CMNT-IMP: NORMAL
SERVICE CMNT-IMP: NORMAL
SODIUM SERPL-SCNC: 149 MMOL/L (ref 136–145)
VANCOMYCIN SERPL-MCNC: 15.1 UG/ML
WBC # BLD AUTO: 16.7 K/UL (ref 3.6–11)

## 2023-09-01 PROCEDURE — 71045 X-RAY EXAM CHEST 1 VIEW: CPT

## 2023-09-01 PROCEDURE — C9113 INJ PANTOPRAZOLE SODIUM, VIA: HCPCS | Performed by: NURSE PRACTITIONER

## 2023-09-01 PROCEDURE — 94003 VENT MGMT INPAT SUBQ DAY: CPT

## 2023-09-01 PROCEDURE — 80076 HEPATIC FUNCTION PANEL: CPT

## 2023-09-01 PROCEDURE — 2500000003 HC RX 250 WO HCPCS: Performed by: NURSE PRACTITIONER

## 2023-09-01 PROCEDURE — 84100 ASSAY OF PHOSPHORUS: CPT

## 2023-09-01 PROCEDURE — 2580000003 HC RX 258: Performed by: SURGERY

## 2023-09-01 PROCEDURE — 80048 BASIC METABOLIC PNL TOTAL CA: CPT

## 2023-09-01 PROCEDURE — 6360000002 HC RX W HCPCS: Performed by: ANESTHESIOLOGY

## 2023-09-01 PROCEDURE — 2580000003 HC RX 258: Performed by: NURSE PRACTITIONER

## 2023-09-01 PROCEDURE — 6370000000 HC RX 637 (ALT 250 FOR IP): Performed by: SURGERY

## 2023-09-01 PROCEDURE — A4216 STERILE WATER/SALINE, 10 ML: HCPCS | Performed by: NURSE PRACTITIONER

## 2023-09-01 PROCEDURE — 6360000002 HC RX W HCPCS: Performed by: SURGERY

## 2023-09-01 PROCEDURE — 82962 GLUCOSE BLOOD TEST: CPT

## 2023-09-01 PROCEDURE — 6360000002 HC RX W HCPCS: Performed by: NURSE PRACTITIONER

## 2023-09-01 PROCEDURE — 83735 ASSAY OF MAGNESIUM: CPT

## 2023-09-01 PROCEDURE — 36415 COLL VENOUS BLD VENIPUNCTURE: CPT

## 2023-09-01 PROCEDURE — 80202 ASSAY OF VANCOMYCIN: CPT

## 2023-09-01 PROCEDURE — 85025 COMPLETE CBC W/AUTO DIFF WBC: CPT

## 2023-09-01 PROCEDURE — 94640 AIRWAY INHALATION TREATMENT: CPT

## 2023-09-01 PROCEDURE — 2000000000 HC ICU R&B

## 2023-09-01 RX ORDER — DEXTROSE MONOHYDRATE 50 MG/ML
INJECTION, SOLUTION INTRAVENOUS CONTINUOUS
Status: DISCONTINUED | OUTPATIENT
Start: 2023-09-01 | End: 2023-09-03

## 2023-09-01 RX ADMIN — LORAZEPAM 0.5 MG: 2 INJECTION INTRAMUSCULAR; INTRAVENOUS at 08:05

## 2023-09-01 RX ADMIN — LORAZEPAM 0.5 MG: 2 INJECTION INTRAMUSCULAR; INTRAVENOUS at 20:28

## 2023-09-01 RX ADMIN — HYDROMORPHONE HYDROCHLORIDE 0.5 MG: 1 INJECTION, SOLUTION INTRAMUSCULAR; INTRAVENOUS; SUBCUTANEOUS at 06:44

## 2023-09-01 RX ADMIN — MEROPENEM 1000 MG: 1 INJECTION, POWDER, FOR SOLUTION INTRAVENOUS at 00:45

## 2023-09-01 RX ADMIN — MEROPENEM 1000 MG: 1 INJECTION, POWDER, FOR SOLUTION INTRAVENOUS at 08:15

## 2023-09-01 RX ADMIN — BUDESONIDE 500 MCG: 0.5 INHALANT RESPIRATORY (INHALATION) at 07:47

## 2023-09-01 RX ADMIN — BUDESONIDE 500 MCG: 0.5 INHALANT RESPIRATORY (INHALATION) at 19:57

## 2023-09-01 RX ADMIN — MEROPENEM 1000 MG: 1 INJECTION, POWDER, FOR SOLUTION INTRAVENOUS at 17:38

## 2023-09-01 RX ADMIN — ARFORMOTEROL TARTRATE 15 MCG: 15 SOLUTION RESPIRATORY (INHALATION) at 19:57

## 2023-09-01 RX ADMIN — LORAZEPAM 0.5 MG: 2 INJECTION INTRAMUSCULAR; INTRAVENOUS at 14:49

## 2023-09-01 RX ADMIN — SODIUM CHLORIDE, PRESERVATIVE FREE 40 MG: 5 INJECTION INTRAVENOUS at 08:06

## 2023-09-01 RX ADMIN — Medication 10 ML: at 08:21

## 2023-09-01 RX ADMIN — ARFORMOTEROL TARTRATE 15 MCG: 15 SOLUTION RESPIRATORY (INHALATION) at 07:47

## 2023-09-01 RX ADMIN — I.V. FAT EMULSION 250 ML: 20 EMULSION INTRAVENOUS at 19:12

## 2023-09-01 RX ADMIN — Medication: at 08:05

## 2023-09-01 RX ADMIN — CHLORHEXIDINE GLUCONATE 15 ML: 1.2 RINSE ORAL at 08:20

## 2023-09-01 RX ADMIN — HYDROMORPHONE HYDROCHLORIDE 0.5 MG: 1 INJECTION, SOLUTION INTRAMUSCULAR; INTRAVENOUS; SUBCUTANEOUS at 17:56

## 2023-09-01 RX ADMIN — HYDROMORPHONE HYDROCHLORIDE 0.5 MG: 1 INJECTION, SOLUTION INTRAMUSCULAR; INTRAVENOUS; SUBCUTANEOUS at 03:12

## 2023-09-01 RX ADMIN — DEXTROSE MONOHYDRATE: 50 INJECTION, SOLUTION INTRAVENOUS at 16:04

## 2023-09-01 RX ADMIN — LORAZEPAM 0.5 MG: 2 INJECTION INTRAMUSCULAR; INTRAVENOUS at 01:29

## 2023-09-01 RX ADMIN — CALCIUM GLUCONATE: 94 INJECTION, SOLUTION INTRAVENOUS at 19:08

## 2023-09-01 RX ADMIN — CHLORHEXIDINE GLUCONATE 15 ML: 1.2 RINSE ORAL at 20:33

## 2023-09-01 RX ADMIN — Medication 10 ML: at 20:34

## 2023-09-01 RX ADMIN — VANCOMYCIN HYDROCHLORIDE 1000 MG: 1 INJECTION, POWDER, LYOPHILIZED, FOR SOLUTION INTRAVENOUS at 09:03

## 2023-09-01 RX ADMIN — SODIUM CHLORIDE, PRESERVATIVE FREE 10 ML: 5 INJECTION INTRAVENOUS at 20:33

## 2023-09-01 RX ADMIN — Medication: at 16:06

## 2023-09-01 RX ADMIN — SODIUM CHLORIDE 100 MG: 9 INJECTION, SOLUTION INTRAVENOUS at 11:23

## 2023-09-01 RX ADMIN — SODIUM CHLORIDE, PRESERVATIVE FREE 10 ML: 5 INJECTION INTRAVENOUS at 08:20

## 2023-09-01 ASSESSMENT — ENCOUNTER SYMPTOMS
ABDOMINAL PAIN: 1
SHORTNESS OF BREATH: 0

## 2023-09-01 ASSESSMENT — PAIN SCALES - GENERAL
PAINLEVEL_OUTOF10: 4
PAINLEVEL_OUTOF10: 0
PAINLEVEL_OUTOF10: 0
PAINLEVEL_OUTOF10: 6
PAINLEVEL_OUTOF10: 0
PAINLEVEL_OUTOF10: 7
PAINLEVEL_OUTOF10: 0
PAINLEVEL_OUTOF10: 4

## 2023-09-01 ASSESSMENT — PULMONARY FUNCTION TESTS
PIF_VALUE: 35
PIF_VALUE: 29
PIF_VALUE: 18
PIF_VALUE: 28
PIF_VALUE: 31
PIF_VALUE: 28

## 2023-09-01 NOTE — PROGRESS NOTES
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 difficile colitis, s/p ex lap with no signs of perforation, EGD revealing GJ stricture, s/p 2 mm dilatation, started on anticoagulation for DVT) [facility], developed hematemesis/GIB     BRIEF PATIENT SUMMARY   71-year-old female who presented to the emergency department  from [facility] for weakness and Hematemesis. 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 She was readmitted to [facility] ( through ) at which time she underwent EGD revealing GJ stricture. CT abdomen/pelvis demonstrated loculated intraperitoneal fluid in the anterior abdomen with persistent free air.   she underwent US guided drainage +E. coli. She completed a course of Zosyn and drain removed 8/10.   G-tube was placed.  patient transferred to the ICU in the setting of acute hypoxic respiratory failure d/t pulmonary edema requiring diuresis and eventual intubation ( - ). Patient reintubated  through current for worsening hypoxic respiratory failure. Course further complicated by persistent leukocytosis, fevers and CT evidence of intra-abdominal fluid collection post ultrasound drainage.   Repeat imaging  did not reveal any drainable

## 2023-09-01 NOTE — PROGRESS NOTES
Shift Summary:    Overall uneventful shift. Precedex stopped, previously paused, at shift change. PRN pain meds given appropriately overnight for indications of pain. Patient drowsy throughout the shift with some command following and nodding when prompted. CHG bath completed and dressings for wounds assessed and changed appropriately. PICC dressing changed.       Drips:  TPN @ 100 mL/hr

## 2023-09-01 NOTE — PROGRESS NOTES
0730: Bedside shift change report given to Petar iNelsen (oncoming nurse) by Meet Gabriel (offgoing nurse). Report included the following information Nurse Handoff Report, Index, Adult Overview, Surgery Report, Intake/Output, MAR, Recent Results, Cardiac Rhythm sinus tachy, Alarm Parameters, Quality Measures, Neuro Assessment, and Event Log.     0745: RTRN at bedside. Vent settings changed to PSV 15.    1450: Pt sustaining RR >30. RTRN at bedside. Vent settings changed to [facility]. ICU multidisciplinary rounds lead by Farnaz Leonardo NP (Intensivist)  The following elements were discussed:   A. Pain Management Plan: yes  B. SAT/SBT:yes  C. Sedation and RASS Goal yes  D. CAM-ICU: Negative Prevention/Management & Sleep plan? yes  Restraints: Needed? NO   Order Renewed? not applicable  E. Mobility Level   PT/OT Consulted: no - intubated/on bedrest  F. Family Involvement: Has the family been updated in the last 24 hours? yes  Central line? YES Still Indicated? YesTPN, Antibiotic use >10 days, and Limited/No vessel suitable for PIV  Stress Ulcer Prophylaxis Indicated/in place: YES  DVT Prophylaxis SCD's or Sequential Compression Device  Nutrition: TPN    Review of body systems, fluid balance I/Os, recent labs and imaging, length of ICU stay discussed. Daily Goals include: attempt SBT; assess for possible R side thoracentesis in morning.  (Note written by RN)

## 2023-09-02 LAB
ANION GAP SERPL CALC-SCNC: 5 MMOL/L (ref 5–15)
ARTERIAL PATENCY WRIST A: YES
BASE DEFICIT BLDA-SCNC: 4.6 MMOL/L
BASOPHILS # BLD: 0.1 K/UL (ref 0–0.1)
BASOPHILS NFR BLD: 1 % (ref 0–1)
BDY SITE: ABNORMAL
BUN SERPL-MCNC: 57 MG/DL (ref 6–20)
BUN/CREAT SERPL: 130 (ref 12–20)
CALCIUM SERPL-MCNC: 8.5 MG/DL (ref 8.5–10.1)
CHLORIDE SERPL-SCNC: 114 MMOL/L (ref 97–108)
CO2 SERPL-SCNC: 25 MMOL/L (ref 21–32)
CREAT SERPL-MCNC: 0.44 MG/DL (ref 0.55–1.02)
DIFFERENTIAL METHOD BLD: ABNORMAL
EOSINOPHIL # BLD: 0.9 K/UL (ref 0–0.4)
EOSINOPHIL NFR BLD: 5 % (ref 0–7)
ERYTHROCYTE [DISTWIDTH] IN BLOOD BY AUTOMATED COUNT: 16.5 % (ref 11.5–14.5)
FIO2 ON VENT: 30 %
GLUCOSE BLD STRIP.AUTO-MCNC: 106 MG/DL (ref 65–117)
GLUCOSE BLD STRIP.AUTO-MCNC: 115 MG/DL (ref 65–117)
GLUCOSE BLD STRIP.AUTO-MCNC: 126 MG/DL (ref 65–117)
GLUCOSE BLD STRIP.AUTO-MCNC: 94 MG/DL (ref 65–117)
GLUCOSE SERPL-MCNC: 116 MG/DL (ref 65–100)
HCO3 BLDA-SCNC: 22 MMOL/L (ref 22–26)
HCT VFR BLD AUTO: 24.2 % (ref 35–47)
HGB BLD-MCNC: 7.7 G/DL (ref 11.5–16)
IMM GRANULOCYTES # BLD AUTO: 0.3 K/UL (ref 0–0.04)
IMM GRANULOCYTES NFR BLD AUTO: 2 % (ref 0–0.5)
LYMPHOCYTES # BLD: 1 K/UL (ref 0.8–3.5)
LYMPHOCYTES NFR BLD: 6 % (ref 12–49)
MAGNESIUM SERPL-MCNC: 1.8 MG/DL (ref 1.6–2.4)
MCH RBC QN AUTO: 29.6 PG (ref 26–34)
MCHC RBC AUTO-ENTMCNC: 31.8 G/DL (ref 30–36.5)
MCV RBC AUTO: 93.1 FL (ref 80–99)
MONOCYTES # BLD: 1.6 K/UL (ref 0–1)
MONOCYTES NFR BLD: 9 % (ref 5–13)
NEUTS SEG # BLD: 13.7 K/UL (ref 1.8–8)
NEUTS SEG NFR BLD: 77 % (ref 32–75)
NRBC # BLD: 0 K/UL (ref 0–0.01)
NRBC BLD-RTO: 0 PER 100 WBC
PCO2 BLDA: 46 MMHG (ref 35–45)
PEEP RESPIRATORY: 5
PH BLDA: 7.3 (ref 7.35–7.45)
PHOSPHATE SERPL-MCNC: 2.6 MG/DL (ref 2.6–4.7)
PLATELET # BLD AUTO: 479 K/UL (ref 150–400)
PMV BLD AUTO: 10.1 FL (ref 8.9–12.9)
PO2 BLDA: 106 MMHG (ref 80–100)
POTASSIUM SERPL-SCNC: 4 MMOL/L (ref 3.5–5.1)
PRESSURE SUPPORT SETTING VENT: 15
RBC # BLD AUTO: 2.6 M/UL (ref 3.8–5.2)
SAO2 % BLD: 97 % (ref 92–97)
SAO2% DEVICE SAO2% SENSOR NAME: ABNORMAL
SERVICE CMNT-IMP: ABNORMAL
SERVICE CMNT-IMP: NORMAL
SODIUM SERPL-SCNC: 144 MMOL/L (ref 136–145)
SPECIMEN SITE: ABNORMAL
VENTILATION MODE VENT: ABNORMAL
WBC # BLD AUTO: 17.6 K/UL (ref 3.6–11)

## 2023-09-02 PROCEDURE — 84100 ASSAY OF PHOSPHORUS: CPT

## 2023-09-02 PROCEDURE — 2580000003 HC RX 258: Performed by: NURSE PRACTITIONER

## 2023-09-02 PROCEDURE — 82803 BLOOD GASES ANY COMBINATION: CPT

## 2023-09-02 PROCEDURE — 2580000003 HC RX 258: Performed by: SURGERY

## 2023-09-02 PROCEDURE — 6370000000 HC RX 637 (ALT 250 FOR IP): Performed by: SURGERY

## 2023-09-02 PROCEDURE — 94003 VENT MGMT INPAT SUBQ DAY: CPT

## 2023-09-02 PROCEDURE — 6360000002 HC RX W HCPCS: Performed by: NURSE PRACTITIONER

## 2023-09-02 PROCEDURE — 2000000000 HC ICU R&B

## 2023-09-02 PROCEDURE — 80048 BASIC METABOLIC PNL TOTAL CA: CPT

## 2023-09-02 PROCEDURE — A4216 STERILE WATER/SALINE, 10 ML: HCPCS | Performed by: NURSE PRACTITIONER

## 2023-09-02 PROCEDURE — 82962 GLUCOSE BLOOD TEST: CPT

## 2023-09-02 PROCEDURE — 6360000002 HC RX W HCPCS: Performed by: ANESTHESIOLOGY

## 2023-09-02 PROCEDURE — C9113 INJ PANTOPRAZOLE SODIUM, VIA: HCPCS | Performed by: NURSE PRACTITIONER

## 2023-09-02 PROCEDURE — 6360000002 HC RX W HCPCS: Performed by: SURGERY

## 2023-09-02 PROCEDURE — 2500000003 HC RX 250 WO HCPCS: Performed by: NURSE PRACTITIONER

## 2023-09-02 PROCEDURE — 83735 ASSAY OF MAGNESIUM: CPT

## 2023-09-02 PROCEDURE — 36600 WITHDRAWAL OF ARTERIAL BLOOD: CPT

## 2023-09-02 PROCEDURE — 94640 AIRWAY INHALATION TREATMENT: CPT

## 2023-09-02 PROCEDURE — 36415 COLL VENOUS BLD VENIPUNCTURE: CPT

## 2023-09-02 PROCEDURE — 85025 COMPLETE CBC W/AUTO DIFF WBC: CPT

## 2023-09-02 RX ADMIN — MEROPENEM 1000 MG: 1 INJECTION, POWDER, FOR SOLUTION INTRAVENOUS at 17:24

## 2023-09-02 RX ADMIN — SODIUM CHLORIDE 100 MG: 9 INJECTION, SOLUTION INTRAVENOUS at 11:34

## 2023-09-02 RX ADMIN — CHLORHEXIDINE GLUCONATE 15 ML: 1.2 RINSE ORAL at 20:16

## 2023-09-02 RX ADMIN — HYDROMORPHONE HYDROCHLORIDE 0.5 MG: 1 INJECTION, SOLUTION INTRAMUSCULAR; INTRAVENOUS; SUBCUTANEOUS at 09:06

## 2023-09-02 RX ADMIN — Medication: at 16:13

## 2023-09-02 RX ADMIN — Medication 10 ML: at 20:16

## 2023-09-02 RX ADMIN — LORAZEPAM 0.5 MG: 2 INJECTION INTRAMUSCULAR; INTRAVENOUS at 14:04

## 2023-09-02 RX ADMIN — SODIUM CHLORIDE, PRESERVATIVE FREE 10 ML: 5 INJECTION INTRAVENOUS at 08:30

## 2023-09-02 RX ADMIN — CHLORHEXIDINE GLUCONATE 15 ML: 1.2 RINSE ORAL at 08:24

## 2023-09-02 RX ADMIN — CALCIUM GLUCONATE: 94 INJECTION, SOLUTION INTRAVENOUS at 18:57

## 2023-09-02 RX ADMIN — SODIUM CHLORIDE, PRESERVATIVE FREE 40 MG: 5 INJECTION INTRAVENOUS at 08:24

## 2023-09-02 RX ADMIN — Medication: at 01:01

## 2023-09-02 RX ADMIN — ARFORMOTEROL TARTRATE 15 MCG: 15 SOLUTION RESPIRATORY (INHALATION) at 07:20

## 2023-09-02 RX ADMIN — DEXTROSE MONOHYDRATE: 50 INJECTION, SOLUTION INTRAVENOUS at 12:02

## 2023-09-02 RX ADMIN — MEROPENEM 1000 MG: 1 INJECTION, POWDER, FOR SOLUTION INTRAVENOUS at 08:25

## 2023-09-02 RX ADMIN — BUDESONIDE 500 MCG: 0.5 INHALANT RESPIRATORY (INHALATION) at 07:20

## 2023-09-02 RX ADMIN — SODIUM CHLORIDE, PRESERVATIVE FREE 10 ML: 5 INJECTION INTRAVENOUS at 20:15

## 2023-09-02 RX ADMIN — BUDESONIDE 500 MCG: 0.5 INHALANT RESPIRATORY (INHALATION) at 20:39

## 2023-09-02 RX ADMIN — LORAZEPAM 0.5 MG: 2 INJECTION INTRAMUSCULAR; INTRAVENOUS at 20:14

## 2023-09-02 RX ADMIN — MEROPENEM 1000 MG: 1 INJECTION, POWDER, FOR SOLUTION INTRAVENOUS at 01:07

## 2023-09-02 RX ADMIN — HYDROMORPHONE HYDROCHLORIDE 0.5 MG: 1 INJECTION, SOLUTION INTRAMUSCULAR; INTRAVENOUS; SUBCUTANEOUS at 17:22

## 2023-09-02 RX ADMIN — ARFORMOTEROL TARTRATE 15 MCG: 15 SOLUTION RESPIRATORY (INHALATION) at 20:39

## 2023-09-02 RX ADMIN — DEXTROSE MONOHYDRATE: 50 INJECTION, SOLUTION INTRAVENOUS at 21:35

## 2023-09-02 RX ADMIN — LORAZEPAM 0.5 MG: 2 INJECTION INTRAMUSCULAR; INTRAVENOUS at 03:40

## 2023-09-02 RX ADMIN — Medication: at 08:07

## 2023-09-02 RX ADMIN — VANCOMYCIN HYDROCHLORIDE 1000 MG: 1 INJECTION, POWDER, LYOPHILIZED, FOR SOLUTION INTRAVENOUS at 08:31

## 2023-09-02 RX ADMIN — Medication 10 ML: at 08:30

## 2023-09-02 RX ADMIN — LORAZEPAM 0.5 MG: 2 INJECTION INTRAMUSCULAR; INTRAVENOUS at 08:13

## 2023-09-02 RX ADMIN — DEXTROSE MONOHYDRATE: 50 INJECTION, SOLUTION INTRAVENOUS at 02:12

## 2023-09-02 RX ADMIN — HYDROMORPHONE HYDROCHLORIDE 0.5 MG: 1 INJECTION, SOLUTION INTRAMUSCULAR; INTRAVENOUS; SUBCUTANEOUS at 00:50

## 2023-09-02 ASSESSMENT — PULMONARY FUNCTION TESTS
PIF_VALUE: 29
PIF_VALUE: 28
PIF_VALUE: 33
PIF_VALUE: 32
PIF_VALUE: 21
PIF_VALUE: 31

## 2023-09-02 ASSESSMENT — ENCOUNTER SYMPTOMS
ABDOMINAL PAIN: 1
SHORTNESS OF BREATH: 0

## 2023-09-02 ASSESSMENT — PAIN SCALES - GENERAL
PAINLEVEL_OUTOF10: 0
PAINLEVEL_OUTOF10: 0
PAINLEVEL_OUTOF10: 4
PAINLEVEL_OUTOF10: 0
PAINLEVEL_OUTOF10: 4
PAINLEVEL_OUTOF10: 6

## 2023-09-02 NOTE — PROGRESS NOTES
abdominal drain  -Wound VAC to sacral wound    RENAL/ELECTROLYTE/FLUIDS:  Acute kidney injury likely prerenal-resolving  Hypernatremia   -Serial laboratory results, replete as indicated  -Worsening hypernatremia, all Na removed from TPN, start D5W gtt  -Nephrotoxic agents  -Strict I&Os    ENDOCRINE:  Hx TTDM   -A1c 3.8%  -Blood glucose range 120-180, avoid hypo-/hyperglycemia  -TSH 2.23    HEMATOLOGY/ONCOLOGY:  Thrombocytosis  R femoral T s/p IVC (8/2/2023)   Hx of PE (on Eliquis at home)   -8/30 hgb 6.5, Tx 1 PRBCs  -Trend CBC  -Hgb greater than 7.0, transfuse as indicated, H&H 8.1/26.3  -Worsening thrombocytosis   -SCDs for DVT prophylaxis    INFECTIOUS DISEASE:  Severe sepsis/septic shock of likely intra-abdominal origin  Concern for bilateral pneumonia  Sacral wound s/p wound debridement 8/14  History of C. difficile colitis   ID following  Continue meropenem, vancomycin and Eraxis  Repeat cultures NGTD  Afebrile, WBC 16.7    ANTIBIOTICS TO DATE:  Eraxis (8/24 - current)  Meropenem (8/21 - current)  Vancomycin (8/6 - current)  Zosyn (7/29 - 8/21)    CULTURES TO DATE:  8/22: PD fluid demonstrated moderate yeast  8/21: BC NGTD  8/19: BC NGTD  8/18: RC NGTD  8/7: MRSA negative  8/6: BC NGTD  7/31: PD fluid +E. coli    ICU DAILY CHECKLIST     Code Status:Full  DVT Prophylaxis:SCDs  T/L/D: ETT, PICC, Lutz catheter and G-tube, LUQ drain  SUP: PPI  Diet: NPO, TPN  Activity Level:Bedrest  ABCDEF Bundle/Checklist Completed:Yes  Disposition: ICU  Multidisciplinary Rounds Completed:Yes  Goals of Care Discussion/Palliative: Yes  Patient/Family Updated: Yes      HOSPITAL COURSE/DAILY EVENT LOG   8/28: Patient remains weak and deconditioned. Difficulty tolerating SBT's, have spoken with family and will set up family meeting today to discuss potential for tracheostomy. 8/29: Afebrile overnight. Plan for family meeting tomorrow to discuss tracheostomy placement. Continued bowel rest, TPN for nutrition. H&H holding 7.5/20.5. 8/30: Hg 6.5 this morning, Tx 1 PRBCs. Renal function stable. Family meeting today to discuss [facility]. AM CXR demonstrates interval decrease in pneumomediastinum and ongoing right wall chest emphysema. Persistent bilateral effusions with patchy interstitial and airspace opacities. Bedside point of care US re-demonstrates moderate bilateral pleural effusions L>R. Will do L thoracentesis today and likely right thoracentesis tomorrow. 9/1: Morning CXR re demonstrates stable pneumomediastinum and improvement in pleural effusions. F/U procalcitonin 0.62 and lactic acid 0.7, WBC 20. Appears more comfortable w/ativan. Weaning precdex. Ps 15/5. SUBJECTIVE   Review of Systems   Respiratory:  Negative for shortness of breath. Gastrointestinal:  Positive for abdominal pain. Neurological:  Positive for weakness. OBJECTIVE   Physical Exam  Constitutional:       General: She is not in acute distress. Appearance: She is ill-appearing. HENT:      Head: Normocephalic and atraumatic. Mouth/Throat:      Mouth: Mucous membranes are moist.      Pharynx: Oropharynx is clear. Eyes:      Extraocular Movements: Extraocular movements intact. Conjunctiva/sclera: Conjunctivae normal.      Pupils: Pupils are equal, round, and reactive to light. Cardiovascular:      Rate and Rhythm: Normal rate. Pulses: Normal pulses. Pulmonary:      Comments: Intubated mechanically ventilated  Abdominal:      General: There is no distension. Tenderness: There is abdominal tenderness. Skin:     General: Skin is warm and dry. Capillary Refill: Capillary refill takes less than 2 seconds. Coloration: Skin is pale. Neurological:      General: No focal deficit present. Mental Status: She is oriented to person, place, and time and easily aroused. She is lethargic.       Comments: Intubated mechanically ventilated       Labs and Data: Reviewed 09/02/23  Medications: Reviewed 09/02/23  Imaging:

## 2023-09-03 ENCOUNTER — APPOINTMENT (OUTPATIENT)
Facility: HOSPITAL | Age: 63
DRG: 004 | End: 2023-09-03
Payer: MEDICARE

## 2023-09-03 LAB
ANION GAP SERPL CALC-SCNC: 6 MMOL/L (ref 5–15)
BASOPHILS # BLD: 0 K/UL (ref 0–0.1)
BASOPHILS NFR BLD: 0 % (ref 0–1)
BUN SERPL-MCNC: 56 MG/DL (ref 6–20)
BUN/CREAT SERPL: 133 (ref 12–20)
CALCIUM SERPL-MCNC: 8.4 MG/DL (ref 8.5–10.1)
CHLORIDE SERPL-SCNC: 107 MMOL/L (ref 97–108)
CO2 SERPL-SCNC: 26 MMOL/L (ref 21–32)
CREAT SERPL-MCNC: 0.42 MG/DL (ref 0.55–1.02)
DIFFERENTIAL METHOD BLD: ABNORMAL
EOSINOPHIL # BLD: 1.1 K/UL (ref 0–0.4)
EOSINOPHIL NFR BLD: 7 % (ref 0–7)
ERYTHROCYTE [DISTWIDTH] IN BLOOD BY AUTOMATED COUNT: 16 % (ref 11.5–14.5)
GLUCOSE BLD STRIP.AUTO-MCNC: 113 MG/DL (ref 65–117)
GLUCOSE BLD STRIP.AUTO-MCNC: 114 MG/DL (ref 65–117)
GLUCOSE BLD STRIP.AUTO-MCNC: 119 MG/DL (ref 65–117)
GLUCOSE BLD STRIP.AUTO-MCNC: 85 MG/DL (ref 65–117)
GLUCOSE SERPL-MCNC: 110 MG/DL (ref 65–100)
HCT VFR BLD AUTO: 24.4 % (ref 35–47)
HGB BLD-MCNC: 7.9 G/DL (ref 11.5–16)
IMM GRANULOCYTES # BLD AUTO: 0.3 K/UL (ref 0–0.04)
IMM GRANULOCYTES NFR BLD AUTO: 2 % (ref 0–0.5)
LYMPHOCYTES # BLD: 0.9 K/UL (ref 0.8–3.5)
LYMPHOCYTES NFR BLD: 6 % (ref 12–49)
MAGNESIUM SERPL-MCNC: 1.4 MG/DL (ref 1.6–2.4)
MCH RBC QN AUTO: 29.7 PG (ref 26–34)
MCHC RBC AUTO-ENTMCNC: 32.4 G/DL (ref 30–36.5)
MCV RBC AUTO: 91.7 FL (ref 80–99)
MONOCYTES # BLD: 1.4 K/UL (ref 0–1)
MONOCYTES NFR BLD: 9 % (ref 5–13)
NEUTS SEG # BLD: 11.3 K/UL (ref 1.8–8)
NEUTS SEG NFR BLD: 76 % (ref 32–75)
NRBC # BLD: 0 K/UL (ref 0–0.01)
NRBC BLD-RTO: 0 PER 100 WBC
PHOSPHATE SERPL-MCNC: 2.7 MG/DL (ref 2.6–4.7)
PLATELET # BLD AUTO: 402 K/UL (ref 150–400)
PMV BLD AUTO: 10.1 FL (ref 8.9–12.9)
POTASSIUM SERPL-SCNC: 3.9 MMOL/L (ref 3.5–5.1)
RBC # BLD AUTO: 2.66 M/UL (ref 3.8–5.2)
RBC MORPH BLD: ABNORMAL
SERVICE CMNT-IMP: ABNORMAL
SERVICE CMNT-IMP: NORMAL
SODIUM SERPL-SCNC: 139 MMOL/L (ref 136–145)
WBC # BLD AUTO: 15 K/UL (ref 3.6–11)

## 2023-09-03 PROCEDURE — 2500000003 HC RX 250 WO HCPCS: Performed by: NURSE PRACTITIONER

## 2023-09-03 PROCEDURE — 84100 ASSAY OF PHOSPHORUS: CPT

## 2023-09-03 PROCEDURE — 2580000003 HC RX 258: Performed by: NURSE PRACTITIONER

## 2023-09-03 PROCEDURE — 0BJ08ZZ INSPECTION OF TRACHEOBRONCHIAL TREE, VIA NATURAL OR ARTIFICIAL OPENING ENDOSCOPIC: ICD-10-PCS | Performed by: STUDENT IN AN ORGANIZED HEALTH CARE EDUCATION/TRAINING PROGRAM

## 2023-09-03 PROCEDURE — 94003 VENT MGMT INPAT SUBQ DAY: CPT

## 2023-09-03 PROCEDURE — 2000000000 HC ICU R&B

## 2023-09-03 PROCEDURE — 0B113F4 BYPASS TRACHEA TO CUTANEOUS WITH TRACHEOSTOMY DEVICE, PERCUTANEOUS APPROACH: ICD-10-PCS | Performed by: STUDENT IN AN ORGANIZED HEALTH CARE EDUCATION/TRAINING PROGRAM

## 2023-09-03 PROCEDURE — 71045 X-RAY EXAM CHEST 1 VIEW: CPT

## 2023-09-03 PROCEDURE — 82962 GLUCOSE BLOOD TEST: CPT

## 2023-09-03 PROCEDURE — 6360000002 HC RX W HCPCS: Performed by: NURSE PRACTITIONER

## 2023-09-03 PROCEDURE — 83735 ASSAY OF MAGNESIUM: CPT

## 2023-09-03 PROCEDURE — 80048 BASIC METABOLIC PNL TOTAL CA: CPT

## 2023-09-03 PROCEDURE — 85025 COMPLETE CBC W/AUTO DIFF WBC: CPT

## 2023-09-03 PROCEDURE — 2580000003 HC RX 258: Performed by: SURGERY

## 2023-09-03 PROCEDURE — 6370000000 HC RX 637 (ALT 250 FOR IP): Performed by: SURGERY

## 2023-09-03 PROCEDURE — 0BH17EZ INSERTION OF ENDOTRACHEAL AIRWAY INTO TRACHEA, VIA NATURAL OR ARTIFICIAL OPENING: ICD-10-PCS | Performed by: STUDENT IN AN ORGANIZED HEALTH CARE EDUCATION/TRAINING PROGRAM

## 2023-09-03 PROCEDURE — 0B110F4 BYPASS TRACHEA TO CUTANEOUS WITH TRACHEOSTOMY DEVICE, OPEN APPROACH: ICD-10-PCS | Performed by: STUDENT IN AN ORGANIZED HEALTH CARE EDUCATION/TRAINING PROGRAM

## 2023-09-03 PROCEDURE — 6360000002 HC RX W HCPCS: Performed by: ANESTHESIOLOGY

## 2023-09-03 PROCEDURE — C9113 INJ PANTOPRAZOLE SODIUM, VIA: HCPCS | Performed by: NURSE PRACTITIONER

## 2023-09-03 PROCEDURE — 94640 AIRWAY INHALATION TREATMENT: CPT

## 2023-09-03 PROCEDURE — 6360000002 HC RX W HCPCS: Performed by: SURGERY

## 2023-09-03 PROCEDURE — A4216 STERILE WATER/SALINE, 10 ML: HCPCS | Performed by: NURSE PRACTITIONER

## 2023-09-03 PROCEDURE — 36415 COLL VENOUS BLD VENIPUNCTURE: CPT

## 2023-09-03 RX ORDER — FENTANYL CITRATE 50 UG/ML
200 INJECTION, SOLUTION INTRAMUSCULAR; INTRAVENOUS ONCE
Status: COMPLETED | OUTPATIENT
Start: 2023-09-03 | End: 2023-09-03

## 2023-09-03 RX ORDER — MAGNESIUM SULFATE IN WATER 40 MG/ML
2000 INJECTION, SOLUTION INTRAVENOUS ONCE
Status: COMPLETED | OUTPATIENT
Start: 2023-09-03 | End: 2023-09-03

## 2023-09-03 RX ORDER — PROPOFOL 10 MG/ML
5-50 INJECTION, EMULSION INTRAVENOUS CONTINUOUS
Status: DISCONTINUED | OUTPATIENT
Start: 2023-09-03 | End: 2023-09-04

## 2023-09-03 RX ORDER — MIDAZOLAM HYDROCHLORIDE 1 MG/ML
6 INJECTION INTRAMUSCULAR; INTRAVENOUS ONCE
Status: COMPLETED | OUTPATIENT
Start: 2023-09-03 | End: 2023-09-03

## 2023-09-03 RX ORDER — LIDOCAINE HYDROCHLORIDE 20 MG/ML
INJECTION, SOLUTION INTRAVENOUS
Status: DISCONTINUED
Start: 2023-09-03 | End: 2023-09-03 | Stop reason: WASHOUT

## 2023-09-03 RX ORDER — MIDAZOLAM HYDROCHLORIDE 10 MG/2ML
6 INJECTION, SOLUTION INTRAMUSCULAR; INTRAVENOUS ONCE
Status: DISCONTINUED | OUTPATIENT
Start: 2023-09-03 | End: 2023-09-03 | Stop reason: SDUPTHER

## 2023-09-03 RX ORDER — ROCURONIUM BROMIDE 10 MG/ML
50 INJECTION, SOLUTION INTRAVENOUS ONCE
Status: COMPLETED | OUTPATIENT
Start: 2023-09-03 | End: 2023-09-03

## 2023-09-03 RX ADMIN — ROCURONIUM BROMIDE 50 MG: 10 INJECTION, SOLUTION INTRAVENOUS at 11:35

## 2023-09-03 RX ADMIN — SODIUM CHLORIDE, PRESERVATIVE FREE 10 ML: 5 INJECTION INTRAVENOUS at 08:45

## 2023-09-03 RX ADMIN — MEROPENEM 1000 MG: 1 INJECTION, POWDER, FOR SOLUTION INTRAVENOUS at 00:50

## 2023-09-03 RX ADMIN — SODIUM CHLORIDE 100 MG: 9 INJECTION, SOLUTION INTRAVENOUS at 10:42

## 2023-09-03 RX ADMIN — ARFORMOTEROL TARTRATE 15 MCG: 15 SOLUTION RESPIRATORY (INHALATION) at 19:54

## 2023-09-03 RX ADMIN — HYDROMORPHONE HYDROCHLORIDE 0.5 MG: 1 INJECTION, SOLUTION INTRAMUSCULAR; INTRAVENOUS; SUBCUTANEOUS at 18:22

## 2023-09-03 RX ADMIN — SODIUM CHLORIDE, PRESERVATIVE FREE 40 MG: 5 INJECTION INTRAVENOUS at 08:43

## 2023-09-03 RX ADMIN — LORAZEPAM 0.5 MG: 2 INJECTION INTRAMUSCULAR; INTRAVENOUS at 16:46

## 2023-09-03 RX ADMIN — HYDROMORPHONE HYDROCHLORIDE 0.5 MG: 1 INJECTION, SOLUTION INTRAMUSCULAR; INTRAVENOUS; SUBCUTANEOUS at 00:08

## 2023-09-03 RX ADMIN — BUDESONIDE 500 MCG: 0.5 INHALANT RESPIRATORY (INHALATION) at 07:00

## 2023-09-03 RX ADMIN — CALCIUM GLUCONATE: 94 INJECTION, SOLUTION INTRAVENOUS at 19:07

## 2023-09-03 RX ADMIN — MAGNESIUM SULFATE HEPTAHYDRATE 2000 MG: 40 INJECTION, SOLUTION INTRAVENOUS at 10:26

## 2023-09-03 RX ADMIN — ARFORMOTEROL TARTRATE 15 MCG: 15 SOLUTION RESPIRATORY (INHALATION) at 07:00

## 2023-09-03 RX ADMIN — MEROPENEM 1000 MG: 1 INJECTION, POWDER, FOR SOLUTION INTRAVENOUS at 16:46

## 2023-09-03 RX ADMIN — HYDROMORPHONE HYDROCHLORIDE 0.5 MG: 1 INJECTION, SOLUTION INTRAMUSCULAR; INTRAVENOUS; SUBCUTANEOUS at 20:22

## 2023-09-03 RX ADMIN — LORAZEPAM 0.5 MG: 2 INJECTION INTRAMUSCULAR; INTRAVENOUS at 04:59

## 2023-09-03 RX ADMIN — MEROPENEM 1000 MG: 1 INJECTION, POWDER, FOR SOLUTION INTRAVENOUS at 08:41

## 2023-09-03 RX ADMIN — CHLORHEXIDINE GLUCONATE 15 ML: 1.2 RINSE ORAL at 20:04

## 2023-09-03 RX ADMIN — CHLORHEXIDINE GLUCONATE 15 ML: 1.2 RINSE ORAL at 08:45

## 2023-09-03 RX ADMIN — Medication: at 00:11

## 2023-09-03 RX ADMIN — SODIUM CHLORIDE, PRESERVATIVE FREE 10 ML: 5 INJECTION INTRAVENOUS at 20:05

## 2023-09-03 RX ADMIN — FENTANYL CITRATE 100 MCG: 50 INJECTION INTRAMUSCULAR; INTRAVENOUS at 11:34

## 2023-09-03 RX ADMIN — Medication 10 ML: at 20:06

## 2023-09-03 RX ADMIN — Medication: at 08:44

## 2023-09-03 RX ADMIN — VANCOMYCIN HYDROCHLORIDE 1000 MG: 1 INJECTION, POWDER, LYOPHILIZED, FOR SOLUTION INTRAVENOUS at 08:44

## 2023-09-03 RX ADMIN — MIDAZOLAM 5 MG: 1 INJECTION INTRAMUSCULAR; INTRAVENOUS at 11:34

## 2023-09-03 RX ADMIN — Medication 10 ML: at 08:45

## 2023-09-03 RX ADMIN — LORAZEPAM 0.5 MG: 2 INJECTION INTRAMUSCULAR; INTRAVENOUS at 10:28

## 2023-09-03 RX ADMIN — Medication: at 16:00

## 2023-09-03 RX ADMIN — HYDROMORPHONE HYDROCHLORIDE 0.5 MG: 1 INJECTION, SOLUTION INTRAMUSCULAR; INTRAVENOUS; SUBCUTANEOUS at 13:57

## 2023-09-03 RX ADMIN — BUDESONIDE 500 MCG: 0.5 INHALANT RESPIRATORY (INHALATION) at 19:54

## 2023-09-03 RX ADMIN — HYDROMORPHONE HYDROCHLORIDE 0.5 MG: 1 INJECTION, SOLUTION INTRAMUSCULAR; INTRAVENOUS; SUBCUTANEOUS at 09:01

## 2023-09-03 ASSESSMENT — PULMONARY FUNCTION TESTS
PIF_VALUE: 30
PIF_VALUE: 36
PIF_VALUE: 30
PIF_VALUE: 32
PIF_VALUE: 28

## 2023-09-03 ASSESSMENT — ENCOUNTER SYMPTOMS
ABDOMINAL PAIN: 1
SHORTNESS OF BREATH: 0

## 2023-09-03 ASSESSMENT — PAIN SCALES - GENERAL
PAINLEVEL_OUTOF10: 5
PAINLEVEL_OUTOF10: 0
PAINLEVEL_OUTOF10: 4
PAINLEVEL_OUTOF10: 8
PAINLEVEL_OUTOF10: 0
PAINLEVEL_OUTOF10: 0

## 2023-09-03 NOTE — PROCEDURES
Tracheostomy Procedure Note    Performed by: Dr. [facility]    Bronchoscopy by: Dr. Mariah Hayden    Time Start: 36  Time Finish: 8816  Indication: Acute hypoxemic respiratory failure  Anesthesia: Versed, fentanyl. See [facility]    Consent: On file    The risks, benefits, complications, treatment options and expected outcomes were discussed with the patient's family. The possibilities of reaction to medication, pulmonary aspiration, perforation of a viscus, bleeding, failure to diagnose a condition and creating a complication requiring transfusion or operation were discussed with the patient's family  who freely signed the consent. Procedure in detail:The patient was identified as Lenard Arevalo and the procedure verified as percutaneous tracheostomy. A Time Out was held and the above information confirmed. Anatomy palpated and confirmed to be amenable for PDT placement. Local anesthesia instilled to site. 2cm vertical incision made at midline of lower neck. Blunt dissection of tissue performed until cricoid cartilage, 2nd and 3rd tracheal rings readily palpated. With the bronchoscope in the ET tube, the ET tube was retracted slowly 1cm at a time until transillumination of bronchoscope and confirmation with gentle external probing. Introducer needle advanced into the trachea between 2nd and 3rd tracheal rings. Introducer catheter was then advanced into trachea under visualization and needle retracted. Guidewire passed through the catheter, and the catheter was removed. Graduated dilators were placed over the wire, and then a cuffed 6.0 Shiley tracheostomy tube placed over the guidewire into the trachea. Bronchoscope was passed through the tracheostomy tube and zi visualized. Mechanical ventilation resumed through tracheostomy tube. Tracheostomy tube secured with sutures x4 and trach ties.     Findings: successful 6.0 cuffed shiley trach placement    Complications: None    Please See Dr. Edgar Johnson bronchoscopy note for further procedure    CXR ordered for follow up. Keep sutures in place at least 7 days    Do not change trach or downsize until 10-14 days post placement unless emergent. Dave Sauceda MD  Staff [address] [place] [address]

## 2023-09-03 NOTE — PROGRESS NOTES
Shift Summary:    Overall uneventful shift. Patient with minimal command following but more spontaneous movements noted. BP stable. CHG bath completed.      Drips:    TPN @ 100 mL/hr  D5 @ 100 mL/hr

## 2023-09-03 NOTE — PROGRESS NOTES
Shift Summary:    Overall uneventful shift. Patient with some command following throughout shift. Some agitation noted with tachycardia, tachypnea, and biting on tube- dilaudid given for signs of pain. BP stable overnight. Minimal output from drains.      Drips:    TPN @ 100 mL/hr  D5 @ 100 mL/hr  KVO x2 @ 3mL/hr

## 2023-09-03 NOTE — PROGRESS NOTES
1115: Mago Granado MD, RTRN, and RN at bedside for planned bedside bronchoscopy and tracheostomy. Procedural consent form signed and verified. Time out held prior to procedure. 1142: 6.0 cuffed Shiley trach placed under sterile conditions with direct visualization of the airway. ETT removed. Sedation and paralytic administered per MD. Pt tolerated the procedure well. VSS.     1150: Supraclavicular wound debrided and eschar removed by Patel Dyer MD. Covered by mepilex.     1151: Verbal orders received for stat CXR per [facility] NP.

## 2023-09-04 LAB
ALBUMIN SERPL-MCNC: 1.8 G/DL (ref 3.5–5)
ALBUMIN/GLOB SERPL: 0.5 (ref 1.1–2.2)
ALP SERPL-CCNC: 362 U/L (ref 45–117)
ALT SERPL-CCNC: 167 U/L (ref 12–78)
ANION GAP SERPL CALC-SCNC: 5 MMOL/L (ref 5–15)
AST SERPL-CCNC: 87 U/L (ref 15–37)
BILIRUB SERPL-MCNC: 0.5 MG/DL (ref 0.2–1)
BUN SERPL-MCNC: 49 MG/DL (ref 6–20)
BUN/CREAT SERPL: 129 (ref 12–20)
CALCIUM SERPL-MCNC: 8.5 MG/DL (ref 8.5–10.1)
CHLORIDE SERPL-SCNC: 109 MMOL/L (ref 97–108)
CO2 SERPL-SCNC: 26 MMOL/L (ref 21–32)
CREAT SERPL-MCNC: 0.38 MG/DL (ref 0.55–1.02)
ERYTHROCYTE [DISTWIDTH] IN BLOOD BY AUTOMATED COUNT: 15.8 % (ref 11.5–14.5)
GLOBULIN SER CALC-MCNC: 3.3 G/DL (ref 2–4)
GLUCOSE BLD STRIP.AUTO-MCNC: 102 MG/DL (ref 65–117)
GLUCOSE BLD STRIP.AUTO-MCNC: 112 MG/DL (ref 65–117)
GLUCOSE BLD STRIP.AUTO-MCNC: 135 MG/DL (ref 65–117)
GLUCOSE BLD STRIP.AUTO-MCNC: 95 MG/DL (ref 65–117)
GLUCOSE SERPL-MCNC: 119 MG/DL (ref 65–100)
HCT VFR BLD AUTO: 22.9 % (ref 35–47)
HGB BLD-MCNC: 7.5 G/DL (ref 11.5–16)
MAGNESIUM SERPL-MCNC: 1.8 MG/DL (ref 1.6–2.4)
MCH RBC QN AUTO: 29.4 PG (ref 26–34)
MCHC RBC AUTO-ENTMCNC: 32.8 G/DL (ref 30–36.5)
MCV RBC AUTO: 89.8 FL (ref 80–99)
NRBC # BLD: 0 K/UL (ref 0–0.01)
NRBC BLD-RTO: 0 PER 100 WBC
PHOSPHATE SERPL-MCNC: 2.7 MG/DL (ref 2.6–4.7)
PLATELET # BLD AUTO: 365 K/UL (ref 150–400)
PMV BLD AUTO: 10.1 FL (ref 8.9–12.9)
POTASSIUM SERPL-SCNC: 3.9 MMOL/L (ref 3.5–5.1)
PROT SERPL-MCNC: 5.1 G/DL (ref 6.4–8.2)
RBC # BLD AUTO: 2.55 M/UL (ref 3.8–5.2)
SERVICE CMNT-IMP: ABNORMAL
SERVICE CMNT-IMP: NORMAL
SODIUM SERPL-SCNC: 140 MMOL/L (ref 136–145)
VANCOMYCIN SERPL-MCNC: 16.7 UG/ML
WBC # BLD AUTO: 13.4 K/UL (ref 3.6–11)

## 2023-09-04 PROCEDURE — 2580000003 HC RX 258: Performed by: INTERNAL MEDICINE

## 2023-09-04 PROCEDURE — 2580000003 HC RX 258: Performed by: NURSE PRACTITIONER

## 2023-09-04 PROCEDURE — 6360000002 HC RX W HCPCS: Performed by: NURSE PRACTITIONER

## 2023-09-04 PROCEDURE — 82962 GLUCOSE BLOOD TEST: CPT

## 2023-09-04 PROCEDURE — 2580000003 HC RX 258: Performed by: SURGERY

## 2023-09-04 PROCEDURE — 6360000002 HC RX W HCPCS: Performed by: INTERNAL MEDICINE

## 2023-09-04 PROCEDURE — 84100 ASSAY OF PHOSPHORUS: CPT

## 2023-09-04 PROCEDURE — 2500000003 HC RX 250 WO HCPCS: Performed by: NURSE PRACTITIONER

## 2023-09-04 PROCEDURE — 6370000000 HC RX 637 (ALT 250 FOR IP): Performed by: SURGERY

## 2023-09-04 PROCEDURE — 85027 COMPLETE CBC AUTOMATED: CPT

## 2023-09-04 PROCEDURE — 6360000002 HC RX W HCPCS: Performed by: ANESTHESIOLOGY

## 2023-09-04 PROCEDURE — 94003 VENT MGMT INPAT SUBQ DAY: CPT

## 2023-09-04 PROCEDURE — 80053 COMPREHEN METABOLIC PANEL: CPT

## 2023-09-04 PROCEDURE — 2000000000 HC ICU R&B

## 2023-09-04 PROCEDURE — C9113 INJ PANTOPRAZOLE SODIUM, VIA: HCPCS | Performed by: NURSE PRACTITIONER

## 2023-09-04 PROCEDURE — 36415 COLL VENOUS BLD VENIPUNCTURE: CPT

## 2023-09-04 PROCEDURE — 80202 ASSAY OF VANCOMYCIN: CPT

## 2023-09-04 PROCEDURE — 83735 ASSAY OF MAGNESIUM: CPT

## 2023-09-04 RX ADMIN — ARFORMOTEROL TARTRATE 15 MCG: 15 SOLUTION RESPIRATORY (INHALATION) at 07:52

## 2023-09-04 RX ADMIN — Medication 10 ML: at 07:49

## 2023-09-04 RX ADMIN — LORAZEPAM 0.5 MG: 2 INJECTION INTRAMUSCULAR; INTRAVENOUS at 23:03

## 2023-09-04 RX ADMIN — LORAZEPAM 0.5 MG: 2 INJECTION INTRAMUSCULAR; INTRAVENOUS at 11:45

## 2023-09-04 RX ADMIN — BUDESONIDE 500 MCG: 0.5 INHALANT RESPIRATORY (INHALATION) at 07:52

## 2023-09-04 RX ADMIN — VANCOMYCIN HYDROCHLORIDE 1250 MG: 1.25 INJECTION, POWDER, LYOPHILIZED, FOR SOLUTION INTRAVENOUS at 09:01

## 2023-09-04 RX ADMIN — HYDROMORPHONE HYDROCHLORIDE 0.5 MG: 1 INJECTION, SOLUTION INTRAMUSCULAR; INTRAVENOUS; SUBCUTANEOUS at 20:56

## 2023-09-04 RX ADMIN — I.V. FAT EMULSION 250 ML: 20 EMULSION INTRAVENOUS at 18:44

## 2023-09-04 RX ADMIN — LORAZEPAM 0.5 MG: 2 INJECTION INTRAMUSCULAR; INTRAVENOUS at 05:38

## 2023-09-04 RX ADMIN — HYDROMORPHONE HYDROCHLORIDE 0.5 MG: 1 INJECTION, SOLUTION INTRAMUSCULAR; INTRAVENOUS; SUBCUTANEOUS at 08:04

## 2023-09-04 RX ADMIN — SODIUM CHLORIDE, PRESERVATIVE FREE 10 ML: 5 INJECTION INTRAVENOUS at 20:57

## 2023-09-04 RX ADMIN — CALCIUM GLUCONATE: 94 INJECTION, SOLUTION INTRAVENOUS at 18:39

## 2023-09-04 RX ADMIN — Medication: at 16:18

## 2023-09-04 RX ADMIN — MEROPENEM 1000 MG: 1 INJECTION, POWDER, FOR SOLUTION INTRAVENOUS at 16:57

## 2023-09-04 RX ADMIN — Medication 10 ML: at 20:57

## 2023-09-04 RX ADMIN — LORAZEPAM 0.5 MG: 2 INJECTION INTRAMUSCULAR; INTRAVENOUS at 17:00

## 2023-09-04 RX ADMIN — SODIUM CHLORIDE, PRESERVATIVE FREE 10 ML: 5 INJECTION INTRAVENOUS at 07:49

## 2023-09-04 RX ADMIN — Medication: at 00:11

## 2023-09-04 RX ADMIN — SODIUM CHLORIDE 100 MG: 9 INJECTION, SOLUTION INTRAVENOUS at 11:45

## 2023-09-04 RX ADMIN — SODIUM CHLORIDE, PRESERVATIVE FREE 40 MG: 5 INJECTION INTRAVENOUS at 07:53

## 2023-09-04 RX ADMIN — HYDROMORPHONE HYDROCHLORIDE 0.5 MG: 1 INJECTION, SOLUTION INTRAMUSCULAR; INTRAVENOUS; SUBCUTANEOUS at 17:27

## 2023-09-04 RX ADMIN — BUDESONIDE 500 MCG: 0.5 INHALANT RESPIRATORY (INHALATION) at 20:24

## 2023-09-04 RX ADMIN — CHLORHEXIDINE GLUCONATE 15 ML: 1.2 RINSE ORAL at 07:50

## 2023-09-04 RX ADMIN — HYDROMORPHONE HYDROCHLORIDE 0.5 MG: 1 INJECTION, SOLUTION INTRAMUSCULAR; INTRAVENOUS; SUBCUTANEOUS at 02:36

## 2023-09-04 RX ADMIN — CHLORHEXIDINE GLUCONATE 15 ML: 1.2 RINSE ORAL at 20:58

## 2023-09-04 RX ADMIN — MEROPENEM 1000 MG: 1 INJECTION, POWDER, FOR SOLUTION INTRAVENOUS at 00:18

## 2023-09-04 RX ADMIN — MEROPENEM 1000 MG: 1 INJECTION, POWDER, FOR SOLUTION INTRAVENOUS at 08:01

## 2023-09-04 RX ADMIN — ARFORMOTEROL TARTRATE 15 MCG: 15 SOLUTION RESPIRATORY (INHALATION) at 20:24

## 2023-09-04 RX ADMIN — Medication: at 07:49

## 2023-09-04 RX ADMIN — LORAZEPAM 0.5 MG: 2 INJECTION INTRAMUSCULAR; INTRAVENOUS at 00:11

## 2023-09-04 ASSESSMENT — PAIN DESCRIPTION - LOCATION
LOCATION: GENERALIZED
LOCATION: GENERALIZED

## 2023-09-04 ASSESSMENT — PULMONARY FUNCTION TESTS
PIF_VALUE: 26
PIF_VALUE: 21
PIF_VALUE: 27
PIF_VALUE: 21
PIF_VALUE: 21
PIF_VALUE: 27

## 2023-09-04 ASSESSMENT — PAIN SCALES - GENERAL
PAINLEVEL_OUTOF10: 5
PAINLEVEL_OUTOF10: 7
PAINLEVEL_OUTOF10: 6
PAINLEVEL_OUTOF10: 0

## 2023-09-04 ASSESSMENT — PAIN DESCRIPTION - PAIN TYPE
TYPE: CHRONIC PAIN
TYPE: CHRONIC PAIN

## 2023-09-04 ASSESSMENT — ENCOUNTER SYMPTOMS
SHORTNESS OF BREATH: 0
ABDOMINAL PAIN: 1

## 2023-09-04 ASSESSMENT — PAIN DESCRIPTION - DESCRIPTORS
DESCRIPTORS: PATIENT UNABLE TO DESCRIBE
DESCRIPTORS: PATIENT UNABLE TO DESCRIBE

## 2023-09-04 NOTE — PROGRESS NOTES
0800: Axillary temp 100.7; blankets removed, ice packs applied, and NP notified. 1105: Placed on PS 15/5 by RT.    1210: RR 40s; Placed back on AC/VC by RT.

## 2023-09-04 NOTE — PROGRESS NOTES
Pharmacist Note - Vancomycin Dosing  Therapy day 30  Indication: Intra-abdominal abscess; sacral decubitus wound  Current regimen: 1 gram IV Q 24 hours    Recent Labs     09/02/23  0441 09/03/23  0504 09/03/23  0505 09/04/23  0540 09/04/23  0546   WBC 17.6*  --  15.0* 13.4*  --    CREATININE 0.44* 0.42*  --   --  0.38*   BUN 57* 56*  --   --  49*       A random vancomycin level of 16.7 mcg/mL was obtained and from this level, the patient's AUC24 is calculated to be 389 with the current regimen. Goal target range AUC/WILLEM 400-600      Plan: Change to 1250 mg IV Q 24 hours . Pharmacy will continue to monitor this patient daily for changes in clinical status and renal function. *Random vancomycin levels are used to calculate AUC/WILLEM, this level should not be interpreted as a trough. Vancomycin has been dosed using Bayesian kinetics software to target an AUC24:WILLEM of 400-600, which provides adequate exposure for as assumed infection due to MRSA with an WILLEM of 1 or less while reducing the risk of nephrotoxicity as seen with traditional trough based dosing goals.

## 2023-09-05 LAB
ANION GAP SERPL CALC-SCNC: 6 MMOL/L (ref 5–15)
BUN SERPL-MCNC: 48 MG/DL (ref 6–20)
BUN/CREAT SERPL: 145 (ref 12–20)
CALCIUM SERPL-MCNC: 8.3 MG/DL (ref 8.5–10.1)
CHLORIDE SERPL-SCNC: 109 MMOL/L (ref 97–108)
CO2 SERPL-SCNC: 27 MMOL/L (ref 21–32)
CREAT SERPL-MCNC: 0.33 MG/DL (ref 0.55–1.02)
ERYTHROCYTE [DISTWIDTH] IN BLOOD BY AUTOMATED COUNT: 15.7 % (ref 11.5–14.5)
GLUCOSE BLD STRIP.AUTO-MCNC: 126 MG/DL (ref 65–117)
GLUCOSE BLD STRIP.AUTO-MCNC: 129 MG/DL (ref 65–117)
GLUCOSE BLD STRIP.AUTO-MCNC: 137 MG/DL (ref 65–117)
GLUCOSE BLD STRIP.AUTO-MCNC: 139 MG/DL (ref 65–117)
GLUCOSE BLD STRIP.AUTO-MCNC: 97 MG/DL (ref 65–117)
GLUCOSE SERPL-MCNC: 114 MG/DL (ref 65–100)
HCT VFR BLD AUTO: 21.8 % (ref 35–47)
HGB BLD-MCNC: 7 G/DL (ref 11.5–16)
MAGNESIUM SERPL-MCNC: 1.6 MG/DL (ref 1.6–2.4)
MCH RBC QN AUTO: 29 PG (ref 26–34)
MCHC RBC AUTO-ENTMCNC: 32.1 G/DL (ref 30–36.5)
MCV RBC AUTO: 90.5 FL (ref 80–99)
NRBC # BLD: 0 K/UL (ref 0–0.01)
NRBC BLD-RTO: 0 PER 100 WBC
PHOSPHATE SERPL-MCNC: 2.8 MG/DL (ref 2.6–4.7)
PLATELET # BLD AUTO: 332 K/UL (ref 150–400)
PMV BLD AUTO: 10.1 FL (ref 8.9–12.9)
POTASSIUM SERPL-SCNC: 3.8 MMOL/L (ref 3.5–5.1)
RBC # BLD AUTO: 2.41 M/UL (ref 3.8–5.2)
SERVICE CMNT-IMP: ABNORMAL
SERVICE CMNT-IMP: NORMAL
SODIUM SERPL-SCNC: 142 MMOL/L (ref 136–145)
WBC # BLD AUTO: 11.9 K/UL (ref 3.6–11)

## 2023-09-05 PROCEDURE — 2000000000 HC ICU R&B

## 2023-09-05 PROCEDURE — 6360000002 HC RX W HCPCS: Performed by: NURSE PRACTITIONER

## 2023-09-05 PROCEDURE — 84100 ASSAY OF PHOSPHORUS: CPT

## 2023-09-05 PROCEDURE — 2580000003 HC RX 258: Performed by: SURGERY

## 2023-09-05 PROCEDURE — 2580000003 HC RX 258: Performed by: INTERNAL MEDICINE

## 2023-09-05 PROCEDURE — C9113 INJ PANTOPRAZOLE SODIUM, VIA: HCPCS | Performed by: NURSE PRACTITIONER

## 2023-09-05 PROCEDURE — 2580000003 HC RX 258: Performed by: NURSE PRACTITIONER

## 2023-09-05 PROCEDURE — 85027 COMPLETE CBC AUTOMATED: CPT

## 2023-09-05 PROCEDURE — 94640 AIRWAY INHALATION TREATMENT: CPT

## 2023-09-05 PROCEDURE — 99232 SBSQ HOSP IP/OBS MODERATE 35: CPT | Performed by: INTERNAL MEDICINE

## 2023-09-05 PROCEDURE — 97606 NEG PRS WND THER DME>50 SQCM: CPT

## 2023-09-05 PROCEDURE — 80048 BASIC METABOLIC PNL TOTAL CA: CPT

## 2023-09-05 PROCEDURE — 82962 GLUCOSE BLOOD TEST: CPT

## 2023-09-05 PROCEDURE — 6360000002 HC RX W HCPCS: Performed by: INTERNAL MEDICINE

## 2023-09-05 PROCEDURE — 94003 VENT MGMT INPAT SUBQ DAY: CPT

## 2023-09-05 PROCEDURE — 83735 ASSAY OF MAGNESIUM: CPT

## 2023-09-05 PROCEDURE — 36415 COLL VENOUS BLD VENIPUNCTURE: CPT

## 2023-09-05 PROCEDURE — 2500000003 HC RX 250 WO HCPCS: Performed by: NURSE PRACTITIONER

## 2023-09-05 PROCEDURE — 6370000000 HC RX 637 (ALT 250 FOR IP): Performed by: SURGERY

## 2023-09-05 PROCEDURE — 6360000002 HC RX W HCPCS: Performed by: ANESTHESIOLOGY

## 2023-09-05 RX ADMIN — HYDROMORPHONE HYDROCHLORIDE 0.5 MG: 1 INJECTION, SOLUTION INTRAMUSCULAR; INTRAVENOUS; SUBCUTANEOUS at 13:59

## 2023-09-05 RX ADMIN — HYDROMORPHONE HYDROCHLORIDE 0.5 MG: 1 INJECTION, SOLUTION INTRAMUSCULAR; INTRAVENOUS; SUBCUTANEOUS at 00:29

## 2023-09-05 RX ADMIN — VANCOMYCIN HYDROCHLORIDE 1250 MG: 1.25 INJECTION, POWDER, LYOPHILIZED, FOR SOLUTION INTRAVENOUS at 09:02

## 2023-09-05 RX ADMIN — ARFORMOTEROL TARTRATE 15 MCG: 15 SOLUTION RESPIRATORY (INHALATION) at 07:40

## 2023-09-05 RX ADMIN — LORAZEPAM 0.5 MG: 2 INJECTION INTRAMUSCULAR; INTRAVENOUS at 11:04

## 2023-09-05 RX ADMIN — SODIUM CHLORIDE, PRESERVATIVE FREE 10 ML: 5 INJECTION INTRAVENOUS at 08:52

## 2023-09-05 RX ADMIN — SODIUM CHLORIDE, PRESERVATIVE FREE 40 MG: 5 INJECTION INTRAVENOUS at 08:52

## 2023-09-05 RX ADMIN — LORAZEPAM 0.5 MG: 2 INJECTION INTRAMUSCULAR; INTRAVENOUS at 18:18

## 2023-09-05 RX ADMIN — Medication: at 16:05

## 2023-09-05 RX ADMIN — Medication 10 ML: at 23:43

## 2023-09-05 RX ADMIN — CHLORHEXIDINE GLUCONATE 15 ML: 1.2 RINSE ORAL at 08:55

## 2023-09-05 RX ADMIN — LORAZEPAM 0.5 MG: 2 INJECTION INTRAMUSCULAR; INTRAVENOUS at 05:11

## 2023-09-05 RX ADMIN — HYDROMORPHONE HYDROCHLORIDE 0.5 MG: 1 INJECTION, SOLUTION INTRAMUSCULAR; INTRAVENOUS; SUBCUTANEOUS at 08:52

## 2023-09-05 RX ADMIN — CALCIUM GLUCONATE: 94 INJECTION, SOLUTION INTRAVENOUS at 18:20

## 2023-09-05 RX ADMIN — CHLORHEXIDINE GLUCONATE 15 ML: 1.2 RINSE ORAL at 20:54

## 2023-09-05 RX ADMIN — Medication: at 23:48

## 2023-09-05 RX ADMIN — BUDESONIDE 500 MCG: 0.5 INHALANT RESPIRATORY (INHALATION) at 19:48

## 2023-09-05 RX ADMIN — Medication: at 00:37

## 2023-09-05 RX ADMIN — SODIUM CHLORIDE 100 MG: 9 INJECTION, SOLUTION INTRAVENOUS at 11:06

## 2023-09-05 RX ADMIN — ARFORMOTEROL TARTRATE 15 MCG: 15 SOLUTION RESPIRATORY (INHALATION) at 19:48

## 2023-09-05 RX ADMIN — LORAZEPAM 0.5 MG: 2 INJECTION INTRAMUSCULAR; INTRAVENOUS at 23:43

## 2023-09-05 RX ADMIN — SODIUM CHLORIDE, PRESERVATIVE FREE 10 ML: 5 INJECTION INTRAVENOUS at 23:43

## 2023-09-05 RX ADMIN — BUDESONIDE 500 MCG: 0.5 INHALANT RESPIRATORY (INHALATION) at 07:40

## 2023-09-05 RX ADMIN — MEROPENEM 1000 MG: 1 INJECTION, POWDER, FOR SOLUTION INTRAVENOUS at 08:55

## 2023-09-05 RX ADMIN — Medication: at 08:51

## 2023-09-05 RX ADMIN — MEROPENEM 1000 MG: 1 INJECTION, POWDER, FOR SOLUTION INTRAVENOUS at 16:04

## 2023-09-05 RX ADMIN — MEROPENEM 1000 MG: 1 INJECTION, POWDER, FOR SOLUTION INTRAVENOUS at 00:46

## 2023-09-05 RX ADMIN — Medication 10 ML: at 08:52

## 2023-09-05 RX ADMIN — MEROPENEM 1000 MG: 1 INJECTION, POWDER, FOR SOLUTION INTRAVENOUS at 23:49

## 2023-09-05 ASSESSMENT — PULMONARY FUNCTION TESTS
PIF_VALUE: 24
PIF_VALUE: 22
PIF_VALUE: 29
PIF_VALUE: 21
PIF_VALUE: 28
PIF_VALUE: 32
PIF_VALUE: 26
PIF_VALUE: 22

## 2023-09-05 ASSESSMENT — PAIN SCALES - GENERAL
PAINLEVEL_OUTOF10: 0
PAINLEVEL_OUTOF10: 7
PAINLEVEL_OUTOF10: 4
PAINLEVEL_OUTOF10: 0

## 2023-09-05 ASSESSMENT — ENCOUNTER SYMPTOMS
SHORTNESS OF BREATH: 0
ABDOMINAL PAIN: 1

## 2023-09-05 ASSESSMENT — PAIN DESCRIPTION - ORIENTATION: ORIENTATION: ANTERIOR

## 2023-09-05 ASSESSMENT — PAIN DESCRIPTION - DESCRIPTORS: DESCRIPTORS: PATIENT UNABLE TO DESCRIBE

## 2023-09-05 ASSESSMENT — PAIN DESCRIPTION - LOCATION: LOCATION: GENERALIZED

## 2023-09-05 ASSESSMENT — PAIN DESCRIPTION - PAIN TYPE: TYPE: CHRONIC PAIN

## 2023-09-05 NOTE — WOUND CARE
WOCN Note:     Follow up assessment of wounds and change VAC. Chart reviewed. Assessed in room 7120 with RN. Admitted DX:  Hematemesis;GI bleed; Gastrointestinal hemorrhage; cdiff    Past Medical History: gastric bypass; asthma; cad; dm2  Admitted from [facility]    Assessment:   Patient is intubated via trach, opens eyes intermittently and requires assist with repositioning. Bed: [facility]  Patient has a engle. Patient repositioned on left side and placed in chair position. Generalized edema lower legs and feet. Heels offloaded with pillows. 1. POA Sacrum and bilateral buttocks, Unstageable Pressure Injury: 6.8 x 7 x 1cm; undermining 1.8 cm at 4 - 8 o' clock; 60% tan and brown; 40% red; denuded to constantine wound; 200 cc serosang drainage in canister; no odor; denudation to distal constantine wound; constantine wound edges hyperpigmented. Cleansed with Vashe; applied silver foam to distal constantine wound; resumed NPWT at 125 using 2 black( 1 to the wound bed that is wrapped in mepitel one) and bridged to left side. 2. Right heel, Unstageable Pressure Injury with deflated bullae/hyperpigmented:  1 x 1 x 0 cm. Applied Venelex. 3.  Left heel, unstageable pressure injury:  0.8  x 0.8 x 0 cm. 100% yellow. Applied Venelex. 4.  Right upper back, hyperpigmented linear area:  3 x 0.6 x 0 cm;  constantine wound intact. 5.  Right chest, partial thickness wound/skin tear: 3 x 2 x 0.1 cm; 100% moist red; constantine wound intact. Cleansed with saline; applied silver foam.    6.  Right arm, skin tear:  2 x 2 x 0.1 cm; 100% red; constantine wound intact. Xeroform and foam dressing in place. Wound, Pressure Prevention & Skin Care Recommendations:    Minimize layers of linen/pads under patient to optimize support surface. 2.  Turn/reposition approximately every 2 hours and offload heels. 3.  Manage moisture/ Keep skin folds clean and dry/minimize brief usage.   4.  Specialty bed:[facility].  5.  Sacrum:  Continue NPWT at 125 mmHg with

## 2023-09-05 NOTE — PROGRESS NOTES
0800: Pt placed on PSV 15/5 by RT and put into chair position in bed, tolerating well. 1813: Placed back on AC/VC by RT to rest for the night.

## 2023-09-05 NOTE — PROGRESS NOTES
Hgb: 7.0Nanblayne Taylor NP notified. Patient hemodynamically stable. No orders at this time.      Gtts:  TPN @100

## 2023-09-05 NOTE — PROGRESS NOTES
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 difficile colitis, s/p ex lap with no signs of perforation, EGD revealing GJ stricture, s/p 2 mm dilatation, started on anticoagulation for DVT) discharged to [facility], developed hematemesis/GIB     BRIEF PATIENT SUMMARY   28-year-old female who presented to the emergency department  from [facility] for weakness and Hematemesis. 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 She was readmitted to [facility] ( through ) at which time she underwent EGD revealing GJ stricture. CT abdomen/pelvis demonstrated loculated intraperitoneal fluid in the anterior abdomen with persistent free air.   she underwent US guided drainage +E. coli. She completed a course of Zosyn and drain removed 8/10.   G-tube was placed.  patient transferred to the ICU in the setting of acute hypoxic respiratory failure d/t pulmonary edema requiring diuresis and eventual intubation ( - ). Patient reintubated  through current for worsening hypoxic respiratory failure. Course further complicated by persistent leukocytosis, fevers and CT evidence of intra-abdominal fluid collection post ultrasound drainage.   Repeat imaging  did not reveal any drainable

## 2023-09-06 LAB
ALBUMIN SERPL-MCNC: 1.9 G/DL (ref 3.5–5)
ALBUMIN/GLOB SERPL: 0.6 (ref 1.1–2.2)
ALP SERPL-CCNC: 303 U/L (ref 45–117)
ALT SERPL-CCNC: 89 U/L (ref 12–78)
AMMONIA PLAS-SCNC: 41 UMOL/L
ANION GAP SERPL CALC-SCNC: 6 MMOL/L (ref 5–15)
AST SERPL-CCNC: 33 U/L (ref 15–37)
BILIRUB SERPL-MCNC: 0.5 MG/DL (ref 0.2–1)
BUN SERPL-MCNC: 48 MG/DL (ref 6–20)
BUN/CREAT SERPL: 137 (ref 12–20)
CALCIUM SERPL-MCNC: 8.6 MG/DL (ref 8.5–10.1)
CHLORIDE SERPL-SCNC: 109 MMOL/L (ref 97–108)
CO2 SERPL-SCNC: 27 MMOL/L (ref 21–32)
CREAT SERPL-MCNC: 0.35 MG/DL (ref 0.55–1.02)
ERYTHROCYTE [DISTWIDTH] IN BLOOD BY AUTOMATED COUNT: 15.7 % (ref 11.5–14.5)
GLOBULIN SER CALC-MCNC: 3.4 G/DL (ref 2–4)
GLUCOSE BLD STRIP.AUTO-MCNC: 120 MG/DL (ref 65–117)
GLUCOSE BLD STRIP.AUTO-MCNC: 145 MG/DL (ref 65–117)
GLUCOSE BLD STRIP.AUTO-MCNC: 98 MG/DL (ref 65–117)
GLUCOSE SERPL-MCNC: 109 MG/DL (ref 65–100)
HCT VFR BLD AUTO: 23.6 % (ref 35–47)
HGB BLD-MCNC: 7.7 G/DL (ref 11.5–16)
MAGNESIUM SERPL-MCNC: 1.6 MG/DL (ref 1.6–2.4)
MCH RBC QN AUTO: 29.4 PG (ref 26–34)
MCHC RBC AUTO-ENTMCNC: 32.6 G/DL (ref 30–36.5)
MCV RBC AUTO: 90.1 FL (ref 80–99)
NRBC # BLD: 0 K/UL (ref 0–0.01)
NRBC BLD-RTO: 0 PER 100 WBC
PHOSPHATE SERPL-MCNC: 2.4 MG/DL (ref 2.6–4.7)
PLATELET # BLD AUTO: 305 K/UL (ref 150–400)
PMV BLD AUTO: 10.3 FL (ref 8.9–12.9)
POTASSIUM SERPL-SCNC: 3.8 MMOL/L (ref 3.5–5.1)
PROT SERPL-MCNC: 5.3 G/DL (ref 6.4–8.2)
RBC # BLD AUTO: 2.62 M/UL (ref 3.8–5.2)
SERVICE CMNT-IMP: ABNORMAL
SERVICE CMNT-IMP: ABNORMAL
SERVICE CMNT-IMP: NORMAL
SODIUM SERPL-SCNC: 142 MMOL/L (ref 136–145)
WBC # BLD AUTO: 12.8 K/UL (ref 3.6–11)

## 2023-09-06 PROCEDURE — A4216 STERILE WATER/SALINE, 10 ML: HCPCS | Performed by: NURSE PRACTITIONER

## 2023-09-06 PROCEDURE — 2580000003 HC RX 258: Performed by: NURSE PRACTITIONER

## 2023-09-06 PROCEDURE — 6360000002 HC RX W HCPCS: Performed by: NURSE PRACTITIONER

## 2023-09-06 PROCEDURE — 85027 COMPLETE CBC AUTOMATED: CPT

## 2023-09-06 PROCEDURE — 83735 ASSAY OF MAGNESIUM: CPT

## 2023-09-06 PROCEDURE — 2500000003 HC RX 250 WO HCPCS: Performed by: NURSE PRACTITIONER

## 2023-09-06 PROCEDURE — 2580000003 HC RX 258: Performed by: SURGERY

## 2023-09-06 PROCEDURE — 6370000000 HC RX 637 (ALT 250 FOR IP): Performed by: SURGERY

## 2023-09-06 PROCEDURE — 82140 ASSAY OF AMMONIA: CPT

## 2023-09-06 PROCEDURE — 80053 COMPREHEN METABOLIC PANEL: CPT

## 2023-09-06 PROCEDURE — 84100 ASSAY OF PHOSPHORUS: CPT

## 2023-09-06 PROCEDURE — 6370000000 HC RX 637 (ALT 250 FOR IP): Performed by: INTERNAL MEDICINE

## 2023-09-06 PROCEDURE — 94003 VENT MGMT INPAT SUBQ DAY: CPT

## 2023-09-06 PROCEDURE — 94640 AIRWAY INHALATION TREATMENT: CPT

## 2023-09-06 PROCEDURE — 2000000000 HC ICU R&B

## 2023-09-06 PROCEDURE — 82962 GLUCOSE BLOOD TEST: CPT

## 2023-09-06 PROCEDURE — 6360000002 HC RX W HCPCS: Performed by: ANESTHESIOLOGY

## 2023-09-06 PROCEDURE — 36415 COLL VENOUS BLD VENIPUNCTURE: CPT

## 2023-09-06 PROCEDURE — C9113 INJ PANTOPRAZOLE SODIUM, VIA: HCPCS | Performed by: NURSE PRACTITIONER

## 2023-09-06 RX ORDER — LORAZEPAM 2 MG/ML
0.5 INJECTION INTRAMUSCULAR EVERY 6 HOURS PRN
Status: DISCONTINUED | OUTPATIENT
Start: 2023-09-06 | End: 2023-09-17

## 2023-09-06 RX ORDER — LORAZEPAM 2 MG/ML
0.5 INJECTION INTRAMUSCULAR EVERY 12 HOURS
Status: DISCONTINUED | OUTPATIENT
Start: 2023-09-06 | End: 2023-09-07

## 2023-09-06 RX ORDER — MAGNESIUM SULFATE IN WATER 40 MG/ML
2000 INJECTION, SOLUTION INTRAVENOUS ONCE
Status: COMPLETED | OUTPATIENT
Start: 2023-09-06 | End: 2023-09-06

## 2023-09-06 RX ADMIN — BUDESONIDE 500 MCG: 0.5 INHALANT RESPIRATORY (INHALATION) at 22:45

## 2023-09-06 RX ADMIN — CALCIUM GLUCONATE: 94 INJECTION, SOLUTION INTRAVENOUS at 19:04

## 2023-09-06 RX ADMIN — Medication: at 23:15

## 2023-09-06 RX ADMIN — Medication: at 08:37

## 2023-09-06 RX ADMIN — CHLORHEXIDINE GLUCONATE 15 ML: 1.2 RINSE ORAL at 23:14

## 2023-09-06 RX ADMIN — LORAZEPAM 0.5 MG: 2 INJECTION INTRAMUSCULAR; INTRAVENOUS at 11:18

## 2023-09-06 RX ADMIN — HYDROMORPHONE HYDROCHLORIDE 0.5 MG: 1 INJECTION, SOLUTION INTRAMUSCULAR; INTRAVENOUS; SUBCUTANEOUS at 16:53

## 2023-09-06 RX ADMIN — I.V. FAT EMULSION 250 ML: 20 EMULSION INTRAVENOUS at 19:04

## 2023-09-06 RX ADMIN — BUDESONIDE 500 MCG: 0.5 INHALANT RESPIRATORY (INHALATION) at 07:42

## 2023-09-06 RX ADMIN — MEROPENEM 1000 MG: 1 INJECTION, POWDER, FOR SOLUTION INTRAVENOUS at 16:45

## 2023-09-06 RX ADMIN — Medication 10 ML: at 23:15

## 2023-09-06 RX ADMIN — CHLORHEXIDINE GLUCONATE 15 ML: 1.2 RINSE ORAL at 08:37

## 2023-09-06 RX ADMIN — ARFORMOTEROL TARTRATE 15 MCG: 15 SOLUTION RESPIRATORY (INHALATION) at 07:42

## 2023-09-06 RX ADMIN — SODIUM CHLORIDE, PRESERVATIVE FREE 40 MG: 5 INJECTION INTRAVENOUS at 08:37

## 2023-09-06 RX ADMIN — SODIUM CHLORIDE 100 MG: 9 INJECTION, SOLUTION INTRAVENOUS at 11:18

## 2023-09-06 RX ADMIN — HYDROMORPHONE HYDROCHLORIDE 0.5 MG: 1 INJECTION, SOLUTION INTRAMUSCULAR; INTRAVENOUS; SUBCUTANEOUS at 23:13

## 2023-09-06 RX ADMIN — ARFORMOTEROL TARTRATE 15 MCG: 15 SOLUTION RESPIRATORY (INHALATION) at 22:45

## 2023-09-06 RX ADMIN — SODIUM CHLORIDE, PRESERVATIVE FREE 10 ML: 5 INJECTION INTRAVENOUS at 08:37

## 2023-09-06 RX ADMIN — MAGNESIUM SULFATE HEPTAHYDRATE 2000 MG: 40 INJECTION, SOLUTION INTRAVENOUS at 05:50

## 2023-09-06 RX ADMIN — LORAZEPAM 0.5 MG: 2 INJECTION INTRAMUSCULAR; INTRAVENOUS at 23:14

## 2023-09-06 RX ADMIN — LORAZEPAM 0.5 MG: 2 INJECTION INTRAMUSCULAR; INTRAVENOUS at 05:47

## 2023-09-06 RX ADMIN — Medication: at 16:45

## 2023-09-06 RX ADMIN — Medication 10 ML: at 08:37

## 2023-09-06 RX ADMIN — MEROPENEM 1000 MG: 1 INJECTION, POWDER, FOR SOLUTION INTRAVENOUS at 08:37

## 2023-09-06 RX ADMIN — SODIUM CHLORIDE, PRESERVATIVE FREE 10 ML: 5 INJECTION INTRAVENOUS at 23:15

## 2023-09-06 ASSESSMENT — PULMONARY FUNCTION TESTS
PIF_VALUE: 22
PIF_VALUE: 24
PIF_VALUE: 28
PIF_VALUE: 30
PIF_VALUE: 31
PIF_VALUE: 22

## 2023-09-06 ASSESSMENT — ENCOUNTER SYMPTOMS
SHORTNESS OF BREATH: 0
ABDOMINAL PAIN: 1

## 2023-09-06 ASSESSMENT — PAIN SCALES - GENERAL
PAINLEVEL_OUTOF10: 0
PAINLEVEL_OUTOF10: 0

## 2023-09-06 NOTE — PROGRESS NOTES
Comprehensive Nutrition Assessment    Type and Reason for Visit: Reassess    Nutrition Recommendations/Plan:     -Continue cyclic TPN         Malnutrition Assessment:  Malnutrition Status:  Severe malnutrition (08/01/23 1000)    Context:  Acute Illness     Findings of the 6 clinical characteristics of malnutrition:  Energy Intake:  50% or less of estimated energy requirements for 5 or more days  Weight Loss:  Unable to assess     Body Fat Loss:  Mild body fat loss Buccal region, Orbital   Muscle Mass Loss: Moderate muscle mass loss Temples (temporalis), Clavicles (pectoralis & deltoids)  Fluid Accumulation:  Moderate to Severe Generalized, Extremities   Strength:  Not Performed       Nutrition Assessment:    Pt admitted from [facility] d/t GI Bleed. Recent extended hospitalization @ [facility]-readmitted after episode of fulminant colitis requiring subtotal colectomy with end ileostomy, s/p reversal and subsequent ileocolonic anastomosis; EGD 7/13/23 showed nl esophagus, small hiatal hernia, evidence of previous RYGB with a tight stricture and ulcer at the gastro-jejunal anastomosis-s/p dilatation; recurrent C Diff colitis. PMHx: Gastric bypass 2017, CAD, DM 2, Dyslipidemia, GERD, PE.    9/6: Follow-up. Vent dependent respiratory failure-s/p trach placement 9/3. Undergoing daily SBT's. SLP now following for PMV trials. Drainage decreasing per surgery; plan for CT abdomen/pelvis tomorrow. Transitioned to cyclic TPN last week to reduce hepatobiliary effects of chronic TPN. TPN as ordered provides 1450 average daily calories and 110 gm protein per day to meet estimated needs. Hope to see improvement in leak on CT. Could consider checking nitrogen balance (need to collect 24 hr urine)-this is used to assess if nitrogen (protein) intake is keeping up with nitrogen losses. Weight down 10# form the past week; edema improved-now \"only\"  3+ pitting of all extremities.  Phosphorous slightly BNL today-recommend increasing in the

## 2023-09-06 NOTE — PROGRESS NOTES
Speech Therapy Contact Note    Chart reviewed and case discussed with RN. Patient on SBT but not consistently following commands. Will hold SLP today and follow-up tomorrow for appropriateness for swallow evaluation and/or PMV trials.      Carrie Rosales, CARLOS-SLP

## 2023-09-06 NOTE — PROGRESS NOTES
SLP consult received for trach management. Will confer with RT and medical team to determine timing of intervention for SLP (speaking valve both in-line and on trach collar and swallowing intervention).        Thank you,  Feli Iniguez M.Ed, PhD(c), CCC-SLP  Speech-Language Pathologist

## 2023-09-06 NOTE — CARE COORDINATION
Transition of Care Plan:     RUR: 25 % High  Prior Level of Functioning: Max assist with ADL's  Disposition: TBD  DME needed: TBD  Transportation at discharge: BLS vs ALS  IM/IMM Medicare 7/29/23   Is patient a [place] and connected with [facility]? No  Caregiver Contact:  Terry Vazquez [phone]  Discharge Caregiver contacted prior to discharge? Care Conference needed? No  Barriers to discharge:    Vented with SBT now progressing. Accordion drain, G tube  Wound Care following for Wound Vac with s/p debridement of sacral wound. ID is following.   TPN

## 2023-09-07 ENCOUNTER — APPOINTMENT (OUTPATIENT)
Facility: HOSPITAL | Age: 63
DRG: 004 | End: 2023-09-07
Payer: MEDICARE

## 2023-09-07 LAB
ALBUMIN SERPL-MCNC: 1.9 G/DL (ref 3.5–5)
ALBUMIN/GLOB SERPL: 0.6 (ref 1.1–2.2)
ALP SERPL-CCNC: 287 U/L (ref 45–117)
ALT SERPL-CCNC: 85 U/L (ref 12–78)
ANION GAP SERPL CALC-SCNC: 6 MMOL/L (ref 5–15)
AST SERPL-CCNC: 50 U/L (ref 15–37)
BILIRUB SERPL-MCNC: 0.3 MG/DL (ref 0.2–1)
BUN SERPL-MCNC: 48 MG/DL (ref 6–20)
BUN/CREAT SERPL: 150 (ref 12–20)
CALCIUM SERPL-MCNC: 8.4 MG/DL (ref 8.5–10.1)
CHLORIDE SERPL-SCNC: 107 MMOL/L (ref 97–108)
CO2 SERPL-SCNC: 28 MMOL/L (ref 21–32)
CREAT SERPL-MCNC: 0.32 MG/DL (ref 0.55–1.02)
ERYTHROCYTE [DISTWIDTH] IN BLOOD BY AUTOMATED COUNT: 15.9 % (ref 11.5–14.5)
GLOBULIN SER CALC-MCNC: 3.4 G/DL (ref 2–4)
GLUCOSE BLD STRIP.AUTO-MCNC: 102 MG/DL (ref 65–117)
GLUCOSE SERPL-MCNC: 112 MG/DL (ref 65–100)
HCT VFR BLD AUTO: 24.1 % (ref 35–47)
HGB BLD-MCNC: 7.7 G/DL (ref 11.5–16)
MAGNESIUM SERPL-MCNC: 1.9 MG/DL (ref 1.6–2.4)
MCH RBC QN AUTO: 29.6 PG (ref 26–34)
MCHC RBC AUTO-ENTMCNC: 32 G/DL (ref 30–36.5)
MCV RBC AUTO: 92.7 FL (ref 80–99)
NRBC # BLD: 0 K/UL (ref 0–0.01)
NRBC BLD-RTO: 0 PER 100 WBC
PHOSPHATE SERPL-MCNC: 2.8 MG/DL (ref 2.6–4.7)
PLATELET # BLD AUTO: 338 K/UL (ref 150–400)
PMV BLD AUTO: 10.4 FL (ref 8.9–12.9)
POTASSIUM SERPL-SCNC: 3.9 MMOL/L (ref 3.5–5.1)
PROT SERPL-MCNC: 5.3 G/DL (ref 6.4–8.2)
RBC # BLD AUTO: 2.6 M/UL (ref 3.8–5.2)
SERVICE CMNT-IMP: NORMAL
SODIUM SERPL-SCNC: 141 MMOL/L (ref 136–145)
WBC # BLD AUTO: 14.5 K/UL (ref 3.6–11)

## 2023-09-07 PROCEDURE — 2000000000 HC ICU R&B

## 2023-09-07 PROCEDURE — 2500000003 HC RX 250 WO HCPCS: Performed by: NURSE PRACTITIONER

## 2023-09-07 PROCEDURE — C9113 INJ PANTOPRAZOLE SODIUM, VIA: HCPCS | Performed by: NURSE PRACTITIONER

## 2023-09-07 PROCEDURE — 6360000002 HC RX W HCPCS: Performed by: NURSE PRACTITIONER

## 2023-09-07 PROCEDURE — 2580000003 HC RX 258: Performed by: SURGERY

## 2023-09-07 PROCEDURE — 80053 COMPREHEN METABOLIC PANEL: CPT

## 2023-09-07 PROCEDURE — 84100 ASSAY OF PHOSPHORUS: CPT

## 2023-09-07 PROCEDURE — 6370000000 HC RX 637 (ALT 250 FOR IP): Performed by: SURGERY

## 2023-09-07 PROCEDURE — 36415 COLL VENOUS BLD VENIPUNCTURE: CPT

## 2023-09-07 PROCEDURE — 99232 SBSQ HOSP IP/OBS MODERATE 35: CPT | Performed by: INTERNAL MEDICINE

## 2023-09-07 PROCEDURE — 70450 CT HEAD/BRAIN W/O DYE: CPT

## 2023-09-07 PROCEDURE — 74177 CT ABD & PELVIS W/CONTRAST: CPT

## 2023-09-07 PROCEDURE — 6360000002 HC RX W HCPCS: Performed by: ANESTHESIOLOGY

## 2023-09-07 PROCEDURE — 85027 COMPLETE CBC AUTOMATED: CPT

## 2023-09-07 PROCEDURE — 94640 AIRWAY INHALATION TREATMENT: CPT

## 2023-09-07 PROCEDURE — 2580000003 HC RX 258: Performed by: NURSE PRACTITIONER

## 2023-09-07 PROCEDURE — 94003 VENT MGMT INPAT SUBQ DAY: CPT

## 2023-09-07 PROCEDURE — 82962 GLUCOSE BLOOD TEST: CPT

## 2023-09-07 PROCEDURE — 83735 ASSAY OF MAGNESIUM: CPT

## 2023-09-07 PROCEDURE — A4216 STERILE WATER/SALINE, 10 ML: HCPCS | Performed by: NURSE PRACTITIONER

## 2023-09-07 PROCEDURE — 6360000004 HC RX CONTRAST MEDICATION: Performed by: RADIOLOGY

## 2023-09-07 RX ADMIN — HYDROMORPHONE HYDROCHLORIDE 0.5 MG: 1 INJECTION, SOLUTION INTRAMUSCULAR; INTRAVENOUS; SUBCUTANEOUS at 05:11

## 2023-09-07 RX ADMIN — SODIUM CHLORIDE 100 MG: 9 INJECTION, SOLUTION INTRAVENOUS at 14:09

## 2023-09-07 RX ADMIN — HYDROMORPHONE HYDROCHLORIDE 0.5 MG: 1 INJECTION, SOLUTION INTRAMUSCULAR; INTRAVENOUS; SUBCUTANEOUS at 09:12

## 2023-09-07 RX ADMIN — MEROPENEM 1000 MG: 1 INJECTION, POWDER, FOR SOLUTION INTRAVENOUS at 00:45

## 2023-09-07 RX ADMIN — MEROPENEM 1000 MG: 1 INJECTION, POWDER, FOR SOLUTION INTRAVENOUS at 17:17

## 2023-09-07 RX ADMIN — Medication 10 ML: at 08:04

## 2023-09-07 RX ADMIN — SODIUM CHLORIDE, PRESERVATIVE FREE 10 ML: 5 INJECTION INTRAVENOUS at 08:04

## 2023-09-07 RX ADMIN — IOHEXOL 50 ML: 240 INJECTION, SOLUTION INTRATHECAL; INTRAVASCULAR; INTRAVENOUS; ORAL at 12:28

## 2023-09-07 RX ADMIN — HYDROMORPHONE HYDROCHLORIDE 0.5 MG: 1 INJECTION, SOLUTION INTRAMUSCULAR; INTRAVENOUS; SUBCUTANEOUS at 14:09

## 2023-09-07 RX ADMIN — CALCIUM GLUCONATE: 94 INJECTION, SOLUTION INTRAVENOUS at 19:24

## 2023-09-07 RX ADMIN — SODIUM CHLORIDE, PRESERVATIVE FREE 40 MG: 5 INJECTION INTRAVENOUS at 08:03

## 2023-09-07 RX ADMIN — IOPAMIDOL 100 ML: 755 INJECTION, SOLUTION INTRAVENOUS at 12:27

## 2023-09-07 RX ADMIN — ARFORMOTEROL TARTRATE 15 MCG: 15 SOLUTION RESPIRATORY (INHALATION) at 20:08

## 2023-09-07 RX ADMIN — Medication: at 08:03

## 2023-09-07 RX ADMIN — BUDESONIDE 500 MCG: 0.5 INHALANT RESPIRATORY (INHALATION) at 08:27

## 2023-09-07 RX ADMIN — CHLORHEXIDINE GLUCONATE 15 ML: 1.2 RINSE ORAL at 08:03

## 2023-09-07 RX ADMIN — ARFORMOTEROL TARTRATE 15 MCG: 15 SOLUTION RESPIRATORY (INHALATION) at 08:27

## 2023-09-07 RX ADMIN — MEROPENEM 1000 MG: 1 INJECTION, POWDER, FOR SOLUTION INTRAVENOUS at 08:02

## 2023-09-07 RX ADMIN — Medication 10 ML: at 21:07

## 2023-09-07 RX ADMIN — LORAZEPAM 0.5 MG: 2 INJECTION INTRAMUSCULAR; INTRAVENOUS at 17:17

## 2023-09-07 RX ADMIN — Medication: at 17:18

## 2023-09-07 RX ADMIN — SODIUM CHLORIDE, PRESERVATIVE FREE 10 ML: 5 INJECTION INTRAVENOUS at 21:01

## 2023-09-07 RX ADMIN — CHLORHEXIDINE GLUCONATE 15 ML: 1.2 RINSE ORAL at 21:07

## 2023-09-07 RX ADMIN — BUDESONIDE 500 MCG: 0.5 INHALANT RESPIRATORY (INHALATION) at 20:08

## 2023-09-07 ASSESSMENT — PAIN SCALES - GENERAL
PAINLEVEL_OUTOF10: 0
PAINLEVEL_OUTOF10: 0

## 2023-09-07 ASSESSMENT — PULMONARY FUNCTION TESTS
PIF_VALUE: 21
PIF_VALUE: 22
PIF_VALUE: 15
PIF_VALUE: 13
PIF_VALUE: 13
PIF_VALUE: 27

## 2023-09-07 ASSESSMENT — ENCOUNTER SYMPTOMS
ABDOMINAL PAIN: 1
SHORTNESS OF BREATH: 0

## 2023-09-07 NOTE — PROGRESS NOTES
Speech Therapy Contact Note     Chart reviewed and case discussed with RT and RN. Per RN, patient pending CT Abdomen this AM. Per RT, patient may do SBT later today. Will hold SLP today but continue to follow for appropriateness.      Zoie Pang, CARLOS-SLP

## 2023-09-08 ENCOUNTER — APPOINTMENT (OUTPATIENT)
Facility: HOSPITAL | Age: 63
DRG: 004 | End: 2023-09-08
Payer: MEDICARE

## 2023-09-08 LAB
ALBUMIN SERPL-MCNC: 2 G/DL (ref 3.5–5)
ALBUMIN/GLOB SERPL: 0.6 (ref 1.1–2.2)
ALP SERPL-CCNC: 266 U/L (ref 45–117)
ALT SERPL-CCNC: 79 U/L (ref 12–78)
ANION GAP SERPL CALC-SCNC: 5 MMOL/L (ref 5–15)
AST SERPL-CCNC: 41 U/L (ref 15–37)
BILIRUB SERPL-MCNC: 0.4 MG/DL (ref 0.2–1)
BUN SERPL-MCNC: 46 MG/DL (ref 6–20)
BUN/CREAT SERPL: 131 (ref 12–20)
CALCIUM SERPL-MCNC: 8.7 MG/DL (ref 8.5–10.1)
CHLORIDE SERPL-SCNC: 105 MMOL/L (ref 97–108)
CO2 SERPL-SCNC: 28 MMOL/L (ref 21–32)
COMMENT:: NORMAL
CREAT SERPL-MCNC: 0.35 MG/DL (ref 0.55–1.02)
ERYTHROCYTE [DISTWIDTH] IN BLOOD BY AUTOMATED COUNT: 15.6 % (ref 11.5–14.5)
GLOBULIN SER CALC-MCNC: 3.5 G/DL (ref 2–4)
GLUCOSE BLD STRIP.AUTO-MCNC: 124 MG/DL (ref 65–117)
GLUCOSE BLD STRIP.AUTO-MCNC: 128 MG/DL (ref 65–117)
GLUCOSE SERPL-MCNC: 114 MG/DL (ref 65–100)
HCT VFR BLD AUTO: 24.6 % (ref 35–47)
HGB BLD-MCNC: 7.9 G/DL (ref 11.5–16)
MAGNESIUM SERPL-MCNC: 1.8 MG/DL (ref 1.6–2.4)
MCH RBC QN AUTO: 29.2 PG (ref 26–34)
MCHC RBC AUTO-ENTMCNC: 32.1 G/DL (ref 30–36.5)
MCV RBC AUTO: 90.8 FL (ref 80–99)
NRBC # BLD: 0 K/UL (ref 0–0.01)
NRBC BLD-RTO: 0 PER 100 WBC
PLATELET # BLD AUTO: 299 K/UL (ref 150–400)
PMV BLD AUTO: 10.3 FL (ref 8.9–12.9)
POTASSIUM SERPL-SCNC: 4 MMOL/L (ref 3.5–5.1)
PROT SERPL-MCNC: 5.5 G/DL (ref 6.4–8.2)
RBC # BLD AUTO: 2.71 M/UL (ref 3.8–5.2)
SERVICE CMNT-IMP: ABNORMAL
SERVICE CMNT-IMP: ABNORMAL
SODIUM SERPL-SCNC: 138 MMOL/L (ref 136–145)
SPECIMEN HOLD: NORMAL
WBC # BLD AUTO: 11.2 K/UL (ref 3.6–11)

## 2023-09-08 PROCEDURE — 83735 ASSAY OF MAGNESIUM: CPT

## 2023-09-08 PROCEDURE — 71045 X-RAY EXAM CHEST 1 VIEW: CPT

## 2023-09-08 PROCEDURE — 97606 NEG PRS WND THER DME>50 SQCM: CPT

## 2023-09-08 PROCEDURE — 2580000003 HC RX 258: Performed by: SURGERY

## 2023-09-08 PROCEDURE — 2500000003 HC RX 250 WO HCPCS: Performed by: NURSE PRACTITIONER

## 2023-09-08 PROCEDURE — 2580000003 HC RX 258: Performed by: NURSE PRACTITIONER

## 2023-09-08 PROCEDURE — 6360000002 HC RX W HCPCS: Performed by: NURSE PRACTITIONER

## 2023-09-08 PROCEDURE — 6370000000 HC RX 637 (ALT 250 FOR IP): Performed by: SURGERY

## 2023-09-08 PROCEDURE — 36415 COLL VENOUS BLD VENIPUNCTURE: CPT

## 2023-09-08 PROCEDURE — C9113 INJ PANTOPRAZOLE SODIUM, VIA: HCPCS | Performed by: NURSE PRACTITIONER

## 2023-09-08 PROCEDURE — 32555 ASPIRATE PLEURA W/ IMAGING: CPT

## 2023-09-08 PROCEDURE — 0W993ZZ DRAINAGE OF RIGHT PLEURAL CAVITY, PERCUTANEOUS APPROACH: ICD-10-PCS

## 2023-09-08 PROCEDURE — 6360000002 HC RX W HCPCS: Performed by: ANESTHESIOLOGY

## 2023-09-08 PROCEDURE — 85027 COMPLETE CBC AUTOMATED: CPT

## 2023-09-08 PROCEDURE — 80053 COMPREHEN METABOLIC PANEL: CPT

## 2023-09-08 PROCEDURE — 94640 AIRWAY INHALATION TREATMENT: CPT

## 2023-09-08 PROCEDURE — 82962 GLUCOSE BLOOD TEST: CPT

## 2023-09-08 PROCEDURE — 94003 VENT MGMT INPAT SUBQ DAY: CPT

## 2023-09-08 PROCEDURE — 2000000000 HC ICU R&B

## 2023-09-08 RX ORDER — MAGNESIUM SULFATE IN WATER 40 MG/ML
2000 INJECTION, SOLUTION INTRAVENOUS ONCE
Status: COMPLETED | OUTPATIENT
Start: 2023-09-08 | End: 2023-09-08

## 2023-09-08 RX ADMIN — SODIUM CHLORIDE 100 MG: 9 INJECTION, SOLUTION INTRAVENOUS at 11:45

## 2023-09-08 RX ADMIN — CHLORHEXIDINE GLUCONATE 15 ML: 1.2 RINSE ORAL at 22:44

## 2023-09-08 RX ADMIN — BUDESONIDE 500 MCG: 0.5 INHALANT RESPIRATORY (INHALATION) at 21:58

## 2023-09-08 RX ADMIN — MAGNESIUM SULFATE HEPTAHYDRATE 2000 MG: 40 INJECTION, SOLUTION INTRAVENOUS at 09:54

## 2023-09-08 RX ADMIN — BUDESONIDE 500 MCG: 0.5 INHALANT RESPIRATORY (INHALATION) at 07:33

## 2023-09-08 RX ADMIN — LORAZEPAM 0.5 MG: 2 INJECTION INTRAMUSCULAR; INTRAVENOUS at 10:14

## 2023-09-08 RX ADMIN — LORAZEPAM 0.5 MG: 2 INJECTION INTRAMUSCULAR; INTRAVENOUS at 17:26

## 2023-09-08 RX ADMIN — Medication 10 ML: at 09:54

## 2023-09-08 RX ADMIN — THIAMINE HYDROCHLORIDE: 100 INJECTION, SOLUTION INTRAMUSCULAR; INTRAVENOUS at 18:39

## 2023-09-08 RX ADMIN — SODIUM CHLORIDE, PRESERVATIVE FREE 10 ML: 5 INJECTION INTRAVENOUS at 09:54

## 2023-09-08 RX ADMIN — ARFORMOTEROL TARTRATE 15 MCG: 15 SOLUTION RESPIRATORY (INHALATION) at 07:33

## 2023-09-08 RX ADMIN — HYDROMORPHONE HYDROCHLORIDE 0.5 MG: 1 INJECTION, SOLUTION INTRAMUSCULAR; INTRAVENOUS; SUBCUTANEOUS at 10:14

## 2023-09-08 RX ADMIN — SODIUM CHLORIDE, PRESERVATIVE FREE 40 MG: 5 INJECTION INTRAVENOUS at 09:52

## 2023-09-08 RX ADMIN — Medication: at 09:52

## 2023-09-08 RX ADMIN — I.V. FAT EMULSION 250 ML: 20 EMULSION INTRAVENOUS at 18:42

## 2023-09-08 RX ADMIN — CHLORHEXIDINE GLUCONATE 15 ML: 1.2 RINSE ORAL at 09:52

## 2023-09-08 RX ADMIN — Medication: at 17:27

## 2023-09-08 RX ADMIN — HYDROMORPHONE HYDROCHLORIDE 0.5 MG: 1 INJECTION, SOLUTION INTRAMUSCULAR; INTRAVENOUS; SUBCUTANEOUS at 17:26

## 2023-09-08 RX ADMIN — LORAZEPAM 0.5 MG: 2 INJECTION INTRAMUSCULAR; INTRAVENOUS at 03:10

## 2023-09-08 RX ADMIN — HYDROMORPHONE HYDROCHLORIDE 0.5 MG: 1 INJECTION, SOLUTION INTRAMUSCULAR; INTRAVENOUS; SUBCUTANEOUS at 03:08

## 2023-09-08 RX ADMIN — HYDROMORPHONE HYDROCHLORIDE 0.5 MG: 1 INJECTION, SOLUTION INTRAMUSCULAR; INTRAVENOUS; SUBCUTANEOUS at 22:43

## 2023-09-08 RX ADMIN — MEROPENEM 1000 MG: 1 INJECTION, POWDER, FOR SOLUTION INTRAVENOUS at 02:09

## 2023-09-08 RX ADMIN — ARFORMOTEROL TARTRATE 15 MCG: 15 SOLUTION RESPIRATORY (INHALATION) at 21:58

## 2023-09-08 RX ADMIN — MEROPENEM 1000 MG: 1 INJECTION, POWDER, FOR SOLUTION INTRAVENOUS at 08:31

## 2023-09-08 ASSESSMENT — PAIN DESCRIPTION - LOCATION
LOCATION: BACK
LOCATION: BACK

## 2023-09-08 ASSESSMENT — PULMONARY FUNCTION TESTS
PIF_VALUE: 28
PIF_VALUE: 19
PIF_VALUE: 21
PIF_VALUE: 21
PIF_VALUE: 24
PIF_VALUE: 21
PIF_VALUE: 17

## 2023-09-08 ASSESSMENT — PAIN SCALES - GENERAL
PAINLEVEL_OUTOF10: 6
PAINLEVEL_OUTOF10: 6
PAINLEVEL_OUTOF10: 0
PAINLEVEL_OUTOF10: 0

## 2023-09-08 ASSESSMENT — ENCOUNTER SYMPTOMS
COUGH: 0
ABDOMINAL PAIN: 1
SHORTNESS OF BREATH: 0

## 2023-09-08 ASSESSMENT — PAIN DESCRIPTION - DESCRIPTORS: DESCRIPTORS: ACHING

## 2023-09-08 ASSESSMENT — PAIN DESCRIPTION - ORIENTATION: ORIENTATION: POSTERIOR

## 2023-09-08 NOTE — PROGRESS NOTES
RT Note:  Cuff leak    This RT had to add air to trach balloon 4 times this shift, 1-1.5mls of air each time. RT notified NP & Dr Louise Carrington. RT did  balloon which showed no leak from the spring. Ok to monitor for now. Cuff pressures stable in the mid 30s.

## 2023-09-08 NOTE — PROGRESS NOTES
Occupational Therapy  9/8/2023    Chart reviewed and discussed with RN in prep for OT re-eval.  Conferred with RN, patient currently undergoing wound care at bedside and received ativan and dilaudid in prep. Increased drowsiness, Rn agreement to hold OT re-evaluation at this time. Will follow up as medically appropriate/as able. Thank you.      Deepthi Palafox, OTD, OTR/L

## 2023-09-08 NOTE — WOUND CARE
WOCN Note:     Follow up sacral wound vac change. Chart reviewed. Assessed in room 7120 with Roderick Mariano. Admitted DX:  Hematemesis;GI bleed; Gastrointestinal hemorrhage; cdiff    Past Medical History: gastric bypass; asthma; cad; dm2  Admitted from [facility]    Assessment:   Patient is intubated via trach, opens eyes intermittently and requires assist with repositioning. Bed: [facility]  Patient has a engle and drains. Patient repositioned on left side. Generalized edema lower legs and feet. Heels offloaded with pillows. 1. POA Sacrum and bilateral buttocks, Unstageable Pressure Injury: 6.8 x 7 x 1cm; undermining 1.8 cm at 4 - 8 o' clock; 60% tan and brown; 40% red; denuded to constantine wound; 200 cc serosang drainage in canister; no odor; denudation to distal constantine wound; constantine wound edges hyperpigmented. Treatment:  Cleansed with Vashe; applied silver foam to distal constantine wound; resumed NPWT at 125 using 2 black(1 to the wound bed that is wrapped in mepitel one) and bridged to left side. 2. Right heel, Unstageable Pressure Injury with deflated bullae/hyperpigmented:  1 x 1 x 0 cm. Applied Venelex. 3.  Left heel, unstageable pressure injury:  0.8  x 0.8 x 0 cm. 100% yellow. Applied Venelex. 4.  Right upper back, hyperpigmented linear area:  3 x 0.6 x 0 cm;  constantine wound intact. 5.  Right chest, partial thickness wound/skin tear: 3 x 2 x 0.1 cm; 100% moist red; constantine wound intact. Cleansed with saline; applied silver foam.  6.  Right arm, skin tear:  2 x 2 x 0.1 cm; 100% red; constantine wound intact. Xeroform and foam dressing in place. Wound, Pressure Prevention & Skin Care Recommendations:    Minimize layers of linen/pads under patient to optimize support surface. 2.  Turn/reposition approximately every 2 hours and offload heels. 3.  Manage moisture/ Keep skin folds clean and dry/minimize brief usage. 4.  Specialty bed:[facility].  5.  Sacrum:  Continue NPWT at 125 mmHg with changes twice weekly.   6.

## 2023-09-08 NOTE — CARE COORDINATION
9/8/2023:    Transition of Care Plan:    RUR: 25%; HIGH   Prior Level of Functioning: Prior to 06/2023 admission, patient was fully independent    With current admission, patient presented from [address] [facility]; Max Assist at this time     Disposition: TBD - dependent on progression; IPR vs LTACH? Follow up appointments: PCP, GI, Gen Surg, ID  DME needed: TBD  Transportation at discharge: Likely BLS vs ALS  IM/IMM Medicare/ letter given: 7/29  Caregiver Contact: Guillermo Martel: [phone]  Discharge Caregiver contacted prior to discharge? Yes  Care Conference needed? Potentially to discuss [address] [email or web address] [place]   Barriers to discharge: Wound Care, PT/OT Evals, SBT, Thoracentesis for 9/8, TPN, Bowel Rest, G-Tube, Wound Vac to Sacrum, ABX, Cultures, Monitoring of Labs, Vent Weaning on Trach     CM notes that patient has had multiple recent admissions to include:    6-22 to 7-21-23 [facility] colectomy, c-diff, right DVT  7-21-23 to 7-28-23 [facility], sent to [facility] [place] [facility]  7-28-23 Admitted to [facility] for GIB, hematemesis    Patient presented for current admission from [facility]. At baseline, patient was independent in ADLs but is currently max assist.     Patient is s/p Trach placement on 9/3 and continues with difficulty of maintaining SBT trials. Wound vac to sacral wound continues. Patient is not appropriate for PT/OT evals at this time. Evals pending medical progression. CM Team to continue following for JAVIER Needs.     CRM: Virgilio Astudillo, MPH, [address] [place] [address] St: [phone]

## 2023-09-08 NOTE — PROGRESS NOTES
RT Note:  PSV trial    Pt tolerated 6.5hrs on PSV +11wpL3B. Trial ended due to low volumes & agitation.       09/07/23 2008 09/07/23 2010   Vent Information   Vent Mode CPAP/PS (S)  AC/VC  (Pt completed 6.5hrs of PSV +15)   Ventilator Settings   FiO2  25 % 25 %   Vt (Set, mL)  --  320 mL   Resp Rate (Set)  --  16 bmp   PEEP/CPAP (cmH2O) 5 5   Target Vt 300  --    Pressure Support (cm H2O) 15 cm H2O  --    Vent Patient Data (Readings)   Vt Spont (mL) 263 mL  --    Vt (Measured)  --  334 mL   Peak Inspiratory Pressure (cmH2O) 22 cmH2O 27 cmH2O   Rate Measured 30 br/min 28 br/min   Minute Volume (L/min) 11.5 Liters 10.1 Liters   Peak Inspiratory Flow (lpm)  --  56 L/sec  (1:5.1)   Mean Airway Pressure (cmH2O) 10 cmH20 10 cmH20   Plateau Pressure (cm H2O)  --    (unable to obtain d/t tachypnea)   I:E Ratio 1:2.5 1:2.2   Flow Sensitivity 2 L/min 2 L/min   Expiratory Sensitivity (%) 25 %  --    PEEP Intrinsic (cm H2O)  --    (unable to obtain d/t tachypnea)   Static Compliance (L/cm H2O)  --    (unable to obtain d/t tachypnea)   Insp Rise Time (%) 60 %  --    Backup Apnea On  --    Treatment   $Treatment Type $Inhaled Therapy/Meds  --

## 2023-09-08 NOTE — PROGRESS NOTES
Physical Therapy: Hold    Chart reviewed and discussed with RN in prep for PT re-eval.  Patient currently getting wound care and not available for therapy. RN also noted pt received ativan and dilaudid prior to wound care and is drowsy. Will hold and follow up once medically appropriate.     Mt Wolff, PT, DPT

## 2023-09-08 NOTE — PROGRESS NOTES
CT abdomen/pelvis with gastroview via gastrostomy without signs of leak at ileorectal anastomosis. WBC count is normal; she is intermittently on SBT; minimal drainage from abdominal drain; wound VAC dressing in place. Tube feeds initiated (Vital @ 20 mL/hr) as test to determine if leak is completely sealed and if feeds can be advanced to goal and TPN discontinued. Will follow drain output over the weekend and can discontinue feeds if output increases or if tube feeds identified in drain system.

## 2023-09-09 LAB
ALBUMIN SERPL-MCNC: 1.9 G/DL (ref 3.5–5)
ALBUMIN/GLOB SERPL: 0.6 (ref 1.1–2.2)
ALP SERPL-CCNC: 239 U/L (ref 45–117)
ALT SERPL-CCNC: 64 U/L (ref 12–78)
ANION GAP SERPL CALC-SCNC: 4 MMOL/L (ref 5–15)
AST SERPL-CCNC: 36 U/L (ref 15–37)
BILIRUB SERPL-MCNC: 0.3 MG/DL (ref 0.2–1)
BUN SERPL-MCNC: 43 MG/DL (ref 6–20)
BUN/CREAT SERPL: 159 (ref 12–20)
CALCIUM SERPL-MCNC: 8.7 MG/DL (ref 8.5–10.1)
CHLORIDE SERPL-SCNC: 104 MMOL/L (ref 97–108)
CO2 SERPL-SCNC: 28 MMOL/L (ref 21–32)
CREAT SERPL-MCNC: 0.27 MG/DL (ref 0.55–1.02)
ERYTHROCYTE [DISTWIDTH] IN BLOOD BY AUTOMATED COUNT: 15.4 % (ref 11.5–14.5)
GLOBULIN SER CALC-MCNC: 3.4 G/DL (ref 2–4)
GLUCOSE SERPL-MCNC: 105 MG/DL (ref 65–100)
HCT VFR BLD AUTO: 23.6 % (ref 35–47)
HGB BLD-MCNC: 7.8 G/DL (ref 11.5–16)
MAGNESIUM SERPL-MCNC: 2.1 MG/DL (ref 1.6–2.4)
MCH RBC QN AUTO: 29.4 PG (ref 26–34)
MCHC RBC AUTO-ENTMCNC: 33.1 G/DL (ref 30–36.5)
MCV RBC AUTO: 89.1 FL (ref 80–99)
NRBC # BLD: 0 K/UL (ref 0–0.01)
NRBC BLD-RTO: 0 PER 100 WBC
PHOSPHATE SERPL-MCNC: 2.4 MG/DL (ref 2.6–4.7)
PLATELET # BLD AUTO: 330 K/UL (ref 150–400)
PMV BLD AUTO: 10.2 FL (ref 8.9–12.9)
POTASSIUM SERPL-SCNC: 3.6 MMOL/L (ref 3.5–5.1)
PROT SERPL-MCNC: 5.3 G/DL (ref 6.4–8.2)
RBC # BLD AUTO: 2.65 M/UL (ref 3.8–5.2)
SODIUM SERPL-SCNC: 136 MMOL/L (ref 136–145)
WBC # BLD AUTO: 10.7 K/UL (ref 3.6–11)

## 2023-09-09 PROCEDURE — 36415 COLL VENOUS BLD VENIPUNCTURE: CPT

## 2023-09-09 PROCEDURE — 80053 COMPREHEN METABOLIC PANEL: CPT

## 2023-09-09 PROCEDURE — 6370000000 HC RX 637 (ALT 250 FOR IP): Performed by: SURGERY

## 2023-09-09 PROCEDURE — 6360000002 HC RX W HCPCS: Performed by: ANESTHESIOLOGY

## 2023-09-09 PROCEDURE — 2580000003 HC RX 258: Performed by: SURGERY

## 2023-09-09 PROCEDURE — 6360000002 HC RX W HCPCS: Performed by: NURSE PRACTITIONER

## 2023-09-09 PROCEDURE — 2000000000 HC ICU R&B

## 2023-09-09 PROCEDURE — A4216 STERILE WATER/SALINE, 10 ML: HCPCS | Performed by: NURSE PRACTITIONER

## 2023-09-09 PROCEDURE — 2580000003 HC RX 258: Performed by: NURSE PRACTITIONER

## 2023-09-09 PROCEDURE — 94003 VENT MGMT INPAT SUBQ DAY: CPT

## 2023-09-09 PROCEDURE — C9113 INJ PANTOPRAZOLE SODIUM, VIA: HCPCS | Performed by: NURSE PRACTITIONER

## 2023-09-09 PROCEDURE — 94640 AIRWAY INHALATION TREATMENT: CPT

## 2023-09-09 PROCEDURE — 85027 COMPLETE CBC AUTOMATED: CPT

## 2023-09-09 PROCEDURE — 2500000003 HC RX 250 WO HCPCS: Performed by: NURSE PRACTITIONER

## 2023-09-09 PROCEDURE — 99232 SBSQ HOSP IP/OBS MODERATE 35: CPT | Performed by: SURGERY

## 2023-09-09 PROCEDURE — 83735 ASSAY OF MAGNESIUM: CPT

## 2023-09-09 PROCEDURE — 84100 ASSAY OF PHOSPHORUS: CPT

## 2023-09-09 RX ORDER — POTASSIUM CHLORIDE 29.8 MG/ML
20 INJECTION INTRAVENOUS ONCE
Status: COMPLETED | OUTPATIENT
Start: 2023-09-09 | End: 2023-09-09

## 2023-09-09 RX ADMIN — HYDROMORPHONE HYDROCHLORIDE 0.5 MG: 1 INJECTION, SOLUTION INTRAMUSCULAR; INTRAVENOUS; SUBCUTANEOUS at 16:31

## 2023-09-09 RX ADMIN — SODIUM CHLORIDE, PRESERVATIVE FREE 30 ML: 5 INJECTION INTRAVENOUS at 20:13

## 2023-09-09 RX ADMIN — Medication: at 09:25

## 2023-09-09 RX ADMIN — CHLORHEXIDINE GLUCONATE 15 ML: 1.2 RINSE ORAL at 20:12

## 2023-09-09 RX ADMIN — ARFORMOTEROL TARTRATE 15 MCG: 15 SOLUTION RESPIRATORY (INHALATION) at 07:14

## 2023-09-09 RX ADMIN — Medication: at 15:57

## 2023-09-09 RX ADMIN — POTASSIUM CHLORIDE 20 MEQ: 29.8 INJECTION, SOLUTION INTRAVENOUS at 09:24

## 2023-09-09 RX ADMIN — LORAZEPAM 0.5 MG: 2 INJECTION INTRAMUSCULAR; INTRAVENOUS at 00:12

## 2023-09-09 RX ADMIN — Medication 10 ML: at 09:25

## 2023-09-09 RX ADMIN — SODIUM CHLORIDE, PRESERVATIVE FREE 10 ML: 5 INJECTION INTRAVENOUS at 09:24

## 2023-09-09 RX ADMIN — BUDESONIDE 500 MCG: 0.5 INHALANT RESPIRATORY (INHALATION) at 07:14

## 2023-09-09 RX ADMIN — Medication: at 23:24

## 2023-09-09 RX ADMIN — LORAZEPAM 0.5 MG: 2 INJECTION INTRAMUSCULAR; INTRAVENOUS at 10:45

## 2023-09-09 RX ADMIN — SODIUM PHOSPHATE, MONOBASIC, MONOHYDRATE AND SODIUM PHOSPHATE, DIBASIC, ANHYDROUS 15 MMOL: 276; 142 INJECTION, SOLUTION INTRAVENOUS at 15:56

## 2023-09-09 RX ADMIN — LORAZEPAM 0.5 MG: 2 INJECTION INTRAMUSCULAR; INTRAVENOUS at 16:31

## 2023-09-09 RX ADMIN — HYDROMORPHONE HYDROCHLORIDE 0.5 MG: 1 INJECTION, SOLUTION INTRAMUSCULAR; INTRAVENOUS; SUBCUTANEOUS at 10:45

## 2023-09-09 RX ADMIN — CHLORHEXIDINE GLUCONATE 15 ML: 1.2 RINSE ORAL at 09:25

## 2023-09-09 RX ADMIN — SODIUM CHLORIDE, PRESERVATIVE FREE 40 MG: 5 INJECTION INTRAVENOUS at 09:24

## 2023-09-09 RX ADMIN — BUDESONIDE 500 MCG: 0.5 INHALANT RESPIRATORY (INHALATION) at 20:10

## 2023-09-09 RX ADMIN — ARFORMOTEROL TARTRATE 15 MCG: 15 SOLUTION RESPIRATORY (INHALATION) at 20:10

## 2023-09-09 RX ADMIN — THIAMINE HYDROCHLORIDE: 100 INJECTION, SOLUTION INTRAMUSCULAR; INTRAVENOUS at 18:55

## 2023-09-09 ASSESSMENT — ENCOUNTER SYMPTOMS
COUGH: 0
SHORTNESS OF BREATH: 0
ABDOMINAL PAIN: 1

## 2023-09-09 ASSESSMENT — PULMONARY FUNCTION TESTS
PIF_VALUE: 11
PIF_VALUE: 18
PIF_VALUE: 21
PIF_VALUE: 24
PIF_VALUE: 21
PIF_VALUE: 14

## 2023-09-09 ASSESSMENT — PAIN SCALES - GENERAL
PAINLEVEL_OUTOF10: 0
PAINLEVEL_OUTOF10: 0

## 2023-09-09 NOTE — PROGRESS NOTES
RT Note:  Vent assessment    Pt completed 14hrs on PSV +84toO5O. Pt placed on SIMV with rate decreased to 10 to encourage spont breathing.      09/08/23 2157 09/08/23 2159   Vent Information   Vent Mode CPAP/PS (S)  SIMV/VC  (pt completed 14hrs on PSV)   Ventilator Settings   FiO2  21 % 21 %   Vt (Set, mL)  --  320 mL   Resp Rate (Set)  --  (S)  10 bmp  (decreased to encourage spont breathing)   PEEP/CPAP (cmH2O) 5 5   Peak Inspiratory Flow (Set)  --  59 L/sec   Pressure Support (cm H2O) 15 cm H2O 15 cm H2O   Vent Patient Data (Readings)   Vt Spont (mL) 321 mL 329 mL   Vt (Measured)  --  331 mL   Peak Inspiratory Pressure (cmH2O) 21 cmH2O 28 cmH2O   Rate Measured 29 br/min 33 br/min   Minute Volume (L/min) 9.52 Liters 10.3 Liters   Peak Inspiratory Flow (lpm)  --  59 L/sec  (1:9)   Mean Airway Pressure (cmH2O) 8.9 cmH20 9.9 cmH20   Plateau Pressure (cm H2O)  --  27 cm H2O   Driving Pressure  --  22   I:E Ratio 1:3 1:1.9   Flow Sensitivity 2 L/min 2 L/min   Expiratory Sensitivity (%) 25 % 25 %   PEEP Intrinsic (cm H2O)  --    (unable to obtain d/t spont breathing)   Static Compliance (L/cm H2O)  --  12   Insp Rise Time (%) 50 % 50 %   Backup Apnea On  --    Backup Rate 16 Breaths Per Minute  --    Backup Vt 320  --    Treatment   $Treatment Type $Inhaled Therapy/Meds  --

## 2023-09-10 LAB
ANION GAP SERPL CALC-SCNC: 4 MMOL/L (ref 5–15)
BUN SERPL-MCNC: 46 MG/DL (ref 6–20)
BUN/CREAT SERPL: 131 (ref 12–20)
CALCIUM SERPL-MCNC: 8.3 MG/DL (ref 8.5–10.1)
CHLORIDE SERPL-SCNC: 108 MMOL/L (ref 97–108)
CO2 SERPL-SCNC: 27 MMOL/L (ref 21–32)
CREAT SERPL-MCNC: 0.35 MG/DL (ref 0.55–1.02)
ERYTHROCYTE [DISTWIDTH] IN BLOOD BY AUTOMATED COUNT: 15.6 % (ref 11.5–14.5)
GLUCOSE SERPL-MCNC: 107 MG/DL (ref 65–100)
HCT VFR BLD AUTO: 24.7 % (ref 35–47)
HGB BLD-MCNC: 8 G/DL (ref 11.5–16)
MAGNESIUM SERPL-MCNC: 1.8 MG/DL (ref 1.6–2.4)
MCH RBC QN AUTO: 29.1 PG (ref 26–34)
MCHC RBC AUTO-ENTMCNC: 32.4 G/DL (ref 30–36.5)
MCV RBC AUTO: 89.8 FL (ref 80–99)
NRBC # BLD: 0 K/UL (ref 0–0.01)
NRBC BLD-RTO: 0 PER 100 WBC
PHOSPHATE SERPL-MCNC: 3.1 MG/DL (ref 2.6–4.7)
PLATELET # BLD AUTO: 323 K/UL (ref 150–400)
PMV BLD AUTO: 10.5 FL (ref 8.9–12.9)
POTASSIUM SERPL-SCNC: 4.2 MMOL/L (ref 3.5–5.1)
RBC # BLD AUTO: 2.75 M/UL (ref 3.8–5.2)
SODIUM SERPL-SCNC: 139 MMOL/L (ref 136–145)
WBC # BLD AUTO: 12 K/UL (ref 3.6–11)

## 2023-09-10 PROCEDURE — 2500000003 HC RX 250 WO HCPCS: Performed by: NURSE PRACTITIONER

## 2023-09-10 PROCEDURE — 2580000003 HC RX 258: Performed by: SURGERY

## 2023-09-10 PROCEDURE — 6360000002 HC RX W HCPCS: Performed by: NURSE PRACTITIONER

## 2023-09-10 PROCEDURE — A4216 STERILE WATER/SALINE, 10 ML: HCPCS | Performed by: NURSE PRACTITIONER

## 2023-09-10 PROCEDURE — 99232 SBSQ HOSP IP/OBS MODERATE 35: CPT | Performed by: SURGERY

## 2023-09-10 PROCEDURE — 83735 ASSAY OF MAGNESIUM: CPT

## 2023-09-10 PROCEDURE — 6360000002 HC RX W HCPCS: Performed by: ANESTHESIOLOGY

## 2023-09-10 PROCEDURE — 85027 COMPLETE CBC AUTOMATED: CPT

## 2023-09-10 PROCEDURE — 94003 VENT MGMT INPAT SUBQ DAY: CPT

## 2023-09-10 PROCEDURE — 6360000002 HC RX W HCPCS: Performed by: SURGERY

## 2023-09-10 PROCEDURE — 6370000000 HC RX 637 (ALT 250 FOR IP): Performed by: SURGERY

## 2023-09-10 PROCEDURE — 94640 AIRWAY INHALATION TREATMENT: CPT

## 2023-09-10 PROCEDURE — 36415 COLL VENOUS BLD VENIPUNCTURE: CPT

## 2023-09-10 PROCEDURE — C9113 INJ PANTOPRAZOLE SODIUM, VIA: HCPCS | Performed by: NURSE PRACTITIONER

## 2023-09-10 PROCEDURE — 2580000003 HC RX 258: Performed by: NURSE PRACTITIONER

## 2023-09-10 PROCEDURE — 2000000000 HC ICU R&B

## 2023-09-10 PROCEDURE — 80048 BASIC METABOLIC PNL TOTAL CA: CPT

## 2023-09-10 PROCEDURE — 84100 ASSAY OF PHOSPHORUS: CPT

## 2023-09-10 RX ADMIN — HYDROMORPHONE HYDROCHLORIDE 0.5 MG: 1 INJECTION, SOLUTION INTRAMUSCULAR; INTRAVENOUS; SUBCUTANEOUS at 00:46

## 2023-09-10 RX ADMIN — Medication: at 05:34

## 2023-09-10 RX ADMIN — ALBUTEROL SULFATE 2.5 MG: 2.5 SOLUTION RESPIRATORY (INHALATION) at 11:59

## 2023-09-10 RX ADMIN — BUDESONIDE 500 MCG: 0.5 INHALANT RESPIRATORY (INHALATION) at 08:01

## 2023-09-10 RX ADMIN — CHLORHEXIDINE GLUCONATE 15 ML: 1.2 RINSE ORAL at 08:28

## 2023-09-10 RX ADMIN — SODIUM CHLORIDE, PRESERVATIVE FREE 30 ML: 5 INJECTION INTRAVENOUS at 19:53

## 2023-09-10 RX ADMIN — LORAZEPAM 0.5 MG: 2 INJECTION INTRAMUSCULAR; INTRAVENOUS at 00:45

## 2023-09-10 RX ADMIN — LORAZEPAM 0.5 MG: 2 INJECTION INTRAMUSCULAR; INTRAVENOUS at 19:53

## 2023-09-10 RX ADMIN — HYDROMORPHONE HYDROCHLORIDE 0.5 MG: 1 INJECTION, SOLUTION INTRAMUSCULAR; INTRAVENOUS; SUBCUTANEOUS at 13:03

## 2023-09-10 RX ADMIN — HYDROMORPHONE HYDROCHLORIDE 0.5 MG: 1 INJECTION, SOLUTION INTRAMUSCULAR; INTRAVENOUS; SUBCUTANEOUS at 10:13

## 2023-09-10 RX ADMIN — CHLORHEXIDINE GLUCONATE 15 ML: 1.2 RINSE ORAL at 19:50

## 2023-09-10 RX ADMIN — LORAZEPAM 0.5 MG: 2 INJECTION INTRAMUSCULAR; INTRAVENOUS at 10:12

## 2023-09-10 RX ADMIN — Medication 10 ML: at 08:28

## 2023-09-10 RX ADMIN — Medication: at 08:27

## 2023-09-10 RX ADMIN — Medication: at 16:00

## 2023-09-10 RX ADMIN — HYDROMORPHONE HYDROCHLORIDE 0.5 MG: 1 INJECTION, SOLUTION INTRAMUSCULAR; INTRAVENOUS; SUBCUTANEOUS at 19:54

## 2023-09-10 RX ADMIN — ARFORMOTEROL TARTRATE 15 MCG: 15 SOLUTION RESPIRATORY (INHALATION) at 21:28

## 2023-09-10 RX ADMIN — ARFORMOTEROL TARTRATE 15 MCG: 15 SOLUTION RESPIRATORY (INHALATION) at 08:01

## 2023-09-10 RX ADMIN — SODIUM CHLORIDE, PRESERVATIVE FREE 40 MG: 5 INJECTION INTRAVENOUS at 08:27

## 2023-09-10 RX ADMIN — MAGNESIUM SULFATE HEPTAHYDRATE: 500 INJECTION, SOLUTION INTRAMUSCULAR; INTRAVENOUS at 19:13

## 2023-09-10 RX ADMIN — BUDESONIDE 500 MCG: 0.5 INHALANT RESPIRATORY (INHALATION) at 21:28

## 2023-09-10 RX ADMIN — SODIUM CHLORIDE, PRESERVATIVE FREE 10 ML: 5 INJECTION INTRAVENOUS at 08:28

## 2023-09-10 ASSESSMENT — PULMONARY FUNCTION TESTS
PIF_VALUE: 27
PIF_VALUE: 11
PIF_VALUE: 22
PIF_VALUE: 26
PIF_VALUE: 26
PIF_VALUE: 24
PIF_VALUE: 19

## 2023-09-10 ASSESSMENT — ENCOUNTER SYMPTOMS
ABDOMINAL PAIN: 0
SHORTNESS OF BREATH: 0

## 2023-09-10 NOTE — PROGRESS NOTES
Trach Team Note    This is a late entry for meeting on 9/7/23. Patient discussed as part of interdisciplinary trach rounds with Dr. Conchis Turner, respiratory therapy clinical educator, case management , and SLP.     -PT/OT consult placed during rounds to be completed at their discretion from their expertise.    -As pt able to wean from vent, will explore options for discharge including returning to [address] [place] [address]. -SLP consulted but has not been able to work with patient yet. Trach team will continue to follow while pt remains in-house.        Thank you,  Wing Faisal M.Ed, PhD(c), CCC-SLP  Speech-Language Pathologist

## 2023-09-10 NOTE — PROGRESS NOTES
RT Note:  RSBI    Pt placed back on SIMV due to high RSBI.     09/10/23 0031   Weaning Parameters   Respiratory Rate Observed 39   Ve 11.7      RSBI 133

## 2023-09-10 NOTE — PROGRESS NOTES
RT Note:  RSBI     09/09/23 2009   Weaning Parameters   Respiratory Rate Observed 25   Ve 9.11      RSBI 72

## 2023-09-10 NOTE — PROGRESS NOTES
RT Note:  Vent assessment    Pt completed 15hrs on PSV with PS weaned to +5cmH2O.   Pt tolerated trial well but was ended due to high RSBI.     09/10/23 0031 09/10/23 0032   Vent Information   Vent Mode CPAP/PS (S)  SIMV/VC  (Placed back on rate d/t increased RSBI)   $Ventilation $Subsequent Day  --    Ventilator Settings   FiO2  21 % 21 %   Vt (Set, mL)  --  320 mL   Resp Rate (Set)  --  10 bmp   PEEP/CPAP (cmH2O) 5 5   Target Vt  --  300   Peak Inspiratory Flow (Set)  --  59 L/sec  (1:9)   Pressure Support (cm H2O) 5 cm H2O 10 cm H2O   Vent Patient Data (Readings)   Vt Spont (mL) 338 mL 331 mL   Vt (Measured)  --  333 mL   Peak Inspiratory Pressure (cmH2O) 11 cmH2O 19 cmH2O   Rate Measured 39 br/min 36 br/min   Minute Volume (L/min) 11.7 Liters 11.8 Liters   Peak Inspiratory Flow (lpm)  --  59 L/sec  (1:9)   Mean Airway Pressure (cmH2O) 7.3 cmH20 9.5 cmH20   I:E Ratio 1:2.5 1:1.8   Flow Sensitivity 2 L/min 2 L/min   Expiratory Sensitivity (%) 25 % 25 %   Insp Rise Time (%) 50 % 70 %

## 2023-09-11 LAB
ANION GAP SERPL CALC-SCNC: 2 MMOL/L (ref 5–15)
BASOPHILS # BLD: 0 K/UL (ref 0–0.1)
BASOPHILS NFR BLD: 0 % (ref 0–1)
BUN SERPL-MCNC: 48 MG/DL (ref 6–20)
BUN/CREAT SERPL: 137 (ref 12–20)
CALCIUM SERPL-MCNC: 8.6 MG/DL (ref 8.5–10.1)
CHLORIDE SERPL-SCNC: 106 MMOL/L (ref 97–108)
CO2 SERPL-SCNC: 28 MMOL/L (ref 21–32)
CREAT SERPL-MCNC: 0.35 MG/DL (ref 0.55–1.02)
DIFFERENTIAL METHOD BLD: ABNORMAL
EOSINOPHIL # BLD: 1 K/UL (ref 0–0.4)
EOSINOPHIL NFR BLD: 8 % (ref 0–7)
ERYTHROCYTE [DISTWIDTH] IN BLOOD BY AUTOMATED COUNT: 15 % (ref 11.5–14.5)
GLUCOSE SERPL-MCNC: 123 MG/DL (ref 65–100)
HCT VFR BLD AUTO: 24.8 % (ref 35–47)
HGB BLD-MCNC: 8.1 G/DL (ref 11.5–16)
IMM GRANULOCYTES # BLD AUTO: 0.1 K/UL (ref 0–0.04)
IMM GRANULOCYTES NFR BLD AUTO: 1 % (ref 0–0.5)
LYMPHOCYTES # BLD: 0.7 K/UL (ref 0.8–3.5)
LYMPHOCYTES NFR BLD: 6 % (ref 12–49)
MAGNESIUM SERPL-MCNC: 1.8 MG/DL (ref 1.6–2.4)
MCH RBC QN AUTO: 29.5 PG (ref 26–34)
MCHC RBC AUTO-ENTMCNC: 32.7 G/DL (ref 30–36.5)
MCV RBC AUTO: 90.2 FL (ref 80–99)
MONOCYTES # BLD: 1.1 K/UL (ref 0–1)
MONOCYTES NFR BLD: 9 % (ref 5–13)
NEUTS SEG # BLD: 9 K/UL (ref 1.8–8)
NEUTS SEG NFR BLD: 76 % (ref 32–75)
NRBC # BLD: 0 K/UL (ref 0–0.01)
NRBC BLD-RTO: 0 PER 100 WBC
PHOSPHATE SERPL-MCNC: 2.6 MG/DL (ref 2.6–4.7)
PLATELET # BLD AUTO: 354 K/UL (ref 150–400)
PMV BLD AUTO: 10.1 FL (ref 8.9–12.9)
POTASSIUM SERPL-SCNC: 3.9 MMOL/L (ref 3.5–5.1)
RBC # BLD AUTO: 2.75 M/UL (ref 3.8–5.2)
RBC MORPH BLD: ABNORMAL
SODIUM SERPL-SCNC: 136 MMOL/L (ref 136–145)
WBC # BLD AUTO: 11.9 K/UL (ref 3.6–11)

## 2023-09-11 PROCEDURE — 6370000000 HC RX 637 (ALT 250 FOR IP): Performed by: SURGERY

## 2023-09-11 PROCEDURE — 6360000002 HC RX W HCPCS: Performed by: NURSE PRACTITIONER

## 2023-09-11 PROCEDURE — 2580000003 HC RX 258: Performed by: NURSE PRACTITIONER

## 2023-09-11 PROCEDURE — 97165 OT EVAL LOW COMPLEX 30 MIN: CPT

## 2023-09-11 PROCEDURE — 84100 ASSAY OF PHOSPHORUS: CPT

## 2023-09-11 PROCEDURE — 97164 PT RE-EVAL EST PLAN CARE: CPT

## 2023-09-11 PROCEDURE — 6370000000 HC RX 637 (ALT 250 FOR IP): Performed by: NURSE PRACTITIONER

## 2023-09-11 PROCEDURE — A4216 STERILE WATER/SALINE, 10 ML: HCPCS | Performed by: NURSE PRACTITIONER

## 2023-09-11 PROCEDURE — 80048 BASIC METABOLIC PNL TOTAL CA: CPT

## 2023-09-11 PROCEDURE — C9113 INJ PANTOPRAZOLE SODIUM, VIA: HCPCS | Performed by: NURSE PRACTITIONER

## 2023-09-11 PROCEDURE — 2500000003 HC RX 250 WO HCPCS: Performed by: NURSE PRACTITIONER

## 2023-09-11 PROCEDURE — 2580000003 HC RX 258: Performed by: SURGERY

## 2023-09-11 PROCEDURE — 94640 AIRWAY INHALATION TREATMENT: CPT

## 2023-09-11 PROCEDURE — 36592 COLLECT BLOOD FROM PICC: CPT

## 2023-09-11 PROCEDURE — 36415 COLL VENOUS BLD VENIPUNCTURE: CPT

## 2023-09-11 PROCEDURE — 97535 SELF CARE MNGMENT TRAINING: CPT

## 2023-09-11 PROCEDURE — 94003 VENT MGMT INPAT SUBQ DAY: CPT

## 2023-09-11 PROCEDURE — 97168 OT RE-EVAL EST PLAN CARE: CPT

## 2023-09-11 PROCEDURE — 83735 ASSAY OF MAGNESIUM: CPT

## 2023-09-11 PROCEDURE — 6360000002 HC RX W HCPCS: Performed by: ANESTHESIOLOGY

## 2023-09-11 PROCEDURE — 85025 COMPLETE CBC W/AUTO DIFF WBC: CPT

## 2023-09-11 PROCEDURE — 97530 THERAPEUTIC ACTIVITIES: CPT

## 2023-09-11 PROCEDURE — 2000000000 HC ICU R&B

## 2023-09-11 RX ADMIN — NYSTATIN 500000 UNITS: 100000 SUSPENSION ORAL at 10:07

## 2023-09-11 RX ADMIN — NYSTATIN 500000 UNITS: 100000 SUSPENSION ORAL at 19:56

## 2023-09-11 RX ADMIN — Medication: at 00:00

## 2023-09-11 RX ADMIN — CHLORHEXIDINE GLUCONATE 15 ML: 1.2 RINSE ORAL at 10:04

## 2023-09-11 RX ADMIN — HYDROMORPHONE HYDROCHLORIDE 0.5 MG: 1 INJECTION, SOLUTION INTRAMUSCULAR; INTRAVENOUS; SUBCUTANEOUS at 16:42

## 2023-09-11 RX ADMIN — HYDROMORPHONE HYDROCHLORIDE 0.5 MG: 1 INJECTION, SOLUTION INTRAMUSCULAR; INTRAVENOUS; SUBCUTANEOUS at 10:03

## 2023-09-11 RX ADMIN — BUDESONIDE 500 MCG: 0.5 INHALANT RESPIRATORY (INHALATION) at 20:25

## 2023-09-11 RX ADMIN — CHLORHEXIDINE GLUCONATE 15 ML: 1.2 RINSE ORAL at 19:56

## 2023-09-11 RX ADMIN — LORAZEPAM 0.5 MG: 2 INJECTION INTRAMUSCULAR; INTRAVENOUS at 20:06

## 2023-09-11 RX ADMIN — SODIUM CHLORIDE, PRESERVATIVE FREE 40 MG: 5 INJECTION INTRAVENOUS at 10:03

## 2023-09-11 RX ADMIN — SODIUM CHLORIDE, PRESERVATIVE FREE 10 ML: 5 INJECTION INTRAVENOUS at 10:03

## 2023-09-11 RX ADMIN — NYSTATIN 500000 UNITS: 100000 SUSPENSION ORAL at 14:36

## 2023-09-11 RX ADMIN — Medication: at 10:08

## 2023-09-11 RX ADMIN — ARFORMOTEROL TARTRATE 15 MCG: 15 SOLUTION RESPIRATORY (INHALATION) at 20:25

## 2023-09-11 RX ADMIN — LORAZEPAM 0.5 MG: 2 INJECTION INTRAMUSCULAR; INTRAVENOUS at 14:36

## 2023-09-11 RX ADMIN — ARFORMOTEROL TARTRATE 15 MCG: 15 SOLUTION RESPIRATORY (INHALATION) at 08:10

## 2023-09-11 RX ADMIN — SODIUM CHLORIDE, PRESERVATIVE FREE 10 ML: 5 INJECTION INTRAVENOUS at 10:04

## 2023-09-11 RX ADMIN — MAGNESIUM SULFATE HEPTAHYDRATE: 500 INJECTION, SOLUTION INTRAMUSCULAR; INTRAVENOUS at 18:31

## 2023-09-11 RX ADMIN — BUDESONIDE 500 MCG: 0.5 INHALANT RESPIRATORY (INHALATION) at 08:10

## 2023-09-11 RX ADMIN — Medication 30 ML: at 19:57

## 2023-09-11 RX ADMIN — NYSTATIN 500000 UNITS: 100000 SUSPENSION ORAL at 18:06

## 2023-09-11 RX ADMIN — Medication: at 18:06

## 2023-09-11 ASSESSMENT — PULMONARY FUNCTION TESTS
PEFR_L/MIN: 99
PIF_VALUE: 13
PIF_VALUE: 27
PIF_VALUE: 26
PIF_VALUE: 28

## 2023-09-11 ASSESSMENT — PAIN SCALES - GENERAL
PAINLEVEL_OUTOF10: 0
PAINLEVEL_OUTOF10: 3

## 2023-09-11 ASSESSMENT — PAIN - FUNCTIONAL ASSESSMENT: PAIN_FUNCTIONAL_ASSESSMENT: ACTIVITIES ARE NOT PREVENTED

## 2023-09-11 ASSESSMENT — ENCOUNTER SYMPTOMS
ABDOMINAL PAIN: 0
CHEST TIGHTNESS: 0
SHORTNESS OF BREATH: 0

## 2023-09-11 ASSESSMENT — PAIN DESCRIPTION - DESCRIPTORS: DESCRIPTORS: PATIENT UNABLE TO DESCRIBE

## 2023-09-11 NOTE — CARE COORDINATION
Transition of Care Plan:    RUR: 24% High  Prior Level of Functioning: Independent   Disposition: LTAC Vs IPR  Follow up appointments: PCP, Gen Surg   DME needed: TBD  Transportation at discharge: BLS vs ALS  IM/IMM Medicare 23   Is patient a [place] and connected with [facility]? No  Caregiver Contact:  Delana Ellison Yolande Cushing: [phone]  Discharge Caregiver contacted prior to discharge? Yes  Care Conference needed?  No  Barriers to discharge:    Intubated  Wound Vac to sacral wound   G Tube to gravity,   LUQ drain  Monitor drain output  Cont TPN  Roland Jordan RN,Care Management

## 2023-09-11 NOTE — PROGRESS NOTES
09/11/23 0755   Weaning Parameters   Spontaneous Breathing Trial Complete   (PS 15/5)   Respiratory Rate Observed 40   Ve 16      RSBI 164     Patient returned to previous vent settings

## 2023-09-11 NOTE — PROGRESS NOTES
NUTRITION brief    Recommendations:     Increase Vital 1.5 to 30 ml/hr    Discontinue IV lipid/fat emulsion    Adjust TPN to continues @ 42 ml/hr with 100 gm CHO and 65 gm protein    -Goal tube feeding without TPN: Vital 1.5 @ 45 ml/hr with 120 ml water flush q 4 hr and 2 packets Prosource daily       Diet: NPO  Nutrition Support: Tube Feeding: Vital 1.5 @ 20 ml/hr with 50 ml water flush q 4 hr   Cyclic TPN: 6627 ml, 244 gm protein, 234 gm CHO and 250 ml 20% lipid 3 x/week  Nutrition-related meds: Protonix    New events impacting nutrition plan of care:   Results of CT scan noted; good to see no evidence of leak. Trophic tube feeding started 9/9. Patient has tolerated well thus far. Discussed during MDR and with surgery. Will increase feeding slightly today to 30 ml/hr and adjust TPN to avoid overfeeding. Vital 1.5 @ 30 ml/hr will provide 660 ml, 990 calories, 45 gm protein and 800 ml free water. Above continuous TPN (no lipid): 1000 ml, 65 gm protein and 600 calories. Combined pt's nutrient needs will be met. Goal tube feeding will provide a total of 990 ml, 1600 calories, 97 gm protein and 1600 ml free water (tube feeding/flush) per day. See full RD assessment from 9/6 for additional details, goals, and monitoring/evaluation.    Estimated Nutrition Needs:   Energy Requirements Based On: Formula  Weight Used for Energy Requirements: Ideal  Energy (kcal/day): 1500 ([facility]  Weight Used for Protein Requirements: Ideal  Protein (g/day): 110 (2g/kg IBW)  Method Used for Fluid Requirements: 1 ml/kcal  Fluid (ml/day): 1 mL/kcal    Tad Kilgore RD [facility]

## 2023-09-11 NOTE — PROGRESS NOTES
I participated in the IDT meeting where the plan of care for Taryn Francis was discussed. I reviewed the patient's medical record prior to this meeting. We will continue to follow and visit for spiritual assessment when appropriate. Please contact  paging service for any immediate needs. _____________________________  Bren Ingram M.Div., M.S., B.C.C.   Staff

## 2023-09-11 NOTE — PROGRESS NOTES
09/11/23 1418   Ventilator Settings   FiO2  30 %   PEEP/CPAP (cmH2O) 5   Pressure Support (cm H2O) 18 cm H2O     Patient tolerated for  hour only.

## 2023-09-11 NOTE — PROGRESS NOTES
This is an initial visit of the . The patient was alone with a tube attached to her neck. The patient acknowledged the presence of the  by nodding her head. The respiratory therapist entered to do his thing while talking. It seemed that the patient enjoyed the activity. She wanted to say something but neither the therapist nor the  could understand what she needed. They informed the nurse to check the patient's needs. The  asked the patient if prayer was important to her and she nodded her head. The  prayed for her. The  noticed that patient was capable of listening and understanding. The  informed the nurse of his visit and advised the nurse to call spiritual care if his presence is needed.     [facility]

## 2023-09-11 NOTE — PROGRESS NOTES
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 difficile colitis, s/p ex lap with no signs of perforation, EGD revealing GJ stricture, s/p 2 mm dilatation, started on anticoagulation for DVT) [facility], developed hematemesis/GIB     BRIEF PATIENT SUMMARY   60-year-old female who presented to the emergency department  from [facility] for weakness and Hematemesis. 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 She was readmitted to [facility] ( through ) at which time she underwent EGD revealing GJ stricture. CT abdomen/pelvis demonstrated loculated intraperitoneal fluid in the anterior abdomen with persistent free air.   she underwent US guided drainage +E. coli. She completed a course of Zosyn and drain removed 8/10.   G-tube was placed.  patient transferred to the ICU in the setting of acute hypoxic respiratory failure d/t pulmonary edema requiring diuresis and eventual intubation ( - ). Patient reintubated  through current for worsening hypoxic respiratory failure. Course further complicated by persistent leukocytosis, fevers and CT evidence of intra-abdominal fluid collection post ultrasound drainage.   Repeat imaging  did not reveal any drainable

## 2023-09-11 NOTE — PLAN OF CARE
Problem: Occupational Therapy - Adult  Goal: By Discharge: Performs self-care activities at highest level of function for planned discharge setting. See evaluation for individualized goals. Description: FUNCTIONAL STATUS PRIOR TO ADMISSION:  Patient was independent, active, working and a  prior to admission at [facility]. She was independent to mod I for all self-care and functional mobility. Since onset of illness the patient has been in need of assistance for all ADLs and functional mobility. DC to IPR prior to her admission, however, limited there and stating she was unable to complete much OOB therapy. She is able to verbalize and demonstrate a home exercise program involving UE movement. HOME SUPPORT: Patient lived with spouse but didn't require assistance at her true baseline. Occupational Therapy Goals:  Initiated 9/11/2023 at re-evaluation:   1. Patient will perform  simple grooming routine, in supported sitting, with moderate assistance within 7 day(s). 2.  Patient will perform upper body dressing with moderate assistance within 7 day(s). 3.  Patient will perform anterior neck to thigh bathing with moderate assist within 7 day(s). 4.  Patient will perform lateral rolling in prep for completion of toileting routine with maximal assistance within 7 days. 5.  Patient will participate in upper extremity therapeutic exercise/activities with minimal assistance for 3 minutes within 7 day(s). Initiated 8/2/2023  1. Patient will perform grooming with Set-up sitting EOB within 7 day(s). 2.  Patient will perform upper body dressing with Set-up long sitting in bed within 7 day(s). 3.  Patient will perform bathing with Moderate Assist within 7 day(s). 4.  Patient will perform toilet transfers to least restrictive device with Minimal Assist  within 7 day(s). 5.  Patient will perform all aspects of toileting with Moderate Assist within 7 day(s).   6.  Patient will participate Mobility:     Bed Mobility Training  Bed Mobility Training: Yes  Overall Level of Assistance: Total assistance;Assist X2  Rolling: Total assistance;Assist X2  Supine to Sit: Total assistance (bed mechnics used for chair position)  Scooting: Total assistance (to [address])    Transfers:                Balance:          ADL Assessment:       Feeding: Dependent/Total  Feeding Skilled Clinical Factors: NPO    Grooming: Maximum assistance;Dependent/Total  Grooming Skilled Clinical Factors: inferred, d/t poor ROM/rigidity and command following    UE Bathing: Dependent/Total  UE Bathing Skilled Clinical Factors: inferred    LE Bathing: Dependent/Total  LE Bathing Skilled Clinical Factors: constantine care at bed level    UE Dressing: Dependent/Total  UE Dressing Skilled Clinical Factors: to [facility] gown at bed level    LE Dressing: Dependent/Total       Toileting: Dependent/Total  Toileting Skilled Clinical Factors: at bed level, pt incontinent of stool           ADL Intervention and task modifications:      [address] [facility] \"6 Clicks\"                                                       Daily Activity Inpatient Short Form  AM-PAC Daily Activity - Inpatient   How much help is needed for putting on and taking off regular lower body clothing?: Total  How much help is needed for bathing (which includes washing, rinsing, drying)?: Total  How much help is needed for toileting (which includes using toilet, bedpan, or urinal)?: Total  How much help is needed for putting on and taking off regular upper body clothing?: Total  How much help is needed for taking care of personal grooming?: Total  How much help for eating meals?: Total  AM-PAC Inpatient Daily Activity Raw Score: 6  AM-PAC Inpatient ADL T-Scale Score : 17.07  ADL Inpatient CMS 0-100% Score: 100  ADL Inpatient CMS G-Code Modifier : CN     Interpretation of Tool:  Represents clinically-significant functional categories (i.e. Activities of daily living).   Cutoff

## 2023-09-11 NOTE — PLAN OF CARE
Problem: Physical Therapy - Adult  Goal: By Discharge: Performs mobility at highest level of function for planned discharge setting. See evaluation for individualized goals. Description: FUNCTIONAL STATUS PRIOR TO ADMISSION: Patient was independent and active without use of DME prior to last hospitalization at [facility] for abdominal surgery. D/c'ed to [facility] [place], operated by [facility] where she was for 1 week-- states she did not get out of bed at [facility] [place] operated by [facility] and worked on bed exercises. 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 On 8/6 patient transferred to the ICU in the setting of acute hypoxic respiratory failure d/t pulmonary edema requiring diuresis and eventual intubation (8/7 - 8/14). Patient reintubated 8/18 through current for worsening hypoxic respiratory failure. Course further complicated by persistent leukocytosis, fevers and CT evidence of intra-abdominal fluid collection post ultrasound drainage. HOME SUPPORT PRIOR TO ADMISSION: The patient lived with her  at baseline. Physical Therapy Goals  Revised 9/11/2023  1. Patient will move from supine to sit and sit to supine, scoot up and down, and roll side to side in bed with moderate assistance x2 within 7 day(s). 2.  Patient will perform sit to stand with max assistance x2 within 7 day(s). 3.  Patient will transfer from bed to chair and chair to bed with max assistance x2 using the least restrictive device within 7 day(s). 4.  Patient will sit edge of bed with min assist for 10 minutes within 7 day(s). Initiated 8/2/2023  1. Patient will move from supine to sit and sit to supine, scoot up and down, and roll side to side in bed with minimal assistance x2 within 7 day(s). 2.  Patient will perform sit to stand with moderate assistance x2 within 7 day(s).   3.  Patient will transfer from bed to chair and chair to bed [id number]. PMID: 56278067. [facility],[address], Estefani BOLDEN, [place] An, Attila SHANKAR. Activity Measure for Post-Acute Care \"6-Clicks\" Basic Mobility Scores Predict Discharge Destination After Acute Care Hospitalization in Select Patient Groups: A Retrospective, Observational Study. Arch Rehabil Res Clin Transl. 2022 Jul 16;4(3):432796. doi: 10.1016/j.arrct. [id number]. PMID: 59633002; PMCID: [id number]. 4. Carmenza Santos Ni P. AM-PAC Short Forms Manual 4.0. Revised 2/2020.                                                                                                                                                                                                                               Pain Rating:  No indicators of pain   Pain Intervention(s):   repositioning    Activity Tolerance:   Fair     After treatment:   Patient left in no apparent distress in bed and bed in chair position; ankles supported into neutral     COMMUNICATION/EDUCATION:   The patient's plan of care was discussed with: occupational therapist    Patient Education  Education Given To: Patient  Education Provided: Role of Therapy  Education Method: Verbal  Education Outcome: Continued education needed    Thank you for this referral.  Kody Wayne, PT  Minutes: 24

## 2023-09-12 LAB
ANION GAP SERPL CALC-SCNC: 6 MMOL/L (ref 5–15)
BUN SERPL-MCNC: 40 MG/DL (ref 6–20)
BUN/CREAT SERPL: 105 (ref 12–20)
CALCIUM SERPL-MCNC: 8.7 MG/DL (ref 8.5–10.1)
CHLORIDE SERPL-SCNC: 109 MMOL/L (ref 97–108)
CO2 SERPL-SCNC: 25 MMOL/L (ref 21–32)
CREAT SERPL-MCNC: 0.38 MG/DL (ref 0.55–1.02)
GLUCOSE SERPL-MCNC: 109 MG/DL (ref 65–100)
MAGNESIUM SERPL-MCNC: 1.6 MG/DL (ref 1.6–2.4)
PHOSPHATE SERPL-MCNC: 2.7 MG/DL (ref 2.6–4.7)
POTASSIUM SERPL-SCNC: 4.2 MMOL/L (ref 3.5–5.1)
SODIUM SERPL-SCNC: 140 MMOL/L (ref 136–145)

## 2023-09-12 PROCEDURE — 6370000000 HC RX 637 (ALT 250 FOR IP): Performed by: NURSE PRACTITIONER

## 2023-09-12 PROCEDURE — 6360000002 HC RX W HCPCS: Performed by: NURSE PRACTITIONER

## 2023-09-12 PROCEDURE — 6370000000 HC RX 637 (ALT 250 FOR IP): Performed by: SURGERY

## 2023-09-12 PROCEDURE — 2580000003 HC RX 258: Performed by: SURGERY

## 2023-09-12 PROCEDURE — 97606 NEG PRS WND THER DME>50 SQCM: CPT

## 2023-09-12 PROCEDURE — 6360000002 HC RX W HCPCS: Performed by: SURGERY

## 2023-09-12 PROCEDURE — 94003 VENT MGMT INPAT SUBQ DAY: CPT

## 2023-09-12 PROCEDURE — 36592 COLLECT BLOOD FROM PICC: CPT

## 2023-09-12 PROCEDURE — 2000000000 HC ICU R&B

## 2023-09-12 PROCEDURE — 80048 BASIC METABOLIC PNL TOTAL CA: CPT

## 2023-09-12 PROCEDURE — 2580000003 HC RX 258: Performed by: NURSE PRACTITIONER

## 2023-09-12 PROCEDURE — 6360000002 HC RX W HCPCS: Performed by: ANESTHESIOLOGY

## 2023-09-12 PROCEDURE — 84100 ASSAY OF PHOSPHORUS: CPT

## 2023-09-12 PROCEDURE — 36415 COLL VENOUS BLD VENIPUNCTURE: CPT

## 2023-09-12 PROCEDURE — 2500000003 HC RX 250 WO HCPCS: Performed by: NURSE PRACTITIONER

## 2023-09-12 PROCEDURE — 83735 ASSAY OF MAGNESIUM: CPT

## 2023-09-12 PROCEDURE — 94640 AIRWAY INHALATION TREATMENT: CPT

## 2023-09-12 PROCEDURE — C9113 INJ PANTOPRAZOLE SODIUM, VIA: HCPCS | Performed by: NURSE PRACTITIONER

## 2023-09-12 PROCEDURE — A4216 STERILE WATER/SALINE, 10 ML: HCPCS | Performed by: NURSE PRACTITIONER

## 2023-09-12 RX ORDER — LABETALOL HYDROCHLORIDE 5 MG/ML
10 INJECTION, SOLUTION INTRAVENOUS EVERY 6 HOURS PRN
Status: DISCONTINUED | OUTPATIENT
Start: 2023-09-12 | End: 2023-10-19

## 2023-09-12 RX ORDER — HYDROMORPHONE HYDROCHLORIDE 1 MG/ML
0.25 INJECTION, SOLUTION INTRAMUSCULAR; INTRAVENOUS; SUBCUTANEOUS
Status: DISCONTINUED | OUTPATIENT
Start: 2023-09-12 | End: 2023-09-26

## 2023-09-12 RX ORDER — HYDROMORPHONE HYDROCHLORIDE 1 MG/ML
0.5 INJECTION, SOLUTION INTRAMUSCULAR; INTRAVENOUS; SUBCUTANEOUS
Status: DISCONTINUED | OUTPATIENT
Start: 2023-09-12 | End: 2023-09-26

## 2023-09-12 RX ADMIN — I.V. FAT EMULSION 250 ML: 20 EMULSION INTRAVENOUS at 19:59

## 2023-09-12 RX ADMIN — Medication: at 00:00

## 2023-09-12 RX ADMIN — Medication: at 16:09

## 2023-09-12 RX ADMIN — Medication: at 23:21

## 2023-09-12 RX ADMIN — ARFORMOTEROL TARTRATE 15 MCG: 15 SOLUTION RESPIRATORY (INHALATION) at 07:26

## 2023-09-12 RX ADMIN — LORAZEPAM 0.5 MG: 2 INJECTION INTRAMUSCULAR; INTRAVENOUS at 13:35

## 2023-09-12 RX ADMIN — ALBUTEROL SULFATE 2.5 MG: 2.5 SOLUTION RESPIRATORY (INHALATION) at 13:43

## 2023-09-12 RX ADMIN — NYSTATIN 500000 UNITS: 100000 SUSPENSION ORAL at 16:08

## 2023-09-12 RX ADMIN — HYDROMORPHONE HYDROCHLORIDE 0.25 MG: 1 INJECTION, SOLUTION INTRAMUSCULAR; INTRAVENOUS; SUBCUTANEOUS at 14:46

## 2023-09-12 RX ADMIN — Medication: at 11:06

## 2023-09-12 RX ADMIN — NYSTATIN 500000 UNITS: 100000 SUSPENSION ORAL at 20:35

## 2023-09-12 RX ADMIN — BUDESONIDE 500 MCG: 0.5 INHALANT RESPIRATORY (INHALATION) at 21:34

## 2023-09-12 RX ADMIN — THIAMINE HYDROCHLORIDE: 100 INJECTION, SOLUTION INTRAMUSCULAR; INTRAVENOUS at 18:59

## 2023-09-12 RX ADMIN — SODIUM CHLORIDE, PRESERVATIVE FREE 10 ML: 5 INJECTION INTRAVENOUS at 11:06

## 2023-09-12 RX ADMIN — CHLORHEXIDINE GLUCONATE 15 ML: 1.2 RINSE ORAL at 11:06

## 2023-09-12 RX ADMIN — HYDROMORPHONE HYDROCHLORIDE 0.25 MG: 1 INJECTION, SOLUTION INTRAMUSCULAR; INTRAVENOUS; SUBCUTANEOUS at 08:20

## 2023-09-12 RX ADMIN — HYDROMORPHONE HYDROCHLORIDE 0.25 MG: 1 INJECTION, SOLUTION INTRAMUSCULAR; INTRAVENOUS; SUBCUTANEOUS at 14:19

## 2023-09-12 RX ADMIN — HYDROMORPHONE HYDROCHLORIDE 0.25 MG: 1 INJECTION, SOLUTION INTRAMUSCULAR; INTRAVENOUS; SUBCUTANEOUS at 23:40

## 2023-09-12 RX ADMIN — SODIUM CHLORIDE, PRESERVATIVE FREE 10 ML: 5 INJECTION INTRAVENOUS at 20:36

## 2023-09-12 RX ADMIN — BUDESONIDE 500 MCG: 0.5 INHALANT RESPIRATORY (INHALATION) at 07:26

## 2023-09-12 RX ADMIN — NYSTATIN 500000 UNITS: 100000 SUSPENSION ORAL at 11:09

## 2023-09-12 RX ADMIN — SODIUM CHLORIDE, PRESERVATIVE FREE 40 MG: 5 INJECTION INTRAVENOUS at 11:05

## 2023-09-12 RX ADMIN — CHLORHEXIDINE GLUCONATE 15 ML: 1.2 RINSE ORAL at 20:36

## 2023-09-12 RX ADMIN — ARFORMOTEROL TARTRATE 15 MCG: 15 SOLUTION RESPIRATORY (INHALATION) at 21:34

## 2023-09-12 RX ADMIN — Medication 10 ML: at 11:06

## 2023-09-12 ASSESSMENT — PAIN SCALES - GENERAL
PAINLEVEL_OUTOF10: 7
PAINLEVEL_OUTOF10: 4
PAINLEVEL_OUTOF10: 1
PAINLEVEL_OUTOF10: 6
PAINLEVEL_OUTOF10: 7
PAINLEVEL_OUTOF10: 6
PAINLEVEL_OUTOF10: 0

## 2023-09-12 ASSESSMENT — PULMONARY FUNCTION TESTS
PIF_VALUE: 22
PIF_VALUE: 31
PIF_VALUE: 28
PIF_VALUE: 30
PIF_VALUE: 29
PIF_VALUE: 29
PIF_VALUE: 28

## 2023-09-12 ASSESSMENT — PAIN DESCRIPTION - ORIENTATION
ORIENTATION: POSTERIOR

## 2023-09-12 ASSESSMENT — PAIN SCALES - WONG BAKER
WONGBAKER_NUMERICALRESPONSE: NO HURT
WONGBAKER_NUMERICALRESPONSE: 0

## 2023-09-12 ASSESSMENT — PAIN DESCRIPTION - LOCATION
LOCATION: BACK
LOCATION: SACRUM
LOCATION: BACK

## 2023-09-12 ASSESSMENT — ENCOUNTER SYMPTOMS
SHORTNESS OF BREATH: 0
CHEST TIGHTNESS: 0
ABDOMINAL PAIN: 0

## 2023-09-12 NOTE — PROGRESS NOTES
Comprehensive Nutrition Assessment    Type and Reason for Visit: Reassess    Nutrition Recommendations/Plan:     -Discontinue EN    -Resume Cyclic TPN:      0924 ml, 110 gm protein, 195 gm CHO and 250 ml 20% lipid daily         Malnutrition Assessment:  Malnutrition Status:  Severe malnutrition (08/01/23 1000)    Context:  Acute Illness     Findings of the 6 clinical characteristics of malnutrition:  Energy Intake:  50% or less of estimated energy requirements for 5 or more days  Weight Loss:  Unable to assess     Body Fat Loss:  Mild body fat loss Buccal region, Orbital   Muscle Mass Loss: Moderate muscle mass loss Temples (temporalis), Clavicles (pectoralis & deltoids)  Fluid Accumulation:  Moderate to Severe Generalized, Extremities   Strength:  Not Performed       Nutrition Assessment:    Pt admitted from [facility] d/t GI Bleed. 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 PMHx: Gastric bypass 2017, CAD, DM 2, Dyslipidemia, GERD, PE.    9/12: Follow-up. Increased drainage and gas in bag noted by surgery. EN stopped d/t ongoing anastomotic disruption. Will need to resume cyclic TPN. Discussed during IDR and with Pharmacy. Recommend the following TPN: 1550 ml, 195 gm CHO, 110 gm protein and 250 ml 20% lipid daily. This will provide 1600 calories  (41% CHO, 27% protein and 31% fat) and 110 gm protein per day to meet estimated needs. Nitrogen balance study would not be helpful d/t drain/output affecting the study. Patient less edematous-noted negative fluid balance. Lytes WNL. 9/11: Results of CT scan noted; good to see no evidence of leak. Trophic tube feeding started 9/9.  Patient has tolerated well thus far. Discussed during MDR and with surgery. Will increase feeding slightly today to 30 ml/hr and adjust TPN to avoid overfeeding. Vital 1.5 @ 30 ml/hr will provide 660 ml, 990 calories, 45 gm protein and 800 ml free water. Above continuous TPN (no lipid): 1000 ml, 65 gm protein and 600 calories. Combined pt's nutrient needs will be met. Goal tube feeding will provide a total of 990 ml, 1600 calories, 97 gm protein and 1600 ml free water (tube feeding/flush) per day. 9/6: Follow-up. Vent dependent respiratory failure-s/p trach placement 9/3. Undergoing daily SBT's. SLP now following for PMV trials. Drainage decreasing per surgery; plan for CT abdomen/pelvis tomorrow. Transitioned to cyclic TPN last week to reduce hepatobiliary effects of chronic TPN. TPN as ordered provides 1450 average daily calories and 110 gm protein per day to meet estimated needs. Hope to see improvement in leak on CT. Could consider checking nitrogen balance (need to collect 24 hr urine)-this is used to assess if nitrogen (protein) intake is keeping up with nitrogen losses. Weight down 10# form the past week; edema improved-now \"only\"  3+ pitting of all extremities. Phosphorous slightly BNL today-recommend increasing in the TPN.       Nutritionally Significant Medications:  Protonix,       Estimated Daily Nutrient Needs:  Energy Requirements Based On: Formula  Weight Used for Energy Requirements: Ideal  Energy (kcal/day): 1500 ([facility] 2003b)  Weight Used for Protein Requirements: Ideal  Protein (g/day): 110 (2g/kg IBW)  Method Used for Fluid Requirements: 1 ml/kcal  Fluid (ml/day): 1 mL/kcal    Nutrition Related Findings:   Edema: Generalized   Edema Generalized: Pitting, +2  RUE Edema: None  LUE Edema: None  RLE Edema: +1, Pitting  LLE Edema: +1, Pitting    Bowel Movement:  09/12/23    Wounds: Wound Type: Partial Thickness, Unstageable, Wound Vac      Current Nutrition Therapies:  Diet:

## 2023-09-12 NOTE — PLAN OF CARE
Problem: Skin/Tissue Integrity  Goal: Absence of new skin breakdown  Description: 1. Monitor for areas of redness and/or skin breakdown  2. Assess vascular access sites hourly  3. Every 4-6 hours minimum:  Change oxygen saturation probe site  4. Every 4-6 hours:  If on nasal continuous positive airway pressure, respiratory therapy assess nares and determine need for appliance change or resting period.   Outcome: Progressing     Problem: Discharge Planning  Goal: Discharge to home or other facility with appropriate resources  Outcome: Progressing     Problem: Safety - Adult  Goal: Free from fall injury  Outcome: Progressing     Problem: Chronic Conditions and Co-morbidities  Goal: Patient's chronic conditions and co-morbidity symptoms are monitored and maintained or improved  Outcome: Progressing     Problem: Cardiovascular - Adult  Goal: Maintains optimal cardiac output and hemodynamic stability  Outcome: Progressing  Goal: Absence of cardiac dysrhythmias or at baseline  Outcome: Progressing     Problem: Skin/Tissue Integrity - Adult  Goal: Incisions, wounds, or drain sites healing without S/S of infection  Outcome: Progressing  Goal: Oral mucous membranes remain intact  Outcome: Progressing     Problem: Musculoskeletal - Adult  Goal: Return mobility to safest level of function  Outcome: Progressing  Goal: Maintain proper alignment of affected body part  Outcome: Progressing  Goal: Return ADL status to a safe level of function  Outcome: Progressing     Problem: Gastrointestinal - Adult  Goal: Minimal or absence of nausea and vomiting  Outcome: Progressing  Goal: Maintains or returns to baseline bowel function  Outcome: Progressing  Goal: Maintains adequate nutritional intake  Outcome: Progressing  Goal: Establish and maintain optimal ostomy function  Outcome: Progressing     Problem: Hematologic - Adult  Goal: Maintains hematologic stability  Outcome: Progressing     Problem: Infection - Adult  Goal: Absence of of toileting routine with maximal assistance within 7 days. 5.  Patient will participate in upper extremity therapeutic exercise/activities with minimal assistance for 3 minutes within 7 day(s). Initiated 8/2/2023  1. Patient will perform grooming with Set-up sitting EOB within 7 day(s). 2.  Patient will perform upper body dressing with Set-up long sitting in bed within 7 day(s). 3.  Patient will perform bathing with Moderate Assist within 7 day(s). 4.  Patient will perform toilet transfers to least restrictive device with Minimal Assist  within 7 day(s). 5.  Patient will perform all aspects of toileting with Moderate Assist within 7 day(s). 6.  Patient will participate in upper extremity therapeutic exercise/activities with Set-up for 10 minutes within 7 day(s). 7.  Patient will utilize energy conservation techniques during functional activities with verbal cues within 7 day(s).    9/11/2023 1430 by Tyree Mejia OT  Outcome: Progressing

## 2023-09-12 NOTE — PROGRESS NOTES
Occupational Therapy:     Chart reviewed and attempted to see patient. Patient currently with wound care at bedside and is not available for therapy at this time. Will defer and continue to follow.     Thank you,   MELISSA Núñez, OTR/L

## 2023-09-12 NOTE — PROGRESS NOTES
Gradual progress with weaning from ventilator following percutaneous tracheostomy. With CT abdomen/pelvis from last week negative for ileorectal anastomotic leak, trickle feeds initiated over the weekend, advance to 30 cc/h yesterday. She now has increased drainage with gas in bag. This confirms ongoing anastomotic disruption. Will discontinue tube feeds, return TPN to baseline rate, and maintain drain. Continue antibiotics, antifungals. Continue wound care to sacral area with wound VAC dressing.

## 2023-09-12 NOTE — PROGRESS NOTES
Physical Therapy: Defer    Chart reviewed and attempted to see patient. Patient currently with wound care at bedside and is not available for therapy at this time. Will defer and continue to follow.     Yury Zamora, PT, DPT

## 2023-09-12 NOTE — PROGRESS NOTES
CRITICAL CARE NOTE      Name: Juan Pope   : 1960   MRN: [id number]   Date: 2023      REASON FOR ICU ADMISSION: Acute hypoxic respiratory failure    PRINCIPAL ICU DIAGNOSIS   Acute hypoxic respiratory failure requiring intubation and mechanical ventilation x2  Hematemesis  Bilateral pneumonia  Pneumomediastinum  Severe protein calorie malnutrition  Sacral decubitus s/p debridement   RLE femoral vein DVT s/p IVC filter  Generalized anasarca  PMHx significant for gastric bypass (), T2DM, CAD, PE, fulminant C. difficile colitis with subtotal colectomy/ileostomy (3/2023), s/p reversal (), recently admitted to [facility] (2023 through 2023 for recurrent C. difficile colitis, s/p ex lap with no signs of perforation, EGD revealing GJ stricture, s/p 2 mm dilatation, started on anticoagulation for DVT) [facility], developed hematemesis/GIB     BRIEF PATIENT SUMMARY   41-year-old female who presented to the emergency department  from [facility] for weakness and Hematemesis. 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 She was readmitted to [facility] ( through ) at which time she underwent EGD revealing GJ stricture. CT abdomen/pelvis demonstrated loculated intraperitoneal fluid in the anterior abdomen with persistent free air.   she underwent US guided drainage +E. coli. She completed a course of Zosyn and drain removed 8/10.   G-tube was placed.  patient transferred to the ICU in the setting of acute hypoxic respiratory failure d/t pulmonary edema requiring diuresis and eventual intubation ( - ). Patient reintubated  through current for worsening hypoxic respiratory failure. Course further complicated by persistent leukocytosis, fevers and CT evidence of intra-abdominal fluid collection post ultrasound drainage.   Repeat imaging  did not reveal any drainable

## 2023-09-12 NOTE — PROGRESS NOTES
09/12/23 1215   Ventilator Settings   FiO2  28 %   Insp Time (sec) 0.8 sec   Resp Rate (Set) 10 bmp   PEEP/CPAP (cmH2O) 5   Target Vt 350   Pressure Ordered 20

## 2023-09-12 NOTE — CARE COORDINATION
Transition of Care Plan:     RUR: 24% High  Prior Level of Functioning: Independent   Disposition: LTAC Vs IPR  Follow up appointments: PCP, Gen Surg   DME needed: TBD  Transportation at discharge: BLS vs ALS  IM/IMM Medicare 7/29/23   Is patient a [place] and connected with [facility]? No  Caregiver Contact:  Asha Rosibel Moscoso Secretary: [phone]  Discharge Caregiver contacted prior to discharge? Yes  Care Conference needed? No  Barriers to discharge:    Intubated  Wound Vac to sacral wound   G Tube to gravity,   LUQ drain  Monitor drain output  Cont TPN  Spoke with patient's  to discuss transitions of care. Currently patient will need LTAC level of care. CM left the  list of LTAC's for him and will follow up for choice.    Opal Spivey RN,Care Management

## 2023-09-12 NOTE — PROGRESS NOTES
09/12/23 1029   Ventilator Settings   FiO2  28 %   PEEP/CPAP (cmH2O) 5   Target Vt 350   Pressure Support (cm H2O) 18 cm H2O

## 2023-09-12 NOTE — WOUND CARE
WOCN Note:     Follow up sacral wound vac change. Chart reviewed. Assessed in room 7120. Admitted DX:  Hematemesis;GI bleed; Gastrointestinal hemorrhage; cdiff    Past Medical History: gastric bypass; asthma; cad; dm2  [facility]    Assessment:   Patient is intubated via trach, eyes open, mouths question responses and requires assist with repositioning. Bed: cecelia  Patient has a engle and drains. Patient repositioned on left side. Generalized edema lower legs and feet. Heels offloaded with pillows. 1. POA Sacrum and bilateral buttocks, Unstageable Pressure Injury: 6.8 x 7 x 1cm; undermining 1.8 cm at 4 - 8 o' clock; 40% tan and brown; 60% red; serosang drainage in canister; no odor; improved denudation to constantine wound; constantine wound edges hyperpigmented. Treatment:  Cleansed with Vashe; applied silver foam to constantine wound; resumed NPWT at 125 using 2 black(1 to the wound bed) and bridged to left side. Canister changed. 2. Right heel, Unstageable Pressure Injury with deflated bullae/hyperpigmented:  1 x 1 x 0 cm. 3.  Left heel, unstageable pressure injury:  0.8  x 0.8 x 0 cm. 60% red 40% yellow. 4.  Right upper back, hyperpigmented linear area:  3 x 0.6 x 0 cm;  constantine wound intact. 5.  Right chest, partial thickness wound/skin tear: 3 x 2 x 0.1 cm; 100% moist red; constantine wound intact. Cleansed with saline; applied silver foam.      6.  Right arm, skin tear:  2 x 2 x 0.1 cm; 100% red; constantine wound intact. Applied petrolatum and foam.  Covered with dry dressing. 7.  Right low leg, skin tear:  1 x 1 x 0.1 cm; 100% red; constantine wound intact. Applied petrolatum and foam.  Covered with dry dressing. Wound, Pressure Prevention & Skin Care Recommendations:    Minimize layers of linen/pads under patient to optimize support surface. 2.  Turn/reposition approximately every 2 hours and offload heels.     3.  Manage moisture/ Keep skin folds clean and dry/minimize brief usage.  4.  Specialty bed:cecelia.  5.  Sacrum:  Continue NPWT at 125 mmHg with changes twice weekly. 6.  Heels:  Apply Venelex BID   7. Right arm and right low leg skin tear:  Every 3 days cleanse with saline; apply petrolatum and foam dressing. Cover with dry dressing. 8.  Right upper chest:  Every other day cleanse with saline; apply silver foam dressing. Discussed above plan with RN. Transition of Care:   Plan to follow Tuesday and Friday for VAC changes.     ONEL Salinas RN [facility] Inpatient Wound Care  Available on [employer]  Office [phone]

## 2023-09-13 LAB
ANION GAP SERPL CALC-SCNC: 7 MMOL/L (ref 5–15)
BASOPHILS # BLD: 0 K/UL (ref 0–0.1)
BASOPHILS NFR BLD: 0 % (ref 0–1)
BUN SERPL-MCNC: 43 MG/DL (ref 6–20)
BUN/CREAT SERPL: 113 (ref 12–20)
CALCIUM SERPL-MCNC: 8.8 MG/DL (ref 8.5–10.1)
CHLORIDE SERPL-SCNC: 106 MMOL/L (ref 97–108)
CO2 SERPL-SCNC: 25 MMOL/L (ref 21–32)
CREAT SERPL-MCNC: 0.38 MG/DL (ref 0.55–1.02)
DIFFERENTIAL METHOD BLD: ABNORMAL
EOSINOPHIL # BLD: 1 K/UL (ref 0–0.4)
EOSINOPHIL NFR BLD: 10 % (ref 0–7)
ERYTHROCYTE [DISTWIDTH] IN BLOOD BY AUTOMATED COUNT: 14.7 % (ref 11.5–14.5)
GLUCOSE SERPL-MCNC: 122 MG/DL (ref 65–100)
HCT VFR BLD AUTO: 22.4 % (ref 35–47)
HGB BLD-MCNC: 7.5 G/DL (ref 11.5–16)
IMM GRANULOCYTES # BLD AUTO: 0.1 K/UL (ref 0–0.04)
IMM GRANULOCYTES NFR BLD AUTO: 1 % (ref 0–0.5)
LYMPHOCYTES # BLD: 0.7 K/UL (ref 0.8–3.5)
LYMPHOCYTES NFR BLD: 7 % (ref 12–49)
MAGNESIUM SERPL-MCNC: 1.9 MG/DL (ref 1.6–2.4)
MCH RBC QN AUTO: 29.9 PG (ref 26–34)
MCHC RBC AUTO-ENTMCNC: 33.5 G/DL (ref 30–36.5)
MCV RBC AUTO: 89.2 FL (ref 80–99)
MONOCYTES # BLD: 0.8 K/UL (ref 0–1)
MONOCYTES NFR BLD: 8 % (ref 5–13)
NEUTS SEG # BLD: 7.5 K/UL (ref 1.8–8)
NEUTS SEG NFR BLD: 74 % (ref 32–75)
NRBC # BLD: 0 K/UL (ref 0–0.01)
NRBC BLD-RTO: 0 PER 100 WBC
PLATELET # BLD AUTO: 369 K/UL (ref 150–400)
PLATELET COMMENT: ABNORMAL
PMV BLD AUTO: 10.1 FL (ref 8.9–12.9)
POTASSIUM SERPL-SCNC: 4.2 MMOL/L (ref 3.5–5.1)
RBC # BLD AUTO: 2.51 M/UL (ref 3.8–5.2)
RBC MORPH BLD: ABNORMAL
SODIUM SERPL-SCNC: 138 MMOL/L (ref 136–145)
WBC # BLD AUTO: 10.1 K/UL (ref 3.6–11)

## 2023-09-13 PROCEDURE — C9113 INJ PANTOPRAZOLE SODIUM, VIA: HCPCS | Performed by: NURSE PRACTITIONER

## 2023-09-13 PROCEDURE — 2500000003 HC RX 250 WO HCPCS: Performed by: NURSE PRACTITIONER

## 2023-09-13 PROCEDURE — 2580000003 HC RX 258: Performed by: NURSE PRACTITIONER

## 2023-09-13 PROCEDURE — 80048 BASIC METABOLIC PNL TOTAL CA: CPT

## 2023-09-13 PROCEDURE — 2580000003 HC RX 258: Performed by: SURGERY

## 2023-09-13 PROCEDURE — 2000000000 HC ICU R&B

## 2023-09-13 PROCEDURE — 36592 COLLECT BLOOD FROM PICC: CPT

## 2023-09-13 PROCEDURE — 99232 SBSQ HOSP IP/OBS MODERATE 35: CPT | Performed by: INTERNAL MEDICINE

## 2023-09-13 PROCEDURE — 6370000000 HC RX 637 (ALT 250 FOR IP): Performed by: SURGERY

## 2023-09-13 PROCEDURE — 85025 COMPLETE CBC W/AUTO DIFF WBC: CPT

## 2023-09-13 PROCEDURE — 94003 VENT MGMT INPAT SUBQ DAY: CPT

## 2023-09-13 PROCEDURE — 97530 THERAPEUTIC ACTIVITIES: CPT

## 2023-09-13 PROCEDURE — 6360000002 HC RX W HCPCS: Performed by: NURSE PRACTITIONER

## 2023-09-13 PROCEDURE — A4216 STERILE WATER/SALINE, 10 ML: HCPCS | Performed by: NURSE PRACTITIONER

## 2023-09-13 PROCEDURE — 97535 SELF CARE MNGMENT TRAINING: CPT

## 2023-09-13 PROCEDURE — 83735 ASSAY OF MAGNESIUM: CPT

## 2023-09-13 PROCEDURE — 6370000000 HC RX 637 (ALT 250 FOR IP): Performed by: NURSE PRACTITIONER

## 2023-09-13 PROCEDURE — 6360000002 HC RX W HCPCS: Performed by: ANESTHESIOLOGY

## 2023-09-13 PROCEDURE — 36415 COLL VENOUS BLD VENIPUNCTURE: CPT

## 2023-09-13 RX ORDER — MIDAZOLAM HYDROCHLORIDE 2 MG/2ML
2 INJECTION, SOLUTION INTRAMUSCULAR; INTRAVENOUS ONCE
Status: DISCONTINUED | OUTPATIENT
Start: 2023-09-13 | End: 2023-09-13

## 2023-09-13 RX ORDER — HEPARIN SODIUM 5000 [USP'U]/ML
5000 INJECTION, SOLUTION INTRAVENOUS; SUBCUTANEOUS EVERY 8 HOURS SCHEDULED
Status: DISCONTINUED | OUTPATIENT
Start: 2023-09-13 | End: 2023-10-11

## 2023-09-13 RX ORDER — ROCURONIUM BROMIDE 10 MG/ML
50 INJECTION, SOLUTION INTRAVENOUS ONCE
Status: DISCONTINUED | OUTPATIENT
Start: 2023-09-13 | End: 2023-09-13

## 2023-09-13 RX ADMIN — Medication: at 16:11

## 2023-09-13 RX ADMIN — HEPARIN SODIUM 5000 UNITS: 5000 INJECTION INTRAVENOUS; SUBCUTANEOUS at 15:27

## 2023-09-13 RX ADMIN — SODIUM CHLORIDE, PRESERVATIVE FREE 10 ML: 5 INJECTION INTRAVENOUS at 20:43

## 2023-09-13 RX ADMIN — Medication 10 ML: at 09:37

## 2023-09-13 RX ADMIN — Medication: at 23:58

## 2023-09-13 RX ADMIN — NYSTATIN 500000 UNITS: 100000 SUSPENSION ORAL at 16:11

## 2023-09-13 RX ADMIN — Medication 10 ML: at 20:43

## 2023-09-13 RX ADMIN — THIAMINE HYDROCHLORIDE: 100 INJECTION, SOLUTION INTRAMUSCULAR; INTRAVENOUS at 19:11

## 2023-09-13 RX ADMIN — Medication: at 09:36

## 2023-09-13 RX ADMIN — HYDROMORPHONE HYDROCHLORIDE 0.5 MG: 1 INJECTION, SOLUTION INTRAMUSCULAR; INTRAVENOUS; SUBCUTANEOUS at 15:27

## 2023-09-13 RX ADMIN — NYSTATIN 500000 UNITS: 100000 SUSPENSION ORAL at 13:05

## 2023-09-13 RX ADMIN — CHLORHEXIDINE GLUCONATE 15 ML: 1.2 RINSE ORAL at 20:42

## 2023-09-13 RX ADMIN — HEPARIN SODIUM 5000 UNITS: 5000 INJECTION INTRAVENOUS; SUBCUTANEOUS at 22:02

## 2023-09-13 RX ADMIN — ARFORMOTEROL TARTRATE 15 MCG: 15 SOLUTION RESPIRATORY (INHALATION) at 20:09

## 2023-09-13 RX ADMIN — I.V. FAT EMULSION 250 ML: 20 EMULSION INTRAVENOUS at 19:11

## 2023-09-13 RX ADMIN — CHLORHEXIDINE GLUCONATE 15 ML: 1.2 RINSE ORAL at 09:35

## 2023-09-13 RX ADMIN — NYSTATIN 500000 UNITS: 100000 SUSPENSION ORAL at 09:33

## 2023-09-13 RX ADMIN — BUDESONIDE 500 MCG: 0.5 INHALANT RESPIRATORY (INHALATION) at 08:20

## 2023-09-13 RX ADMIN — HYDROMORPHONE HYDROCHLORIDE 0.5 MG: 1 INJECTION, SOLUTION INTRAMUSCULAR; INTRAVENOUS; SUBCUTANEOUS at 11:04

## 2023-09-13 RX ADMIN — SODIUM CHLORIDE, PRESERVATIVE FREE 40 MG: 5 INJECTION INTRAVENOUS at 09:33

## 2023-09-13 RX ADMIN — SODIUM CHLORIDE, PRESERVATIVE FREE 10 ML: 5 INJECTION INTRAVENOUS at 09:36

## 2023-09-13 RX ADMIN — ARFORMOTEROL TARTRATE 15 MCG: 15 SOLUTION RESPIRATORY (INHALATION) at 08:19

## 2023-09-13 RX ADMIN — BUDESONIDE 500 MCG: 0.5 INHALANT RESPIRATORY (INHALATION) at 20:09

## 2023-09-13 RX ADMIN — NYSTATIN 500000 UNITS: 100000 SUSPENSION ORAL at 20:45

## 2023-09-13 ASSESSMENT — ENCOUNTER SYMPTOMS
SHORTNESS OF BREATH: 0
CHEST TIGHTNESS: 0
ABDOMINAL PAIN: 0

## 2023-09-13 ASSESSMENT — PULMONARY FUNCTION TESTS
PIF_VALUE: 28
PIF_VALUE: 21
PIF_VALUE: 28
PIF_VALUE: 28
PIF_VALUE: 31
PIF_VALUE: 22
PIF_VALUE: 22
PIF_VALUE: 30
PIF_VALUE: 21

## 2023-09-13 ASSESSMENT — PAIN SCALES - GENERAL
PAINLEVEL_OUTOF10: 7
PAINLEVEL_OUTOF10: 0
PAINLEVEL_OUTOF10: 0
PAINLEVEL_OUTOF10: 5
PAINLEVEL_OUTOF10: 0

## 2023-09-13 NOTE — PROGRESS NOTES
0800: Rosalie LAWLER at bedside. Verbal orders received to discontinue TF. Brown watery output draining from hemovac with fecal odor. 0820: 0.25mg dilaudid given for pain. Partial dose okay per Lori Galarza NP.    1000: Wound care RN at bedside. 1230: RT notified of expiratory wheeze. 1335: 0.5mg ativan given for anxiety. 1400: Assisted patient OOB up to chair with assist of x3 RN using MaxiMove. Pt tolerate well. 1419: 0.25mg dilaudid given for moderate pain. 1446: Additional 0.25mg dose of dilaudid given for moderate pain. Verbal orders to give dose now per Lori Galarza NP.    1600: Pt returned to bed with assist of x4 RN and MaxiMove. Tolerated transition well.    1630: RT notified of expiratory wheeze. 1730: Left message with wound care team for call back. 0mL output from wound-vac since change. 1800: Family at bedside. Pt attempting to talk, restless, and setting off ventilator.  Advised family to promote rest.

## 2023-09-13 NOTE — PROGRESS NOTES
Bedside and Verbal shift change report given to BONY Johnson and Gildardo Kawasaki, RN (oncoming nurse) by Robles Simental RN and Santa Estevez RN (offgoing nurse). Report included the following information Nurse Handoff Report, Intake/Output, MAR, Recent Results, Cardiac Rhythm  , Alarm Parameters, Neuro Assessment, and Event Log.

## 2023-09-13 NOTE — PROGRESS NOTES
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 difficile colitis, s/p ex lap with no signs of perforation, EGD revealing GJ stricture, s/p 2 mm dilatation, started on anticoagulation for DVT) [facility], developed hematemesis/GIB     BRIEF PATIENT SUMMARY   58-year-old female who presented to the emergency department  from [facility] for weakness and Hematemesis. 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 She was readmitted to [facility] ( through ) at which time she underwent EGD revealing GJ stricture. CT abdomen/pelvis demonstrated loculated intraperitoneal fluid in the anterior abdomen with persistent free air.   she underwent US guided drainage +E. coli. She completed a course of Zosyn and drain removed 8/10.   G-tube was placed.  patient transferred to the ICU in the setting of acute hypoxic respiratory failure d/t pulmonary edema requiring diuresis and eventual intubation ( - ). Patient reintubated  through current for worsening hypoxic respiratory failure. Course further complicated by persistent leukocytosis, fevers and CT evidence of intra-abdominal fluid collection post ultrasound drainage.   Repeat imaging  did not reveal any drainable laboratory results, replete as indicated  -Avoid nephrotoxic agents  -Strict I&Os    ENDOCRINE:  Hx TTDM   -A1c 3.8%  -Blood glucose range 120-180, avoid hypo-/hyperglycemia  -TSH 2.23    HEMATOLOGY/ONCOLOGY:  Thrombocytosis- resolved  R femoral DVT s/p IVC (8/2/2023)   Hx of PE (on Eliquis at home)   -Trend CBC  -Hgb greater than 7.0, transfuse as indicated  -SCDs for DVT prophylaxis  - Repeat CBC and start heparin ppx if ok    INFECTIOUS DISEASE:  Severe sepsis/septic shock of likely intra-abdominal origin  Concern for bilateral pneumonia  Sacral wound s/p wound debridement 8/14- wound vac  History of C. difficile colitis   ID following    Current Antibiotics:  None; completed full course of abx as below; afebrile    ANTIBIOTICS TO DATE:  Eraxis (8/24 - 9/8)  Meropenem (8/21 - 9/8)  Vancomycin (8/6 - 9/5)  Zosyn (7/29 - 8/21)    CULTURES TO DATE:  8/22: PD fluid demonstrated moderate yeast  8/21: BC NGTD  8/19: BC NGTD  8/18: RC NGTD  8/7: MRSA negative  8/6: BC NGTD  7/31: PD fluid +E. coli    ICU DAILY CHECKLIST     Code Status:Full  DVT Prophylaxis:SCDs  T/L/D: Trach, PICC, G-tube, and LUQ drain  SUP: PPI  Diet: NPO, TPN/Trickle feeds per G-tube  Activity Level:Bedrest  ABCDEF Bundle/Checklist Completed:Yes  Disposition: ICU  Multidisciplinary Rounds Completed:Yes  Goals of Care Discussion/Palliative: Yes  Patient/Family Updated: Yes      HOSPITAL COURSE/DAILY EVENT LOG   8/28: Patient remains weak and deconditioned. Difficulty tolerating SBT's, have spoken with family and will set up family meeting today to discuss potential for tracheostomy. 8/29: Afebrile overnight. Plan for family meeting tomorrow to discuss tracheostomy placement. Continued bowel rest, TPN for nutrition. H&H holding 7.5/20.5.    8/30: Hg 6.5 this morning, Tx 1 PRBCs. Renal function stable. Family meeting today to discuss [facility]. AM CXR demonstrates interval decrease in pneumomediastinum and ongoing right wall chest emphysema.   Persistent bilateral effusions with patchy interstitial and airspace opacities. Bedside point of care US re-demonstrates moderate bilateral pleural effusions L>R. Will do L thoracentesis today and likely right thoracentesis tomorrow. 9/1: Morning CXR re demonstrates stable pneumomediastinum and improvement in pleural effusions. F/U procalcitonin 0.62 and lactic acid 0.7, WBC 20. Appears more comfortable w/ativan. Weaning precdex. Ps 15/5.   9/3: Mariaa Misael today at bedside. 9/4: PSV trial today. 9/5: 15/5 PSV trial again this morning. Tolerated for 10 hours. Will talk to ID about peeling back antibiotics. 9/6: 15/5 PSV trial again today  9/7: CT abdomen pelvis this morning. CT negative for leak. Has large pleural effusion PSV trial later today  9/8: 15/5 PSV trial.  Thoracentesis today. 9/8: We will trial lower PSV today. Start tube feeding. 9:12 EN stopped due to ongoing anastomotic disruption; remains on TPN    SUBJECTIVE   Review of Systems   Respiratory:  Negative for chest tightness and shortness of breath. Gastrointestinal:  Negative for abdominal pain. Neurological:  Positive for weakness. Negative for headaches. OBJECTIVE   Physical Exam  Constitutional:       General: She is not in acute distress. HENT:      Head: Normocephalic and atraumatic. Mouth/Throat:      Mouth: Mucous membranes are moist.      Pharynx: Oropharynx is clear. Eyes:      Extraocular Movements: Extraocular movements intact. Conjunctiva/sclera: Conjunctivae normal.      Pupils: Pupils are equal, round, and reactive to light. Cardiovascular:      Rate and Rhythm: Normal rate. Pulses: Normal pulses. Pulmonary:      Comments: Trach/ mechanically ventilated  Abdominal:      General: There is no distension. Tenderness: There is no abdominal tenderness. Skin:     General: Skin is warm and dry. Capillary Refill: Capillary refill takes less than 2 seconds.    Neurological:      General: No focal deficit

## 2023-09-13 NOTE — PLAN OF CARE
Problem: Physical Therapy - Adult  Goal: By Discharge: Performs mobility at highest level of function for planned discharge setting. See evaluation for individualized goals. Description: FUNCTIONAL STATUS PRIOR TO ADMISSION: Patient was independent and active without use of DME prior to last hospitalization at [facility] for abdominal surgery. D/c'ed to [facility] where she was for 1 week-- states she did not get out of bed at [facility] and worked on bed exercises. 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 On 8/6 patient transferred to the ICU in the setting of acute hypoxic respiratory failure d/t pulmonary edema requiring diuresis and eventual intubation (8/7 - 8/14). Patient reintubated 8/18 through current for worsening hypoxic respiratory failure. Course further complicated by persistent leukocytosis, fevers and CT evidence of intra-abdominal fluid collection post ultrasound drainage. HOME SUPPORT PRIOR TO ADMISSION: The patient lived with her  at baseline. Physical Therapy Goals  Revised 9/11/2023  1. Patient will move from supine to sit and sit to supine, scoot up and down, and roll side to side in bed with moderate assistance x2 within 7 day(s). 2.  Patient will perform sit to stand with max assistance x2 within 7 day(s). 3.  Patient will transfer from bed to chair and chair to bed with max assistance x2 using the least restrictive device within 7 day(s). 4.  Patient will sit edge of bed with min assist for 10 minutes within 7 day(s). Initiated 8/2/2023  1. Patient will move from supine to sit and sit to supine, scoot up and down, and roll side to side in bed with minimal assistance x2 within 7 day(s). 2.  Patient will perform sit to stand with moderate assistance x2 within 7 day(s).   3.  Patient will transfer from bed to chair and chair to bed

## 2023-09-13 NOTE — PLAN OF CARE
Problem: Nutrition Deficit:  Goal: Optimize nutritional status  9/12/2023 1240 by Latoya Laguerre RD  Outcome: Progressing     Problem: Skin/Tissue Integrity  Goal: Absence of new skin breakdown  Description: 1. Monitor for areas of redness and/or skin breakdown  2. Assess vascular access sites hourly  3. Every 4-6 hours minimum:  Change oxygen saturation probe site  4. Every 4-6 hours:  If on nasal continuous positive airway pressure, respiratory therapy assess nares and determine need for appliance change or resting period.   Outcome: [address] [place] [address] Progressing     Problem: Safety - Adult  Goal: Free from fall injury  Outcome: [address] [place] [address] Progressing     Problem: Cardiovascular - Adult  Goal: Maintains optimal cardiac output and hemodynamic stability  Outcome: [address] [place] [address] Progressing     Problem: Skin/Tissue Integrity - Adult  Goal: Skin integrity remains intact  Outcome: /[facility] Progressing  Flowsheets (Taken 9/12/2023 2000)  Skin Integrity Remains Intact:   Monitor for areas of redness and/or skin breakdown   Assess vascular access sites hourly   Every 4-6 hours minimum: Change oxygen saturation probe site   Every 4-6 hours: If on nasal continuous positive airway pressure, respiratory therapy assesses nares and determine need for appliance change or resting period  Goal: Incisions, wounds, or drain sites healing without S/S of infection  Outcome: [address] [address] Progressing  Goal: Oral mucous membranes remain intact  Outcome: /[facility] Progressing  Flowsheets (Taken 9/12/2023 2000)  Oral Mucous Membranes Remain Intact:   Assess oral mucosa and hygiene practices   Implement preventative oral hygiene regimen   Implement oral medicated treatments as ordered     Problem: Musculoskeletal - Adult  Goal: Return mobility to safest level of function  Outcome: [address] (Taken 9/12/2023 2000)  Return Mobility to Safest Level of Function:   Assess patient stability and activity tolerance for standing, transferring and

## 2023-09-13 NOTE — PLAN OF CARE
Problem: Occupational Therapy - Adult  Goal: By Discharge: Performs self-care activities at highest level of function for planned discharge setting. See evaluation for individualized goals. Description: FUNCTIONAL STATUS PRIOR TO ADMISSION:  Patient was independent, active, working and a  prior to admission at [facility]. She was independent to mod I for all self-care and functional mobility. Since onset of illness the patient has been in need of assistance for all ADLs and functional mobility. DC to [facility] prior to her admission, however, limited there and stating she was unable to complete much OOB therapy. She is able to verbalize and demonstrate a home exercise program involving UE movement. HOME SUPPORT: Patient lived with spouse but didn't require assistance at her true baseline. Occupational Therapy Goals:  Initiated 9/11/2023 at re-evaluation:   1. Patient will perform  simple grooming routine, in supported sitting, with moderate assistance within 7 day(s). 2.  Patient will perform upper body dressing with moderate assistance within 7 day(s). 3.  Patient will perform anterior neck to thigh bathing with moderate assist within 7 day(s). 4.  Patient will perform lateral rolling in prep for completion of toileting routine with maximal assistance within 7 days. 5.  Patient will participate in upper extremity therapeutic exercise/activities with minimal assistance for 3 minutes within 7 day(s). Initiated 8/2/2023  1. Patient will perform grooming with Set-up sitting EOB within 7 day(s). 2.  Patient will perform upper body dressing with Set-up long sitting in bed within 7 day(s). 3.  Patient will perform bathing with Moderate Assist within 7 day(s). 4.  Patient will perform toilet transfers to least restrictive device with Minimal Assist  within 7 day(s). 5.  Patient will perform all aspects of toileting with Moderate Assist within 7 day(s).   6.  Patient will participate treatment:   Patient left in no apparent distress sitting up in chair, Bed/ chair alarm activated, and Heels elevated for pressure relief    COMMUNICATION/EDUCATION:   The patient's plan of care was discussed with: physical therapist and registered nurse    Patient Education  Education Given To: Patient  Education Provided: Role of Therapy;Plan of Care;Precautions; ADL Adaptive Strategies;Transfer Training;Energy Conservation; Fall Prevention Strategies  Education Method: Demonstration;Verbal;Teach Back  Barriers to Learning: Cognition  Education Outcome: Continued education needed;Verbalized understanding    Thank you for this referral.  MELISSA Kapoor, OTR/L  Minutes: 34

## 2023-09-13 NOTE — PROGRESS NOTES
SLP Contact Note      Discussed pt with RT Tigist, appreciate her expertise. Pt not currently appropriate for in-line PMV.   SLP will formally sign off for now, but this SLP will continue to follow closely per trach team.       Thank you,  Feli Iniguez M.Ed, PhD(c), CCC-SLP  Speech-Language Pathologist

## 2023-09-14 LAB
ANION GAP SERPL CALC-SCNC: 7 MMOL/L (ref 5–15)
BUN SERPL-MCNC: 46 MG/DL (ref 6–20)
BUN/CREAT SERPL: 128 (ref 12–20)
CALCIUM SERPL-MCNC: 8.9 MG/DL (ref 8.5–10.1)
CHLORIDE SERPL-SCNC: 105 MMOL/L (ref 97–108)
CO2 SERPL-SCNC: 23 MMOL/L (ref 21–32)
CREAT SERPL-MCNC: 0.36 MG/DL (ref 0.55–1.02)
ERYTHROCYTE [DISTWIDTH] IN BLOOD BY AUTOMATED COUNT: 14.7 % (ref 11.5–14.5)
GLUCOSE SERPL-MCNC: 122 MG/DL (ref 65–100)
HCT VFR BLD AUTO: 23.4 % (ref 35–47)
HGB BLD-MCNC: 7.7 G/DL (ref 11.5–16)
MAGNESIUM SERPL-MCNC: 2 MG/DL (ref 1.6–2.4)
MCH RBC QN AUTO: 29.5 PG (ref 26–34)
MCHC RBC AUTO-ENTMCNC: 32.9 G/DL (ref 30–36.5)
MCV RBC AUTO: 89.7 FL (ref 80–99)
NRBC # BLD: 0 K/UL (ref 0–0.01)
NRBC BLD-RTO: 0 PER 100 WBC
PHOSPHATE SERPL-MCNC: 4 MG/DL (ref 2.6–4.7)
PLATELET # BLD AUTO: 391 K/UL (ref 150–400)
PMV BLD AUTO: 10.2 FL (ref 8.9–12.9)
POTASSIUM SERPL-SCNC: 4.1 MMOL/L (ref 3.5–5.1)
RBC # BLD AUTO: 2.61 M/UL (ref 3.8–5.2)
SODIUM SERPL-SCNC: 135 MMOL/L (ref 136–145)
WBC # BLD AUTO: 9.4 K/UL (ref 3.6–11)

## 2023-09-14 PROCEDURE — 83735 ASSAY OF MAGNESIUM: CPT

## 2023-09-14 PROCEDURE — 2580000003 HC RX 258: Performed by: NURSE PRACTITIONER

## 2023-09-14 PROCEDURE — 2000000000 HC ICU R&B

## 2023-09-14 PROCEDURE — 6370000000 HC RX 637 (ALT 250 FOR IP): Performed by: SURGERY

## 2023-09-14 PROCEDURE — 6360000002 HC RX W HCPCS: Performed by: NURSE PRACTITIONER

## 2023-09-14 PROCEDURE — 2580000003 HC RX 258: Performed by: SURGERY

## 2023-09-14 PROCEDURE — 6370000000 HC RX 637 (ALT 250 FOR IP): Performed by: NURSE PRACTITIONER

## 2023-09-14 PROCEDURE — 84100 ASSAY OF PHOSPHORUS: CPT

## 2023-09-14 PROCEDURE — 2700000000 HC OXYGEN THERAPY PER DAY

## 2023-09-14 PROCEDURE — 2500000003 HC RX 250 WO HCPCS: Performed by: NURSE PRACTITIONER

## 2023-09-14 PROCEDURE — 6360000002 HC RX W HCPCS: Performed by: ANESTHESIOLOGY

## 2023-09-14 PROCEDURE — C9113 INJ PANTOPRAZOLE SODIUM, VIA: HCPCS | Performed by: NURSE PRACTITIONER

## 2023-09-14 PROCEDURE — 94003 VENT MGMT INPAT SUBQ DAY: CPT

## 2023-09-14 PROCEDURE — A4216 STERILE WATER/SALINE, 10 ML: HCPCS | Performed by: NURSE PRACTITIONER

## 2023-09-14 PROCEDURE — 36415 COLL VENOUS BLD VENIPUNCTURE: CPT

## 2023-09-14 PROCEDURE — 85027 COMPLETE CBC AUTOMATED: CPT

## 2023-09-14 PROCEDURE — 94640 AIRWAY INHALATION TREATMENT: CPT

## 2023-09-14 PROCEDURE — 80048 BASIC METABOLIC PNL TOTAL CA: CPT

## 2023-09-14 RX ADMIN — Medication: at 08:51

## 2023-09-14 RX ADMIN — VASOPRESSIN: 20 INJECTION, SOLUTION INTRAVENOUS at 19:05

## 2023-09-14 RX ADMIN — ARFORMOTEROL TARTRATE 15 MCG: 15 SOLUTION RESPIRATORY (INHALATION) at 08:12

## 2023-09-14 RX ADMIN — HYDROMORPHONE HYDROCHLORIDE 0.5 MG: 1 INJECTION, SOLUTION INTRAMUSCULAR; INTRAVENOUS; SUBCUTANEOUS at 14:20

## 2023-09-14 RX ADMIN — BUDESONIDE 500 MCG: 0.5 INHALANT RESPIRATORY (INHALATION) at 20:52

## 2023-09-14 RX ADMIN — SODIUM CHLORIDE, PRESERVATIVE FREE 40 MG: 5 INJECTION INTRAVENOUS at 08:50

## 2023-09-14 RX ADMIN — I.V. FAT EMULSION 250 ML: 20 EMULSION INTRAVENOUS at 19:05

## 2023-09-14 RX ADMIN — Medication: at 17:33

## 2023-09-14 RX ADMIN — CHLORHEXIDINE GLUCONATE 15 ML: 1.2 RINSE ORAL at 20:50

## 2023-09-14 RX ADMIN — HYDROMORPHONE HYDROCHLORIDE 0.25 MG: 1 INJECTION, SOLUTION INTRAMUSCULAR; INTRAVENOUS; SUBCUTANEOUS at 00:17

## 2023-09-14 RX ADMIN — ARFORMOTEROL TARTRATE 15 MCG: 15 SOLUTION RESPIRATORY (INHALATION) at 20:52

## 2023-09-14 RX ADMIN — NYSTATIN 500000 UNITS: 100000 SUSPENSION ORAL at 20:50

## 2023-09-14 RX ADMIN — HEPARIN SODIUM 5000 UNITS: 5000 INJECTION INTRAVENOUS; SUBCUTANEOUS at 14:20

## 2023-09-14 RX ADMIN — HYDROMORPHONE HYDROCHLORIDE 0.5 MG: 1 INJECTION, SOLUTION INTRAMUSCULAR; INTRAVENOUS; SUBCUTANEOUS at 17:33

## 2023-09-14 RX ADMIN — HYDROMORPHONE HYDROCHLORIDE 0.25 MG: 1 INJECTION, SOLUTION INTRAMUSCULAR; INTRAVENOUS; SUBCUTANEOUS at 21:58

## 2023-09-14 RX ADMIN — SODIUM CHLORIDE, PRESERVATIVE FREE 10 ML: 5 INJECTION INTRAVENOUS at 08:50

## 2023-09-14 RX ADMIN — HEPARIN SODIUM 5000 UNITS: 5000 INJECTION INTRAVENOUS; SUBCUTANEOUS at 06:09

## 2023-09-14 RX ADMIN — NYSTATIN 500000 UNITS: 100000 SUSPENSION ORAL at 17:39

## 2023-09-14 RX ADMIN — CHLORHEXIDINE GLUCONATE 15 ML: 1.2 RINSE ORAL at 08:50

## 2023-09-14 RX ADMIN — BUDESONIDE 500 MCG: 0.5 INHALANT RESPIRATORY (INHALATION) at 08:12

## 2023-09-14 RX ADMIN — HEPARIN SODIUM 5000 UNITS: 5000 INJECTION INTRAVENOUS; SUBCUTANEOUS at 21:58

## 2023-09-14 RX ADMIN — Medication: at 23:45

## 2023-09-14 RX ADMIN — HYDROMORPHONE HYDROCHLORIDE 0.5 MG: 1 INJECTION, SOLUTION INTRAMUSCULAR; INTRAVENOUS; SUBCUTANEOUS at 10:04

## 2023-09-14 RX ADMIN — SODIUM CHLORIDE, PRESERVATIVE FREE 10 ML: 5 INJECTION INTRAVENOUS at 20:50

## 2023-09-14 RX ADMIN — Medication 10 ML: at 20:51

## 2023-09-14 RX ADMIN — NYSTATIN 500000 UNITS: 100000 SUSPENSION ORAL at 14:20

## 2023-09-14 RX ADMIN — HYDROMORPHONE HYDROCHLORIDE 0.25 MG: 1 INJECTION, SOLUTION INTRAMUSCULAR; INTRAVENOUS; SUBCUTANEOUS at 07:14

## 2023-09-14 RX ADMIN — NYSTATIN 500000 UNITS: 100000 SUSPENSION ORAL at 08:50

## 2023-09-14 ASSESSMENT — PAIN SCALES - GENERAL
PAINLEVEL_OUTOF10: 0
PAINLEVEL_OUTOF10: 8
PAINLEVEL_OUTOF10: 0
PAINLEVEL_OUTOF10: 4

## 2023-09-14 ASSESSMENT — ENCOUNTER SYMPTOMS
ABDOMINAL PAIN: 0
SHORTNESS OF BREATH: 0
CHEST TIGHTNESS: 0

## 2023-09-14 ASSESSMENT — PULMONARY FUNCTION TESTS
PIF_VALUE: 28
PIF_VALUE: 21
PIF_VALUE: 30

## 2023-09-14 NOTE — PROGRESS NOTES
1930: Bedside and Verbal shift change report given to [address] RN (oncoming nurse) by Levon Llamas RN (offgoing nurse). Report included the following information Nurse Handoff Report, Adult Overview, Intake/Output, MAR, Recent Results, Cardiac Rhythm nsr-sinus tachycardia, Quality Measures, and Neuro Assessment. Drips:    TPN: @100mL/hr    Bedside and Verbal shift change report given to [facility] (oncoming nurse) by [address] RN (offgoing nurse). Report included the following information Nurse Handoff Report, Adult Overview, Intake/Output, MAR, Recent Results, Cardiac Rhythm nsr,sinus tachycardia, Quality Measures, and Neuro Assessment.

## 2023-09-14 NOTE — PROGRESS NOTES
I participated in the IDT meeting where the plan of care for Sarabjit Lord was discussed. I reviewed the patient's medical record prior to this meeting. We will continue to follow and visit for spiritual assessment when appropriate. Please contact  paging service for any immediate needs. _____________________________  Violet Maria M.Div., M.S., B.C.C.   Staff

## 2023-09-14 NOTE — CARE COORDINATION
Transition of Care    RUR: 24% High  Prior Level of Functioning: Independent   Disposition: LTAC Vs IPR penidng ventilator weaning   Follow up appointments: PCP, Gen Surg   DME needed: TBD  Transportation at discharge: BLS vs ALS  IM/IMM Medicare 7/29/23   Is patient a [place] and connected with [facility]? No  Caregiver Contact:    Iveth Godfrey: [phone]  Discharge Caregiver contacted prior to discharge? Yes  Care Conference needed? No  Barriers to discharge:    Medical stability  Remains vented    TPN     Currently patient will need LTAC level of care.  has list of LTAC's and CM will secure choice when appropriate. Therapy is recommending LTAC vs IPR pending ventilator weaning. Trach placed 9/3/23. CM to follow and assist with transition of care planning. Note - patient was recently discharged from [facility] after abdominal surgery to [facility] . She was there about a week and discharged home. 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 Patient lives at home with her .      [address] Mendoza, BSW

## 2023-09-14 NOTE — PROGRESS NOTES
1930: Bedside and Verbal shift change report given to Dante Dawson (oncoming nurse) by Kristina Thapa (offgoing nurse). Report included the following information Nurse Handoff Report, Adult Overview, Intake/Output, MAR, Recent Results, Cardiac Rhythm sinus tach, Quality Measures, and Neuro Assessment. Drips:    TPN: @100mL/hr    Bedside and Verbal shift change report given to BONY Quesada (oncoming nurse) by Malachi Russo RN and Skylar Dong RN (offgoing nurse). Report included the following information Nurse Handoff Report, Adult Overview, Intake/Output, MAR, Recent Results, Cardiac Rhythm sinus tachycardia, Quality Measures, and Neuro Assessment.

## 2023-09-14 NOTE — PROGRESS NOTES
Referral source:  initiated visit due to Kusum Silva length of stay [address] [place]. I reviewed the patient's medical record prior to this encounter. Assessment: Unable to assess; pt was asleep when I rounded    Plan of Care: Ongoing support      For additional spiritual care, please contact the  on-call at ([phone]). Rev.  Rufino Wylie MDiv, MS, [facility]  Staff

## 2023-09-14 NOTE — PLAN OF CARE
Problem: Chronic Conditions and Co-morbidities  Goal: Patient's chronic conditions and co-morbidity symptoms are monitored and maintained or improved  Outcome: Progressing     Problem: Cardiovascular - Adult  Goal: Absence of cardiac dysrhythmias or at baseline  Outcome: Progressing     Problem: Gastrointestinal - Adult  Goal: Minimal or absence of nausea and vomiting  Outcome: Progressing     Problem: Nutrition Deficit:  Goal: Optimize nutritional status  Outcome: Progressing     Problem: Physical Therapy - Adult  Goal: By Discharge: Performs mobility at highest level of function for planned discharge setting. See evaluation for individualized goals. Description: FUNCTIONAL STATUS PRIOR TO ADMISSION: Patient was independent and active without use of DME prior to last hospitalization at [facility] for abdominal surgery. D/c'ed to [facility] where she was for 1 week-- states she did not get out of bed at [facility] and worked on bed exercises. 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 On 8/6 patient transferred to the ICU in the setting of acute hypoxic respiratory failure d/t pulmonary edema requiring diuresis and eventual intubation (8/7 - 8/14). Patient reintubated 8/18 through current for worsening hypoxic respiratory failure. Course further complicated by persistent leukocytosis, fevers and CT evidence of intra-abdominal fluid collection post ultrasound drainage. HOME SUPPORT PRIOR TO ADMISSION: The patient lived with her  at baseline. Physical Therapy Goals  Revised 9/11/2023  1. Patient will move from supine to sit and sit to supine, scoot up and down, and roll side to side in bed with moderate assistance x2 within 7 day(s). 2.  Patient will perform sit to stand with max assistance x2 within 7 day(s).   3.  Patient will transfer from bed to chair and chair to bed with max assistance x2 using the least restrictive device within 7 day(s). 4.  Patient will sit edge of bed with min assist for 10 minutes within 7 day(s). Initiated 8/2/2023  1. Patient will move from supine to sit and sit to supine, scoot up and down, and roll side to side in bed with minimal assistance x2 within 7 day(s). 2.  Patient will perform sit to stand with moderate assistance x2 within 7 day(s). 3.  Patient will transfer from bed to chair and chair to bed with moderate assistance x2 using the least restrictive device within 7 day(s). 4.  Patient will ambulate with moderate assistance x2 for 10 feet with the least restrictive device within 7 day(s). 9/13/2023 1438 by Anum Moreno PT  Outcome: Progressing     Problem: Occupational Therapy - Adult  Goal: By Discharge: Performs self-care activities at highest level of function for planned discharge setting. See evaluation for individualized goals. Description: FUNCTIONAL STATUS PRIOR TO ADMISSION:  Patient was independent, active, working and a  prior to admission at [facility]. She was independent to mod I for all self-care and functional mobility. Since onset of illness the patient has been in need of assistance for all ADLs and functional mobility. DC to [facility] prior to her admission, however, limited there and stating she was unable to complete much OOB therapy. She is able to verbalize and demonstrate a home exercise program involving UE movement. HOME SUPPORT: Patient lived with spouse but didn't require assistance at her true baseline. Occupational Therapy Goals:  Initiated 9/11/2023 at re-evaluation:   1. Patient will perform  simple grooming routine, in supported sitting, with moderate assistance within 7 day(s). 2.  Patient will perform upper body dressing with moderate assistance within 7 day(s).   3.  Patient will perform anterior neck to thigh bathing with moderate assist within 7 day(s). 4.  Patient will perform lateral rolling in prep for completion of toileting routine with maximal assistance within 7 days. 5.  Patient will participate in upper extremity therapeutic exercise/activities with minimal assistance for 3 minutes within 7 day(s). Initiated 8/2/2023  1. Patient will perform grooming with Set-up sitting EOB within 7 day(s). 2.  Patient will perform upper body dressing with Set-up long sitting in bed within 7 day(s). 3.  Patient will perform bathing with Moderate Assist within 7 day(s). 4.  Patient will perform toilet transfers to least restrictive device with Minimal Assist  within 7 day(s). 5.  Patient will perform all aspects of toileting with Moderate Assist within 7 day(s). 6.  Patient will participate in upper extremity therapeutic exercise/activities with Set-up for 10 minutes within 7 day(s). 7.  Patient will utilize energy conservation techniques during functional activities with verbal cues within 7 day(s).    9/13/2023 1656 by Samir Stephens OT  Outcome: Progressing     Problem: Pain  Goal: Verbalizes/displays adequate comfort level or baseline comfort level  Recent Flowsheet Documentation  Taken 9/13/2023 2000 by Jeanne Napier RN  Verbalizes/displays adequate comfort level or baseline comfort level: Assess pain using appropriate pain scale

## 2023-09-14 NOTE — PROGRESS NOTES
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 difficile colitis, s/p ex lap with no signs of perforation, EGD revealing GJ stricture, s/p 2 mm dilatation, started on anticoagulation for DVT) [facility], developed hematemesis/GIB     BRIEF PATIENT SUMMARY   24-year-old female who presented to the emergency department  from [facility] for weakness and Hematemesis. 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 She was readmitted to [facility] ( through ) at which time she underwent EGD revealing GJ stricture. CT abdomen/pelvis demonstrated loculated intraperitoneal fluid in the anterior abdomen with persistent free air.   she underwent US guided drainage +E. coli. She completed a course of Zosyn and drain removed 8/10.   G-tube was placed.  patient transferred to the ICU in the setting of acute hypoxic respiratory failure d/t pulmonary edema requiring diuresis and eventual intubation ( - ). Patient reintubated  through current for worsening hypoxic respiratory failure. Course further complicated by persistent leukocytosis, fevers and CT evidence of intra-abdominal fluid collection post ultrasound drainage.   Repeat imaging  did not reveal any drainable [facility]

## 2023-09-15 LAB
ANION GAP SERPL CALC-SCNC: 7 MMOL/L (ref 5–15)
BUN SERPL-MCNC: 43 MG/DL (ref 6–20)
BUN/CREAT SERPL: 154 (ref 12–20)
CALCIUM SERPL-MCNC: 8.9 MG/DL (ref 8.5–10.1)
CHLORIDE SERPL-SCNC: 107 MMOL/L (ref 97–108)
CO2 SERPL-SCNC: 23 MMOL/L (ref 21–32)
CREAT SERPL-MCNC: 0.28 MG/DL (ref 0.55–1.02)
GLUCOSE SERPL-MCNC: 109 MG/DL (ref 65–100)
MAGNESIUM SERPL-MCNC: 1.9 MG/DL (ref 1.6–2.4)
POTASSIUM SERPL-SCNC: 4.2 MMOL/L (ref 3.5–5.1)
SODIUM SERPL-SCNC: 137 MMOL/L (ref 136–145)

## 2023-09-15 PROCEDURE — 6370000000 HC RX 637 (ALT 250 FOR IP): Performed by: NURSE PRACTITIONER

## 2023-09-15 PROCEDURE — 83735 ASSAY OF MAGNESIUM: CPT

## 2023-09-15 PROCEDURE — 2000000000 HC ICU R&B

## 2023-09-15 PROCEDURE — 6360000002 HC RX W HCPCS: Performed by: NURSE PRACTITIONER

## 2023-09-15 PROCEDURE — 2580000003 HC RX 258: Performed by: NURSE PRACTITIONER

## 2023-09-15 PROCEDURE — 6370000000 HC RX 637 (ALT 250 FOR IP): Performed by: SURGERY

## 2023-09-15 PROCEDURE — 94640 AIRWAY INHALATION TREATMENT: CPT

## 2023-09-15 PROCEDURE — 2500000003 HC RX 250 WO HCPCS: Performed by: NURSE PRACTITIONER

## 2023-09-15 PROCEDURE — 80048 BASIC METABOLIC PNL TOTAL CA: CPT

## 2023-09-15 PROCEDURE — C9113 INJ PANTOPRAZOLE SODIUM, VIA: HCPCS | Performed by: NURSE PRACTITIONER

## 2023-09-15 PROCEDURE — 6360000002 HC RX W HCPCS: Performed by: ANESTHESIOLOGY

## 2023-09-15 PROCEDURE — 2700000000 HC OXYGEN THERAPY PER DAY

## 2023-09-15 PROCEDURE — 6370000000 HC RX 637 (ALT 250 FOR IP): Performed by: INTERNAL MEDICINE

## 2023-09-15 PROCEDURE — 2580000003 HC RX 258: Performed by: SURGERY

## 2023-09-15 PROCEDURE — A4216 STERILE WATER/SALINE, 10 ML: HCPCS | Performed by: NURSE PRACTITIONER

## 2023-09-15 PROCEDURE — 97606 NEG PRS WND THER DME>50 SQCM: CPT

## 2023-09-15 PROCEDURE — 36415 COLL VENOUS BLD VENIPUNCTURE: CPT

## 2023-09-15 PROCEDURE — 94003 VENT MGMT INPAT SUBQ DAY: CPT

## 2023-09-15 PROCEDURE — 99232 SBSQ HOSP IP/OBS MODERATE 35: CPT | Performed by: INTERNAL MEDICINE

## 2023-09-15 RX ADMIN — HEPARIN SODIUM 5000 UNITS: 5000 INJECTION INTRAVENOUS; SUBCUTANEOUS at 22:07

## 2023-09-15 RX ADMIN — HYDROMORPHONE HYDROCHLORIDE 0.25 MG: 1 INJECTION, SOLUTION INTRAMUSCULAR; INTRAVENOUS; SUBCUTANEOUS at 01:26

## 2023-09-15 RX ADMIN — ARFORMOTEROL TARTRATE 15 MCG: 15 SOLUTION RESPIRATORY (INHALATION) at 20:13

## 2023-09-15 RX ADMIN — HYDROMORPHONE HYDROCHLORIDE 0.5 MG: 1 INJECTION, SOLUTION INTRAMUSCULAR; INTRAVENOUS; SUBCUTANEOUS at 08:30

## 2023-09-15 RX ADMIN — HEPARIN SODIUM 5000 UNITS: 5000 INJECTION INTRAVENOUS; SUBCUTANEOUS at 06:14

## 2023-09-15 RX ADMIN — VASOPRESSIN: 20 INJECTION, SOLUTION INTRAVENOUS at 19:05

## 2023-09-15 RX ADMIN — SODIUM CHLORIDE, PRESERVATIVE FREE 10 ML: 5 INJECTION INTRAVENOUS at 20:49

## 2023-09-15 RX ADMIN — HYDROMORPHONE HYDROCHLORIDE 0.5 MG: 1 INJECTION, SOLUTION INTRAMUSCULAR; INTRAVENOUS; SUBCUTANEOUS at 20:50

## 2023-09-15 RX ADMIN — NYSTATIN 500000 UNITS: 100000 SUSPENSION ORAL at 08:00

## 2023-09-15 RX ADMIN — SODIUM CHLORIDE, PRESERVATIVE FREE 10 ML: 5 INJECTION INTRAVENOUS at 08:00

## 2023-09-15 RX ADMIN — NYSTATIN 500000 UNITS: 100000 SUSPENSION ORAL at 14:40

## 2023-09-15 RX ADMIN — HEPARIN SODIUM 5000 UNITS: 5000 INJECTION INTRAVENOUS; SUBCUTANEOUS at 14:40

## 2023-09-15 RX ADMIN — HYDROMORPHONE HYDROCHLORIDE 0.25 MG: 1 INJECTION, SOLUTION INTRAMUSCULAR; INTRAVENOUS; SUBCUTANEOUS at 05:01

## 2023-09-15 RX ADMIN — NYSTATIN 500000 UNITS: 100000 SUSPENSION ORAL at 20:49

## 2023-09-15 RX ADMIN — Medication 10 ML: at 20:49

## 2023-09-15 RX ADMIN — LORAZEPAM 0.5 MG: 2 INJECTION INTRAMUSCULAR; INTRAVENOUS at 12:33

## 2023-09-15 RX ADMIN — HYDROMORPHONE HYDROCHLORIDE 0.5 MG: 1 INJECTION, SOLUTION INTRAMUSCULAR; INTRAVENOUS; SUBCUTANEOUS at 12:32

## 2023-09-15 RX ADMIN — Medication: at 16:39

## 2023-09-15 RX ADMIN — CHLORHEXIDINE GLUCONATE 15 ML: 1.2 RINSE ORAL at 20:49

## 2023-09-15 RX ADMIN — BUDESONIDE 500 MCG: 0.5 INHALANT RESPIRATORY (INHALATION) at 07:38

## 2023-09-15 RX ADMIN — I.V. FAT EMULSION 250 ML: 20 EMULSION INTRAVENOUS at 19:03

## 2023-09-15 RX ADMIN — SODIUM CHLORIDE, PRESERVATIVE FREE 40 MG: 5 INJECTION INTRAVENOUS at 08:00

## 2023-09-15 RX ADMIN — CHLORHEXIDINE GLUCONATE 15 ML: 1.2 RINSE ORAL at 08:00

## 2023-09-15 RX ADMIN — Medication 10 ML: at 08:01

## 2023-09-15 RX ADMIN — NYSTATIN 500000 UNITS: 100000 SUSPENSION ORAL at 16:39

## 2023-09-15 RX ADMIN — BUDESONIDE 500 MCG: 0.5 INHALANT RESPIRATORY (INHALATION) at 20:13

## 2023-09-15 RX ADMIN — Medication: at 08:01

## 2023-09-15 RX ADMIN — ARFORMOTEROL TARTRATE 15 MCG: 15 SOLUTION RESPIRATORY (INHALATION) at 07:38

## 2023-09-15 RX ADMIN — HYDROMORPHONE HYDROCHLORIDE 0.5 MG: 1 INJECTION, SOLUTION INTRAMUSCULAR; INTRAVENOUS; SUBCUTANEOUS at 16:38

## 2023-09-15 RX ADMIN — LORAZEPAM 0.5 MG: 2 INJECTION INTRAMUSCULAR; INTRAVENOUS at 20:52

## 2023-09-15 ASSESSMENT — PULMONARY FUNCTION TESTS
PIF_VALUE: 29
PIF_VALUE: 14
PIF_VALUE: 29
PIF_VALUE: 14

## 2023-09-15 ASSESSMENT — PAIN DESCRIPTION - LOCATION
LOCATION: ABDOMEN

## 2023-09-15 ASSESSMENT — PAIN DESCRIPTION - PAIN TYPE
TYPE: SURGICAL PAIN
TYPE: ACUTE PAIN

## 2023-09-15 ASSESSMENT — PAIN - FUNCTIONAL ASSESSMENT
PAIN_FUNCTIONAL_ASSESSMENT: ACTIVITIES ARE NOT PREVENTED

## 2023-09-15 ASSESSMENT — PAIN SCALES - GENERAL
PAINLEVEL_OUTOF10: 0
PAINLEVEL_OUTOF10: 6
PAINLEVEL_OUTOF10: 0
PAINLEVEL_OUTOF10: 6
PAINLEVEL_OUTOF10: 0
PAINLEVEL_OUTOF10: 6
PAINLEVEL_OUTOF10: 0
PAINLEVEL_OUTOF10: 7

## 2023-09-15 ASSESSMENT — PAIN DESCRIPTION - ORIENTATION
ORIENTATION: MID

## 2023-09-15 ASSESSMENT — ENCOUNTER SYMPTOMS
CHEST TIGHTNESS: 0
ABDOMINAL PAIN: 0
SHORTNESS OF BREATH: 0

## 2023-09-15 ASSESSMENT — PAIN DESCRIPTION - ONSET
ONSET: ON-GOING

## 2023-09-15 ASSESSMENT — PAIN DESCRIPTION - FREQUENCY
FREQUENCY: CONTINUOUS

## 2023-09-15 ASSESSMENT — PAIN DESCRIPTION - DESCRIPTORS
DESCRIPTORS: ACHING

## 2023-09-15 NOTE — PROGRESS NOTES
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 difficile colitis, s/p ex lap with no signs of perforation, EGD revealing GJ stricture, s/p 2 mm dilatation, started on anticoagulation for DVT) [facility], developed hematemesis/GIB     BRIEF PATIENT SUMMARY   71-year-old female who presented to the emergency department  from [facility] for weakness and Hematemesis. 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 She was readmitted to [facility] ( through ) at which time she underwent EGD revealing GJ stricture. CT abdomen/pelvis demonstrated loculated intraperitoneal fluid in the anterior abdomen with persistent free air.   she underwent US guided drainage +E. coli. She completed a course of Zosyn and drain removed 8/10.   G-tube was placed.  patient transferred to the ICU in the setting of acute hypoxic respiratory failure d/t pulmonary edema requiring diuresis and eventual intubation ( - ). Patient reintubated  through current for worsening hypoxic respiratory failure. Course further complicated by persistent leukocytosis, fevers and CT evidence of intra-abdominal fluid collection post ultrasound drainage.   Repeat imaging  did not reveal any drainable Bedside point of care US re-demonstrates moderate bilateral pleural effusions L>R. Will do L thoracentesis today and likely right thoracentesis tomorrow. 9/1: Morning CXR re demonstrates stable pneumomediastinum and improvement in pleural effusions. F/U procalcitonin 0.62 and lactic acid 0.7, WBC 20. Appears more comfortable w/ativan. Weaning precdex. Ps 15/5.   9/3: Mariaa Misael today at bedside. 9/4: PSV trial today. 9/5: 15/5 PSV trial again this morning. Tolerated for 10 hours. Will talk to ID about peeling back antibiotics. 9/6: 15/5 PSV trial again today  9/7: CT abdomen pelvis this morning. CT negative for leak. Has large pleural effusion PSV trial later today  9/8: 15/5 PSV trial.  Thoracentesis today. 9/9: Trial lower PSV today. Start tube feeding. 9/10 - 11: Alert this morning. TID PS trials. PT/OT encourage OOB to chair. Increased gas/drainage from bag felt to be ongoing evidence of anastomotic disruption per surgeon Dr. Polly Womack. Trickle feeds will have to be held at this time an return to TPN solely for nutritional support. Wound care following for chronic unstageable sacral, bilateral heel decubitus and skin tears- wound-vac changed. 9/13: TPN  9/14: Continue OOB to chair w/PT/OT, SBT TID. Bowel rest and TPN for nutritional support. SUBJECTIVE   Review of Systems   Respiratory:  Negative for chest tightness and shortness of breath. Gastrointestinal:  Negative for abdominal pain. Neurological:  Positive for weakness. Negative for headaches. OBJECTIVE   Physical Exam  Constitutional:       General: She is not in acute distress. HENT:      Head: Normocephalic and atraumatic. Mouth/Throat:      Mouth: Mucous membranes are moist.      Pharynx: Oropharynx is clear. Eyes:      Extraocular Movements: Extraocular movements intact. Conjunctiva/sclera: Conjunctivae normal.      Pupils: Pupils are equal, round, and reactive to light.    Cardiovascular:      Rate and Rhythm: Normal

## 2023-09-15 NOTE — WOUND CARE
WOCN Note:     Follow up sacral wound vac change. Chart reviewed. Assessed in room 7120. Admitted DX:  Hematemesis;GI bleed; Gastrointestinal hemorrhage; cdiff    Past Medical History: gastric bypass; asthma; cad; dm2  [facility]    Assessment:   Patient is intubated via trach, eyes open, communicates via writing pad and communication board and mouthing words; requires assist with repositioning. Bed: [facility]  Patient has a engle and drains. Patient repositioned on left side. Generalized edema lower legs and feet. Heels offloaded with pillows. 1. POA Sacrum and bilateral buttocks, Stage 4 Pressure Injury with palpable bone: 7.8 x 7.5 x 1cm; undermining 1 cm at 4 - 8 o' clock; 40% tan; 60% red; serous drainage in canister; no odor; improved denudation to constantine wound; constantine wound edges hyperpigmented. Treatment:  Cleansed with Vashe; applied silver foam to denuded constantine wound; resumed NPWT at 125 using 2 black (wrapped in mepitel one to the wound bed) and bridged to left side. 2. POA Right heel, Unstageable Pressure Injury with dry deflated bullae/hyperpigmented:  0.3 x 0.3 x 0 cm. 3.  POA Left heel, unstageable pressure injury:  0.8  x 0.8 x 0 cm. 60% red 40% yellow; diffuse non blanching purple to 10 o'clock wound edge. 4.  Right upper back, hyperpigmented linear area:  3 x 0.6 x 0 cm;  constantine wound intact. 5.  Right chest, partial thickness wound/skin tear: 2 x 1 x 0.1 cm; 100% moist red; constantine wound intact. Cleansed with saline; applied silver foam.        6.  Right arm, skin tear:  resolved      7. Right low leg, skin tear: resurface      Wound, Pressure Prevention & Skin Care Recommendations:    Minimize layers of linen/pads under patient to optimize support surface. 2.  Turn/reposition approximately every 2 hours and offload heels. 3.  Manage moisture/ Keep skin folds clean and dry/minimize brief usage.   4.  Specialty bed:[facility].  5.  Sacrum:  Continue NPWT at 125 mmHg with changes twice weekly. 6.  Heels:  Apply Venelex BID   7. Right upper chest:  Every other day cleanse with saline; apply silver foam dressing. Discussed above plan with RN. Transition of Care:   Plan to follow Tuesday and Friday for VAC changes.     RONALD EvansN RN [facility] Inpatient [facility]  Available on [employer]  Office [phone]

## 2023-09-15 NOTE — PROGRESS NOTES
09/15/23 1148   Spontaneous Breathing Trial (SBT) RT Doc   Rate Measured 28 br/min   Pulse (!) 108   SpO2 100 %   Spontaneous  mL   RSBI Calculated 71.79   Patient fail SBT?  No

## 2023-09-15 NOTE — PLAN OF CARE
Problem: Discharge Planning  Goal: Discharge to home or other facility with appropriate resources  Outcome: Progressing  Flowsheets (Taken 9/15/2023 0800)  Discharge to home or other facility with appropriate resources: Identify barriers to discharge with patient and caregiver     Problem: Chronic Conditions and Co-morbidities  Goal: Patient's chronic conditions and co-morbidity symptoms are monitored and maintained or improved  Outcome: Progressing  Flowsheets (Taken 9/15/2023 0800)  Care Plan - Patient's Chronic Conditions and Co-Morbidity Symptoms are Monitored and Maintained or Improved: Monitor and assess patient's chronic conditions and comorbid symptoms for stability, deterioration, or improvement     Problem: Cardiovascular - Adult  Goal: Absence of cardiac dysrhythmias or at baseline  Outcome: Progressing  Flowsheets (Taken 9/15/2023 0800)  Absence of cardiac dysrhythmias or at baseline: Monitor cardiac rate and rhythm     Problem: Gastrointestinal - Adult  Goal: Minimal or absence of nausea and vomiting  Outcome: Progressing  Flowsheets (Taken 9/15/2023 0800)  Minimal or absence of nausea and vomiting: Administer IV fluids as ordered to ensure adequate hydration  Goal: Maintains or returns to baseline bowel function  Outcome: Progressing  Flowsheets (Taken 9/15/2023 0800)  Maintains or returns to baseline bowel function: Assess bowel function  Goal: Maintains adequate nutritional intake  Outcome: Progressing  Flowsheets (Taken 9/15/2023 0800)  Maintains adequate nutritional intake: Monitor percentage of each meal consumed  Goal: Establish and maintain optimal ostomy function  Outcome: Progressing  Flowsheets (Taken 9/15/2023 0800)  Establish and maintain optimal ostomy function: Monitor output from ostomies     Problem: Hematologic - Adult  Goal: Maintains hematologic stability  Outcome: Progressing  Flowsheets (Taken 9/15/2023 0800)  Maintains hematologic stability: Assess for signs and symptoms of bleeding or hemorrhage     Problem: Infection - Adult  Goal: Absence of infection at discharge  Outcome: Progressing  Flowsheets (Taken 9/15/2023 0800)  Absence of infection at discharge: Assess and monitor for signs and symptoms of infection  Goal: Absence of infection during hospitalization  Outcome: Progressing  Flowsheets (Taken 9/15/2023 0800)  Absence of infection during hospitalization: Assess and monitor for signs and symptoms of infection     Problem: Nutrition Deficit:  Goal: Optimize nutritional status  Outcome: Progressing

## 2023-09-16 LAB
ANION GAP SERPL CALC-SCNC: 4 MMOL/L (ref 5–15)
ARTERIAL PATENCY WRIST A: YES
BASE DEFICIT BLDA-SCNC: 3.7 MMOL/L
BDY SITE: ABNORMAL
BUN SERPL-MCNC: 45 MG/DL (ref 6–20)
BUN/CREAT SERPL: 129 (ref 12–20)
CALCIUM SERPL-MCNC: 9.4 MG/DL (ref 8.5–10.1)
CHLORIDE SERPL-SCNC: 106 MMOL/L (ref 97–108)
CO2 SERPL-SCNC: 24 MMOL/L (ref 21–32)
CREAT SERPL-MCNC: 0.35 MG/DL (ref 0.55–1.02)
FIO2 ON VENT: 28 %
GLUCOSE SERPL-MCNC: 125 MG/DL (ref 65–100)
HCO3 BLDA-SCNC: 23 MMOL/L (ref 22–26)
MAGNESIUM SERPL-MCNC: 1.9 MG/DL (ref 1.6–2.4)
PCO2 BLDA: 48 MMHG (ref 35–45)
PH BLDA: 7.3 (ref 7.35–7.45)
PO2 BLDA: 97 MMHG (ref 80–100)
POTASSIUM SERPL-SCNC: 4.4 MMOL/L (ref 3.5–5.1)
SAO2 % BLD: 97 % (ref 92–97)
SAO2% DEVICE SAO2% SENSOR NAME: ABNORMAL
SODIUM SERPL-SCNC: 134 MMOL/L (ref 136–145)
SPECIMEN SITE: ABNORMAL

## 2023-09-16 PROCEDURE — 6370000000 HC RX 637 (ALT 250 FOR IP): Performed by: NURSE PRACTITIONER

## 2023-09-16 PROCEDURE — 6360000002 HC RX W HCPCS: Performed by: NURSE PRACTITIONER

## 2023-09-16 PROCEDURE — 82803 BLOOD GASES ANY COMBINATION: CPT

## 2023-09-16 PROCEDURE — 2580000003 HC RX 258: Performed by: SURGERY

## 2023-09-16 PROCEDURE — 36415 COLL VENOUS BLD VENIPUNCTURE: CPT

## 2023-09-16 PROCEDURE — 94003 VENT MGMT INPAT SUBQ DAY: CPT

## 2023-09-16 PROCEDURE — 6360000002 HC RX W HCPCS: Performed by: INTERNAL MEDICINE

## 2023-09-16 PROCEDURE — C9113 INJ PANTOPRAZOLE SODIUM, VIA: HCPCS | Performed by: NURSE PRACTITIONER

## 2023-09-16 PROCEDURE — 6370000000 HC RX 637 (ALT 250 FOR IP): Performed by: SURGERY

## 2023-09-16 PROCEDURE — 83735 ASSAY OF MAGNESIUM: CPT

## 2023-09-16 PROCEDURE — A4216 STERILE WATER/SALINE, 10 ML: HCPCS | Performed by: NURSE PRACTITIONER

## 2023-09-16 PROCEDURE — 2000000000 HC ICU R&B

## 2023-09-16 PROCEDURE — 36600 WITHDRAWAL OF ARTERIAL BLOOD: CPT

## 2023-09-16 PROCEDURE — 6360000002 HC RX W HCPCS: Performed by: ANESTHESIOLOGY

## 2023-09-16 PROCEDURE — 2580000003 HC RX 258: Performed by: INTERNAL MEDICINE

## 2023-09-16 PROCEDURE — 2500000003 HC RX 250 WO HCPCS: Performed by: INTERNAL MEDICINE

## 2023-09-16 PROCEDURE — 2580000003 HC RX 258: Performed by: NURSE PRACTITIONER

## 2023-09-16 PROCEDURE — 80048 BASIC METABOLIC PNL TOTAL CA: CPT

## 2023-09-16 PROCEDURE — 2700000000 HC OXYGEN THERAPY PER DAY

## 2023-09-16 PROCEDURE — 94640 AIRWAY INHALATION TREATMENT: CPT

## 2023-09-16 PROCEDURE — 2500000003 HC RX 250 WO HCPCS: Performed by: NURSE PRACTITIONER

## 2023-09-16 RX ADMIN — SODIUM CHLORIDE, PRESERVATIVE FREE 40 MG: 5 INJECTION INTRAVENOUS at 09:03

## 2023-09-16 RX ADMIN — VASOPRESSIN: 20 INJECTION, SOLUTION INTRAVENOUS at 19:13

## 2023-09-16 RX ADMIN — HEPARIN SODIUM 5000 UNITS: 5000 INJECTION INTRAVENOUS; SUBCUTANEOUS at 21:33

## 2023-09-16 RX ADMIN — I.V. FAT EMULSION 250 ML: 20 EMULSION INTRAVENOUS at 19:16

## 2023-09-16 RX ADMIN — HEPARIN SODIUM 5000 UNITS: 5000 INJECTION INTRAVENOUS; SUBCUTANEOUS at 13:31

## 2023-09-16 RX ADMIN — LORAZEPAM 0.5 MG: 2 INJECTION INTRAMUSCULAR; INTRAVENOUS at 09:01

## 2023-09-16 RX ADMIN — LORAZEPAM 0.5 MG: 2 INJECTION INTRAMUSCULAR; INTRAVENOUS at 21:33

## 2023-09-16 RX ADMIN — Medication: at 00:00

## 2023-09-16 RX ADMIN — ARFORMOTEROL TARTRATE 15 MCG: 15 SOLUTION RESPIRATORY (INHALATION) at 19:35

## 2023-09-16 RX ADMIN — Medication: at 15:47

## 2023-09-16 RX ADMIN — HYDROMORPHONE HYDROCHLORIDE 0.5 MG: 1 INJECTION, SOLUTION INTRAMUSCULAR; INTRAVENOUS; SUBCUTANEOUS at 09:01

## 2023-09-16 RX ADMIN — Medication 10 ML: at 20:04

## 2023-09-16 RX ADMIN — ARFORMOTEROL TARTRATE 15 MCG: 15 SOLUTION RESPIRATORY (INHALATION) at 07:31

## 2023-09-16 RX ADMIN — HYDROMORPHONE HYDROCHLORIDE 0.5 MG: 1 INJECTION, SOLUTION INTRAMUSCULAR; INTRAVENOUS; SUBCUTANEOUS at 22:15

## 2023-09-16 RX ADMIN — SODIUM CHLORIDE, PRESERVATIVE FREE 10 ML: 5 INJECTION INTRAVENOUS at 20:04

## 2023-09-16 RX ADMIN — CHLORHEXIDINE GLUCONATE 15 ML: 1.2 RINSE ORAL at 20:04

## 2023-09-16 RX ADMIN — HYDROMORPHONE HYDROCHLORIDE 0.5 MG: 1 INJECTION, SOLUTION INTRAMUSCULAR; INTRAVENOUS; SUBCUTANEOUS at 18:50

## 2023-09-16 RX ADMIN — NYSTATIN 500000 UNITS: 100000 SUSPENSION ORAL at 16:09

## 2023-09-16 RX ADMIN — Medication: at 09:07

## 2023-09-16 RX ADMIN — NYSTATIN 500000 UNITS: 100000 SUSPENSION ORAL at 12:19

## 2023-09-16 RX ADMIN — NYSTATIN 500000 UNITS: 100000 SUSPENSION ORAL at 20:04

## 2023-09-16 RX ADMIN — CHLORHEXIDINE GLUCONATE 15 ML: 1.2 RINSE ORAL at 09:07

## 2023-09-16 RX ADMIN — BUDESONIDE 500 MCG: 0.5 INHALANT RESPIRATORY (INHALATION) at 07:31

## 2023-09-16 RX ADMIN — HYDROMORPHONE HYDROCHLORIDE 0.5 MG: 1 INJECTION, SOLUTION INTRAMUSCULAR; INTRAVENOUS; SUBCUTANEOUS at 00:32

## 2023-09-16 RX ADMIN — Medication 10 ML: at 09:08

## 2023-09-16 RX ADMIN — BUDESONIDE 500 MCG: 0.5 INHALANT RESPIRATORY (INHALATION) at 19:35

## 2023-09-16 RX ADMIN — NYSTATIN 500000 UNITS: 100000 SUSPENSION ORAL at 09:03

## 2023-09-16 RX ADMIN — HYDROMORPHONE HYDROCHLORIDE 0.5 MG: 1 INJECTION, SOLUTION INTRAMUSCULAR; INTRAVENOUS; SUBCUTANEOUS at 03:33

## 2023-09-16 RX ADMIN — HYDROMORPHONE HYDROCHLORIDE 0.5 MG: 1 INJECTION, SOLUTION INTRAMUSCULAR; INTRAVENOUS; SUBCUTANEOUS at 12:18

## 2023-09-16 RX ADMIN — SODIUM CHLORIDE, PRESERVATIVE FREE 10 ML: 5 INJECTION INTRAVENOUS at 09:08

## 2023-09-16 RX ADMIN — HEPARIN SODIUM 5000 UNITS: 5000 INJECTION INTRAVENOUS; SUBCUTANEOUS at 05:41

## 2023-09-16 ASSESSMENT — PAIN DESCRIPTION - ORIENTATION
ORIENTATION: MID

## 2023-09-16 ASSESSMENT — PAIN SCALES - GENERAL
PAINLEVEL_OUTOF10: 0
PAINLEVEL_OUTOF10: 0
PAINLEVEL_OUTOF10: 4
PAINLEVEL_OUTOF10: 0
PAINLEVEL_OUTOF10: 0
PAINLEVEL_OUTOF10: 7
PAINLEVEL_OUTOF10: 0
PAINLEVEL_OUTOF10: 5
PAINLEVEL_OUTOF10: 2
PAINLEVEL_OUTOF10: 0
PAINLEVEL_OUTOF10: 7
PAINLEVEL_OUTOF10: 0
PAINLEVEL_OUTOF10: 4

## 2023-09-16 ASSESSMENT — PULMONARY FUNCTION TESTS
PIF_VALUE: 21
PIF_VALUE: 23
PIF_VALUE: 33

## 2023-09-16 ASSESSMENT — ENCOUNTER SYMPTOMS
ABDOMINAL PAIN: 0
CHEST TIGHTNESS: 0
SHORTNESS OF BREATH: 0

## 2023-09-16 ASSESSMENT — PAIN DESCRIPTION - LOCATION
LOCATION: ABDOMEN

## 2023-09-16 NOTE — PLAN OF CARE
Problem: Discharge Planning  Goal: Discharge to home or other facility with appropriate resources  Outcome: Progressing     Problem: Chronic Conditions and Co-morbidities  Goal: Patient's chronic conditions and co-morbidity symptoms are monitored and maintained or improved  Outcome: Progressing     Problem: Cardiovascular - Adult  Goal: Absence of cardiac dysrhythmias or at baseline  Outcome: Progressing     Problem: Gastrointestinal - Adult  Goal: Minimal or absence of nausea and vomiting  Outcome: Progressing  Goal: Maintains or returns to baseline bowel function  Outcome: Progressing  Goal: Maintains adequate nutritional intake  Outcome: Progressing  Goal: Establish and maintain optimal ostomy function  Outcome: Progressing     Problem: Hematologic - Adult  Goal: Maintains hematologic stability  Outcome: Progressing     Problem: Infection - Adult  Goal: Absence of infection at discharge  Outcome: Progressing  Goal: Absence of infection during hospitalization  Outcome: Progressing     Problem: Nutrition Deficit:  Goal: Optimize nutritional status  Outcome: Progressing

## 2023-09-16 NOTE — PROGRESS NOTES
mg, IntraVENous, Q6H PRN, GARETT Bahena NP, 0.5 mg at 09/16/23 0901    budesonide (PULMICORT) nebulizer suspension 500 mcg, 0.5 mg, Nebulization, BID RT, Wynelle Lennox, MD, 500 mcg at 09/16/23 0731    arformoterol tartrate (BROVANA) nebulizer solution 15 mcg, 15 mcg, Nebulization, BID RT, Wynelle Lennox, MD, 15 mcg at 09/16/23 0731    pantoprazole (PROTONIX) 40 mg in sodium chloride (PF) 0.9 % 10 mL injection, 40 mg, IntraVENous, Daily, GARETT Velasquez NP, 40 mg at 09/16/23 0903    [Held by provider] buPROPion (WELLBUTRIN) tablet 100 mg, 100 mg, Per G Tube, BID, GARETT Ferguson - CNP, 100 mg at 08/22/23 0837    sodium chloride flush 0.9 % injection 5-40 mL, 5-40 mL, IntraVENous, 2 times per day, Jeb Vargas MD, 10 mL at 09/16/23 0908    sodium chloride flush 0.9 % injection 5-40 mL, 5-40 mL, IntraVENous, PRN, Jeb Vargas MD    0.9 % sodium chloride infusion, 25 mL, IntraVENous, PRN, Jeb Vargas MD    chlorhexidine (PERIDEX) 0.12 % solution 15 mL, 15 mL, Mouth/Throat, BID, Jeb Vargas MD, 15 mL at 09/16/23 0907    albuterol (PROVENTIL) (2.5 MG/3ML) 0.083% nebulizer solution 2.5 mg, 2.5 mg, Nebulization, Q4H PRN, Jeb Vargas MD, 2.5 mg at 09/12/23 1343    prochlorperazine (COMPAZINE) injection 10 mg, 10 mg, IntraVENous, Q6H PRN, Jeb Vargas MD, 10 mg at 08/14/23 2143    alteplase (CATHFLO) 1 mg in sterile water 1 mL injection, 1 mg, IntraCATHeter, PRN, Jeb Vargas MD, 1 mg at 08/26/23 0154    balsum peru-castor oil (VENELEX) ointment, , Topical, q8h, Yary Roberts MD, Given at 09/16/23 0907    albumin human 5% IV solution 12.5 g, 12.5 g, IntraVENous, Q6H PRN, Jeb Vargas MD, Stopped at 07/29/23 1948    sodium chloride flush 0.9 % injection 5-40 mL, 5-40 mL, IntraVENous, 2 times per day, Jeb Vargas MD, 10 mL at 09/16/23 0908    sodium chloride flush 0.9 % injection 5-40 mL, 5-40 mL, IntraVENous, PRN, Jeb Vargas MD    ondansetron (ZOFRAN-ODT) disintegrating tablet 4 mg, 4 mg, Oral, Q8H PRN **OR** ondansetron (ZOFRAN) injection 4 mg, 4 mg, IntraVENous, Q6H PRN, Noe Eng MD, 4 mg at 08/16/23 1557    polyethylene glycol (GLYCOLAX) packet 17 g, 17 g, Oral, Daily PRN, Noe Eng MD    acetaminophen (TYLENOL) tablet 650 mg, 650 mg, Oral, Q6H PRN, 650 mg at 08/21/23 1657 **OR** acetaminophen (TYLENOL) suppository 650 mg, 650 mg, Rectal, Q6H PRN, Noe Eng MD    glucose chewable tablet 16 g, 4 tablet, Oral, PRN, Noe Eng MD    dextrose bolus 10% 125 mL, 125 mL, IntraVENous, PRN, Stopped at 08/01/23 0538 **OR** dextrose bolus 10% 250 mL, 250 mL, IntraVENous, PRN, Noe Eng MD, Stopped at 07/28/23 2319    glucagon injection 1 mg, 1 mg, SubCUTAneous, PRN, Noe Eng MD    dextrose 10 % infusion, , IntraVENous, Continuous PRN, Noe Eng MD      Labs:  Recent Results (from the past 24 hour(s))   Magnesium    Collection Time: 09/16/23  3:48 AM   Result Value Ref Range    Magnesium 1.9 1.6 - 2.4 mg/dL   Basic Metabolic Panel    Collection Time: 09/16/23  3:48 AM   Result Value Ref Range    Sodium 134 (L) 136 - 145 mmol/L    Potassium 4.4 3.5 - 5.1 mmol/L    Chloride 106 97 - 108 mmol/L    CO2 24 21 - 32 mmol/L    Anion Gap 4 (L) 5 - 15 mmol/L    Glucose 125 (H) 65 - 100 mg/dL    BUN 45 (H) 6 - 20 MG/DL    Creatinine 0.35 (L) 0.55 - 1.02 MG/DL    Bun/Cre Ratio 129 (H) 12 - 20      Est, Glom Filt Rate >60 >60 ml/min/1.73m2    Calcium 9.4 8.5 - 10.1 MG/DL           Micro: No recent cultures obtained    Blood:       Urine:     Synovial/Joint fluid:     Sputum/BAL/Pleural:     Peritoneal:      Pathology:      Imaging: CXR 09/08/23                  Right-sided effusion status postthoracentesis,lungs unchanged        Assessment / Plan    Tracheostomy in place, oxygenation adequate, out of bed to chair daily  Afebrile without leukocytosis off antibiotic  Anasarca/edema resolved with continued malnutrition/hypoalbuminemia  Unstageable sacral decubitus with wound VAC in place  Continued clinical improvement  Consider repeat portable CXR this coming week      Thank you for the opportunity to participate in the care of this patient. Please contact with questions or concerns.       Chaka Segura MD

## 2023-09-17 LAB
ANION GAP SERPL CALC-SCNC: 4 MMOL/L (ref 5–15)
BUN SERPL-MCNC: 41 MG/DL (ref 6–20)
BUN/CREAT SERPL: 124 (ref 12–20)
CALCIUM SERPL-MCNC: 9 MG/DL (ref 8.5–10.1)
CHLORIDE SERPL-SCNC: 108 MMOL/L (ref 97–108)
CO2 SERPL-SCNC: 25 MMOL/L (ref 21–32)
CREAT SERPL-MCNC: 0.33 MG/DL (ref 0.55–1.02)
ERYTHROCYTE [DISTWIDTH] IN BLOOD BY AUTOMATED COUNT: 14.6 % (ref 11.5–14.5)
GLUCOSE SERPL-MCNC: 123 MG/DL (ref 65–100)
HCT VFR BLD AUTO: 23.1 % (ref 35–47)
HGB BLD-MCNC: 7.5 G/DL (ref 11.5–16)
MCH RBC QN AUTO: 29.3 PG (ref 26–34)
MCHC RBC AUTO-ENTMCNC: 32.5 G/DL (ref 30–36.5)
MCV RBC AUTO: 90.2 FL (ref 80–99)
NRBC # BLD: 0 K/UL (ref 0–0.01)
NRBC BLD-RTO: 0 PER 100 WBC
PLATELET # BLD AUTO: 431 K/UL (ref 150–400)
PMV BLD AUTO: 9.7 FL (ref 8.9–12.9)
POTASSIUM SERPL-SCNC: 4.2 MMOL/L (ref 3.5–5.1)
RBC # BLD AUTO: 2.56 M/UL (ref 3.8–5.2)
SODIUM SERPL-SCNC: 137 MMOL/L (ref 136–145)
WBC # BLD AUTO: 10.6 K/UL (ref 3.6–11)

## 2023-09-17 PROCEDURE — 3E04317 INTRODUCTION OF OTHER THROMBOLYTIC INTO CENTRAL VEIN, PERCUTANEOUS APPROACH: ICD-10-PCS | Performed by: SURGERY

## 2023-09-17 PROCEDURE — 2580000003 HC RX 258: Performed by: SURGERY

## 2023-09-17 PROCEDURE — 2500000003 HC RX 250 WO HCPCS: Performed by: NURSE PRACTITIONER

## 2023-09-17 PROCEDURE — 2580000003 HC RX 258: Performed by: NURSE PRACTITIONER

## 2023-09-17 PROCEDURE — 85027 COMPLETE CBC AUTOMATED: CPT

## 2023-09-17 PROCEDURE — 36415 COLL VENOUS BLD VENIPUNCTURE: CPT

## 2023-09-17 PROCEDURE — 6360000002 HC RX W HCPCS: Performed by: NURSE PRACTITIONER

## 2023-09-17 PROCEDURE — 92610 EVALUATE SWALLOWING FUNCTION: CPT

## 2023-09-17 PROCEDURE — 6360000002 HC RX W HCPCS: Performed by: SURGERY

## 2023-09-17 PROCEDURE — C9113 INJ PANTOPRAZOLE SODIUM, VIA: HCPCS | Performed by: NURSE PRACTITIONER

## 2023-09-17 PROCEDURE — A4216 STERILE WATER/SALINE, 10 ML: HCPCS | Performed by: NURSE PRACTITIONER

## 2023-09-17 PROCEDURE — 80048 BASIC METABOLIC PNL TOTAL CA: CPT

## 2023-09-17 PROCEDURE — 2000000000 HC ICU R&B

## 2023-09-17 PROCEDURE — 6370000000 HC RX 637 (ALT 250 FOR IP): Performed by: NURSE PRACTITIONER

## 2023-09-17 PROCEDURE — 6360000002 HC RX W HCPCS: Performed by: INTERNAL MEDICINE

## 2023-09-17 PROCEDURE — 6370000000 HC RX 637 (ALT 250 FOR IP): Performed by: SURGERY

## 2023-09-17 PROCEDURE — 6360000002 HC RX W HCPCS: Performed by: ANESTHESIOLOGY

## 2023-09-17 PROCEDURE — 94003 VENT MGMT INPAT SUBQ DAY: CPT

## 2023-09-17 PROCEDURE — 2500000003 HC RX 250 WO HCPCS: Performed by: INTERNAL MEDICINE

## 2023-09-17 PROCEDURE — 94640 AIRWAY INHALATION TREATMENT: CPT

## 2023-09-17 PROCEDURE — 92597 ORAL SPEECH DEVICE EVAL: CPT

## 2023-09-17 PROCEDURE — 2580000003 HC RX 258: Performed by: INTERNAL MEDICINE

## 2023-09-17 RX ADMIN — WATER 1 MG: 1 INJECTION INTRAMUSCULAR; INTRAVENOUS; SUBCUTANEOUS at 20:55

## 2023-09-17 RX ADMIN — CHLORHEXIDINE GLUCONATE 15 ML: 1.2 RINSE ORAL at 20:55

## 2023-09-17 RX ADMIN — SODIUM CHLORIDE, PRESERVATIVE FREE 10 ML: 5 INJECTION INTRAVENOUS at 20:55

## 2023-09-17 RX ADMIN — ARFORMOTEROL TARTRATE 15 MCG: 15 SOLUTION RESPIRATORY (INHALATION) at 19:43

## 2023-09-17 RX ADMIN — NYSTATIN 500000 UNITS: 100000 SUSPENSION ORAL at 15:37

## 2023-09-17 RX ADMIN — Medication: at 15:42

## 2023-09-17 RX ADMIN — HYDROMORPHONE HYDROCHLORIDE 0.5 MG: 1 INJECTION, SOLUTION INTRAMUSCULAR; INTRAVENOUS; SUBCUTANEOUS at 01:44

## 2023-09-17 RX ADMIN — NYSTATIN 500000 UNITS: 100000 SUSPENSION ORAL at 12:13

## 2023-09-17 RX ADMIN — SODIUM CHLORIDE, PRESERVATIVE FREE 10 ML: 5 INJECTION INTRAVENOUS at 08:57

## 2023-09-17 RX ADMIN — BUDESONIDE 500 MCG: 0.5 INHALANT RESPIRATORY (INHALATION) at 19:43

## 2023-09-17 RX ADMIN — I.V. FAT EMULSION 250 ML: 20 EMULSION INTRAVENOUS at 18:53

## 2023-09-17 RX ADMIN — BUDESONIDE 500 MCG: 0.5 INHALANT RESPIRATORY (INHALATION) at 07:08

## 2023-09-17 RX ADMIN — CHLORHEXIDINE GLUCONATE 15 ML: 1.2 RINSE ORAL at 08:56

## 2023-09-17 RX ADMIN — Medication 10 ML: at 20:59

## 2023-09-17 RX ADMIN — HYDROMORPHONE HYDROCHLORIDE 0.5 MG: 1 INJECTION, SOLUTION INTRAMUSCULAR; INTRAVENOUS; SUBCUTANEOUS at 15:37

## 2023-09-17 RX ADMIN — Medication: at 08:56

## 2023-09-17 RX ADMIN — Medication 10 ML: at 08:57

## 2023-09-17 RX ADMIN — HEPARIN SODIUM 5000 UNITS: 5000 INJECTION INTRAVENOUS; SUBCUTANEOUS at 21:01

## 2023-09-17 RX ADMIN — Medication: at 00:00

## 2023-09-17 RX ADMIN — HEPARIN SODIUM 5000 UNITS: 5000 INJECTION INTRAVENOUS; SUBCUTANEOUS at 14:14

## 2023-09-17 RX ADMIN — HYDROMORPHONE HYDROCHLORIDE 0.5 MG: 1 INJECTION, SOLUTION INTRAMUSCULAR; INTRAVENOUS; SUBCUTANEOUS at 12:13

## 2023-09-17 RX ADMIN — HYDROMORPHONE HYDROCHLORIDE 0.5 MG: 1 INJECTION, SOLUTION INTRAMUSCULAR; INTRAVENOUS; SUBCUTANEOUS at 19:23

## 2023-09-17 RX ADMIN — NYSTATIN 500000 UNITS: 100000 SUSPENSION ORAL at 09:06

## 2023-09-17 RX ADMIN — HYDROMORPHONE HYDROCHLORIDE 0.5 MG: 1 INJECTION, SOLUTION INTRAMUSCULAR; INTRAVENOUS; SUBCUTANEOUS at 09:02

## 2023-09-17 RX ADMIN — NYSTATIN 500000 UNITS: 100000 SUSPENSION ORAL at 20:55

## 2023-09-17 RX ADMIN — HYDROMORPHONE HYDROCHLORIDE 0.25 MG: 1 INJECTION, SOLUTION INTRAMUSCULAR; INTRAVENOUS; SUBCUTANEOUS at 06:01

## 2023-09-17 RX ADMIN — PROCHLORPERAZINE EDISYLATE 10 MG: 5 INJECTION, SOLUTION INTRAMUSCULAR; INTRAVENOUS at 09:02

## 2023-09-17 RX ADMIN — HEPARIN SODIUM 5000 UNITS: 5000 INJECTION INTRAVENOUS; SUBCUTANEOUS at 05:45

## 2023-09-17 RX ADMIN — ARFORMOTEROL TARTRATE 15 MCG: 15 SOLUTION RESPIRATORY (INHALATION) at 07:08

## 2023-09-17 RX ADMIN — VASOPRESSIN: 20 INJECTION, SOLUTION INTRAVENOUS at 18:55

## 2023-09-17 RX ADMIN — SODIUM CHLORIDE, PRESERVATIVE FREE 40 MG: 5 INJECTION INTRAVENOUS at 09:02

## 2023-09-17 RX ADMIN — ONDANSETRON 4 MG: 2 INJECTION INTRAMUSCULAR; INTRAVENOUS at 15:37

## 2023-09-17 ASSESSMENT — PAIN SCALES - GENERAL
PAINLEVEL_OUTOF10: 4
PAINLEVEL_OUTOF10: 5
PAINLEVEL_OUTOF10: 1
PAINLEVEL_OUTOF10: 3
PAINLEVEL_OUTOF10: 7
PAINLEVEL_OUTOF10: 0
PAINLEVEL_OUTOF10: 0
PAINLEVEL_OUTOF10: 2
PAINLEVEL_OUTOF10: 0
PAINLEVEL_OUTOF10: 4
PAINLEVEL_OUTOF10: 5
PAINLEVEL_OUTOF10: 0

## 2023-09-17 ASSESSMENT — PULMONARY FUNCTION TESTS
PIF_VALUE: 20
PIF_VALUE: 22
PIF_VALUE: 19
PIF_VALUE: 23

## 2023-09-17 NOTE — PROGRESS NOTES
Pt placed back on vent at this time          09/16/23 2055   Patient Observation   Pulse 98   Respirations 23   SpO2 100 %   Vent Information   Ventilator Day(s) 41   Ventilator Initiate Yes   Vent Mode AC/VC   $Ventilation $Subsequent Day   Ventilator Settings   FiO2  28 %   Vt (Set, mL) 330 mL   Resp Rate (Set) 18 bmp   PEEP/CPAP (cmH2O) 5   Peak Inspiratory Flow (Set) 40 L/sec   Vent Patient Data (Readings)   Vt (Measured) 365 mL   Peak Inspiratory Pressure (cmH2O) 21 cmH2O   Rate Measured 23 br/min   Minute Volume (L/min) 8.22 Liters   Peak Inspiratory Flow (lpm) 40 L/sec   Mean Airway Pressure (cmH2O) 9.5 cmH20   Plateau Pressure (cm H2O) 28 cm H2O   Driving Pressure 23   I:E Ratio 12.1   Flow Sensitivity 3 L/min   Static Compliance (L/cm H2O) 12   Dynamic Compliance (L/cm H2O) 26 L/cm H2O   Backup Apnea On   Backup Rate 18 Breaths Per Minute   Backup Vt 330   Vent Alarm Settings   High Pressure (cmH2O) 40 cmH2O   Low Minute Volume (lpm) 2 L/min   High Minute Volume (lpm) 20 L/min   Low Exhaled Vt (ml) 200 mL   High Exhaled Vt (ml) 900 mL   RR High (bpm) 40 br/min   Apnea (secs) 20 secs   Additional Respiratoray Assessments   Humidification Source Heated wire   Humidification Temp 37.1   Circuit Condensation Not drained   Ambu Bag With Mask At Bedside Yes   Backup Trachs Available (Size) 4.0;6.0   Surgical Airway (Trach) 09/03/23 Deloresley Cuffed   Placement Date/Time: 09/03/23 3750   Placed By: Licensed provider;RN;RT  Placement Verified By: Direct visualization; Chest X-ray  Surgical Airway Type: Tracheostomy  Brand: Lakshmi  Style: Cuffed  Size (mm): 6   Status Secured   Ties Assessment Secure   Cuff Pressure 22 cm H2O   Spare Trach at Bedside Yes   Spare Trach Tube One Size Smaller at Bedside Yes   Ambu Bag With Mask at Bedside Yes

## 2023-09-17 NOTE — PLAN OF CARE
Problem: Discharge Planning  Goal: Discharge to home or other facility with appropriate resources  Outcome: Progressing  Flowsheets (Taken 9/16/2023 2000 by Ana M Pino RN)  Discharge to home or other facility with appropriate resources: Identify barriers to discharge with patient and caregiver     Problem: Chronic Conditions and Co-morbidities  Goal: Patient's chronic conditions and co-morbidity symptoms are monitored and maintained or improved  Outcome: Progressing  Flowsheets (Taken 9/16/2023 2000 by Ana M Pino RN)  Care Plan - Patient's Chronic Conditions and Co-Morbidity Symptoms are Monitored and Maintained or Improved: Monitor and assess patient's chronic conditions and comorbid symptoms for stability, deterioration, or improvement     Problem: Cardiovascular - Adult  Goal: Absence of cardiac dysrhythmias or at baseline  Outcome: Progressing  Flowsheets (Taken 9/16/2023 2000 by Ana M Pino RN)  Absence of cardiac dysrhythmias or at baseline: Monitor cardiac rate and rhythm     Problem: Gastrointestinal - Adult  Goal: Minimal or absence of nausea and vomiting  Outcome: Progressing  Goal: Maintains or returns to baseline bowel function  Outcome: Progressing  Goal: Maintains adequate nutritional intake  Outcome: Progressing  Goal: Establish and maintain optimal ostomy function  Outcome: Progressing     Problem: Hematologic - Adult  Goal: Maintains hematologic stability  Outcome: Progressing  Flowsheets (Taken 9/16/2023 2000 by Ana M Pino RN)  Maintains hematologic stability: Assess for signs and symptoms of bleeding or hemorrhage     Problem: Infection - Adult  Goal: Absence of infection at discharge  Outcome: Progressing  Flowsheets  Taken 9/17/2023 0800 by July Munoz RN  Absence of infection at discharge: Assess and monitor for signs and symptoms of infection  Taken 9/16/2023 2000 by Ana M Pino RN  Absence of infection at discharge: Assess and monitor for signs and symptoms of infection  Goal: Absence of infection during hospitalization  Outcome: Progressing  Flowsheets (Taken 9/16/2023 2000 by Monae Branch RN)  Absence of infection during hospitalization: Assess and monitor for signs and symptoms of infection     Problem: Nutrition Deficit:  Goal: Optimize nutritional status  Outcome: Progressing

## 2023-09-17 NOTE — PROGRESS NOTES
SLP Contact Note    SLP evaluation complete. Pt tolerating PMV without any adverse effects or evidence of back pressure/breath stacking. Participated in trials of ice chips. Recommend PMV as tolerated and ice chips. Will plan to complete Flexible Endoscopic Evaluation of Swallow (FEES) at bedside when appropriate for swallow assessment. Full note to follow.       Thank you,  Lety Ma M.Ed, PhD(c), CCC-SLP  Speech-Language Pathologist

## 2023-09-17 NOTE — PROGRESS NOTES
09/17/23 1944   Patient Observation   Pulse (!) 105   Respirations 18   SpO2 100 %   Vent Information   Equipment Changed Humidification   Vent Mode AC/VC   Ventilator Settings   FiO2  28 %   Vt (Set, mL) 330 mL   Resp Rate (Set) 18 bmp   PEEP/CPAP (cmH2O) 5   Vent Patient Data (Readings)   Vt (Measured) 347 mL   Peak Inspiratory Pressure (cmH2O) 23 cmH2O   Rate Measured 18 br/min   Minute Volume (L/min) 8.2 Liters   Peak Inspiratory Flow (lpm) 40 L/sec   Mean Airway Pressure (cmH2O) 10 cmH20   I:E Ratio 1:2.7   Flow Sensitivity 3 L/min   Backup Apnea On   Backup Rate 18 Breaths Per Minute   Backup Vt 330   Vent Alarm Settings   High Pressure (cmH2O) 40 cmH2O   Low Minute Volume (lpm) 2 L/min   High Minute Volume (lpm) 20 L/min   Low Exhaled Vt (ml) 200 mL   High Exhaled Vt (ml) 900 mL   RR High (bpm) 40 br/min   Apnea (secs) 20 secs   Additional Respiratoray Assessments   Humidification Source Heated wire   Humidification Temp 37.2   Circuit Condensation Not drained   Ambu Bag With Mask At Bedside Yes   Backup Trachs Available (Size) 4.0;6.0   Surgical Airway (Trach) 09/03/23 Lakshmi Cuffed   Placement Date/Time: 09/03/23 3244   Placed By: Licensed provider;RN;RT  Placement Verified By: Direct visualization; Chest X-ray  Surgical Airway Type: Tracheostomy  Brand: Lakshmi  Style: Cuffed  Size (mm): 6   Status Secured   Ties Assessment Secure   Cuff Pressure 22 cm H2O   Spare Trach at Bedside Yes   Spare Trach Tube One Size Smaller at Bedside Yes   Ambu Bag With Mask at Bedside Yes

## 2023-09-18 LAB
MAGNESIUM SERPL-MCNC: 1.6 MG/DL (ref 1.6–2.4)
PHOSPHATE SERPL-MCNC: 3.7 MG/DL (ref 2.6–4.7)

## 2023-09-18 PROCEDURE — 6360000002 HC RX W HCPCS: Performed by: NURSE PRACTITIONER

## 2023-09-18 PROCEDURE — 84100 ASSAY OF PHOSPHORUS: CPT

## 2023-09-18 PROCEDURE — 83735 ASSAY OF MAGNESIUM: CPT

## 2023-09-18 PROCEDURE — 2000000000 HC ICU R&B

## 2023-09-18 PROCEDURE — 6370000000 HC RX 637 (ALT 250 FOR IP): Performed by: NURSE PRACTITIONER

## 2023-09-18 PROCEDURE — 2580000003 HC RX 258: Performed by: SURGERY

## 2023-09-18 PROCEDURE — 97535 SELF CARE MNGMENT TRAINING: CPT

## 2023-09-18 PROCEDURE — 6360000002 HC RX W HCPCS: Performed by: ANESTHESIOLOGY

## 2023-09-18 PROCEDURE — 97530 THERAPEUTIC ACTIVITIES: CPT

## 2023-09-18 PROCEDURE — 92507 TX SP LANG VOICE COMM INDIV: CPT

## 2023-09-18 PROCEDURE — 2500000003 HC RX 250 WO HCPCS: Performed by: NURSE PRACTITIONER

## 2023-09-18 PROCEDURE — 2580000003 HC RX 258: Performed by: NURSE PRACTITIONER

## 2023-09-18 PROCEDURE — 97110 THERAPEUTIC EXERCISES: CPT

## 2023-09-18 PROCEDURE — 92612 ENDOSCOPY SWALLOW (FEES) VID: CPT

## 2023-09-18 PROCEDURE — 94003 VENT MGMT INPAT SUBQ DAY: CPT

## 2023-09-18 PROCEDURE — 6370000000 HC RX 637 (ALT 250 FOR IP): Performed by: SURGERY

## 2023-09-18 PROCEDURE — 94640 AIRWAY INHALATION TREATMENT: CPT

## 2023-09-18 PROCEDURE — 92526 ORAL FUNCTION THERAPY: CPT

## 2023-09-18 PROCEDURE — 36415 COLL VENOUS BLD VENIPUNCTURE: CPT

## 2023-09-18 PROCEDURE — C9113 INJ PANTOPRAZOLE SODIUM, VIA: HCPCS | Performed by: NURSE PRACTITIONER

## 2023-09-18 PROCEDURE — A4216 STERILE WATER/SALINE, 10 ML: HCPCS | Performed by: NURSE PRACTITIONER

## 2023-09-18 RX ORDER — MAGNESIUM SULFATE IN WATER 40 MG/ML
2000 INJECTION, SOLUTION INTRAVENOUS ONCE
Status: COMPLETED | OUTPATIENT
Start: 2023-09-18 | End: 2023-09-18

## 2023-09-18 RX ADMIN — NYSTATIN 500000 UNITS: 100000 SUSPENSION ORAL at 20:18

## 2023-09-18 RX ADMIN — NYSTATIN 500000 UNITS: 100000 SUSPENSION ORAL at 14:43

## 2023-09-18 RX ADMIN — VASOPRESSIN: 20 INJECTION, SOLUTION INTRAVENOUS at 20:00

## 2023-09-18 RX ADMIN — CHLORHEXIDINE GLUCONATE 15 ML: 1.2 RINSE ORAL at 20:17

## 2023-09-18 RX ADMIN — LABETALOL HYDROCHLORIDE 10 MG: 5 INJECTION INTRAVENOUS at 04:30

## 2023-09-18 RX ADMIN — HYDROMORPHONE HYDROCHLORIDE 0.5 MG: 1 INJECTION, SOLUTION INTRAMUSCULAR; INTRAVENOUS; SUBCUTANEOUS at 22:16

## 2023-09-18 RX ADMIN — ARFORMOTEROL TARTRATE 15 MCG: 15 SOLUTION RESPIRATORY (INHALATION) at 08:10

## 2023-09-18 RX ADMIN — MAGNESIUM SULFATE HEPTAHYDRATE 2000 MG: 40 INJECTION, SOLUTION INTRAVENOUS at 08:30

## 2023-09-18 RX ADMIN — BUDESONIDE 500 MCG: 0.5 INHALANT RESPIRATORY (INHALATION) at 20:13

## 2023-09-18 RX ADMIN — HYDROMORPHONE HYDROCHLORIDE 0.5 MG: 1 INJECTION, SOLUTION INTRAMUSCULAR; INTRAVENOUS; SUBCUTANEOUS at 04:30

## 2023-09-18 RX ADMIN — Medication 40 ML: at 10:27

## 2023-09-18 RX ADMIN — I.V. FAT EMULSION 250 ML: 20 EMULSION INTRAVENOUS at 20:01

## 2023-09-18 RX ADMIN — SODIUM CHLORIDE, PRESERVATIVE FREE 40 ML: 5 INJECTION INTRAVENOUS at 10:27

## 2023-09-18 RX ADMIN — CHLORHEXIDINE GLUCONATE 15 ML: 1.2 RINSE ORAL at 08:45

## 2023-09-18 RX ADMIN — HYDROMORPHONE HYDROCHLORIDE 0.5 MG: 1 INJECTION, SOLUTION INTRAMUSCULAR; INTRAVENOUS; SUBCUTANEOUS at 07:09

## 2023-09-18 RX ADMIN — Medication: at 07:10

## 2023-09-18 RX ADMIN — ARFORMOTEROL TARTRATE 15 MCG: 15 SOLUTION RESPIRATORY (INHALATION) at 20:13

## 2023-09-18 RX ADMIN — NYSTATIN 500000 UNITS: 100000 SUSPENSION ORAL at 08:30

## 2023-09-18 RX ADMIN — BUDESONIDE 500 MCG: 0.5 INHALANT RESPIRATORY (INHALATION) at 08:10

## 2023-09-18 RX ADMIN — HEPARIN SODIUM 5000 UNITS: 5000 INJECTION INTRAVENOUS; SUBCUTANEOUS at 21:15

## 2023-09-18 RX ADMIN — SODIUM CHLORIDE, PRESERVATIVE FREE 10 ML: 5 INJECTION INTRAVENOUS at 20:17

## 2023-09-18 RX ADMIN — SODIUM CHLORIDE, PRESERVATIVE FREE 40 MG: 5 INJECTION INTRAVENOUS at 08:29

## 2023-09-18 RX ADMIN — HEPARIN SODIUM 5000 UNITS: 5000 INJECTION INTRAVENOUS; SUBCUTANEOUS at 06:30

## 2023-09-18 RX ADMIN — Medication 20 ML: at 20:17

## 2023-09-18 RX ADMIN — Medication: at 16:57

## 2023-09-18 RX ADMIN — HEPARIN SODIUM 5000 UNITS: 5000 INJECTION INTRAVENOUS; SUBCUTANEOUS at 14:42

## 2023-09-18 ASSESSMENT — PAIN SCALES - GENERAL
PAINLEVEL_OUTOF10: 5
PAINLEVEL_OUTOF10: 0
PAINLEVEL_OUTOF10: 6

## 2023-09-18 ASSESSMENT — PULMONARY FUNCTION TESTS
PIF_VALUE: 26
PIF_VALUE: 21

## 2023-09-18 NOTE — PROGRESS NOTES
09/18/23 0908   Oxygen Therapy/Pulse Ox   O2 Therapy Oxygen humidified   O2 Device Trach collar   O2 Flow Rate (L/min) 5 L/min   FiO2  28 %   Pulse 89   Respirations 18   SpO2 97 %     Patient off Vent on [facility].

## 2023-09-18 NOTE — PROGRESS NOTES
Making gradual progress with regards to pulmonary status. Still on cyclic TPN. We will plan on repeat CT scan on Thursday; if no signs of ongoing ileorectal anastomotic leak, can attempt restarting trickle tube feeds through gastrostomy tube. Continue antibiotics, continue drain, continue wound VAC dressing.

## 2023-09-18 NOTE — PLAN OF CARE
Speech Language Pathology  Flexible Endoscopic Evaluation of Swallowing-FEES  Patient: Sonia French (72 y.o. female)  Date: 9/18/2023  Primary Diagnosis: Hematemesis [K92.0]  GI bleed [K92.2]  Gastrointestinal hemorrhage, unspecified gastrointestinal hemorrhage type [K92.2]  Procedure(s) (LRB):  DEBRIDEMENT OF SACRAL DECUBITUS WOUND (N/A) 35 Days Post-Op   Precautions: Aspiration, Trach, Fall, Wounds       ASSESSMENT :  FEES completed with thin liquids only as per General Surgery clearance; note, per Dr. Michael Brantley, patient is not currently a candidate for a PO diet and FEES completed to assess safety for comfort PO of water. Swallow assessed with and without PMV placed. Patient demonstrated slowed but functional oral phase of swallow. Patient exhibited pharyngeal dysphagia characterized by aspiration of thin liquids and dystussia/weak cough response to aspiration events. Trace aspiration with spontaneous ineffective cough seen when PMV on. Volume of aspiration significantly increased when PMV off; additionally, aspiration was silent when PMV off. Recommend occasional ice chips only when PMV on and following oral care. Will benefit from dysphagia therapy (e.g., effortful swallow, possibly EMST) to reduce disuse atrophy and improve cough/secretion management. Continue PMV use as tolerated while awake. SLP will follow. Patient will benefit from skilled intervention to address the above impairments. Images taken from FEES:    Aspiration of thin liquids (water) without PMV placed     PLAN :  Recommendations and Planned Interventions:  Diet: NPO except occasional ice chips following oral care  Place PMV prior to any ice chips   Rigorous oral care 3-4x/daily   SLP for dysphagia therapy     Acute SLP Services: Yes, patient will be followed by speech-language pathology 3x/week to address goals. Patient's rehabilitation potential is considered to be Fair.   Discharge Recommendations: Yes, recommend SLP treatment at [facility]  [address] RAD US    US ABSCESS DRAINAGE PERITONEAL  8/22/2023    US ABSCESS DRAINAGE PERITONEAL 8/22/2023 Sole Mark PA-C [address] RAD      Diet prior to admission: Regular texture with thin liquids  Current Diet:  NPO, TPN     Cognitive and Communication Status:  Neurologic State: Alert  Orientation Level: Oriented to person, DNT other areas of orientatino  Cognition: Decreased attention/concentration and Decreased command following    History/indication for procedure: Dysphagia risk factors (trach, deconditioning, prolonged NPO)  Lidocaine used: No  Nostril used: right  Scope Used: Ambu disposable scope  Feeding Tube Present in Nare: No  Adverse Reaction: No  Respiratory status: Trach collar (5L 28%)    Part I:  Anatomy:  General Comments:    Palate:   WFL Base of tongue:   WFL   Valleculae:   WFL Epiglottis:   Impaired: Abbreviated inversion   Arytenoids:   WFL False vocal folds:   WFL   Vocal folds:   WFL Pyriform sinus:   WFL     Part II:  Laryngeal Function:  Adduction:   ?Mildly reduced; Difficult to determine as patient with reduced breath support/effort Abduction:   Full   Arytenoid movement:   Symmetric Vocal quality:   WFL, Reduced vocal intensity     Part III:  Secretions:  [place] Secretion Rating (0-7): 0     Location:  0 - Nil significant pooled secretions in pyriform fossae or laryngeal vestibule Amount:   0 - Nil significant pooled secretions in pyriform fossae (0-20%) Response*:  0 - Secretions in pyriform fossae or laryngeal vestibule effectively cleared   Comments (e.g., quality/texture/color): NA   *Normal airway responses in the pharynx or laryngeal vestibule may include spontaneous coughing, throat clearing, and/or swallowing    Part IV:  Swallow Trials:  Consistency:  Thin liquid with PMV  Position of Bolus Pre-Swallow: Pyriform sinuses  [place] Pharyngeal Residue Severity Rating Scale: Valleculae residue: 2 - trace; 1-5%, trace coating of the mucosa and Pyriform sinus residue: 2 - verbalized understanding but will benefit from reniforcement. Patient/family have participated as able in goal setting and plan of care    Thank you,  Cris Fleischer, SLP  Minutes: 20     Problem: SLP Adult - Impaired Swallowing  Goal: By Discharge: Advance to least restrictive diet without signs or symptoms of aspiration for planned discharge setting. See evaluation for individualized goals. Description: Speech Therapy Goals  Initiated 9/17/23  1. Patient will tolerate PMV during all waking hours without adverse effects within 7 days. 2. Patient will initiate a diet without adverse effects within 7 days. Goal deferred; only cleared for sips of water per Surgery until further notice. 3. Patient will complete FEES for objective swallow evaluation of thin liquids only. Goal initiated 9/18/23. Goal met 9/18/23. 4. Patient will participate in dysphagia exercises (effortful swallow, EMST) with min cues for technique.  Goal initiated 9/18/23.   9/18/2023 1437 by Cris Fleischer, SLP  Outcome: Progressing  9/18/2023 1127 by Cris Fleischer, SLP  Outcome: Progressing

## 2023-09-18 NOTE — PLAN OF CARE
Problem: Physical Therapy - Adult  Goal: By Discharge: Performs mobility at highest level of function for planned discharge setting. See evaluation for individualized goals. Description: FUNCTIONAL STATUS PRIOR TO ADMISSION: Patient was independent and active without use of DME prior to last hospitalization at [facility] for abdominal surgery. D/c'ed to [facility] where she was for 1 week-- states she did not get out of bed at [facility] and worked on bed exercises. 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 On 8/6 patient transferred to the ICU in the setting of acute hypoxic respiratory failure d/t pulmonary edema requiring diuresis and eventual intubation (8/7 - 8/14). Patient reintubated 8/18 through current for worsening hypoxic respiratory failure. Course further complicated by persistent leukocytosis, fevers and CT evidence of intra-abdominal fluid collection post ultrasound drainage. HOME SUPPORT PRIOR TO ADMISSION: The patient lived with her  at baseline. Physical Therapy Goals  Revised 9/18/2023  1. Patient will move from supine to sit and sit to supine, scoot up and down, and roll side to side in bed with maximal assistance x2 within 7 day(s). 2.  Patient will perform sit to stand with maximal assistance x2 within 7 day(s). 3.  Patient will transfer from bed to chair and chair to bed with maximal assistance x2 using the least restrictive device within 7 day(s). 4.  Patient will sit edge of bed with minimal assist for 2 minutes within 7 day(s). Physical Therapy Goals  Revised 9/11/2023  1. Patient will move from supine to sit and sit to supine, scoot up and down, and roll side to side in bed with moderate assistance x2 within 7 day(s). 2.  Patient will perform sit to stand with max assistance x2 within 7 day(s).   3.  Patient will transfer from [facility] AM-PAC Short Forms Manual 4.0. Revised 2020. Pain Ratin/10   Pain Intervention(s):   pain is at a level acceptable to the patient    Activity Tolerance:   Fair , requires frequent rest breaks, and desaturates with activity and requires oxygen    After treatment:   Patient left in no apparent distress sitting up in chair, Call bell within reach, and Bed/ chair alarm activated    COMMUNICATION/EDUCATION:   The patient's plan of care was discussed with: occupational therapist and registered nurse    Patient Education  Education Given To: Patient  Education Provided: Role of Therapy;Plan of Care; Fall Prevention Strategies;Transfer Training  Education Method: Verbal;Demonstration  Barriers to Learning: Cognition  Education Outcome: Continued education needed    Thank you for this referral.  Hiram Justin PT, DPT  Minutes: 32

## 2023-09-18 NOTE — PROGRESS NOTES
CRITICAL CARE NOTE      Name: Kusum Silva   : 1960   MRN: [id number]   Date: 2023      REASON FOR ICU ADMISSION: Acute hypoxic respiratory failure    PRINCIPAL ICU DIAGNOSIS   Acute hypoxic respiratory failure    BRIEF PATIENT SUMMARY   54-year-old female who presented to the emergency department  from [facility] for weakness and Hematemesis. 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 She was readmitted to [facility] ( through ) at which time she underwent EGD revealing GJ stricture. CT abdomen/pelvis demonstrated loculated intraperitoneal fluid in the anterior abdomen with persistent free air.   she underwent US guided drainage +E. coli. She completed a course of Zosyn and drain removed 8/10.   G-tube was placed.  patient transferred to the ICU in the setting of acute hypoxic respiratory failure d/t pulmonary edema requiring diuresis and eventual intubation ( - ). Patient reintubated  through current for worsening hypoxic respiratory failure. Course further complicated by persistent leukocytosis, fevers and CT evidence of intra-abdominal fluid collection post ultrasound drainage. Repeat imaging  did not reveal any drainable fluid collection or free air. She had tracheostomy 9/3. She started PSV trials on . Thoracentesis on . On 9/15 she started trach collar trials. COMPREHENSIVE ASSESSMENT & PLAN:SYSTEM BASED     24 HOUR EVENTS: On vent overnight. Attempt continuous trach collar today.     NEUROLOGICAL:  Hx anxiety/depression   Close neurologic monitoring  Analgesia: Dilaudid  Delirium precautions    PULMONOLOGY:  Acute hypoxic respiratory failure  Pleural effusions  Pneumomediastinum   S/p tracheostomy on  by Dr. Glo Whitlock (#6 [place])  Aspiration precautions  Trach collar trials ongoing  S/p left thoracentesis     CARDIOVASCULAR:   Tele  MAP goal > radiograph September 3, 2023    FINDINGS: AP portable chest radiograph. The tracheostomy tube is in  satisfactory position. There is a LEFT upper extremity PICC line with the tip in  the mid SVC. Heart size is normal. Bilateral, diffuse interstitial and airspace  opacities persist. There is decreased right-sided effusion status post  thoracentesis. No pneumothorax is seen. There is a small LEFT effusion. Impression  Decreased right-sided effusion status post thoracentesis. No pneumothorax. The  lungs are unchanged. Most Recent CT  CT HEAD WO CONTRAST 09/07/2023    Narrative  HEAD CT WITHOUT CONTRAST: 9/7/2023 12:34 PM    INDICATION: Decreased movement on left side    COMPARISON: 8/1/2023. PROCEDURE: Axial images of the head were obtained without contrast. Coronal and  sagittal reformats were performed. CT dose reduction was achieved through use of  a standardized protocol tailored for this examination and automatic exposure  control for dose modulation. FINDINGS: No loss of gray-white differentiation to suggest late acute or early  subacute infarction. The ventricles and sulci are appropriate in size and  configuration for age. No mass effect or intracranial hemorrhage. There are  bilateral mastoid effusions. Impression  No acute intracranial abnormality. Most Recent MRI  No results found for this or any previous visit from the past 3650 days. Most Recent Echo  07/28/23    TRANSTHORACIC ECHOCARDIOGRAM (TTE) COMPLETE (CONTRAST/BUBBLE/3D PRN) 08/08/2023  1:51 PM (Final)    Interpretation Summary    Left Ventricle: Low normal left ventricular systolic function. EF by visual approximation is 50%. Left ventricle size is normal. Normal wall thickness. Mild hypokinesis of the following segments: mid anteroseptal and apical septal. Normal diastolic function. Tricuspid Valve: Mild regurgitation. Mildly elevated RVSP. The estimated RVSP is 51 mmHg.     Signed by: Suresh Araujo MD on 8/8/2023  1:51

## 2023-09-18 NOTE — PLAN OF CARE
Speech LAnguage Pathology TREATMENT    Patient: Hue Pollock (71 y.o. female)  Date: 9/18/2023  Primary Diagnosis: Hematemesis [K92.0]  GI bleed [K92.2]  Gastrointestinal hemorrhage, unspecified gastrointestinal hemorrhage type [K92.2]  Procedure(s) (LRB):  DEBRIDEMENT OF SACRAL DECUBITUS WOUND (N/A) 35 Days Post-Op   Precautions: Aspiration, Trach, Fall, Wounds       ASSESSMENT :  Therapy targeted swallowing and PMV. Patient presents with Shiley 6 cuffless (deflated) on trach collar at 5L/28%. White PMV placed for session without any change in vitals. Vocal quality clear with moderately reduced vocal intensity, resulting in diminished intelligibility. Suspect breath support impairment related to overall deconditioning as well as presence of trach. Discussed case with General Surgery Dr. Coby Daigle who stated patient is only cleared for comfort sips of water given ongoing ileorectal anastomotic leak. PO trials of thin liquids (water) given to patient. Oral phase appeared [facility]. Pharyngeal phase suggestive of potential aspiration as per immediate cough response. Will plan to complete FEES to evaluate appropriateness for comfort sips of water, particularly to reduce disuse atrophy of swallow musculature in setting of trach and deconditioning related to complex medical course, after further discussion by Adrián Daigle. Recommend PMV after ensuring cuff is fully deflated as tolerated while awake; would consider transition to cuffless trach as medically appropriate. Patient will benefit from skilled intervention to address the above impairments.      PLAN :  Recommendations and Planned Interventions:  Diet: NPO except comfort ice chips  FEES to evaluate tolerance of water (per discussion with General Surgery)  Non-oral medications and primary nutrition/hydration otherwise  Oral care 3-4x/daily   PMV as tolerated while awake   Ensure trach cuff fully deflated prior to PMV placement  Consider trach transition Ramírez Vergara MD at [facility] [place] SURGERY  01/2017    HEMICOLECTOMY W/ OSTOMY      march 2023.     HYSTERECTOMY (CERVIX STATUS UNKNOWN)  1985    INTUBATION  8/7/2023    INTUBATION  8/18/2023    IR FLUOROSCOPY GUIDED SPINAL INJECTION  5/5/2022    IR FLUOROSCOPY GUIDED SPINAL INJECTION  2/4/2021    IR GASTROSTOMY TUBE PLACEMENT PERCUTANEOUS  8/4/2023    IR GASTROSTOMY TUBE PLACEMENT PERCUTANEOUS 8/4/2023 [facility] RAD ANGIO IR    IR IVC FILTER PLACEMENT W IMAGING  8/2/2023    IR IVC FILTER PLACEMENT W IMAGING 8/2/2023 [facility] RAD ANGIO IR    LAPAROTOMY N/A 6/28/2023    LAPAROTOMY EXPLORATORY, LYSIS OF ADHESIONS, ABDOMINAL WASHOUT performed by Chase Johnson MD at [facility] STEREO BREAST BX ADD LESION RIGHT Right 2000    neg     [place] UNLISTED PROCEDURE CARDIAC SURGERY  11/2015    STENT PLACED via catheterization    PRESSURE ULCER DEBRIDEMENT N/A 8/14/2023    DEBRIDEMENT OF SACRAL DECUBITUS WOUND performed by Douglas Marquez MD at [facility] 12/2022    SIGMOIDOSCOPY N/A 05/30/2023    FLEXIBLE SIGMOIDOSCOPY performed by Nino Lezama MD at [address] [place] [address] N/A 6/8/2023    EXPLORATORY LAPAROTOMY; LYSIS OF ADHESIONS; EXCISION OF OMENTAL MASS; CREATION OF ILEOCOLIC ANASTOMOSIS performed by Destinee Pate MD at [facility] N/A 05/30/2023    EGD ESOPHAGOGASTRODUODENOSCOPY performed by Nino Lezama MD at [address] [employer]  05/30/2023    EGD BIOPSY performed by Nino Lezama MD at [address] [employer] N/A 7/13/2023    EGD ESOPHAGOGASTRODUODENOSCOPY with Dobhoff placement performed by Nino Lezama MD at [address] [employer]  7/13/2023    EGD DILATION BALLOON performed by Nino Lezama MD at [address] [employer] [employer] N/A 7/31/2023    EGD DIAGNOSTIC ONLY performed by Shun Rodrigues MD at [address] [place] [address] within 7 days. 2. Patient will initiate a diet without adverse effects within 7 days. Goal deferred; only cleared for sips of water per Surgery until further notice. 3. Patient will complete FEES for objective swallow evaluation of thin liquids only. Goal initiated 9/18/23.    Outcome: Progressing

## 2023-09-19 LAB
ANION GAP SERPL CALC-SCNC: 4 MMOL/L (ref 5–15)
BUN SERPL-MCNC: 36 MG/DL (ref 6–20)
BUN/CREAT SERPL: 109 (ref 12–20)
CALCIUM SERPL-MCNC: 9.7 MG/DL (ref 8.5–10.1)
CHLORIDE SERPL-SCNC: 105 MMOL/L (ref 97–108)
CO2 SERPL-SCNC: 28 MMOL/L (ref 21–32)
CREAT SERPL-MCNC: 0.33 MG/DL (ref 0.55–1.02)
GLUCOSE SERPL-MCNC: 144 MG/DL (ref 65–100)
MAGNESIUM SERPL-MCNC: 2 MG/DL (ref 1.6–2.4)
PHOSPHATE SERPL-MCNC: 3.4 MG/DL (ref 2.6–4.7)
POTASSIUM SERPL-SCNC: 4.1 MMOL/L (ref 3.5–5.1)
SODIUM SERPL-SCNC: 137 MMOL/L (ref 136–145)

## 2023-09-19 PROCEDURE — 6370000000 HC RX 637 (ALT 250 FOR IP): Performed by: SURGERY

## 2023-09-19 PROCEDURE — 36415 COLL VENOUS BLD VENIPUNCTURE: CPT

## 2023-09-19 PROCEDURE — 6360000002 HC RX W HCPCS: Performed by: NURSE PRACTITIONER

## 2023-09-19 PROCEDURE — C9113 INJ PANTOPRAZOLE SODIUM, VIA: HCPCS | Performed by: NURSE PRACTITIONER

## 2023-09-19 PROCEDURE — 6360000002 HC RX W HCPCS: Performed by: ANESTHESIOLOGY

## 2023-09-19 PROCEDURE — 36592 COLLECT BLOOD FROM PICC: CPT

## 2023-09-19 PROCEDURE — 2580000003 HC RX 258: Performed by: SURGERY

## 2023-09-19 PROCEDURE — 94640 AIRWAY INHALATION TREATMENT: CPT

## 2023-09-19 PROCEDURE — 84100 ASSAY OF PHOSPHORUS: CPT

## 2023-09-19 PROCEDURE — 83735 ASSAY OF MAGNESIUM: CPT

## 2023-09-19 PROCEDURE — 97606 NEG PRS WND THER DME>50 SQCM: CPT

## 2023-09-19 PROCEDURE — 6370000000 HC RX 637 (ALT 250 FOR IP): Performed by: NURSE PRACTITIONER

## 2023-09-19 PROCEDURE — 2000000000 HC ICU R&B

## 2023-09-19 PROCEDURE — 80048 BASIC METABOLIC PNL TOTAL CA: CPT

## 2023-09-19 PROCEDURE — 99232 SBSQ HOSP IP/OBS MODERATE 35: CPT | Performed by: INTERNAL MEDICINE

## 2023-09-19 PROCEDURE — 2580000003 HC RX 258: Performed by: NURSE PRACTITIONER

## 2023-09-19 PROCEDURE — 2500000003 HC RX 250 WO HCPCS: Performed by: NURSE PRACTITIONER

## 2023-09-19 PROCEDURE — A4216 STERILE WATER/SALINE, 10 ML: HCPCS | Performed by: NURSE PRACTITIONER

## 2023-09-19 RX ADMIN — Medication: at 00:29

## 2023-09-19 RX ADMIN — HYDROMORPHONE HYDROCHLORIDE 0.5 MG: 1 INJECTION, SOLUTION INTRAMUSCULAR; INTRAVENOUS; SUBCUTANEOUS at 20:00

## 2023-09-19 RX ADMIN — SODIUM CHLORIDE, PRESERVATIVE FREE 40 MG: 5 INJECTION INTRAVENOUS at 08:28

## 2023-09-19 RX ADMIN — I.V. FAT EMULSION 250 ML: 20 EMULSION INTRAVENOUS at 18:12

## 2023-09-19 RX ADMIN — SODIUM CHLORIDE, PRESERVATIVE FREE 10 ML: 5 INJECTION INTRAVENOUS at 20:09

## 2023-09-19 RX ADMIN — HEPARIN SODIUM 5000 UNITS: 5000 INJECTION INTRAVENOUS; SUBCUTANEOUS at 13:05

## 2023-09-19 RX ADMIN — HYDROMORPHONE HYDROCHLORIDE 0.5 MG: 1 INJECTION, SOLUTION INTRAMUSCULAR; INTRAVENOUS; SUBCUTANEOUS at 09:22

## 2023-09-19 RX ADMIN — NYSTATIN 500000 UNITS: 100000 SUSPENSION ORAL at 20:07

## 2023-09-19 RX ADMIN — Medication: at 07:40

## 2023-09-19 RX ADMIN — VASOPRESSIN: 20 INJECTION, SOLUTION INTRAVENOUS at 18:07

## 2023-09-19 RX ADMIN — ARFORMOTEROL TARTRATE 15 MCG: 15 SOLUTION RESPIRATORY (INHALATION) at 07:14

## 2023-09-19 RX ADMIN — Medication: at 18:00

## 2023-09-19 RX ADMIN — SODIUM CHLORIDE, PRESERVATIVE FREE 40 ML: 5 INJECTION INTRAVENOUS at 08:28

## 2023-09-19 RX ADMIN — BUDESONIDE 500 MCG: 0.5 INHALANT RESPIRATORY (INHALATION) at 19:57

## 2023-09-19 RX ADMIN — HYDROMORPHONE HYDROCHLORIDE 0.5 MG: 1 INJECTION, SOLUTION INTRAMUSCULAR; INTRAVENOUS; SUBCUTANEOUS at 15:15

## 2023-09-19 RX ADMIN — NYSTATIN 500000 UNITS: 100000 SUSPENSION ORAL at 07:41

## 2023-09-19 RX ADMIN — Medication: at 23:21

## 2023-09-19 RX ADMIN — HYDROMORPHONE HYDROCHLORIDE 0.5 MG: 1 INJECTION, SOLUTION INTRAMUSCULAR; INTRAVENOUS; SUBCUTANEOUS at 04:00

## 2023-09-19 RX ADMIN — NYSTATIN 500000 UNITS: 100000 SUSPENSION ORAL at 13:05

## 2023-09-19 RX ADMIN — HEPARIN SODIUM 5000 UNITS: 5000 INJECTION INTRAVENOUS; SUBCUTANEOUS at 04:53

## 2023-09-19 RX ADMIN — CHLORHEXIDINE GLUCONATE 15 ML: 1.2 RINSE ORAL at 07:40

## 2023-09-19 RX ADMIN — CHLORHEXIDINE GLUCONATE 15 ML: 1.2 RINSE ORAL at 20:07

## 2023-09-19 RX ADMIN — Medication 20 ML: at 20:09

## 2023-09-19 RX ADMIN — NYSTATIN 500000 UNITS: 100000 SUSPENSION ORAL at 15:17

## 2023-09-19 RX ADMIN — Medication 40 ML: at 08:28

## 2023-09-19 RX ADMIN — ARFORMOTEROL TARTRATE 15 MCG: 15 SOLUTION RESPIRATORY (INHALATION) at 19:57

## 2023-09-19 RX ADMIN — BUDESONIDE 500 MCG: 0.5 INHALANT RESPIRATORY (INHALATION) at 07:14

## 2023-09-19 RX ADMIN — HEPARIN SODIUM 5000 UNITS: 5000 INJECTION INTRAVENOUS; SUBCUTANEOUS at 21:48

## 2023-09-19 ASSESSMENT — PAIN DESCRIPTION - LOCATION: LOCATION: SACRUM

## 2023-09-19 ASSESSMENT — PAIN SCALES - GENERAL
PAINLEVEL_OUTOF10: 0
PAINLEVEL_OUTOF10: 7
PAINLEVEL_OUTOF10: 0
PAINLEVEL_OUTOF10: 4
PAINLEVEL_OUTOF10: 5
PAINLEVEL_OUTOF10: 8
PAINLEVEL_OUTOF10: 0
PAINLEVEL_OUTOF10: 0

## 2023-09-19 NOTE — PROGRESS NOTES
Comprehensive Nutrition Assessment    Type and Reason for Visit: Reassess    Nutrition Recommendations/Plan:     -Increase CHO to 225 gm per day in TPN    -If able to resume EN after CT scan recommend Vital AF 1.2 formula @ 20 ml/hr with 50 ml water flush q 4 hr         Malnutrition Assessment:  Malnutrition Status:  Severe malnutrition (08/01/23 1000)    Context:  Acute Illness     Findings of the 6 clinical characteristics of malnutrition:  Energy Intake:  50% or less of estimated energy requirements for 5 or more days  Weight Loss:  Unable to assess     Body Fat Loss:  Mild body fat loss Buccal region, Orbital   Muscle Mass Loss: Moderate muscle mass loss Temples (temporalis), Clavicles (pectoralis & deltoids)  Fluid Accumulation:  Moderate to Severe Generalized, Extremities   Strength:  Not Performed       Nutrition Assessment:    Pt admitted from [facility] d/t GI Bleed. Recent extended hospitalization @ [facility]-readmitted after episode of fulminant colitis requiring subtotal colectomy with end ileostomy, s/p reversal and subsequent ileocolonic anastomosis; EGD 7/13/23 showed nl esophagus, small hiatal hernia, evidence of previous RYGB with a tight stricture and ulcer at the gastro-jejunal anastomosis-s/p dilatation; recurrent C Diff colitis. PMHx: Gastric bypass 2017, CAD, DM 2, Dyslipidemia, GERD, PE.    9/19: Started TC trials 9/15 and tolerated TC ON for the first time! Sitting up in chair this afternoon. Repeat CT scan scheduled for Thursday 9/21. Surgery to resume feeds if no signs of ileorectal anastomotic leak. SLP following s/p trach placement. FEES yesterday-only able to have occasional ice chips when PMV on. Updated weight today reflects possible 10# loss since yesterday and 28# loss in a week? ? Wound vac attached to bed weighs 3.5 kg-suspect current weight taken without wound vac. RN zeroed bed; will re-weigh patient once back in beg.  Edema significantly improved in the past week (now only wounds  Inadequate oral intake related to altered GI structure as evidenced by nutrition support - parenteral nutrition    Nutrition Interventions:   Food and/or Nutrient Delivery: Continue Current Parenteral Nutrition  Nutrition Education/Counseling: No recommendation at this time  Coordination of Nutrition Care: Continue to monitor while inpatient       Goals:  Previous Goal Met: Goal(s) Achieved  Goals:  (PN to meet 90% estimated needs x 5-7 days.)  Specify Other Goals: PN to meet >/=85% of EEN over next 5-7 days or until able to resume EN support    Nutrition Monitoring and Evaluation:   Behavioral-Environmental Outcomes: None Identified  Food/Nutrient Intake Outcomes: Parenteral Nutrition Intake/Tolerance  Physical Signs/Symptoms Outcomes: Biochemical Data, Skin, Weight, GI Status, Fluid Status or Edema    Discharge Planning:     Too soon to determine     Martha Woods RD CNSC  Available via [employer] [place]

## 2023-09-19 NOTE — CARE COORDINATION
Transition of Care     RUR: 24% High  Prior Level of Functioning: Independent   Disposition: LTAC Vs IPR penidng ventilator weaning   Follow up appointments: PCP, Gen Surg   DME needed: TBD  Transportation at discharge: BLS vs ALS  IM/IMM Medicare 7/29/23   Is patient a  and connected with VA? No  Caregiver Contact:    Isai Brisenotye Finn: [phone]  Discharge Caregiver contacted prior to discharge? Yes  Care Conference needed? No  Barriers to discharge:    Medical stability  S/P trach placement 9/7, # 6 Shiley  Trach collar during the day, vent at night  Wound care following for sacral wound   TF on hold for concern of anastomotic disruption. Plan for CT on Thursday.    Bree Ceja RN,Care Management

## 2023-09-19 NOTE — WOUND CARE
Wound Care Note:     Follow-up visit for wound VAC dressing change. Chart shows:  Admitted for GI bleed with a history of anesthesia complication, arthritis, asthma, fibromyalgia, CAD s/p stents 11/2015, cardiac murmur, CHF, colitis, depression, diabetes, DVT, GERD, vascular access device, blood clots, hyperlipidemia, HTN, lumbar radiculitis, morbid obesity, musculoskeletal disorder, ANCA, pulmonary embolus, total abdominal hysterectomy, septic shock, and trigger finger    WBC = 10.6 on 9/17/23  Admitted from home    Assessment:   Patient is alert, mouths words, incontinent with some assistance needed in repositioning. Bed: In Touch Jansen  Patient has a Lutz. Diet: None  Patient pre-medicated for pain by RN. Removed 3 black and 1 Mepitel One    1. POA stage 4 pressure injury to sacral and bilateral buttocks looks the same, 40% tan and 60% red, wound edges with hyperpigmentation, small serous drainage, constantine-wound intact. Breakdown to constantine-wound has resolved. Negative pressure wound therapy applied. Skin prep applied to constantine-wound, drape applied to left hip for trac pad placement, 1 piece of black granufoam placed in wound bed with 1 piece of Mepitel One wrapped around, an additional piece of black granufoam used to bridge to left hip, occlusively sealed at 125 mmhg continuously. 2. POA right heel with small hyperpigmentation, non-blanchable, no open area. Venelex ointment applied and heel offloaded. 3.  POA left heel with unstageable pressure injury, wound is crusted, small purple, non-blanchable spot around 10  o'clock, no drainage, constantine-wound with some hyperpigmentation. Venelex ointment applied and heel offloaded. 4.  Right upper back with linear line that is hyperpigmented, no open area, no drainage. Left open to air.     5.  Right chest, near trach with skin tear, wound is improving with small open area proximally and

## 2023-09-20 ENCOUNTER — APPOINTMENT (OUTPATIENT)
Facility: HOSPITAL | Age: 63
DRG: 004 | End: 2023-09-20
Payer: MEDICARE

## 2023-09-20 LAB
ANION GAP SERPL CALC-SCNC: 6 MMOL/L (ref 5–15)
BASOPHILS # BLD: 0 K/UL (ref 0–0.1)
BASOPHILS NFR BLD: 0 % (ref 0–1)
BUN SERPL-MCNC: 40 MG/DL (ref 6–20)
BUN/CREAT SERPL: 148 (ref 12–20)
CALCIUM SERPL-MCNC: 9.4 MG/DL (ref 8.5–10.1)
CHLORIDE SERPL-SCNC: 104 MMOL/L (ref 97–108)
CO2 SERPL-SCNC: 28 MMOL/L (ref 21–32)
CREAT SERPL-MCNC: 0.27 MG/DL (ref 0.55–1.02)
DIFFERENTIAL METHOD BLD: ABNORMAL
EOSINOPHIL # BLD: 0.2 K/UL (ref 0–0.4)
EOSINOPHIL NFR BLD: 1 % (ref 0–7)
ERYTHROCYTE [DISTWIDTH] IN BLOOD BY AUTOMATED COUNT: 14.6 % (ref 11.5–14.5)
GLUCOSE SERPL-MCNC: 145 MG/DL (ref 65–100)
HCT VFR BLD AUTO: 26.7 % (ref 35–47)
HGB BLD-MCNC: 8.5 G/DL (ref 11.5–16)
IMM GRANULOCYTES # BLD AUTO: 0.2 K/UL (ref 0–0.04)
IMM GRANULOCYTES NFR BLD AUTO: 1 % (ref 0–0.5)
LYMPHOCYTES # BLD: 0.6 K/UL (ref 0.8–3.5)
LYMPHOCYTES NFR BLD: 3 % (ref 12–49)
MAGNESIUM SERPL-MCNC: 1.9 MG/DL (ref 1.6–2.4)
MCH RBC QN AUTO: 28.8 PG (ref 26–34)
MCHC RBC AUTO-ENTMCNC: 31.8 G/DL (ref 30–36.5)
MCV RBC AUTO: 90.5 FL (ref 80–99)
MONOCYTES # BLD: 0.8 K/UL (ref 0–1)
MONOCYTES NFR BLD: 4 % (ref 5–13)
NEUTS SEG # BLD: 19.2 K/UL (ref 1.8–8)
NEUTS SEG NFR BLD: 91 % (ref 32–75)
NRBC # BLD: 0 K/UL (ref 0–0.01)
NRBC BLD-RTO: 0 PER 100 WBC
PHOSPHATE SERPL-MCNC: 3.4 MG/DL (ref 2.6–4.7)
PLATELET # BLD AUTO: 481 K/UL (ref 150–400)
PMV BLD AUTO: 9.6 FL (ref 8.9–12.9)
POTASSIUM SERPL-SCNC: 4.1 MMOL/L (ref 3.5–5.1)
RBC # BLD AUTO: 2.95 M/UL (ref 3.8–5.2)
RBC MORPH BLD: ABNORMAL
SODIUM SERPL-SCNC: 138 MMOL/L (ref 136–145)
WBC # BLD AUTO: 21 K/UL (ref 3.6–11)

## 2023-09-20 PROCEDURE — 92507 TX SP LANG VOICE COMM INDIV: CPT

## 2023-09-20 PROCEDURE — 2580000003 HC RX 258: Performed by: NURSE PRACTITIONER

## 2023-09-20 PROCEDURE — 97530 THERAPEUTIC ACTIVITIES: CPT

## 2023-09-20 PROCEDURE — 6360000002 HC RX W HCPCS: Performed by: NURSE PRACTITIONER

## 2023-09-20 PROCEDURE — 2580000003 HC RX 258: Performed by: SURGERY

## 2023-09-20 PROCEDURE — C9113 INJ PANTOPRAZOLE SODIUM, VIA: HCPCS | Performed by: NURSE PRACTITIONER

## 2023-09-20 PROCEDURE — 36415 COLL VENOUS BLD VENIPUNCTURE: CPT

## 2023-09-20 PROCEDURE — 6360000002 HC RX W HCPCS: Performed by: ANESTHESIOLOGY

## 2023-09-20 PROCEDURE — A4216 STERILE WATER/SALINE, 10 ML: HCPCS | Performed by: NURSE PRACTITIONER

## 2023-09-20 PROCEDURE — 97535 SELF CARE MNGMENT TRAINING: CPT

## 2023-09-20 PROCEDURE — 84100 ASSAY OF PHOSPHORUS: CPT

## 2023-09-20 PROCEDURE — 6370000000 HC RX 637 (ALT 250 FOR IP): Performed by: NURSE PRACTITIONER

## 2023-09-20 PROCEDURE — 2500000003 HC RX 250 WO HCPCS: Performed by: NURSE PRACTITIONER

## 2023-09-20 PROCEDURE — 6370000000 HC RX 637 (ALT 250 FOR IP): Performed by: SURGERY

## 2023-09-20 PROCEDURE — 83735 ASSAY OF MAGNESIUM: CPT

## 2023-09-20 PROCEDURE — 92526 ORAL FUNCTION THERAPY: CPT

## 2023-09-20 PROCEDURE — 71045 X-RAY EXAM CHEST 1 VIEW: CPT

## 2023-09-20 PROCEDURE — 2000000000 HC ICU R&B

## 2023-09-20 PROCEDURE — 94640 AIRWAY INHALATION TREATMENT: CPT

## 2023-09-20 PROCEDURE — 80048 BASIC METABOLIC PNL TOTAL CA: CPT

## 2023-09-20 PROCEDURE — 85025 COMPLETE CBC W/AUTO DIFF WBC: CPT

## 2023-09-20 PROCEDURE — 2700000000 HC OXYGEN THERAPY PER DAY

## 2023-09-20 PROCEDURE — 97110 THERAPEUTIC EXERCISES: CPT

## 2023-09-20 RX ADMIN — ARFORMOTEROL TARTRATE 15 MCG: 15 SOLUTION RESPIRATORY (INHALATION) at 19:54

## 2023-09-20 RX ADMIN — SODIUM CHLORIDE, PRESERVATIVE FREE 40 ML: 5 INJECTION INTRAVENOUS at 08:56

## 2023-09-20 RX ADMIN — NYSTATIN 500000 UNITS: 100000 SUSPENSION ORAL at 18:08

## 2023-09-20 RX ADMIN — Medication: at 08:52

## 2023-09-20 RX ADMIN — BUDESONIDE 500 MCG: 0.5 INHALANT RESPIRATORY (INHALATION) at 19:54

## 2023-09-20 RX ADMIN — NYSTATIN 500000 UNITS: 100000 SUSPENSION ORAL at 14:43

## 2023-09-20 RX ADMIN — HEPARIN SODIUM 5000 UNITS: 5000 INJECTION INTRAVENOUS; SUBCUTANEOUS at 14:43

## 2023-09-20 RX ADMIN — CHLORHEXIDINE GLUCONATE 15 ML: 1.2 RINSE ORAL at 08:52

## 2023-09-20 RX ADMIN — CHLORHEXIDINE GLUCONATE 15 ML: 1.2 RINSE ORAL at 20:22

## 2023-09-20 RX ADMIN — VASOPRESSIN: 20 INJECTION, SOLUTION INTRAVENOUS at 18:15

## 2023-09-20 RX ADMIN — LABETALOL HYDROCHLORIDE 10 MG: 5 INJECTION INTRAVENOUS at 23:22

## 2023-09-20 RX ADMIN — HEPARIN SODIUM 5000 UNITS: 5000 INJECTION INTRAVENOUS; SUBCUTANEOUS at 22:00

## 2023-09-20 RX ADMIN — HYDROMORPHONE HYDROCHLORIDE 0.5 MG: 1 INJECTION, SOLUTION INTRAMUSCULAR; INTRAVENOUS; SUBCUTANEOUS at 04:30

## 2023-09-20 RX ADMIN — I.V. FAT EMULSION 250 ML: 20 EMULSION INTRAVENOUS at 18:17

## 2023-09-20 RX ADMIN — NYSTATIN 500000 UNITS: 100000 SUSPENSION ORAL at 20:22

## 2023-09-20 RX ADMIN — Medication 10 ML: at 20:22

## 2023-09-20 RX ADMIN — HEPARIN SODIUM 5000 UNITS: 5000 INJECTION INTRAVENOUS; SUBCUTANEOUS at 05:58

## 2023-09-20 RX ADMIN — Medication 30 ML: at 08:57

## 2023-09-20 RX ADMIN — SODIUM CHLORIDE, PRESERVATIVE FREE 10 ML: 5 INJECTION INTRAVENOUS at 20:24

## 2023-09-20 RX ADMIN — ARFORMOTEROL TARTRATE 15 MCG: 15 SOLUTION RESPIRATORY (INHALATION) at 07:19

## 2023-09-20 RX ADMIN — Medication: at 18:08

## 2023-09-20 RX ADMIN — NYSTATIN 500000 UNITS: 100000 SUSPENSION ORAL at 08:52

## 2023-09-20 RX ADMIN — BUDESONIDE 500 MCG: 0.5 INHALANT RESPIRATORY (INHALATION) at 07:19

## 2023-09-20 RX ADMIN — Medication: at 23:46

## 2023-09-20 RX ADMIN — SODIUM CHLORIDE, PRESERVATIVE FREE 40 MG: 5 INJECTION INTRAVENOUS at 08:51

## 2023-09-20 ASSESSMENT — PAIN SCALES - GENERAL
PAINLEVEL_OUTOF10: 0
PAINLEVEL_OUTOF10: 4
PAINLEVEL_OUTOF10: 0

## 2023-09-20 NOTE — CARE COORDINATION
Transition of Care     RUR: 24% High  Prior Level of Functioning: Independent   Disposition: LTAC Vs IPR penidng ventilator weaning   Follow up appointments: PCP, Gen Surg   DME needed: TBD  Transportation at discharge: BLS vs ALS  IM/IMM Medicare 7/29/23   Is patient a  and connected with VA? No  Caregiver Contact:    Ruddy Vargas Click: [phone]  Discharge Caregiver contacted prior to discharge? Yes  Care Conference needed? No  Barriers to discharge:    Medical stability  S/P trach placement 9/7, # 6 Shiley  Patient tolerating Trach collar. Wound care following for sacral wound   TF on hold for concern of anastomotic disruption. Plan for CT on Thursday. Care manager spoke with  regarding transitions of care. Patient does not need LTAC level of care. Therapy is recommending rehab at SNF level of care.  requested CM leave the list in the room, he visits in the evening. Will follow up for choice later in the week. If plans change and patient would need IPR he would like for her to return to [address] [place] [address] Fredy Brianvard.    Monica Sarah RN,Care Management  Monica Sarah RN,Care Management

## 2023-09-20 NOTE — PROGRESS NOTES
Palliative Medicine   [place] [phone] (COPE)    Palliative Medicine has been following along this patient's care, goals have been clear- s/p tracheostomy, working with PT/OT/Speech. Palliative Medicine will sign off, please re-consult our team if we can be further support.      Thank you for including Palliative Medicine in the care of  [place], [place]

## 2023-09-20 NOTE — PROGRESS NOTES
The  was doing his rounds in ICU. Pt was unconscious. No family member was around. Will visit again another time.     [address]

## 2023-09-20 NOTE — PLAN OF CARE
Problem: Physical Therapy - Adult  Goal: By Discharge: Performs mobility at highest level of function for planned discharge setting. See evaluation for individualized goals. Description: FUNCTIONAL STATUS PRIOR TO ADMISSION: Patient was independent and active without use of DME prior to last hospitalization at [facility] for abdominal surgery. D/c'ed to [facility] where she was for 1 week-- states she did not get out of bed at [facility] and worked on bed exercises. 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 On 8/6 patient transferred to the ICU in the setting of acute hypoxic respiratory failure d/t pulmonary edema requiring diuresis and eventual intubation (8/7 - 8/14). Patient reintubated 8/18 through current for worsening hypoxic respiratory failure. Course further complicated by persistent leukocytosis, fevers and CT evidence of intra-abdominal fluid collection post ultrasound drainage. HOME SUPPORT PRIOR TO ADMISSION: The patient lived with her  at baseline. Physical Therapy Goals  Revised 9/18/2023  1. Patient will move from supine to sit and sit to supine, scoot up and down, and roll side to side in bed with maximal assistance x2 within 7 day(s). 2.  Patient will perform sit to stand with maximal assistance x2 within 7 day(s). 3.  Patient will transfer from bed to chair and chair to bed with maximal assistance x2 using the least restrictive device within 7 day(s). 4.  Patient will sit edge of bed with minimal assist for 2 minutes within 7 day(s). Physical Therapy Goals  Revised 9/11/2023  1. Patient will move from supine to sit and sit to supine, scoot up and down, and roll side to side in bed with moderate assistance x2 within 7 day(s). 2.  Patient will perform sit to stand with max assistance x2 within 7 day(s).   3.  Patient will transfer from

## 2023-09-20 NOTE — PROGRESS NOTES
CRITICAL CARE NOTE      Name: Rodo Gunderson   : 1960   MRN: [id number]   Date: 2023      REASON FOR ICU ADMISSION: Acute hypoxic respiratory failure    PRINCIPAL ICU DIAGNOSIS   Acute hypoxic respiratory failure    BRIEF PATIENT SUMMARY   70-year-old female who presented to the emergency department  [facility] and Hematemesis. 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 She was readmitted to [facility] ( through ) at which time she underwent EGD revealing GJ stricture. CT abdomen/pelvis demonstrated loculated intraperitoneal fluid in the anterior abdomen with persistent free air.   she underwent US guided drainage +E. coli. She completed a course of Zosyn and drain removed 8/10.   G-tube was placed.  patient transferred to the ICU in the setting of acute hypoxic respiratory failure d/t pulmonary edema requiring diuresis and eventual intubation ( - ). Patient reintubated  through current for worsening hypoxic respiratory failure. Course further complicated by persistent leukocytosis, fevers and CT evidence of intra-abdominal fluid collection post ultrasound drainage. Repeat imaging  did not reveal any drainable fluid collection or free air. She had tracheostomy 9/3. She started PSV trials on . Thoracentesis on . On 9/15 she started trach collar trials.     COMPREHENSIVE ASSESSMENT & PLAN:SYSTEM BASED     24 HOUR EVENTS: Continuous trach collar     NEUROLOGICAL:  Hx anxiety/depression   Close neurologic monitoring  Analgesia: Dilaudid  Delirium precautions    PULMONOLOGY:  Acute hypoxic respiratory failure  Pleural effusions  Pneumomediastinum   S/p tracheostomy on  by Dr. Jackie Avery (#6 [facility])  Aspiration precautions  Trach collar trials ongoing  S/p left thoracentesis     CARDIOVASCULAR:   Tele  MAP goal > 65    GASTROINTESTINAL:  Severe protein TPN for nutrition. H&H holding 7.5/20.5.    8/30: Hg 6.5 this morning, Tx 1 PRBCs. Renal function stable. Family meeting today to discuss [facility]. AM CXR demonstrates interval decrease in pneumomediastinum and ongoing right wall chest emphysema. Persistent bilateral effusions with patchy interstitial and airspace opacities. Bedside point of care US re-demonstrates moderate bilateral pleural effusions L>R. Will do L thoracentesis today and likely right thoracentesis tomorrow. 9/1: Morning CXR re demonstrates stable pneumomediastinum and improvement in pleural effusions. F/U procalcitonin 0.62 and lactic acid 0.7, WBC 20. Appears more comfortable w/ativan. Weaning precdex. Ps 15/5.   9/3: Piper Gutierrez today at bedside. 9/4: PSV trial today. 9/5: 15/5 PSV trial again this morning. Tolerated for 10 hours. Will talk to ID about peeling back antibiotics. 9/6: 15/5 PSV trial again today  9/7: CT abdomen pelvis this morning. CT negative for leak. Has large pleural effusion PSV trial later today  9/8: 15/5 PSV trial.  Thoracentesis today. 9/9: Trial lower PSV today. Start tube feeding. 9/10 - 11: Alert this morning. TID PS trials. PT/OT encourage OOB to chair. Increased gas/drainage from bag felt to be ongoing evidence of anastomotic disruption per surgeon Dr. Marlene Ventura. Trickle feeds will have to be held at this time an return to TPN solely for nutritional support. Wound care following for chronic unstageable sacral, bilateral heel decubitus and skin tears- wound-vac changed. 9/13: TPN  9/14: Continue OOB to chair w/PT/OT, SBT TID. Bowel rest and TPN for nutritional support. 9/15: Tolerating longer periods of PS, trach collar trial. OOB to chair w/PT/OT, afebrile. Bowel rest and TPN for nutritional support. Slowly improving.   9/17: Continuous trach collar trial today. Continue TPN. 9/18: Vent overnight. Continuous TCT today. Plan for CT Thursday. 9/20: Continuous trach collar trial.  CT tomorrow.     SUBJECTIVE

## 2023-09-20 NOTE — PLAN OF CARE
Problem: Occupational Therapy - Adult  Goal: By Discharge: Performs self-care activities at highest level of function for planned discharge setting. See evaluation for individualized goals. Description: FUNCTIONAL STATUS PRIOR TO ADMISSION:  Patient was independent, active, working and a  prior to admission at [facility]. She was independent to mod I for all self-care and functional mobility. Since onset of illness the patient has been in need of assistance for all ADLs and functional mobility. DC to IPR prior to her admission, however, limited there and stating she was unable to complete much OOB therapy. She is able to verbalize and demonstrate a home exercise program involving UE movement. HOME SUPPORT: Patient lived with spouse but didn't require assistance at her true baseline. Occupational Therapy Goals: WEEKLY REASSESSMENT 9/18  Initiated 9/11/2023   1. Patient will perform  simple grooming routine, in supported sitting, with moderate assistance within 7 day(s). 2.  Patient will perform upper body dressing with moderate assistance within 7 day(s). 3.  Patient will perform anterior neck to thigh bathing with moderate assist within 7 day(s). 4.  Patient will perform lateral rolling in prep for completion of toileting routine with maximal assistance within 7 days. 5.  Patient will participate in upper extremity therapeutic exercise/activities with minimal assistance for 3 minutes within 7 day(s). Occupational Therapy Goals:  Initiated 9/11/2023 at re-evaluation:   1. Patient will perform  simple grooming routine, in supported sitting, with moderate assistance within 7 day(s). 2.  Patient will perform upper body dressing with moderate assistance within 7 day(s). 3.  Patient will perform anterior neck to thigh bathing with moderate assist within 7 day(s).   4.  Patient will perform lateral rolling in prep for completion of toileting routine with maximal assistance within 40s, returned to EOB and patient reported seeing black spots. Transferred back to bed and repositioned for comfort, HR returned to 70, BP stable. Placed bed in chair position, repositioned for comfort and left with all needs in reach, NAD. Anticipate patient could be good candidate for [facility] next session. PLAN :  Patient continues to benefit from skilled intervention to address the above impairments. Continue treatment per established plan of care to address goals. Recommend with staff: HOB elevated or bed in chair position for meals if tolerated, bedpan/engle for toileting    Recommend next OT session: OOB ADL, transfer to standing in prep for stand pivot transfers to [facility] [place] [employer] trial    Recommendation for discharge: (in order for the patient to meet his/her long term goals): Therapy up to 5 days/week in Skilled nursing facility    Other factors to consider for discharge: patient's current support system is unable to meet their requirements for physical assistance, high risk for falls, not safe to be alone, and concern for safely navigating or managing the home environment    IF patient discharges home will need the following DME: continuing to assess with progress       SUBJECTIVE:   Patient wrote \"Walker? \" Asking to try standing with a RW    OBJECTIVE DATA SUMMARY:   Cognitive/Behavioral Status:  Orientation  Overall Orientation Status: Within Normal Limits  Orientation Level: Oriented X4  Cognition  Arousal/Alertness: Appropriate responses to stimuli  Following Commands: Follows one step commands with increased time; Follows one step commands with repetition  Attention Span: Appears intact  Safety Judgement: Good awareness of safety precautions  Insights: Decreased awareness of deficits  Initiation: Requires cues for some  Sequencing: Requires cues for some    Functional Mobility and Transfers for ADLs:  Bed Mobility:  Bed Mobility Training  Supine to Sit: Maximum assistance;Assist

## 2023-09-20 NOTE — PLAN OF CARE
Speech LAnguage Pathology TREATMENT    Patient: Jon Llamas (67 y.o. female)  Date: 9/20/2023  Primary Diagnosis: Hematemesis [K92.0]  GI bleed [K92.2]  Gastrointestinal hemorrhage, unspecified gastrointestinal hemorrhage type [K92.2]  Procedure(s) (LRB):  DEBRIDEMENT OF SACRAL DECUBITUS WOUND (N/A) 37 Days Post-Op   Precautions:  Fall Risk                  ASSESSMENT :  Based on the objective data described below, the patient presents with good tolerance of PMV on this date absent any adverse effects, evidence of breath stacking/back pressure indicative of CO2 retention when doffing PMV, and stable vital signs. Recommend pt utilize PMV as tolerated during waking hours for swallowing purposes, communication, trach weaning, and secretion management. Additionally, introduced effortful swallow exercises to patient on this date. Pt able to complete three trials with min cues. Recommend pt continue to complete these exercises with ice chips after oral care and PMV donned throughout the day to begin utilizing swallow muscles to combat deconditioning. Will continue to follow. Patient will benefit from skilled intervention to address the above impairments. PLAN :  Recommendations and Planned Interventions:  Diet: ice chips and PEG tube    Effortful swallow exercises with ice chips after oral care (3x every 1-2 hours as able)  PMV for swallowing exercises and as tolerated during waking hours. Acute SLP Services: Yes, SLP will continue to follow per plan of care.     Discharge Recommendations: Yes, recommend SLP treatment at next level of care     SUBJECTIVE:   Patient shook her head, \"No\"    OBJECTIVE:     Past Medical History:   Diagnosis Date    Anesthesia complication 76/8658    unable to lift feet & legs post op 3/2023, admitted to rehab post op; patient states \"due to anesthesia\"    Arthritis     OA back,knees and hands    Asthma     Autoimmune disease ([address] )     FIBROMYLGIA    CAD (coronary artery EXPLORATORY, LYSIS OF ADHESIONS, ABDOMINAL WASHOUT performed by Tiffany Rojo MD at [address] [place] [address] ADD LESION RIGHT Right 2000    neg     GA UNLISTED PROCEDURE CARDIAC SURGERY  11/2015    STENT PLACED via catheterization    PRESSURE ULCER DEBRIDEMENT N/A 8/14/2023    DEBRIDEMENT OF SACRAL DECUBITUS WOUND performed by Noe Eng MD at [facility] 12/2022    SIGMOIDOSCOPY N/A 05/30/2023    FLEXIBLE SIGMOIDOSCOPY performed by Preston Cherry MD at [address] [place] [address] N/A 6/8/2023    EXPLORATORY LAPAROTOMY; LYSIS OF ADHESIONS; EXCISION OF OMENTAL MASS; CREATION OF ILEOCOLIC ANASTOMOSIS performed by Olive Araiza MD at [facility] N/A 05/30/2023    EGD ESOPHAGOGASTRODUODENOSCOPY performed by Preston Cherry MD at [address]  05/30/2023    EGD BIOPSY performed by Preston Cherry MD at [address] N/A 7/13/2023    EGD ESOPHAGOGASTRODUODENOSCOPY with Dobhoff placement performed by Preston Cherry MD at [address]  7/13/2023    EGD DILATION BALLOON performed by Preston Cherry MD at [address] N/A 7/31/2023    EGD DIAGNOSTIC ONLY performed by Mitzi Ashley MD at [facility] [place] 7/31/2023    EGD BIOPSY performed by Mitzi Ashley MD at [address] [place] [address]  7/31/2023    [address] 7/31/2023 Gladis Shah APRN - NP [facility] [address] [facility]  8/22/2023    US ABSCESS DRAINAGE PERITONEAL 8/22/2023 Triny Wilson PA-C [facility]      Prior Level of Function/Home Situation:   Social/Functional History  Lives With: Spouse  Type of Home: House  Home Layout: Two level, Able to Live on Main level with bedroom/bathroom  Home Access: Stairs to enter with rails  Entrance Stairs - Number

## 2023-09-21 ENCOUNTER — APPOINTMENT (OUTPATIENT)
Facility: HOSPITAL | Age: 63
DRG: 004 | End: 2023-09-21
Payer: MEDICARE

## 2023-09-21 LAB
ANION GAP SERPL CALC-SCNC: 0 MMOL/L (ref 5–15)
BASOPHILS # BLD: 0.1 K/UL (ref 0–0.1)
BASOPHILS NFR BLD: 0 % (ref 0–1)
BUN SERPL-MCNC: 41 MG/DL (ref 6–20)
BUN/CREAT SERPL: 137 (ref 12–20)
CALCIUM SERPL-MCNC: 9.6 MG/DL (ref 8.5–10.1)
CHLORIDE SERPL-SCNC: 105 MMOL/L (ref 97–108)
CO2 SERPL-SCNC: 31 MMOL/L (ref 21–32)
CREAT SERPL-MCNC: 0.3 MG/DL (ref 0.55–1.02)
DIFFERENTIAL METHOD BLD: ABNORMAL
EOSINOPHIL # BLD: 0.6 K/UL (ref 0–0.4)
EOSINOPHIL NFR BLD: 4 % (ref 0–7)
ERYTHROCYTE [DISTWIDTH] IN BLOOD BY AUTOMATED COUNT: 14.7 % (ref 11.5–14.5)
GLUCOSE SERPL-MCNC: 123 MG/DL (ref 65–100)
HCT VFR BLD AUTO: 25.9 % (ref 35–47)
HGB BLD-MCNC: 8 G/DL (ref 11.5–16)
IMM GRANULOCYTES # BLD AUTO: 0.2 K/UL (ref 0–0.04)
IMM GRANULOCYTES NFR BLD AUTO: 1 % (ref 0–0.5)
LYMPHOCYTES # BLD: 0.8 K/UL (ref 0.8–3.5)
LYMPHOCYTES NFR BLD: 6 % (ref 12–49)
MAGNESIUM SERPL-MCNC: 2 MG/DL (ref 1.6–2.4)
MCH RBC QN AUTO: 28.7 PG (ref 26–34)
MCHC RBC AUTO-ENTMCNC: 30.9 G/DL (ref 30–36.5)
MCV RBC AUTO: 92.8 FL (ref 80–99)
MONOCYTES # BLD: 1.1 K/UL (ref 0–1)
MONOCYTES NFR BLD: 8 % (ref 5–13)
NEUTS SEG # BLD: 11.2 K/UL (ref 1.8–8)
NEUTS SEG NFR BLD: 81 % (ref 32–75)
NRBC # BLD: 0 K/UL (ref 0–0.01)
NRBC BLD-RTO: 0 PER 100 WBC
PHOSPHATE SERPL-MCNC: 3.2 MG/DL (ref 2.6–4.7)
PLATELET # BLD AUTO: 480 K/UL (ref 150–400)
PMV BLD AUTO: 9.9 FL (ref 8.9–12.9)
POTASSIUM SERPL-SCNC: 4.6 MMOL/L (ref 3.5–5.1)
RBC # BLD AUTO: 2.79 M/UL (ref 3.8–5.2)
SODIUM SERPL-SCNC: 136 MMOL/L (ref 136–145)
WBC # BLD AUTO: 13.9 K/UL (ref 3.6–11)

## 2023-09-21 PROCEDURE — 36415 COLL VENOUS BLD VENIPUNCTURE: CPT

## 2023-09-21 PROCEDURE — A4216 STERILE WATER/SALINE, 10 ML: HCPCS | Performed by: NURSE PRACTITIONER

## 2023-09-21 PROCEDURE — 6370000000 HC RX 637 (ALT 250 FOR IP): Performed by: SURGERY

## 2023-09-21 PROCEDURE — 2500000003 HC RX 250 WO HCPCS: Performed by: NURSE PRACTITIONER

## 2023-09-21 PROCEDURE — 85025 COMPLETE CBC W/AUTO DIFF WBC: CPT

## 2023-09-21 PROCEDURE — 6360000002 HC RX W HCPCS: Performed by: NURSE PRACTITIONER

## 2023-09-21 PROCEDURE — C9113 INJ PANTOPRAZOLE SODIUM, VIA: HCPCS | Performed by: NURSE PRACTITIONER

## 2023-09-21 PROCEDURE — 2580000003 HC RX 258: Performed by: SURGERY

## 2023-09-21 PROCEDURE — 6360000002 HC RX W HCPCS: Performed by: ANESTHESIOLOGY

## 2023-09-21 PROCEDURE — 6360000002 HC RX W HCPCS: Performed by: SURGERY

## 2023-09-21 PROCEDURE — 92526 ORAL FUNCTION THERAPY: CPT

## 2023-09-21 PROCEDURE — 2580000003 HC RX 258: Performed by: NURSE PRACTITIONER

## 2023-09-21 PROCEDURE — 94640 AIRWAY INHALATION TREATMENT: CPT

## 2023-09-21 PROCEDURE — 97535 SELF CARE MNGMENT TRAINING: CPT

## 2023-09-21 PROCEDURE — 80048 BASIC METABOLIC PNL TOTAL CA: CPT

## 2023-09-21 PROCEDURE — 6360000004 HC RX CONTRAST MEDICATION: Performed by: RADIOLOGY

## 2023-09-21 PROCEDURE — 84100 ASSAY OF PHOSPHORUS: CPT

## 2023-09-21 PROCEDURE — 83735 ASSAY OF MAGNESIUM: CPT

## 2023-09-21 PROCEDURE — 2000000000 HC ICU R&B

## 2023-09-21 PROCEDURE — 97530 THERAPEUTIC ACTIVITIES: CPT

## 2023-09-21 PROCEDURE — 74177 CT ABD & PELVIS W/CONTRAST: CPT

## 2023-09-21 RX ADMIN — HYDROMORPHONE HYDROCHLORIDE 0.5 MG: 1 INJECTION, SOLUTION INTRAMUSCULAR; INTRAVENOUS; SUBCUTANEOUS at 16:19

## 2023-09-21 RX ADMIN — Medication: at 09:19

## 2023-09-21 RX ADMIN — HEPARIN SODIUM 5000 UNITS: 5000 INJECTION INTRAVENOUS; SUBCUTANEOUS at 21:50

## 2023-09-21 RX ADMIN — HYDROMORPHONE HYDROCHLORIDE 0.5 MG: 1 INJECTION, SOLUTION INTRAMUSCULAR; INTRAVENOUS; SUBCUTANEOUS at 09:29

## 2023-09-21 RX ADMIN — HEPARIN SODIUM 5000 UNITS: 5000 INJECTION INTRAVENOUS; SUBCUTANEOUS at 06:00

## 2023-09-21 RX ADMIN — Medication: at 16:19

## 2023-09-21 RX ADMIN — SODIUM CHLORIDE, PRESERVATIVE FREE 40 MG: 5 INJECTION INTRAVENOUS at 09:19

## 2023-09-21 RX ADMIN — BUDESONIDE 500 MCG: 0.5 INHALANT RESPIRATORY (INHALATION) at 07:56

## 2023-09-21 RX ADMIN — HEPARIN SODIUM 5000 UNITS: 5000 INJECTION INTRAVENOUS; SUBCUTANEOUS at 14:22

## 2023-09-21 RX ADMIN — SODIUM CHLORIDE, PRESERVATIVE FREE 10 ML: 5 INJECTION INTRAVENOUS at 20:03

## 2023-09-21 RX ADMIN — IOPAMIDOL 100 ML: 755 INJECTION, SOLUTION INTRAVENOUS at 13:01

## 2023-09-21 RX ADMIN — ARFORMOTEROL TARTRATE 15 MCG: 15 SOLUTION RESPIRATORY (INHALATION) at 07:56

## 2023-09-21 RX ADMIN — HYDROMORPHONE HYDROCHLORIDE 0.5 MG: 1 INJECTION, SOLUTION INTRAMUSCULAR; INTRAVENOUS; SUBCUTANEOUS at 21:01

## 2023-09-21 RX ADMIN — ALBUTEROL SULFATE 2.5 MG: 2.5 SOLUTION RESPIRATORY (INHALATION) at 17:11

## 2023-09-21 RX ADMIN — BUDESONIDE 500 MCG: 0.5 INHALANT RESPIRATORY (INHALATION) at 19:30

## 2023-09-21 RX ADMIN — HYDROMORPHONE HYDROCHLORIDE 0.5 MG: 1 INJECTION, SOLUTION INTRAMUSCULAR; INTRAVENOUS; SUBCUTANEOUS at 04:54

## 2023-09-21 RX ADMIN — ARFORMOTEROL TARTRATE 15 MCG: 15 SOLUTION RESPIRATORY (INHALATION) at 19:30

## 2023-09-21 RX ADMIN — Medication 10 ML: at 20:03

## 2023-09-21 RX ADMIN — HYDROMORPHONE HYDROCHLORIDE 0.5 MG: 1 INJECTION, SOLUTION INTRAMUSCULAR; INTRAVENOUS; SUBCUTANEOUS at 12:29

## 2023-09-21 RX ADMIN — HYDROMORPHONE HYDROCHLORIDE 0.5 MG: 1 INJECTION, SOLUTION INTRAMUSCULAR; INTRAVENOUS; SUBCUTANEOUS at 01:46

## 2023-09-21 RX ADMIN — CHLORHEXIDINE GLUCONATE 15 ML: 1.2 RINSE ORAL at 20:02

## 2023-09-21 RX ADMIN — I.V. FAT EMULSION 250 ML: 20 EMULSION INTRAVENOUS at 19:06

## 2023-09-21 RX ADMIN — ONDANSETRON 4 MG: 2 INJECTION INTRAMUSCULAR; INTRAVENOUS at 18:15

## 2023-09-21 RX ADMIN — SODIUM CHLORIDE, PRESERVATIVE FREE 40 ML: 5 INJECTION INTRAVENOUS at 09:20

## 2023-09-21 RX ADMIN — VASOPRESSIN: 20 INJECTION, SOLUTION INTRAVENOUS at 19:06

## 2023-09-21 RX ADMIN — IOHEXOL 50 ML: 240 INJECTION, SOLUTION INTRATHECAL; INTRAVASCULAR; INTRAVENOUS; ORAL at 13:03

## 2023-09-21 RX ADMIN — CHLORHEXIDINE GLUCONATE 15 ML: 1.2 RINSE ORAL at 09:20

## 2023-09-21 ASSESSMENT — PAIN DESCRIPTION - LOCATION
LOCATION: ABDOMEN

## 2023-09-21 ASSESSMENT — PAIN SCALES - GENERAL
PAINLEVEL_OUTOF10: 0
PAINLEVEL_OUTOF10: 7
PAINLEVEL_OUTOF10: 0
PAINLEVEL_OUTOF10: 7
PAINLEVEL_OUTOF10: 0
PAINLEVEL_OUTOF10: 8
PAINLEVEL_OUTOF10: 0
PAINLEVEL_OUTOF10: 7
PAINLEVEL_OUTOF10: 0
PAINLEVEL_OUTOF10: 7
PAINLEVEL_OUTOF10: 0
PAINLEVEL_OUTOF10: 7

## 2023-09-21 ASSESSMENT — PAIN DESCRIPTION - ORIENTATION
ORIENTATION: MID

## 2023-09-21 NOTE — PLAN OF CARE
Problem: Occupational Therapy - Adult  Goal: By Discharge: Performs self-care activities at highest level of function for planned discharge setting. See evaluation for individualized goals. Description: FUNCTIONAL STATUS PRIOR TO ADMISSION:  Patient was independent, active, working and a  prior to admission at [facility]. She was independent to mod I for all self-care and functional mobility. Since onset of illness the patient has been in need of assistance for all ADLs and functional mobility. DC to IPR prior to her admission, however, limited there and stating she was unable to complete much OOB therapy. She is able to verbalize and demonstrate a home exercise program involving UE movement. HOME SUPPORT: Patient lived with spouse but didn't require assistance at her true baseline. Occupational Therapy Goals: WEEKLY REASSESSMENT 9/18  Initiated 9/11/2023   1. Patient will perform  simple grooming routine, in supported sitting, with moderate assistance within 7 day(s). 2.  Patient will perform upper body dressing with moderate assistance within 7 day(s). 3.  Patient will perform anterior neck to thigh bathing with moderate assist within 7 day(s). 4.  Patient will perform lateral rolling in prep for completion of toileting routine with maximal assistance within 7 days. 5.  Patient will participate in upper extremity therapeutic exercise/activities with minimal assistance for 3 minutes within 7 day(s). Occupational Therapy Goals:  Initiated 9/11/2023 at re-evaluation:   1. Patient will perform  simple grooming routine, in supported sitting, with moderate assistance within 7 day(s). 2.  Patient will perform upper body dressing with moderate assistance within 7 day(s). 3.  Patient will perform anterior neck to thigh bathing with moderate assist within 7 day(s).   4.  Patient will perform lateral rolling in prep for completion of toileting routine with maximal assistance within 7 days. 5.  Patient will participate in upper extremity therapeutic exercise/activities with minimal assistance for 3 minutes within 7 day(s). Initiated 8/2/2023  1. Patient will perform grooming with Set-up sitting EOB within 7 day(s). 2.  Patient will perform upper body dressing with Set-up long sitting in bed within 7 day(s). 3.  Patient will perform bathing with Moderate Assist within 7 day(s). 4.  Patient will perform toilet transfers to least restrictive device with Minimal Assist  within 7 day(s). 5.  Patient will perform all aspects of toileting with Moderate Assist within 7 day(s). 6.  Patient will participate in upper extremity therapeutic exercise/activities with Set-up for 10 minutes within 7 day(s). 7.  Patient will utilize energy conservation techniques during functional activities with verbal cues within 7 day(s). Outcome: Progressing   OCCUPATIONAL THERAPY TREATMENT  Patient: Jayleen Sparks (33 y.o. female)  Date: 9/21/2023  Primary Diagnosis: Hematemesis [K92.0]  GI bleed [K92.2]  Gastrointestinal hemorrhage, unspecified gastrointestinal hemorrhage type [K92.2]  Procedure(s) (LRB):  DEBRIDEMENT OF SACRAL DECUBITUS WOUND (N/A) 38 Days Post-Op   Precautions: Fall Risk                Chart, occupational therapy assessment, plan of care, and goals were reviewed. ASSESSMENT  Patient continues to benefit from skilled OT services and is progressing towards goals. Pt continues to present with deficits in ADL idnependence, fucntional mobility, strength, balance, activity tolerance, and cognition (command following, attention, insight). Pt presented to OT services supine in bed, amendable to session. Pt HR: 80's at start of session. Pt completed all bed mobility with MAX A x 2 for trunk/LE management, boost to EOB, and positioning, however, pt able to initiate LE movement towards EOB in preparation for sitting.  OT provided pt with verbal and demo education on Sera Steady for transfer

## 2023-09-21 NOTE — PROGRESS NOTES
Interdisciplinary Trach Team Rounds    Patient discussed in interdisciplinary trach team rounds on this date. If pt continues to tolerate trach collar trials, consider downsize to a 6 cuffless.        Thank you,  Rayna Dyer M.Ed, PhD(c), CCC-SLP  Speech-Language Pathologist

## 2023-09-21 NOTE — PROGRESS NOTES
Follow up visit due to length of stay. Pt was sitting up in the bed watching TV and reading. She declined a visit. Honored pt's decision and encouraged her to request care as needed. For additional spiritual care, please contact the  on-call at ([phone]). Rev.  Obdulia Chen MDiv, MS, [facility]  Staff

## 2023-09-21 NOTE — CARE COORDINATION
Transition of Care     RUR: 24% High  Prior Level of Functioning: Independent   Disposition: LTAC Vs IPR penidng ventilator weaning   Follow up appointments: PCP, Gen Surg   DME needed: TBD  Transportation at discharge: BLS vs ALS  IM/IMM Medicare 7/29/23   Is patient a [place] and connected with [facility]? No  Caregiver Contact:    Alyson Jose Harshad Au: [phone]  Discharge Caregiver contacted prior to discharge? Yes  Care Conference needed? No  Barriers to discharge:    Medical stability  S/P trach placement 9/7, # 6 Shiley  Patient tolerating Trach collar.   Wound care following for sacral wound   TF on hold for concern of anastomotic disruption  G Tube   For CT today  Jinny Umana RN,Care Management

## 2023-09-22 LAB
ANION GAP SERPL CALC-SCNC: 1 MMOL/L (ref 5–15)
BASOPHILS # BLD: 0.2 K/UL (ref 0–0.1)
BASOPHILS NFR BLD: 1 % (ref 0–1)
BUN SERPL-MCNC: 40 MG/DL (ref 6–20)
BUN/CREAT SERPL: 143 (ref 12–20)
CALCIUM SERPL-MCNC: 9.6 MG/DL (ref 8.5–10.1)
CHLORIDE SERPL-SCNC: 103 MMOL/L (ref 97–108)
CO2 SERPL-SCNC: 32 MMOL/L (ref 21–32)
CREAT SERPL-MCNC: 0.28 MG/DL (ref 0.55–1.02)
DIFFERENTIAL METHOD BLD: ABNORMAL
EOSINOPHIL # BLD: 0.6 K/UL (ref 0–0.4)
EOSINOPHIL NFR BLD: 4 % (ref 0–7)
ERYTHROCYTE [DISTWIDTH] IN BLOOD BY AUTOMATED COUNT: 14.6 % (ref 11.5–14.5)
GLUCOSE SERPL-MCNC: 114 MG/DL (ref 65–100)
HCT VFR BLD AUTO: 26.7 % (ref 35–47)
HGB BLD-MCNC: 8.3 G/DL (ref 11.5–16)
IMM GRANULOCYTES # BLD AUTO: 0.2 K/UL (ref 0–0.04)
IMM GRANULOCYTES NFR BLD AUTO: 1 % (ref 0–0.5)
LYMPHOCYTES # BLD: 0.8 K/UL (ref 0.8–3.5)
LYMPHOCYTES NFR BLD: 5 % (ref 12–49)
MAGNESIUM SERPL-MCNC: 1.9 MG/DL (ref 1.6–2.4)
MCH RBC QN AUTO: 29 PG (ref 26–34)
MCHC RBC AUTO-ENTMCNC: 31.1 G/DL (ref 30–36.5)
MCV RBC AUTO: 93.4 FL (ref 80–99)
MONOCYTES # BLD: 1.6 K/UL (ref 0–1)
MONOCYTES NFR BLD: 10 % (ref 5–13)
NEUTS SEG # BLD: 12.5 K/UL (ref 1.8–8)
NEUTS SEG NFR BLD: 79 % (ref 32–75)
NRBC # BLD: 0 K/UL (ref 0–0.01)
NRBC BLD-RTO: 0 PER 100 WBC
PHOSPHATE SERPL-MCNC: 3.5 MG/DL (ref 2.6–4.7)
PLATELET # BLD AUTO: 469 K/UL (ref 150–400)
PMV BLD AUTO: 9.7 FL (ref 8.9–12.9)
POTASSIUM SERPL-SCNC: 4.6 MMOL/L (ref 3.5–5.1)
RBC # BLD AUTO: 2.86 M/UL (ref 3.8–5.2)
RBC MORPH BLD: ABNORMAL
SODIUM SERPL-SCNC: 136 MMOL/L (ref 136–145)
WBC # BLD AUTO: 15.9 K/UL (ref 3.6–11)

## 2023-09-22 PROCEDURE — 80048 BASIC METABOLIC PNL TOTAL CA: CPT

## 2023-09-22 PROCEDURE — 6360000002 HC RX W HCPCS: Performed by: NURSE PRACTITIONER

## 2023-09-22 PROCEDURE — 97606 NEG PRS WND THER DME>50 SQCM: CPT

## 2023-09-22 PROCEDURE — 85025 COMPLETE CBC W/AUTO DIFF WBC: CPT

## 2023-09-22 PROCEDURE — A4216 STERILE WATER/SALINE, 10 ML: HCPCS | Performed by: NURSE PRACTITIONER

## 2023-09-22 PROCEDURE — 97110 THERAPEUTIC EXERCISES: CPT

## 2023-09-22 PROCEDURE — 3E04317 INTRODUCTION OF OTHER THROMBOLYTIC INTO CENTRAL VEIN, PERCUTANEOUS APPROACH: ICD-10-PCS | Performed by: SURGERY

## 2023-09-22 PROCEDURE — 2580000003 HC RX 258: Performed by: NURSE PRACTITIONER

## 2023-09-22 PROCEDURE — 2700000000 HC OXYGEN THERAPY PER DAY

## 2023-09-22 PROCEDURE — 6360000002 HC RX W HCPCS: Performed by: SURGERY

## 2023-09-22 PROCEDURE — 83735 ASSAY OF MAGNESIUM: CPT

## 2023-09-22 PROCEDURE — 92526 ORAL FUNCTION THERAPY: CPT

## 2023-09-22 PROCEDURE — 36415 COLL VENOUS BLD VENIPUNCTURE: CPT

## 2023-09-22 PROCEDURE — 2580000003 HC RX 258: Performed by: SURGERY

## 2023-09-22 PROCEDURE — 84100 ASSAY OF PHOSPHORUS: CPT

## 2023-09-22 PROCEDURE — 2000000000 HC ICU R&B

## 2023-09-22 PROCEDURE — 94640 AIRWAY INHALATION TREATMENT: CPT

## 2023-09-22 PROCEDURE — 6370000000 HC RX 637 (ALT 250 FOR IP): Performed by: NURSE PRACTITIONER

## 2023-09-22 PROCEDURE — C9113 INJ PANTOPRAZOLE SODIUM, VIA: HCPCS | Performed by: NURSE PRACTITIONER

## 2023-09-22 PROCEDURE — 97535 SELF CARE MNGMENT TRAINING: CPT

## 2023-09-22 PROCEDURE — 6360000002 HC RX W HCPCS: Performed by: ANESTHESIOLOGY

## 2023-09-22 PROCEDURE — 2500000003 HC RX 250 WO HCPCS: Performed by: NURSE PRACTITIONER

## 2023-09-22 PROCEDURE — 6370000000 HC RX 637 (ALT 250 FOR IP): Performed by: SURGERY

## 2023-09-22 PROCEDURE — 92507 TX SP LANG VOICE COMM INDIV: CPT

## 2023-09-22 RX ADMIN — HEPARIN SODIUM 5000 UNITS: 5000 INJECTION INTRAVENOUS; SUBCUTANEOUS at 14:18

## 2023-09-22 RX ADMIN — HYDROMORPHONE HYDROCHLORIDE 0.25 MG: 1 INJECTION, SOLUTION INTRAMUSCULAR; INTRAVENOUS; SUBCUTANEOUS at 11:30

## 2023-09-22 RX ADMIN — NYSTATIN 500000 UNITS: 100000 SUSPENSION ORAL at 20:10

## 2023-09-22 RX ADMIN — Medication: at 00:00

## 2023-09-22 RX ADMIN — VASOPRESSIN: 20 INJECTION, SOLUTION INTRAVENOUS at 18:56

## 2023-09-22 RX ADMIN — SODIUM CHLORIDE, PRESERVATIVE FREE 10 ML: 5 INJECTION INTRAVENOUS at 08:12

## 2023-09-22 RX ADMIN — NYSTATIN 500000 UNITS: 100000 SUSPENSION ORAL at 17:31

## 2023-09-22 RX ADMIN — CHLORHEXIDINE GLUCONATE 15 ML: 1.2 RINSE ORAL at 23:08

## 2023-09-22 RX ADMIN — Medication: at 08:16

## 2023-09-22 RX ADMIN — ARFORMOTEROL TARTRATE 15 MCG: 15 SOLUTION RESPIRATORY (INHALATION) at 21:23

## 2023-09-22 RX ADMIN — CHLORHEXIDINE GLUCONATE 15 ML: 1.2 RINSE ORAL at 08:16

## 2023-09-22 RX ADMIN — HEPARIN SODIUM 5000 UNITS: 5000 INJECTION INTRAVENOUS; SUBCUTANEOUS at 06:03

## 2023-09-22 RX ADMIN — HEPARIN SODIUM 5000 UNITS: 5000 INJECTION INTRAVENOUS; SUBCUTANEOUS at 23:08

## 2023-09-22 RX ADMIN — SODIUM CHLORIDE, PRESERVATIVE FREE 40 MG: 5 INJECTION INTRAVENOUS at 08:12

## 2023-09-22 RX ADMIN — BUDESONIDE 500 MCG: 0.5 INHALANT RESPIRATORY (INHALATION) at 09:37

## 2023-09-22 RX ADMIN — I.V. FAT EMULSION 250 ML: 20 EMULSION INTRAVENOUS at 18:58

## 2023-09-22 RX ADMIN — ONDANSETRON 4 MG: 2 INJECTION INTRAMUSCULAR; INTRAVENOUS at 12:41

## 2023-09-22 RX ADMIN — ARFORMOTEROL TARTRATE 15 MCG: 15 SOLUTION RESPIRATORY (INHALATION) at 09:37

## 2023-09-22 RX ADMIN — Medication: at 23:08

## 2023-09-22 RX ADMIN — WATER 1 MG: 1 INJECTION INTRAMUSCULAR; INTRAVENOUS; SUBCUTANEOUS at 11:32

## 2023-09-22 RX ADMIN — Medication 10 ML: at 08:13

## 2023-09-22 RX ADMIN — HYDROMORPHONE HYDROCHLORIDE 0.5 MG: 1 INJECTION, SOLUTION INTRAMUSCULAR; INTRAVENOUS; SUBCUTANEOUS at 14:51

## 2023-09-22 RX ADMIN — BUDESONIDE 500 MCG: 0.5 INHALANT RESPIRATORY (INHALATION) at 21:23

## 2023-09-22 RX ADMIN — HYDROMORPHONE HYDROCHLORIDE 0.5 MG: 1 INJECTION, SOLUTION INTRAMUSCULAR; INTRAVENOUS; SUBCUTANEOUS at 20:10

## 2023-09-22 RX ADMIN — Medication: at 15:57

## 2023-09-22 RX ADMIN — NYSTATIN 500000 UNITS: 100000 SUSPENSION ORAL at 11:09

## 2023-09-22 RX ADMIN — Medication 10 ML: at 20:11

## 2023-09-22 RX ADMIN — HYDROMORPHONE HYDROCHLORIDE 0.5 MG: 1 INJECTION, SOLUTION INTRAMUSCULAR; INTRAVENOUS; SUBCUTANEOUS at 08:11

## 2023-09-22 RX ADMIN — HYDROMORPHONE HYDROCHLORIDE 0.5 MG: 1 INJECTION, SOLUTION INTRAMUSCULAR; INTRAVENOUS; SUBCUTANEOUS at 00:06

## 2023-09-22 RX ADMIN — HYDROMORPHONE HYDROCHLORIDE 0.5 MG: 1 INJECTION, SOLUTION INTRAMUSCULAR; INTRAVENOUS; SUBCUTANEOUS at 23:08

## 2023-09-22 ASSESSMENT — PAIN DESCRIPTION - LOCATION
LOCATION: ABDOMEN
LOCATION: HEAD
LOCATION: ABDOMEN;HEAD
LOCATION: ABDOMEN

## 2023-09-22 ASSESSMENT — PAIN DESCRIPTION - ORIENTATION
ORIENTATION: RIGHT
ORIENTATION: MID
ORIENTATION: MID
ORIENTATION: LEFT;RIGHT
ORIENTATION: LEFT
ORIENTATION: MID

## 2023-09-22 ASSESSMENT — PAIN SCALES - GENERAL
PAINLEVEL_OUTOF10: 0
PAINLEVEL_OUTOF10: 4
PAINLEVEL_OUTOF10: 3
PAINLEVEL_OUTOF10: 8
PAINLEVEL_OUTOF10: 0
PAINLEVEL_OUTOF10: 7
PAINLEVEL_OUTOF10: 7
PAINLEVEL_OUTOF10: 1
PAINLEVEL_OUTOF10: 7
PAINLEVEL_OUTOF10: 6
PAINLEVEL_OUTOF10: 7
PAINLEVEL_OUTOF10: 2
PAINLEVEL_OUTOF10: 0

## 2023-09-22 ASSESSMENT — PAIN DESCRIPTION - DESCRIPTORS
DESCRIPTORS: ACHING

## 2023-09-22 ASSESSMENT — PAIN SCALES - WONG BAKER
WONGBAKER_NUMERICALRESPONSE: 0
WONGBAKER_NUMERICALRESPONSE: NO HURT

## 2023-09-22 NOTE — PLAN OF CARE
Problem: Occupational Therapy - Adult  Goal: By Discharge: Performs self-care activities at highest level of function for planned discharge setting. See evaluation for individualized goals. Description: FUNCTIONAL STATUS PRIOR TO ADMISSION:  Patient was independent, active, working and a  prior to admission at [facility]. She was independent to mod I for all self-care and functional mobility. Since onset of illness the patient has been in need of assistance for all ADLs and functional mobility. DC to IPR prior to her admission, however, limited there and stating she was unable to complete much OOB therapy. She is able to verbalize and demonstrate a home exercise program involving UE movement. HOME SUPPORT: Patient lived with spouse but didn't require assistance at her true baseline. Occupational Therapy Goals: WEEKLY REASSESSMENT 9/18  Initiated 9/11/2023   1. Patient will perform  simple grooming routine, in supported sitting, with moderate assistance within 7 day(s). 2.  Patient will perform upper body dressing with moderate assistance within 7 day(s). 3.  Patient will perform anterior neck to thigh bathing with moderate assist within 7 day(s). 4.  Patient will perform lateral rolling in prep for completion of toileting routine with maximal assistance within 7 days. 5.  Patient will participate in upper extremity therapeutic exercise/activities with minimal assistance for 3 minutes within 7 day(s). Occupational Therapy Goals:  Initiated 9/11/2023 at re-evaluation:   1. Patient will perform  simple grooming routine, in supported sitting, with moderate assistance within 7 day(s). 2.  Patient will perform upper body dressing with moderate assistance within 7 day(s). 3.  Patient will perform anterior neck to thigh bathing with moderate assist within 7 day(s).   4.  Patient will perform lateral rolling in prep for completion of toileting routine with maximal assistance within made;Decreased awareness of errors  Insights: Decreased awareness of deficits  Initiation: Requires cues for some  Sequencing: Requires cues for some    Functional Mobility and Transfers for ADLs:  Bed Mobility:  Bed Mobility Training  Bed Mobility Training: No     Transfers:   Transfer Training  Transfer Training: No    ADL Intervention:    Grooming: . - Face Washing : Stand-by Assistance and Bed level  - Brushing/Combing Hair : Minimal Assistance and Bed level  - Hair Care : Minimal Assistance and Bed level    Pain Rating:  Pt stated increased pain in R ear and nausea, RN aware  Pain Intervention(s):   nursing notified, rest, and repositioning      Activity Tolerance:   Fair   Please refer to the flowsheet for vital signs taken during this treatment. After treatment:   Call bell within reach and bed in chair position, RN present    COMMUNICATION/EDUCATION:   The patient's plan of care was discussed with: registered nurse    Patient Education  Education Given To: Patient  Education Provided: Plan of Care;Precautions; ADL Adaptive Strategies;Transfer Training; Fall Prevention Strategies  Education Method: Verbal  Barriers to Learning: Cognition  Education Outcome: Continued education needed;Demonstrated understanding    Thank you for this referral.  Ankur Pimentel OT  Minutes: 30

## 2023-09-22 NOTE — WOUND CARE
Wound Care Note:     Follow-up visit for wound VAC dressing change. Chart shows:  Admitted for GI bleed with a history of anesthesia complication, arthritis, asthma, fibromyalgia, CAD s/p stents 11/2015, cardiac murmur, CHF, colitis, depression, diabetes, DVT, GERD, vascular access device, blood clots, hyperlipidemia, HTN, lumbar radiculitis, morbid obesity, musculoskeletal disorder, ANCA, pulmonary embolus, total abdominal hysterectomy, septic shock, and trigger finger    WBC = 15.9 on 9/22/23  Admitted from home    Assessment:   Patient is alert, mouths words, incontinent with some assistance needed in repositioning. Bed: In Touch [place]  Patient has a Lutz. Diet: None  Patient pre-medicated for pain by RN. Removed 2 black and 1 Mepitel One    1. POA stage 4 pressure injury to sacral and bilateral buttocks measures 7.8 cm x 7 cm x 1 cm, wound bed looks the same, 40% tan and 60% red, wound edges with hyperpigmentation, undermining from 3 to 8 with deepest at 6 o'clock measuring 0.7 cm, small serous drainage, constantine-wound intact. Breakdown to constantine-wound has resolved. Negative pressure wound therapy applied. Skin prep applied to constantine-wound, drape applied to left hip for trac pad placement, 1 piece of black granufoam placed in wound bed with 1 piece of Mepitel One wrapped around, an additional piece of black granufoam used to bridge to left hip, occlusively sealed at 125 mmhg continuously. 2. POA right heel with light hyperpigmentation and superficial crusted wound, non-blanchable, no open area. Venelex ointment applied and heel offloaded. 3.  POA left heel with unstageable pressure injury, wound is crusted, small purple, non-blanchable spot around 10  o'clock, no drainage, constantine-wound with some hyperpigmentation. Venelex ointment applied and heel offloaded.         4.  Right upper back with linear line that is hyperpigmented, no

## 2023-09-22 NOTE — PROGRESS NOTES
0800- white patches noted on tongue, Marvin, NP ordered nystatin, switch and spit. 1130-cathflo placed in red and gray port for no blood return. Blood return noted after 30 minute.

## 2023-09-22 NOTE — PLAN OF CARE
Problem: Physical Therapy - Adult  Goal: By Discharge: Performs mobility at highest level of function for planned discharge setting. See evaluation for individualized goals. Description: FUNCTIONAL STATUS PRIOR TO ADMISSION: Patient was independent and active without use of DME prior to last hospitalization at [facility] for abdominal surgery. D/c'ed to [facility] where she was for 1 week-- states she did not get out of bed at [facility] and worked on bed exercises. 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 On 8/6 patient transferred to the ICU in the setting of acute hypoxic respiratory failure d/t pulmonary edema requiring diuresis and eventual intubation (8/7 - 8/14). Patient reintubated 8/18 through current for worsening hypoxic respiratory failure. Course further complicated by persistent leukocytosis, fevers and CT evidence of intra-abdominal fluid collection post ultrasound drainage. HOME SUPPORT PRIOR TO ADMISSION: The patient lived with her  at baseline. Physical Therapy Goals  Revised 9/18/2023  1. Patient will move from supine to sit and sit to supine, scoot up and down, and roll side to side in bed with maximal assistance x2 within 7 day(s). 2.  Patient will perform sit to stand with maximal assistance x2 within 7 day(s). 3.  Patient will transfer from bed to chair and chair to bed with maximal assistance x2 using the least restrictive device within 7 day(s). 4.  Patient will sit edge of bed with minimal assist for 2 minutes within 7 day(s). Physical Therapy Goals  Revised 9/11/2023  1. Patient will move from supine to sit and sit to supine, scoot up and down, and roll side to side in bed with moderate assistance x2 within 7 day(s). 2.  Patient will perform sit to stand with max assistance x2 within 7 day(s).   3.  Patient will transfer from

## 2023-09-22 NOTE — PROGRESS NOTES
Patient now on trach collar. CT 9/21 findings reviewed, which revealed persistent anastomotic leak at ileorectal anastomosis. Perianastomotic collection is well controlled by percutaneous drain. This leak is now chronic, and unlikely to undergo spontaneous healing. She will likely need some form of surgical intervention (resection of anastomosis with end ileostomy versus creation of proximal loop ileostomy) the week after next to allow additional improvement in protein stores. Will discuss with patient and her , Keila Quispe, next week, highlighting pros and cons of surgery versus ongoing cyclic TPN.

## 2023-09-23 PROBLEM — K91.89 INTESTINAL ANASTOMOTIC LEAK: Status: ACTIVE | Noted: 2023-01-01

## 2023-09-23 LAB
ANION GAP SERPL CALC-SCNC: 2 MMOL/L (ref 5–15)
BASOPHILS # BLD: 0.1 K/UL (ref 0–0.1)
BASOPHILS NFR BLD: 1 % (ref 0–1)
BUN SERPL-MCNC: 36 MG/DL (ref 6–20)
BUN/CREAT SERPL: 133 (ref 12–20)
CALCIUM SERPL-MCNC: 9.3 MG/DL (ref 8.5–10.1)
CHLORIDE SERPL-SCNC: 103 MMOL/L (ref 97–108)
CO2 SERPL-SCNC: 33 MMOL/L (ref 21–32)
CREAT SERPL-MCNC: 0.27 MG/DL (ref 0.55–1.02)
DIFFERENTIAL METHOD BLD: ABNORMAL
EOSINOPHIL # BLD: 0.5 K/UL (ref 0–0.4)
EOSINOPHIL NFR BLD: 5 % (ref 0–7)
ERYTHROCYTE [DISTWIDTH] IN BLOOD BY AUTOMATED COUNT: 14.6 % (ref 11.5–14.5)
GLUCOSE SERPL-MCNC: 110 MG/DL (ref 65–100)
HCT VFR BLD AUTO: 25.5 % (ref 35–47)
HGB BLD-MCNC: 7.8 G/DL (ref 11.5–16)
IMM GRANULOCYTES # BLD AUTO: 0.2 K/UL (ref 0–0.04)
IMM GRANULOCYTES NFR BLD AUTO: 2 % (ref 0–0.5)
LYMPHOCYTES # BLD: 0.8 K/UL (ref 0.8–3.5)
LYMPHOCYTES NFR BLD: 7 % (ref 12–49)
MAGNESIUM SERPL-MCNC: 1.8 MG/DL (ref 1.6–2.4)
MCH RBC QN AUTO: 29.2 PG (ref 26–34)
MCHC RBC AUTO-ENTMCNC: 30.6 G/DL (ref 30–36.5)
MCV RBC AUTO: 95.5 FL (ref 80–99)
MONOCYTES # BLD: 1 K/UL (ref 0–1)
MONOCYTES NFR BLD: 9 % (ref 5–13)
NEUTS SEG # BLD: 8.3 K/UL (ref 1.8–8)
NEUTS SEG NFR BLD: 76 % (ref 32–75)
NRBC # BLD: 0 K/UL (ref 0–0.01)
NRBC BLD-RTO: 0 PER 100 WBC
PHOSPHATE SERPL-MCNC: 3.1 MG/DL (ref 2.6–4.7)
PLATELET # BLD AUTO: 473 K/UL (ref 150–400)
PMV BLD AUTO: 10.3 FL (ref 8.9–12.9)
POTASSIUM SERPL-SCNC: 3.9 MMOL/L (ref 3.5–5.1)
RBC # BLD AUTO: 2.67 M/UL (ref 3.8–5.2)
RBC MORPH BLD: ABNORMAL
RBC MORPH BLD: ABNORMAL
SODIUM SERPL-SCNC: 138 MMOL/L (ref 136–145)
WBC # BLD AUTO: 10.9 K/UL (ref 3.6–11)

## 2023-09-23 PROCEDURE — 80048 BASIC METABOLIC PNL TOTAL CA: CPT

## 2023-09-23 PROCEDURE — 6370000000 HC RX 637 (ALT 250 FOR IP): Performed by: NURSE PRACTITIONER

## 2023-09-23 PROCEDURE — 6360000002 HC RX W HCPCS: Performed by: ANESTHESIOLOGY

## 2023-09-23 PROCEDURE — 85025 COMPLETE CBC W/AUTO DIFF WBC: CPT

## 2023-09-23 PROCEDURE — 6370000000 HC RX 637 (ALT 250 FOR IP): Performed by: SURGERY

## 2023-09-23 PROCEDURE — 2500000003 HC RX 250 WO HCPCS: Performed by: NURSE PRACTITIONER

## 2023-09-23 PROCEDURE — 2580000003 HC RX 258: Performed by: SURGERY

## 2023-09-23 PROCEDURE — 83735 ASSAY OF MAGNESIUM: CPT

## 2023-09-23 PROCEDURE — 6360000002 HC RX W HCPCS: Performed by: NURSE PRACTITIONER

## 2023-09-23 PROCEDURE — 99232 SBSQ HOSP IP/OBS MODERATE 35: CPT | Performed by: SURGERY

## 2023-09-23 PROCEDURE — 6360000002 HC RX W HCPCS: Performed by: SURGERY

## 2023-09-23 PROCEDURE — C9113 INJ PANTOPRAZOLE SODIUM, VIA: HCPCS | Performed by: NURSE PRACTITIONER

## 2023-09-23 PROCEDURE — 84100 ASSAY OF PHOSPHORUS: CPT

## 2023-09-23 PROCEDURE — 2700000000 HC OXYGEN THERAPY PER DAY

## 2023-09-23 PROCEDURE — A4216 STERILE WATER/SALINE, 10 ML: HCPCS | Performed by: NURSE PRACTITIONER

## 2023-09-23 PROCEDURE — 36415 COLL VENOUS BLD VENIPUNCTURE: CPT

## 2023-09-23 PROCEDURE — 2580000003 HC RX 258: Performed by: NURSE PRACTITIONER

## 2023-09-23 PROCEDURE — 94640 AIRWAY INHALATION TREATMENT: CPT

## 2023-09-23 PROCEDURE — 2060000000 HC ICU INTERMEDIATE R&B

## 2023-09-23 RX ORDER — MAGNESIUM SULFATE 1 G/100ML
1000 INJECTION INTRAVENOUS ONCE
Status: COMPLETED | OUTPATIENT
Start: 2023-09-23 | End: 2023-09-23

## 2023-09-23 RX ORDER — OFLOXACIN 3 MG/ML
10 SOLUTION AURICULAR (OTIC) DAILY
Status: COMPLETED | OUTPATIENT
Start: 2023-09-23 | End: 2023-09-29

## 2023-09-23 RX ADMIN — ONDANSETRON 4 MG: 2 INJECTION INTRAMUSCULAR; INTRAVENOUS at 03:27

## 2023-09-23 RX ADMIN — I.V. FAT EMULSION 250 ML: 20 EMULSION INTRAVENOUS at 19:06

## 2023-09-23 RX ADMIN — NYSTATIN 500000 UNITS: 100000 SUSPENSION ORAL at 14:27

## 2023-09-23 RX ADMIN — MAGNESIUM SULFATE HEPTAHYDRATE 1000 MG: 1 INJECTION, SOLUTION INTRAVENOUS at 06:06

## 2023-09-23 RX ADMIN — BUDESONIDE 500 MCG: 0.5 INHALANT RESPIRATORY (INHALATION) at 09:09

## 2023-09-23 RX ADMIN — Medication 10 ML: at 08:05

## 2023-09-23 RX ADMIN — HYDROMORPHONE HYDROCHLORIDE 0.5 MG: 1 INJECTION, SOLUTION INTRAMUSCULAR; INTRAVENOUS; SUBCUTANEOUS at 04:24

## 2023-09-23 RX ADMIN — ALBUTEROL SULFATE 2.5 MG: 2.5 SOLUTION RESPIRATORY (INHALATION) at 20:46

## 2023-09-23 RX ADMIN — HYDROMORPHONE HYDROCHLORIDE 0.5 MG: 1 INJECTION, SOLUTION INTRAMUSCULAR; INTRAVENOUS; SUBCUTANEOUS at 01:26

## 2023-09-23 RX ADMIN — BUDESONIDE 500 MCG: 0.5 INHALANT RESPIRATORY (INHALATION) at 20:45

## 2023-09-23 RX ADMIN — NYSTATIN 500000 UNITS: 100000 SUSPENSION ORAL at 16:07

## 2023-09-23 RX ADMIN — Medication: at 08:04

## 2023-09-23 RX ADMIN — HYDROMORPHONE HYDROCHLORIDE 0.5 MG: 1 INJECTION, SOLUTION INTRAMUSCULAR; INTRAVENOUS; SUBCUTANEOUS at 23:59

## 2023-09-23 RX ADMIN — HYDROMORPHONE HYDROCHLORIDE 0.5 MG: 1 INJECTION, SOLUTION INTRAMUSCULAR; INTRAVENOUS; SUBCUTANEOUS at 16:07

## 2023-09-23 RX ADMIN — HYDROMORPHONE HYDROCHLORIDE 0.5 MG: 1 INJECTION, SOLUTION INTRAMUSCULAR; INTRAVENOUS; SUBCUTANEOUS at 19:04

## 2023-09-23 RX ADMIN — HEPARIN SODIUM 5000 UNITS: 5000 INJECTION INTRAVENOUS; SUBCUTANEOUS at 14:27

## 2023-09-23 RX ADMIN — Medication: at 23:53

## 2023-09-23 RX ADMIN — VASOPRESSIN: 20 INJECTION, SOLUTION INTRAVENOUS at 19:05

## 2023-09-23 RX ADMIN — CHLORHEXIDINE GLUCONATE 15 ML: 1.2 RINSE ORAL at 08:08

## 2023-09-23 RX ADMIN — Medication: at 15:28

## 2023-09-23 RX ADMIN — SODIUM CHLORIDE, PRESERVATIVE FREE 40 MG: 5 INJECTION INTRAVENOUS at 08:04

## 2023-09-23 RX ADMIN — NYSTATIN 500000 UNITS: 100000 SUSPENSION ORAL at 08:04

## 2023-09-23 RX ADMIN — ARFORMOTEROL TARTRATE 15 MCG: 15 SOLUTION RESPIRATORY (INHALATION) at 20:45

## 2023-09-23 RX ADMIN — Medication 10 ML: at 20:43

## 2023-09-23 RX ADMIN — CHLORHEXIDINE GLUCONATE 15 ML: 1.2 RINSE ORAL at 20:42

## 2023-09-23 RX ADMIN — OFLOXACIN 10 DROP: 3 SOLUTION AURICULAR (OTIC) at 10:10

## 2023-09-23 RX ADMIN — DOXYCYCLINE 100 MG: 100 INJECTION, POWDER, LYOPHILIZED, FOR SOLUTION INTRAVENOUS at 06:05

## 2023-09-23 RX ADMIN — ARFORMOTEROL TARTRATE 15 MCG: 15 SOLUTION RESPIRATORY (INHALATION) at 09:09

## 2023-09-23 RX ADMIN — HEPARIN SODIUM 5000 UNITS: 5000 INJECTION INTRAVENOUS; SUBCUTANEOUS at 21:59

## 2023-09-23 RX ADMIN — NYSTATIN 500000 UNITS: 100000 SUSPENSION ORAL at 20:43

## 2023-09-23 RX ADMIN — HYDROMORPHONE HYDROCHLORIDE 0.5 MG: 1 INJECTION, SOLUTION INTRAMUSCULAR; INTRAVENOUS; SUBCUTANEOUS at 09:00

## 2023-09-23 RX ADMIN — HEPARIN SODIUM 5000 UNITS: 5000 INJECTION INTRAVENOUS; SUBCUTANEOUS at 06:05

## 2023-09-23 ASSESSMENT — PAIN SCALES - WONG BAKER
WONGBAKER_NUMERICALRESPONSE: 2
WONGBAKER_NUMERICALRESPONSE: HURTS A LITTLE BIT

## 2023-09-23 ASSESSMENT — PAIN DESCRIPTION - LOCATION
LOCATION: ABDOMEN
LOCATION: ABDOMEN;EAR

## 2023-09-23 ASSESSMENT — PAIN DESCRIPTION - DESCRIPTORS
DESCRIPTORS: ACHING

## 2023-09-23 ASSESSMENT — ENCOUNTER SYMPTOMS
SHORTNESS OF BREATH: 0
BACK PAIN: 1
COUGH: 1
ABDOMINAL PAIN: 1

## 2023-09-23 ASSESSMENT — PAIN SCALES - GENERAL
PAINLEVEL_OUTOF10: 2
PAINLEVEL_OUTOF10: 7
PAINLEVEL_OUTOF10: 0
PAINLEVEL_OUTOF10: 4
PAINLEVEL_OUTOF10: 7
PAINLEVEL_OUTOF10: 8
PAINLEVEL_OUTOF10: 8
PAINLEVEL_OUTOF10: 0
PAINLEVEL_OUTOF10: 4
PAINLEVEL_OUTOF10: 0
PAINLEVEL_OUTOF10: 7

## 2023-09-23 ASSESSMENT — PAIN DESCRIPTION - ORIENTATION
ORIENTATION: LEFT
ORIENTATION: LEFT

## 2023-09-23 NOTE — PROGRESS NOTES
CRITICAL CARE NOTE      Name: Nimo Horn   : 1960   MRN: [id number]   Date: 2023      REASON FOR ICU ADMISSION: Acute hypoxic respiratory failure    PRINCIPAL ICU DIAGNOSIS   Acute hypoxic respiratory failure    BRIEF PATIENT SUMMARY   78-year-old female who presented to the emergency department  from [facility] for weakness and Hematemesis. 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 She was readmitted to [facility] ( through ) at which time she underwent EGD revealing GJ stricture. CT abdomen/pelvis demonstrated loculated intraperitoneal fluid in the anterior abdomen with persistent free air.   she underwent US guided drainage +E. coli. She completed a course of Zosyn and drain removed 8/10.   G-tube was placed.  patient transferred to the ICU in the setting of acute hypoxic respiratory failure d/t pulmonary edema requiring diuresis and eventual intubation ( - ). Patient reintubated  through current for worsening hypoxic respiratory failure. Course further complicated by persistent leukocytosis, fevers and CT evidence of intra-abdominal fluid collection post ultrasound drainage. Repeat imaging  did not reveal any drainable fluid collection or free air. She had tracheostomy 9/3. She started PSV trials on . Thoracentesis on . On 9/15 she started trach collar trials.     COMPREHENSIVE ASSESSMENT & PLAN:SYSTEM BASED     24 HOUR EVENTS: Continuous trach collar now for over 3 days and tolerating everette valve    NEUROLOGICAL:  Hx anxiety/depression   Alert and oriented and following commands  Analgesia: Dilaudid  Delirium precautions    PULMONOLOGY:  Acute hypoxic respiratory failure  Pleural effusions  Pneumomediastinum   S/p tracheostomy on  by Dr. Jaime Palomares (#[facility])  Aspiration precautions  Trach collar x > 3 days  S/p left thoracentesis     CARDIOVASCULAR: does not include any procedural time which has been billed separately.     Kahlil Adams APRN - NP   Critical Care Medicine  [facility]

## 2023-09-23 NOTE — PROGRESS NOTES
[address] [facility]  Hospitalist Group                                                                                          Hospitalist Progress Note  Joaquim Power MD  Answering service: [phone] from in house phone        Date of Service:  2023  NAME:  Sarabjit Lord  :  1960  MRN:  [id number]       Admission Summary:   Per HPI: 45-year-old female who presented to the emergency department  from [facility] for weakness and Hematemesis. 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 She was readmitted to [facility] ( through ) at which time she underwent EGD revealing GJ stricture. CT abdomen/pelvis demonstrated loculated intraperitoneal fluid in the anterior abdomen with persistent free air.   she underwent US guided drainage +E. coli. She completed a course of Zosyn and drain removed 8/10.   G-tube was placed.  patient transferred to the ICU in the setting of acute hypoxic respiratory failure d/t pulmonary edema requiring diuresis and eventual intubation ( - ). Patient reintubated  through current for worsening hypoxic respiratory failure. Course further complicated by persistent leukocytosis, fevers and CT evidence of intra-abdominal fluid collection post ultrasound drainage. Repeat imaging  did not reveal any drainable fluid collection or free air. She had tracheostomy 9/3. She started PSV trials on . Thoracentesis on . On 9/15 she started trach collar trials. Tolerating trach collar 3 days and tolerating everette valve. ICU treatment:  : Patient remains weak and deconditioned. Difficulty tolerating SBT's, have spoken with family and will set up family meeting today to discuss potential for tracheostomy. : Afebrile overnight. Plan for family meeting tomorrow to discuss tracheostomy placement.  Continued bowel rest, TPN for Medication Dose Route Frequency    ofloxacin (FLOXIN) 0.3 % otic solution 10 drop  10 drop Right Ear Daily    PN-Adult 2-in-1 Central Line (Standard)   IntraVENous Continuous TPN    nystatin (MYCOSTATIN) 074053 UNIT/ML suspension 500,000 Units  5 mL Oral 4x Daily    heparin (porcine) injection 5,000 Units  5,000 Units SubCUTAneous 3 times per day    labetalol (NORMODYNE;TRANDATE) injection 10 mg  10 mg IntraVENous Q6H PRN    HYDROmorphone HCl PF (DILAUDID) injection 0.5 mg  0.5 mg IntraVENous Q3H PRN    HYDROmorphone HCl PF (DILAUDID) injection 0.25 mg  0.25 mg IntraVENous Q3H PRN    fat emulsion (INTRALIPID/NUTRILIPID) 20 % infusion 250 mL  250 mL IntraVENous Daily    budesonide (PULMICORT) nebulizer suspension 500 mcg  0.5 mg Nebulization BID RT    arformoterol tartrate (BROVANA) nebulizer solution 15 mcg  15 mcg Nebulization BID RT    pantoprazole (PROTONIX) 40 mg in sodium chloride (PF) 0.9 % 10 mL injection  40 mg IntraVENous Daily    [Held by provider] buPROPion (WELLBUTRIN) tablet 100 mg  100 mg Per G Tube BID    0.9 % sodium chloride infusion  25 mL IntraVENous PRN    chlorhexidine (PERIDEX) 0.12 % solution 15 mL  15 mL Mouth/Throat BID    albuterol (PROVENTIL) (2.5 MG/3ML) 0.083% nebulizer solution 2.5 mg  2.5 mg Nebulization Q4H PRN    prochlorperazine (COMPAZINE) injection 10 mg  10 mg IntraVENous Q6H PRN    alteplase (CATHFLO) 1 mg in sterile water 1 mL injection  1 mg IntraCATHeter PRN    balsum peru-castor oil (VENELEX) ointment   Topical q8h    albumin human 5% IV solution 12.5 g  12.5 g IntraVENous Q6H PRN    sodium chloride flush 0.9 % injection 5-40 mL  5-40 mL IntraVENous 2 times per day    sodium chloride flush 0.9 % injection 5-40 mL  5-40 mL IntraVENous PRN    ondansetron (ZOFRAN-ODT) disintegrating tablet 4 mg  4 mg Oral Q8H PRN    Or    ondansetron (ZOFRAN) injection 4 mg  4 mg IntraVENous Q6H PRN    polyethylene glycol (GLYCOLAX) packet 17 g  17 g Oral Daily PRN    acetaminophen (TYLENOL)

## 2023-09-23 NOTE — PROGRESS NOTES
1100-attempted to get patient up to chair with another nurse. Patient's vital signs remained stable at 74, 139/60, 26, 99%, however, patient reports feeling very dizzy and states she is unable to stand. Place patient in chair position. Patient tolerating well. 1230-after a hour and a half in chair position, patient reports that she is feeling tired. Bed placed in regular position.

## 2023-09-23 NOTE — Clinical Note
0327: patient complaining of nausea and pain in her ear. Requesting pain medication. Advised her she can have zofran but needs to wait about 60 minutes for dilaudid. 0411: patient called out stating severe pain to right ear. NP Corrinne Rasmussen notified. 0424: per NP Corrinne Rasmussen ok to give dilaudid for ear pain at this time. 1068: patient called out for ear pain and pain medication. Advised patient she has already received medication and NP Corrinne Rasmussen will be in shortly to assess ear pain. 3735: patient called out for ear pain and to be repositioned. NP Corrinne Rasmussen assessing ear pain. Per NP Corrinne Rasmussen will order ear drops. Patient repositioned.  1

## 2023-09-24 VITALS
OXYGEN SATURATION: 99 % | RESPIRATION RATE: 18 BRPM | HEIGHT: 64 IN | HEART RATE: 89 BPM | SYSTOLIC BLOOD PRESSURE: 144 MMHG | DIASTOLIC BLOOD PRESSURE: 63 MMHG | BODY MASS INDEX: 17.8 KG/M2 | TEMPERATURE: 97.8 F | WEIGHT: 104.28 LBS

## 2023-09-24 LAB
ANION GAP SERPL CALC-SCNC: 3 MMOL/L (ref 5–15)
BASOPHILS # BLD: 0.1 K/UL (ref 0–0.1)
BASOPHILS NFR BLD: 1 % (ref 0–1)
BUN SERPL-MCNC: 34 MG/DL (ref 6–20)
BUN/CREAT SERPL: 126 (ref 12–20)
CALCIUM SERPL-MCNC: 9.1 MG/DL (ref 8.5–10.1)
CHLORIDE SERPL-SCNC: 102 MMOL/L (ref 97–108)
CO2 SERPL-SCNC: 32 MMOL/L (ref 21–32)
CREAT SERPL-MCNC: 0.27 MG/DL (ref 0.55–1.02)
DIFFERENTIAL METHOD BLD: ABNORMAL
EOSINOPHIL # BLD: 0.4 K/UL (ref 0–0.4)
EOSINOPHIL NFR BLD: 4 % (ref 0–7)
ERYTHROCYTE [DISTWIDTH] IN BLOOD BY AUTOMATED COUNT: 14.4 % (ref 11.5–14.5)
GLUCOSE SERPL-MCNC: 126 MG/DL (ref 65–100)
HCT VFR BLD AUTO: 25.8 % (ref 35–47)
HGB BLD-MCNC: 8.1 G/DL (ref 11.5–16)
IMM GRANULOCYTES # BLD AUTO: 0 K/UL
IMM GRANULOCYTES NFR BLD AUTO: 0 %
LYMPHOCYTES # BLD: 1.2 K/UL (ref 0.8–3.5)
LYMPHOCYTES NFR BLD: 11 % (ref 12–49)
MAGNESIUM SERPL-MCNC: 1.7 MG/DL (ref 1.6–2.4)
MCH RBC QN AUTO: 29.1 PG (ref 26–34)
MCHC RBC AUTO-ENTMCNC: 31.4 G/DL (ref 30–36.5)
MCV RBC AUTO: 92.8 FL (ref 80–99)
MONOCYTES # BLD: 0.9 K/UL (ref 0–1)
MONOCYTES NFR BLD: 9 % (ref 5–13)
MYELOCYTES NFR BLD MANUAL: 1 %
NEUTS BAND NFR BLD MANUAL: 2 % (ref 0–6)
NEUTS SEG # BLD: 7.8 K/UL (ref 1.8–8)
NEUTS SEG NFR BLD: 72 % (ref 32–75)
NRBC # BLD: 0 K/UL (ref 0–0.01)
NRBC BLD-RTO: 0 PER 100 WBC
PHOSPHATE SERPL-MCNC: 2.8 MG/DL (ref 2.6–4.7)
PLATELET # BLD AUTO: 430 K/UL (ref 150–400)
PLATELET COMMENT: ABNORMAL
PMV BLD AUTO: 9.4 FL (ref 8.9–12.9)
POTASSIUM SERPL-SCNC: 3.7 MMOL/L (ref 3.5–5.1)
RBC # BLD AUTO: 2.78 M/UL (ref 3.8–5.2)
RBC MORPH BLD: ABNORMAL
RBC MORPH BLD: ABNORMAL
SODIUM SERPL-SCNC: 137 MMOL/L (ref 136–145)
WBC # BLD AUTO: 10.5 K/UL (ref 3.6–11)

## 2023-09-24 PROCEDURE — 6360000002 HC RX W HCPCS: Performed by: INTERNAL MEDICINE

## 2023-09-24 PROCEDURE — 6370000000 HC RX 637 (ALT 250 FOR IP): Performed by: SURGERY

## 2023-09-24 PROCEDURE — 6360000002 HC RX W HCPCS: Performed by: NURSE PRACTITIONER

## 2023-09-24 PROCEDURE — A4216 STERILE WATER/SALINE, 10 ML: HCPCS | Performed by: NURSE PRACTITIONER

## 2023-09-24 PROCEDURE — 2500000003 HC RX 250 WO HCPCS: Performed by: INTERNAL MEDICINE

## 2023-09-24 PROCEDURE — 6360000002 HC RX W HCPCS: Performed by: SURGERY

## 2023-09-24 PROCEDURE — 2700000000 HC OXYGEN THERAPY PER DAY

## 2023-09-24 PROCEDURE — C9113 INJ PANTOPRAZOLE SODIUM, VIA: HCPCS | Performed by: NURSE PRACTITIONER

## 2023-09-24 PROCEDURE — 99232 SBSQ HOSP IP/OBS MODERATE 35: CPT | Performed by: SURGERY

## 2023-09-24 PROCEDURE — 80048 BASIC METABOLIC PNL TOTAL CA: CPT

## 2023-09-24 PROCEDURE — 2580000003 HC RX 258: Performed by: INTERNAL MEDICINE

## 2023-09-24 PROCEDURE — 36415 COLL VENOUS BLD VENIPUNCTURE: CPT

## 2023-09-24 PROCEDURE — 2500000003 HC RX 250 WO HCPCS: Performed by: NURSE PRACTITIONER

## 2023-09-24 PROCEDURE — 6370000000 HC RX 637 (ALT 250 FOR IP): Performed by: NURSE PRACTITIONER

## 2023-09-24 PROCEDURE — 2580000003 HC RX 258: Performed by: SURGERY

## 2023-09-24 PROCEDURE — 6360000002 HC RX W HCPCS: Performed by: ANESTHESIOLOGY

## 2023-09-24 PROCEDURE — 2580000003 HC RX 258: Performed by: NURSE PRACTITIONER

## 2023-09-24 PROCEDURE — 94640 AIRWAY INHALATION TREATMENT: CPT

## 2023-09-24 PROCEDURE — 85025 COMPLETE CBC W/AUTO DIFF WBC: CPT

## 2023-09-24 PROCEDURE — 2060000000 HC ICU INTERMEDIATE R&B

## 2023-09-24 PROCEDURE — 84100 ASSAY OF PHOSPHORUS: CPT

## 2023-09-24 PROCEDURE — 83735 ASSAY OF MAGNESIUM: CPT

## 2023-09-24 RX ADMIN — HYDROMORPHONE HYDROCHLORIDE 0.5 MG: 1 INJECTION, SOLUTION INTRAMUSCULAR; INTRAVENOUS; SUBCUTANEOUS at 14:08

## 2023-09-24 RX ADMIN — ARFORMOTEROL TARTRATE 15 MCG: 15 SOLUTION RESPIRATORY (INHALATION) at 22:48

## 2023-09-24 RX ADMIN — CHLORHEXIDINE GLUCONATE 15 ML: 1.2 RINSE ORAL at 21:00

## 2023-09-24 RX ADMIN — ONDANSETRON 4 MG: 2 INJECTION INTRAMUSCULAR; INTRAVENOUS at 10:56

## 2023-09-24 RX ADMIN — Medication: at 16:07

## 2023-09-24 RX ADMIN — HYDROMORPHONE HYDROCHLORIDE 0.5 MG: 1 INJECTION, SOLUTION INTRAMUSCULAR; INTRAVENOUS; SUBCUTANEOUS at 20:24

## 2023-09-24 RX ADMIN — CHLORHEXIDINE GLUCONATE 15 ML: 1.2 RINSE ORAL at 08:43

## 2023-09-24 RX ADMIN — ALBUTEROL SULFATE 2.5 MG: 2.5 SOLUTION RESPIRATORY (INHALATION) at 16:11

## 2023-09-24 RX ADMIN — NYSTATIN 500000 UNITS: 100000 SUSPENSION ORAL at 08:37

## 2023-09-24 RX ADMIN — BUDESONIDE 500 MCG: 0.5 INHALANT RESPIRATORY (INHALATION) at 22:48

## 2023-09-24 RX ADMIN — VASOPRESSIN: 20 INJECTION, SOLUTION INTRAVENOUS at 18:30

## 2023-09-24 RX ADMIN — Medication 10 ML: at 08:42

## 2023-09-24 RX ADMIN — HEPARIN SODIUM 5000 UNITS: 5000 INJECTION INTRAVENOUS; SUBCUTANEOUS at 14:08

## 2023-09-24 RX ADMIN — HYDROMORPHONE HYDROCHLORIDE 0.5 MG: 1 INJECTION, SOLUTION INTRAMUSCULAR; INTRAVENOUS; SUBCUTANEOUS at 11:41

## 2023-09-24 RX ADMIN — SODIUM CHLORIDE, PRESERVATIVE FREE 40 MG: 5 INJECTION INTRAVENOUS at 08:37

## 2023-09-24 RX ADMIN — NYSTATIN 500000 UNITS: 100000 SUSPENSION ORAL at 20:30

## 2023-09-24 RX ADMIN — OFLOXACIN 10 DROP: 3 SOLUTION AURICULAR (OTIC) at 08:43

## 2023-09-24 RX ADMIN — HYDROMORPHONE HYDROCHLORIDE 0.5 MG: 1 INJECTION, SOLUTION INTRAMUSCULAR; INTRAVENOUS; SUBCUTANEOUS at 17:47

## 2023-09-24 RX ADMIN — NYSTATIN 500000 UNITS: 100000 SUSPENSION ORAL at 14:07

## 2023-09-24 RX ADMIN — NYSTATIN 500000 UNITS: 100000 SUSPENSION ORAL at 17:05

## 2023-09-24 RX ADMIN — Medication: at 08:42

## 2023-09-24 RX ADMIN — HEPARIN SODIUM 5000 UNITS: 5000 INJECTION INTRAVENOUS; SUBCUTANEOUS at 22:00

## 2023-09-24 RX ADMIN — HYDROMORPHONE HYDROCHLORIDE 0.5 MG: 1 INJECTION, SOLUTION INTRAMUSCULAR; INTRAVENOUS; SUBCUTANEOUS at 03:56

## 2023-09-24 RX ADMIN — ARFORMOTEROL TARTRATE 15 MCG: 15 SOLUTION RESPIRATORY (INHALATION) at 08:24

## 2023-09-24 RX ADMIN — BUDESONIDE 500 MCG: 0.5 INHALANT RESPIRATORY (INHALATION) at 08:24

## 2023-09-24 RX ADMIN — HYDROMORPHONE HYDROCHLORIDE 0.5 MG: 1 INJECTION, SOLUTION INTRAMUSCULAR; INTRAVENOUS; SUBCUTANEOUS at 23:29

## 2023-09-24 RX ADMIN — HEPARIN SODIUM 5000 UNITS: 5000 INJECTION INTRAVENOUS; SUBCUTANEOUS at 05:55

## 2023-09-24 RX ADMIN — I.V. FAT EMULSION 250 ML: 20 EMULSION INTRAVENOUS at 18:30

## 2023-09-24 RX ADMIN — HYDROMORPHONE HYDROCHLORIDE 0.5 MG: 1 INJECTION, SOLUTION INTRAMUSCULAR; INTRAVENOUS; SUBCUTANEOUS at 08:37

## 2023-09-24 ASSESSMENT — PAIN SCALES - GENERAL
PAINLEVEL_OUTOF10: 7
PAINLEVEL_OUTOF10: 8
PAINLEVEL_OUTOF10: 5
PAINLEVEL_OUTOF10: 7
PAINLEVEL_OUTOF10: 8
PAINLEVEL_OUTOF10: 8
PAINLEVEL_OUTOF10: 7
PAINLEVEL_OUTOF10: 8
PAINLEVEL_OUTOF10: 3
PAINLEVEL_OUTOF10: 0
PAINLEVEL_OUTOF10: 6
PAINLEVEL_OUTOF10: 7
PAINLEVEL_OUTOF10: 7
PAINLEVEL_OUTOF10: 0

## 2023-09-24 ASSESSMENT — PAIN DESCRIPTION - DESCRIPTORS
DESCRIPTORS: ACHING
DESCRIPTORS: ACHING

## 2023-09-24 ASSESSMENT — PAIN DESCRIPTION - ORIENTATION
ORIENTATION: MID

## 2023-09-24 ASSESSMENT — PAIN DESCRIPTION - LOCATION
LOCATION: ABDOMEN
LOCATION: ABDOMEN;EAR
LOCATION: ABDOMEN

## 2023-09-24 NOTE — PROGRESS NOTES
Surgery Progress Note    9/24/2023    Admit Date: 7/28/2023  4:15 PM    CC: sleeping    Subjective:     No acute events. Pain controlled. Sleeping. Constitutional: No fever or chills  Neurologic: No headache  Eyes: No scleral icterus or irritated eyes  Nose: No nasal pain or drainage  Mouth: No oral lesions or sore throat  Cardiac: No palpations or chest pain  Pulmonary: No cough or shortness of breath  Gastrointestinal: No nausea, emesis, diarrhea, or constipation  Genitourinary: No dysuria  Musculoskeletal: No muscle or joint tenderness  Skin: No rashes or lesions  Psychiatric: No anxiety or depressed mood    Objective:     Vitals:    09/24/23 1100   BP: 132/84   Pulse: 98   Resp: 25   Temp:    SpO2: 100%       General: No acute distress, appears thin  Eyes: PERRLA, no scleral icterus  HENT: Normocephalic without oral lesions  Neck: Trachea midline without LAD  Cardiac: Normal pulse rate and rhythm  Pulmonary: Symmetric chest rise with normal effort  GI: Soft, NT, ND, no hernia, no splenomegaly  Skin: Warm without rash  Extremities: No edema or joint stiffness  Psych: Appropriate mood and affect    Labs, vital signs, and I/O reviewed.     Assessment:     62 y/o F with ileorectal leak and malnutrition among other things    Plan:     PRN pain meds  Trach care  Renewed TPN  Cont drains  More to come from Dr Dannie Juarez MD, FACS, [facility]  Bariatric and General Surgeon  [facility]

## 2023-09-24 NOTE — PROGRESS NOTES
RT Note: Trach change    Please note, patient is due for her first trach change. Pt has been off vent for several days & should be safe to change to a cuffless trach. Thank you       09/23/23 2042   Surgical Airway (Trach) 09/03/23 Shiley Cuffed   Placement Date/Time: 09/03/23 1336   Placed By: Licensed provider;RN;RT  Placement Verified By: Direct visualization; Chest X-ray  Surgical Airway Type: Tracheostomy  Brand: Lakshmi  Style: Cuffed  Size (mm): 6   Status Secured;Speaking valve  (PMV removed for neb txs)   Site Assessment Clean;Dry; No Bleeding; No drainage   Site Care Open to air   Inner Cannula Care   (Assessed)   Ties Assessment Dry; Intact; Secure   Cuff Pressure   (Deflated) Suicidal Ideation:  denies   Suicide Plan: denies  Is Patient Feeling Safe?:  yes  Safety Plan Reviewed?: yes  Hopelessness:  no  Constant Observation: no   Patient assessed using the C-SSRS tool and states that he is not   suicidal at this time. Patient is on Q15 minute checks. Writer and patient reviewed safety plan.     Depression: no  Anxiety: denies but appears tremulous d/t etoh detox    Isolating Behavior:  no  Homicidal Ideation:  denies   Mood (1 = worst, 10 = best):  5 of 10   Affect:  flat    AVH/delusions/paranoia: denies    Pain: chronic back pain, \"baclofen works good\"    Attended Groups:  yes  Compliant with Medications:  yes  Observed Behavior:  Up among peers    Progress Note:   Patient observed in wheelchair d/t SOB with activity, wheeled walker continues to be available for patient use.  He slept in LSR d/t bedside commode required, loose stools.  Patient states he \"usually have loose stools for a couple days while detoxing.\"  CIWA scales for etoh detox, denies n/v at this time.  Appetite good with meals, drinking fluids throughout the day. He has been pleasant and cooperative with tx plan, able to make needs known.  Will continue to monitor.      02/27/19 0800    MENTAL STATUS    Mental Status Assessment WDL Except   -Mental Assessment Frequency BID   Level of Consciousness 3   Speech Guarded   Thought Content Perservation/Rumination   Thought Process Disorganized   Insight Poor   Judgment Poor   Reliability Appears to minimize   Sleep/Wake Cycle Difficulty falling asleep   Affect/Behavior Anxious;Depressed;Flat;Poor eye contact

## 2023-09-24 NOTE — ASSESSMENT & PLAN NOTE
Continue TPN need to get protein reserves up in order for her to undergo surgery as planned in a couple of weeks.

## 2023-09-24 NOTE — PROGRESS NOTES
Hospitalist Progress Note  Jennifer Martinez MD  Answering service: [phone] from in house phone        Date of Service:  2023  NAME:  Salima Arita  :  1960  MRN:  [id number]      Admission Summary:   Per HPI: 40-year-old female who presented to the emergency department  [facility] for weakness and Hematemesis. 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 She was readmitted to [facility] ( through ) at which time she underwent EGD revealing GJ stricture. CT abdomen/pelvis demonstrated loculated intraperitoneal fluid in the anterior abdomen with persistent free air.   she underwent US guided drainage +E. coli. She completed a course of Zosyn and drain removed 8/10.   G-tube was placed.  patient transferred to the ICU in the setting of acute hypoxic respiratory failure d/t pulmonary edema requiring diuresis and eventual intubation ( - ). Patient reintubated  through current for worsening hypoxic respiratory failure. Course further complicated by persistent leukocytosis, fevers and CT evidence of intra-abdominal fluid collection post ultrasound drainage. Repeat imaging  did not reveal any drainable fluid collection or free air. She had tracheostomy 9/3. She started PSV trials on . Thoracentesis on . On 9/15 she started trach collar trials. Tolerating trach collar 3 days and tolerating everette valve. Interval history / Subjective:   Patient seen and examined in ICU, family members (bother and sister-in-law) in the room. She is awake, alert, conversant (PMV in place). Reports pain controlled, has some right ear discomfort. (Per RN, right ear was checked with otoscope yesterday and started on ofloxin ear drop). Has mild nausea, improved with antiemetics.  No emesis, no tartrate (BROVANA) nebulizer solution 15 mcg  15 mcg Nebulization BID RT    pantoprazole (PROTONIX) 40 mg in sodium chloride (PF) 0.9 % 10 mL injection  40 mg IntraVENous Daily    [Held by provider] buPROPion (WELLBUTRIN) tablet 100 mg  100 mg Per G Tube BID    0.9 % sodium chloride infusion  25 mL IntraVENous PRN    chlorhexidine (PERIDEX) 0.12 % solution 15 mL  15 mL Mouth/Throat BID    albuterol (PROVENTIL) (2.5 MG/3ML) 0.083% nebulizer solution 2.5 mg  2.5 mg Nebulization Q4H PRN    prochlorperazine (COMPAZINE) injection 10 mg  10 mg IntraVENous Q6H PRN    alteplase (CATHFLO) 1 mg in sterile water 1 mL injection  1 mg IntraCATHeter PRN    balsum peru-castor oil (VENELEX) ointment   Topical q8h    albumin human 5% IV solution 12.5 g  12.5 g IntraVENous Q6H PRN    sodium chloride flush 0.9 % injection 5-40 mL  5-40 mL IntraVENous 2 times per day    sodium chloride flush 0.9 % injection 5-40 mL  5-40 mL IntraVENous PRN    ondansetron (ZOFRAN-ODT) disintegrating tablet 4 mg  4 mg Oral Q8H PRN    Or    ondansetron (ZOFRAN) injection 4 mg  4 mg IntraVENous Q6H PRN    polyethylene glycol (GLYCOLAX) packet 17 g  17 g Oral Daily PRN    acetaminophen (TYLENOL) tablet 650 mg  650 mg Oral Q6H PRN    Or    acetaminophen (TYLENOL) suppository 650 mg  650 mg Rectal Q6H PRN    glucose chewable tablet 16 g  4 tablet Oral PRN    dextrose bolus 10% 125 mL  125 mL IntraVENous PRN    Or    dextrose bolus 10% 250 mL  250 mL IntraVENous PRN    glucagon injection 1 mg  1 mg SubCUTAneous PRN    dextrose 10 % infusion   IntraVENous Continuous PRN     ______________________________________________________________________  EXPECTED LENGTH OF STAY: Unable to retrieve estimated LOS  ACTUAL LENGTH OF STAY:          62                 Xu Menendez MD

## 2023-09-25 LAB
ANION GAP SERPL CALC-SCNC: 8 MMOL/L (ref 5–15)
BASOPHILS # BLD: 0.1 K/UL (ref 0–0.1)
BASOPHILS NFR BLD: 1 % (ref 0–1)
BUN SERPL-MCNC: 36 MG/DL (ref 6–20)
BUN/CREAT SERPL: 150 (ref 12–20)
CALCIUM SERPL-MCNC: 9.3 MG/DL (ref 8.5–10.1)
CHLORIDE SERPL-SCNC: 102 MMOL/L (ref 97–108)
CO2 SERPL-SCNC: 28 MMOL/L (ref 21–32)
CREAT SERPL-MCNC: 0.24 MG/DL (ref 0.55–1.02)
DIFFERENTIAL METHOD BLD: ABNORMAL
EOSINOPHIL # BLD: 0.4 K/UL (ref 0–0.4)
EOSINOPHIL NFR BLD: 4 % (ref 0–7)
ERYTHROCYTE [DISTWIDTH] IN BLOOD BY AUTOMATED COUNT: 14.5 % (ref 11.5–14.5)
GLUCOSE SERPL-MCNC: 111 MG/DL (ref 65–100)
HCT VFR BLD AUTO: 25.7 % (ref 35–47)
HGB BLD-MCNC: 7.8 G/DL (ref 11.5–16)
IMM GRANULOCYTES # BLD AUTO: 0 K/UL
IMM GRANULOCYTES NFR BLD AUTO: 0 %
LYMPHOCYTES # BLD: 1 K/UL (ref 0.8–3.5)
LYMPHOCYTES NFR BLD: 9 % (ref 12–49)
MCH RBC QN AUTO: 28.8 PG (ref 26–34)
MCHC RBC AUTO-ENTMCNC: 30.4 G/DL (ref 30–36.5)
MCV RBC AUTO: 94.8 FL (ref 80–99)
MONOCYTES # BLD: 0.4 K/UL (ref 0–1)
MONOCYTES NFR BLD: 4 % (ref 5–13)
NEUTS SEG # BLD: 8.9 K/UL (ref 1.8–8)
NEUTS SEG NFR BLD: 82 % (ref 32–75)
NRBC # BLD: 0 K/UL (ref 0–0.01)
NRBC BLD-RTO: 0 PER 100 WBC
PLATELET # BLD AUTO: 436 K/UL (ref 150–400)
PMV BLD AUTO: 9.4 FL (ref 8.9–12.9)
POTASSIUM SERPL-SCNC: 3.7 MMOL/L (ref 3.5–5.1)
RBC # BLD AUTO: 2.71 M/UL (ref 3.8–5.2)
RBC MORPH BLD: ABNORMAL
SODIUM SERPL-SCNC: 138 MMOL/L (ref 136–145)
WBC # BLD AUTO: 10.8 K/UL (ref 3.6–11)

## 2023-09-25 PROCEDURE — 2500000003 HC RX 250 WO HCPCS: Performed by: HOSPITALIST

## 2023-09-25 PROCEDURE — 99231 SBSQ HOSP IP/OBS SF/LOW 25: CPT | Performed by: INTERNAL MEDICINE

## 2023-09-25 PROCEDURE — 6370000000 HC RX 637 (ALT 250 FOR IP): Performed by: NURSE PRACTITIONER

## 2023-09-25 PROCEDURE — 97530 THERAPEUTIC ACTIVITIES: CPT

## 2023-09-25 PROCEDURE — 6370000000 HC RX 637 (ALT 250 FOR IP): Performed by: SURGERY

## 2023-09-25 PROCEDURE — 2700000000 HC OXYGEN THERAPY PER DAY

## 2023-09-25 PROCEDURE — 36415 COLL VENOUS BLD VENIPUNCTURE: CPT

## 2023-09-25 PROCEDURE — 85025 COMPLETE CBC W/AUTO DIFF WBC: CPT

## 2023-09-25 PROCEDURE — 6360000002 HC RX W HCPCS: Performed by: NURSE PRACTITIONER

## 2023-09-25 PROCEDURE — 2580000003 HC RX 258: Performed by: SURGERY

## 2023-09-25 PROCEDURE — 6360000002 HC RX W HCPCS: Performed by: SURGERY

## 2023-09-25 PROCEDURE — 94640 AIRWAY INHALATION TREATMENT: CPT

## 2023-09-25 PROCEDURE — 2580000003 HC RX 258: Performed by: NURSE PRACTITIONER

## 2023-09-25 PROCEDURE — 6360000002 HC RX W HCPCS: Performed by: ANESTHESIOLOGY

## 2023-09-25 PROCEDURE — 2580000003 HC RX 258: Performed by: HOSPITALIST

## 2023-09-25 PROCEDURE — 2500000003 HC RX 250 WO HCPCS: Performed by: NURSE PRACTITIONER

## 2023-09-25 PROCEDURE — 2060000000 HC ICU INTERMEDIATE R&B

## 2023-09-25 PROCEDURE — C9113 INJ PANTOPRAZOLE SODIUM, VIA: HCPCS | Performed by: NURSE PRACTITIONER

## 2023-09-25 PROCEDURE — 80048 BASIC METABOLIC PNL TOTAL CA: CPT

## 2023-09-25 PROCEDURE — A4216 STERILE WATER/SALINE, 10 ML: HCPCS | Performed by: NURSE PRACTITIONER

## 2023-09-25 PROCEDURE — 6360000002 HC RX W HCPCS: Performed by: HOSPITALIST

## 2023-09-25 RX ADMIN — NYSTATIN 500000 UNITS: 100000 SUSPENSION ORAL at 13:10

## 2023-09-25 RX ADMIN — ALBUTEROL SULFATE 2.5 MG: 2.5 SOLUTION RESPIRATORY (INHALATION) at 14:39

## 2023-09-25 RX ADMIN — HYDROMORPHONE HYDROCHLORIDE 0.5 MG: 1 INJECTION, SOLUTION INTRAMUSCULAR; INTRAVENOUS; SUBCUTANEOUS at 20:30

## 2023-09-25 RX ADMIN — Medication 10 ML: at 08:27

## 2023-09-25 RX ADMIN — HEPARIN SODIUM 5000 UNITS: 5000 INJECTION INTRAVENOUS; SUBCUTANEOUS at 05:34

## 2023-09-25 RX ADMIN — HEPARIN SODIUM 5000 UNITS: 5000 INJECTION INTRAVENOUS; SUBCUTANEOUS at 14:28

## 2023-09-25 RX ADMIN — HYDROMORPHONE HYDROCHLORIDE 0.5 MG: 1 INJECTION, SOLUTION INTRAMUSCULAR; INTRAVENOUS; SUBCUTANEOUS at 12:06

## 2023-09-25 RX ADMIN — BUDESONIDE 500 MCG: 0.5 INHALANT RESPIRATORY (INHALATION) at 07:14

## 2023-09-25 RX ADMIN — HYDROMORPHONE HYDROCHLORIDE 0.5 MG: 1 INJECTION, SOLUTION INTRAMUSCULAR; INTRAVENOUS; SUBCUTANEOUS at 05:34

## 2023-09-25 RX ADMIN — I.V. FAT EMULSION 250 ML: 20 EMULSION INTRAVENOUS at 19:38

## 2023-09-25 RX ADMIN — HYDROMORPHONE HYDROCHLORIDE 0.5 MG: 1 INJECTION, SOLUTION INTRAMUSCULAR; INTRAVENOUS; SUBCUTANEOUS at 14:32

## 2023-09-25 RX ADMIN — NYSTATIN 500000 UNITS: 100000 SUSPENSION ORAL at 17:20

## 2023-09-25 RX ADMIN — HYDROMORPHONE HYDROCHLORIDE 0.5 MG: 1 INJECTION, SOLUTION INTRAMUSCULAR; INTRAVENOUS; SUBCUTANEOUS at 17:20

## 2023-09-25 RX ADMIN — HYDROMORPHONE HYDROCHLORIDE 0.5 MG: 1 INJECTION, SOLUTION INTRAMUSCULAR; INTRAVENOUS; SUBCUTANEOUS at 08:24

## 2023-09-25 RX ADMIN — OFLOXACIN 10 DROP: 3 SOLUTION AURICULAR (OTIC) at 08:27

## 2023-09-25 RX ADMIN — Medication: at 08:26

## 2023-09-25 RX ADMIN — VASOPRESSIN: 20 INJECTION, SOLUTION INTRAVENOUS at 19:27

## 2023-09-25 RX ADMIN — HYDROMORPHONE HYDROCHLORIDE 0.5 MG: 1 INJECTION, SOLUTION INTRAMUSCULAR; INTRAVENOUS; SUBCUTANEOUS at 23:35

## 2023-09-25 RX ADMIN — HYDROMORPHONE HYDROCHLORIDE 0.5 MG: 1 INJECTION, SOLUTION INTRAMUSCULAR; INTRAVENOUS; SUBCUTANEOUS at 02:32

## 2023-09-25 RX ADMIN — SODIUM CHLORIDE, PRESERVATIVE FREE 40 MG: 5 INJECTION INTRAVENOUS at 08:27

## 2023-09-25 RX ADMIN — Medication: at 17:22

## 2023-09-25 RX ADMIN — PROCHLORPERAZINE EDISYLATE 10 MG: 5 INJECTION, SOLUTION INTRAMUSCULAR; INTRAVENOUS at 23:42

## 2023-09-25 RX ADMIN — HEPARIN SODIUM 5000 UNITS: 5000 INJECTION INTRAVENOUS; SUBCUTANEOUS at 22:00

## 2023-09-25 RX ADMIN — ARFORMOTEROL TARTRATE 15 MCG: 15 SOLUTION RESPIRATORY (INHALATION) at 07:15

## 2023-09-25 RX ADMIN — BUDESONIDE 500 MCG: 0.5 INHALANT RESPIRATORY (INHALATION) at 21:57

## 2023-09-25 RX ADMIN — NYSTATIN 500000 UNITS: 100000 SUSPENSION ORAL at 08:26

## 2023-09-25 RX ADMIN — CHLORHEXIDINE GLUCONATE 15 ML: 1.2 RINSE ORAL at 08:26

## 2023-09-25 RX ADMIN — ARFORMOTEROL TARTRATE 15 MCG: 15 SOLUTION RESPIRATORY (INHALATION) at 21:57

## 2023-09-25 ASSESSMENT — PAIN SCALES - GENERAL
PAINLEVEL_OUTOF10: 8
PAINLEVEL_OUTOF10: 3
PAINLEVEL_OUTOF10: 8
PAINLEVEL_OUTOF10: 9
PAINLEVEL_OUTOF10: 6
PAINLEVEL_OUTOF10: 6

## 2023-09-25 ASSESSMENT — PAIN DESCRIPTION - LOCATION
LOCATION: ABDOMEN;HEAD
LOCATION: ABDOMEN
LOCATION: HIP
LOCATION: ABDOMEN
LOCATION: OTHER (COMMENT)
LOCATION: ABDOMEN

## 2023-09-25 ASSESSMENT — PAIN DESCRIPTION - DESCRIPTORS
DESCRIPTORS: ACHING
DESCRIPTORS: DISCOMFORT

## 2023-09-25 ASSESSMENT — PAIN DESCRIPTION - ORIENTATION
ORIENTATION: ANTERIOR
ORIENTATION: ANTERIOR
ORIENTATION: RIGHT;ANTERIOR

## 2023-09-25 NOTE — PLAN OF CARE
Problem: Occupational Therapy - Adult  Goal: By Discharge: Performs self-care activities at highest level of function for planned discharge setting. See evaluation for individualized goals. Description: FUNCTIONAL STATUS PRIOR TO ADMISSION:  Patient was independent, active, working and a  prior to admission at [facility]. She was independent to mod I for all self-care and functional mobility. Since onset of illness the patient has been in need of assistance for all ADLs and functional mobility. DC to IPR prior to her admission, however, limited there and stating she was unable to complete much OOB therapy. She is able to verbalize and demonstrate a home exercise program involving UE movement. HOME SUPPORT: Patient lived with spouse but didn't require assistance at her true baseline. Occupational Therapy Goals:   Seen for weekly re-assessment 9/25/2023  1. Patient will perform simple grooming routine, in supported sitting, with minimal assistance within 7 day(s). 2.  Patient will perform upper body dressing at EOB with moderate assistance within 7 day(s). 3.  Patient will perform anterior neck to thigh bathing in supported sitting with moderate assist within 7 day(s). 4.  Patient will perform lateral rolling in prep for completion of toileting routine with moderate assistance within 7 days. 5.  Patient will participate in upper extremity therapeutic exercise/activities with minimal assistance for 5 minutes within 7 day(s). 6.  Patient will perform toilet transfer to [facility] with maximal assistance x2 within 7 day(s). Initiated 9/11/2023   1. Patient will perform  simple grooming routine, in supported sitting, with moderate assistance within 7 day(s). 2.  Patient will perform upper body dressing with moderate assistance within 7 day(s). 3.  Patient will perform anterior neck to thigh bathing with moderate assist within 7 day(s).   4.  Patient will perform lateral rolling in prep

## 2023-09-25 NOTE — PROGRESS NOTES
Hospitalist Progress Note  Wili Clemens MD  Answering service: [phone] from in house phone        Date of Service:  2023  NAME:  Juan Pope  :  1960  MRN:  [id number]      Admission Summary:   Per HPI: 42-year-old female who presented to the emergency department  [facility] and Hematemesis. 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 She was readmitted to [facility] ( through ) at which time she underwent EGD revealing GJ stricture. CT abdomen/pelvis demonstrated loculated intraperitoneal fluid in the anterior abdomen with persistent free air.   she underwent US guided drainage +E. coli. She completed a course of Zosyn and drain removed 8/10.   G-tube was placed.  patient transferred to the ICU in the setting of acute hypoxic respiratory failure d/t pulmonary edema requiring diuresis and eventual intubation ( - ). Patient reintubated  through current for worsening hypoxic respiratory failure. Course further complicated by persistent leukocytosis, fevers and CT evidence of intra-abdominal fluid collection post ultrasound drainage. Repeat imaging  did not reveal any drainable fluid collection or free air. She had tracheostomy 9/3. She started PSV trials on . Thoracentesis on . On 9/15 she started trach collar trials. Tolerating trach collar 3 days and tolerating everette valve.         Interval history / Subjective:   Patient seen and examined in ICU, patient found resting comfortably on trach collar  Pharmacy will renew TPN further management as per surgery for repair of the anastomotic leak  Currently afebrile without leukocytosis with minimal abdominal discomfort Remains off all antibiotic     Assessment & Plan:     Acute hypoxic respiratory failure

## 2023-09-25 NOTE — PROGRESS NOTES
RT Note: Trach    Pt is ready for the initial trach change. Pt has been off the vent for several days & is tolerating the speaking valve all day therefore a change to a cuffless trach is recommended. Please note, provider who inserted the trach will need to be the one to do this first change. RT can perform subsequent changes. 09/24/23 2241   Surgical Airway (Trach) 09/03/23 Deloresley Cuffed   Placement Date/Time: 09/03/23 340   Placed By: Licensed provider;RN;RT  Placement Verified By: Direct visualization; Chest X-ray  Surgical Airway Type: Tracheostomy  Brand: Lakshmi  Style: Cuffed  Size (mm): 6   Status Secured   Site Assessment Dry; No Bleeding; No drainage   Site Care Open to air   Inner Cannula Care Changed/new   Ties Assessment Clean;Dry; Intact; Secure   Cuff Pressure   (Deflated)

## 2023-09-25 NOTE — PLAN OF CARE
Problem: Physical Therapy - Adult  Goal: By Discharge: Performs mobility at highest level of function for planned discharge setting. See evaluation for individualized goals. Description: FUNCTIONAL STATUS PRIOR TO ADMISSION: Patient was independent and active without use of DME prior to last hospitalization at [address] [place] [address] for abdominal surgery. D/c'ed to [facility] where she was for 1 week-- states she did not get out of bed at [facility] and worked on bed exercises. 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 On 8/6 patient transferred to the ICU in the setting of acute hypoxic respiratory failure d/t pulmonary edema requiring diuresis and eventual intubation (8/7 - 8/14). Patient reintubated 8/18 through current for worsening hypoxic respiratory failure. Course further complicated by persistent leukocytosis, fevers and CT evidence of intra-abdominal fluid collection post ultrasound drainage. HOME SUPPORT PRIOR TO ADMISSION: The patient lived with her  at baseline. Physical Therapy Goals  Revised 9/18/2023; weekly completed 9/25, goals appropriate to carryover x 7 days. 1.  Patient will move from supine to sit and sit to supine, scoot up and down, and roll side to side in bed with maximal assistance x2 within 7 day(s). 2.  Patient will perform sit to stand with maximal assistance x2 within 7 day(s). 3.  Patient will transfer from bed to chair and chair to bed with maximal assistance x2 using the least restrictive device within 7 day(s). 4.  Patient will sit edge of bed with minimal assist for 2 minutes within 7 day(s). Physical Therapy Goals  Revised 9/11/2023  1. Patient will move from supine to sit and sit to supine, scoot up and down, and roll side to side in bed with moderate assistance x2 within 7 day(s).     2.  Patient will perform sit to stand with stents 2015    Cardiac murmur     CHF (congestive heart failure) ([address])     Colitis     Depression     Diabetes ([address])     diet controlled at this time. DVT (deep venous thrombosis) ([address])     Fibromyalgia     Fragile skin 2023    tears easily per patient; multple scabs noted    GERD (gastroesophageal reflux disease)     History of blood transfusion     post c section    History of vascular access device 2023    4.5 cm Midline right basilic, 84BL 3 cm out, arm circ 24.5    Hx of blood clots     Hyperlipidemia     Hypertension     Lumbar radiculitis     follows with dr Paulette Díaz for epidural steroid injections    Morbid obesity ([address])     Musculoskeletal disorder     ANCA (obstructive sleep apnea)     Pulmonary embolus ([address])     at [facility]    S/P RANDA (total abdominal hysterectomy)     Septic shock ([address]) 2023    ICU [facility]; ischemic colitis; partial colon resection    Thromboembolus ([address])     PE    Trigger finger, right little finger 10/19/2020    follows with [facility]    Trigger finger, right ring finger 10/19/2020    [facility]     Past Surgical History:   Procedure Laterality Date    [address] Right 2018    Fibroadenoma     [address]    CHOLECYSTECTOMY  2017    COLONOSCOPY N/A 2021    COLONOSCOPY (URGENT) performed by Mary Jo Marcos MD at [facility]  2017    HEMICOLECTOMY W/ OSTOMY      2023.     HYSTERECTOMY (CERVIX STATUS UNKNOWN)  1985    INTUBATION  2023    INTUBATION  2023    IR FLUOROSCOPY GUIDED SPINAL INJECTION  2022    IR FLUOROSCOPY GUIDED SPINAL INJECTION  2021    IR GASTROSTOMY TUBE PLACEMENT PERCUTANEOUS  2023    IR GASTROSTOMY TUBE PLACEMENT PERCUTANEOUS 2023 [facility] RAD ANGIO IR    IR IVC FILTER PLACEMENT W IMAGING  2023    IR IVC FILTER PLACEMENT W IMAGING 2023 [facility] RAD ANGIO IR    LAPAROTOMY N/A 2023    LAPAROTOMY EXPLORATORY, LYSIS OF

## 2023-09-25 NOTE — PROGRESS NOTES
0930: Bedside shift change report given to Kendall Bucio RN (oncoming nurse) by Sole Adamson RN (offgoing nurse). Report included the following information Nurse Handoff Report, Intake/Output, MAR, and Recent Results. 1620: Pt brigette lifted to chair. 1820: Pt brigette lifted back to bed. 1930: Bedside shift change report given to Isiah Ramos RN (oncoming nurse) by Kendall Bucio RN (offgoing nurse). Report included the following information Nurse Handoff Report, Intake/Output, MAR, and Recent Results.

## 2023-09-26 LAB
BASOPHILS # BLD: 0 K/UL (ref 0–0.1)
BASOPHILS NFR BLD: 0 % (ref 0–1)
COMMENT:: NORMAL
DIFFERENTIAL METHOD BLD: ABNORMAL
EOSINOPHIL # BLD: 0.4 K/UL (ref 0–0.4)
EOSINOPHIL NFR BLD: 4 % (ref 0–7)
ERYTHROCYTE [DISTWIDTH] IN BLOOD BY AUTOMATED COUNT: 14.4 % (ref 11.5–14.5)
GLUCOSE BLD STRIP.AUTO-MCNC: 96 MG/DL (ref 65–117)
HCT VFR BLD AUTO: 26 % (ref 35–47)
HGB BLD-MCNC: 8.1 G/DL (ref 11.5–16)
IMM GRANULOCYTES # BLD AUTO: 0 K/UL
IMM GRANULOCYTES NFR BLD AUTO: 0 %
LYMPHOCYTES # BLD: 1.1 K/UL (ref 0.8–3.5)
LYMPHOCYTES NFR BLD: 11 % (ref 12–49)
MCH RBC QN AUTO: 29.2 PG (ref 26–34)
MCHC RBC AUTO-ENTMCNC: 31.2 G/DL (ref 30–36.5)
MCV RBC AUTO: 93.9 FL (ref 80–99)
MONOCYTES # BLD: 0.5 K/UL (ref 0–1)
MONOCYTES NFR BLD: 5 % (ref 5–13)
NEUTS SEG # BLD: 8.1 K/UL (ref 1.8–8)
NEUTS SEG NFR BLD: 80 % (ref 32–75)
NRBC # BLD: 0 K/UL (ref 0–0.01)
NRBC BLD-RTO: 0 PER 100 WBC
PLATELET # BLD AUTO: 428 K/UL (ref 150–400)
PMV BLD AUTO: 9.4 FL (ref 8.9–12.9)
RBC # BLD AUTO: 2.77 M/UL (ref 3.8–5.2)
RBC MORPH BLD: ABNORMAL
SERVICE CMNT-IMP: NORMAL
SPECIMEN HOLD: NORMAL
WBC # BLD AUTO: 10.1 K/UL (ref 3.6–11)

## 2023-09-26 PROCEDURE — 6360000002 HC RX W HCPCS: Performed by: NURSE PRACTITIONER

## 2023-09-26 PROCEDURE — 2500000003 HC RX 250 WO HCPCS: Performed by: NURSE PRACTITIONER

## 2023-09-26 PROCEDURE — 31502 CHANGE OF WINDPIPE AIRWAY: CPT

## 2023-09-26 PROCEDURE — C9113 INJ PANTOPRAZOLE SODIUM, VIA: HCPCS | Performed by: NURSE PRACTITIONER

## 2023-09-26 PROCEDURE — 2580000003 HC RX 258: Performed by: NURSE PRACTITIONER

## 2023-09-26 PROCEDURE — 2580000003 HC RX 258: Performed by: PHYSICIAN ASSISTANT

## 2023-09-26 PROCEDURE — 2580000003 HC RX 258: Performed by: SURGERY

## 2023-09-26 PROCEDURE — 2060000000 HC ICU INTERMEDIATE R&B

## 2023-09-26 PROCEDURE — 6360000002 HC RX W HCPCS: Performed by: PHYSICIAN ASSISTANT

## 2023-09-26 PROCEDURE — 6370000000 HC RX 637 (ALT 250 FOR IP): Performed by: SURGERY

## 2023-09-26 PROCEDURE — 6360000002 HC RX W HCPCS: Performed by: SURGERY

## 2023-09-26 PROCEDURE — 0B21XFZ CHANGE TRACHEOSTOMY DEVICE IN TRACHEA, EXTERNAL APPROACH: ICD-10-PCS | Performed by: INTERNAL MEDICINE

## 2023-09-26 PROCEDURE — 97606 NEG PRS WND THER DME>50 SQCM: CPT

## 2023-09-26 PROCEDURE — 82962 GLUCOSE BLOOD TEST: CPT

## 2023-09-26 PROCEDURE — 2500000003 HC RX 250 WO HCPCS: Performed by: PHYSICIAN ASSISTANT

## 2023-09-26 PROCEDURE — 94640 AIRWAY INHALATION TREATMENT: CPT

## 2023-09-26 PROCEDURE — 6360000002 HC RX W HCPCS: Performed by: ANESTHESIOLOGY

## 2023-09-26 PROCEDURE — 6360000002 HC RX W HCPCS

## 2023-09-26 PROCEDURE — 85025 COMPLETE CBC W/AUTO DIFF WBC: CPT

## 2023-09-26 PROCEDURE — 2700000000 HC OXYGEN THERAPY PER DAY

## 2023-09-26 PROCEDURE — 36415 COLL VENOUS BLD VENIPUNCTURE: CPT

## 2023-09-26 RX ORDER — HYDROMORPHONE HYDROCHLORIDE 1 MG/ML
0.5 INJECTION, SOLUTION INTRAMUSCULAR; INTRAVENOUS; SUBCUTANEOUS ONCE
Status: COMPLETED | OUTPATIENT
Start: 2023-09-26 | End: 2023-09-26

## 2023-09-26 RX ORDER — HYDROMORPHONE HYDROCHLORIDE 1 MG/ML
1 INJECTION, SOLUTION INTRAMUSCULAR; INTRAVENOUS; SUBCUTANEOUS
Status: DISCONTINUED | OUTPATIENT
Start: 2023-09-26 | End: 2023-10-02

## 2023-09-26 RX ORDER — HYDROMORPHONE HYDROCHLORIDE 1 MG/ML
0.5 INJECTION, SOLUTION INTRAMUSCULAR; INTRAVENOUS; SUBCUTANEOUS
Status: DISCONTINUED | OUTPATIENT
Start: 2023-09-26 | End: 2023-10-06

## 2023-09-26 RX ADMIN — ARFORMOTEROL TARTRATE 15 MCG: 15 SOLUTION RESPIRATORY (INHALATION) at 19:54

## 2023-09-26 RX ADMIN — HEPARIN SODIUM 5000 UNITS: 5000 INJECTION INTRAVENOUS; SUBCUTANEOUS at 15:30

## 2023-09-26 RX ADMIN — I.V. FAT EMULSION 250 ML: 20 EMULSION INTRAVENOUS at 20:40

## 2023-09-26 RX ADMIN — SODIUM CHLORIDE, PRESERVATIVE FREE 40 MG: 5 INJECTION INTRAVENOUS at 09:06

## 2023-09-26 RX ADMIN — HYDROMORPHONE HYDROCHLORIDE 1 MG: 1 INJECTION, SOLUTION INTRAMUSCULAR; INTRAVENOUS; SUBCUTANEOUS at 18:36

## 2023-09-26 RX ADMIN — ARFORMOTEROL TARTRATE 15 MCG: 15 SOLUTION RESPIRATORY (INHALATION) at 07:40

## 2023-09-26 RX ADMIN — OFLOXACIN 10 DROP: 3 SOLUTION AURICULAR (OTIC) at 09:07

## 2023-09-26 RX ADMIN — HEPARIN SODIUM 5000 UNITS: 5000 INJECTION INTRAVENOUS; SUBCUTANEOUS at 22:23

## 2023-09-26 RX ADMIN — HYDROMORPHONE HYDROCHLORIDE 1 MG: 1 INJECTION, SOLUTION INTRAMUSCULAR; INTRAVENOUS; SUBCUTANEOUS at 15:28

## 2023-09-26 RX ADMIN — BUDESONIDE 500 MCG: 0.5 INHALANT RESPIRATORY (INHALATION) at 19:54

## 2023-09-26 RX ADMIN — HYDROMORPHONE HYDROCHLORIDE 0.5 MG: 1 INJECTION, SOLUTION INTRAMUSCULAR; INTRAVENOUS; SUBCUTANEOUS at 09:05

## 2023-09-26 RX ADMIN — ONDANSETRON 4 MG: 2 INJECTION INTRAMUSCULAR; INTRAVENOUS at 12:28

## 2023-09-26 RX ADMIN — HYDROMORPHONE HYDROCHLORIDE 0.5 MG: 1 INJECTION, SOLUTION INTRAMUSCULAR; INTRAVENOUS; SUBCUTANEOUS at 02:29

## 2023-09-26 RX ADMIN — Medication: at 17:05

## 2023-09-26 RX ADMIN — Medication 10 ML: at 09:07

## 2023-09-26 RX ADMIN — BUDESONIDE 500 MCG: 0.5 INHALANT RESPIRATORY (INHALATION) at 07:40

## 2023-09-26 RX ADMIN — Medication 10 ML: at 22:24

## 2023-09-26 RX ADMIN — HYDROMORPHONE HYDROCHLORIDE 1 MG: 1 INJECTION, SOLUTION INTRAMUSCULAR; INTRAVENOUS; SUBCUTANEOUS at 22:19

## 2023-09-26 RX ADMIN — CHLORHEXIDINE GLUCONATE 15 ML: 1.2 RINSE ORAL at 09:07

## 2023-09-26 RX ADMIN — HYDROMORPHONE HYDROCHLORIDE 0.5 MG: 1 INJECTION, SOLUTION INTRAMUSCULAR; INTRAVENOUS; SUBCUTANEOUS at 05:39

## 2023-09-26 RX ADMIN — ALBUTEROL SULFATE 2.5 MG: 2.5 SOLUTION RESPIRATORY (INHALATION) at 07:40

## 2023-09-26 RX ADMIN — Medication: at 09:07

## 2023-09-26 RX ADMIN — VASOPRESSIN: 20 INJECTION, SOLUTION INTRAVENOUS at 19:28

## 2023-09-26 RX ADMIN — HYDROMORPHONE HYDROCHLORIDE 0.5 MG: 1 INJECTION, SOLUTION INTRAMUSCULAR; INTRAVENOUS; SUBCUTANEOUS at 12:28

## 2023-09-26 RX ADMIN — HEPARIN SODIUM 5000 UNITS: 5000 INJECTION INTRAVENOUS; SUBCUTANEOUS at 05:39

## 2023-09-26 ASSESSMENT — PAIN DESCRIPTION - DESCRIPTORS: DESCRIPTORS: ACHING

## 2023-09-26 ASSESSMENT — PAIN SCALES - GENERAL
PAINLEVEL_OUTOF10: 6
PAINLEVEL_OUTOF10: 8
PAINLEVEL_OUTOF10: 10
PAINLEVEL_OUTOF10: 6
PAINLEVEL_OUTOF10: 8
PAINLEVEL_OUTOF10: 9
PAINLEVEL_OUTOF10: 8
PAINLEVEL_OUTOF10: 0
PAINLEVEL_OUTOF10: 7
PAINLEVEL_OUTOF10: 6
PAINLEVEL_OUTOF10: 7
PAINLEVEL_OUTOF10: 0
PAINLEVEL_OUTOF10: 8
PAINLEVEL_OUTOF10: 8

## 2023-09-26 ASSESSMENT — PAIN DESCRIPTION - LOCATION
LOCATION: ABDOMEN
LOCATION: GENERALIZED

## 2023-09-26 ASSESSMENT — PAIN DESCRIPTION - ORIENTATION
ORIENTATION: UPPER
ORIENTATION: INNER

## 2023-09-26 ASSESSMENT — PAIN SCALES - WONG BAKER
WONGBAKER_NUMERICALRESPONSE: 0
WONGBAKER_NUMERICALRESPONSE: NO HURT
WONGBAKER_NUMERICALRESPONSE: 0
WONGBAKER_NUMERICALRESPONSE: NO HURT

## 2023-09-26 NOTE — WOUND CARE
WOCN Note:     Follow up sacral wound vac change. Chart reviewed. Assessed in room 7120. Admitted DX:  Hematemesis;GI bleed; Gastrointestinal hemorrhage; cdiff    Past Medical History: gastric bypass; asthma; cad; dm2  [facility]    Assessment:   Patient has trach, alert, communicates via writing pad and communication board and mouthing words; requires assist with repositioning. Bed: [facility]  Patient has a engle and drains. Patient repositioned on left side. Generalized edema lower legs and feet. Heels offloaded with pillows. 1. POA Sacrum and bilateral buttocks, Stage 4 Pressure Injury with palpable bone: 7.8 x 7.5 x 1cm; undermining 1 cm at 4 - 8 o' clock; 40% tan; 60% red; serous drainage in canister; no odor; constantine wound edges resurfaced with hyperpigmentation. Treatment:  Cleansed with Vashe; resumed NPWT at 125 using 2 black (wrapped in mepitel one to the wound bed) and bridged to left side. 2. POA Right heel, Unstageable Pressure Injury with dry deflated bullae/hyperpigmented:  improving to blanching red erythema. 3.  POA Left heel, unstageable pressure injury:  healed with dry scab and hyperpigmented skin. 4.  Right upper back, hyperpigmented linear area:  3 x 0.6 x 0 cm;  constantine wound intact. 5.  Right chest, partial thickness wound/skin tear: 2 x 1 x 0.1 cm with bridge of intact skin; 100% moist red; constantine wound intact. Cleansed with saline; applied silver foam.          Wound, Pressure Prevention & Skin Care Recommendations:    Minimize layers of linen/pads under patient to optimize support surface. 2.  Turn/reposition approximately every 2 hours and offload heels. 3.  Manage moisture/ Keep skin folds clean and dry/minimize brief usage. 4.  Specialty bed:[facility].  5.  Sacrum:  Continue NPWT at 125 mmHg with changes twice weekly. 6.  Heels:  Apply Venelex BID   7.   Right upper chest:  Every other day cleanse with saline; apply silver foam

## 2023-09-26 NOTE — PROGRESS NOTES
[address] [facility]  Hospitalist Group                                                                                          Hospitalist Progress Note  Lucy Crawley  Answering service: [phone] from in house phone        Date of Service:  2023  NAME:  Marleni Cobos  :  1960  MRN:  [id number]      Admission Summary:   Per HPI: 80-year-old female who presented to the emergency department  [facility] and Hematemesis. 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 She was readmitted to [facility] ( through ) at which time she underwent EGD revealing GJ stricture. CT abdomen/pelvis demonstrated loculated intraperitoneal fluid in the anterior abdomen with persistent free air.   she underwent US guided drainage +E. coli. She completed a course of Zosyn and drain removed 8/10.   G-tube was placed.  patient transferred to the ICU in the setting of acute hypoxic respiratory failure d/t pulmonary edema requiring diuresis and eventual intubation ( - ). Patient reintubated  through current for worsening hypoxic respiratory failure. Course further complicated by persistent leukocytosis, fevers and CT evidence of intra-abdominal fluid collection post ultrasound drainage. Repeat imaging  did not reveal any drainable fluid collection or free air. She had tracheostomy 9/3. She started PSV trials on . Thoracentesis on . On 9/15 she started trach collar trials. Tolerating trach collar 3 days and tolerating everette valve. Interval history / Subjective:   Saw the patient on rounds, she continues to endorse feeling short of breath as if she cannot take a deep breath. She denies any other symptoms at this time.       Assessment & Plan:     Acute hypoxic respiratory failure status post ETT, mechanical ventilation and now s/p trach 09/26/23 1000   BP:    Pulse: 89   Resp: 17   Temp:    SpO2: 100%         Intake/Output Summary (Last 24 hours) at 9/26/2023 1047  Last data filed at 9/26/2023 1000  Gross per 24 hour   Intake 2096.85 ml   Output 1835 ml   Net 261.85 ml        Physical Examination:     I had a face to face encounter with this patient and independently examined them on 9/26/2023 as outlined below:          Constitutional:  No acute distress, cooperative, pleasant    ENT:  Oral mucosa moist, oropharynx benign. Resp: On 5L Trach collar, CTA bilaterally, respirations shallow but no accessory muscle use. CV:  Regular rhythm, normal rate, no murmurs, gallops, rubs    GI:  Soft, non distended, non tender. bowel sounds present    Musculoskeletal:  No edema, warm, 2+ pulses throughout, wound vac in place, serosanguinous output    Neurologic:  Moves all extremities. AAOx3, CN II-XII grossly intact            Data Review:    Review and/or order of clinical lab test  Review and/or order of tests in the radiology section of CPT  Review and/or order of tests in the medicine section of CPT      Labs:     Recent Labs     09/25/23  0235 09/26/23  0336   WBC 10.8 10.1   HGB 7.8* 8.1*   HCT 25.7* 26.0*   * 428*     Recent Labs     09/24/23  0349 09/25/23  0235    138   K 3.7 3.7    102   CO2 32 28   BUN 34* 36*   MG 1.7  --    PHOS 2.8  --      No results for input(s): \"ALT\", \"TP\", \"ALB\", \"GLOB\", \"GGT\", \"AML\" in the last 72 hours. Invalid input(s): \"SGOT\", \"GPT\", \"AP\", \"TBIL\", \"TBILI\", \"AMYP\", \"LPSE\", \"HLPSE\"  No results for input(s): \"INR\", \"APTT\" in the last 72 hours. Invalid input(s): \"PTP\"   No results for input(s): \"TIBC\", \"FERR\" in the last 72 hours. Invalid input(s): \"FE\", \"PSAT\"   No results found for: \"FOL\", \"RBCF\"   No results for input(s): \"PH\", \"PCO2\", \"PO2\" in the last 72 hours. No results for input(s): \"CPK\" in the last 72 hours.     Invalid input(s): \"CPKMB\", \"CKNDX\", \"TROIQ\"  Lab Results   Component

## 2023-09-26 NOTE — PROGRESS NOTES
Trach changed by physician to 6 cuffless shiley. Pt tolerated well.   Procedure done with no complications

## 2023-09-26 NOTE — PROGRESS NOTES
Speech pathology note  Reviewed chart and discussed case with RN. Note plan for trach change today with possible transfer to [facility]. Note per surgery, \"Okay for speech pathology to reassess swallowing, and clear liquids (comfort sips), would be okay, as would ice chips/popsicles. \" Given aspiration of thin liquids on FEES last week, would recommend only ice chips at this time. However, SLP will continue to follow for PMV tolerance and dysphagia management after trach change. Thank you.     Surjit Brewer., CCC-SLP

## 2023-09-26 NOTE — PROGRESS NOTES
Comprehensive Nutrition Assessment    Type and Reason for Visit: Reassess    Nutrition Recommendations/Plan:     -Please weigh patient daily; Standing scale as able    -Continue Cyclic TPN       Malnutrition Assessment:  Malnutrition Status:  Severe malnutrition (08/01/23 1000)    Context:  Acute Illness     Findings of the 6 clinical characteristics of malnutrition:  Energy Intake:  50% or less of estimated energy requirements for 5 or more days  Weight Loss:  Unable to assess     Body Fat Loss:  Mild body fat loss Buccal region, Orbital   Muscle Mass Loss: Moderate muscle mass loss Temples (temporalis), Clavicles (pectoralis & deltoids)  Fluid Accumulation:  Moderate to Severe Generalized, Extremities   Strength:  Not Performed     Nutrition Assessment:  Pt admitted from [facility] d/t GI Bleed. Recent extended hospitalization @ [facility]-readmitted after episode of fulminant colitis requiring subtotal colectomy with end ileostomy, s/p reversal and subsequent ileocolonic anastomosis; EGD 7/13/23 showed nl esophagus, small hiatal hernia, evidence of previous RYGB with a tight stricture and ulcer at the gastro-jejunal anastomosis-s/p dilatation; recurrent C Diff colitis. PMHx: Gastric bypass 2017, CAD, DM 2, Dyslipidemia, GERD, PE.    9/26: Tolerating TC and PMV; trach downsized today and pt moved to stepdown unit. Persistent anastomotic leak @ ileorectal anastomosis. Surgery plans to take pt back to the OR next week. Pt only allowed ice chips at this time per SLP and results of FEES last week. Calories increased in TPN last week by increasing total CHO to 225 gm per day. TPN provides 2 g/protein per kg IBW and a total of 1700 calories per day to meet estimated needs. Pt last weighed a week ago; recommend weighing patient daily. Unable to determine weight changes/etc. Labs reviewed. 9/19: Started TC trials 9/15 and tolerated TC ON for the first time! Sitting up in chair this afternoon.  Repeat CT scan scheduled for

## 2023-09-26 NOTE — PROGRESS NOTES
Trach change at bedside    Trach changed by physician to 6 cuffless shiley. Pt tolerated well.   Procedure done with no complications

## 2023-09-26 NOTE — PROGRESS NOTES
1118: TRANSFER - OUT REPORT:    Verbal report given to Salma on Sherie Santiago  being transferred to [facility] for routine progression of patient care       Report consisted of patient's Situation, Background, Assessment and   Recommendations(SBAR). Information from the following report(s) Nurse Handoff Report, Adult Overview, Intake/Output, MAR, Med Rec Status, Cardiac Rhythm NSR, and Event Log was reviewed with the receiving nurse. Lines:   PICC Triple Lumen 42/77/29 Left Basilic (Active)   Central Line Being Utilized Yes 09/26/23 0800   Criteria for Appropriate Use Total parenteral nutrition 09/26/23 0800   Site Assessment Clean, dry & intact 09/26/23 0800   Phlebitis Assessment No symptoms 09/26/23 0800   Infiltration Assessment 0 09/26/23 0800   Extremity Circumference (cm) 26 cm 09/18/23 0400   External Catheter Length (cm) 0 cm 09/18/23 0400   Proximal Lumen Color/Status Red;Flushed 09/26/23 0800   Medial Lumen Status White;Flushed 09/26/23 0800   Distal Lumen Color/Status Gray;Flushed 09/26/23 0800   Line Care Ports disinfected 09/26/23 0800   Alcohol Cap Used Yes 09/26/23 0800   Date of Last Dressing Change 09/26/23 09/26/23 0800   Dressing Type Transparent w/CHG gel 09/26/23 0800   Dressing Status Clean, dry & intact; New dressing applied 09/26/23 0800   Dressing Intervention Dressing changed;New 09/26/23 0800        Opportunity for questions and clarification was provided.       Patient transported with:  Monitor, O2 @ 5lpm, Registered Nurse, and Neuralieve

## 2023-09-27 LAB
GLUCOSE BLD STRIP.AUTO-MCNC: 136 MG/DL (ref 65–117)
GLUCOSE BLD STRIP.AUTO-MCNC: 152 MG/DL (ref 65–117)
GLUCOSE BLD STRIP.AUTO-MCNC: 82 MG/DL (ref 65–117)
SERVICE CMNT-IMP: ABNORMAL
SERVICE CMNT-IMP: ABNORMAL
SERVICE CMNT-IMP: NORMAL

## 2023-09-27 PROCEDURE — 82962 GLUCOSE BLOOD TEST: CPT

## 2023-09-27 PROCEDURE — 6360000002 HC RX W HCPCS: Performed by: NURSE PRACTITIONER

## 2023-09-27 PROCEDURE — 94760 N-INVAS EAR/PLS OXIMETRY 1: CPT

## 2023-09-27 PROCEDURE — A4216 STERILE WATER/SALINE, 10 ML: HCPCS | Performed by: NURSE PRACTITIONER

## 2023-09-27 PROCEDURE — C9113 INJ PANTOPRAZOLE SODIUM, VIA: HCPCS | Performed by: NURSE PRACTITIONER

## 2023-09-27 PROCEDURE — 92526 ORAL FUNCTION THERAPY: CPT

## 2023-09-27 PROCEDURE — 2060000000 HC ICU INTERMEDIATE R&B

## 2023-09-27 PROCEDURE — 6360000002 HC RX W HCPCS: Performed by: ANESTHESIOLOGY

## 2023-09-27 PROCEDURE — 2700000000 HC OXYGEN THERAPY PER DAY

## 2023-09-27 PROCEDURE — 97530 THERAPEUTIC ACTIVITIES: CPT

## 2023-09-27 PROCEDURE — 6360000002 HC RX W HCPCS: Performed by: PHYSICIAN ASSISTANT

## 2023-09-27 PROCEDURE — 2580000003 HC RX 258: Performed by: SURGERY

## 2023-09-27 PROCEDURE — 6360000002 HC RX W HCPCS: Performed by: SURGERY

## 2023-09-27 PROCEDURE — 2500000003 HC RX 250 WO HCPCS: Performed by: PHYSICIAN ASSISTANT

## 2023-09-27 PROCEDURE — 6370000000 HC RX 637 (ALT 250 FOR IP): Performed by: SURGERY

## 2023-09-27 PROCEDURE — 94640 AIRWAY INHALATION TREATMENT: CPT

## 2023-09-27 PROCEDURE — 2580000003 HC RX 258: Performed by: NURSE PRACTITIONER

## 2023-09-27 PROCEDURE — 51702 INSERT TEMP BLADDER CATH: CPT

## 2023-09-27 PROCEDURE — 2580000003 HC RX 258: Performed by: PHYSICIAN ASSISTANT

## 2023-09-27 PROCEDURE — 2500000003 HC RX 250 WO HCPCS: Performed by: NURSE PRACTITIONER

## 2023-09-27 RX ADMIN — Medication: at 23:35

## 2023-09-27 RX ADMIN — HYDROMORPHONE HYDROCHLORIDE 1 MG: 1 INJECTION, SOLUTION INTRAMUSCULAR; INTRAVENOUS; SUBCUTANEOUS at 05:33

## 2023-09-27 RX ADMIN — HYDROMORPHONE HYDROCHLORIDE 1 MG: 1 INJECTION, SOLUTION INTRAMUSCULAR; INTRAVENOUS; SUBCUTANEOUS at 14:34

## 2023-09-27 RX ADMIN — ALBUTEROL SULFATE 2.5 MG: 2.5 SOLUTION RESPIRATORY (INHALATION) at 03:48

## 2023-09-27 RX ADMIN — HYDROMORPHONE HYDROCHLORIDE 1 MG: 1 INJECTION, SOLUTION INTRAMUSCULAR; INTRAVENOUS; SUBCUTANEOUS at 17:25

## 2023-09-27 RX ADMIN — Medication 10 ML: at 20:29

## 2023-09-27 RX ADMIN — ARFORMOTEROL TARTRATE 15 MCG: 15 SOLUTION RESPIRATORY (INHALATION) at 20:09

## 2023-09-27 RX ADMIN — Medication: at 17:26

## 2023-09-27 RX ADMIN — HEPARIN SODIUM 5000 UNITS: 5000 INJECTION INTRAVENOUS; SUBCUTANEOUS at 14:09

## 2023-09-27 RX ADMIN — SODIUM CHLORIDE, PRESERVATIVE FREE 40 MG: 5 INJECTION INTRAVENOUS at 08:46

## 2023-09-27 RX ADMIN — HYDROMORPHONE HYDROCHLORIDE 1 MG: 1 INJECTION, SOLUTION INTRAMUSCULAR; INTRAVENOUS; SUBCUTANEOUS at 08:46

## 2023-09-27 RX ADMIN — Medication: at 01:08

## 2023-09-27 RX ADMIN — BUDESONIDE 500 MCG: 0.5 INHALANT RESPIRATORY (INHALATION) at 20:09

## 2023-09-27 RX ADMIN — HEPARIN SODIUM 5000 UNITS: 5000 INJECTION INTRAVENOUS; SUBCUTANEOUS at 05:33

## 2023-09-27 RX ADMIN — Medication: at 08:47

## 2023-09-27 RX ADMIN — HYDROMORPHONE HYDROCHLORIDE 1 MG: 1 INJECTION, SOLUTION INTRAMUSCULAR; INTRAVENOUS; SUBCUTANEOUS at 01:09

## 2023-09-27 RX ADMIN — OFLOXACIN 10 DROP: 3 SOLUTION AURICULAR (OTIC) at 08:47

## 2023-09-27 RX ADMIN — HYDROMORPHONE HYDROCHLORIDE 1 MG: 1 INJECTION, SOLUTION INTRAMUSCULAR; INTRAVENOUS; SUBCUTANEOUS at 23:28

## 2023-09-27 RX ADMIN — Medication 10 ML: at 08:48

## 2023-09-27 RX ADMIN — ACETAMINOPHEN 650 MG: 650 SUPPOSITORY RECTAL at 23:35

## 2023-09-27 RX ADMIN — ONDANSETRON 4 MG: 2 INJECTION INTRAMUSCULAR; INTRAVENOUS at 21:09

## 2023-09-27 RX ADMIN — VASOPRESSIN: 20 INJECTION, SOLUTION INTRAVENOUS at 18:05

## 2023-09-27 RX ADMIN — HYDROMORPHONE HYDROCHLORIDE 1 MG: 1 INJECTION, SOLUTION INTRAMUSCULAR; INTRAVENOUS; SUBCUTANEOUS at 20:30

## 2023-09-27 RX ADMIN — BUDESONIDE 500 MCG: 0.5 INHALANT RESPIRATORY (INHALATION) at 07:42

## 2023-09-27 RX ADMIN — I.V. FAT EMULSION 250 ML: 20 EMULSION INTRAVENOUS at 18:05

## 2023-09-27 RX ADMIN — ARFORMOTEROL TARTRATE 15 MCG: 15 SOLUTION RESPIRATORY (INHALATION) at 07:42

## 2023-09-27 RX ADMIN — HEPARIN SODIUM 5000 UNITS: 5000 INJECTION INTRAVENOUS; SUBCUTANEOUS at 21:11

## 2023-09-27 RX ADMIN — HYDROMORPHONE HYDROCHLORIDE 1 MG: 1 INJECTION, SOLUTION INTRAMUSCULAR; INTRAVENOUS; SUBCUTANEOUS at 11:45

## 2023-09-27 ASSESSMENT — PAIN DESCRIPTION - DESCRIPTORS
DESCRIPTORS: ACHING
DESCRIPTORS: SHARP
DESCRIPTORS: ACHING

## 2023-09-27 ASSESSMENT — PAIN DESCRIPTION - LOCATION
LOCATION: ABDOMEN
LOCATION: HEAD
LOCATION: ABDOMEN

## 2023-09-27 ASSESSMENT — PAIN SCALES - GENERAL
PAINLEVEL_OUTOF10: 8
PAINLEVEL_OUTOF10: 0
PAINLEVEL_OUTOF10: 7
PAINLEVEL_OUTOF10: 8
PAINLEVEL_OUTOF10: 7
PAINLEVEL_OUTOF10: 8

## 2023-09-27 ASSESSMENT — PAIN SCALES - WONG BAKER
WONGBAKER_NUMERICALRESPONSE: 0
WONGBAKER_NUMERICALRESPONSE: 0
WONGBAKER_NUMERICALRESPONSE: NO HURT
WONGBAKER_NUMERICALRESPONSE: NO HURT

## 2023-09-27 ASSESSMENT — PAIN DESCRIPTION - ORIENTATION
ORIENTATION: MID
ORIENTATION: UPPER
ORIENTATION: MID
ORIENTATION: POSTERIOR
ORIENTATION: MID
ORIENTATION: MID
ORIENTATION: UPPER

## 2023-09-27 NOTE — CONSULTS
Pulmonary, Critical Care, and Sleep Medicine~Consult Note    Name: Taryn Francis MRN: [id number]   : 1960 Hospital: [facility]   Date: 2023 1:04 PM Admission: 2023     Impression Plan   Chronic hypoxic respiratory failure due to prolonged medical course. Now has a chronic trach in place on 9/3/2023  C. difficile colitis, status post partial colectomy with ileostomy on iliac anastomosis. Recurrent intra-abdominal fluid collections history. Extensive ICU stent  Asthma mild intermittent. Not bronchospastic  Severe septic shock now resolved. Pressure wounds being monitored Inner cannula changes per shift  Trach care as already completed  Passy-Mp valve trials as directed by speech therapy  Bronchodilator therapy  O2 titration above 90%  It appears that she is already been downsized in her trach we will continue to monitor     Daily Progression:    Consult Note requested by hospitalist service. Patient was recently discharged from the ICU following an extensive hospitalization course dating back to . It will not be summarized in this note please; defer to previous several months of medical records for more detailed clinical course. However she did undergo a partial colectomy with ileostomy following C. difficile this infection. Required substantial ICU monitoring and intubation. Was extubated and reintubated several times and up requiring a tracheostomy on 9/3. Family liberated from mechanical ventilation on . Was downgraded yesterday. She had a trach change yesterday to a 6 cuffless Shiley by the intensivist.  Tolerated that well. Does not sound like she has had Passy-Mp valve trials yet but has been moving forward with p.o. intake slowly. Speech therapy is following her currently. She denies any history of smoking but reports a history of asthma.   Not on bronchodilators consistently but has had less inhalers in the past.  Mentions that she was % infusion 250 mL  250 mL IntraVENous Daily    budesonide (PULMICORT) nebulizer suspension 500 mcg  0.5 mg Nebulization BID RT    arformoterol tartrate (BROVANA) nebulizer solution 15 mcg  15 mcg Nebulization BID RT    pantoprazole (PROTONIX) 40 mg in sodium chloride (PF) 0.9 % 10 mL injection  40 mg IntraVENous Daily    [Held by provider] buPROPion (WELLBUTRIN) tablet 100 mg  100 mg Per G Tube BID    0.9 % sodium chloride infusion  25 mL IntraVENous PRN    albuterol (PROVENTIL) (2.5 MG/3ML) 0.083% nebulizer solution 2.5 mg  2.5 mg Nebulization Q4H PRN    prochlorperazine (COMPAZINE) injection 10 mg  10 mg IntraVENous Q6H PRN    alteplase (CATHFLO) 1 mg in sterile water 1 mL injection  1 mg IntraCATHeter PRN    balsum peru-castor oil (VENELEX) ointment   Topical q8h    albumin human 5% IV solution 12.5 g  12.5 g IntraVENous Q6H PRN    sodium chloride flush 0.9 % injection 5-40 mL  5-40 mL IntraVENous 2 times per day    sodium chloride flush 0.9 % injection 5-40 mL  5-40 mL IntraVENous PRN    ondansetron (ZOFRAN-ODT) disintegrating tablet 4 mg  4 mg Oral Q8H PRN    Or    ondansetron (ZOFRAN) injection 4 mg  4 mg IntraVENous Q6H PRN    polyethylene glycol (GLYCOLAX) packet 17 g  17 g Oral Daily PRN    acetaminophen (TYLENOL) tablet 650 mg  650 mg Oral Q6H PRN    Or    acetaminophen (TYLENOL) suppository 650 mg  650 mg Rectal Q6H PRN    glucose chewable tablet 16 g  4 tablet Oral PRN    dextrose bolus 10% 125 mL  125 mL IntraVENous PRN    Or    dextrose bolus 10% 250 mL  250 mL IntraVENous PRN    glucagon injection 1 mg  1 mg SubCUTAneous PRN    dextrose 10 % infusion   IntraVENous Continuous PRN       Labs:  ABG Invalid input(s):  \"ABG\"     CBC Recent Labs     09/25/23  0235 09/26/23  0336   WBC 10.8 10.1   HGB 7.8* 8.1*   HCT 25.7* 26.0*   * 428*   MCV 94.8 93.9   MCH 28.8 46.3        Metabolic  Panel Recent Labs     09/25/23  0235      K 3.7      CO2 28   BUN 36*        Pertinent Labs

## 2023-09-27 NOTE — PLAN OF CARE
Bedside and Verbal shift change report given to Raza Ryan RN (oncoming nurse) by Dennis Bernabe RN (offgoing nurse). Report included the following information SBAR, Kardex, ED Summary, MAR, and Cardiac Rhythm NSR.      Problem: Discharge Planning  Goal: Discharge to home or other facility with appropriate resources  Outcome: Progressing     Problem: Chronic Conditions and Co-morbidities  Goal: Patient's chronic conditions and co-morbidity symptoms are monitored and maintained or improved  Outcome: Progressing     Problem: Cardiovascular - Adult  Goal: Absence of cardiac dysrhythmias or at baseline  Outcome: Progressing     Problem: Gastrointestinal - Adult  Goal: Minimal or absence of nausea and vomiting  Outcome: Progressing  Goal: Maintains or returns to baseline bowel function  Outcome: Progressing  Goal: Maintains adequate nutritional intake  Outcome: Progressing  Goal: Establish and maintain optimal ostomy function  Outcome: Progressing     Problem: Hematologic - Adult  Goal: Maintains hematologic stability  Outcome: Progressing     Problem: Infection - Adult  Goal: Absence of infection at discharge  Outcome: Progressing  Goal: Absence of infection during hospitalization  Outcome: Progressing     Problem: Nutrition Deficit:  Goal: Optimize nutritional status  Outcome: Progressing

## 2023-09-27 NOTE — PLAN OF CARE
Problem: Physical Therapy - Adult  Goal: By Discharge: Performs mobility at highest level of function for planned discharge setting. See evaluation for individualized goals. Description: FUNCTIONAL STATUS PRIOR TO ADMISSION: Patient was independent and active without use of DME prior to last hospitalization at [facility] for abdominal surgery. D/c'ed to [facility] [place] [facility] where she was for 1 week-- states she did not get out of bed at [facility] [place] [place] [facility] and worked on bed exercises. 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 On 8/6 patient transferred to the ICU in the setting of acute hypoxic respiratory failure d/t pulmonary edema requiring diuresis and eventual intubation (8/7 - 8/14). Patient reintubated 8/18 through current for worsening hypoxic respiratory failure. Course further complicated by persistent leukocytosis, fevers and CT evidence of intra-abdominal fluid collection post ultrasound drainage. HOME SUPPORT PRIOR TO ADMISSION: The patient lived with her  at baseline. Physical Therapy Goals  Revised 9/18/2023; weekly completed 9/25, goals appropriate to carryover x 7 days. 1.  Patient will move from supine to sit and sit to supine, scoot up and down, and roll side to side in bed with maximal assistance x2 within 7 day(s). 2.  Patient will perform sit to stand with maximal assistance x2 within 7 day(s). 3.  Patient will transfer from bed to chair and chair to bed with maximal assistance x2 using the least restrictive device within 7 day(s). 4.  Patient will sit edge of bed with minimal assist for 2 minutes within 7 day(s). Physical Therapy Goals  Revised 9/11/2023  1. Patient will move from supine to sit and sit to supine, scoot up and down, and roll side to side in bed with moderate assistance x2 within 7 day(s).     2.  Patient will perform sit to stand with max assistance x2 within 7 day(s). 3.  Patient will transfer from bed to chair and chair to bed with max assistance x2 using the least restrictive device within 7 day(s). 4.  Patient will sit edge of bed with min assist for 10 minutes within 7 day(s). Initiated 8/2/2023  1. Patient will move from supine to sit and sit to supine, scoot up and down, and roll side to side in bed with minimal assistance x2 within 7 day(s). 2.  Patient will perform sit to stand with moderate assistance x2 within 7 day(s). 3.  Patient will transfer from bed to chair and chair to bed with moderate assistance x2 using the least restrictive device within 7 day(s). 4.  Patient will ambulate with moderate assistance x2 for 10 feet with the least restrictive device within 7 day(s). Outcome: Progressing   PHYSICAL THERAPY TREATMENT    Patient: Hue Pollock (2 y.o. female)  Date: 9/27/2023  Diagnosis: Hematemesis [K92.0]  GI bleed [K92.2]  Gastrointestinal hemorrhage, unspecified gastrointestinal hemorrhage type [K92.2] GI bleed  Procedure(s) (LRB):  DEBRIDEMENT OF SACRAL DECUBITUS WOUND (N/A) 44 Days Post-Op  Precautions: Fall Risk                    ASSESSMENT:  Patient continues to benefit from skilled PT services and is slowly progressing towards goals. Patient agreeable to participate with goal to try Neena Maxim again for standing and transfer. Patient initially asking to use the sling/[place] but extensive education provided regarding therapy's role and benefit to her in using a method that encourages improved strength and mobility on her part. Patient agreeable but remained anxious. Variable levels of effort noted throughout session, MAX A x 2 to get to EOB but believe she could have done more. Fair sitting balance EOB but patient at times would just try and lean fully back without warning.   Stood with less assistance than anticipated via pulling up on Neena Maxim and maintained for nearly 1 minute without buttock pad

## 2023-09-27 NOTE — PROGRESS NOTES
Preliminary family discussion with patient,  regarding recommendations for laparotomy, temporary diversion. She has ileorectal anastomotic leak from Marylou Castleman surgery over the summer, which has not healed despite diversion through gastrostomy, n.p.o. status/TPN and percutaneous drainage. Discussed recommendation for laparotomy, resection of anastomosis with end ileostomy versus proximal loop ileostomy diversion and washout. We would proceed with surgery on Wednesday, Thursday, timing to be determined. Initial questions answered, and we agreed on a more detailed discussion on Tuesday around noon. I will be out of town until Monday. I will update through progress note regarding timing of procedure next week. Continue TPN, gastrostomy to gravity drainage. Continue occupational therapy, physical therapy. Continue popsicles/ice chips.

## 2023-09-27 NOTE — PROGRESS NOTES
[address] [facility]  Hospitalist Group                                                                                          Hospitalist Progress Note  Lucy Barrera  Answering service: [phone] from in house phone        Date of Service:  2023  NAME:  Manuela Stevenson  :  1960  MRN:  [id number]      Admission Summary:   Per HPI: 70-year-old female who presented to the emergency department  [facility] for weakness and Hematemesis. 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 She was readmitted to [facility] ( through ) at which time she underwent EGD revealing GJ stricture. CT abdomen/pelvis demonstrated loculated intraperitoneal fluid in the anterior abdomen with persistent free air.   she underwent US guided drainage +E. coli. She completed a course of Zosyn and drain removed 8/10.   G-tube was placed.  patient transferred to the ICU in the setting of acute hypoxic respiratory failure d/t pulmonary edema requiring diuresis and eventual intubation ( - ). Patient reintubated  through current for worsening hypoxic respiratory failure. Course further complicated by persistent leukocytosis, fevers and CT evidence of intra-abdominal fluid collection post ultrasound drainage. Repeat imaging  did not reveal any drainable fluid collection or free air. She had tracheostomy 9/3. She started PSV trials on . Thoracentesis on . On 9/15 she started trach collar trials. Tolerating trach collar 3 days and tolerating everette valve. Interval history / Subjective:   No acute interval events. Patient states her breathing feels better today, she remains on 5L trach collar. Placed consult to pulmonology for trach weaning.  Surgeon to discuss procedure with family today per yesterday's note     Assessment & Plan:     Acute hypoxic respiratory failure

## 2023-09-27 NOTE — PLAN OF CARE
for completion of toileting routine with maximal assistance within 7 days. 5.  Patient will participate in upper extremity therapeutic exercise/activities with minimal assistance for 3 minutes within 7 day(s). Occupational Therapy Goals:  Initiated 9/11/2023 at re-evaluation:   1. Patient will perform  simple grooming routine, in supported sitting, with moderate assistance within 7 day(s). 2.  Patient will perform upper body dressing with moderate assistance within 7 day(s). 3.  Patient will perform anterior neck to thigh bathing with moderate assist within 7 day(s). 4.  Patient will perform lateral rolling in prep for completion of toileting routine with maximal assistance within 7 days. 5.  Patient will participate in upper extremity therapeutic exercise/activities with minimal assistance for 3 minutes within 7 day(s). Initiated 8/2/2023  1. Patient will perform grooming with Set-up sitting EOB within 7 day(s). 2.  Patient will perform upper body dressing with Set-up long sitting in bed within 7 day(s). 3.  Patient will perform bathing with Moderate Assist within 7 day(s). 4.  Patient will perform toilet transfers to least restrictive device with Minimal Assist  within 7 day(s). 5.  Patient will perform all aspects of toileting with Moderate Assist within 7 day(s). 6.  Patient will participate in upper extremity therapeutic exercise/activities with Set-up for 10 minutes within 7 day(s). 7.  Patient will utilize energy conservation techniques during functional activities with verbal cues within 7 day(s).    Outcome: Progressing   OCCUPATIONAL THERAPY TREATMENT  Patient: Livier Jewell (89 y.o. female)  Date: 9/27/2023  Primary Diagnosis: Hematemesis [K92.0]  GI bleed [K92.2]  Gastrointestinal hemorrhage, unspecified gastrointestinal hemorrhage type [K92.2]  Procedure(s) (LRB):  DEBRIDEMENT OF SACRAL DECUBITUS WOUND (N/A) 44 Days Post-Op   Precautions: Fall Risk                Chart, occupational therapy assessment, plan of care, and goals were reviewed. ASSESSMENT  Patient continues to benefit from skilled OT services and is progressing towards goals. Patient demonstrates improving sitting tolerance (duration 5-10 min) and unsupported sitting balance from previous session, though still limited and noted with intermittent posterior lean. Patient agreeable to transfer to chair using Nyoka Anis. She initially requested use of brigette sling however agreed after education on importance af active participation in mobility training. Patient was left NAD in recliner chair in room at session's end. PLAN :  Patient continues to benefit from skilled intervention to address the above impairments. Continue treatment per established plan of care to address goals. Recommend with staff: in chair 3x daily- MD goal for 6 hrs per day . Use Nyoka Anis- 2 person assist (largely to manage lines)    Recommend next OT session: chair level ADL     Recommendation for discharge: (in order for the patient to meet his/her long term goals): Rehab     Other factors to consider for discharge: concern for safely navigating or managing the home environment    IF patient discharges home will need the following DME: continuing to assess with progress       SUBJECTIVE:   Patient pleasant and cooperative     OBJECTIVE DATA SUMMARY:   Cognitive/Behavioral Status:  Orientation  Orientation Level: Oriented X4       Functional Mobility and Transfers for ADLs:  Bed Mobility:  Bed Mobility Training  Supine to Sit: Moderate assistance;Maximum assistance;Assist X2  Scooting: Maximum assistance     Transfers:   Transfer Training  Sit to Stand: Moderate assistance;Assist X2;Adaptive equipment (sit to stand from slightly elevated bed height surface into rafael stedy)  Stand to Sit: Moderate assistance;Assist X2      Balance:     Balance  Sitting: Impaired  Sitting - Static: Fair (occasional); Prop sitting  Sitting - Dynamic: Fair

## 2023-09-28 LAB
ALBUMIN SERPL-MCNC: 2.3 G/DL (ref 3.5–5)
ALBUMIN/GLOB SERPL: 0.7 (ref 1.1–2.2)
ALP SERPL-CCNC: 153 U/L (ref 45–117)
ALT SERPL-CCNC: 41 U/L (ref 12–78)
ANION GAP SERPL CALC-SCNC: 3 MMOL/L (ref 5–15)
AST SERPL-CCNC: 28 U/L (ref 15–37)
BILIRUB SERPL-MCNC: 0.3 MG/DL (ref 0.2–1)
BUN SERPL-MCNC: 42 MG/DL (ref 6–20)
BUN/CREAT SERPL: 175 (ref 12–20)
CALCIUM SERPL-MCNC: 9 MG/DL (ref 8.5–10.1)
CHLORIDE SERPL-SCNC: 102 MMOL/L (ref 97–108)
CO2 SERPL-SCNC: 31 MMOL/L (ref 21–32)
CREAT SERPL-MCNC: 0.24 MG/DL (ref 0.55–1.02)
ERYTHROCYTE [DISTWIDTH] IN BLOOD BY AUTOMATED COUNT: 14.8 % (ref 11.5–14.5)
GLOBULIN SER CALC-MCNC: 3.5 G/DL (ref 2–4)
GLUCOSE BLD STRIP.AUTO-MCNC: 141 MG/DL (ref 65–117)
GLUCOSE BLD STRIP.AUTO-MCNC: 90 MG/DL (ref 65–117)
GLUCOSE SERPL-MCNC: 126 MG/DL (ref 65–100)
HCT VFR BLD AUTO: 25 % (ref 35–47)
HGB BLD-MCNC: 7.8 G/DL (ref 11.5–16)
MAGNESIUM SERPL-MCNC: 1.9 MG/DL (ref 1.6–2.4)
MCH RBC QN AUTO: 28.8 PG (ref 26–34)
MCHC RBC AUTO-ENTMCNC: 31.2 G/DL (ref 30–36.5)
MCV RBC AUTO: 92.3 FL (ref 80–99)
NRBC # BLD: 0 K/UL (ref 0–0.01)
NRBC BLD-RTO: 0 PER 100 WBC
PHOSPHATE SERPL-MCNC: 3.4 MG/DL (ref 2.6–4.7)
PLATELET # BLD AUTO: 409 K/UL (ref 150–400)
PMV BLD AUTO: 10 FL (ref 8.9–12.9)
POTASSIUM SERPL-SCNC: 3.1 MMOL/L (ref 3.5–5.1)
PROT SERPL-MCNC: 5.8 G/DL (ref 6.4–8.2)
RBC # BLD AUTO: 2.71 M/UL (ref 3.8–5.2)
SERVICE CMNT-IMP: ABNORMAL
SERVICE CMNT-IMP: NORMAL
SODIUM SERPL-SCNC: 136 MMOL/L (ref 136–145)
WBC # BLD AUTO: 10.7 K/UL (ref 3.6–11)

## 2023-09-28 PROCEDURE — 6360000002 HC RX W HCPCS: Performed by: ANESTHESIOLOGY

## 2023-09-28 PROCEDURE — 97530 THERAPEUTIC ACTIVITIES: CPT

## 2023-09-28 PROCEDURE — 2500000003 HC RX 250 WO HCPCS: Performed by: FAMILY MEDICINE

## 2023-09-28 PROCEDURE — 6360000002 HC RX W HCPCS: Performed by: PHYSICIAN ASSISTANT

## 2023-09-28 PROCEDURE — 36592 COLLECT BLOOD FROM PICC: CPT

## 2023-09-28 PROCEDURE — 92507 TX SP LANG VOICE COMM INDIV: CPT

## 2023-09-28 PROCEDURE — 82962 GLUCOSE BLOOD TEST: CPT

## 2023-09-28 PROCEDURE — 36415 COLL VENOUS BLD VENIPUNCTURE: CPT

## 2023-09-28 PROCEDURE — 6360000002 HC RX W HCPCS: Performed by: SURGERY

## 2023-09-28 PROCEDURE — 6360000002 HC RX W HCPCS: Performed by: NURSE PRACTITIONER

## 2023-09-28 PROCEDURE — 2580000003 HC RX 258: Performed by: FAMILY MEDICINE

## 2023-09-28 PROCEDURE — 83735 ASSAY OF MAGNESIUM: CPT

## 2023-09-28 PROCEDURE — C9113 INJ PANTOPRAZOLE SODIUM, VIA: HCPCS | Performed by: NURSE PRACTITIONER

## 2023-09-28 PROCEDURE — 6360000002 HC RX W HCPCS: Performed by: FAMILY MEDICINE

## 2023-09-28 PROCEDURE — 85027 COMPLETE CBC AUTOMATED: CPT

## 2023-09-28 PROCEDURE — 2500000003 HC RX 250 WO HCPCS: Performed by: NURSE PRACTITIONER

## 2023-09-28 PROCEDURE — 2060000000 HC ICU INTERMEDIATE R&B

## 2023-09-28 PROCEDURE — 2580000003 HC RX 258: Performed by: NURSE PRACTITIONER

## 2023-09-28 PROCEDURE — 2700000000 HC OXYGEN THERAPY PER DAY

## 2023-09-28 PROCEDURE — 92526 ORAL FUNCTION THERAPY: CPT

## 2023-09-28 PROCEDURE — 2580000003 HC RX 258: Performed by: SURGERY

## 2023-09-28 PROCEDURE — 94640 AIRWAY INHALATION TREATMENT: CPT

## 2023-09-28 PROCEDURE — 3E04317 INTRODUCTION OF OTHER THROMBOLYTIC INTO CENTRAL VEIN, PERCUTANEOUS APPROACH: ICD-10-PCS | Performed by: SURGERY

## 2023-09-28 PROCEDURE — 84100 ASSAY OF PHOSPHORUS: CPT

## 2023-09-28 PROCEDURE — 80053 COMPREHEN METABOLIC PANEL: CPT

## 2023-09-28 RX ORDER — POTASSIUM CHLORIDE 14.9 MG/ML
10 INJECTION INTRAVENOUS
Status: COMPLETED | OUTPATIENT
Start: 2023-09-28 | End: 2023-09-28

## 2023-09-28 RX ADMIN — Medication 10 ML: at 09:22

## 2023-09-28 RX ADMIN — BUDESONIDE 500 MCG: 0.5 INHALANT RESPIRATORY (INHALATION) at 20:08

## 2023-09-28 RX ADMIN — SODIUM CHLORIDE, PRESERVATIVE FREE 40 MG: 5 INJECTION INTRAVENOUS at 09:18

## 2023-09-28 RX ADMIN — BUDESONIDE 500 MCG: 0.5 INHALANT RESPIRATORY (INHALATION) at 07:46

## 2023-09-28 RX ADMIN — Medication: at 22:14

## 2023-09-28 RX ADMIN — HYDROMORPHONE HYDROCHLORIDE 1 MG: 1 INJECTION, SOLUTION INTRAMUSCULAR; INTRAVENOUS; SUBCUTANEOUS at 21:51

## 2023-09-28 RX ADMIN — WATER 1 MG: 1 INJECTION INTRAMUSCULAR; INTRAVENOUS; SUBCUTANEOUS at 23:32

## 2023-09-28 RX ADMIN — HYDROMORPHONE HYDROCHLORIDE 1 MG: 1 INJECTION, SOLUTION INTRAMUSCULAR; INTRAVENOUS; SUBCUTANEOUS at 09:18

## 2023-09-28 RX ADMIN — ARFORMOTEROL TARTRATE 15 MCG: 15 SOLUTION RESPIRATORY (INHALATION) at 07:46

## 2023-09-28 RX ADMIN — ARFORMOTEROL TARTRATE 15 MCG: 15 SOLUTION RESPIRATORY (INHALATION) at 20:08

## 2023-09-28 RX ADMIN — HYDROMORPHONE HYDROCHLORIDE 1 MG: 1 INJECTION, SOLUTION INTRAMUSCULAR; INTRAVENOUS; SUBCUTANEOUS at 12:27

## 2023-09-28 RX ADMIN — OFLOXACIN 10 DROP: 3 SOLUTION AURICULAR (OTIC) at 09:22

## 2023-09-28 RX ADMIN — I.V. FAT EMULSION 250 ML: 20 EMULSION INTRAVENOUS at 18:45

## 2023-09-28 RX ADMIN — HEPARIN SODIUM 5000 UNITS: 5000 INJECTION INTRAVENOUS; SUBCUTANEOUS at 05:30

## 2023-09-28 RX ADMIN — POTASSIUM CHLORIDE 10 MEQ: 200 INJECTION, SOLUTION INTRAVENOUS at 15:43

## 2023-09-28 RX ADMIN — Medication 10 ML: at 20:47

## 2023-09-28 RX ADMIN — WATER 1 MG: 1 INJECTION INTRAMUSCULAR; INTRAVENOUS; SUBCUTANEOUS at 20:54

## 2023-09-28 RX ADMIN — Medication: at 16:35

## 2023-09-28 RX ADMIN — POTASSIUM CHLORIDE 10 MEQ: 200 INJECTION, SOLUTION INTRAVENOUS at 16:35

## 2023-09-28 RX ADMIN — HYDROMORPHONE HYDROCHLORIDE 1 MG: 1 INJECTION, SOLUTION INTRAMUSCULAR; INTRAVENOUS; SUBCUTANEOUS at 05:29

## 2023-09-28 RX ADMIN — HYDROMORPHONE HYDROCHLORIDE 1 MG: 1 INJECTION, SOLUTION INTRAMUSCULAR; INTRAVENOUS; SUBCUTANEOUS at 18:46

## 2023-09-28 RX ADMIN — HYDROMORPHONE HYDROCHLORIDE 1 MG: 1 INJECTION, SOLUTION INTRAMUSCULAR; INTRAVENOUS; SUBCUTANEOUS at 15:44

## 2023-09-28 RX ADMIN — POTASSIUM CHLORIDE: 2 INJECTION, SOLUTION, CONCENTRATE INTRAVENOUS at 18:45

## 2023-09-28 RX ADMIN — HEPARIN SODIUM 5000 UNITS: 5000 INJECTION INTRAVENOUS; SUBCUTANEOUS at 21:30

## 2023-09-28 RX ADMIN — HEPARIN SODIUM 5000 UNITS: 5000 INJECTION INTRAVENOUS; SUBCUTANEOUS at 15:43

## 2023-09-28 RX ADMIN — Medication: at 09:22

## 2023-09-28 RX ADMIN — WATER 1 MG: 1 INJECTION INTRAMUSCULAR; INTRAVENOUS; SUBCUTANEOUS at 22:14

## 2023-09-28 RX ADMIN — HYDROMORPHONE HYDROCHLORIDE 1 MG: 1 INJECTION, SOLUTION INTRAMUSCULAR; INTRAVENOUS; SUBCUTANEOUS at 02:31

## 2023-09-28 ASSESSMENT — PAIN SCALES - GENERAL
PAINLEVEL_OUTOF10: 8
PAINLEVEL_OUTOF10: 9
PAINLEVEL_OUTOF10: 10
PAINLEVEL_OUTOF10: 8
PAINLEVEL_OUTOF10: 7
PAINLEVEL_OUTOF10: 9
PAINLEVEL_OUTOF10: 7
PAINLEVEL_OUTOF10: 8
PAINLEVEL_OUTOF10: 8
PAINLEVEL_OUTOF10: 7
PAINLEVEL_OUTOF10: 9
PAINLEVEL_OUTOF10: 7
PAINLEVEL_OUTOF10: 8
PAINLEVEL_OUTOF10: 9

## 2023-09-28 ASSESSMENT — PAIN DESCRIPTION - LOCATION
LOCATION: ABDOMEN
LOCATION: GENERALIZED;ABDOMEN

## 2023-09-28 ASSESSMENT — PAIN DESCRIPTION - FREQUENCY: FREQUENCY: CONTINUOUS

## 2023-09-28 ASSESSMENT — PAIN DESCRIPTION - DESCRIPTORS
DESCRIPTORS: ACHING
DESCRIPTORS: ACHING;CRUSHING;GNAWING

## 2023-09-28 ASSESSMENT — PAIN DESCRIPTION - ORIENTATION
ORIENTATION: ANTERIOR
ORIENTATION: UPPER

## 2023-09-28 ASSESSMENT — PAIN DESCRIPTION - PAIN TYPE: TYPE: SURGICAL PAIN

## 2023-09-28 NOTE — PLAN OF CARE
Problem: Occupational Therapy - Adult  Goal: By Discharge: Performs self-care activities at highest level of function for planned discharge setting. See evaluation for individualized goals. Description: FUNCTIONAL STATUS PRIOR TO ADMISSION:  Patient was independent, active, working and a  prior to admission at [facility]. She was independent to mod I for all self-care and functional mobility. Since onset of illness the patient has been in need of assistance for all ADLs and functional mobility. DC to [facility] prior to her admission, however, limited there and stating she was unable to complete much OOB therapy. She is able to verbalize and demonstrate a home exercise program involving UE movement. HOME SUPPORT: Patient lived with spouse but didn't require assistance at her true baseline. Occupational Therapy Goals:   Seen for weekly re-assessment 9/25/2023  1. Patient will perform simple grooming routine, in supported sitting, with minimal assistance within 7 day(s). 2.  Patient will perform upper body dressing at EOB with moderate assistance within 7 day(s). 3.  Patient will perform anterior neck to thigh bathing in supported sitting with moderate assist within 7 day(s). 4.  Patient will perform lateral rolling in prep for completion of toileting routine with moderate assistance within 7 days. 5.  Patient will participate in upper extremity therapeutic exercise/activities with minimal assistance for 5 minutes within 7 day(s). 6.  Patient will perform toilet transfer to [facility] with maximal assistance x2 within 7 day(s). Initiated 9/11/2023   1. Patient will perform  simple grooming routine, in supported sitting, with moderate assistance within 7 day(s). 2.  Patient will perform upper body dressing with moderate assistance within 7 day(s). 3.  Patient will perform anterior neck to thigh bathing with moderate assist within 7 day(s).   4.  Patient will perform lateral rolling in prep

## 2023-09-28 NOTE — PROGRESS NOTES
Hospitalist Progress Note  Sasha Jorgensen MD  Answering service:         Date of Service:  2023  NAME:  Belle Clarke  :  1960  MRN:  [id number]      Admission Summary:     Patient admitted with hematemesis and acute blood loss anemia, history of gastric bypass with GJ ulcer. Interval history / Subjective:     Patient states that her breathing is better. No other acute complaint. Still with abdominal pain.      Assessment & Plan:     Acute respiratory failure  -Acute hypoxic respiratory failure due to pulmonary edema and fluid overload as well as pneumonia  -Patient requiring ICU admission  to  , s/p intubation and mechanical ventilation from  to , reintubated  and eventual tracheostomy 9/3, trach has been downsized to 6 cuffless Shiley on   -Completed antibiotics  -Trach collar in place, tolerating PMV, supplemental oxygen at 5 L, wean as tolerated  -Pulmonology and speech therapy following    Acute pulmonary edema  -Acute pulmonary edema due to fluid overload with pleural effusion  -S/p thoracentesis      GI bleed  -Upper GI bleed, history of gastric bypass with GJ anastomotic ulcer  -S/p EGD  shows anastomotic ulcer, nonbleeding, negative for stricture  -GI has signed off, on clear liquid, on TPN, continuing PPI and Carafate     Acute blood loss anemia  -Acute blood loss anemia due to GI bleed  -S/p transfusion of 1 unit PRBCs, monitor H&H     Intra-abdominal abscess  -CT abdomen and pelvis on  shows loculated intraperitoneal fluid in the anterior abdomen, persistent free intraperitoneal air  -S/p ultrasound-guided drainage   -Fluid culture growing E. coli, on empiric Zosyn, completed antibiotics  -Repeat CT of the abdomen  shows fluid collection anterior mid abdomen, thought to be due to residual anastomotic leak, plan for repair next Thursday per 26.0* 25.0*   * 409*     Recent Labs     09/28/23 0524      K 3.1*      CO2 31   BUN 42*   MG 1.9   PHOS 3.4     Recent Labs     09/28/23 0524   ALT 41   GLOB 3.5     No results for input(s): \"INR\", \"APTT\" in the last 72 hours. Invalid input(s): \"PTP\"   No results for input(s): \"TIBC\", \"FERR\" in the last 72 hours. Invalid input(s): \"FE\", \"PSAT\"   No results found for: \"FOL\", \"RBCF\"   No results for input(s): \"PH\", \"PCO2\", \"PO2\" in the last 72 hours. No results for input(s): \"CPK\" in the last 72 hours.     Invalid input(s): \"CPKMB\", \"CKNDX\", \"TROIQ\"  Lab Results   Component Value Date/Time    CHOL 212 05/08/2023 02:55 PM    HDL 83 05/08/2023 02:55 PM     No results found for: \"GLUCPOC\"  [unfilled]      Medications Reviewed:     Current Facility-Administered Medications   Medication Dose Route Frequency    PN-Adult 2-in-1 Central Line (Standard)   IntraVENous Continuous TPN    HYDROmorphone HCl PF (DILAUDID) injection 0.5 mg  0.5 mg IntraVENous Q3H PRN    HYDROmorphone HCl PF (DILAUDID) injection 1 mg  1 mg IntraVENous Q3H PRN    ofloxacin (FLOXIN) 0.3 % otic solution 10 drop  10 drop Right Ear Daily    heparin (porcine) injection 5,000 Units  5,000 Units SubCUTAneous 3 times per day    labetalol (NORMODYNE;TRANDATE) injection 10 mg  10 mg IntraVENous Q6H PRN    fat emulsion (INTRALIPID/NUTRILIPID) 20 % infusion 250 mL  250 mL IntraVENous Daily    budesonide (PULMICORT) nebulizer suspension 500 mcg  0.5 mg Nebulization BID RT    arformoterol tartrate (BROVANA) nebulizer solution 15 mcg  15 mcg Nebulization BID RT    pantoprazole (PROTONIX) 40 mg in sodium chloride (PF) 0.9 % 10 mL injection  40 mg IntraVENous Daily    [Held by provider] buPROPion (WELLBUTRIN) tablet 100 mg  100 mg Per G Tube BID    0.9 % sodium chloride infusion  25 mL IntraVENous PRN    albuterol (PROVENTIL) (2.5 MG/3ML) 0.083% nebulizer solution 2.5 mg  2.5 mg Nebulization Q4H PRN    prochlorperazine (COMPAZINE) injection 10

## 2023-09-28 NOTE — PLAN OF CARE
Problem: Physical Therapy - Adult  Goal: By Discharge: Performs mobility at highest level of function for planned discharge setting. See evaluation for individualized goals. Description: FUNCTIONAL STATUS PRIOR TO ADMISSION: Patient was independent and active without use of DME prior to last hospitalization at [facility] for abdominal surgery. D/c'ed to [facility] [place] where she was for 1 week-- states she did not get out of bed at [facility] [place] and worked on bed exercises. 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 On 8/6 patient transferred to the ICU in the setting of acute hypoxic respiratory failure d/t pulmonary edema requiring diuresis and eventual intubation (8/7 - 8/14). Patient reintubated 8/18 through current for worsening hypoxic respiratory failure. Course further complicated by persistent leukocytosis, fevers and CT evidence of intra-abdominal fluid collection post ultrasound drainage. HOME SUPPORT PRIOR TO ADMISSION: The patient lived with her  at baseline. Physical Therapy Goals  Revised 9/18/2023; weekly completed 9/25, goals appropriate to carryover x 7 days. 1.  Patient will move from supine to sit and sit to supine, scoot up and down, and roll side to side in bed with maximal assistance x2 within 7 day(s). 2.  Patient will perform sit to stand with maximal assistance x2 within 7 day(s). 3.  Patient will transfer from bed to chair and chair to bed with maximal assistance x2 using the least restrictive device within 7 day(s). 4.  Patient will sit edge of bed with minimal assist for 2 minutes within 7 day(s). Physical Therapy Goals  Revised 9/11/2023  1. Patient will move from supine to sit and sit to supine, scoot up and down, and roll side to side in bed with moderate assistance x2 within 7 day(s).     2.  Patient will perform sit to stand with max assistance x2 within 7 day(s). 3.  Patient will transfer from bed to chair and chair to bed with max assistance x2 using the least restrictive device within 7 day(s). 4.  Patient will sit edge of bed with min assist for 10 minutes within 7 day(s). Initiated 8/2/2023  1. Patient will move from supine to sit and sit to supine, scoot up and down, and roll side to side in bed with minimal assistance x2 within 7 day(s). 2.  Patient will perform sit to stand with moderate assistance x2 within 7 day(s). 3.  Patient will transfer from bed to chair and chair to bed with moderate assistance x2 using the least restrictive device within 7 day(s). 4.  Patient will ambulate with moderate assistance x2 for 10 feet with the least restrictive device within 7 day(s). Outcome: Progressing     PHYSICAL THERAPY TREATMENT    Patient: Julien Chen (41 y.o. female)  Date: 9/28/2023  Diagnosis: Hematemesis [K92.0]  GI bleed [K92.2]  Gastrointestinal hemorrhage, unspecified gastrointestinal hemorrhage type [K92.2] GI bleed  Procedure(s) (LRB):  DEBRIDEMENT OF SACRAL DECUBITUS WOUND (N/A) 45 Days Post-Op  Precautions: Fall Risk                  ASSESSMENT:  Patient continues to benefit from skilled PT services and is progressing towards goals. Patient continues to be limited in progression by some fear and self-limiting behaviors. Education providing on working with Elena savagegurpreet and being upright and out of bed to maximize functional potential and independence. Strong posterior lean initially in seated which feel is somewhat impacted by fear on top of general ROM/strength deficits from lengthy and complex hospitalization. Able to perform multiple standing trials and transfer to chair with rafael hemphill to participate in SLP session at conclusion of PT/OT session. Patient benefits from encouragement and feel she could likely do more with therapy than she is currently agreeing to.  Needs to work more with therapy to improve her

## 2023-09-28 NOTE — PROGRESS NOTES
Pulmonary, Critical Care, and Sleep Medicine~Progress Note    Name: Rodo Gunderson MRN: [id number]   : 1960 Hospital: [address]   Date: 2023 12:09 PM Admission: 2023     Impression Plan   Chronic hypoxic respiratory failure due to prolonged medical course. Now has a chronic trach in place on 9/3/2023  C. difficile colitis, status post partial colectomy with ileostomy on iliac anastomosis. Recurrent intra-abdominal fluid collections history. Extensive ICU stent  Asthma mild intermittent. Not bronchospastic  Severe septic shock now resolved. Pressure wounds being monitored Inner cannula changes per shift  Trach care as already completed  Passy-Mp valve trials as directed by speech therapy  Bronchodilator therapy  O2 titration above 90%  It appears that she is already been downsized in her trach. Further decannulation can be done at rehab  We will be available again to see if needed      Daily Progression:    No congestion in trach noted     Consult Note requested by hospitalist service. Patient was recently discharged from the ICU following an extensive hospitalization course dating back to . It will not be summarized in this note please; defer to previous several months of medical records for more detailed clinical course. However she did undergo a partial colectomy with ileostomy following C. difficile this infection. Required substantial ICU monitoring and intubation. Was extubated and reintubated several times and up requiring a tracheostomy on 9/3. Family liberated from mechanical ventilation on . Was downgraded yesterday. She had a trach change yesterday to a 6 cuffless Shiley by the intensivist.  Tolerated that well. Does not sound like she has had Passy-Indore valve trials yet but has been moving forward with p.o. intake slowly. Speech therapy is following her currently. She denies any history of smoking but reports a history of asthma.   Not on infusion 250 mL  250 mL IntraVENous Daily    budesonide (PULMICORT) nebulizer suspension 500 mcg  0.5 mg Nebulization BID RT    arformoterol tartrate (BROVANA) nebulizer solution 15 mcg  15 mcg Nebulization BID RT    pantoprazole (PROTONIX) 40 mg in sodium chloride (PF) 0.9 % 10 mL injection  40 mg IntraVENous Daily    [Held by provider] buPROPion (WELLBUTRIN) tablet 100 mg  100 mg Per G Tube BID    0.9 % sodium chloride infusion  25 mL IntraVENous PRN    albuterol (PROVENTIL) (2.5 MG/3ML) 0.083% nebulizer solution 2.5 mg  2.5 mg Nebulization Q4H PRN    prochlorperazine (COMPAZINE) injection 10 mg  10 mg IntraVENous Q6H PRN    alteplase (CATHFLO) 1 mg in sterile water 1 mL injection  1 mg IntraCATHeter PRN    balsum peru-castor oil (VENELEX) ointment   Topical q8h    albumin human 5% IV solution 12.5 g  12.5 g IntraVENous Q6H PRN    sodium chloride flush 0.9 % injection 5-40 mL  5-40 mL IntraVENous 2 times per day    sodium chloride flush 0.9 % injection 5-40 mL  5-40 mL IntraVENous PRN    ondansetron (ZOFRAN-ODT) disintegrating tablet 4 mg  4 mg Oral Q8H PRN    Or    ondansetron (ZOFRAN) injection 4 mg  4 mg IntraVENous Q6H PRN    polyethylene glycol (GLYCOLAX) packet 17 g  17 g Oral Daily PRN    acetaminophen (TYLENOL) tablet 650 mg  650 mg Oral Q6H PRN    Or    acetaminophen (TYLENOL) suppository 650 mg  650 mg Rectal Q6H PRN    glucose chewable tablet 16 g  4 tablet Oral PRN    dextrose bolus 10% 125 mL  125 mL IntraVENous PRN    Or    dextrose bolus 10% 250 mL  250 mL IntraVENous PRN    glucagon injection 1 mg  1 mg SubCUTAneous PRN    dextrose 10 % infusion   IntraVENous Continuous PRN       Labs:  ABG Invalid input(s):  \"ABG\"     CBC Recent Labs     09/26/23  0336 09/28/23  0524   WBC 10.1 10.7   HGB 8.1* 7.8*   HCT 26.0* 25.0*   * 409*   MCV 93.9 92.3   MCH 29.2 72.9          Metabolic  Panel Recent Labs     09/28/23  0524      K 3.1*      CO2 31   BUN 42*   MG 1.9   PHOS 3.4   ALT 41

## 2023-09-29 LAB
ALBUMIN SERPL-MCNC: 2.2 G/DL (ref 3.5–5)
ALBUMIN/GLOB SERPL: 0.6 (ref 1.1–2.2)
ALP SERPL-CCNC: 153 U/L (ref 45–117)
ALT SERPL-CCNC: 39 U/L (ref 12–78)
ANION GAP SERPL CALC-SCNC: 4 MMOL/L (ref 5–15)
AST SERPL-CCNC: 29 U/L (ref 15–37)
BILIRUB SERPL-MCNC: 0.4 MG/DL (ref 0.2–1)
BUN SERPL-MCNC: 39 MG/DL (ref 6–20)
BUN/CREAT SERPL: 144 (ref 12–20)
CALCIUM SERPL-MCNC: 9 MG/DL (ref 8.5–10.1)
CHLORIDE SERPL-SCNC: 103 MMOL/L (ref 97–108)
CO2 SERPL-SCNC: 31 MMOL/L (ref 21–32)
CREAT SERPL-MCNC: 0.27 MG/DL (ref 0.55–1.02)
ERYTHROCYTE [DISTWIDTH] IN BLOOD BY AUTOMATED COUNT: 15 % (ref 11.5–14.5)
GLOBULIN SER CALC-MCNC: 3.6 G/DL (ref 2–4)
GLUCOSE BLD STRIP.AUTO-MCNC: 113 MG/DL (ref 65–117)
GLUCOSE BLD STRIP.AUTO-MCNC: 130 MG/DL (ref 65–117)
GLUCOSE BLD STRIP.AUTO-MCNC: 82 MG/DL (ref 65–117)
GLUCOSE BLD STRIP.AUTO-MCNC: 96 MG/DL (ref 65–117)
GLUCOSE SERPL-MCNC: 111 MG/DL (ref 65–100)
HCT VFR BLD AUTO: 24.5 % (ref 35–47)
HGB BLD-MCNC: 7.7 G/DL (ref 11.5–16)
MAGNESIUM SERPL-MCNC: 1.8 MG/DL (ref 1.6–2.4)
MCH RBC QN AUTO: 28.9 PG (ref 26–34)
MCHC RBC AUTO-ENTMCNC: 31.4 G/DL (ref 30–36.5)
MCV RBC AUTO: 92.1 FL (ref 80–99)
NRBC # BLD: 0 K/UL (ref 0–0.01)
NRBC BLD-RTO: 0 PER 100 WBC
PHOSPHATE SERPL-MCNC: 2.8 MG/DL (ref 2.6–4.7)
PLATELET # BLD AUTO: 405 K/UL (ref 150–400)
PMV BLD AUTO: 10.1 FL (ref 8.9–12.9)
POTASSIUM SERPL-SCNC: 3.4 MMOL/L (ref 3.5–5.1)
PROT SERPL-MCNC: 5.8 G/DL (ref 6.4–8.2)
RBC # BLD AUTO: 2.66 M/UL (ref 3.8–5.2)
SERVICE CMNT-IMP: ABNORMAL
SERVICE CMNT-IMP: NORMAL
SODIUM SERPL-SCNC: 138 MMOL/L (ref 136–145)
WBC # BLD AUTO: 13.2 K/UL (ref 3.6–11)

## 2023-09-29 PROCEDURE — 80053 COMPREHEN METABOLIC PANEL: CPT

## 2023-09-29 PROCEDURE — 2500000003 HC RX 250 WO HCPCS: Performed by: FAMILY MEDICINE

## 2023-09-29 PROCEDURE — 6360000002 HC RX W HCPCS: Performed by: SURGERY

## 2023-09-29 PROCEDURE — 84100 ASSAY OF PHOSPHORUS: CPT

## 2023-09-29 PROCEDURE — 6360000002 HC RX W HCPCS: Performed by: NURSE PRACTITIONER

## 2023-09-29 PROCEDURE — 85027 COMPLETE CBC AUTOMATED: CPT

## 2023-09-29 PROCEDURE — 2700000000 HC OXYGEN THERAPY PER DAY

## 2023-09-29 PROCEDURE — 2580000003 HC RX 258: Performed by: FAMILY MEDICINE

## 2023-09-29 PROCEDURE — 2500000003 HC RX 250 WO HCPCS: Performed by: NURSE PRACTITIONER

## 2023-09-29 PROCEDURE — 6360000002 HC RX W HCPCS: Performed by: ANESTHESIOLOGY

## 2023-09-29 PROCEDURE — 97606 NEG PRS WND THER DME>50 SQCM: CPT

## 2023-09-29 PROCEDURE — 82962 GLUCOSE BLOOD TEST: CPT

## 2023-09-29 PROCEDURE — C9113 INJ PANTOPRAZOLE SODIUM, VIA: HCPCS | Performed by: NURSE PRACTITIONER

## 2023-09-29 PROCEDURE — 94760 N-INVAS EAR/PLS OXIMETRY 1: CPT

## 2023-09-29 PROCEDURE — 2060000000 HC ICU INTERMEDIATE R&B

## 2023-09-29 PROCEDURE — 2500000003 HC RX 250 WO HCPCS: Performed by: PHYSICIAN ASSISTANT

## 2023-09-29 PROCEDURE — 6360000002 HC RX W HCPCS: Performed by: PHYSICIAN ASSISTANT

## 2023-09-29 PROCEDURE — 94640 AIRWAY INHALATION TREATMENT: CPT

## 2023-09-29 PROCEDURE — 83735 ASSAY OF MAGNESIUM: CPT

## 2023-09-29 PROCEDURE — 6360000002 HC RX W HCPCS: Performed by: FAMILY MEDICINE

## 2023-09-29 PROCEDURE — 43762 RPLC GTUBE NO REVJ TRC: CPT

## 2023-09-29 PROCEDURE — 2580000003 HC RX 258: Performed by: NURSE PRACTITIONER

## 2023-09-29 PROCEDURE — 2580000003 HC RX 258: Performed by: SURGERY

## 2023-09-29 PROCEDURE — 36415 COLL VENOUS BLD VENIPUNCTURE: CPT

## 2023-09-29 RX ORDER — POTASSIUM CHLORIDE 14.9 MG/ML
10 INJECTION INTRAVENOUS
Status: COMPLETED | OUTPATIENT
Start: 2023-09-29 | End: 2023-09-29

## 2023-09-29 RX ADMIN — HYDROMORPHONE HYDROCHLORIDE 1 MG: 1 INJECTION, SOLUTION INTRAMUSCULAR; INTRAVENOUS; SUBCUTANEOUS at 21:56

## 2023-09-29 RX ADMIN — ARFORMOTEROL TARTRATE 15 MCG: 15 SOLUTION RESPIRATORY (INHALATION) at 21:03

## 2023-09-29 RX ADMIN — Medication 10 ML: at 09:46

## 2023-09-29 RX ADMIN — HYDROMORPHONE HYDROCHLORIDE 1 MG: 1 INJECTION, SOLUTION INTRAMUSCULAR; INTRAVENOUS; SUBCUTANEOUS at 00:42

## 2023-09-29 RX ADMIN — Medication: at 09:46

## 2023-09-29 RX ADMIN — HEPARIN SODIUM 5000 UNITS: 5000 INJECTION INTRAVENOUS; SUBCUTANEOUS at 14:28

## 2023-09-29 RX ADMIN — ALBUTEROL SULFATE 2.5 MG: 2.5 SOLUTION RESPIRATORY (INHALATION) at 11:39

## 2023-09-29 RX ADMIN — BUDESONIDE 500 MCG: 0.5 INHALANT RESPIRATORY (INHALATION) at 08:05

## 2023-09-29 RX ADMIN — Medication: at 15:55

## 2023-09-29 RX ADMIN — BUDESONIDE 500 MCG: 0.5 INHALANT RESPIRATORY (INHALATION) at 21:04

## 2023-09-29 RX ADMIN — SODIUM CHLORIDE, PRESERVATIVE FREE 40 MG: 5 INJECTION INTRAVENOUS at 09:46

## 2023-09-29 RX ADMIN — ONDANSETRON 4 MG: 2 INJECTION INTRAMUSCULAR; INTRAVENOUS at 21:57

## 2023-09-29 RX ADMIN — HYDROMORPHONE HYDROCHLORIDE 1 MG: 1 INJECTION, SOLUTION INTRAMUSCULAR; INTRAVENOUS; SUBCUTANEOUS at 09:46

## 2023-09-29 RX ADMIN — HYDROMORPHONE HYDROCHLORIDE 1 MG: 1 INJECTION, SOLUTION INTRAMUSCULAR; INTRAVENOUS; SUBCUTANEOUS at 17:33

## 2023-09-29 RX ADMIN — HEPARIN SODIUM 5000 UNITS: 5000 INJECTION INTRAVENOUS; SUBCUTANEOUS at 06:00

## 2023-09-29 RX ADMIN — POTASSIUM CHLORIDE 10 MEQ: 14.9 INJECTION, SOLUTION INTRAVENOUS at 12:29

## 2023-09-29 RX ADMIN — HYDROMORPHONE HYDROCHLORIDE 1 MG: 1 INJECTION, SOLUTION INTRAMUSCULAR; INTRAVENOUS; SUBCUTANEOUS at 14:28

## 2023-09-29 RX ADMIN — OFLOXACIN 10 DROP: 3 SOLUTION AURICULAR (OTIC) at 09:46

## 2023-09-29 RX ADMIN — I.V. FAT EMULSION 250 ML: 20 EMULSION INTRAVENOUS at 19:04

## 2023-09-29 RX ADMIN — POTASSIUM CHLORIDE 10 MEQ: 14.9 INJECTION, SOLUTION INTRAVENOUS at 13:08

## 2023-09-29 RX ADMIN — Medication 10 ML: at 23:02

## 2023-09-29 RX ADMIN — HEPARIN SODIUM 5000 UNITS: 5000 INJECTION INTRAVENOUS; SUBCUTANEOUS at 21:56

## 2023-09-29 RX ADMIN — POTASSIUM CHLORIDE: 2 INJECTION, SOLUTION, CONCENTRATE INTRAVENOUS at 19:03

## 2023-09-29 RX ADMIN — ARFORMOTEROL TARTRATE 15 MCG: 15 SOLUTION RESPIRATORY (INHALATION) at 08:05

## 2023-09-29 RX ADMIN — HYDROMORPHONE HYDROCHLORIDE 1 MG: 1 INJECTION, SOLUTION INTRAMUSCULAR; INTRAVENOUS; SUBCUTANEOUS at 03:44

## 2023-09-29 RX ADMIN — HYDROMORPHONE HYDROCHLORIDE 1 MG: 1 INJECTION, SOLUTION INTRAMUSCULAR; INTRAVENOUS; SUBCUTANEOUS at 06:43

## 2023-09-29 ASSESSMENT — PAIN SCALES - GENERAL
PAINLEVEL_OUTOF10: 8
PAINLEVEL_OUTOF10: 9
PAINLEVEL_OUTOF10: 4
PAINLEVEL_OUTOF10: 7
PAINLEVEL_OUTOF10: 9
PAINLEVEL_OUTOF10: 8

## 2023-09-29 ASSESSMENT — PAIN DESCRIPTION - ORIENTATION
ORIENTATION: ANTERIOR;LEFT
ORIENTATION: UPPER
ORIENTATION: ANTERIOR;LEFT
ORIENTATION: ANTERIOR
ORIENTATION: MID;UPPER
ORIENTATION: ANTERIOR

## 2023-09-29 ASSESSMENT — PAIN DESCRIPTION - LOCATION
LOCATION: ABDOMEN
LOCATION: BACK;NECK

## 2023-09-29 ASSESSMENT — PAIN DESCRIPTION - DESCRIPTORS
DESCRIPTORS: ACHING
DESCRIPTORS: ACHING
DESCRIPTORS: GNAWING;DULL;DISCOMFORT
DESCRIPTORS: ACHING
DESCRIPTORS: GNAWING
DESCRIPTORS: ACHING;GNAWING

## 2023-09-29 NOTE — PROGRESS NOTES
Occupational Therapy    Chart reviewed in prep for skilled OT treatment; however, pt sleeping soundly upon Ot's arrival to room.   OT to defer and allow pt to rest.    Thank you,  Michael Obrien, OT

## 2023-09-29 NOTE — PROGRESS NOTES
Spiritual Care Assessment/Progress Note  [facility]    Name: Manuela Stevenson MRN: [id number]    Age: 61 y.o. Sex: female   Language: English     Date: 9/29/2023            Total Time Calculated: 5 min              Spiritual Assessment begun in [facility] IMCU 2  Service Provided For[de-identified] Patient  Referral/Consult From[de-identified] Rounding  Encounter Overview/Reason : Spiritual/Emotional Needs    Spiritual beliefs:      [] Involved in a ramez tradition/spiritual practice:      [] Supported by a ramez community:      [] Claims no spiritual orientation:      [] Seeking spiritual identity:           [] Adheres to an individual form of spirituality:      [x] Not able to assess:                Identified resources for coping and support system:   Support System: Unknown       [] Prayer                  [] Devotional reading               [] Music                  [] Guided Imagery     [] Pet visits                                        [] Other: (COMMENT)     Specific area/focus of visit   Encounter:    Crisis:    Spiritual/Emotional needs: Type: Spiritual Support  Ritual, Rites and Sacraments:    Grief, Loss, and Adjustments:   Ethics/Mediation:    Behavioral Health:    Palliative Care: Advance Care Planning:      Plan/Referrals: Other (Comment) (Please contact spiritual care for further consults.)    Narrative:     Self Initiated visit in Unit: [facility] 4 IMCU 2 421 . Unable to proceed with the visit as the patient did not respond to my greeting or knock on the door. LINETTE Cisneros [address] [facility] Resident

## 2023-09-29 NOTE — WOUND CARE
WOCN Note:     Follow up sacral wound vac change. Other wounds assessed 9.26.23 and to be reassessed 10.2. 23. Chart reviewed. Assessed in room 7120. Admitted DX:  Hematemesis;GI bleed; Gastrointestinal hemorrhage; cdiff    Past Medical History: gastric bypass; asthma; cad; dm2  [facility]    Assessment:   Patient has trach, alert, communicates via writing pad and communication board and mouthing words; requires assist with repositioning. Bed: p500 air mattress  Patient has a engle and drains. Patient repositioned on right side. Generalized edema lower legs and feet. Heels offloaded with pillows. 1. 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 Treatment:  Cleansed with Vashe; resumed NPWT at 125 using 2 black (wrapped in mepitel one to the wound bed) and bridged to left side. Wound, Pressure Prevention & Skin Care Recommendations:    Minimize layers of linen/pads under patient to optimize support surface. 2.  Turn/reposition approximately every 2 hours and offload heels. 3.  Manage moisture/ Keep skin folds clean and dry/minimize brief usage. 4.  Specialty bed:[facility].  5.  Sacrum:  Continue NPWT at 125 mmHg with changes twice weekly. 6.  Heels:  Apply Venelex BID   7. Right upper chest:  Every other day cleanse with saline; apply silver foam dressing. Discussed above plan with RN. Transition of Care:   Plan to follow Tuesday and Friday for VAC changes.     ONEL Finn RN [facility]  Available on [employer]  Office [phone]

## 2023-09-29 NOTE — PROGRESS NOTES
2000 Received report from [facility] and assumed care. 2200 Complete chlorhexidine bed bath provided, tolerated well. 0000 Trach care completed and inner cannula changed. 0030 Labs drawn. 0400 Lutz leaked, balloon deflated and re inflated, will continue to monitor. Incontinence care provided. 0730 Bedside and Verbal shift change report given to Alicia (oncoming nurse) by Adenike Lacy (offgoing nurse). Report included the following information Nurse Handoff Report, Adult Overview, Intake/Output, MAR, and Recent Results.

## 2023-09-29 NOTE — PROGRESS NOTES
Hospitalist Progress Note  Dayana Barcenas MD  Answering service: [phone]        Date of Service:  2023  NAME:  Lizy Corral  :  1960  MRN:  [id number]      Admission Summary:     Patient admitted with hematemesis and acute blood loss anemia, history of gastric bypass with GJ ulcer. Interval history / Subjective:     Patient states that her breathing is better. No other acute complaint. Still with abdominal pain.      Assessment & Plan:     Acute respiratory failure  -Acute hypoxic respiratory failure due to pulmonary edema and fluid overload as well as pneumonia  -Patient requiring ICU admission  to  , s/p intubation and mechanical ventilation from  to , reintubated  and eventual tracheostomy 9/3, trach has been downsized to 6 cuffless Shiley on   -Completed antibiotics  -Trach collar in place, tolerating PMV, supplemental oxygen at 5 L, wean as tolerated  -Pulmonology and speech therapy following    Acute pulmonary edema  -Acute pulmonary edema due to fluid overload with pleural effusion  -S/p thoracentesis      GI bleed  -Upper GI bleed, history of gastric bypass with GJ anastomotic ulcer  -S/p EGD  shows anastomotic ulcer, nonbleeding, negative for stricture  -GI has signed off, on clear liquid, on TPN, continuing PPI and Carafate     Acute blood loss anemia  -Acute blood loss anemia due to GI bleed  -S/p transfusion of 1 unit PRBCs, monitor H&H     Intra-abdominal abscess  -CT abdomen and pelvis on  shows loculated intraperitoneal fluid in the anterior abdomen, persistent free intraperitoneal air  -S/p ultrasound-guided drainage   -Fluid culture growing E. coli, on empiric Zosyn, completed antibiotics  -Repeat CT of the abdomen  shows fluid collection anterior mid abdomen, thought to be due to residual anastomotic leak, plan for repair next Thursday per following life and organ supporting interventions that required my frequent assessment to treat or prevent imminent deterioration. I personally spent 45 minutes of critical care time. This is time spent at this critically ill patient's bedside actively involved in patient care as well as the coordination of care and discussions with the patient's family. This does not include any procedural time which has been billed separately. Review of Systems:   Pertinent items are noted in HPI. Vital Signs:    Last 24hrs VS reviewed since prior progress note. Most recent are:  Vitals:    09/29/23 1201   BP:    Pulse: 77   Resp:    Temp:    SpO2:          Intake/Output Summary (Last 24 hours) at 9/29/2023 1316  Last data filed at 9/29/2023 1241  Gross per 24 hour   Intake --   Output 1940 ml   Net -1940 ml        Physical Examination:     I had a face to face encounter with this patient and independently examined them on 9/29/2023 as outlined below:          General : alert x 3, awake, no acute distress,   HEENT: PEERL, EOMI, moist mucus membrane, TM clear  Neck: supple, no JVD, no meningeal signs  Chest: Clear to auscultation bilaterally   CVS: S1 S2 heard, Capillary refill less than 2 seconds  Abd: soft/ non tender, non distended, BS physiological,   Ext: no clubbing, no cyanosis, no edema, brisk 2+ DP pulses  Neuro/Psych: pleasant mood and affect, CN 2-12 grossly intact, sensory grossly within normal limit, Strength 5/5 in all extremities, DTR 1+ x 4  Skin: warm            Data Review:    Review and/or order of clinical lab test    I have independently reviewed and interpreted patient's lab and all other diagnostic data    Notes reviewed from all clinical/nonclinical/nursing services involved in patient's clinical care. Care coordination discussions were held with appropriate clinical/nonclinical/ nursing providers based on care coordination needs.      Labs:     Recent Labs     09/28/23  6016

## 2023-09-30 LAB
ANION GAP SERPL CALC-SCNC: 4 MMOL/L (ref 5–15)
BASOPHILS # BLD: 0.1 K/UL (ref 0–0.1)
BASOPHILS NFR BLD: 1 % (ref 0–1)
BUN SERPL-MCNC: 35 MG/DL (ref 6–20)
BUN/CREAT SERPL: 130 (ref 12–20)
CALCIUM SERPL-MCNC: 9.2 MG/DL (ref 8.5–10.1)
CHLORIDE SERPL-SCNC: 105 MMOL/L (ref 97–108)
CO2 SERPL-SCNC: 26 MMOL/L (ref 21–32)
CREAT SERPL-MCNC: 0.27 MG/DL (ref 0.55–1.02)
DIFFERENTIAL METHOD BLD: ABNORMAL
EOSINOPHIL # BLD: 0.4 K/UL (ref 0–0.4)
EOSINOPHIL NFR BLD: 3 % (ref 0–7)
ERYTHROCYTE [DISTWIDTH] IN BLOOD BY AUTOMATED COUNT: 15.3 % (ref 11.5–14.5)
GLUCOSE BLD STRIP.AUTO-MCNC: 132 MG/DL (ref 65–117)
GLUCOSE BLD STRIP.AUTO-MCNC: 91 MG/DL (ref 65–117)
GLUCOSE BLD STRIP.AUTO-MCNC: 96 MG/DL (ref 65–117)
GLUCOSE BLD STRIP.AUTO-MCNC: 97 MG/DL (ref 65–117)
GLUCOSE SERPL-MCNC: 114 MG/DL (ref 65–100)
HCT VFR BLD AUTO: 27.5 % (ref 35–47)
HGB BLD-MCNC: 8.6 G/DL (ref 11.5–16)
IMM GRANULOCYTES # BLD AUTO: 0.3 K/UL (ref 0–0.04)
IMM GRANULOCYTES NFR BLD AUTO: 2 % (ref 0–0.5)
LYMPHOCYTES # BLD: 0.7 K/UL (ref 0.8–3.5)
LYMPHOCYTES NFR BLD: 5 % (ref 12–49)
MAGNESIUM SERPL-MCNC: 1.9 MG/DL (ref 1.6–2.4)
MCH RBC QN AUTO: 29.7 PG (ref 26–34)
MCHC RBC AUTO-ENTMCNC: 31.3 G/DL (ref 30–36.5)
MCV RBC AUTO: 94.8 FL (ref 80–99)
MONOCYTES # BLD: 1 K/UL (ref 0–1)
MONOCYTES NFR BLD: 7 % (ref 5–13)
NEUTS SEG # BLD: 11.1 K/UL (ref 1.8–8)
NEUTS SEG NFR BLD: 82 % (ref 32–75)
NRBC # BLD: 0 K/UL (ref 0–0.01)
NRBC BLD-RTO: 0 PER 100 WBC
PHOSPHATE SERPL-MCNC: 3 MG/DL (ref 2.6–4.7)
PLATELET # BLD AUTO: 367 K/UL (ref 150–400)
PMV BLD AUTO: 9.5 FL (ref 8.9–12.9)
POTASSIUM SERPL-SCNC: 4.6 MMOL/L (ref 3.5–5.1)
RBC # BLD AUTO: 2.9 M/UL (ref 3.8–5.2)
RBC MORPH BLD: ABNORMAL
SERVICE CMNT-IMP: ABNORMAL
SERVICE CMNT-IMP: NORMAL
SODIUM SERPL-SCNC: 135 MMOL/L (ref 136–145)
WBC # BLD AUTO: 13.6 K/UL (ref 3.6–11)

## 2023-09-30 PROCEDURE — 2500000003 HC RX 250 WO HCPCS: Performed by: NURSE PRACTITIONER

## 2023-09-30 PROCEDURE — 51702 INSERT TEMP BLADDER CATH: CPT

## 2023-09-30 PROCEDURE — 1100000000 HC RM PRIVATE

## 2023-09-30 PROCEDURE — 85025 COMPLETE CBC W/AUTO DIFF WBC: CPT

## 2023-09-30 PROCEDURE — 6360000002 HC RX W HCPCS: Performed by: PHYSICIAN ASSISTANT

## 2023-09-30 PROCEDURE — 6370000000 HC RX 637 (ALT 250 FOR IP): Performed by: FAMILY MEDICINE

## 2023-09-30 PROCEDURE — C9113 INJ PANTOPRAZOLE SODIUM, VIA: HCPCS | Performed by: NURSE PRACTITIONER

## 2023-09-30 PROCEDURE — 84100 ASSAY OF PHOSPHORUS: CPT

## 2023-09-30 PROCEDURE — 80048 BASIC METABOLIC PNL TOTAL CA: CPT

## 2023-09-30 PROCEDURE — 94640 AIRWAY INHALATION TREATMENT: CPT

## 2023-09-30 PROCEDURE — 6360000002 HC RX W HCPCS: Performed by: NURSE PRACTITIONER

## 2023-09-30 PROCEDURE — 82962 GLUCOSE BLOOD TEST: CPT

## 2023-09-30 PROCEDURE — 36415 COLL VENOUS BLD VENIPUNCTURE: CPT

## 2023-09-30 PROCEDURE — 2580000003 HC RX 258: Performed by: FAMILY MEDICINE

## 2023-09-30 PROCEDURE — 6360000002 HC RX W HCPCS: Performed by: ANESTHESIOLOGY

## 2023-09-30 PROCEDURE — 2700000000 HC OXYGEN THERAPY PER DAY

## 2023-09-30 PROCEDURE — 2580000003 HC RX 258: Performed by: NURSE PRACTITIONER

## 2023-09-30 PROCEDURE — 6360000002 HC RX W HCPCS: Performed by: SURGERY

## 2023-09-30 PROCEDURE — 2500000003 HC RX 250 WO HCPCS: Performed by: FAMILY MEDICINE

## 2023-09-30 PROCEDURE — 6360000002 HC RX W HCPCS: Performed by: FAMILY MEDICINE

## 2023-09-30 PROCEDURE — 2580000003 HC RX 258: Performed by: SURGERY

## 2023-09-30 PROCEDURE — 83735 ASSAY OF MAGNESIUM: CPT

## 2023-09-30 PROCEDURE — A4216 STERILE WATER/SALINE, 10 ML: HCPCS | Performed by: NURSE PRACTITIONER

## 2023-09-30 RX ORDER — HYDROXYZINE HYDROCHLORIDE 25 MG/1
25 TABLET, FILM COATED ORAL 3 TIMES DAILY PRN
Status: DISCONTINUED | OUTPATIENT
Start: 2023-09-30 | End: 2023-10-10

## 2023-09-30 RX ORDER — ATORVASTATIN CALCIUM 20 MG/1
20 TABLET, FILM COATED ORAL
Status: DISCONTINUED | OUTPATIENT
Start: 2023-09-30 | End: 2023-10-08

## 2023-09-30 RX ADMIN — HEPARIN SODIUM 5000 UNITS: 5000 INJECTION INTRAVENOUS; SUBCUTANEOUS at 20:19

## 2023-09-30 RX ADMIN — Medication 10 ML: at 22:29

## 2023-09-30 RX ADMIN — Medication: at 15:40

## 2023-09-30 RX ADMIN — Medication: at 09:10

## 2023-09-30 RX ADMIN — BUDESONIDE 500 MCG: 0.5 INHALANT RESPIRATORY (INHALATION) at 08:10

## 2023-09-30 RX ADMIN — HYDROMORPHONE HYDROCHLORIDE 1 MG: 1 INJECTION, SOLUTION INTRAMUSCULAR; INTRAVENOUS; SUBCUTANEOUS at 14:02

## 2023-09-30 RX ADMIN — POTASSIUM CHLORIDE: 2 INJECTION, SOLUTION, CONCENTRATE INTRAVENOUS at 18:23

## 2023-09-30 RX ADMIN — HYDROMORPHONE HYDROCHLORIDE 1 MG: 1 INJECTION, SOLUTION INTRAMUSCULAR; INTRAVENOUS; SUBCUTANEOUS at 20:18

## 2023-09-30 RX ADMIN — Medication: at 22:44

## 2023-09-30 RX ADMIN — ARFORMOTEROL TARTRATE 15 MCG: 15 SOLUTION RESPIRATORY (INHALATION) at 08:10

## 2023-09-30 RX ADMIN — HEPARIN SODIUM 5000 UNITS: 5000 INJECTION INTRAVENOUS; SUBCUTANEOUS at 07:10

## 2023-09-30 RX ADMIN — BUDESONIDE 500 MCG: 0.5 INHALANT RESPIRATORY (INHALATION) at 19:08

## 2023-09-30 RX ADMIN — Medication 10 ML: at 09:11

## 2023-09-30 RX ADMIN — SODIUM CHLORIDE, PRESERVATIVE FREE 40 MG: 5 INJECTION INTRAVENOUS at 09:04

## 2023-09-30 RX ADMIN — HYDROXYZINE HYDROCHLORIDE 25 MG: 25 TABLET, FILM COATED ORAL at 12:29

## 2023-09-30 RX ADMIN — HEPARIN SODIUM 5000 UNITS: 5000 INJECTION INTRAVENOUS; SUBCUTANEOUS at 13:56

## 2023-09-30 RX ADMIN — ONDANSETRON 4 MG: 2 INJECTION INTRAMUSCULAR; INTRAVENOUS at 09:08

## 2023-09-30 RX ADMIN — I.V. FAT EMULSION 250 ML: 20 EMULSION INTRAVENOUS at 18:23

## 2023-09-30 RX ADMIN — HYDROMORPHONE HYDROCHLORIDE 1 MG: 1 INJECTION, SOLUTION INTRAMUSCULAR; INTRAVENOUS; SUBCUTANEOUS at 10:00

## 2023-09-30 RX ADMIN — ARFORMOTEROL TARTRATE 15 MCG: 15 SOLUTION RESPIRATORY (INHALATION) at 19:08

## 2023-09-30 RX ADMIN — Medication: at 00:27

## 2023-09-30 RX ADMIN — HYDROMORPHONE HYDROCHLORIDE 1 MG: 1 INJECTION, SOLUTION INTRAMUSCULAR; INTRAVENOUS; SUBCUTANEOUS at 04:14

## 2023-09-30 RX ADMIN — HYDROMORPHONE HYDROCHLORIDE 1 MG: 1 INJECTION, SOLUTION INTRAMUSCULAR; INTRAVENOUS; SUBCUTANEOUS at 17:19

## 2023-09-30 RX ADMIN — HYDROMORPHONE HYDROCHLORIDE 1 MG: 1 INJECTION, SOLUTION INTRAMUSCULAR; INTRAVENOUS; SUBCUTANEOUS at 01:06

## 2023-09-30 RX ADMIN — HYDROMORPHONE HYDROCHLORIDE 1 MG: 1 INJECTION, SOLUTION INTRAMUSCULAR; INTRAVENOUS; SUBCUTANEOUS at 07:11

## 2023-09-30 ASSESSMENT — PAIN DESCRIPTION - ORIENTATION
ORIENTATION: ANTERIOR
ORIENTATION: ANTERIOR;POSTERIOR
ORIENTATION: POSTERIOR
ORIENTATION: OTHER (COMMENT)
ORIENTATION: POSTERIOR
ORIENTATION: ANTERIOR

## 2023-09-30 ASSESSMENT — PULMONARY FUNCTION TESTS: PEFR_L/MIN: 97

## 2023-09-30 ASSESSMENT — PAIN DESCRIPTION - DESCRIPTORS
DESCRIPTORS: ACHING

## 2023-09-30 ASSESSMENT — PAIN SCALES - GENERAL
PAINLEVEL_OUTOF10: 8
PAINLEVEL_OUTOF10: 9
PAINLEVEL_OUTOF10: 8
PAINLEVEL_OUTOF10: 9
PAINLEVEL_OUTOF10: 9
PAINLEVEL_OUTOF10: 2

## 2023-09-30 ASSESSMENT — PAIN DESCRIPTION - LOCATION
LOCATION: ABDOMEN;BACK;HEAD
LOCATION: HEAD
LOCATION: ABDOMEN
LOCATION: HEAD
LOCATION: ABDOMEN
LOCATION: ABDOMEN

## 2023-09-30 ASSESSMENT — PAIN DESCRIPTION - PAIN TYPE: TYPE: ACUTE PAIN

## 2023-09-30 NOTE — PROGRESS NOTES
Hospitalist Progress Note  Joey Sheffield MD  Answering service: [phone]        Date of Service:  2023  NAME:  Hao Rutledge  :  1960  MRN:  [id number]      Admission Summary:     Patient admitted with hematemesis and acute blood loss anemia, history of gastric bypass with GJ ulcer. Interval history / Subjective:     Patient states that her breathing is better. No other acute complaint. Still with abdominal pain.      Assessment & Plan:     Acute respiratory failure  -Acute hypoxic respiratory failure due to pulmonary edema and fluid overload as well as pneumonia  -Patient requiring ICU admission  to  , s/p intubation and mechanical ventilation from  to , reintubated  and eventual tracheostomy 9/3, trach has been downsized to 6 cuffless Shiley on   -Completed antibiotics  -Trach collar in place, tolerating PMV, supplemental oxygen at 5 L, wean as tolerated  -Pulmonology and speech therapy following    Acute pulmonary edema  -Acute pulmonary edema due to fluid overload with pleural effusion  -S/p thoracentesis      GI bleed  -Upper GI bleed, history of gastric bypass with GJ anastomotic ulcer  -S/p EGD  shows anastomotic ulcer, nonbleeding, negative for stricture  -GI has signed off, on clear liquid, on TPN, continuing PPI and Carafate     Acute blood loss anemia  -Acute blood loss anemia due to GI bleed  -S/p transfusion of 1 unit PRBCs, monitor H&H     Intra-abdominal abscess  -CT abdomen and pelvis on  shows loculated intraperitoneal fluid in the anterior abdomen, persistent free intraperitoneal air  -S/p ultrasound-guided drainage   -Fluid culture growing E. coli, on empiric Zosyn, completed antibiotics  -Repeat CT of the abdomen  shows fluid collection anterior mid abdomen, thought to be due to residual anastomotic leak, plan for repair next Thursday per 09/29/23 0029 09/30/23 0436   WBC 13.2* 13.6*   HGB 7.7* 8.6*   HCT 24.5* 27.5*   * 367     Recent Labs     09/28/23 0524 09/29/23 0029 09/30/23 0436    138 135*   K 3.1* 3.4* 4.6    103 105   CO2 31 31 26   BUN 42* 39* 35*   MG 1.9 1.8 1.9   PHOS 3.4 2.8 3.0     Recent Labs     09/28/23 0524 09/29/23 0029   ALT 41 39   GLOB 3.5 3.6     No results for input(s): \"INR\", \"APTT\" in the last 72 hours. Invalid input(s): \"PTP\"   No results for input(s): \"TIBC\", \"FERR\" in the last 72 hours. Invalid input(s): \"FE\", \"PSAT\"   No results found for: \"FOL\", \"RBCF\"   No results for input(s): \"PH\", \"PCO2\", \"PO2\" in the last 72 hours. No results for input(s): \"CPK\" in the last 72 hours.     Invalid input(s): \"CPKMB\", \"CKNDX\", \"TROIQ\"  Lab Results   Component Value Date/Time    CHOL 212 05/08/2023 02:55 PM    HDL 83 05/08/2023 02:55 PM     No results found for: \"GLUCPOC\"  [unfilled]      Medications Reviewed:     Current Facility-Administered Medications   Medication Dose Route Frequency    PN-Adult 2-in-1 Central Line (Standard)   IntraVENous Continuous TPN    atorvastatin (LIPITOR) tablet 20 mg  20 mg Oral QHS    hydrOXYzine HCl (ATARAX) tablet 25 mg  25 mg Oral TID PRN    HYDROmorphone HCl PF (DILAUDID) injection 0.5 mg  0.5 mg IntraVENous Q3H PRN    HYDROmorphone HCl PF (DILAUDID) injection 1 mg  1 mg IntraVENous Q3H PRN    heparin (porcine) injection 5,000 Units  5,000 Units SubCUTAneous 3 times per day    labetalol (NORMODYNE;TRANDATE) injection 10 mg  10 mg IntraVENous Q6H PRN    fat emulsion (INTRALIPID/NUTRILIPID) 20 % infusion 250 mL  250 mL IntraVENous Daily    budesonide (PULMICORT) nebulizer suspension 500 mcg  0.5 mg Nebulization BID RT    arformoterol tartrate (BROVANA) nebulizer solution 15 mcg  15 mcg Nebulization BID RT    pantoprazole (PROTONIX) 40 mg in sodium chloride (PF) 0.9 % 10 mL injection  40 mg IntraVENous Daily    [Held by provider] buPROPion (WELLBUTRIN) tablet 100 mg  100 mg

## 2023-10-01 VITALS
TEMPERATURE: 98.4 F | SYSTOLIC BLOOD PRESSURE: 126 MMHG | OXYGEN SATURATION: 99 % | DIASTOLIC BLOOD PRESSURE: 82 MMHG | HEART RATE: 87 BPM | BODY MASS INDEX: 19.16 KG/M2 | RESPIRATION RATE: 20 BRPM | HEIGHT: 64 IN | WEIGHT: 112.21 LBS

## 2023-10-01 LAB
ALBUMIN SERPL-MCNC: 2.3 G/DL (ref 3.5–5)
ALBUMIN/GLOB SERPL: 0.6 (ref 1.1–2.2)
ALP SERPL-CCNC: 166 U/L (ref 45–117)
ALT SERPL-CCNC: 40 U/L (ref 12–78)
ANION GAP SERPL CALC-SCNC: 2 MMOL/L (ref 5–15)
ANION GAP SERPL CALC-SCNC: 4 MMOL/L (ref 5–15)
AST SERPL-CCNC: 32 U/L (ref 15–37)
BILIRUB SERPL-MCNC: 0.3 MG/DL (ref 0.2–1)
BUN SERPL-MCNC: 32 MG/DL (ref 6–20)
BUN SERPL-MCNC: 36 MG/DL (ref 6–20)
BUN/CREAT SERPL: 107 (ref 12–20)
BUN/CREAT SERPL: 113 (ref 12–20)
CALCIUM SERPL-MCNC: 9.4 MG/DL (ref 8.5–10.1)
CALCIUM SERPL-MCNC: 9.5 MG/DL (ref 8.5–10.1)
CHLORIDE SERPL-SCNC: 102 MMOL/L (ref 97–108)
CHLORIDE SERPL-SCNC: 103 MMOL/L (ref 97–108)
CO2 SERPL-SCNC: 29 MMOL/L (ref 21–32)
CO2 SERPL-SCNC: 31 MMOL/L (ref 21–32)
CREAT SERPL-MCNC: 0.3 MG/DL (ref 0.55–1.02)
CREAT SERPL-MCNC: 0.32 MG/DL (ref 0.55–1.02)
ERYTHROCYTE [DISTWIDTH] IN BLOOD BY AUTOMATED COUNT: 15.7 % (ref 11.5–14.5)
GLOBULIN SER CALC-MCNC: 3.6 G/DL (ref 2–4)
GLUCOSE BLD STRIP.AUTO-MCNC: 102 MG/DL (ref 65–117)
GLUCOSE BLD STRIP.AUTO-MCNC: 113 MG/DL (ref 65–117)
GLUCOSE BLD STRIP.AUTO-MCNC: 127 MG/DL (ref 65–117)
GLUCOSE BLD STRIP.AUTO-MCNC: 90 MG/DL (ref 65–117)
GLUCOSE SERPL-MCNC: 126 MG/DL (ref 65–100)
GLUCOSE SERPL-MCNC: 90 MG/DL (ref 65–100)
HCT VFR BLD AUTO: 25.7 % (ref 35–47)
HGB BLD-MCNC: 8 G/DL (ref 11.5–16)
MAGNESIUM SERPL-MCNC: 1.8 MG/DL (ref 1.6–2.4)
MCH RBC QN AUTO: 29.1 PG (ref 26–34)
MCHC RBC AUTO-ENTMCNC: 31.1 G/DL (ref 30–36.5)
MCV RBC AUTO: 93.5 FL (ref 80–99)
NRBC # BLD: 0 K/UL (ref 0–0.01)
NRBC BLD-RTO: 0 PER 100 WBC
PHOSPHATE SERPL-MCNC: 3 MG/DL (ref 2.6–4.7)
PLATELET # BLD AUTO: 366 K/UL (ref 150–400)
PMV BLD AUTO: 9.8 FL (ref 8.9–12.9)
POTASSIUM SERPL-SCNC: 4.2 MMOL/L (ref 3.5–5.1)
POTASSIUM SERPL-SCNC: 4.6 MMOL/L (ref 3.5–5.1)
PROT SERPL-MCNC: 5.9 G/DL (ref 6.4–8.2)
RBC # BLD AUTO: 2.75 M/UL (ref 3.8–5.2)
SERVICE CMNT-IMP: ABNORMAL
SERVICE CMNT-IMP: NORMAL
SODIUM SERPL-SCNC: 135 MMOL/L (ref 136–145)
SODIUM SERPL-SCNC: 136 MMOL/L (ref 136–145)
WBC # BLD AUTO: 10.2 K/UL (ref 3.6–11)

## 2023-10-01 PROCEDURE — 6360000002 HC RX W HCPCS: Performed by: ANESTHESIOLOGY

## 2023-10-01 PROCEDURE — 85027 COMPLETE CBC AUTOMATED: CPT

## 2023-10-01 PROCEDURE — A4216 STERILE WATER/SALINE, 10 ML: HCPCS | Performed by: NURSE PRACTITIONER

## 2023-10-01 PROCEDURE — 94640 AIRWAY INHALATION TREATMENT: CPT

## 2023-10-01 PROCEDURE — 2500000003 HC RX 250 WO HCPCS: Performed by: NURSE PRACTITIONER

## 2023-10-01 PROCEDURE — C9113 INJ PANTOPRAZOLE SODIUM, VIA: HCPCS | Performed by: NURSE PRACTITIONER

## 2023-10-01 PROCEDURE — 6370000000 HC RX 637 (ALT 250 FOR IP): Performed by: FAMILY MEDICINE

## 2023-10-01 PROCEDURE — 2580000003 HC RX 258: Performed by: NURSE PRACTITIONER

## 2023-10-01 PROCEDURE — 2580000003 HC RX 258: Performed by: SURGERY

## 2023-10-01 PROCEDURE — 80053 COMPREHEN METABOLIC PANEL: CPT

## 2023-10-01 PROCEDURE — 36415 COLL VENOUS BLD VENIPUNCTURE: CPT

## 2023-10-01 PROCEDURE — 1100000000 HC RM PRIVATE

## 2023-10-01 PROCEDURE — 6360000002 HC RX W HCPCS: Performed by: SURGERY

## 2023-10-01 PROCEDURE — 2580000003 HC RX 258: Performed by: FAMILY MEDICINE

## 2023-10-01 PROCEDURE — 6360000002 HC RX W HCPCS: Performed by: NURSE PRACTITIONER

## 2023-10-01 PROCEDURE — 84100 ASSAY OF PHOSPHORUS: CPT

## 2023-10-01 PROCEDURE — 6360000002 HC RX W HCPCS: Performed by: FAMILY MEDICINE

## 2023-10-01 PROCEDURE — 82962 GLUCOSE BLOOD TEST: CPT

## 2023-10-01 PROCEDURE — 83735 ASSAY OF MAGNESIUM: CPT

## 2023-10-01 PROCEDURE — 6360000002 HC RX W HCPCS: Performed by: PHYSICIAN ASSISTANT

## 2023-10-01 PROCEDURE — 2500000003 HC RX 250 WO HCPCS: Performed by: FAMILY MEDICINE

## 2023-10-01 RX ADMIN — ARFORMOTEROL TARTRATE 15 MCG: 15 SOLUTION RESPIRATORY (INHALATION) at 22:04

## 2023-10-01 RX ADMIN — HEPARIN SODIUM 5000 UNITS: 5000 INJECTION INTRAVENOUS; SUBCUTANEOUS at 14:26

## 2023-10-01 RX ADMIN — HEPARIN SODIUM 5000 UNITS: 5000 INJECTION INTRAVENOUS; SUBCUTANEOUS at 21:54

## 2023-10-01 RX ADMIN — BUDESONIDE 500 MCG: 0.5 INHALANT RESPIRATORY (INHALATION) at 08:19

## 2023-10-01 RX ADMIN — ALBUTEROL SULFATE 2.5 MG: 2.5 SOLUTION RESPIRATORY (INHALATION) at 15:02

## 2023-10-01 RX ADMIN — HYDROMORPHONE HYDROCHLORIDE 0.5 MG: 1 INJECTION, SOLUTION INTRAMUSCULAR; INTRAVENOUS; SUBCUTANEOUS at 00:14

## 2023-10-01 RX ADMIN — HYDROMORPHONE HYDROCHLORIDE 1 MG: 1 INJECTION, SOLUTION INTRAMUSCULAR; INTRAVENOUS; SUBCUTANEOUS at 14:26

## 2023-10-01 RX ADMIN — ARFORMOTEROL TARTRATE 15 MCG: 15 SOLUTION RESPIRATORY (INHALATION) at 08:19

## 2023-10-01 RX ADMIN — BUDESONIDE 500 MCG: 0.5 INHALANT RESPIRATORY (INHALATION) at 22:04

## 2023-10-01 RX ADMIN — HYDROMORPHONE HYDROCHLORIDE 1 MG: 1 INJECTION, SOLUTION INTRAMUSCULAR; INTRAVENOUS; SUBCUTANEOUS at 18:55

## 2023-10-01 RX ADMIN — I.V. FAT EMULSION 250 ML: 20 EMULSION INTRAVENOUS at 18:44

## 2023-10-01 RX ADMIN — SODIUM CHLORIDE, PRESERVATIVE FREE 40 MG: 5 INJECTION INTRAVENOUS at 10:33

## 2023-10-01 RX ADMIN — Medication 10 ML: at 10:59

## 2023-10-01 RX ADMIN — HEPARIN SODIUM 5000 UNITS: 5000 INJECTION INTRAVENOUS; SUBCUTANEOUS at 06:03

## 2023-10-01 RX ADMIN — Medication: at 10:51

## 2023-10-01 RX ADMIN — Medication 10 ML: at 21:54

## 2023-10-01 RX ADMIN — HYDROMORPHONE HYDROCHLORIDE 1 MG: 1 INJECTION, SOLUTION INTRAMUSCULAR; INTRAVENOUS; SUBCUTANEOUS at 21:54

## 2023-10-01 RX ADMIN — Medication: at 17:03

## 2023-10-01 RX ADMIN — POTASSIUM CHLORIDE: 2 INJECTION, SOLUTION, CONCENTRATE INTRAVENOUS at 18:44

## 2023-10-01 RX ADMIN — HYDROMORPHONE HYDROCHLORIDE 1 MG: 1 INJECTION, SOLUTION INTRAMUSCULAR; INTRAVENOUS; SUBCUTANEOUS at 05:09

## 2023-10-01 ASSESSMENT — PAIN DESCRIPTION - LOCATION
LOCATION: ABDOMEN
LOCATION: ABDOMEN
LOCATION: ABDOMEN;BUTTOCKS
LOCATION: ABDOMEN

## 2023-10-01 ASSESSMENT — PAIN SCALES - GENERAL
PAINLEVEL_OUTOF10: 0
PAINLEVEL_OUTOF10: 9
PAINLEVEL_OUTOF10: 0
PAINLEVEL_OUTOF10: 8
PAINLEVEL_OUTOF10: 5
PAINLEVEL_OUTOF10: 8
PAINLEVEL_OUTOF10: 9
PAINLEVEL_OUTOF10: 9

## 2023-10-01 ASSESSMENT — PAIN DESCRIPTION - ORIENTATION
ORIENTATION: ANTERIOR;POSTERIOR
ORIENTATION: ANTERIOR
ORIENTATION: LEFT
ORIENTATION: RIGHT
ORIENTATION: ANTERIOR
ORIENTATION: ANTERIOR

## 2023-10-01 ASSESSMENT — PAIN SCALES - WONG BAKER
WONGBAKER_NUMERICALRESPONSE: NO HURT
WONGBAKER_NUMERICALRESPONSE: NO HURT
WONGBAKER_NUMERICALRESPONSE: 0
WONGBAKER_NUMERICALRESPONSE: 0

## 2023-10-01 ASSESSMENT — PAIN DESCRIPTION - DESCRIPTORS
DESCRIPTORS: ACHING;CRAMPING
DESCRIPTORS: ACHING

## 2023-10-01 ASSESSMENT — PAIN DESCRIPTION - ONSET: ONSET: PROGRESSIVE

## 2023-10-01 NOTE — PROGRESS NOTES
Patient appears confused  from sleep removing oxygen and removing gown, patient reoriented to situation, same redirectable.

## 2023-10-01 NOTE — PROGRESS NOTES
Hospitalist Progress Note  Dipti Damon MD  Answering service: [phone]        Date of Service:  10/1/2023  NAME:  Sonia French  :  1960  MRN:  [id number]      Admission Summary:     Patient admitted with hematemesis and acute blood loss anemia, history of gastric bypass with GJ ulcer. Interval history / Subjective:     Patient states that her breathing is better. No other acute complaint. Still with abdominal pain.      Assessment & Plan:     Acute respiratory failure  -Acute hypoxic respiratory failure due to pulmonary edema and fluid overload as well as pneumonia  -Patient requiring ICU admission  to  , s/p intubation and mechanical ventilation from  to , reintubated  and eventual tracheostomy 9/3, trach has been downsized to 6 cuffless Shiley on   -Completed antibiotics  -Trach collar in place, tolerating PMV, supplemental oxygen at 5 L, wean as tolerated  -Pulmonology and speech therapy following    Acute pulmonary edema  -Acute pulmonary edema due to fluid overload with pleural effusion  -S/p thoracentesis      GI bleed  -Upper GI bleed, history of gastric bypass with GJ anastomotic ulcer  -S/p EGD  shows anastomotic ulcer, nonbleeding, negative for stricture  -GI has signed off, on clear liquid, on TPN, continuing PPI and Carafate     Acute blood loss anemia  -Acute blood loss anemia due to GI bleed  -S/p transfusion of 1 unit PRBCs, monitor H&H     Intra-abdominal abscess  -CT abdomen and pelvis on  shows loculated intraperitoneal fluid in the anterior abdomen, persistent free intraperitoneal air  -S/p ultrasound-guided drainage   -Fluid culture growing E. coli, on empiric Zosyn, completed antibiotics  -Repeat CT of the abdomen  shows fluid collection anterior mid abdomen, thought to be due to residual anastomotic leak, plan for repair on Thursday per general

## 2023-10-01 NOTE — PROGRESS NOTES
10/01/23 1256   Oxygen Therapy/Pulse Ox   O2 Device None (Room air)  (Trach)   Pulse (!) 103   Respirations 30   SpO2 99 %

## 2023-10-02 LAB
ALBUMIN SERPL-MCNC: 2.4 G/DL (ref 3.5–5)
ALBUMIN/GLOB SERPL: 0.6 (ref 1.1–2.2)
ALP SERPL-CCNC: 175 U/L (ref 45–117)
ALT SERPL-CCNC: 38 U/L (ref 12–78)
ANION GAP SERPL CALC-SCNC: 2 MMOL/L (ref 5–15)
AST SERPL-CCNC: 29 U/L (ref 15–37)
BILIRUB SERPL-MCNC: 0.4 MG/DL (ref 0.2–1)
BUN SERPL-MCNC: 34 MG/DL (ref 6–20)
BUN/CREAT SERPL: 94 (ref 12–20)
CALCIUM SERPL-MCNC: 9.6 MG/DL (ref 8.5–10.1)
CHLORIDE SERPL-SCNC: 104 MMOL/L (ref 97–108)
CO2 SERPL-SCNC: 30 MMOL/L (ref 21–32)
CREAT SERPL-MCNC: 0.36 MG/DL (ref 0.55–1.02)
ERYTHROCYTE [DISTWIDTH] IN BLOOD BY AUTOMATED COUNT: 15.7 % (ref 11.5–14.5)
GLOBULIN SER CALC-MCNC: 4.2 G/DL (ref 2–4)
GLUCOSE BLD STRIP.AUTO-MCNC: 116 MG/DL (ref 65–117)
GLUCOSE BLD STRIP.AUTO-MCNC: 126 MG/DL (ref 65–117)
GLUCOSE BLD STRIP.AUTO-MCNC: 94 MG/DL (ref 65–117)
GLUCOSE SERPL-MCNC: 100 MG/DL (ref 65–100)
HCT VFR BLD AUTO: 26.9 % (ref 35–47)
HGB BLD-MCNC: 8.5 G/DL (ref 11.5–16)
MAGNESIUM SERPL-MCNC: 1.6 MG/DL (ref 1.6–2.4)
MCH RBC QN AUTO: 29.2 PG (ref 26–34)
MCHC RBC AUTO-ENTMCNC: 31.6 G/DL (ref 30–36.5)
MCV RBC AUTO: 92.4 FL (ref 80–99)
NRBC # BLD: 0 K/UL (ref 0–0.01)
NRBC BLD-RTO: 0 PER 100 WBC
PHOSPHATE SERPL-MCNC: 3.1 MG/DL (ref 2.6–4.7)
PLATELET # BLD AUTO: 379 K/UL (ref 150–400)
PMV BLD AUTO: 9.4 FL (ref 8.9–12.9)
POTASSIUM SERPL-SCNC: 4.6 MMOL/L (ref 3.5–5.1)
PROT SERPL-MCNC: 6.6 G/DL (ref 6.4–8.2)
RBC # BLD AUTO: 2.91 M/UL (ref 3.8–5.2)
SERVICE CMNT-IMP: ABNORMAL
SERVICE CMNT-IMP: NORMAL
SERVICE CMNT-IMP: NORMAL
SODIUM SERPL-SCNC: 136 MMOL/L (ref 136–145)
WBC # BLD AUTO: 16.3 K/UL (ref 3.6–11)

## 2023-10-02 PROCEDURE — 6360000002 HC RX W HCPCS: Performed by: FAMILY MEDICINE

## 2023-10-02 PROCEDURE — 6360000002 HC RX W HCPCS: Performed by: PHYSICIAN ASSISTANT

## 2023-10-02 PROCEDURE — 2500000003 HC RX 250 WO HCPCS: Performed by: FAMILY MEDICINE

## 2023-10-02 PROCEDURE — 36415 COLL VENOUS BLD VENIPUNCTURE: CPT

## 2023-10-02 PROCEDURE — 82962 GLUCOSE BLOOD TEST: CPT

## 2023-10-02 PROCEDURE — 2580000003 HC RX 258: Performed by: SURGERY

## 2023-10-02 PROCEDURE — 85027 COMPLETE CBC AUTOMATED: CPT

## 2023-10-02 PROCEDURE — 80053 COMPREHEN METABOLIC PANEL: CPT

## 2023-10-02 PROCEDURE — 2580000003 HC RX 258: Performed by: NURSE PRACTITIONER

## 2023-10-02 PROCEDURE — 6360000002 HC RX W HCPCS: Performed by: NURSE PRACTITIONER

## 2023-10-02 PROCEDURE — 1200000000 HC SEMI PRIVATE

## 2023-10-02 PROCEDURE — 94640 AIRWAY INHALATION TREATMENT: CPT

## 2023-10-02 PROCEDURE — 83735 ASSAY OF MAGNESIUM: CPT

## 2023-10-02 PROCEDURE — C9113 INJ PANTOPRAZOLE SODIUM, VIA: HCPCS | Performed by: NURSE PRACTITIONER

## 2023-10-02 PROCEDURE — 84100 ASSAY OF PHOSPHORUS: CPT

## 2023-10-02 PROCEDURE — 97535 SELF CARE MNGMENT TRAINING: CPT

## 2023-10-02 PROCEDURE — 6360000002 HC RX W HCPCS: Performed by: ANESTHESIOLOGY

## 2023-10-02 PROCEDURE — 2500000003 HC RX 250 WO HCPCS: Performed by: NURSE PRACTITIONER

## 2023-10-02 PROCEDURE — 2580000003 HC RX 258: Performed by: FAMILY MEDICINE

## 2023-10-02 PROCEDURE — A4216 STERILE WATER/SALINE, 10 ML: HCPCS | Performed by: NURSE PRACTITIONER

## 2023-10-02 RX ORDER — HYDROMORPHONE HYDROCHLORIDE 1 MG/ML
2 INJECTION, SOLUTION INTRAMUSCULAR; INTRAVENOUS; SUBCUTANEOUS
Status: DISCONTINUED | OUTPATIENT
Start: 2023-10-02 | End: 2023-10-03

## 2023-10-02 RX ADMIN — HYDROMORPHONE HYDROCHLORIDE 2 MG: 1 INJECTION, SOLUTION INTRAMUSCULAR; INTRAVENOUS; SUBCUTANEOUS at 18:55

## 2023-10-02 RX ADMIN — I.V. FAT EMULSION 250 ML: 20 EMULSION INTRAVENOUS at 19:17

## 2023-10-02 RX ADMIN — Medication: at 16:18

## 2023-10-02 RX ADMIN — HYDROMORPHONE HYDROCHLORIDE 1 MG: 1 INJECTION, SOLUTION INTRAMUSCULAR; INTRAVENOUS; SUBCUTANEOUS at 15:16

## 2023-10-02 RX ADMIN — POTASSIUM CHLORIDE: 2 INJECTION, SOLUTION, CONCENTRATE INTRAVENOUS at 19:16

## 2023-10-02 RX ADMIN — BUDESONIDE 500 MCG: 0.5 INHALANT RESPIRATORY (INHALATION) at 08:15

## 2023-10-02 RX ADMIN — HYDROMORPHONE HYDROCHLORIDE 2 MG: 1 INJECTION, SOLUTION INTRAMUSCULAR; INTRAVENOUS; SUBCUTANEOUS at 22:54

## 2023-10-02 RX ADMIN — Medication: at 23:14

## 2023-10-02 RX ADMIN — HYDROMORPHONE HYDROCHLORIDE 1 MG: 1 INJECTION, SOLUTION INTRAMUSCULAR; INTRAVENOUS; SUBCUTANEOUS at 04:09

## 2023-10-02 RX ADMIN — HEPARIN SODIUM 5000 UNITS: 5000 INJECTION INTRAVENOUS; SUBCUTANEOUS at 21:54

## 2023-10-02 RX ADMIN — HYDROMORPHONE HYDROCHLORIDE 1 MG: 1 INJECTION, SOLUTION INTRAMUSCULAR; INTRAVENOUS; SUBCUTANEOUS at 09:12

## 2023-10-02 RX ADMIN — HYDROMORPHONE HYDROCHLORIDE 1 MG: 1 INJECTION, SOLUTION INTRAMUSCULAR; INTRAVENOUS; SUBCUTANEOUS at 12:55

## 2023-10-02 RX ADMIN — Medication: at 00:18

## 2023-10-02 RX ADMIN — Medication 10 ML: at 21:54

## 2023-10-02 RX ADMIN — ARFORMOTEROL TARTRATE 15 MCG: 15 SOLUTION RESPIRATORY (INHALATION) at 08:15

## 2023-10-02 RX ADMIN — Medication: at 09:12

## 2023-10-02 RX ADMIN — HEPARIN SODIUM 5000 UNITS: 5000 INJECTION INTRAVENOUS; SUBCUTANEOUS at 15:14

## 2023-10-02 RX ADMIN — HEPARIN SODIUM 5000 UNITS: 5000 INJECTION INTRAVENOUS; SUBCUTANEOUS at 06:43

## 2023-10-02 RX ADMIN — SODIUM CHLORIDE, PRESERVATIVE FREE 40 MG: 5 INJECTION INTRAVENOUS at 09:12

## 2023-10-02 ASSESSMENT — PAIN DESCRIPTION - LOCATION
LOCATION: BACK
LOCATION: BACK
LOCATION: ABDOMEN
LOCATION: BACK

## 2023-10-02 ASSESSMENT — PAIN SCALES - GENERAL
PAINLEVEL_OUTOF10: 9
PAINLEVEL_OUTOF10: 2
PAINLEVEL_OUTOF10: 9
PAINLEVEL_OUTOF10: 8

## 2023-10-02 ASSESSMENT — PAIN DESCRIPTION - ORIENTATION: ORIENTATION: ANTERIOR

## 2023-10-02 NOTE — PROGRESS NOTES
Hospitalist Progress Note  Adrian Julio MD  Answering service: [phone]        Date of Service:  10/2/2023  NAME:  Papito Timmons  :  1960  MRN:  [id number]      Admission Summary:     Patient admitted with hematemesis and acute blood loss anemia, history of gastric bypass with GJ ulcer. Interval history / Subjective:     Patient states that her breathing is better. No other acute complaint.        Assessment & Plan:     Acute respiratory failure  -Acute hypoxic respiratory failure due to pulmonary edema and fluid overload as well as pneumonia  -Patient requiring ICU admission  to  , s/p intubation and mechanical ventilation from  to , reintubated  and eventual tracheostomy 9/3, trach has been downsized to 6 cuffless Shiley on   -Completed antibiotics  -Trach collar in place, tolerating PMV, now on room air  -Pulmonology and speech therapy following    Acute pulmonary edema  -Acute pulmonary edema due to fluid overload with pleural effusion  -S/p thoracentesis      GI bleed  -Upper GI bleed, history of gastric bypass with GJ anastomotic ulcer  -S/p EGD  shows anastomotic ulcer, nonbleeding, negative for stricture  -GI has signed off, on clear liquid, on TPN, continuing PPI and Carafate     Acute blood loss anemia  -Acute blood loss anemia due to GI bleed  -S/p transfusion of 1 unit PRBCs, monitor H&H     Intra-abdominal abscess  -CT abdomen and pelvis on  shows loculated intraperitoneal fluid in the anterior abdomen, persistent free intraperitoneal air  -S/p ultrasound-guided drainage   -Fluid culture growing E. coli, on empiric Zosyn, completed antibiotics  -Repeat CT of the abdomen  shows fluid collection anterior mid abdomen, thought to be due to residual anastomotic leak, plan for repair on Thursday per general surgery  -General surgery following  -ID previously

## 2023-10-02 NOTE — PLAN OF CARE
with Minimal Assist  within 7 day(s). 5.  Patient will perform all aspects of toileting with Moderate Assist within 7 day(s). 6.  Patient will participate in upper extremity therapeutic exercise/activities with Set-up for 10 minutes within 7 day(s). 7.  Patient will utilize energy conservation techniques during functional activities with verbal cues within 7 day(s). Outcome: Progressing     OCCUPATIONAL THERAPY TREATMENT: WEEKLY REASSESSMENT    Patient: Livier Jewell (42 y.o. female)  Date: 10/2/2023  Primary Diagnosis: Hematemesis [K92.0]  GI bleed [K92.2]  Gastrointestinal hemorrhage, unspecified gastrointestinal hemorrhage type [K92.2]  Procedure(s) (LRB):  DEBRIDEMENT OF SACRAL DECUBITUS WOUND (N/A) 49 Days Post-Op   Precautions: Fall Risk                Chart, occupational therapy assessment, plan of care, and goals were reviewed. ASSESSMENT  Patient continues to benefit from skilled OT services and is progressing towards goals. Pt continues to present with deficits in ADL independence, functional mobility, strength, activity tolerance, body mechanics, balance, and general deconditioning. Pt presents to OT services supine in bed, resting, amendable to session. VSS throughout session. Pt completed grooming ADLs (shower cap and brushing hair) seated with bed in chair position with SBA. Discussed with pt POC and functional goals, pt asking appropriate questions throughout session. Pt repositioned for comfort, provided verbal education on importance of pressure relief/positioning techniques, call bell within reach, all needs met. Pt remains highly motivated to participate in therapy sessions and OT continues to recommend IPR following discharge to maximize safety when completing ADLs and related functional mobility. PLAN  Goals have been updated based on progression since last assessment. Patient continues to benefit from skilled intervention to address the above impairments.     Recommendations

## 2023-10-02 NOTE — PROGRESS NOTES
2000 Received patient from [facility], [address] [place] [address].    0730 Bedside shift change report given to BONY Smith (oncoming nurse) by Maria T Marin RN. Report included the following information SBAR, Kardex, MAR, Recent Results, and Cardiac Rhythm NSR.

## 2023-10-02 NOTE — PROGRESS NOTES
SLP Contact Note    This SLP will continue to follow peripherally for trach team. Pt having surgery on 10/5. Continue PMV and ice chips.        Thank you,  Cosme Jimenez M.Ed, PhD(c), CCC-SLP  Speech-Language Pathologist

## 2023-10-03 LAB
ALBUMIN SERPL-MCNC: 2.5 G/DL (ref 3.5–5)
ALBUMIN/GLOB SERPL: 0.6 (ref 1.1–2.2)
ALP SERPL-CCNC: 181 U/L (ref 45–117)
ALT SERPL-CCNC: 38 U/L (ref 12–78)
ANION GAP SERPL CALC-SCNC: 5 MMOL/L (ref 5–15)
ARTERIAL PATENCY WRIST A: POSITIVE
ARTERIAL PATENCY WRIST A: YES
AST SERPL-CCNC: 32 U/L (ref 15–37)
BASE DEFICIT BLD-SCNC: 0.5 MMOL/L
BASE DEFICIT BLDA-SCNC: 2.2 MMOL/L
BASOPHILS # BLD: 0.2 K/UL (ref 0–0.1)
BASOPHILS NFR BLD: 1 % (ref 0–1)
BDY SITE: ABNORMAL
BDY SITE: ABNORMAL
BILIRUB SERPL-MCNC: 0.5 MG/DL (ref 0.2–1)
BUN SERPL-MCNC: 40 MG/DL (ref 6–20)
BUN/CREAT SERPL: 111 (ref 12–20)
CALCIUM SERPL-MCNC: 9.4 MG/DL (ref 8.5–10.1)
CHLORIDE SERPL-SCNC: 103 MMOL/L (ref 97–108)
CO2 SERPL-SCNC: 24 MMOL/L (ref 21–32)
CREAT SERPL-MCNC: 0.36 MG/DL (ref 0.55–1.02)
D DIMER PPP FEU-MCNC: 1.6 MG/L FEU (ref 0–0.65)
DIFFERENTIAL METHOD BLD: ABNORMAL
EOSINOPHIL # BLD: 0.3 K/UL (ref 0–0.4)
EOSINOPHIL NFR BLD: 2 % (ref 0–7)
ERYTHROCYTE [DISTWIDTH] IN BLOOD BY AUTOMATED COUNT: 15.8 % (ref 11.5–14.5)
FIO2 ON VENT: 98 %
GAS FLOW.O2 O2 DELIVERY SYS: 14 L/MIN
GAS FLOW.O2 O2 DELIVERY SYS: ABNORMAL
GAS FLOW.O2 SETTING OXYMISER: 30 BPM
GLOBULIN SER CALC-MCNC: 4.1 G/DL (ref 2–4)
GLUCOSE BLD STRIP.AUTO-MCNC: 130 MG/DL (ref 65–117)
GLUCOSE SERPL-MCNC: 110 MG/DL (ref 65–100)
HCO3 BLD-SCNC: 27.3 MMOL/L (ref 22–26)
HCO3 BLDA-SCNC: 24 MMOL/L (ref 22–26)
HCT VFR BLD AUTO: 29 % (ref 35–47)
HGB BLD-MCNC: 9 G/DL (ref 11.5–16)
IMM GRANULOCYTES # BLD AUTO: 0.2 K/UL (ref 0–0.04)
IMM GRANULOCYTES NFR BLD AUTO: 1 % (ref 0–0.5)
LYMPHOCYTES # BLD: 0.8 K/UL (ref 0.8–3.5)
LYMPHOCYTES NFR BLD: 5 % (ref 12–49)
MAGNESIUM SERPL-MCNC: 1.8 MG/DL (ref 1.6–2.4)
MCH RBC QN AUTO: 29.1 PG (ref 26–34)
MCHC RBC AUTO-ENTMCNC: 31 G/DL (ref 30–36.5)
MCV RBC AUTO: 93.9 FL (ref 80–99)
MONOCYTES # BLD: 1.6 K/UL (ref 0–1)
MONOCYTES NFR BLD: 10 % (ref 5–13)
NEUTS SEG # BLD: 12.9 K/UL (ref 1.8–8)
NEUTS SEG NFR BLD: 81 % (ref 32–75)
NRBC # BLD: 0 K/UL (ref 0–0.01)
NRBC BLD-RTO: 0 PER 100 WBC
O2/TOTAL GAS SETTING VFR VENT: 98 %
PCO2 BLD: 60.6 MMHG (ref 35–45)
PCO2 BLDA: 46 MMHG (ref 35–45)
PH BLD: 7.26 (ref 7.35–7.45)
PH BLDA: 7.33 (ref 7.35–7.45)
PHOSPHATE SERPL-MCNC: 3.7 MG/DL (ref 2.6–4.7)
PLATELET # BLD AUTO: 376 K/UL (ref 150–400)
PMV BLD AUTO: 9.9 FL (ref 8.9–12.9)
PO2 BLD: 83 MMHG (ref 80–100)
PO2 BLDA: 144 MMHG (ref 80–100)
POTASSIUM SERPL-SCNC: 5 MMOL/L (ref 3.5–5.1)
PROT SERPL-MCNC: 6.6 G/DL (ref 6.4–8.2)
RBC # BLD AUTO: 3.09 M/UL (ref 3.8–5.2)
RBC MORPH BLD: ABNORMAL
SAO2 % BLD: 93.9 % (ref 92–97)
SAO2 % BLD: 99 % (ref 92–97)
SAO2% DEVICE SAO2% SENSOR NAME: ABNORMAL
SERVICE CMNT-IMP: ABNORMAL
SODIUM SERPL-SCNC: 132 MMOL/L (ref 136–145)
SPECIMEN SITE: ABNORMAL
SPECIMEN TYPE: ABNORMAL
WBC # BLD AUTO: 16 K/UL (ref 3.6–11)

## 2023-10-03 PROCEDURE — 36600 WITHDRAWAL OF ARTERIAL BLOOD: CPT

## 2023-10-03 PROCEDURE — 2700000000 HC OXYGEN THERAPY PER DAY

## 2023-10-03 PROCEDURE — 36415 COLL VENOUS BLD VENIPUNCTURE: CPT

## 2023-10-03 PROCEDURE — A4216 STERILE WATER/SALINE, 10 ML: HCPCS | Performed by: NURSE PRACTITIONER

## 2023-10-03 PROCEDURE — 6360000002 HC RX W HCPCS: Performed by: FAMILY MEDICINE

## 2023-10-03 PROCEDURE — C9113 INJ PANTOPRAZOLE SODIUM, VIA: HCPCS | Performed by: NURSE PRACTITIONER

## 2023-10-03 PROCEDURE — 82803 BLOOD GASES ANY COMBINATION: CPT

## 2023-10-03 PROCEDURE — 6360000002 HC RX W HCPCS: Performed by: NURSE PRACTITIONER

## 2023-10-03 PROCEDURE — 85379 FIBRIN DEGRADATION QUANT: CPT

## 2023-10-03 PROCEDURE — 2060000000 HC ICU INTERMEDIATE R&B

## 2023-10-03 PROCEDURE — 2500000003 HC RX 250 WO HCPCS: Performed by: NURSE PRACTITIONER

## 2023-10-03 PROCEDURE — 84100 ASSAY OF PHOSPHORUS: CPT

## 2023-10-03 PROCEDURE — 2580000003 HC RX 258: Performed by: SURGERY

## 2023-10-03 PROCEDURE — 6360000002 HC RX W HCPCS: Performed by: ANESTHESIOLOGY

## 2023-10-03 PROCEDURE — 94760 N-INVAS EAR/PLS OXIMETRY 1: CPT

## 2023-10-03 PROCEDURE — 83735 ASSAY OF MAGNESIUM: CPT

## 2023-10-03 PROCEDURE — 99231 SBSQ HOSP IP/OBS SF/LOW 25: CPT | Performed by: INTERNAL MEDICINE

## 2023-10-03 PROCEDURE — 2580000003 HC RX 258: Performed by: FAMILY MEDICINE

## 2023-10-03 PROCEDURE — 82962 GLUCOSE BLOOD TEST: CPT

## 2023-10-03 PROCEDURE — 6370000000 HC RX 637 (ALT 250 FOR IP): Performed by: FAMILY MEDICINE

## 2023-10-03 PROCEDURE — 85025 COMPLETE CBC W/AUTO DIFF WBC: CPT

## 2023-10-03 PROCEDURE — 2580000003 HC RX 258: Performed by: NURSE PRACTITIONER

## 2023-10-03 PROCEDURE — 94640 AIRWAY INHALATION TREATMENT: CPT

## 2023-10-03 PROCEDURE — 2500000003 HC RX 250 WO HCPCS: Performed by: FAMILY MEDICINE

## 2023-10-03 PROCEDURE — 80053 COMPREHEN METABOLIC PANEL: CPT

## 2023-10-03 PROCEDURE — 6360000002 HC RX W HCPCS: Performed by: SURGERY

## 2023-10-03 RX ORDER — ALBUTEROL SULFATE 2.5 MG/3ML
2.5 SOLUTION RESPIRATORY (INHALATION)
Status: DISCONTINUED | OUTPATIENT
Start: 2023-10-03 | End: 2023-10-04

## 2023-10-03 RX ORDER — IPRATROPIUM BROMIDE AND ALBUTEROL SULFATE 2.5; .5 MG/3ML; MG/3ML
1 SOLUTION RESPIRATORY (INHALATION)
Status: DISCONTINUED | OUTPATIENT
Start: 2023-10-03 | End: 2023-10-07

## 2023-10-03 RX ORDER — HYDROMORPHONE HYDROCHLORIDE 1 MG/ML
1 INJECTION, SOLUTION INTRAMUSCULAR; INTRAVENOUS; SUBCUTANEOUS
Status: DISCONTINUED | OUTPATIENT
Start: 2023-10-03 | End: 2023-10-05

## 2023-10-03 RX ORDER — FUROSEMIDE 10 MG/ML
20 INJECTION INTRAMUSCULAR; INTRAVENOUS ONCE
Status: COMPLETED | OUTPATIENT
Start: 2023-10-03 | End: 2023-10-03

## 2023-10-03 RX ORDER — ACETYLCYSTEINE 200 MG/ML
600 SOLUTION ORAL; RESPIRATORY (INHALATION)
Status: COMPLETED | OUTPATIENT
Start: 2023-10-03 | End: 2023-10-06

## 2023-10-03 RX ADMIN — SODIUM CHLORIDE, PRESERVATIVE FREE 40 MG: 5 INJECTION INTRAVENOUS at 09:48

## 2023-10-03 RX ADMIN — HEPARIN SODIUM 5000 UNITS: 5000 INJECTION INTRAVENOUS; SUBCUTANEOUS at 22:35

## 2023-10-03 RX ADMIN — BUDESONIDE 500 MCG: 0.5 INHALANT RESPIRATORY (INHALATION) at 21:30

## 2023-10-03 RX ADMIN — ARFORMOTEROL TARTRATE 15 MCG: 15 SOLUTION RESPIRATORY (INHALATION) at 21:30

## 2023-10-03 RX ADMIN — HEPARIN SODIUM 5000 UNITS: 5000 INJECTION INTRAVENOUS; SUBCUTANEOUS at 06:38

## 2023-10-03 RX ADMIN — Medication 10 ML: at 22:36

## 2023-10-03 RX ADMIN — HEPARIN SODIUM 5000 UNITS: 5000 INJECTION INTRAVENOUS; SUBCUTANEOUS at 15:24

## 2023-10-03 RX ADMIN — POTASSIUM CHLORIDE: 2 INJECTION, SOLUTION, CONCENTRATE INTRAVENOUS at 19:16

## 2023-10-03 RX ADMIN — HYDROMORPHONE HYDROCHLORIDE 2 MG: 1 INJECTION, SOLUTION INTRAMUSCULAR; INTRAVENOUS; SUBCUTANEOUS at 06:38

## 2023-10-03 RX ADMIN — BUDESONIDE 500 MCG: 0.5 INHALANT RESPIRATORY (INHALATION) at 07:01

## 2023-10-03 RX ADMIN — FUROSEMIDE 20 MG: 10 INJECTION, SOLUTION INTRAMUSCULAR; INTRAVENOUS at 07:46

## 2023-10-03 RX ADMIN — Medication 10 ML: at 09:49

## 2023-10-03 RX ADMIN — Medication: at 09:51

## 2023-10-03 RX ADMIN — HYDROMORPHONE HYDROCHLORIDE 2 MG: 1 INJECTION, SOLUTION INTRAMUSCULAR; INTRAVENOUS; SUBCUTANEOUS at 02:57

## 2023-10-03 RX ADMIN — ARFORMOTEROL TARTRATE 15 MCG: 15 SOLUTION RESPIRATORY (INHALATION) at 00:34

## 2023-10-03 RX ADMIN — IPRATROPIUM BROMIDE AND ALBUTEROL SULFATE 1 DOSE: 2.5; .5 SOLUTION RESPIRATORY (INHALATION) at 21:30

## 2023-10-03 RX ADMIN — BUDESONIDE 500 MCG: 0.5 INHALANT RESPIRATORY (INHALATION) at 00:34

## 2023-10-03 RX ADMIN — I.V. FAT EMULSION 250 ML: 20 EMULSION INTRAVENOUS at 19:24

## 2023-10-03 RX ADMIN — Medication: at 19:16

## 2023-10-03 RX ADMIN — ACETYLCYSTEINE 600 MG: 200 INHALANT RESPIRATORY (INHALATION) at 21:29

## 2023-10-03 RX ADMIN — ALBUTEROL SULFATE 2.5 MG: 2.5 SOLUTION RESPIRATORY (INHALATION) at 11:35

## 2023-10-03 RX ADMIN — ARFORMOTEROL TARTRATE 15 MCG: 15 SOLUTION RESPIRATORY (INHALATION) at 07:01

## 2023-10-03 ASSESSMENT — PAIN SCALES - GENERAL
PAINLEVEL_OUTOF10: 10
PAINLEVEL_OUTOF10: 10
PAINLEVEL_OUTOF10: 2

## 2023-10-03 ASSESSMENT — PAIN DESCRIPTION - DESCRIPTORS
DESCRIPTORS: ACHING
DESCRIPTORS: ACHING

## 2023-10-03 ASSESSMENT — PAIN DESCRIPTION - LOCATION
LOCATION: BACK
LOCATION: BACK

## 2023-10-03 ASSESSMENT — PAIN DESCRIPTION - ORIENTATION
ORIENTATION: MID;LOWER
ORIENTATION: LOWER;MID

## 2023-10-03 NOTE — WOUND CARE
WOCN Note:     Follow up sacral wound vac change. Chart reviewed. Assessed in room 404. Admitted DX:  Hematemesis;GI bleed; Gastrointestinal hemorrhage; cdiff    Past Medical History: gastric bypass; asthma; cad; dm2  [facility]    Assessment:   Patient has trach, drowsy, minimally communicative; requires assist with repositioning. Bed: [facility] air mattress  Patient has a engle and drains. Patient repositioned on right side. Generalized edema lower legs and feet. Heels offloaded with pillows. 1. POA Sacrum and bilateral buttocks, Stage 4 Pressure Injury: 7.8 x 7.5 x 1 cm; 40% tan; 60% red; serous drainage in canister; no odor; constantine wound edges resurfaced with hyperpigmentation. Treatment:  Cleansed with Vashe; resumed NPWT at 125 using 2 black and bridged to left side. 2. POA Right heel, Unstageable Pressure Injury with dry deflated bullae/hyperpigmented:  improving to blanching red erythema. 3.  POA Left heel, unstageable pressure injury:  healed with dry scab and hyperpigmented skin. 4.  Right upper back, hyperpigmented linear area. 5.  Right chest, partial thickness wound/skin tear: resolved. Wound, Pressure Prevention & Skin Care Recommendations:    Minimize layers of linen/pads under patient to optimize support surface. 2.  Turn/reposition approximately every 2 hours and offload heels. 3.  Manage moisture/ Keep skin folds clean and dry/minimize brief usage. 4.  Specialty bed:[facility].  5.  Sacrum:  Continue NPWT at 125 mmHg with changes twice weekly. 6.  Heels:  Apply Venelex BID     Discussed above plan with RN. Transition of Care:   Plan to follow Tuesday and Friday for VAC changes.     ONEL Kessler RN [facility] [place] [facility]  Available on [employer]  Office [phone]

## 2023-10-03 NOTE — PROGRESS NOTES
Pt transferred to  502 with cardiac monitoring with X1 RN at bedside and transport team. No acute events on transfer. Ru, RN at bedside with patient at time of transfer. POC reviewed. All pt belonging transported with patient.

## 2023-10-03 NOTE — PROGRESS NOTES
Hospitalist Progress Note  Debora Falcon MD  Answering service: [phone]        Date of Service:  10/3/2023  NAME:  Jyothi Florian  :  1960  MRN:  [id number]      Admission Summary:     Patient admitted with hematemesis and acute blood loss anemia, history of gastric bypass with GJ ulcer. Interval history / Subjective:     Patient states that her breathing is better. No other acute complaint.        Assessment & Plan:     Acute respiratory failure  -Acute hypoxic respiratory failure due to pulmonary edema and fluid overload as well as pneumonia  -Patient requiring ICU admission  to  , s/p intubation and mechanical ventilation from  to , reintubated  and eventual tracheostomy 9/3, trach has been downsized to 6 cuffless Shiley on   -Completed antibiotics  -Trach collar in place, tolerating PMV  -Pulmonology and speech therapy following  -Noted patient more hypoxic requiring mid flow oxygen this a.m., likely due to more mucus secretions as well as respiratory depression from Dilaudid which the dose was increased overnight  -ABG shows respiratory acidosis with elevated CO2 level  -Started Mucomyst, NIV at night, discussed with RT    Acute pulmonary edema  -Acute pulmonary edema due to fluid overload with pleural effusion  -S/p thoracentesis      GI bleed  -Upper GI bleed, history of gastric bypass with GJ anastomotic ulcer  -S/p EGD  shows anastomotic ulcer, nonbleeding, negative for stricture  -GI has signed off, on clear liquid, on TPN, continuing PPI and Carafate     Acute blood loss anemia  -Acute blood loss anemia due to GI bleed  -S/p transfusion of 1 unit PRBCs, monitor H&H     Intra-abdominal abscess  -CT abdomen and pelvis on  shows loculated intraperitoneal fluid in the anterior abdomen, persistent free intraperitoneal air  -S/p ultrasound-guided drainage   -Fluid

## 2023-10-03 NOTE — PROGRESS NOTES
08/31/23 0500 60.4 kg (133 lb 2.5 oz) Bed scale     08/30/23 1228 62.3 kg (137 lb 5.6 oz) Bed scale     08/25/23 0500 62.8 kg (138 lb 7.2 oz) Bed scale     08/24/23 0400 68.1 kg (150 lb 2.1 oz) Bed scale     08/23/23 0400 67.2 kg (148 lb 2.4 oz) --     08/22/23 0200 67.4 kg (148 lb 9.4 oz) Bed scale     08/21/23 0600 69.8 kg (153 lb 14.1 oz) Bed scale     08/16/23 0400 67.7 kg (149 lb 4 oz) Bed scale     08/15/23 0600 67.4 kg (148 lb 9.4 oz) Bed scale       Wt Readings from Last 10 Encounters:   07/18/23 65.3 kg (143 lb 14.4 oz)   06/08/23 50.8 kg (112 lb)   06/02/23 51.2 kg (112 lb 14.4 oz)   06/01/23 52.2 kg (115 lb)   05/30/23 52.3 kg (115 lb 4.8 oz)   05/08/23 53.1 kg (117 lb)   03/28/23 75.9 kg (167 lb 4.8 oz)   03/24/23 62.2 kg (137 lb 3.2 oz)   03/20/23 64.5 kg (142 lb 3.2 oz)           Nutrition Diagnosis:   Increased nutrient needs related to increase demand for energy/nutrients as evidenced by wounds  Inadequate oral intake related to altered GI structure as evidenced by nutrition support - parenteral nutrition    Nutrition Interventions:   Food and/or Nutrient Delivery: Continue Current Parenteral Nutrition  Nutrition Education/Counseling: No recommendation at this time  Coordination of Nutrition Care: Continue to monitor while inpatient       Goals:  Previous Goal Met: Goal(s) Achieved  Goals: other (specify)  Specify Other Goals: PN to meet estimated needs x 5-7 days. Nutrition Monitoring and Evaluation:   Behavioral-Environmental Outcomes: None Identified  Food/Nutrient Intake Outcomes: Parenteral Nutrition Intake/Tolerance  Physical Signs/Symptoms Outcomes: Biochemical Data, Skin, Weight, GI Status, Fluid Status or Edema    Discharge Planning:     Too soon to determine     Recent Labs     10/01/23  0615 10/01/23  1726 10/02/23  0055 10/03/23  0309   GLUCOSE 126* 90 100 110*   BUN 36* 32* 34* 40*   CREATININE 0.32* 0.30* 0.36* 0.36*    135* 136 132*   K 4.6 4.2 4.6 5.0    102 104 103

## 2023-10-03 NOTE — CONSULTS
distended    EXT: No cyanosis/clubbing/edema, normal peripheral pulses    Skin: No rashes or ulcers, no mottling    Neuro: A/O x 3        Medications:  Current Facility-Administered Medications   Medication Dose Route Frequency    HYDROmorphone HCl PF (DILAUDID) injection 1 mg  1 mg IntraVENous Q3H PRN    PN-Adult 2-in-1 Central Line (Standard)   IntraVENous Continuous TPN    albuterol (PROVENTIL) (2.5 MG/3ML) 0.083% nebulizer solution 2.5 mg  2.5 mg Nebulization Q6H RT    ipratropium 0.5 mg-albuterol 2.5 mg (DUONEB) nebulizer solution 1 Dose  1 Dose Inhalation Q6H WA RT    acetylcysteine (MUCOMYST) 20 % solution 600 mg  600 mg Inhalation BID RT    atorvastatin (LIPITOR) tablet 20 mg  20 mg Oral QHS    hydrOXYzine HCl (ATARAX) tablet 25 mg  25 mg Oral TID PRN    HYDROmorphone HCl PF (DILAUDID) injection 0.5 mg  0.5 mg IntraVENous Q3H PRN    heparin (porcine) injection 5,000 Units  5,000 Units SubCUTAneous 3 times per day    labetalol (NORMODYNE;TRANDATE) injection 10 mg  10 mg IntraVENous Q6H PRN    fat emulsion (INTRALIPID/NUTRILIPID) 20 % infusion 250 mL  250 mL IntraVENous Daily    budesonide (PULMICORT) nebulizer suspension 500 mcg  0.5 mg Nebulization BID RT    arformoterol tartrate (BROVANA) nebulizer solution 15 mcg  15 mcg Nebulization BID RT    pantoprazole (PROTONIX) 40 mg in sodium chloride (PF) 0.9 % 10 mL injection  40 mg IntraVENous Daily    [Held by provider] buPROPion (WELLBUTRIN) tablet 100 mg  100 mg Per G Tube BID    0.9 % sodium chloride infusion  25 mL IntraVENous PRN    prochlorperazine (COMPAZINE) injection 10 mg  10 mg IntraVENous Q6H PRN    alteplase (CATHFLO) 1 mg in sterile water 1 mL injection  1 mg IntraCATHeter PRN    balsum peru-castor oil (VENELEX) ointment   Topical q8h    albumin human 5% IV solution 12.5 g  12.5 g IntraVENous Q6H PRN    sodium chloride flush 0.9 % injection 5-40 mL  5-40 mL IntraVENous 2 times per day    sodium chloride flush 0.9 % injection 5-40 mL  5-40 mL

## 2023-10-03 NOTE — PLAN OF CARE
2000 Report received from [facility]). 2100 Patient transferred to unit with RN and transport. Pt alert and oriented, call bell placed within reach. VSS, PPN verified with RN and suction set up in room. Ice chips provided, pt NPO with G tube to gravity. Trach kit and spare cannulas at bedside. Thermostat adjusted and personal fan set up per pt request.    Shift summary. 2300 CHG bath given, venelex applied and PICC line dressing changed. Pt very anxious, and often forgetful, wakes up asking what time she's going for procedure. Pt notified that no procedure going on tomorrow however on Thursday. Pt also always asking if she's received her medication and pt notified and updated on all scheduled and PRN medications. RN in room to calm down pt and offer reassurance, pt verbalized she felt much better. 0300 Labs drawn. 0720 RRT called by RT for sats in the lower 80s, pt bagged then recovered. Dr Daisha Rosas at bedside and orders for lasix 20 mg and CXR given. IMCU transfer orders  in.    [id number] Bedside shift change report given to Catracho Valverde by Jaden Perez RN. Report included the following information SBAR, Kardex, MAR, Accordion, and Recent Results and Cardiac Rhythm NSR.    0830 Report called to [address], RN. Pt transferred on monitor with rapid nurse and RT.     Problem: Discharge Planning  Goal: Discharge to home or other facility with appropriate resources  Outcome: Progressing     Problem: Chronic Conditions and Co-morbidities  Goal: Patient's chronic conditions and co-morbidity symptoms are monitored and maintained or improved  Outcome: Progressing     Problem: Cardiovascular - Adult  Goal: Absence of cardiac dysrhythmias or at baseline  Outcome: Progressing     Problem: Gastrointestinal - Adult  Goal: Minimal or absence of nausea and vomiting  Outcome: Progressing  Goal: Maintains or returns to baseline bowel function  Outcome: Progressing  Goal: Maintains adequate nutritional intake  Outcome: Progressing

## 2023-10-03 NOTE — PLAN OF CARE
Problem: Chronic Conditions and Co-morbidities  Goal: Patient's chronic conditions and co-morbidity symptoms are monitored and maintained or improved  Outcome: Progressing     Problem: Cardiovascular - Adult  Goal: Absence of cardiac dysrhythmias or at baseline  Outcome: Progressing     Problem: Nutrition Deficit:  Goal: Optimize nutritional status  Outcome: Progressing        Problem: Infection - Adult  Goal: Absence of infection during hospitalization  Outcome: Progressing

## 2023-10-03 NOTE — SIGNIFICANT EVENT
Rapid Response  Rapid response room 502 called at this time. RRT responding. RT at bedside called rapid when sats dropped to lower 80's. Pt was on room air, placed on trach collar without relief, and RT had to bag pt to recover. Pt received 2 mg dilaudid x 3 overnight, last dose being 0638. Upon RRT arrival, pt on trach collar, sats fluctuating in the 90's, intermittently dropping to the upper 80's. RT suctioned pt. Dr. Sarah Mcneil at bedside along with day shift and night shift primary RNs. Pt to transfer back to [facility] per Dr. Sarah Mcneil. Nursing supervisor made aware, [phone] and ready. Pt prepared for transport to [facility] on monitor x 3 and trach collar 15 L with day shift primary RN, this RN, and RT. All belongings placed in bags and brought with pt including cell phone, tablet, , glasses, purse, and several other personal belongings and medical supplies. Checked in with primary RN prior to leaving. [facility] for questions and concerns provided. Sepsis Screening  Are two or more SIRS criteria present? Yes    If yes: SIRS Criteria: Heart rate (pulse) > 90 bpm and WBC > 12 k/mcL    Is the patient's history suggestive of a new infection? No    Pt admitted with sacral wound which she has been treated and cleared for. Spoke with provider about labs, states this is likely r/t dilaudid, not infectious process.

## 2023-10-03 NOTE — PROGRESS NOTES
Physical Therapy services attempted @8:45AM. Per cursory review of medical chart, RRT called and Pt pending transfer to [facility]. Per chart, [id number] Labs drawn. 0720 RRT called by RT for sats in the lower 80s, pt bagged then recovered. Dr Angel Juarez at bedside and orders for lasix 20 mg and CXR given. IMCU transfer orders  in\". PT Team HOLD per medical status.     Ashley Evans, PT, DPT

## 2023-10-03 NOTE — PROGRESS NOTES
Still agitated and tachypneic at 1220 after suctioning, but SpO2 100% on tracheostomy collar. Slight increase in WBC count but afebrile. Current clinical status, to include ongoing leak at ileorectal anastomosis despite gastrostomy diversion, NPO/TPN. Reviewed technical aspects and goal of procedure which would include laparotomy, lysis of adhesions, diversion proximal to leak vs resection of anastomosis inclusive of leak with end ileostomy. Also reviewed my concerns with recent pulmonary deterioration and that we would need to assess her clinical course over the next 1.5 days to determine if she is appropriate for this extensive surgery on Thursday. Patient's  in agreement with this plan. Continue TPN, drain, gastrostomy to gravity; PT/OT/SLP as patient is able to participate.

## 2023-10-03 NOTE — PROGRESS NOTES
Chart reviewed. Physical Therapy note noted. Will hold OT services at this time. Will con't to follow.      Araceli Vila

## 2023-10-03 NOTE — CARE COORDINATION
Transition of Care Plan:    RUR: 25%  Prior Level of Functioning: Independent   Disposition: SNF   If SNF or IPR: Date FOC offered: TBD   Date FOC received: 09/21  Accepting facility: [facility]  Date authorization started with reference number: N/A  Date authorization received and expires: N/A  Follow up appointments: On AVS   DME needed: None identified at this time   Transportation at discharge: TBD   IM/IMM Medicare/ letter given: 2nd will be needed prior to dc  Is patient a [place] and connected with [facility]? No    If yes, was [employer] transfer form completed and [facility] notified? N/A  Caregiver Contact: Yolande Cushing [phone]  Discharge Caregiver contacted prior to discharge? Yes   Care Conference needed? Not at this time  Barriers to discharge: Medical stability     Pt discussed in IMCU rounds. ZULEMA unknown/>48 hours. CM following for JAVIER and DC planning and needs.    BERKLEY Castillo

## 2023-10-03 NOTE — PROGRESS NOTES
10/03/23 1128   Oxygen Therapy/Pulse Ox   O2 Therapy Oxygen humidified   O2 Device Trach collar   O2 Flow Rate (L/min) 12 L/min   FiO2  98 %   Pulse (!) 114   Respirations 23   SpO2 100 %     Patient saturations came back up after RN suction. Per RN prior to suctioning patient saturations were in the 70's.      Witness patient open her eyes and acknowledged family at bedside

## 2023-10-04 LAB
ALBUMIN SERPL-MCNC: 2.4 G/DL (ref 3.5–5)
ALBUMIN/GLOB SERPL: 0.6 (ref 1.1–2.2)
ALP SERPL-CCNC: 193 U/L (ref 45–117)
ALT SERPL-CCNC: 34 U/L (ref 12–78)
ANION GAP SERPL CALC-SCNC: 7 MMOL/L (ref 5–15)
AST SERPL-CCNC: 23 U/L (ref 15–37)
BILIRUB SERPL-MCNC: 0.5 MG/DL (ref 0.2–1)
BUN SERPL-MCNC: 54 MG/DL (ref 6–20)
BUN/CREAT SERPL: 132 (ref 12–20)
CALCIUM SERPL-MCNC: 9.7 MG/DL (ref 8.5–10.1)
CHLORIDE SERPL-SCNC: 106 MMOL/L (ref 97–108)
CO2 SERPL-SCNC: 24 MMOL/L (ref 21–32)
CREAT SERPL-MCNC: 0.41 MG/DL (ref 0.55–1.02)
GLOBULIN SER CALC-MCNC: 3.8 G/DL (ref 2–4)
GLUCOSE BLD STRIP.AUTO-MCNC: 126 MG/DL (ref 65–117)
GLUCOSE BLD STRIP.AUTO-MCNC: 134 MG/DL (ref 65–117)
GLUCOSE SERPL-MCNC: 124 MG/DL (ref 65–100)
MAGNESIUM SERPL-MCNC: 2.2 MG/DL (ref 1.6–2.4)
PHOSPHATE SERPL-MCNC: 3.4 MG/DL (ref 2.6–4.7)
POTASSIUM SERPL-SCNC: 5.2 MMOL/L (ref 3.5–5.1)
PROT SERPL-MCNC: 6.2 G/DL (ref 6.4–8.2)
SERVICE CMNT-IMP: ABNORMAL
SERVICE CMNT-IMP: ABNORMAL
SODIUM SERPL-SCNC: 137 MMOL/L (ref 136–145)

## 2023-10-04 PROCEDURE — 6360000002 HC RX W HCPCS: Performed by: SURGERY

## 2023-10-04 PROCEDURE — 2580000003 HC RX 258: Performed by: FAMILY MEDICINE

## 2023-10-04 PROCEDURE — 6370000000 HC RX 637 (ALT 250 FOR IP): Performed by: FAMILY MEDICINE

## 2023-10-04 PROCEDURE — 97168 OT RE-EVAL EST PLAN CARE: CPT

## 2023-10-04 PROCEDURE — 2700000000 HC OXYGEN THERAPY PER DAY

## 2023-10-04 PROCEDURE — 6360000002 HC RX W HCPCS: Performed by: NURSE PRACTITIONER

## 2023-10-04 PROCEDURE — 84100 ASSAY OF PHOSPHORUS: CPT

## 2023-10-04 PROCEDURE — 6360000002 HC RX W HCPCS: Performed by: ANESTHESIOLOGY

## 2023-10-04 PROCEDURE — 2580000003 HC RX 258: Performed by: NURSE PRACTITIONER

## 2023-10-04 PROCEDURE — 97530 THERAPEUTIC ACTIVITIES: CPT

## 2023-10-04 PROCEDURE — 6360000002 HC RX W HCPCS: Performed by: FAMILY MEDICINE

## 2023-10-04 PROCEDURE — A4216 STERILE WATER/SALINE, 10 ML: HCPCS | Performed by: NURSE PRACTITIONER

## 2023-10-04 PROCEDURE — 2500000003 HC RX 250 WO HCPCS: Performed by: NURSE PRACTITIONER

## 2023-10-04 PROCEDURE — 6360000002 HC RX W HCPCS: Performed by: PHYSICIAN ASSISTANT

## 2023-10-04 PROCEDURE — 94760 N-INVAS EAR/PLS OXIMETRY 1: CPT

## 2023-10-04 PROCEDURE — 2580000003 HC RX 258: Performed by: SURGERY

## 2023-10-04 PROCEDURE — 6360000004 HC RX CONTRAST MEDICATION

## 2023-10-04 PROCEDURE — 94640 AIRWAY INHALATION TREATMENT: CPT

## 2023-10-04 PROCEDURE — 36415 COLL VENOUS BLD VENIPUNCTURE: CPT

## 2023-10-04 PROCEDURE — C9113 INJ PANTOPRAZOLE SODIUM, VIA: HCPCS | Performed by: NURSE PRACTITIONER

## 2023-10-04 PROCEDURE — 83735 ASSAY OF MAGNESIUM: CPT

## 2023-10-04 PROCEDURE — 2060000000 HC ICU INTERMEDIATE R&B

## 2023-10-04 PROCEDURE — 97164 PT RE-EVAL EST PLAN CARE: CPT

## 2023-10-04 PROCEDURE — 2500000003 HC RX 250 WO HCPCS: Performed by: FAMILY MEDICINE

## 2023-10-04 PROCEDURE — 82962 GLUCOSE BLOOD TEST: CPT

## 2023-10-04 PROCEDURE — 80053 COMPREHEN METABOLIC PANEL: CPT

## 2023-10-04 RX ORDER — HYDRALAZINE HYDROCHLORIDE 20 MG/ML
10 INJECTION INTRAMUSCULAR; INTRAVENOUS
Status: CANCELLED | OUTPATIENT
Start: 2023-10-04

## 2023-10-04 RX ORDER — SODIUM CHLORIDE 9 MG/ML
INJECTION, SOLUTION INTRAVENOUS PRN
Status: CANCELLED | OUTPATIENT
Start: 2023-10-04

## 2023-10-04 RX ORDER — FENTANYL CITRATE 50 UG/ML
100 INJECTION, SOLUTION INTRAMUSCULAR; INTRAVENOUS
Status: CANCELLED | OUTPATIENT
Start: 2023-10-04 | End: 2023-10-05

## 2023-10-04 RX ORDER — SODIUM CHLORIDE 0.9 % (FLUSH) 0.9 %
5-40 SYRINGE (ML) INJECTION EVERY 12 HOURS SCHEDULED
Status: CANCELLED | OUTPATIENT
Start: 2023-10-04

## 2023-10-04 RX ORDER — LIDOCAINE HYDROCHLORIDE 10 MG/ML
1 INJECTION, SOLUTION EPIDURAL; INFILTRATION; INTRACAUDAL; PERINEURAL
Status: CANCELLED | OUTPATIENT
Start: 2023-10-04 | End: 2023-10-05

## 2023-10-04 RX ORDER — PROCHLORPERAZINE EDISYLATE 5 MG/ML
5 INJECTION INTRAMUSCULAR; INTRAVENOUS
Status: CANCELLED | OUTPATIENT
Start: 2023-10-04 | End: 2023-10-05

## 2023-10-04 RX ORDER — ONDANSETRON 2 MG/ML
4 INJECTION INTRAMUSCULAR; INTRAVENOUS
Status: CANCELLED | OUTPATIENT
Start: 2023-10-04 | End: 2023-10-05

## 2023-10-04 RX ORDER — SODIUM CHLORIDE, SODIUM LACTATE, POTASSIUM CHLORIDE, CALCIUM CHLORIDE 600; 310; 30; 20 MG/100ML; MG/100ML; MG/100ML; MG/100ML
INJECTION, SOLUTION INTRAVENOUS CONTINUOUS
Status: CANCELLED | OUTPATIENT
Start: 2023-10-04

## 2023-10-04 RX ORDER — SODIUM CHLORIDE 0.9 % (FLUSH) 0.9 %
5-40 SYRINGE (ML) INJECTION PRN
Status: CANCELLED | OUTPATIENT
Start: 2023-10-04

## 2023-10-04 RX ORDER — MIDAZOLAM HYDROCHLORIDE 2 MG/2ML
2 INJECTION, SOLUTION INTRAMUSCULAR; INTRAVENOUS
Status: CANCELLED | OUTPATIENT
Start: 2023-10-04 | End: 2023-10-05

## 2023-10-04 RX ORDER — FENTANYL CITRATE 50 UG/ML
25 INJECTION, SOLUTION INTRAMUSCULAR; INTRAVENOUS EVERY 5 MIN PRN
Status: CANCELLED | OUTPATIENT
Start: 2023-10-04

## 2023-10-04 RX ORDER — HYDROMORPHONE HYDROCHLORIDE 1 MG/ML
0.5 INJECTION, SOLUTION INTRAMUSCULAR; INTRAVENOUS; SUBCUTANEOUS EVERY 5 MIN PRN
Status: CANCELLED | OUTPATIENT
Start: 2023-10-04

## 2023-10-04 RX ORDER — ACETAMINOPHEN 500 MG
1000 TABLET ORAL ONCE
Status: CANCELLED | OUTPATIENT
Start: 2023-10-04 | End: 2023-10-04

## 2023-10-04 RX ORDER — OXYCODONE HYDROCHLORIDE 5 MG/1
5 TABLET ORAL
Status: CANCELLED | OUTPATIENT
Start: 2023-10-04 | End: 2023-10-05

## 2023-10-04 RX ADMIN — Medication: at 01:26

## 2023-10-04 RX ADMIN — HYDROMORPHONE HYDROCHLORIDE 1 MG: 1 INJECTION, SOLUTION INTRAMUSCULAR; INTRAVENOUS; SUBCUTANEOUS at 21:42

## 2023-10-04 RX ADMIN — ALBUTEROL SULFATE 2.5 MG: 2.5 SOLUTION RESPIRATORY (INHALATION) at 02:11

## 2023-10-04 RX ADMIN — ACETYLCYSTEINE 600 MG: 200 INHALANT RESPIRATORY (INHALATION) at 08:12

## 2023-10-04 RX ADMIN — ARFORMOTEROL TARTRATE 15 MCG: 15 SOLUTION RESPIRATORY (INHALATION) at 21:02

## 2023-10-04 RX ADMIN — BUDESONIDE 500 MCG: 0.5 INHALANT RESPIRATORY (INHALATION) at 08:13

## 2023-10-04 RX ADMIN — ATORVASTATIN CALCIUM 20 MG: 20 TABLET, FILM COATED ORAL at 21:42

## 2023-10-04 RX ADMIN — HEPARIN SODIUM 5000 UNITS: 5000 INJECTION INTRAVENOUS; SUBCUTANEOUS at 21:42

## 2023-10-04 RX ADMIN — ACETYLCYSTEINE 600 MG: 200 INHALANT RESPIRATORY (INHALATION) at 21:02

## 2023-10-04 RX ADMIN — HYDROMORPHONE HYDROCHLORIDE 0.5 MG: 1 INJECTION, SOLUTION INTRAMUSCULAR; INTRAVENOUS; SUBCUTANEOUS at 18:58

## 2023-10-04 RX ADMIN — IPRATROPIUM BROMIDE AND ALBUTEROL SULFATE 1 DOSE: 2.5; .5 SOLUTION RESPIRATORY (INHALATION) at 13:56

## 2023-10-04 RX ADMIN — SODIUM CHLORIDE, PRESERVATIVE FREE 40 MG: 5 INJECTION INTRAVENOUS at 09:10

## 2023-10-04 RX ADMIN — I.V. FAT EMULSION 250 ML: 20 EMULSION INTRAVENOUS at 18:56

## 2023-10-04 RX ADMIN — Medication: at 09:15

## 2023-10-04 RX ADMIN — IPRATROPIUM BROMIDE AND ALBUTEROL SULFATE 1 DOSE: 2.5; .5 SOLUTION RESPIRATORY (INHALATION) at 21:02

## 2023-10-04 RX ADMIN — HYDROMORPHONE HYDROCHLORIDE 0.5 MG: 1 INJECTION, SOLUTION INTRAMUSCULAR; INTRAVENOUS; SUBCUTANEOUS at 10:14

## 2023-10-04 RX ADMIN — HYDROMORPHONE HYDROCHLORIDE 0.5 MG: 1 INJECTION, SOLUTION INTRAMUSCULAR; INTRAVENOUS; SUBCUTANEOUS at 01:18

## 2023-10-04 RX ADMIN — ONDANSETRON 4 MG: 2 INJECTION INTRAMUSCULAR; INTRAVENOUS at 18:58

## 2023-10-04 RX ADMIN — HYDROMORPHONE HYDROCHLORIDE 0.5 MG: 1 INJECTION, SOLUTION INTRAMUSCULAR; INTRAVENOUS; SUBCUTANEOUS at 13:42

## 2023-10-04 RX ADMIN — Medication 10 ML: at 09:11

## 2023-10-04 RX ADMIN — IPRATROPIUM BROMIDE AND ALBUTEROL SULFATE 1 DOSE: 2.5; .5 SOLUTION RESPIRATORY (INHALATION) at 08:13

## 2023-10-04 RX ADMIN — Medication: at 18:57

## 2023-10-04 RX ADMIN — BUDESONIDE 500 MCG: 0.5 INHALANT RESPIRATORY (INHALATION) at 21:02

## 2023-10-04 RX ADMIN — ARFORMOTEROL TARTRATE 15 MCG: 15 SOLUTION RESPIRATORY (INHALATION) at 08:12

## 2023-10-04 RX ADMIN — HEPARIN SODIUM 5000 UNITS: 5000 INJECTION INTRAVENOUS; SUBCUTANEOUS at 13:42

## 2023-10-04 RX ADMIN — HEPARIN SODIUM 5000 UNITS: 5000 INJECTION INTRAVENOUS; SUBCUTANEOUS at 07:33

## 2023-10-04 RX ADMIN — MAGNESIUM SULFATE HEPTAHYDRATE: 500 INJECTION, SOLUTION INTRAMUSCULAR; INTRAVENOUS at 18:55

## 2023-10-04 RX ADMIN — IOPAMIDOL 70 ML: 755 INJECTION, SOLUTION INTRAVENOUS at 12:49

## 2023-10-04 ASSESSMENT — PAIN DESCRIPTION - LOCATION
LOCATION: ABDOMEN

## 2023-10-04 ASSESSMENT — PAIN DESCRIPTION - ONSET: ONSET: GRADUAL

## 2023-10-04 ASSESSMENT — PAIN DESCRIPTION - DESCRIPTORS: DESCRIPTORS: ACHING

## 2023-10-04 ASSESSMENT — PAIN SCALES - WONG BAKER
WONGBAKER_NUMERICALRESPONSE: HURTS EVEN MORE
WONGBAKER_NUMERICALRESPONSE: 6
WONGBAKER_NUMERICALRESPONSE: 2
WONGBAKER_NUMERICALRESPONSE: HURTS A LITTLE BIT

## 2023-10-04 ASSESSMENT — PAIN SCALES - GENERAL
PAINLEVEL_OUTOF10: 8
PAINLEVEL_OUTOF10: 8
PAINLEVEL_OUTOF10: 0
PAINLEVEL_OUTOF10: 7
PAINLEVEL_OUTOF10: 6
PAINLEVEL_OUTOF10: 10
PAINLEVEL_OUTOF10: 5
PAINLEVEL_OUTOF10: 8
PAINLEVEL_OUTOF10: 0

## 2023-10-04 ASSESSMENT — PAIN - FUNCTIONAL ASSESSMENT: PAIN_FUNCTIONAL_ASSESSMENT: ACTIVITIES ARE NOT PREVENTED

## 2023-10-04 ASSESSMENT — PAIN DESCRIPTION - FREQUENCY: FREQUENCY: CONTINUOUS

## 2023-10-04 ASSESSMENT — PAIN DESCRIPTION - PAIN TYPE: TYPE: CHRONIC PAIN

## 2023-10-04 ASSESSMENT — PAIN DESCRIPTION - ORIENTATION: ORIENTATION: RIGHT

## 2023-10-04 NOTE — PROGRESS NOTES
(!) 162/74 (!) 88 %   10/03/23 0745 -- -- -- -- (!) 89 %   10/03/23 0741 -- -- -- -- 95 %       Physical Exam:    Gen: No apparent distress  HEENT:  Normocephalic, atraumatic, no scleral icterus, no thrush  CV: S1,2 heard regularly, no lower extremity edema    Lungs: Coarse breath sounds with occasional rhonchi with diminished breath sounds at bases  Abdomen: soft, non tender, non distended, no CVA tenderness   Genitourinary: Deferred  Skin: no rash ,decubitus sacral wound with VAC in place  Psych: has been more interactive but currently resting  Neuro: drowsy , moves  extremities   Musculoskeletal:  No joint edema, erythema or tenderness noted   Lines:     Medications:    Current Facility-Administered Medications:     HYDROmorphone HCl PF (DILAUDID) injection 1 mg, 1 mg, IntraVENous, Q3H PRN, Elliot Inman MD    PN-Adult 2-in-1 Central Line (Standard), , IntraVENous, Continuous TPN, Elliot Inman MD, Last Rate: 75 mL/hr at 10/03/23 1916, New Bag at 10/03/23 1916    ipratropium 0.5 mg-albuterol 2.5 mg (DUONEB) nebulizer solution 1 Dose, 1 Dose, Inhalation, Q6H WA RT, Elliot Inman MD, 1 Dose at 10/03/23 2130    acetylcysteine (MUCOMYST) 20 % solution 600 mg, 600 mg, Inhalation, BID RT, Elliot Inman MD, 600 mg at 10/03/23 2129    atorvastatin (LIPITOR) tablet 20 mg, 20 mg, Oral, QHS, Elliot Inman MD    hydrOXYzine HCl (ATARAX) tablet 25 mg, 25 mg, Oral, TID PRN, Elliot Inman MD, 25 mg at 09/30/23 1229    HYDROmorphone HCl PF (DILAUDID) injection 0.5 mg, 0.5 mg, IntraVENous, Q3H PRN, Milligram, KING Sena-C, 0.5 mg at 10/04/23 0118    heparin (porcine) injection 5,000 Units, 5,000 Units, SubCUTAneous, 3 times per day, Riya Mccormick, APRN - NP, 5,000 Units at 10/03/23 2235    labetalol (NORMODYNE;TRANDATE) injection 10 mg, 10 mg, IntraVENous, Q6H PRN, Rodolph Both, APRN - CNP, 10 mg at 09/20/23 2322    fat emulsion (INTRALIPID/NUTRILIPID) 20 % infusion 250 mL, 250 mL, IntraVENous, Daily, Rodolph Libby, APRN - CNP,

## 2023-10-04 NOTE — PLAN OF CARE
Problem: Physical Therapy - Adult  Goal: By Discharge: Performs mobility at highest level of function for planned discharge setting. See evaluation for individualized goals. Description: FUNCTIONAL STATUS PRIOR TO ADMISSION: Patient was independent and active without use of DME prior to last hospitalization at [facility] for abdominal surgery. D/c'ed to [facility] where she was for 1 week-- states she did not get out of bed at [facility] and worked on bed exercises. 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 On 8/6 patient transferred to the ICU in the setting of acute hypoxic respiratory failure d/t pulmonary edema requiring diuresis and eventual intubation (8/7 - 8/14). Patient reintubated 8/18 through current for worsening hypoxic respiratory failure. Course further complicated by persistent leukocytosis, fevers and CT evidence of intra-abdominal fluid collection post ultrasound drainage. HOME SUPPORT PRIOR TO ADMISSION: The patient lived with her  at baseline. Physical Therapy Goals  Goals re-evaluated 10/4/2023   1. Patient will move from supine to sit and sit to supine, scoot up and down, and roll side to side in bed with maximal assistance x2 within 7 day(s). 2.  Patient will perform sit to stand with maximal assistance x2 within 7 day(s). 3.  Patient will transfer from bed to chair and chair to bed with maximal assistance x2 using the least restrictive device within 7 day(s). 4.  Patient will sit edge of bed with minimal assist for 2 minutes within 7 day(s). Revised 9/18/2023; weekly completed 9/25, goals appropriate to carryover x 7 days. 1.  Patient will move from supine to sit and sit to supine, scoot up and down, and roll side to side in bed with maximal assistance x2 within 7 day(s).     2.  Patient will perform sit to stand with maximal

## 2023-10-04 NOTE — PROGRESS NOTES
Received female patient with eyes closed appears drowsy , aroused by touch and name calling, obeys command, Pupils round and react brisk to light 3mm. RRT in attendance ABG done. NP Onur Garcia in attendance . 1:21  Pt awake and alert  c/o pain, spoke with family member( Step-daughter) as call to  unanswered at this time. Stepdaughter updated on patient current status. Medicated as per Mar with Diluadid 0.5mg iv .

## 2023-10-04 NOTE — PROGRESS NOTES
Pulmonary, Critical Care, and Sleep Medicine~Progress Note    Name: Taryn Francis MRN: [id number]   : 1960 Hospital: [address]   Date: 10/4/2023 11:20 AM Admission: 2023     Impression Plan   Acute on chronic hypoxic respiratory failure due to prolonged medical course. Now has a chronic trach in place on 9/3/2023 (#6 cuffless)  C. difficile colitis; S/p abdominal colectomy on 3/26/2023, reversal of end ileostomy with ileocolic anastomosis   Recurrent intra-abdominal fluid collections; S/p ultrasound-guided drainage 2023. DVT of the right lower extremity; S/p IVC filter  placement 2023, off anticoagulation  Asthma mild intermittent. Pressure wounds being monitored O2 supplementation to keep sats above 90%. Trach # 6 cuffless in place. Not ready for decannulation as being suctioned every 30-60 min secretions however are not purulent. CXR reviewed- improved from before. Watch for over sedation. Bronchodilator therapy. Mucomyst.  D dimer at 1. 6. will order CTA to screen for Pes   Check TTE for RVSP. Echo from 2023 reviewed - EF 50%. RVSP 51 mmhg     Daily Progression:    10/4:  Family at bedside  Capping trials in play  No congestion noted     10/3:  Shortness of breath this morning. Usually 99% on RA but now is requiring 10 lpm by trach coller to keep sats above 90%. Per nursing she received increased pain meds and was hypoventilating from sedation. Increased secretions that is being suctioned every 30 min. Patient unable to offer any complaints. Currently has #6 cuffless trach. On heparin sq dvt ppx. Consult Note requested by hospitalist service. Patient was recently discharged from the ICU following an extensive hospitalization course dating back to . It will not be summarized in this note please; defer to previous several months of medical records for more detailed clinical course.   However she did undergo a partial colectomy with ileostomy

## 2023-10-04 NOTE — PLAN OF CARE
Problem: Chronic Conditions and Co-morbidities  Goal: Patient's chronic conditions and co-morbidity symptoms are monitored and maintained or improved  10/4/2023 1806 by Hannah Cope RN  Outcome: Progressing     Problem: Cardiovascular - Adult  Goal: Absence of cardiac dysrhythmias or at baseline  10/4/2023 1806 by Hannah Cope RN  Outcome: Progressing     Problem: Skin/Tissue Integrity - Adult  Goal: Skin integrity remains intact  Recent Flowsheet Documentation  Taken 10/4/2023 0800 by Hannah Cope RN  Skin Integrity Remains Intact: Monitor for areas of redness and/or skin breakdown     Problem: Skin/Tissue Integrity - Adult  Goal: Incisions, wounds, or drain sites healing without S/S of infection  Recent Flowsheet Documentation  Taken 10/4/2023 0800 by Hannah Cope RN  Incisions, Wounds, or Drain Sites Healing Without Sign and Symptoms of Infection: ADMISSION and DAILY: Assess and document risk factors for pressure ulcer development     Problem: Skin/Tissue Integrity - Adult  Goal: Oral mucous membranes remain intact  Recent Flowsheet Documentation  Taken 10/4/2023 0800 by Hannah Cope RN  Oral Mucous Membranes Remain Intact: Assess oral mucosa and hygiene practices     Problem: Hematologic - Adult  Goal: Maintains hematologic stability  10/4/2023 1806 by Hannah Cope RN  Outcome: Progressing

## 2023-10-04 NOTE — CARE COORDINATION
Transition of Care Plan:    RUR: 25% High   Prior Level of Functioning: Independent - prior to long complicated hospitalization  Disposition: IPR recommended  If SNF or IPR: Date FOC offered: TBD   Date FOC received: 09/21  Accepting facility: TBD  Date authorization started with reference number: N/A  Date authorization received and expires: N/A  Follow up appointments: On AVS   DME needed: None identified at this time   Transportation at discharge: TBD   IM/IMM Medicare/ letter given: 2nd will be needed prior to dc  Is patient a  and connected with VA? No    If yes, was [employer] transfer form completed and VA notified? N/A  Caregiver Contact: Blanka Cooleybird [phone]  Discharge Caregiver contacted prior to discharge? Yes   Care Conference needed? Not at this time  Barriers to discharge: Medical stability - respiratory stability for surgery     Pt discussed in IMCU rounds. ZULEMA >48 hours. Disposition: Current recommendation - Likely will make referral to (return to) [facility] for IPR when pt medically ready for dc. Transportation: TBD     CM following for JAVIER and DC planning and needs.      Hx of patient's prolonged hospitalization  6-22 to 7-21-23 [facility] colectomy, c-diff, right DVT  7-21-23 to 7-28-23 [address] [facility] Araceli [facility], sent to [facility] [place] ED  7-28-23 Admitted to [facility] for GIB, hematemesis  BERKLEY Almazan   [phone]

## 2023-10-04 NOTE — PROGRESS NOTES
Hospitalist Progress Note  Noelle Daley MD  Answering service: [phone]        Date of Service:  10/4/2023  NAME:  Jon Llamas  :  1960  MRN:  [id number]      Admission Summary:     Patient admitted with hematemesis and acute blood loss anemia, history of gastric bypass with GJ ulcer. Interval history / Subjective:     Patient states that her breathing is better. No other acute complaint.        Assessment & Plan:     Acute respiratory failure  -Acute hypoxic respiratory failure due to pulmonary edema and fluid overload as well as pneumonia  -Patient requiring ICU admission  to  , s/p intubation and mechanical ventilation from  to , reintubated  and eventual tracheostomy 9/3, trach has been downsized to 6 cuffless Shiley on   -Completed antibiotics  -Trach collar in place, tolerating PMV  -Pulmonology and speech therapy following  -Noted patient more hypoxic requiring mid flow oxygen 10/3, likely due to more mucus secretions as well as respiratory depression from Dilaudid which the dose was increased overnight  -ABG shows respiratory acidosis with elevated CO2 level  -Started Mucomyst, NIV at night, discussed with RT    Acute pulmonary edema  -Acute pulmonary edema due to fluid overload with pleural effusion  -Repeat chest x-ray shows improvement in pulmonary edema  -S/p thoracentesis      GI bleed  -Upper GI bleed, history of gastric bypass with GJ anastomotic ulcer  -S/p EGD  shows anastomotic ulcer, nonbleeding, negative for stricture  -GI has signed off, on clear liquid, on TPN, continuing PPI and Carafate     Acute blood loss anemia  -Acute blood loss anemia due to GI bleed  -S/p transfusion of 1 unit PRBCs, monitor H&H     Intra-abdominal abscess  -CT abdomen and pelvis on  shows loculated intraperitoneal fluid in the anterior abdomen, persistent free intraperitoneal

## 2023-10-05 LAB
ALBUMIN SERPL-MCNC: 2.6 G/DL (ref 3.5–5)
ALBUMIN/GLOB SERPL: 0.7 (ref 1.1–2.2)
ALP SERPL-CCNC: 215 U/L (ref 45–117)
ALT SERPL-CCNC: 36 U/L (ref 12–78)
ANION GAP SERPL CALC-SCNC: 2 MMOL/L (ref 5–15)
ANION GAP SERPL CALC-SCNC: 5 MMOL/L (ref 5–15)
ARTERIAL PATENCY WRIST A: POSITIVE
ARTERIAL PATENCY WRIST A: POSITIVE
AST SERPL-CCNC: 24 U/L (ref 15–37)
BASE DEFICIT BLD-SCNC: 1.8 MMOL/L
BASE DEFICIT BLD-SCNC: 6.3 MMOL/L
BASOPHILS # BLD: 0 K/UL (ref 0–0.1)
BASOPHILS # BLD: 0 K/UL (ref 0–0.1)
BASOPHILS NFR BLD: 0 % (ref 0–1)
BASOPHILS NFR BLD: 0 % (ref 0–1)
BDY SITE: ABNORMAL
BDY SITE: ABNORMAL
BILIRUB SERPL-MCNC: 0.4 MG/DL (ref 0.2–1)
BUN SERPL-MCNC: 55 MG/DL (ref 6–20)
BUN SERPL-MCNC: 62 MG/DL (ref 6–20)
BUN/CREAT SERPL: 120 (ref 12–20)
BUN/CREAT SERPL: 148 (ref 12–20)
CALCIUM SERPL-MCNC: 10.4 MG/DL (ref 8.5–10.1)
CALCIUM SERPL-MCNC: 10.5 MG/DL (ref 8.5–10.1)
CHLORIDE SERPL-SCNC: 108 MMOL/L (ref 97–108)
CHLORIDE SERPL-SCNC: 110 MMOL/L (ref 97–108)
CO2 SERPL-SCNC: 24 MMOL/L (ref 21–32)
CO2 SERPL-SCNC: 27 MMOL/L (ref 21–32)
COMMENT:: NORMAL
CREAT SERPL-MCNC: 0.42 MG/DL (ref 0.55–1.02)
CREAT SERPL-MCNC: 0.46 MG/DL (ref 0.55–1.02)
DIFFERENTIAL METHOD BLD: ABNORMAL
DIFFERENTIAL METHOD BLD: ABNORMAL
EOSINOPHIL # BLD: 0 K/UL (ref 0–0.4)
EOSINOPHIL # BLD: 0 K/UL (ref 0–0.4)
EOSINOPHIL NFR BLD: 0 % (ref 0–7)
EOSINOPHIL NFR BLD: 0 % (ref 0–7)
ERYTHROCYTE [DISTWIDTH] IN BLOOD BY AUTOMATED COUNT: 15.8 % (ref 11.5–14.5)
ERYTHROCYTE [DISTWIDTH] IN BLOOD BY AUTOMATED COUNT: 15.9 % (ref 11.5–14.5)
GAS FLOW.O2 O2 DELIVERY SYS: ABNORMAL
GAS FLOW.O2 O2 DELIVERY SYS: ABNORMAL
GAS FLOW.O2 SETTING OXYMISER: 20 BPM
GLOBULIN SER CALC-MCNC: 4 G/DL (ref 2–4)
GLUCOSE BLD STRIP.AUTO-MCNC: 120 MG/DL (ref 65–117)
GLUCOSE BLD STRIP.AUTO-MCNC: 169 MG/DL (ref 65–117)
GLUCOSE SERPL-MCNC: 131 MG/DL (ref 65–100)
GLUCOSE SERPL-MCNC: 176 MG/DL (ref 65–100)
HCO3 BLD-SCNC: 26 MMOL/L (ref 22–26)
HCO3 BLD-SCNC: 26 MMOL/L (ref 22–26)
HCT VFR BLD AUTO: 26.7 % (ref 35–47)
HCT VFR BLD AUTO: 30.2 % (ref 35–47)
HGB BLD-MCNC: 8.1 G/DL (ref 11.5–16)
HGB BLD-MCNC: 9.1 G/DL (ref 11.5–16)
IMM GRANULOCYTES # BLD AUTO: 0.3 K/UL (ref 0–0.04)
IMM GRANULOCYTES # BLD AUTO: 0.4 K/UL (ref 0–0.04)
IMM GRANULOCYTES NFR BLD AUTO: 1 % (ref 0–0.5)
IMM GRANULOCYTES NFR BLD AUTO: 1 % (ref 0–0.5)
INR PPP: 1 (ref 0.9–1.1)
INSPIRATION.DURATION SETTING TIME VENT: 1 SEC
IPAP/PIP/HIGH PEEP: 25
LYMPHOCYTES # BLD: 0.3 K/UL (ref 0.8–3.5)
LYMPHOCYTES # BLD: 0.7 K/UL (ref 0.8–3.5)
LYMPHOCYTES NFR BLD: 1 % (ref 12–49)
LYMPHOCYTES NFR BLD: 2 % (ref 12–49)
MAGNESIUM SERPL-MCNC: 2 MG/DL (ref 1.6–2.4)
MAGNESIUM SERPL-MCNC: 2.3 MG/DL (ref 1.6–2.4)
MCH RBC QN AUTO: 29.4 PG (ref 26–34)
MCH RBC QN AUTO: 29.8 PG (ref 26–34)
MCHC RBC AUTO-ENTMCNC: 30.1 G/DL (ref 30–36.5)
MCHC RBC AUTO-ENTMCNC: 30.3 G/DL (ref 30–36.5)
MCV RBC AUTO: 97.7 FL (ref 80–99)
MCV RBC AUTO: 98.2 FL (ref 80–99)
MONOCYTES # BLD: 2 K/UL (ref 0–1)
MONOCYTES # BLD: 2.2 K/UL (ref 0–1)
MONOCYTES NFR BLD: 6 % (ref 5–13)
MONOCYTES NFR BLD: 6 % (ref 5–13)
NEUTS SEG # BLD: 31.2 K/UL (ref 1.8–8)
NEUTS SEG # BLD: 34.1 K/UL (ref 1.8–8)
NEUTS SEG NFR BLD: 91 % (ref 32–75)
NEUTS SEG NFR BLD: 92 % (ref 32–75)
NRBC # BLD: 0 K/UL (ref 0–0.01)
NRBC # BLD: 0 K/UL (ref 0–0.01)
NRBC BLD-RTO: 0 PER 100 WBC
NRBC BLD-RTO: 0 PER 100 WBC
O2/TOTAL GAS SETTING VFR VENT: 100 %
O2/TOTAL GAS SETTING VFR VENT: 98 %
PATH REV BLD -IMP: ABNORMAL
PCO2 BLD: 103 MMHG (ref 35–45)
PCO2 BLD: 60.9 MMHG (ref 35–45)
PEEP RESPIRATORY: 5 CMH2O
PH BLD: 7.01 (ref 7.35–7.45)
PH BLD: 7.24 (ref 7.35–7.45)
PHOSPHATE SERPL-MCNC: 4.8 MG/DL (ref 2.6–4.7)
PHOSPHATE SERPL-MCNC: 5.8 MG/DL (ref 2.6–4.7)
PLATELET # BLD AUTO: 384 K/UL (ref 150–400)
PLATELET # BLD AUTO: 469 K/UL (ref 150–400)
PMV BLD AUTO: 9.6 FL (ref 8.9–12.9)
PMV BLD AUTO: 9.9 FL (ref 8.9–12.9)
PO2 BLD: 78 MMHG (ref 80–100)
PO2 BLD: >515 MMHG (ref 80–100)
POTASSIUM SERPL-SCNC: 4.9 MMOL/L (ref 3.5–5.1)
POTASSIUM SERPL-SCNC: 5.2 MMOL/L (ref 3.5–5.1)
PROT SERPL-MCNC: 6.6 G/DL (ref 6.4–8.2)
PROTHROMBIN TIME: 10.1 SEC (ref 9–11.1)
RBC # BLD AUTO: 2.72 M/UL (ref 3.8–5.2)
RBC # BLD AUTO: 3.09 M/UL (ref 3.8–5.2)
RBC MORPH BLD: ABNORMAL
SAO2 % BLD: 85.4 % (ref 92–97)
SERVICE CMNT-IMP: ABNORMAL
SODIUM SERPL-SCNC: 137 MMOL/L (ref 136–145)
SODIUM SERPL-SCNC: 139 MMOL/L (ref 136–145)
SPECIMEN HOLD: NORMAL
SPECIMEN TYPE: ABNORMAL
SPECIMEN TYPE: ABNORMAL
VENTILATION MODE VENT: ABNORMAL
WBC # BLD AUTO: 33.8 K/UL (ref 3.6–11)
WBC # BLD AUTO: 37.4 K/UL (ref 3.6–11)

## 2023-10-05 PROCEDURE — 85025 COMPLETE CBC W/AUTO DIFF WBC: CPT

## 2023-10-05 PROCEDURE — C9113 INJ PANTOPRAZOLE SODIUM, VIA: HCPCS | Performed by: NURSE PRACTITIONER

## 2023-10-05 PROCEDURE — 6360000002 HC RX W HCPCS: Performed by: SURGERY

## 2023-10-05 PROCEDURE — 2000000000 HC ICU R&B

## 2023-10-05 PROCEDURE — 2580000003 HC RX 258: Performed by: ANESTHESIOLOGY

## 2023-10-05 PROCEDURE — 6360000002 HC RX W HCPCS: Performed by: NURSE PRACTITIONER

## 2023-10-05 PROCEDURE — 83735 ASSAY OF MAGNESIUM: CPT

## 2023-10-05 PROCEDURE — 2580000003 HC RX 258: Performed by: NURSE PRACTITIONER

## 2023-10-05 PROCEDURE — 82803 BLOOD GASES ANY COMBINATION: CPT

## 2023-10-05 PROCEDURE — 84100 ASSAY OF PHOSPHORUS: CPT

## 2023-10-05 PROCEDURE — 87070 CULTURE OTHR SPECIMN AEROBIC: CPT

## 2023-10-05 PROCEDURE — 82962 GLUCOSE BLOOD TEST: CPT

## 2023-10-05 PROCEDURE — 2580000003 HC RX 258: Performed by: SURGERY

## 2023-10-05 PROCEDURE — 94640 AIRWAY INHALATION TREATMENT: CPT

## 2023-10-05 PROCEDURE — 0B21XFZ CHANGE TRACHEOSTOMY DEVICE IN TRACHEA, EXTERNAL APPROACH: ICD-10-PCS | Performed by: NURSE PRACTITIONER

## 2023-10-05 PROCEDURE — 94002 VENT MGMT INPAT INIT DAY: CPT

## 2023-10-05 PROCEDURE — 6360000002 HC RX W HCPCS: Performed by: PHYSICIAN ASSISTANT

## 2023-10-05 PROCEDURE — 2500000003 HC RX 250 WO HCPCS: Performed by: NURSE PRACTITIONER

## 2023-10-05 PROCEDURE — 2700000000 HC OXYGEN THERAPY PER DAY

## 2023-10-05 PROCEDURE — 6360000002 HC RX W HCPCS: Performed by: ANESTHESIOLOGY

## 2023-10-05 PROCEDURE — 99232 SBSQ HOSP IP/OBS MODERATE 35: CPT | Performed by: INTERNAL MEDICINE

## 2023-10-05 PROCEDURE — 6370000000 HC RX 637 (ALT 250 FOR IP): Performed by: FAMILY MEDICINE

## 2023-10-05 PROCEDURE — 85610 PROTHROMBIN TIME: CPT

## 2023-10-05 PROCEDURE — 36600 WITHDRAWAL OF ARTERIAL BLOOD: CPT

## 2023-10-05 PROCEDURE — 80053 COMPREHEN METABOLIC PANEL: CPT

## 2023-10-05 PROCEDURE — 36415 COLL VENOUS BLD VENIPUNCTURE: CPT

## 2023-10-05 PROCEDURE — 87205 SMEAR GRAM STAIN: CPT

## 2023-10-05 PROCEDURE — 2500000003 HC RX 250 WO HCPCS: Performed by: ANESTHESIOLOGY

## 2023-10-05 PROCEDURE — A4216 STERILE WATER/SALINE, 10 ML: HCPCS | Performed by: NURSE PRACTITIONER

## 2023-10-05 PROCEDURE — 6360000002 HC RX W HCPCS: Performed by: FAMILY MEDICINE

## 2023-10-05 PROCEDURE — 6370000000 HC RX 637 (ALT 250 FOR IP): Performed by: ANESTHESIOLOGY

## 2023-10-05 RX ORDER — CHLORHEXIDINE GLUCONATE ORAL RINSE 1.2 MG/ML
15 SOLUTION DENTAL 2 TIMES DAILY
Status: DISCONTINUED | OUTPATIENT
Start: 2023-10-05 | End: 2023-10-19

## 2023-10-05 RX ADMIN — BUDESONIDE 500 MCG: 0.5 INHALANT RESPIRATORY (INHALATION) at 19:45

## 2023-10-05 RX ADMIN — HEPARIN SODIUM 5000 UNITS: 5000 INJECTION INTRAVENOUS; SUBCUTANEOUS at 13:51

## 2023-10-05 RX ADMIN — CHLORHEXIDINE GLUCONATE 15 ML: 1.2 RINSE ORAL at 11:38

## 2023-10-05 RX ADMIN — ACETYLCYSTEINE 600 MG: 200 INHALANT RESPIRATORY (INHALATION) at 19:45

## 2023-10-05 RX ADMIN — ARFORMOTEROL TARTRATE 15 MCG: 15 SOLUTION RESPIRATORY (INHALATION) at 19:45

## 2023-10-05 RX ADMIN — PIPERACILLIN AND TAZOBACTAM 3375 MG: 3; .375 INJECTION, POWDER, LYOPHILIZED, FOR SOLUTION INTRAVENOUS at 17:39

## 2023-10-05 RX ADMIN — Medication 10 ML: at 08:44

## 2023-10-05 RX ADMIN — BUDESONIDE 500 MCG: 0.5 INHALANT RESPIRATORY (INHALATION) at 09:15

## 2023-10-05 RX ADMIN — ARFORMOTEROL TARTRATE 15 MCG: 15 SOLUTION RESPIRATORY (INHALATION) at 09:15

## 2023-10-05 RX ADMIN — IPRATROPIUM BROMIDE AND ALBUTEROL SULFATE 1 DOSE: 2.5; .5 SOLUTION RESPIRATORY (INHALATION) at 14:48

## 2023-10-05 RX ADMIN — CHLORHEXIDINE GLUCONATE 15 ML: 1.2 RINSE ORAL at 21:54

## 2023-10-05 RX ADMIN — IPRATROPIUM BROMIDE AND ALBUTEROL SULFATE 1 DOSE: 2.5; .5 SOLUTION RESPIRATORY (INHALATION) at 19:45

## 2023-10-05 RX ADMIN — HYDROMORPHONE HYDROCHLORIDE 0.5 MG: 1 INJECTION, SOLUTION INTRAMUSCULAR; INTRAVENOUS; SUBCUTANEOUS at 19:23

## 2023-10-05 RX ADMIN — VANCOMYCIN HYDROCHLORIDE 1000 MG: 1 INJECTION, POWDER, LYOPHILIZED, FOR SOLUTION INTRAVENOUS at 16:04

## 2023-10-05 RX ADMIN — Medication: at 15:54

## 2023-10-05 RX ADMIN — ACETYLCYSTEINE 600 MG: 200 INHALANT RESPIRATORY (INHALATION) at 09:12

## 2023-10-05 RX ADMIN — SODIUM CHLORIDE, PRESERVATIVE FREE 40 MG: 5 INJECTION INTRAVENOUS at 08:44

## 2023-10-05 RX ADMIN — PIPERACILLIN AND TAZOBACTAM 4500 MG: 4; .5 INJECTION, POWDER, LYOPHILIZED, FOR SOLUTION INTRAVENOUS at 03:45

## 2023-10-05 RX ADMIN — HEPARIN SODIUM 5000 UNITS: 5000 INJECTION INTRAVENOUS; SUBCUTANEOUS at 06:10

## 2023-10-05 RX ADMIN — VANCOMYCIN HYDROCHLORIDE 1000 MG: 1 INJECTION, POWDER, LYOPHILIZED, FOR SOLUTION INTRAVENOUS at 04:31

## 2023-10-05 RX ADMIN — I.V. FAT EMULSION 250 ML: 20 EMULSION INTRAVENOUS at 19:11

## 2023-10-05 RX ADMIN — IPRATROPIUM BROMIDE AND ALBUTEROL SULFATE 1 DOSE: 2.5; .5 SOLUTION RESPIRATORY (INHALATION) at 09:12

## 2023-10-05 RX ADMIN — HEPARIN SODIUM 5000 UNITS: 5000 INJECTION INTRAVENOUS; SUBCUTANEOUS at 21:55

## 2023-10-05 RX ADMIN — Medication: at 02:25

## 2023-10-05 RX ADMIN — MAGNESIUM SULFATE HEPTAHYDRATE: 500 INJECTION, SOLUTION INTRAMUSCULAR; INTRAVENOUS at 19:10

## 2023-10-05 RX ADMIN — Medication 10 ML: at 21:55

## 2023-10-05 RX ADMIN — Medication: at 08:43

## 2023-10-05 RX ADMIN — PIPERACILLIN AND TAZOBACTAM 3375 MG: 3; .375 INJECTION, POWDER, LYOPHILIZED, FOR SOLUTION INTRAVENOUS at 09:06

## 2023-10-05 RX ADMIN — WATER 1 MG: 1 INJECTION INTRAMUSCULAR; INTRAVENOUS; SUBCUTANEOUS at 12:13

## 2023-10-05 ASSESSMENT — PULMONARY FUNCTION TESTS
PIF_VALUE: 31
PIF_VALUE: 31
PIF_VALUE: 30
PIF_VALUE: 31

## 2023-10-05 ASSESSMENT — PAIN SCALES - GENERAL
PAINLEVEL_OUTOF10: 0
PAINLEVEL_OUTOF10: 7
PAINLEVEL_OUTOF10: 0

## 2023-10-05 ASSESSMENT — PAIN DESCRIPTION - DESCRIPTORS: DESCRIPTORS: ACHING

## 2023-10-05 ASSESSMENT — PAIN DESCRIPTION - LOCATION: LOCATION: HEAD

## 2023-10-05 ASSESSMENT — PAIN DESCRIPTION - ORIENTATION: ORIENTATION: RIGHT

## 2023-10-05 NOTE — PROCEDURES
Trach Change    #6 cuffless Shiley changed to #6 cuffed Shiley by Dr. Phuc Quinones. Inner cannula placed. Trach secured with ties. Patient tolerated exchange well and placed on the ventilator.     Katya Bledsoe [facility]  [address]

## 2023-10-05 NOTE — PROGRESS NOTES
Occupational Therapy  10/05/23    Chart reviewed and events noted. Patient transferred to ICU and placed on vent d/t unresponsiveness and respiratory failure. Will hold and follow up for OT re-eval once medically appropriate.       Thank you,   MELISSA Horner, OTR/L

## 2023-10-05 NOTE — PROGRESS NOTES
10/05/23 1606   B: Both Spontaneous Awakening and Breathing Trials   RT Notified Ready for SBT Yes   Was Patient Receiving Mechanical Ventilation Yes   Safety Screening Spontaneous Breathing Trial (SBT) Proceed with SBT - no exclusion criteria met   Spontaneous  mL   RSBI Calculated 166.67   Spontaneous Breathing Trial (SBT) Outcome RSBI>105 - SBT failure   Patient Meet Extubation Criteria No   Reasons Failed Extubation Criteria Failed SAT and/or SBT   Provider Ordered Extubation No

## 2023-10-05 NOTE — SIGNIFICANT EVENT
Rapid Response  Rapid response room 404 called at this time. RRT responding. Rapid called for low oxygen and AMS. Nita Borden NP, respiratory, nursing sups, and primary RN at bedside. RN suctioning trach. O2 coming up to high 90's from 80%'s. Pt not responding to painful stimuli. ABG, chest xray, and basic labs ordered. Blood drawn, sent to lab. RT loren ABG, CO2 103. Chest Xray complete. . Pt going to ICU for vent. Checked in with primary RN prior to leaving. Opportunity for questions and concerns provided. Sepsis Screening  Are two or more SIRS criteria present? Yes    If yes: SIRS Criteria: Acutely altered mental status and Heart rate (pulse) > 90 bpm    Is the patient's history suggestive of a new infection? No    If yes: Suspected source of infection: Pneumonia     ICU taking over care.

## 2023-10-05 NOTE — PROGRESS NOTES
Events from overnight noted; findings discussed with -explained that given recent deterioration we would not recommend laparotomy, diversion at this time as leak is stable/controlled and pulmonary issues are more significant. We will continue to follow. Continue wound VAC dressing, TPN, gastrostomy to gravity, abdominal drain; wean pulmonary support as tolerated.

## 2023-10-05 NOTE — PROGRESS NOTES
Physical Therapy: Hold    Chart reviewed and events noted. Pt transferred to ICU and placed on vent d/t unresponsiveness and respiratory failure. Will hold and follow up for PT re-eval once medically appropriate.       Heather Michaud, PT, DPT

## 2023-10-05 NOTE — PROGRESS NOTES
0930- Dr. Polina Dowling. Ordered 1 mg dilaudid order to be d/c. Family concerned that patient is getting too much pain medication. 1600-pt. Failed SBT. Tidal volume dropped below 300 immediately when taken off of PS. Linda, RT increased to 12 PS with no improvement in tidal volumes. Patient placed back on vent mode.

## 2023-10-05 NOTE — PROGRESS NOTES
[facility] Hospitalist cross coverage (7p-7a) progress note:    RR called for unresponsiveness. Evaluated at bedside, patient is guppy breathing and unresponsive to painful stimuli. ABG resp acidosis CO2 103, pH 7.01. Consulted ICU to switch to cuffed trach and place on vent. CXR and labs pending. Called  Karthikeyan Layman [phone] and informed him of the events including rapid response, acute change in mentation, and transfer to ICU for vent due to respiratory acidosis.       Idania HANSON

## 2023-10-05 NOTE — PROGRESS NOTES
CRITICAL CARE NOTE      Name: Jyothi Florian   : 1960   MRN: [id number]   Date: 10/5/2023      REASON FOR ICU ADMISSION: Acute hypercapnic respiratory failure    PRINCIPAL ICU DIAGNOSIS   Acute hypercapnic respiratory failure    BRIEF PATIENT SUMMARY   70-year-old female who [facility] and [facility]. 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 She was readmitted to [facility] ( through ) at which time she underwent EGD revealing GJ stricture. CT abdomen/pelvis demonstrated loculated intraperitoneal fluid in the anterior abdomen with persistent free air.   she underwent US guided drainage +E. coli. She completed a course of Zosyn and drain removed 8/10.   G-tube was placed.  patient transferred to the ICU in the setting of acute hypoxic respiratory failure d/t pulmonary edema requiring diuresis and eventual intubation ( - ). Patient reintubated  through current for worsening hypoxic respiratory failure. Course further complicated by persistent leukocytosis, fevers and CT evidence of intra-abdominal fluid collection post ultrasound drainage. Repeat imaging  did not reveal any drainable fluid collection or free air. She had tracheostomy 9/3. She started PSV trials on . Thoracentesis on . On 9/15 she started trach collar trials. She was transferred out of the ICU on . Trach changed to 6 cuffless Shiley on . Surgical plan appears to be possible laparotomy, lysis of adhesions, diversion proximal to leak versus resection of anastomosis inclusive of leak with end ileostomy on 10/5, however, over the past several days she has had worsening leukocytosis with pulmonary deterioration. Early morning on 10/5, ICU was consulted for unresponsiveness.   ABG ordered which showed respiratory acidosis (7.01/103/78) and leukocytosis

## 2023-10-05 NOTE — PROGRESS NOTES
0200 received report from [facility].    Billie White pt arrived in ICU. Jerrol Aschoff, BLAS & Dr. Garrett Monsivais at bedside to switch to cuffed trach. Pt placed on vent.

## 2023-10-06 LAB
ALBUMIN SERPL-MCNC: 2 G/DL (ref 3.5–5)
ALBUMIN/GLOB SERPL: 0.6 (ref 1.1–2.2)
ALP SERPL-CCNC: 162 U/L (ref 45–117)
ALT SERPL-CCNC: 22 U/L (ref 12–78)
ANION GAP SERPL CALC-SCNC: 5 MMOL/L (ref 5–15)
AST SERPL-CCNC: 18 U/L (ref 15–37)
BACTERIA SPEC CULT: NORMAL
BACTERIA SPEC CULT: NORMAL
BASOPHILS # BLD: 0 K/UL (ref 0–0.1)
BASOPHILS NFR BLD: 0 % (ref 0–1)
BILIRUB SERPL-MCNC: 0.4 MG/DL (ref 0.2–1)
BUN SERPL-MCNC: 52 MG/DL (ref 6–20)
BUN/CREAT SERPL: 116 (ref 12–20)
CALCIUM SERPL-MCNC: 10 MG/DL (ref 8.5–10.1)
CHLORIDE SERPL-SCNC: 115 MMOL/L (ref 97–108)
CO2 SERPL-SCNC: 22 MMOL/L (ref 21–32)
CREAT SERPL-MCNC: 0.45 MG/DL (ref 0.55–1.02)
DIFFERENTIAL METHOD BLD: ABNORMAL
EOSINOPHIL # BLD: 0.1 K/UL (ref 0–0.4)
EOSINOPHIL NFR BLD: 1 % (ref 0–7)
ERYTHROCYTE [DISTWIDTH] IN BLOOD BY AUTOMATED COUNT: 16 % (ref 11.5–14.5)
GLOBULIN SER CALC-MCNC: 3.4 G/DL (ref 2–4)
GLUCOSE BLD STRIP.AUTO-MCNC: 133 MG/DL (ref 65–117)
GLUCOSE SERPL-MCNC: 117 MG/DL (ref 65–100)
HCT VFR BLD AUTO: 23.2 % (ref 35–47)
HGB BLD-MCNC: 7.2 G/DL (ref 11.5–16)
IMM GRANULOCYTES # BLD AUTO: 0.1 K/UL (ref 0–0.04)
IMM GRANULOCYTES NFR BLD AUTO: 1 % (ref 0–0.5)
LYMPHOCYTES # BLD: 0.4 K/UL (ref 0.8–3.5)
LYMPHOCYTES NFR BLD: 3 % (ref 12–49)
MAGNESIUM SERPL-MCNC: 1.9 MG/DL (ref 1.6–2.4)
MCH RBC QN AUTO: 29.4 PG (ref 26–34)
MCHC RBC AUTO-ENTMCNC: 31 G/DL (ref 30–36.5)
MCV RBC AUTO: 94.7 FL (ref 80–99)
MONOCYTES # BLD: 0.9 K/UL (ref 0–1)
MONOCYTES NFR BLD: 7 % (ref 5–13)
NEUTS SEG # BLD: 11.3 K/UL (ref 1.8–8)
NEUTS SEG NFR BLD: 88 % (ref 32–75)
NRBC # BLD: 0 K/UL (ref 0–0.01)
NRBC BLD-RTO: 0 PER 100 WBC
PHOSPHATE SERPL-MCNC: 2.1 MG/DL (ref 2.6–4.7)
PLATELET # BLD AUTO: 364 K/UL (ref 150–400)
PMV BLD AUTO: 9.9 FL (ref 8.9–12.9)
POTASSIUM SERPL-SCNC: 3.7 MMOL/L (ref 3.5–5.1)
PROT SERPL-MCNC: 5.4 G/DL (ref 6.4–8.2)
RBC # BLD AUTO: 2.45 M/UL (ref 3.8–5.2)
RBC MORPH BLD: ABNORMAL
SERVICE CMNT-IMP: ABNORMAL
SERVICE CMNT-IMP: NORMAL
SODIUM SERPL-SCNC: 142 MMOL/L (ref 136–145)
VANCOMYCIN SERPL-MCNC: 16.7 UG/ML
WBC # BLD AUTO: 12.8 K/UL (ref 3.6–11)

## 2023-10-06 PROCEDURE — 6360000002 HC RX W HCPCS: Performed by: NURSE PRACTITIONER

## 2023-10-06 PROCEDURE — 80202 ASSAY OF VANCOMYCIN: CPT

## 2023-10-06 PROCEDURE — 2580000003 HC RX 258: Performed by: NURSE PRACTITIONER

## 2023-10-06 PROCEDURE — 83735 ASSAY OF MAGNESIUM: CPT

## 2023-10-06 PROCEDURE — 85025 COMPLETE CBC W/AUTO DIFF WBC: CPT

## 2023-10-06 PROCEDURE — A4216 STERILE WATER/SALINE, 10 ML: HCPCS | Performed by: NURSE PRACTITIONER

## 2023-10-06 PROCEDURE — 6370000000 HC RX 637 (ALT 250 FOR IP): Performed by: INTERNAL MEDICINE

## 2023-10-06 PROCEDURE — 82962 GLUCOSE BLOOD TEST: CPT

## 2023-10-06 PROCEDURE — 2500000003 HC RX 250 WO HCPCS: Performed by: ANESTHESIOLOGY

## 2023-10-06 PROCEDURE — 97606 NEG PRS WND THER DME>50 SQCM: CPT

## 2023-10-06 PROCEDURE — 2580000003 HC RX 258: Performed by: SURGERY

## 2023-10-06 PROCEDURE — 36415 COLL VENOUS BLD VENIPUNCTURE: CPT

## 2023-10-06 PROCEDURE — 6360000002 HC RX W HCPCS: Performed by: PHYSICIAN ASSISTANT

## 2023-10-06 PROCEDURE — 6360000002 HC RX W HCPCS: Performed by: SURGERY

## 2023-10-06 PROCEDURE — 2000000000 HC ICU R&B

## 2023-10-06 PROCEDURE — 97164 PT RE-EVAL EST PLAN CARE: CPT

## 2023-10-06 PROCEDURE — 84100 ASSAY OF PHOSPHORUS: CPT

## 2023-10-06 PROCEDURE — 2700000000 HC OXYGEN THERAPY PER DAY

## 2023-10-06 PROCEDURE — 6360000002 HC RX W HCPCS: Performed by: ANESTHESIOLOGY

## 2023-10-06 PROCEDURE — 2580000003 HC RX 258: Performed by: ANESTHESIOLOGY

## 2023-10-06 PROCEDURE — 2500000003 HC RX 250 WO HCPCS: Performed by: NURSE PRACTITIONER

## 2023-10-06 PROCEDURE — 94640 AIRWAY INHALATION TREATMENT: CPT

## 2023-10-06 PROCEDURE — 80053 COMPREHEN METABOLIC PANEL: CPT

## 2023-10-06 PROCEDURE — 6370000000 HC RX 637 (ALT 250 FOR IP): Performed by: FAMILY MEDICINE

## 2023-10-06 PROCEDURE — 94003 VENT MGMT INPAT SUBQ DAY: CPT

## 2023-10-06 PROCEDURE — 97168 OT RE-EVAL EST PLAN CARE: CPT

## 2023-10-06 PROCEDURE — 97530 THERAPEUTIC ACTIVITIES: CPT

## 2023-10-06 PROCEDURE — 6370000000 HC RX 637 (ALT 250 FOR IP): Performed by: ANESTHESIOLOGY

## 2023-10-06 PROCEDURE — C9113 INJ PANTOPRAZOLE SODIUM, VIA: HCPCS | Performed by: NURSE PRACTITIONER

## 2023-10-06 PROCEDURE — 6360000002 HC RX W HCPCS: Performed by: FAMILY MEDICINE

## 2023-10-06 RX ORDER — HYDROMORPHONE HYDROCHLORIDE 1 MG/ML
0.5 INJECTION, SOLUTION INTRAMUSCULAR; INTRAVENOUS; SUBCUTANEOUS EVERY 4 HOURS PRN
Status: DISCONTINUED | OUTPATIENT
Start: 2023-10-06 | End: 2023-10-13

## 2023-10-06 RX ORDER — HYDROMORPHONE HYDROCHLORIDE 1 MG/ML
0.25 INJECTION, SOLUTION INTRAMUSCULAR; INTRAVENOUS; SUBCUTANEOUS EVERY 4 HOURS PRN
Status: DISCONTINUED | OUTPATIENT
Start: 2023-10-06 | End: 2023-10-13

## 2023-10-06 RX ADMIN — IPRATROPIUM BROMIDE AND ALBUTEROL SULFATE 1 DOSE: 2.5; .5 SOLUTION RESPIRATORY (INHALATION) at 22:38

## 2023-10-06 RX ADMIN — HEPARIN SODIUM 5000 UNITS: 5000 INJECTION INTRAVENOUS; SUBCUTANEOUS at 06:25

## 2023-10-06 RX ADMIN — PIPERACILLIN AND TAZOBACTAM 3375 MG: 3; .375 INJECTION, POWDER, LYOPHILIZED, FOR SOLUTION INTRAVENOUS at 17:09

## 2023-10-06 RX ADMIN — I.V. FAT EMULSION 250 ML: 20 EMULSION INTRAVENOUS at 19:16

## 2023-10-06 RX ADMIN — Medication: at 08:58

## 2023-10-06 RX ADMIN — ARFORMOTEROL TARTRATE 15 MCG: 15 SOLUTION RESPIRATORY (INHALATION) at 22:38

## 2023-10-06 RX ADMIN — BUDESONIDE 500 MCG: 0.5 INHALANT RESPIRATORY (INHALATION) at 22:38

## 2023-10-06 RX ADMIN — ONDANSETRON 4 MG: 2 INJECTION INTRAMUSCULAR; INTRAVENOUS at 19:25

## 2023-10-06 RX ADMIN — HYDROMORPHONE HYDROCHLORIDE 0.5 MG: 1 INJECTION, SOLUTION INTRAMUSCULAR; INTRAVENOUS; SUBCUTANEOUS at 05:29

## 2023-10-06 RX ADMIN — ONDANSETRON 4 MG: 2 INJECTION INTRAMUSCULAR; INTRAVENOUS at 05:19

## 2023-10-06 RX ADMIN — HYDROMORPHONE HYDROCHLORIDE 0.5 MG: 1 INJECTION, SOLUTION INTRAMUSCULAR; INTRAVENOUS; SUBCUTANEOUS at 23:31

## 2023-10-06 RX ADMIN — Medication 10 ML: at 20:21

## 2023-10-06 RX ADMIN — MAGNESIUM SULFATE HEPTAHYDRATE: 500 INJECTION, SOLUTION INTRAMUSCULAR; INTRAVENOUS at 19:15

## 2023-10-06 RX ADMIN — PIPERACILLIN AND TAZOBACTAM 3375 MG: 3; .375 INJECTION, POWDER, LYOPHILIZED, FOR SOLUTION INTRAVENOUS at 09:12

## 2023-10-06 RX ADMIN — ONDANSETRON 4 MG: 2 INJECTION INTRAMUSCULAR; INTRAVENOUS at 13:56

## 2023-10-06 RX ADMIN — HYDROMORPHONE HYDROCHLORIDE 0.5 MG: 1 INJECTION, SOLUTION INTRAMUSCULAR; INTRAVENOUS; SUBCUTANEOUS at 09:19

## 2023-10-06 RX ADMIN — IPRATROPIUM BROMIDE AND ALBUTEROL SULFATE 1 DOSE: 2.5; .5 SOLUTION RESPIRATORY (INHALATION) at 14:08

## 2023-10-06 RX ADMIN — Medication: at 17:08

## 2023-10-06 RX ADMIN — VANCOMYCIN HYDROCHLORIDE 1000 MG: 1 INJECTION, POWDER, LYOPHILIZED, FOR SOLUTION INTRAVENOUS at 05:00

## 2023-10-06 RX ADMIN — HEPARIN SODIUM 5000 UNITS: 5000 INJECTION INTRAVENOUS; SUBCUTANEOUS at 13:55

## 2023-10-06 RX ADMIN — Medication: at 23:32

## 2023-10-06 RX ADMIN — HYDROMORPHONE HYDROCHLORIDE 0.5 MG: 1 INJECTION, SOLUTION INTRAMUSCULAR; INTRAVENOUS; SUBCUTANEOUS at 19:25

## 2023-10-06 RX ADMIN — SODIUM CHLORIDE, PRESERVATIVE FREE 40 MG: 5 INJECTION INTRAVENOUS at 08:58

## 2023-10-06 RX ADMIN — HYDROMORPHONE HYDROCHLORIDE 0.5 MG: 1 INJECTION, SOLUTION INTRAMUSCULAR; INTRAVENOUS; SUBCUTANEOUS at 13:30

## 2023-10-06 RX ADMIN — ACETYLCYSTEINE 600 MG: 200 INHALANT RESPIRATORY (INHALATION) at 08:29

## 2023-10-06 RX ADMIN — CHLORHEXIDINE GLUCONATE 15 ML: 1.2 RINSE ORAL at 08:58

## 2023-10-06 RX ADMIN — CHLORHEXIDINE GLUCONATE 15 ML: 1.2 RINSE ORAL at 20:20

## 2023-10-06 RX ADMIN — IPRATROPIUM BROMIDE AND ALBUTEROL SULFATE 1 DOSE: 2.5; .5 SOLUTION RESPIRATORY (INHALATION) at 08:29

## 2023-10-06 RX ADMIN — Medication 10 ML: at 09:12

## 2023-10-06 RX ADMIN — HEPARIN SODIUM 5000 UNITS: 5000 INJECTION INTRAVENOUS; SUBCUTANEOUS at 22:00

## 2023-10-06 RX ADMIN — BUDESONIDE 500 MCG: 0.5 INHALANT RESPIRATORY (INHALATION) at 07:53

## 2023-10-06 RX ADMIN — Medication: at 00:26

## 2023-10-06 RX ADMIN — HYDROMORPHONE HYDROCHLORIDE 0.5 MG: 1 INJECTION, SOLUTION INTRAMUSCULAR; INTRAVENOUS; SUBCUTANEOUS at 01:34

## 2023-10-06 RX ADMIN — ARFORMOTEROL TARTRATE 15 MCG: 15 SOLUTION RESPIRATORY (INHALATION) at 07:53

## 2023-10-06 RX ADMIN — PIPERACILLIN AND TAZOBACTAM 3375 MG: 3; .375 INJECTION, POWDER, LYOPHILIZED, FOR SOLUTION INTRAVENOUS at 01:37

## 2023-10-06 RX ADMIN — VANCOMYCIN HYDROCHLORIDE 1000 MG: 1 INJECTION, POWDER, LYOPHILIZED, FOR SOLUTION INTRAVENOUS at 23:16

## 2023-10-06 ASSESSMENT — PAIN DESCRIPTION - ORIENTATION
ORIENTATION: LEFT
ORIENTATION: MID

## 2023-10-06 ASSESSMENT — PAIN DESCRIPTION - DESCRIPTORS
DESCRIPTORS: ACHING

## 2023-10-06 ASSESSMENT — PAIN SCALES - GENERAL
PAINLEVEL_OUTOF10: 9
PAINLEVEL_OUTOF10: 0
PAINLEVEL_OUTOF10: 0
PAINLEVEL_OUTOF10: 7
PAINLEVEL_OUTOF10: 6
PAINLEVEL_OUTOF10: 6
PAINLEVEL_OUTOF10: 0
PAINLEVEL_OUTOF10: 10
PAINLEVEL_OUTOF10: 7
PAINLEVEL_OUTOF10: 0

## 2023-10-06 ASSESSMENT — PULMONARY FUNCTION TESTS
PEFR_L/MIN: 100
PIF_VALUE: 31
PIF_VALUE: 11
PIF_VALUE: 17

## 2023-10-06 ASSESSMENT — PAIN DESCRIPTION - LOCATION
LOCATION: ABDOMEN
LOCATION: HEAD
LOCATION: ABDOMEN

## 2023-10-06 NOTE — CARE COORDINATION
Transition of Care Plan:     RUR: 25%  Prior Level of Functioning: Independent   Disposition: SNF   If SNF or IPR: Date FOC offered: TBD   Date FOC received: 09/21  Accepting facility: [facility]  Date authorization started with reference number: N/A  Date authorization received and expires: N/A  Follow up appointments: On AVS   DME needed: None identified at this time   Transportation at discharge: TBD   IM/IMM Medicare/ letter given: 2nd will be needed prior to dc  Is patient a  and connected with VA? No               If yes, was [employer] transfer form completed and VA notified? N/A  Caregiver Contact: [facility] [phone]  Discharge Caregiver contacted prior to discharge? Yes   Care Conference needed? Not at this time  Barriers to discharge: Medical stability     ZULEMA > 72 hrs      Patient transferred from [facility] to ICU due to unresponsives and now on ventilator for respiratory failure. The plan is to wean vent support as tolerated.      Hx of patient's prolonged hospitalization  6-22 to 7-21-23 [facility] colectomy, c-diff, right DVT  7-21-23 to 7-28-23 [facility], sent to [facility] [place] ED  7-28-23 Admitted to [facility] for GIB, hematemesis     Aleyda Gonzalez, [address]

## 2023-10-06 NOTE — WOUND CARE
WOCN Note:     Follow up sacral wound vac change. Chart reviewed. Assessed in room 7118. Admitted DX:  Hematemesis;GI bleed; Gastrointestinal hemorrhage; cdiff    Past Medical History: gastric bypass; asthma; cad; dm2  [facility]    Assessment:   Patient has trach, alert; requires assist with repositioning. Bed: cecelia  Patient has a engle and drains. Patient repositioned on left side. Generalized edema lower legs and feet. Heels offloaded with pillows. 1. POA Sacrum and bilateral buttocks, Stage 4 Pressure Injury with palpable bone: 7.8 x 7 x 0.7  cm; 40% yellow; 60% red; serous drainage in canister; no odor; constantine wound edges resurfaced with hyperpigmentation. Treatment:  Cleansed with Vashe; resumed NPWT at 125 using 2 black, mepitel one and bridged to left side. 2. POA Right heel, Unstageable Pressure Injury with dry deflated bullae/hyperpigmented:  improving to blanching pink erythema. 3.  POA Left heel, unstageable pressure injury:  healed with dry scab and hyperpigmented skin. Wound, Pressure Prevention & Skin Care Recommendations:    Minimize layers of linen/pads under patient to optimize support surface. 2.  Turn/reposition approximately every 2 hours and offload heels. 3.  Manage moisture/ Keep skin folds clean and dry/minimize brief usage. 4.  Specialty bed:cecelia.  5.  Sacrum:  Continue NPWT at 125 mmHg with changes twice weekly. 6.  Heels:  Apply Venelex BID     Discussed above plan with RN. Transition of Care:   Plan to follow Tuesday and Friday for VAC changes.     NOEL Brian RN [facility]  [address] [facility] Inpatient Wound Care  Available on [employer]  Office [phone]

## 2023-10-06 NOTE — PLAN OF CARE
Problem: Physical Therapy - Adult  Goal: By Discharge: Performs mobility at highest level of function for planned discharge setting. See evaluation for individualized goals. Description: FUNCTIONAL STATUS PRIOR TO ADMISSION: Patient was independent and active without use of DME prior to last hospitalization at [address] [place] [address] for abdominal surgery. D/c'ed to [facility] where she was for 1 week-- states she did not get out of bed at [facility] and worked on bed exercises. 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 On 8/6 patient transferred to the ICU in the setting of acute hypoxic respiratory failure d/t pulmonary edema requiring diuresis and eventual intubation (8/7 - 8/14). Patient reintubated 8/18 through current for worsening hypoxic respiratory failure. Course further complicated by persistent leukocytosis, fevers and CT evidence of intra-abdominal fluid collection post ultrasound drainage. HOME SUPPORT PRIOR TO ADMISSION: The patient lived with her  at baseline. Physical Therapy Goals  Goals re-evaluated 10/4/2023. Goals re-evaluated and appropriate for carryover. 1.  Patient will move from supine to sit and sit to supine, scoot up and down, and roll side to side in bed with maximal assistance x2 within 7 day(s). 2.  Patient will perform sit to stand with maximal assistance x2 within 7 day(s). 3.  Patient will transfer from bed to chair and chair to bed with maximal assistance x2 using the least restrictive device within 7 day(s). 4.  Patient will sit edge of bed with minimal assist for 2 minutes within 7 day(s). Revised 9/18/2023; weekly completed 9/25, goals appropriate to carryover x 7 days. 1.  Patient will move from supine to sit and sit to supine, scoot up and down, and roll side to side in bed with maximal assistance x2 within 7 day(s).     2.

## 2023-10-06 NOTE — PROGRESS NOTES
Nebulizers (COMPRESSOR/NEBULIZER) MISC Use to administer albuterol breathing treatments at home  Patient not taking: Reported on 6/24/2023 5/8/23   Karen Lyles MD   gabapentin (NEURONTIN) 300 MG capsule Take 2 capsules by mouth 3 times daily for 30 days. Max Daily Amount: 1,800 mg 5/8/23 6/7/23  Karen Lyles MD   albuterol sulfate HFA (PROVENTIL;VENTOLIN;PROAIR) 108 (90 Base) MCG/ACT inhaler Inhale 1 puff into the lungs every 4 hours as needed 4/27/22   Automatic Reconciliation, Ar   atorvastatin (LIPITOR) 20 MG tablet Take 1 tablet by mouth nightly 11/28/22   Automatic Reconciliation, Ar   meloxicam (MOBIC) 15 MG tablet 1 tablet every evening 7/3/22   Automatic Reconciliation, Ar   metoprolol tartrate (LOPRESSOR) 25 MG tablet Take 0.5 tablets by mouth every morning 11/4/22   Automatic Reconciliation, Ar   nitroGLYCERIN (NITROSTAT) 0.4 MG SL tablet  12/12/22   Automatic Reconciliation, Ar         Allergies/Social/Family History: Allergies   Allergen Reactions    Adhesive Tape Other (See Comments)     Makes skin tears easily.      Amlodipine Swelling     On legs at 10 mg dose    Quinapril Cough      Social History     Tobacco Use    Smoking status: Never    Smokeless tobacco: Never   Substance Use Topics    Alcohol use: No     Alcohol/week: 0.0 standard drinks of alcohol      Family History   Problem Relation Age of Onset    Cancer Mother 67        colon    Cancer Maternal Uncle         melanoma    High Cholesterol Father     Hypertension Father     Heart Attack Father     Heart Disease Father     Diabetes Father     Migraines Mother     Stroke Mother     Osteoarthritis Mother     Diabetes Mother     COPD Father     Heart Failure Father     Cancer Other     Diabetes Other     Heart Disease Other     Hypertension Other     Cancer Brother         lymphoma    Cancer Brother         PROSTATE AND LYMPHOMA    Cancer Maternal Grandmother         stomach    Asthma Brother     Asthma Brother     Stroke

## 2023-10-06 NOTE — PLAN OF CARE
Judgement: Decreased awareness of need for assistance;Decreased awareness of need for safety  Problem Solving: Assistance required to implement solutions;Assistance required to generate solutions;Assistance required to identify errors made  Insights: Decreased awareness of deficits  Initiation: Requires cues for some  Sequencing: Requires cues for some  Cognition Comment: improved engagement with activtity despite cardiopulmonary status limitations    Skin: See wound care notes    Edema: None    Hearing:        Vision/Perceptual:    Vision - Basic Assessment  Prior Vision: Wears glasses only for reading  Visual History: No significant visual history  Patient Visual Report: No visual complaint reported. Visual Field Cut: No  Oculo Motor Control: WNL     Vision  Vision: Impaired  Vision Exceptions: Wears glasses for reading  Tracking:  (some blurred vision reported, not decreased from baseline)       Range of Motion:             Strength:         Coordination: Tone & Sensation:               Functional Mobility and Transfers for ADLs:  Bed Mobility:     Bed Mobility Training  Bed Mobility Training: Yes  Interventions: Safety awareness training; Tactile cues; Verbal cues;Manual cues; Visual cues  Rolling: Maximum assistance;Minimum assistance;Assist X1 (improved intiiation rolling to the R--initial Max A, able to sustain sidelying with LUE on bedrail, Min A to return to supine)  Supine to Sit: Total assistance; Adaptive equipment (bed mechanics d/t poor cardiopulmonary endurance)  Sit to Supine: Total assistance; Adaptive equipment  Scooting: Maximum assistance;Assist X2 (supine)    Transfers:     Transfer Training  Transfer Training: No          Balance:     Balance  Sitting: Impaired  Sitting - Static: Poor (constant support)    ADL Assessment:       Feeding: Dependent/Total  Feeding Skilled Clinical Factors: NPO, PEG    Grooming: Moderate assistance       UE Bathing:  Moderate assistance       LE Bathing:

## 2023-10-07 LAB
ALBUMIN SERPL-MCNC: 2.2 G/DL (ref 3.5–5)
ALBUMIN/GLOB SERPL: 0.6 (ref 1.1–2.2)
ALP SERPL-CCNC: 155 U/L (ref 45–117)
ALT SERPL-CCNC: 23 U/L (ref 12–78)
ANION GAP SERPL CALC-SCNC: 3 MMOL/L (ref 5–15)
AST SERPL-CCNC: 18 U/L (ref 15–37)
BACTERIA SPEC CULT: NORMAL
BASOPHILS # BLD: 0 K/UL (ref 0–0.1)
BASOPHILS NFR BLD: 0 % (ref 0–1)
BILIRUB SERPL-MCNC: 0.4 MG/DL (ref 0.2–1)
BUN SERPL-MCNC: 49 MG/DL (ref 6–20)
BUN/CREAT SERPL: 107 (ref 12–20)
CALCIUM SERPL-MCNC: 10.4 MG/DL (ref 8.5–10.1)
CHLORIDE SERPL-SCNC: 116 MMOL/L (ref 97–108)
CO2 SERPL-SCNC: 25 MMOL/L (ref 21–32)
CREAT SERPL-MCNC: 0.46 MG/DL (ref 0.55–1.02)
DIFFERENTIAL METHOD BLD: ABNORMAL
EOSINOPHIL # BLD: 0.1 K/UL (ref 0–0.4)
EOSINOPHIL NFR BLD: 1 % (ref 0–7)
ERYTHROCYTE [DISTWIDTH] IN BLOOD BY AUTOMATED COUNT: 16.1 % (ref 11.5–14.5)
GLOBULIN SER CALC-MCNC: 3.8 G/DL (ref 2–4)
GLUCOSE BLD STRIP.AUTO-MCNC: 108 MG/DL (ref 65–117)
GLUCOSE SERPL-MCNC: 127 MG/DL (ref 65–100)
GRAM STN SPEC: NORMAL
HCT VFR BLD AUTO: 25.1 % (ref 35–47)
HGB BLD-MCNC: 7.7 G/DL (ref 11.5–16)
IMM GRANULOCYTES # BLD AUTO: 0.1 K/UL (ref 0–0.04)
IMM GRANULOCYTES NFR BLD AUTO: 1 % (ref 0–0.5)
LYMPHOCYTES # BLD: 0.4 K/UL (ref 0.8–3.5)
LYMPHOCYTES NFR BLD: 4 % (ref 12–49)
MAGNESIUM SERPL-MCNC: 1.8 MG/DL (ref 1.6–2.4)
MCH RBC QN AUTO: 29.3 PG (ref 26–34)
MCHC RBC AUTO-ENTMCNC: 30.7 G/DL (ref 30–36.5)
MCV RBC AUTO: 95.4 FL (ref 80–99)
MONOCYTES # BLD: 1.1 K/UL (ref 0–1)
MONOCYTES NFR BLD: 10 % (ref 5–13)
NEUTS SEG # BLD: 9 K/UL (ref 1.8–8)
NEUTS SEG NFR BLD: 84 % (ref 32–75)
NRBC # BLD: 0 K/UL (ref 0–0.01)
NRBC BLD-RTO: 0 PER 100 WBC
PHOSPHATE SERPL-MCNC: 3.1 MG/DL (ref 2.6–4.7)
PLATELET # BLD AUTO: 367 K/UL (ref 150–400)
PMV BLD AUTO: 9.6 FL (ref 8.9–12.9)
POTASSIUM SERPL-SCNC: 3.6 MMOL/L (ref 3.5–5.1)
PROT SERPL-MCNC: 6 G/DL (ref 6.4–8.2)
RBC # BLD AUTO: 2.63 M/UL (ref 3.8–5.2)
RBC MORPH BLD: ABNORMAL
RBC MORPH BLD: ABNORMAL
SERVICE CMNT-IMP: NORMAL
SERVICE CMNT-IMP: NORMAL
SODIUM SERPL-SCNC: 144 MMOL/L (ref 136–145)
WBC # BLD AUTO: 10.7 K/UL (ref 3.6–11)

## 2023-10-07 PROCEDURE — A4216 STERILE WATER/SALINE, 10 ML: HCPCS | Performed by: NURSE PRACTITIONER

## 2023-10-07 PROCEDURE — 6360000002 HC RX W HCPCS: Performed by: ANESTHESIOLOGY

## 2023-10-07 PROCEDURE — 83735 ASSAY OF MAGNESIUM: CPT

## 2023-10-07 PROCEDURE — 2580000003 HC RX 258: Performed by: ANESTHESIOLOGY

## 2023-10-07 PROCEDURE — 6370000000 HC RX 637 (ALT 250 FOR IP): Performed by: ANESTHESIOLOGY

## 2023-10-07 PROCEDURE — 2580000003 HC RX 258: Performed by: OBSTETRICS & GYNECOLOGY

## 2023-10-07 PROCEDURE — 6360000002 HC RX W HCPCS: Performed by: NURSE PRACTITIONER

## 2023-10-07 PROCEDURE — 94003 VENT MGMT INPAT SUBQ DAY: CPT

## 2023-10-07 PROCEDURE — 84100 ASSAY OF PHOSPHORUS: CPT

## 2023-10-07 PROCEDURE — 2700000000 HC OXYGEN THERAPY PER DAY

## 2023-10-07 PROCEDURE — 2580000003 HC RX 258: Performed by: SURGERY

## 2023-10-07 PROCEDURE — 2580000003 HC RX 258: Performed by: NURSE PRACTITIONER

## 2023-10-07 PROCEDURE — 6370000000 HC RX 637 (ALT 250 FOR IP): Performed by: FAMILY MEDICINE

## 2023-10-07 PROCEDURE — 36415 COLL VENOUS BLD VENIPUNCTURE: CPT

## 2023-10-07 PROCEDURE — 2000000000 HC ICU R&B

## 2023-10-07 PROCEDURE — 94640 AIRWAY INHALATION TREATMENT: CPT

## 2023-10-07 PROCEDURE — 2500000003 HC RX 250 WO HCPCS: Performed by: NURSE PRACTITIONER

## 2023-10-07 PROCEDURE — 99231 SBSQ HOSP IP/OBS SF/LOW 25: CPT | Performed by: STUDENT IN AN ORGANIZED HEALTH CARE EDUCATION/TRAINING PROGRAM

## 2023-10-07 PROCEDURE — 6360000002 HC RX W HCPCS: Performed by: OBSTETRICS & GYNECOLOGY

## 2023-10-07 PROCEDURE — C9113 INJ PANTOPRAZOLE SODIUM, VIA: HCPCS | Performed by: NURSE PRACTITIONER

## 2023-10-07 PROCEDURE — 2500000003 HC RX 250 WO HCPCS: Performed by: OBSTETRICS & GYNECOLOGY

## 2023-10-07 PROCEDURE — 82962 GLUCOSE BLOOD TEST: CPT

## 2023-10-07 PROCEDURE — 80053 COMPREHEN METABOLIC PANEL: CPT

## 2023-10-07 PROCEDURE — 85025 COMPLETE CBC W/AUTO DIFF WBC: CPT

## 2023-10-07 PROCEDURE — 6360000002 HC RX W HCPCS: Performed by: SURGERY

## 2023-10-07 RX ORDER — POTASSIUM CHLORIDE 29.8 MG/ML
20 INJECTION INTRAVENOUS
Status: COMPLETED | OUTPATIENT
Start: 2023-10-07 | End: 2023-10-07

## 2023-10-07 RX ORDER — MAGNESIUM SULFATE IN WATER 40 MG/ML
2000 INJECTION, SOLUTION INTRAVENOUS ONCE
Status: COMPLETED | OUTPATIENT
Start: 2023-10-07 | End: 2023-10-07

## 2023-10-07 RX ORDER — IPRATROPIUM BROMIDE AND ALBUTEROL SULFATE 2.5; .5 MG/3ML; MG/3ML
1 SOLUTION RESPIRATORY (INHALATION) EVERY 6 HOURS PRN
Status: DISCONTINUED | OUTPATIENT
Start: 2023-10-07 | End: 2023-10-11

## 2023-10-07 RX ADMIN — SODIUM CHLORIDE, PRESERVATIVE FREE 40 MG: 5 INJECTION INTRAVENOUS at 09:26

## 2023-10-07 RX ADMIN — PIPERACILLIN AND TAZOBACTAM 3375 MG: 3; .375 INJECTION, POWDER, LYOPHILIZED, FOR SOLUTION INTRAVENOUS at 17:23

## 2023-10-07 RX ADMIN — HEPARIN SODIUM 5000 UNITS: 5000 INJECTION INTRAVENOUS; SUBCUTANEOUS at 20:21

## 2023-10-07 RX ADMIN — HEPARIN SODIUM 5000 UNITS: 5000 INJECTION INTRAVENOUS; SUBCUTANEOUS at 15:56

## 2023-10-07 RX ADMIN — I.V. FAT EMULSION 250 ML: 20 EMULSION INTRAVENOUS at 18:36

## 2023-10-07 RX ADMIN — HYDROMORPHONE HYDROCHLORIDE 0.5 MG: 1 INJECTION, SOLUTION INTRAMUSCULAR; INTRAVENOUS; SUBCUTANEOUS at 07:27

## 2023-10-07 RX ADMIN — HEPARIN SODIUM 5000 UNITS: 5000 INJECTION INTRAVENOUS; SUBCUTANEOUS at 06:08

## 2023-10-07 RX ADMIN — Medication 10 ML: at 09:27

## 2023-10-07 RX ADMIN — HYDROMORPHONE HYDROCHLORIDE 0.5 MG: 1 INJECTION, SOLUTION INTRAMUSCULAR; INTRAVENOUS; SUBCUTANEOUS at 11:53

## 2023-10-07 RX ADMIN — HYDROMORPHONE HYDROCHLORIDE 0.5 MG: 1 INJECTION, SOLUTION INTRAMUSCULAR; INTRAVENOUS; SUBCUTANEOUS at 21:12

## 2023-10-07 RX ADMIN — IPRATROPIUM BROMIDE AND ALBUTEROL SULFATE 1 DOSE: .5; 3 SOLUTION RESPIRATORY (INHALATION) at 21:53

## 2023-10-07 RX ADMIN — POTASSIUM CHLORIDE 20 MEQ: 29.8 INJECTION, SOLUTION INTRAVENOUS at 06:09

## 2023-10-07 RX ADMIN — Medication 10 ML: at 20:21

## 2023-10-07 RX ADMIN — IPRATROPIUM BROMIDE AND ALBUTEROL SULFATE 1 DOSE: 2.5; .5 SOLUTION RESPIRATORY (INHALATION) at 07:27

## 2023-10-07 RX ADMIN — MAGNESIUM SULFATE HEPTAHYDRATE: 500 INJECTION, SOLUTION INTRAMUSCULAR; INTRAVENOUS at 18:36

## 2023-10-07 RX ADMIN — Medication: at 15:57

## 2023-10-07 RX ADMIN — HYDROMORPHONE HYDROCHLORIDE 0.5 MG: 1 INJECTION, SOLUTION INTRAMUSCULAR; INTRAVENOUS; SUBCUTANEOUS at 03:35

## 2023-10-07 RX ADMIN — ONDANSETRON 4 MG: 2 INJECTION INTRAMUSCULAR; INTRAVENOUS at 15:57

## 2023-10-07 RX ADMIN — PIPERACILLIN AND TAZOBACTAM 3375 MG: 3; .375 INJECTION, POWDER, LYOPHILIZED, FOR SOLUTION INTRAVENOUS at 09:26

## 2023-10-07 RX ADMIN — HYDROMORPHONE HYDROCHLORIDE 0.5 MG: 1 INJECTION, SOLUTION INTRAMUSCULAR; INTRAVENOUS; SUBCUTANEOUS at 15:57

## 2023-10-07 RX ADMIN — CHLORHEXIDINE GLUCONATE 15 ML: 1.2 RINSE ORAL at 20:20

## 2023-10-07 RX ADMIN — VANCOMYCIN HYDROCHLORIDE 1000 MG: 1 INJECTION, POWDER, LYOPHILIZED, FOR SOLUTION INTRAVENOUS at 17:22

## 2023-10-07 RX ADMIN — POTASSIUM CHLORIDE 20 MEQ: 29.8 INJECTION, SOLUTION INTRAVENOUS at 07:14

## 2023-10-07 RX ADMIN — MAGNESIUM SULFATE HEPTAHYDRATE 2000 MG: 40 INJECTION, SOLUTION INTRAVENOUS at 06:09

## 2023-10-07 RX ADMIN — BUDESONIDE 500 MCG: 0.5 INHALANT RESPIRATORY (INHALATION) at 07:27

## 2023-10-07 RX ADMIN — ARFORMOTEROL TARTRATE 15 MCG: 15 SOLUTION RESPIRATORY (INHALATION) at 21:53

## 2023-10-07 RX ADMIN — ARFORMOTEROL TARTRATE 15 MCG: 15 SOLUTION RESPIRATORY (INHALATION) at 07:27

## 2023-10-07 RX ADMIN — CHLORHEXIDINE GLUCONATE 15 ML: 1.2 RINSE ORAL at 09:26

## 2023-10-07 RX ADMIN — BUDESONIDE 500 MCG: 0.5 INHALANT RESPIRATORY (INHALATION) at 21:53

## 2023-10-07 RX ADMIN — PIPERACILLIN AND TAZOBACTAM 3375 MG: 3; .375 INJECTION, POWDER, LYOPHILIZED, FOR SOLUTION INTRAVENOUS at 01:02

## 2023-10-07 RX ADMIN — Medication: at 09:27

## 2023-10-07 ASSESSMENT — PAIN DESCRIPTION - LOCATION
LOCATION: ABDOMEN

## 2023-10-07 ASSESSMENT — PAIN DESCRIPTION - ORIENTATION
ORIENTATION: MID

## 2023-10-07 ASSESSMENT — PULMONARY FUNCTION TESTS
PIF_VALUE: 22
PIF_VALUE: 25

## 2023-10-07 ASSESSMENT — PAIN SCALES - GENERAL
PAINLEVEL_OUTOF10: 8
PAINLEVEL_OUTOF10: 4
PAINLEVEL_OUTOF10: 9
PAINLEVEL_OUTOF10: 8
PAINLEVEL_OUTOF10: 0
PAINLEVEL_OUTOF10: 0
PAINLEVEL_OUTOF10: 4
PAINLEVEL_OUTOF10: 4
PAINLEVEL_OUTOF10: 8
PAINLEVEL_OUTOF10: 7
PAINLEVEL_OUTOF10: 9
PAINLEVEL_OUTOF10: 8

## 2023-10-07 ASSESSMENT — PAIN DESCRIPTION - PAIN TYPE
TYPE: CHRONIC PAIN

## 2023-10-07 NOTE — PROGRESS NOTES
RT Note:  Vent assessment    Pt had remained off vent after discussing plan with NP. Pt was stable thru the shift till last rounds. Pt looked very tired, lethargic. RT placed pt on vent & adjusted settings. NP notified.      10/07/23 6391   Vent Information   Vent Mode AC/PC   Ventilator Settings   FiO2  35 %   Insp Time (sec) 0.84 sec  (1:3)   Resp Rate (Set) 18 bmp   PEEP/CPAP (cmH2O) 5   Target Vt 350   Pressure Ordered 16   Vent Patient Data (Readings)   Vt (Measured) 475 mL   Peak Inspiratory Pressure (cmH2O) 25 cmH2O   Rate Measured 20 br/min   Minute Volume (L/min) 9.88 Liters   Mean Airway Pressure (cmH2O) 11 cmH20   Plateau Pressure (cm H2O) 28 cm H2O   Driving Pressure 23   I:E Ratio 1:2   Pressure Sensitivity 2 cm H2O   PEEP Intrinsic (cm H2O)   (unable to obtain d/t spont breath triggering)   Static Compliance (L/cm H2O) 18   Insp Rise Time (%) 70 %

## 2023-10-07 NOTE — PROGRESS NOTES
[facility] Daily Progress Note. Name: Evita Garcia   : 1960   MRN: [id number]   Date: 10/7/2023        Chief Complaint   Patient presents with    Fatigue         HPI:     60-year-old female who presented to the emergency department  from [facility] for weakness and Hematemesis. 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 She was readmitted to [facility] ( through ) at which time she underwent EGD revealing GJ stricture. CT abdomen/pelvis demonstrated loculated intraperitoneal fluid in the anterior abdomen with persistent free air.   she underwent US guided drainage +E. coli. She completed a course of Zosyn and drain removed 8/10.   G-tube was placed.  patient transferred to the ICU in the setting of acute hypoxic respiratory failure d/t pulmonary edema requiring diuresis and eventual intubation ( - ). Patient reintubated  through current for worsening hypoxic respiratory failure. Course further complicated by persistent leukocytosis, fevers and CT evidence of intra-abdominal fluid collection post ultrasound drainage. Repeat imaging  did not reveal any drainable fluid collection or free air. She had tracheostomy 9/3. She started PSV trials on . Thoracentesis on . On 9/15 she started trach collar trials. She was transferred out of the ICU on . Trach changed to 6 cuffless Shiley on . Surgical plan appears to be possible laparotomy, lysis of adhesions, diversion proximal to leak versus resection of anastomosis inclusive of leak with end ileostomy on 10/5, however, over the past several days she has had worsening leukocytosis with pulmonary deterioration. Early morning on 10/5, ICU was consulted for unresponsiveness. ABG ordered which showed respiratory acidosis (7.01/103/78) and leukocytosis of 37 from 16.   She was transferred back to ICU with trach changed

## 2023-10-07 NOTE — PROGRESS NOTES
RT Note:  Assessment    Pt stable on Trach collar, vent on S/B in room     10/06/23 2200   Treatment   Treatment Type HHN   $Treatment Type $Inhaled Therapy/Meds   Medications Albuterol/Ipratropium; Arformoterol;Budesonide   Pre-Tx Pulse 110   Pre-Tx Resps 31   Breath Sounds Pre-Tx ZAYDA Clear;Diminished   Breath Sounds Pre-Tx LLL Clear;Diminished   Breath Sounds Pre-Tx RUL Clear;Diminished   Breath Sounds Pre-Tx RML Clear;Diminished   Breath Sounds Pre-Tx RLL Clear;Diminished   Delivery Source In-line nebulizer  (Via Trach collar)   Position Semi-Benavides's   Treatment Tolerance Well   Is patient on O2?  Y   Breath Sounds   Breath Sounds Bilateral Clear;Diminished   Oxygen Therapy/Pulse Ox   O2 Therapy Oxygen humidified   $Oxygen $Daily Charge   O2 Device Trach collar   O2 Flow Rate (L/min) 11 L/min   FiO2  50 %   Pulse (!) 110   Respirations (!) 31   SpO2 95 %

## 2023-10-07 NOTE — PROGRESS NOTES
10/07/23 0940   Vent Information   Vent Mode CPAP/PS   Ventilator Settings   PEEP/CPAP (cmH2O) 5   Pressure Support (cm H2O) 5 cm H2O   B: Both Spontaneous Awakening and Breathing Trials   RT Notified Ready for SBT Yes   Was Patient Receiving Mechanical Ventilation Yes   Spontaneous  mL   Weaning Parameters   Spontaneous Breathing Trial Complete Yes   Respiratory Rate Observed 16   Ve 5.75   RSBI 47       1110: Patient placed on Trach collar 6/35% per MD order.

## 2023-10-07 NOTE — PROGRESS NOTES
[facility]  Surgery Progress Note    10/7/2023     24 HOUR EVENTS/SUBJECTIVE:  No overnight events. Reporting some mild abdominal pain. OBJECTIVE:  Vital signs:     SpO2  Av.1 %  Min: 94 %  Max: 100 %    Intake/Output:  I/O last 3 completed shifts: In: 3613.6 [IV UUMRLXOPJ:960]  Out: 2699 [Urine:2520; Emesis/NG output:154; Drains:25]  No intake/output data recorded. Physical Exam:  General: NAD  HEENT: Trach  Cardiovascular: Regular  Respiratory: On vent  Abdominal: Soft, mild tenderness throughout. G-tube and perc drain in place (perc with stool output). Extremities: WWP, no edema    Medications:  Scheduled   [unfilled]    Infusions    PN-Adult 2-in-1 Central Line (Standard) 100 mL/hr at 10/06/23 1952    sodium chloride      dextrose        PRN     HYDROmorphone, HYDROmorphone, hydrOXYzine HCl, labetalol, sodium chloride, prochlorperazine, alteplase (CATHFLO) 1 mg in sterile water 1 mL injection, albumin human 5%, sodium chloride flush, ondansetron **OR** ondansetron, polyethylene glycol, acetaminophen **OR** acetaminophen, glucose, dextrose bolus **OR** dextrose bolus, glucagon (rDNA), dextrose        Labs:  Lab Results   Component Value Date    WBC 10.7 10/07/2023    HGB 7.7 (L) 10/07/2023    HCT 25.1 (L) 10/07/2023    MCV 95.4 10/07/2023     10/07/2023     Lab Results   Component Value Date     10/07/2023    K 3.6 10/07/2023     (H) 10/07/2023    CO2 25 10/07/2023    BUN 49 (H) 10/07/2023    MG 1.8 10/07/2023    PHOS 3.1 10/07/2023            ASSESSMENT:  Jon Llamas is a 61 y.o. female with history of gastric bypass, marginal ulcer and ileocolonic anastomotic leak. Awaiting surgical diversion. PLAN:   - Continue current care by ICU. Surgical plans to be determined by Dr. Anitha Sandoval next week .      Marine Stauffer MD

## 2023-10-08 LAB
ANION GAP SERPL CALC-SCNC: 6 MMOL/L (ref 5–15)
BASOPHILS # BLD: 0.1 K/UL (ref 0–0.1)
BASOPHILS NFR BLD: 1 % (ref 0–1)
BUN SERPL-MCNC: 45 MG/DL (ref 6–20)
BUN/CREAT SERPL: 113 (ref 12–20)
CALCIUM SERPL-MCNC: 10.1 MG/DL (ref 8.5–10.1)
CHLORIDE SERPL-SCNC: 119 MMOL/L (ref 97–108)
CO2 SERPL-SCNC: 22 MMOL/L (ref 21–32)
CREAT SERPL-MCNC: 0.4 MG/DL (ref 0.55–1.02)
DIFFERENTIAL METHOD BLD: ABNORMAL
EOSINOPHIL # BLD: 0.3 K/UL (ref 0–0.4)
EOSINOPHIL NFR BLD: 3 % (ref 0–7)
ERYTHROCYTE [DISTWIDTH] IN BLOOD BY AUTOMATED COUNT: 16 % (ref 11.5–14.5)
GLUCOSE SERPL-MCNC: 101 MG/DL (ref 65–100)
HCT VFR BLD AUTO: 26.2 % (ref 35–47)
HGB BLD-MCNC: 7.7 G/DL (ref 11.5–16)
IMM GRANULOCYTES # BLD AUTO: 0.1 K/UL (ref 0–0.04)
IMM GRANULOCYTES NFR BLD AUTO: 1 % (ref 0–0.5)
LYMPHOCYTES # BLD: 0.5 K/UL (ref 0.8–3.5)
LYMPHOCYTES NFR BLD: 6 % (ref 12–49)
MAGNESIUM SERPL-MCNC: 2 MG/DL (ref 1.6–2.4)
MCH RBC QN AUTO: 28.7 PG (ref 26–34)
MCHC RBC AUTO-ENTMCNC: 29.4 G/DL (ref 30–36.5)
MCV RBC AUTO: 97.8 FL (ref 80–99)
MONOCYTES # BLD: 1 K/UL (ref 0–1)
MONOCYTES NFR BLD: 11 % (ref 5–13)
NEUTS SEG # BLD: 6.8 K/UL (ref 1.8–8)
NEUTS SEG NFR BLD: 78 % (ref 32–75)
NRBC # BLD: 0 K/UL (ref 0–0.01)
NRBC BLD-RTO: 0 PER 100 WBC
PHOSPHATE SERPL-MCNC: 2.8 MG/DL (ref 2.6–4.7)
PLATELET # BLD AUTO: 356 K/UL (ref 150–400)
PMV BLD AUTO: 9.6 FL (ref 8.9–12.9)
POTASSIUM SERPL-SCNC: 3.7 MMOL/L (ref 3.5–5.1)
RBC # BLD AUTO: 2.68 M/UL (ref 3.8–5.2)
RBC MORPH BLD: ABNORMAL
SODIUM SERPL-SCNC: 147 MMOL/L (ref 136–145)
VANCOMYCIN SERPL-MCNC: 23.9 UG/ML
WBC # BLD AUTO: 8.8 K/UL (ref 3.6–11)

## 2023-10-08 PROCEDURE — 84100 ASSAY OF PHOSPHORUS: CPT

## 2023-10-08 PROCEDURE — 80048 BASIC METABOLIC PNL TOTAL CA: CPT

## 2023-10-08 PROCEDURE — C9113 INJ PANTOPRAZOLE SODIUM, VIA: HCPCS | Performed by: NURSE PRACTITIONER

## 2023-10-08 PROCEDURE — A4216 STERILE WATER/SALINE, 10 ML: HCPCS | Performed by: NURSE PRACTITIONER

## 2023-10-08 PROCEDURE — 2580000003 HC RX 258: Performed by: INTERNAL MEDICINE

## 2023-10-08 PROCEDURE — 6360000002 HC RX W HCPCS: Performed by: ANESTHESIOLOGY

## 2023-10-08 PROCEDURE — 2500000003 HC RX 250 WO HCPCS: Performed by: INTERNAL MEDICINE

## 2023-10-08 PROCEDURE — 2500000003 HC RX 250 WO HCPCS: Performed by: NURSE PRACTITIONER

## 2023-10-08 PROCEDURE — 36415 COLL VENOUS BLD VENIPUNCTURE: CPT

## 2023-10-08 PROCEDURE — 6360000002 HC RX W HCPCS: Performed by: NURSE PRACTITIONER

## 2023-10-08 PROCEDURE — 80202 ASSAY OF VANCOMYCIN: CPT

## 2023-10-08 PROCEDURE — A4216 STERILE WATER/SALINE, 10 ML: HCPCS | Performed by: SURGERY

## 2023-10-08 PROCEDURE — 6360000002 HC RX W HCPCS: Performed by: SURGERY

## 2023-10-08 PROCEDURE — 2000000000 HC ICU R&B

## 2023-10-08 PROCEDURE — 94640 AIRWAY INHALATION TREATMENT: CPT

## 2023-10-08 PROCEDURE — 2700000000 HC OXYGEN THERAPY PER DAY

## 2023-10-08 PROCEDURE — 2580000003 HC RX 258: Performed by: NURSE PRACTITIONER

## 2023-10-08 PROCEDURE — 94003 VENT MGMT INPAT SUBQ DAY: CPT

## 2023-10-08 PROCEDURE — 85025 COMPLETE CBC W/AUTO DIFF WBC: CPT

## 2023-10-08 PROCEDURE — 83735 ASSAY OF MAGNESIUM: CPT

## 2023-10-08 PROCEDURE — 6360000002 HC RX W HCPCS: Performed by: INTERNAL MEDICINE

## 2023-10-08 PROCEDURE — 2580000003 HC RX 258: Performed by: SURGERY

## 2023-10-08 RX ADMIN — CHLORHEXIDINE GLUCONATE 15 ML: 1.2 RINSE ORAL at 20:10

## 2023-10-08 RX ADMIN — HEPARIN SODIUM 5000 UNITS: 5000 INJECTION INTRAVENOUS; SUBCUTANEOUS at 05:30

## 2023-10-08 RX ADMIN — ONDANSETRON 4 MG: 2 INJECTION INTRAMUSCULAR; INTRAVENOUS at 22:37

## 2023-10-08 RX ADMIN — Medication: at 08:38

## 2023-10-08 RX ADMIN — ARFORMOTEROL TARTRATE 15 MCG: 15 SOLUTION RESPIRATORY (INHALATION) at 07:36

## 2023-10-08 RX ADMIN — Medication 10 ML: at 08:37

## 2023-10-08 RX ADMIN — PIPERACILLIN AND TAZOBACTAM 3375 MG: 3; .375 INJECTION, POWDER, LYOPHILIZED, FOR SOLUTION INTRAVENOUS at 09:29

## 2023-10-08 RX ADMIN — ARFORMOTEROL TARTRATE 15 MCG: 15 SOLUTION RESPIRATORY (INHALATION) at 20:51

## 2023-10-08 RX ADMIN — MAGNESIUM SULFATE HEPTAHYDRATE: 500 INJECTION, SOLUTION INTRAMUSCULAR; INTRAVENOUS at 19:02

## 2023-10-08 RX ADMIN — PIPERACILLIN AND TAZOBACTAM 3375 MG: 3; .375 INJECTION, POWDER, LYOPHILIZED, FOR SOLUTION INTRAVENOUS at 01:25

## 2023-10-08 RX ADMIN — PIPERACILLIN AND TAZOBACTAM 3375 MG: 3; .375 INJECTION, POWDER, LYOPHILIZED, FOR SOLUTION INTRAVENOUS at 17:13

## 2023-10-08 RX ADMIN — SODIUM CHLORIDE, PRESERVATIVE FREE 40 MG: 5 INJECTION INTRAVENOUS at 08:37

## 2023-10-08 RX ADMIN — HYDROMORPHONE HYDROCHLORIDE 0.5 MG: 1 INJECTION, SOLUTION INTRAMUSCULAR; INTRAVENOUS; SUBCUTANEOUS at 19:05

## 2023-10-08 RX ADMIN — ONDANSETRON 4 MG: 2 INJECTION INTRAMUSCULAR; INTRAVENOUS at 10:41

## 2023-10-08 RX ADMIN — CHLORHEXIDINE GLUCONATE 15 ML: 1.2 RINSE ORAL at 08:37

## 2023-10-08 RX ADMIN — HEPARIN SODIUM 5000 UNITS: 5000 INJECTION INTRAVENOUS; SUBCUTANEOUS at 17:13

## 2023-10-08 RX ADMIN — I.V. FAT EMULSION 250 ML: 20 EMULSION INTRAVENOUS at 19:03

## 2023-10-08 RX ADMIN — BUDESONIDE 500 MCG: 0.5 INHALANT RESPIRATORY (INHALATION) at 20:51

## 2023-10-08 RX ADMIN — PROCHLORPERAZINE EDISYLATE 10 MG: 5 INJECTION, SOLUTION INTRAMUSCULAR; INTRAVENOUS at 15:39

## 2023-10-08 RX ADMIN — HYDROMORPHONE HYDROCHLORIDE 0.5 MG: 1 INJECTION, SOLUTION INTRAMUSCULAR; INTRAVENOUS; SUBCUTANEOUS at 09:30

## 2023-10-08 RX ADMIN — BUDESONIDE 500 MCG: 0.5 INHALANT RESPIRATORY (INHALATION) at 07:36

## 2023-10-08 RX ADMIN — Medication: at 17:14

## 2023-10-08 RX ADMIN — HYDROMORPHONE HYDROCHLORIDE 0.5 MG: 1 INJECTION, SOLUTION INTRAMUSCULAR; INTRAVENOUS; SUBCUTANEOUS at 01:25

## 2023-10-08 RX ADMIN — HYDROMORPHONE HYDROCHLORIDE 0.5 MG: 1 INJECTION, SOLUTION INTRAMUSCULAR; INTRAVENOUS; SUBCUTANEOUS at 13:45

## 2023-10-08 RX ADMIN — ONDANSETRON 4 MG: 2 INJECTION INTRAMUSCULAR; INTRAVENOUS at 01:45

## 2023-10-08 RX ADMIN — Medication 10 ML: at 20:10

## 2023-10-08 RX ADMIN — HYDROMORPHONE HYDROCHLORIDE 0.5 MG: 1 INJECTION, SOLUTION INTRAMUSCULAR; INTRAVENOUS; SUBCUTANEOUS at 05:30

## 2023-10-08 RX ADMIN — HYDROMORPHONE HYDROCHLORIDE 0.5 MG: 1 INJECTION, SOLUTION INTRAMUSCULAR; INTRAVENOUS; SUBCUTANEOUS at 23:07

## 2023-10-08 ASSESSMENT — PAIN DESCRIPTION - LOCATION
LOCATION: ABDOMEN;HEAD
LOCATION: BREAST

## 2023-10-08 ASSESSMENT — PAIN SCALES - GENERAL
PAINLEVEL_OUTOF10: 10
PAINLEVEL_OUTOF10: 10
PAINLEVEL_OUTOF10: 8
PAINLEVEL_OUTOF10: 4
PAINLEVEL_OUTOF10: 9
PAINLEVEL_OUTOF10: 8
PAINLEVEL_OUTOF10: 10
PAINLEVEL_OUTOF10: 4
PAINLEVEL_OUTOF10: 4
PAINLEVEL_OUTOF10: 5
PAINLEVEL_OUTOF10: 0
PAINLEVEL_OUTOF10: 7

## 2023-10-08 ASSESSMENT — PAIN DESCRIPTION - FREQUENCY: FREQUENCY: CONTINUOUS

## 2023-10-08 ASSESSMENT — PAIN DESCRIPTION - PAIN TYPE: TYPE: ACUTE PAIN

## 2023-10-08 ASSESSMENT — PAIN DESCRIPTION - ORIENTATION
ORIENTATION: MID
ORIENTATION: MID

## 2023-10-08 ASSESSMENT — PAIN DESCRIPTION - ONSET: ONSET: ON-GOING

## 2023-10-08 ASSESSMENT — PAIN - FUNCTIONAL ASSESSMENT: PAIN_FUNCTIONAL_ASSESSMENT: ACTIVITIES ARE NOT PREVENTED

## 2023-10-08 ASSESSMENT — PAIN DESCRIPTION - DESCRIPTORS
DESCRIPTORS: ACHING
DESCRIPTORS: ACHING

## 2023-10-08 NOTE — PROGRESS NOTES
[facility] Daily Progress Note. Name: Evita Garcia   : 1960   MRN: [id number]   Date: 10/8/2023        SUMMARY:    61 F initially adm  to  from Rehab facility ([facility]) with  weakness and Hematemesis. Previously, she had been hospitalized for fulminant C. difficile colitis requiring subtotal colectomy with ileostomy.  she underwent US guided drainage of an intra-abd fluid collection which grew E. coli. She completed a course of Zosyn and drain removed 8/10.   G-tube was placed. On  patient transferred to the ICU with acute hypoxic respiratory failure d/t pulmonary edema requiring diuresis and eventual intubation ( - ). Reintubated  due to recurrent acute hypoxic respiratory failure. ICU course complicated by persistent leukocytosis, fevers and CT evidence of intra-abdominal fluid collection post ultrasound drainage. Repeat imaging  did not reveal any drainable fluid collection or free air. Underwent trancheostomy tube placement  9/3, thoracentesis on . On 9/15 she started trach collar trials. She was transferred out of the ICU on . Trach changed to 6 cuffless Shiley on . Surgical plan appears to be possible laparotomy, lysis of adhesions, diversion proximal to leak versus resection of anastomosis inclusive of leak with end ileostomy. On 10/5, transferred back to ICU with depressed LOC after several days of increasing O2 requirements. ABG showed respiratory acidosis (7.01/103/78) and leukocytosis of 37 from 16. She was transferred back to ICU 10/05 with trach changed back to 6 cuffed and placed on ventilator for management of hypercapnic respiratory failure. Assessment:   Recurrent acute hypoxic/hypercapnic respiratory failure  Tracheostomy status  Severe sepsis  Bilateral pulmonary infiltrates.  Appear to be acute on chronic infiltrates  Intra-abdominal abscesses  Severe protein-calorie malnutrition - on TPN  Profound weight loss and

## 2023-10-08 NOTE — PROGRESS NOTES
RT Note:  Trach Collar    Pt tolerated TC overnight. VS stable. NARD noted. Pt agitated this shift, awake mostly, many requests for pain medication & oral care for complaints of dry mouth.      10/08/23 0424   Oxygen Therapy/Pulse Ox   O2 Therapy Oxygen humidified   O2 Device Trach collar   O2 Flow Rate (L/min) 8 L/min   FiO2  35 %   Pulse 88   Respirations 21   SpO2 98 %   Humidification Source   (Cool mist)

## 2023-10-09 LAB
ALBUMIN SERPL-MCNC: 2.1 G/DL (ref 3.5–5)
ALBUMIN/GLOB SERPL: 0.6 (ref 1.1–2.2)
ALP SERPL-CCNC: 196 U/L (ref 45–117)
ALT SERPL-CCNC: 39 U/L (ref 12–78)
ANION GAP SERPL CALC-SCNC: 6 MMOL/L (ref 5–15)
AST SERPL-CCNC: 43 U/L (ref 15–37)
BASOPHILS # BLD: 0 K/UL (ref 0–0.1)
BASOPHILS NFR BLD: 0 % (ref 0–1)
BILIRUB SERPL-MCNC: 0.4 MG/DL (ref 0.2–1)
BUN SERPL-MCNC: 47 MG/DL (ref 6–20)
BUN/CREAT SERPL: 109 (ref 12–20)
CALCIUM SERPL-MCNC: 10 MG/DL (ref 8.5–10.1)
CHLORIDE SERPL-SCNC: 118 MMOL/L (ref 97–108)
CO2 SERPL-SCNC: 24 MMOL/L (ref 21–32)
CREAT SERPL-MCNC: 0.43 MG/DL (ref 0.55–1.02)
DIFFERENTIAL METHOD BLD: ABNORMAL
EOSINOPHIL # BLD: 0.2 K/UL (ref 0–0.4)
EOSINOPHIL NFR BLD: 3 % (ref 0–7)
ERYTHROCYTE [DISTWIDTH] IN BLOOD BY AUTOMATED COUNT: 16 % (ref 11.5–14.5)
GLOBULIN SER CALC-MCNC: 3.7 G/DL (ref 2–4)
GLUCOSE SERPL-MCNC: 128 MG/DL (ref 65–100)
HCT VFR BLD AUTO: 27.4 % (ref 35–47)
HGB BLD-MCNC: 8.1 G/DL (ref 11.5–16)
IMM GRANULOCYTES # BLD AUTO: 0.1 K/UL (ref 0–0.04)
IMM GRANULOCYTES NFR BLD AUTO: 1 % (ref 0–0.5)
LYMPHOCYTES # BLD: 0.6 K/UL (ref 0.8–3.5)
LYMPHOCYTES NFR BLD: 8 % (ref 12–49)
MAGNESIUM SERPL-MCNC: 2 MG/DL (ref 1.6–2.4)
MCH RBC QN AUTO: 28.6 PG (ref 26–34)
MCHC RBC AUTO-ENTMCNC: 29.6 G/DL (ref 30–36.5)
MCV RBC AUTO: 96.8 FL (ref 80–99)
MONOCYTES # BLD: 0.8 K/UL (ref 0–1)
MONOCYTES NFR BLD: 10 % (ref 5–13)
NEUTS SEG # BLD: 6.3 K/UL (ref 1.8–8)
NEUTS SEG NFR BLD: 78 % (ref 32–75)
NRBC # BLD: 0 K/UL (ref 0–0.01)
NRBC BLD-RTO: 0 PER 100 WBC
PHOSPHATE SERPL-MCNC: 2.8 MG/DL (ref 2.6–4.7)
PLATELET # BLD AUTO: 330 K/UL (ref 150–400)
PMV BLD AUTO: 9.7 FL (ref 8.9–12.9)
POTASSIUM SERPL-SCNC: 3.6 MMOL/L (ref 3.5–5.1)
PROT SERPL-MCNC: 5.8 G/DL (ref 6.4–8.2)
RBC # BLD AUTO: 2.83 M/UL (ref 3.8–5.2)
RBC MORPH BLD: ABNORMAL
RBC MORPH BLD: ABNORMAL
SODIUM SERPL-SCNC: 148 MMOL/L (ref 136–145)
WBC # BLD AUTO: 8 K/UL (ref 3.6–11)

## 2023-10-09 PROCEDURE — 2580000003 HC RX 258: Performed by: INTERNAL MEDICINE

## 2023-10-09 PROCEDURE — 99231 SBSQ HOSP IP/OBS SF/LOW 25: CPT | Performed by: INTERNAL MEDICINE

## 2023-10-09 PROCEDURE — 80053 COMPREHEN METABOLIC PANEL: CPT

## 2023-10-09 PROCEDURE — A4216 STERILE WATER/SALINE, 10 ML: HCPCS | Performed by: NURSE PRACTITIONER

## 2023-10-09 PROCEDURE — 83735 ASSAY OF MAGNESIUM: CPT

## 2023-10-09 PROCEDURE — 92610 EVALUATE SWALLOWING FUNCTION: CPT

## 2023-10-09 PROCEDURE — 6370000000 HC RX 637 (ALT 250 FOR IP): Performed by: INTERNAL MEDICINE

## 2023-10-09 PROCEDURE — 6370000000 HC RX 637 (ALT 250 FOR IP): Performed by: ANESTHESIOLOGY

## 2023-10-09 PROCEDURE — 2580000003 HC RX 258: Performed by: NURSE PRACTITIONER

## 2023-10-09 PROCEDURE — 6360000002 HC RX W HCPCS: Performed by: ANESTHESIOLOGY

## 2023-10-09 PROCEDURE — 94640 AIRWAY INHALATION TREATMENT: CPT

## 2023-10-09 PROCEDURE — 6360000002 HC RX W HCPCS: Performed by: NURSE PRACTITIONER

## 2023-10-09 PROCEDURE — 6360000002 HC RX W HCPCS: Performed by: INTERNAL MEDICINE

## 2023-10-09 PROCEDURE — 84100 ASSAY OF PHOSPHORUS: CPT

## 2023-10-09 PROCEDURE — 85025 COMPLETE CBC W/AUTO DIFF WBC: CPT

## 2023-10-09 PROCEDURE — 97530 THERAPEUTIC ACTIVITIES: CPT

## 2023-10-09 PROCEDURE — 2700000000 HC OXYGEN THERAPY PER DAY

## 2023-10-09 PROCEDURE — 2500000003 HC RX 250 WO HCPCS: Performed by: INTERNAL MEDICINE

## 2023-10-09 PROCEDURE — 36415 COLL VENOUS BLD VENIPUNCTURE: CPT

## 2023-10-09 PROCEDURE — 2580000003 HC RX 258: Performed by: SURGERY

## 2023-10-09 PROCEDURE — 97535 SELF CARE MNGMENT TRAINING: CPT

## 2023-10-09 PROCEDURE — C9113 INJ PANTOPRAZOLE SODIUM, VIA: HCPCS | Performed by: NURSE PRACTITIONER

## 2023-10-09 PROCEDURE — 92597 ORAL SPEECH DEVICE EVAL: CPT

## 2023-10-09 PROCEDURE — 6360000002 HC RX W HCPCS

## 2023-10-09 PROCEDURE — 1100000003 HC PRIVATE W/ TELEMETRY

## 2023-10-09 PROCEDURE — 2500000003 HC RX 250 WO HCPCS: Performed by: NURSE PRACTITIONER

## 2023-10-09 RX ORDER — LORAZEPAM 2 MG/ML
0.5 INJECTION INTRAMUSCULAR
Status: COMPLETED | OUTPATIENT
Start: 2023-10-09 | End: 2023-10-09

## 2023-10-09 RX ADMIN — HYDROMORPHONE HYDROCHLORIDE 0.5 MG: 1 INJECTION, SOLUTION INTRAMUSCULAR; INTRAVENOUS; SUBCUTANEOUS at 15:18

## 2023-10-09 RX ADMIN — MAGNESIUM SULFATE HEPTAHYDRATE: 500 INJECTION, SOLUTION INTRAMUSCULAR; INTRAVENOUS at 19:48

## 2023-10-09 RX ADMIN — PIPERACILLIN AND TAZOBACTAM 3375 MG: 3; .375 INJECTION, POWDER, LYOPHILIZED, FOR SOLUTION INTRAVENOUS at 08:27

## 2023-10-09 RX ADMIN — Medication 10 ML: at 08:27

## 2023-10-09 RX ADMIN — I.V. FAT EMULSION 250 ML: 20 EMULSION INTRAVENOUS at 22:19

## 2023-10-09 RX ADMIN — Medication: at 15:18

## 2023-10-09 RX ADMIN — HYDROMORPHONE HYDROCHLORIDE 0.25 MG: 1 INJECTION, SOLUTION INTRAMUSCULAR; INTRAVENOUS; SUBCUTANEOUS at 19:43

## 2023-10-09 RX ADMIN — HYDROMORPHONE HYDROCHLORIDE 0.5 MG: 1 INJECTION, SOLUTION INTRAMUSCULAR; INTRAVENOUS; SUBCUTANEOUS at 07:15

## 2023-10-09 RX ADMIN — ARFORMOTEROL TARTRATE: 15 SOLUTION RESPIRATORY (INHALATION) at 20:23

## 2023-10-09 RX ADMIN — Medication: at 00:00

## 2023-10-09 RX ADMIN — HYDROMORPHONE HYDROCHLORIDE 0.5 MG: 1 INJECTION, SOLUTION INTRAMUSCULAR; INTRAVENOUS; SUBCUTANEOUS at 11:03

## 2023-10-09 RX ADMIN — BUDESONIDE 500 MCG: 0.5 INHALANT RESPIRATORY (INHALATION) at 09:12

## 2023-10-09 RX ADMIN — Medication 10 ML: at 22:19

## 2023-10-09 RX ADMIN — PIPERACILLIN AND TAZOBACTAM 3375 MG: 3; .375 INJECTION, POWDER, LYOPHILIZED, FOR SOLUTION INTRAVENOUS at 16:50

## 2023-10-09 RX ADMIN — HEPARIN SODIUM 5000 UNITS: 5000 INJECTION INTRAVENOUS; SUBCUTANEOUS at 01:22

## 2023-10-09 RX ADMIN — LORAZEPAM 0.5 MG: 2 INJECTION INTRAMUSCULAR; INTRAVENOUS at 22:03

## 2023-10-09 RX ADMIN — SODIUM CHLORIDE, PRESERVATIVE FREE 40 MG: 5 INJECTION INTRAVENOUS at 08:27

## 2023-10-09 RX ADMIN — Medication: at 08:26

## 2023-10-09 RX ADMIN — HEPARIN SODIUM 5000 UNITS: 5000 INJECTION INTRAVENOUS; SUBCUTANEOUS at 16:51

## 2023-10-09 RX ADMIN — CHLORHEXIDINE GLUCONATE 15 ML: 1.2 RINSE ORAL at 08:27

## 2023-10-09 RX ADMIN — HEPARIN SODIUM 5000 UNITS: 5000 INJECTION INTRAVENOUS; SUBCUTANEOUS at 08:27

## 2023-10-09 RX ADMIN — ARFORMOTEROL TARTRATE 15 MCG: 15 SOLUTION RESPIRATORY (INHALATION) at 09:12

## 2023-10-09 RX ADMIN — PIPERACILLIN AND TAZOBACTAM 3375 MG: 3; .375 INJECTION, POWDER, LYOPHILIZED, FOR SOLUTION INTRAVENOUS at 01:24

## 2023-10-09 ASSESSMENT — PAIN DESCRIPTION - DESCRIPTORS
DESCRIPTORS: ACHING

## 2023-10-09 ASSESSMENT — PAIN SCALES - GENERAL
PAINLEVEL_OUTOF10: 0
PAINLEVEL_OUTOF10: 6
PAINLEVEL_OUTOF10: 7
PAINLEVEL_OUTOF10: 0
PAINLEVEL_OUTOF10: 0
PAINLEVEL_OUTOF10: 6
PAINLEVEL_OUTOF10: 6

## 2023-10-09 ASSESSMENT — PAIN DESCRIPTION - ORIENTATION
ORIENTATION: MID;LOWER
ORIENTATION: MID;LOWER

## 2023-10-09 ASSESSMENT — PAIN DESCRIPTION - LOCATION
LOCATION: BACK;COCCYX
LOCATION: BACK;COCCYX
LOCATION: ABDOMEN;HEAD

## 2023-10-09 NOTE — PLAN OF CARE
Patient will perform sit to stand with maximal assistance x2 within 7 day(s). 3.  Patient will transfer from bed to chair and chair to bed with maximal assistance x2 using the least restrictive device within 7 day(s). 4.  Patient will sit edge of bed with minimal assist for 2 minutes within 7 day(s). Physical Therapy Goals  Revised 9/11/2023  1. Patient will move from supine to sit and sit to supine, scoot up and down, and roll side to side in bed with moderate assistance x2 within 7 day(s). 2.  Patient will perform sit to stand with max assistance x2 within 7 day(s). 3.  Patient will transfer from bed to chair and chair to bed with max assistance x2 using the least restrictive device within 7 day(s). 4.  Patient will sit edge of bed with min assist for 10 minutes within 7 day(s). Initiated 8/2/2023  1. Patient will move from supine to sit and sit to supine, scoot up and down, and roll side to side in bed with minimal assistance x2 within 7 day(s). 2.  Patient will perform sit to stand with moderate assistance x2 within 7 day(s). 3.  Patient will transfer from bed to chair and chair to bed with moderate assistance x2 using the least restrictive device within 7 day(s). 4.  Patient will ambulate with moderate assistance x2 for 10 feet with the least restrictive device within 7 day(s). Outcome: Progressing   PHYSICAL THERAPY TREATMENT    Patient: Reji Sofia (48 y.o. female)  Date: 10/9/2023  Diagnosis: Hematemesis [K92.0]  GI bleed [K92.2]  Gastrointestinal hemorrhage, unspecified gastrointestinal hemorrhage type [K92.2] GI bleed  Procedure(s) (LRB):  DEBRIDEMENT OF SACRAL DECUBITUS WOUND (N/A) 56 Days Post-Op  Precautions: Fall Risk                    ASSESSMENT:  Patient continues to benefit from skilled PT services and is progressing towards goals.  Patient found supine in bed and agreeable to PT, receiving supplemental O2 via trach collar at 28% FiO2, 5LPM. Patient demonstrating good

## 2023-10-09 NOTE — PLAN OF CARE
distress in bed, Call bell within reach, Caregiver / family present, Side rails x3, Heels elevated for pressure relief, and Patient offloaded in partial L side lying for pressure relief    COMMUNICATION/EDUCATION:   The patient's plan of care was discussed with: physical therapist and registered nurse    Patient Education  Education Given To: Patient; Family  Education Provided: Role of Therapy;Plan of Care;Transfer Training; Fall Prevention Strategies; Equipment;ADL Adaptive Strategies; Energy Conservation;Precautions; Home Exercise Program;Family Education  Education Method: Demonstration;Verbal;Teach Back  Barriers to Learning: Cognition  Education Outcome: Continued education needed;Verbalized understanding    Thank you for this referral.  Bubba Edwards OT  Minutes: 29

## 2023-10-09 NOTE — PROGRESS NOTES
[facility] Daily Progress Note. Name: Nimo    : 1960   MRN: [id number]   Date: 10/9/2023        SUMMARY:    61 F initially adm  to  from Rehab facility ([facility]) with  weakness and Hematemesis. Previously, she had been hospitalized for fulminant C. difficile colitis requiring subtotal colectomy with ileostomy.  she underwent US guided drainage of an intra-abd fluid collection which grew E. coli. She completed a course of Zosyn and drain removed 8/10.   G-tube was placed. On  patient transferred to the ICU with acute hypoxic respiratory failure d/t pulmonary edema requiring diuresis and eventual intubation ( - ). Reintubated  due to recurrent acute hypoxic respiratory failure. ICU course complicated by persistent leukocytosis, fevers and CT evidence of intra-abdominal fluid collection post ultrasound drainage. Repeat imaging  did not reveal any drainable fluid collection or free air. Underwent trancheostomy tube placement  9/3, thoracentesis on . On 9/15 she started trach collar trials. She was transferred out of the ICU on . Trach changed to 6 cuffless Shiley on . Surgical plan appears to be possible laparotomy, lysis of adhesions, diversion proximal to leak versus resection of anastomosis inclusive of leak with end ileostomy. On 10/5, transferred back to ICU with depressed LOC after several days of increasing O2 requirements. ABG showed respiratory acidosis (7.01/103/78) and leukocytosis of 37 from 16. She was transferred back to ICU 10/05 with trach changed back to 6 cuffed and placed on ventilator for management of hypercapnic respiratory failure. Assessment:   Recurrent acute hypoxic/hypercapnic respiratory failure  Tracheostomy status  Severe sepsis  Bilateral pulmonary infiltrates.  Appear to be acute on chronic infiltrates  Intra-abdominal abscesses  Severe protein-calorie malnutrition - on TPN  Profound weight loss and

## 2023-10-09 NOTE — CARE COORDINATION
Transition of Care Plan:     RUR: 25%  Prior Level of Functioning: Independent   Disposition: Rehab setting - patient was at [address]  If SNF or IPR: Date FOC offered: TBD   Date FOC received: 09/21  Accepting facility: TBD  Date authorization started with reference number: N/A  Date authorization received and expires: N/A  Follow up appointments: On AVS   DME needed: None identified at this time   Transportation at discharge: TBD   IM/IMM Medicare/ letter given: 2nd will be needed prior to dc  Is patient a  and connected with VA? No               If yes, was [employer] transfer form completed and VA notified? N/A  Caregiver Contact: Sam Click [phone]  Discharge Caregiver contacted prior to discharge? Yes   Care Conference needed?  Not at this time  Barriers to discharge: Medical stability     Hx of patient's prolonged hospitalization  6-22 to 7-21-23 [place] colectomy, c-diff, right DVT  7-21-23 to 7-28-23 [address], sent to [facility] [place] [facility]  7-28-23 Admitted to [facility] for GIB, hematemesis

## 2023-10-09 NOTE — PROGRESS NOTES
1750 Report received for pt coming to [address] Pt arrives to [address] on trach collar, VSS.    1915 Pt moved to Rm 447. Family at bedside. 1950 Pt's TPN started, Lipids not available, oncoming RN to see if it is still on previous unit. 2000 Sandy care provided and purewick changed. 2005 Bedside shift change report given to Rosalino (oncoming nurse) by Abena Leong (offgoing nurse). Report included the following information Nurse Handoff Report, Index, Intake/Output, MAR, Recent Results, and Cardiac Rhythm NSR .

## 2023-10-09 NOTE — PROGRESS NOTES
Clinical course, improvement over weekend noted to include impending transfer out of ICU. New OR date/time for laparotomy, ileostomy tentatively scheduled for Thursday 10/12 (0930).  Will re-discuss with patient/family in AM.

## 2023-10-09 NOTE — PROGRESS NOTES
RT Note:  Trach Collar    Pt off vent, night 2. Weaned to 28%.      10/09/23 0452   Oxygen Therapy/Pulse Ox   O2 Therapy Oxygen humidified   O2 Device Trach collar   O2 Flow Rate (L/min) 5 L/min   FiO2  28 %   Pulse 90   Respirations 22   SpO2 100 %

## 2023-10-10 LAB
ALBUMIN SERPL-MCNC: 2.2 G/DL (ref 3.5–5)
ALBUMIN/GLOB SERPL: 0.5 (ref 1.1–2.2)
ALP SERPL-CCNC: 196 U/L (ref 45–117)
ALT SERPL-CCNC: 45 U/L (ref 12–78)
ANION GAP SERPL CALC-SCNC: 0 MMOL/L (ref 5–15)
ARTERIAL PATENCY WRIST A: NEGATIVE
AST SERPL-CCNC: 34 U/L (ref 15–37)
BASE EXCESS BLD CALC-SCNC: 1.8 MMOL/L
BASOPHILS # BLD: 0 K/UL (ref 0–0.1)
BASOPHILS NFR BLD: 0 % (ref 0–1)
BDY SITE: ABNORMAL
BILIRUB SERPL-MCNC: 0.4 MG/DL (ref 0.2–1)
BUN SERPL-MCNC: 42 MG/DL (ref 6–20)
BUN/CREAT SERPL: 84 (ref 12–20)
CALCIUM SERPL-MCNC: 10.1 MG/DL (ref 8.5–10.1)
CHLORIDE SERPL-SCNC: 117 MMOL/L (ref 97–108)
CO2 SERPL-SCNC: 32 MMOL/L (ref 21–32)
CREAT SERPL-MCNC: 0.5 MG/DL (ref 0.55–1.02)
DIFFERENTIAL METHOD BLD: ABNORMAL
EKG ATRIAL RATE: 92 BPM
EKG DIAGNOSIS: NORMAL
EKG P AXIS: 18 DEGREES
EKG P-R INTERVAL: 118 MS
EKG Q-T INTERVAL: 324 MS
EKG QRS DURATION: 76 MS
EKG QTC CALCULATION (BAZETT): 400 MS
EKG R AXIS: 18 DEGREES
EKG T AXIS: -58 DEGREES
EKG VENTRICULAR RATE: 92 BPM
EOSINOPHIL # BLD: 0.1 K/UL (ref 0–0.4)
EOSINOPHIL NFR BLD: 1 % (ref 0–7)
ERYTHROCYTE [DISTWIDTH] IN BLOOD BY AUTOMATED COUNT: 16 % (ref 11.5–14.5)
GAS FLOW.O2 O2 DELIVERY SYS: ABNORMAL
GLOBULIN SER CALC-MCNC: 4.3 G/DL (ref 2–4)
GLUCOSE BLD STRIP.AUTO-MCNC: 120 MG/DL (ref 65–117)
GLUCOSE BLD STRIP.AUTO-MCNC: 129 MG/DL (ref 65–117)
GLUCOSE BLD STRIP.AUTO-MCNC: 151 MG/DL (ref 65–117)
GLUCOSE SERPL-MCNC: 151 MG/DL (ref 65–100)
HCO3 BLD-SCNC: 30.7 MMOL/L (ref 22–26)
HCT VFR BLD AUTO: 28.6 % (ref 35–47)
HGB BLD-MCNC: 8.4 G/DL (ref 11.5–16)
IMM GRANULOCYTES # BLD AUTO: 0 K/UL
IMM GRANULOCYTES NFR BLD AUTO: 0 %
LYMPHOCYTES # BLD: 0.4 K/UL (ref 0.8–3.5)
LYMPHOCYTES NFR BLD: 4 % (ref 12–49)
MCH RBC QN AUTO: 28.9 PG (ref 26–34)
MCHC RBC AUTO-ENTMCNC: 29.4 G/DL (ref 30–36.5)
MCV RBC AUTO: 98.3 FL (ref 80–99)
MONOCYTES # BLD: 0.6 K/UL (ref 0–1)
MONOCYTES NFR BLD: 6 % (ref 5–13)
NEUTS SEG # BLD: 8.1 K/UL (ref 1.8–8)
NEUTS SEG NFR BLD: 89 % (ref 32–75)
NRBC # BLD: 0 K/UL (ref 0–0.01)
NRBC BLD-RTO: 0 PER 100 WBC
NT PRO BNP: 462 PG/ML
O2/TOTAL GAS SETTING VFR VENT: 100 %
PCO2 BLD: 76.6 MMHG (ref 35–45)
PH BLD: 7.21 (ref 7.35–7.45)
PLATELET # BLD AUTO: 406 K/UL (ref 150–400)
PMV BLD AUTO: 9.6 FL (ref 8.9–12.9)
PO2 BLD: 136 MMHG (ref 80–100)
POTASSIUM SERPL-SCNC: 3.1 MMOL/L (ref 3.5–5.1)
PROT SERPL-MCNC: 6.5 G/DL (ref 6.4–8.2)
RBC # BLD AUTO: 2.91 M/UL (ref 3.8–5.2)
RBC MORPH BLD: ABNORMAL
RBC MORPH BLD: ABNORMAL
SAO2 % BLD: 98.2 % (ref 92–97)
SERVICE CMNT-IMP: ABNORMAL
SODIUM SERPL-SCNC: 149 MMOL/L (ref 136–145)
SPECIMEN TYPE: ABNORMAL
TROPONIN I SERPL HS-MCNC: 13 NG/L (ref 0–51)
WBC # BLD AUTO: 9.2 K/UL (ref 3.6–11)

## 2023-10-10 PROCEDURE — 6360000002 HC RX W HCPCS: Performed by: NURSE PRACTITIONER

## 2023-10-10 PROCEDURE — C9113 INJ PANTOPRAZOLE SODIUM, VIA: HCPCS | Performed by: NURSE PRACTITIONER

## 2023-10-10 PROCEDURE — 82803 BLOOD GASES ANY COMBINATION: CPT

## 2023-10-10 PROCEDURE — 2580000003 HC RX 258: Performed by: NURSE PRACTITIONER

## 2023-10-10 PROCEDURE — 82962 GLUCOSE BLOOD TEST: CPT

## 2023-10-10 PROCEDURE — 36600 WITHDRAWAL OF ARTERIAL BLOOD: CPT

## 2023-10-10 PROCEDURE — 94002 VENT MGMT INPAT INIT DAY: CPT

## 2023-10-10 PROCEDURE — 97606 NEG PRS WND THER DME>50 SQCM: CPT

## 2023-10-10 PROCEDURE — 6360000002 HC RX W HCPCS: Performed by: ANESTHESIOLOGY

## 2023-10-10 PROCEDURE — 2700000000 HC OXYGEN THERAPY PER DAY

## 2023-10-10 PROCEDURE — A4216 STERILE WATER/SALINE, 10 ML: HCPCS | Performed by: NURSE PRACTITIONER

## 2023-10-10 PROCEDURE — 2580000003 HC RX 258: Performed by: SURGERY

## 2023-10-10 PROCEDURE — 93010 ELECTROCARDIOGRAM REPORT: CPT | Performed by: INTERNAL MEDICINE

## 2023-10-10 PROCEDURE — 2580000003 HC RX 258: Performed by: INTERNAL MEDICINE

## 2023-10-10 PROCEDURE — 93005 ELECTROCARDIOGRAM TRACING: CPT

## 2023-10-10 PROCEDURE — 85025 COMPLETE CBC W/AUTO DIFF WBC: CPT

## 2023-10-10 PROCEDURE — 2500000003 HC RX 250 WO HCPCS: Performed by: INTERNAL MEDICINE

## 2023-10-10 PROCEDURE — 6360000002 HC RX W HCPCS: Performed by: INTERNAL MEDICINE

## 2023-10-10 PROCEDURE — 2000000000 HC ICU R&B

## 2023-10-10 PROCEDURE — 83880 ASSAY OF NATRIURETIC PEPTIDE: CPT

## 2023-10-10 PROCEDURE — 36415 COLL VENOUS BLD VENIPUNCTURE: CPT

## 2023-10-10 PROCEDURE — 6360000002 HC RX W HCPCS

## 2023-10-10 PROCEDURE — 94660 CPAP INITIATION&MGMT: CPT

## 2023-10-10 PROCEDURE — 84484 ASSAY OF TROPONIN QUANT: CPT

## 2023-10-10 PROCEDURE — 80053 COMPREHEN METABOLIC PANEL: CPT

## 2023-10-10 PROCEDURE — 94640 AIRWAY INHALATION TREATMENT: CPT

## 2023-10-10 PROCEDURE — 2500000003 HC RX 250 WO HCPCS: Performed by: NURSE PRACTITIONER

## 2023-10-10 RX ORDER — LORAZEPAM 2 MG/ML
0.5 INJECTION INTRAMUSCULAR
Status: DISCONTINUED | OUTPATIENT
Start: 2023-10-10 | End: 2023-10-19

## 2023-10-10 RX ORDER — ARFORMOTEROL TARTRATE 15 UG/2ML
15 SOLUTION RESPIRATORY (INHALATION)
Status: DISCONTINUED | OUTPATIENT
Start: 2023-10-10 | End: 2023-10-11

## 2023-10-10 RX ORDER — FENTANYL CITRATE 50 UG/ML
25 INJECTION, SOLUTION INTRAMUSCULAR; INTRAVENOUS
Status: DISCONTINUED | OUTPATIENT
Start: 2023-10-10 | End: 2023-10-10

## 2023-10-10 RX ORDER — FENTANYL CITRATE 50 UG/ML
INJECTION, SOLUTION INTRAMUSCULAR; INTRAVENOUS
Status: DISPENSED
Start: 2023-10-10 | End: 2023-10-10

## 2023-10-10 RX ORDER — BUDESONIDE 0.25 MG/2ML
0.25 INHALANT ORAL
Status: DISCONTINUED | OUTPATIENT
Start: 2023-10-10 | End: 2023-10-12

## 2023-10-10 RX ORDER — BUDESONIDE 0.5 MG/2ML
INHALANT ORAL
Status: COMPLETED
Start: 2023-10-10 | End: 2023-10-10

## 2023-10-10 RX ORDER — POTASSIUM CHLORIDE 29.8 MG/ML
20 INJECTION INTRAVENOUS
Status: COMPLETED | OUTPATIENT
Start: 2023-10-10 | End: 2023-10-10

## 2023-10-10 RX ADMIN — HEPARIN SODIUM 5000 UNITS: 5000 INJECTION INTRAVENOUS; SUBCUTANEOUS at 08:13

## 2023-10-10 RX ADMIN — HEPARIN SODIUM 5000 UNITS: 5000 INJECTION INTRAVENOUS; SUBCUTANEOUS at 17:19

## 2023-10-10 RX ADMIN — CHLORHEXIDINE GLUCONATE 15 ML: 1.2 RINSE ORAL at 20:49

## 2023-10-10 RX ADMIN — HYDROMORPHONE HYDROCHLORIDE 0.25 MG: 1 INJECTION, SOLUTION INTRAMUSCULAR; INTRAVENOUS; SUBCUTANEOUS at 13:37

## 2023-10-10 RX ADMIN — PIPERACILLIN AND TAZOBACTAM 3375 MG: 3; .375 INJECTION, POWDER, LYOPHILIZED, FOR SOLUTION INTRAVENOUS at 01:28

## 2023-10-10 RX ADMIN — Medication: at 01:30

## 2023-10-10 RX ADMIN — I.V. FAT EMULSION 250 ML: 20 EMULSION INTRAVENOUS at 18:59

## 2023-10-10 RX ADMIN — ARFORMOTEROL TARTRATE 15 MCG: 15 SOLUTION RESPIRATORY (INHALATION) at 08:41

## 2023-10-10 RX ADMIN — BUDESONIDE 250 MCG: 0.25 INHALANT RESPIRATORY (INHALATION) at 08:41

## 2023-10-10 RX ADMIN — LORAZEPAM 0.5 MG: 2 INJECTION INTRAMUSCULAR; INTRAVENOUS at 09:05

## 2023-10-10 RX ADMIN — HEPARIN SODIUM 5000 UNITS: 5000 INJECTION INTRAVENOUS; SUBCUTANEOUS at 01:28

## 2023-10-10 RX ADMIN — Medication 10 ML: at 20:49

## 2023-10-10 RX ADMIN — Medication 10 ML: at 08:18

## 2023-10-10 RX ADMIN — CHLORHEXIDINE GLUCONATE 15 ML: 1.2 RINSE ORAL at 08:18

## 2023-10-10 RX ADMIN — Medication: at 17:20

## 2023-10-10 RX ADMIN — Medication: at 08:19

## 2023-10-10 RX ADMIN — PIPERACILLIN AND TAZOBACTAM 3375 MG: 3; .375 INJECTION, POWDER, LYOPHILIZED, FOR SOLUTION INTRAVENOUS at 09:38

## 2023-10-10 RX ADMIN — POTASSIUM CHLORIDE 20 MEQ: 29.8 INJECTION, SOLUTION INTRAVENOUS at 05:42

## 2023-10-10 RX ADMIN — PIPERACILLIN AND TAZOBACTAM 3375 MG: 3; .375 INJECTION, POWDER, LYOPHILIZED, FOR SOLUTION INTRAVENOUS at 17:19

## 2023-10-10 RX ADMIN — POTASSIUM CHLORIDE 20 MEQ: 29.8 INJECTION, SOLUTION INTRAVENOUS at 07:01

## 2023-10-10 RX ADMIN — MAGNESIUM SULFATE HEPTAHYDRATE: 500 INJECTION, SOLUTION INTRAMUSCULAR; INTRAVENOUS at 18:55

## 2023-10-10 RX ADMIN — BUDESONIDE 500 MCG: 0.5 INHALANT RESPIRATORY (INHALATION) at 20:40

## 2023-10-10 RX ADMIN — ARFORMOTEROL TARTRATE 15 MCG: 15 SOLUTION RESPIRATORY (INHALATION) at 20:40

## 2023-10-10 RX ADMIN — SODIUM CHLORIDE, PRESERVATIVE FREE 40 MG: 5 INJECTION INTRAVENOUS at 08:13

## 2023-10-10 RX ADMIN — CHLORHEXIDINE GLUCONATE 15 ML: 1.2 RINSE ORAL at 01:29

## 2023-10-10 RX ADMIN — POTASSIUM CHLORIDE 20 MEQ: 29.8 INJECTION, SOLUTION INTRAVENOUS at 08:15

## 2023-10-10 ASSESSMENT — PULMONARY FUNCTION TESTS
PIF_VALUE: 45
PIF_VALUE: 35
PIF_VALUE: 34
PIF_VALUE: 35

## 2023-10-10 ASSESSMENT — PAIN DESCRIPTION - LOCATION: LOCATION: OTHER (COMMENT);GENERALIZED

## 2023-10-10 ASSESSMENT — PAIN SCALES - WONG BAKER
WONGBAKER_NUMERICALRESPONSE: 0
WONGBAKER_NUMERICALRESPONSE: NO HURT

## 2023-10-10 ASSESSMENT — PAIN SCALES - GENERAL
PAINLEVEL_OUTOF10: 4
PAINLEVEL_OUTOF10: 0

## 2023-10-10 ASSESSMENT — PAIN DESCRIPTION - ORIENTATION: ORIENTATION: OTHER (COMMENT)

## 2023-10-10 ASSESSMENT — PAIN DESCRIPTION - DESCRIPTORS: DESCRIPTORS: OTHER (COMMENT)

## 2023-10-10 NOTE — PROGRESS NOTES
RT Note:  NIV Set up via Trach    Pt S/U on NIV vs Vent per NP.  RT adjusted to comfort. Pt tolerating AVAPS mode better. Will monitor.      10/10/23 0520   NIV Type   NIV Started/Stopped On   Equipment Type V60   Mode AVAPS   Mask Type Tracheostomy   Assessment   Pulse 58   Heart Rate Source Monitor   Respirations (!) 34   SpO2 97 %   Level of Consciousness 0   Comfort Level Good   Using Accessory Muscles No   Breath Sounds   Breath Sounds Bilateral Clear;Diminished   Settings/Measurements   PIP Observed 20 cm H20   CPAP/EPAP 5 cmH2O   IPAP Min 14 cmH2O   IPAP Max 24 cmH2O   Vt (Set, mL) 350 mL   Vt (Measured) 325 mL   Rate Ordered 8   Insp Rise Time (%) 2 %   FiO2  30 %   I Time/ I Time % 1 s   Minute Volume (L/min) 11.6 Liters   Mask Leak (lpm) 0 lpm   Patient's Home Machine No   Alarm Settings   Alarms On Y

## 2023-10-10 NOTE — WOUND CARE
WOCN Note:     Follow up sacral wound vac change. Chart reviewed. Assessed in room 7114. Admitted DX:  Hematemesis;GI bleed; Gastrointestinal hemorrhage; cdiff    Past Medical History: gastric bypass; asthma; cad; dm2  [facility]    Assessment:   Patient is vented via trach; requires assist with repositioning. Bed: cecelia  Patient has a engle and drains. Patient repositioned on right side. Generalized edema lower legs and feet. Heels offloaded with pillows. 1. POA Sacrum and bilateral buttocks, Stage 4 Pressure Injury with palpable bone: 7.8 x 7 x 0.7  cm; 40% yellow; 60% red; serous drainage in canister; no odor; cosntantine wound edges resurfaced with hyperpigmentation. Treatment:  Cleansed with Vashe; applied puracol ag collagen; resumed NPWT at 125 using 2 black, mepitel one and bridged to left side. 2. POA Right heel, now blanching pink erythema. 3.  POA Left heel, unstageable pressure injury:  scab has fallen off with open blanching pink wound bed surrounding blanching hyperpigmented skin. Wound, Pressure Prevention & Skin Care Recommendations:    Minimize layers of linen/pads under patient to optimize support surface. 2.  Turn/reposition approximately every 2 hours and offload heels. 3.  Manage moisture/ Keep skin folds clean and dry/minimize brief usage. 4.  Specialty bed:cecelia.  5.  Sacrum:  Continue NPWT at 125 mmHg with changes twice weekly. 6.  Heels:  Apply Venelex BID     Discussed above plan with RN. Transition of Care:   Plan to follow Tuesday and Friday for VAC changes.     RONALD ParekhN RN [facility] [place] [facility]  Available on [employer]  Office [phone]

## 2023-10-10 NOTE — PROGRESS NOTES
Spiritual Care Assessment/Progress Note  [facility]    Name: Ulysses Robbins MRN: [id number]    Age: 61 y.o. Sex: female   Language: English     Date: 10/10/2023            Total Time Calculated: 6 min              Spiritual Assessment begun in [facility] 7S2 INTENSIVE CARE  Service Provided For[de-identified] Patient  Referral/Consult From[de-identified] Rounding  Encounter Overview/Reason : Initial Encounter    Spiritual beliefs:      [] Involved in a ramez tradition/spiritual practice:      [] Supported by a ramez community:      [] Claims no spiritual orientation:      [] Seeking spiritual identity:           [] Adheres to an individual form of spirituality:      [x] Not able to assess:                Identified resources for coping and support system:   Support System: Family members       [] Prayer                  [] Devotional reading               [] Music                  [] Guided Imagery     [] Pet visits                                        [] Other: (COMMENT)     Specific area/focus of visit   Encounter:    Crisis:    Spiritual/Emotional needs: Type: Emotional Distress  Ritual, Rites and Sacraments:    Grief, Loss, and Adjustments: Type: Adjustment to illness  Ethics/Mediation:    Behavioral Health:    Palliative Care: Type: Palliative Care, Family Care  Advance Care Planning:      Plan/Referrals: Continue Support (comment) (The patient was not able to angage in conversation.)    Narrative: The patient had a feeding tube. Her mother in law was in the room. The patient seemed to be awake but could not communicate. The mother in law was passive and was not engaged in the conversation. The mother in law was appreciative though of the 's visit. The mother in law was informed that she could always call on always call the 's office in event the family decided to have spiritual care.     [address] [place] [address]

## 2023-10-10 NOTE — PROGRESS NOTES
Speech Therapy Contact Note    Chart reviewed. Note Rapid Response and transfer to ICU for respiratory decline. Patient currently on vent. Will hold SLP today and follow-up for PMV as appropriate.      Yordy Josue CCC-SLP

## 2023-10-10 NOTE — PROGRESS NOTES
5821: Rapid Response  Rapid response room 447 called at 0343. RRT responding. RRT called for patient o2 sats 75%, trach collar fio2 increased to 100%, deep suctioned with 14 Fr catheter, pt o2 sats improved to 100%. Family at bedside states \" patient pointed to chest when complaining of pain\" Troponin and ekg ordered. Pt remains tachypneic with RR in 45s. ABG obtained  pH 7.21, CO2 76.6, po2 136, hco3 30.7. ICU consulted by Arnold ODONNELL.    0400: Pt appears to be agonally breathing, moving very little air on auscultation. RRT paged to bedside, trach cuff inflated for rescue breathing with ambu bag. Defib pads placed on patient. Chest Xray obtained. 0578: ICU NP at bedside, orders received to transfer patient to ICU for ventilator/bipap      Sepsis Screening  Are two or more SIRS criteria present? Yes  If yes: SIRS Criteria: Heart rate (pulse) > 90 bpm and Respiration > 20/min  Is the patient's history suggestive of a new infection?  No

## 2023-10-10 NOTE — PROGRESS NOTES
Comprehensive Nutrition Assessment    Type and Reason for Visit: Reassess    Nutrition Recommendations/Plan:     -Increase CHO in  PN to 260 gm/day      -Continue cyclic PN (9552 ml, 435 gm protein, 260 gm CHO)         Malnutrition Assessment:  Malnutrition Status:  Severe malnutrition (08/01/23 1000)    Context:  Acute Illness     Findings of the 6 clinical characteristics of malnutrition:  Energy Intake:  50% or less of estimated energy requirements for 5 or more days  Weight Loss:  Unable to assess     Body Fat Loss:  Mild body fat loss Buccal region, Orbital   Muscle Mass Loss: Moderate muscle mass loss Temples (temporalis), Clavicles (pectoralis & deltoids)  Fluid Accumulation:  Moderate to Severe Generalized, Extremities   Strength:  Not Performed       Nutrition Assessment:    Pt admitted from [facility] d/t GI Bleed. Recent extended hospitalization @ [facility]-readmitted after episode of fulminant colitis requiring subtotal colectomy with end ileostomy, s/p reversal and subsequent ileocolonic anastomosis; EGD 7/13/23 showed nl esophagus, small hiatal hernia, evidence of previous RYGB with a tight stricture and ulcer at the gastro-jejunal anastomosis-s/p dilatation; recurrent C Diff colitis. PMHx: Gastric bypass 2017, CAD, DM 2, Dyslipidemia, GERD, PE.    10/10: Pt transferred out of the unit x 2 in the past week. Returned 10/5 after becoming unresponsive (?oversedated) and placed back on the vent. Weaned back to [facility] and moved out yesterday. Unfortunately back to the unit overnight d/t hypercapnic respiratory failure-now on the vent. Possible return to the OR later this week to d/t ongoing ileocolonic leak per surgery. Patient remains on TPN. Weight down to 45.5 kg (100#)-no longer edematous. Suspect pt now at dry weight; reflect 35# difference from admission weight (includes significant edema). Recommend increasing calories provided by PN by providing 260 gm CHO per day (4.0 mg/kg/min).  This will increase

## 2023-10-10 NOTE — PROGRESS NOTES
Recurrent respiratory failure, returned to ICU, back on mechanical ventilation; increased secretions noted but CXR with improved aeration. Findings discussed with patient/mother-in-law, ICU staff; will reassess Wednesday for feasibility of proceed with laparotomy, ileostomy on Thursday. Continue wound VAC, gastrostomy to gravity, drain to suction, TPN/lipids.

## 2023-10-10 NOTE — PROGRESS NOTES
[facility] Daily Progress Note. Name: Marleni Cobos   : 1960   MRN: [id number]   Date: 10/10/2023        SUMMARY:    61 F initially adm  to  from Rehab facility ([facility]) with  weakness and Hematemesis. Previously, she had been hospitalized for fulminant C. difficile colitis requiring subtotal colectomy with ileostomy.  she underwent US guided drainage of an intra-abd fluid collection which grew E. coli. She completed a course of Zosyn and drain removed 8/10.   G-tube was placed. On  patient transferred to the ICU with acute hypoxic respiratory failure d/t pulmonary edema requiring diuresis and eventual intubation ( - ). Reintubated  due to recurrent acute hypoxic respiratory failure. ICU course complicated by persistent leukocytosis, fevers and CT evidence of intra-abdominal fluid collection post ultrasound drainage. Repeat imaging  did not reveal any drainable fluid collection or free air. Underwent trancheostomy tube placement  9/3, thoracentesis on . On 9/15 she started trach collar trials. She was transferred out of the ICU on . Trach changed to 6 cuffless Shiley on . Surgical plan appears to be possible laparotomy, lysis of adhesions, diversion proximal to leak versus resection of anastomosis inclusive of leak with end ileostomy. On 10/5, transferred back to ICU with depressed LOC after several days of increasing O2 requirements. ABG showed respiratory acidosis (7.01/103/78) and leukocytosis of 37 from 16. She was transferred back to ICU 10/05 with trach changed back to 6 cuffed and placed on ventilator for management of hypercapnic respiratory failure. 10/09 Off vent > 24 hrs. Fully intact cognitively. Texting on phone. Frustrated with inability to phonate. Transferred out of ICU only to return several hours later with AMS, respiratory distress and hypercarbia. Placed back on vent support  10/10 RASS -1.  Remains on full vent

## 2023-10-10 NOTE — PROGRESS NOTES
Edison Tolentino MD, 650 mg at 09/27/23 2335    glucose chewable tablet 16 g, 4 tablet, Oral, PRN, Cordelia Jenkins MD    dextrose bolus 10% 125 mL, 125 mL, IntraVENous, PRN, Stopped at 08/01/23 0538 **OR** dextrose bolus 10% 250 mL, 250 mL, IntraVENous, PRN, Cordelia Jenkins MD, Stopped at 07/28/23 2319    glucagon injection 1 mg, 1 mg, SubCUTAneous, PRN, Cordelia Jenkins MD    dextrose 10 % infusion, , IntraVENous, Continuous PRN, Cordelia Jenkins MD      Labs:  Recent Results (from the past 24 hour(s))   POCT Glucose    Collection Time: 10/10/23 12:53 AM   Result Value Ref Range    POC Glucose 120 (H) 65 - 117 mg/dL    Performed by: Perfecto Song    Arterial Blood Gas, POC    Collection Time: 10/10/23  3:53 AM   Result Value Ref Range    FIO2 100 %    pH, Arterial, POC 7.21 (LL) 7.35 - 7.45      pCO2, Arterial, POC 76.6 (H) 35.0 - 45.0 MMHG    pO2, Arterial,  (H) 80 - 100 MMHG    HCO3, Mixed 30.7 (H) 22 - 26 MMOL/L    SO2c, Arterial, POC 98.2 (H) 92 - 97 %    Base Excess 1.8 mmol/L    Site RIGHT RADIAL      DEVICE T-COLLAR      POC Cory's Test Negative      Specimen type: ARTERIAL      Critical Value Read Back CACHORRO    EKG 12 Lead    Collection Time: 10/10/23  3:55 AM   Result Value Ref Range    Ventricular Rate 92 BPM    Atrial Rate 92 BPM    P-R Interval 118 ms    QRS Duration 76 ms    Q-T Interval 324 ms    QTc Calculation (Bazett) 400 ms    P Axis 18 degrees    R Axis 18 degrees    T Axis -58 degrees    Diagnosis       Normal sinus rhythm  Left ventricular hypertrophy with repolarization abnormality  When compared with ECG of 06-AUG-2023 20:36,  Sinus rhythm has replaced Junctional rhythm  Criteria for Anteroseptal infarct are no longer present  Non-specific change in ST segment in Anterior leads  Nonspecific T wave abnormality no longer evident in Inferior leads  T wave inversion less evident in Anterolateral leads     Troponin    Collection Time: 10/10/23  4:12 AM   Result Value Ref Range

## 2023-10-10 NOTE — PROGRESS NOTES
TRANSFER - IN REPORT:    Verbal report received from [facility] on Nimo   being received from Luis Muniz RN for change in patient condition (Respiratory Status)      Report consisted of patient's Situation, Background, Assessment and   Recommendations(SBAR). Information from the following report(s) Nurse Handoff Report, Index, ED SBAR, Adult Overview, and Surgery Report was reviewed with the receiving nurse. Opportunity for questions and clarification was provided. Assessment completed upon patient's arrival to unit and care assumed. 1693 - Pt arrive on unit. RT and Jaison NP at bedside    0545 - Pt Bradycardic in the 46s. Fabby Lopez NP notified and at bedside.

## 2023-10-10 NOTE — PROGRESS NOTES
distress. Comments: Cachectic   HENT:      Head: Normocephalic. Eyes:      General: No scleral icterus. Conjunctiva/sclera: Conjunctivae normal.      Pupils: Pupils are equal, round, and reactive to light. Cardiovascular:      Rate and Rhythm: Normal rate and regular rhythm. Pulses: Normal pulses. Pulmonary:      Effort: Pulmonary effort is normal.      Breath sounds: Normal breath sounds. Comments: Diminished at bases  Abdominal:      General: Abdomen is flat. There is no distension. Palpations: Abdomen is soft. Tenderness: There is no abdominal tenderness. Musculoskeletal:      Right lower leg: No edema. Left lower leg: No edema. Skin:     General: Skin is warm and dry. Neurological:      General: No focal deficit present. Past Medical History:   Reviewed - note prior history of DVT    Past Surgical History:   Reviewed    Home Medications:   Reviewed    Allergies/Social/Family History:   Reviewed    LABS AND  DATA:   Reviewed        CONSULTANT NOTE  I had a face to face encounter with the patient, reviewed and interpreted patient data including clinical events, labs, images, vital signs, I/O's, and examined patient. I have discussed the case and the plan and management of the patient's care with the consulting services, the bedside nurses and the respiratory therapist.      NOTE OF PERSONAL INVOLVEMENT IN CARE   This patient has a high probability of imminent, clinically significant deterioration, which requires the highest level of preparedness to intervene urgently. I participated in the decision-making and personally managed or directed the management of the following life and organ supporting interventions that required my frequent assessment to treat or prevent imminent deterioration.         GARETT Cazares NP   [address]  [phone]  10/10/2023

## 2023-10-11 LAB
ANION GAP SERPL CALC-SCNC: 3 MMOL/L (ref 5–15)
BUN SERPL-MCNC: 47 MG/DL (ref 6–20)
BUN/CREAT SERPL: 102 (ref 12–20)
CALCIUM SERPL-MCNC: 9.6 MG/DL (ref 8.5–10.1)
CHLORIDE SERPL-SCNC: 114 MMOL/L (ref 97–108)
CO2 SERPL-SCNC: 27 MMOL/L (ref 21–32)
CREAT SERPL-MCNC: 0.46 MG/DL (ref 0.55–1.02)
ERYTHROCYTE [DISTWIDTH] IN BLOOD BY AUTOMATED COUNT: 16 % (ref 11.5–14.5)
GLUCOSE BLD STRIP.AUTO-MCNC: 115 MG/DL (ref 65–117)
GLUCOSE BLD STRIP.AUTO-MCNC: 130 MG/DL (ref 65–117)
GLUCOSE SERPL-MCNC: 138 MG/DL (ref 65–100)
HCT VFR BLD AUTO: 26.2 % (ref 35–47)
HGB BLD-MCNC: 7.9 G/DL (ref 11.5–16)
MAGNESIUM SERPL-MCNC: 2.1 MG/DL (ref 1.6–2.4)
MCH RBC QN AUTO: 29.2 PG (ref 26–34)
MCHC RBC AUTO-ENTMCNC: 30.2 G/DL (ref 30–36.5)
MCV RBC AUTO: 96.7 FL (ref 80–99)
NRBC # BLD: 0.04 K/UL (ref 0–0.01)
NRBC BLD-RTO: 0.4 PER 100 WBC
PHOSPHATE SERPL-MCNC: 1.5 MG/DL (ref 2.6–4.7)
PLATELET # BLD AUTO: 369 K/UL (ref 150–400)
PMV BLD AUTO: 9.6 FL (ref 8.9–12.9)
POTASSIUM SERPL-SCNC: 4.2 MMOL/L (ref 3.5–5.1)
RBC # BLD AUTO: 2.71 M/UL (ref 3.8–5.2)
SERVICE CMNT-IMP: ABNORMAL
SERVICE CMNT-IMP: NORMAL
SODIUM SERPL-SCNC: 144 MMOL/L (ref 136–145)
WBC # BLD AUTO: 9.9 K/UL (ref 3.6–11)

## 2023-10-11 PROCEDURE — 2580000003 HC RX 258: Performed by: INTERNAL MEDICINE

## 2023-10-11 PROCEDURE — 36415 COLL VENOUS BLD VENIPUNCTURE: CPT

## 2023-10-11 PROCEDURE — 97530 THERAPEUTIC ACTIVITIES: CPT

## 2023-10-11 PROCEDURE — 94640 AIRWAY INHALATION TREATMENT: CPT

## 2023-10-11 PROCEDURE — 6360000002 HC RX W HCPCS: Performed by: NURSE PRACTITIONER

## 2023-10-11 PROCEDURE — 84100 ASSAY OF PHOSPHORUS: CPT

## 2023-10-11 PROCEDURE — 2500000003 HC RX 250 WO HCPCS: Performed by: NURSE PRACTITIONER

## 2023-10-11 PROCEDURE — C9113 INJ PANTOPRAZOLE SODIUM, VIA: HCPCS | Performed by: NURSE PRACTITIONER

## 2023-10-11 PROCEDURE — 80048 BASIC METABOLIC PNL TOTAL CA: CPT

## 2023-10-11 PROCEDURE — 94003 VENT MGMT INPAT SUBQ DAY: CPT

## 2023-10-11 PROCEDURE — 6370000000 HC RX 637 (ALT 250 FOR IP): Performed by: INTERNAL MEDICINE

## 2023-10-11 PROCEDURE — 97168 OT RE-EVAL EST PLAN CARE: CPT

## 2023-10-11 PROCEDURE — 83735 ASSAY OF MAGNESIUM: CPT

## 2023-10-11 PROCEDURE — 2580000003 HC RX 258: Performed by: NURSE PRACTITIONER

## 2023-10-11 PROCEDURE — 2500000003 HC RX 250 WO HCPCS: Performed by: INTERNAL MEDICINE

## 2023-10-11 PROCEDURE — 82962 GLUCOSE BLOOD TEST: CPT

## 2023-10-11 PROCEDURE — 97164 PT RE-EVAL EST PLAN CARE: CPT

## 2023-10-11 PROCEDURE — 2000000000 HC ICU R&B

## 2023-10-11 PROCEDURE — 2580000003 HC RX 258: Performed by: SURGERY

## 2023-10-11 PROCEDURE — A4216 STERILE WATER/SALINE, 10 ML: HCPCS | Performed by: NURSE PRACTITIONER

## 2023-10-11 PROCEDURE — 85027 COMPLETE CBC AUTOMATED: CPT

## 2023-10-11 PROCEDURE — 6360000002 HC RX W HCPCS: Performed by: INTERNAL MEDICINE

## 2023-10-11 PROCEDURE — 6360000002 HC RX W HCPCS: Performed by: ANESTHESIOLOGY

## 2023-10-11 RX ORDER — ENOXAPARIN SODIUM 100 MG/ML
30 INJECTION SUBCUTANEOUS EVERY EVENING
Status: DISCONTINUED | OUTPATIENT
Start: 2023-10-11 | End: 2023-10-19

## 2023-10-11 RX ORDER — IPRATROPIUM BROMIDE AND ALBUTEROL SULFATE 2.5; .5 MG/3ML; MG/3ML
1 SOLUTION RESPIRATORY (INHALATION)
Status: DISCONTINUED | OUTPATIENT
Start: 2023-10-11 | End: 2023-10-12

## 2023-10-11 RX ORDER — ENOXAPARIN SODIUM 100 MG/ML
40 INJECTION SUBCUTANEOUS DAILY
Status: DISCONTINUED | OUTPATIENT
Start: 2023-10-11 | End: 2023-10-11 | Stop reason: DRUGHIGH

## 2023-10-11 RX ORDER — IPRATROPIUM BROMIDE AND ALBUTEROL SULFATE 2.5; .5 MG/3ML; MG/3ML
1 SOLUTION RESPIRATORY (INHALATION)
Status: DISCONTINUED | OUTPATIENT
Start: 2023-10-11 | End: 2023-10-19

## 2023-10-11 RX ADMIN — I.V. FAT EMULSION 250 ML: 20 EMULSION INTRAVENOUS at 18:12

## 2023-10-11 RX ADMIN — Medication 10 ML: at 20:05

## 2023-10-11 RX ADMIN — Medication: at 00:00

## 2023-10-11 RX ADMIN — CHLORHEXIDINE GLUCONATE 15 ML: 1.2 RINSE ORAL at 08:15

## 2023-10-11 RX ADMIN — PIPERACILLIN AND TAZOBACTAM 3375 MG: 3; .375 INJECTION, POWDER, LYOPHILIZED, FOR SOLUTION INTRAVENOUS at 17:20

## 2023-10-11 RX ADMIN — Medication 10 ML: at 08:16

## 2023-10-11 RX ADMIN — SODIUM PHOSPHATE, MONOBASIC, MONOHYDRATE AND SODIUM PHOSPHATE, DIBASIC, ANHYDROUS 30 MMOL: 142; 276 INJECTION, SOLUTION INTRAVENOUS at 06:44

## 2023-10-11 RX ADMIN — SODIUM CHLORIDE, PRESERVATIVE FREE 40 MG: 5 INJECTION INTRAVENOUS at 08:15

## 2023-10-11 RX ADMIN — CHLORHEXIDINE GLUCONATE 15 ML: 1.2 RINSE ORAL at 20:04

## 2023-10-11 RX ADMIN — ENOXAPARIN SODIUM 30 MG: 100 INJECTION SUBCUTANEOUS at 17:32

## 2023-10-11 RX ADMIN — HYDROMORPHONE HYDROCHLORIDE 0.25 MG: 1 INJECTION, SOLUTION INTRAMUSCULAR; INTRAVENOUS; SUBCUTANEOUS at 14:08

## 2023-10-11 RX ADMIN — LORAZEPAM 0.5 MG: 2 INJECTION INTRAMUSCULAR; INTRAVENOUS at 00:22

## 2023-10-11 RX ADMIN — BUDESONIDE 250 MCG: 0.25 INHALANT RESPIRATORY (INHALATION) at 21:23

## 2023-10-11 RX ADMIN — LORAZEPAM 0.5 MG: 2 INJECTION INTRAMUSCULAR; INTRAVENOUS at 22:51

## 2023-10-11 RX ADMIN — Medication: at 08:15

## 2023-10-11 RX ADMIN — HEPARIN SODIUM 5000 UNITS: 5000 INJECTION INTRAVENOUS; SUBCUTANEOUS at 00:30

## 2023-10-11 RX ADMIN — HYDROMORPHONE HYDROCHLORIDE 0.25 MG: 1 INJECTION, SOLUTION INTRAMUSCULAR; INTRAVENOUS; SUBCUTANEOUS at 04:37

## 2023-10-11 RX ADMIN — LORAZEPAM 0.5 MG: 2 INJECTION INTRAMUSCULAR; INTRAVENOUS at 12:57

## 2023-10-11 RX ADMIN — PIPERACILLIN AND TAZOBACTAM 3375 MG: 3; .375 INJECTION, POWDER, LYOPHILIZED, FOR SOLUTION INTRAVENOUS at 10:19

## 2023-10-11 RX ADMIN — HEPARIN SODIUM 5000 UNITS: 5000 INJECTION INTRAVENOUS; SUBCUTANEOUS at 08:15

## 2023-10-11 RX ADMIN — IPRATROPIUM BROMIDE AND ALBUTEROL SULFATE 1 DOSE: .5; 3 SOLUTION RESPIRATORY (INHALATION) at 21:23

## 2023-10-11 RX ADMIN — Medication: at 16:57

## 2023-10-11 RX ADMIN — HYDROMORPHONE HYDROCHLORIDE 0.25 MG: 1 INJECTION, SOLUTION INTRAMUSCULAR; INTRAVENOUS; SUBCUTANEOUS at 10:24

## 2023-10-11 RX ADMIN — BUDESONIDE 250 MCG: 0.25 INHALANT RESPIRATORY (INHALATION) at 09:20

## 2023-10-11 RX ADMIN — IPRATROPIUM BROMIDE AND ALBUTEROL SULFATE 1 DOSE: .5; 3 SOLUTION RESPIRATORY (INHALATION) at 17:13

## 2023-10-11 RX ADMIN — HYDROMORPHONE HYDROCHLORIDE 0.25 MG: 1 INJECTION, SOLUTION INTRAMUSCULAR; INTRAVENOUS; SUBCUTANEOUS at 21:45

## 2023-10-11 RX ADMIN — MAGNESIUM SULFATE HEPTAHYDRATE: 500 INJECTION, SOLUTION INTRAMUSCULAR; INTRAVENOUS at 18:18

## 2023-10-11 RX ADMIN — IPRATROPIUM BROMIDE AND ALBUTEROL SULFATE 1 DOSE: .5; 3 SOLUTION RESPIRATORY (INHALATION) at 09:22

## 2023-10-11 RX ADMIN — IPRATROPIUM BROMIDE AND ALBUTEROL SULFATE 1 DOSE: .5; 3 SOLUTION RESPIRATORY (INHALATION) at 13:22

## 2023-10-11 RX ADMIN — PIPERACILLIN AND TAZOBACTAM 3375 MG: 3; .375 INJECTION, POWDER, LYOPHILIZED, FOR SOLUTION INTRAVENOUS at 01:00

## 2023-10-11 ASSESSMENT — PAIN DESCRIPTION - ORIENTATION
ORIENTATION: OTHER (COMMENT)
ORIENTATION: OTHER (COMMENT)
ORIENTATION: LEFT

## 2023-10-11 ASSESSMENT — PULMONARY FUNCTION TESTS
PIF_VALUE: 27
PIF_VALUE: 27
PIF_VALUE: 29
PIF_VALUE: 29
PIF_VALUE: 31
PIF_VALUE: 24
PIF_VALUE: 29

## 2023-10-11 ASSESSMENT — PAIN DESCRIPTION - LOCATION
LOCATION: ABDOMEN

## 2023-10-11 ASSESSMENT — PAIN DESCRIPTION - DESCRIPTORS
DESCRIPTORS: ACHING;CRAMPING
DESCRIPTORS: ACHING;CRAMPING

## 2023-10-11 ASSESSMENT — PAIN SCALES - GENERAL
PAINLEVEL_OUTOF10: 5

## 2023-10-11 ASSESSMENT — PAIN SCALES - WONG BAKER
WONGBAKER_NUMERICALRESPONSE: NO HURT
WONGBAKER_NUMERICALRESPONSE: 0

## 2023-10-11 NOTE — PLAN OF CARE
in no apparent distress in bed, Call bell within reach, Bed/ chair alarm activated, Caregiver / family present, Side rails x3, and Heels elevated for pressure relief    COMMUNICATION/EDUCATION:   The patient's plan of care was discussed with: physical therapist and registered nurse    Patient Education  Education Given To: Patient  Education Provided: Role of Therapy;Plan of Care;Transfer Training; Fall Prevention Strategies; Equipment;ADL Adaptive Strategies; Energy Conservation;Precautions; Home Exercise Program;Family Education;Orientation  Education Method: Demonstration;Verbal;Teach Back  Barriers to Learning: Cognition  Education Outcome: Continued education needed;Verbalized understanding    Thank you for this referral.  MELISSA Jaimes, OTR/L  Minutes: 20

## 2023-10-11 NOTE — PROGRESS NOTES
Lovenox Dosing Update  Indication: DVT Prophylaxis  Recent Labs     10/09/23  0416 10/10/23  0414 10/11/23  0447   HGB 8.1* 8.4* 7.9*    406* 369     Current Weight: 45.5 kg  Est. CrCl = ~90 ml/min  Current Dose: 40 mg subcutaneously every 24 hours. Plan: Change to 30 mg every 24 hours per the Enoxaparin Dosing, Rounding & Dispensing Guidelines.

## 2023-10-11 NOTE — PROGRESS NOTES
[facility] Daily Progress Note. Name: Marleni Cobos   : 1960   MRN: [id number]   Date: 10/11/2023        SUMMARY:    61 F initially adm  to  from Rehab facility ([facility]) with  weakness and Hematemesis. Previously, she had been hospitalized for fulminant C. difficile colitis requiring subtotal colectomy with ileostomy.  she underwent US guided drainage of an intra-abd fluid collection which grew E. coli. She completed a course of Zosyn and drain removed 8/10.   G-tube was placed. On  patient transferred to the ICU with acute hypoxic respiratory failure d/t pulmonary edema requiring diuresis and eventual intubation ( - ). Reintubated  due to recurrent acute hypoxic respiratory failure. ICU course complicated by persistent leukocytosis, fevers and CT evidence of intra-abdominal fluid collection post ultrasound drainage. Repeat imaging  did not reveal any drainable fluid collection or free air. Underwent trancheostomy tube placement  9/3, thoracentesis on . On 9/15 she started trach collar trials. She was transferred out of the ICU on . Trach changed to 6 cuffless Shiley on . Surgical plan appears to be possible laparotomy, lysis of adhesions, diversion proximal to leak versus resection of anastomosis inclusive of leak with end ileostomy. On 10/5, transferred back to ICU with depressed LOC after several days of increasing O2 requirements. ABG showed respiratory acidosis (7.01/103/78) and leukocytosis of 37 from 16. She was transferred back to ICU 10/05 with trach changed back to 6 cuffed and placed on ventilator for management of hypercapnic respiratory failure. 10/09 Off vent > 24 hrs. Fully intact cognitively. Texting on phone. Frustrated with inability to phonate. Transferred out of ICU only to return several hours later with AMS, respiratory distress and hypercarbia. Placed back on vent support  10/10 RASS -1.  Remains on full vent

## 2023-10-11 NOTE — PROGRESS NOTES
SLP Contact Note    Discussed pt with RT [facility] and [facility]. Recommend a hold on trialing PMV for right now given anxiety. Will continue to follow.       Thank you,  Hao Chan M.Ed, PhD(c), CCC-SLP  Speech-Language Pathologist

## 2023-10-12 LAB
ALBUMIN SERPL-MCNC: 1.9 G/DL (ref 3.5–5)
ALBUMIN/GLOB SERPL: 0.5 (ref 1.1–2.2)
ALP SERPL-CCNC: 172 U/L (ref 45–117)
ALT SERPL-CCNC: 37 U/L (ref 12–78)
ANION GAP SERPL CALC-SCNC: 4 MMOL/L (ref 5–15)
AST SERPL-CCNC: 24 U/L (ref 15–37)
BILIRUB DIRECT SERPL-MCNC: 0.1 MG/DL (ref 0–0.2)
BILIRUB SERPL-MCNC: 0.3 MG/DL (ref 0.2–1)
BUN SERPL-MCNC: 40 MG/DL (ref 6–20)
BUN/CREAT SERPL: 118 (ref 12–20)
CALCIUM SERPL-MCNC: 8.3 MG/DL (ref 8.5–10.1)
CHLORIDE SERPL-SCNC: 114 MMOL/L (ref 97–108)
CO2 SERPL-SCNC: 25 MMOL/L (ref 21–32)
CREAT SERPL-MCNC: 0.34 MG/DL (ref 0.55–1.02)
ERYTHROCYTE [DISTWIDTH] IN BLOOD BY AUTOMATED COUNT: 16.5 % (ref 11.5–14.5)
GLOBULIN SER CALC-MCNC: 3.5 G/DL (ref 2–4)
GLUCOSE BLD STRIP.AUTO-MCNC: 106 MG/DL (ref 65–117)
GLUCOSE BLD STRIP.AUTO-MCNC: 114 MG/DL (ref 65–117)
GLUCOSE SERPL-MCNC: 118 MG/DL (ref 65–100)
HCT VFR BLD AUTO: 22.2 % (ref 35–47)
HCT VFR BLD AUTO: 27.6 % (ref 35–47)
HGB BLD-MCNC: 6.8 G/DL (ref 11.5–16)
HGB BLD-MCNC: 8.6 G/DL (ref 11.5–16)
HISTORY CHECK: NORMAL
MAGNESIUM SERPL-MCNC: 1.8 MG/DL (ref 1.6–2.4)
MCH RBC QN AUTO: 29.4 PG (ref 26–34)
MCHC RBC AUTO-ENTMCNC: 30.6 G/DL (ref 30–36.5)
MCV RBC AUTO: 96.1 FL (ref 80–99)
NRBC # BLD: 0.03 K/UL (ref 0–0.01)
NRBC BLD-RTO: 0.3 PER 100 WBC
PHOSPHATE SERPL-MCNC: 3.6 MG/DL (ref 2.6–4.7)
PLATELET # BLD AUTO: 357 K/UL (ref 150–400)
PMV BLD AUTO: 9.7 FL (ref 8.9–12.9)
POTASSIUM SERPL-SCNC: 4.5 MMOL/L (ref 3.5–5.1)
PROT SERPL-MCNC: 5.4 G/DL (ref 6.4–8.2)
RBC # BLD AUTO: 2.31 M/UL (ref 3.8–5.2)
SERVICE CMNT-IMP: NORMAL
SERVICE CMNT-IMP: NORMAL
SODIUM SERPL-SCNC: 143 MMOL/L (ref 136–145)
WBC # BLD AUTO: 10.1 K/UL (ref 3.6–11)

## 2023-10-12 PROCEDURE — 85014 HEMATOCRIT: CPT

## 2023-10-12 PROCEDURE — 94003 VENT MGMT INPAT SUBQ DAY: CPT

## 2023-10-12 PROCEDURE — 2500000003 HC RX 250 WO HCPCS: Performed by: INTERNAL MEDICINE

## 2023-10-12 PROCEDURE — 85018 HEMOGLOBIN: CPT

## 2023-10-12 PROCEDURE — 80076 HEPATIC FUNCTION PANEL: CPT

## 2023-10-12 PROCEDURE — 86923 COMPATIBILITY TEST ELECTRIC: CPT

## 2023-10-12 PROCEDURE — 82962 GLUCOSE BLOOD TEST: CPT

## 2023-10-12 PROCEDURE — 94640 AIRWAY INHALATION TREATMENT: CPT

## 2023-10-12 PROCEDURE — 86901 BLOOD TYPING SEROLOGIC RH(D): CPT

## 2023-10-12 PROCEDURE — A4216 STERILE WATER/SALINE, 10 ML: HCPCS | Performed by: NURSE PRACTITIONER

## 2023-10-12 PROCEDURE — 6360000002 HC RX W HCPCS: Performed by: SURGERY

## 2023-10-12 PROCEDURE — C9113 INJ PANTOPRAZOLE SODIUM, VIA: HCPCS | Performed by: NURSE PRACTITIONER

## 2023-10-12 PROCEDURE — 2580000003 HC RX 258: Performed by: SURGERY

## 2023-10-12 PROCEDURE — 2580000003 HC RX 258: Performed by: INTERNAL MEDICINE

## 2023-10-12 PROCEDURE — 6360000002 HC RX W HCPCS: Performed by: INTERNAL MEDICINE

## 2023-10-12 PROCEDURE — 6370000000 HC RX 637 (ALT 250 FOR IP): Performed by: INTERNAL MEDICINE

## 2023-10-12 PROCEDURE — 2500000003 HC RX 250 WO HCPCS: Performed by: NURSE PRACTITIONER

## 2023-10-12 PROCEDURE — 6360000002 HC RX W HCPCS: Performed by: ANESTHESIOLOGY

## 2023-10-12 PROCEDURE — 2580000003 HC RX 258: Performed by: NURSE PRACTITIONER

## 2023-10-12 PROCEDURE — 86850 RBC ANTIBODY SCREEN: CPT

## 2023-10-12 PROCEDURE — 84100 ASSAY OF PHOSPHORUS: CPT

## 2023-10-12 PROCEDURE — 2000000000 HC ICU R&B

## 2023-10-12 PROCEDURE — 36430 TRANSFUSION BLD/BLD COMPNT: CPT

## 2023-10-12 PROCEDURE — 6360000002 HC RX W HCPCS: Performed by: NURSE PRACTITIONER

## 2023-10-12 PROCEDURE — 85027 COMPLETE CBC AUTOMATED: CPT

## 2023-10-12 PROCEDURE — 80048 BASIC METABOLIC PNL TOTAL CA: CPT

## 2023-10-12 PROCEDURE — P9016 RBC LEUKOCYTES REDUCED: HCPCS

## 2023-10-12 PROCEDURE — 36415 COLL VENOUS BLD VENIPUNCTURE: CPT

## 2023-10-12 PROCEDURE — 83735 ASSAY OF MAGNESIUM: CPT

## 2023-10-12 PROCEDURE — 86900 BLOOD TYPING SEROLOGIC ABO: CPT

## 2023-10-12 RX ORDER — LORAZEPAM 2 MG/ML
1 INJECTION INTRAMUSCULAR EVERY 8 HOURS
Status: DISCONTINUED | OUTPATIENT
Start: 2023-10-12 | End: 2023-10-12

## 2023-10-12 RX ORDER — ARFORMOTEROL TARTRATE 15 UG/2ML
15 SOLUTION RESPIRATORY (INHALATION)
Status: DISCONTINUED | OUTPATIENT
Start: 2023-10-12 | End: 2023-10-19

## 2023-10-12 RX ORDER — ESCITALOPRAM OXALATE 5 MG/5ML
10 SOLUTION ORAL DAILY
Status: DISCONTINUED | OUTPATIENT
Start: 2023-10-12 | End: 2023-10-12

## 2023-10-12 RX ORDER — MAGNESIUM SULFATE IN WATER 40 MG/ML
2000 INJECTION, SOLUTION INTRAVENOUS ONCE
Status: COMPLETED | OUTPATIENT
Start: 2023-10-12 | End: 2023-10-12

## 2023-10-12 RX ORDER — BUDESONIDE 0.5 MG/2ML
0.5 INHALANT ORAL
Status: DISCONTINUED | OUTPATIENT
Start: 2023-10-12 | End: 2023-10-19

## 2023-10-12 RX ORDER — LORAZEPAM 2 MG/ML
1 INJECTION INTRAMUSCULAR 3 TIMES DAILY
Status: DISCONTINUED | OUTPATIENT
Start: 2023-10-12 | End: 2023-10-20 | Stop reason: HOSPADM

## 2023-10-12 RX ORDER — MIRTAZAPINE 15 MG/1
15 TABLET, ORALLY DISINTEGRATING ORAL NIGHTLY
Status: DISCONTINUED | OUTPATIENT
Start: 2023-10-12 | End: 2023-10-19

## 2023-10-12 RX ADMIN — CHLORHEXIDINE GLUCONATE 15 ML: 1.2 RINSE ORAL at 08:11

## 2023-10-12 RX ADMIN — PIPERACILLIN AND TAZOBACTAM 3375 MG: 3; .375 INJECTION, POWDER, LYOPHILIZED, FOR SOLUTION INTRAVENOUS at 09:44

## 2023-10-12 RX ADMIN — LORAZEPAM 1 MG: 2 INJECTION INTRAMUSCULAR; INTRAVENOUS at 20:37

## 2023-10-12 RX ADMIN — Medication: at 17:26

## 2023-10-12 RX ADMIN — Medication 10 ML: at 08:13

## 2023-10-12 RX ADMIN — MAGNESIUM SULFATE HEPTAHYDRATE: 500 INJECTION, SOLUTION INTRAMUSCULAR; INTRAVENOUS at 18:14

## 2023-10-12 RX ADMIN — CHLORHEXIDINE GLUCONATE 15 ML: 1.2 RINSE ORAL at 20:37

## 2023-10-12 RX ADMIN — BUDESONIDE 250 MCG: 0.25 INHALANT RESPIRATORY (INHALATION) at 08:04

## 2023-10-12 RX ADMIN — MAGNESIUM SULFATE HEPTAHYDRATE 2000 MG: 40 INJECTION, SOLUTION INTRAVENOUS at 06:25

## 2023-10-12 RX ADMIN — ARFORMOTEROL TARTRATE 15 MCG: 15 SOLUTION RESPIRATORY (INHALATION) at 22:12

## 2023-10-12 RX ADMIN — MAGNESIUM SULFATE HEPTAHYDRATE 2000 MG: 40 INJECTION, SOLUTION INTRAVENOUS at 12:23

## 2023-10-12 RX ADMIN — IPRATROPIUM BROMIDE AND ALBUTEROL SULFATE 1 DOSE: .5; 3 SOLUTION RESPIRATORY (INHALATION) at 08:04

## 2023-10-12 RX ADMIN — SODIUM CHLORIDE, PRESERVATIVE FREE 40 MG: 5 INJECTION INTRAVENOUS at 08:11

## 2023-10-12 RX ADMIN — ENOXAPARIN SODIUM 30 MG: 100 INJECTION SUBCUTANEOUS at 17:30

## 2023-10-12 RX ADMIN — HYDROMORPHONE HYDROCHLORIDE 0.5 MG: 1 INJECTION, SOLUTION INTRAMUSCULAR; INTRAVENOUS; SUBCUTANEOUS at 08:11

## 2023-10-12 RX ADMIN — PIPERACILLIN AND TAZOBACTAM 3375 MG: 3; .375 INJECTION, POWDER, LYOPHILIZED, FOR SOLUTION INTRAVENOUS at 17:25

## 2023-10-12 RX ADMIN — Medication: at 08:11

## 2023-10-12 RX ADMIN — BUDESONIDE 500 MCG: 0.5 INHALANT RESPIRATORY (INHALATION) at 22:12

## 2023-10-12 RX ADMIN — PROCHLORPERAZINE EDISYLATE 10 MG: 5 INJECTION, SOLUTION INTRAMUSCULAR; INTRAVENOUS at 20:37

## 2023-10-12 RX ADMIN — Medication: at 00:23

## 2023-10-12 RX ADMIN — HYDROMORPHONE HYDROCHLORIDE 0.5 MG: 1 INJECTION, SOLUTION INTRAMUSCULAR; INTRAVENOUS; SUBCUTANEOUS at 12:23

## 2023-10-12 RX ADMIN — PIPERACILLIN AND TAZOBACTAM 3375 MG: 3; .375 INJECTION, POWDER, LYOPHILIZED, FOR SOLUTION INTRAVENOUS at 01:00

## 2023-10-12 RX ADMIN — Medication 10 ML: at 20:37

## 2023-10-12 RX ADMIN — I.V. FAT EMULSION 250 ML: 20 EMULSION INTRAVENOUS at 18:14

## 2023-10-12 RX ADMIN — LORAZEPAM 1 MG: 2 INJECTION INTRAMUSCULAR; INTRAVENOUS at 14:38

## 2023-10-12 ASSESSMENT — PAIN DESCRIPTION - ORIENTATION
ORIENTATION: OTHER (COMMENT)
ORIENTATION: OTHER (COMMENT)

## 2023-10-12 ASSESSMENT — PULMONARY FUNCTION TESTS
PIF_VALUE: 33
PIF_VALUE: 30
PIF_VALUE: 18
PIF_VALUE: 22

## 2023-10-12 ASSESSMENT — PAIN DESCRIPTION - LOCATION
LOCATION: GENERALIZED
LOCATION: ABDOMEN

## 2023-10-12 ASSESSMENT — PAIN DESCRIPTION - DESCRIPTORS
DESCRIPTORS: ACHING
DESCRIPTORS: ACHING

## 2023-10-12 ASSESSMENT — PAIN SCALES - GENERAL
PAINLEVEL_OUTOF10: 7
PAINLEVEL_OUTOF10: 9

## 2023-10-12 ASSESSMENT — PAIN SCALES - WONG BAKER
WONGBAKER_NUMERICALRESPONSE: NO HURT
WONGBAKER_NUMERICALRESPONSE: 0

## 2023-10-12 NOTE — PROGRESS NOTES
[facility] ICU Progress Note        Taryn Francis  1960  [id number]  10/12/2023      Assessment and plan:  Recurrent acute hypoxic/hypercapnic respiratory failure:  -failure to liberate from mechanical ventilation  -failure to thrive  -neuromuscular weakness with hypercapnia   -no improvement since trach placed  -limitations are tachypnea, anxiety and NM weakness  -RR is 30s chronically     Tracheostomy status:  not moving towards liberation  -actually moving backward and potentially vent dependent at least at hs     Acute anemia:  -hb from 8 --> 6  -related to severe malnutrition, bone marrow depletion  -check iron level in am after 2 units today   -changed labs to [facility]     Anxiety:  -add scheduled ativan  -add remeron ODT at hs for sleep     Intra-abdominal abscesses:  with drains   -surgery following   -plan for return to OR next week   Severe protein-calorie malnutrition - on TPN  Profound weight loss and deconditioning  Hypernatremia   Sacral pressure ulcer - wound vac in place    HPI:  Failure to thrive. Not improved. Recurrent hypercapnia bringing her back to ICU and now twice we have failed to support her on the floor. Events prompting ICU re-admit mirror failure to thrive:    ABG showed respiratory acidosis (7.01/103/78) and leukocytosis of 37 from 16. She was transferred back to ICU 10/05 with trach changed back to 6 cuffed and placed on ventilator for management of hypercapnic respiratory failure. She was transferred out of the ICU on 10/9. On 10/10, she had episode of SOB. Rapid response called and she was found to be hypercapnic again with ABG (7.21/77/136). She was bagged for hypoxia on the monitor. Transferred back to ICU for vent as they can not do BiPAP on the floor with a tracheostomy. ICU DAILY CHECKLIST     Code Status:full   DVT Prophylaxis:loveonx  T/L/D: trach, PIVs, engle   SUP: not indicated   Diet: TPN.   Not able to take PO due to

## 2023-10-12 NOTE — PROGRESS NOTES
MD requested the ventilator to be changed from a [employer] 840 to a [employer] 980. RN manually ventilated patient while I switched the ventilators. Pt tolerated well.

## 2023-10-12 NOTE — CARE COORDINATION
Transition of Care     RUR: 24% High  Prior Level of Functioning: Independent   Disposition: LTAC Vs IPR penidng ventilator weaning   Follow up appointments: PCP, Gen Surg   DME needed: TBD  Transportation at discharge: BLS vs ALS  IM/IMM Medicare 7/29/23   Is patient a [place] and connected with [facility]? No  Caregiver Contact:    Jame Baker Turkmen: [phone]  Discharge Caregiver contacted prior to discharge? Yes  Care Conference needed?  No  Barriers to discharge:    Medical stability  S/P trach placement 9/7, # 6 Shiley  Remains vented, failing SBT  Plan for OR next week   TPN  Wound Vac to sacral wound   Andreina Eldridge RN,Care Management

## 2023-10-12 NOTE — PROGRESS NOTES
SLP Contact Note    Noted SLP orders have been cancelled. This SLP will continue to follow for trach team, but if PMV trials want to be reinitiated please reconsult SLP.       Thank you,  Glenn Noguera M.Ed, PhD(c), CCC-SLP  Speech-Language Pathologist

## 2023-10-13 PROBLEM — R06.02 SHORTNESS OF BREATH: Status: ACTIVE | Noted: 2023-01-01

## 2023-10-13 PROBLEM — E43 SEVERE PROTEIN-CALORIE MALNUTRITION (HCC): Status: ACTIVE | Noted: 2023-01-01

## 2023-10-13 LAB
ABO + RH BLD: NORMAL
ANION GAP SERPL CALC-SCNC: 7 MMOL/L (ref 5–15)
ARTERIAL PATENCY WRIST A: ABNORMAL
BASE EXCESS BLD CALC-SCNC: 4.6 MMOL/L
BDY SITE: ABNORMAL
BLD PROD TYP BPU: NORMAL
BLOOD BANK DISPENSE STATUS: NORMAL
BLOOD GROUP ANTIBODIES SERPL: NORMAL
BPU ID: NORMAL
BUN SERPL-MCNC: 39 MG/DL (ref 6–20)
BUN/CREAT SERPL: 105 (ref 12–20)
CALCIUM SERPL-MCNC: 9.4 MG/DL (ref 8.5–10.1)
CHLORIDE SERPL-SCNC: 107 MMOL/L (ref 97–108)
CO2 SERPL-SCNC: 28 MMOL/L (ref 21–32)
CREAT SERPL-MCNC: 0.37 MG/DL (ref 0.55–1.02)
CROSSMATCH RESULT: NORMAL
ERYTHROCYTE [DISTWIDTH] IN BLOOD BY AUTOMATED COUNT: 16.2 % (ref 11.5–14.5)
GAS FLOW.O2 O2 DELIVERY SYS: ABNORMAL
GAS FLOW.O2 SETTING OXYMISER: 16 BPM
GLUCOSE SERPL-MCNC: 71 MG/DL (ref 65–100)
HCO3 BLD-SCNC: 28.8 MMOL/L (ref 22–26)
HCT VFR BLD AUTO: 30.2 % (ref 35–47)
HGB BLD-MCNC: 9.7 G/DL (ref 11.5–16)
IRON SATN MFR SERPL: 15 % (ref 20–50)
IRON SERPL-MCNC: 26 UG/DL (ref 35–150)
MAGNESIUM SERPL-MCNC: 2.8 MG/DL (ref 1.6–2.4)
MCH RBC QN AUTO: 28.8 PG (ref 26–34)
MCHC RBC AUTO-ENTMCNC: 32.1 G/DL (ref 30–36.5)
MCV RBC AUTO: 89.6 FL (ref 80–99)
NRBC # BLD: 0.02 K/UL (ref 0–0.01)
NRBC BLD-RTO: 0.2 PER 100 WBC
O2/TOTAL GAS SETTING VFR VENT: 25 %
PAW @ MEAN EXP FLOW ON VENT: 11 CMH2O
PCO2 BLD: 40.3 MMHG (ref 35–45)
PEEP RESPIRATORY: 5 CMH2O
PH BLD: 7.46 (ref 7.35–7.45)
PHOSPHATE SERPL-MCNC: 3.7 MG/DL (ref 2.6–4.7)
PLATELET # BLD AUTO: 368 K/UL (ref 150–400)
PMV BLD AUTO: 9.8 FL (ref 8.9–12.9)
PO2 BLD: 85 MMHG (ref 80–100)
POTASSIUM SERPL-SCNC: 4.7 MMOL/L (ref 3.5–5.1)
RBC # BLD AUTO: 3.37 M/UL (ref 3.8–5.2)
SAO2 % BLD: 96.9 % (ref 92–97)
SODIUM SERPL-SCNC: 142 MMOL/L (ref 136–145)
SPECIMEN EXP DATE BLD: NORMAL
SPECIMEN TYPE: ABNORMAL
TIBC SERPL-MCNC: 171 UG/DL (ref 250–450)
UNIT DIVISION: 0
VENTILATION MODE VENT: ABNORMAL
VT SETTING VENT: 300 ML
WBC # BLD AUTO: 11.3 K/UL (ref 3.6–11)

## 2023-10-13 PROCEDURE — 83735 ASSAY OF MAGNESIUM: CPT

## 2023-10-13 PROCEDURE — 94003 VENT MGMT INPAT SUBQ DAY: CPT

## 2023-10-13 PROCEDURE — 85027 COMPLETE CBC AUTOMATED: CPT

## 2023-10-13 PROCEDURE — 94640 AIRWAY INHALATION TREATMENT: CPT

## 2023-10-13 PROCEDURE — C9113 INJ PANTOPRAZOLE SODIUM, VIA: HCPCS | Performed by: NURSE PRACTITIONER

## 2023-10-13 PROCEDURE — 36600 WITHDRAWAL OF ARTERIAL BLOOD: CPT

## 2023-10-13 PROCEDURE — 84100 ASSAY OF PHOSPHORUS: CPT

## 2023-10-13 PROCEDURE — 83550 IRON BINDING TEST: CPT

## 2023-10-13 PROCEDURE — 2500000003 HC RX 250 WO HCPCS: Performed by: NURSE PRACTITIONER

## 2023-10-13 PROCEDURE — 80048 BASIC METABOLIC PNL TOTAL CA: CPT

## 2023-10-13 PROCEDURE — 2580000003 HC RX 258: Performed by: NURSE PRACTITIONER

## 2023-10-13 PROCEDURE — 82803 BLOOD GASES ANY COMBINATION: CPT

## 2023-10-13 PROCEDURE — 97606 NEG PRS WND THER DME>50 SQCM: CPT

## 2023-10-13 PROCEDURE — 6360000002 HC RX W HCPCS: Performed by: NURSE PRACTITIONER

## 2023-10-13 PROCEDURE — 6370000000 HC RX 637 (ALT 250 FOR IP): Performed by: INTERNAL MEDICINE

## 2023-10-13 PROCEDURE — 99231 SBSQ HOSP IP/OBS SF/LOW 25: CPT | Performed by: INTERNAL MEDICINE

## 2023-10-13 PROCEDURE — 6360000002 HC RX W HCPCS: Performed by: ANESTHESIOLOGY

## 2023-10-13 PROCEDURE — A4216 STERILE WATER/SALINE, 10 ML: HCPCS | Performed by: NURSE PRACTITIONER

## 2023-10-13 PROCEDURE — 6360000002 HC RX W HCPCS: Performed by: INTERNAL MEDICINE

## 2023-10-13 PROCEDURE — 99233 SBSQ HOSP IP/OBS HIGH 50: CPT | Performed by: PHYSICAL MEDICINE & REHABILITATION

## 2023-10-13 PROCEDURE — 2580000003 HC RX 258: Performed by: INTERNAL MEDICINE

## 2023-10-13 PROCEDURE — G0316 PR PROLONG INPT EVAL ADD15 M: HCPCS | Performed by: PHYSICAL MEDICINE & REHABILITATION

## 2023-10-13 PROCEDURE — 2500000003 HC RX 250 WO HCPCS: Performed by: INTERNAL MEDICINE

## 2023-10-13 PROCEDURE — 83540 ASSAY OF IRON: CPT

## 2023-10-13 PROCEDURE — 2580000003 HC RX 258: Performed by: SURGERY

## 2023-10-13 PROCEDURE — 36415 COLL VENOUS BLD VENIPUNCTURE: CPT

## 2023-10-13 PROCEDURE — 2000000000 HC ICU R&B

## 2023-10-13 RX ORDER — HYDROMORPHONE HYDROCHLORIDE 1 MG/ML
1 INJECTION, SOLUTION INTRAMUSCULAR; INTRAVENOUS; SUBCUTANEOUS EVERY 4 HOURS PRN
Status: DISCONTINUED | OUTPATIENT
Start: 2023-10-13 | End: 2023-10-18

## 2023-10-13 RX ADMIN — MIRTAZAPINE 15 MG: 15 TABLET, ORALLY DISINTEGRATING ORAL at 20:58

## 2023-10-13 RX ADMIN — SODIUM CHLORIDE, PRESERVATIVE FREE 40 MG: 5 INJECTION INTRAVENOUS at 08:39

## 2023-10-13 RX ADMIN — ARFORMOTEROL TARTRATE 15 MCG: 15 SOLUTION RESPIRATORY (INHALATION) at 08:18

## 2023-10-13 RX ADMIN — Medication: at 14:50

## 2023-10-13 RX ADMIN — LORAZEPAM 1 MG: 2 INJECTION INTRAMUSCULAR; INTRAVENOUS at 14:45

## 2023-10-13 RX ADMIN — Medication: at 08:44

## 2023-10-13 RX ADMIN — BUDESONIDE 500 MCG: 0.5 INHALANT RESPIRATORY (INHALATION) at 19:48

## 2023-10-13 RX ADMIN — LORAZEPAM 1 MG: 2 INJECTION INTRAMUSCULAR; INTRAVENOUS at 20:17

## 2023-10-13 RX ADMIN — HYDROMORPHONE HYDROCHLORIDE 0.5 MG: 1 INJECTION, SOLUTION INTRAMUSCULAR; INTRAVENOUS; SUBCUTANEOUS at 06:18

## 2023-10-13 RX ADMIN — IRON SUCROSE 300 MG: 20 INJECTION, SOLUTION INTRAVENOUS at 16:22

## 2023-10-13 RX ADMIN — Medication 10 ML: at 09:11

## 2023-10-13 RX ADMIN — Medication: at 00:00

## 2023-10-13 RX ADMIN — I.V. FAT EMULSION 250 ML: 20 EMULSION INTRAVENOUS at 19:01

## 2023-10-13 RX ADMIN — CHLORHEXIDINE GLUCONATE 15 ML: 1.2 RINSE ORAL at 20:05

## 2023-10-13 RX ADMIN — CHLORHEXIDINE GLUCONATE 15 ML: 1.2 RINSE ORAL at 08:44

## 2023-10-13 RX ADMIN — LORAZEPAM 1 MG: 2 INJECTION INTRAMUSCULAR; INTRAVENOUS at 08:39

## 2023-10-13 RX ADMIN — HYDROMORPHONE HYDROCHLORIDE 1 MG: 1 INJECTION, SOLUTION INTRAMUSCULAR; INTRAVENOUS; SUBCUTANEOUS at 14:46

## 2023-10-13 RX ADMIN — HYDROMORPHONE HYDROCHLORIDE 0.5 MG: 1 INJECTION, SOLUTION INTRAMUSCULAR; INTRAVENOUS; SUBCUTANEOUS at 10:20

## 2023-10-13 RX ADMIN — HYDROMORPHONE HYDROCHLORIDE 1 MG: 1 INJECTION, SOLUTION INTRAMUSCULAR; INTRAVENOUS; SUBCUTANEOUS at 19:53

## 2023-10-13 RX ADMIN — BUDESONIDE 500 MCG: 0.5 INHALANT RESPIRATORY (INHALATION) at 08:18

## 2023-10-13 RX ADMIN — Medication 10 ML: at 20:05

## 2023-10-13 RX ADMIN — MAGNESIUM SULFATE HEPTAHYDRATE: 500 INJECTION, SOLUTION INTRAMUSCULAR; INTRAVENOUS at 18:56

## 2023-10-13 RX ADMIN — ARFORMOTEROL TARTRATE 15 MCG: 15 SOLUTION RESPIRATORY (INHALATION) at 19:48

## 2023-10-13 RX ADMIN — ENOXAPARIN SODIUM 30 MG: 100 INJECTION SUBCUTANEOUS at 18:02

## 2023-10-13 ASSESSMENT — PAIN DESCRIPTION - DESCRIPTORS
DESCRIPTORS: ACHING
DESCRIPTORS: ACHING

## 2023-10-13 ASSESSMENT — PAIN DESCRIPTION - LOCATION
LOCATION: ABDOMEN

## 2023-10-13 ASSESSMENT — PAIN SCALES - GENERAL
PAINLEVEL_OUTOF10: 8
PAINLEVEL_OUTOF10: 8
PAINLEVEL_OUTOF10: 0
PAINLEVEL_OUTOF10: 0
PAINLEVEL_OUTOF10: 8
PAINLEVEL_OUTOF10: 8

## 2023-10-13 ASSESSMENT — PAIN DESCRIPTION - ORIENTATION
ORIENTATION: LEFT
ORIENTATION: MID
ORIENTATION: RIGHT
ORIENTATION: RIGHT

## 2023-10-13 ASSESSMENT — PULMONARY FUNCTION TESTS
PIF_VALUE: 24
PIF_VALUE: 22
PIF_VALUE: 18
PIF_VALUE: 24
PIF_VALUE: 19
PIF_VALUE: 23
PIF_VALUE: 28

## 2023-10-13 NOTE — PROGRESS NOTES
Physical Therapy  10/13/23    Chart reviewed. Attempted to see for PT treatment however undergoing wound care at this time. Will follow up as able/appropriate.      Thank you,   Matilda Zacarias, PT, DPT

## 2023-10-13 NOTE — WOUND CARE
WOCN Note:     Follow up sacral wound vac change. Chart reviewed. Assessed in room 7114. Admitted DX:  Hematemesis;GI bleed; Gastrointestinal hemorrhage; cdiff    Past Medical History: gastric bypass; asthma; cad; dm2  [facility]    Assessment:   Patient is vented via trach; requires assist with repositioning. Bed: cecelia  Patient has a pure wick and drains. Patient repositioned on right side. Generalized edema lower legs and feet. Heels offloaded with pillows. 1. POA Sacrum and bilateral buttocks, Stage 4 Pressure Injury with palpable bone: 7.8 x 7 x 0.7  cm; 40% yellow; 60% red; serous drainage in canister; no odor; constantine wound edges resurfaced with hyperpigmentation. Treatment:  Cleansed with Vashe; applied puracol ag collagen; resumed NPWT at 125 using 2 black, mepitel one and bridged to left side. 2. POA Right heel, now blanching pink erythema. 3.  POA Left heel, unstageable pressure injury:  scab has fallen off (0.5 x 0.5 x 0.2) with open blanching pink wound bed surrounding blanching hyperpigmented skin. Wound, Pressure Prevention & Skin Care Recommendations:    Minimize layers of linen/pads under patient to optimize support surface. 2.  Turn/reposition approximately every 2 hours and offload heels. 3.  Manage moisture/ Keep skin folds clean and dry/minimize brief usage. 4.  Specialty bed:[facility].  5.  Sacrum:  Continue NPWT at 125 mmHg with changes twice weekly. 6.  Heels:  Apply Venelex BID     Discussed above plan with RN. Transition of Care:   Plan to follow Tuesday and Friday for VAC changes.     RONALD FernandezN RN [facility] Inpatient Wound Care  Available on [employer]  Office [phone]

## 2023-10-13 NOTE — PROGRESS NOTES
Palliative Medicine   [place] [phone] - [phone] (COPE)    Palliative Medicine is familiar with this patient from earlier this admission- this writer spoke with family in August, but our team signed off late August given that goals were clear for full restorative measures and patient working with therapies- patient has not been stable to leave the hospital, and has been coming back and forth from intermediate care to ICU. Our team met with the patient at bedside- she was able to make needs known, mouthing words to family- was able to make out the word \"back\" and indicated to family wanting to be turned. The patient's brother Lance Lieberman was at bedside, (he is  to STUART Rivas [phone]), and the patient's [address] [facility] Kathlee Form - spouse - mother [phone]). Leeann was visibly upset- she took our team outside and discussed how unhappy they were with the care at the hospital, and verbalized their frustration about this hospital stay and that hospice was brought up and wanted to know about our involvement. We explained that our team is separate from hospice, sometimes we do overlap, but our team is really extra support and help families navigate complex medical stays. Leeann was tearful at times, clearly trying to hold back emotion about how frustrated she was- SW acknowledged the difficult hospital stay and acknowledged how much she cares for Meme Penaloza in trying to advocate for her. Leeann shares that [employer] didn't fail- the hospital failed\" and verbalized that whenever the patient is transferred out of ICU she decompensates and needs to come back- Leeann attributes this to the care being received and decisions by physicians, not her medical condition- she shares that Meme Penaloza was making progress and just a week ago was able to sit up in the chair.      Family asked us Maricarmen Inks can be done\" and we verbalize that we can call Patient Advocacy for the family regarding their

## 2023-10-13 NOTE — PROGRESS NOTES
[facility] ICU Progress Note        Susie Roberts  1960  [id number]  10/13/2023      Assessment and plan:  Recurrent acute hypoxic/hypercapnic respiratory failure:  -cont mechanical ventilation  -vent changes made  -SBT daily    -failure to liberate from mechanical ventilation due to NM weakness, hypercapnia   -failure to thrive  -neuromuscular weakness with hypercapnia   -no improvement since trach placed  -limitations are tachypnea, anxiety and NM weakness which are not improving and only getting worse with time and her clinical decline    -RR is 30s chronically   -cont budesonide and brovona      Tracheostomy status: not moving towards liberation  -actually moving backward and potentially vent dependent at least at hs   -cannot tolerate TCT with tachynea, she fatigues and develops recurrent hypercapnia >>100     Acute anemia:  -hb from 8 --> 6  -related to severe malnutrition, bone marrow depletion  -2 units yesterday  -start venofer 300 mg x 3 days   -changed labs to [facility]      Anxiety:  -cont scheduled ativan  -cont remeron ODT at hs for sleep      Intra-abdominal abscesses:  with drains   -surgery following   -plan for return to OR next week ? Concerned this will not solve her problem. TF will be perilous with her post bypass anatomy and she will continue to suffer malabsorption problems   Severe protein-calorie malnutrition - on TPN for months and markers of nutrition have not improved  Profound weight loss and deconditioning  Hypernatremia   Sacral pressure ulcer with bone exposed . Not infected but not healing due to severe malnutrition     Plan for family meeting Monday afternoon 6pm.  We have failed to achieve the metrics we hoped on placing the trach.    She is not stronger  Not mobile  Not well nourished  Has not healed her anastomatic leak to tolerate enteral nutrition   She is not liberated from the vent  She is incredibly weak and this has not improved since placement of trach

## 2023-10-13 NOTE — PROGRESS NOTES
RT Note:  Vent Assessment     10/12/23 1380   Vent Information   Ventilator Day(s) 3   Ventilator ID [id number]   Equipment Changed Suction catheter   Vent Mode AC/VC  (VC+)   Ventilator Settings   FiO2  (S)  30 %  (weaned to 25% per protocol)   Insp Time (sec) 0.83 sec  (1:3.5)   Vt (Set, mL) (S)  300 mL  (Changed at 1058 per vent logbook)   Resp Rate (Set) 16 bmp   PEEP/CPAP (cmH2O) 5   Vent Patient Data (Readings)   Vt (Measured) 297 mL   Peak Inspiratory Pressure (cmH2O) 22 cmH2O   Rate Measured 22 br/min   Minute Volume (L/min) 7.35 Liters   Mean Airway Pressure (cmH2O) 11 cmH20   Plateau Pressure (cm H2O) 19 cm H2O   Driving Pressure 14   I:E Ratio 1:1.7   Flow Sensitivity 3 L/min   PEEP Intrinsic (cm H2O)   (unable to obtain d/t spot breathing)   Static Compliance (L/cm H2O) 18   Insp Rise Time (%) 70 %   Treatment   $Treatment Type $Inhaled Therapy/Meds

## 2023-10-13 NOTE — PROGRESS NOTES
Occupational Therapy  10/13/23    Chart reviewed. Attempted to see for OT treatment however undergoing wound care at this time. Will follow up as able/appropriate.      Thank you,   Geena Alexander, OTD, OTR/L

## 2023-10-13 NOTE — CONSULTS
167 lb 4.8 oz (75.9 kg)   03/24/23 137 lb 3.2 oz (62.2 kg)   03/20/23 142 lb 3.2 oz (64.5 kg)   12/07/22 135 lb (61.2 kg)   11/29/22 139 lb 6.4 oz (63.2 kg)   10/28/22 136 lb (61.7 kg)   08/24/22 140 lb (63.5 kg)   07/15/22 142 lb (64.4 kg)        Current Diet: PN-Adult 2-in-1 Central Line (Standard)       PSYCHOSOCIAL/SPIRITUAL SCREENING:   Palliative IDT has assessed this patient for cultural preferences / practices and a referral made as appropriate to needs (Cultural Services, Patient Advocacy, Ethics, etc.)    Spiritual Affiliation: [employer]    Any spiritual / Faith concerns:  [] Yes /  [x] No   If \"Yes\" to discuss with pastoral care during IDT     Does caregiver feel burdened by caring for their loved one:   [] Yes /  [x] No /  [] No Caregiver Present/Available [] No Caregiver [] Pt Lives at [facility]  If \"Yes\" to discuss with social work during IDT    Anticipatory grief assessment:   [x] Normal  / [] Maladaptive     If \"Maladaptive\" to discuss with social work during IDT    ESAS Anxiety:      ESAS Depression:          LAB AND IMAGING FINDINGS:   Objective data reviewed:  labs, images, records, medication use, vitals, and chart     FINAL COMMENTS   Thank you for allowing Palliative Medicine to participate in the care of Marleni Cobos. Only check if applicable and billing time based rather than MDM  [x] The total encounter time on this service date was __75__ minutes which was spent performing a face-to-face encounter and personally completing the provider-level activities documented in the note. This includes time spent prior to the visit and after the visit in direct care of the patient. This time does not include time spent in any separately reportable services.     Electronically signed by   Deepthi Tafoya MD  Palliative Care Team  on 10/13/2023 at 12:28 PM

## 2023-10-13 NOTE — PROGRESS NOTES
Ongoing need for mechanical ventilation. Findings, concerns discussed with ICU attending. Later, called , to discuss our concerns, regarding her inability to liberate from the anticoagulation, ongoing severe debility, poor progress with regards to physical therapy, Occupational Therapy, opioid dependence. I reviewed that she has made little progress since tracheostomy and has not demonstrated ability to remain off ventilator for meaningful periods of time. We discussed laparotomy, washout, diversion, which would be a high risk procedure, and would only achieve limited goals (possible transition from TPN to tube feeds); this goal will be limited by her bariatric anatomy, which would likely lead to high output ileostomy, fluid electrolyte derangement. We discussed his concerns with recommendations for considering hospice, and I recommended he and additional family members be willing to proceed with meeting on Monday evening to discuss goals of care, lack of progress with optimal medical management, options, next steps. She appreciated the phone call and discussion, is willing to attend meeting on Monday. I reviewed that we will continue all measures over the weekend, to include TPN, efforts at physical therapy, wound VAC care, pain management. All questions answered.

## 2023-10-14 LAB
ANION GAP SERPL CALC-SCNC: 4 MMOL/L (ref 5–15)
BASOPHILS # BLD: 0.1 K/UL (ref 0–0.1)
BASOPHILS NFR BLD: 1 % (ref 0–1)
BUN SERPL-MCNC: 40 MG/DL (ref 6–20)
BUN/CREAT SERPL: 103 (ref 12–20)
CALCIUM SERPL-MCNC: 9 MG/DL (ref 8.5–10.1)
CHLORIDE SERPL-SCNC: 106 MMOL/L (ref 97–108)
CO2 SERPL-SCNC: 26 MMOL/L (ref 21–32)
CREAT SERPL-MCNC: 0.39 MG/DL (ref 0.55–1.02)
DIFFERENTIAL METHOD BLD: ABNORMAL
EOSINOPHIL # BLD: 0.3 K/UL (ref 0–0.4)
EOSINOPHIL NFR BLD: 3 % (ref 0–7)
ERYTHROCYTE [DISTWIDTH] IN BLOOD BY AUTOMATED COUNT: 16.2 % (ref 11.5–14.5)
GLUCOSE SERPL-MCNC: 119 MG/DL (ref 65–100)
HCT VFR BLD AUTO: 31.6 % (ref 35–47)
HGB BLD-MCNC: 10 G/DL (ref 11.5–16)
IMM GRANULOCYTES # BLD AUTO: 0 K/UL
IMM GRANULOCYTES NFR BLD AUTO: 0 %
LYMPHOCYTES # BLD: 1.3 K/UL (ref 0.8–3.5)
LYMPHOCYTES NFR BLD: 12 % (ref 12–49)
MCH RBC QN AUTO: 29.2 PG (ref 26–34)
MCHC RBC AUTO-ENTMCNC: 31.6 G/DL (ref 30–36.5)
MCV RBC AUTO: 92.1 FL (ref 80–99)
MONOCYTES # BLD: 0.9 K/UL (ref 0–1)
MONOCYTES NFR BLD: 8 % (ref 5–13)
MYELOCYTES NFR BLD MANUAL: 1 %
NEUTS BAND NFR BLD MANUAL: 4 % (ref 0–6)
NEUTS SEG # BLD: 8.3 K/UL (ref 1.8–8)
NEUTS SEG NFR BLD: 71 % (ref 32–75)
NRBC # BLD: 0 K/UL (ref 0–0.01)
NRBC BLD-RTO: 0 PER 100 WBC
PLATELET # BLD AUTO: 348 K/UL (ref 150–400)
PLATELET COMMENT: ABNORMAL
PMV BLD AUTO: 9.8 FL (ref 8.9–12.9)
POTASSIUM SERPL-SCNC: 4.8 MMOL/L (ref 3.5–5.1)
RBC # BLD AUTO: 3.43 M/UL (ref 3.8–5.2)
RBC MORPH BLD: ABNORMAL
SODIUM SERPL-SCNC: 136 MMOL/L (ref 136–145)
WBC # BLD AUTO: 11 K/UL (ref 3.6–11)

## 2023-10-14 PROCEDURE — 2580000003 HC RX 258: Performed by: ANESTHESIOLOGY

## 2023-10-14 PROCEDURE — 6360000002 HC RX W HCPCS: Performed by: NURSE PRACTITIONER

## 2023-10-14 PROCEDURE — 2000000000 HC ICU R&B

## 2023-10-14 PROCEDURE — 2580000003 HC RX 258: Performed by: INTERNAL MEDICINE

## 2023-10-14 PROCEDURE — A4216 STERILE WATER/SALINE, 10 ML: HCPCS | Performed by: NURSE PRACTITIONER

## 2023-10-14 PROCEDURE — 2580000003 HC RX 258: Performed by: SURGERY

## 2023-10-14 PROCEDURE — 6360000002 HC RX W HCPCS: Performed by: SURGERY

## 2023-10-14 PROCEDURE — 2500000003 HC RX 250 WO HCPCS: Performed by: ANESTHESIOLOGY

## 2023-10-14 PROCEDURE — 6370000000 HC RX 637 (ALT 250 FOR IP): Performed by: INTERNAL MEDICINE

## 2023-10-14 PROCEDURE — 36415 COLL VENOUS BLD VENIPUNCTURE: CPT

## 2023-10-14 PROCEDURE — 6360000002 HC RX W HCPCS: Performed by: INTERNAL MEDICINE

## 2023-10-14 PROCEDURE — 80048 BASIC METABOLIC PNL TOTAL CA: CPT

## 2023-10-14 PROCEDURE — 6360000002 HC RX W HCPCS: Performed by: ANESTHESIOLOGY

## 2023-10-14 PROCEDURE — 94003 VENT MGMT INPAT SUBQ DAY: CPT

## 2023-10-14 PROCEDURE — 2580000003 HC RX 258: Performed by: NURSE PRACTITIONER

## 2023-10-14 PROCEDURE — 2500000003 HC RX 250 WO HCPCS: Performed by: NURSE PRACTITIONER

## 2023-10-14 PROCEDURE — 94640 AIRWAY INHALATION TREATMENT: CPT

## 2023-10-14 PROCEDURE — C9113 INJ PANTOPRAZOLE SODIUM, VIA: HCPCS | Performed by: NURSE PRACTITIONER

## 2023-10-14 PROCEDURE — 6370000000 HC RX 637 (ALT 250 FOR IP): Performed by: SURGERY

## 2023-10-14 PROCEDURE — 85025 COMPLETE CBC W/AUTO DIFF WBC: CPT

## 2023-10-14 PROCEDURE — 51798 US URINE CAPACITY MEASURE: CPT

## 2023-10-14 RX ADMIN — LORAZEPAM 1 MG: 2 INJECTION INTRAMUSCULAR; INTRAVENOUS at 14:10

## 2023-10-14 RX ADMIN — HYDROMORPHONE HYDROCHLORIDE 1 MG: 1 INJECTION, SOLUTION INTRAMUSCULAR; INTRAVENOUS; SUBCUTANEOUS at 16:07

## 2023-10-14 RX ADMIN — LORAZEPAM 1 MG: 2 INJECTION INTRAMUSCULAR; INTRAVENOUS at 21:02

## 2023-10-14 RX ADMIN — MIRTAZAPINE 15 MG: 15 TABLET, ORALLY DISINTEGRATING ORAL at 22:13

## 2023-10-14 RX ADMIN — LORAZEPAM 1 MG: 2 INJECTION INTRAMUSCULAR; INTRAVENOUS at 08:40

## 2023-10-14 RX ADMIN — Medication 10 ML: at 21:04

## 2023-10-14 RX ADMIN — HYDROMORPHONE HYDROCHLORIDE 1 MG: 1 INJECTION, SOLUTION INTRAMUSCULAR; INTRAVENOUS; SUBCUTANEOUS at 08:40

## 2023-10-14 RX ADMIN — Medication 10 ML: at 10:50

## 2023-10-14 RX ADMIN — MAGNESIUM SULFATE HEPTAHYDRATE: 500 INJECTION, SOLUTION INTRAMUSCULAR; INTRAVENOUS at 18:57

## 2023-10-14 RX ADMIN — HYDROMORPHONE HYDROCHLORIDE 1 MG: 1 INJECTION, SOLUTION INTRAMUSCULAR; INTRAVENOUS; SUBCUTANEOUS at 12:07

## 2023-10-14 RX ADMIN — BUDESONIDE 500 MCG: 0.5 INHALANT RESPIRATORY (INHALATION) at 21:27

## 2023-10-14 RX ADMIN — Medication: at 16:10

## 2023-10-14 RX ADMIN — CHLORHEXIDINE GLUCONATE 15 ML: 1.2 RINSE ORAL at 21:03

## 2023-10-14 RX ADMIN — IRON SUCROSE 300 MG: 20 INJECTION, SOLUTION INTRAVENOUS at 14:10

## 2023-10-14 RX ADMIN — SODIUM CHLORIDE, PRESERVATIVE FREE 40 MG: 5 INJECTION INTRAVENOUS at 09:00

## 2023-10-14 RX ADMIN — BUDESONIDE 500 MCG: 0.5 INHALANT RESPIRATORY (INHALATION) at 08:16

## 2023-10-14 RX ADMIN — ARFORMOTEROL TARTRATE 15 MCG: 15 SOLUTION RESPIRATORY (INHALATION) at 21:27

## 2023-10-14 RX ADMIN — HYDROMORPHONE HYDROCHLORIDE 1 MG: 1 INJECTION, SOLUTION INTRAMUSCULAR; INTRAVENOUS; SUBCUTANEOUS at 03:12

## 2023-10-14 RX ADMIN — ONDANSETRON 4 MG: 2 INJECTION INTRAMUSCULAR; INTRAVENOUS at 15:14

## 2023-10-14 RX ADMIN — HYDROMORPHONE HYDROCHLORIDE 1 MG: 1 INJECTION, SOLUTION INTRAMUSCULAR; INTRAVENOUS; SUBCUTANEOUS at 21:10

## 2023-10-14 RX ADMIN — ONDANSETRON 4 MG: 2 INJECTION INTRAMUSCULAR; INTRAVENOUS at 22:43

## 2023-10-14 RX ADMIN — CHLORHEXIDINE GLUCONATE 15 ML: 1.2 RINSE ORAL at 08:40

## 2023-10-14 RX ADMIN — Medication: at 00:06

## 2023-10-14 RX ADMIN — ARFORMOTEROL TARTRATE 15 MCG: 15 SOLUTION RESPIRATORY (INHALATION) at 08:16

## 2023-10-14 RX ADMIN — I.V. FAT EMULSION 250 ML: 20 EMULSION INTRAVENOUS at 18:51

## 2023-10-14 RX ADMIN — Medication: at 08:40

## 2023-10-14 RX ADMIN — Medication: at 23:47

## 2023-10-14 RX ADMIN — ACETAMINOPHEN 650 MG: 650 SUPPOSITORY RECTAL at 22:43

## 2023-10-14 RX ADMIN — ENOXAPARIN SODIUM 30 MG: 100 INJECTION SUBCUTANEOUS at 18:15

## 2023-10-14 ASSESSMENT — PAIN SCALES - WONG BAKER
WONGBAKER_NUMERICALRESPONSE: NO HURT
WONGBAKER_NUMERICALRESPONSE: 0

## 2023-10-14 ASSESSMENT — PAIN SCALES - GENERAL
PAINLEVEL_OUTOF10: 0
PAINLEVEL_OUTOF10: 7
PAINLEVEL_OUTOF10: 6
PAINLEVEL_OUTOF10: 8
PAINLEVEL_OUTOF10: 0
PAINLEVEL_OUTOF10: 3
PAINLEVEL_OUTOF10: 7
PAINLEVEL_OUTOF10: 0

## 2023-10-14 ASSESSMENT — PAIN DESCRIPTION - ORIENTATION
ORIENTATION: LEFT;RIGHT
ORIENTATION: MID
ORIENTATION: ANTERIOR

## 2023-10-14 ASSESSMENT — PULMONARY FUNCTION TESTS
PIF_VALUE: 25
PIF_VALUE: 18
PIF_VALUE: 21
PIF_VALUE: 20

## 2023-10-14 ASSESSMENT — PAIN DESCRIPTION - DESCRIPTORS
DESCRIPTORS: ACHING
DESCRIPTORS: ACHING

## 2023-10-14 ASSESSMENT — PAIN DESCRIPTION - LOCATION
LOCATION: BUTTOCKS
LOCATION: BUTTOCKS
LOCATION: ABDOMEN
LOCATION: HEAD
LOCATION: ABDOMEN

## 2023-10-14 NOTE — PROGRESS NOTES
2am- patient no urine output for 6 hours, bladder scan done, noted 400ml of urine, intermittent straight catheterization done, drained  350 ml of urine    5:50am informed Fabby Lopez NP patient having urinary retention, asked if another bladder scan or urinary catheter, may insert urinary catheter    6:00am urinary catheter inserted, draining to yellow urine color urinre

## 2023-10-14 NOTE — PROGRESS NOTES
Topics    Alcohol use: No     Alcohol/week: 0.0 standard drinks of alcohol      Family History   Problem Relation Age of Onset    Cancer Mother 67        colon    Cancer Maternal Uncle         melanoma    High Cholesterol Father     Hypertension Father     Heart Attack Father     Heart Disease Father     Diabetes Father     Migraines Mother     Stroke Mother     Osteoarthritis Mother     Diabetes Mother     COPD Father     Heart Failure Father     Cancer Other     Diabetes Other     Heart Disease Other     Hypertension Other     Cancer Brother         lymphoma    Cancer Brother         PROSTATE AND LYMPHOMA    Cancer Maternal Grandmother         stomach    Asthma Brother     Asthma Brother     Stroke Brother     Cancer Maternal Aunt         lung     Breast Cancer Maternal Aunt     Cancer Maternal Uncle         lung    Anesth Problems Neg Hx          LABS AND  DATA:   Reviewed      Peak airway pressure:      Minute ventilation:        CRITICAL CARE CONSULTANT NOTE  I had a face to face encounter with the patient, reviewed and interpreted patient data including clinical events, labs, images, vital signs, I/O's, and examined patient. I have discussed the case and the plan and management of the patient's care with the consulting services, the bedside nurses and the respiratory therapist.      NOTE OF PERSONAL INVOLVEMENT IN CARE   This patient has a high probability of imminent, clinically significant deterioration, which requires the highest level of preparedness to intervene urgently. I participated in the decision-making and personally managed or directed the management of the following life and organ supporting interventions that required my frequent assessment to treat or prevent imminent deterioration.         Janet Cotter, [address] [facility]  [phone]  10/14/2023

## 2023-10-15 PROCEDURE — 2500000003 HC RX 250 WO HCPCS: Performed by: ANESTHESIOLOGY

## 2023-10-15 PROCEDURE — 2580000003 HC RX 258: Performed by: NURSE PRACTITIONER

## 2023-10-15 PROCEDURE — 6360000002 HC RX W HCPCS: Performed by: INTERNAL MEDICINE

## 2023-10-15 PROCEDURE — 3E04317 INTRODUCTION OF OTHER THROMBOLYTIC INTO CENTRAL VEIN, PERCUTANEOUS APPROACH: ICD-10-PCS | Performed by: SURGERY

## 2023-10-15 PROCEDURE — 6360000002 HC RX W HCPCS: Performed by: SURGERY

## 2023-10-15 PROCEDURE — 94640 AIRWAY INHALATION TREATMENT: CPT

## 2023-10-15 PROCEDURE — A4216 STERILE WATER/SALINE, 10 ML: HCPCS | Performed by: NURSE PRACTITIONER

## 2023-10-15 PROCEDURE — 94003 VENT MGMT INPAT SUBQ DAY: CPT

## 2023-10-15 PROCEDURE — 6360000002 HC RX W HCPCS: Performed by: ANESTHESIOLOGY

## 2023-10-15 PROCEDURE — 2580000003 HC RX 258: Performed by: SURGERY

## 2023-10-15 PROCEDURE — 2000000000 HC ICU R&B

## 2023-10-15 PROCEDURE — 2580000003 HC RX 258: Performed by: INTERNAL MEDICINE

## 2023-10-15 PROCEDURE — C9113 INJ PANTOPRAZOLE SODIUM, VIA: HCPCS | Performed by: NURSE PRACTITIONER

## 2023-10-15 PROCEDURE — 6370000000 HC RX 637 (ALT 250 FOR IP): Performed by: INTERNAL MEDICINE

## 2023-10-15 PROCEDURE — 6360000002 HC RX W HCPCS: Performed by: NURSE PRACTITIONER

## 2023-10-15 PROCEDURE — 2700000000 HC OXYGEN THERAPY PER DAY

## 2023-10-15 PROCEDURE — 2500000003 HC RX 250 WO HCPCS: Performed by: NURSE PRACTITIONER

## 2023-10-15 PROCEDURE — 2580000003 HC RX 258: Performed by: ANESTHESIOLOGY

## 2023-10-15 RX ORDER — FENTANYL CITRATE 50 UG/ML
50 INJECTION, SOLUTION INTRAMUSCULAR; INTRAVENOUS ONCE
Status: COMPLETED | OUTPATIENT
Start: 2023-10-15 | End: 2023-10-15

## 2023-10-15 RX ADMIN — PROCHLORPERAZINE EDISYLATE 10 MG: 5 INJECTION, SOLUTION INTRAMUSCULAR; INTRAVENOUS at 00:27

## 2023-10-15 RX ADMIN — BUDESONIDE 500 MCG: 0.5 INHALANT RESPIRATORY (INHALATION) at 20:36

## 2023-10-15 RX ADMIN — Medication: at 16:05

## 2023-10-15 RX ADMIN — IRON SUCROSE 300 MG: 20 INJECTION, SOLUTION INTRAVENOUS at 14:11

## 2023-10-15 RX ADMIN — ONDANSETRON 4 MG: 2 INJECTION INTRAMUSCULAR; INTRAVENOUS at 15:48

## 2023-10-15 RX ADMIN — MIRTAZAPINE 15 MG: 15 TABLET, ORALLY DISINTEGRATING ORAL at 21:00

## 2023-10-15 RX ADMIN — LORAZEPAM 1 MG: 2 INJECTION INTRAMUSCULAR; INTRAVENOUS at 14:13

## 2023-10-15 RX ADMIN — Medication 10 ML: at 08:18

## 2023-10-15 RX ADMIN — CHLORHEXIDINE GLUCONATE 15 ML: 1.2 RINSE ORAL at 08:16

## 2023-10-15 RX ADMIN — ARFORMOTEROL TARTRATE 15 MCG: 15 SOLUTION RESPIRATORY (INHALATION) at 07:54

## 2023-10-15 RX ADMIN — SODIUM CHLORIDE, PRESERVATIVE FREE 40 MG: 5 INJECTION INTRAVENOUS at 08:16

## 2023-10-15 RX ADMIN — HYDROMORPHONE HYDROCHLORIDE 1 MG: 1 INJECTION, SOLUTION INTRAMUSCULAR; INTRAVENOUS; SUBCUTANEOUS at 11:02

## 2023-10-15 RX ADMIN — HYDROMORPHONE HYDROCHLORIDE 1 MG: 1 INJECTION, SOLUTION INTRAMUSCULAR; INTRAVENOUS; SUBCUTANEOUS at 15:19

## 2023-10-15 RX ADMIN — BUDESONIDE 500 MCG: 0.5 INHALANT RESPIRATORY (INHALATION) at 07:54

## 2023-10-15 RX ADMIN — FENTANYL CITRATE 50 MCG: 0.05 INJECTION, SOLUTION INTRAMUSCULAR; INTRAVENOUS at 00:56

## 2023-10-15 RX ADMIN — I.V. FAT EMULSION 250 ML: 20 EMULSION INTRAVENOUS at 19:00

## 2023-10-15 RX ADMIN — WATER 1 MG: 1 INJECTION INTRAMUSCULAR; INTRAVENOUS; SUBCUTANEOUS at 14:19

## 2023-10-15 RX ADMIN — HYDROMORPHONE HYDROCHLORIDE 1 MG: 1 INJECTION, SOLUTION INTRAMUSCULAR; INTRAVENOUS; SUBCUTANEOUS at 01:35

## 2023-10-15 RX ADMIN — ENOXAPARIN SODIUM 30 MG: 100 INJECTION SUBCUTANEOUS at 18:01

## 2023-10-15 RX ADMIN — CHLORHEXIDINE GLUCONATE 15 ML: 1.2 RINSE ORAL at 20:35

## 2023-10-15 RX ADMIN — WATER 1 MG: 1 INJECTION INTRAMUSCULAR; INTRAVENOUS; SUBCUTANEOUS at 12:44

## 2023-10-15 RX ADMIN — Medication 10 ML: at 20:34

## 2023-10-15 RX ADMIN — HYDROMORPHONE HYDROCHLORIDE 1 MG: 1 INJECTION, SOLUTION INTRAMUSCULAR; INTRAVENOUS; SUBCUTANEOUS at 06:40

## 2023-10-15 RX ADMIN — Medication: at 23:34

## 2023-10-15 RX ADMIN — LORAZEPAM 1 MG: 2 INJECTION INTRAMUSCULAR; INTRAVENOUS at 20:34

## 2023-10-15 RX ADMIN — HYDROMORPHONE HYDROCHLORIDE 1 MG: 1 INJECTION, SOLUTION INTRAMUSCULAR; INTRAVENOUS; SUBCUTANEOUS at 21:12

## 2023-10-15 RX ADMIN — MAGNESIUM SULFATE HEPTAHYDRATE: 500 INJECTION, SOLUTION INTRAMUSCULAR; INTRAVENOUS at 19:00

## 2023-10-15 RX ADMIN — Medication: at 08:18

## 2023-10-15 RX ADMIN — LORAZEPAM 1 MG: 2 INJECTION INTRAMUSCULAR; INTRAVENOUS at 08:16

## 2023-10-15 RX ADMIN — ARFORMOTEROL TARTRATE 15 MCG: 15 SOLUTION RESPIRATORY (INHALATION) at 20:36

## 2023-10-15 ASSESSMENT — PULMONARY FUNCTION TESTS
PIF_VALUE: 24
PIF_VALUE: 19
PIF_VALUE: 16
PIF_VALUE: 24
PIF_VALUE: 24
PIF_VALUE: 30

## 2023-10-15 ASSESSMENT — PAIN DESCRIPTION - ORIENTATION
ORIENTATION: RIGHT;ANTERIOR
ORIENTATION: RIGHT;ANTERIOR

## 2023-10-15 ASSESSMENT — PAIN DESCRIPTION - LOCATION
LOCATION: ABDOMEN

## 2023-10-15 ASSESSMENT — PAIN SCALES - GENERAL
PAINLEVEL_OUTOF10: 8
PAINLEVEL_OUTOF10: 8
PAINLEVEL_OUTOF10: 3
PAINLEVEL_OUTOF10: 1
PAINLEVEL_OUTOF10: 0
PAINLEVEL_OUTOF10: 8
PAINLEVEL_OUTOF10: 8

## 2023-10-15 ASSESSMENT — PAIN DESCRIPTION - DESCRIPTORS
DESCRIPTORS: THROBBING
DESCRIPTORS: THROBBING
DESCRIPTORS: ACHING
DESCRIPTORS: ACHING
DESCRIPTORS: CRAMPING

## 2023-10-15 NOTE — PROGRESS NOTES
Take 1 tablet by mouth in the morning and at bedtime 7/21/23   Rossy Dutta MD   naloxone 4 MG/0.1ML LIQD nasal spray 1 spray by Nasal route as needed for Opioid Reversal 7/21/23   Rossy Dutta MD   bumetanide (BUMEX) 0.5 MG tablet TAKE 1 TABLET BY MOUTH EVERY DAY AS NEEDED 6/8/23   Alvaro Barker MD   pantoprazole (PROTONIX) 40 MG tablet Take 1 tablet by mouth in the morning and at bedtime    Provider, MD Suleman   acetaminophen (TYLENOL) 500 MG tablet Take 2 tablets by mouth every 6 hours as needed for Pain    Provider, MD Suleman   ondansetron (ZOFRAN-ODT) 4 MG disintegrating tablet Take 1 tablet by mouth every 8 hours as needed for Nausea for nausea 5/8/23   Nicki Mendoza MD   Nebulizers (COMPRESSOR/NEBULIZER) MISC Use to administer albuterol breathing treatments at home  Patient not taking: Reported on 6/24/2023 5/8/23   Nicki Mendoza MD   gabapentin (NEURONTIN) 300 MG capsule Take 2 capsules by mouth 3 times daily for 30 days. Max Daily Amount: 1,800 mg 5/8/23 6/7/23  Nicki Mendoza MD   albuterol sulfate HFA (PROVENTIL;VENTOLIN;PROAIR) 108 (90 Base) MCG/ACT inhaler Inhale 1 puff into the lungs every 4 hours as needed 4/27/22   Automatic Reconciliation, Ar   atorvastatin (LIPITOR) 20 MG tablet Take 1 tablet by mouth nightly 11/28/22   Automatic Reconciliation, Ar   meloxicam (MOBIC) 15 MG tablet 1 tablet every evening 7/3/22   Automatic Reconciliation, Ar   metoprolol tartrate (LOPRESSOR) 25 MG tablet Take 0.5 tablets by mouth every morning 11/4/22   Automatic Reconciliation, Ar   nitroGLYCERIN (NITROSTAT) 0.4 MG SL tablet  12/12/22   Automatic Reconciliation, Ar         Allergies/Social/Family History: Allergies   Allergen Reactions    Adhesive Tape Other (See Comments)     Makes skin tears easily.      Amlodipine Swelling     On legs at 10 mg dose    Quinapril Cough      Social History     Tobacco Use    Smoking status: Never    Smokeless tobacco: Never   Substance

## 2023-10-15 NOTE — PROGRESS NOTES
0930: RTRN at bedside. SBT started. Pt in chair position in bed. Pt tolerating well. 1030: RTRN at bedside. Vent setting revered back to [employer]. 1645: RTRN at bedside. SBT started. Pt in chair position in bed.  Pt tolerating well.    1900: Vent setting revered back to [employer] per [facility] MD.

## 2023-10-16 PROBLEM — Z71.89 DO NOT RESUSCITATE DISCUSSION: Status: ACTIVE | Noted: 2023-01-01

## 2023-10-16 LAB
ANION GAP SERPL CALC-SCNC: 3 MMOL/L (ref 5–15)
BUN SERPL-MCNC: 42 MG/DL (ref 6–20)
BUN/CREAT SERPL: 105 (ref 12–20)
CALCIUM SERPL-MCNC: 9.2 MG/DL (ref 8.5–10.1)
CHLORIDE SERPL-SCNC: 107 MMOL/L (ref 97–108)
CO2 SERPL-SCNC: 25 MMOL/L (ref 21–32)
CREAT SERPL-MCNC: 0.4 MG/DL (ref 0.55–1.02)
ERYTHROCYTE [DISTWIDTH] IN BLOOD BY AUTOMATED COUNT: 15.6 % (ref 11.5–14.5)
GLUCOSE SERPL-MCNC: 107 MG/DL (ref 65–100)
HCT VFR BLD AUTO: 29.6 % (ref 35–47)
HGB BLD-MCNC: 9.4 G/DL (ref 11.5–16)
MAGNESIUM SERPL-MCNC: 1.5 MG/DL (ref 1.6–2.4)
MCH RBC QN AUTO: 29.4 PG (ref 26–34)
MCHC RBC AUTO-ENTMCNC: 31.8 G/DL (ref 30–36.5)
MCV RBC AUTO: 92.5 FL (ref 80–99)
NRBC # BLD: 0 K/UL (ref 0–0.01)
NRBC BLD-RTO: 0 PER 100 WBC
PHOSPHATE SERPL-MCNC: 3.9 MG/DL (ref 2.6–4.7)
PLATELET # BLD AUTO: 316 K/UL (ref 150–400)
PMV BLD AUTO: 10.3 FL (ref 8.9–12.9)
POTASSIUM SERPL-SCNC: 4.4 MMOL/L (ref 3.5–5.1)
RBC # BLD AUTO: 3.2 M/UL (ref 3.8–5.2)
SODIUM SERPL-SCNC: 135 MMOL/L (ref 136–145)
WBC # BLD AUTO: 13.5 K/UL (ref 3.6–11)

## 2023-10-16 PROCEDURE — G0316 PR PROLONG INPT EVAL ADD15 M: HCPCS | Performed by: PHYSICAL MEDICINE & REHABILITATION

## 2023-10-16 PROCEDURE — 85027 COMPLETE CBC AUTOMATED: CPT

## 2023-10-16 PROCEDURE — 99233 SBSQ HOSP IP/OBS HIGH 50: CPT | Performed by: PHYSICAL MEDICINE & REHABILITATION

## 2023-10-16 PROCEDURE — 2500000003 HC RX 250 WO HCPCS: Performed by: NURSE PRACTITIONER

## 2023-10-16 PROCEDURE — 6360000002 HC RX W HCPCS: Performed by: NURSE PRACTITIONER

## 2023-10-16 PROCEDURE — A4216 STERILE WATER/SALINE, 10 ML: HCPCS | Performed by: NURSE PRACTITIONER

## 2023-10-16 PROCEDURE — 94003 VENT MGMT INPAT SUBQ DAY: CPT

## 2023-10-16 PROCEDURE — 6360000002 HC RX W HCPCS: Performed by: INTERNAL MEDICINE

## 2023-10-16 PROCEDURE — 2580000003 HC RX 258: Performed by: NURSE PRACTITIONER

## 2023-10-16 PROCEDURE — 2500000003 HC RX 250 WO HCPCS: Performed by: STUDENT IN AN ORGANIZED HEALTH CARE EDUCATION/TRAINING PROGRAM

## 2023-10-16 PROCEDURE — 83735 ASSAY OF MAGNESIUM: CPT

## 2023-10-16 PROCEDURE — 2580000003 HC RX 258: Performed by: STUDENT IN AN ORGANIZED HEALTH CARE EDUCATION/TRAINING PROGRAM

## 2023-10-16 PROCEDURE — 36415 COLL VENOUS BLD VENIPUNCTURE: CPT

## 2023-10-16 PROCEDURE — A4216 STERILE WATER/SALINE, 10 ML: HCPCS | Performed by: SURGERY

## 2023-10-16 PROCEDURE — 80048 BASIC METABOLIC PNL TOTAL CA: CPT

## 2023-10-16 PROCEDURE — C9113 INJ PANTOPRAZOLE SODIUM, VIA: HCPCS | Performed by: NURSE PRACTITIONER

## 2023-10-16 PROCEDURE — 2000000000 HC ICU R&B

## 2023-10-16 PROCEDURE — 94640 AIRWAY INHALATION TREATMENT: CPT

## 2023-10-16 PROCEDURE — 6360000002 HC RX W HCPCS: Performed by: STUDENT IN AN ORGANIZED HEALTH CARE EDUCATION/TRAINING PROGRAM

## 2023-10-16 PROCEDURE — 84100 ASSAY OF PHOSPHORUS: CPT

## 2023-10-16 PROCEDURE — 2580000003 HC RX 258: Performed by: SURGERY

## 2023-10-16 PROCEDURE — 6370000000 HC RX 637 (ALT 250 FOR IP): Performed by: INTERNAL MEDICINE

## 2023-10-16 RX ORDER — MIDAZOLAM HYDROCHLORIDE 2 MG/2ML
1 INJECTION, SOLUTION INTRAMUSCULAR; INTRAVENOUS ONCE
Status: COMPLETED | OUTPATIENT
Start: 2023-10-16 | End: 2023-10-16

## 2023-10-16 RX ORDER — MAGNESIUM SULFATE IN WATER 40 MG/ML
2000 INJECTION, SOLUTION INTRAVENOUS ONCE
Status: COMPLETED | OUTPATIENT
Start: 2023-10-16 | End: 2023-10-16

## 2023-10-16 RX ORDER — DIPHENHYDRAMINE HYDROCHLORIDE 50 MG/ML
25 INJECTION INTRAMUSCULAR; INTRAVENOUS ONCE
Status: DISCONTINUED | OUTPATIENT
Start: 2023-10-16 | End: 2023-10-16

## 2023-10-16 RX ADMIN — HYDROMORPHONE HYDROCHLORIDE 1 MG: 1 INJECTION, SOLUTION INTRAMUSCULAR; INTRAVENOUS; SUBCUTANEOUS at 18:40

## 2023-10-16 RX ADMIN — MIDAZOLAM 1 MG: 1 INJECTION INTRAMUSCULAR; INTRAVENOUS at 01:15

## 2023-10-16 RX ADMIN — LORAZEPAM 1 MG: 2 INJECTION INTRAMUSCULAR; INTRAVENOUS at 08:15

## 2023-10-16 RX ADMIN — Medication: at 16:18

## 2023-10-16 RX ADMIN — ARFORMOTEROL TARTRATE 15 MCG: 15 SOLUTION RESPIRATORY (INHALATION) at 08:17

## 2023-10-16 RX ADMIN — ARFORMOTEROL TARTRATE 15 MCG: 15 SOLUTION RESPIRATORY (INHALATION) at 22:41

## 2023-10-16 RX ADMIN — CHLORHEXIDINE GLUCONATE 15 ML: 1.2 RINSE ORAL at 08:14

## 2023-10-16 RX ADMIN — BUDESONIDE 500 MCG: 0.5 INHALANT RESPIRATORY (INHALATION) at 22:41

## 2023-10-16 RX ADMIN — MAGNESIUM SULFATE HEPTAHYDRATE 2000 MG: 40 INJECTION, SOLUTION INTRAVENOUS at 12:32

## 2023-10-16 RX ADMIN — Medication 10 ML: at 20:40

## 2023-10-16 RX ADMIN — LORAZEPAM 1 MG: 2 INJECTION INTRAMUSCULAR; INTRAVENOUS at 20:37

## 2023-10-16 RX ADMIN — I.V. FAT EMULSION 250 ML: 20 EMULSION INTRAVENOUS at 18:30

## 2023-10-16 RX ADMIN — Medication 10 ML: at 08:14

## 2023-10-16 RX ADMIN — SODIUM CHLORIDE, PRESERVATIVE FREE 40 MG: 5 INJECTION INTRAVENOUS at 08:15

## 2023-10-16 RX ADMIN — CHLORHEXIDINE GLUCONATE 15 ML: 1.2 RINSE ORAL at 20:40

## 2023-10-16 RX ADMIN — MIRTAZAPINE 15 MG: 15 TABLET, ORALLY DISINTEGRATING ORAL at 20:42

## 2023-10-16 RX ADMIN — Medication: at 08:14

## 2023-10-16 RX ADMIN — BUDESONIDE 500 MCG: 0.5 INHALANT RESPIRATORY (INHALATION) at 08:17

## 2023-10-16 RX ADMIN — MAGNESIUM SULFATE HEPTAHYDRATE: 500 INJECTION, SOLUTION INTRAMUSCULAR; INTRAVENOUS at 18:30

## 2023-10-16 RX ADMIN — HYDROMORPHONE HYDROCHLORIDE 1 MG: 1 INJECTION, SOLUTION INTRAMUSCULAR; INTRAVENOUS; SUBCUTANEOUS at 02:37

## 2023-10-16 RX ADMIN — ENOXAPARIN SODIUM 30 MG: 100 INJECTION SUBCUTANEOUS at 18:30

## 2023-10-16 ASSESSMENT — PULMONARY FUNCTION TESTS
PIF_VALUE: 16
PIF_VALUE: 19
PIF_VALUE: 22
PIF_VALUE: 28
PIF_VALUE: 23

## 2023-10-16 ASSESSMENT — PAIN SCALES - GENERAL
PAINLEVEL_OUTOF10: 2
PAINLEVEL_OUTOF10: 6
PAINLEVEL_OUTOF10: 8

## 2023-10-16 ASSESSMENT — PAIN DESCRIPTION - DESCRIPTORS: DESCRIPTORS: ACHING;DISCOMFORT;THROBBING

## 2023-10-16 ASSESSMENT — PAIN DESCRIPTION - LOCATION: LOCATION: ABDOMEN;BACK

## 2023-10-16 NOTE — PROGRESS NOTES
[facility] Daily Progress Note. Name: Evita Garcia   : 1960   MRN: [id number]   Date: 10/16/2023        HPI:   61 y.o. female with complicated pmhx including gastric bypass , hx PE, c diff colitis 3/2023 s/p colectomy/ileostomy s/p reversal 23, admission to [facility] -23. She briefly went to [address] [place] [address] and then was admitted on 2023 with GIB. G tube placed , Trach placed 9/3. Due to her complicated medical course and failure to thrive. Her main medical issues include stage IV sacral decub, severe malnutrition on TPN and inability to wean from vent. Past 24 hrs  No acute events o/n, remains on MV. PSV and T collar trials as toleated. Family meeting today for further [address]. Pt high risk for any further surgical intervention. Addendum: Family meeting held along with palliative and gen surg services, with extensive discussion regarding current clinical status, and goals of care. At this time, decision made to not pursue surgical intervention given limited benefit and high risk, as well as to limit further escalation of care and to not undergo resucitative efforts in event of cardiac arrest. Given pt's baseline values, family feels that transitioning to comfort focused care and likely hospice in the coming week after family and friends have a chance to visit. Active Problems Being Managed:   Failure to thrive  Malnutrition  Chronic respiratory failure  Intrabdominal abcess  Sacral pressure ulcer    Assessment/Plan:     Neuro:  - awake/alert. Pt debilitated. Malnourished and has failed to thrive and regain strength over past few months. BMI down to 17    CVS:  - MAP goal > 65    Pulm:  - On MV, PSV and TC trials as tolerated  - Debility main barrier to liberation  - cont neb treatments    Renal:  - Monitor I/Os; replete lytes as needed    GI:  - Family still interested in pursuing wash out laparotomy; Meeting today to determine goals and plan of care.   - cont

## 2023-10-16 NOTE — CARE COORDINATION
Transition of Care     RUR: 24% High  Prior Level of Functioning: Independent   Disposition: LTAC Vs IPR penidng ventilator weaning   Follow up appointments: PCP, Gen Surg   DME needed: TBD  Transportation at discharge: BLS vs ALS  IM/IMM Medicare 7/29/23   Is patient a  and connected with VA? No  Caregiver Contact:    Kristine Pan Jennifer: [phone]  Discharge Caregiver contacted prior to discharge? Yes  Care Conference needed?  No  Barriers to discharge:    Medical stability  Awake alert  FTT  S/P trach placement 9/7, # 6 Lakshmi  Remains vented with TC Trials   Plan for OR this week for wash out   TPN  Wound Vac to sacral wound   Family Meeting today   You Oates RN,Care Management

## 2023-10-17 PROCEDURE — 6360000002 HC RX W HCPCS: Performed by: NURSE PRACTITIONER

## 2023-10-17 PROCEDURE — 97606 NEG PRS WND THER DME>50 SQCM: CPT

## 2023-10-17 PROCEDURE — 6360000002 HC RX W HCPCS: Performed by: INTERNAL MEDICINE

## 2023-10-17 PROCEDURE — 6360000002 HC RX W HCPCS: Performed by: STUDENT IN AN ORGANIZED HEALTH CARE EDUCATION/TRAINING PROGRAM

## 2023-10-17 PROCEDURE — A4216 STERILE WATER/SALINE, 10 ML: HCPCS | Performed by: NURSE PRACTITIONER

## 2023-10-17 PROCEDURE — 2000000000 HC ICU R&B

## 2023-10-17 PROCEDURE — 2580000003 HC RX 258: Performed by: NURSE PRACTITIONER

## 2023-10-17 PROCEDURE — C9113 INJ PANTOPRAZOLE SODIUM, VIA: HCPCS | Performed by: NURSE PRACTITIONER

## 2023-10-17 PROCEDURE — 94640 AIRWAY INHALATION TREATMENT: CPT

## 2023-10-17 PROCEDURE — 2580000003 HC RX 258: Performed by: STUDENT IN AN ORGANIZED HEALTH CARE EDUCATION/TRAINING PROGRAM

## 2023-10-17 PROCEDURE — 2500000003 HC RX 250 WO HCPCS: Performed by: STUDENT IN AN ORGANIZED HEALTH CARE EDUCATION/TRAINING PROGRAM

## 2023-10-17 PROCEDURE — 6370000000 HC RX 637 (ALT 250 FOR IP): Performed by: INTERNAL MEDICINE

## 2023-10-17 PROCEDURE — 2500000003 HC RX 250 WO HCPCS: Performed by: NURSE PRACTITIONER

## 2023-10-17 PROCEDURE — 94003 VENT MGMT INPAT SUBQ DAY: CPT

## 2023-10-17 PROCEDURE — 2580000003 HC RX 258: Performed by: SURGERY

## 2023-10-17 RX ORDER — HYDROMORPHONE HYDROCHLORIDE 1 MG/ML
1 INJECTION, SOLUTION INTRAMUSCULAR; INTRAVENOUS; SUBCUTANEOUS ONCE
Status: COMPLETED | OUTPATIENT
Start: 2023-10-17 | End: 2023-10-17

## 2023-10-17 RX ADMIN — Medication 10 ML: at 20:33

## 2023-10-17 RX ADMIN — HYDROMORPHONE HYDROCHLORIDE 1 MG: 1 INJECTION, SOLUTION INTRAMUSCULAR; INTRAVENOUS; SUBCUTANEOUS at 00:24

## 2023-10-17 RX ADMIN — Medication 10 ML: at 08:47

## 2023-10-17 RX ADMIN — CHLORHEXIDINE GLUCONATE 15 ML: 1.2 RINSE ORAL at 08:46

## 2023-10-17 RX ADMIN — CHLORHEXIDINE GLUCONATE 15 ML: 1.2 RINSE ORAL at 20:34

## 2023-10-17 RX ADMIN — BUDESONIDE 500 MCG: 0.5 INHALANT RESPIRATORY (INHALATION) at 07:49

## 2023-10-17 RX ADMIN — MAGNESIUM SULFATE HEPTAHYDRATE: 500 INJECTION, SOLUTION INTRAMUSCULAR; INTRAVENOUS at 17:29

## 2023-10-17 RX ADMIN — LORAZEPAM 1 MG: 2 INJECTION INTRAMUSCULAR; INTRAVENOUS at 08:45

## 2023-10-17 RX ADMIN — ENOXAPARIN SODIUM 30 MG: 100 INJECTION SUBCUTANEOUS at 17:33

## 2023-10-17 RX ADMIN — SODIUM CHLORIDE, PRESERVATIVE FREE 40 MG: 5 INJECTION INTRAVENOUS at 08:46

## 2023-10-17 RX ADMIN — ARFORMOTEROL TARTRATE 15 MCG: 15 SOLUTION RESPIRATORY (INHALATION) at 07:49

## 2023-10-17 RX ADMIN — LORAZEPAM 1 MG: 2 INJECTION INTRAMUSCULAR; INTRAVENOUS at 20:32

## 2023-10-17 RX ADMIN — HYDROMORPHONE HYDROCHLORIDE 1 MG: 1 INJECTION, SOLUTION INTRAMUSCULAR; INTRAVENOUS; SUBCUTANEOUS at 04:28

## 2023-10-17 RX ADMIN — HYDROMORPHONE HYDROCHLORIDE 1 MG: 1 INJECTION, SOLUTION INTRAMUSCULAR; INTRAVENOUS; SUBCUTANEOUS at 17:34

## 2023-10-17 RX ADMIN — Medication: at 08:45

## 2023-10-17 RX ADMIN — HYDROMORPHONE HYDROCHLORIDE 1 MG: 1 INJECTION, SOLUTION INTRAMUSCULAR; INTRAVENOUS; SUBCUTANEOUS at 14:03

## 2023-10-17 RX ADMIN — MIRTAZAPINE 15 MG: 15 TABLET, ORALLY DISINTEGRATING ORAL at 21:23

## 2023-10-17 RX ADMIN — I.V. FAT EMULSION 250 ML: 20 EMULSION INTRAVENOUS at 17:29

## 2023-10-17 RX ADMIN — HYDROMORPHONE HYDROCHLORIDE 1 MG: 1 INJECTION, SOLUTION INTRAMUSCULAR; INTRAVENOUS; SUBCUTANEOUS at 11:50

## 2023-10-17 RX ADMIN — LORAZEPAM 1 MG: 2 INJECTION INTRAMUSCULAR; INTRAVENOUS at 13:26

## 2023-10-17 RX ADMIN — Medication: at 16:00

## 2023-10-17 RX ADMIN — Medication: at 00:11

## 2023-10-17 ASSESSMENT — PAIN DESCRIPTION - LOCATION
LOCATION: BACK;ABDOMEN
LOCATION: BACK

## 2023-10-17 ASSESSMENT — PAIN SCALES - GENERAL
PAINLEVEL_OUTOF10: 7
PAINLEVEL_OUTOF10: 0
PAINLEVEL_OUTOF10: 7
PAINLEVEL_OUTOF10: 10
PAINLEVEL_OUTOF10: 0

## 2023-10-17 ASSESSMENT — PULMONARY FUNCTION TESTS
PIF_VALUE: 16

## 2023-10-17 NOTE — PROGRESS NOTES
prochlorperazine (COMPAZINE) injection 10 mg, 10 mg, IntraVENous, Q6H PRN, Erika Lopez MD, 10 mg at 10/15/23 0027    alteplase (CATHFLO) 1 mg in sterile water 1 mL injection, 1 mg, IntraCATHeter, PRN, Erika Lopez MD, 1 mg at 10/15/23 1419    balsum peru-castor oil (VENELEX) ointment, , Topical, q8h, Kat Beasley MD, Given at 10/16/23 1618    sodium chloride flush 0.9 % injection 5-40 mL, 5-40 mL, IntraVENous, 2 times per day, Erika Lopez MD, 10 mL at 10/16/23 0814    sodium chloride flush 0.9 % injection 5-40 mL, 5-40 mL, IntraVENous, PRN, Erika Lopez MD    [DISCONTINUED] ondansetron (ZOFRAN-ODT) disintegrating tablet 4 mg, 4 mg, Oral, Q8H PRN **OR** ondansetron (ZOFRAN) injection 4 mg, 4 mg, IntraVENous, Q6H PRN, Erika Lopez MD, 4 mg at 10/15/23 1548    acetaminophen (TYLENOL) tablet 650 mg, 650 mg, Oral, Q6H PRN, 650 mg at 08/21/23 1657 **OR** acetaminophen (TYLENOL) suppository 650 mg, 650 mg, Rectal, Q6H PRN, Erika Lopez MD, 650 mg at 10/14/23 2243    glucose chewable tablet 16 g, 4 tablet, Oral, PRN, Erika Lopez MD    dextrose bolus 10% 125 mL, 125 mL, IntraVENous, PRN, Stopped at 08/01/23 0538 **OR** dextrose bolus 10% 250 mL, 250 mL, IntraVENous, PRN, Erika Lopez MD, Stopped at 07/28/23 2319    glucagon injection 1 mg, 1 mg, SubCUTAneous, PRN, Erika Lopez MD    dextrose 10 % infusion, , IntraVENous, Continuous PRN, Erika Lopez MD      Labs:  Recent Results (from the past 24 hour(s))   Basic Metabolic Panel    Collection Time: 10/16/23  5:28 AM   Result Value Ref Range    Sodium 135 (L) 136 - 145 mmol/L    Potassium 4.4 3.5 - 5.1 mmol/L    Chloride 107 97 - 108 mmol/L    CO2 25 21 - 32 mmol/L    Anion Gap 3 (L) 5 - 15 mmol/L    Glucose 107 (H) 65 - 100 mg/dL    BUN 42 (H) 6 - 20 MG/DL    Creatinine 0.40 (L) 0.55 - 1.02 MG/DL    Bun/Cre Ratio 105 (H) 12 - 20      Est, Glom Filt Rate >60 >60 ml/min/1.73m2    Calcium 9.2 8.5 - 10.1 MG/DL   CBC

## 2023-10-17 NOTE — PROGRESS NOTES
10/17/23 1528   B: Both Spontaneous Awakening and Breathing Trials   RT Notified Ready for SBT Yes   Was Patient Receiving Mechanical Ventilation Yes   Safety Screening Spontaneous Breathing Trial (SBT) Proceed with SBT - no exclusion criteria met   Spontaneous  mL   RSBI Calculated 54.14   Spontaneous Breathing Trial (SBT) Outcome SBT Passed   Weaning Parameters   Spontaneous Breathing Trial Complete Yes   Respiratory Rate Observed 17   Ve 5.81   RSBI 55

## 2023-10-17 NOTE — WOUND CARE
WOCN Note:     Follow up sacral wound vac change. Chart reviewed. Assessed in room 7114. Admitted DX:  Hematemesis;GI bleed; Gastrointestinal hemorrhage; cdiff    Past Medical History: gastric bypass; asthma; cad; dm2  [facility]    Assessment:   Patient is vented via trach; requires assist with repositioning. Bed: cecelia  Patient has a engle and drains. Patient repositioned on right side. Heels offloaded with pillows. 1. POA Sacrum and bilateral buttocks, Stage 4 Pressure Injury with palpable bone: skin is mobile and the measurement are varying 8.2 x 8 x 0.7  cm; 35% yellow; 75% red; serous drainage in canister; no odor; constantine wound edges resurfaced with hyperpigmentation. Treatment:  Cleansed with saline; resumed NPWT at 125 using 2 black, mepitel one and bridged to left side. 2. POA Right heel, now blanching pink erythema. 3.  POA Left heel, unstageable pressure injury:  scab has fallen off (0.5 x 0.5 x 0.2) with open blanching pink wound bed surrounding blanching hyperpigmented skin. Wound, Pressure Prevention & Skin Care Recommendations:    Minimize layers of linen/pads under patient to optimize support surface. 2.  Turn/reposition approximately every 2 hours and offload heels. 3.  Manage moisture/ Keep skin folds clean and dry/minimize brief usage. 4.  Specialty bed:[facility].  5.  Sacrum:  Continue NPWT at 125 mmHg with changes twice weekly. 6.  Heels:  Apply Venelex BID     Discussed above plan with RN. Transition of Care:   Plan to follow Tuesday and Friday for VAC changes.     RONALD GoodN RN [address] [facility] Inpatient Wound Care  Available on [employer]  Office [phone]

## 2023-10-17 NOTE — PROGRESS NOTES
Physical Therapy    Chart reviewed with note of possible transition to comfort measures later this week. Spoke with RN who notes pt with high pain level following wound VAC change; recommended defer PT treatment at this time.      Prasad Quinones, PT, MPT

## 2023-10-17 NOTE — PROGRESS NOTES
Behavior: Behavior normal.             LABS AND  DATA:   Reviewed      Peak airway pressure:      Minute ventilation:        CRITICAL CARE CONSULTANT NOTE  I had a face to face encounter with the patient, reviewed and interpreted patient data including clinical events, labs, images, vital signs, I/O's, and examined patient. I have discussed the case and the plan and management of the patient's care with the consulting services, the bedside nurses and the respiratory therapist.      NOTE OF PERSONAL INVOLVEMENT IN CARE   This patient has a high probability of imminent, clinically significant deterioration, which requires the highest level of preparedness to intervene urgently. I participated in the decision-making and personally managed or directed the management of the following life and organ supporting interventions that required my frequent assessment to treat or prevent imminent deterioration. Dave Rodriguez MD  Staff [facility]

## 2023-10-18 LAB
ANION GAP SERPL CALC-SCNC: 6 MMOL/L (ref 5–15)
BUN SERPL-MCNC: 41 MG/DL (ref 6–20)
BUN/CREAT SERPL: 121 (ref 12–20)
CALCIUM SERPL-MCNC: 9 MG/DL (ref 8.5–10.1)
CHLORIDE SERPL-SCNC: 105 MMOL/L (ref 97–108)
CO2 SERPL-SCNC: 27 MMOL/L (ref 21–32)
CREAT SERPL-MCNC: 0.34 MG/DL (ref 0.55–1.02)
ERYTHROCYTE [DISTWIDTH] IN BLOOD BY AUTOMATED COUNT: 15.9 % (ref 11.5–14.5)
GLUCOSE SERPL-MCNC: 130 MG/DL (ref 65–100)
HCT VFR BLD AUTO: 29.1 % (ref 35–47)
HGB BLD-MCNC: 9.2 G/DL (ref 11.5–16)
MAGNESIUM SERPL-MCNC: 1.4 MG/DL (ref 1.6–2.4)
MCH RBC QN AUTO: 29.3 PG (ref 26–34)
MCHC RBC AUTO-ENTMCNC: 31.6 G/DL (ref 30–36.5)
MCV RBC AUTO: 92.7 FL (ref 80–99)
NRBC # BLD: 0 K/UL (ref 0–0.01)
NRBC BLD-RTO: 0 PER 100 WBC
PHOSPHATE SERPL-MCNC: 3.3 MG/DL (ref 2.6–4.7)
PLATELET # BLD AUTO: 355 K/UL (ref 150–400)
PMV BLD AUTO: 10.5 FL (ref 8.9–12.9)
POTASSIUM SERPL-SCNC: 4 MMOL/L (ref 3.5–5.1)
RBC # BLD AUTO: 3.14 M/UL (ref 3.8–5.2)
SODIUM SERPL-SCNC: 138 MMOL/L (ref 136–145)
WBC # BLD AUTO: 17.5 K/UL (ref 3.6–11)

## 2023-10-18 PROCEDURE — 6360000002 HC RX W HCPCS: Performed by: INTERNAL MEDICINE

## 2023-10-18 PROCEDURE — 94640 AIRWAY INHALATION TREATMENT: CPT

## 2023-10-18 PROCEDURE — 2580000003 HC RX 258: Performed by: STUDENT IN AN ORGANIZED HEALTH CARE EDUCATION/TRAINING PROGRAM

## 2023-10-18 PROCEDURE — 94003 VENT MGMT INPAT SUBQ DAY: CPT

## 2023-10-18 PROCEDURE — 2580000003 HC RX 258: Performed by: NURSE PRACTITIONER

## 2023-10-18 PROCEDURE — 6370000000 HC RX 637 (ALT 250 FOR IP): Performed by: INTERNAL MEDICINE

## 2023-10-18 PROCEDURE — 2500000003 HC RX 250 WO HCPCS: Performed by: NURSE PRACTITIONER

## 2023-10-18 PROCEDURE — G0316 PR PROLONG INPT EVAL ADD15 M: HCPCS | Performed by: PHYSICAL MEDICINE & REHABILITATION

## 2023-10-18 PROCEDURE — 80048 BASIC METABOLIC PNL TOTAL CA: CPT

## 2023-10-18 PROCEDURE — 83735 ASSAY OF MAGNESIUM: CPT

## 2023-10-18 PROCEDURE — 85027 COMPLETE CBC AUTOMATED: CPT

## 2023-10-18 PROCEDURE — 6360000002 HC RX W HCPCS: Performed by: NURSE PRACTITIONER

## 2023-10-18 PROCEDURE — 2000000000 HC ICU R&B

## 2023-10-18 PROCEDURE — C9113 INJ PANTOPRAZOLE SODIUM, VIA: HCPCS | Performed by: NURSE PRACTITIONER

## 2023-10-18 PROCEDURE — 2500000003 HC RX 250 WO HCPCS: Performed by: STUDENT IN AN ORGANIZED HEALTH CARE EDUCATION/TRAINING PROGRAM

## 2023-10-18 PROCEDURE — 84100 ASSAY OF PHOSPHORUS: CPT

## 2023-10-18 PROCEDURE — 99233 SBSQ HOSP IP/OBS HIGH 50: CPT | Performed by: PHYSICAL MEDICINE & REHABILITATION

## 2023-10-18 PROCEDURE — 2580000003 HC RX 258: Performed by: SURGERY

## 2023-10-18 PROCEDURE — 36415 COLL VENOUS BLD VENIPUNCTURE: CPT

## 2023-10-18 PROCEDURE — 6360000002 HC RX W HCPCS: Performed by: STUDENT IN AN ORGANIZED HEALTH CARE EDUCATION/TRAINING PROGRAM

## 2023-10-18 RX ORDER — HYDROMORPHONE HYDROCHLORIDE 1 MG/ML
1 INJECTION, SOLUTION INTRAMUSCULAR; INTRAVENOUS; SUBCUTANEOUS
Status: DISCONTINUED | OUTPATIENT
Start: 2023-10-18 | End: 2023-10-19

## 2023-10-18 RX ADMIN — CHLORHEXIDINE GLUCONATE 15 ML: 1.2 RINSE ORAL at 20:17

## 2023-10-18 RX ADMIN — HYDROMORPHONE HYDROCHLORIDE 1 MG: 1 INJECTION, SOLUTION INTRAMUSCULAR; INTRAVENOUS; SUBCUTANEOUS at 11:04

## 2023-10-18 RX ADMIN — HYDROMORPHONE HYDROCHLORIDE 1 MG: 1 INJECTION, SOLUTION INTRAMUSCULAR; INTRAVENOUS; SUBCUTANEOUS at 07:15

## 2023-10-18 RX ADMIN — Medication: at 16:35

## 2023-10-18 RX ADMIN — ARFORMOTEROL TARTRATE 15 MCG: 15 SOLUTION RESPIRATORY (INHALATION) at 19:57

## 2023-10-18 RX ADMIN — HYDROMORPHONE HYDROCHLORIDE 1 MG: 1 INJECTION, SOLUTION INTRAMUSCULAR; INTRAVENOUS; SUBCUTANEOUS at 00:55

## 2023-10-18 RX ADMIN — SODIUM CHLORIDE, PRESERVATIVE FREE 40 MG: 5 INJECTION INTRAVENOUS at 09:26

## 2023-10-18 RX ADMIN — BUDESONIDE 500 MCG: 0.5 INHALANT RESPIRATORY (INHALATION) at 19:57

## 2023-10-18 RX ADMIN — BUDESONIDE 500 MCG: 0.5 INHALANT RESPIRATORY (INHALATION) at 08:15

## 2023-10-18 RX ADMIN — LORAZEPAM 1 MG: 2 INJECTION INTRAMUSCULAR; INTRAVENOUS at 20:17

## 2023-10-18 RX ADMIN — HYDROMORPHONE HYDROCHLORIDE 1 MG: 1 INJECTION, SOLUTION INTRAMUSCULAR; INTRAVENOUS; SUBCUTANEOUS at 17:50

## 2023-10-18 RX ADMIN — I.V. FAT EMULSION 250 ML: 20 EMULSION INTRAVENOUS at 17:50

## 2023-10-18 RX ADMIN — ARFORMOTEROL TARTRATE 15 MCG: 15 SOLUTION RESPIRATORY (INHALATION) at 08:15

## 2023-10-18 RX ADMIN — ENOXAPARIN SODIUM 30 MG: 100 INJECTION SUBCUTANEOUS at 17:50

## 2023-10-18 RX ADMIN — Medication 10 ML: at 09:26

## 2023-10-18 RX ADMIN — MAGNESIUM SULFATE HEPTAHYDRATE: 500 INJECTION, SOLUTION INTRAMUSCULAR; INTRAVENOUS at 17:51

## 2023-10-18 RX ADMIN — HYDROMORPHONE HYDROCHLORIDE 1 MG: 1 INJECTION, SOLUTION INTRAMUSCULAR; INTRAVENOUS; SUBCUTANEOUS at 14:29

## 2023-10-18 RX ADMIN — MIRTAZAPINE 15 MG: 15 TABLET, ORALLY DISINTEGRATING ORAL at 20:18

## 2023-10-18 RX ADMIN — Medication: at 09:27

## 2023-10-18 RX ADMIN — LORAZEPAM 1 MG: 2 INJECTION INTRAMUSCULAR; INTRAVENOUS at 09:26

## 2023-10-18 RX ADMIN — CHLORHEXIDINE GLUCONATE 15 ML: 1.2 RINSE ORAL at 09:25

## 2023-10-18 RX ADMIN — Medication 10 ML: at 20:19

## 2023-10-18 RX ADMIN — LORAZEPAM 0.5 MG: 2 INJECTION INTRAMUSCULAR; INTRAVENOUS at 14:28

## 2023-10-18 RX ADMIN — Medication: at 00:17

## 2023-10-18 ASSESSMENT — PAIN DESCRIPTION - LOCATION
LOCATION: ABDOMEN;BACK
LOCATION: BACK
LOCATION: ABDOMEN;BACK
LOCATION: BACK

## 2023-10-18 ASSESSMENT — PAIN SCALES - WONG BAKER
WONGBAKER_NUMERICALRESPONSE: NO HURT
WONGBAKER_NUMERICALRESPONSE: 0

## 2023-10-18 ASSESSMENT — PULMONARY FUNCTION TESTS
PIF_VALUE: 16
PIF_VALUE: 15
PIF_VALUE: 16
PIF_VALUE: 16

## 2023-10-18 ASSESSMENT — PAIN DESCRIPTION - FREQUENCY: FREQUENCY: CONTINUOUS

## 2023-10-18 ASSESSMENT — PAIN DESCRIPTION - DESCRIPTORS
DESCRIPTORS: ACHING

## 2023-10-18 ASSESSMENT — PAIN DESCRIPTION - ORIENTATION
ORIENTATION: POSTERIOR

## 2023-10-18 ASSESSMENT — PAIN SCALES - GENERAL
PAINLEVEL_OUTOF10: 8
PAINLEVEL_OUTOF10: 9
PAINLEVEL_OUTOF10: 8
PAINLEVEL_OUTOF10: 9
PAINLEVEL_OUTOF10: 5
PAINLEVEL_OUTOF10: 8
PAINLEVEL_OUTOF10: 9
PAINLEVEL_OUTOF10: 8
PAINLEVEL_OUTOF10: 7

## 2023-10-18 NOTE — PROGRESS NOTES
Visited patient at request of Palliative Dr Nicole Lux. Her  Clinton Fothergill, mother-in-law and sister-in-law were present in room. The patient and Clinton Fothergill have been  some 15 years. She has three children from a previous marriage. She was able to acknowledge my presence and the family's request for prayer on her behalf.    Chaplain Sagastume MDiv, MS, [facility]

## 2023-10-18 NOTE — PROGRESS NOTES
Comprehensive Nutrition Assessment    Type and Reason for Visit: Reassess    Nutrition Recommendations/Plan:     TPN Rec's:  Continue Cyclic TPN as ordered: 9224 ml of 110 gm AA, 225 gm Dex + 250 ml 20% lipid daily         Malnutrition Assessment:  Malnutrition Status:  Severe malnutrition (08/01/23 1000)    Context:  Acute Illness     Findings of the 6 clinical characteristics of malnutrition:  Energy Intake:  50% or less of estimated energy requirements for 5 or more days  Weight Loss:  Unable to assess     Body Fat Loss:  Mild body fat loss Buccal region, Orbital   Muscle Mass Loss: Moderate muscle mass loss Temples (temporalis), Clavicles (pectoralis & deltoids)  Fluid Accumulation:  Moderate to Severe Generalized, Extremities   Strength:  Not Performed       Nutrition Assessment:    Pt admitted from [facility] d/t GI Bleed. Recent extended hospitalization @ [facility]-readmitted after episode of fulminant colitis requiring subtotal colectomy with end ileostomy, s/p reversal and subsequent ileocolonic anastomosis; EGD 7/13/23 showed nl esophagus, small hiatal hernia, evidence of previous RYGB with a tight stricture and ulcer at the gastro-jejunal anastomosis-s/p dilatation; recurrent C Diff colitis. PMHx: Gastric bypass 2017, CAD, DM 2, Dyslipidemia, GERD, PE.     10/18: Follow up. Family had a meeting with palliative team on 10/16 and has decided against any further surgery with plans to transition to comfort care and possibly hospice within the next week after family/friends come to visit. She continues on cyclic TPN at this time: 1550 ml, 110 gm protein, 225 gm CHO with 250 ml 20% lipid daily. This has been meeting 100% pt's kcal and protein needs. Will continue as ordered. No new weight since 10/9. Labs reviewed. 10/10: Pt transferred out of the unit x 2 in the past week. Returned 10/5 after becoming unresponsive (?oversedated) and placed back on the vent. Weaned back to [facility] and moved out yesterday.

## 2023-10-18 NOTE — PROGRESS NOTES
[facility] Daily Progress Note. Name: Kusum Silva   : 1960   MRN: [id number]   Date: 10/18/2023        HPI:   61 y.o. female with complicated pmhx including gastric bypass , hx PE, c diff colitis 3/2023 s/p colectomy/ileostomy s/p reversal 23, admission to [facility] -23. She briefly went to [address] [place] [address] and then was admitted on 2023 with GIB. G tube placed , Trach placed 9/3. Due to her complicated medical course and failure to thrive. Her main medical issues include stage IV sacral decub, severe malnutrition on TPN and inability to wean from vent. Pt transitioned to DNR on 10/16 after family meeting, with likely transition to comfort focused care in the coming week. Past 24 hrs  No clinical changes, PRN pain regimen adjusted, tolerating PSV. Spoke with pt and family at bedside to update pt regarding plan of care to transition comfort focused care, which is in line with patients values. During our discussion pt communicated via mouthing words, nods and head shakes with limited written communication. She appears to grossly understand her current clinical status and appropriateness for shifting focus to palliation, and per her responses this appears to be in line with her wishes. Ultimately, while she appears able to comprehend plan of care, care team will defer medical decisions to her family as medical proxy, given her limited ability to express herself fully and occasional episodes of confusion. Active Problems Being Managed:   Failure to thrive  Malnutrition  Chronic respiratory failure  Intrabdominal abcess  Sacral pressure ulcer    Assessment/Plan:     Neuro:  - awake/alert. Pt debilitated. Malnourished and has failed to thrive and regain strength over past few months.    - PRN pain and anxiety regimen    CVS:  - MAP goal > 65    Pulm:  - On MV, PSV and TC trials as tolerated  - Debility main barrier to liberation  - cont neb treatments    Renal:  -

## 2023-10-18 NOTE — PROGRESS NOTES
home, but we also tell her this may not happen- SW tells patient take things one step at a time. We do share with family that administration received call from the patient's daughter Cristhian Woo concerned that we were not involving the patient in her care- family is concerned that this conversation is making patient anxious and if this call is the only reason why are telling the patient right now, see Dr. Gabriel Rocha' note for additional information (this writer was not at previous family meeting), but medical team previously told family on Monday that we would have to talk to Kitty salcedo regarding the decision for comfort focused care to honor her autonomy, patient is alert, however, family also understands she cannot make complex medical decisions. The transition to comfort is supported by family, and documented clearly in AMD-     There is NO change to current care plan. Continue current care, the transition to comfort focused care is on the family's timeline and they understand we would not make that transition without MPOA agreement. SW provided support to patient in trying to calm her, she mouths words and points to something in room, but this writer and family unable to make out what she is asking for, patient frustrated and tearful at times- attempted to calm patient, family is also supporting her. SW went to get RN to help with oral care- perhaps patient wants mouth care. Our team is following along with you.      Thank you for including Palliative Medicine in the care of  [place], [place]

## 2023-10-18 NOTE — PROGRESS NOTES
0800: Pt and family requesting ice chips and sips of water. Informed verbal orders needed from BULMARO AMAYA [facility] MD prior to any PO intake. 1050: Verbal orders received by BULMARO AMAYA [facility] MD to change freqency of PRN dilaudid to q3hr and ice chips for patient comfort. 1110: Ventilator red - alarming for low tidal volumes. Pt resting. RR 20s. Inline suctioned trach. RT notified.

## 2023-10-18 NOTE — PROGRESS NOTES
0000: Pt report given to BONY Smith. Pt assignment switched. SBAR report given. All questions answered at this time.

## 2023-10-18 NOTE — PROGRESS NOTES
Palliative Medicine  Patient Name: Evita Garcia  YOB: 1960  MRN: [id number]  Age: 61 y.o. Gender: female    Date of Initial Consult:  reconsult- 10/13/23  Date of Service: 10/18/2023  Time: 10:43 AM  Provider: Paolo Oates, [address] Day: 80  Admit Date: 7/28/2023  Referring Provider: Dr Cesilia Mg        Reasons for Consultation:  Goals of Care    HISTORY OF PRESENT ILLNESS (HPI):   Evita Garcia is a 61 y.o. female with a past medical history of complex pmhx incl gastric bypass 2017, hx PE, c diff colitis 3/2023 s/p colectomy/ileostomy s/p reversal 6/8/23, admission to [facility] 6/22-7/21/23 who was admitted on 7/28/2023 from [address] [place] [address] with GIB. Complex hospital stay- see ICU and hospitalist note for details- incl ICU admissions 8/6, 9/23, 10/10. G tube placed 8/23, Trach placed 9/3. Other medical issues incl stage IV sacral decub, severe malnutrition on PPN. Before coming back to ICU on 10/10 was tolerating PMV, speaking. Family meeting on 10/16 - DNR status and no escalation of care. Psychosocial: Pt  to Omar. They have 3 adult children, grandkids, great grandkids. Brother is Hipolito Avila. Mother in law is Leeann. 6/2023  pt was working w/ therapies- mod I with rolling walker and max A x 2 to get to bedside commode. AMD on file. PALLIATIVE DIAGNOSES:    Generalized weakness   Shortness of breath, recurrent respiratory failure   Severe malnutrition, on TPN  Generalized pain, pain from decubitus ulcer   Goals of care/support       ASSESSMENT AND PLAN:   Pt continues to c/o pain (back and wound especially) to staff. Family - Omar, his mom Leeann and pt's STUART at bedside. A granddtr is flying in from out of town this evening. Giving opportunity for all family/friends who are able to visit and spend time w/ pt. Family shares that they think they will transition to comfort measures only Thurw/tmrw evening.  At that time, would recommend allowing IV Dilaudid for pain every 15-30 min

## 2023-10-19 PROCEDURE — 2000000000 HC ICU R&B

## 2023-10-19 PROCEDURE — 6360000002 HC RX W HCPCS: Performed by: STUDENT IN AN ORGANIZED HEALTH CARE EDUCATION/TRAINING PROGRAM

## 2023-10-19 PROCEDURE — 94640 AIRWAY INHALATION TREATMENT: CPT

## 2023-10-19 PROCEDURE — A4216 STERILE WATER/SALINE, 10 ML: HCPCS | Performed by: NURSE PRACTITIONER

## 2023-10-19 PROCEDURE — 6360000002 HC RX W HCPCS: Performed by: PHYSICAL MEDICINE & REHABILITATION

## 2023-10-19 PROCEDURE — 6360000002 HC RX W HCPCS: Performed by: INTERNAL MEDICINE

## 2023-10-19 PROCEDURE — 99233 SBSQ HOSP IP/OBS HIGH 50: CPT | Performed by: PHYSICAL MEDICINE & REHABILITATION

## 2023-10-19 PROCEDURE — 6360000002 HC RX W HCPCS: Performed by: NURSE PRACTITIONER

## 2023-10-19 PROCEDURE — 2580000003 HC RX 258: Performed by: SURGERY

## 2023-10-19 PROCEDURE — C9113 INJ PANTOPRAZOLE SODIUM, VIA: HCPCS | Performed by: NURSE PRACTITIONER

## 2023-10-19 PROCEDURE — 2580000003 HC RX 258: Performed by: NURSE PRACTITIONER

## 2023-10-19 PROCEDURE — 94003 VENT MGMT INPAT SUBQ DAY: CPT

## 2023-10-19 PROCEDURE — G0316 PR PROLONG INPT EVAL ADD15 M: HCPCS | Performed by: PHYSICAL MEDICINE & REHABILITATION

## 2023-10-19 RX ORDER — HYDROMORPHONE HYDROCHLORIDE 1 MG/ML
1 INJECTION, SOLUTION INTRAMUSCULAR; INTRAVENOUS; SUBCUTANEOUS
Status: DISCONTINUED | OUTPATIENT
Start: 2023-10-19 | End: 2023-10-20 | Stop reason: HOSPADM

## 2023-10-19 RX ORDER — LORAZEPAM 2 MG/ML
1 INJECTION INTRAMUSCULAR EVERY 30 MIN PRN
Status: DISCONTINUED | OUTPATIENT
Start: 2023-10-19 | End: 2023-10-20 | Stop reason: HOSPADM

## 2023-10-19 RX ORDER — HYDROMORPHONE HYDROCHLORIDE 1 MG/ML
0.5 INJECTION, SOLUTION INTRAMUSCULAR; INTRAVENOUS; SUBCUTANEOUS EVERY 30 MIN PRN
Status: DISCONTINUED | OUTPATIENT
Start: 2023-10-19 | End: 2023-10-20 | Stop reason: HOSPADM

## 2023-10-19 RX ORDER — MAGNESIUM SULFATE 1 G/100ML
1000 INJECTION INTRAVENOUS ONCE
Status: COMPLETED | OUTPATIENT
Start: 2023-10-19 | End: 2023-10-19

## 2023-10-19 RX ADMIN — Medication: at 08:19

## 2023-10-19 RX ADMIN — CHLORHEXIDINE GLUCONATE 15 ML: 1.2 RINSE ORAL at 08:18

## 2023-10-19 RX ADMIN — HYDROMORPHONE HYDROCHLORIDE 0.5 MG: 1 INJECTION, SOLUTION INTRAMUSCULAR; INTRAVENOUS; SUBCUTANEOUS at 17:24

## 2023-10-19 RX ADMIN — HYDROMORPHONE HYDROCHLORIDE 1 MG: 1 INJECTION, SOLUTION INTRAMUSCULAR; INTRAVENOUS; SUBCUTANEOUS at 17:44

## 2023-10-19 RX ADMIN — HYDROMORPHONE HYDROCHLORIDE 1 MG: 1 INJECTION, SOLUTION INTRAMUSCULAR; INTRAVENOUS; SUBCUTANEOUS at 01:01

## 2023-10-19 RX ADMIN — Medication 10 ML: at 08:19

## 2023-10-19 RX ADMIN — MAGNESIUM SULFATE HEPTAHYDRATE 1000 MG: 1 INJECTION, SOLUTION INTRAVENOUS at 01:01

## 2023-10-19 RX ADMIN — HYDROMORPHONE HYDROCHLORIDE 0.5 MG: 1 INJECTION, SOLUTION INTRAMUSCULAR; INTRAVENOUS; SUBCUTANEOUS at 18:47

## 2023-10-19 RX ADMIN — HYDROMORPHONE HYDROCHLORIDE 0.5 MG: 1 INJECTION, SOLUTION INTRAMUSCULAR; INTRAVENOUS; SUBCUTANEOUS at 23:43

## 2023-10-19 RX ADMIN — LORAZEPAM 1 MG: 2 INJECTION INTRAMUSCULAR; INTRAVENOUS at 14:06

## 2023-10-19 RX ADMIN — LORAZEPAM 1 MG: 2 INJECTION INTRAMUSCULAR; INTRAVENOUS at 08:04

## 2023-10-19 RX ADMIN — HYDROMORPHONE HYDROCHLORIDE 1 MG: 1 INJECTION, SOLUTION INTRAMUSCULAR; INTRAVENOUS; SUBCUTANEOUS at 08:04

## 2023-10-19 RX ADMIN — LORAZEPAM 1 MG: 2 INJECTION INTRAMUSCULAR; INTRAVENOUS at 20:46

## 2023-10-19 RX ADMIN — LORAZEPAM 1 MG: 2 INJECTION INTRAMUSCULAR; INTRAVENOUS at 17:28

## 2023-10-19 RX ADMIN — Medication: at 00:22

## 2023-10-19 RX ADMIN — HYDROMORPHONE HYDROCHLORIDE 0.5 MG: 1 INJECTION, SOLUTION INTRAMUSCULAR; INTRAVENOUS; SUBCUTANEOUS at 15:59

## 2023-10-19 RX ADMIN — SODIUM CHLORIDE, PRESERVATIVE FREE 40 MG: 5 INJECTION INTRAVENOUS at 08:04

## 2023-10-19 RX ADMIN — HYDROMORPHONE HYDROCHLORIDE 1 MG: 1 INJECTION, SOLUTION INTRAMUSCULAR; INTRAVENOUS; SUBCUTANEOUS at 22:49

## 2023-10-19 RX ADMIN — Medication 10 ML: at 22:04

## 2023-10-19 RX ADMIN — LORAZEPAM 1 MG: 2 INJECTION INTRAMUSCULAR; INTRAVENOUS at 15:59

## 2023-10-19 RX ADMIN — ARFORMOTEROL TARTRATE 15 MCG: 15 SOLUTION RESPIRATORY (INHALATION) at 07:31

## 2023-10-19 RX ADMIN — BUDESONIDE 500 MCG: 0.5 INHALANT RESPIRATORY (INHALATION) at 07:31

## 2023-10-19 RX ADMIN — HYDROMORPHONE HYDROCHLORIDE 1 MG: 1 INJECTION, SOLUTION INTRAMUSCULAR; INTRAVENOUS; SUBCUTANEOUS at 22:01

## 2023-10-19 RX ADMIN — HYDROMORPHONE HYDROCHLORIDE 1 MG: 1 INJECTION, SOLUTION INTRAMUSCULAR; INTRAVENOUS; SUBCUTANEOUS at 12:18

## 2023-10-19 ASSESSMENT — PAIN DESCRIPTION - LOCATION
LOCATION: BACK

## 2023-10-19 ASSESSMENT — PULMONARY FUNCTION TESTS
PIF_VALUE: 24
PIF_VALUE: 16
PIF_VALUE: 28
PIF_VALUE: 31
PIF_VALUE: 24

## 2023-10-19 ASSESSMENT — PAIN SCALES - GENERAL
PAINLEVEL_OUTOF10: 9
PAINLEVEL_OUTOF10: 6
PAINLEVEL_OUTOF10: 9
PAINLEVEL_OUTOF10: 0
PAINLEVEL_OUTOF10: 9
PAINLEVEL_OUTOF10: 4
PAINLEVEL_OUTOF10: 9

## 2023-10-19 ASSESSMENT — PAIN DESCRIPTION - DESCRIPTORS
DESCRIPTORS: ACHING

## 2023-10-19 ASSESSMENT — PAIN DESCRIPTION - ORIENTATION
ORIENTATION: POSTERIOR

## 2023-10-19 NOTE — PROGRESS NOTES
Occupational Therapy  10/19/23    Chart reviewed, discussed with RN. Patient with increased lethargy this morning, noted possible plans to transition to comfort care. Will defer OT weekly re-assessment and follow up as able/appropriate.      Thank you,   MELISSA Quigley, OTR/L

## 2023-10-19 NOTE — PROGRESS NOTES
offered support to pt's daughter Horace Sanabria by phone per daughter's request.  Offered listening presence as she shared some concerns. Horace Sanabria is on her way to the hospital this morning. Assured her of ongoing  availability for patient and family. Pt 's case discussed with Palliative  for continuity of care.     [address] [place] [address], [address] [facility] [address] [phone]

## 2023-10-19 NOTE — PROGRESS NOTES
Physical Therapy 10/19/2023       Chart reviewed, discussed with RN. Patient with plans to transition to comfort care. Will defer PT weekly re-assessment and follow up as able/appropriate.      Angelo Null, PT

## 2023-10-19 NOTE — CARE COORDINATION
Transition of Care     RUR: 25% High  Prior Level of Functioning: Independent   IM/IMM Medicare 7/29/23   Is patient a  and connected with VA? No  Caregiver Contact:    [facility]: [phone]  Discharge Caregiver contacted prior to discharge? Yes  Care Conference needed? No  Barriers to discharge:    Code status DNR with no escalation of care. Plan is to transition to [address] [place] [address] this evening. Lily Mao RN,Care Management    3:15 PM Consult for Hospice noted. Referral made through Epic. UMANG spoke with Kaitlin Gutierrez with [facility] intake as referral is for tomorrow.    Lily Mao RN,Care Management

## 2023-10-19 NOTE — PROGRESS NOTES
Referral source:  [facility]. Assessment: Provided support to family during transition to comfort process. No spiritual needs noted. Collaborated with staff. [facility], [address] [place] [address] paging Service [phone] ([phone])

## 2023-10-19 NOTE — PROGRESS NOTES
1721: Pt removed from ventilator. Placed on trach collar. Comfort measures initiated as agreed upon by patient's family and .

## 2023-10-19 NOTE — PROGRESS NOTES
[address] [facility]  [phone]     Patient Name: Eula Ferreira  YOB: 1960  Age: 61 y.o. [facility]:  Palliative team provided an update that this patient was being transitioned to comfort care this evening. Palliative updated CM to Hospice consult to be addressed on 10/20/23. , Kyra Cabello, has sent the Hospice consult to [facility] for review. Will follow up with Unit Nurse and Care Manager to discuss plan of care, patient status and discharge disposition tomorrow, as per request that Hospice Team make contact with patient/family on Friday, 10/20/23. Thank you for the opportunity to be of service to this patient.      Yolande Bojorquez RN, [address] [place] [address] Nurse Liaison  [phone] Mobile  [phone] Office  Available on Perfect Serve

## 2023-10-19 NOTE — PROGRESS NOTES
[facility] Daily Progress Note. Name: Susie Roberts   : 1960   MRN: [id number]   Date: 10/19/2023        HPI:   61 y.o. female with complicated pmhx including gastric bypass , hx PE, c diff colitis 3/2023 s/p colectomy/ileostomy s/p reversal 23, admission to [facility] -23. She briefly went to [address] [place] [address] and then was admitted on 2023 with GIB. G tube placed , Trach placed 9/3. Due to her complicated medical course and failure to thrive. Her main medical issues include stage IV sacral decub, severe malnutrition on TPN and inability to wean from vent. Pt transitioned to DNR on 10/16 after family meeting, with likely transition to comfort focused care in the coming week. Past 24 hrs  Clinically unchanged, remains intermittently awake and interactive, tolerating PSV. Addendum: Family wishes to transition to comfort measures only this PM. Pt placed on TC and PRN regimen adjusted. Hospice eval in AM    Active Problems Being Managed:   Failure to thrive  Malnutrition  Chronic respiratory failure  Intrabdominal abcess  Sacral pressure ulcer    Assessment/Plan:     Neuro:  - awake/alert. Pt debilitated. Malnourished and has failed to thrive and regain strength over past few months.    - PRN pain and anxiety regimen    CVS:  - MAP goal > 65    Pulm:  - On MV, PSV and TC trials as tolerated  - Debility main barrier to liberation  - cont neb treatments    Renal:  - Monitor I/Os; replete lytes as needed    GI:  - Decision made to not pursue further operative interventions  - cont TPN until transition to [facility]  - allow leisure ice chips, swabs    Endo:  - BS goal 100-180    ID:  - completed abx 10/13    Heme:  - H/H acceptable    DVT prophylaxis: enoxaparin  SUP: protonix  Code status: DNR      Next of kin: Emmanuelle Omalley (Spouse)   [phone] (Mobile)        Review of systems:     Review of Systems   Unable to obtain 2/2 trach      Objective:     Vital Signs:  /74

## 2023-10-20 VITALS
HEART RATE: 111 BPM | SYSTOLIC BLOOD PRESSURE: 117 MMHG | WEIGHT: 100.3 LBS | TEMPERATURE: 98.9 F | DIASTOLIC BLOOD PRESSURE: 70 MMHG | RESPIRATION RATE: 18 BRPM | BODY MASS INDEX: 17.13 KG/M2 | HEIGHT: 64 IN | OXYGEN SATURATION: 59 %

## 2023-10-20 PROCEDURE — 6360000002 HC RX W HCPCS: Performed by: STUDENT IN AN ORGANIZED HEALTH CARE EDUCATION/TRAINING PROGRAM

## 2023-10-20 PROCEDURE — 6360000002 HC RX W HCPCS: Performed by: PHYSICAL MEDICINE & REHABILITATION

## 2023-10-20 RX ADMIN — HYDROMORPHONE HYDROCHLORIDE 1 MG: 1 INJECTION, SOLUTION INTRAMUSCULAR; INTRAVENOUS; SUBCUTANEOUS at 02:23

## 2023-10-20 RX ADMIN — HYDROMORPHONE HYDROCHLORIDE 1 MG: 1 INJECTION, SOLUTION INTRAMUSCULAR; INTRAVENOUS; SUBCUTANEOUS at 00:51

## 2023-10-20 RX ADMIN — HYDROMORPHONE HYDROCHLORIDE 0.5 MG: 1 INJECTION, SOLUTION INTRAMUSCULAR; INTRAVENOUS; SUBCUTANEOUS at 01:38

## 2023-10-20 NOTE — DISCHARGE SUMMARY
[address] [facility]  Hospitalist Group     Death Discharge Summary     PATIENT ID: Kam Martins  MRN: [id number]   YOB: 1960    DATE OF ADMISSION: 7/28/2023  4:15 PM    PRIMARY CARE PROVIDER: Ailyn Pulliam MD   ATTENDING PHYSICIAN: GARETT Medina NP  CONSULTATIONS:   IP CONSULT TO DIETITIAN  IP CONSULT TO GI  IP CONSULT TO GENERAL SURGERY  IP WOUND CARE NURSE CONSULT TO EVAL  IP WOUND CARE NURSE CONSULT TO EVAL  IP WOUND CARE NURSE CONSULT TO EVAL  IP CONSULT TO DIETITIAN  IP WOUND CARE NURSE CONSULT TO EVAL  IP CONSULT TO INFECTIOUS DISEASES  IP CONSULT TO INTERVENTIONAL RADIOLOGY  IP CONSULT TO PHARMACY  IP WOUND CARE NURSE CONSULT TO EVAL  IP CONSULT TO DIETITIAN  IP CONSULT TO INTERVENTIONAL RADIOLOGY  IP CONSULT TO DIETITIAN  IP CONSULT TO PHYSICAL THERAPY  IP CONSULT TO OCCUPATIONAL THERAPY  IP CONSULT TO PULMONOLOGY  IP CONSULT TO PULMONOLOGY  IP CONSULT TO PALLIATIVE CARE  IP CONSULT TO CASE MANAGEMENT  IP CONSULT TO HOSPICE    PROCEDURES/SURGERIES:   Procedure(s):  DEBRIDEMENT OF SACRAL DECUBITUS WOUND    REASON FOR ADMISSION: GI bleed     HOSPITAL PROBLEM LIST:  Patient Active Problem List   Diagnosis    Abnormal CT of the abdomen    FH: colon cancer    Hypertension    DDD (degenerative disc disease), lumbar    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[address])    Asthma    Abnormal mammogram of right breast    Hypokalemia    MUNOZ (nonalcoholic steatohepatitis)    Type 2 diabetes mellitus with diabetic neuropathy ([address])    History of pulmonary embolus (PE)    Diabetes mellitus ([address])    FH: diabetes mellitus    Arthritis    H/O colonoscopy    Venous insufficiency    GERD (gastroesophageal reflux disease)    Elevated ferritin    Inflammatory bowel disease    Metabolic syndrome    Anxiety    H/O gastric bypass    Primary fibromyalgia syndrome    Colitis    DAWIT (acute kidney injury) ([address])    Fibromyalgia    Neutrophilia    Diarrhea of presumed infectious origin    Thrombocytosis    S/P colectomy

## 2023-10-20 NOTE — PROGRESS NOTES
Referral source:  notified of Marin Albert on [facility]. Assessment: Patient . Family declined services. [address] [facility], [address] [place] [address] paging Service [phone] ([phone])

## 2023-10-20 NOTE — PROGRESS NOTES
0045: TRANSFER - OUT REPORT:    Verbal report given to Marquis Dudley RN on Darien Thomas  being transferred to [facility] for routine progression of patient care       Report consisted of patient's Situation, Background, Assessment and   Recommendations(SBAR). Information from the following report(s) Nurse Handoff Report, Intake/Output, MAR, Recent Results, Cardiac Rhythm NSR, and Alarm Parameters was reviewed with the receiving nurse. Lines:   PICC Triple Lumen 90/58/61 Left Basilic (Active)   Central Line Being Utilized No 10/19/23 2000   Criteria for Appropriate Use Total parenteral nutrition 10/19/23 1600   Site Assessment Clean, dry & intact 10/19/23 2000   Phlebitis Assessment No symptoms 10/19/23 2000   Infiltration Assessment 0 10/19/23 2000   Extremity Circumference (cm) 26 cm 10/17/23 0800   External Catheter Length (cm) 0 cm 10/17/23 0800   Proximal Lumen Color/Status Red;Capped 10/19/23 2000   Medial Lumen Status White;Capped 10/19/23 2000   Distal Lumen Color/Status Gray;Capped 10/19/23 2000   Line Care Connections checked and tightened;Ports disinfected;Cap changed 10/19/23 2000   Alcohol Cap Used Yes 10/19/23 2000   Date of Last Dressing Change 10/18/23 10/19/23 1600   Dressing Type Transparent w/CHG gel;Securing device 10/19/23 2000   Dressing Status Clean, dry & intact; New dressing applied 10/19/23 2000   Dressing Intervention Dressing changed;New;Security device changed 10/18/23 1600        Opportunity for questions and clarification was provided.       Patient transported with:  Registered Nurse & O2

## 2024-01-23 NOTE — OP NOTES
Laparoscopic Cholecystectomy with IOC Procedure Note    Indications: This patient presents with a symptomatic gallbladder disease and will undergo laparoscopic cholecystecomy. Proceedure: laparoscopic cholecystectomy with Cholangiogram    Pre-operative Diagnosis: Calculus of gallbladder without mention of cholecystitis or obstruction    Post-operative Diagnosis:  Calculus of gallbladder without mention of cholecystitis or obstruction    Surgeon: Primitivo Alonzo MD     Assistants: Chai Reyes    Anesthesia: General endotracheal anesthesia    ASA Class: 1      Assistant:  Surgeon(s):  Primitivo Alonzo MD    Procedure Details   The patient was seen again in the Holding Room. The risks, benefits, complications, treatment options, and expected outcomes were discussed with the patient. The possibilities of reaction to medication, pulmonary aspiration, perforation of viscus, bleeding, recurrent infection, finding a normal gallbladder, the need for additional procedures, failure to diagnose a condition, the possible need to convert to an open procedure, and creating a complication requiring transfusion or operation were discussed with the patient. The patient and/or family concurred with the proposed plan, giving informed consent. The patient was taken to Operating Room, identified as Mariaelena Lemons and the procedure verified as Laparoscopic Cholecystectomy with possible Intraoperative Cholangiograms. A Time Out was held and the above information confirmed. Prior to the induction of general anesthesia,  antibiotic prophylaxis was administered. General endotracheal anesthesia was then administered and tolerated well. After the induction, the abdomen was prepped in the usual sterile fashion. The abdomen was entered in the right upper quadrant in the midclavicular line just below the costal margin. At this site, 3 mL of 0.5% Marcaine was injected in the subcutaneous space. A 5-mm incision made with an 11-blade scalpel. Then a 5-mm Xcel trocar containing 5-mm 0-degree laparoscope was used to dissect through the layers of the abdominal wall under direct laparoscopic vision. Entry into the abdomen was confirmed visually. Once within the abdomen, insufflation was provided through this trocar to a pressure of 15 mmHg. The patient tolerated insufflation well and there were no apparent complications. A 360-degree evaluation of the abdomen was then performed from this trocar  site. There were no peritoneal implants identified. There were no abnormalities on the surface of the liver. Attention was then focused at the umbilicus. Marcaine plain 0.5% 3 mL was injected in the subcutaneous space, as well as the preperitoneal space. A 12-mm curvilinear incision was made at the base of the umbilicus, and then a 67-LO Xcel trocar was inserted under direct laparoscopic vision into the abdominal cavity. The camera was then switched to this trocar position. The patient was then placed in reverse Trendelenburg with right side up. Attention was focused at the right upper quadrant, and 2 additional trocars were placed. One 5-mm trocar was placed in the epigastrium just below the xyphyoid The third 5-mm trocar was placed in the anterior axillary line just below the costal margin. At both sites, 3 mL of 0.5% Marcaine was injected into the skin and the preperitoneal space, a 5-mm incision made, and then the 5-mm trocar inserted under direct laparoscopic vision. The fundus of the gallbladder was then grasped and retracted over the dome of the liver. The infundibulum was grasped and retracted to the right and inferiorly. Blunt dissection was used to dissect out the space between the infundibulum and the gallbladder bed, and then blunt dissection was used to dissect out the cystic duct and  cystic artery. A critical view was obtained where these were the only 2 structures entering the gallbladder.  Once this critical view was obtained, a clip was placed at the base of the infundibulum. A choledochotomy was made in the very distal cystic duct. Then a 14-gauge angiocatheter was used to introduce an Arrow cholangiogram catheter into the abdominal cavity. The catheter was irrigated with saline and then inserted into the choledochotomy. Once within the cystic duct, the balloon was inflated and the cystic duct irrigated with normal saline. There was no evidence of any leakage out through the choledochotomy, indicating good seal with the balloon. Of note, the saline irrigated easily without much resistance. All instruments were removed from the patient's abdomen. She was placed in a supine position and a C-arm was brought into the field. A  film was shot to obtain proper orientation and alignment of the C-arm, and then, under real-time fluoroscopy, contrast was injected slowly by hand. Contrast was seen flowing through the cystic duct and into a common bile duct. There was no filling defect seen in the common bile duct and the duct was not dilated. Good  flow into the  duodenum was visualized. Contrast easily refluxed up the common hepatic  duct and into right and left hepatic ducts and into secondary and tertiary  biliary radicles within the liver without signs of filling defects. The C-arm was removed, the cholangiogram catheter removed. Two clips were  placed on the proximal cystic duct and then the choledochotomy  completed with laparoscopic scissors. The artery was clipped and divided in a similar fashion. The infundibulum of the gallbladder was then retracted towards the anterior abdominal wall and the gallbladder removed from the gallbladder fossa with electrocautery. The gallbladder was then placed over the right lobe of the liver. An Endocatch bag was then inserted through the umbilical trochar. The gallbladder was then placed in the EndoCatch bag and removed through the umbilical trocar site.   The gallbladder was then sent to Pathology. Next, the right upper quadrant was inspected. The gallbladder fossa appeared dry. There was no evidence of any bleeding. The cystic duct remnant had 2 clips across it, and an Endoloop, with no evidence of any leakage of bile. The cystic artery remnant was inspected. It had 2 clips across it with no evidence of any bleeding. The right upper quadrant was then irrigated with 300 mL of normal saline. The irrigation came back  clear. Attention was then focused on the umbilical trocar site, and the fascia at  this umbilical trocar site was closed with a transfascial sutures of 0  Vicryl. This was done using an Endo Close device under direct laparoscopic  vision. Once this suture was tied, there was no loss of pneumoperitoneum through the umbilical trocar site and no palpable defect, indicating adequate closure of the trocar site. Pneumoinsufflation was then vented through the 5-mm trocar sites. The trocars were then removed and the skin at all trocar sites closed with 4-0 Monocryl and Dermabond. The patient was extubated in the room and taken to the [id number] [facility]  Unit in stable condition. Instrument, sponge, and needle counts were correct x2. Findings:  Adhesions between Abraham limb and the abdominal wall  No Osullivan's internal hernia  Normal cholangiogram   Cholelithiasis    Estimated Blood Loss:  Minimal           Drains: none           Specimens: Gallbladder             Complications:  None; patient tolerated the procedure well. Disposition: PACU - hemodynamically stable.            Condition: stable    Attending Attestation: I was present and scrubbed for the entire procedure appears normal and intact

## 2024-06-13 NOTE — WOUND CARE
Ostomy Progress Note:  postoperative visit , patient on 5th floor ambulating  Type of Ostomy;ileostomy  Location:[facility]  Date of Surgery:3/27  Surgeon:     Treatments:  Pouch removed, stoma and peristomal skin cleaned and assessed, new pouch prepared and applied. Findings:  Current appliance:single  Stoma size:pink, moist, budded  Peristomal skin:clean  Output:liquid brown stool 100cc    Teaching/subjects covered today:  Encouraged patient to review handout given to patient/family and ask questions regarding material on next ostomy nurse visit. General anatomy and expectations of output  Normal and abnormal stoma characteristics & changes over time. Normal abnormal peristomal characteristics. When/how to clean stoma & peristomal skin. When/how to empty pouch  Crusting technique  When/how to change appliance   When to notify nurse/Physician  Where/how to obtain supplies  Manipulated/practiced with clean pouch and pouch closure  reviewed ADL's(bathing,mattress cover,car pillow to protect from seatbelt,etc.    Patient demonstrated understanding of above material covered  Recommendations:  Empty appliance when 1/3 full and PRN. Encourage patient/family to notify nurse and assist w/ pouch emptying to promote self care. Change appliance twice weekly and PRN for leaking ASAP. DO NOT REINFORCE LEAKS to avoid skin breakdown. Transition of Care:  Ostomy nurse to return and continue teaching. Patient has learned vital skills but will likely need home health care for further teaching after discharge.     Evelyn Loredo RN  Wound Progressing toward goals. Added weights to standing program, with good tolerance from patient. Added weights to exercises as noted in the chart above. Cues for slow pace during exercises, little to no carryover.     [] No change.     [x] Other: Patient pacing is still an issue, tends to rush through exercise.   [x] Patient will benefit from physical therapy to improve L hip and trunk ROM, strength, normalize gait tolerance and improve ADLs.         STG: (to be met in 8 treatments)  ? Pain: 2/10 L hip and back pain with ambulation.   ? ROM: L hip flexion to 110, abduction to 40, and ER to 35 degrees to improve ADLs.   ? Strength: 4+/5 L hip flexion and abduction to improve joint stability and standing   ? Function: Oswestry score 36% impaired or better to improve ADLs.   Independent with Home Exercise Programs.     LTG: (to be met in 12 treatments)  Patient will be able to climb and 8 inch step.   Patient will be able to perform 10 second L sided SLS.     Pt. Education:  [x] Yes  [] No  [x] Reviewed Prior HEP/Ed  Method of Education: [x] Verbal  [] Demo  [] Written  Comprehension of Education:  [x] Verbalizes understanding.  [x] Demonstrates understanding.  [] Needs review.  [] Demonstrates/verbalizes HEP/Ed previously given.     Plan: [x] Continue current frequency toward long and short term goals.    [x] Specific Instructions for subsequent treatments:  L hip ROM, strengthening, core strengthening       Time In: 1120           Time Out: 1156    Electronically signed by:  Siri Wong, PT

## 2024-08-27 NOTE — PROGRESS NOTES
Office Follow-up    NAME: Dayanara Seay   :  1960  MRM:  [id number]    Date:  2021            Assessment:     Problem List  Date Reviewed: 2021        Codes Class Noted    Venous insufficiency ICD-10-CM: I87.2  ICD-9-CM: 459.81  2021        Type 2 diabetes with nephropathy ([facility] ICD-10-CM: E11.21  ICD-9-CM: 250.40, 583.81  10/28/2020        Primary fibromyalgia syndrome ICD-10-CM: M79.7  ICD-9-CM: 729.1  3/10/2020        Inflammatory bowel disease ICD-10-CM: K52.9  ICD-9-CM: 558.9  3/10/2020        Omental infarction [facility]) ICD-10-CM: I67.735  ICD-9-CM: 557.0  2019        Abnormal CT of the abdomen ICD-10-CM: R93.5  ICD-9-CM: 793.6  2018    Overview Signed 2018  6:01 AM by Hunter Lynn     2018:   Ongoing evolution of inflammatory changes in the left upper quadrant most  consistent with omental infarction. Decreased size of main inflamed fatty nodule  and less surrounding stranding. Otherwise, no acute pathology in the chest, abdomen or pelvis. Abnormal mammogram of right breast ICD-10-CM: R92.8  ICD-9-CM: 793.80  2018    Overview Addendum 2018  4:36 PM by Hunter Stewart did biopsy 5371. Biopsy  = fibroadenoma    Mammogram 2018: IMPRESSION: Small right breast mass. BI-RADS Assessment Category 4: Suspicious  Abnormality- Biopsy should be considered. RECOMMENDATION:  Ultrasound-guided right breast biopsy. Type 2 diabetes mellitus with diabetic neuropathy ([facility] ICD-10-CM: E11.40  ICD-9-CM: 250.60, 357.2  2018    Overview Addendum 2020  1:26 PM by Lolita Espino MD     2018:  a1c 5.3    2017:  S/p gastric bypass = a1c 5.2/  LDL 84/  MAB negative    Dx:  a1c 6.9 2016    Eye exam: 6/3/2020.  Normal exam.             Elevated ferritin ICD-10-CM: R79.89  ICD-9-CM: 790.6  2017        H/O colonoscopy ICD-10-CM: L71.529  ICD-9-CM: V45.89  2017    Overview Addendum 2017  2:12 PM by Reyes Claros     8569, next 2020  +FH colon cancer in mom             DDD (degenerative disc disease), lumbar ICD-10-CM: M51.36  ICD-9-CM: 722.52  12/19/2017    Overview Signed 12/19/2017  1:57 PM by Reyes Maldonado  S/p NEREYDA x 3             FH: colon cancer ICD-10-CM: Z80.0  ICD-9-CM: V16.0  12/19/2017    Overview Signed 12/19/2017  2:12 PM by Reyes Claros     mom             H/O gastric bypass ICD-10-CM: Z98.84  ICD-9-CM: V45.86  7/12/2017    Overview Addendum 11/3/2018  9:51 AM by Reyes Camilo 1/2017  lost from 230 to 154 lbs             Hypokalemia ICD-10-CM: E87.6  ICD-9-CM: 276.8  1/26/2017        History of pulmonary embolus (PE) ICD-10-CM: Z86.711  ICD-9-CM: V12.55  11/7/2016        Anxiety ICD-10-CM: F41.9  ICD-9-CM: 300.00  6/1/2016    Overview Addendum 9/18/2018  4:54 PM by Reyes Claros     Needs to be seen at least twice yearly for BNZ  FTF 12/19/2017, 9/18/18   as expected 12/19/2017, 9/18/18             FH: diabetes mellitus ICD-10-CM: Z83.3  ICD-9-CM: V18.0  6/7/5532        Metabolic syndrome ZVK-08-HW: E88.81  ICD-9-CM: 277.7  8/3/2015    Overview Addendum 8/4/2015 11:38 AM by Nadeem Foss V     TG up, sugar up, HTN, waist 54 inches  Rx metformin  Needs yearly eye exams and labs             Essential hypertension ICD-10-CM: I10  ICD-9-CM: 401.9  5/17/2015        Chronic pain ICD-10-CM: G89.29  ICD-9-CM: 338.29  3/30/2015    Overview Addendum 7/12/2017 11:40 AM by Adrián Echevarria  S/p NEREYDA x 3 in back via Dr. Tani Maldonado.   He uses flexeril HS for pain             GARCIA (nonalcoholic steatohepatitis) ICD-10-CM: K75.81  ICD-9-CM: 571.8  1/24/2011    Overview Signed 1/24/2011  7:18 PM by Reyes Robbins 2008             Obstructive sleep apnea ICD-10-CM: G47.33  ICD-9-CM: 327.23  9/22/2010        Asthma ICD-10-CM: J45.909  ICD-9-CM: 493.90  9/22/2010        Arthritis ICD-10-CM: M19.90  ICD-9-CM: 716.90  9/22/2010        GERD (gastroesophageal reflux disease) ICD-10-CM: K21.9  ICD-9-CM: 530.81  9/22/2010                 Plan:     1. CAD/status post LCx PCI (Nov 2015): Stable. Continue aspirin. Continue statins. 2. Hypertension: Blood pressure is controlled. Continue metoprolol. 3. Dyslipidemia: Has been having cough from Crestor. Will change to Atorvastatin 20mg po daily. Lipid profile from 7/28/21 show  while not taking Crestor. 4. Peripheral edema: This is occasional.  Previously this was related to her weight. Use diruetic as needed. Furosemide is not covered by her pharmacy. I will change it to Bumex 0.5 as needed. 5. Morbid obesity: Lost 120lbs after gastric bybass surgery IN 2017.   6. Skin bruising: Change ASA to alternate day. 7. See me again in 1 year. Subjective:     Alyx Simeon, a 64y.o. year-old who presents for followup. She has known history of CAD status post PCI of the left circumflex coronary artery. Hypertension. She has had gastric bypass surgery in 2017. Since then she has lost 120 pounds. She is returning today for follow-up. Denies any symptoms of chest pain, shortness of breath, lightheadedness or dizziness. She has easy skin bruising. She has not had any recurrence of symptoms. Recently she was on vacation and was exposed to significant humidity and felt SOB and occasional extremity edema. EKG in my office today demonstrated normal sinus rhythm normal EKG, normal axis, normal intervals.         Exam:     Physical Exam:  Visit Vitals  /88 (BP 1 Location: Left upper arm, BP Patient Position: Sitting)   Pulse 77   Ht 5' 5\" (1.651 m)   Wt 150 lb (68 kg)   LMP 01/01/1985   SpO2 98%   BMI 24.96 kg/m²     General appearance - alert, well appearing, and in no distress  Mental status - affect appropriate to mood  Eyes - sclera anicteric, moist mucous membranes  Neck - supple, no significant adenopathy  Chest - clear to auscultation, no wheezes, rales or rhonchi  Heart - normal rate, regular rhythm, normal S1, S2, no murmurs, rubs, clicks or gallops  Abdomen - soft, nontender, nondistended, no masses or organomegaly  Extremities - peripheral pulses normal, no pedal edema  Skin - normal coloration. Skin bruising is present on the arms and neck. Medications:     Current Outpatient Medications   Medication Sig    cyclobenzaprine (FLEXERIL) 10 mg tablet TAKE 1 TABLET BY MOUTH THREE TIMES A DAY AS NEEDED FOR MUSCLE SPASMS    omeprazole (PRILOSEC) 40 mg capsule TAKE 1 CAPSULE BY MOUTH EVERY DAY    hydrOXYzine pamoate (VISTARIL) 25 mg capsule Take 1 Capsule by mouth two (2) times daily as needed for Anxiety.  nitroglycerin (NITROSTAT) 0.4 mg SL tablet 1TAB WAIT 5MINS IF PAIN PERSISTS TAKE 1TAB, WAIT 5MINS IF PAIN PERSISTS TAKE 1 TAB & DIAL 911.  valACYclovir (VALTREX) 500 mg tablet TAKE 1 PILL TWICE DAILY FOR 3 DAYS AT SIGN OF HERPES FLARE.  furosemide (LASIX) 20 mg tablet TAKE 1 TABLET BY MOUTH EVERY DAY AS NEEDED    gabapentin (NEURONTIN) 600 mg tablet TAKE 1 TABLET BY MOUTH THREE TIMES A DAY    metoprolol tartrate (LOPRESSOR) 25 mg tablet TAKE 1 TABLET BY MOUTH TWO (2) TIMES A DAY.  ondansetron (ZOFRAN ODT) 4 mg disintegrating tablet TAKE 1 TABLET BY MOUTH EVERY 8 HOURS AS NEEDED FOR NAUSEA    glucose blood VI test strips (BLOOD GLUCOSE TEST) strip Use once a day    albuterol (ACCUNEB) 0.63 mg/3 mL nebulizer solution 3 mL by Nebulization route every six (6) hours as needed for Wheezing.  albuterol (PROAIR HFA) 90 mcg/actuation inhaler Take 1 Puff by inhalation every four (4) hours as needed for Wheezing or Shortness of Breath.  dicyclomine (BENTYL) 10 mg capsule TAKE 1 (ONE) CAPSULE BY MOUTH AS NEEDED EVERY 6 HOURS    budesonide (PULMICORT FLEXHALER) 180 mcg/actuation aepb inhaler Take 2 Puffs by inhalation daily.  aspirin delayed-release 81 mg tablet Take  by mouth daily.     Blood-Glucose Meter monitoring kit Use to check glucose once a day    alcohol swabs (ALCOHOL PADS) padm Use when checking blood glucose    Lancets misc Use once a day. Please give one compatible with patient's meter    omeprazole (PRILOSEC) 20 mg capsule Take 1 Capsule by mouth daily.  rosuvastatin (CRESTOR) 20 mg tablet TAKE 1 TABLET BY MOUTH EVERY DAY AT NIGHT (Patient not taking: Reported on 9/2/2021)    sucralfate (CARAFATE) 1 gram tablet Take 1 g by mouth four (4) times daily. (Patient not taking: Reported on 9/2/2021)    nystatin (MYCOSTATIN) 100,000 unit/mL suspension Take 5 mL by mouth four (4) times daily. swish and spit (Patient not taking: Reported on 9/2/2021)     No current facility-administered medications for this visit. Diagnostic Data Review:       LHC: 11/30/15- Circumflex: There was a 80 % stenosis in the distal third of the vessel segment, Successful BMS dLCx: 2.0x12 Mini Vision. ECHO:4/3/15- EF 65%, G1DD, trivial MR,   Dobutamine Stress Echo : 4/27/15: No ischemia. False positive ST Changes of ischemia. Had chest pain during the test.       Lab Review:     Lab Results   Component Value Date/Time    Cholesterol, total 269 (H) 07/28/2021 03:30 PM    HDL Cholesterol 51 07/28/2021 03:30 PM    LDL,Direct 97 09/18/2018 02:16 PM    LDL, calculated 161.4 (H) 07/28/2021 03:30 PM    Triglyceride 283 (H) 07/28/2021 03:30 PM    CHOL/HDL Ratio 5.3 (H) 07/28/2021 03:30 PM     Lab Results   Component Value Date/Time    Creatinine 0.61 07/28/2021 03:30 PM     Lab Results   Component Value Date/Time    BUN 8 07/28/2021 03:30 PM     Lab Results   Component Value Date/Time    Potassium 4.1 07/28/2021 03:30 PM     Lab Results   Component Value Date/Time    Hemoglobin A1c 5.2 07/28/2021 03:30 PM     Lab Results   Component Value Date/Time    HGB 14.5 07/28/2021 03:30 PM     Lab Results   Component Value Date/Time    PLATELET 993 77/87/9767 03:30 PM     No results for input(s): CPK, CKMB, TROIQ in the last 72 hours.     No lab exists for component: CKQMB, CPKMB             ___________________________________________________    Robbi Frankel.  Mark Ng MD, [facility] [place] LR LR; K+; Plasma

## 2025-02-07 NOTE — PROGRESS NOTES
[facility] Hospital[facility] cross coverage (7p-7a) progress note:    Rapid response called for acute respiratory failure, oxygen saturation 70%. Improvement noted after trach collar increased to 100% and patient was deep suctioned. Mother-in-law was at bedside and stated that the patient complained of chest pain. EKG and troponin ordered. Morning labs pending. ABG pH 7.21, CO2 76, ICU called to request ventilator support. Patient began agonally breathing, less responsive, defib pads applied and she was bagged. Discussed possibility of respiratory and cardiac arrest with mother in law at bedside who states she wants CPR and all interventions. Chest x-ray obtained and shows improved aeration of the left upper lobe with residual airspace opacity in the bilateral lung apices. Patient transferred to ICU and continued to be bagged on the way.       Alvin HANSON No.

## (undated) DEVICE — DRAIN SURG 15FR SIL RND CHN W/ TRCR FULL FLUT DBL WRP TRAD

## (undated) DEVICE — CATHETER IV 22GA L1IN OD0.8382-0.9144MM ID0.6096-0.6858MM

## (undated) DEVICE — 1LYRTR 16FR10ML100%SIL UMS SNP: Brand: MEDLINE INDUSTRIES, INC.

## (undated) DEVICE — ELECTRODE,RADIOTRANSLUCENT,FOAM,3PK: Brand: MEDLINE

## (undated) DEVICE — SUTURE PERMAHAND SZ 3-0 L18IN NONABSORBABLE BLK L26MM SH C013D

## (undated) DEVICE — SUTURE VCRL SZ 3-0 L18IN ABSRB UD L26MM SH 1/2 CIR J864D

## (undated) DEVICE — HYPODERMIC SAFETY NEEDLE: Brand: MAGELLAN

## (undated) DEVICE — BAG SPEC BIOHZRD 10 X 10 IN --

## (undated) DEVICE — KENDALL SCD EXPRESS SLEEVES, KNEE LENGTH, MEDIUM: Brand: KENDALL SCD

## (undated) DEVICE — SET GRAV CK VLV NEEDLESS ST 3 GANGED 4WAY STPCOCK HI FLO 10

## (undated) DEVICE — STERILE POLYISOPRENE POWDER-FREE SURGICAL GLOVES: Brand: PROTEXIS

## (undated) DEVICE — SYRINGE MED 5ML STD CLR PLAS LUERLOCK TIP N CTRL DISP

## (undated) DEVICE — BLUNTFILL WITH FILTER: Brand: MONOJECT

## (undated) DEVICE — SUTURE PROL SZ 0 L30IN NONABSORBABLE BLU L40MM CT 1/2 CIR 8434H

## (undated) DEVICE — PAD,ABDOMINAL,5"X9",ST,LF,25/BX: Brand: MEDLINE INDUSTRIES, INC.

## (undated) DEVICE — 1200 GUARD II KIT W/5MM TUBE W/O VAC TUBE: Brand: GUARDIAN

## (undated) DEVICE — SOL IRRIGATION INJ NACL 0.9% 500ML BTL

## (undated) DEVICE — Device

## (undated) DEVICE — BLADELESS OPTICAL TROCAR WITH FIXATION CANNULA: Brand: VERSAONE

## (undated) DEVICE — UNIVERSAL FIXATION CANNULA: Brand: VERSAONE

## (undated) DEVICE — CATHETER IV 22GA L1IN OD0.8382-0.9144MM ID0.6096-0.6858MM 382523

## (undated) DEVICE — 3M™ CUROS™ DISINFECTING CAP FOR NEEDLELESS CONNECTORS 270/CARTON 20 CARTONS/CASE CFF1-270: Brand: CUROS™

## (undated) DEVICE — BLADE ES ELASTOMERIC COAT INSUL DURABLE BEND UPTO 90DEG

## (undated) DEVICE — TAPE,CLOTH/SILK,CURAD,3"X10YD,LF,40/CS: Brand: CURAD

## (undated) DEVICE — SET ADMIN 16ML TBNG L100IN 2 Y INJ SITE IV PIGGY BK DISP (ORDER IN MULIPLES OF 48)

## (undated) DEVICE — SUT ETHLN 3-0 18IN PS2 BLK --

## (undated) DEVICE — CATHETER IV 14GA L2IN POLYUR STR ORNG HUB SFTY RADPQ DISP

## (undated) DEVICE — BAG BELONG PT PERS CLEAR HANDL

## (undated) DEVICE — GLOVE SURG SZ 6 THK91MIL LTX FREE SYN POLYISOPRENE ANTI

## (undated) DEVICE — BITEBLOCK ENDOSCP 60FR MAXI WHT POLYETH STURDY W/ VELC WVN

## (undated) DEVICE — FORCEPS BX L240CM JAW DIA2.8MM L CAP W/ NDL MIC MESH TOOTH

## (undated) DEVICE — CUFF BLD PRSS AD SZ 11 FIT 25-34CM LNG SFT 1 TB W/ M BAYNT

## (undated) DEVICE — TOWEL SURG W17XL27IN STD BLU COT NONFENESTRATED PREWASHED

## (undated) DEVICE — SUTURE PDS II SZ 0 L36IN ABSRB VLT L40MM CT 1/2 CIR Z358T

## (undated) DEVICE — BASIN ST MAJOR-NO CAUTERY: Brand: MEDLINE INDUSTRIES, INC.

## (undated) DEVICE — RELOAD STPL L60MM H1.5-3.6MM REG TISS BLU GRIPPING SURF B

## (undated) DEVICE — SEALER TISS L14CM DIA5MM ADV BPLR CRV TIP OPN APPRCH ENSEAL

## (undated) DEVICE — LAPAROTOMY-SFMC: Brand: MEDLINE INDUSTRIES, INC.

## (undated) DEVICE — SYR 10ML LUER LOK 1/5ML GRAD --

## (undated) DEVICE — SYRINGE MED 3ML CLR PLAS STD N CTRL LUERLOCK TIP DISP

## (undated) DEVICE — TOTAL TRAY, 16FR 10ML SIL FOLEY, URN: Brand: MEDLINE

## (undated) DEVICE — SOLIDIFIER FLD 2OZ 1500CC N DISINF IN BTL DISP SAFESORB

## (undated) DEVICE — SUTURE SZ 0 27IN 5/8 CIR UR-6  TAPER PT VIOLET ABSRB VICRYL J603H

## (undated) DEVICE — SPONGE LAPAROTOMY W18XL18IN WHITE STRUNG RADIOPAQUE STERILE

## (undated) DEVICE — SYRINGE MED 20ML STD CLR PLAS LUERLOCK TIP N CTRL DISP

## (undated) DEVICE — 3M™ MEDIPORE™ H SOFT CLOTH TAPE SHORT ROLL TAPE 6INCHES X 2 YARDS 16 ROLLS/CASE 2866S: Brand: 3M™ MEDIPORE™

## (undated) DEVICE — SYRINGE 50ML E/T

## (undated) DEVICE — IV STRT KT 3282] LSL INDUSTRIES INC]

## (undated) DEVICE — SPONGE GZ W4XL4IN COT 12 PLY TYP VII WVN C FLD DSGN STERILE

## (undated) DEVICE — ESOPHAGEAL BALLOON DILATATION CATHETER: Brand: CRE FIXED WIRE

## (undated) DEVICE — 3000CC GUARDIAN II: Brand: GUARDIAN

## (undated) DEVICE — BARRIER TISS ABSRB 3X5IN --

## (undated) DEVICE — GLOVE ORANGE PI 7 1/2   MSG9075

## (undated) DEVICE — BLADE ASSEMB CLP HAIR FINE --

## (undated) DEVICE — BLUNTFILL: Brand: MONOJECT

## (undated) DEVICE — SOLUTION IRRIG 1000ML 0.9% SOD CHL USP POUR PLAS BTL

## (undated) DEVICE — CANNULA CUSH AD W/ 14FT TBG

## (undated) DEVICE — LIQUIBAND RAPID ADHESIVE 36/CS 0.8ML: Brand: MEDLINE

## (undated) DEVICE — BASIN EMSIS 16OZ GRAPHITE PLAS KID SHP MOLD GRAD FOR ORAL

## (undated) DEVICE — CONTAINER SPEC 20 ML LID NEUT BUFF FORMALIN 10 % POLYPR STS

## (undated) DEVICE — PENCIL SMK EVAC 10 FT BLADE ELECTRD ROCKER FOR TELSCP

## (undated) DEVICE — (D)PREP SKN CHLRAPRP APPL 26ML -- CONVERT TO ITEM 371833

## (undated) DEVICE — TROCAR SITE CLOSURE DEVICE: Brand: ENDO CLOSE

## (undated) DEVICE — ELECTRODE PT RET AD L9FT HI MOIST COND ADH HYDRGEL CORDED

## (undated) DEVICE — SPONGE DRN W4XL4IN RAYON/POLYESTER 6 PLY NONWOVEN PRECUT 2 PER PK

## (undated) DEVICE — SUTURE MCRYL SZ 4-0 L27IN ABSRB UD L19MM PS-2 1/2 CIR PRIM Y426H

## (undated) DEVICE — STAPLER SKIN H3.9MM WIRE DIA0.58MM CRWN 6.9MM 35 STPL ROT

## (undated) DEVICE — PREMIUM WET SKIN PREP TRAY: Brand: MEDLINE INDUSTRIES, INC.

## (undated) DEVICE — APPLIER LIG CLP 5MM CONTAIN 16 TI CLP DISP ENDO CLP

## (undated) DEVICE — CATH IV AUTOGRD BC PNK 20GA 25 -- INSYTE

## (undated) DEVICE — PACK,LAPAROTOMY,2 REINFORCED GOWNS: Brand: MEDLINE

## (undated) DEVICE — INFECTION CONTROL KIT SYS

## (undated) DEVICE — RELOAD STPL L60MM H1-2.6MM MESENTERY THN TISS WHT 6 ROW

## (undated) DEVICE — SURGICAL PROCEDURE KIT GEN LAPAROSCOPY LF

## (undated) DEVICE — SET ADMIN 16ML TBNG L100IN 2 Y INJ SITE IV PIGGY BK DISP

## (undated) DEVICE — SUTURE PERMAHAND SZ 3-0 L30IN NONABSORBABLE BLK SILK BRAID A304H

## (undated) DEVICE — LIGHT HANDLE: Brand: DEVON

## (undated) DEVICE — TRANSFER SET 3": Brand: MEDLINE INDUSTRIES, INC.

## (undated) DEVICE — HANDPIECE SET WITH BONE CLEANING TIP AND SUCTION TUBE: Brand: INTERPULSE

## (undated) DEVICE — (D)SENSOR RMFG 02 PULS OXMTR -- DISC BY MFR USE ITEM 133445

## (undated) DEVICE — SOLIDIFIER MEDC 1200ML -- CONVERT TO 356117

## (undated) DEVICE — SIMPLICITY FLUFF UNDERPAD 23X36, MODERATE: Brand: SIMPLICITY

## (undated) DEVICE — TUBING INSUFLTN 10FT LUER -- CONVERT TO ITEM 368568

## (undated) DEVICE — Device: Brand: SENSURA MIO

## (undated) DEVICE — KIT COLON W/ 1.1OZ LUB AND 2 END

## (undated) DEVICE — SPECIMEN RETRIEVAL POUCH: Brand: ENDO CATCH GOLD

## (undated) DEVICE — GLOVE SURG SZ 75 L1212IN FNGR THK138MIL BRN LTX FREE

## (undated) DEVICE — CLICKLINE SCISSORS INSERT: Brand: CLICKLINE

## (undated) DEVICE — SUTURE PDS II SZ 2-0 L36IN ABSRB VLT CT L40MM 1/2 CIR TAPR Z357H

## (undated) DEVICE — PENCIL ES CRD L10FT HND SWCHING ROCK SWCH W/ EDGE COAT BLDE

## (undated) DEVICE — SENSOR PLSE OXMTR AD CBL L3FT ADH TRANSMISSIVE

## (undated) DEVICE — DRAPE FLD WRM W44XL66IN C6L FOR INTRATEMP SYS THERMABASIN

## (undated) DEVICE — DERMABOND SKIN ADH 0.7ML -- DERMABOND ADVANCED 12/BX

## (undated) DEVICE — SYRINGE MED 10ML LUERLOCK TIP W/O SFTY DISP

## (undated) DEVICE — CATH IV AUTOGRD BC BLU 22GA 25 -- INSYTE

## (undated) DEVICE — DRAIN SURG WND 15 FR RND TIP SIL STRL LF DISP

## (undated) DEVICE — RESERVOIR,SUCTION,100CC,SILICONE: Brand: MEDLINE

## (undated) DEVICE — SYR ASSEMB INFL BLLN 60ML --

## (undated) DEVICE — CANN NASAL O2 CAPNOGRAPHY AD -- FILTERLINE

## (undated) DEVICE — CANISTER, RIGID, 3000CC: Brand: MEDLINE INDUSTRIES, INC.

## (undated) DEVICE — GLOVE ORTHO 8   MSG9480

## (undated) DEVICE — NEEDLE HYPO 22GA L1.5IN BLK S STL HUB POLYPR SHLD REG BVL

## (undated) DEVICE — 3-0 COATED VICRYL PLUS UNDYED 1X27" SH --

## (undated) DEVICE — DRAPE THERMABASIN INTRATEMP --

## (undated) DEVICE — CUFF RMFG BP INF SZ 11 DISP -- LAWSON OEM ITEM 238915

## (undated) DEVICE — SURGICAL PROCEDURE PACK TISS 3X5 IN ABSORBABLE SEPRAFILM

## (undated) DEVICE — DEVON™ KNEE AND BODY STRAP 60" X 3" (1.5 M X 7.6 CM): Brand: DEVON

## (undated) DEVICE — REM POLYHESIVE ADULT PATIENT RETURN ELECTRODE: Brand: VALLEYLAB

## (undated) DEVICE — DEVICE TRNSF SPIK STL 2008S] MICROTEK MEDICAL INC]

## (undated) DEVICE — BLADELESS OPTICAL TROCAR WITH FIXATION CANNULA: Brand: VERSAPORT

## (undated) DEVICE — E-Z CLEAN, PTFE COATED, ELECTROSURGICAL LAPAROSCOPIC ELECTRODE, L-HOOK, 33 CM., SINGLE-USE, FOR USE WITH HAND CONTROL PENCIL: Brand: MEGADYNE

## (undated) DEVICE — BLANKET WRM W35.4XL86.6IN FULL UNDERBODY + FORC AIR